# Patient Record
Sex: FEMALE | Race: WHITE | NOT HISPANIC OR LATINO | Employment: FULL TIME | URBAN - METROPOLITAN AREA
[De-identification: names, ages, dates, MRNs, and addresses within clinical notes are randomized per-mention and may not be internally consistent; named-entity substitution may affect disease eponyms.]

---

## 2017-11-08 ENCOUNTER — OFFICE VISIT (OUTPATIENT)
Dept: FAMILY MEDICINE | Facility: CLINIC | Age: 47
End: 2017-11-08
Payer: COMMERCIAL

## 2017-11-08 ENCOUNTER — TRANSFERRED RECORDS (OUTPATIENT)
Dept: HEALTH INFORMATION MANAGEMENT | Facility: CLINIC | Age: 47
End: 2017-11-08

## 2017-11-08 VITALS
WEIGHT: 175.25 LBS | TEMPERATURE: 99.4 F | BODY MASS INDEX: 29.2 KG/M2 | HEART RATE: 86 BPM | HEIGHT: 65 IN | RESPIRATION RATE: 16 BRPM | DIASTOLIC BLOOD PRESSURE: 72 MMHG | OXYGEN SATURATION: 99 % | SYSTOLIC BLOOD PRESSURE: 124 MMHG

## 2017-11-08 DIAGNOSIS — E03.9 HYPOTHYROIDISM, UNSPECIFIED TYPE: ICD-10-CM

## 2017-11-08 DIAGNOSIS — Z23 NEED FOR PROPHYLACTIC VACCINATION AND INOCULATION AGAINST INFLUENZA: ICD-10-CM

## 2017-11-08 DIAGNOSIS — Z00.00 ROUTINE GENERAL MEDICAL EXAMINATION AT A HEALTH CARE FACILITY: Primary | ICD-10-CM

## 2017-11-08 DIAGNOSIS — Z12.31 VISIT FOR SCREENING MAMMOGRAM: ICD-10-CM

## 2017-11-08 DIAGNOSIS — G62.9 NEUROPATHY: ICD-10-CM

## 2017-11-08 PROCEDURE — 90686 IIV4 VACC NO PRSV 0.5 ML IM: CPT | Performed by: FAMILY MEDICINE

## 2017-11-08 PROCEDURE — 93000 ELECTROCARDIOGRAM COMPLETE: CPT | Performed by: FAMILY MEDICINE

## 2017-11-08 PROCEDURE — 99396 PREV VISIT EST AGE 40-64: CPT | Mod: 25 | Performed by: FAMILY MEDICINE

## 2017-11-08 PROCEDURE — 90471 IMMUNIZATION ADMIN: CPT | Performed by: FAMILY MEDICINE

## 2017-11-08 ASSESSMENT — PAIN SCALES - GENERAL: PAINLEVEL: NO PAIN (0)

## 2017-11-08 NOTE — PROGRESS NOTES
Injectable Influenza Immunization Documentation    1.  Is the person to be vaccinated sick today?   No    2. Does the person to be vaccinated have an allergy to a component   of the vaccine?   No  Egg Allergy Algorithm Link    3. Has the person to be vaccinated ever had a serious reaction   to influenza vaccine in the past?   No    4. Has the person to be vaccinated ever had Guillain-Barré syndrome?   No    Form completed by Mitzy Rodriguez CMA    Prior to injection verified patient identity using patient's name and date of birth. Pt was advised to stay in clinic for 20 minutes and report any reactions.

## 2017-11-08 NOTE — PROGRESS NOTES
SUBJECTIVE:   CC: Vivian Ya is an 47 year old woman who presents for preventive health visit.     Physical   Annual:     Getting at least 3 servings of Calcium per day::  Yes    Bi-annual eye exam::  Yes    Dental care twice a year::  Yes    Sleep apnea or symptoms of sleep apnea::  Daytime drowsiness    Diet::  Regular (no restrictions)    Frequency of exercise::  1 day/week    Duration of exercise::  45-60 minutes    Taking medications regularly::  Yes    Medication side effects::  None    Additional concerns today::  YES          Concerns: bilateral feet pain for several months  While sitting with her legs bent under her she sees black spots and feels faint.  While lying down last night she felt her heart was irregular.        Today's PHQ-2 Score: PHQ-2 ( 1999 Pfizer) 11/8/2017   Q1: Little interest or pleasure in doing things 0   Q2: Feeling down, depressed or hopeless 1   PHQ-2 Score 1   Q1: Little interest or pleasure in doing things Not at all   Q2: Feeling down, depressed or hopeless Several days   PHQ-2 Score 1       Abuse: Current or Past(Physical, Sexual or Emotional)- No  Do you feel safe in your environment - Yes    Social History   Substance Use Topics     Smoking status: Never Smoker     Smokeless tobacco: Never Used     Alcohol use 0.0 oz/week     0 Standard drinks or equivalent per week     The patient does not drink >3 drinks per day nor >7 drinks per week.    Reviewed orders with patient.  Reviewed health maintenance and updated orders accordingly - Yes  BP Readings from Last 3 Encounters:   11/08/17 124/72   10/25/16 98/64   09/28/15 107/75    Wt Readings from Last 3 Encounters:   11/08/17 175 lb 4 oz (79.5 kg)   10/25/16 175 lb (79.4 kg)   09/28/15 162 lb (73.5 kg)                  Current Outpatient Prescriptions   Medication Sig Dispense Refill     Simethicone (GAS RELIEF PO) Take by mouth daily       levothyroxine (SYNTHROID,LEVOTHROID) 100 MCG tablet Take 1 tablet (100 mcg) by  "mouth daily 90 tablet 3         Mammogram not appropriate for this patient based on age.    Pertinent mammograms are reviewed under the imaging tab.  History of abnormal Pap smear: NO - age 30- 65 PAP every 3 years recommended    Reviewed and updated as needed this visit by clinical staff         Reviewed and updated as needed this visit by Provider          Review of Systems  C: NEGATIVE for fever, chills, change in weight  I: NEGATIVE for worrisome rashes, moles or lesions  E: NEGATIVE for vision changes or irritation  ENT: NEGATIVE for ear, mouth and throat problems  RESP:NEGATIVE for significant cough or SOB and dyspnea on exertion mild   B: NEGATIVE for masses, tenderness or discharge  CV: chest pain/pressure and irregular heart beat  GI: NEGATIVE for nausea, abdominal pain, heartburn, or change in bowel habits  : NEGATIVE for unusual urinary or vaginal symptoms. Periods are regular.  M: Painful feet in AM has had work up no findings will see [facility] if persists   N: NEGATIVE for weakness, dizziness or paresthesias  NEURO: lightheaded when standing, tingling in feet   P: NEGATIVE for changes in mood or affect  PSYCHIATRIC: stress and relationship issues      OBJECTIVE:   /72 (BP Location: Left arm, Patient Position: Chair, Cuff Size: Adult Regular)  Pulse 86  Temp 99.4  F (37.4  C) (Tympanic)  Resp 16  Ht 5' 4.5\" (1.638 m)  Wt 175 lb 4 oz (79.5 kg)  SpO2 99%  BMI 29.62 kg/m2  Physical Exam  GENERAL: healthy, alert and no distress  EYES: Eyes grossly normal to inspection, PERRL and conjunctivae and sclerae normal  HENT: ear canals and TM's normal, nose and mouth without ulcers or lesions  NECK: no adenopathy, no asymmetry, masses, or scars and thyroid normal to palpation  RESP: lungs clear to auscultation - no rales, rhonchi or wheezes  CV: regular rate and rhythm, normal S1 S2, no S3 or S4, no murmur, click or rub, no peripheral edema and peripheral pulses strong  MS: no gross musculoskeletal " "defects noted, no edema  NEURO: Normal strength and tone, mentation intact and speech normal  PSYCH: mentation appears normal, affect normal/bright      Fasting labs ordered     ASSESSMENT/PLAN:   1. Routine general medical examination at a health care facility      2. Visit for screening mammogram      3. Need for prophylactic vaccination and inoculation against influenza    - Vaccine Administration, Initial [35278]  - FLU VAC, SPLIT VIRUS IM > 3 YO (QUADRIVALENT) [41606]  - Vaccine Administration, Initial [11536]    4. Hypothyroidism, unspecified type  Will inform pt. of all test results and any care plan changes.    - TSH with free T4 reflex FUTURE anytime; Future    5. Neuropathy  In her feet   - **Glucose FUTURE anytime; Future  6. Palpitations       If they persist will get Holter monitor of event monitor   7. Chest symptoms; will walk over the next two weeks if pain, pressure of SOB will call and we will schedule a stress thallium       COUNSELING:  Reviewed preventive health counseling, as reflected in patient instructions       Regular exercise       Healthy diet/nutrition         reports that she has never smoked. She has never used smokeless tobacco.    Estimated body mass index is 29.62 kg/(m^2) as calculated from the following:    Height as of this encounter: 5' 4.5\" (1.638 m).    Weight as of this encounter: 175 lb 4 oz (79.5 kg).   Weight management plan: Discussed healthy diet and exercise guidelines and patient will follow up in 12 months in clinic to re-evaluate.    Counseling Resources:  ATP IV Guidelines  Pooled Cohorts Equation Calculator  Breast Cancer Risk Calculator  FRAX Risk Assessment  ICSI Preventive Guidelines  Dietary Guidelines for Americans, 2010  USDA's MyPlate  ASA Prophylaxis  Lung CA Screening    Harry Cancino MD, MD  [facility]  Answers for HPI/ROS submitted by the patient on 11/8/2017   PHQ-2 Score: 1    Injectable Influenza Immunization Documentation    1.  " Is the person to be vaccinated sick today?   No    2. Does the person to be vaccinated have an allergy to a component   of the vaccine?   No  Egg Allergy Algorithm Link    3. Has the person to be vaccinated ever had a serious reaction   to influenza vaccine in the past?   No    4. Has the person to be vaccinated ever had Guillain-Barré syndrome?   No    Form completed by Veronica Perales, [employer]

## 2017-11-08 NOTE — MR AVS SNAPSHOT
After Visit Summary   11/8/2017    Vivian Ya    MRN: [id number]           Patient Information     Date Of Birth          1970        Visit Information        Provider Department      11/8/2017 9:10 AM Harry Cancino MD [facility]        Today's Diagnoses     Routine general medical examination at a health care facility    -  1    Visit for screening mammogram        Need for prophylactic vaccination and inoculation against influenza        Hypothyroidism, unspecified type        Neuropathy          Care Instructions      Preventive Health Recommendations  Female Ages 40 to 49    Yearly exam:     See your health care provider every year in order to  1. Review health changes.   2. Discuss preventive care.    3. Review your medicines if your doctor prescribed any.      Get a Pap test every three years (unless you have an abnormal result and your provider advises testing more often).      If you get Pap tests with HPV test, you only need to test every 5 years, unless you have an abnormal result. You do not need a Pap test if your uterus was removed (hysterectomy) and you have not had cancer.      You should be tested each year for STDs (sexually transmitted diseases), if you're at risk.       Ask your doctor if you should have a mammogram.      Have a colonoscopy (test for colon cancer) if someone in your family has had colon cancer or polyps before age 50.       Have a cholesterol test every 5 years.       Have a diabetes test (fasting glucose) after age 45. If you are at risk for diabetes, you should have this test every 3 years.    Shots: Get a flu shot each year. Get a tetanus shot every 10 years.     Nutrition:     Eat at least 5 servings of fruits and vegetables each day.    Eat whole-grain bread, whole-wheat pasta and brown rice instead of white grains and rice.    Talk to your provider about Calcium and Vitamin D.     Lifestyle    Exercise at least 150 minutes a week  "(an average of 30 minutes a day, 5 days a week). This will help you control your weight and prevent disease.    Limit alcohol to one drink per day.    No smoking.     Wear sunscreen to prevent skin cancer.    See your dentist every six months for an exam and cleaning.          Follow-ups after your visit        Your next 10 appointments already scheduled     Nov 21, 2017  9:30 AM CST   MA SCREENING DIGITAL BILATERAL with PHMA1   [facility] ([facility])    [address] [place] [address] [phone]           Do not use any powder, lotion or deodorant under your arms or on your breast. If you do, we will ask you to remove it before your exam.  Wear comfortable, two-piece clothing.  If you have any allergies, tell your care team.  Bring any previous mammograms from other facilities or have them mailed to the breast center. Three-dimensional (3D) mammograms are available at [facility] locations in [facility], [facility], and [place]. Health locations include [facility] in [place]. Benefits of 3D mammograms include: - Improved rate of cancer detection - Decreases your chance of having to go back for more tests, which means fewer: - \"False-positive\" results (This means that there is an abnormal area but it isn't cancer.) - Invasive testing procedures, such as a biopsy or surgery - Can provide clearer images of the breast if you have dense breast tissue. 3D mammography is an optional exam that anyone can have with a 2D mammogram. It doesn't replace or take the place of a 2D mammogram. 2D mammograms remain an effective screening test for all women.  Not all insurance companies cover the cost of a 3D mammogram. Check with your insurance.              Future tests that were ordered for you today     Open Future Orders        Priority Expected Expires Ordered    **Glucose FUTURE anytime Routine " "11/8/2017 11/8/2018 11/8/2017    **TSH with free T4 reflex FUTURE anytime Routine 11/8/2017 11/8/2018 11/8/2017            Who to contact     If you have questions or need follow up information about today's clinic visit or your schedule please contact [facility] directly at [phone].  Normal or non-critical lab and imaging results will be communicated to you by MyChart, letter or phone within 4 business days after the clinic has received the results. If you do not hear from us within 7 days, please contact the clinic through Numerifyt or phone. If you have a critical or abnormal lab result, we will notify you by phone as soon as possible.  Submit refill requests through Airizu or call your pharmacy and they will forward the refill request to us. Please allow 3 business days for your refill to be completed.          Additional Information About Your Visit        SwagsyharRegalamos Information     Airizu gives you secure access to your electronic health record. If you see a primary care provider, you can also send messages to your care team and make appointments. If you have questions, please call your primary care clinic.  If you do not have a primary care provider, please call [phone] and they will assist you.        Care EveryWhere ID     This is your Care EveryWhere ID. This could be used by other organizations to access your [facility] medical records  [id number]        Your Vitals Were     Pulse Temperature Respirations Height Last Period Pulse Oximetry    86 99.4  F (37.4  C) (Tympanic) 16 5' 4.5\" (1.638 m) 10/24/2017 (Exact Date) 99%    BMI (Body Mass Index)                   29.62 kg/m2            Blood Pressure from Last 3 Encounters:   11/08/17 124/72   10/25/16 98/64   09/28/15 107/75    Weight from Last 3 Encounters:   11/08/17 175 lb 4 oz (79.5 kg)   10/25/16 175 lb (79.4 kg)   09/28/15 162 lb (73.5 kg)              We Performed the Following     FLU VAC, SPLIT VIRUS IM > 3 YO " (QUADRIVALENT) [08689]     Vaccine Administration, Initial [85963]     Vaccine Administration, Initial [67111]        Primary Care Provider Office Phone # Fax #    Harry Cancino [phone] [address] [place] [address]        Equal Access to Services     [facility]: Hadii aad ku hadasho Soomaali, waaxda luqadaha, qaybta kaalmada adeegyada, waxay stephaniein haykenyattan adefer khmikeyzuly lazoë cooney. So [facility] [phone].    ATENCIÓN: Si habla español, tiene a workman disposición servicios gratuitos de asistencia lingüística. Llame al [phone].    We comply with applicable federal civil rights laws and [place] laws. We do not discriminate on the basis of race, color, national origin, age, disability, sex, sexual orientation, or gender identity.            Thank you!     Thank you for choosing [facility]  for your care. Our goal is always to provide you with excellent care. Hearing back from our patients is one way we can continue to improve our services. Please take a few minutes to complete the written survey that you may receive in the mail after your visit with us. Thank you!             Your Updated Medication List - Protect others around you: Learn how to safely use, store and throw away your medicines at www.disposemymeds.org.          This list is accurate as of: 11/8/17  4:08 PM.  Always use your most recent med list.                   Brand Name Dispense Instructions for use Diagnosis    GAS RELIEF PO      Take by mouth daily        levothyroxine 100 MCG tablet    SYNTHROID/LEVOTHROID    90 tablet    Take 1 tablet (100 mcg) by mouth daily    Other specified hypothyroidism

## 2017-11-08 NOTE — NURSING NOTE
"Chief Complaint   Patient presents with     Physical       Initial /72 (BP Location: Left arm, Patient Position: Chair, Cuff Size: Adult Regular)  Pulse 86  Temp 99.4  F (37.4  C) (Tympanic)  Resp 16  Ht 5' 4.5\" (1.638 m)  Wt 175 lb 4 oz (79.5 kg)  LMP 10/24/2017 (Exact Date)  SpO2 99%  BMI 29.62 kg/m2 Estimated body mass index is 29.62 kg/(m^2) as calculated from the following:    Height as of this encounter: 5' 4.5\" (1.638 m).    Weight as of this encounter: 175 lb 4 oz (79.5 kg).  Medication Reconciliation: complete   Health Maintenance Due   Topic Date Due     INFLUENZA VACCINE (SYSTEM ASSIGNED)  09/01/2017     MAMMO Q1 YR  11/14/2017     Veronica Perales, [facility]      "

## 2017-11-09 DIAGNOSIS — E03.9 HYPOTHYROIDISM, UNSPECIFIED TYPE: ICD-10-CM

## 2017-11-09 DIAGNOSIS — G62.9 NEUROPATHY: ICD-10-CM

## 2017-11-09 LAB
GLUCOSE SERPL-MCNC: 98 MG/DL (ref 70–99)
TSH SERPL DL<=0.005 MIU/L-ACNC: 3.62 MU/L (ref 0.4–4)

## 2017-11-09 PROCEDURE — 84443 ASSAY THYROID STIM HORMONE: CPT | Performed by: FAMILY MEDICINE

## 2017-11-09 PROCEDURE — 82947 ASSAY GLUCOSE BLOOD QUANT: CPT | Performed by: FAMILY MEDICINE

## 2017-11-09 PROCEDURE — 36415 COLL VENOUS BLD VENIPUNCTURE: CPT | Performed by: FAMILY MEDICINE

## 2017-11-21 ENCOUNTER — HOSPITAL ENCOUNTER (OUTPATIENT)
Dept: MAMMOGRAPHY | Facility: CLINIC | Age: 47
Discharge: HOME | End: 2017-11-21
Attending: FAMILY MEDICINE | Admitting: FAMILY MEDICINE
Payer: COMMERCIAL

## 2017-11-21 DIAGNOSIS — Z12.31 VISIT FOR SCREENING MAMMOGRAM: ICD-10-CM

## 2017-11-21 PROCEDURE — G0202 SCR MAMMO BI INCL CAD: HCPCS

## 2017-11-26 DIAGNOSIS — E03.8 OTHER SPECIFIED HYPOTHYROIDISM: ICD-10-CM

## 2017-11-27 RX ORDER — LEVOTHYROXINE SODIUM 100 UG/1
TABLET ORAL
Qty: 90 TABLET | Refills: 3 | Status: SHIPPED | OUTPATIENT
Start: 2017-11-27 | End: 2018-12-07

## 2017-11-27 NOTE — TELEPHONE ENCOUNTER
LOV 11/08/2017.    Prescription approved per [facility] Refill Protocol.    Enedina Coughlin RN     Requested Prescriptions   Pending Prescriptions Disp Refills     levothyroxine (SYNTHROID/LEVOTHROID) 100 MCG tablet [Pharmacy Med Name: LEVOTHYROXINE 100MCG TAB] 90 tablet 3     Sig: TAKE ONE TABLET BY MOUTH EVERY DAY    Thyroid Protocol Passed    11/27/2017  8:34 AM       Passed - Patient is 12 years or older       Passed - Recent or future visit with authorizing provider's specialty    Patient had office visit in the last year or has a visit in the next 30 days with authorizing provider.  See chart review.              Passed - Normal TSH on file in past 12 months    Recent Labs   Lab Test  11/09/17   0745   TSH  3.62             Passed - No active pregnancy on record    If patient is pregnant or has had a positive pregnancy test, please check TSH.         Passed - No positive pregnancy test in past 12 months    If patient is pregnant or has had a positive pregnancy test, please check TSH.

## 2018-11-16 ASSESSMENT — PATIENT HEALTH QUESTIONNAIRE - PHQ9
SUM OF ALL RESPONSES TO PHQ QUESTIONS 1-9: 11
10. IF YOU CHECKED OFF ANY PROBLEMS, HOW DIFFICULT HAVE THESE PROBLEMS MADE IT FOR YOU TO DO YOUR WORK, TAKE CARE OF THINGS AT HOME, OR GET ALONG WITH OTHER PEOPLE: VERY DIFFICULT
SUM OF ALL RESPONSES TO PHQ QUESTIONS 1-9: 11

## 2018-11-17 ASSESSMENT — PATIENT HEALTH QUESTIONNAIRE - PHQ9: SUM OF ALL RESPONSES TO PHQ QUESTIONS 1-9: 11

## 2018-11-19 ENCOUNTER — OFFICE VISIT (OUTPATIENT)
Dept: OBGYN | Facility: OTHER | Age: 48
End: 2018-11-19
Payer: COMMERCIAL

## 2018-11-19 VITALS
SYSTOLIC BLOOD PRESSURE: 116 MMHG | WEIGHT: 171.2 LBS | DIASTOLIC BLOOD PRESSURE: 74 MMHG | BODY MASS INDEX: 28.52 KG/M2 | HEIGHT: 65 IN | HEART RATE: 72 BPM

## 2018-11-19 DIAGNOSIS — N85.2 ENLARGED UTERUS: ICD-10-CM

## 2018-11-19 DIAGNOSIS — Z12.31 ENCOUNTER FOR SCREENING MAMMOGRAM FOR BREAST CANCER: ICD-10-CM

## 2018-11-19 DIAGNOSIS — N93.9 ABNORMAL UTERINE BLEEDING (AUB): ICD-10-CM

## 2018-11-19 DIAGNOSIS — Z00.00 ANNUAL PHYSICAL EXAM: Primary | ICD-10-CM

## 2018-11-19 PROCEDURE — 99386 PREV VISIT NEW AGE 40-64: CPT | Performed by: OBSTETRICS & GYNECOLOGY

## 2018-11-19 NOTE — MR AVS SNAPSHOT
After Visit Summary   11/19/2018    Vivian Ya    MRN: [id number]           Patient Information     Date Of Birth          1970        Visit Information        Provider Department      11/19/2018 9:30 AM Leonardo Anderson MD [facility]        Today's Diagnoses     Annual physical exam    -  1    Encounter for screening mammogram for breast cancer        Abnormal uterine bleeding (AUB)        Enlarged uterus           Follow-ups after your visit        Your next 10 appointments already scheduled     Dec 05, 2018  1:40 PM CST   US PELVIC COMPLETE W TRANSVAGINAL with [id number]   [facility]    [address] [facility] [address] [phone]           How do I prepare for my exam? (Food and drink instructions) Adults: Drink four 8-ounce glasses of fluid. Finish drinking an hour before your exam, so you have a full bladder. If you need to empty your bladder before your exam, try to release only a little urine. Then, drink another glass of fluid.  Children: * Children who are potty trained up to 6 years old should drink at least 2 cups (16 oz) of water/non-carbonated beverage 30 minutes prior to the exam. * Children who are 6-10 years should drink at least 3 cups (24 oz) of water/non-carbonated beverage 45 minutes prior to the exam. * Children who are 10 years or older should drink at least 4 cups (32 oz) of water/non-carbonated beverage 45 minutes prior to the exam.  If your child is very uncomfortable or has an urgent need to pee, please notify a technologist; they will try to find out how much longer the wait may be and provide instructions to help relieve the pressure.  What should I wear: Wear comfortable clothes.  How long does the exam take: Most ultrasounds take 30 to 60 minutes.  What should I bring: Bring a list of your medicines, including vitamins, minerals and over-the-counter drugs. It is safest to leave  personal items at home.  Do I need a :  No  is needed.  What do I need to tell my doctor: Tell your doctor about any allergies you may have.  What should I do after the exam: No restrictions, you may resume normal activities.  What is this test: An ultrasound uses sound waves to make pictures of the body. Sound waves do not cause pain. The only discomfort may be the pressure of the wand against your skin or full bladder.  Who should I call with questions: If you have any questions, please call the Imaging Department where you will have your exam. Directions, parking instructions, and other information are available on our website, [email or web address].              Future tests that were ordered for you today     Open Future Orders        Priority Expected Expires Ordered    US Pelvic Complete w Transvaginal Routine  11/19/2019 11/19/2018    *MA Screening Digital Bilateral Routine  11/19/2019 11/19/2018            Who to contact     If you have questions or need follow up information about today's clinic visit or your schedule please contact [facility] directly at [phone].  Normal or non-critical lab and imaging results will be communicated to you by Lightwirehart, letter or phone within 4 business days after the clinic has received the results. If you do not hear from us within 7 days, please contact the clinic through OneTokt or phone. If you have a critical or abnormal lab result, we will notify you by phone as soon as possible.  Submit refill requests through Novatris or call your pharmacy and they will forward the refill request to us. Please allow 3 business days for your refill to be completed.          Additional Information About Your Visit        Novatris Information     Novatris gives you secure access to your electronic health record. If you see a primary care provider, you can also send messages to your care team and make appointments. If you have questions, please call your  "primary care clinic.  If you do not have a primary care provider, please call [phone] and they will assist you.        Care EveryWhere ID     This is your Care EveryWhere ID. This could be used by other organizations to access your [facility] medical records  [id number]        Your Vitals Were     Pulse Height Last Period BMI (Body Mass Index)          72 5' 4.8\" (1.646 m) 10/17/2018 (Exact Date) 28.66 kg/m2         Blood Pressure from Last 3 Encounters:   11/19/18 116/74   11/08/17 124/72   10/25/16 98/64    Weight from Last 3 Encounters:   11/19/18 171 lb 3.2 oz (77.7 kg)   11/08/17 175 lb 4 oz (79.5 kg)   10/25/16 175 lb (79.4 kg)               Primary Care Provider Office Phone # Fax #    Harry Cancino [phone] [address] [place] [address]        Equal Access to Services     [facility]: Hadii aad ku hadasho Soomaali, waaxda luqadaha, qaybta kaalmada adeegyada, waxay ramona haykenyattan foreign nicole . So [facility] [phone].    ATENCIÓN: Si habla español, tiene a workman disposición servicios gratuitos de asistencia lingüística. Ll[facility] [phone].    We comply with applicable federal civil rights laws and [place] laws. We do not discriminate on the basis of race, color, national origin, age, disability, sex, sexual orientation, or gender identity.            Thank you!     Thank you for choosing [facility]  for your care. Our goal is always to provide you with excellent care. Hearing back from our patients is one way we can continue to improve our services. Please take a few minutes to complete the written survey that you may receive in the mail after your visit with us. Thank you!             Your Updated Medication List - Protect others around you: Learn how to safely use, store and throw away your medicines at www.disposemymeds.org.          This list is accurate as of 11/19/18 12:59 PM.  Always use your most recent med list.                   Brand " Name Dispense Instructions for use Diagnosis    GAS RELIEF PO      Take by mouth daily        levothyroxine 100 MCG tablet    SYNTHROID/LEVOTHROID    90 tablet    TAKE ONE TABLET BY MOUTH EVERY DAY    Other specified hypothyroidism

## 2018-11-19 NOTE — PROGRESS NOTES
"Clinic Note    CC:    Chief Complaint   Patient presents with     Physical        HPI:  Vivian Ya is a 48 year old  woman who presents for last annual exam.   She notes several years of intermittent depressed feelings. She first was aware of this following her husbands stroke, though denies triggers for ongoing symptoms. She has not been previously seen nor evaluated for this. The mood changes have not interfered with her daily activities, she states that she just feels \"less mike in my life.\" Menstrual cycles have typically been regular and monthly, though over the past several months, the intervals have varied more and the bleeding length less regular that prior. She otherwise feels physically well. Denies breast changes, GI symptoms, changes in diet or weight, pelvic or vaginal symptoms, urinary or bowel symptoms.     Ob Hx:     Gyn Hx: Patient's last menstrual period was 10/17/2018 (exact date).  Menses as above   Last pap NILM, HPV- (10/16)   STI history negative   Using condoms for birth control  PMH:   Past Medical History:   Diagnosis Date     Family history of colon cancer     Colonoscoy q5 years next 2020     Hypothyroidism      SurgHx:   Past Surgical History:   Procedure Laterality Date     COLONOSCOPY N/A 2015    Procedure: COLONOSCOPY;  Surgeon: Eugenio Travis MD;  Location:  GI     TONSILLECTOMY       FamHx:   Family History   Problem Relation Age of Onset     Colon Cancer Maternal Grandmother 91     Hypertension Mother      Hyperlipidemia Mother      Cancer Mother 65     GIST     Other Cancer Mother      Coronary Artery Disease Father      MI     Thyroid Disease Sister      Asthma Daughter      SocHx:   Social History     Social History     Marital status:      Spouse name: N/A     Number of children: N/A     Years of education: N/A     Occupational History           Physical Therapy     Social History Main Topics     Smoking status: Never Smoker     " "Smokeless tobacco: Never Used     Alcohol use 0.0 oz/week     0 Standard drinks or equivalent per week      Comment: 1-2 times/month     Drug use: No     Sexual activity: Yes     Partners: Male     Birth control/ protection: Condom     Allergies:   Latex and Seasonal allergies    Current Medications:   Current Outpatient Prescriptions   Medication Sig Dispense Refill     levothyroxine (SYNTHROID/LEVOTHROID) 100 MCG tablet TAKE ONE TABLET BY MOUTH EVERY DAY 90 tablet 3     Simethicone (GAS RELIEF PO) Take by mouth daily       ROS: As described in HPI, otherwise negative for fever/chills, fatigue, dizziness, changes or new deficits in vision, weight changes, worrisome rashes, new lumps or masses, cough/SOB/CP, nausea/vomit, GI distress, dysuria, abnormal vaginal discharge, constipation/diarrhea, neurological deficits, changes in ADL, changes in skin or hair, heat or cold intolerance, or other systemic complaints    EXAM:  Vitals:    11/19/18 0935   BP: 116/74   BP Location: Right arm   Cuff Size: Adult Regular   Pulse: 72   Weight: 171 lb 3.2 oz (77.7 kg)   Height: 5' 4.8\" (1.646 m)    Body mass index is 28.66 kg/(m^2).    Gen: Alert, oriented, appropriately interactive, NAD  Neck: soft, no cervical adenopathy, no masses  CV: RRR, no murmurs, no extra heart sounds, 2+ peripheral pulses  Resp: CTAB, good effort without distress   Breasts: no axillary adenopathy, no dominant masses, no skin changes, no nipple discharge, nontender  Abdomen: soft, non tender, non distended, no masses, no hernias. No inguinal lymphadenopathy. No hepatosplenomegaly  Perineum: no lesions; normal appearing external genitalia, bartholins glands, urethra, skenes glands  Vagina : no masses or lesions or discharge, normally rugated.  Cervix: no masses or lesions or discharge   Bimanual exam:   Urethra nontender   Bladder- nontender and without massess , well supported   Uterus- midline , enlarged, anteverted, mobile , no masses, " non-tender  Adnexa - no masses or tenderness   No cervical motion tenderness  Lower extremities: non-tender, no edema  Skin: no lesions or rashes    Labs & Imaging:  No results found for this or any previous visit (from the past 24 hour(s)).    Lab Results   Component Value Date    PAP NIL 10/25/2016    PAP NIL 2013     ASSESSMENT/PLAN: Vivian Ya is a 48 year old  woman who presents for last annual exam.     1. Depressed mood  - Given prolonged course, we discussed first line antidepressants as well as a referral to mental health, I recommended consideration of starting an serotonin specific reuptake inhibitor today. She declined at this time, plans to become more active and develop a hobby. I encouraged these activities, though pointed out that the mood changes may make this effort more difficult, and to consider treatment if she finds that she is struggling.   - We also discussed that her TSH is technically normal, though a bit elevated, she could consider discussing a mild increase in her synthroid with her PCP    2. Abnormal uterine bleeding (AUB)  - Perimenopausal is a possibility, though given enlarged uterus, will collect an Ultrasound   - US Pelvic Complete w Transvaginal; Future    3. Annual exam  - *MA Screening Digital Bilateral; Future  - PAP not due    Leonardo Anderson MD  2018 12:27 PM      Answers for HPI/ROS submitted by the patient on 2018   PHQ-2 Score: 4  If you checked off any problems, how difficult have these problems made it for you to do your work, take care of things at home, or get along with other people?: Very difficult  PHQ9 TOTAL SCORE: 11

## 2018-11-30 ENCOUNTER — HOSPITAL ENCOUNTER (OUTPATIENT)
Dept: MAMMOGRAPHY | Facility: CLINIC | Age: 48
Discharge: HOME | End: 2018-11-30
Attending: OBSTETRICS & GYNECOLOGY | Admitting: OBSTETRICS & GYNECOLOGY
Payer: COMMERCIAL

## 2018-11-30 DIAGNOSIS — Z12.31 VISIT FOR SCREENING MAMMOGRAM: ICD-10-CM

## 2018-11-30 PROCEDURE — 77063 BREAST TOMOSYNTHESIS BI: CPT

## 2018-12-05 ENCOUNTER — RADIANT APPOINTMENT (OUTPATIENT)
Dept: ULTRASOUND IMAGING | Facility: OTHER | Age: 48
End: 2018-12-05
Payer: COMMERCIAL

## 2018-12-05 ENCOUNTER — TELEPHONE (OUTPATIENT)
Dept: OBGYN | Facility: OTHER | Age: 48
End: 2018-12-05

## 2018-12-05 DIAGNOSIS — N93.9 ABNORMAL UTERINE BLEEDING (AUB): ICD-10-CM

## 2018-12-05 DIAGNOSIS — E03.8 OTHER SPECIFIED HYPOTHYROIDISM: ICD-10-CM

## 2018-12-05 DIAGNOSIS — N93.9 ABNORMAL UTERINE BLEEDING (AUB): Primary | ICD-10-CM

## 2018-12-05 DIAGNOSIS — N85.2 ENLARGED UTERUS: ICD-10-CM

## 2018-12-05 PROCEDURE — 76856 US EXAM PELVIC COMPLETE: CPT

## 2018-12-05 PROCEDURE — 76830 TRANSVAGINAL US NON-OB: CPT

## 2018-12-05 NOTE — TELEPHONE ENCOUNTER
Reason for Call:  Medication or medication refill:    Do you use a [facility]?  Name of the pharmacy and phone number for the current request:  [facility] - [phone]    Name of the medication requested: levothyroxine (SYNTHROID/LEVOTHROID) 100 MCG tablet    Other request: patient is wanting to increase the dose like you gayle talked about at her visit. She is also ready for a refill.  Any questions please call    Can we leave a detailed message on this number? YES    Phone number patient can be reached at: Cell number on file:    Telephone Information:   Mobile [phone]       Best Time: any    Call taken on 12/5/2018 at 1:34 PM by Tania Azevedo

## 2018-12-05 NOTE — TELEPHONE ENCOUNTER
Per Dr. Anderson's OV plan on 11/19/18, he suggested that pt discussing a mild increase with her PCP:    We also discussed that her TSH is technically normal, though a bit elevated, she could consider discussing a mild increase in her synthroid with her PCP    Left pt a detailed message.  Let her know that she needs to f/u with her PCP for possible synthroid increase and/or refills.    Conchita Powell RN

## 2018-12-05 NOTE — TELEPHONE ENCOUNTER
Please call Pt at [phone] (Work)  Ask for Pt. She would like to talk to someone about this situation.

## 2018-12-06 NOTE — TELEPHONE ENCOUNTER
"Spoke with pt.  She states that when she was leaving her appointment with Dr. Anderson on 11/19/18 that he told her \"let me know if you want me to increase your Synthroid\".  I explained to the pt that he does not manage thyroid levels and and suggested to her during her appointment that she needs to contact her PCP to increase her Synthroid.  Again, pt was adamant that Dr. Anderson had told her he would change her thyroid medication for her.  She states she will f/u with her primary but she will be out of her Synthroid medication this weekend and doesn't have an appt set up with her PCP yet.  I explained to pt that I could send her PCP a request to refill her current dose until she is able to see him.  Pt did not want me to do that since her PCP is out the rest of the week and she needs a refill before the weekend.  I explained to pt that I will send a message to Dr. Anderson.  She is requesting Dr. Anderson increase it just for a month and her PCP can determine what level is best for her.  Also, pt was concerned because she states that she had a pelvic US yesterday and the [employer] tech put in her LMP wrong.  Pt states her LMP was on 11/7/18.  I explained to pt that when I roomed her on 11/19/18, she told me the date of her LMP was 10/17/18 as in her note.  Pt denies this and said her LMP was 11/7/18.  Pt states she wants whoever reads her pelvic US to know this information and that she also started bleeding today, 12/6/18.  I explained to pt that I will send a message to Dr. Anderson but that he is off today and we won't hear from him until tomorrow.  Pt verbalized understanding and agreed to plan.  Pt states we can call her back on her cell phone and leave a detailed message.    Conchita Powell RN    "

## 2018-12-06 NOTE — TELEPHONE ENCOUNTER
Attempted to reach pt at her work number as listed below.  Left a message to call back.  Also attempted pt's cell phone number, left message for pt to return call to clinic.    Conchita Powell RN

## 2018-12-07 DIAGNOSIS — N93.9 ABNORMAL UTERINE BLEEDING (AUB): ICD-10-CM

## 2018-12-07 DIAGNOSIS — E03.8 OTHER SPECIFIED HYPOTHYROIDISM: ICD-10-CM

## 2018-12-07 LAB
ERYTHROCYTE [DISTWIDTH] IN BLOOD BY AUTOMATED COUNT: 16.1 % (ref 10–15)
HCT VFR BLD AUTO: 34.2 % (ref 35–47)
HGB BLD-MCNC: 10.5 G/DL (ref 11.7–15.7)
MCH RBC QN AUTO: 24.1 PG (ref 26.5–33)
MCHC RBC AUTO-ENTMCNC: 30.7 G/DL (ref 31.5–36.5)
MCV RBC AUTO: 78 FL (ref 78–100)
PLATELET # BLD AUTO: 356 10E9/L (ref 150–450)
RBC # BLD AUTO: 4.36 10E12/L (ref 3.8–5.2)
WBC # BLD AUTO: 8.3 10E9/L (ref 4–11)

## 2018-12-07 PROCEDURE — 36415 COLL VENOUS BLD VENIPUNCTURE: CPT | Performed by: OBSTETRICS & GYNECOLOGY

## 2018-12-07 PROCEDURE — 85027 COMPLETE CBC AUTOMATED: CPT | Performed by: OBSTETRICS & GYNECOLOGY

## 2018-12-07 RX ORDER — LEVOTHYROXINE SODIUM 112 UG/1
112 TABLET ORAL DAILY
Qty: 90 TABLET | Refills: 3 | Status: SHIPPED | OUTPATIENT
Start: 2018-12-07 | End: 2020-01-22

## 2018-12-07 NOTE — TELEPHONE ENCOUNTER
Leonardo Anderson MD Netland, Heidi, RN        Caller: Unspecified (2 days ago,  1:34 PM)                     I refilled her synthroid with a mild increase in dose and with refills. I also ordered a repeat TSH, she should have this collected appx one month after she starts the new dose.     Regarding her recent Ultrasound, she does have several small fibroids consisted with her enlarged uterus on my exam. This is not dangerous, though, if her bleeding is too heavy or is bothering her, she should follow up with me to discuss options. I would like her to get a CBC drawn from lab when she stops in for her medications, if she is anemic, the fibroids are more concerning. Make sure she only has the CBC drawn this time, and not the TSH.     Thanks,   leonardo       Attempted to reach pt on her cell number. Left message to call clinic back to relay above message from Dr. Anderson.    Conchita Powell, RN

## 2018-12-07 NOTE — TELEPHONE ENCOUNTER
Left detailed message on pt's cell phone regarding Dr. Anderson's message below.    Conchita Powell RN

## 2018-12-11 DIAGNOSIS — D50.0 IRON DEFICIENCY ANEMIA DUE TO CHRONIC BLOOD LOSS: Primary | ICD-10-CM

## 2018-12-11 RX ORDER — FERROUS SULFATE 325(65) MG
325 TABLET ORAL 2 TIMES DAILY
Qty: 60 TABLET | Refills: 2 | Status: SHIPPED | OUTPATIENT
Start: 2018-12-11 | End: 2019-01-16

## 2018-12-19 ENCOUNTER — OFFICE VISIT (OUTPATIENT)
Dept: OBGYN | Facility: CLINIC | Age: 48
End: 2018-12-19
Payer: COMMERCIAL

## 2018-12-19 VITALS
HEART RATE: 88 BPM | SYSTOLIC BLOOD PRESSURE: 104 MMHG | WEIGHT: 174.9 LBS | DIASTOLIC BLOOD PRESSURE: 68 MMHG | BODY MASS INDEX: 29.28 KG/M2

## 2018-12-19 DIAGNOSIS — Z11.3 ROUTINE SCREENING FOR STI (SEXUALLY TRANSMITTED INFECTION): ICD-10-CM

## 2018-12-19 DIAGNOSIS — K59.00 CONSTIPATION, UNSPECIFIED CONSTIPATION TYPE: ICD-10-CM

## 2018-12-19 DIAGNOSIS — N93.9 ABNORMAL UTERINE BLEEDING (AUB): Primary | ICD-10-CM

## 2018-12-19 DIAGNOSIS — D50.0 IRON DEFICIENCY ANEMIA DUE TO CHRONIC BLOOD LOSS: ICD-10-CM

## 2018-12-19 DIAGNOSIS — Z30.430 ENCOUNTER FOR INSERTION OF INTRAUTERINE CONTRACEPTIVE DEVICE: ICD-10-CM

## 2018-12-19 LAB — HCG UR QL: NEGATIVE

## 2018-12-19 PROCEDURE — 87491 CHLMYD TRACH DNA AMP PROBE: CPT | Performed by: OBSTETRICS & GYNECOLOGY

## 2018-12-19 PROCEDURE — 87591 N.GONORRHOEAE DNA AMP PROB: CPT | Performed by: OBSTETRICS & GYNECOLOGY

## 2018-12-19 PROCEDURE — 81025 URINE PREGNANCY TEST: CPT | Performed by: OBSTETRICS & GYNECOLOGY

## 2018-12-19 PROCEDURE — 99214 OFFICE O/P EST MOD 30 MIN: CPT | Mod: 25 | Performed by: OBSTETRICS & GYNECOLOGY

## 2018-12-19 PROCEDURE — 58300 INSERT INTRAUTERINE DEVICE: CPT | Performed by: OBSTETRICS & GYNECOLOGY

## 2018-12-19 RX ORDER — POLYETHYLENE GLYCOL 3350 17 G/17G
1 POWDER, FOR SOLUTION ORAL DAILY
Qty: 510 G | Refills: 3 | Status: SHIPPED | OUTPATIENT
Start: 2018-12-19 | End: 2019-01-16

## 2018-12-19 NOTE — PROGRESS NOTES
Clinic Note    HPI:  Vivian Ya is a 48 year old  woman who presents for follow up regarding irregular menstrual cycles and enlarged uterus. Menstrual cycles have typically been regular and monthly, though over the past several months, the intervals have varied more, bleeding heavy at times, and the bleeding length less regular that prior. She has oral iron, though not tolerating well due to constipation and nausea.      Ob Hx:     Gyn Hx: Patient's last menstrual period was 2018.  Menses as above                 Last pap NILM, HPV- (10/16)                 STI history negative                 Using condoms for birth control  PMH:   Past Medical History:   Diagnosis Date     Family history of colon cancer     Colonoscoy q5 years next 2020     Hypothyroidism      SurgHx:   Past Surgical History:   Procedure Laterality Date     COLONOSCOPY N/A 2015    Procedure: COLONOSCOPY;  Surgeon: Eugenio Travis MD;  Location:  GI     TONSILLECTOMY       FamHx:   Family History   Problem Relation Age of Onset     Colon Cancer Maternal Grandmother 91     Hypertension Mother      Hyperlipidemia Mother      Cancer Mother 65        GIST     Other Cancer Mother      Coronary Artery Disease Father         MI     Thyroid Disease Sister      Asthma Daughter      SocHx:   Social History     Socioeconomic History     Marital status:      Spouse name: None     Number of children: None     Years of education: None     Highest education level: None   Social Needs     Financial resource strain: None     Food insecurity - worry: None     Food insecurity - inability: None     Transportation needs - medical: None     Transportation needs - non-medical: None   Occupational History     Comment: Physical Therapy   Tobacco Use     Smoking status: Never Smoker     Smokeless tobacco: Never Used   Substance and Sexual Activity     Alcohol use: Yes     Alcohol/week: 0.0 oz     Comment: 1-2 times/month      Drug use: No     Sexual activity: Yes     Partners: Male     Birth control/protection: Condom   Other Topics Concern     Parent/sibling w/ CABG, MI or angioplasty before 65F 55M? Not Asked   Social History Narrative     None     Allergies:   Latex and Seasonal allergies    Current Medications:   Current Outpatient Medications   Medication Sig Dispense Refill     ferrous sulfate (FEROSUL) 325 (65 Fe) MG tablet Take 1 tablet (325 mg) by mouth 2 times daily 60 tablet 2     levothyroxine (SYNTHROID/LEVOTHROID) 112 MCG tablet Take 1 tablet (112 mcg) by mouth daily 90 tablet 3     polyethylene glycol (MIRALAX) powder Take 17 g (1 capful) by mouth daily 510 g 3     Simethicone (GAS RELIEF PO) Take by mouth daily       ROS: As described in HPI, otherwise negative for fever/chills, fatigue, dizziness, changes or new deficits in vision, weight changes, worrisome rashes, new lumps or masses, cough/SOB/CP, nausea/vomit, GI distress, dysuria, abnormal vaginal discharge, constipation/diarrhea, neurological deficits, changes in ADL, changes in skin or hair, heat or cold intolerance, or other systemic complaints    EXAM:  Vitals:    12/19/18 0858   BP: 104/68   BP Location: Right arm   Cuff Size: Adult Regular   Pulse: 88   Weight: 79.3 kg (174 lb 14.4 oz)    Body mass index is 29.28 kg/m .    Gen: Alert, oriented, appropriately interactive, NAD  Neck: soft, no cervical adenopathy, no masses  CV: RRR, no murmurs, no extra heart sounds, 2+ peripheral pulses  Resp: CTAB, good effort without distress   Abdomen: soft, non tender, non distended, no masses, no hernias. No inguinal lymphadenopathy.   Perineum: no lesions; normal appearing external genitalia  Vagina : no masses or lesions or discharge, normally rugated.  Cervix: no masses or lesions or discharge   (Enlarged non tender uterus on bimanual at last visit)  No cervical motion tenderness  Lower extremities: non-tender, no edema  Skin: no lesions or rashes    Labs & Imaging:  Hgb  10.5  Plt 356  MCV 78  Results for orders placed or performed in visit on 12/19/18 (from the past 24 hour(s))   HCG qualitative urine   Result Value Ref Range    HCG Qual Urine Negative NEG^Negative     Pelvic Ultrasound (12/5/18):  The uterus measures 8.5 x 5.7 x 5.9 cm. Multiple fibroids are seen in the uterus. The largest three are described on this study. One in the anterior uterine fundus measures 1.2 x 1.0 x 1.4 cm. One in the upper uterine body adjacent to the endometrial stripe (submucosal as well as myometrial) measures 1.6 x 1.3 x 1.5 cm. One in the lower anterior uterine body measures 1.6 x 0.9 x 1.5 cm. Endometrial stripe measures up to 1.2 cm in thickness and appears grossly normal where seen. Uterus is otherwise unremarkable.  The ovaries measure 3.1 x 2.0 x 2.2 cm on the right and 2.7 x 1.5 x 1.7 cm on the left. Simple-appearing cystic structure in the right ovary measures 1.5 x 1.4 x 1.3 cm and likely represents a dominant follicle. The ovaries are otherwise normal in appearance. No abnormal free fluid collections are identified.                                                               IMPRESSION:  1. Fibroid uterus. One of the fibroids (#2) approaches the endometrial stripe and is likely submucosal as well as myometrial. This could be associated with menorrhagia.  2. No other significant abnormalities are identified. Dominant follicle is seen in the right ovary.    ASSESSMENT/PLAN: Vivian Ya is a 48 year old  woman who presents for f/u regarding AUB, enlarged uterus.     1. Abnormal uterine bleeding (AUB)  - Reviewed options including: do nothing, menstrual suppression with hormonal contraception vs Depo lupron, or surgery with myomectomy vs hysterectomy. We discussed that a hysteroscopic myomectomy would not be an option given fibroid position. We also discussed that hormonal suppression may not stop the bleeding given fibroid interference with the endometrial lining and likely  secondary vascular changes. She would prefer a minimally invasive option over surgery, and would prefer a Mirena IUD of lupron or other contraception choices.  - I anticipate that she will have some irregular spotting or bleeding, though anticipated period bleeding will be less.   - levonorgestrel (MIRENA) 20 MCG/24HR IUD 20 mcg; 1 each (20 mcg) by Intrauterine route once  - INSERTION INTRAUTERINE DEVICE    2. Iron deficiency anemia due to chronic blood loss  - Advised continue iron as tolerated   - levonorgestrel (MIRENA) 20 MCG/24HR IUD 20 mcg; 1 each (20 mcg) by Intrauterine route once  - INSERTION INTRAUTERINE DEVICE    3. Encounter for insertion of intrauterine contraceptive device  - Placed for menstrual suppression, improvement of CRISTINE  - levonorgestrel (MIRENA) 20 MCG/24HR IUD 20 mcg; 1 each (20 mcg) by Intrauterine route once  - INSERTION INTRAUTERINE DEVICE    4. Constipation, unspecified constipation type  - polyethylene glycol (MIRALAX) powder; Take 17 g (1 capful) by mouth daily  Dispense: 510 g; Refill: 3    5. Routine screening for STI (sexually transmitted infection)  - Collected given IUD placement. No symptoms nor risk factors  - Neisseria gonorrhoeae PCR  - Chlamydia trachomatis PCR    Leonardo Anderson MD  12/19/2018 11:28 AM

## 2018-12-19 NOTE — PROGRESS NOTES
IUD Insertion:  CONSULT:    Is a pregnancy test required: Yes.  Was it positive or negative?  Negative  Was a consent obtained?  Yes    Subjective: Vivian Ya is a 48 year old  presents for IUD and desires Mirena type IUD.    Patient has been given the opportunity to ask questions about all forms of birth control, including all options appropriate for Vivian Ya. Discussed that no method of birth control, except abstinence is 100% effective against pregnancy or sexually transmitted infection.     Vivian Ya understands she may have the IUD removed at any time. IUD should be removed by a health care provider.    The entire insertion procedure was reviewed with the patient, including care after placement.    Patient's last menstrual period was 2018. Last sexual activity: one week ago. No allergy to betadine or shellfish. Patient desires STD screening  HCG Qual Urine   Date Value Ref Range Status   2018 Negative NEG^Negative Final     Comment:     This test is for screening purposes.  Results should be interpreted along with   the clinical picture.  Confirmation testing is available if warranted by   ordering [id number], HCG Quantitative Pregnancy.           /68 (BP Location: Right arm, Cuff Size: Adult Regular)   Pulse 88   Wt 79.3 kg (174 lb 14.4 oz)   LMP 2018   BMI 29.28 kg/m      Pelvic Exam:   EG/BUS: normal genital architecture without lesions, erythema or abnormal secretions.   Vagina: moist, pink, rugae with physiologic discharge and secretions  Cervix: multiparous no lesions and pink, moist, closed, without lesion or CMT  Uterus: midline position, mobile, no pain  Adnexa: within normal limits and no masses, nodularity, tenderness    PROCEDURE NOTE: -- IUD Insertion    Reason for Insertion: contraception and abnormal uterine bleeding    Under sterile technique, cervix was visualized with speculum and prepped with Betadine solution swab x 3. Tenaculum was  placed for stability. The uterus was gently straightened and sounded to 6.5 cm. IUD prepared for placement, and IUD inserted according to 's instructions without difficulty or significant resitance, and deployed at the fundus. The strings were visualized and trimmed to 2.0 cm from the external os. Tenaculum was removed and hemostasis noted. Speculum removed.  Patient tolerated procedure well.    Lot # [id number]  Exp: Apr 2021     EBL: minimal    Complications: none    ASSESSMENT:     ICD-10-CM    1. Abnormal uterine bleeding (AUB) N93.9 levonorgestrel (MIRENA) 20 MCG/24HR IUD 20 mcg     INSERTION INTRAUTERINE DEVICE   2. Constipation, unspecified constipation type K59.00 polyethylene glycol (MIRALAX) powder     HCG qualitative urine   3. Routine screening for STI (sexually transmitted infection) Z11.3 Neisseria gonorrhoeae PCR     Chlamydia trachomatis PCR   4. Iron deficiency anemia due to chronic blood loss D50.0 levonorgestrel (MIRENA) 20 MCG/24HR IUD 20 mcg     INSERTION INTRAUTERINE DEVICE   5. Encounter for insertion of intrauterine contraceptive device Z30.430 levonorgestrel (MIRENA) 20 MCG/24HR IUD 20 mcg     INSERTION INTRAUTERINE DEVICE      PLAN:  Given 's handouts, including when to have IUD removed, list of danger s/sx, side effects and follow up recommended. Encouraged condom use for prevention of STD. Back up contraception advised for 7 days if progestin method. Advised to call for any fever, for prolonged or severe pain or bleeding, abnormal vaginal discharge, or unable to palpate strings. She was advised to use pain medications (ibuprofen) as needed for mild to moderate pain. Advised to follow-up in clinic in 4-6 weeks for IUD string check if unable to find strings or as directed by provider.     Leonardo Anderson MD  December 19, 2018 11:28 AM

## 2018-12-20 LAB
C TRACH DNA SPEC QL NAA+PROBE: NEGATIVE
N GONORRHOEA DNA SPEC QL NAA+PROBE: NEGATIVE
SPECIMEN SOURCE: NORMAL
SPECIMEN SOURCE: NORMAL

## 2019-01-16 ENCOUNTER — OFFICE VISIT (OUTPATIENT)
Dept: FAMILY MEDICINE | Facility: CLINIC | Age: 49
End: 2019-01-16
Payer: COMMERCIAL

## 2019-01-16 VITALS
WEIGHT: 175 LBS | HEIGHT: 65 IN | RESPIRATION RATE: 16 BRPM | TEMPERATURE: 97.8 F | SYSTOLIC BLOOD PRESSURE: 112 MMHG | HEART RATE: 78 BPM | DIASTOLIC BLOOD PRESSURE: 70 MMHG | BODY MASS INDEX: 29.16 KG/M2

## 2019-01-16 DIAGNOSIS — Z11.59 ENCOUNTER FOR HEPATITIS C SCREENING TEST FOR LOW RISK PATIENT: ICD-10-CM

## 2019-01-16 DIAGNOSIS — R53.83 FATIGUE, UNSPECIFIED TYPE: Primary | ICD-10-CM

## 2019-01-16 DIAGNOSIS — E03.9 HYPOTHYROIDISM, UNSPECIFIED TYPE: ICD-10-CM

## 2019-01-16 DIAGNOSIS — Z11.4 SCREENING FOR HIV WITHOUT PRESENCE OF RISK FACTORS: ICD-10-CM

## 2019-01-16 DIAGNOSIS — Z13.6 CARDIOVASCULAR SCREENING; LDL GOAL LESS THAN 160: ICD-10-CM

## 2019-01-16 DIAGNOSIS — D50.0 IRON DEFICIENCY ANEMIA DUE TO CHRONIC BLOOD LOSS: ICD-10-CM

## 2019-01-16 LAB
CHOLEST SERPL-MCNC: 196 MG/DL
DEPRECATED CALCIDIOL+CALCIFEROL SERPL-MC: 24 UG/L (ref 20–75)
HDLC SERPL-MCNC: 56 MG/DL
HGB BLD-MCNC: 11.4 G/DL (ref 11.7–15.7)
HIV 1+2 AB+HIV1 P24 AG SERPL QL IA: NONREACTIVE
LDLC SERPL CALC-MCNC: 114 MG/DL
NONHDLC SERPL-MCNC: 140 MG/DL
TRIGL SERPL-MCNC: 131 MG/DL
TSH SERPL DL<=0.005 MIU/L-ACNC: 0.67 MU/L (ref 0.4–4)

## 2019-01-16 PROCEDURE — 99214 OFFICE O/P EST MOD 30 MIN: CPT | Performed by: FAMILY MEDICINE

## 2019-01-16 PROCEDURE — 36415 COLL VENOUS BLD VENIPUNCTURE: CPT | Performed by: FAMILY MEDICINE

## 2019-01-16 PROCEDURE — 82306 VITAMIN D 25 HYDROXY: CPT | Performed by: FAMILY MEDICINE

## 2019-01-16 PROCEDURE — 80061 LIPID PANEL: CPT | Performed by: FAMILY MEDICINE

## 2019-01-16 PROCEDURE — 85018 HEMOGLOBIN: CPT | Performed by: FAMILY MEDICINE

## 2019-01-16 PROCEDURE — 87389 HIV-1 AG W/HIV-1&-2 AB AG IA: CPT | Performed by: FAMILY MEDICINE

## 2019-01-16 PROCEDURE — 84443 ASSAY THYROID STIM HORMONE: CPT | Performed by: FAMILY MEDICINE

## 2019-01-16 SDOH — HEALTH STABILITY: MENTAL HEALTH: HOW OFTEN DO YOU HAVE A DRINK CONTAINING ALCOHOL?: MONTHLY OR LESS

## 2019-01-16 SDOH — HEALTH STABILITY: MENTAL HEALTH: HOW OFTEN DO YOU HAVE 6 OR MORE DRINKS ON ONE OCCASION?: NEVER

## 2019-01-16 SDOH — HEALTH STABILITY: MENTAL HEALTH: HOW MANY STANDARD DRINKS CONTAINING ALCOHOL DO YOU HAVE ON A TYPICAL DAY?: 1 OR 2

## 2019-01-16 ASSESSMENT — ENCOUNTER SYMPTOMS
ARTHRALGIAS: 0
HEMATOCHEZIA: 0
HEARTBURN: 0
MYALGIAS: 0
BREAST MASS: 0
DIZZINESS: 0
EYE PAIN: 0
WEAKNESS: 0
SORE THROAT: 0
PARESTHESIAS: 0
DYSURIA: 0
FEVER: 0
COUGH: 0
JOINT SWELLING: 0
SHORTNESS OF BREATH: 0
NAUSEA: 0
FREQUENCY: 0
HEADACHES: 0
NERVOUS/ANXIOUS: 0
PALPITATIONS: 0
ABDOMINAL PAIN: 0
HEMATURIA: 0
CHILLS: 0
CONSTIPATION: 0
DIARRHEA: 0

## 2019-01-16 ASSESSMENT — MIFFLIN-ST. JEOR: SCORE: 1421.49

## 2019-01-16 NOTE — PROGRESS NOTES
SUBJECTIVE:   CC: Vivian Brown is an 48 year old woman who presents for preventive health visit.     Physical   Annual:     Getting at least 3 servings of Calcium per day:  NO    Bi-annual eye exam:  Yes    Dental care twice a year:  Yes    Sleep apnea or symptoms of sleep apnea:  None    Diet:  Regular (no restrictions)    Frequency of exercise:  1 day/week    Duration of exercise:  Less than 15 minutes    Taking medications regularly:  No    Barriers to taking medications:  Side effects    Medication side effects:  Other    Additional concerns today:  No    PHQ-2 Total Score: 0    Mary Ann is here for her annual physical. She is concerned about fatigue. She has had this for years and saw Dr. Anderson in December 2018 who diagnosed her with iron deficiency anemia. She has been taking iron intermittently due to nausea but does feel her fatigue has improved. She also had a Mirena IUD placed in December due to abnormal uterine bleeding and iron deficiency anemia. She had a very light period for about 2 weeks following placement of the IUD but otherwise has not had any side effects.    No other concerns. She is not a smoker. She is very busy with her job and children and rarely has time to exercise. She consumes alcohol rarely and does not use drugs.    Today's PHQ-2 Score:   PHQ-2 ( 1999 Pfizer) 1/16/2019   Q1: Little interest or pleasure in doing things 0   Q2: Feeling down, depressed or hopeless 0   PHQ-2 Score 0   Q1: Little interest or pleasure in doing things Not at all   Q2: Feeling down, depressed or hopeless Not at all   PHQ-2 Score 0       Abuse: Current or Past(Physical, Sexual or Emotional)- No  Do you feel safe in your environment? Yes    Social History     Tobacco Use     Smoking status: Never Smoker     Smokeless tobacco: Never Used   Substance Use Topics     Alcohol use: Yes     Alcohol/week: 0.0 oz     Comment: 1-2 times/month     Alcohol Use 1/16/2019   If you drink alcohol do you typically have  "greater than 3 drinks per day OR greater than 7 drinks per week? No   No flowsheet data found.    Reviewed orders with patient.  Reviewed health maintenance and updated orders accordingly - Yes      Mammogram Screening: Patient under age 50, mutual decision reflected in health maintenance.      Pertinent mammograms are reviewed under the imaging tab.  History of abnormal Pap smear:   Last 3 Pap and HPV Results:   PAP / HPV Latest Ref Rng & Units 10/25/2016 5/21/2013   PAP - NIL NIL   HPV 16 DNA NEG Negative -   HPV 18 DNA NEG Negative -   OTHER HR HPV NEG Negative -     PAP / HPV Latest Ref Rng & Units 10/25/2016 5/21/2013   PAP - NIL NIL   HPV 16 DNA NEG Negative -   HPV 18 DNA NEG Negative -   OTHER HR HPV NEG Negative -     Reviewed and updated as needed this visit by clinical staff  Tobacco  Allergies  Meds         Reviewed and updated as needed this visit by Provider            Review of Systems   Constitutional: Negative for chills and fever.   HENT: Negative for congestion, ear pain, hearing loss and sore throat.    Eyes: Negative for pain and visual disturbance.   Respiratory: Negative for cough and shortness of breath.    Cardiovascular: Negative for chest pain, palpitations and peripheral edema.   Gastrointestinal: Negative for abdominal pain, constipation, diarrhea, heartburn, hematochezia and nausea.   Breasts:  Negative for tenderness, breast mass and discharge.   Genitourinary: Negative for dysuria, frequency, genital sores, hematuria, pelvic pain, urgency, vaginal bleeding and vaginal discharge.   Musculoskeletal: Negative for arthralgias, joint swelling and myalgias.   Skin: Negative for rash.   Neurological: Negative for dizziness, weakness, headaches and paresthesias.   Psychiatric/Behavioral: Negative for mood changes. The patient is not nervous/anxious.         OBJECTIVE:   /70   Pulse 78   Temp 97.8  F (36.6  C) (Temporal)   Resp 16   Ht 1.646 m (5' 4.8\")   Wt 79.4 kg (175 lb)   " BMI 29.30 kg/m    Physical Exam  GENERAL: healthy, alert and no distress  EYES: Eyes grossly normal to inspection, PERRL and conjunctivae and sclerae normal  HENT: ear canals and TM's normal, nose and mouth without ulcers or lesions  NECK: no adenopathy, no asymmetry, masses, or scars and thyroid normal to palpation  RESP: lungs clear to auscultation - no rales, rhonchi or wheezes  CV: regular rate and rhythm, normal S1 S2, no S3 or S4, no murmur, click or rub, no peripheral edema   ABDOMEN: soft, nontender, no hepatosplenomegaly, no masses  MS: no gross musculoskeletal defects noted, no edema  SKIN: no suspicious lesions or rashes on exposed skin  NEURO: Normal strength and tone, mentation intact and speech normal  PSYCH: mentation appears normal, affect normal/bright    Diagnostic Test Results:  Results for orders placed or performed in visit on 01/16/19 (from the past 24 hour(s))   Hemoglobin   Result Value Ref Range    Hemoglobin 11.4 (L) 11.7 - 15.7 g/dL   TSH with free T4 reflex   Result Value Ref Range    TSH 0.67 0.40 - 4.00 mU/L   Lipid panel reflex to direct LDL Fasting   Result Value Ref Range    Cholesterol 196 <200 mg/dL    Triglycerides 131 <150 mg/dL    HDL Cholesterol 56 >49 mg/dL    LDL Cholesterol Calculated 114 (H) <100 mg/dL    Non HDL Cholesterol 140 (H) <130 mg/dL       ASSESSMENT/PLAN:   1. Fatigue, unspecified type  Fatigue improved with iron supplementation. Hgb 11.4 today, up from 10.5 on 12/07. Recommend taking multivitamin containing iron daily which should avoid the side effects she experienced with the iron supplementation. Also recommended 40-60 minutes of daily aerobic exercise to increase energy level. Will check Vitamin D level at patient's request.  - Vitamin D Deficiency    2. Iron deficiency anemia due to chronic blood loss  See #1 above.  - Hemoglobin  - Stool Guaiac x 2   3. Hypothyroidism, unspecified type  Has had fatigue but is improving. TSH in normal range. Continue with  "current dose of levothyroxine.   - TSH with free T4 reflex    4. CARDIOVASCULAR SCREENING; LDL GOAL LESS THAN 160  Lipid panel shows LDL of 114 but otherwise normal. LDL still under goal of 160, no treatment needed. Encouraged to watch diet and eat plenty of fruits and vegetables.  - Lipid panel reflex to direct LDL Fasting    5. Screening for HIV without presence of risk factors  No risk factors. Will notify with results.  - HIV Antigen Antibody Combo    6. Encounter for hepatitis C screening test for low risk patient  No risk factors. Will notify with results.  - **Hepatitis C Screen Reflex to RNA FUTURE anytime; Future    COUNSELING:  Reviewed preventive health counseling, as reflected in patient instructions  Special attention given to:        Regular exercise       Healthy diet/nutrition    BP Readings from Last 1 Encounters:   01/16/19 112/70     Estimated body mass index is 29.3 kg/m  as calculated from the following:    Height as of this encounter: 1.646 m (5' 4.8\").    Weight as of this encounter: 79.4 kg (175 lb).      Weight management plan: Discussed healthy diet and exercise guidelines     reports that  has never smoked. she has never used smokeless tobacco.      Counseling Resources:  ATP IV Guidelines  Pooled Cohorts Equation Calculator  Breast Cancer Risk Calculator  FRAX Risk Assessment  ICSI Preventive Guidelines  Dietary Guidelines for Americans, 2010  USDA's MyPlate  ASA Prophylaxis  Lung CA Screening    Patient was seen and examined by myself and Dr Cancino.  The note was then scribed by me.  Karissa Meng, MS3      I spent 25 minutes with this patient over 50% of the time was in healthcare counseling, careplan development, strategies for partnering in keeping this patient healthy and appropriate referrals and follow up    Harry Cancino MD, MD  [facility]  "

## 2019-01-16 NOTE — NURSING NOTE
"Chief Complaint   Patient presents with     Physical     Estimated body mass index is 29.3 kg/m  as calculated from the following:    Height as of this encounter: 1.646 m (5' 4.8\").    Weight as of this encounter: 79.4 kg (175 lb).  BP Readings from Last 1 Encounters:   01/16/19 112/70   ]  BP cuff size:  large  Do you feel safe in your environment?  Not asked  Does the patient need any medication refills today? No  Jia Thacker CMA ([facility])     "

## 2020-01-22 DIAGNOSIS — E03.8 OTHER SPECIFIED HYPOTHYROIDISM: ICD-10-CM

## 2020-01-22 RX ORDER — LEVOTHYROXINE SODIUM 112 UG/1
TABLET ORAL
Qty: 90 TABLET | Refills: 0 | Status: SHIPPED | OUTPATIENT
Start: 2020-01-22 | End: 2020-04-07

## 2020-01-22 NOTE — TELEPHONE ENCOUNTER
"Levothyroxine  Last Written Prescription Date:  12/07/2018  Last Fill Quantity: 90,  # refills: 3   Last office visit: 01/16/2019 with prescribing provider:  Barak   Future Office Visit:   Next 5 appointments (look out 90 days)    Jan 23, 2020  3:20 PM CST  [facility] [address] [facility] [address] [phone]         Requested Prescriptions   Pending Prescriptions Disp Refills     levothyroxine (SYNTHROID/LEVOTHROID) 112 MCG tablet [Pharmacy Med Name: LEVOTHYROXINE SODIUM 112MCG TABS] 90 tablet 3     Sig: TAKE ONE TABLET BY MOUTH ONCE DAILY       Thyroid Protocol Failed - 1/22/2020  8:54 AM        Failed - Recent (12 mo) or future (30 days) visit within the authorizing provider's specialty     Patient has had an office visit with the authorizing provider or a provider within the authorizing providers department within the previous 12 mos or has a future within next 30 days. See \"Patient Info\" tab in inbasket, or \"Choose Columns\" in Meds & Orders section of the refill encounter.              Failed - Normal TSH on file in past 12 months     Recent Labs   Lab Test 01/16/19  0919   TSH 0.67              Passed - Patient is 12 years or older        Passed - Medication is active on med list        Passed - No active pregnancy on record     If patient is pregnant or has had a positive pregnancy test, please check TSH.          Passed - No positive pregnancy test in past 12 months     If patient is pregnant or has had a positive pregnancy test, please check TSH.          Medication is being filled for 1 time refill only due to:  patient has an appointment scheduled 01/23/2020  Valorie Tanner RN BSN  '    "

## 2020-01-22 NOTE — PROGRESS NOTES
SUBJECTIVE:   CC: Vivian Brown is an 49 year old woman who presents for preventive health visit.     Healthy Habits:     Getting at least 3 servings of Calcium per day:  Yes    Bi-annual eye exam:  Yes    Dental care twice a year:  Yes    Sleep apnea or symptoms of sleep apnea:  None    Diet:  Regular (no restrictions)    Frequency of exercise:  2-3 days/week    Duration of exercise:  Greater than 60 minutes    Taking medications regularly:  No    Barriers to taking medications:  Side effects    Medication side effects:  Other    PHQ-2 Total Score: 0    Additional concerns today:  No    Today's PHQ-2 Score:   PHQ-2 ( 1999 Pfizer) 1/23/2020   Q1: Little interest or pleasure in doing things 0   Q2: Feeling down, depressed or hopeless 0   PHQ-2 Score 0   Q1: Little interest or pleasure in doing things Not at all   Q2: Feeling down, depressed or hopeless Not at all   PHQ-2 Score 0       Abuse: Current or Past(Physical, Sexual or Emotional)- No  Do you feel safe in your environment? Yes        Social History     Tobacco Use     Smoking status: Never Smoker     Smokeless tobacco: Never Used   Substance Use Topics     Alcohol use: Yes     Alcohol/week: 0.0 standard drinks     Frequency: Monthly or less     Drinks per session: 1 or 2     Binge frequency: Never     Comment: 1-2 times/month         Alcohol Use 1/23/2020   Prescreen: >3 drinks/day or >7 drinks/week? No   Prescreen: >3 drinks/day or >7 drinks/week? -       Reviewed orders with patient.  Reviewed health maintenance and updated orders accordingly - Yes  Lab work is in process  Labs reviewed in EPIC  BP Readings from Last 3 Encounters:   01/23/20 126/70   01/16/19 112/70   12/19/18 104/68    Wt Readings from Last 3 Encounters:   01/23/20 82.1 kg (181 lb)   01/16/19 79.4 kg (175 lb)   12/19/18 79.3 kg (174 lb 14.4 oz)                  Patient Active Problem List   Diagnosis     Hypothyroidism     Hyperlipidemia LDL goal <130     Family hx of colon cancer      Nonallopathic lesion of cervical region     Cervicalgia     Nonallopathic lesion of sacral region     Nonallopathic lesion of lumbar region     Lumbago     Nonallopathic lesion of thoracic region     Past Surgical History:   Procedure Laterality Date     COLONOSCOPY N/A 9/28/2015    Procedure: COLONOSCOPY;  Surgeon: Eugenio Travis MD;  Location:  GI     TONSILLECTOMY         Social History     Tobacco Use     Smoking status: Never Smoker     Smokeless tobacco: Never Used   Substance Use Topics     Alcohol use: Yes     Alcohol/week: 0.0 standard drinks     Frequency: Monthly or less     Drinks per session: 1 or 2     Binge frequency: Never     Comment: 1-2 times/month     Family History   Problem Relation Age of Onset     Colon Cancer Maternal Grandmother 91     Hypertension Mother      Hyperlipidemia Mother      Cancer Mother 65        GIST     Other Cancer Mother      Coronary Artery Disease Father         MI     Thyroid Disease Sister      Asthma Daughter          Current Outpatient Medications   Medication Sig Dispense Refill     levothyroxine (SYNTHROID/LEVOTHROID) 112 MCG tablet TAKE ONE TABLET BY MOUTH ONCE DAILY 90 tablet 0     Allergies   Allergen Reactions     Latex Itching     Seasonal Allergies      Recent Labs   Lab Test 01/16/19  0919 11/09/17  0745  09/18/15  0742  05/21/13  0934   *  --   --  124  --  108   HDL 56  --   --  55  --  46*   TRIG 131  --   --  114  --  135   TSH 0.67 3.62   < > 3.67   < > 4.87    < > = values in this interval not displayed.        Mammogram Screening: Patient under age 50, mutual decision reflected in health maintenance.      Pertinent mammograms are reviewed under the imaging tab.  History of abnormal Pap smear:   NO - age 30-65 PAP every 5 years with negative HPV co-testing recommended  Last 3 Pap and HPV Results:   PAP / HPV Latest Ref Rng & Units 10/25/2016 5/21/2013   PAP - NIL NIL   HPV 16 DNA NEG Negative -   HPV 18 DNA NEG Negative -   OTHER  "HR HPV NEG Negative -     PAP / HPV Latest Ref Rng & Units 10/25/2016 5/21/2013   PAP - NIL NIL   HPV 16 DNA NEG Negative -   HPV 18 DNA NEG Negative -   OTHER HR HPV NEG Negative -     Reviewed and updated as needed this visit by clinical staff  Tobacco  Allergies  Meds  Med Hx  Surg Hx  Fam Hx  Soc Hx        Reviewed and updated as needed this visit by Provider        Past Medical History:   Diagnosis Date     Family history of colon cancer     Colonoscoy q5 years next 9/2020     Hypothyroidism       Past Surgical History:   Procedure Laterality Date     COLONOSCOPY N/A 9/28/2015    Procedure: COLONOSCOPY;  Surgeon: Eugenio Travis MD;  Location:  GI     TONSILLECTOMY         Review of Systems   Constitutional: Negative for chills and fever.   HENT: Negative for congestion, ear pain, hearing loss and sore throat.    Eyes: Negative for pain and visual disturbance.   Respiratory: Negative for cough and shortness of breath.    Cardiovascular: Negative for chest pain, palpitations and peripheral edema.   Gastrointestinal: Negative for abdominal pain, constipation, diarrhea, heartburn, hematochezia and nausea.   Breasts:  Negative for tenderness, breast mass and discharge.   Genitourinary: Negative for dysuria, frequency, genital sores, hematuria, pelvic pain, urgency, vaginal bleeding and vaginal discharge.   Musculoskeletal: Negative for arthralgias, joint swelling and myalgias.   Skin: Negative for rash.   Neurological: Negative for dizziness, weakness, headaches and paresthesias.   Psychiatric/Behavioral: Negative for mood changes. The patient is not nervous/anxious.       OBJECTIVE:   /70   Pulse 88   Temp 98  F (36.7  C) (Temporal)   Resp 16   Ht 1.633 m (5' 4.29\")   Wt 82.1 kg (181 lb)   SpO2 96%   BMI 30.79 kg/m    Physical Exam  GENERAL: healthy, alert and no distress  EYES: Eyes grossly normal to inspection, PERRL and conjunctivae and sclerae normal  HENT: ear canals and TM's " normal, nose and mouth without ulcers or lesions  NECK: no adenopathy, no asymmetry, masses, or scars and thyroid normal to palpation  RESP: lungs clear to auscultation - no rales, rhonchi or wheezes  BREAST: normal without masses, tenderness or nipple discharge and no palpable axillary masses or adenopathy  CV: regular rate and rhythm, normal S1 S2, no S3 or S4, no murmur, click or rub, no peripheral edema and peripheral pulses strong  ABDOMEN: soft, nontender, no hepatosplenomegaly, no masses and bowel sounds normal   (female): normal female external genitalia, normal urethral meatus , vaginal mucosa pink, moist, well rugated, vaginal discharge - moderate, thin and milky, normal cervix, adnexae, and uterus without masses. and mild friability of the cervical os noted  MS: no gross musculoskeletal defects noted, no edema  SKIN: no suspicious lesions or rashes  NEURO: Normal strength and tone, mentation intact and speech normal  PSYCH: mentation appears normal, affect normal/bright    Diagnostic Test Results:  Labs reviewed in Epic  No results found for this or any previous visit (from the past 24 hour(s)).    ASSESSMENT/PLAN:   1. Routine general medical examination at a health care facility  - MA SCREENING DIGITAL BILAT - Future  (s+30); Future  - TSH with free T4 reflex; Future  - Comprehensive metabolic panel; Future  - CBC with platelets; Future  - Lipid panel reflex to direct LDL Fasting; Future    2. Acquired hypothyroidism  - TSH with free T4 reflex; Future  - Comprehensive metabolic panel; Future    3. Family hx of colon cancer  5. Special screening for malignant neoplasms, colon  6. Family history of malignant neoplasm of gastrointestinal tract  Repeat colonoscopy advised.  - GASTROENTEROLOGY ADULT REF PROCEDURE ONLY [facility] [phone]; Fred Costa MD    4. Hyperlipidemia LDL goal <130  - Comprehensive metabolic panel; Future  - Lipid panel reflex to direct LDL Fasting;  "Future        COUNSELING:  Reviewed preventive health counseling, as reflected in patient instructions       Regular exercise       Healthy diet/nutrition       Vision screening       Hearing screening    Estimated body mass index is 30.79 kg/m  as calculated from the following:    Height as of this encounter: 1.633 m (5' 4.29\").    Weight as of this encounter: 82.1 kg (181 lb).    Weight management plan: Discussed healthy diet and exercise guidelines     reports that she has never smoked. She has never used smokeless tobacco.      Counseling Resources:  ATP IV Guidelines  Pooled Cohorts Equation Calculator  Breast Cancer Risk Calculator  FRAX Risk Assessment  ICSI Preventive Guidelines  Dietary Guidelines for Americans, 2010  USDA's MyPlate  ASA Prophylaxis  Lung CA Screening    Alin Skinner PA-C  [facility] [place]  Answers for HPI/ROS submitted by the patient on 1/23/2020   PHQ9 TOTAL SCORE: 0  CON 7 TOTAL SCORE: 0    "

## 2020-01-22 NOTE — PROGRESS NOTES
"   SUBJECTIVE:   CC: Vivian Brown is an 49 year old woman who presents for preventive health visit.     Healthy Habits:    Do you get at least three servings of calcium containing foods daily (dairy, green leafy vegetables, etc.)? { :396345::\"yes\"}    Amount of exercise or daily activities, outside of work: { :293950}    Problems taking medications regularly { :241115::\"No\"}    Medication side effects: { :893480::\"No\"}    Have you had an eye exam in the past two years? { :214841}    Do you see a dentist twice per year? { :783799}    Do you have sleep apnea, excessive snoring or daytime drowsiness?{ :502643}  {Outside tests to abstract? :593373}    {additional problems to add (Optional):033902}    Today's PHQ-2 Score:   PHQ-2 ( 1999 Pfizer) 1/16/2019 11/16/2018   Q1: Little interest or pleasure in doing things 0 3   Q2: Feeling down, depressed or hopeless 0 1   PHQ-2 Score 0 4   Q1: Little interest or pleasure in doing things Not at all Nearly every day   Q2: Feeling down, depressed or hopeless Not at all Several days   PHQ-2 Score 0 4     {PHQ-2 LOOK IN ASSESSMENTS (Optional) :687260}  Abuse: Current or Past(Physical, Sexual or Emotional)- {YES/NO/NA:377792}  Do you feel safe in your environment? {YES/NO/NA:767983}        Social History     Tobacco Use     Smoking status: Never Smoker     Smokeless tobacco: Never Used   Substance Use Topics     Alcohol use: Yes     Alcohol/week: 0.0 standard drinks     Frequency: Monthly or less     Drinks per session: 1 or 2     Binge frequency: Never     Comment: 1-2 times/month     If you drink alcohol do you typically have >3 drinks per day or >7 drinks per week? {ETOH :338027}                     Reviewed orders with patient.  Reviewed health maintenance and updated orders accordingly - {Yes/No:666260::\"Yes\"}  {Chronicprobdata (Optional):621887}    {Mammo Decision Support (Optional):116101}    Pertinent mammograms are reviewed under the imaging tab.  History of abnormal " "Pap smear: {PAP HX:289969}  PAP / HPV Latest Ref Rng & Units 10/25/2016 5/21/2013   PAP - NIL NIL   HPV 16 DNA NEG Negative -   HPV 18 DNA NEG Negative -   OTHER HR HPV NEG Negative -     Reviewed and updated as needed this visit by clinical staff         Reviewed and updated as needed this visit by Provider        {HISTORY OPTIONS (Optional):550974}    ROS:  { :047716}    OBJECTIVE:   There were no vitals taken for this visit.  EXAM:  {Exam Choices:635039}    {Diagnostic Test Results (Optional):402543::\"Diagnostic Test Results:\",\"Labs reviewed in Epic\"}    ASSESSMENT/PLAN:   {Diag Picklist:585380}    COUNSELING:   {FEMALE COUNSELING MESSAGES:119632::\"Reviewed preventive health counseling, as reflected in patient instructions\"}    Estimated body mass index is 29.3 kg/m  as calculated from the following:    Height as of 1/16/19: 1.646 m (5' 4.8\").    Weight as of 1/16/19: 79.4 kg (175 lb).    {Weight Management Plan (ACO) Complete if BMI is abnormal-  Ages 18-64  BMI >24.9.  Age 65+ with BMI <23 or >30 (Optional):608533}     reports that she has never smoked. She has never used smokeless tobacco.  {Tobacco Cessation -- Complete if patient is a smoker (Optional):588353}    Counseling Resources:  ATP IV Guidelines  Pooled Cohorts Equation Calculator  Breast Cancer Risk Calculator  FRAX Risk Assessment  ICSI Preventive Guidelines  Dietary Guidelines for Americans, 2010  USDA's MyPlate  ASA Prophylaxis  Lung CA Screening    Alin Skinner PA-C  [facility]"

## 2020-01-23 ENCOUNTER — OFFICE VISIT (OUTPATIENT)
Dept: FAMILY MEDICINE | Facility: OTHER | Age: 50
End: 2020-01-23
Payer: COMMERCIAL

## 2020-01-23 VITALS
HEIGHT: 64 IN | BODY MASS INDEX: 30.9 KG/M2 | WEIGHT: 181 LBS | RESPIRATION RATE: 16 BRPM | OXYGEN SATURATION: 96 % | SYSTOLIC BLOOD PRESSURE: 126 MMHG | TEMPERATURE: 98 F | HEART RATE: 88 BPM | DIASTOLIC BLOOD PRESSURE: 70 MMHG

## 2020-01-23 DIAGNOSIS — Z80.0 FAMILY HX OF COLON CANCER: ICD-10-CM

## 2020-01-23 DIAGNOSIS — Z80.0 FAMILY HISTORY OF MALIGNANT NEOPLASM OF GASTROINTESTINAL TRACT: ICD-10-CM

## 2020-01-23 DIAGNOSIS — Z12.11 SPECIAL SCREENING FOR MALIGNANT NEOPLASMS, COLON: ICD-10-CM

## 2020-01-23 DIAGNOSIS — E78.5 HYPERLIPIDEMIA LDL GOAL <130: ICD-10-CM

## 2020-01-23 DIAGNOSIS — Z00.00 ROUTINE GENERAL MEDICAL EXAMINATION AT A HEALTH CARE FACILITY: Primary | ICD-10-CM

## 2020-01-23 DIAGNOSIS — E03.9 ACQUIRED HYPOTHYROIDISM: ICD-10-CM

## 2020-01-23 LAB
SPECIMEN SOURCE: NORMAL
WET PREP SPEC: NORMAL

## 2020-01-23 PROCEDURE — G0145 SCR C/V CYTO,THINLAYER,RESCR: HCPCS | Performed by: PHYSICIAN ASSISTANT

## 2020-01-23 PROCEDURE — 87624 HPV HI-RISK TYP POOLED RSLT: CPT | Performed by: PHYSICIAN ASSISTANT

## 2020-01-23 PROCEDURE — 99396 PREV VISIT EST AGE 40-64: CPT | Performed by: PHYSICIAN ASSISTANT

## 2020-01-23 PROCEDURE — 87210 SMEAR WET MOUNT SALINE/INK: CPT | Performed by: PHYSICIAN ASSISTANT

## 2020-01-23 PROCEDURE — 99213 OFFICE O/P EST LOW 20 MIN: CPT | Mod: 25 | Performed by: PHYSICIAN ASSISTANT

## 2020-01-23 ASSESSMENT — ENCOUNTER SYMPTOMS
COUGH: 0
FREQUENCY: 0
WEAKNESS: 0
BREAST MASS: 0
PALPITATIONS: 0
SORE THROAT: 0
EYE PAIN: 0
ARTHRALGIAS: 0
DYSURIA: 0
PARESTHESIAS: 0
HEMATURIA: 0
HEMATOCHEZIA: 0
FEVER: 0
NAUSEA: 0
HEARTBURN: 0
NERVOUS/ANXIOUS: 0
JOINT SWELLING: 0
ABDOMINAL PAIN: 0
HEADACHES: 0
CONSTIPATION: 0
SHORTNESS OF BREATH: 0
DIARRHEA: 0
DIZZINESS: 0
CHILLS: 0
MYALGIAS: 0

## 2020-01-23 ASSESSMENT — ANXIETY QUESTIONNAIRES
GAD7 TOTAL SCORE: 0
5. BEING SO RESTLESS THAT IT IS HARD TO SIT STILL: NOT AT ALL
2. NOT BEING ABLE TO STOP OR CONTROL WORRYING: NOT AT ALL
GAD7 TOTAL SCORE: 0
3. WORRYING TOO MUCH ABOUT DIFFERENT THINGS: NOT AT ALL
7. FEELING AFRAID AS IF SOMETHING AWFUL MIGHT HAPPEN: NOT AT ALL
1. FEELING NERVOUS, ANXIOUS, OR ON EDGE: NOT AT ALL
4. TROUBLE RELAXING: NOT AT ALL
6. BECOMING EASILY ANNOYED OR IRRITABLE: NOT AT ALL
GAD7 TOTAL SCORE: 0
7. FEELING AFRAID AS IF SOMETHING AWFUL MIGHT HAPPEN: NOT AT ALL

## 2020-01-23 ASSESSMENT — PATIENT HEALTH QUESTIONNAIRE - PHQ9
SUM OF ALL RESPONSES TO PHQ QUESTIONS 1-9: 0
SUM OF ALL RESPONSES TO PHQ QUESTIONS 1-9: 0

## 2020-01-23 ASSESSMENT — PAIN SCALES - GENERAL: PAINLEVEL: NO PAIN (0)

## 2020-01-23 ASSESSMENT — MIFFLIN-ST. JEOR: SCORE: 1435.63

## 2020-01-23 NOTE — LETTER
January 31, 2020    Vivian Brown  [address] [facility] [address]    Dear ,  This letter is regarding your recent Pap smear (cervical cancer screening) and Human Papillomavirus (HPV) test.  We are happy to inform you that your Pap smear result is normal. Cervical cancer is closely linked with certain types of HPV. Your results showed no evidence of high-risk HPV.  We recommend you have your next PAP smear and HPV test in 5 years.  You will still need to return to the clinic every year for an annual exam and other preventive tests.  If you have additional questions regarding this result, please call our registered nurse, Zeina at [phone].  Sincerely,    Alin Skinner PA-C/refugio

## 2020-01-24 ASSESSMENT — PATIENT HEALTH QUESTIONNAIRE - PHQ9: SUM OF ALL RESPONSES TO PHQ QUESTIONS 1-9: 0

## 2020-01-24 ASSESSMENT — ANXIETY QUESTIONNAIRES: GAD7 TOTAL SCORE: 0

## 2020-01-28 ENCOUNTER — TELEPHONE (OUTPATIENT)
Dept: FAMILY MEDICINE | Facility: CLINIC | Age: 50
End: 2020-01-28

## 2020-01-28 LAB
COPATH REPORT: NORMAL
PAP: NORMAL

## 2020-01-28 NOTE — TELEPHONE ENCOUNTER
Left message for patient to return call to schedule colonoscopy or EGD. If Zohreh or Anastasia are unavailable, please transfer to the surgery center.

## 2020-01-29 LAB
FINAL DIAGNOSIS: NORMAL
HPV HR 12 DNA CVX QL NAA+PROBE: NEGATIVE
HPV16 DNA SPEC QL NAA+PROBE: NEGATIVE
HPV18 DNA SPEC QL NAA+PROBE: NEGATIVE
SPECIMEN DESCRIPTION: NORMAL
SPECIMEN SOURCE CVX/VAG CYTO: NORMAL

## 2020-01-29 NOTE — TELEPHONE ENCOUNTER
Left message for patient to return call to schedule EGD/colonoscopy. If Anastasia or Zohreh are not available, please transfer to same day surgery

## 2020-03-09 ENCOUNTER — TELEPHONE (OUTPATIENT)
Dept: FAMILY MEDICINE | Facility: OTHER | Age: 50
End: 2020-03-09

## 2020-03-09 DIAGNOSIS — Z00.00 ROUTINE GENERAL MEDICAL EXAMINATION AT A HEALTH CARE FACILITY: ICD-10-CM

## 2020-03-09 DIAGNOSIS — E78.5 HYPERLIPIDEMIA LDL GOAL <130: ICD-10-CM

## 2020-03-09 DIAGNOSIS — E03.9 ACQUIRED HYPOTHYROIDISM: ICD-10-CM

## 2020-03-09 DIAGNOSIS — E03.9 ACQUIRED HYPOTHYROIDISM: Primary | ICD-10-CM

## 2020-03-09 LAB
ALBUMIN SERPL-MCNC: 3.4 G/DL (ref 3.4–5)
ALP SERPL-CCNC: 82 U/L (ref 40–150)
ALT SERPL W P-5'-P-CCNC: 35 U/L (ref 0–50)
ANION GAP SERPL CALCULATED.3IONS-SCNC: 7 MMOL/L (ref 3–14)
AST SERPL W P-5'-P-CCNC: 15 U/L (ref 0–45)
BASOPHILS # BLD AUTO: 0.1 10E9/L (ref 0–0.2)
BASOPHILS NFR BLD AUTO: 1.1 %
BILIRUB SERPL-MCNC: 0.6 MG/DL (ref 0.2–1.3)
BUN SERPL-MCNC: 17 MG/DL (ref 7–30)
CALCIUM SERPL-MCNC: 8.3 MG/DL (ref 8.5–10.1)
CHLORIDE SERPL-SCNC: 107 MMOL/L (ref 94–109)
CHOLEST SERPL-MCNC: 194 MG/DL
CO2 SERPL-SCNC: 27 MMOL/L (ref 20–32)
CREAT SERPL-MCNC: 0.94 MG/DL (ref 0.52–1.04)
DIFFERENTIAL METHOD BLD: NORMAL
EOSINOPHIL NFR BLD AUTO: 3.9 %
ERYTHROCYTE [DISTWIDTH] IN BLOOD BY AUTOMATED COUNT: 14.6 % (ref 10–15)
GFR SERPL CREATININE-BSD FRML MDRD: 71 ML/MIN/{1.73_M2}
GLUCOSE SERPL-MCNC: 95 MG/DL (ref 70–99)
HCT VFR BLD AUTO: 39.9 % (ref 35–47)
HDLC SERPL-MCNC: 45 MG/DL
HGB BLD-MCNC: 12.7 G/DL (ref 11.7–15.7)
IMM GRANULOCYTES # BLD: 0 10E9/L (ref 0–0.4)
IMM GRANULOCYTES NFR BLD: 0.3 %
LDLC SERPL CALC-MCNC: 115 MG/DL
LYMPHOCYTES # BLD AUTO: 2.3 10E9/L (ref 0.8–5.3)
LYMPHOCYTES NFR BLD AUTO: 25.1 %
MCH RBC QN AUTO: 28.2 PG (ref 26.5–33)
MCHC RBC AUTO-ENTMCNC: 31.8 G/DL (ref 31.5–36.5)
MCV RBC AUTO: 89 FL (ref 78–100)
MONOCYTES # BLD AUTO: 0.7 10E9/L (ref 0–1.3)
MONOCYTES NFR BLD AUTO: 7.5 %
NEUTROPHILS # BLD AUTO: 5.6 10E9/L (ref 1.6–8.3)
NEUTROPHILS NFR BLD AUTO: 62.1 %
NONHDLC SERPL-MCNC: 149 MG/DL
NRBC # BLD AUTO: 0 10*3/UL
NRBC BLD AUTO-RTO: 0 /100
PLATELET # BLD AUTO: 290 10E9/L (ref 150–450)
POTASSIUM SERPL-SCNC: 3.9 MMOL/L (ref 3.4–5.3)
PROT SERPL-MCNC: 7 G/DL (ref 6.8–8.8)
RBC # BLD AUTO: 4.51 10E12/L (ref 3.8–5.2)
SODIUM SERPL-SCNC: 141 MMOL/L (ref 133–144)
TRIGL SERPL-MCNC: 170 MG/DL
TSH SERPL DL<=0.005 MIU/L-ACNC: 1.13 MU/L (ref 0.4–4)
WBC # BLD AUTO: 9 10E9/L (ref 4–11)

## 2020-03-09 PROCEDURE — 80050 GENERAL HEALTH PANEL: CPT | Performed by: PHYSICIAN ASSISTANT

## 2020-03-09 PROCEDURE — 36415 COLL VENOUS BLD VENIPUNCTURE: CPT | Performed by: PHYSICIAN ASSISTANT

## 2020-03-09 PROCEDURE — 80061 LIPID PANEL: CPT | Performed by: PHYSICIAN ASSISTANT

## 2020-03-09 NOTE — LETTER
March 9, 2020      Vivian Brown  [address] [facility] [id number]        Dear ,    We are writing to inform you of your test results.    Your complete blood cell count and thyroid are normal at this point time.   Your comprehensive metabolic profile profile shows a low calcium.  Would have you consider a calcium supplement with vitamin D on a regular basis and also have this rechecked in 2 to 3 months.   Your cholesterol profile shows a low HDL cholesterol which is the part of the cholesterol that helps to combat cardiovascular disease.  Strongly recommend physical exercise on a daily basis to bring this back up to what it was a year ago.  Your LDL cholesterol is stable at 115.  Would strongly recommend that you take a look at your diet to decrease cholesterol containing foods as best you can to reach a optimal goal of less than 100.  It would be wise to recheck this in about 6 months.     If you have any questions or concerns, please call the clinic at the number listed above.       Sincerely,        Alin Skinner PA-C

## 2020-03-09 NOTE — TELEPHONE ENCOUNTER
Per provider letter written.  Rita Botello CMA ([facility])    Notes recorded by Alin Sandra PA-C on 3/9/2020 at 9:43 AM CDT   Your complete blood cell count and thyroid are normal at this point time.   Your comprehensive metabolic profile profile shows a low calcium.  Would have you consider a calcium supplement with vitamin D on a regular basis and also have this rechecked in 2 to 3 months.   Your cholesterol profile shows a low HDL cholesterol which is the part of the cholesterol that helps to combat cardiovascular disease.  Strongly recommend physical exercise on a daily basis to bring this back up to what it was a year ago.  Your LDL cholesterol is stable at 115.  Would strongly recommend that you take a look at your diet to decrease cholesterol containing foods as best you can to reach a optimal goal of less than 100.  It would be wise to recheck this in about 6 months.

## 2020-03-09 NOTE — TELEPHONE ENCOUNTER
Patient calling but she was told come in and do lab work but there were no orders. She said she was into see you on 1/23/20 and to stop in and do her annual blood work.  If you could place those right away, she can get this done before 8:30 am at [facility] She is fasting. .  Please call when ordered on her cell phone.

## 2020-04-06 DIAGNOSIS — E03.8 OTHER SPECIFIED HYPOTHYROIDISM: ICD-10-CM

## 2020-04-07 RX ORDER — LEVOTHYROXINE SODIUM 112 UG/1
TABLET ORAL
Qty: 90 TABLET | Refills: 1 | Status: SHIPPED | OUTPATIENT
Start: 2020-04-07 | End: 2020-11-04

## 2020-04-07 NOTE — TELEPHONE ENCOUNTER
"Levothyroxine  Last Written Prescription Date:  01/22/2020  Last Fill Quantity: 90,  # refills: 0   Last office visit: 01/23/2020 with prescribing provider:  Chas Ríos   Future Office Visit:  None  Prescription approved per [facility] Refill Protocol.    Requested Prescriptions   Pending Prescriptions Disp Refills     levothyroxine (SYNTHROID/LEVOTHROID) 112 MCG tablet [Pharmacy Med Name: LEVOTHYROXINE SODIUM 112MCG TABS] 90 tablet 0     Sig: TAKE ONE TABLET BY MOUTH ONCE DAILY       Thyroid Protocol Failed - 4/6/2020  8:49 AM        Failed - Recent (12 mo) or future (30 days) visit within the authorizing provider's specialty     Patient has had an office visit with the authorizing provider or a provider within the authorizing providers department within the previous 12 mos or has a future within next 30 days. See \"Patient Info\" tab in inbasket, or \"Choose Columns\" in Meds & Orders section of the refill encounter.          Passed - Patient is 12 years or older        Passed - Medication is active on med list        Passed - Normal TSH on file in past 12 months     Recent Labs   Lab Test 03/09/20  0758   TSH 1.13           Passed - No active pregnancy on record     If patient is pregnant or has had a positive pregnancy test, please check TSH.        Passed - No positive pregnancy test in past 12 months     If patient is pregnant or has had a positive pregnancy test, please check TSH         Enedina Coughlin RN   "

## 2020-08-06 ENCOUNTER — HOSPITAL ENCOUNTER (OUTPATIENT)
Dept: MAMMOGRAPHY | Facility: CLINIC | Age: 50
Discharge: HOME | End: 2020-08-06
Attending: PHYSICIAN ASSISTANT | Admitting: PHYSICIAN ASSISTANT
Payer: COMMERCIAL

## 2020-08-06 DIAGNOSIS — Z12.31 VISIT FOR SCREENING MAMMOGRAM: ICD-10-CM

## 2020-08-06 PROCEDURE — 77063 BREAST TOMOSYNTHESIS BI: CPT

## 2020-11-04 DIAGNOSIS — E03.8 OTHER SPECIFIED HYPOTHYROIDISM: ICD-10-CM

## 2020-11-04 RX ORDER — LEVOTHYROXINE SODIUM 112 UG/1
112 TABLET ORAL DAILY
Qty: 90 TABLET | Refills: 0 | Status: SHIPPED | OUTPATIENT
Start: 2020-11-04 | End: 2021-02-08

## 2020-11-04 NOTE — TELEPHONE ENCOUNTER
Prescription approved per [facility] Refill Protocol.  Angelica Vera RN

## 2020-11-04 NOTE — TELEPHONE ENCOUNTER
Reason for Call:  Medication or medication refill:    Do you use a [facility]?  Name of the pharmacy and phone number for the current request:  [facility] - [phone]    Name of the medication requested:levothyroxine (SYNTHROID/LEVOTHROID) 112 MCG tablet    Other request: Patient is calling in today to see if she can get a refill? Can someone call patient to let her know? Thank you    Can we leave a detailed message on this number? YES    Phone number patient can be reached at: Cell number on file:    Telephone Information:   Mobile [phone]       Best Time: anytime    Call taken on 11/4/2020 at 10:13 AM by Nithya Jerez

## 2020-12-20 ENCOUNTER — HEALTH MAINTENANCE LETTER (OUTPATIENT)
Age: 50
End: 2020-12-20

## 2021-02-06 DIAGNOSIS — E03.8 OTHER SPECIFIED HYPOTHYROIDISM: ICD-10-CM

## 2021-02-08 ENCOUNTER — TELEPHONE (OUTPATIENT)
Dept: FAMILY MEDICINE | Facility: OTHER | Age: 51
End: 2021-02-08

## 2021-02-08 DIAGNOSIS — E03.9 ACQUIRED HYPOTHYROIDISM: ICD-10-CM

## 2021-02-08 DIAGNOSIS — E78.5 HYPERLIPIDEMIA LDL GOAL <130: ICD-10-CM

## 2021-02-08 DIAGNOSIS — Z80.0 FAMILY HX OF COLON CANCER: Primary | ICD-10-CM

## 2021-02-08 RX ORDER — LEVOTHYROXINE SODIUM 112 UG/1
112 TABLET ORAL DAILY
Qty: 30 TABLET | Refills: 0 | Status: SHIPPED | OUTPATIENT
Start: 2021-02-08 | End: 2021-02-25

## 2021-02-08 NOTE — TELEPHONE ENCOUNTER
Vivian made her annual exam with you on 2/25/21. She is wondering if there would be any labs that you would want done prior to her coming in that day.... Please advise and okay to lm

## 2021-02-08 NOTE — TELEPHONE ENCOUNTER
Orders are placed for typical labs it would be wise for her to have done at her level of maturity.  Electronically signed:    Alin Skinner PA-C

## 2021-02-24 DIAGNOSIS — Z80.0 FAMILY HX OF COLON CANCER: ICD-10-CM

## 2021-02-24 DIAGNOSIS — E03.9 ACQUIRED HYPOTHYROIDISM: ICD-10-CM

## 2021-02-24 DIAGNOSIS — E78.5 HYPERLIPIDEMIA LDL GOAL <130: ICD-10-CM

## 2021-02-24 LAB
ALBUMIN SERPL-MCNC: 3.5 G/DL (ref 3.4–5)
ALP SERPL-CCNC: 87 U/L (ref 40–150)
ALT SERPL W P-5'-P-CCNC: 65 U/L (ref 0–50)
ANION GAP SERPL CALCULATED.3IONS-SCNC: 4 MMOL/L (ref 3–14)
AST SERPL W P-5'-P-CCNC: 29 U/L (ref 0–45)
BILIRUB SERPL-MCNC: 0.5 MG/DL (ref 0.2–1.3)
BUN SERPL-MCNC: 16 MG/DL (ref 7–30)
CALCIUM SERPL-MCNC: 9.2 MG/DL (ref 8.5–10.1)
CHLORIDE SERPL-SCNC: 107 MMOL/L (ref 94–109)
CHOLEST SERPL-MCNC: 223 MG/DL
CO2 SERPL-SCNC: 29 MMOL/L (ref 20–32)
CREAT SERPL-MCNC: 0.8 MG/DL (ref 0.52–1.04)
ERYTHROCYTE [DISTWIDTH] IN BLOOD BY AUTOMATED COUNT: 13.4 % (ref 10–15)
GFR SERPL CREATININE-BSD FRML MDRD: 85 ML/MIN/{1.73_M2}
GLUCOSE SERPL-MCNC: 97 MG/DL (ref 70–99)
HCT VFR BLD AUTO: 44.9 % (ref 35–47)
HDLC SERPL-MCNC: 56 MG/DL
HGB BLD-MCNC: 14.2 G/DL (ref 11.7–15.7)
LDLC SERPL CALC-MCNC: 138 MG/DL
MCH RBC QN AUTO: 29.5 PG (ref 26.5–33)
MCHC RBC AUTO-ENTMCNC: 31.6 G/DL (ref 31.5–36.5)
MCV RBC AUTO: 93 FL (ref 78–100)
NONHDLC SERPL-MCNC: 167 MG/DL
PLATELET # BLD AUTO: 290 10E9/L (ref 150–450)
POTASSIUM SERPL-SCNC: 4.3 MMOL/L (ref 3.4–5.3)
PROT SERPL-MCNC: 7.1 G/DL (ref 6.8–8.8)
RBC # BLD AUTO: 4.81 10E12/L (ref 3.8–5.2)
SODIUM SERPL-SCNC: 140 MMOL/L (ref 133–144)
T4 FREE SERPL-MCNC: 1.1 NG/DL (ref 0.76–1.46)
TRIGL SERPL-MCNC: 146 MG/DL
TSH SERPL DL<=0.005 MIU/L-ACNC: 4.14 MU/L (ref 0.4–4)
WBC # BLD AUTO: 11.1 10E9/L (ref 4–11)

## 2021-02-24 PROCEDURE — 80061 LIPID PANEL: CPT | Performed by: PHYSICIAN ASSISTANT

## 2021-02-24 PROCEDURE — 84443 ASSAY THYROID STIM HORMONE: CPT | Performed by: PHYSICIAN ASSISTANT

## 2021-02-24 PROCEDURE — 85027 COMPLETE CBC AUTOMATED: CPT | Performed by: PHYSICIAN ASSISTANT

## 2021-02-24 PROCEDURE — 84439 ASSAY OF FREE THYROXINE: CPT | Performed by: PHYSICIAN ASSISTANT

## 2021-02-24 PROCEDURE — 36415 COLL VENOUS BLD VENIPUNCTURE: CPT | Performed by: PHYSICIAN ASSISTANT

## 2021-02-24 PROCEDURE — 80053 COMPREHEN METABOLIC PANEL: CPT | Performed by: PHYSICIAN ASSISTANT

## 2021-02-24 NOTE — PROGRESS NOTES
{2021 PROVIDER CHARTING PREFERENCE:718393}    Subjective     Vivian Brown is a 51 year old female who presents to clinic today for the following health issues accompanied by her {accompanied to visit:832619}:    HPI     {SUPERLIST (Optional):244847}  {additonal problems for provider to add (Optional):708365}    Review of Systems   {ROS COMP (Optional):270613}      Objective    There were no vitals taken for this visit.  There is no height or weight on file to calculate BMI.  Physical Exam   {Exam List (Optional):608415}    {Diagnostic Test Results (Optional):271361}

## 2021-02-25 ENCOUNTER — OFFICE VISIT (OUTPATIENT)
Dept: FAMILY MEDICINE | Facility: OTHER | Age: 51
End: 2021-02-25
Payer: COMMERCIAL

## 2021-02-25 VITALS
SYSTOLIC BLOOD PRESSURE: 126 MMHG | DIASTOLIC BLOOD PRESSURE: 70 MMHG | BODY MASS INDEX: 31.07 KG/M2 | RESPIRATION RATE: 18 BRPM | HEART RATE: 88 BPM | HEIGHT: 64 IN | OXYGEN SATURATION: 97 % | TEMPERATURE: 98.3 F | WEIGHT: 182 LBS

## 2021-02-25 DIAGNOSIS — E03.8 OTHER SPECIFIED HYPOTHYROIDISM: ICD-10-CM

## 2021-02-25 DIAGNOSIS — E03.4 HYPOTHYROIDISM DUE TO ACQUIRED ATROPHY OF THYROID: ICD-10-CM

## 2021-02-25 DIAGNOSIS — Z80.0 FAMILY HISTORY OF MALIGNANT NEOPLASM OF GASTROINTESTINAL TRACT: ICD-10-CM

## 2021-02-25 DIAGNOSIS — Z23 NEED FOR VACCINATION: ICD-10-CM

## 2021-02-25 DIAGNOSIS — Z12.11 SPECIAL SCREENING FOR MALIGNANT NEOPLASMS, COLON: ICD-10-CM

## 2021-02-25 DIAGNOSIS — E78.5 HYPERLIPIDEMIA LDL GOAL <130: ICD-10-CM

## 2021-02-25 DIAGNOSIS — Z23 NEED FOR PROPHYLACTIC VACCINATION AND INOCULATION AGAINST INFLUENZA: ICD-10-CM

## 2021-02-25 DIAGNOSIS — Z11.59 NEED FOR HEPATITIS C SCREENING TEST: Primary | ICD-10-CM

## 2021-02-25 PROCEDURE — 99396 PREV VISIT EST AGE 40-64: CPT | Performed by: PHYSICIAN ASSISTANT

## 2021-02-25 RX ORDER — LEVOTHYROXINE SODIUM 112 UG/1
112 TABLET ORAL DAILY
Qty: 90 TABLET | Refills: 1 | Status: SHIPPED | OUTPATIENT
Start: 2021-02-25 | End: 2021-09-15

## 2021-02-25 ASSESSMENT — MIFFLIN-ST. JEOR: SCORE: 1431.42

## 2021-02-25 ASSESSMENT — PAIN SCALES - GENERAL: PAINLEVEL: NO PAIN (0)

## 2021-02-25 NOTE — PROGRESS NOTES
SUBJECTIVE:   CC: Vivian Brown is an 51 year old woman who presents for preventive health visit.     Patient has been advised of split billing requirements and indicates understanding: Yes     Healthy Habits:    Getting at least 3 servings of Calcium per day:  Yes    Bi-annual eye exam:  Yes    Dental care twice a year:  Yes    Sleep apnea or symptoms of sleep apnea:  None    Diet:  Regular (no restrictions)    Frequency of exercise:  4-5 days/week    Duration of exercise:  30-45 minutes    Taking medications regularly:  Yes    Barriers to taking medications:  None    Medication side effects:  None    PHQ-2 Total Score:    Additional concerns today:  No      Hypothyroidism Follow-up      Since last visit, patient describes the following symptoms: Weight stable, no hair loss, no skin changes, no constipation, no loose stools      Today's PHQ-2 Score:   PHQ-2 ( 1999 Pfizer) 2/25/2021   Q1: Little interest or pleasure in doing things -   Q2: Feeling down, depressed or hopeless -   PHQ-2 Score -   Q1: Little interest or pleasure in doing things -   Q2: Feeling down, depressed or hopeless -   PHQ-2 Score Incomplete       Abuse: Current or Past (Physical, Sexual or Emotional) - No  Do you feel safe in your environment? Yes    Have you ever done Advance Care Planning? (For example, a Health Directive, POLST, or a discussion with a medical provider or your loved ones about your wishes): No, advance care planning information given to patient to review.  Patient plans to discuss their wishes with loved ones or provider.      Social History     Tobacco Use     Smoking status: Never Smoker     Smokeless tobacco: Never Used   Substance Use Topics     Alcohol use: Yes     Alcohol/week: 0.0 standard drinks     Frequency: Monthly or less     Drinks per session: 1 or 2     Binge frequency: Never     Comment: 1-2 times/month         Alcohol Use 1/23/2020   Prescreen: >3 drinks/day or >7 drinks/week? No   Prescreen: >3  drinks/day or >7 drinks/week? -       Any new diagnosis of family breast, ovarian, or bowel cancer? No   Reviewed orders with patient.  Reviewed health maintenance and updated orders accordingly - Yes  Lab work is in process  Labs reviewed in EPIC  BP Readings from Last 3 Encounters:   02/25/21 126/70   01/23/20 126/70   01/16/19 112/70    Wt Readings from Last 3 Encounters:   02/25/21 82.6 kg (182 lb)   01/23/20 82.1 kg (181 lb)   01/16/19 79.4 kg (175 lb)                  Patient Active Problem List   Diagnosis     Hypothyroidism     Hyperlipidemia LDL goal <130     Family hx of colon cancer     Nonallopathic lesion of cervical region     Cervicalgia     Nonallopathic lesion of sacral region     Nonallopathic lesion of lumbar region     Lumbago     Nonallopathic lesion of thoracic region     Past Surgical History:   Procedure Laterality Date     COLONOSCOPY N/A 9/28/2015    Procedure: COLONOSCOPY;  Surgeon: Eugenio Travis MD;  Location:  GI     TONSILLECTOMY         Social History     Tobacco Use     Smoking status: Never Smoker     Smokeless tobacco: Never Used   Substance Use Topics     Alcohol use: Not Currently     Alcohol/week: 0.0 standard drinks     Frequency: Monthly or less     Drinks per session: 1 or 2     Binge frequency: Never     Comment: 1-2 times/month     Family History   Problem Relation Age of Onset     Colon Cancer Maternal Grandmother 91     Hypertension Mother      Hyperlipidemia Mother      Cancer Mother 65        GIST     Other Cancer Mother      Coronary Artery Disease Father         MI     Thyroid Disease Sister      Asthma Daughter          Current Outpatient Medications   Medication Sig Dispense Refill     levothyroxine (SYNTHROID/LEVOTHROID) 112 MCG tablet Take 1 tablet (112 mcg) by mouth daily 90 tablet 1     Allergies   Allergen Reactions     Latex Itching     Seasonal Allergies      Recent Labs   Lab Test 02/24/21  0758 03/09/20  0758 01/16/19  0919   * 115* 114*    HDL 56 45* 56   TRIG 146 170* 131   ALT 65* 35  --    CR 0.80 0.94  --    GFRESTIMATED 85 71  --    GFRESTBLACK >90 82  --    POTASSIUM 4.3 3.9  --    TSH 4.14* 1.13 0.67        Breast CA Risk Screening:  No flowsheet data found.  No flowsheet data found.  click delete button to remove this line now    Pertinent mammograms are reviewed under the imaging tab.    History of abnormal Pap smear:   Last 3 Pap and HPV Results:   PAP / HPV Latest Ref Rng & Units 1/23/2020 10/25/2016 5/21/2013   PAP - NIL NIL NIL   HPV 16 DNA NEG:Negative Negative Negative -   HPV 18 DNA NEG:Negative Negative Negative -   OTHER HR HPV NEG:Negative Negative Negative -     PAP / HPV Latest Ref Rng & Units 1/23/2020 10/25/2016 5/21/2013   PAP - NIL NIL NIL   HPV 16 DNA NEG:Negative Negative Negative -   HPV 18 DNA NEG:Negative Negative Negative -   OTHER HR HPV NEG:Negative Negative Negative -     Reviewed and updated as needed this visit by clinical staff   Allergies  Meds              Reviewed and updated as needed this visit by Provider                Past Medical History:   Diagnosis Date     Family history of colon cancer     Colonoscoy q5 years next 9/2020     Hypothyroidism       Past Surgical History:   Procedure Laterality Date     COLONOSCOPY N/A 9/28/2015    Procedure: COLONOSCOPY;  Surgeon: Eugenio Travis MD;  Location:  GI     TONSILLECTOMY         Review of Systems  CONSTITUTIONAL: NEGATIVE for fever, chills, change in weight  INTEGUMENTARU/SKIN: NEGATIVE for worrisome rashes, moles or lesions  EYES: NEGATIVE for vision changes or irritation  ENT: NEGATIVE for ear, mouth and throat problems  RESP: NEGATIVE for significant cough or SOB  BREAST: NEGATIVE for masses, tenderness or discharge  CV: NEGATIVE for chest pain, palpitations or peripheral edema  GI: NEGATIVE for nausea, abdominal pain, heartburn, or change in bowel habits  : NEGATIVE for unusual urinary or vaginal symptoms. Periods are  regular.  MUSCULOSKELETAL: NEGATIVE for significant arthralgias or myalgia  NEURO: NEGATIVE for weakness, dizziness or paresthesias  PSYCHIATRIC: NEGATIVE for changes in mood or affect     OBJECTIVE:   There were no vitals taken for this visit.  Physical Exam  GENERAL APPEARANCE: healthy, alert and no distress  EYES: Eyes grossly normal to inspection, PERRL and conjunctivae and sclerae normal  HENT: ear canals and TM's normal, nose and mouth without ulcers or lesions, oropharynx clear and oral mucous membranes moist  NECK: no adenopathy, no asymmetry, masses, or scars and thyroid normal to palpation  RESP: lungs clear to auscultation - no rales, rhonchi or wheezes  CV: regular rate and rhythm, normal S1 S2, no S3 or S4, no murmur, click or rub, no peripheral edema and peripheral pulses strong  ABDOMEN: soft, nontender, no hepatosplenomegaly, no masses and bowel sounds normal  MS: no musculoskeletal defects are noted and gait is age appropriate without ataxia  SKIN: no suspicious lesions or rashes  NEURO: Normal strength and tone, sensory exam grossly normal, mentation intact and speech normal  PSYCH: mentation appears normal and affect normal/bright    Diagnostic Test Results:  Labs reviewed in Epic    ASSESSMENT/PLAN:   1. Need for hepatitis C screening test  2. Need for vaccination  3. Need for prophylactic vaccination and inoculation against influenza  4. Hyperlipidemia LDL goal <130    5. Hypothyroidism due to acquired atrophy of thyroid  Slightly elevation in her TSH was noted recently.  It would appear that she may have had a different brand name or chemical composition of her medication that could be giving her part of the problem.  We may need to have this changed back to what it was.  Advised that we recheck her TSH in 12 weeks  - TSH with free T4 reflex; Future    6. Special screening for malignant neoplasms, colon  7. Family history of malignant neoplasm of gastrointestinal tract  She is some concern with  "gastrointestinal issues that may be either allergic in nature due to food sensitivities or possibly related to ulcerative colitis or Crohn's disease.  We reviewed the colonoscopy from 5 years ago and note a an essentially normal colon at that point time it would be highly unlikely that she would have developed UC or Crohn's within that time.  I do recommend that she try off of the foods that she has been tested positive for sensitivity to in the past and see if this helps her overall gut health.  Further evaluation based on her next colonoscopy and response to dietary restriction.  She certainly could see gastroenterology to discuss this further or be retested for allergies.  - GASTROENTEROLOGY ADULT REF PROCEDURE ONLY; Future    Patient has been advised of split billing requirements and indicates understanding:   COUNSELING:  Reviewed preventive health counseling, as reflected in patient instructions       Regular exercise       Healthy diet/nutrition       Vision screening       Hearing screening    Estimated body mass index is 30.79 kg/m  as calculated from the following:    Height as of 1/23/20: 1.633 m (5' 4.29\").    Weight as of 1/23/20: 82.1 kg (181 lb).    Weight management plan: Discussed healthy diet and exercise guidelines    She reports that she has never smoked. She has never used smokeless tobacco.      Counseling Resources:  ATP IV Guidelines  Pooled Cohorts Equation Calculator  Breast Cancer Risk Calculator  BRCA-Related Cancer Risk Assessment: FHS-7 Tool  FRAX Risk Assessment  ICSI Preventive Guidelines  Dietary Guidelines for Americans, 2010  USDA's MyPlate  ASA Prophylaxis  Lung CA Screening    JADE [facility]  "

## 2021-02-26 ENCOUNTER — TELEPHONE (OUTPATIENT)
Dept: FAMILY MEDICINE | Facility: OTHER | Age: 51
End: 2021-02-26

## 2021-02-26 ENCOUNTER — HOSPITAL ENCOUNTER (OUTPATIENT)
Facility: CLINIC | Age: 51
End: 2021-02-26
Attending: SPECIALIST | Admitting: SPECIALIST
Payer: COMMERCIAL

## 2021-02-26 DIAGNOSIS — Z11.59 ENCOUNTER FOR SCREENING FOR OTHER VIRAL DISEASES: ICD-10-CM

## 2021-02-26 NOTE — TELEPHONE ENCOUNTER
Date of procedure: 3/10/21  Colonoscopy  Surgeon: Dr. Guzman  Prep:Miralax  Packet:Colonoscopy/EGD instructions mailed to patient's home address.   Date: 2/26/2021      Surgery Scheduler

## 2021-03-01 NOTE — TELEPHONE ENCOUNTER
Please call pt to reschedule, it is ok to LM per pt and she would like to know what days are available.Thank You Radha [phone]

## 2021-08-26 ENCOUNTER — LAB (OUTPATIENT)
Dept: LAB | Facility: CLINIC | Age: 51
End: 2021-08-26
Payer: COMMERCIAL

## 2021-08-26 DIAGNOSIS — E03.4 HYPOTHYROIDISM DUE TO ACQUIRED ATROPHY OF THYROID: ICD-10-CM

## 2021-08-26 LAB — TSH SERPL DL<=0.005 MIU/L-ACNC: 2.2 MU/L (ref 0.4–4)

## 2021-08-26 PROCEDURE — 84443 ASSAY THYROID STIM HORMONE: CPT

## 2021-08-26 PROCEDURE — 36415 COLL VENOUS BLD VENIPUNCTURE: CPT

## 2021-08-28 ENCOUNTER — OFFICE VISIT (OUTPATIENT)
Dept: URGENT CARE | Facility: URGENT CARE | Age: 51
End: 2021-08-28
Payer: COMMERCIAL

## 2021-08-28 VITALS
HEART RATE: 76 BPM | WEIGHT: 185 LBS | OXYGEN SATURATION: 98 % | BODY MASS INDEX: 31.39 KG/M2 | TEMPERATURE: 98.8 F | SYSTOLIC BLOOD PRESSURE: 125 MMHG | DIASTOLIC BLOOD PRESSURE: 84 MMHG

## 2021-08-28 DIAGNOSIS — L23.7 CONTACT DERMATITIS DUE TO POISON IVY: Primary | ICD-10-CM

## 2021-08-28 PROCEDURE — 99213 OFFICE O/P EST LOW 20 MIN: CPT | Performed by: PHYSICIAN ASSISTANT

## 2021-08-28 RX ORDER — PREDNISONE 20 MG/1
20 TABLET ORAL DAILY
Qty: 5 TABLET | Refills: 0 | Status: SHIPPED | OUTPATIENT
Start: 2021-08-28 | End: 2021-09-02

## 2021-08-28 RX ORDER — TRIAMCINOLONE ACETONIDE 1 MG/G
CREAM TOPICAL 3 TIMES DAILY
Qty: 80 G | Refills: 4 | Status: SHIPPED | OUTPATIENT
Start: 2021-08-28 | End: 2023-11-27

## 2021-08-28 ASSESSMENT — PAIN SCALES - GENERAL: PAINLEVEL: NO PAIN (1)

## 2021-08-28 NOTE — PROGRESS NOTES
Assessment & Plan     1. Contact dermatitis due to poison ivy   R/O vs oak vs other    - predniSONE (DELTASONE) 20 MG tablet; Take 1 tablet (20 mg) by mouth daily for 5 days  Dispense: 5 tablet; Refill: 0  - triamcinolone (KENALOG) 0.1 % external cream; Apply topically 3 times daily  Dispense: 80 g; Refill: 4              JADE Garcia [facility] [place]    Dre Park is a 51 year old female who presents to clinic today for the following health issues:  Chief Complaint   Patient presents with     Derm Problem     arms and legs since Tuesday      HPI    Rash started 4 days ago. Location left forearm maybe a bite. Now other lesions to left arm and right arms and both ankles. Itching. She is outside a lot. Some softballs rolled into weeds that she grabbed. Ibuprofen and topical HC.             Review of Systems        Objective    /84 (BP Location: Right arm, Patient Position: Chair, Cuff Size: Adult Regular)   Pulse 76   Temp 98.8  F (37.1  C) (Tympanic)   Wt 83.9 kg (185 lb)   SpO2 98%   Breastfeeding No   BMI 31.39 kg/m    Physical Exam  Skin:     Comments: Left and right forearm with blistering lesions.

## 2021-09-04 ENCOUNTER — OFFICE VISIT (OUTPATIENT)
Dept: FAMILY MEDICINE | Facility: OTHER | Age: 51
End: 2021-09-04
Attending: NURSE PRACTITIONER
Payer: COMMERCIAL

## 2021-09-04 ENCOUNTER — NURSE TRIAGE (OUTPATIENT)
Dept: NURSING | Facility: CLINIC | Age: 51
End: 2021-09-04

## 2021-09-04 VITALS
BODY MASS INDEX: 31.95 KG/M2 | DIASTOLIC BLOOD PRESSURE: 72 MMHG | HEART RATE: 85 BPM | TEMPERATURE: 98.5 F | SYSTOLIC BLOOD PRESSURE: 130 MMHG | RESPIRATION RATE: 18 BRPM | OXYGEN SATURATION: 98 % | WEIGHT: 188.3 LBS

## 2021-09-04 DIAGNOSIS — L23.7 CONTACT DERMATITIS DUE TO POISON IVY: Primary | ICD-10-CM

## 2021-09-04 PROCEDURE — 99202 OFFICE O/P NEW SF 15 MIN: CPT | Performed by: FAMILY MEDICINE

## 2021-09-04 RX ORDER — PREDNISONE 20 MG/1
20 TABLET ORAL 2 TIMES DAILY
Qty: 10 TABLET | Refills: 1 | Status: SHIPPED | OUTPATIENT
Start: 2021-09-04 | End: 2021-09-09

## 2021-09-04 ASSESSMENT — PAIN SCALES - GENERAL: PAINLEVEL: NO PAIN (0)

## 2021-09-04 NOTE — PROGRESS NOTES
Nursing Notes:   Luz Elena Finnegan LPN  9/4/2021  1:08 PM  Sign at exiting of workspace  Chief Complaint   Patient presents with     Derm Problem     poison ivy type rash       Per patient- she was previously seen for this back home, her last day of prescribed prednisone was 8/31/21. Rash has now spread to arms and legs. Today is day 4 off of prednisone.    /72 (BP Location: Right arm, Patient Position: Sitting)   Pulse 85   Temp 98.5  F (36.9  C) (Tympanic)   Resp 18   Wt 85.4 kg (188 lb 4.8 oz)   SpO2 98%   Breastfeeding No   BMI 31.95 kg/m      Medication Reconciliation: complete      FOOD SECURITY SCREENING QUESTIONS  Hunger Vital Signs:  Within the past 12 months we worried whether our food would run out before we got money to buy more. Never  Within the past 12 months the food we bought just didn't last and we didn't have money to get more. Never      Luz Elena Finnegan LPN       SUBJECTIVE:   CC:  Vivian Brown is a 51 year old female who presents to clinic today for the following health issues:  rash    HPI  Vivian Brown is a 51 year old female who presents for evaluation of rash - possible poison ivy.  Onset of a rash August 24.  This was on her left forearm.  Initially she thought it was some type of spider bite, she had some mild erythema and itching with this.  A few days after this, the rash started to spread and she had more lines of raised bumps/vesicles.  These 2 are quite itchy.  She went to urgent care on August 28, was diagnosed with contact dermatitis. She had been on prednisone 20mg daily for 5 days.  She started to break back thru on this last weekend.  She now has spots on both arms and her right leg.      Allergies   Allergen Reactions     Latex Itching     Seasonal Allergies      Current Outpatient Medications   Medication     predniSONE (DELTASONE) 20 MG tablet     levothyroxine (SYNTHROID/LEVOTHROID) 112 MCG tablet     triamcinolone (KENALOG) 0.1 % external cream      No current facility-administered medications for this visit.      Past Medical History:   Diagnosis Date     Family history of colon cancer     Colonoscoy q5 years next 9/2020     Hypothyroidism       Past Surgical History:   Procedure Laterality Date     COLONOSCOPY N/A 9/28/2015    Procedure: COLONOSCOPY;  Surgeon: Eugenio Travis MD;  Location:  GI     TONSILLECTOMY         Review of Systems     PHQ-2 Score:     PHQ-2 ( 1999 Pfizer) 9/4/2021 2/25/2021   Q1: Little interest or pleasure in doing things 0 -   Q2: Feeling down, depressed or hopeless 0 -   PHQ-2 Score 0 -   Q1: Little interest or pleasure in doing things - -   Q2: Feeling down, depressed or hopeless - -   PHQ-2 Score - Incomplete         PHQ-9 SCORE 11/16/2018 1/23/2020   PHQ-9 Total Score MyChart 11 (Moderate depression) 0   PHQ-9 Total Score 11 0     CON-7 SCORE 1/23/2020   Total Score 0 (minimal anxiety)   Total Score 0             OBJECTIVE:     /72 (BP Location: Right arm, Patient Position: Sitting)   Pulse 85   Temp 98.5  F (36.9  C) (Tympanic)   Resp 18   Wt 85.4 kg (188 lb 4.8 oz)   SpO2 98%   Breastfeeding No   BMI 31.95 kg/m    Wt Readings from Last 3 Encounters:   09/04/21 85.4 kg (188 lb 4.8 oz)   08/28/21 83.9 kg (185 lb)   02/25/21 82.6 kg (182 lb)       Nursing notes and VS reviewed.      Body mass index is 31.95 kg/m .  Physical Exam  Constitutional:       Appearance: Normal appearance.   Skin:     Comments: Erythematous vesicular rash right forearm and areas on her right leg/shin.  On her left forearm, she has more flat/macular erythema.  There is no drainage.  These areas are not tender.   Neurological:      Mental Status: She is alert.          No results found for any visits on 09/04/21.      ASSESSMENT/PLAN:     1. Contact dermatitis due to poison ivy        Assessment & Plan   Problem List Items Addressed This Visit     None      Visit Diagnoses     Contact dermatitis due to poison ivy    -  Primary     Relevant Medications    predniSONE (DELTASONE) 20 MG tablet        1.  Patient appears to be having rebound/breakthrough contact dermatitis.  She did respond some to initial steroid dose and topical steroids.  She will be restarted on prednisone, this time at 20 mg twice daily with an initial prescription for 5 days which is then refillable if she is having breakthrough symptoms again.  Potential side effects of this medication, in particular since she will be on a higher dose are discussed with her.  Follow-up if not improving.      ERIK VIZCAINO MD  [facility]    PDMP Review     None

## 2021-09-04 NOTE — NURSING NOTE
Chief Complaint   Patient presents with     Derm Problem     poison ivy type rash       Per patient- she was previously seen for this back home, her last day of prescribed prednisone was 8/31/21. Rash has now spread to arms and legs. Today is day 4 off of prednisone.    /72 (BP Location: Right arm, Patient Position: Sitting)   Pulse 85   Temp 98.5  F (36.9  C) (Tympanic)   Resp 18   Wt 85.4 kg (188 lb 4.8 oz)   SpO2 98%   Breastfeeding No   BMI 31.95 kg/m      Medication Reconciliation: complete      FOOD SECURITY SCREENING QUESTIONS  Hunger Vital Signs:  Within the past 12 months we worried whether our food would run out before we got money to buy more. Never  Within the past 12 months the food we bought just didn't last and we didn't have money to get more. Never      Luz Elena Finnegan, STEVEN

## 2021-09-04 NOTE — TELEPHONE ENCOUNTER
Pt reports that she was diagnosed with contact dermatitis 1 week ago, at urgent care.   She was prescribed topical steroid cream x 5 days.  Symptoms were almost resolved.    This morning, she woke with the rash returned on 3 of her extremities.  She states the rash is re/pink, small dots, and very itchy. Some spots are fluid filled.  Pt is afebrile, and denies any swelling of the lips, tongue, throat, or difficulty breathing.    Per protocol, pt advised to be seen at [facility]/walk-in-clinic today.  Raegan Hatch RN 09/04/21 10:17 AM  [facility] Nurse Advisor        Reason for Disposition    Hives or itching    Additional Information    Negative: [1] Life-threatening reaction (anaphylaxis) in the past to the same drug AND [2] < 2 hours since exposure    Negative: Difficulty breathing or wheezing    Negative: [1] Hoarseness or cough AND [2] started soon after 1st dose of drug    Negative: [1] Swollen tongue AND [2] started soon after 1st dose of drug    Negative: [1] Purple or blood-colored rash (spots or dots) AND [2] fever    Negative: Sounds like a life-threatening emergency to the triager    Protocols used: RASH - WIDESPREAD ON DRUGS-A-AH    COVID 19 Nurse Triage Plan/Patient Instructions    Please be aware that novel coronavirus (COVID-19) may be circulating in the community. If you develop symptoms such as fever, cough, or SOB or if you have concerns about the presence of another infection including coronavirus (COVID-19), please contact your health care provider or visit [email or web address].     Disposition/Instructions    In-Person Visit with provider recommended. Reference Visit Selection Guide.    Thank you for taking steps to prevent the spread of this virus.  o Limit your contact with others.  o Wear a simple mask to cover your cough.  o Wash your hands well and often.    Resources    [facility]: About COVID-19: [email or web address]    CDC: What to Do If You're Sick:  www.cdc.gov/coronavirus/2019-ncov/about/steps-when-sick.html    CDC: Ending Home Isolation: www.cdc.gov/coronavirus/2019-ncov/hcp/disposition-in-home-patients.html     CDC: Caring for Someone: www.cdc.gov/coronavirus/2019-ncov/if-you-are-sick/care-for-someone.html     [facility]: Interim Guidance for Hospital Discharge to Home: www.[email or web address].mn./diseases/coronavirus/hcp/hospdischarge.pdf    [facility] clinical trials (COVID-19 research studies): clinicalaffairs.[facility]-clinical-trials     Below are the COVID-19 hotlines at the [place] [facility] ([facility]). Interpreters are available.   o For health questions: Call 920-064-0377 or 1-813.461.6289 (7 a.m. to 7 p.m.)  o For questions about schools and childcare: Call 483-098-7575 or 1-142.824.5561 (7 a.m. to 7 p.m.)

## 2021-09-15 ENCOUNTER — TELEPHONE (OUTPATIENT)
Dept: FAMILY MEDICINE | Facility: OTHER | Age: 51
End: 2021-09-15

## 2021-09-15 DIAGNOSIS — E03.8 OTHER SPECIFIED HYPOTHYROIDISM: ICD-10-CM

## 2021-09-15 RX ORDER — LEVOTHYROXINE SODIUM 112 UG/1
112 TABLET ORAL DAILY
Qty: 90 TABLET | Refills: 1 | Status: SHIPPED | OUTPATIENT
Start: 2021-09-15 | End: 2022-03-10

## 2021-09-15 NOTE — TELEPHONE ENCOUNTER
Patient called in questioning if she had any refills on levothyroxine. Advised we show one at [facility] . patient checking with them as she thought it was at [facility]. Transferred to pharmacy

## 2022-03-04 DIAGNOSIS — E03.8 OTHER SPECIFIED HYPOTHYROIDISM: ICD-10-CM

## 2022-03-10 ENCOUNTER — TELEPHONE (OUTPATIENT)
Dept: FAMILY MEDICINE | Facility: OTHER | Age: 52
End: 2022-03-10
Payer: COMMERCIAL

## 2022-03-10 RX ORDER — LEVOTHYROXINE SODIUM 112 UG/1
112 TABLET ORAL DAILY
Qty: 90 TABLET | Refills: 1 | Status: SHIPPED | OUTPATIENT
Start: 2022-03-10 | End: 2022-10-10

## 2022-03-10 NOTE — TELEPHONE ENCOUNTER
"Requested Prescriptions   Pending Prescriptions Disp Refills     levothyroxine (SYNTHROID/LEVOTHROID) 112 MCG tablet 90 tablet 1     Sig: Take 1 tablet (112 mcg) by mouth daily       Thyroid Protocol Passed - 3/4/2022  7:58 AM        Passed - Patient is 12 years or older        Passed - Recent (12 mo) or future (30 days) visit within the authorizing provider's specialty     Patient has had an office visit with the authorizing provider or a provider within the authorizing providers department within the previous 12 mos or has a future within next 30 days. See \"Patient Info\" tab in inbasket, or \"Choose Columns\" in Meds & Orders section of the refill encounter.              Passed - Medication is active on med list        Passed - Normal TSH on file in past 12 months     Recent Labs   Lab Test 08/26/21  1220   TSH 2.20              Passed - No active pregnancy on record     If patient is pregnant or has had a positive pregnancy test, please check TSH.          Passed - No positive pregnancy test in past 12 months     If patient is pregnant or has had a positive pregnancy test, please check TSH.             Pharmacy calling to check status of medication, RX approved per protocol    Cris Almazan RN on 3/10/2022 at 11:58 AM    "

## 2022-03-10 NOTE — TELEPHONE ENCOUNTER
Levothyroxine RX status update. RX approved per protocol.  Cris Almazan RN on 3/10/2022 at 11:58 AM

## 2022-09-06 ENCOUNTER — TELEPHONE (OUTPATIENT)
Dept: OBGYN | Facility: OTHER | Age: 52
End: 2022-09-06

## 2022-09-06 NOTE — TELEPHONE ENCOUNTER
[facility] Call Center    Phone Message    May a detailed message be left on voicemail: yes     Reason for Call: The patient called to schedule for skin tag on her labia that has changed color and is now weeping. Writer scheduled for first available on 9/20/22 in [place], and added to wait list. She would like to speak with a nurse. Please advise. Thank you.     Action Taken: Message routed to:  [facility] p [phone]    Travel Screening: Not Applicable

## 2022-09-06 NOTE — TELEPHONE ENCOUNTER
Pt called, has skin tag on labia that is bright red and dark red in spots and weeping for 2 days. Weepy from tag is clear, no odor, does not require a pad, no bleeding. No fever. Has had tag long time, possibly 10 years, very small growth over time. Not painful, just feels irritated.    Pt scheduled apt with Dr. Ontiveros for 9/20/22. Advised her to monitor for and notify clinic if she has signs of infection.    Pt would like to know if she should be seen sooner.    Routing to Dr. Ontiveros.    Lovely Manzano RN on 9/6/2022 at 5:07 PM

## 2022-09-09 NOTE — TELEPHONE ENCOUNTER
The 20th is probably soon enough to be seen. Suggest just to keep the lesions clean and if still a problem will remove them on the 20th.     DB     Spoke with pt, reviewed provider recommendation. Pt in agreement with plan.    Lovely Manzano RN on 9/9/2022 at 4:39 PM

## 2022-09-13 NOTE — TELEPHONE ENCOUNTER
Called pt, no sooner openings available at this time. Pt refers phone calls and will remain on wait list, does not use [employer]t.    Lovely Manzano RN on 9/13/2022 at 2:30 PM

## 2022-09-13 NOTE — TELEPHONE ENCOUNTER
Patient returning call;  Had received voicemail just after 6pm last night telling her earlier appointment available per wait list.  She is calling regarding that earlier appointment date.  Requesting call back to let her know if still available and to schedule please.  Routing to OB/GYN group for assistance.  Mary Ann LOBO RN

## 2022-09-20 ENCOUNTER — OFFICE VISIT (OUTPATIENT)
Dept: OBGYN | Facility: OTHER | Age: 52
End: 2022-09-20
Payer: COMMERCIAL

## 2022-09-20 VITALS
DIASTOLIC BLOOD PRESSURE: 80 MMHG | SYSTOLIC BLOOD PRESSURE: 109 MMHG | BODY MASS INDEX: 31.9 KG/M2 | WEIGHT: 188 LBS | HEART RATE: 86 BPM

## 2022-09-20 DIAGNOSIS — N90.89 LABIAL LESION: Primary | ICD-10-CM

## 2022-09-20 PROCEDURE — 88312 SPECIAL STAINS GROUP 1: CPT | Performed by: DERMATOLOGY

## 2022-09-20 PROCEDURE — 88305 TISSUE EXAM BY PATHOLOGIST: CPT | Performed by: DERMATOLOGY

## 2022-09-20 PROCEDURE — 11200 RMVL SKIN TAGS UP TO&INC 15: CPT | Performed by: OBSTETRICS & GYNECOLOGY

## 2022-09-20 NOTE — PROGRESS NOTES
"  SUBJECTIVE:                                                   Vivian Brown is a 52 year old female who presents to clinic today for the following health issue(s):  Patient presents with:  Consult: \"Skin tag on labia\"  Vivian has had a couple labial lesions on the right side that she is noted chronically.  1 lesion on the labia minora has been removed on 1 occasion as a skin tag but she feels that this is grown back.  Wondering if it got aggravated by 13 mile walk.    Patient was consented for excision of both lesions after examination revealed a three-quarter centimeter polyp type lesion growing out of the labia majora and the tip of it appeared to be eroded the other lesion was sessile.    Procedure:  Each lesion was prepped with alcohol and anesthetized with 1.1 cc lidocaine with epinephrine.  The labia lesion on the labia minora was tied off with a 4-0 Vicryl anchoring into normal dermis and then excised and placed in a pathology in formalin.  Good hemostasis had  Flat lesion on the lateral aspect of the labia was read each line below, one line at a time, and watch your screen to see what happens:    With 15 blade.  That was also removed and placed in pathology formalin cup.  Skin edges were reapproximated with 4-0 Vicryl for that good hemostasis had.    Assessment:  To labial lesions on the right side 1 on the labia minora and 1 on the labia majora.  Both abnormal in character meriting removal.    Plan:  Specimen sent to pathology  Inner labial lesion is labled #2  Outer labial lesion is labled #1  Patient does not to MyChart so we will either get a call if its normal pathology by the nurse line.  If its abnormal will call her myself    No LMP recorded. (Menstrual status: Irregular Periods)..     Patient is sexually active, .  Using IUD for contraception.    reports that she has never smoked. She has never used smokeless tobacco.    STD testing offered?  Declined    Health maintenance updated:  " no    Today's PHQ-2 Score:   PHQ-2 ( 1999 Pf[employer]) 9/4/2021   Q1: Little interest or pleasure in doing things 0   Q2: Feeling down, depressed or hopeless 0   PHQ-2 Score 0   PHQ-2 Total Score (12-17 Years)- Positive if 3 or more points; Administer PHQ-A if positive 0   Q1: Little interest or pleasure in doing things -   Q2: Feeling down, depressed or hopeless -   PHQ-2 Score -     Today's PHQ-9 Score:   PHQ-9 SCORE 1/23/2020   PHQ-9 Total Score MyChart 0   PHQ-9 Total Score 0     Today's CON-7 Score:   CON-7 SCORE 1/23/2020   Total Score 0 (minimal anxiety)   Total Score 0       Problem list and histories reviewed & adjusted, as indicated.  Additional history: as documented.    Patient Active Problem List   Diagnosis     Hypothyroidism     Hyperlipidemia LDL goal <130     Family hx of colon cancer     Nonallopathic lesion of cervical region     Cervicalgia     Nonallopathic lesion of sacral region     Nonallopathic lesion of lumbar region     Lumbago     Nonallopathic lesion of thoracic region     Past Surgical History:   Procedure Laterality Date     COLONOSCOPY N/A 9/28/2015    Procedure: COLONOSCOPY;  Surgeon: Eugenio Travis MD;  Location:  GI     TONSILLECTOMY        Social History     Tobacco Use     Smoking status: Never Smoker     Smokeless tobacco: Never Used   Substance Use Topics     Alcohol use: Not Currently     Alcohol/week: 0.0 standard drinks     Comment: 1-2 times/month      Problem (# of Occurrences) Relation (Name,Age of Onset)    Asthma (1) Daughter    Cancer (1) Mother (65): GIST    Colon Cancer (1) Maternal Grandmother (91)    Coronary Artery Disease (1) Father: MI    Hyperlipidemia (1) Mother    Hypertension (1) Mother    Other Cancer (1) Mother    Thyroid Disease (1) Sister            Current Outpatient Medications   Medication Sig     levothyroxine (SYNTHROID/LEVOTHROID) 112 MCG tablet Take 1 tablet (112 mcg) by mouth daily     triamcinolone (KENALOG) 0.1 % external cream Apply  topically 3 times daily     No current facility-administered medications for this visit.     Allergies   Allergen Reactions     Latex Itching     Seasonal Allergies          OBJECTIVE:     There were no vitals taken for this visit.  There is no height or weight on file to calculate BMI.    Exam:      In-Clinic Test Results:  No results found for this or any previous visit (from the past 24 hour(s)).    ASSESSMENT/PLAN:                                                        Sarthak Ontiveros MD  [facility]

## 2022-09-25 LAB
PATH REPORT.COMMENTS IMP SPEC: NORMAL
PATH REPORT.FINAL DX SPEC: NORMAL
PATH REPORT.FINAL DX SPEC: NORMAL
PATH REPORT.GROSS SPEC: NORMAL
PATH REPORT.GROSS SPEC: NORMAL
PATH REPORT.MICROSCOPIC SPEC OTHER STN: NORMAL
PATH REPORT.MICROSCOPIC SPEC OTHER STN: NORMAL
PATH REPORT.RELEVANT HX SPEC: NORMAL
PATH REPORT.RELEVANT HX SPEC: NORMAL
PHOTO IMAGE: NORMAL
PHOTO IMAGE: NORMAL

## 2022-09-28 ENCOUNTER — TELEPHONE (OUTPATIENT)
Dept: OBGYN | Facility: OTHER | Age: 52
End: 2022-09-28

## 2022-09-28 NOTE — TELEPHONE ENCOUNTER
"Per 9/20/22 pathology result note:  \"Attached are the results from the two growths that were removed.  They are both considered benign and I would expect not likely to recur again.\"    Contacted pt to relay above result note on her recent vulvar biopsies.      She states that one biopsy area has not healed yet.  I suggested taking sitz baths a couple times a day and to keep the area clean and dry and dab or blot when wiping after using the bathroom.  I encouraged her to schedule an OV with Dr. Ontiveros if it doesn't seem to be healing in the next couple of weeks or gets worse.    Pt asked me to deactivate her mychart as she wasn't able to access it even after several chats with IT.  She wanted it deactivated so it doesn't appear in her chart that she has mychart.    Pt also requested that the pathology report from her 9/20 vulvar biopsies be mailed to her home so she can review.  Verified that address in her chart is correct.    Routing to the [facility] for clinic staff to print and mail the 9/20 biopsy report to pt's home address.    Conchita Powell RN    "

## 2022-10-10 DIAGNOSIS — E03.8 OTHER SPECIFIED HYPOTHYROIDISM: ICD-10-CM

## 2022-10-10 RX ORDER — LEVOTHYROXINE SODIUM 112 UG/1
112 TABLET ORAL DAILY
Qty: 30 TABLET | Refills: 0 | Status: SHIPPED | OUTPATIENT
Start: 2022-10-10 | End: 2022-11-07

## 2022-11-07 DIAGNOSIS — E03.8 OTHER SPECIFIED HYPOTHYROIDISM: ICD-10-CM

## 2022-11-07 RX ORDER — LEVOTHYROXINE SODIUM 112 UG/1
112 TABLET ORAL DAILY
Qty: 30 TABLET | Refills: 0 | Status: SHIPPED | OUTPATIENT
Start: 2022-11-07 | End: 2022-12-06

## 2022-12-06 DIAGNOSIS — E03.8 OTHER SPECIFIED HYPOTHYROIDISM: ICD-10-CM

## 2022-12-06 RX ORDER — LEVOTHYROXINE SODIUM 112 UG/1
112 TABLET ORAL DAILY
Qty: 30 TABLET | Refills: 0 | Status: SHIPPED | OUTPATIENT
Start: 2022-12-06 | End: 2022-12-23 | Stop reason: DRUGHIGH

## 2022-12-19 ENCOUNTER — OFFICE VISIT (OUTPATIENT)
Dept: FAMILY MEDICINE | Facility: OTHER | Age: 52
End: 2022-12-19
Payer: COMMERCIAL

## 2022-12-19 VITALS
DIASTOLIC BLOOD PRESSURE: 70 MMHG | HEART RATE: 97 BPM | RESPIRATION RATE: 18 BRPM | OXYGEN SATURATION: 97 % | TEMPERATURE: 97.8 F | SYSTOLIC BLOOD PRESSURE: 114 MMHG | WEIGHT: 205 LBS | BODY MASS INDEX: 34.16 KG/M2 | HEIGHT: 65 IN

## 2022-12-19 DIAGNOSIS — E78.5 HYPERLIPIDEMIA LDL GOAL <100: ICD-10-CM

## 2022-12-19 DIAGNOSIS — Z12.11 SCREEN FOR COLON CANCER: ICD-10-CM

## 2022-12-19 DIAGNOSIS — Z12.31 VISIT FOR SCREENING MAMMOGRAM: ICD-10-CM

## 2022-12-19 DIAGNOSIS — D72.828 OTHER ELEVATED WHITE BLOOD CELL (WBC) COUNT: ICD-10-CM

## 2022-12-19 DIAGNOSIS — Z11.59 NEED FOR HEPATITIS C SCREENING TEST: ICD-10-CM

## 2022-12-19 DIAGNOSIS — Z23 NEED FOR VACCINATION: ICD-10-CM

## 2022-12-19 DIAGNOSIS — E03.4 HYPOTHYROIDISM DUE TO ACQUIRED ATROPHY OF THYROID: Primary | ICD-10-CM

## 2022-12-19 LAB
ALBUMIN SERPL-MCNC: 2.2 G/DL (ref 3.4–5)
ALP SERPL-CCNC: 105 U/L (ref 40–150)
ALT SERPL W P-5'-P-CCNC: 34 U/L (ref 0–50)
ANION GAP SERPL CALCULATED.3IONS-SCNC: 2 MMOL/L (ref 3–14)
AST SERPL W P-5'-P-CCNC: 23 U/L (ref 0–45)
BASOPHILS # BLD AUTO: 0.1 10E3/UL (ref 0–0.2)
BASOPHILS NFR BLD AUTO: 1 %
BILIRUB SERPL-MCNC: 0.4 MG/DL (ref 0.2–1.3)
BUN SERPL-MCNC: 15 MG/DL (ref 7–30)
CALCIUM SERPL-MCNC: 8.3 MG/DL (ref 8.5–10.1)
CHLORIDE BLD-SCNC: 108 MMOL/L (ref 94–109)
CO2 SERPL-SCNC: 33 MMOL/L (ref 20–32)
CREAT SERPL-MCNC: 0.9 MG/DL (ref 0.52–1.04)
EOSINOPHIL # BLD AUTO: 0.4 10E3/UL (ref 0–0.7)
EOSINOPHIL NFR BLD AUTO: 3 %
ERYTHROCYTE [DISTWIDTH] IN BLOOD BY AUTOMATED COUNT: 16.2 % (ref 10–15)
GFR SERPL CREATININE-BSD FRML MDRD: 77 ML/MIN/1.73M2
GLUCOSE BLD-MCNC: 96 MG/DL (ref 70–99)
HCT VFR BLD AUTO: 42.9 % (ref 35–47)
HGB BLD-MCNC: 14.2 G/DL (ref 11.7–15.7)
LDLC SERPL CALC-MCNC: 246 MG/DL
LYMPHOCYTES # BLD AUTO: 4.3 10E3/UL (ref 0.8–5.3)
LYMPHOCYTES NFR BLD AUTO: 35 %
MCH RBC QN AUTO: 28.7 PG (ref 26.5–33)
MCHC RBC AUTO-ENTMCNC: 33.1 G/DL (ref 31.5–36.5)
MCV RBC AUTO: 87 FL (ref 78–100)
MONOCYTES # BLD AUTO: 1 10E3/UL (ref 0–1.3)
MONOCYTES NFR BLD AUTO: 8 %
NEUTROPHILS # BLD AUTO: 6.6 10E3/UL (ref 1.6–8.3)
NEUTROPHILS NFR BLD AUTO: 53 %
PLATELET # BLD AUTO: 357 10E3/UL (ref 150–450)
POTASSIUM BLD-SCNC: 3.9 MMOL/L (ref 3.4–5.3)
PROT SERPL-MCNC: 5.9 G/DL (ref 6.8–8.8)
RBC # BLD AUTO: 4.95 10E6/UL (ref 3.8–5.2)
SODIUM SERPL-SCNC: 143 MMOL/L (ref 133–144)
T4 FREE SERPL-MCNC: 0.8 NG/DL (ref 0.76–1.46)
TSH SERPL DL<=0.005 MIU/L-ACNC: 29.16 MU/L (ref 0.4–4)
WBC # BLD AUTO: 12.1 10E3/UL (ref 4–11)
WBC # BLD AUTO: 12.3 10E3/UL (ref 4–11)

## 2022-12-19 PROCEDURE — 83721 ASSAY OF BLOOD LIPOPROTEIN: CPT | Performed by: PHYSICIAN ASSISTANT

## 2022-12-19 PROCEDURE — 99214 OFFICE O/P EST MOD 30 MIN: CPT | Mod: 25 | Performed by: PHYSICIAN ASSISTANT

## 2022-12-19 PROCEDURE — 36415 COLL VENOUS BLD VENIPUNCTURE: CPT | Performed by: PHYSICIAN ASSISTANT

## 2022-12-19 PROCEDURE — 90750 HZV VACC RECOMBINANT IM: CPT | Performed by: PHYSICIAN ASSISTANT

## 2022-12-19 PROCEDURE — 90715 TDAP VACCINE 7 YRS/> IM: CPT | Performed by: PHYSICIAN ASSISTANT

## 2022-12-19 PROCEDURE — 84439 ASSAY OF FREE THYROXINE: CPT | Performed by: PHYSICIAN ASSISTANT

## 2022-12-19 PROCEDURE — 80050 GENERAL HEALTH PANEL: CPT | Performed by: PHYSICIAN ASSISTANT

## 2022-12-19 PROCEDURE — 90471 IMMUNIZATION ADMIN: CPT | Performed by: PHYSICIAN ASSISTANT

## 2022-12-19 PROCEDURE — 90472 IMMUNIZATION ADMIN EACH ADD: CPT | Performed by: PHYSICIAN ASSISTANT

## 2022-12-19 ASSESSMENT — PAIN SCALES - GENERAL: PAINLEVEL: NO PAIN (0)

## 2022-12-19 NOTE — PROGRESS NOTES
"Assessment & Plan     Hypothyroidism due to acquired atrophy of thyroid  Due for related labs today.  Follow-up based on results.  Refill medications based on results.  Follow-up in 6 months.  - TSH with free T4 reflex; Future  - CBC with platelets; Future    Hyperlipidemia LDL goal <100  Labs pending at this point time.  LDL goal should be less than 100 based on hypothyroidism.  Follow-up in 6 months.  - TSH with free T4 reflex; Future  - LDL cholesterol direct; Future  - Comprehensive metabolic panel (BMP + Alb, Alk Phos, ALT, AST, Total. Bili, TP); Future  - CBC with platelets; Future    Need for vaccination  -Shingrix vaccine    Need for hepatitis C screening test  Declines considering her self low risk.    Screen for colon cancer  - Fecal colorectal cancer screen (FIT); Future    Visit for screening mammogram  - MA Diagnostic Digital Bilateral; Future -patient has been told that she has fibrous breast tissue and was recommended a 3D breast mammogram.  Advised that I will put in a diagnostic approach for this.  And see if there is anything that we can do to help.     BMI:   Estimated body mass index is 34.64 kg/m  as calculated from the following:    Height as of this encounter: 1.638 m (5' 4.5\").    Weight as of this encounter: 93 kg (205 lb).   Weight management plan: Discussed healthy diet and exercise guidelines    Work on weight loss  Regular exercise    No follow-ups on file.    Alin Skinner PA-C  [facility] MODESTO Park is a 52 year old, presenting for the following health issues:  Thyroid Problem      History of Present Illness       Reason for visit:  Med check   just a wellness visit    She eats 2-3 servings of fruits and vegetables daily.She consumes 0 sweetened beverage(s) daily.She exercises with enough effort to increase her heart rate 10 to 19 minutes per day.  She exercises with enough effort to increase her heart rate 3 or less days per week.   She is taking " "medications regularly.       Hypothyroidism Follow-up      Since last visit, patient describes the following symptoms: Weight stable, no hair loss, no skin changes, no constipation, no loose stools    Review of Systems   Constitutional, HEENT, cardiovascular, pulmonary, GI, , musculoskeletal, neuro, skin, endocrine and psych systems are negative, except as otherwise noted.      Objective    /70 (Cuff Size: Adult Regular)   Pulse 97   Temp 97.8  F (36.6  C) (Temporal)   Resp 18   Ht 1.638 m (5' 4.5\")   Wt 93 kg (205 lb)   SpO2 97%   BMI 34.64 kg/m    Body mass index is 34.64 kg/m .  Physical Exam   GENERAL: healthy, alert and no distress  NECK: no adenopathy, no asymmetry, masses, or scars and thyroid normal to palpation  RESP: lungs clear to auscultation - no rales, rhonchi or wheezes  CV: regular rate and rhythm, normal S1 S2, no S3 or S4, no murmur, click or rub, no peripheral edema and peripheral pulses strong  ABDOMEN: soft, nontender, no hepatosplenomegaly, no masses and bowel sounds normal  MS: no gross musculoskeletal defects noted, no edema  SKIN: no suspicious lesions or rashes to exposed visible skin today.  NEURO: Normal strength and tone, mentation intact and speech normal  PSYCH: mentation appears normal, affect normal/bright    No results found for this or any previous visit (from the past 24 hour(s)).                "

## 2022-12-19 NOTE — PROGRESS NOTES
Prior to immunization administration, verified patients identity using patient s name and date of birth. Please see Immunization Activity for additional information.     Screening Questionnaire for Adult Immunization    Are you sick today?   No   Do you have allergies to medications, food, a vaccine component or latex?   Yes   Have you ever had a serious reaction after receiving a vaccination?   No   Do you have a long-term health problem with heart, lung, kidney, or metabolic disease (e.g., diabetes), asthma, a blood disorder, no spleen, complement component deficiency, a cochlear implant, or a spinal fluid leak?  Are you on long-term aspirin therapy?   No   Do you have cancer, leukemia, HIV/AIDS, or any other immune system problem?   No   Do you have a parent, brother, or sister with an immune system problem?   No   In the past 3 months, have you taken medications that affect  your immune system, such as prednisone, other steroids, or anticancer drugs; drugs for the treatment of rheumatoid arthritis, Crohn s disease, or psoriasis; or have you had radiation treatments?   No   Have you had a seizure, or a brain or other nervous system problem?   No   During the past year, have you received a transfusion of blood or blood    products, or been given immune (gamma) globulin or antiviral drug?   No   For women: Are you pregnant or is there a chance you could become       pregnant during the next month?   No   Have you received any vaccinations in the past 4 weeks?   No     Immunization questionnaire was positive for at least one answer.  Notified Alin Skinner.        Per orders of Alin Skinner, injection of Tdap/Shingrix given by Iveth Westbrook CMA. Patient instructed to remain in clinic for 15 minutes afterwards, and to report any adverse reaction to me immediately.       Screening performed by Iveth Westbrook CMA on 12/19/2022 at 4:22 PM.  Answers for HPI/ROS submitted by the patient on 12/19/2022  What is  the reason for your visit today? : med check   just a wellness visit  How many servings of fruits and vegetables do you eat daily?: 2-3  On average, how many sweetened beverages do you drink each day (Examples: soda, juice, sweet tea, etc.  Do NOT count diet or artificially sweetened beverages)?: 0  How many minutes a day do you exercise enough to make your heart beat faster?: 10 to 19  How many days a week do you exercise enough to make your heart beat faster?: 3 or less  How many days per week do you miss taking your medication?: 0

## 2022-12-23 ENCOUNTER — TELEPHONE (OUTPATIENT)
Dept: FAMILY MEDICINE | Facility: OTHER | Age: 52
End: 2022-12-23

## 2022-12-23 DIAGNOSIS — E78.5 HYPERLIPIDEMIA LDL GOAL <100: ICD-10-CM

## 2022-12-23 DIAGNOSIS — E03.4 HYPOTHYROIDISM DUE TO ACQUIRED ATROPHY OF THYROID: Primary | ICD-10-CM

## 2022-12-23 RX ORDER — LEVOTHYROXINE SODIUM 150 UG/1
150 TABLET ORAL DAILY
Qty: 90 TABLET | Refills: 1 | Status: SHIPPED | OUTPATIENT
Start: 2022-12-23 | End: 2023-04-24 | Stop reason: DRUGHIGH

## 2022-12-23 NOTE — TELEPHONE ENCOUNTER
Patient calling about labs from 12/19/2022.     Patient saw note from provider on [facility] regarding some labs but noticed her TSH was elevated and the provider did not leave a comment regarding that abnormal result. Patient is concerned about this and is wondering about the plan moving forward of if any new follow up is needed or reccommended. Patient requesting callback with provider response.     Mary Ann: [phone]

## 2022-12-23 NOTE — TELEPHONE ENCOUNTER
Uncertain as to the reason why I have not seen the results yet we will adjust medications and proceed with a thyroid ultrasound to make sure that there is nothing further of concern here.  The big change in TSH does give me concern for etiology around her own process.  Electronically signed:    Alin Skinner PA-C

## 2022-12-29 ENCOUNTER — HOSPITAL ENCOUNTER (OUTPATIENT)
Dept: ULTRASOUND IMAGING | Facility: CLINIC | Age: 52
Discharge: HOME | End: 2022-12-29
Attending: PHYSICIAN ASSISTANT
Payer: COMMERCIAL

## 2022-12-29 ENCOUNTER — HOSPITAL ENCOUNTER (OUTPATIENT)
Dept: MAMMOGRAPHY | Facility: CLINIC | Age: 52
Discharge: HOME | End: 2022-12-29
Attending: PHYSICIAN ASSISTANT
Payer: COMMERCIAL

## 2022-12-29 DIAGNOSIS — Z12.31 SCREENING MAMMOGRAM FOR HIGH-RISK PATIENT: ICD-10-CM

## 2022-12-29 DIAGNOSIS — E03.4 HYPOTHYROIDISM DUE TO ACQUIRED ATROPHY OF THYROID: ICD-10-CM

## 2022-12-29 DIAGNOSIS — E78.5 HYPERLIPIDEMIA LDL GOAL <100: ICD-10-CM

## 2022-12-29 PROCEDURE — 76536 US EXAM OF HEAD AND NECK: CPT

## 2022-12-29 PROCEDURE — 77067 SCR MAMMO BI INCL CAD: CPT

## 2023-02-20 PROCEDURE — 82274 ASSAY TEST FOR BLOOD FECAL: CPT | Performed by: PHYSICIAN ASSISTANT

## 2023-02-24 LAB — HEMOCCULT STL QL IA: NEGATIVE

## 2023-04-19 ENCOUNTER — LAB (OUTPATIENT)
Dept: LAB | Facility: CLINIC | Age: 53
End: 2023-04-19
Payer: COMMERCIAL

## 2023-04-19 DIAGNOSIS — E78.5 HYPERLIPIDEMIA LDL GOAL <100: Primary | ICD-10-CM

## 2023-04-19 DIAGNOSIS — E03.4 HYPOTHYROIDISM DUE TO ACQUIRED ATROPHY OF THYROID: ICD-10-CM

## 2023-04-19 DIAGNOSIS — E78.5 HYPERLIPIDEMIA LDL GOAL <100: ICD-10-CM

## 2023-04-19 LAB
CHOLEST SERPL-MCNC: 394 MG/DL
HDLC SERPL-MCNC: 50 MG/DL
LDLC SERPL CALC-MCNC: 277 MG/DL
NONHDLC SERPL-MCNC: 344 MG/DL
T4 FREE SERPL-MCNC: 1.14 NG/DL (ref 0.9–1.7)
TRIGL SERPL-MCNC: 336 MG/DL
TSH SERPL DL<=0.005 MIU/L-ACNC: 13.2 UIU/ML (ref 0.3–4.2)

## 2023-04-19 PROCEDURE — 36415 COLL VENOUS BLD VENIPUNCTURE: CPT

## 2023-04-19 PROCEDURE — 84443 ASSAY THYROID STIM HORMONE: CPT

## 2023-04-19 PROCEDURE — 84439 ASSAY OF FREE THYROXINE: CPT

## 2023-04-19 PROCEDURE — 80061 LIPID PANEL: CPT

## 2023-04-19 RX ORDER — ATORVASTATIN CALCIUM 10 MG/1
10 TABLET, FILM COATED ORAL DAILY
Qty: 30 TABLET | Refills: 1 | Status: SHIPPED | OUTPATIENT
Start: 2023-04-19 | End: 2023-04-24

## 2023-04-20 ENCOUNTER — MYC MEDICAL ADVICE (OUTPATIENT)
Dept: FAMILY MEDICINE | Facility: OTHER | Age: 53
End: 2023-04-20
Payer: COMMERCIAL

## 2023-04-24 DIAGNOSIS — E03.4 HYPOTHYROIDISM DUE TO ACQUIRED ATROPHY OF THYROID: ICD-10-CM

## 2023-04-24 DIAGNOSIS — E78.5 HYPERLIPIDEMIA LDL GOAL <100: Primary | ICD-10-CM

## 2023-04-24 RX ORDER — LEVOTHYROXINE SODIUM 175 UG/1
175 TABLET ORAL DAILY
Qty: 90 TABLET | Refills: 1 | Status: SHIPPED | OUTPATIENT
Start: 2023-04-24 | End: 2023-07-20

## 2023-05-22 ENCOUNTER — OFFICE VISIT (OUTPATIENT)
Dept: FAMILY MEDICINE | Facility: OTHER | Age: 53
End: 2023-05-22
Payer: COMMERCIAL

## 2023-05-22 VITALS
BODY MASS INDEX: 35 KG/M2 | OXYGEN SATURATION: 96 % | WEIGHT: 205 LBS | HEIGHT: 64 IN | SYSTOLIC BLOOD PRESSURE: 108 MMHG | DIASTOLIC BLOOD PRESSURE: 74 MMHG | HEART RATE: 99 BPM | TEMPERATURE: 99.1 F

## 2023-05-22 DIAGNOSIS — Z12.11 SCREEN FOR COLON CANCER: Primary | ICD-10-CM

## 2023-05-22 DIAGNOSIS — R60.9 DEPENDENT EDEMA: ICD-10-CM

## 2023-05-22 DIAGNOSIS — E03.4 HYPOTHYROIDISM DUE TO ACQUIRED ATROPHY OF THYROID: ICD-10-CM

## 2023-05-22 LAB
ALBUMIN SERPL BCG-MCNC: 2.3 G/DL (ref 3.5–5.2)
ALP SERPL-CCNC: 109 U/L (ref 35–104)
ALT SERPL W P-5'-P-CCNC: 29 U/L (ref 10–35)
ANION GAP SERPL CALCULATED.3IONS-SCNC: 7 MMOL/L (ref 7–15)
AST SERPL W P-5'-P-CCNC: 29 U/L (ref 10–35)
BILIRUB SERPL-MCNC: 0.2 MG/DL
BUN SERPL-MCNC: 15.1 MG/DL (ref 6–20)
CALCIUM SERPL-MCNC: 8.8 MG/DL (ref 8.6–10)
CHLORIDE SERPL-SCNC: 104 MMOL/L (ref 98–107)
CREAT SERPL-MCNC: 1.05 MG/DL (ref 0.51–0.95)
DEPRECATED HCO3 PLAS-SCNC: 29 MMOL/L (ref 22–29)
GFR SERPL CREATININE-BSD FRML MDRD: 63 ML/MIN/1.73M2
GLUCOSE SERPL-MCNC: 94 MG/DL (ref 70–99)
HGB BLD-MCNC: 14.5 G/DL (ref 11.7–15.7)
NT-PROBNP SERPL-MCNC: 108 PG/ML (ref 0–900)
POTASSIUM SERPL-SCNC: 4.2 MMOL/L (ref 3.4–5.3)
PROT SERPL-MCNC: 5.3 G/DL (ref 6.4–8.3)
SODIUM SERPL-SCNC: 140 MMOL/L (ref 136–145)
T4 FREE SERPL-MCNC: 1.17 NG/DL (ref 0.9–1.7)
TSH SERPL DL<=0.005 MIU/L-ACNC: 7.43 UIU/ML (ref 0.3–4.2)

## 2023-05-22 PROCEDURE — 99214 OFFICE O/P EST MOD 30 MIN: CPT | Mod: 25 | Performed by: PHYSICIAN ASSISTANT

## 2023-05-22 PROCEDURE — 83880 ASSAY OF NATRIURETIC PEPTIDE: CPT | Performed by: PHYSICIAN ASSISTANT

## 2023-05-22 PROCEDURE — 85018 HEMOGLOBIN: CPT | Performed by: PHYSICIAN ASSISTANT

## 2023-05-22 PROCEDURE — 80053 COMPREHEN METABOLIC PANEL: CPT | Performed by: PHYSICIAN ASSISTANT

## 2023-05-22 PROCEDURE — 84439 ASSAY OF FREE THYROXINE: CPT | Performed by: PHYSICIAN ASSISTANT

## 2023-05-22 PROCEDURE — 36415 COLL VENOUS BLD VENIPUNCTURE: CPT | Performed by: PHYSICIAN ASSISTANT

## 2023-05-22 PROCEDURE — 90471 IMMUNIZATION ADMIN: CPT | Performed by: PHYSICIAN ASSISTANT

## 2023-05-22 PROCEDURE — 83721 ASSAY OF BLOOD LIPOPROTEIN: CPT | Performed by: PHYSICIAN ASSISTANT

## 2023-05-22 PROCEDURE — 84443 ASSAY THYROID STIM HORMONE: CPT | Performed by: PHYSICIAN ASSISTANT

## 2023-05-22 PROCEDURE — 90750 HZV VACC RECOMBINANT IM: CPT | Performed by: PHYSICIAN ASSISTANT

## 2023-05-22 RX ORDER — FUROSEMIDE 20 MG
20 TABLET ORAL 2 TIMES DAILY
Qty: 14 TABLET | Refills: 0 | Status: SHIPPED | OUTPATIENT
Start: 2023-05-22 | End: 2023-07-20

## 2023-05-22 ASSESSMENT — PAIN SCALES - GENERAL: PAINLEVEL: MODERATE PAIN (4)

## 2023-05-22 NOTE — PROGRESS NOTES
Assessment & Plan     Hypothyroidism due to acquired atrophy of thyroid  Rechecking related labs today.  Follow-up based on results.  Adjust medications based on results.  - Hemoglobin; Future  - Comprehensive metabolic panel (BMP + Alb, Alk Phos, ALT, AST, Total. Bili, TP); Future  - LDL cholesterol direct; Future  - BNP-N terminal pro; Future  - furosemide (LASIX) 20 MG tablet; Take 1 tablet (20 mg) by mouth 2 times daily for 7 days  - TSH with free T4 reflex; Future  - Hemoglobin  - Comprehensive metabolic panel (BMP + Alb, Alk Phos, ALT, AST, Total. Bili, TP)  - LDL cholesterol direct  - BNP-N terminal pro  - TSH with free T4 reflex    Dependent edema R>L  Mild dependent edema bilaterally.  Right greater than left based on patient's reported signs and symptoms.  Overall size may be more left than right.  Short-term treatment with some Lasix is advised and following up in 2 to 3 weeks from there.  - Hemoglobin; Future  - Comprehensive metabolic panel (BMP + Alb, Alk Phos, ALT, AST, Total. Bili, TP); Future  - LDL cholesterol direct; Future  - BNP-N terminal pro; Future  - furosemide (LASIX) 20 MG tablet; Take 1 tablet (20 mg) by mouth 2 times daily for 7 days  - TSH with free T4 reflex; Future  - Hemoglobin  - Comprehensive metabolic panel (BMP + Alb, Alk Phos, ALT, AST, Total. Bili, TP)  - LDL cholesterol direct  - BNP-N terminal pro  - TSH with free T4 reflex    Screen for colon cancer      JADE Graff [facility]    Dre Park is a 53 year old, presenting for the following health issues:  Edema (Lower legs/)        5/22/2023     3:38 PM   Additional Questions   Roomed by Lovely   Accompanied by Self     HPI     Concern - Lower leg Edema   Onset: May 10th most recent flare   Description: on and off since 06/2022, intermittent since then  Intensity: severe  Progression of Symptoms:  worsening  Accompanying Signs & Symptoms: pain, swelling  Previous history of similar  "problem: ongoing   Precipitating factors:        Worsened by: unknown   Alleviating factors:        Improved by: compression stockings   Therapies tried and outcome: unknown     Talk about other meds to make sure that isnt the cause.         Review of Systems   Constitutional, HEENT, cardiovascular, pulmonary, GI, , musculoskeletal, neuro, skin, endocrine and psych systems are negative, except as otherwise noted.      Objective    /74   Pulse 99   Temp 99.1  F (37.3  C) (Temporal)   Ht 1.635 m (5' 4.37\")   Wt 93 kg (205 lb)   SpO2 96%   BMI 34.78 kg/m    Body mass index is 34.78 kg/m .  Physical Exam   GENERAL: healthy, alert and no distress  NECK: no adenopathy, no asymmetry, masses, or scars and thyroid normal to palpation  RESP: lungs clear to auscultation - no rales, rhonchi or wheezes  CV: regular rate and rhythm, normal S1 S2, no S3 or S4, no murmur, click or rub, no peripheral edema and peripheral pulses strong  ABDOMEN: soft, nontender, no hepatosplenomegaly, no masses and bowel sounds normal  MS: normal muscle tone, 2+ edema to mid calf and tenderness to palpation right foot anteriorly, pedal pulses within normal limits.    No results found for this or any previous visit (from the past 24 hour(s)).                "

## 2023-05-23 DIAGNOSIS — E78.5 HYPERLIPIDEMIA LDL GOAL <100: Primary | ICD-10-CM

## 2023-05-23 DIAGNOSIS — E03.4 HYPOTHYROIDISM DUE TO ACQUIRED ATROPHY OF THYROID: ICD-10-CM

## 2023-05-23 LAB — LDLC SERPL DIRECT ASSAY-MCNC: 274 MG/DL

## 2023-05-23 RX ORDER — ROSUVASTATIN CALCIUM 10 MG/1
10 TABLET, COATED ORAL DAILY
Qty: 90 TABLET | Refills: 3 | Status: ON HOLD | OUTPATIENT
Start: 2023-05-23 | End: 2024-06-30

## 2023-07-17 ENCOUNTER — LAB (OUTPATIENT)
Dept: LAB | Facility: CLINIC | Age: 53
End: 2023-07-17
Payer: COMMERCIAL

## 2023-07-17 DIAGNOSIS — E78.5 HYPERLIPIDEMIA LDL GOAL <100: ICD-10-CM

## 2023-07-17 DIAGNOSIS — E03.4 HYPOTHYROIDISM DUE TO ACQUIRED ATROPHY OF THYROID: ICD-10-CM

## 2023-07-17 LAB
ALBUMIN SERPL BCG-MCNC: 2.3 G/DL (ref 3.5–5.2)
ALP SERPL-CCNC: 131 U/L (ref 35–104)
ALT SERPL W P-5'-P-CCNC: 31 U/L (ref 0–50)
ANION GAP SERPL CALCULATED.3IONS-SCNC: 5 MMOL/L (ref 7–15)
AST SERPL W P-5'-P-CCNC: 30 U/L (ref 0–45)
BILIRUB SERPL-MCNC: 0.3 MG/DL
BUN SERPL-MCNC: 18.9 MG/DL (ref 6–20)
CALCIUM SERPL-MCNC: 9 MG/DL (ref 8.6–10)
CHLORIDE SERPL-SCNC: 107 MMOL/L (ref 98–107)
CHOLEST SERPL-MCNC: 313 MG/DL
CREAT SERPL-MCNC: 1 MG/DL (ref 0.51–0.95)
DEPRECATED HCO3 PLAS-SCNC: 30 MMOL/L (ref 22–29)
GFR SERPL CREATININE-BSD FRML MDRD: 67 ML/MIN/1.73M2
GLUCOSE SERPL-MCNC: 102 MG/DL (ref 70–99)
HDLC SERPL-MCNC: 50 MG/DL
LDLC SERPL CALC-MCNC: 215 MG/DL
NONHDLC SERPL-MCNC: 263 MG/DL
POTASSIUM SERPL-SCNC: 4.2 MMOL/L (ref 3.4–5.3)
PROT SERPL-MCNC: 5.5 G/DL (ref 6.4–8.3)
SODIUM SERPL-SCNC: 142 MMOL/L (ref 136–145)
T4 FREE SERPL-MCNC: 1.09 NG/DL (ref 0.9–1.7)
TRIGL SERPL-MCNC: 241 MG/DL
TSH SERPL DL<=0.005 MIU/L-ACNC: 11.65 UIU/ML (ref 0.3–4.2)

## 2023-07-17 PROCEDURE — 80061 LIPID PANEL: CPT

## 2023-07-17 PROCEDURE — 36415 COLL VENOUS BLD VENIPUNCTURE: CPT

## 2023-07-17 PROCEDURE — 80053 COMPREHEN METABOLIC PANEL: CPT

## 2023-07-17 PROCEDURE — 84443 ASSAY THYROID STIM HORMONE: CPT

## 2023-07-17 PROCEDURE — 84439 ASSAY OF FREE THYROXINE: CPT

## 2023-07-20 ENCOUNTER — OFFICE VISIT (OUTPATIENT)
Dept: FAMILY MEDICINE | Facility: OTHER | Age: 53
End: 2023-07-20
Payer: COMMERCIAL

## 2023-07-20 VITALS
WEIGHT: 199 LBS | TEMPERATURE: 97.6 F | OXYGEN SATURATION: 96 % | HEART RATE: 93 BPM | BODY MASS INDEX: 33.15 KG/M2 | DIASTOLIC BLOOD PRESSURE: 82 MMHG | SYSTOLIC BLOOD PRESSURE: 106 MMHG | HEIGHT: 65 IN

## 2023-07-20 DIAGNOSIS — Z12.11 SCREEN FOR COLON CANCER: ICD-10-CM

## 2023-07-20 DIAGNOSIS — Z00.00 ROUTINE HISTORY AND PHYSICAL EXAMINATION OF ADULT: Primary | ICD-10-CM

## 2023-07-20 DIAGNOSIS — E78.5 HYPERLIPIDEMIA LDL GOAL <100: ICD-10-CM

## 2023-07-20 DIAGNOSIS — E03.4 HYPOTHYROIDISM DUE TO ACQUIRED ATROPHY OF THYROID: ICD-10-CM

## 2023-07-20 DIAGNOSIS — R60.9 DEPENDENT EDEMA: ICD-10-CM

## 2023-07-20 PROCEDURE — 99214 OFFICE O/P EST MOD 30 MIN: CPT | Mod: 25 | Performed by: PHYSICIAN ASSISTANT

## 2023-07-20 PROCEDURE — 84481 FREE ASSAY (FT-3): CPT | Performed by: PHYSICIAN ASSISTANT

## 2023-07-20 PROCEDURE — 84480 ASSAY TRIIODOTHYRONINE (T3): CPT | Performed by: PHYSICIAN ASSISTANT

## 2023-07-20 PROCEDURE — 36415 COLL VENOUS BLD VENIPUNCTURE: CPT | Performed by: PHYSICIAN ASSISTANT

## 2023-07-20 PROCEDURE — 99396 PREV VISIT EST AGE 40-64: CPT | Performed by: PHYSICIAN ASSISTANT

## 2023-07-20 PROCEDURE — 86376 MICROSOMAL ANTIBODY EACH: CPT | Performed by: PHYSICIAN ASSISTANT

## 2023-07-20 RX ORDER — FUROSEMIDE 20 MG
20 TABLET ORAL 2 TIMES DAILY
Qty: 14 TABLET | Refills: 0 | Status: SHIPPED | OUTPATIENT
Start: 2023-07-20 | End: 2023-11-27

## 2023-07-20 RX ORDER — LEVOTHYROXINE SODIUM 200 UG/1
200 TABLET ORAL DAILY
Qty: 90 TABLET | Refills: 3 | Status: SHIPPED | OUTPATIENT
Start: 2023-07-20 | End: 2023-11-17

## 2023-07-20 NOTE — PROGRESS NOTES
SUBJECTIVE:   CC: Vivian is an 53 year old who presents for preventive health visit.       7/20/2023     8:48 AM   Additional Questions   Roomed by Lovely   Accompanied by self     Healthy Habits:     Getting at least 3 servings of Calcium per day:  Yes    Bi-annual eye exam:  Yes    Dental care twice a year:  Yes    Sleep apnea or symptoms of sleep apnea:  None    Diet:  Regular (no restrictions)    Frequency of exercise:  2-3 days/week    Duration of exercise:  15-30 minutes    Taking medications regularly:  0    Barriers to taking medications:  None    Medication side effects:  None    Additional concerns today:  Yes (thyroid)  History of Present Illness       Hypothyroidism:     Since last visit, patient describes the following symptoms::  Fatigue    She eats 2-3 servings of fruits and vegetables daily.She consumes 0 sweetened beverage(s) daily.She exercises with enough effort to increase her heart rate 20 to 29 minutes per day.  She exercises with enough effort to increase her heart rate 3 or less days per week.   She is taking medications regularly.  She is not taking prescribed medications regularly due to None.    Social History     Tobacco Use     Smoking status: Never     Passive exposure: Never     Smokeless tobacco: Never   Substance Use Topics     Alcohol use: Not Currently     Alcohol/week: 0.0 standard drinks of alcohol     Comment: 1-2 times/month             7/20/2023     8:54 AM   Alcohol Use   Prescreen: >3 drinks/day or >7 drinks/week? No     Reviewed orders with patient.  Reviewed health maintenance and updated orders accordingly - Yes  Lab work is in process  Labs reviewed in EPIC  BP Readings from Last 3 Encounters:   07/20/23 106/82   05/22/23 108/74   12/19/22 114/70    Wt Readings from Last 3 Encounters:   07/20/23 90.3 kg (199 lb)   05/22/23 93 kg (205 lb)   12/19/22 93 kg (205 lb)                  Patient Active Problem List   Diagnosis     Hypothyroidism due to acquired atrophy of  thyroid     Family hx of colon cancer     Nonallopathic lesion of cervical region     Cervicalgia     Nonallopathic lesion of sacral region     Nonallopathic lesion of lumbar region     Lumbago     Nonallopathic lesion of thoracic region     Hyperlipidemia LDL goal <100     Dependent edema R>L     Past Surgical History:   Procedure Laterality Date     COLONOSCOPY N/A 9/28/2015    Procedure: COLONOSCOPY;  Surgeon: Eugenio Travis MD;  Location: [facility]     TONSILLECTOMY         Social History     Tobacco Use     Smoking status: Never     Passive exposure: Never     Smokeless tobacco: Never   Substance Use Topics     Alcohol use: Not Currently     Alcohol/week: 0.0 standard drinks of alcohol     Comment: 1-2 times/month     Family History   Problem Relation Age of Onset     Colon Cancer Maternal Grandmother 91     Hypertension Mother      Hyperlipidemia Mother      Cancer Mother 65        GIST     Other Cancer Mother      Coronary Artery Disease Father         MI     Thyroid Disease Sister      Asthma Daughter          Current Outpatient Medications   Medication Sig Dispense Refill     furosemide (LASIX) 20 MG tablet Take 1 tablet (20 mg) by mouth 2 times daily For days of longer travel 14 tablet 0     levothyroxine (SYNTHROID/LEVOTHROID) 200 MCG tablet Take 1 tablet (200 mcg) by mouth daily 90 tablet 3     rosuvastatin (CRESTOR) 10 MG tablet Take 1 tablet (10 mg) by mouth daily 90 tablet 3     triamcinolone (KENALOG) 0.1 % external cream Apply topically 3 times daily (Patient not taking: Reported on 9/20/2022) 80 g 4     Allergies   Allergen Reactions     Latex Itching     Seasonal Allergies      Recent Labs   Lab Test 07/17/23  0828 05/22/23  1605 04/19/23  1220 04/19/23  1220 12/19/22  1636 08/26/21  1220 02/24/21  0758 03/09/20  0758   * 274*  --  277* 246*  --  138* 115*   HDL 50  --   --  50  --   --  56 45*   TRIG 241*  --   --  336*  --   --  146 170*   ALT 31 29  --   --  34  --  65* 35   CR  1.00* 1.05*  --   --  0.90   < > 0.80 0.94   GFRESTIMATED 67 63  --   --  77   < > 85 71   GFRESTBLACK  --   --   --   --   --   --  >90 82   POTASSIUM 4.2 4.2  --   --  3.9  --  4.3 3.9   TSH 11.65* 7.43*   < > 13.20* 29.16*   < > 4.14* 1.13    < > = values in this interval not displayed.        Breast Cancer Screening:  Any new diagnosis of family breast, ovarian, or bowel cancer? No    FHS-7:       12/29/2022     3:05 PM   Breast CA Risk Assessment (FHS-7)   Did any of your first-degree relatives have breast or ovarian cancer? No   Did any of your relatives have bilateral breast cancer? No   Did any man in your family have breast cancer? No   Did any woman in your family have breast and ovarian cancer? No   Did any woman in your family have breast cancer before age 50 y? No   Do you have 2 or more relatives with breast and/or ovarian cancer? No   Do you have 2 or more relatives with breast and/or bowel cancer? No     click delete button to remove this line now  Mammogram Screening: Recommended annual mammography  Pertinent mammograms are reviewed under the imaging tab.    History of abnormal Pap smear: NO - age 30-65 PAP every 5 years with negative HPV co-testing recommended      Latest Ref Rng & Units 1/23/2020     4:48 PM 1/23/2020     3:59 PM 10/25/2016     9:54 AM   PAP / HPV   PAP (Historical)  NIL      HPV 16 DNA NEG^Negative  Negative  Negative    HPV 18 DNA NEG^Negative  Negative  Negative    Other HR HPV NEG^Negative  Negative  Negative      Reviewed and updated as needed this visit by clinical staff   Tobacco   Meds              Reviewed and updated as needed this visit by Provider                 Past Medical History:   Diagnosis Date     Family history of colon cancer     Colonoscoy q5 years next 9/2020      Past Surgical History:   Procedure Laterality Date     COLONOSCOPY N/A 9/28/2015    Procedure: COLONOSCOPY;  Surgeon: Eugenio Travis MD;  Location:  GI     TONSILLECTOMY    "      Review of Systems  CONSTITUTIONAL: NEGATIVE for fever, chills, change in weight  INTEGUMENTARY/SKIN: NEGATIVE for worrisome rashes, moles or lesions  EYES: NEGATIVE for vision changes or irritation  ENT: NEGATIVE for ear, mouth and throat problems  RESP: NEGATIVE for significant cough or SOB  BREAST: NEGATIVE for masses, tenderness or discharge  CV: NEGATIVE for chest pain, palpitations or peripheral edema  GI: NEGATIVE for nausea, abdominal pain, heartburn, or change in bowel habits  : NEGATIVE for unusual urinary or vaginal symptoms. No vaginal bleeding.  MUSCULOSKELETAL: NEGATIVE for significant arthralgias or myalgia  NEURO: NEGATIVE for weakness, dizziness or paresthesias  PSYCHIATRIC: NEGATIVE for changes in mood or affect      OBJECTIVE:   /82   Pulse 93   Temp 97.6  F (36.4  C) (Temporal)   Ht 1.64 m (5' 4.57\")   Wt 90.3 kg (199 lb)   SpO2 96%   BMI 33.56 kg/m    Physical Exam  GENERAL APPEARANCE: healthy, alert and no distress  EYES: Eyes grossly normal to inspection, PERRL and conjunctivae and sclerae normal  HENT: ear canals and TM's normal, nose and mouth without ulcers or lesions, oropharynx clear and oral mucous membranes moist  NECK: no adenopathy, no asymmetry, masses, or scars and thyroid normal to palpation  RESP: lungs clear to auscultation - no rales, rhonchi or wheezes  CV: regular rate and rhythm, normal S1 S2, no S3 or S4, no murmur, click or rub, no peripheral edema and peripheral pulses strong  ABDOMEN: soft, nontender, no hepatosplenomegaly, no masses and bowel sounds normal  MS: no musculoskeletal defects are noted and gait is age appropriate without ataxia  SKIN: no suspicious lesions or rashes  NEURO: Normal strength and tone, sensory exam grossly normal, mentation intact and speech normal  PSYCH: mentation appears normal and affect normal/bright    Diagnostic Test Results:  Labs reviewed in Epic  No results found for this or any previous visit (from the past 24 " "hour(s)).    ASSESSMENT/PLAN:   Vivian was seen today for physical.    Diagnoses and all orders for this visit:    Routine history and physical examination of adult    Hyperlipidemia LDL goal <100    Hypothyroidism due to acquired atrophy of thyroid  -     Thyroid peroxidase antibody; Future  -     T3, Free; Future  -     T3, total; Future  -     Adult Endocrinology  Referral; Future  -     levothyroxine (SYNTHROID/LEVOTHROID) 200 MCG tablet; Take 1 tablet (200 mcg) by mouth daily  -     furosemide (LASIX) 20 MG tablet; Take 1 tablet (20 mg) by mouth 2 times daily For days of longer travel  -     T3, total  -     T3, Free  -     Thyroid peroxidase antibody    Dependent edema R>L  -     furosemide (LASIX) 20 MG tablet; Take 1 tablet (20 mg) by mouth 2 times daily For days of longer travel    Screen for colon cancer        Patient has been advised of split billing requirements and indicates understanding: Yes      COUNSELING:  Reviewed preventive health counseling, as reflected in patient instructions       Regular exercise       Healthy diet/nutrition       Vision screening       Hearing screening       Alcohol Use       Folic Acid       Osteoporosis prevention/bone health      BMI:   Estimated body mass index is 33.56 kg/m  as calculated from the following:    Height as of this encounter: 1.64 m (5' 4.57\").    Weight as of this encounter: 90.3 kg (199 lb).   Weight management plan: Discussed healthy diet and exercise guidelines      She reports that she has never smoked. She has never been exposed to tobacco smoke. She has never used smokeless tobacco.      Alin Torres PA-C  [employer]  "

## 2023-07-21 LAB
T3 SERPL-MCNC: 87 NG/DL (ref 85–202)
T3FREE SERPL-MCNC: 2.4 PG/ML (ref 2–4.4)
THYROPEROXIDASE AB SERPL-ACNC: 738 IU/ML

## 2023-08-14 ENCOUNTER — TRANSFERRED RECORDS (OUTPATIENT)
Dept: HEALTH INFORMATION MANAGEMENT | Facility: CLINIC | Age: 53
End: 2023-08-14
Payer: COMMERCIAL

## 2023-10-04 ENCOUNTER — LAB (OUTPATIENT)
Dept: LAB | Facility: CLINIC | Age: 53
End: 2023-10-04
Payer: COMMERCIAL

## 2023-10-04 DIAGNOSIS — E06.3 CHRONIC LYMPHOCYTIC THYROIDITIS: Primary | ICD-10-CM

## 2023-10-04 DIAGNOSIS — E78.2 MIXED HYPERLIPIDEMIA: ICD-10-CM

## 2023-10-04 LAB
CHOLEST SERPL-MCNC: 303 MG/DL
CORTIS SERPL-MCNC: 5 UG/DL
HDLC SERPL-MCNC: 50 MG/DL
LDLC SERPL CALC-MCNC: 214 MG/DL
NONHDLC SERPL-MCNC: 253 MG/DL
T4 FREE SERPL-MCNC: 1.19 NG/DL (ref 0.9–1.7)
TRIGL SERPL-MCNC: 197 MG/DL
TSH SERPL DL<=0.005 MIU/L-ACNC: 3.44 UIU/ML (ref 0.3–4.2)

## 2023-10-04 PROCEDURE — 84443 ASSAY THYROID STIM HORMONE: CPT

## 2023-10-04 PROCEDURE — 36415 COLL VENOUS BLD VENIPUNCTURE: CPT

## 2023-10-04 PROCEDURE — 84439 ASSAY OF FREE THYROXINE: CPT

## 2023-10-04 PROCEDURE — 80061 LIPID PANEL: CPT

## 2023-10-04 PROCEDURE — 82533 TOTAL CORTISOL: CPT

## 2023-11-15 ENCOUNTER — MYC MEDICAL ADVICE (OUTPATIENT)
Dept: FAMILY MEDICINE | Facility: OTHER | Age: 53
End: 2023-11-15
Payer: COMMERCIAL

## 2023-11-16 ENCOUNTER — NURSE TRIAGE (OUTPATIENT)
Dept: NURSING | Facility: CLINIC | Age: 53
End: 2023-11-16
Payer: COMMERCIAL

## 2023-11-16 ENCOUNTER — LAB (OUTPATIENT)
Dept: LAB | Facility: CLINIC | Age: 53
End: 2023-11-16
Payer: COMMERCIAL

## 2023-11-16 DIAGNOSIS — E03.4 HYPOTHYROIDISM DUE TO ACQUIRED ATROPHY OF THYROID: ICD-10-CM

## 2023-11-16 DIAGNOSIS — R60.9 DEPENDENT EDEMA: ICD-10-CM

## 2023-11-16 DIAGNOSIS — R60.9 DEPENDENT EDEMA: Primary | ICD-10-CM

## 2023-11-16 LAB
ALBUMIN SERPL BCG-MCNC: 2.2 G/DL (ref 3.5–5.2)
ALP SERPL-CCNC: 125 U/L (ref 40–150)
ALT SERPL W P-5'-P-CCNC: 24 U/L (ref 0–50)
ANION GAP SERPL CALCULATED.3IONS-SCNC: 6 MMOL/L (ref 7–15)
AST SERPL W P-5'-P-CCNC: 25 U/L (ref 0–45)
BILIRUB SERPL-MCNC: 0.2 MG/DL
BUN SERPL-MCNC: 13.6 MG/DL (ref 6–20)
CALCIUM SERPL-MCNC: 9 MG/DL (ref 8.6–10)
CHLORIDE SERPL-SCNC: 105 MMOL/L (ref 98–107)
CREAT SERPL-MCNC: 1.04 MG/DL (ref 0.51–0.95)
DEPRECATED HCO3 PLAS-SCNC: 29 MMOL/L (ref 22–29)
EGFRCR SERPLBLD CKD-EPI 2021: 64 ML/MIN/1.73M2
GLUCOSE SERPL-MCNC: 97 MG/DL (ref 70–99)
NT-PROBNP SERPL-MCNC: 392 PG/ML (ref 0–900)
POTASSIUM SERPL-SCNC: 4.4 MMOL/L (ref 3.4–5.3)
PROT SERPL-MCNC: 4.9 G/DL (ref 6.4–8.3)
SODIUM SERPL-SCNC: 140 MMOL/L (ref 135–145)
TSH SERPL DL<=0.005 MIU/L-ACNC: 0.57 UIU/ML (ref 0.3–4.2)

## 2023-11-16 PROCEDURE — 80053 COMPREHEN METABOLIC PANEL: CPT

## 2023-11-16 PROCEDURE — 84443 ASSAY THYROID STIM HORMONE: CPT

## 2023-11-16 PROCEDURE — 36415 COLL VENOUS BLD VENIPUNCTURE: CPT

## 2023-11-16 PROCEDURE — 83880 ASSAY OF NATRIURETIC PEPTIDE: CPT

## 2023-11-16 NOTE — TELEPHONE ENCOUNTER
Alin Torres PA-C  [facility] Triage Nurse Pool36 minutes ago (10:06 AM)       Orders placed for related labs.  Please contact the patient and have her come in for labs to be done as soon as practical.  Electronically signed:    Alin Skinner PA-C

## 2023-11-16 NOTE — TELEPHONE ENCOUNTER
Nurse Triage SBAR    Is this a 2nd Level Triage? YES, LICENSED PRACTITIONER REVIEW IS REQUIRED    Situation: Pt calling to report that she has increased edema in her BLE recently and would like to be seen by her provider, but there are no appts available.    Background: Pt states that she has a hx of edema in her BLE. Hx of Thyroid issues and Hashimoto's     Assessment:   Pt is calling to report an increase in her BLE edema. Edema is above her knees and she also feels edema in her trunk area and arms. No difficulty breathing. She has an order for Furosemide for when she travels, but she is not traveling right now.      She has been told that her edema is related to Hashimoto's and thyroid issues. She has labs next week    Pants are getting tight  Compression socks are too hard to get on now  Left left calf pain  Left left leg is more swollen than her right   Itching of trunk and small rash that she took zyrtec for; not effective    Protocol Recommended Disposition:   Go To Office Now, Go To ED/UCC Now (Or To Office With PCP Approval)    Recommendation: Care advice given.      Routed to provider    Does the patient meet one of the following criteria for ADS visit consideration? 16+ years old, with an [facility] PCP     TIP  Providers, please consider if this condition is appropriate for management at one of our Acute and Diagnostic Services sites.     If patient is a good candidate, please use dotphrase <dot>triageresponse and select Refer to ADS to document.    Reason for Disposition   SEVERE swelling (e.g., swelling extends above knee, entire leg is swollen, weeping fluid)   Swelling of face, arm or hands  (Exception: Slight puffiness of fingers during hot weather.)   Thigh or calf pain and only 1 side and present > 1 hour    Additional Information   Negative: Sounds like a life-threatening emergency to the triager   Negative: Chest pain   Negative: Difficulty breathing at rest   Negative: Entire foot is cool or blue in  comparison to other side    Protocols used: Leg Swelling and Edema-A-OH  Grecia Holbrook RN  [facility] Nurse Advisor 8:22 AM 11/16/2023

## 2023-11-16 NOTE — TELEPHONE ENCOUNTER
RN called patient and let her know that labs have been ordered. Patient states understanding and will get labs drawn today. No further questions or concerns at this time.

## 2023-11-16 NOTE — TELEPHONE ENCOUNTER
Alin Torres PA-C Linn, Erin M, RN; [facility] Triage Nurse Pool44 minutes ago (8:35 AM)       I do not have access today.  Please have her weigh herself and compared to the last weight that we have on file.  If more than 5 pounds weight gain is noted that I do recommend an urgent care visit.  Theoretically I do have a spot that she could have tomorrow but that the last 1 for the week.  Electronically signed:    Alin Skinner PA-C

## 2023-11-16 NOTE — TELEPHONE ENCOUNTER
RN called patient and patient stated she weighed 202.8 lbs at home. Last office visit was 7/20 and patient weighed 199 lbs. Patient does not think UC is needed as she doesn't think she is having a DVT but she does really want to be seen given how tight her skin gets with edema. RN scheduled patient tomorrow in approval only spot with PCP. Patient is wondering if PCP would like kidney function labs done prior to appointment to discuss labs at time of visit? If so, please place order.

## 2023-11-17 ENCOUNTER — OFFICE VISIT (OUTPATIENT)
Dept: FAMILY MEDICINE | Facility: OTHER | Age: 53
End: 2023-11-17
Payer: COMMERCIAL

## 2023-11-17 ENCOUNTER — MYC REFILL (OUTPATIENT)
Dept: FAMILY MEDICINE | Facility: OTHER | Age: 53
End: 2023-11-17

## 2023-11-17 VITALS
OXYGEN SATURATION: 95 % | HEIGHT: 64 IN | RESPIRATION RATE: 20 BRPM | SYSTOLIC BLOOD PRESSURE: 118 MMHG | HEART RATE: 95 BPM | BODY MASS INDEX: 33.46 KG/M2 | WEIGHT: 196 LBS | TEMPERATURE: 98.3 F | DIASTOLIC BLOOD PRESSURE: 82 MMHG

## 2023-11-17 DIAGNOSIS — I49.9 IRREGULAR HEART BEAT: Primary | ICD-10-CM

## 2023-11-17 DIAGNOSIS — R60.9 DEPENDENT EDEMA: ICD-10-CM

## 2023-11-17 DIAGNOSIS — E03.4 HYPOTHYROIDISM DUE TO ACQUIRED ATROPHY OF THYROID: ICD-10-CM

## 2023-11-17 DIAGNOSIS — Z80.0 FAMILY HX OF COLON CANCER: ICD-10-CM

## 2023-11-17 DIAGNOSIS — N04.9 NEPHROTIC SYNDROME: ICD-10-CM

## 2023-11-17 LAB
ALBUMIN UR-MCNC: >=300 MG/DL
APPEARANCE UR: CLEAR
BACTERIA #/AREA URNS HPF: ABNORMAL /HPF
BILIRUB UR QL STRIP: NEGATIVE
COLOR UR AUTO: YELLOW
ERYTHROCYTE [DISTWIDTH] IN BLOOD BY AUTOMATED COUNT: 15.8 % (ref 10–15)
GLUCOSE UR STRIP-MCNC: NEGATIVE MG/DL
HCT VFR BLD AUTO: 45.3 % (ref 35–47)
HGB BLD-MCNC: 14.6 G/DL (ref 11.7–15.7)
HGB UR QL STRIP: ABNORMAL
HYALINE CASTS #/AREA URNS LPF: ABNORMAL /LPF
KETONES UR STRIP-MCNC: NEGATIVE MG/DL
LEUKOCYTE ESTERASE UR QL STRIP: NEGATIVE
MCH RBC QN AUTO: 27.9 PG (ref 26.5–33)
MCHC RBC AUTO-ENTMCNC: 32.2 G/DL (ref 31.5–36.5)
MCV RBC AUTO: 87 FL (ref 78–100)
MUCOUS THREADS #/AREA URNS LPF: PRESENT /LPF
NITRATE UR QL: NEGATIVE
PH UR STRIP: 6.5 [PH] (ref 5–7)
PLATELET # BLD AUTO: 406 10E3/UL (ref 150–450)
RBC # BLD AUTO: 5.23 10E6/UL (ref 3.8–5.2)
RBC #/AREA URNS AUTO: ABNORMAL /HPF
SP GR UR STRIP: 1.02 (ref 1–1.03)
SQUAMOUS #/AREA URNS AUTO: ABNORMAL /LPF
UROBILINOGEN UR STRIP-ACNC: 0.2 E.U./DL
WBC # BLD AUTO: 11 10E3/UL (ref 4–11)
WBC #/AREA URNS AUTO: ABNORMAL /HPF

## 2023-11-17 PROCEDURE — 93000 ELECTROCARDIOGRAM COMPLETE: CPT | Performed by: PHYSICIAN ASSISTANT

## 2023-11-17 PROCEDURE — 36415 COLL VENOUS BLD VENIPUNCTURE: CPT | Performed by: PHYSICIAN ASSISTANT

## 2023-11-17 PROCEDURE — 81001 URINALYSIS AUTO W/SCOPE: CPT | Performed by: PHYSICIAN ASSISTANT

## 2023-11-17 PROCEDURE — 99214 OFFICE O/P EST MOD 30 MIN: CPT | Performed by: PHYSICIAN ASSISTANT

## 2023-11-17 PROCEDURE — 85027 COMPLETE CBC AUTOMATED: CPT | Performed by: PHYSICIAN ASSISTANT

## 2023-11-17 RX ORDER — BUMETANIDE 1 MG/1
1 TABLET ORAL DAILY
Qty: 7 TABLET | Refills: 0 | Status: SHIPPED | OUTPATIENT
Start: 2023-11-17 | End: 2023-11-28

## 2023-11-17 ASSESSMENT — PAIN SCALES - GENERAL: PAINLEVEL: NO PAIN (0)

## 2023-11-17 NOTE — PROGRESS NOTES
Assessment & Plan   1. Irregular heart beat  2. Dependent edema R>L  3. Hypothyroidism due to acquired atrophy of thyroid  4. Family hx of colon cancer  Appears to have some PVCs but no other evidence of atrial fibrillation at this point in time.  Labs related to this are pending.  We will have her see GI for further follow-up.  - CBC with platelets; Future  - EKG 12-lead complete w/read - Clinics  - UA Macroscopic with reflex to Microscopic and Culture - Lab Collect; Future  - Adult GI  Referral - Consult Only; Future  - bumetanide (BUMEX) 1 MG tablet; Take 1 tablet (1 mg) by mouth daily for 7 days  Dispense: 7 tablet; Refill: 0    JADE Graff [facility] JOYCE Park is a 53 year old, presenting for the following health issues:  Edema      11/17/2023    11:27 AM   Additional Questions   Roomed by Malina   Accompanied by Self         11/17/2023    11:27 AM   Patient Reported Additional Medications   Patient reports taking the following new medications NA       History of Present Illness       Reason for visit:  Edema  Symptom onset:  More than a month  Symptom intensity:  Severe  Symptom progression:  Worsening  Had these symptoms before:  Yes  Has tried/received treatment for these symptoms:  Yes  Previous treatment was successful:  Yes  Prior treatment description:  Furosemide  What makes it worse:  Some foods/travel    She eats 2-3 servings of fruits and vegetables daily.She consumes 0 sweetened beverage(s) daily.She exercises with enough effort to increase her heart rate 60 or more minutes per day.  She exercises with enough effort to increase her heart rate 3 or less days per week.   She is taking medications regularly.                 Review of Systems   Constitutional, HEENT, cardiovascular, pulmonary, GI, , musculoskeletal, neuro, skin, endocrine and psych systems are negative, except as otherwise noted.      Objective    /82   Pulse 95   Temp  "98.3  F (36.8  C) (Temporal)   Resp 20   Ht 1.633 m (5' 4.29\")   Wt 88.9 kg (196 lb)   SpO2 95%   BMI 33.34 kg/m    Body mass index is 33.34 kg/m .  Physical Exam   GENERAL: healthy, alert and no distress  NECK: no adenopathy, no asymmetry, masses, or scars and thyroid normal to palpation  RESP: lungs clear to auscultation - no rales, rhonchi or wheezes  CV: irregular rate and rhythm, normal S1 S2, no S3 or S4, no murmur, click or rub, peripheral pulses strong  ABDOMEN: soft, nontender, no hepatosplenomegaly, no masses and bowel sounds normal  MS: 3 edema to above the knee  SKIN: no suspicious lesions or rashes to exposed visible skin today.  NEURO: Normal strength and tone, mentation intact and speech normal  PSYCH: anxious and fatigued    EKG - Reviewed and interpreted by me appears normal, NSR, normal axis, normal intervals, no acute ST/T changes c/w ischemia, no LVH by voltage criteria, occasional PVC noted, unifocal                "

## 2023-11-18 RX ORDER — LEVOTHYROXINE SODIUM 25 UG/1
25 TABLET ORAL DAILY
COMMUNITY
Start: 2023-11-18 | End: 2024-05-23

## 2023-11-18 RX ORDER — LIOTHYRONINE SODIUM 5 UG/1
5 TABLET ORAL 2 TIMES DAILY
COMMUNITY
Start: 2023-11-18

## 2023-11-18 RX ORDER — LEVOTHYROXINE SODIUM 200 UG/1
200 TABLET ORAL DAILY
Qty: 90 TABLET | Refills: 3 | Status: SHIPPED | OUTPATIENT
Start: 2023-11-18 | End: 2024-05-23

## 2023-11-21 ENCOUNTER — MYC MEDICAL ADVICE (OUTPATIENT)
Dept: GASTROENTEROLOGY | Facility: CLINIC | Age: 53
End: 2023-11-21
Payer: COMMERCIAL

## 2023-11-21 ENCOUNTER — MYC MEDICAL ADVICE (OUTPATIENT)
Dept: FAMILY MEDICINE | Facility: OTHER | Age: 53
End: 2023-11-21
Payer: COMMERCIAL

## 2023-11-21 ENCOUNTER — TELEPHONE (OUTPATIENT)
Dept: UROLOGY | Facility: CLINIC | Age: 53
End: 2023-11-21
Payer: COMMERCIAL

## 2023-11-21 NOTE — TELEPHONE ENCOUNTER
[facility] Call Center    Phone Message    May a detailed message be left on voicemail: yes     Reason for Call: Other:     Patient has a referral in that writer is unsure what to schedule under DX. Attempted backline. Please call pt back asap to discuss and schedule please call between 12:15-1pm due to pt being with patient.  Ref is   Irregular heart beat [I49.9]  Dependent edema [R60.9]  Hypothyroidism due to acquired atrophy of thyroid [E03.4]  Family hx of colon cancer [Z80.0]  Nephrotic syndrome [N04.9]       Action Taken: Message routed to:  Other: neph    Travel Screening: Not Applicable

## 2023-11-22 ENCOUNTER — LAB (OUTPATIENT)
Dept: LAB | Facility: CLINIC | Age: 53
End: 2023-11-22
Payer: COMMERCIAL

## 2023-11-22 DIAGNOSIS — E06.3 CHRONIC LYMPHOCYTIC THYROIDITIS: ICD-10-CM

## 2023-11-22 DIAGNOSIS — E03.4 HYPOTHYROIDISM DUE TO ACQUIRED ATROPHY OF THYROID: ICD-10-CM

## 2023-11-22 DIAGNOSIS — E03.9 MYXEDEMA HEART DISEASE: Primary | ICD-10-CM

## 2023-11-22 DIAGNOSIS — E78.5 HYPERLIPIDEMIA LDL GOAL <100: ICD-10-CM

## 2023-11-22 DIAGNOSIS — I51.9 MYXEDEMA HEART DISEASE: Primary | ICD-10-CM

## 2023-11-22 DIAGNOSIS — E78.2 MIXED HYPERLIPIDEMIA: ICD-10-CM

## 2023-11-22 LAB
ALBUMIN SERPL BCG-MCNC: 2.2 G/DL (ref 3.5–5.2)
ANION GAP SERPL CALCULATED.3IONS-SCNC: 5 MMOL/L (ref 7–15)
BUN SERPL-MCNC: 17.5 MG/DL (ref 6–20)
CALCIUM SERPL-MCNC: 8.9 MG/DL (ref 8.6–10)
CHLORIDE SERPL-SCNC: 105 MMOL/L (ref 98–107)
CHOLEST SERPL-MCNC: 267 MG/DL
CREAT SERPL-MCNC: 1.12 MG/DL (ref 0.51–0.95)
DEPRECATED HCO3 PLAS-SCNC: 30 MMOL/L (ref 22–29)
EGFRCR SERPLBLD CKD-EPI 2021: 59 ML/MIN/1.73M2
GLUCOSE SERPL-MCNC: 105 MG/DL (ref 70–99)
HDLC SERPL-MCNC: 51 MG/DL
LDLC SERPL CALC-MCNC: 174 MG/DL
NONHDLC SERPL-MCNC: 216 MG/DL
PHOSPHATE SERPL-MCNC: 4 MG/DL (ref 2.5–4.5)
POTASSIUM SERPL-SCNC: 4.1 MMOL/L (ref 3.4–5.3)
SODIUM SERPL-SCNC: 140 MMOL/L (ref 135–145)
T4 FREE SERPL-MCNC: 1.23 NG/DL (ref 0.9–1.7)
TRIGL SERPL-MCNC: 211 MG/DL
TSH SERPL DL<=0.005 MIU/L-ACNC: 1.01 UIU/ML (ref 0.3–4.2)

## 2023-11-22 PROCEDURE — 84439 ASSAY OF FREE THYROXINE: CPT

## 2023-11-22 PROCEDURE — 84443 ASSAY THYROID STIM HORMONE: CPT

## 2023-11-22 PROCEDURE — 80061 LIPID PANEL: CPT

## 2023-11-22 PROCEDURE — 36415 COLL VENOUS BLD VENIPUNCTURE: CPT

## 2023-11-22 PROCEDURE — 80069 RENAL FUNCTION PANEL: CPT

## 2023-11-22 NOTE — TELEPHONE ENCOUNTER
"Routing message received from Teresa RN Nephrology care coordinator:  \"Please offer this patient virtual visit with Dr. Sen on 11/28 that is open.  She lives in [place].  Thank you,  Teresa\"      Attempted to contact pt.  No answer. Message left to return call.  Will also send a [employer] Message.    Appt placed on HOLD for pt:  Virtual Visit with Dr. Sen 11/28/23 at 11:30am.        Solange Wood LPN    "

## 2023-11-22 NOTE — TELEPHONE ENCOUNTER
Pt returned call.  Pt agreed to Virtual Visit with Dr. Sen 11/28/23 at 11:30am.  Appt scheduled.  Pt stated if the pre visit caller that day is not able to contact her, to please leave a message, she may be with a pt her self.    This LPN placed this message as well in appt note for 11/28/23 appt.    Solange Wood LPN

## 2023-11-26 NOTE — PROGRESS NOTES
"Gastroenterology CLINIC VISIT, NEW PATIENT    CC/REFERRING PROVIDER: Alin Sandra  REASON FOR CONSULTATION: constipation, hypoalbuminemia    HPI: 53 year old female with Hx of hypothyroidism, hyperlipidemia, and edema, was referred  to GI clinic for consult on new hypoalbuminemia. Patient is here today with her mother. They are questioning if she has some kind of food allergy or celiac disease. Said that she had positive skin prick test in her 20s (recalls reaction to corn, peanuts, citrus, and wheat). She is on regular diet, but sometimes, feels \"sick to my stomach\" after consuming certain foods ( peanut butter, citrus, or dairy).  Patient reported ongoing morning nausea \"thru my entire life\". No emesis. Denies acid reflux. No abdominal pain. Complains of abdominal bloating at times. Patient said that she has constipation, reported hard large stools. No melena, no hematochezia. No straining.   Stated that she has been feeling tired over the past few years. Developed LE edema and rapid weight gain few months ago. Noted mild renal insufficiency. Patient was given  furosemide for one week. Stated that she lost 18 lbs.  She was seen by endocrinology for hypothyroidism. Inadequate control on levothyroxine. Was started on Cytomel.   Patient said that she had \"borderline\" anemia for many years on lab work before blood donation. Admits to issues with menorrhagia until IUD was placed. Hx of uterine fibroids.  Patient denies smoking and illicit drug use. Admits to alcohol use.    PREVIOUS ENDOSCOPY:  9/28/2015 Colonoscopy  Findings:       The colon (entire examined portion) appeared normal.        The terminal ileum appeared normal.       PERTINENT STUDIES Reviewed in EMR    ROS: 10pt ROS performed and otherwise negative.    PAST MEDICAL HISTORY:  Past Medical History:   Diagnosis Date    Family history of colon cancer     Colonoscoy q5 years next 9/2020       PREVIOUS ABDOMINAL/GYNECOLOGIC SURGERIES:    Past Surgical " History:   Procedure Laterality Date    COLONOSCOPY N/A 9/28/2015    Procedure: COLONOSCOPY;  Surgeon: Eugenio Travis MD;  Location:  GI    [facility]           PERTINENT MEDICATIONS:  Current Outpatient Medications   Medication Sig Dispense Refill    levothyroxine (SYNTHROID/LEVOTHROID) 200 MCG tablet Take 1 tablet (200 mcg) by mouth daily 90 tablet 3    levothyroxine (SYNTHROID/LEVOTHROID) 25 MCG tablet Take 1 tablet (25 mcg) by mouth daily      liothyronine (CYTOMEL) 5 MCG tablet Take 1 tablet (5 mcg) by mouth daily      rosuvastatin (CRESTOR) 10 MG tablet Take 1 tablet (10 mg) by mouth daily 90 tablet 3    bumetanide (BUMEX) 1 MG tablet Take 1 tablet (1 mg) by mouth daily for 7 days 7 tablet 0         SOCIAL HISTORY:  Social History     Socioeconomic History    Marital status:      Spouse name: Not on file    Number of children: Not on file    Years of education: Not on file    Highest education level: Not on file   Occupational History     Comment: Physical Therapy   Tobacco Use    Smoking status: Never     Passive exposure: Never    Smokeless tobacco: Never   Vaping Use    Vaping Use: Never used   Substance and Sexual Activity    Alcohol use: Not Currently     Alcohol/week: 0.0 standard drinks of alcohol     Comment: 1-2 times/month    Drug use: No    Sexual activity: Yes     Partners: Male     Birth control/protection: I.U.D.   Other Topics Concern    Parent/sibling w/ CABG, MI or angioplasty before 65F 55M? Not Asked   Social History Narrative    Not on file     Social Determinants of Health     Financial Resource Strain: Low Risk  (11/17/2023)    Financial Resource Strain     Within the past 12 months, have you or your family members you live with been unable to get utilities (heat, electricity) when it was really needed?: No   Food Insecurity: Low Risk  (11/17/2023)    Food Insecurity     Within the past 12 months, did you worry that your food would run out before you got money to buy  "more?: No     Within the past 12 months, did the food you bought just not last and you didn t have money to get more?: No   Transportation Needs: Low Risk  (11/17/2023)    Transportation Needs     Within the past 12 months, has lack of transportation kept you from medical appointments, getting your medicines, non-medical meetings or appointments, work, or from getting things that you need?: No   Physical Activity: Not on file   Stress: Not on file   Social Connections: Not on file   Interpersonal Safety: Low Risk  (11/17/2023)    Interpersonal Safety     Do you feel physically and emotionally safe where you currently live?: Yes     Within the past 12 months, have you been hit, slapped, kicked or otherwise physically hurt by someone?: No     Within the past 12 months, have you been humiliated or emotionally abused in other ways by your partner or ex-partner?: No   Housing Stability: Low Risk  (11/17/2023)    Housing Stability     Do you have housing? : Yes     Are you worried about losing your housing?: No       FAMILY HISTORY:  Denies colon/panc/esophageal/other GI CA, no other Sosa or other HPS-related Jose Martin. No IBD/celiac, no other AI/liver/thyroid disease.    Family History   Problem Relation Age of Onset    Colon Cancer Maternal Grandmother 91    Hypertension Mother     Hyperlipidemia Mother     Cancer Mother 65        GIST    Other Cancer Mother     Coronary Artery Disease Father         MI    Thyroid Disease Sister     Asthma Daughter        PHYSICAL EXAMINATION:  Vitals reviewed  /80 (BP Location: Right arm, Patient Position: Sitting, Cuff Size: Adult Large)   Pulse 88   Ht 1.632 m (5' 4.25\")   Wt 88 kg (194 lb)   Breastfeeding No   BMI 33.04 kg/m      General: Patient appears well in no acute distress.    Skin: No visualized rash or lesions on visualized skin  HEENT:    EOMI, no erythema, sclera icterus or discharge noted.  Mouth mucosa intact, pink, moist  No cervical or supraclavicular " lymphadenopathy. Thyroid gland not enlarged.  Resp: breathing comfortably without accessory muscle usage, speaking in full sentences, no cough. Lung sounds clear  Card: Regular and rhythmic S1 and S2. No gallop or rub. No murmur. Pitting 1+ bilat. LE edema.   Abdomen: Active bowel sounds X 4 quadrants. Soft to palpation.  No guarding or rebound tenderness. Enciso's sign negative.  MSK: Appears to have normal range of motion based on visualized movements  Neurologic: No apparent tremors, facial movements symmetric  Psych: affect normal, alert and oriented      ASSESSMENT/PLAN:    ICD-10-CM    1. Nausea  R11.0 CT Abdomen wo & w & Pelvis w Contrast      2. Dependent edema R>L  R60.9 Adult GI  Referral - Consult Only      3. Family hx of colon cancer  Z80.0 Adult GI  Referral - Consult Only      4. Abdominal bloating  R14.0 Tissue transglutaminase sukhi IgA and IgG     Allergy adult food panel     CT Abdomen wo & w & Pelvis w Contrast      5. Hypoalbuminemia  E88.09 Allergy adult food panel      6. Constipation, unspecified constipation type  K59.00 CT Abdomen wo & w & Pelvis w Contrast         53 year old female  presented  to GI clinic for evaluation of possible GI etiology of hypoalbuminemia. Patient is questioning if she could have celiac disease or some food allergies. She reported adequate food intake and she likes meat. Not on any diet. No history of eating disorder. Denies any GI symptoms other than ongoing nausea in the morning, some bloating, and possible constipation. Patient has bowel movement every day, but stated that her stools are large and hard at times. Not taking any laxatives or stool softeners.Benign assessment of abdomen. Pitting LE edema present.  Noted signs of new renal disease and proteinuria on recent lab work. No anemia. Normal liver function.   Patient has been followed by endocrinology for hypothyroidism, questionable myxedema?  Explained to the patient that there would be  no significant benefits from upper GI endoscopy given that she has only mild and non-specific GI symptoms. She had normal colonoscopy in 2015. Reported family hx of colon cancer was clarified- her maternal grandmother was diagnosed with colon cancer at age 91. Suggested that patient has colonoscopy every 10 years (next one in 2025).  Will order TTG, ferritin, and food allergy test.  CT scan of abdomen was ordered (liver cirrhosis? Vascular obstruction?)  May consider consultation with dietician if tests come back abnormal.    Patient verbalized understanding and appreciation of care provided. Stated that all of the questions were answered to her/his satisfaction.  RTC in 2 months    Thank you for this consultation. It was a pleasure to participate in the care of this patient; please contact us with any further questions.    MARICRUZ Tejada, KEYSHAP-C  [facility]  Gastroenterology Department  [place], [place]    This note was created with Dragon voice recognition software, and while reviewed for accuracy, inadvertent minor typographic errors may occur. Please contact the provider if you have any questions.

## 2023-11-27 ENCOUNTER — OFFICE VISIT (OUTPATIENT)
Dept: GASTROENTEROLOGY | Facility: CLINIC | Age: 53
End: 2023-11-27
Attending: PHYSICIAN ASSISTANT
Payer: COMMERCIAL

## 2023-11-27 VITALS
HEIGHT: 64 IN | WEIGHT: 194 LBS | SYSTOLIC BLOOD PRESSURE: 118 MMHG | BODY MASS INDEX: 33.12 KG/M2 | HEART RATE: 88 BPM | DIASTOLIC BLOOD PRESSURE: 80 MMHG

## 2023-11-27 DIAGNOSIS — Z80.0 FAMILY HX OF COLON CANCER: ICD-10-CM

## 2023-11-27 DIAGNOSIS — K59.00 CONSTIPATION, UNSPECIFIED CONSTIPATION TYPE: ICD-10-CM

## 2023-11-27 DIAGNOSIS — R11.0 NAUSEA: Primary | ICD-10-CM

## 2023-11-27 DIAGNOSIS — E88.09 HYPOALBUMINEMIA: ICD-10-CM

## 2023-11-27 DIAGNOSIS — R60.9 DEPENDENT EDEMA: ICD-10-CM

## 2023-11-27 DIAGNOSIS — R14.0 ABDOMINAL BLOATING: ICD-10-CM

## 2023-11-27 PROCEDURE — 99204 OFFICE O/P NEW MOD 45 MIN: CPT | Performed by: NURSE PRACTITIONER

## 2023-11-27 ASSESSMENT — PAIN SCALES - GENERAL: PAINLEVEL: NO PAIN (0)

## 2023-11-27 NOTE — LETTER
"    11/27/2023         RE: Vivian Brown  [address] [facility] [address]        Dear Colleague,    Thank you for referring your patient, Vivian Brown, to the [facility]. Please see a copy of my visit note below.    Gastroenterology CLINIC VISIT, NEW PATIENT    CC/REFERRING PROVIDER: Alin Sandra  REASON FOR CONSULTATION: constipation, hypoalbuminemia    HPI: 53 year old female with Hx of hypothyroidism, hyperlipidemia, and edema, was referred  to GI clinic for consult on new hypoalbuminemia. Patient is here today with her mother. They are questioning if she has some kind of food allergy or celiac disease. Said that she had positive skin prick test in her 20s (recalls reaction to corn, peanuts, citrus, and wheat). She is on regular diet, but sometimes, feels \"sick to my stomach\" after consuming certain foods ( peanut butter, citrus, or dairy).  Patient reported ongoing morning nausea \"thru my entire life\". No emesis. Denies acid reflux. No abdominal pain. Complains of abdominal bloating at times. Patient said that she has constipation, reported hard large stools. No melena, no hematochezia. No straining.   Stated that she has been feeling tired over the past few years. Developed LE edema and rapid weight gain few months ago. Noted mild renal insufficiency. Patient was given  furosemide for one week. Stated that she lost 18 lbs.  She was seen by endocrinology for hypothyroidism. Inadequate control on levothyroxine. Was started on Cytomel.   Patient said that she had \"borderline\" anemia for many years on lab work before blood donation. Admits to issues with menorrhagia until IUD was placed. Hx of uterine fibroids.  Patient denies smoking and illicit drug use. Admits to alcohol use.    PREVIOUS ENDOSCOPY:  9/28/2015 Colonoscopy  Findings:       The colon (entire examined portion) appeared normal.        The terminal ileum appeared normal.       PERTINENT STUDIES Reviewed in " EMR    ROS: 10pt ROS performed and otherwise negative.    PAST MEDICAL HISTORY:  Past Medical History:   Diagnosis Date     Family history of colon cancer     Colonoscoy q5 years next 9/2020       PREVIOUS ABDOMINAL/GYNECOLOGIC SURGERIES:    Past Surgical History:   Procedure Laterality Date     COLONOSCOPY N/A 9/28/2015    Procedure: COLONOSCOPY;  Surgeon: Eugenio Travis MD;  Location:  GI     [facility]           PERTINENT MEDICATIONS:  Current Outpatient Medications   Medication Sig Dispense Refill     levothyroxine (SYNTHROID/LEVOTHROID) 200 MCG tablet Take 1 tablet (200 mcg) by mouth daily 90 tablet 3     levothyroxine (SYNTHROID/LEVOTHROID) 25 MCG tablet Take 1 tablet (25 mcg) by mouth daily       liothyronine (CYTOMEL) 5 MCG tablet Take 1 tablet (5 mcg) by mouth daily       rosuvastatin (CRESTOR) 10 MG tablet Take 1 tablet (10 mg) by mouth daily 90 tablet 3     bumetanide (BUMEX) 1 MG tablet Take 1 tablet (1 mg) by mouth daily for 7 days 7 tablet 0         SOCIAL HISTORY:  Social History     Socioeconomic History     Marital status:      Spouse name: Not on file     Number of children: Not on file     Years of education: Not on file     Highest education level: Not on file   Occupational History     Comment: Physical Therapy   Tobacco Use     Smoking status: Never     Passive exposure: Never     Smokeless tobacco: Never   Vaping Use     Vaping Use: Never used   Substance and Sexual Activity     Alcohol use: Not Currently     Alcohol/week: 0.0 standard drinks of alcohol     Comment: 1-2 times/month     Drug use: No     Sexual activity: Yes     Partners: Male     Birth control/protection: I.U.D.   Other Topics Concern     Parent/sibling w/ CABG, MI or angioplasty before 65F 55M? Not Asked   Social History Narrative     Not on file     Social Determinants of Health     Financial Resource Strain: Low Risk  (11/17/2023)    Financial Resource Strain      Within the past 12 months, have you or  "your family members you live with been unable to get utilities (heat, electricity) when it was really needed?: No   Food Insecurity: Low Risk  (11/17/2023)    Food Insecurity      Within the past 12 months, did you worry that your food would run out before you got money to buy more?: No      Within the past 12 months, did the food you bought just not last and you didn t have money to get more?: No   Transportation Needs: Low Risk  (11/17/2023)    Transportation Needs      Within the past 12 months, has lack of transportation kept you from medical appointments, getting your medicines, non-medical meetings or appointments, work, or from getting things that you need?: No   Physical Activity: Not on file   Stress: Not on file   Social Connections: Not on file   Interpersonal Safety: Low Risk  (11/17/2023)    Interpersonal Safety      Do you feel physically and emotionally safe where you currently live?: Yes      Within the past 12 months, have you been hit, slapped, kicked or otherwise physically hurt by someone?: No      Within the past 12 months, have you been humiliated or emotionally abused in other ways by your partner or ex-partner?: No   Housing Stability: Low Risk  (11/17/2023)    Housing Stability      Do you have housing? : Yes      Are you worried about losing your housing?: No       FAMILY HISTORY:  Denies colon/panc/esophageal/other GI CA, no other Sosa or other HPS-related Jose Martin. No IBD/celiac, no other AI/liver/thyroid disease.    Family History   Problem Relation Age of Onset     Colon Cancer Maternal Grandmother 91     Hypertension Mother      Hyperlipidemia Mother      Cancer Mother 65        GIST     Other Cancer Mother      Coronary Artery Disease Father         MI     Thyroid Disease Sister      Asthma Daughter        PHYSICAL EXAMINATION:  Vitals reviewed  /80 (BP Location: Right arm, Patient Position: Sitting, Cuff Size: Adult Large)   Pulse 88   Ht 1.632 m (5' 4.25\")   Wt 88 kg (194 lb)  "  Breastfeeding No   BMI 33.04 kg/m      General: Patient appears well in no acute distress.    Skin: No visualized rash or lesions on visualized skin  HEENT:    EOMI, no erythema, sclera icterus or discharge noted.  Mouth mucosa intact, pink, moist  No cervical or supraclavicular lymphadenopathy. Thyroid gland not enlarged.  Resp: breathing comfortably without accessory muscle usage, speaking in full sentences, no cough. Lung sounds clear  Card: Regular and rhythmic S1 and S2. No gallop or rub. No murmur. Pitting 1+ bilat. LE edema.   Abdomen: Active bowel sounds X 4 quadrants. Soft to palpation.  No guarding or rebound tenderness. Enciso's sign negative.  MSK: Appears to have normal range of motion based on visualized movements  Neurologic: No apparent tremors, facial movements symmetric  Psych: affect normal, alert and oriented      ASSESSMENT/PLAN:    ICD-10-CM    1. Nausea  R11.0 CT Abdomen wo & w & Pelvis w Contrast      2. Dependent edema R>L  R60.9 Adult GI  Referral - Consult Only      3. Family hx of colon cancer  Z80.0 Adult GI  Referral - Consult Only      4. Abdominal bloating  R14.0 Tissue transglutaminase sukhi IgA and IgG     Allergy adult food panel     CT Abdomen wo & w & Pelvis w Contrast      5. Hypoalbuminemia  E88.09 Allergy adult food panel      6. Constipation, unspecified constipation type  K59.00 CT Abdomen wo & w & Pelvis w Contrast         53 year old female  presented  to GI clinic for evaluation of possible GI etiology of hypoalbuminemia. Patient is questioning if she could have celiac disease or some food allergies. She reported adequate food intake and she likes meat. Not on any diet. No history of eating disorder. Denies any GI symptoms other than ongoing nausea in the morning, some bloating, and possible constipation. Patient has bowel movement every day, but stated that her stools are large and hard at times. Not taking any laxatives or stool softeners.Benign  assessment of abdomen. Pitting LE edema present.  Noted signs of new renal disease and proteinuria on recent lab work. No anemia. Normal liver function.   Patient has been followed by endocrinology for hypothyroidism, questionable myxedema?  Explained to the patient that there would be no significant benefits from upper GI endoscopy given that she has only mild and non-specific GI symptoms. She had normal colonoscopy in 2015. Reported family hx of colon cancer was clarified- her maternal grandmother was diagnosed with colon cancer at age 91. Suggested that patient has colonoscopy every 10 years (next one in 2025).  Will order TTG, ferritin, and food allergy test.  CT scan of abdomen was ordered (liver cirrhosis? Vascular obstruction?)  May consider consultation with dietician if tests come back abnormal.    Patient verbalized understanding and appreciation of care provided. Stated that all of the questions were answered to her/his satisfaction.  RTC in 2 months    Thank you for this consultation. It was a pleasure to participate in the care of this patient; please contact us with any further questions.    MARICRUZ Tejada, FNP-C  [facility]  Gastroenterology Department  [place], [place]    This note was created with Dragon voice recognition software, and while reviewed for accuracy, inadvertent minor typographic errors may occur. Please contact the provider if you have any questions.       Again, thank you for allowing me to participate in the care of your patient.        Sincerely,        MARICRUZ TEJADA CNP

## 2023-11-27 NOTE — PATIENT INSTRUCTIONS
It was a pleasure taking care of you today.  I've included a brief summary of our discussion and care plan from today's visit below.  Please review this information with your primary care provider.  ______________________________________________________________________    My recommendations are summarized as follows:    As we discussed today, lab work was ordered. You can complete it any time in the next 30 days.    2. I also ordered CT scan of abdomen. They should call you to schedule an appointment.    3. Please follow up with nephrology on your new kidney disease.    Return to GI Clinic in 2 months to review your progress.    ______________________________________________________________________    Constipation refers to a change in bowel habits, but it has varied meanings. Stools may be too hard or too small, difficult to pass, or infrequent (less than three times per week). People with constipation may also notice a frequent need to strain and a sense that the bowels are not empty.  Constipation is a very common problem. Each year more than 2.5 million Americans visit their healthcare provider for relief from this problem. Many factors can contribute to or cause constipation, although in most people, no single cause can be found. In general, constipation occurs more frequently as you get older.     Treatment for constipation includes changing some behaviors, eating foods high in fiber, and using medications if needed.  Behavior changes -- The bowels are most active following meals, and this is often the time when stools will pass most readily. If you ignore your body's signals to have a bowel movement, the signals become weaker and weaker over time.By paying close attention to these signals, you may have an easier time moving your bowels. Drinking a caffeine-containing beverage in the morning may also be helpful.  Increase fiber -- Increasing fiber in your diet may reduce or eliminate constipation. The  recommended amount of dietary fiber is 20 to 35 grams of fiber per day. By reading the product information panel on the side of the package, you can determine the number of grams of fiber per serving .Many fruits and vegetables can be particularly helpful in preventing and treating constipation .This is especially true of citrus fruits, prunes, and prune juice. Some breakfast cereals are also an excellent source of dietary fiber.  Start your day off with a high-fiber breakfast such as whole grain oatmeal sprinkled with  raisins, blueberries, pecans or macadamia nuts.   Slice up raw veggies (carrots, celery, bell peppers) and fruit (apples, pears) and keep  them in baggies for quick high-fiber snacks.   Munch on a small handful of nuts, such as peanuts, walnuts, and almonds.   Look for individually wrapped prunes in the grocery store for a quick fiber snack.   When buying frozen veggies, look for ones that have been  flash frozen.    Add half a cup of garbanzo or black beans or peas to your salad to add fiber, texture, and flavor.   EXAMPLES OF HIGH-FIBER FOODS   1 large apple or pear (5g)   1 cup Raisin Bran (5g)     cup raspberries (9g)   1 cup cooked broccoli (5g)   23 almonds (3.5g)   1 cup black beans (15g)   2 brazil nuts (2.5g)   1 cup peas (16g)    Anti-constipation fruit paste:  Ingredients  1 cup pitted prunes  1 cup raisins  1 cup pitted dates  1/2 cup orange juice  2/3 cup prune juice  Tip:  For even more fiber add 1 cup of natural wheat bran to the fruit mixture.  Directions (Makes 25 servings)  Combine all ingredients in a bowl and soak overnight in the refrigerator. Blend in  or  until smooth. Keep in the refrigerator (can be kept frozen in small containers).   Serving size: 2 tablespoons. Spread it on toast or eat from a spoon.  Caution: May cause bloating and abdominal cramping. Start with smaller portion and increase it gradually over a few weeks as  tolerate.    Medications:  Bulk forming -- These include natural fiber and commercial fiber preparations such as Psyllium (Konsyl; Metamucil; Perdiem), Methylcellulose (Citrucel),  or Wheat dextrin (Benefiber);  You should increase the dose of fiber supplements slowly to prevent gas and cramping, and you should always take the supplement with plenty of fluid.  2. Hyperosmolar medications  -- such as   ?Polyethylene glycol (MiraLAX, GlycoLax)  ? Lactulose  ? Sorbitol  Polyethylene glycol is generally preferred since it does not cause gas or bloating and is available without a prescription. Lactulose and sorbitol can produce gas and bloating. Sorbitol works as well as lactulose and is much less expensive.  3.Saline laxatives -- such as Milk of Magnesia and magnesium citrate (Evac-Q-Mag) act similarly to the hyperosmolar drugs.  4. Stimulant drugs -- include Senna (eg, Black Draught, Ex-Lax, Senokot) and Bisacodil (eg, Correctol, Doxidan, Dulcolax).     Constipation treatments to avoid:  ? Emollients - Emollient laxatives, principally mineral oil, soften stools by moisturizing them. However, other treatments have fewer risks and equal benefit.  ? Natural products - A wide variety of natural products are advertised for constipation. Some of them contain the active ingredients found in commercially available laxatives. However, their dose and purity may not be carefully controlled. Thus, these products are not generally recommended.  ? A variety of home-made enema preparations have been used throughout the years, such as soapsuds, hydrogen peroxide, and household detergents. These can be extremely irritating to the lining of the intestine and should be avoided.           ______________________________________________________________________    Who do I call with any questions after my visit?  Please be in touch if there are any further questions that arise following today's visit.  There are multiple ways to contact your  gastroenterology care team.      During business hours, you may reach a Gastroenterology nurse at [phone], option [phone].     To schedule or reschedule an appointment, please call [phone].   To schedule your imaging studies (CT, MRI, ultrasound)  call [phone] (or toll-free # [phone])  To schedule your lab work at [facility], please call [phone]    You can always send a secure message through [employer].  [employer] messages are answered by your nurse or doctor typically within 24 hours.  Please allow extra time on weekends and holidays.      For urgent/emergent questions after business hours, you may reach the on-call GI Fellow by contacting the [facility]  at [phone].    In order for your refill to be processed in a timely fashion, it is your responsibility to ensure you follow the recommendations from your provider regarding your laboratory studies and follow up appointments.       How will I get the results of any tests ordered?    You will receive all of your results.  If you have signed up for [employer], any tests ordered at your visit will be available to you after your physician reviews them.  Typically this takes 1-2 weeks.  If there are urgent results that require a change in your care plan, your physician or nurse will call you to discuss the next steps.   What is [employer]?  [employer] is a secure way for you to access all of your healthcare records from the [facility].  It is a web based computer program, so you can sign on to it from any location.  It also allows you to send secure messages to your care team.  I recommend signing up for [employer] access if you have not already done so and are comfortable with using a computer.    How to I schedule a follow-up visit?  If you did not schedule a follow-up visit today, please call [phone] to schedule a follow-up office visit.      Sincerely,  [facility]  [place],  Division of Gastroenterology   ([facility])

## 2023-11-28 ENCOUNTER — VIRTUAL VISIT (OUTPATIENT)
Dept: NEPHROLOGY | Facility: CLINIC | Age: 53
End: 2023-11-28
Payer: COMMERCIAL

## 2023-11-28 ENCOUNTER — LAB (OUTPATIENT)
Dept: LAB | Facility: CLINIC | Age: 53
End: 2023-11-28
Payer: COMMERCIAL

## 2023-11-28 VITALS — HEIGHT: 64 IN | BODY MASS INDEX: 33.12 KG/M2 | WEIGHT: 194 LBS

## 2023-11-28 DIAGNOSIS — R80.1 PERSISTENT PROTEINURIA: ICD-10-CM

## 2023-11-28 DIAGNOSIS — R11.0 NAUSEA: ICD-10-CM

## 2023-11-28 DIAGNOSIS — E88.09 HYPOALBUMINEMIA: ICD-10-CM

## 2023-11-28 DIAGNOSIS — R14.0 ABDOMINAL BLOATING: Primary | ICD-10-CM

## 2023-11-28 DIAGNOSIS — N04.9 NEPHROTIC SYNDROME: ICD-10-CM

## 2023-11-28 DIAGNOSIS — R80.1 PERSISTENT PROTEINURIA: Primary | ICD-10-CM

## 2023-11-28 DIAGNOSIS — R14.0 ABDOMINAL BLOATING: ICD-10-CM

## 2023-11-28 LAB
ALBUMIN MFR UR ELPH: 1572 MG/DL
ALBUMIN SERPL BCG-MCNC: 2.2 G/DL (ref 3.5–5.2)
ALBUMIN UR-MCNC: >499 MG/DL
ANION GAP SERPL CALCULATED.3IONS-SCNC: 5 MMOL/L (ref 7–15)
APPEARANCE UR: ABNORMAL
BACTERIA #/AREA URNS HPF: ABNORMAL /HPF
BILIRUB UR QL STRIP: NEGATIVE
BUN SERPL-MCNC: 18.1 MG/DL (ref 6–20)
CALCIUM SERPL-MCNC: 9.1 MG/DL (ref 8.6–10)
CHLORIDE SERPL-SCNC: 105 MMOL/L (ref 98–107)
COLOR UR AUTO: YELLOW
CREAT SERPL-MCNC: 1.11 MG/DL (ref 0.51–0.95)
CREAT UR-MCNC: 125.8 MG/DL
DEPRECATED HCO3 PLAS-SCNC: 30 MMOL/L (ref 22–29)
EGFRCR SERPLBLD CKD-EPI 2021: 59 ML/MIN/1.73M2
FERRITIN SERPL-MCNC: 54 NG/ML (ref 11–328)
GLUCOSE SERPL-MCNC: 118 MG/DL (ref 70–99)
GLUCOSE UR STRIP-MCNC: NEGATIVE MG/DL
HGB UR QL STRIP: ABNORMAL
HYALINE CASTS: 11 /LPF
KETONES UR STRIP-MCNC: NEGATIVE MG/DL
LEUKOCYTE ESTERASE UR QL STRIP: NEGATIVE
Lab: NORMAL
MUCOUS THREADS #/AREA URNS LPF: PRESENT /LPF
NITRATE UR QL: NEGATIVE
PERFORMING LABORATORY: NORMAL
PH UR STRIP: 6 [PH] (ref 5–7)
PHOSPHATE SERPL-MCNC: 3.9 MG/DL (ref 2.5–4.5)
POTASSIUM SERPL-SCNC: 3.9 MMOL/L (ref 3.4–5.3)
PROT/CREAT 24H UR: 12.5 MG/MG CR (ref 0–0.2)
RBC URINE: 1 /HPF
SODIUM SERPL-SCNC: 140 MMOL/L (ref 135–145)
SP GR UR STRIP: 1.02 (ref 1–1.03)
SPECIMEN STATUS: NORMAL
SQUAMOUS EPITHELIAL: <1 /HPF
TEST NAME: NORMAL
TOTAL PROTEIN SERUM FOR ELP: 4.2 G/DL (ref 6.4–8.3)
UROBILINOGEN UR STRIP-MCNC: NORMAL MG/DL
WBC URINE: 3 /HPF

## 2023-11-28 PROCEDURE — 99204 OFFICE O/P NEW MOD 45 MIN: CPT | Mod: VID | Performed by: INTERNAL MEDICINE

## 2023-11-28 PROCEDURE — 84156 ASSAY OF PROTEIN URINE: CPT

## 2023-11-28 PROCEDURE — 86364 TISS TRNSGLTMNASE EA IG CLAS: CPT

## 2023-11-28 PROCEDURE — 86255 FLUORESCENT ANTIBODY SCREEN: CPT | Mod: 90

## 2023-11-28 PROCEDURE — 83876 ASSAY MYELOPEROXIDASE: CPT

## 2023-11-28 PROCEDURE — 81001 URINALYSIS AUTO W/SCOPE: CPT

## 2023-11-28 PROCEDURE — 36415 COLL VENOUS BLD VENIPUNCTURE: CPT

## 2023-11-28 PROCEDURE — 83516 IMMUNOASSAY NONANTIBODY: CPT

## 2023-11-28 PROCEDURE — 83520 IMMUNOASSAY QUANT NOS NONAB: CPT

## 2023-11-28 PROCEDURE — 83516 IMMUNOASSAY NONANTIBODY: CPT | Mod: 90

## 2023-11-28 PROCEDURE — 80069 RENAL FUNCTION PANEL: CPT

## 2023-11-28 PROCEDURE — 83521 IG LIGHT CHAINS FREE EACH: CPT

## 2023-11-28 PROCEDURE — 86003 ALLG SPEC IGE CRUDE XTRC EA: CPT

## 2023-11-28 PROCEDURE — 86160 COMPLEMENT ANTIGEN: CPT

## 2023-11-28 PROCEDURE — 84165 PROTEIN E-PHORESIS SERUM: CPT | Performed by: PATHOLOGY

## 2023-11-28 PROCEDURE — 84155 ASSAY OF PROTEIN SERUM: CPT

## 2023-11-28 PROCEDURE — 99000 SPECIMEN HANDLING OFFICE-LAB: CPT

## 2023-11-28 PROCEDURE — 82728 ASSAY OF FERRITIN: CPT

## 2023-11-28 PROCEDURE — 86334 IMMUNOFIX E-PHORESIS SERUM: CPT | Performed by: PATHOLOGY

## 2023-11-28 RX ORDER — LISINOPRIL 2.5 MG/1
2.5 TABLET ORAL DAILY
Qty: 90 TABLET | Refills: 3 | Status: SHIPPED | OUTPATIENT
Start: 2023-11-28 | End: 2024-03-18

## 2023-11-28 RX ORDER — FUROSEMIDE 20 MG
20 TABLET ORAL DAILY
Qty: 90 TABLET | Refills: 3 | Status: SHIPPED | OUTPATIENT
Start: 2023-11-28 | End: 2024-02-18

## 2023-11-28 ASSESSMENT — PAIN SCALES - GENERAL: PAINLEVEL: NO PAIN (0)

## 2023-11-28 NOTE — PROGRESS NOTES
Virtual Visit Details    Type of service:  Video Visit     Originating Location (pt. Location): Work    Distant Location (provider location):  Off-site  Platform used for Video Visit: Robert

## 2023-11-28 NOTE — PATIENT INSTRUCTIONS
Cancel CT scan appt  Schedule Abdominal ultrasound  Lab and urine today  Kidney biopsy in the near future.   Start lisinopril 2.5 mg daily  Ok to use lasix 20 mg daily as needed for swelling.

## 2023-11-28 NOTE — PROGRESS NOTES
"23    I was asked to see this patient by Alin Torres PA-C for evaluation of nephrotic syndrome.     CC: nephrotic syndrome     HPI: Vivian Brown is a 53 year old female who presents for evaluation of nephrotic syndrome. Her hx is significant for hypothyroidism on replacement but otherwise no significant other medical hx. On review of her labs, she has hematuria and proteinuria on her UA from 23. Her albumin on her blood work was 3.4 and 3.5 in  and  but down to 2.2 in Dec 2022 and has remained low through . Creatinine at baseline in 2691-2898 was 0.8-0.9; increased to 1.12 now.    Noted swelling in her legs in 2022. She recalls being [place] her daughter in 100 degrees - legs were dangling for that day - led to swelling. Has had weight gain - lost 18 lbs since taking the lasix. Sick to her stomach every morning since she was a kid. Feels better with food. That is her normal. No diarrhea. She has more difficulty with constipation. Addt hx includes her mother having a kidney mass - it was muscle in origin - benign finding. Mother had GIST. MOther's brother  of MDS. Mother's sister - with dx of multiple myeloma.     - History of Hematuria: no  - Swelling: yes  - Hx of UTIs: no  - Hx of stones: no  - Rashes/Joint pain: strong steroid for contact dermatitis after starting thyroid med started. ; fingers painful at times.   - Family hx of kidney disease:   - NSAID use: no    levothyroxine (SYNTHROID/LEVOTHROID) 200 MCG tablet, Take 1 tablet (200 mcg) by mouth daily  levothyroxine (SYNTHROID/LEVOTHROID) 25 MCG tablet, Take 1 tablet (25 mcg) by mouth daily  rosuvastatin (CRESTOR) 10 MG tablet, Take 1 tablet (10 mg) by mouth daily  liothyronine (CYTOMEL) 5 MCG tablet, Take 1 tablet (5 mcg) by mouth daily    No current facility-administered medications on file prior to visit.      Exam:  Ht 1.626 m (5' 4\")   Wt 88 kg (194 lb)   BMI 33.30 kg/m    GENERAL APPEARANCE: alert " and no distress  R - CTAB    Results:    Lab on 11/28/2023   Component Date Value Ref Range Status    Tissue Transglutaminase Antibody I* 11/28/2023 124.0 (H)  <7.0 U/mL Final    Positive    Tissue Transglutaminase Antibody I* 11/28/2023 <0.6  <7.0 U/mL Final    Negative    Immunoglobulin E 11/28/2023 208 (H)  0 - 114 kU/L Final    [place], Food IgE 11/28/2023 0.21 (H)  <0.10 KU(A)/L Final    Interpretation: Low    Cashew, Food IgE 11/28/2023 <0.10  <0.10 KU(A)/L Final    Interpretation: None Detected    Codfish IgE 11/28/2023 <0.10  <0.10 KU(A)/L Final    Interpretation: None Detected    Egg White IgE 11/28/2023 <0.10  <0.10 KU(A)/L Final    Interpretation: None Detected    Hazelnut IgE 11/28/2023 5.43 (H)  <0.10 KU(A)/L Final    Interpretation: High    Milk, Cow IgE 11/28/2023 <0.10  <0.10 KU(A)/L Final    Interpretation: None Detected    Peanut IgE 11/28/2023 2.88 (H)  <0.10 KU(A)/L Final    Interpretation: Moderate    [place]a IgE 11/28/2023 <0.10  <0.10 KU(A)/L Final    Interpretation: None Detected    Scallop IgE 11/28/2023 <0.10  <0.10 KU(A)/L Final    Interpretation: None Detected    Sesame Seed IgE 11/28/2023 0.28 (H)  <0.10 KU(A)/L Final    Interpretation: Low    Shrimp IgE 11/28/2023 <0.10  <0.10 KU(A)/L Final    Interpretation: None Detected    Soybean IgE 11/28/2023 <0.10  <0.10 KU(A)/L Final    Interpretation: None Detected    Tuna IgE 11/28/2023 <0.10  <0.10 KU(A)/L Final    Interpretation: None Detected    [place], Food IgE 11/28/2023 0.40 (H)  <0.10 KU(A)/L Final    Interpretation: Low    Wheat IgE 11/28/2023 0.17 (H)  <0.10 KU(A)/L Final    Interpretation: Low    Ferritin 11/28/2023 54  11 - 328 ng/mL Final    Sodium 11/28/2023 140  135 - 145 mmol/L Final    Reference intervals for this test were updated on 09/26/2023 to more accurately reflect our healthy population. There may be differences in the flagging of prior results with similar values performed with this method. Interpretation of those prior  results can be made in the context of the updated reference intervals.     Potassium 11/28/2023 3.9  3.4 - 5.3 mmol/L Final    Chloride 11/28/2023 105  98 - 107 mmol/L Final    Carbon Dioxide (CO2) 11/28/2023 30 (H)  22 - 29 mmol/L Final    Anion Gap 11/28/2023 5 (L)  7 - 15 mmol/L Final    Glucose 11/28/2023 118 (H)  70 - 99 mg/dL Final    Urea Nitrogen 11/28/2023 18.1  6.0 - 20.0 mg/dL Final    Creatinine 11/28/2023 1.11 (H)  0.51 - 0.95 mg/dL Final    GFR Estimate 11/28/2023 59 (L)  >60 mL/min/1.73m2 Final    Calcium 11/28/2023 9.1  8.6 - 10.0 mg/dL Final    Albumin 11/28/2023 2.2 (L)  3.5 - 5.2 g/dL Final    Phosphorus 11/28/2023 3.9  2.5 - 4.5 mg/dL Final    Total Protein Urine mg/dL 11/28/2023 1,572.0    mg/dL Final    The reference ranges have not been established in urine protein. The results should be integrated into the clinical context for interpretation.    Total Protein Urine mg/mg Creat 11/28/2023 12.50 (H)  0.00 - 0.20 mg/mg Cr Final    Creatinine Urine mg/dL 11/28/2023 125.8  mg/dL Final    The reference ranges have not been established in urine creatinine. The results should be integrated into the clinical context for interpretation.    Color Urine 11/28/2023 Yellow  Colorless, Straw, Light Yellow, Yellow Final    Appearance Urine 11/28/2023 Slightly Cloudy (A)  Clear Final    Glucose Urine 11/28/2023 Negative  Negative mg/dL Final    Bilirubin Urine 11/28/2023 Negative  Negative Final    Ketones Urine 11/28/2023 Negative  Negative mg/dL Final    Specific Gravity Urine 11/28/2023 1.020  1.003 - 1.035 Final    Blood Urine 11/28/2023 Small (A)  Negative Final    pH Urine 11/28/2023 6.0  5.0 - 7.0 Final    Protein Albumin Urine 11/28/2023 >499 (A)  Negative mg/dL Final    Urobilinogen Urine 11/28/2023 Normal  Normal, 2.0 mg/dL Final    Nitrite Urine 11/28/2023 Negative  Negative Final    Leukocyte Esterase Urine 11/28/2023 Negative  Negative Final    Bacteria Urine 11/28/2023 Few (A)  None Seen /HPF  Final    Mucus Urine 11/28/2023 Present (A)  None Seen /LPF Final    RBC Urine 11/28/2023 1  <=2 /HPF Final    WBC Urine 11/28/2023 3  <=5 /HPF Final    Squamous Epithelials Urine 11/28/2023 <1  <=1 /HPF Final    Hyaline Casts Urine 11/28/2023 11 (H)  <=2 /LPF Final    Kappa Free Light Chains 11/28/2023 3.05 (H)  0.33 - 1.94 mg/dL Final    Lambda Free Light Chains 11/28/2023 7.82 (H)  0.57 - 2.63 mg/dL Final    Kappa /Lambda Ratio 11/28/2023 0.39  0.26 - 1.65 Final    Immunofixation ELP 11/28/2023 No monoclonal protein seen on immunofixation. Pathologic significance requires clinical correlation.  RAPHAEL Greene M.D., Ph.D., Pathologist ()   Final    C3 Complement 11/28/2023 143  81 - 157 mg/dL Final    C4 Complement 11/28/2023 27  13 - 39 mg/dL Final    GBM Antibody IgG 11/28/2023 0  0 - 19 AU/mL Final    INTERPRETIVE INFORMATION: GBM Ab, IgG by Multiplex Bead                             Assay      19 AU/mL or Less ......... Negative    20-25 AU/mL .............. Equivocal    26 AU/mL or Greater ...... Positive    The presence of anti-glomerular basement membrane (GBM)   antibodies by Multiplex Bead Assay may aid in the diagnosis   of Goodpasture syndrome. False positive results may occur   due to reactivity against other chains of type IV collagen.   If Multiplex Bead Assay is negative but there is a strong   suspicion for disease, renal biopsy may be indicated. A   renal biopsy may also be essential in  suspected   Goodpasture disease with renal involvement, allowing   diagnostic confirmation and assessment of renal prognosis.  Performed By: [facility]  [address] [place] [id number]  : Eugenio Mcdaniel MD, PhD  CLIA Number: [id number]    MPO Loan IgG Instrument Value 11/28/2023 <0.3  <3.5 U/mL Final    Myeloperoxidase Antibody IgG 11/28/2023 Negative  Negative Final    Proteinase 3 Loan IgG Instrument Va* 11/28/2023 <1.0  <2.0 U/mL Final    Proteinase 3 Antibody  IgG 11/28/2023 Negative  Negative Final    See Scanned Result 11/28/2023 Specimen received. Reordered and sent to performing laboratory. Report to follow up on completion.   Final    Performing Laboratory 11/28/2023 [facility]   Final    Test Name 11/28/2023 screen for membranous nephropathy   Corrected    This is a corrected result. Previous result was PMND1 - screen for membranous nephropathy on 11/28/2023 at 12:34 PM CST    Test Code 11/28/2023 PMND1   Corrected    This is a corrected result. Previous result was PMND1 - screen for membranous nephropathy on 11/28/2023 at 12:34 PM CST    Total Protein Serum for ELP 11/28/2023 4.2 (L)  6.4 - 8.3 g/dL Final    Albumin 11/28/2023 1.9 (L)  3.7 - 5.1 g/dL Final    Alpha 1 11/28/2023 0.3  0.2 - 0.4 g/dL Final    Alpha 2 11/28/2023 1.1 (H)  0.5 - 0.9 g/dL Final    Beta Globulin 11/28/2023 0.6  0.6 - 1.0 g/dL Final    Gamma Globulin 11/28/2023 0.4 (L)  0.7 - 1.6 g/dL Final    Monoclonal Peak 11/28/2023 0.0  <=0.0 g/dL Final    ELP Interpretation 11/28/2023    Final                    Value:Maked hypoalbuminemia with significantly increased alpha 2 globulins and significant hypogammaglobulinemia, suggestive of possible renal protein loss. No obvious monoclonal protein seen. Pathologic significance requires clinical correlation. RAPHAEL Greene M.D., Ph.D., Pathologist ().    Hall Result 11/28/2023 SEE NOTE   Final       Test                                     Result  Flag  Unit   RefValue  ----------------------------------------------------------------------  Prim Membranous Nephropathy Diag, S    Phospholipase A2 Receptor, JILLIAN, S    <2            RU/mL                       -------------------REFERENCE VALUE--------------------------      <14 RU/mL: Negative      >=14 to <20 RU/mL: Borderline      >=20 RU/mL: Positive             Test Performed by:      [facility]      [address] [place]  [address]      : Nestor Deshpande M.D. Ph.D.; CLIA# [id number]    Phospholipase A2 Receptor, Immunof* 11/28/2023 Negative  Negative Final    PLA2R is Negative     -------------------ADDITIONAL INFORMATION-------------------  This test was developed and its performance characteristics   determined by [facility] in a manner consistent with   CLIA requirements. This test has not been cleared or   approved by the U.S. Food and Drug Administration.     Test Performed by:  [facility] - [address] [address]  : Nestor Deshpande M.D. Ph.D.; CLIA# [id number]    THSD7A Antibodies 11/28/2023 Negative  Negative Final       -------------------ADDITIONAL INFORMATION-------------------  This test was developed and its performance characteristics   determined by [facility] in a manner consistent with   CLIA requirements. This test has not been cleared or   approved by the U.S. Food and Drug Administration.     Test Performed by:  [facility] - [address] [address]  : Nestor Deshpande M.D. Ph.D.; CLIA# [id number]      Lab results were reviewed and interpreted.       Assessment/Plan:   Nephrotic syndrome: recommend renal imaging to assess kidney size/rule out obstruction. Recommend lab and urine studies at this time. Recommend kidney biopsy to determine etiology of nephrotic syndrome.   Swelling: to treat nephrotic syndrome, will start lisinopril  - low dose given normal BP. Will also prescribe lasix to help with edema.    Patient Instructions   Cancel CT scan appt  Schedule Abdominal ultrasound  Lab and urine today  Kidney biopsy in the near future.   Start lisinopril 2.5 mg daily  Ok to use lasix 20 mg daily as needed for swelling.      53 minutes spent by me on the date of the encounter doing chart review, review of test results, interpretation of tests, patient visit, and  documentation   [id number] video visit via [facility] - offsite  Audrey Sen DO

## 2023-11-28 NOTE — NURSING NOTE
Is the patient currently in the state of [place]? YES    Visit mode:VIDEO    If the visit is dropped, the patient can be reconnected by: VIDEO VISIT: Text to cell phone:   Telephone Information:   Mobile [phone]       Will anyone else be joining the visit? NO  (If patient encounters technical issues they should call [phone]    How would you like to obtain your AVS? MyChart    Are changes needed to the allergy or medication list? No    Reason for visit: Video Visit    Carmen ESCOBEDO

## 2023-11-28 NOTE — Clinical Note
I am about to see Ms. Brown but on review of her chart alone, I feel she is in need of a kidney biopsy to evaluate for cause of nephrotic syndrome likely. She needs a total urine protein/creatinne first so I'll have her get imaging of her kidneys and more labs prior to the biopsy. My question is can we cancel the CT of the abdomen with contrast? If she has an underlyng kidney disease at this time it would be best to avoid contrast exposure. I was planning a renal ultrasound but will wait to hear back from you as want to avoid multiple imaging studies of the abdomen if we can do all at once.  Audrey Sen Nephrology

## 2023-11-29 ENCOUNTER — PATIENT OUTREACH (OUTPATIENT)
Dept: CARE COORDINATION | Facility: CLINIC | Age: 53
End: 2023-11-29
Payer: COMMERCIAL

## 2023-11-29 ENCOUNTER — HOSPITAL ENCOUNTER (OUTPATIENT)
Dept: ULTRASOUND IMAGING | Facility: CLINIC | Age: 53
Discharge: HOME | End: 2023-11-29
Attending: NURSE PRACTITIONER | Admitting: NURSE PRACTITIONER
Payer: COMMERCIAL

## 2023-11-29 DIAGNOSIS — R11.0 NAUSEA: ICD-10-CM

## 2023-11-29 DIAGNOSIS — R14.0 ABDOMINAL BLOATING: ICD-10-CM

## 2023-11-29 LAB
ALBUMIN SERPL ELPH-MCNC: 1.9 G/DL (ref 3.7–5.1)
ALMOND IGE QN: 0.21 KU(A)/L
ALPHA1 GLOB SERPL ELPH-MCNC: 0.3 G/DL (ref 0.2–0.4)
ALPHA2 GLOB SERPL ELPH-MCNC: 1.1 G/DL (ref 0.5–0.9)
B-GLOBULIN SERPL ELPH-MCNC: 0.6 G/DL (ref 0.6–1)
C3 SERPL-MCNC: 143 MG/DL (ref 81–157)
C4 SERPL-MCNC: 27 MG/DL (ref 13–39)
CASHEW NUT IGE QN: <0.1 KU(A)/L
CODFISH IGE QN: <0.1 KU(A)/L
COW MILK IGE QN: <0.1 KU(A)/L
EGG WHITE IGE QN: <0.1 KU(A)/L
GAMMA GLOB SERPL ELPH-MCNC: 0.4 G/DL (ref 0.7–1.6)
HAZELNUT IGE QN: 5.43 KU(A)/L
IGE SERPL-ACNC: 208 KU/L (ref 0–114)
KAPPA LC FREE SER-MCNC: 3.05 MG/DL (ref 0.33–1.94)
KAPPA LC FREE/LAMBDA FREE SER NEPH: 0.39 {RATIO} (ref 0.26–1.65)
LAMBDA LC FREE SERPL-MCNC: 7.82 MG/DL (ref 0.57–2.63)
M PROTEIN SERPL ELPH-MCNC: 0 G/DL
PEANUT IGE QN: 2.88 KU(A)/L
PROT PATTERN SERPL ELPH-IMP: ABNORMAL
PROT PATTERN SERPL IFE-IMP: NORMAL
SALMON IGE QN: <0.1 KU(A)/L
SCALLOP IGE QN: <0.1 KU(A)/L
SESAME SEED IGE QN: 0.28 KU(A)/L
SHRIMP IGE QN: <0.1 KU(A)/L
SOYBEAN IGE QN: <0.1 KU(A)/L
TUNA IGE QN: <0.1 KU(A)/L
WALNUT IGE QN: 0.4 KU(A)/L
WHEAT IGE QN: 0.17 KU(A)/L

## 2023-11-29 PROCEDURE — 76700 US EXAM ABDOM COMPLETE: CPT

## 2023-11-30 ENCOUNTER — TELEPHONE (OUTPATIENT)
Dept: GASTROENTEROLOGY | Facility: CLINIC | Age: 53
End: 2023-11-30
Payer: COMMERCIAL

## 2023-11-30 DIAGNOSIS — E88.09 HYPOALBUMINEMIA: ICD-10-CM

## 2023-11-30 DIAGNOSIS — R14.0 ABDOMINAL BLOATING: Primary | ICD-10-CM

## 2023-11-30 LAB
BM IGG SER IF-ACNC: 0 AU/ML
TTG IGA SER-ACNC: 124 U/ML
TTG IGG SER-ACNC: <0.6 U/ML

## 2023-11-30 NOTE — TELEPHONE ENCOUNTER
----- Message from MARICRUZ Tejada CNP sent at 11/30/2023 12:31 PM CST -----  Please let the patient know that her ferritin level came back normal. Food allergy tests was positive for allergy to several nuts. Noted elevated IgE. Noted reaction to wheat but her TTG test is still pending. If comes back positive, I will order EGD.  I would suggest consultation with an allergist.  Thank you,  Carmen Waggoner CNP, GI

## 2023-11-30 NOTE — TELEPHONE ENCOUNTER
Call placed, message left.  Will send patient a [employer] message with results.      Enedina Coughlin RN

## 2023-11-30 NOTE — TELEPHONE ENCOUNTER
Patient returned call, follow up appointment was made as patient has questions about results.     Enedina Coughlin RN

## 2023-12-01 LAB
MAYO MISC RESULT: NORMAL
MYELOPEROXIDASE AB SER IA-ACNC: <0.3 U/ML
MYELOPEROXIDASE AB SER IA-ACNC: NEGATIVE
PLA2R IGG SERPL QL IF: NEGATIVE
PROTEINASE3 AB SER IA-ACNC: <1 U/ML
PROTEINASE3 AB SER IA-ACNC: NEGATIVE
THSD7A IGG SERPL QL IF: NEGATIVE

## 2023-12-04 ENCOUNTER — HOSPITAL ENCOUNTER (OUTPATIENT)
Facility: CLINIC | Age: 53
End: 2023-12-04
Attending: INTERNAL MEDICINE | Admitting: INTERNAL MEDICINE
Payer: COMMERCIAL

## 2023-12-04 ENCOUNTER — TELEPHONE (OUTPATIENT)
Dept: GASTROENTEROLOGY | Facility: CLINIC | Age: 53
End: 2023-12-04

## 2023-12-04 ENCOUNTER — OFFICE VISIT (OUTPATIENT)
Dept: GASTROENTEROLOGY | Facility: CLINIC | Age: 53
End: 2023-12-04
Payer: COMMERCIAL

## 2023-12-04 VITALS
HEIGHT: 64 IN | DIASTOLIC BLOOD PRESSURE: 74 MMHG | SYSTOLIC BLOOD PRESSURE: 110 MMHG | HEART RATE: 88 BPM | BODY MASS INDEX: 32.78 KG/M2 | WEIGHT: 192 LBS

## 2023-12-04 DIAGNOSIS — Z91.018 FOOD ALLERGY: Primary | ICD-10-CM

## 2023-12-04 DIAGNOSIS — K90.41 GLUTEN INTOLERANCE: ICD-10-CM

## 2023-12-04 PROCEDURE — 99214 OFFICE O/P EST MOD 30 MIN: CPT | Performed by: NURSE PRACTITIONER

## 2023-12-04 ASSESSMENT — PAIN SCALES - GENERAL: PAINLEVEL: NO PAIN (0)

## 2023-12-04 NOTE — LETTER
12/4/2023         RE: Vivian Brown  [address] [facility] [address]        Dear Colleague,    Thank you for referring your patient, Vivian Brown, to the [facility]. Please see a copy of my visit note below.    Gastroenterology CLINIC VISIT, RETURN PATIENT    CC/REFERRING PROVIDER: Alin Sandra   REASON FOR CONSULTATION: follow up    HPI: 53 year old female presented to GI clinic for follow up. I saw the patient one week ago. She is here today to discuss her lab report: positive TTG and food allergy test.     Was referred for evaluation of possible GI etiology of hypoalbuminemia.  She reported adequate food intake. Not on any diet. No history of eating disorder. Denies any GI symptoms other than ongoing nausea in the mornings, some bloating, and possible constipation. Patient has bowel movement every day, but stated that her stools are large and hard at times. Not taking any laxatives or stool softeners.     Patient was seen by nephrology for signs of new nephrotic syndrome and proteinuria on recent lab work. No anemia. Normal liver function.  Plan for renal biopsy on 12/21.  Patient is also followed by endocrinology for hypothyroidism, questionable myxedema.    PERTINENT STUDIES Reviewed in EMR  11/29/23 Abdominal ultrasound  FINDINGS:     GALLBLADDER: Normal. No gallstones, wall thickening, or  pericholecystic fluid. Negative sonographic Enciso's sign.     BILE DUCTS: No intrahepatic biliary dilatation. The common duct is  mildly enlarged at 7 mm. No obstructing stone or mass is seen.     LIVER: Normal parenchyma with smooth contour. No focal mass.     RIGHT KIDNEY: Normal size. Normal echogenicity with no hydronephrosis  or mass.      LEFT KIDNEY: Normal size. Normal echogenicity with no hydronephrosis  or mass.      SPLEEN: Spleen is normal in size. Numerous benign echogenic splenic  granulomas are noted.   PANCREAS: The visualized portions are normal.   AORTA:  Normal in caliber.    IVC: Normal where visualized.   No ascites.                                                                 IMPRESSION:  1.  Minimal dilation of the common bile duct without obstructing stone  or mass. Consider correlation with biliary function studies and  follow-up MRCP if clinically indicated.  2.  No signs of cholelithiasis or acute cholecystitis.      ROS: 10pt ROS performed and otherwise negative.    PAST MEDICAL HISTORY:  Past Medical History:   Diagnosis Date     Family history of colon cancer     Colonoscoy q5 years next 9/2020       PREVIOUS ABDOMINAL/GYNECOLOGIC SURGERIES:    Past Surgical History:   Procedure Laterality Date     COLONOSCOPY N/A 9/28/2015    Procedure: COLONOSCOPY;  Surgeon: Eugenio Travis MD;  Location:  GI     TONSILLECTOMY           PERTINENT MEDICATIONS:  Current Outpatient Medications   Medication Sig Dispense Refill     levothyroxine (SYNTHROID/LEVOTHROID) 200 MCG tablet Take 1 tablet (200 mcg) by mouth daily 90 tablet 3     levothyroxine (SYNTHROID/LEVOTHROID) 25 MCG tablet Take 1 tablet (25 mcg) by mouth daily       liothyronine (CYTOMEL) 5 MCG tablet Take 1 tablet (5 mcg) by mouth daily       lisinopril (ZESTRIL) 2.5 MG tablet Take 1 tablet (2.5 mg) by mouth daily 90 tablet 3     rosuvastatin (CRESTOR) 10 MG tablet Take 1 tablet (10 mg) by mouth daily 90 tablet 3     furosemide (LASIX) 20 MG tablet Take 1 tablet (20 mg) by mouth daily (Patient not taking: Reported on 12/4/2023) 90 tablet 3         SOCIAL HISTORY:  Social History     Socioeconomic History     Marital status:      Spouse name: Not on file     Number of children: Not on file     Years of education: Not on file     Highest education level: Not on file   Occupational History     Comment: Physical Therapy   Tobacco Use     Smoking status: Never     Passive exposure: Never     Smokeless tobacco: Never   Vaping Use     Vaping Use: Never used   Substance and Sexual Activity      Alcohol use: Not Currently     Alcohol/week: 0.0 standard drinks of alcohol     Comment: 1-2 times/month     Drug use: No     Sexual activity: Yes     Partners: Male     Birth control/protection: I.U.D.   Other Topics Concern     Parent/sibling w/ CABG, MI or angioplasty before 65F 55M? Not Asked   Social History Narrative     Not on file     Social Determinants of Health     Financial Resource Strain: Low Risk  (11/17/2023)    Financial Resource Strain      Within the past 12 months, have you or your family members you live with been unable to get utilities (heat, electricity) when it was really needed?: No   Food Insecurity: Low Risk  (11/17/2023)    Food Insecurity      Within the past 12 months, did you worry that your food would run out before you got money to buy more?: No      Within the past 12 months, did the food you bought just not last and you didn t have money to get more?: No   Transportation Needs: Low Risk  (11/17/2023)    Transportation Needs      Within the past 12 months, has lack of transportation kept you from medical appointments, getting your medicines, non-medical meetings or appointments, work, or from getting things that you need?: No   Physical Activity: Not on file   Stress: Not on file   Social Connections: Not on file   Interpersonal Safety: Low Risk  (11/17/2023)    Interpersonal Safety      Do you feel physically and emotionally safe where you currently live?: Yes      Within the past 12 months, have you been hit, slapped, kicked or otherwise physically hurt by someone?: No      Within the past 12 months, have you been humiliated or emotionally abused in other ways by your partner or ex-partner?: No   Housing Stability: Low Risk  (11/17/2023)    Housing Stability      Do you have housing? : Yes      Are you worried about losing your housing?: No       FAMILY HISTORY:  Denies colon/panc/esophageal/other GI CA, no other Sosa or other HPS-related Jose Martin. No IBD/celiac, no other  "AI/liver/thyroid disease.    Family History   Problem Relation Age of Onset     Colon Cancer Maternal Grandmother 91     Hypertension Mother      Hyperlipidemia Mother      Cancer Mother 65        GIST     Other Cancer Mother      Coronary Artery Disease Father         MI     Thyroid Disease Sister      Asthma Daughter        PHYSICAL EXAMINATION:  Vitals reviewed  /74 (BP Location: Right arm, Patient Position: Sitting, Cuff Size: Adult Regular)   Pulse 88   Ht 1.632 m (5' 4.25\")   Wt 87.1 kg (192 lb)   Breastfeeding No   BMI 32.70 kg/m      General: Patient appears well in no acute distress.    Skin: No visualized rash or lesions on visualized skin  HEENT:    EOMI, no erythema, sclera icterus or discharge noted.  Resp: breathing comfortably without accessory muscle usage, speaking in full sentences, no cough.  MSK: Appears to have normal range of motion based on visualized movements  Neurologic: No apparent tremors, facial movements symmetric  Psych: affect normal, alert and oriented      ASSESSMENT/PLAN:    ICD-10-CM    1. Food allergy  Z91.018 DX Hip/Pelvis/Spine     Nutrition Referral     Adult GI  Referral - Procedure Only      2. Gluten intolerance  K90.41 DX Hip/Pelvis/Spine     Nutrition Referral     Adult GI  Referral - Procedure Only     Adult GI Clinic Follow-Up Order (Blank)         53 year old female  presented  to GI clinic for a follow-up after completion of lab work.  Found to have multiple food allergies and elevated TTG IgA.   Food allergy came back positive for hazelnut, walnuts, peanuts, almonds, sesame seeds, and wheat.  TTG IgA was elevated at 124.  Suspect celiac disease.  Abdominal ultrasound was ordered. It showed mild dilation of common bile duct, no signs of cholelithiasis, sludge, or polyps.  No signs of cholecystitis.  Patient denies any changes in her symptoms.  No family history of celiac disease.  Recommended the following:  -Referral to GI dietitian to " discuss gluten-free diet.  - Placed an order for upper GI endoscopy with duodenal biopsy.  Patient was advised to continue regular diet until after endoscopy. Then, start strict gluten free diet.  - Will send the patient to DEXA scan.  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  - Start a multivitamin.  - Referral to allergy specialist per patient's request.  - Plan for lab work and follow up EGD in one year.  Patient verbalized understanding and appreciation of care provided. Stated that all of the questions were answered to her/his satisfaction.  RTC 6 months    Thank you for this consultation. It was a pleasure to participate in the care of this patient; please contact us with any further questions.    MARICRUZ Tejada, KEYSHAP-C  [facility]  Gastroenterology Department  [place], [place]    This note was created with Dragon voice recognition software, and while reviewed for accuracy, inadvertent minor typographic errors may occur. Please contact the provider if you have any questions.       Again, thank you for allowing me to participate in the care of your patient.        Sincerely,        MARICRUZ TEJADA CNP

## 2023-12-04 NOTE — TELEPHONE ENCOUNTER
"Endoscopy Scheduling Screen    Have you had a positive Covid test in the last 14 days?  No    Are you active on MyChart?   Yes    What insurance is in the chart?  Other:  [facility]    Ordering/Referring Provider: MABLE MANZANO   (If ordering provider performs procedure, schedule with ordering provider unless otherwise instructed. )    BMI: Estimated body mass index is 32.7 kg/m  as calculated from the following:    Height as of an earlier encounter on 12/4/23: 1.632 m (5' 4.25\").    Weight as of an earlier encounter on 12/4/23: 87.1 kg (192 lb).     Sedation Ordered  moderate sedation.   If patient BMI > 50 do not schedule in [facility].    If patient BMI > 45 do not schedule at [facility].    Are you taking methadone or Suboxone?  No    Are you taking any prescription medications for pain 3 or more times per week?   No    Do you have a history of malignant hyperthermia or adverse reaction to anesthesia?  No    (Females) Are you currently pregnant?   No     Have you been diagnosed or told you have pulmonary hypertension?   No    Do you have an LVAD?  No    Have you been told you have moderate to severe sleep apnea?  No    Have you been told you have COPD, asthma, or any other lung disease?  No    Do you have any heart conditions?  No     Have you ever had an organ transplant?   No    Have you ever had or are you awaiting a heart or lung transplant?   No    Have you had a stroke or transient ischemic attack (TIA aka \"mini stroke\" in the last 6 months?   No    Have you been diagnosed with or been told you have cirrhosis of the liver?   No    Are you currently on dialysis?   No    Do you need assistance transferring?   No    BMI: Estimated body mass index is 32.7 kg/m  as calculated from the following:    Height as of an earlier encounter on 12/4/23: 1.632 m (5' 4.25\").    Weight as of an earlier encounter on 12/4/23: 87.1 kg (192 lb).     Is patients BMI > 40 and scheduling location [facility]?  No    Do you take an injectable medication for " weight loss or diabetes (excluding insulin)?  No    Do you take the medication Naltrexone?  No    Do you take blood thinners?  No       Prep   Are you currently on dialysis or do you have chronic kidney disease?  No    Do you have a diagnosis of diabetes?  No    Do you have a diagnosis of cystic fibrosis (CF)?  No    On a regular basis do you go 3 -5 days between bowel movements?  No    BMI > 40?  No    Preferred Pharmacy:    [facility] - [place], [place] - [address]  [address]  [facility] [address]  Phone: [phone] Fax: [phone]      Final Scheduling Details   Colonoscopy prep sent?  N/A    Procedure scheduled  Upper endoscopy (EGD)    Surgeon:  CARROLL     Date of procedure:  1/23/24     Pre-OP / PAC:   No - Not required for this site.    Location  [facility] - Per order.    Sedation   MAC/Deep Sedation  Per location.      Patient Reminders:   You will receive a call from a Nurse to review instructions and health history.  This assessment must be completed prior to your procedure.  Failure to complete the Nurse assessment may result in the procedure being cancelled.      On the day of your procedure, please designate an adult(s) who can drive you home stay with you for the next 24 hours. The medicines used in the exam will make you sleepy. You will not be able to drive.      You cannot take public transportation, ride share services, or non-medical taxi service without a responsible caregiver.  Medical transport services are allowed with the requirement that a responsible caregiver will receive you at your destination.  We require that drivers and caregivers are confirmed prior to your procedure.

## 2023-12-04 NOTE — PROGRESS NOTES
Gastroenterology CLINIC VISIT, RETURN PATIENT    CC/REFERRING PROVIDER: Alin Sandra   REASON FOR CONSULTATION: follow up    HPI: 53 year old female presented to GI clinic for follow up. I saw the patient one week ago. She is here today to discuss her lab report: positive TTG and food allergy test.     Was referred for evaluation of possible GI etiology of hypoalbuminemia.  She reported adequate food intake. Not on any diet. No history of eating disorder. Denies any GI symptoms other than ongoing nausea in the mornings, some bloating, and possible constipation. Patient has bowel movement every day, but stated that her stools are large and hard at times. Not taking any laxatives or stool softeners.     Patient was seen by nephrology for signs of new nephrotic syndrome and proteinuria on recent lab work. No anemia. Normal liver function.  Plan for renal biopsy on 12/21.  Patient is also followed by endocrinology for hypothyroidism, questionable myxedema.    PERTINENT STUDIES Reviewed in EMR  11/29/23 Abdominal ultrasound  FINDINGS:     GALLBLADDER: Normal. No gallstones, wall thickening, or  pericholecystic fluid. Negative sonographic Enciso's sign.     BILE DUCTS: No intrahepatic biliary dilatation. The common duct is  mildly enlarged at 7 mm. No obstructing stone or mass is seen.     LIVER: Normal parenchyma with smooth contour. No focal mass.     RIGHT KIDNEY: Normal size. Normal echogenicity with no hydronephrosis  or mass.      LEFT KIDNEY: Normal size. Normal echogenicity with no hydronephrosis  or mass.      SPLEEN: Spleen is normal in size. Numerous benign echogenic splenic  granulomas are noted.   PANCREAS: The visualized portions are normal.   AORTA: Normal in caliber.    IVC: Normal where visualized.   No ascites.                                                                 IMPRESSION:  1.  Minimal dilation of the common bile duct without obstructing stone  or mass. Consider correlation with biliary  function studies and  follow-up MRCP if clinically indicated.  2.  No signs of cholelithiasis or acute cholecystitis.      ROS: 10pt ROS performed and otherwise negative.    PAST MEDICAL HISTORY:  Past Medical History:   Diagnosis Date    Family history of colon cancer     Colonoscoy q5 years next 9/2020       PREVIOUS ABDOMINAL/GYNECOLOGIC SURGERIES:    Past Surgical History:   Procedure Laterality Date    COLONOSCOPY N/A 9/28/2015    Procedure: COLONOSCOPY;  Surgeon: Eugenio Travis MD;  Location:  GI    TONSILLECTOMY           PERTINENT MEDICATIONS:  Current Outpatient Medications   Medication Sig Dispense Refill    levothyroxine (SYNTHROID/LEVOTHROID) 200 MCG tablet Take 1 tablet (200 mcg) by mouth daily 90 tablet 3    levothyroxine (SYNTHROID/LEVOTHROID) 25 MCG tablet Take 1 tablet (25 mcg) by mouth daily      liothyronine (CYTOMEL) 5 MCG tablet Take 1 tablet (5 mcg) by mouth daily      lisinopril (ZESTRIL) 2.5 MG tablet Take 1 tablet (2.5 mg) by mouth daily 90 tablet 3    rosuvastatin (CRESTOR) 10 MG tablet Take 1 tablet (10 mg) by mouth daily 90 tablet 3    furosemide (LASIX) 20 MG tablet Take 1 tablet (20 mg) by mouth daily (Patient not taking: Reported on 12/4/2023) 90 tablet 3         SOCIAL HISTORY:  Social History     Socioeconomic History    Marital status:      Spouse name: Not on file    Number of children: Not on file    Years of education: Not on file    Highest education level: Not on file   Occupational History     Comment: Physical Therapy   Tobacco Use    Smoking status: Never     Passive exposure: Never    Smokeless tobacco: Never   Vaping Use    Vaping Use: Never used   Substance and Sexual Activity    Alcohol use: Not Currently     Alcohol/week: 0.0 standard drinks of alcohol     Comment: 1-2 times/month    Drug use: No    Sexual activity: Yes     Partners: Male     Birth control/protection: I.U.D.   Other Topics Concern    Parent/sibling w/ CABG, MI or angioplasty before  65F 55M? Not Asked   Social History Narrative    Not on file     Social Determinants of Health     Financial Resource Strain: Low Risk  (11/17/2023)    Financial Resource Strain     Within the past 12 months, have you or your family members you live with been unable to get utilities (heat, electricity) when it was really needed?: No   Food Insecurity: Low Risk  (11/17/2023)    Food Insecurity     Within the past 12 months, did you worry that your food would run out before you got money to buy more?: No     Within the past 12 months, did the food you bought just not last and you didn t have money to get more?: No   Transportation Needs: Low Risk  (11/17/2023)    Transportation Needs     Within the past 12 months, has lack of transportation kept you from medical appointments, getting your medicines, non-medical meetings or appointments, work, or from getting things that you need?: No   Physical Activity: Not on file   Stress: Not on file   Social Connections: Not on file   Interpersonal Safety: Low Risk  (11/17/2023)    Interpersonal Safety     Do you feel physically and emotionally safe where you currently live?: Yes     Within the past 12 months, have you been hit, slapped, kicked or otherwise physically hurt by someone?: No     Within the past 12 months, have you been humiliated or emotionally abused in other ways by your partner or ex-partner?: No   Housing Stability: Low Risk  (11/17/2023)    Housing Stability     Do you have housing? : Yes     Are you worried about losing your housing?: No       FAMILY HISTORY:  Denies colon/panc/esophageal/other GI CA, no other Sosa or other HPS-related Jose Martin. No IBD/celiac, no other AI/liver/thyroid disease.    Family History   Problem Relation Age of Onset    Colon Cancer Maternal Grandmother 91    Hypertension Mother     Hyperlipidemia Mother     Cancer Mother 65        GIST    Other Cancer Mother     Coronary Artery Disease Father         MI    Thyroid Disease Sister      "Asthma Daughter        PHYSICAL EXAMINATION:  Vitals reviewed  /74 (BP Location: Right arm, Patient Position: Sitting, Cuff Size: Adult Regular)   Pulse 88   Ht 1.632 m (5' 4.25\")   Wt 87.1 kg (192 lb)   Breastfeeding No   BMI 32.70 kg/m      General: Patient appears well in no acute distress.    Skin: No visualized rash or lesions on visualized skin  HEENT:    EOMI, no erythema, sclera icterus or discharge noted.  Resp: breathing comfortably without accessory muscle usage, speaking in full sentences, no cough.  MSK: Appears to have normal range of motion based on visualized movements  Neurologic: No apparent tremors, facial movements symmetric  Psych: affect normal, alert and oriented      ASSESSMENT/PLAN:    ICD-10-CM    1. Food allergy  Z91.018 DX Hip/Pelvis/Spine     Nutrition Referral     Adult GI  Referral - Procedure Only      2. Gluten intolerance  K90.41 DX Hip/Pelvis/Spine     Nutrition Referral     Adult GI  Referral - Procedure Only     Adult GI Clinic Follow-Up Order (Blank)         53 year old female  presented  to GI clinic for a follow-up after completion of lab work.  Found to have multiple food allergies and elevated TTG IgA.   Food allergy came back positive for hazelnut, walnuts, peanuts, almonds, sesame seeds, and wheat.  TTG IgA was elevated at 124.  Suspect celiac disease.  Abdominal ultrasound was ordered. It showed mild dilation of common bile duct, no signs of cholelithiasis, sludge, or polyps.  No signs of cholecystitis.  Patient denies any changes in her symptoms.  No family history of celiac disease.  Recommended the following:  -Referral to GI dietitian to discuss gluten-free diet.  - Placed an order for upper GI endoscopy with duodenal biopsy.  Patient was advised to continue regular diet until after endoscopy. Then, start strict gluten free diet.  - Will send the patient to DEXA scan.  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  - Start a multivitamin.  - Referral to allergy specialist per patient's request.  - Plan for lab work and follow up EGD in one year.  Patient verbalized understanding and appreciation of care provided. Stated that all of the questions were answered to her/his satisfaction.  RTC 6 months    Thank you for this consultation. It was a pleasure to participate in the care of this patient; please contact us with any further questions.    MARICRUZ Tejada, FNP-C  [facility]  Gastroenterology Department  [place], [place]    This note was created with Dragon voice recognition software, and while reviewed for accuracy, inadvertent minor typographic errors may occur. Please contact the provider if you have any questions.

## 2023-12-04 NOTE — PATIENT INSTRUCTIONS
"It was a pleasure taking care of you today.  I've included a brief summary of our discussion and care plan from today's visit below.  Please review this information with your primary care provider.  ______________________________________________________________________    My recommendations are summarized as follows:    As we discussed today, please schedule your DEXA scan (bone density) at your convenience.  You may consider offering screening for celiac disease to your immediate family if someone helps symptoms.    2.  I placed a referral to our GI dietician, you will be contacted to schedule an appointment.    3.  Do not start a gluten-free diet until after EGD with duodenal biopsy.    4.  Continue to follow-up with nephrology regarding your kidney disease.    Return to GI Clinic in 6 months to review your progress.    ______________________________________________________________________    Celiac disease is an autoimmune disease that occurs in genetically predisposed people where the ingestion of gluten leads to damage in the small intestine. This damage can cause a range of GI symptoms, but can also impair the absorption of vitamins and minerals. Celiac disease is managed by a lifelong avoidance of gluten-containing products, which includes wheat, rye, and barley.  Many foods, such as breads, pasta, pizza, cereals, and crackers, have gluten in them. People who are on a gluten-free diet should not eat any foods with gluten.     Foods that are gluten free and generally safe for people with celiac disease include the following:           ? Rice, corn, potatoes, quinoa, millet, buckwheat, and soybeans  ?Special flours, pasta, and other products labeled \"gluten free\"  ?Fruits and vegetables  ?Meat, fish, and eggs    Milk, cheese, and other dairy foods are also gluten free. But many people with celiac disease have trouble digesting these foods, especially at first. Doctors usually recommend that people with celiac " "disease avoid eating dairy products, at least for a short time, while their intestines are healing.      ?Soybean or tapioca flours, rice, corn, buckwheat, and potatoes are safe.  ? Oats can be controversial in celiac disease. It is best to limit the amount of oats to around 2oz per day, and ensure any oat-based products are certified gluten free.  ?Read labels on prepared foods and condiments carefully, paying particular attention to additives such as stabilizers or emulsifiers that may contain gluten.  ?Distilled alcoholic beverages and vinegars, as well as wine, are gluten free. However, beers, ales, lagers, and malt vinegars should be avoided because they are often made from gluten-containing grains and are not distilled.        CELIAC DISEASE OVERVIEW  Celiac disease is a condition in which the immune system responds abnormally to a protein called gluten, which then leads to damage to the lining of the small intestine. Gluten is found in wheat, rye, barley, and a multitude of prepared foods. The small intestine is responsible for absorbing food and nutrients. Thus, damage to the lining of the small intestines can lead to difficulty absorbing important nutrients; this problem is referred to as \"malabsorption.\" Although celiac disease cannot be cured, avoiding gluten usually stops the damage to the intestinal lining and the malabsorption that results. Celiac disease can occur in people of any age, sex, and race.    CELIAC DISEASE SYMPTOMS  The symptoms of celiac disease vary from one person to another. In its mildest form, there may be no symptoms whatsoever. However, even if you have no symptoms, you may not be absorbing nutrients adequately; this can be detected with blood tests.  Some people with celiac disease have gastrointestinal symptoms, which commonly include:  ?Abdominal pain  ?Diarrhea  ?Bowel movements that are oily and float  ?Weight loss  ?Feeling bloated, or too full all the time  ?Lack of " "appetite  ?Bad gas  Certain other medical conditions are more common in people with celiac disease, including:  ?Osteopenia or osteoporosis (weakening of the bones)  ?Iron deficiency anemia (low blood count due to lack of iron)  ?Diabetes mellitus (type I or so-called juvenile onset diabetes mellitus)  ?Thyroid problems (usually hypothyroidism, an underactive thyroid)   ? A skin disease called dermatitis herpetiformis   ?Nervous system disorders  ?Liver disease    CELIAC DISEASE DIAGNOSIS  Celiac disease can be difficult to diagnose because it can cause so many different symptoms, many of which can also be caused by other conditions. Fortunately, testing is available that can easily distinguish untreated celiac disease from other disorders.  Blood tests -- A blood test can determine the blood level of antibodies (specific proteins) that become elevated in people with celiac disease. Over 95 percent of people with untreated celiac disease have elevated antibody levels (called IgA tissue transglutaminase, or IgA tTG), while these levels are rarely elevated in those without celiac disease. Levels of other antibodies (called IgA or IgG deamidated gliadin peptide) are also usually abnormally high in untreated celiac disease.  Before having these tests, it is important to continue eating foods that contain gluten. Avoiding or eliminating gluten could cause the antibody levels to fall to normal, delaying the diagnosis.  Small intestine biopsy -- If your blood test is positive, the diagnosis of celiac disease must be confirmed with a biopsy; this involves taking a sample of the intestinal lining and examining it under a microscope. The sample is usually collected during an upper endoscopy, a test that involves swallowing a small flexible instrument with a camera.   Normally, the lining of the small intestine has small finger-like structures that can be seen under a microscope, called \"villi.\" Villi allow the small intestine " "to absorb nutrients from food. When a person with celiac disease ingests gluten, the villi become flattened, and the body cannot properly absorb nutrients. Once the person removes gluten from their diet, the villi can resume a normal growth pattern. More than 70 percent of people with celiac disease begin to feel better within two weeks after stopping gluten.  \"Potential\" celiac disease -- People with a positive blood test but a normal small intestine biopsy are considered to have potential celiac disease. People with potential celiac disease are not usually advised to eat a gluten-free diet. However, ongoing monitoring (with blood tests) is recommended, and a repeat biopsy may be needed if you develop symptoms. Biopsies should be taken from several areas in the intestine.  \"Silent\" celiac disease -- If you have a positive blood test for celiac disease and an abnormal small intestine biopsy, but you have no other symptoms of celiac disease, you are said to have \"silent\" celiac disease. It is not clear if adults with silent celiac disease should eat a gluten-free diet, as there is not sufficient evidence that this is beneficial. Blood tests for malabsorption are recommended, and a gluten-free diet may be needed if you have evidence of malabsorption. A temporary trial of the gluten-free diet for several months may be worthwhile to determine whether subtle, unrecognized celiac symptoms (eg, low energy or mild intestinal symptoms) improve.  Testing for malabsorption -- You should be tested for nutritional deficiencies if your blood test or biopsy indicates celiac disease. Common tests include measurement of iron, folic acid, or vitamin B12, and vitamin D. You may have other tests if you have signs of mineral or fat deficiency, such as changes in taste or smell, poor appetite, changes in your nails, hair, or skin, or diarrhea.  Other tests -- Other standard tests include a CBC (complete blood count), lipid levels (total " cholesterol, HDL, LDL, and triglycerides), and thyroid levels. Once your celiac antibody levels return to normal, you should have a repeat test once per year.  Many clinicians recommend a test for bone loss 12 months after beginning a gluten-free diet. One method involves using a bone density (DEXA) scan to measures your bone density. The test is not painful and is similar to having an x-ray. If you have significant bone loss, you may need calcium and vitamin D supplements, an exercise program, and possibly a medicine to stop bone loss and encourage new bone growth.    CELIAC DISEASE COMPLICATIONS   Nonresponsive celiac disease -- Approximately 10 percent of people with celiac disease experience ongoing symptoms despite adhering to a gluten-free diet. There are many causes, including other food intolerances such as fructose (or other fermentable carbohydrates) malabsorption, food allergies, bacterial overgrowth in the small intestine or conditions such as microscopic colitis, irritable bowel syndrome, pancreatic exocrine insufficiency, or refractory celiac disease. However, the most common cause is ongoing, often inadvertent, gluten ingestion. Thus, an essential first step in evaluating nonresponsive celiac disease is consultation with an experienced celiac dietitian.  Refractory celiac disease -- A small percentage of people develop intestinal symptoms that do not improve despite use of a strict gluten-free diet. In other cases, intestinal symptoms initially improve with dietary changes but then return.  People who have these problems may have refractory celiac disease. The cause of this problem is not known. Treatment involves medications that suppress the immune system's abnormal response (eg, steroids). Treatment is important because people with untreated celiac disease can develop anemia, bone loss, and other consequences of malabsorption.  Ulcerative jejunitis -- People with refractory celiac disease who do  not improve with steroids (glucocorticoids) may have a condition known as ulcerative jejunitis. This condition causes the small intestine to develop multiple ulcers that do not heal; other symptoms may include a lack of appetite, weight loss, abdominal pain, diarrhea, and fever. This condition can be difficult to treat. Treatment may require surgery to remove the ulcerated area.  Lymphoma -- Cancer of the intestinal lymph system (lymphoma) is an uncommon complication of celiac disease. Avoiding gluten can usually prevent this complication.  Skin conditions -- Celiac disease is associated with a number of skin disorders, of which dermatitis herpetiformis is the most common. Dermatitis herpetiformis is characterized by intensely itchy, raised, fluid-filled areas on the skin, usually located on the elbows, knees, buttocks, lower back, face, neck, trunk, and occasionally within the mouth.  The most bothersome symptoms are itching and burning. This feeling is quickly relieved when the blister ruptures. Scratching causes the area to rupture, dry up, and leave an area of darkened skin and scarring. The condition will improve after eliminating gluten from the diet, although it may take several weeks to see significant improvement.     CELIAC DISEASE TREATMENT   Gluten-free diet -- The cornerstone of treatment for celiac disease is complete elimination of gluten from the diet for life. Gluten is the group of proteins found in wheat, rye, and barley that are toxic to those with celiac disease. Gluten is not only contained in these most commonly consumed grains in the [place] world, but is also hidden as an ingredient in a large number of prepared foods as well as medications and supplements.  Maintaining a gluten-free diet can be a challenging task that may require major lifestyle adjustments. Strict gluten avoidance is recommended since even small amounts can aggravate the disease. It is important to avoid both eating gluten  "and being exposed to large amounts of flour particles in the air.  Get help from a dietitian -- An experienced celiac dietitian can help you to learn how to eat a gluten-free diet, what foods to avoid, and what foods to add for a nutritionally balanced diet. Your dietitian can tailor your gluten-free diet to your own food preferences, culture, lifestyle, calorie needs, and other medical conditions. They can also recommend gluten-free vitamins/minerals and other supplements, as needed.  Your celiac dietitian can also educate you on grocery shopping, reading labels, food preparation, dining out, and lifestyle resources. Avoiding cross contact (when gluten-free foods come in contact with gluten-containing ingredients or foods) is also very important. Your dietitian can teach you common sources of cross contact in the kitchen, such as crumbs on the counter or sharing butter, jelly, or other condiments with those who eat gluten. Excellent resources are also available from celiac medical centers, organizations, and support groups.   Fortunately, life on a gluten-free diet becomes increasingly easier each year due to the rising popularity of gluten-free foods among those with celiac disease, non-celiac gluten sensitivity, and wheat allergies. Excellent gluten-free substitute foods are widely available in supermarkets, health food stores, and online.  General tips  ?Avoid foods containing wheat, rye, barley, malt, person's yeast, oats (unless uncontaminated, labeled gluten-free oats), and yeast extract and autolyzed yeast extract (unless the source is identified as gluten-free). \"Malt\" means \"barley malt\" unless another grain source is named, such as \"corn malt.\"  ?According to US Food and Drug Administration (FDA) regulations issued in 2013, foods with \"gluten-free\" labeling must contain less than 20 parts per million (ppm) of gluten; this is considered a safe limit for people with celiac disease.   Naturally gluten-free " foods include rice, wild rice, corn, potato, and other grains and foods . However, these foods may be contaminated with wheat, barley, or rye. Choose labeled gluten-free versions of these products. Exceptions are fresh corn, fresh potatoes, plain rice and plain wild rice. These foods may not be labeled gluten-free but are still considered safe to eat. Unless they are labeled gluten-free, dried beans, lentils, and other legumes, such as chickpeas, are allowed by law to contain a certain percentage of foreign grain, including wheat, barley, and/or rye. Whether they are labeled gluten-free or not, sort through them carefully and rinse carefully under running water. Choose labeled gluten-free nuts and seeds when possible, particularly seasoned or dry roasted.  ?Typically, people who have just been diagnosed depend on low-fiber and low-nutrient grains and starches like rice, corn, and potatoes because they are familiar with them. Some foods that contain these products are fortified with extra nutrients, but most companies do not fortify their products. In addition, a low fiber diet can lead to constipation. Adding some of the whole gluten-free grains (quinoa, amaranth, teff, buckwheat, sorghum, and millet) can help improve bowel movements and also increase your nutrients. Make sure they are labeled gluten-free. Be sure to also include plenty of fruits, vegetables, nuts and seeds, legumes and beans (navy, carcamo, black, cannellini, etc) in your diet. Add high-fiber foods slowly to give your stomach and intestines time to adjust. When eating a diet rich in fiber, it is important to drink water throughout the day to prevent constipation.  ?There is some concern over the potential for increased arsenic and other heavy metal exposure in people with celiac disease. Arsenic can be found in rice, and both children and adults with celiac disease consume a high intake of rice and rice-based products. We need more research to  "understand how this exposure may present a long-term health risk to those with celiac disease. In the meantime, it is another reason to rely less on rice and more on other labeled gluten-free grains. When you prepare rice, rinse it before cooking. Cook it in six times more water than rice (as when you cook pasta), and drain the excess water after the rice has finished cooking to remove about 50 percent of the arsenic.   ?If a food is regulated by the FDA and is not labeled gluten-free (such as prepared foods and condiments), read the ingredients list and \"contains\" statement carefully. The word \"wheat\" will be included if the product is FDA regulated and contains wheat protein. If you do not see any of the following words on the label of an FDA-regulated food (wheat, rye, barley, malt, person's yeast, oats, yeast extract, and autolyzed yeast extract) then the product is unlikely to include any gluten-containing ingredients. However, the Food Allergen Consumer Protection Act pertains to ingredients only. It does not cover wheat protein that may be in a product unintentionally due to cross-contact.  ?Unlike the FDA, there is no official standard for gluten-free labeling of [place] Department of Agriculture (USDA) products. However, if an USDA regulated product has a gluten-free label, it should meet the same standard as FDA products. See USDA labeling information in the resources listed below.  ?Distilled alcoholic beverages and vinegars, as well as wine, are gluten-free unless gluten-containing flavorings are added after production. However, malt beverages, including beer, are not considered gluten-free. There are specially produced beers that do not use malted barley that are labeled gluten-free and can be consumed on a gluten-free diet. Avoid beer that is labeled as gluten-removed or gluten-reduced. Please note that malt vinegar is not gluten-free.  ?You may not tolerate dairy products initially while your " "intestines are healing. If you tolerated lactose before your diagnosis, you may be able to tolerate it again after the intestine heals. In the meantime, choose lactose-reduced or lactose-free products if your symptoms are worsened by dairy products. Choose labeled gluten-free, dairy-free alternatives, such as rice, soy, or nut (almond, hazelnut) beverages that are enriched with calcium and vitamin D. Keep in mind that gluten-free rice and nut milks have minimal protein per serving compared with cow's or soy milk. Gluten-free lactase enzyme supplements are also available, which may help you to tolerate foods that contain lactose.  ?Discuss your need for calcium and vitamin D supplements with your health care provider or dietitian.  ?A small percentage of people with celiac disease cannot tolerate gluten-free oats for several reasons. Pure oats are naturally gluten free, but most are grown in or near barley, rye, and wheat. They can also be processed on the same equipment which contaminates them and makes them unsafe for someone with celiac disease to eat. At this time, there are two methods to produce gluten-free oats. Manufacturers of \"purity protocol\" oats follow several steps to avoid cross contact, such as separate equipment and facilities that do not share or process gluten-containing grains. Other companies use mechanical and optical sorting methods to \"clean\" oats. Their special equipment sorts oats from gluten-containing grains based on the differences in size, shape, and other factors. Regardless of the type of gluten-free oats, proper, rigorous testing by the company is the most important step to avoid cross-contact with gluten.  All oats consumed, at the very least, must be labeled gluten-free or certified gluten-free. If you choose to eat gluten-free oats, first talk to your doctor who can check your tTG-IgA level and monitor any symptoms. Limit your intake of gluten-free oats to no more than 50 grams " (approximately 1/2 cup dry rolled oats or 1/4 cup dry steel-cut oats) per day. If tolerated, you may be able to discuss eating more than 1/2 cup per day under the supervision of your doctor. People with severe or hard to manage disease should avoid even gluten-free oats.    Is strict gluten avoidance really necessary? -- People who have been diagnosed with celiac disease but have no symptoms often find it difficult to follow a strict gluten-free diet. Indeed, some health care providers have questioned the need for a gluten-free diet in this group. However, certain factors support a gluten-free diet, even in those without symptoms:  ?Strictly following a gluten-free diet sometimes helps you to feel more energetic and have an improved sense of health and wellbeing.  ?Some people with celiac disease have vitamin or nutrient deficiencies that do not cause them to feel ill, such as anemia due to iron deficiency or bone loss due to vitamin D deficiency. However, these deficiencies can cause problems over the long term.  ?Untreated celiac disease can increase the risk of developing certain types of gastrointestinal cancer. This risk can be reduced by eating a gluten-free diet.  Ongoing monitoring -- Once a person has been on a gluten-free diet for at least four to six weeks, blood tests can be used to confirm that antibody levels are declining  The health care provider will also monitor the person for any new or worsening symptoms.    IMPLICATIONS FOR THE FAMILY  Eliminating gluten requires a major lifestyle change for you as well as your family. However, with time and practice, it will be easier to know which foods, medications, supplements, and oral care products contain gluten and what alternatives are available. Although eating out can be challenging at first, restaurants have become increasingly interested in serving people with celiac disease by offering a gluten-free menu or ingredient substitutions.  Families also  need to be aware of their increased risk of celiac disease, as there is a genetic component. If you have been diagnosed with celiac disease, your first-degree relatives (parents, siblings, children) should consider being tested as well, especially if anyone has signs or symptoms of the condition.      ______________________________________________________________________    Who do I call with any questions after my visit?  Please be in touch if there are any further questions that arise following today's visit.  There are multiple ways to contact your gastroenterology care team.      During business hours, you may reach a Gastroenterology nurse at [phone], option 3.     To schedule or reschedule an appointment, please call [phone].   To schedule your imaging studies (CT, MRI, ultrasound)  call [phone] (or toll-free # [phone])  To schedule your lab work at [facility], please call [phone]    You can always send a secure message through [employer].  [employer] messages are answered by your nurse or doctor typically within 24 hours.  Please allow extra time on weekends and holidays.      For urgent/emergent questions after business hours, you may reach the on-call GI Fellow by contacting the [facility]  at [phone].    In order for your refill to be processed in a timely fashion, it is your responsibility to ensure you follow the recommendations from your provider regarding your laboratory studies and follow up appointments.       How will I get the results of any tests ordered?    You will receive all of your results.  If you have signed up for [employer], any tests ordered at your visit will be available to you after your physician reviews them.  Typically this takes 1-2 weeks.  If there are urgent results that require a change in your care plan, your physician or nurse will call you to discuss the next steps.   What is [employer]?  [employer] is a secure  way for you to access all of your healthcare records from the [facility].  It is a web based computer program, so you can sign on to it from any location.  It also allows you to send secure messages to your care team.  I recommend signing up for [employer] access if you have not already done so and are comfortable with using a computer.    How to I schedule a follow-up visit?  If you did not schedule a follow-up visit today, please call [phone] to schedule a follow-up office visit.      Sincerely,  TAL Tejada,  [facility],  Division of Gastroenterology   ([facility])

## 2023-12-08 DIAGNOSIS — R80.1 PERSISTENT PROTEINURIA: Primary | ICD-10-CM

## 2023-12-21 ENCOUNTER — APPOINTMENT (OUTPATIENT)
Dept: MEDSURG UNIT | Facility: CLINIC | Age: 53
End: 2023-12-21
Attending: RADIOLOGY
Payer: COMMERCIAL

## 2023-12-21 ENCOUNTER — HOSPITAL ENCOUNTER (OUTPATIENT)
Facility: CLINIC | Age: 53
Discharge: HOME | End: 2023-12-21
Attending: RADIOLOGY | Admitting: PHYSICIAN ASSISTANT
Payer: COMMERCIAL

## 2023-12-21 ENCOUNTER — APPOINTMENT (OUTPATIENT)
Dept: INTERVENTIONAL RADIOLOGY/VASCULAR | Facility: CLINIC | Age: 53
End: 2023-12-21
Attending: INTERNAL MEDICINE
Payer: COMMERCIAL

## 2023-12-21 VITALS
TEMPERATURE: 98.3 F | RESPIRATION RATE: 18 BRPM | WEIGHT: 186.1 LBS | SYSTOLIC BLOOD PRESSURE: 94 MMHG | DIASTOLIC BLOOD PRESSURE: 64 MMHG | OXYGEN SATURATION: 95 % | HEIGHT: 64 IN | BODY MASS INDEX: 31.77 KG/M2 | HEART RATE: 87 BPM

## 2023-12-21 DIAGNOSIS — N04.9 NEPHROTIC SYNDROME: ICD-10-CM

## 2023-12-21 DIAGNOSIS — R80.1 PERSISTENT PROTEINURIA: ICD-10-CM

## 2023-12-21 LAB
ERYTHROCYTE [DISTWIDTH] IN BLOOD BY AUTOMATED COUNT: 15.7 % (ref 10–15)
HCG UR QL: NEGATIVE
HCT VFR BLD AUTO: 46.5 % (ref 35–47)
HGB BLD-MCNC: 15.1 G/DL (ref 11.7–15.7)
INR PPP: 0.99 (ref 0.85–1.15)
MCH RBC QN AUTO: 27.8 PG (ref 26.5–33)
MCHC RBC AUTO-ENTMCNC: 32.5 G/DL (ref 31.5–36.5)
MCV RBC AUTO: 86 FL (ref 78–100)
PLATELET # BLD AUTO: 346 10E3/UL (ref 150–450)
RBC # BLD AUTO: 5.43 10E6/UL (ref 3.8–5.2)
WBC # BLD AUTO: 8.7 10E3/UL (ref 4–11)

## 2023-12-21 PROCEDURE — 36415 COLL VENOUS BLD VENIPUNCTURE: CPT | Performed by: NURSE PRACTITIONER

## 2023-12-21 PROCEDURE — 88305 TISSUE EXAM BY PATHOLOGIST: CPT | Mod: 26 | Performed by: PATHOLOGY

## 2023-12-21 PROCEDURE — 250N000009 HC RX 250

## 2023-12-21 PROCEDURE — 50200 RENAL BIOPSY PERQ: CPT | Mod: RT | Performed by: PHYSICIAN ASSISTANT

## 2023-12-21 PROCEDURE — 88346 IMFLUOR 1ST 1ANTB STAIN PX: CPT | Mod: TC | Performed by: INTERNAL MEDICINE

## 2023-12-21 PROCEDURE — 99153 MOD SED SAME PHYS/QHP EA: CPT

## 2023-12-21 PROCEDURE — 85027 COMPLETE CBC AUTOMATED: CPT | Performed by: NURSE PRACTITIONER

## 2023-12-21 PROCEDURE — 85610 PROTHROMBIN TIME: CPT | Performed by: NURSE PRACTITIONER

## 2023-12-21 PROCEDURE — 250N000011 HC RX IP 250 OP 636

## 2023-12-21 PROCEDURE — 88350 IMFLUOR EA ADDL 1ANTB STN PX: CPT | Mod: 26 | Performed by: PATHOLOGY

## 2023-12-21 PROCEDURE — C1889 IMPLANT/INSERT DEVICE, NOC: HCPCS

## 2023-12-21 PROCEDURE — 99152 MOD SED SAME PHYS/QHP 5/>YRS: CPT | Performed by: PHYSICIAN ASSISTANT

## 2023-12-21 PROCEDURE — 88313 SPECIAL STAINS GROUP 2: CPT | Mod: 26 | Performed by: PATHOLOGY

## 2023-12-21 PROCEDURE — 88346 IMFLUOR 1ST 1ANTB STAIN PX: CPT | Mod: 26 | Performed by: PATHOLOGY

## 2023-12-21 PROCEDURE — 999N000133 HC STATISTIC PP CARE STAGE 2

## 2023-12-21 PROCEDURE — 76942 ECHO GUIDE FOR BIOPSY: CPT | Mod: 26 | Performed by: PHYSICIAN ASSISTANT

## 2023-12-21 PROCEDURE — 999N000142 HC STATISTIC PROCEDURE PREP ONLY

## 2023-12-21 PROCEDURE — 81025 URINE PREGNANCY TEST: CPT | Performed by: PHYSICIAN ASSISTANT

## 2023-12-21 PROCEDURE — 88348 ELECTRON MICROSCOPY DX: CPT | Mod: 26 | Performed by: PATHOLOGY

## 2023-12-21 PROCEDURE — 258N000003 HC RX IP 258 OP 636: Performed by: NURSE PRACTITIONER

## 2023-12-21 RX ORDER — NALOXONE HYDROCHLORIDE 0.4 MG/ML
0.2 INJECTION, SOLUTION INTRAMUSCULAR; INTRAVENOUS; SUBCUTANEOUS
Status: DISCONTINUED | OUTPATIENT
Start: 2023-12-21 | End: 2023-12-21 | Stop reason: HOSPADM

## 2023-12-21 RX ORDER — LIDOCAINE 40 MG/G
CREAM TOPICAL
Status: DISCONTINUED | OUTPATIENT
Start: 2023-12-21 | End: 2023-12-21 | Stop reason: HOSPADM

## 2023-12-21 RX ORDER — FENTANYL CITRATE 50 UG/ML
25-50 INJECTION, SOLUTION INTRAMUSCULAR; INTRAVENOUS EVERY 5 MIN PRN
Status: DISCONTINUED | OUTPATIENT
Start: 2023-12-21 | End: 2023-12-21 | Stop reason: HOSPADM

## 2023-12-21 RX ORDER — FLUMAZENIL 0.1 MG/ML
0.2 INJECTION, SOLUTION INTRAVENOUS
Status: DISCONTINUED | OUTPATIENT
Start: 2023-12-21 | End: 2023-12-21 | Stop reason: HOSPADM

## 2023-12-21 RX ORDER — NALOXONE HYDROCHLORIDE 0.4 MG/ML
0.4 INJECTION, SOLUTION INTRAMUSCULAR; INTRAVENOUS; SUBCUTANEOUS
Status: DISCONTINUED | OUTPATIENT
Start: 2023-12-21 | End: 2023-12-21 | Stop reason: HOSPADM

## 2023-12-21 RX ORDER — SODIUM CHLORIDE 9 MG/ML
INJECTION, SOLUTION INTRAVENOUS CONTINUOUS
Status: DISCONTINUED | OUTPATIENT
Start: 2023-12-21 | End: 2023-12-21 | Stop reason: HOSPADM

## 2023-12-21 RX ADMIN — FENTANYL CITRATE 25 MCG: 50 INJECTION, SOLUTION INTRAMUSCULAR; INTRAVENOUS at 10:56

## 2023-12-21 RX ADMIN — FENTANYL CITRATE 50 MCG: 50 INJECTION, SOLUTION INTRAMUSCULAR; INTRAVENOUS at 10:45

## 2023-12-21 RX ADMIN — SODIUM CHLORIDE: 9 INJECTION, SOLUTION INTRAVENOUS at 09:20

## 2023-12-21 RX ADMIN — SODIUM CHLORIDE: 9 INJECTION, SOLUTION INTRAVENOUS at 09:26

## 2023-12-21 RX ADMIN — LIDOCAINE HYDROCHLORIDE 5 ML: 10 INJECTION, SOLUTION EPIDURAL; INFILTRATION; INTRACAUDAL; PERINEURAL at 10:57

## 2023-12-21 RX ADMIN — MIDAZOLAM 1 MG: 1 INJECTION INTRAMUSCULAR; INTRAVENOUS at 10:56

## 2023-12-21 RX ADMIN — MIDAZOLAM 1 MG: 1 INJECTION INTRAMUSCULAR; INTRAVENOUS at 10:45

## 2023-12-21 ASSESSMENT — ACTIVITIES OF DAILY LIVING (ADL)
ADLS_ACUITY_SCORE: 35

## 2023-12-21 NOTE — PRE-PROCEDURE
GENERAL PRE-PROCEDURE:   Procedure:  Image guided renal bipsy, either side   Date/Time:  12/21/2023 8:51 AM    Verbal consent obtained?: Yes    Written consent obtained?: Yes    Risks and benefits: Risks, benefits and alternatives were discussed    DC Plan: Appropriate discharge home plan in place for patients who are going home after procedure   Consent given by:  Patient  Patient states understanding of procedure being performed: Yes    Patient's understanding of procedure matches consent: Yes    Procedure consent matches procedure scheduled: Yes    Expected level of sedation:  Minimal  Appropriately NPO:  Yes  ASA Class:  1  Mallampati  :  Grade 2- soft palate, base of uvula, tonsillar pillars, and portion of posterior pharyngeal wall visible  Lungs:  Lungs clear with good breath sounds bilaterally  Heart:  Normal heart sounds and rate  History & Physical reviewed:  History and physical reviewed and no updates needed  Statement of review:  I have reviewed the lab findings, diagnostic data, medications, and the plan for sedation

## 2023-12-21 NOTE — PROCEDURES
[facility]    Procedure: IR Procedure Note    Date/Time: 12/21/2023 11:11 AM    Performed by: Jay Ortiz PA-C  Authorized by: Jay Ortiz PA-C      UNIVERSAL PROTOCOL   Site Marked: NA  Prior Images Obtained and Reviewed:  Yes  Required items: Required blood products, implants, devices and special equipment available    Patient identity confirmed:  Verbally with patient, arm band, provided demographic data and hospital-assigned identification number  Patient was reevaluated immediately before administering moderate or deep sedation or anesthesia  Confirmation Checklist:  Patient's identity using two indicators, relevant allergies, procedure was appropriate and matched the consent or emergent situation and correct equipment/implants were available  Time out: Immediately prior to the procedure a time out was called    Universal Protocol: the Joint Commission Universal Protocol was followed    Preparation: Patient was prepped and draped in usual sterile fashion       ANESTHESIA    Anesthesia:  Local infiltration  Local Anesthetic:  Lidocaine 1% without epinephrine      SEDATION  Patient Sedated: Yes    Vital signs: Vital signs monitored during sedation    See dictated procedure note for full details.  Findings: Sedation medications administered: 2 mg versed & 75 mcg fentanyl   Sedation time: 25 minutes    Specimens: core needle biopsy specimens sent for pathological analysis    Complications: None    Condition: Stable      PROCEDURE  Describe Procedure: Vivian MARVIN Brown  [id number]    Completed ultrasound guided RIGHT native kidney biopsy.  A total of four passes yielded tissue cores collected for further pathological evaluation.  Microscopy support present.  No immediate complications.  Dx: nephrotic syndrome.  Theron     Sedation medications administered: 2 mg versed & 75 mcg fentanyl   Sedation time: 25 minutes      Patient Tolerance:   Patient tolerated the procedure well with no immediate complications  Length of time physician/provider present for 1:1 monitoring during sedation: 25

## 2023-12-21 NOTE — PROGRESS NOTES
Patient tolerated recovery stage well. VSS, right flank site clean/dry/intact, no hematoma, and denies pain. Patient tolerated PO food and fluids. Teaching was done and discharge instructions were given. Patient ambulated, voided, and PIV was removed. Patient discharged from the hospital to home with .

## 2023-12-21 NOTE — IR NOTE
Patient Name: Vivian Brown  Medical Record Number: [id number]  Today's Date: 12/21/2023    Procedure: Image Guided Renal Biopsy  Proceduralist: Joe Ortiz PA-C  Pathology present: Renal Pathology Present for sample collection    Sedation Start: 1045  Procedure Start: 1054  Procedure end: 1107  Sedation medications administered: 2 mg versed & 75 mcg fentanyl     22 min sedation time    Report given to:  RN  : N/A    Other Notes: Pt arrived to IR room 6 from . Consent reviewed. Pt denies any questions or concerns regarding procedure. Pt positioned prone and monitored per protocol. Pt tolerated procedure without any noted complications. Pt transferred back to .

## 2023-12-21 NOTE — DISCHARGE INSTRUCTIONS
[facility]    Interventional Radiology  Patient Instructions Following Biopsy    AFTER YOU GO HOME  If you were given sedation, for the first 24 hours: we recommend that an adult stay with you, DO NOT drive or operate machinery at home or at work, DO NOT smoke, DO NOT make any important or legal decisions.  DO NOT drink alcoholic beverages the day of your procedure  Drink plenty of fluids   Resume your regular diet, unless otherwise instructed by your Primary Physician  Relax and take it easy for 48 hours  DO NOT do any strenuous exercise or lifting (> 10 lbs) for at least 7 days following your procedure  Keep the dressing dry and in place for 24 hours. Replace with Band aid for 2 days.  Never leave a wet dressing in place.  Do not take a shower for at least 12 hours following your procedure. No tub bath, hot tub, or swimming for 5 days.  There should be minimum drainage from the biopsy site    CALL THE PHYSICIAN IF:  You start bleeding from the procedure site.  If you do start to bleed from that site, lie down flat and hold pressure on the site for a minimum of 10 minutes.  Your physician will tell you if you need to return to the hospital  You develop nausea or vomiting  You have excessive swelling, redness, or tenderness at the site  You have drainage that looks like it is infected.  You experience severe pain  You develop hives or a rash or unexplained itching  You develop shortness of breath  You develop a temperature of 101 degrees F or greater  You develop bloody clots or red urine after you are discharged  You develop chest pain or cough up blood, lightheadedness or fainting    [facility] INTERVENTIONAL RADIOLOGY DEPARTMENT  Procedure Physician: Joe JACKSON                                     Date of procedure: December 21, 2023  Telephone Numbers: [phone]      Monday-Friday 7:30 am to 4:00 pm  [phone] After 4:00 pm Monday-Friday, Weekends & Holidays.   Ask for the Interventional  Radiologist on call.  Someone is on call 24 hrs/day  [facility] toll free number: [phone] Monday-Friday 8:00 am to 4:30 pm  [facility] Emergency Dept: [phone]

## 2023-12-21 NOTE — PROGRESS NOTES
Pt prepped for renal biopsy.PIV placed and labs sent and NS started.Consent signed and questions answered with  Aneudy at bedside.

## 2023-12-21 NOTE — PROGRESS NOTES
Pt returned from IR via liter accompanied by nurse at 1115.Right flank kidney biopsy site is C/D/I no hematoma and pt denies pain.Pt is sleepy so only wants  something to drink and not eat yet.

## 2023-12-22 ENCOUNTER — PATIENT OUTREACH (OUTPATIENT)
Dept: ONCOLOGY | Facility: CLINIC | Age: 53
End: 2023-12-22
Payer: COMMERCIAL

## 2023-12-22 DIAGNOSIS — E85.4 RENAL AMYLOIDOSIS (H): Primary | ICD-10-CM

## 2023-12-22 DIAGNOSIS — N08 RENAL AMYLOIDOSIS (H): Primary | ICD-10-CM

## 2023-12-22 NOTE — PROGRESS NOTES
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 Recommend lab and urine studies at this time. Recommend kidney biopsy to determine etiology of nephrotic syndrome.   Swelling: to treat nephrotic syndrome, will start lisinopril  - low dose given normal BP. Will also prescribe lasix to help with edema.     Patient Instructions   Cancel CT scan appt  Schedule Abdominal ultrasound  Lab and urine today  Kidney biopsy in the near future.   Start lisinopril 2.5 mg daily  Ok to use lasix 20 mg daily as needed for swelling.        ULTRASOUND ABDOMEN COMPLETE November 29, 2023 8:00 AM     CLINICAL HISTORY: Ongoing nausea, lower extremity edema, low serum  protein. Abdominal discomfort. Abdominal bloating. Nausea.     TECHNIQUE: Complete abdominal ultrasound.     COMPARISON: 12/5/2018.     FINDINGS:     GALLBLADDER: Normal. No gallstones, wall thickening, or  pericholecystic fluid. Negative sonographic Enciso's sign.     BILE DUCTS: No intrahepatic biliary dilatation. The common duct is  mildly enlarged at 7 mm. No obstructing stone or mass is seen.     LIVER: Normal parenchyma with smooth contour. No focal mass.     RIGHT KIDNEY: Normal size. Normal echogenicity with no hydronephrosis  or mass.      LEFT KIDNEY: Normal size. Normal echogenicity with no hydronephrosis  or mass.      SPLEEN: Spleen is normal in size. Numerous benign  echogenic splenic  granulomas are noted.     PANCREAS: The visualized portions are normal.     AORTA: Normal in caliber.      IVC: Normal where visualized.     No ascites.                                                             IMPRESSION:  1.  Minimal dilation of the common bile duct without obstructing stone  or mass. Consider correlation with biliary function studies and  follow-up MRCP if clinically indicated.  2.  No signs of cholelithiasis or acute cholecystitis.    12/21: Kidney biopsy results in process.      EDINSON HINTON MD   Records Location: See Bookmarked material

## 2023-12-23 LAB
PATH REPORT.COMMENTS IMP SPEC: NORMAL
PATH REPORT.FINAL DX SPEC: NORMAL
PATH REPORT.GROSS SPEC: NORMAL
PATH REPORT.MICROSCOPIC SPEC OTHER STN: NORMAL
PATH REPORT.RELEVANT HX SPEC: NORMAL
PHOTO IMAGE: NORMAL

## 2023-12-26 ENCOUNTER — PATIENT OUTREACH (OUTPATIENT)
Dept: ONCOLOGY | Facility: CLINIC | Age: 53
End: 2023-12-26
Payer: COMMERCIAL

## 2023-12-26 ENCOUNTER — TELEPHONE (OUTPATIENT)
Dept: CARDIOLOGY | Facility: CLINIC | Age: 53
End: 2023-12-26

## 2023-12-26 NOTE — PROGRESS NOTES
Hematology referral reviewed for Hematology services, see below.    Referral reason:       Referral received via: Internal referral by [facility]    Current abnormal labs: Available in Chart Review    Outreach: Call not placed to patient regarding referral.    Plan: Triage instructions updated and sent to NPS for completion.

## 2023-12-26 NOTE — TELEPHONE ENCOUNTER
RECORDS STATUS - ALL OTHER DIAGNOSIS      RECORDS RECEIVED FROM: [facility] - Internal records   DATE RECEIVED: 12/26

## 2023-12-26 NOTE — TELEPHONE ENCOUNTER
Reason for Call:  Other appointment    Detailed comments: Mary Ann is needing to get in for an ECHO complete. She has one scheduled for Jan 18th but is needing it done by Jan 10th. She has a lot of other tests that will be reviewed with her provider on Jan 12th and wants to make sure this is completed and resulted in time.     Is there any way she can be worked in for this?     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 She can be reached via cell or calling PT at [phone].    Phone Number Patient can be reached at: Cell number on file:    Telephone Information:   Mobile [phone]       Best Time: any    Can we leave a detailed message on this number? YES    Call taken on 12/26/2023 at 4:13 PM by Raul Luis

## 2023-12-27 ENCOUNTER — PRE VISIT (OUTPATIENT)
Dept: TRANSPLANT | Facility: CLINIC | Age: 53
End: 2023-12-27
Payer: COMMERCIAL

## 2023-12-27 NOTE — TELEPHONE ENCOUNTER
Message sent to heme/onc new pt scheduling. Pt was scheduled for first available. Writer was informed that message has been sent to a nurse navigator to review as well.   Will await updates and contact patient with those.

## 2023-12-27 NOTE — TELEPHONE ENCOUNTER
Note below given to Echo team at [facility] to see if work in is possible.   Marah Pastor on 12/27/2023 at 8:06 AM

## 2024-01-03 ENCOUNTER — HOSPITAL ENCOUNTER (OUTPATIENT)
Dept: BONE DENSITY | Facility: CLINIC | Age: 54
Discharge: HOME | End: 2024-01-03
Attending: NURSE PRACTITIONER
Payer: COMMERCIAL

## 2024-01-03 ENCOUNTER — VIRTUAL VISIT (OUTPATIENT)
Dept: GASTROENTEROLOGY | Facility: CLINIC | Age: 54
End: 2024-01-03
Attending: NURSE PRACTITIONER
Payer: COMMERCIAL

## 2024-01-03 ENCOUNTER — HOSPITAL ENCOUNTER (OUTPATIENT)
Dept: CARDIOLOGY | Facility: CLINIC | Age: 54
Discharge: HOME | End: 2024-01-03
Attending: INTERNAL MEDICINE
Payer: COMMERCIAL

## 2024-01-03 ENCOUNTER — HOSPITAL ENCOUNTER (OUTPATIENT)
Dept: MAMMOGRAPHY | Facility: CLINIC | Age: 54
Discharge: HOME | End: 2024-01-03
Attending: PHYSICIAN ASSISTANT
Payer: COMMERCIAL

## 2024-01-03 VITALS — BODY MASS INDEX: 31.17 KG/M2 | WEIGHT: 183 LBS

## 2024-01-03 DIAGNOSIS — Z91.018 FOOD ALLERGY: ICD-10-CM

## 2024-01-03 DIAGNOSIS — Z13.820 SCREENING FOR OSTEOPOROSIS: ICD-10-CM

## 2024-01-03 DIAGNOSIS — Z12.31 VISIT FOR SCREENING MAMMOGRAM: ICD-10-CM

## 2024-01-03 DIAGNOSIS — E85.4 RENAL AMYLOIDOSIS (H): ICD-10-CM

## 2024-01-03 DIAGNOSIS — K90.41 GLUTEN INTOLERANCE: ICD-10-CM

## 2024-01-03 DIAGNOSIS — N08 RENAL AMYLOIDOSIS (H): ICD-10-CM

## 2024-01-03 LAB — LVEF ECHO: NORMAL

## 2024-01-03 PROCEDURE — 99207 PR NO CHARGE LOS: CPT | Mod: 95 | Performed by: DIETITIAN, REGISTERED

## 2024-01-03 PROCEDURE — 93306 TTE W/DOPPLER COMPLETE: CPT | Mod: 26 | Performed by: INTERNAL MEDICINE

## 2024-01-03 PROCEDURE — 77063 BREAST TOMOSYNTHESIS BI: CPT

## 2024-01-03 PROCEDURE — 93356 MYOCRD STRAIN IMG SPCKL TRCK: CPT | Performed by: INTERNAL MEDICINE

## 2024-01-03 PROCEDURE — 77080 DXA BONE DENSITY AXIAL: CPT

## 2024-01-03 PROCEDURE — 93356 MYOCRD STRAIN IMG SPCKL TRCK: CPT

## 2024-01-03 PROCEDURE — 97802 MEDICAL NUTRITION INDIV IN: CPT | Mod: 95 | Performed by: DIETITIAN, REGISTERED

## 2024-01-03 ASSESSMENT — PAIN SCALES - GENERAL: PAINLEVEL: NO PAIN (0)

## 2024-01-03 NOTE — NURSING NOTE
Is the patient currently in the state of [place]? YES    Visit mode:VIDEO    If the visit is dropped, the patient can be reconnected by: VIDEO VISIT: Text to cell phone:   Telephone Information:   Mobile [phone]       Will anyone else be joining the visit? NO  (If patient encounters technical issues they should call [phone]    How would you like to obtain your AVS? MyChart    Are changes needed to the allergy or medication list? No    Reason for visit: No chief complaint on file.    Carmen ESCOBEDO

## 2024-01-03 NOTE — LETTER
1/3/2024         RE: Vivian Brown  [address] [facility] [address]        Dear Colleague,    Thank you for referring your patient, Vivian Brown, to the [facility] [place]. Please see a copy of my visit note below.  [facility] Outpatient Medical Nutrition Therapy      Time Spent:  50 minutes  Session Type:  Initial   Referring Physician:  MARICRUZ Tejada CNP  Reason for RD Visit:   gluten intolerance and food allergies. (noted had +TTG IgA and will have EGD on 1/23/24).    Nutrition Assessment:  Patient is a 53 year old female with history that includes +TTG IgA, hypothyroidism, edema, hypoalbuminemia and hyperlipidemia. She stated that once she got was diagnosed with and treated for hypothyroidism, she has been feeling better. She will sometimes have nausea, but not having bloating anymore since her thyroid is treated. She occasionally doesn't feel well after eating. For example ate some homemade chocolate covered pretzels recently and didn't feel well after that. She believes that some dairy may cause symptoms such as abeba sauce, ice cream and sour cream so stopped eating these but tolerates some milk and cheese. She will have an EGD on 1/23/24, so has been continuing to eat gluten daily d/t need to continue to consume it for testing. She reported several other food allergies such as tree nuts, peanuts, sesame seeds, wheat, corn, legumes and citrus. Last month she had some fluid weight gain and then weight loss due to fluids after taking a diuretic. She discontinued coke zero due to kidney issues and has had only a couple of glasses of wine over the last month at holidays d/t limiting with possible kidney concerns. Reported getting evaluated for kidney concerns.    Patient Active Problem List   Diagnosis    Hypothyroidism due to acquired atrophy of thyroid    Family hx of colon cancer    Nonallopathic lesion of cervical region    Cervicalgia  "   Nonallopathic lesion of sacral region    Nonallopathic lesion of lumbar region    Lumbago    Nonallopathic lesion of thoracic region    Hyperlipidemia LDL goal <100    Dependent edema R>L     Height:   Ht Readings from Last 1 Encounters:   12/21/23 1.632 m (5' 4.25\")   ]  Weight: reported lost 20 lbs due to fluids lost d/t diuretic in December 2023 (last month).  Wt Readings from Last 10 Encounters:   01/03/24 83 kg (183 lb)   12/21/23 84.4 kg (186 lb 1.6 oz)   12/04/23 87.1 kg (192 lb)   11/28/23 88 kg (194 lb)   11/27/23 88 kg (194 lb)   11/17/23 88.9 kg (196 lb)   07/20/23 90.3 kg (199 lb)   05/22/23 93 kg (205 lb)   12/19/22 93 kg (205 lb)   09/20/22 85.3 kg (188 lb)     BMI: Estimated body mass index is 31.17 kg/m  as calculated from the following:    Height as of 12/21/23: 1.632 m (5' 4.25\").    Weight as of this encounter: 83 kg (183 lb).    Diet Recall:  (some usual/recent meals/snacks/beverages): decreased portion sizes over past couple of month. Supper is her biggest meal of the day. Wonders if she tolerates dairy. Avoids ice cream, sour cream, creamy sauce. Tolerate 1 glass milk now.  Meal Food    Breakfast Smaller meal: bagel with PB or eggs if home or weekends homemade waffles/pancakes with saus or degroot or if out to eat eggs and hash browns   Lunch Work cafe taco salad or fresh ham and turkey sandwich on multigrain or chicken salad on croissant or grilled cheese and tomato soup or enchiliada   Dinner Tacos or spaghetti with jar sauce or homemade fettucjuan jose us or john hernandez or sloppy joes  (family will eat peas and corn, sometimes green beans but she's allergic to corn and peas) or pork chop/burger or brat with potatoes/potato salad and baked beans, veg. Couple nights ago seafood,    Snacks Not typically   Beverages ~60 oz Water, used to drink alexsandra tea lattes or just black alexsandra tea, very occas OJ if in the house but not usually. Used to drink coke zero but discontinued due to kidney " issues. Very occas lemonade if out   Alcohol Intake Not lately d/t kidney issues. Had a glass of wine twice over past month. Likes captain thereas [place] and coke zero but stopped due to kidney issues.     Frequency of eating/taking out meals: likes to eat out a lot at least 1-3x/week or more     Labs:    Last Comprehensive Metabolic Panel:  Sodium   Date Value Ref Range Status   11/28/2023 140 135 - 145 mmol/L Final     Comment:     Reference intervals for this test were updated on 09/26/2023 to more accurately reflect our healthy population. There may be differences in the flagging of prior results with similar values performed with this method. Interpretation of those prior results can be made in the context of the updated reference intervals.    02/24/2021 140 133 - 144 mmol/L Final     Potassium   Date Value Ref Range Status   11/28/2023 3.9 3.4 - 5.3 mmol/L Final   12/19/2022 3.9 3.4 - 5.3 mmol/L Final   02/24/2021 4.3 3.4 - 5.3 mmol/L Final     Chloride   Date Value Ref Range Status   11/28/2023 105 98 - 107 mmol/L Final   12/19/2022 108 94 - 109 mmol/L Final   02/24/2021 107 94 - 109 mmol/L Final     Carbon Dioxide   Date Value Ref Range Status   02/24/2021 29 20 - 32 mmol/L Final     Carbon Dioxide (CO2)   Date Value Ref Range Status   11/28/2023 30 (H) 22 - 29 mmol/L Final   12/19/2022 33 (H) 20 - 32 mmol/L Final     Anion Gap   Date Value Ref Range Status   11/28/2023 5 (L) 7 - 15 mmol/L Final   12/19/2022 2 (L) 3 - 14 mmol/L Final   02/24/2021 4 3 - 14 mmol/L Final     Glucose   Date Value Ref Range Status   11/28/2023 118 (H) 70 - 99 mg/dL Final   12/19/2022 96 70 - 99 mg/dL Final   02/24/2021 97 70 - 99 mg/dL Final     Urea Nitrogen   Date Value Ref Range Status   11/28/2023 18.1 6.0 - 20.0 mg/dL Final   12/19/2022 15 7 - 30 mg/dL Final   02/24/2021 16 7 - 30 mg/dL Final     Creatinine   Date Value Ref Range Status   11/28/2023 1.11 (H) 0.51 - 0.95 mg/dL Final   02/24/2021 0.80 0.52 - 1.04 mg/dL Final      GFR Estimate   Date Value Ref Range Status   11/28/2023 59 (L) >60 mL/min/1.73m2 Final   02/24/2021 85 >60 mL/min/[1.73_m2] Final     Comment:     Non  GFR Calc  Starting 12/18/2018, serum creatinine based estimated GFR (eGFR) will be   calculated using the Chronic Kidney Disease Epidemiology Collaboration   (CKD-EPI) equation.       Calcium   Date Value Ref Range Status   11/28/2023 9.1 8.6 - 10.0 mg/dL Final   02/24/2021 9.2 8.5 - 10.1 mg/dL Final     CBC RESULTS:   Recent Labs   Lab Test 12/21/23  0903   WBC 8.7   RBC 5.43*   HGB 15.1   HCT 46.5   MCV 86   MCH 27.8   MCHC 32.5   RDW 15.7*        Pertinent Medications/vitamin and mineral supplements:    reported takes a daily MVI.  Current Outpatient Medications   Medication    furosemide (LASIX) 20 MG tablet    levothyroxine (SYNTHROID/LEVOTHROID) 200 MCG tablet    levothyroxine (SYNTHROID/LEVOTHROID) 25 MCG tablet    liothyronine (CYTOMEL) 5 MCG tablet    lisinopril (ZESTRIL) 2.5 MG tablet    rosuvastatin (CRESTOR) 10 MG tablet     No current facility-administered medications for this visit.     Food Allergies:  she reported having some allergy testing and allergic to walnuts, almonds, peanuts, hazelnuts, sesame seeds, wheat, corn, legumes and citrus.    Physical Activity:  workout class/group circuit training 2x/week. Does some yoga as well. Used to walk a lot    MALNUTRITION:  % Weight Loss:  Weight loss does not meet criteria for malnutrition   % Intake:  Decreased intake does not meet criteria for malnutrition --she reported eating smaller portions lately  Subcutaneous Fat Loss:  None observed  Muscle Loss:  None observed  Fluid Retention:  None noted    Malnutrition Diagnosis: Patient does not meet two of the above criteria necessary for diagnosing malnutrition  In Context of:  Acute illness or injury  Chronic illness or disease    Nutrition Prescription: Nutrition Education     Nutrition Intervention      Provided diet education  for gluten free diet. Explained that she needs to continue eating at least a couple of servings daily of gluten until her upcoming EGD for testing. If MD/CNP dx her with celiac then explained strict gluten free diet (avoiding other allergen foods, if directed by her doctor/allergist), avoiding cross contamination of gluten and also of other allergy foods.     Answered patient's questions. Patient verbalized understanding of education provided. See Goals below.     Educational Materials Provided:  Nutrition Care Manual: Celiac nutrition therapy, celiac label reading tips and celiac healthy eating tips.    Goals:    --Continue eating at least 2 servings of gluten every day until you have your upper endoscopy later this month.    After your endoscopy, then start the strict gluten free diet and recommendations below:    1. Follow a strict gluten free diet.     2. Pre-plan menus and meals ahead of time to help you stick to a strict gluten free diet.    3. Avoid cross contamination especially when preparing and cooking gluten free food and beverages.  -Cleaning counters well before preparing gluten free foods and after preparing any foods containing gluten.  -Use separate cooking equipment, toasters and and also use separate frying pans, pans, cutting boards and utensils when cooking.  -Use separate peanut butter/jelly/montaño/mustard/condiment jars if any chance that gluten containing crumbs will be in those jars/containers.    -Use glass storage containers over plastic especially if sharing any storage containers with others who are not on gluten free diet.    4. Double check that medications and vitamin/mineral supplements that you take are gluten free.     --Can speak with Pharmacists about any medications and/or vitamin/mineral supplements you take to find out if they are gluten free and/or check websites for vitamin/mineral supplements to see if listed as gluten free.    5. Read food labels and double check that lip  "balms, mouthwashes, toothpaste, and other beauty products especially those that could be ingested are gluten free.     --Can review company websites to see if this information is provided/answered on their websites.    --can use an dalton for scanning labels while shopping such as Fig, Shop Well or Gluten free Scanner.    6. If eating or taking out meals, double check restaurant's website ahead of time to see if gluten free options. Can call ahead and discuss with restaurant and/or if out to eat, let your  know you have an allergy to gluten.    --Can also use an dalton such as \"Find Me Gluten Free\" to find places with gluten free options in your area/or area that you are traveling in.    7. Celiac Foundation at celiac.org is an additional resource for you.    8. Avoid other food allergens as well. Discuss with your doctor/allergist to see if you need to avoid those other foods strictly.    Nutrition Monitoring and Evaluation: Will monitor adherence to nutrition recommendations at future RD visits.     Further Medical Nutrition Therapy:  Follow-up in 2 months or as needed.    Patient was encouraged to contact RD with any further questions.        Again, thank you for allowing me to participate in the care of your patient.      Sincerely,    Sarah Chan RD  "

## 2024-01-03 NOTE — PATIENT INSTRUCTIONS
It was nice meeting you today. Below are the nutrition recommendations we discussed at your visit.    Please let me know if you have any additional questions.    Nutrition Recommendations    --Continue eating at least 2 servings of gluten every day until you have your upper endoscopy later this month.    After your endoscopy, then start the strict gluten free diet and recommendations below:    1. Follow a strict gluten free diet.     2. Pre-plan menus and meals ahead of time to help you stick to a strict gluten free diet.    3. Avoid cross contamination especially when preparing and cooking gluten free food and beverages.  -Cleaning counters well before preparing gluten free foods and after preparing any foods containing gluten.  -Use separate cooking equipment, toasters and and also use separate frying pans, pans, cutting boards and utensils when cooking.  -Use separate peanut butter/jelly/montaño/mustard/condiment jars if any chance that gluten containing crumbs will be in those jars/containers.    -Use glass storage containers over plastic especially if sharing any storage containers with others who are not on gluten free diet.    4. Double check that medications and vitamin/mineral supplements that you take are gluten free.     --Can speak with Pharmacists about any medications and/or vitamin/mineral supplements you take to find out if they are gluten free and/or check websites for vitamin/mineral supplements to see if listed as gluten free.    5. Read food labels and double check that lip balms, mouthwashes, toothpaste, and other beauty products especially those that could be ingested are gluten free.     --Can review company websites to see if this information is provided/answered on their websites.    --can use an dalton for scanning labels while shopping such as Fig, Shop Well or Gluten free Scanner.    6. If eating or taking out meals, double check restaurant's website ahead of time to see if gluten free options.  "Can call ahead and discuss with restaurant and/or if out to eat, let your  know you have an allergy to gluten.    --Can also use an dalton such as \"Find Me Gluten Free\" to find places with gluten free options in your area/or area that you are traveling in.    7. Celiac Foundation at celiac.org is an additional resource for you.    8. Avoid other food allergens as well. Discuss with your doctor/allergist to see if you need to avoid those other foods strictly.    Follow up in 2 months or can follow up as needed.    If you would like to schedule a follow up appointment, please call [phone].    Thank you,    Sarah Chan, MS, RD, LD    "

## 2024-01-03 NOTE — PROGRESS NOTES
Virtual Visit Details    Type of service:  Video Visit     Originating Location (pt. Location): Home  Distant Location (provider location):  Off-site  Platform used for Video Visit: [facility]      Time Spent:  50 minutes  Session Type:  Initial   Referring Physician:  MARICRUZ Tejada CNP  Reason for RD Visit:   gluten intolerance and food allergies. (noted had +TTG IgA and will have EGD on 1/23/24).    Nutrition Assessment:  Patient is a 53 year old female with history that includes +TTG IgA, hypothyroidism, edema, hypoalbuminemia and hyperlipidemia. She stated that once she got was diagnosed with and treated for hypothyroidism, she has been feeling better. She will sometimes have nausea, but not having bloating anymore since her thyroid is treated. She occasionally doesn't feel well after eating. For example ate some homemade chocolate covered pretzels recently and didn't feel well after that. She believes that some dairy may cause symptoms such as abeba sauce, ice cream and sour cream so stopped eating these but tolerates some milk and cheese. She will have an EGD on 1/23/24, so has been continuing to eat gluten daily d/t need to continue to consume it for testing. She reported several other food allergies such as tree nuts, peanuts, sesame seeds, wheat, corn, legumes and citrus. Last month she had some fluid weight gain and then weight loss due to fluids after taking a diuretic. She discontinued coke zero due to kidney issues and has had only a couple of glasses of wine over the last month at holidays d/t limiting with possible kidney concerns. Reported getting evaluated for kidney concerns.    Patient Active Problem List   Diagnosis    Hypothyroidism due to acquired atrophy of thyroid    Family hx of colon cancer    Nonallopathic lesion of cervical region    Cervicalgia    Nonallopathic lesion of sacral region    Nonallopathic lesion of lumbar region     "Lumbago    Nonallopathic lesion of thoracic region    Hyperlipidemia LDL goal <100    Dependent edema R>L     Height:   Ht Readings from Last 1 Encounters:   12/21/23 1.632 m (5' 4.25\")   ]  Weight: reported lost 20 lbs due to fluids lost d/t diuretic in December 2023 (last month).  Wt Readings from Last 10 Encounters:   01/03/24 83 kg (183 lb)   12/21/23 84.4 kg (186 lb 1.6 oz)   12/04/23 87.1 kg (192 lb)   11/28/23 88 kg (194 lb)   11/27/23 88 kg (194 lb)   11/17/23 88.9 kg (196 lb)   07/20/23 90.3 kg (199 lb)   05/22/23 93 kg (205 lb)   12/19/22 93 kg (205 lb)   09/20/22 85.3 kg (188 lb)     BMI: Estimated body mass index is 31.17 kg/m  as calculated from the following:    Height as of 12/21/23: 1.632 m (5' 4.25\").    Weight as of this encounter: 83 kg (183 lb).    Diet Recall:  (some usual/recent meals/snacks/beverages): decreased portion sizes over past couple of month. Supper is her biggest meal of the day. Wonders if she tolerates dairy. Avoids ice cream, sour cream, creamy sauce. Tolerate 1 glass milk now.  Meal Food    Breakfast Smaller meal: bagel with PB or eggs if home or weekends homemade waffles/pancakes with saus or degroot or if out to eat eggs and hash browns   Lunch Work cafe taco salad or fresh ham and turkey sandwich on multigrain or chicken salad on croissant or grilled cheese and tomato soup or enchiliada   Dinner Tacos or spaghetti with jar sauce or homemade gianni us or john hernandez or sloppy joes  (family will eat peas and corn, sometimes green beans but she's allergic to corn and peas) or pork chop/burger or brat with potatoes/potato salad and baked beans, veg. Couple nights ago seafood,    Snacks Not typically   Beverages ~60 oz Water, used to drink alexsandra tea lattes or just black alexsandra tea, very occas OJ if in the house but not usually. Used to drink coke zero but discontinued due to kidney issues. Very occas lemonade if out   Alcohol Intake Not lately d/t kidney issues. Had a " glass of wine twice over past month. Likes [employer] [place] and coke zero but stopped due to kidney issues.     Frequency of eating/taking out meals: likes to eat out a lot at least 1-3x/week or more     Labs:    Last Comprehensive Metabolic Panel:  Sodium   Date Value Ref Range Status   11/28/2023 140 135 - 145 mmol/L Final     Comment:     Reference intervals for this test were updated on 09/26/2023 to more accurately reflect our healthy population. There may be differences in the flagging of prior results with similar values performed with this method. Interpretation of those prior results can be made in the context of the updated reference intervals.    02/24/2021 140 133 - 144 mmol/L Final     Potassium   Date Value Ref Range Status   11/28/2023 3.9 3.4 - 5.3 mmol/L Final   12/19/2022 3.9 3.4 - 5.3 mmol/L Final   02/24/2021 4.3 3.4 - 5.3 mmol/L Final     Chloride   Date Value Ref Range Status   11/28/2023 105 98 - 107 mmol/L Final   12/19/2022 108 94 - 109 mmol/L Final   02/24/2021 107 94 - 109 mmol/L Final     Carbon Dioxide   Date Value Ref Range Status   02/24/2021 29 20 - 32 mmol/L Final     Carbon Dioxide (CO2)   Date Value Ref Range Status   11/28/2023 30 (H) 22 - 29 mmol/L Final   12/19/2022 33 (H) 20 - 32 mmol/L Final     Anion Gap   Date Value Ref Range Status   11/28/2023 5 (L) 7 - 15 mmol/L Final   12/19/2022 2 (L) 3 - 14 mmol/L Final   02/24/2021 4 3 - 14 mmol/L Final     Glucose   Date Value Ref Range Status   11/28/2023 118 (H) 70 - 99 mg/dL Final   12/19/2022 96 70 - 99 mg/dL Final   02/24/2021 97 70 - 99 mg/dL Final     Urea Nitrogen   Date Value Ref Range Status   11/28/2023 18.1 6.0 - 20.0 mg/dL Final   12/19/2022 15 7 - 30 mg/dL Final   02/24/2021 16 7 - 30 mg/dL Final     Creatinine   Date Value Ref Range Status   11/28/2023 1.11 (H) 0.51 - 0.95 mg/dL Final   02/24/2021 0.80 0.52 - 1.04 mg/dL Final     GFR Estimate   Date Value Ref Range Status   11/28/2023 59 (L) >60 mL/min/1.73m2 Final    02/24/2021 85 >60 mL/min/[1.73_m2] Final     Comment:     Non  GFR Calc  Starting 12/18/2018, serum creatinine based estimated GFR (eGFR) will be   calculated using the Chronic Kidney Disease Epidemiology Collaboration   (CKD-EPI) equation.       Calcium   Date Value Ref Range Status   11/28/2023 9.1 8.6 - 10.0 mg/dL Final   02/24/2021 9.2 8.5 - 10.1 mg/dL Final     CBC RESULTS:   Recent Labs   Lab Test 12/21/23  0903   WBC 8.7   RBC 5.43*   HGB 15.1   HCT 46.5   MCV 86   MCH 27.8   MCHC 32.5   RDW 15.7*        Pertinent Medications/vitamin and mineral supplements:    reported takes a daily MVI.  Current Outpatient Medications   Medication    furosemide (LASIX) 20 MG tablet    levothyroxine (SYNTHROID/LEVOTHROID) 200 MCG tablet    levothyroxine (SYNTHROID/LEVOTHROID) 25 MCG tablet    liothyronine (CYTOMEL) 5 MCG tablet    lisinopril (ZESTRIL) 2.5 MG tablet    rosuvastatin (CRESTOR) 10 MG tablet     No current facility-administered medications for this visit.     Food Allergies:  she reported having some allergy testing and allergic to walnuts, almonds, peanuts, hazelnuts, sesame seeds, wheat, corn, legumes and citrus.    Physical Activity:  workout class/group circuit training 2x/week. Does some yoga as well. Used to walk a lot    MALNUTRITION:  % Weight Loss:  Weight loss does not meet criteria for malnutrition   % Intake:  Decreased intake does not meet criteria for malnutrition --she reported eating smaller portions lately  Subcutaneous Fat Loss:  None observed  Muscle Loss:  None observed  Fluid Retention:  None noted    Malnutrition Diagnosis: Patient does not meet two of the above criteria necessary for diagnosing malnutrition  In Context of:  Acute illness or injury  Chronic illness or disease    Nutrition Prescription: Nutrition Education     Nutrition Intervention      Provided diet education for gluten free diet. Explained that she needs to continue eating at least a couple of  servings daily of gluten until her upcoming EGD for testing. If MD/CNP dx her with celiac then explained strict gluten free diet (avoiding other allergen foods, if directed by her doctor/allergist), avoiding cross contamination of gluten and also of other allergy foods.     Answered patient's questions. Patient verbalized understanding of education provided. See Goals below.     Educational Materials Provided:  Nutrition Care Manual: Celiac nutrition therapy, celiac label reading tips and celiac healthy eating tips.    Goals:    --Continue eating at least 2 servings of gluten every day until you have your upper endoscopy later this month.    After your endoscopy, then start the strict gluten free diet and recommendations below:    1. Follow a strict gluten free diet.     2. Pre-plan menus and meals ahead of time to help you stick to a strict gluten free diet.    3. Avoid cross contamination especially when preparing and cooking gluten free food and beverages.  -Cleaning counters well before preparing gluten free foods and after preparing any foods containing gluten.  -Use separate cooking equipment, toasters and and also use separate frying pans, pans, cutting boards and utensils when cooking.  -Use separate peanut butter/jelly/montaño/mustard/condiment jars if any chance that gluten containing crumbs will be in those jars/containers.    -Use glass storage containers over plastic especially if sharing any storage containers with others who are not on gluten free diet.    4. Double check that medications and vitamin/mineral supplements that you take are gluten free.     --Can speak with Pharmacists about any medications and/or vitamin/mineral supplements you take to find out if they are gluten free and/or check websites for vitamin/mineral supplements to see if listed as gluten free.    5. Read food labels and double check that lip balms, mouthwashes, toothpaste, and other beauty products especially those that could be  "ingested are gluten free.     --Can review company websites to see if this information is provided/answered on their websites.    --can use an dalton for scanning labels while shopping such as Fig, Shop Well or Gluten free Scanner.    6. If eating or taking out meals, double check restaurant's website ahead of time to see if gluten free options. Can call ahead and discuss with restaurant and/or if out to eat, let your  know you have an allergy to gluten.    --Can also use an dalton such as \"Find Me Gluten Free\" to find places with gluten free options in your area/or area that you are traveling in.    7. Celiac Foundation at celiac.org is an additional resource for you.    8. Avoid other food allergens as well. Discuss with your doctor/allergist to see if you need to avoid those other foods strictly.    Nutrition Monitoring and Evaluation: Will monitor adherence to nutrition recommendations at future RD visits.     Further Medical Nutrition Therapy:  Follow-up in 2 months or as needed.    Patient was encouraged to contact RD with any further questions.    Sarah Chan, MS, RD, LD      "

## 2024-01-04 ENCOUNTER — PRE VISIT (OUTPATIENT)
Dept: CARDIOLOGY | Facility: CLINIC | Age: 54
End: 2024-01-04
Payer: COMMERCIAL

## 2024-01-04 DIAGNOSIS — E85.4 RENAL AMYLOIDOSIS (H): Primary | ICD-10-CM

## 2024-01-04 DIAGNOSIS — N08 RENAL AMYLOIDOSIS (H): Primary | ICD-10-CM

## 2024-01-04 DIAGNOSIS — E85.4 AMYLOID HEART DISEASE (H): ICD-10-CM

## 2024-01-04 DIAGNOSIS — I43 AMYLOID HEART DISEASE (H): ICD-10-CM

## 2024-01-05 ENCOUNTER — TELEPHONE (OUTPATIENT)
Dept: GASTROENTEROLOGY | Facility: CLINIC | Age: 54
End: 2024-01-05
Payer: COMMERCIAL

## 2024-01-05 NOTE — TELEPHONE ENCOUNTER
Left Voicemail (1st Attempt) for the patient to call back and schedule the following:    Appointment type: RET GI Nutrition   Provider: Sarah Chan RD  Return date: 03/03/2024  Specialty phone number: [phone]  Additional appointment(s) needed: no   Additonal Notes: no

## 2024-01-08 ENCOUNTER — LAB (OUTPATIENT)
Dept: LAB | Facility: CLINIC | Age: 54
End: 2024-01-08
Payer: COMMERCIAL

## 2024-01-08 ENCOUNTER — PRE VISIT (OUTPATIENT)
Dept: NEPHROLOGY | Facility: CLINIC | Age: 54
End: 2024-01-08

## 2024-01-08 ENCOUNTER — VIRTUAL VISIT (OUTPATIENT)
Dept: NEPHROLOGY | Facility: CLINIC | Age: 54
End: 2024-01-08
Attending: INTERNAL MEDICINE
Payer: COMMERCIAL

## 2024-01-08 DIAGNOSIS — E85.81 AL AMYLOIDOSIS (H): ICD-10-CM

## 2024-01-08 DIAGNOSIS — N08 AMYLOID KIDNEY (H): ICD-10-CM

## 2024-01-08 DIAGNOSIS — N04.9 NEPHROTIC SYNDROME: ICD-10-CM

## 2024-01-08 DIAGNOSIS — E85.81 AL AMYLOIDOSIS (H): Primary | ICD-10-CM

## 2024-01-08 DIAGNOSIS — N08 RENAL AMYLOIDOSIS (H): ICD-10-CM

## 2024-01-08 DIAGNOSIS — I43 AMYLOID HEART DISEASE (H): ICD-10-CM

## 2024-01-08 DIAGNOSIS — E85.4 RENAL AMYLOIDOSIS (H): ICD-10-CM

## 2024-01-08 DIAGNOSIS — R80.1 PERSISTENT PROTEINURIA: ICD-10-CM

## 2024-01-08 DIAGNOSIS — E85.4 AMYLOID HEART DISEASE (H): ICD-10-CM

## 2024-01-08 DIAGNOSIS — E85.4 AMYLOID KIDNEY (H): ICD-10-CM

## 2024-01-08 LAB
ALBUMIN MFR UR ELPH: 767.4 MG/DL
ALBUMIN SERPL BCG-MCNC: 2 G/DL (ref 3.5–5.2)
ALBUMIN UR-MCNC: >499 MG/DL
ALP SERPL-CCNC: 120 U/L (ref 40–150)
ALT SERPL W P-5'-P-CCNC: 32 U/L (ref 0–50)
ANION GAP SERPL CALCULATED.3IONS-SCNC: 11 MMOL/L (ref 7–15)
APPEARANCE UR: CLEAR
AST SERPL W P-5'-P-CCNC: 28 U/L (ref 0–45)
BACTERIA #/AREA URNS HPF: ABNORMAL /HPF
BILIRUB SERPL-MCNC: 0.2 MG/DL
BILIRUB UR QL STRIP: NEGATIVE
BUN SERPL-MCNC: 17 MG/DL (ref 6–20)
CALCIUM SERPL-MCNC: 8.6 MG/DL (ref 8.6–10)
CHLORIDE SERPL-SCNC: 106 MMOL/L (ref 98–107)
COLOR UR AUTO: YELLOW
CREAT SERPL-MCNC: 1.04 MG/DL (ref 0.51–0.95)
CREAT UR-MCNC: 88.4 MG/DL
DEPRECATED HCO3 PLAS-SCNC: 24 MMOL/L (ref 22–29)
EGFRCR SERPLBLD CKD-EPI 2021: 64 ML/MIN/1.73M2
ERYTHROCYTE [DISTWIDTH] IN BLOOD BY AUTOMATED COUNT: 15.8 % (ref 10–15)
GLUCOSE SERPL-MCNC: 103 MG/DL (ref 70–99)
GLUCOSE UR STRIP-MCNC: NEGATIVE MG/DL
HCT VFR BLD AUTO: 43.5 % (ref 35–47)
HGB BLD-MCNC: 14.6 G/DL (ref 11.7–15.7)
HGB UR QL STRIP: NEGATIVE
HYALINE CASTS: 8 /LPF
KETONES UR STRIP-MCNC: NEGATIVE MG/DL
LEUKOCYTE ESTERASE UR QL STRIP: NEGATIVE
MCH RBC QN AUTO: 27.9 PG (ref 26.5–33)
MCHC RBC AUTO-ENTMCNC: 33.6 G/DL (ref 31.5–36.5)
MCV RBC AUTO: 83 FL (ref 78–100)
MUCOUS THREADS #/AREA URNS LPF: PRESENT /LPF
NITRATE UR QL: NEGATIVE
NT-PROBNP SERPL-MCNC: 511 PG/ML (ref 0–900)
NT-PROBNP SERPL-MCNC: 529 PG/ML (ref 0–900)
PH UR STRIP: 7 [PH] (ref 5–7)
PHOSPHATE SERPL-MCNC: 4.3 MG/DL (ref 2.5–4.5)
PLATELET # BLD AUTO: 361 10E3/UL (ref 150–450)
POTASSIUM SERPL-SCNC: 4.7 MMOL/L (ref 3.4–5.3)
PROT SERPL-MCNC: 4.7 G/DL (ref 6.4–8.3)
PROT/CREAT 24H UR: 8.68 MG/MG CR (ref 0–0.2)
PTH-INTACT SERPL-MCNC: 10 PG/ML (ref 15–65)
RBC # BLD AUTO: 5.23 10E6/UL (ref 3.8–5.2)
RBC URINE: 1 /HPF
SODIUM SERPL-SCNC: 141 MMOL/L (ref 135–145)
SP GR UR STRIP: 1.02 (ref 1–1.03)
TOTAL PROTEIN SERUM FOR ELP: 4 G/DL (ref 6.4–8.3)
TROPONIN T SERPL HS-MCNC: 15 NG/L
UROBILINOGEN UR STRIP-MCNC: NORMAL MG/DL
VIT D+METAB SERPL-MCNC: 7 NG/ML (ref 20–50)
WBC # BLD AUTO: 10.7 10E3/UL (ref 4–11)
WBC URINE: 3 /HPF

## 2024-01-08 PROCEDURE — 86335 IMMUNFIX E-PHORSIS/URINE/CSF: CPT | Performed by: PATHOLOGY

## 2024-01-08 PROCEDURE — 80053 COMPREHEN METABOLIC PANEL: CPT | Mod: 59

## 2024-01-08 PROCEDURE — 84165 PROTEIN E-PHORESIS SERUM: CPT | Performed by: PATHOLOGY

## 2024-01-08 PROCEDURE — 84155 ASSAY OF PROTEIN SERUM: CPT

## 2024-01-08 PROCEDURE — 83880 ASSAY OF NATRIURETIC PEPTIDE: CPT

## 2024-01-08 PROCEDURE — 86334 IMMUNOFIX E-PHORESIS SERUM: CPT | Performed by: PATHOLOGY

## 2024-01-08 PROCEDURE — 99417 PROLNG OP E/M EACH 15 MIN: CPT | Mod: 95 | Performed by: INTERNAL MEDICINE

## 2024-01-08 PROCEDURE — 85027 COMPLETE CBC AUTOMATED: CPT

## 2024-01-08 PROCEDURE — 84156 ASSAY OF PROTEIN URINE: CPT

## 2024-01-08 PROCEDURE — 81001 URINALYSIS AUTO W/SCOPE: CPT

## 2024-01-08 PROCEDURE — 84166 PROTEIN E-PHORESIS/URINE/CSF: CPT | Performed by: PATHOLOGY

## 2024-01-08 PROCEDURE — 99215 OFFICE O/P EST HI 40 MIN: CPT | Mod: 95 | Performed by: INTERNAL MEDICINE

## 2024-01-08 PROCEDURE — 36415 COLL VENOUS BLD VENIPUNCTURE: CPT

## 2024-01-08 PROCEDURE — 83970 ASSAY OF PARATHORMONE: CPT

## 2024-01-08 PROCEDURE — 84484 ASSAY OF TROPONIN QUANT: CPT

## 2024-01-08 PROCEDURE — 84100 ASSAY OF PHOSPHORUS: CPT

## 2024-01-08 PROCEDURE — 83521 IG LIGHT CHAINS FREE EACH: CPT

## 2024-01-08 PROCEDURE — 82306 VITAMIN D 25 HYDROXY: CPT

## 2024-01-08 NOTE — NURSING NOTE
Is the patient currently in the state of [place]? YES    Visit mode:VIDEO    If the visit is dropped, the patient can be reconnected by: VIDEO VISIT: Text to cell phone:   Telephone Information:   Mobile [phone]       Will anyone else be joining the visit? NO  (If patient encounters technical issues they should call [phone]    How would you like to obtain your AVS? MyChart    Are changes needed to the allergy or medication list? No    Reason for visit: Consult    Shelbi ESCOBEDO

## 2024-01-08 NOTE — PATIENT INSTRUCTIONS
Please monitor blood pressure daily  If <90/60 mmHg consistently martinezase let me know  See you in 2 months

## 2024-01-08 NOTE — PROGRESS NOTES
Virtual Visit Details    Type of service:  Video Visit     Originating Location (pt. Location): Home 10.31-11.12    Distant Location (provider location):  On-site  Platform used for Video Visit: [facility]          Nephrology Initial Consult  January 7, 2024      Vivian Brown MRN:[id number] YOB: 1970  Primary care provider: Alin Torres  Requesting physician: Dr. Lovely Sosa    REASON FOR CONSULT: Bx proven renal AL    HISTORY OF PRESENT ILLNESS:  Vivian Brown is a 53 year old with hypothyroidism, who is here for AL amyloidosis consult.    The patient initially presented with fatigue and LE edema for about 2 years. Initially, she was diagnosed with Hashimoto's and was started on thyroid supplement in August 2023. Symptoms has partially improved. Later on she was found to have hypoalbuminemia dn massiv eproteinuria hence was referred to Dr. Sen. She saw Dr. Sen in November 2023 and was started on Lisinopril 2.5 mg and lasix 20 mg per day. I 1 week, she has lsot 20 Lbs and swelling has been better and remained in control.  She underwent a kidney Bx on 12/21/23. The Bx read by Dr. Garcia which showed Amyloidosis of the kidney, AL-type, lambda light chain-restricted, affecting the glomeruli, interstitium, and arterioles. She has mild chronic changes of the parenchyma, including: focal global glomerulosclerosis (3% of glomeruli), focal tubular atrophy and interstitial fibrosis (5% of the cortex), severe arterial sclerosis.   LM: There were 37 glomeruli (LM-29; IF-7; EM-1), of which 1 is globally sclerosed. The non-sclerosed glomeruli are of normal size and cellularity. Significant endocapillary proliferation or cellular crescents are not seen in the glomeruli. The glomeruli show deposits of amorphous, homogeneous, acellular, eosinophilic material that infiltrates and expands the mesangial areas and extends down the capillaries of the glomerular tuft. Tubules are well-preserved and show no  signs of acute injury. Obvious signs of a viral cytopathic effect are not seen in the sample. No oxalate, urate, or phosphate crystals are present in the sample. The interstitium reveals occasional collection of foam cells and occasional deposits of amorphous material. The interstitium contains mild interstitial inflammation in areas of atrophy, and the infiltrates are composed of mononuclear cells. About 5% of the renal cortex shows tubular atrophy and interstitial fibrosis. Arteries show severe sclerosis. Examination of a [place] red-stained section reveals deposits of amorphous material with apple-green birefringence under polarized light, in all glomeruli and focally in arterioles and interstitium. EM: showed 1 glomerulus; 1 glomerulus is examined ultrastructurally. There is prominent deposition of fibrillary material in the mesangial areas, without significant hypercellularity.  There is focal effacement of visceral epithelial cell foot processes in areas of the glomerular basement membranes that show segmental widening of the subepithelial space and distortion of the lamina densa due to deposits of fibrillary material.  The mesangial and basement membrane deposits consist of non-branching, randomly arranged fibrils of 6 to 15 nm width. Glomerular endothelial cells show focal loss of their normal fenestrations. Tubules show no specific changes. Deposits are Congo red positive. The amyloid deposits affect the glomeruli extensively, and interstitium and arterioles focally. Other potential complications of paraproteins in the kidney are not seen, in particular, there is no evidence of light chain cast nephropathy, light chain deposition disease, or light chain tubulopathy.     For kidney function, she has baseline Cr of 0.9 in 12/19/22. However, Cr started to increase to 1.05 in 5/22/23 and now 1.11 on 11/28/23. Serum albumin was 3.5 in 2/24/21 and 2.2 in 12/19/22. Monoclonal work-up on 11/28/23 showed kappa 3.05,  "lambda 7.82 and K/L ratio 0.39 and L/K ratio 2.56. dFLC 4.77. Other serologies including MPO, PMND1 were negative. UPCR 12.50 mg/g. No 24 hr urine protein yet. UA showed small blood but no RBC. She also has LCL of 174. TSH 29.16 and + Tissue TTG.     She has Echo on 1/3/24. There is mild concentric left ventricular hypertrophy and borderline RVH. Global peak LV longitudinal strain is averaged at -15.3%. This suggests abnormal strain (normal <-18%).The visual ejection fraction is estimated at 54-56%.Trivial posterior pericardial effusion Clinical history is listed as renal amyloid--on this echo the atria are normal size, the valves are not thickend but there is mild LVH and borderline RVH, there is questionable \"scintillation\" of LV myocardium, the global longitudinal strain is abnormal and the best strain values are at the apex (borderline \"apical sparing\" strain pattern) and there is trace pericardial effusion along with borderline criteria for diastolic dysfunction grade2--take together this could represent some features of cardiac amyloid. Additional studies such as cardiac MRI, cardiac biopsy etc may be useful. She is scheduled to see Cardiology on 1/31/24.     Today, I saw her for consultation via video. She is with her  at home. Still feels fatigue and upset stomach last night.  She is currently on furosemide 20 mg daily, lisinopril 2.5 mg daily and crestor 10 mg daily. She reports having lower extremities edema for about 2 years. She was diagnosed with Hashimoto's in August 2023.  She has been feeling better after started taking thyroid supplement. She has leg swelling but not as bad as before. Furosemide was started in December 2023 and she has lost 20 Lbs wit that. She has seen more bubbly in the urineShe sometimes feels lightheadedness and dizziness and her BP was low sometimes. However, mostly > BP /60s. HR in the 80s.  BP today is 114/76, HR is 76.  She has no problem with swallowing, " numbness or tingling sensation. No CTS symptoms. No tongue enlargement. No periorbital ecchymoses. No easy bruising. She has no other medical problem before this. She has been active and exercise at least twice a week.     Her maternal aunt has MM and uncle has MDS. Paternal uncle  of Hodgkin lymphoma. She has no fam Hx of kidney disease.  No smoking or drinking. She has 2 children 14 daughter and 11 years old son. No problem with pregnancy. She is physical therapist.    PAST MEDICAL HISTORY:  Reviewed with patient on 2024     Past Medical History:   Diagnosis Date    Family history of colon cancer     Colonoscoy q5 years next 2020       Past Surgical History:   Procedure Laterality Date    COLONOSCOPY N/A 2015    Procedure: COLONOSCOPY;  Surgeon: Eugenio Travis MD;  Location: [facility]    IR RENAL BIOPSY RIGHT  2023    TONSILLECTOMY        MEDICATIONS:  PTA Meds  Current Outpatient Medications   Medication    furosemide (LASIX) 20 MG tablet    levothyroxine (SYNTHROID/LEVOTHROID) 200 MCG tablet    levothyroxine (SYNTHROID/LEVOTHROID) 25 MCG tablet    liothyronine (CYTOMEL) 5 MCG tablet    lisinopril (ZESTRIL) 2.5 MG tablet    rosuvastatin (CRESTOR) 10 MG tablet     No current facility-administered medications for this visit.         ALLERGIES:    Allergies   Allergen Reactions    Latex Itching    Seasonal Allergies        REVIEW OF SYSTEMS:  A comprehensive of systems was negative except as noted above.    SOCIAL HISTORY:   Social History     Socioeconomic History    Marital status:      Spouse name: Not on file    Number of children: Not on file    Years of education: Not on file    Highest education level: Not on file   Occupational History     Comment: Physical Therapy   Tobacco Use    Smoking status: Never     Passive exposure: Never    Smokeless tobacco: Never   Vaping Use    Vaping Use: Never used   Substance and Sexual Activity    Alcohol use: Not Currently      Alcohol/week: 0.0 standard drinks of alcohol     Comment: 1-2 times/month    Drug use: No    Sexual activity: Yes     Partners: Male     Birth control/protection: I.U.D.   Other Topics Concern    Parent/sibling w/ CABG, MI or angioplasty before 65F 55M? Not Asked   Social History Narrative    Not on file     Social Determinants of Health     Financial Resource Strain: Low Risk  (11/17/2023)    Financial Resource Strain     Within the past 12 months, have you or your family members you live with been unable to get utilities (heat, electricity) when it was really needed?: No   Food Insecurity: Low Risk  (11/17/2023)    Food Insecurity     Within the past 12 months, did you worry that your food would run out before you got money to buy more?: No     Within the past 12 months, did the food you bought just not last and you didn t have money to get more?: No   Transportation Needs: Low Risk  (11/17/2023)    Transportation Needs     Within the past 12 months, has lack of transportation kept you from medical appointments, getting your medicines, non-medical meetings or appointments, work, or from getting things that you need?: No   Physical Activity: Not on file   Stress: Not on file   Social Connections: Not on file   Interpersonal Safety: Low Risk  (11/17/2023)    Interpersonal Safety     Do you feel physically and emotionally safe where you currently live?: Yes     Within the past 12 months, have you been hit, slapped, kicked or otherwise physically hurt by someone?: No     Within the past 12 months, have you been humiliated or emotionally abused in other ways by your partner or ex-partner?: No   Housing Stability: Low Risk  (11/17/2023)    Housing Stability     Do you have housing? : Yes     Are you worried about losing your housing?: No     Reviewed with patient.    FAMILY MEDICAL HISTORY:   Family History   Problem Relation Age of Onset    Colon Cancer Maternal Grandmother 91    Hypertension Mother     Hyperlipidemia  Mother     Cancer Mother 65        GIST    Other Cancer Mother     Coronary Artery Disease Father         MI    Thyroid Disease Sister     Asthma Daughter      Reviewed with patient.    PHYSICAL EXAM:   There were no vitals taken for this visit.   Alert, not in acute distress. No swelling. No tongue enlargement. No periorbital ecchymoses.        LABS:   Last Renal Panel:  Sodium   Date Value Ref Range Status   11/28/2023 140 135 - 145 mmol/L Final     Comment:     Reference intervals for this test were updated on 09/26/2023 to more accurately reflect our healthy population. There may be differences in the flagging of prior results with similar values performed with this method. Interpretation of those prior results can be made in the context of the updated reference intervals.    02/24/2021 140 133 - 144 mmol/L Final     Potassium   Date Value Ref Range Status   11/28/2023 3.9 3.4 - 5.3 mmol/L Final   12/19/2022 3.9 3.4 - 5.3 mmol/L Final   02/24/2021 4.3 3.4 - 5.3 mmol/L Final     Chloride   Date Value Ref Range Status   11/28/2023 105 98 - 107 mmol/L Final   12/19/2022 108 94 - 109 mmol/L Final   02/24/2021 107 94 - 109 mmol/L Final     Carbon Dioxide   Date Value Ref Range Status   02/24/2021 29 20 - 32 mmol/L Final     Carbon Dioxide (CO2)   Date Value Ref Range Status   11/28/2023 30 (H) 22 - 29 mmol/L Final   12/19/2022 33 (H) 20 - 32 mmol/L Final     Anion Gap   Date Value Ref Range Status   11/28/2023 5 (L) 7 - 15 mmol/L Final   12/19/2022 2 (L) 3 - 14 mmol/L Final   02/24/2021 4 3 - 14 mmol/L Final     Glucose   Date Value Ref Range Status   11/28/2023 118 (H) 70 - 99 mg/dL Final   12/19/2022 96 70 - 99 mg/dL Final   02/24/2021 97 70 - 99 mg/dL Final     Urea Nitrogen   Date Value Ref Range Status   11/28/2023 18.1 6.0 - 20.0 mg/dL Final   12/19/2022 15 7 - 30 mg/dL Final   02/24/2021 16 7 - 30 mg/dL Final     Creatinine   Date Value Ref Range Status   11/28/2023 1.11 (H) 0.51 - 0.95 mg/dL Final    02/24/2021 0.80 0.52 - 1.04 mg/dL Final     GFR Estimate   Date Value Ref Range Status   11/28/2023 59 (L) >60 mL/min/1.73m2 Final   02/24/2021 85 >60 mL/min/[1.73_m2] Final     Comment:     Non  GFR Calc  Starting 12/18/2018, serum creatinine based estimated GFR (eGFR) will be   calculated using the Chronic Kidney Disease Epidemiology Collaboration   (CKD-EPI) equation.       Calcium   Date Value Ref Range Status   11/28/2023 9.1 8.6 - 10.0 mg/dL Final   02/24/2021 9.2 8.5 - 10.1 mg/dL Final     Phosphorus   Date Value Ref Range Status   11/28/2023 3.9 2.5 - 4.5 mg/dL Final     Albumin   Date Value Ref Range Status   11/28/2023 2.2 (L) 3.5 - 5.2 g/dL Final   12/19/2022 2.2 (L) 3.4 - 5.0 g/dL Final   02/24/2021 3.5 3.4 - 5.0 g/dL Final       URINE STUDIES  Recent Labs   Lab Test 11/28/23  1243 11/17/23  1221   COLOR Yellow Yellow   APPEARANCE Slightly Cloudy* Clear   URINEGLC Negative Negative   URINEBILI Negative Negative   URINEKETONE Negative Negative   SG 1.020 1.025   UBLD Small* Small*   URINEPH 6.0 6.5   PROTEIN >499* >=300*   UROBILINOGEN  --  0.2   NITRITE Negative Negative   LEUKEST Negative Negative   RBCU 1 2-5*   WBCU 3 0-5     No lab results found.  PTH  No lab results found.  IRON STUDIES  Recent Labs   Lab Test 11/28/23  1229   MICHA 54       IMAGING:  I review the 11/29/23 on which showed  RIGHT KIDNEY: Normal size. Normal echogenicity with no hydronephrosis  or mass.      LEFT KIDNEY: Normal size. Normal echogenicity with no hydronephrosis  or mass.     ASSESSMENT AND RECOMMENDATIONS:   # [place] LC AL involved: kidneys, cardiac, possibly GI, thyroid  # Renal proven AL amyloidosis  # Nephrotic syndrome  # Borderline Hypotension  # Family Hx of MM and MDS (maternal aunt and uncle) and HL (paternal uncle)  # CKD stage 3 2/2 [place] LC AL  The patient presented with fatigue and swelling since 2022. Noted hypoalbuminemia since 12/22. UPCR 12.5g with Salb 2.2 mg/dl. Cr 1.1 with eGFR 59. She  has kidney Bx which showed AL  Involved mainly glomeruli, some mesangium and vessels. Of note kidney sizes are normal on US.  Echo 1/3/23 showed mild LVH and RVH with strained pattern suggestive of possible amyloidosis. At this time, the treatment of renal AL is the treatment of AL. She will see Dr. Viramontes and will likely get BmBx to assess plasma cell burden.  Will need to complete staging with repeat monoclonal protein for dFLC, cTnT and ntproBNP. I suspect that she will mostly like has stage 0 or 1 based on Hall 2012 classification.     For treatment of nephrotic syndrome, she is now on lasix 20 mg daily and lisinopril 2.5 mg daily. I typically do not start lisinopril in AL but since she has been able to tolerate this, I will not stop the med. However, I ask her to measure BP closely and if <90/60 persistently or symptomatic then I will not be hesitant to stop. Will remain lasix at same dose since swelling is under control.     - nt proBNP and cTnT  - 24 hour urine protein, creatinine and UPEP; spot UPEP and uIF, SPEP, FLC, CBC , renal panel, vit D and PTH, UA, UPCR  - Follow up with Dr. Viramontes this Friday  - Follow up with cardiology this month  - Lasix 20 mg daily and lsisinopril 2.5 mg daily  # Hypothyroidism  TPO ab is positive so Dx with Hashimoto's. On thyroid supplement presently. Qondering wheter this could be from amyloid as well.   # Abdominal pain  # Positive TTG  # Concerns for Celiac disease  Will have EGD with Bx. Would recommend test for AL as well in duodenal tissue.      Follow-up in 2 months with labs    I spent 90 minutes on the date of the encounter doing chart review, history and exam, documentation and further activities as noted above. 41 (10.31-11.12) minutes of this visit is dedicated to direct patient interaction via video.     Tracie Dobson MD on 01/07/2024

## 2024-01-08 NOTE — LETTER
1/8/2024       RE: Vivian Brown  [address] [facility] [id number]     Dear Colleague,    Thank you for referring your patient, Vivian Brown, to the [facility] [place] at [facility]. Please see a copy of my visit note below.          Nephrology Initial Consult  January 7, 2024      Vivian Brown MRN:[id number] YOB: 1970  Primary care provider: Alin Torres  Requesting physician: Dr. Lovely Sosa    REASON FOR CONSULT: Bx proven renal AL    HISTORY OF PRESENT ILLNESS:  Vivian Brown is a 53 year old with hypothyroidism, who is here for AL amyloidosis consult.    The patient initially presented with fatigue and LE edema for about 2 years. Initially, she was diagnosed with Hashimoto's and was started on thyroid supplement in August 2023. Symptoms has partially improved. Later on she was found to have hypoalbuminemia dn massiv eproteinuria hence was referred to Dr. Sen. She saw Dr. Sen in November 2023 and was started on Lisinopril 2.5 mg and lasix 20 mg per day. I 1 week, she has lsot 20 Lbs and swelling has been better and remained in control.  She underwent a kidney Bx on 12/21/23. The Bx read by Dr. Garcia which showed Amyloidosis of the kidney, AL-type, lambda light chain-restricted, affecting the glomeruli, interstitium, and arterioles. She has mild chronic changes of the parenchyma, including: focal global glomerulosclerosis (3% of glomeruli), focal tubular atrophy and interstitial fibrosis (5% of the cortex), severe arterial sclerosis.   LM: There were 37 glomeruli (LM-29; IF-7; EM-1), of which 1 is globally sclerosed. The non-sclerosed glomeruli are of normal size and cellularity. Significant endocapillary proliferation or cellular crescents are not seen in the glomeruli. The glomeruli show deposits of amorphous, homogeneous, acellular, eosinophilic material that infiltrates and expands  the mesangial areas and extends down the capillaries of the glomerular tuft. Tubules are well-preserved and show no signs of acute injury. Obvious signs of a viral cytopathic effect are not seen in the sample. No oxalate, urate, or phosphate crystals are present in the sample. The interstitium reveals occasional collection of foam cells and occasional deposits of amorphous material. The interstitium contains mild interstitial inflammation in areas of atrophy, and the infiltrates are composed of mononuclear cells. About 5% of the renal cortex shows tubular atrophy and interstitial fibrosis. Arteries show severe sclerosis. Examination of a Congo red-stained section reveals deposits of amorphous material with apple-green birefringence under polarized light, in all glomeruli and focally in arterioles and interstitium. EM: showed 1 glomerulus; 1 glomerulus is examined ultrastructurally. There is prominent deposition of fibrillary material in the mesangial areas, without significant hypercellularity.  There is focal effacement of visceral epithelial cell foot processes in areas of the glomerular basement membranes that show segmental widening of the subepithelial space and distortion of the lamina densa due to deposits of fibrillary material.  The mesangial and basement membrane deposits consist of non-branching, randomly arranged fibrils of 6 to 15 nm width. Glomerular endothelial cells show focal loss of their normal fenestrations. Tubules show no specific changes. Deposits are Congo red positive. The amyloid deposits affect the glomeruli extensively, and interstitium and arterioles focally. Other potential complications of paraproteins in the kidney are not seen, in particular, there is no evidence of light chain cast nephropathy, light chain deposition disease, or light chain tubulopathy.     For kidney function, she has baseline Cr of 0.9 in 12/19/22. However, Cr started to increase to 1.05 in 5/22/23 and now 1.11 on  "11/28/23. Serum albumin was 3.5 in 2/24/21 and 2.2 in 12/19/22. Monoclonal work-up on 11/28/23 showed kappa 3.05, lambda 7.82 and K/L ratio 0.39 and L/K ratio 2.56. dFLC 4.77. Other serologies including MPO, PMND1 were negative. UPCR 12.50 mg/g. No 24 hr urine protein yet. UA showed small blood but no RBC. She also has LCL of 174. TSH 29.16 and + Tissue TTG.     She has Echo on 1/3/24. There is mild concentric left ventricular hypertrophy and borderline RVH. Global peak LV longitudinal strain is averaged at -15.3%. This suggests abnormal strain (normal <-18%).The visual ejection fraction is estimated at 54-56%.Trivial posterior pericardial effusion Clinical history is listed as renal amyloid--on this echo the atria are normal size, the valves are not thickend but there is mild LVH and borderline RVH, there is questionable \"scintillation\" of LV myocardium, the global longitudinal strain is abnormal and the best strain values are at the apex (borderline \"apical sparing\" strain pattern) and there is trace pericardial effusion along with borderline criteria for diastolic dysfunction grade2--take together this could represent some features of cardiac amyloid. Additional studies such as cardiac MRI, cardiac biopsy etc may be useful. She is scheduled to see Cardiology on 1/31/24.     Today, I saw her for consultation via video. She is with her  at home. Still feels fatigue and upset stomach last night.  She is currently on furosemide 20 mg daily, lisinopril 2.5 mg daily and crestor 10 mg daily. She reports having lower extremities edema for about 2 years. She was diagnosed with Hashimoto's in August 2023.  She has been feeling better after started taking thyroid supplement. She has leg swelling but not as bad as before. Furosemide was started in December 2023 and she has lost 20 Lbs wit that. She has seen more bubbly in the urineShe sometimes feels lightheadedness and dizziness and her BP was low sometimes. However, " mostly > BP /60s. HR in the 80s.  BP today is 114/76, HR is 76.  She has no problem with swallowing, numbness or tingling sensation. No CTS symptoms. No tongue enlargement. No periorbital ecchymoses. No easy bruising. She has no other medical problem before this. She has been active and exercise at least twice a week.     Her maternal aunt has MM and uncle has MDS. Paternal uncle  of Hodgkin lymphoma. She has no fam Hx of kidney disease.  No smoking or drinking. She has 2 children 14 daughter and 11 years old son. No problem with pregnancy. She is physical therapist.    PAST MEDICAL HISTORY:  Reviewed with patient on 2024     Past Medical History:   Diagnosis Date    Family history of colon cancer     Colonoscoy q5 years next 2020       Past Surgical History:   Procedure Laterality Date    COLONOSCOPY N/A 2015    Procedure: COLONOSCOPY;  Surgeon: Eugenio Travis MD;  Location: [facility]    IR RENAL BIOPSY RIGHT  2023    TONSILLECTOMY        MEDICATIONS:  PTA Meds  Current Outpatient Medications   Medication    furosemide (LASIX) 20 MG tablet    levothyroxine (SYNTHROID/LEVOTHROID) 200 MCG tablet    levothyroxine (SYNTHROID/LEVOTHROID) 25 MCG tablet    liothyronine (CYTOMEL) 5 MCG tablet    lisinopril (ZESTRIL) 2.5 MG tablet    rosuvastatin (CRESTOR) 10 MG tablet     No current facility-administered medications for this visit.         ALLERGIES:    Allergies   Allergen Reactions    Latex Itching    Seasonal Allergies        REVIEW OF SYSTEMS:  A comprehensive of systems was negative except as noted above.    SOCIAL HISTORY:   Social History     Socioeconomic History    Marital status:      Spouse name: Not on file    Number of children: Not on file    Years of education: Not on file    Highest education level: Not on file   Occupational History     Comment: Physical Therapy   Tobacco Use    Smoking status: Never     Passive exposure: Never    Smokeless tobacco: Never   Vaping  Use    Vaping Use: Never used   Substance and Sexual Activity    Alcohol use: Not Currently     Alcohol/week: 0.0 standard drinks of alcohol     Comment: 1-2 times/month    Drug use: No    Sexual activity: Yes     Partners: Male     Birth control/protection: I.U.D.   Other Topics Concern    Parent/sibling w/ CABG, MI or angioplasty before 65F 55M? Not Asked   Social History Narrative    Not on file     Social Determinants of Health     Financial Resource Strain: Low Risk  (11/17/2023)    Financial Resource Strain     Within the past 12 months, have you or your family members you live with been unable to get utilities (heat, electricity) when it was really needed?: No   Food Insecurity: Low Risk  (11/17/2023)    Food Insecurity     Within the past 12 months, did you worry that your food would run out before you got money to buy more?: No     Within the past 12 months, did the food you bought just not last and you didn t have money to get more?: No   Transportation Needs: Low Risk  (11/17/2023)    Transportation Needs     Within the past 12 months, has lack of transportation kept you from medical appointments, getting your medicines, non-medical meetings or appointments, work, or from getting things that you need?: No   Physical Activity: Not on file   Stress: Not on file   Social Connections: Not on file   Interpersonal Safety: Low Risk  (11/17/2023)    Interpersonal Safety     Do you feel physically and emotionally safe where you currently live?: Yes     Within the past 12 months, have you been hit, slapped, kicked or otherwise physically hurt by someone?: No     Within the past 12 months, have you been humiliated or emotionally abused in other ways by your partner or ex-partner?: No   Housing Stability: Low Risk  (11/17/2023)    Housing Stability     Do you have housing? : Yes     Are you worried about losing your housing?: No     Reviewed with patient.    FAMILY MEDICAL HISTORY:   Family History   Problem Relation  Age of Onset    Colon Cancer Maternal Grandmother 91    Hypertension Mother     Hyperlipidemia Mother     Cancer Mother 65        GIST    Other Cancer Mother     Coronary Artery Disease Father         MI    Thyroid Disease Sister     Asthma Daughter      Reviewed with patient.    PHYSICAL EXAM:   There were no vitals taken for this visit.   Alert, not in acute distress. No swelling. No tongue enlargement. No periorbital ecchymoses.        LABS:   Last Renal Panel:  Sodium   Date Value Ref Range Status   11/28/2023 140 135 - 145 mmol/L Final     Comment:     Reference intervals for this test were updated on 09/26/2023 to more accurately reflect our healthy population. There may be differences in the flagging of prior results with similar values performed with this method. Interpretation of those prior results can be made in the context of the updated reference intervals.    02/24/2021 140 133 - 144 mmol/L Final     Potassium   Date Value Ref Range Status   11/28/2023 3.9 3.4 - 5.3 mmol/L Final   12/19/2022 3.9 3.4 - 5.3 mmol/L Final   02/24/2021 4.3 3.4 - 5.3 mmol/L Final     Chloride   Date Value Ref Range Status   11/28/2023 105 98 - 107 mmol/L Final   12/19/2022 108 94 - 109 mmol/L Final   02/24/2021 107 94 - 109 mmol/L Final     Carbon Dioxide   Date Value Ref Range Status   02/24/2021 29 20 - 32 mmol/L Final     Carbon Dioxide (CO2)   Date Value Ref Range Status   11/28/2023 30 (H) 22 - 29 mmol/L Final   12/19/2022 33 (H) 20 - 32 mmol/L Final     Anion Gap   Date Value Ref Range Status   11/28/2023 5 (L) 7 - 15 mmol/L Final   12/19/2022 2 (L) 3 - 14 mmol/L Final   02/24/2021 4 3 - 14 mmol/L Final     Glucose   Date Value Ref Range Status   11/28/2023 118 (H) 70 - 99 mg/dL Final   12/19/2022 96 70 - 99 mg/dL Final   02/24/2021 97 70 - 99 mg/dL Final     Urea Nitrogen   Date Value Ref Range Status   11/28/2023 18.1 6.0 - 20.0 mg/dL Final   12/19/2022 15 7 - 30 mg/dL Final   02/24/2021 16 7 - 30 mg/dL Final      Creatinine   Date Value Ref Range Status   11/28/2023 1.11 (H) 0.51 - 0.95 mg/dL Final   02/24/2021 0.80 0.52 - 1.04 mg/dL Final     GFR Estimate   Date Value Ref Range Status   11/28/2023 59 (L) >60 mL/min/1.73m2 Final   02/24/2021 85 >60 mL/min/[1.73_m2] Final     Comment:     Non  GFR Calc  Starting 12/18/2018, serum creatinine based estimated GFR (eGFR) will be   calculated using the Chronic Kidney Disease Epidemiology Collaboration   (CKD-EPI) equation.       Calcium   Date Value Ref Range Status   11/28/2023 9.1 8.6 - 10.0 mg/dL Final   02/24/2021 9.2 8.5 - 10.1 mg/dL Final     Phosphorus   Date Value Ref Range Status   11/28/2023 3.9 2.5 - 4.5 mg/dL Final     Albumin   Date Value Ref Range Status   11/28/2023 2.2 (L) 3.5 - 5.2 g/dL Final   12/19/2022 2.2 (L) 3.4 - 5.0 g/dL Final   02/24/2021 3.5 3.4 - 5.0 g/dL Final       URINE STUDIES  Recent Labs   Lab Test 11/28/23  1243 11/17/23  1221   COLOR Yellow Yellow   APPEARANCE Slightly Cloudy* Clear   URINEGLC Negative Negative   URINEBILI Negative Negative   URINEKETONE Negative Negative   SG 1.020 1.025   UBLD Small* Small*   URINEPH 6.0 6.5   PROTEIN >499* >=300*   UROBILINOGEN  --  0.2   NITRITE Negative Negative   LEUKEST Negative Negative   RBCU 1 2-5*   WBCU 3 0-5     No lab results found.  PTH  No lab results found.  IRON STUDIES  Recent Labs   Lab Test 11/28/23  1229   MICHA 54       IMAGING:  I review the 11/29/23 on which showed  RIGHT KIDNEY: Normal size. Normal echogenicity with no hydronephrosis  or mass.      LEFT KIDNEY: Normal size. Normal echogenicity with no hydronephrosis  or mass.     ASSESSMENT AND RECOMMENDATIONS:   # [place] LC AL involved: kidneys, cardiac, possibly GI, thyroid  # Renal proven AL amyloidosis  # Nephrotic syndrome  # Borderline Hypotension  # Family Hx of MM and MDS (maternal aunt and uncle) and HL (paternal uncle)  # CKD stage 3 2/2 [place] LC AL  The patient presented with fatigue and swelling since 2022.  Noted hypoalbuminemia since 12/22. UPCR 12.5g with Salb 2.2 mg/dl. Cr 1.1 with eGFR 59. She has kidney Bx which showed AL  Involved mainly glomeruli, some mesangium and vessels. Of note kidney sizes are normal on US.  Echo 1/3/23 showed mild LVH and RVH with strained pattern suggestive of possible amyloidosis. At this time, the treatment of renal AL is the treatment of AL. She will see Dr. Viramontes and will likely get BmBx to assess plasma cell burden.  Will need to complete staging with repeat monoclonal protein for dFLC, cTnT and ntproBNP. I suspect that she will mostly like has stage 0 or 1 based on Hall 2012 classification.     For treatment of nephrotic syndrome, she is now on lasix 20 mg daily and lisinopril 2.5 mg daily. I typically do not start lisinopril in AL but since she has been able to tolerate this, I will not stop the med. However, I ask her to measure BP closely and if <90/60 persistently or symptomatic then I will not be hesitant to stop. Will remain lasix at same dose since swelling is under control.     - nt proBNP and cTnT  - 24 hour urine protein, creatinine and UPEP; spot UPEP and uIF, SPEP, FLC, CBC , renal panel, vit D and PTH, UA, UPCR  - Follow up with Dr. Viramontes this Friday  - Follow up with cardiology this month  - Lasix 20 mg daily and lsisinopril 2.5 mg daily  # Hypothyroidism  TPO ab is positive so Dx with Hashimoto's. On thyroid supplement presently. Qondering wheter this could be from amyloid as well.   # Abdominal pain  # Positive TTG  # Concerns for Celiac disease  Will have EGD with Bx. Would recommend test for AL as well in duodenal tissue.      Follow-up in 2 months with labs    I spent 90 minutes on the date of the encounter doing chart review, history and exam, documentation and further activities as noted above. 41 (10.31-11.12) minutes of this visit is dedicated to direct patient interaction via video.     Tracie Dobson MD on 01/07/2024              Again, thank you for  allowing me to participate in the care of your patient.      Sincerely,    Tracie Dobson MD

## 2024-01-08 NOTE — CONFIDENTIAL NOTE
DIAGNOSIS:    Irregular heart beat   Dependent edema   Hypothyroidism due to acquired atrophy of thyroid   Family hx of colon cancer   Nephrotic syndrome      DATE RECEIVED: 01.08.2024    NOTES STATUS DETAILS   OFFICE NOTE from referring provider Internal 11.17.2023  Alin Torres PA-C    OFFICE NOTE from other specialist  Internal 11.28.2023 Audrey Sen, DO    *Only VASCULITIS or LUPUS gather office notes for the following     *PULMONARY       *ENT     *DERMATOLOGY     *RHEUMATOLOGY     DISCHARGE SUMMARY from hospital Internal 12.21.2023 [facility]   DISCHARGE REPORT from the ER     MEDICATION LIST Internal    IMAGING  (NEED IMAGES AND REPORTS)     KIDNEY CT SCAN     KIDNEY ULTRASOUND     MR ABDOMEN     NUCLEAR MEDICINE RENAL     LABS     CBC Internal 12.21.2023   CMP Internal 12.21.2023   BMP     UA Internal 11.28.2023   URINE PROTEIN Internal 11.28.2023   RENAL PANEL Internal 11.28.2023   BIOPSY     KIDNEY BIOPSY  Internal 12.21.2023 ir Renal Biopsy Right

## 2024-01-09 ENCOUNTER — CARE COORDINATION (OUTPATIENT)
Dept: CARDIOLOGY | Facility: CLINIC | Age: 54
End: 2024-01-09
Payer: COMMERCIAL

## 2024-01-09 LAB
ALBUMIN MFR UR ELPH: 73.6 %
ALBUMIN SERPL ELPH-MCNC: 1.8 G/DL (ref 3.7–5.1)
ALPHA1 GLOB MFR UR ELPH: 6.8 %
ALPHA1 GLOB SERPL ELPH-MCNC: 0.3 G/DL (ref 0.2–0.4)
ALPHA2 GLOB MFR UR ELPH: 5.4 %
ALPHA2 GLOB SERPL ELPH-MCNC: 1 G/DL (ref 0.5–0.9)
B-GLOBULIN MFR UR ELPH: 8.8 %
B-GLOBULIN SERPL ELPH-MCNC: 0.6 G/DL (ref 0.6–1)
GAMMA GLOB MFR UR ELPH: 5.4 %
GAMMA GLOB SERPL ELPH-MCNC: 0.3 G/DL (ref 0.7–1.6)
KAPPA LC FREE SER-MCNC: 3.06 MG/DL (ref 0.33–1.94)
KAPPA LC FREE/LAMBDA FREE SER NEPH: 0.37 {RATIO} (ref 0.26–1.65)
LAMBDA LC FREE SERPL-MCNC: 8.31 MG/DL (ref 0.57–2.63)
LOCATION OF TASK: ABNORMAL
LOCATION OF TASK: ABNORMAL
LOCATION OF TASK: NORMAL
LOCATION OF TASK: NORMAL
M PROTEIN MFR UR ELPH: 0 %
M PROTEIN SERPL ELPH-MCNC: 0 G/DL
PROT ELPH PNL UR ELPH: NORMAL
PROT PATTERN SERPL ELPH-IMP: ABNORMAL
PROT PATTERN SERPL IFE-IMP: NORMAL
PROT PATTERN UR ELPH-IMP: ABNORMAL

## 2024-01-09 PROCEDURE — 82570 ASSAY OF URINE CREATININE: CPT

## 2024-01-09 PROCEDURE — 81050 URINALYSIS VOLUME MEASURE: CPT

## 2024-01-09 PROCEDURE — 81050 URINALYSIS VOLUME MEASURE: CPT | Performed by: PATHOLOGY

## 2024-01-09 NOTE — PROGRESS NOTES
AMYLOID REFERRAL     Referred from From: Jomar Chandler MD    Patient was meeting Dr. Chandler for a new kidney amyloid and echo looked suspecious for a cardiac amyloid as well so she is being referred to Dr. Armstrong.     Plan:   January 10, 2024 Per RN - Dr. Armstrong would like a Cardiac MRI and labs prior to seeing pt. MRI can be done much sooner than appt (depending on clinic schedule availability). MRI and labs have been ordered.  January 11, 2024 - Sending a message with the availability times for Amyloid clinic and MRI to make sure patient can and should wait.   Received answer Dr. Armstrong wants to MRI and OV soon. Azucena Shah okayed using MRI inpatient spot on 1/16 arrival 1200 MRI 1230.  In addition have sent message to RN HF supervisor to see if we can use Dr. Armstrong VAd/Tx spot on 1/26 @ 1115 (spot being held).   Left patient VM regarding MRI spot.   January 12, 2024 Patient contacted and appointment made with Dr. Armstrong on   1/26/24 with MRI on 1/16

## 2024-01-10 LAB
ALBUMIN MFR UR ELPH: 697.4 MG/DL
COLLECT DURATION TIME UR: 24 H
COLLECT DURATION TIME UR: 24 H
CREAT 24H UR-MRATE: 1.31 G/SPEC (ref 0.72–1.51)
CREAT UR-MCNC: 62.3 MG/DL
PROT 24H UR-MRATE: ABNORMAL MG/SPECIMEN
SPECIMEN VOL UR: 2100 ML
SPECIMEN VOL UR: 2100 ML

## 2024-01-11 NOTE — PROGRESS NOTES
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      Disease presentation and baseline characteristics:   12/21/2023:  Final Diagnosis   A. kidney biopsy (needle):     Amyloidosis of the kidney, AL-type, lambda light chain-restricted, affecting the glomeruli, interstitium, and arterioles (see comment)     Mild chronic changes of the parenchyma, including:   - focal global glomerulosclerosis (3% of glomeruli)   - focal tubular atrophy and interstitial fibrosis (5% of the cortex)   - severe arterial sclerosis   Electronically signed by Joe Garcia MD on 12/23/2023 at  3:21 PM   Comment  UULAB   The biopsy reveals findings diagnostic of amyloidosis; the amyloid shows lambda light chain-restriction (AL amyloidosis), and the deposits are Congo red positive. The amyloid deposits affect the glomeruli extensively, and interstitium and arterioles focally. Other potential complications of paraproteins in the kidney are not seen, in particular, there is no evidence of light chain cast nephropathy, light chain deposition disease, or light chain tubulopathy.     1/8/24: NT-Pro , Troponin T 15    Date Treatment Name Response Side Effects / Toxicities                HPI:  Please see my entry above for hematologic history.  Mrs. Brown presents today accompanied by Aneudy to discuss her recent diagnosis of AL amyloidosis.  She reports that she has had progressive fatigue and swelling in her legs over the past several years, worsening significantly over the past six months.  She works in physical therapy and exercises regularly, but noted that she was no longer able to exercise to the same degree over the past year.  She notes she has a history of carpal tunnel in her right arm, although this is mild and has been present for several years without any changes.  She denies any numbness or tingling of the extremities.  She denies tongue  swelling/thickness or difficulty swallowing.  She does endorse abdominal discomfort and will be getting an upper endoscopy in the near future.  She denies lightheadedness/dizziness when standing rapidly.  She denies shortness of breath/chest pain.  She was diagnosed with Hashimoto's thyroiditis in August with a positive TPO.      ASSESSMENT AND PLAN:  We discussed AL amyloidosis at length today, both in terms of disease pathology and treatment options.  I will plan to complete the workup with a bone marrow biopsy and additional labs; however her kidney biopsy is diagnostic of AL amyloid and will require systemic treatment.  We reviewed Daratumumab-CyBorD, which was shown in the ANDROMEDA study (DOI: 10.1056/[id number]) to result in higher frequencies of hematologic complete response and survival free from major organ deterioration or hematologic progression compared to CyBorD alone.  We reviewed drug dosing as well as potential side effects; Mrs. Brown had a number of appropriate questions that were answered.    Echo 1/3/24 is suspicious for cardiac amyloid, although cardiac biomarkers are essentially normal.  I reached out to our cardiac amyloid group and they will see her later this month with a planned cardiac MRI.  The presence of cardiac amyloid would not change induction therapy but would affect disease response monitoring and later treatment considerations.    Reported GI symptoms are concerning for possible amyloid involvement.  She has been referred to GI for an endoscopy and will get a duodenal biopsy which should be assessed for the presence of AL amyloid deposition.    She has a history of thyroiditis with a positive TPO in August, which would not typically be seen with amyloid involvement of the thyroid.    Orthostatic vitals checked in clinic today were unremarkable.    We briefly discussed the role of transplant in the management of AL amyloidosis.  I noted that many amyloid patients are  ultimately not transplant candidates due to the degree of organ involvement/damage from amyloid.  I will place a referral today and plan to see her in transplant clinic in approximately 2 months.  If Mrs. Brown is a transplant candidate she should still complete several cycles of induction therapy prior to transplant.      Plan:   - Obtain BMB, labs to complete staging next week  - Start Zenia-CyBorD shortly after BMB  - See cardiology later this month  - Continue to follow with nephrology  - Upper endoscopy to assess for GI involvement of amyloid upcoming  - PET/CT ordered, should not delay treatment start  - Transplant referral placed    I spent 60 minutes in the care of this patient today, which included time necessary for preparation for the visit, obtaining history, ordering medications/tests/procedures as medically indicated, review of pertinent medical literature, counseling of the patient, communication of recommendations to the care team, and documentation time.    Jomar Chandler MD    ROS:    10 point ROS neg other than the symptoms noted above in the HPI.      Past Medical History:   Diagnosis Date    Family history of colon cancer     Colonoscoy q5 years next 9/2020       Past Surgical History:   Procedure Laterality Date    COLONOSCOPY N/A 9/28/2015    Procedure: COLONOSCOPY;  Surgeon: Eugenio Travis MD;  Location: [facility]    IR RENAL BIOPSY RIGHT  12/21/2023    TONSILLECTOMY         Family History   Problem Relation Age of Onset    Colon Cancer Maternal Grandmother 91    Hypertension Mother     Hyperlipidemia Mother     Cancer Mother 65        GIST    Other Cancer Mother     Coronary Artery Disease Father         MI    Thyroid Disease Sister     Asthma Daughter        Social History     Tobacco Use    Smoking status: Never     Passive exposure: Past    Smokeless tobacco: Never    Tobacco comments:     Parents smoked during childhood in the house   Vaping Use    Vaping Use: Never used  "  Substance Use Topics    Alcohol use: Not Currently     Alcohol/week: 0.0 standard drinks of alcohol     Comment: 1-2 times/month    Drug use: No         Allergies   Allergen Reactions    Latex Itching    Seasonal Allergies         Current Outpatient Medications   Medication Sig Dispense Refill    furosemide (LASIX) 20 MG tablet Take 1 tablet (20 mg) by mouth daily 90 tablet 3    levothyroxine (SYNTHROID/LEVOTHROID) 200 MCG tablet Take 1 tablet (200 mcg) by mouth daily 90 tablet 3    levothyroxine (SYNTHROID/LEVOTHROID) 25 MCG tablet Take 1 tablet (25 mcg) by mouth daily      liothyronine (CYTOMEL) 5 MCG tablet Take 5 mcg by mouth 2 times daily      lisinopril (ZESTRIL) 2.5 MG tablet Take 1 tablet (2.5 mg) by mouth daily 90 tablet 3    rosuvastatin (CRESTOR) 10 MG tablet Take 1 tablet (10 mg) by mouth daily 90 tablet 3         Physical Exam:     Vital Signs: /75   Pulse 103   Temp 98.1  F (36.7  C) (Oral)   Resp 16   Ht 1.643 m (5' 4.69\")   Wt 83.9 kg (185 lb)   SpO2 96%   BMI 31.09 kg/m      KPS:  90  General Appearance: alert, and no distress  Eyes: PERRL, conjunctiva and lids normal, sclera nonicteric  Ears/Nose/M/Throat: Oral mucosa and posterior oropharynx normal, moist mucous membranes  Neck supple, non-tender, free range of motion, no adenopathy  Cardio/Vascular:regular rate and rhythm, normal S1 and S2, no murmur  Resp Effort And Auscultation: Normal - Clear to auscultation without rales, rhonchi, or wheezing.  GI: soft, nontender, bowel sounds present in all four quadrants, no hepatosplenomegaly  Lymphatics:no significant enlargement of lymph nodes globally   Musculoskeletal: Musculoskeletal normal  Edema: none  Skin: Skin color, texture, turgor normal. No rashes or lesions.  Neurologic: Gait normal.  Sensation grossly WNL.  Psych/Affect: Mood and affect are appropriate.    Blood Counts     Recent Labs   Lab Test 01/08/24  1201 12/21/23  0903 11/17/23  1213 05/22/23  1605 12/19/22  1802   HGB " 14.6 15.1 14.6   < >  --    HCT 43.5 46.5 45.3  --   --    WBC 10.7 8.7 11.0  --  12.3*   ANEUTAUTO  --   --   --   --  6.6   ALYMPAUTO  --   --   --   --  4.3   AMONOAUTO  --   --   --   --  1.0   AEOSAUTO  --   --   --   --  0.4   ABSBASO  --   --   --   --  0.1    346 406  --   --     < > = values in this interval not displayed.       Chemistries     Basic Panel  Recent Labs   Lab Test 01/08/24  1201 11/28/23  1229 11/22/23  0819    140 140   POTASSIUM 4.7 3.9 4.1   CHLORIDE 106 105 105   CO2 24 30* 30*   BUN 17.0 18.1 17.5   CR 1.04* 1.11* 1.12*   * 118* 105*        Calcium, Magnesium, Phosphorus  Recent Labs   Lab Test 01/08/24  1201 11/28/23  1229 11/22/23  0819   TANNER 8.6 9.1 8.9   PHOS 4.3 3.9 4.0        LFTs  Recent Labs   Lab Test 01/08/24  1201 11/28/23  1229 11/22/23  0819 11/16/23  1447 07/17/23  0828   BILITOTAL 0.2  --   --  0.2 0.3   ALKPHOS 120  --   --  125 131*   AST 28  --   --  25 30   ALT 32  --   --  24 31   ALBUMIN 2.0* 2.2* 2.2* 2.2* 2.3*       LDH  No lab results found.    B2-Microglobulin  No lab results found.      Immunoglobulins     No lab results found.    No lab results found.    No lab results found.      Monocloncal Protein Studies     M spike    Recent Labs   Lab Test 01/08/24 1201 11/28/23  1229   ELPM 0.0 0.0       [place]osa FLC    Recent Labs   Lab Test 01/08/24 1201 11/28/23  1229   KFLCA 3.06* 3.05*       Lambda FLC    Recent Labs   Lab Test 01/08/24  1201 11/28/23  1229   LFLCA 8.31* 7.82*       FLC Ratio    Recent Labs   Lab Test 01/08/24  1201 11/28/23  1229   KLRA 0.37 0.39         Bone Marrow Biopsy       Morphology    No results found for this or any previous visit (from the past 8760 hour(s)).      Echocardiogram       Results for orders placed during the hospital encounter of 01/03/24    ECHOCARDIOGRAM COMPLETE    Status: Normal 1/3/2024    Narrative  [id number]  [id number]  [id number]  [id number]^LEENA^IQRA^DOMINIC    [facility]  "[place]  Echocardiography Laboratory  [address] [place] [address]    Name: ADE NAVARRO  MRN: [id number]  : 1970  Study Date: 2024 09:59 AM  Age: 53 yrs  Gender: Female  Patient Location: [facility]  Reason For Study: Renal amyloidosis (H), Renal amyloidosis (H)  History: hyperlipidemia  Ordering Physician: IQRA STERN  Referring Physician: Alin Torres  Performed By: Beronica Dang    BSA: 1.9 m2  Height: 64 in  Weight: 186 lb  HR: 82  BP: 90/60 mmHg  ______________________________________________________________________________  Procedure  Complete Echo Adult. Myocardial Strain Imaging performed.  ______________________________________________________________________________  Interpretation Summary    There is mild concentric left ventricular hypertrophy.  The right ventricle is normal in size and function.  There is borderline right ventricular hypertrophy.  Global peak LV longitudinal strain is averaged at -15.3%. This suggests  abnormal strain (normal <-18%).  The visual ejection fraction is estimated at 54-56%.  Trivial posterior pericardial effusion  Clinical history is listed as renal amyloid--on this echo the atria are normal  size, the valves are not thickend but there is mild LVH and borderline RVH,  there is questionable \"scintillation\" of LV myocardium, the global  longitudinal strain is abnormal and the best strain values are at the apex  (borderline \"apical sparing\" strain pattern) and there is trace pericardial  effusion along with borderline criteria for diastolic dysfunction grade2--take  together this could represent some features of cardiac amyloid. Additional  studies such as cardiac MRI, cardiac biopsy etc may be useful.  ______________________________________________________________________________  Left Ventricle  The left ventricle is normal in size. There is mild concentric left  ventricular hypertrophy. Global peak LV longitudinal strain is averaged " at -  15.3%. This suggests abnormal strain (normal <-18%). The visual ejection  fraction is estimated at 54-56%. Normal left ventricular wall motion. There is  no thrombus seen in the left ventricle.    Right Ventricle  There is borderline right ventricular hypertrophy. The right ventricle is  normal in size and function.    Atria  Normal left atrial size. Right atrial size is normal.    Mitral Valve  The mitral valve leaflets appear normal. There is no evidence of stenosis,  fluttering, or prolapse. There is physiologic mitral regurgitation.    Tricuspid Valve  Right ventricular systolic pressure could not be approximated due to  inadequate tricuspid regurgitation.    Aortic Valve  Normal tricuspid aortic valve.    Pulmonic Valve  The pulmonic valve is not well seen, but is grossly normal.    Vessels  Normal size aorta. The inferior vena cava is normal.    Pericardium  Trivial posterior pericardial effusion.    Rhythm  Sinus rhythm was noted.  ______________________________________________________________________________  MMode/2D Measurements & Calculations  IVSd: 1.3 cm    LVIDd: 3.7 cm  LVIDs: 2.2 cm  LVPWd: 1.4 cm  FS: 39.1 %  LV mass(C)d: 176.0 grams  LV mass(C)dI: 92.8 grams/m2  Ao root diam: 2.6 cm  LA dimension: 3.0 cm  asc Aorta Diam: 3.1 cm  LA/Ao: 1.1  Ao root diam index Ht(cm/m): 1.6  Ao root diam index BSA (cm/m2): 1.4  Asc Ao diam index BSA (cm/m2): 1.6  Asc Ao diam index Ht(cm/m): 1.9  LA Volume (BP): 26.8 ml    LA Volume Index (BP): 14.1 ml/m2  RWT: 0.77  TAPSE: 1.7 cm    Doppler Measurements & Calculations  MV E max benedicto: 77.6 cm/sec  MV A max benedicto: 61.7 cm/sec  MV E/A: 1.3  MV dec time: 0.17 sec  Ao V2 max: 124.2 cm/sec  Ao max P.0 mmHg  LV V1 max P.4 mmHg  LV V1 max: 76.7 cm/sec  PA V2 max: 85.7 cm/sec  PA max PG: 3.0 mmHg  PA acc time: 0.10 sec  AV Benedicto Ratio (DI): 0.62  E/E' av.8  Lateral E/e': 11.3  Medial E/e': 14.3  RV S Benedicto: 11.7  cm/sec    ______________________________________________________________________________  Report approved by: Danie Ragland 01/03/2024 02:25 PM        PET Scan       No results found for this or any previous visit.       Vivian understood the above assessment and recommendations.  Multiple questions answered.  No barriers to learning identified.       ------------------------------------------------------------------------------------------------------------------------------------------------    Patient Care Team         Relationship Specialty Notifications Start End    Alin Torres PA-C PCP - General Family Medicine  11/4/20     Phone: [phone] Fax: [phone] [address] [address] [facility] [address]    Sarthak Ontiveros MD Assigned OBGYN Provider   9/24/22     Phone: [phone] Fax: [phone]         [address] [address]    Alin Torres PA-C Assigned PCP   12/31/22     Phone: [phone] Fax: [phone]         [address] [address]    Carmen Waggoner APRN CNP Nurse Practitioner Gastroenterology  11/21/23     Phone: [phone] Fax: [phone]         [address] [place] [address]    Audrey Sen DO Assigned Nephrology Provider   12/9/23     Phone: [phone] Fax: [phone]         [address] [facility] [address] [facility] [address]    Musa Adrian MD Physician Hematology & Oncology  12/26/23     Phone: [phone] Fax: [phone]         [address] [facility] [address] [facility] [address]    Jomar Chandler MD MD Hematology & Oncology  12/26/23     Phone: [phone] Fax: [phone]         [address] [place] [address] [address] [facility] [address]    Gaston Flores MD MD Cardiovascular Disease  1/5/24     Phone: [phone] Fax: [phone] [address] [facility] [address]

## 2024-01-12 ENCOUNTER — PATIENT OUTREACH (OUTPATIENT)
Dept: ONCOLOGY | Facility: CLINIC | Age: 54
End: 2024-01-12
Payer: COMMERCIAL

## 2024-01-12 ENCOUNTER — ONCOLOGY VISIT (OUTPATIENT)
Dept: TRANSPLANT | Facility: CLINIC | Age: 54
End: 2024-01-12
Attending: INTERNAL MEDICINE
Payer: COMMERCIAL

## 2024-01-12 ENCOUNTER — MYC MEDICAL ADVICE (OUTPATIENT)
Dept: NEPHROLOGY | Facility: CLINIC | Age: 54
End: 2024-01-12

## 2024-01-12 VITALS
HEIGHT: 65 IN | HEART RATE: 103 BPM | WEIGHT: 185 LBS | BODY MASS INDEX: 30.82 KG/M2 | DIASTOLIC BLOOD PRESSURE: 75 MMHG | OXYGEN SATURATION: 96 % | TEMPERATURE: 98.1 F | RESPIRATION RATE: 16 BRPM | SYSTOLIC BLOOD PRESSURE: 107 MMHG

## 2024-01-12 DIAGNOSIS — E85.4 RENAL AMYLOIDOSIS (H): ICD-10-CM

## 2024-01-12 DIAGNOSIS — E55.9 VITAMIN D DEFICIENCY: Primary | ICD-10-CM

## 2024-01-12 DIAGNOSIS — E85.81 AL AMYLOIDOSIS (H): Primary | ICD-10-CM

## 2024-01-12 DIAGNOSIS — N08 RENAL AMYLOIDOSIS (H): ICD-10-CM

## 2024-01-12 LAB
ALBUMIN MFR UR ELPH: 78.8 %
ALPHA1 GLOB MFR UR ELPH: 7.5 %
ALPHA2 GLOB MFR UR ELPH: 3.8 %
B-GLOBULIN MFR UR ELPH: 7.6 %
GAMMA GLOB MFR UR ELPH: 2.3 %
LOCATION OF TASK: ABNORMAL
M PROTEIN MFR UR ELPH: 0 %
PROT PATTERN UR ELPH-IMP: ABNORMAL

## 2024-01-12 PROCEDURE — 99205 OFFICE O/P NEW HI 60 MIN: CPT | Performed by: STUDENT IN AN ORGANIZED HEALTH CARE EDUCATION/TRAINING PROGRAM

## 2024-01-12 PROCEDURE — 99214 OFFICE O/P EST MOD 30 MIN: CPT | Performed by: STUDENT IN AN ORGANIZED HEALTH CARE EDUCATION/TRAINING PROGRAM

## 2024-01-12 ASSESSMENT — PAIN SCALES - GENERAL: PAINLEVEL: NO PAIN (0)

## 2024-01-12 NOTE — PROGRESS NOTES
"[facility]: Cancer Care Initial Note                                    Discussion with Patient:                                                      Met with Mary Ann and spouse, Aneudy, after office visit with Dr. Chandler. Introduced self as RN Care Coordinator. Provided [facility] Guidebook folder and discussed included materials with patient.  Distributed business cards and contact information for [facility]. Discussed roles of RNCC, MD, ZOHREH, nurse triage line, and clinic coordinators. Discussed how to get in contact with different team members via [employer] for non emergency questions and at [phone], which has options to talk with a Nurse available 24/7. Provided overview of supportive services at [facility] including SW, Cancer Rehab, palliative care, oncology dietitian, and oncology behavioral health providers (psychology, psychiatry, counseling).    Discussed chemo education and what to expect at infusion appointments. Provided printed literature on DaraCyBorD. Reviewed pertinent sections of [facility] Care Guidebook, including \"Getting Ready for Chemotherapy\". Reviewed possible side effects, when to contact your doctor or health care provider, and self care tips sections. Reviewed treatment schedule including cycle length, lab monitoring, and prn medications.    Auth to Discuss PHI form completed- form placed in scanning.           Goals          General     Other (pt-stated)      Notes - Note created  1/12/2024 11:10 AM by Kristen Henley RN     Goal Statement: I will use my clinic and care team resources as directed.  Date Goal set: 1/12/2024  Barriers: disease burden  Strengths: support, coping, motivation, health awareness, and involvement with care team  Date to Achieve By: ongoing  Patient expressed understanding of goal: Yes  Action steps to achieve this goal:  I will contact triage with new, worsening or uncontrolled symptoms.   I will contact triage with temperature over " 100.4  I will call with difficulties of scheduling and/or transportation.   I will request needed refills when there are 7 days of medication remaining.   I will not send urgent or symptomatic messages through [employer].   I will contact scheduling to arrange or make changes in my appointments.                Assessment:                                                      Initial  Current living arrangement:: I live in a private home with family  Type of residence:: Private home - stairs  Informal Support system:: Family  Equipment Currently Used at Home: none  Bed or wheelchair confined:: No  Mobility Status: Independent  Transportation means:: Regular car;Family  Referrals Placed: None    Plan of Care Education Review:   Assessment completed with:: Patient;Spouse or significant other    Plan of Care Education   Yearly learning assessment completed?: Yes (see Education tab)  Diagnosis:: Amyloidosis  Does patient understand diagnosis?: Yes  Tx plan/regimen:: DaraCyBorD  Does patient understand treatment plan/regimen?: Yes  Preparing for treatment:: Reviewed treatment preparation information with patient (vascular access, day of chemo, visitor policy, what to bring, etc.)  Vascular access education provided for:: Peripheral IV  Side effect education:: Diarrhea/Constipation;Fatigue;Hair loss;Immune-mediated effects;Infection;Lab value monitoring (anemia, neutropenia, thrombocytopenia);Mylosuppression;Nausea/Vomiting;Mouth sores;Neuropathy;Skin changes (Went over general side effects in education booklet)  Coping - concerns/fears reviewed with patient:: Yes  Plan of Care:: ZOHREH follow-up appointment;Lab appointment;Treatment schedule  When to call provider:: Bleeding;Increased shortness of breath;New/worsening pain;Shaking chills;Temperature >100.4F;Uncontrolled diarrhea/constipation;Uncontrolled nausea/vomiting (Went over when to call the doctor section in booklet)  Additional education provided for: : Neutropenic  precautions;Bleeding precautions  Procedure education provided for: : Bone marrow biopsy;Blood product transfusion    Evaluation of Learning  Patient Education Provided: Yes  Readiness:: Eager;Acceptance  Method:: Booklet/Handout;Literature;Explanation  Response:: Verbalizes understanding           Intervention/Education provided during outreach:                                                       Further POC:     - BMB asap  - Labs, ZOHREH, and infusion for C1D1 (to be scheduled)  - PET scan (to be scheduled)     Patient verbalizes understanding and has no questions or concerns at this time. Has contact information for [facility] if they have any further needs.    Kristen Henley, BSN, RN  RN Care Coordinator   [phone]

## 2024-01-12 NOTE — LETTER
1/12/2024         RE: Vivian Brown  [address] [facility] [address]        Dear Colleague,    Thank you for referring your patient, Vivian Brown, to the [facility] BLOOD AND MARROW TRANSPLANT PROGRAM [place]. Please see a copy of my visit note below.         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      Disease presentation and baseline characteristics:   12/21/2023:  Final Diagnosis   A. kidney biopsy (needle):     Amyloidosis of the kidney, AL-type, lambda light chain-restricted, affecting the glomeruli, interstitium, and arterioles (see comment)     Mild chronic changes of the parenchyma, including:   - focal global glomerulosclerosis (3% of glomeruli)   - focal tubular atrophy and interstitial fibrosis (5% of the cortex)   - severe arterial sclerosis   Electronically signed by Joe Garcia MD on 12/23/2023 at  3:21 PM   Comment  UULAB   The biopsy reveals findings diagnostic of amyloidosis; the amyloid shows lambda light chain-restriction (AL amyloidosis), and the deposits are Congo red positive. The amyloid deposits affect the glomeruli extensively, and interstitium and arterioles focally. Other potential complications of paraproteins in the kidney are not seen, in particular, there is no evidence of light chain cast nephropathy, light chain deposition disease, or light chain tubulopathy.     1/8/24: NT-Pro , Troponin T 15    Date Treatment Name Response Side Effects / Toxicities                HPI:  Please see my entry above for hematologic history.  Mrs. Brown presents today accompanied by Aneudy to discuss her recent diagnosis of AL amyloidosis.  She reports that she has had progressive fatigue and swelling in her legs over the past several years, worsening significantly over the past six months.  She works in physical therapy and exercises regularly, but noted that she was no  longer able to exercise to the same degree over the past year.  She notes she has a history of carpal tunnel in her right arm, although this is mild and has been present for several years without any changes.  She denies any numbness or tingling of the extremities.  She denies tongue swelling/thickness or difficulty swallowing.  She does endorse abdominal discomfort and will be getting an upper endoscopy in the near future.  She denies lightheadedness/dizziness when standing rapidly.  She denies shortness of breath/chest pain.  She was diagnosed with Hashimoto's thyroiditis in August with a positive TPO.      ASSESSMENT AND PLAN:  We discussed AL amyloidosis at length today, both in terms of disease pathology and treatment options.  I will plan to complete the workup with a bone marrow biopsy and additional labs; however her kidney biopsy is diagnostic of AL amyloid and will require systemic treatment.  We reviewed Daratumumab-CyBorD, which was shown in the ANDROMEDA study (DOI: 10.1056/IKHOzi5096864) to result in higher frequencies of hematologic complete response and survival free from major organ deterioration or hematologic progression compared to CyBorD alone.  We reviewed drug dosing as well as potential side effects; Mrs. Brown had a number of appropriate questions that were answered.    Echo 1/3/24 is suspicious for cardiac amyloid, although cardiac biomarkers are essentially normal.  I reached out to our cardiac amyloid group and they will see her later this month with a planned cardiac MRI.  The presence of cardiac amyloid would not change induction therapy but would affect disease response monitoring and later treatment considerations.    Reported GI symptoms are concerning for possible amyloid involvement.  She has been referred to GI for an endoscopy and will get a duodenal biopsy which should be assessed for the presence of AL amyloid deposition.    She has a history of thyroiditis with a positive TPO  in August, which would not typically be seen with amyloid involvement of the thyroid.    Orthostatic vitals checked in clinic today were unremarkable.    We briefly discussed the role of transplant in the management of AL amyloidosis.  I noted that many amyloid patients are ultimately not transplant candidates due to the degree of organ involvement/damage from amyloid.  I will place a referral today and plan to see her in transplant clinic in approximately 2 months.  If Mrs. Brown is a transplant candidate she should still complete several cycles of induction therapy prior to transplant.      Plan:   - Obtain BMB, labs to complete staging next week  - Start Zenia-CyBorD shortly after BMB  - See cardiology later this month  - Continue to follow with nephrology  - Upper endoscopy to assess for GI involvement of amyloid upcoming  - PET/CT ordered, should not delay treatment start  - Transplant referral placed    I spent 60 minutes in the care of this patient today, which included time necessary for preparation for the visit, obtaining history, ordering medications/tests/procedures as medically indicated, review of pertinent medical literature, counseling of the patient, communication of recommendations to the care team, and documentation time.    Jomar Chandler MD    ROS:    10 point ROS neg other than the symptoms noted above in the HPI.      Past Medical History:   Diagnosis Date    Family history of colon cancer     Colonoscoy q5 years next 9/2020       Past Surgical History:   Procedure Laterality Date    COLONOSCOPY N/A 9/28/2015    Procedure: COLONOSCOPY;  Surgeon: Eugenio Travis MD;  Location: [facility]    IR RENAL BIOPSY RIGHT  12/21/2023    TONSILLECTOMY         Family History   Problem Relation Age of Onset    Colon Cancer Maternal Grandmother 91    Hypertension Mother     Hyperlipidemia Mother     Cancer Mother 65        GIST    Other Cancer Mother     Coronary Artery Disease Father         MI     "Thyroid Disease Sister     Asthma Daughter        Social History     Tobacco Use    Smoking status: Never     Passive exposure: Past    Smokeless tobacco: Never    Tobacco comments:     Parents smoked during childhood in the house   Vaping Use    Vaping Use: Never used   Substance Use Topics    Alcohol use: Not Currently     Alcohol/week: 0.0 standard drinks of alcohol     Comment: 1-2 times/month    Drug use: No         Allergies   Allergen Reactions    Latex Itching    Seasonal Allergies         Current Outpatient Medications   Medication Sig Dispense Refill    furosemide (LASIX) 20 MG tablet Take 1 tablet (20 mg) by mouth daily 90 tablet 3    levothyroxine (SYNTHROID/LEVOTHROID) 200 MCG tablet Take 1 tablet (200 mcg) by mouth daily 90 tablet 3    levothyroxine (SYNTHROID/LEVOTHROID) 25 MCG tablet Take 1 tablet (25 mcg) by mouth daily      liothyronine (CYTOMEL) 5 MCG tablet Take 5 mcg by mouth 2 times daily      lisinopril (ZESTRIL) 2.5 MG tablet Take 1 tablet (2.5 mg) by mouth daily 90 tablet 3    rosuvastatin (CRESTOR) 10 MG tablet Take 1 tablet (10 mg) by mouth daily 90 tablet 3         Physical Exam:     Vital Signs: /75   Pulse 103   Temp 98.1  F (36.7  C) (Oral)   Resp 16   Ht 1.643 m (5' 4.69\")   Wt 83.9 kg (185 lb)   SpO2 96%   BMI 31.09 kg/m      KPS:  90  General Appearance: alert, and no distress  Eyes: PERRL, conjunctiva and lids normal, sclera nonicteric  Ears/Nose/M/Throat: Oral mucosa and posterior oropharynx normal, moist mucous membranes  Neck supple, non-tender, free range of motion, no adenopathy  Cardio/Vascular:regular rate and rhythm, normal S1 and S2, no murmur  Resp Effort And Auscultation: Normal - Clear to auscultation without rales, rhonchi, or wheezing.  GI: soft, nontender, bowel sounds present in all four quadrants, no hepatosplenomegaly  Lymphatics:no significant enlargement of lymph nodes globally   Musculoskeletal: Musculoskeletal normal  Edema: none  Skin: Skin " color, texture, turgor normal. No rashes or lesions.  Neurologic: Gait normal.  Sensation grossly WNL.  Psych/Affect: Mood and affect are appropriate.    Blood Counts     Recent Labs   Lab Test 01/08/24  1201 12/21/23  0903 11/17/23  1213 05/22/23  1605 12/19/22  1802   HGB 14.6 15.1 14.6   < >  --    HCT 43.5 46.5 45.3  --   --    WBC 10.7 8.7 11.0  --  12.3*   ANEUTAUTO  --   --   --   --  6.6   ALYMPAUTO  --   --   --   --  4.3   AMONOAUTO  --   --   --   --  1.0   AEOSAUTO  --   --   --   --  0.4   ABSBASO  --   --   --   --  0.1    346 406  --   --     < > = values in this interval not displayed.       Chemistries     Basic Panel  Recent Labs   Lab Test 01/08/24  1201 11/28/23  1229 11/22/23  0819    140 140   POTASSIUM 4.7 3.9 4.1   CHLORIDE 106 105 105   CO2 24 30* 30*   BUN 17.0 18.1 17.5   CR 1.04* 1.11* 1.12*   * 118* 105*        Calcium, Magnesium, Phosphorus  Recent Labs   Lab Test 01/08/24  1201 11/28/23  1229 11/22/23  0819   TANNER 8.6 9.1 8.9   PHOS 4.3 3.9 4.0        LFTs  Recent Labs   Lab Test 01/08/24  1201 11/28/23  1229 11/22/23  0819 11/16/23  1447 07/17/23  0828   BILITOTAL 0.2  --   --  0.2 0.3   ALKPHOS 120  --   --  125 131*   AST 28  --   --  25 30   ALT 32  --   --  24 31   ALBUMIN 2.0* 2.2* 2.2* 2.2* 2.3*       LDH  No lab results found.    B2-Microglobulin  No lab results found.      Immunoglobulins     No lab results found.    No lab results found.    No lab results found.      Monocloncal Protein Studies     M spike    Recent Labs   Lab Test 01/08/24  1201 11/28/23  1229   ELPM 0.0 0.0       [place] FLC    Recent Labs   Lab Test 01/08/24  1201 11/28/23  1229   KFLCA 3.06* 3.05*       Lambda FLC    Recent Labs   Lab Test 01/08/24  1201 11/28/23  1229   LFLCA 8.31* 7.82*       FLC Ratio    Recent Labs   Lab Test 01/08/24  1201 11/28/23  1229   KLRA 0.37 0.39         Bone Marrow Biopsy       Morphology    No results found for this or any previous visit (from the past 8705  "hour(s)).      Echocardiogram       Results for orders placed during the hospital encounter of 24    ECHOCARDIOGRAM COMPLETE    Status: Normal 1/3/2024    Narrative  [id number]  [id number]  [id number]  [id number]^LEENA^IQRA^DOMINIC    [facility]  Echocardiography Laboratory  [address] [place] [address]    Name: ADE NAVARRO  MRN: [id number]  : 1970  Study Date: 2024 09:59 AM  Age: 53 yrs  Gender: Female  Patient Location: [facility]  Reason For Study: Renal amyloidosis (H), Renal amyloidosis (H)  History: hyperlipidemia  Ordering Physician: IQRA STERN  Referring Physician: Alin Torres  Performed By: Beronica Dang    BSA: 1.9 m2  Height: 64 in  Weight: 186 lb  HR: 82  BP: 90/60 mmHg  ______________________________________________________________________________  Procedure  Complete Echo Adult. Myocardial Strain Imaging performed.  ______________________________________________________________________________  Interpretation Summary    There is mild concentric left ventricular hypertrophy.  The right ventricle is normal in size and function.  There is borderline right ventricular hypertrophy.  Global peak LV longitudinal strain is averaged at -15.3%. This suggests  abnormal strain (normal <-18%).  The visual ejection fraction is estimated at 54-56%.  Trivial posterior pericardial effusion  Clinical history is listed as renal amyloid--on this echo the atria are normal  size, the valves are not thickend but there is mild LVH and borderline RVH,  there is questionable \"scintillation\" of LV myocardium, the global  longitudinal strain is abnormal and the best strain values are at the apex  (borderline \"apical sparing\" strain pattern) and there is trace pericardial  effusion along with borderline criteria for diastolic dysfunction grade2--take  together this could represent some features of cardiac amyloid. Additional  studies such as cardiac MRI, cardiac " biopsy etc may be useful.  ______________________________________________________________________________  Left Ventricle  The left ventricle is normal in size. There is mild concentric left  ventricular hypertrophy. Global peak LV longitudinal strain is averaged at -  15.3%. This suggests abnormal strain (normal <-18%). The visual ejection  fraction is estimated at 54-56%. Normal left ventricular wall motion. There is  no thrombus seen in the left ventricle.    Right Ventricle  There is borderline right ventricular hypertrophy. The right ventricle is  normal in size and function.    Atria  Normal left atrial size. Right atrial size is normal.    Mitral Valve  The mitral valve leaflets appear normal. There is no evidence of stenosis,  fluttering, or prolapse. There is physiologic mitral regurgitation.    Tricuspid Valve  Right ventricular systolic pressure could not be approximated due to  inadequate tricuspid regurgitation.    Aortic Valve  Normal tricuspid aortic valve.    Pulmonic Valve  The pulmonic valve is not well seen, but is grossly normal.    Vessels  Normal size aorta. The inferior vena cava is normal.    Pericardium  Trivial posterior pericardial effusion.    Rhythm  Sinus rhythm was noted.  ______________________________________________________________________________  MMode/2D Measurements & Calculations  IVSd: 1.3 cm    LVIDd: 3.7 cm  LVIDs: 2.2 cm  LVPWd: 1.4 cm  FS: 39.1 %  LV mass(C)d: 176.0 grams  LV mass(C)dI: 92.8 grams/m2  Ao root diam: 2.6 cm  LA dimension: 3.0 cm  asc Aorta Diam: 3.1 cm  LA/Ao: 1.1  Ao root diam index Ht(cm/m): 1.6  Ao root diam index BSA (cm/m2): 1.4  Asc Ao diam index BSA (cm/m2): 1.6  Asc Ao diam index Ht(cm/m): 1.9  LA Volume (BP): 26.8 ml    LA Volume Index (BP): 14.1 ml/m2  RWT: 0.77  TAPSE: 1.7 cm    Doppler Measurements & Calculations  MV E max romero: 77.6 cm/sec  MV A max romero: 61.7 cm/sec  MV E/A: 1.3  MV dec time: 0.17 sec  Ao V2 max: 124.2 cm/sec  Ao max P.0  mmHg  LV V1 max P.4 mmHg  LV V1 max: 76.7 cm/sec  PA V2 max: 85.7 cm/sec  PA max PG: 3.0 mmHg  PA acc time: 0.10 sec  AV Benedicto Ratio (DI): 0.62  E/E' av.8  Lateral E/e': 11.3  Medial E/e': 14.3  RV S Benedicto: 11.7 cm/sec    ______________________________________________________________________________  Report approved by: Danie Ragland 2024 02:25 PM        PET Scan       No results found for this or any previous visit.       Vivian understood the above assessment and recommendations.  Multiple questions answered.  No barriers to learning identified.       ------------------------------------------------------------------------------------------------------------------------------------------------    Patient Care Team         Relationship Specialty Notifications Start End    Alin Torres PA-C PCP - General Family Medicine  20     Phone: [phone] Fax: [phone] [address] [facility] [address]    Sarthak Ontiveros MD Assigned OBGYN Provider   22     Phone: [phone] Fax: [phone]         [address] [address]    Alin Torres PA-C Assigned PCP   22     Phone: [phone] Fax: [phone]         [address] [place] [address]    Carmen Waggoner APRN CNP Nurse Practitioner Gastroenterology  23     Phone: [phone] Fax: [phone]          [address] [place] [address]    Audrey Sen, DO Assigned Nephrology Provider   23     Phone: [phone] Fax: [phone]         [address] [facility] [address] [facility] [address]    Musa Adrian MD Physician Hematology & Oncology  23     Phone: [phone] Fax: [phone]         [address] [facility] [address] [facility] [address]    Jomar Chandler MD MD Hematology & Oncology  23     Phone: [phone] Fax: [phone]         [address] [facility] [address] [facility] [address]    Gaston Flores MD MD Cardiovascular Disease  24     Phone:  [phone] Fax: [phone] [address] [facility] [address]            Jomar Chandler MD

## 2024-01-12 NOTE — NURSING NOTE
"Oncology Rooming Note    January 12, 2024 8:50 AM   Vivian Brown is a 53 year old female who presents for:    Chief Complaint   Patient presents with    Oncology Clinic Visit     Renal amyloidosis     Initial Vitals: /70 (BP Location: Right arm, Patient Position: Sitting, Cuff Size: Adult Large)   Pulse 101   Temp 98.1  F (36.7  C) (Oral)   Resp 20   Ht 1.643 m (5' 4.69\")   Wt 83.9 kg (185 lb)   SpO2 97%   BMI 31.09 kg/m   Estimated body mass index is 31.09 kg/m  as calculated from the following:    Height as of this encounter: 1.643 m (5' 4.69\").    Weight as of this encounter: 83.9 kg (185 lb). Body surface area is 1.96 meters squared.  No Pain (0) Comment: Data Unavailable   No LMP recorded. (Menstrual status: IUD).  Allergies reviewed: Yes  Medications reviewed: Yes    Medications: Medication refills not needed today.  Pharmacy name entered into [facility]:    [facility] [place], [place] - [address] [facility] [place], [place] - [address] [id number] - [place], [place] - [address] [id number] - [place], [place] - [address]  [facility] - [place], [place] - [address]    Frailty Screening:   Is the patient here for a new oncology consult visit in cancer care? 1. Yes. Over the past month, have you experienced difficulty or required a caregiver to assist with:   1. Balance, walking or general mobility (including any falls)? NO  2. Completion of self-care tasks such as bathing, dressing, toileting, grooming/hygiene?  NO  3. Concentration or memory that affects your daily life?  NO       Clinical concerns:  none      Linda Dahl              "

## 2024-01-14 PROBLEM — E85.81 AL AMYLOIDOSIS (H): Status: ACTIVE | Noted: 2024-01-14

## 2024-01-14 RX ORDER — DIPHENHYDRAMINE HCL 25 MG
50 CAPSULE ORAL
Status: CANCELLED | OUTPATIENT
Start: 2024-02-16

## 2024-01-14 RX ORDER — EPINEPHRINE 1 MG/ML
0.3 INJECTION, SOLUTION INTRAMUSCULAR; SUBCUTANEOUS EVERY 5 MIN PRN
Status: CANCELLED | OUTPATIENT
Start: 2024-02-16

## 2024-01-14 RX ORDER — ACETAMINOPHEN 325 MG/1
650 TABLET ORAL
Status: CANCELLED | OUTPATIENT
Start: 2024-02-16

## 2024-01-14 RX ORDER — MONTELUKAST SODIUM 10 MG/1
10 TABLET ORAL ONCE
Status: CANCELLED | OUTPATIENT
Start: 2024-01-19

## 2024-01-14 RX ORDER — HEPARIN SODIUM,PORCINE 10 UNIT/ML
5-20 VIAL (ML) INTRAVENOUS DAILY PRN
Status: CANCELLED | OUTPATIENT
Start: 2024-02-16

## 2024-01-14 RX ORDER — EPINEPHRINE 1 MG/ML
0.3 INJECTION, SOLUTION INTRAMUSCULAR; SUBCUTANEOUS EVERY 5 MIN PRN
Status: CANCELLED | OUTPATIENT
Start: 2024-01-27

## 2024-01-14 RX ORDER — ALBUTEROL SULFATE 90 UG/1
1-2 AEROSOL, METERED RESPIRATORY (INHALATION)
Status: CANCELLED
Start: 2024-01-27

## 2024-01-14 RX ORDER — LORAZEPAM 2 MG/ML
0.5 INJECTION INTRAMUSCULAR EVERY 4 HOURS PRN
Status: CANCELLED | OUTPATIENT
Start: 2024-01-19

## 2024-01-14 RX ORDER — ALBUTEROL SULFATE 90 UG/1
1-2 AEROSOL, METERED RESPIRATORY (INHALATION)
Status: CANCELLED
Start: 2024-02-09

## 2024-01-14 RX ORDER — METHYLPREDNISOLONE SODIUM SUCCINATE 125 MG/2ML
125 INJECTION, POWDER, LYOPHILIZED, FOR SOLUTION INTRAMUSCULAR; INTRAVENOUS
Status: CANCELLED
Start: 2024-01-19

## 2024-01-14 RX ORDER — LORAZEPAM 2 MG/ML
0.5 INJECTION INTRAMUSCULAR EVERY 4 HOURS PRN
Status: CANCELLED | OUTPATIENT
Start: 2024-02-09

## 2024-01-14 RX ORDER — HEPARIN SODIUM (PORCINE) LOCK FLUSH IV SOLN 100 UNIT/ML 100 UNIT/ML
5 SOLUTION INTRAVENOUS
Status: CANCELLED | OUTPATIENT
Start: 2024-02-09

## 2024-01-14 RX ORDER — DIPHENHYDRAMINE HYDROCHLORIDE 50 MG/ML
50 INJECTION INTRAMUSCULAR; INTRAVENOUS
Status: CANCELLED
Start: 2024-02-09

## 2024-01-14 RX ORDER — LORAZEPAM 2 MG/ML
0.5 INJECTION INTRAMUSCULAR EVERY 4 HOURS PRN
Status: CANCELLED | OUTPATIENT
Start: 2024-01-27

## 2024-01-14 RX ORDER — DIPHENHYDRAMINE HCL 25 MG
50 CAPSULE ORAL ONCE
Status: CANCELLED | OUTPATIENT
Start: 2024-02-09

## 2024-01-14 RX ORDER — HEPARIN SODIUM (PORCINE) LOCK FLUSH IV SOLN 100 UNIT/ML 100 UNIT/ML
5 SOLUTION INTRAVENOUS
Status: CANCELLED | OUTPATIENT
Start: 2024-02-16

## 2024-01-14 RX ORDER — LORAZEPAM 2 MG/ML
0.5 INJECTION INTRAMUSCULAR EVERY 4 HOURS PRN
Status: CANCELLED | OUTPATIENT
Start: 2024-02-16

## 2024-01-14 RX ORDER — ACETAMINOPHEN 325 MG/1
650 TABLET ORAL ONCE
Status: CANCELLED | OUTPATIENT
Start: 2024-02-09

## 2024-01-14 RX ORDER — METHYLPREDNISOLONE SODIUM SUCCINATE 125 MG/2ML
125 INJECTION, POWDER, LYOPHILIZED, FOR SOLUTION INTRAMUSCULAR; INTRAVENOUS
Status: CANCELLED
Start: 2024-02-09

## 2024-01-14 RX ORDER — MEPERIDINE HYDROCHLORIDE 25 MG/ML
25 INJECTION INTRAMUSCULAR; INTRAVENOUS; SUBCUTANEOUS EVERY 30 MIN PRN
Status: CANCELLED | OUTPATIENT
Start: 2024-02-09

## 2024-01-14 RX ORDER — HEPARIN SODIUM,PORCINE 10 UNIT/ML
5-20 VIAL (ML) INTRAVENOUS DAILY PRN
Status: CANCELLED | OUTPATIENT
Start: 2024-02-09

## 2024-01-14 RX ORDER — DEXAMETHASONE 4 MG/1
40 TABLET ORAL ONCE
Status: CANCELLED | OUTPATIENT
Start: 2024-02-16

## 2024-01-14 RX ORDER — ALBUTEROL SULFATE 0.83 MG/ML
2.5 SOLUTION RESPIRATORY (INHALATION)
Status: CANCELLED | OUTPATIENT
Start: 2024-01-19

## 2024-01-14 RX ORDER — DIPHENHYDRAMINE HCL 25 MG
50 CAPSULE ORAL ONCE
Status: CANCELLED | OUTPATIENT
Start: 2024-01-19

## 2024-01-14 RX ORDER — DIPHENHYDRAMINE HYDROCHLORIDE 50 MG/ML
50 INJECTION INTRAMUSCULAR; INTRAVENOUS
Status: CANCELLED
Start: 2024-01-27

## 2024-01-14 RX ORDER — HEPARIN SODIUM,PORCINE 10 UNIT/ML
5-20 VIAL (ML) INTRAVENOUS DAILY PRN
Status: CANCELLED | OUTPATIENT
Start: 2024-01-19

## 2024-01-14 RX ORDER — HEPARIN SODIUM,PORCINE 10 UNIT/ML
5-20 VIAL (ML) INTRAVENOUS DAILY PRN
Status: CANCELLED | OUTPATIENT
Start: 2024-01-27

## 2024-01-14 RX ORDER — MEPERIDINE HYDROCHLORIDE 25 MG/ML
25 INJECTION INTRAMUSCULAR; INTRAVENOUS; SUBCUTANEOUS EVERY 30 MIN PRN
Status: CANCELLED | OUTPATIENT
Start: 2024-01-19

## 2024-01-14 RX ORDER — ACETAMINOPHEN 325 MG/1
650 TABLET ORAL ONCE
Status: CANCELLED | OUTPATIENT
Start: 2024-01-19

## 2024-01-14 RX ORDER — EPINEPHRINE 1 MG/ML
0.3 INJECTION, SOLUTION INTRAMUSCULAR; SUBCUTANEOUS EVERY 5 MIN PRN
Status: CANCELLED | OUTPATIENT
Start: 2024-01-19

## 2024-01-14 RX ORDER — DIPHENHYDRAMINE HYDROCHLORIDE 50 MG/ML
50 INJECTION INTRAMUSCULAR; INTRAVENOUS
Status: CANCELLED
Start: 2024-01-19

## 2024-01-14 RX ORDER — MEPERIDINE HYDROCHLORIDE 25 MG/ML
25 INJECTION INTRAMUSCULAR; INTRAVENOUS; SUBCUTANEOUS EVERY 30 MIN PRN
Status: CANCELLED | OUTPATIENT
Start: 2024-02-16

## 2024-01-14 RX ORDER — DEXAMETHASONE 4 MG/1
40 TABLET ORAL ONCE
Status: CANCELLED | OUTPATIENT
Start: 2024-01-19

## 2024-01-14 RX ORDER — MONTELUKAST SODIUM 10 MG/1
10 TABLET ORAL ONCE
Status: CANCELLED | OUTPATIENT
Start: 2024-02-09

## 2024-01-14 RX ORDER — MEPERIDINE HYDROCHLORIDE 25 MG/ML
25 INJECTION INTRAMUSCULAR; INTRAVENOUS; SUBCUTANEOUS EVERY 30 MIN PRN
Status: CANCELLED | OUTPATIENT
Start: 2024-01-27

## 2024-01-14 RX ORDER — DIPHENHYDRAMINE HYDROCHLORIDE 50 MG/ML
50 INJECTION INTRAMUSCULAR; INTRAVENOUS
Status: CANCELLED
Start: 2024-02-16

## 2024-01-14 RX ORDER — ALBUTEROL SULFATE 90 UG/1
1-2 AEROSOL, METERED RESPIRATORY (INHALATION)
Status: CANCELLED
Start: 2024-02-16

## 2024-01-14 RX ORDER — HEPARIN SODIUM (PORCINE) LOCK FLUSH IV SOLN 100 UNIT/ML 100 UNIT/ML
5 SOLUTION INTRAVENOUS
Status: CANCELLED | OUTPATIENT
Start: 2024-01-19

## 2024-01-14 RX ORDER — ACETAMINOPHEN 325 MG/1
650 TABLET ORAL ONCE
Status: CANCELLED | OUTPATIENT
Start: 2024-01-27

## 2024-01-14 RX ORDER — EPINEPHRINE 1 MG/ML
0.3 INJECTION, SOLUTION INTRAMUSCULAR; SUBCUTANEOUS EVERY 5 MIN PRN
Status: CANCELLED | OUTPATIENT
Start: 2024-02-09

## 2024-01-14 RX ORDER — DIPHENHYDRAMINE HCL 25 MG
50 CAPSULE ORAL ONCE
Status: CANCELLED | OUTPATIENT
Start: 2024-01-27

## 2024-01-14 RX ORDER — ALBUTEROL SULFATE 0.83 MG/ML
2.5 SOLUTION RESPIRATORY (INHALATION)
Status: CANCELLED | OUTPATIENT
Start: 2024-02-09

## 2024-01-14 RX ORDER — MONTELUKAST SODIUM 10 MG/1
10 TABLET ORAL ONCE
Status: CANCELLED | OUTPATIENT
Start: 2024-01-27

## 2024-01-14 RX ORDER — ALBUTEROL SULFATE 0.83 MG/ML
2.5 SOLUTION RESPIRATORY (INHALATION)
Status: CANCELLED | OUTPATIENT
Start: 2024-02-16

## 2024-01-14 RX ORDER — DEXAMETHASONE 4 MG/1
40 TABLET ORAL ONCE
Status: CANCELLED | OUTPATIENT
Start: 2024-02-09

## 2024-01-14 RX ORDER — HEPARIN SODIUM (PORCINE) LOCK FLUSH IV SOLN 100 UNIT/ML 100 UNIT/ML
5 SOLUTION INTRAVENOUS
Status: CANCELLED | OUTPATIENT
Start: 2024-01-27

## 2024-01-14 RX ORDER — METHYLPREDNISOLONE SODIUM SUCCINATE 125 MG/2ML
125 INJECTION, POWDER, LYOPHILIZED, FOR SOLUTION INTRAMUSCULAR; INTRAVENOUS
Status: CANCELLED
Start: 2024-02-16

## 2024-01-14 RX ORDER — DEXAMETHASONE 4 MG/1
40 TABLET ORAL ONCE
Status: CANCELLED | OUTPATIENT
Start: 2024-01-27

## 2024-01-14 RX ORDER — METHYLPREDNISOLONE SODIUM SUCCINATE 125 MG/2ML
125 INJECTION, POWDER, LYOPHILIZED, FOR SOLUTION INTRAMUSCULAR; INTRAVENOUS
Status: CANCELLED
Start: 2024-01-27

## 2024-01-14 RX ORDER — ALBUTEROL SULFATE 90 UG/1
1-2 AEROSOL, METERED RESPIRATORY (INHALATION)
Status: CANCELLED
Start: 2024-01-19

## 2024-01-14 RX ORDER — ALBUTEROL SULFATE 0.83 MG/ML
2.5 SOLUTION RESPIRATORY (INHALATION)
Status: CANCELLED | OUTPATIENT
Start: 2024-01-27

## 2024-01-15 ENCOUNTER — TELEPHONE (OUTPATIENT)
Dept: ONCOLOGY | Facility: CLINIC | Age: 54
End: 2024-01-15
Payer: COMMERCIAL

## 2024-01-15 ENCOUNTER — MYC MEDICAL ADVICE (OUTPATIENT)
Dept: ONCOLOGY | Facility: CLINIC | Age: 54
End: 2024-01-15
Payer: COMMERCIAL

## 2024-01-15 ENCOUNTER — TELEPHONE (OUTPATIENT)
Dept: NEPHROLOGY | Facility: CLINIC | Age: 54
End: 2024-01-15
Payer: COMMERCIAL

## 2024-01-15 DIAGNOSIS — E85.81 AL AMYLOIDOSIS (H): Primary | ICD-10-CM

## 2024-01-15 RX ORDER — ONDANSETRON 8 MG/1
8 TABLET, FILM COATED ORAL EVERY 8 HOURS PRN
Qty: 30 TABLET | Refills: 2 | Status: SHIPPED | OUTPATIENT
Start: 2024-01-18 | End: 2024-03-18

## 2024-01-15 RX ORDER — CYCLOPHOSPHAMIDE 50 MG/1
500 CAPSULE ORAL
Qty: 40 CAPSULE | Refills: 0 | Status: SHIPPED | OUTPATIENT
Start: 2024-01-19 | End: 2024-03-15

## 2024-01-15 RX ORDER — ACYCLOVIR 400 MG/1
400 TABLET ORAL 2 TIMES DAILY
Qty: 60 TABLET | Refills: 3 | Status: SHIPPED | OUTPATIENT
Start: 2024-01-18 | End: 2024-02-23

## 2024-01-15 NOTE — TELEPHONE ENCOUNTER
Spoke to patient to schedule follow up with Dr. Dobson with labs prior for Dx: Renal Amyloid // 01.15.2024 MCE

## 2024-01-15 NOTE — ORAL ONC MGMT
"Oral Chemotherapy Monitoring Program    Lab Monitoring Plan  CBC and CMP at baseline then monthly  Subjective/Objective:  Vivian Brown is a 53 year old female contacted by phone for an initial visit for oral chemotherapy education.          No data to display                Last PHQ-2 Score on record:       1/3/2024     1:06 PM 11/28/2023    11:14 AM   PHQ-2 ( 1999 [employer])   Q1: Little interest or pleasure in doing things 0 0   Q2: Feeling down, depressed or hopeless 0 0   PHQ-2 Score 0 0       Vitals:  BP:   BP Readings from Last 1 Encounters:   01/12/24 107/75     Wt Readings from Last 1 Encounters:   01/12/24 83.9 kg (185 lb)     Estimated body surface area is 1.96 meters squared as calculated from the following:    Height as of 1/12/24: 1.643 m (5' 4.69\").    Weight as of 1/12/24: 83.9 kg (185 lb).    Labs:  _  Result Component Current Result Ref Range   Sodium 141 (1/8/2024) 135 - 145 mmol/L     _  Result Component Current Result Ref Range   Potassium 4.7 (1/8/2024) 3.4 - 5.3 mmol/L     _  Result Component Current Result Ref Range   Calcium 8.6 (1/8/2024) 8.6 - 10.0 mg/dL     No results found for Mag within last 30 days.     _  Result Component Current Result Ref Range   Phosphorus 4.3 (1/8/2024) 2.5 - 4.5 mg/dL     _  Result Component Current Result Ref Range   Albumin 2.0 (L) (1/8/2024) 3.5 - 5.2 g/dL     _  Result Component Current Result Ref Range   Urea Nitrogen 17.0 (1/8/2024) 6.0 - 20.0 mg/dL     _  Result Component Current Result Ref Range   Creatinine 1.04 (H) (1/8/2024) 0.51 - 0.95 mg/dL     _  Result Component Current Result Ref Range   AST 28 (1/8/2024) 0 - 45 U/L     _  Result Component Current Result Ref Range   ALT 32 (1/8/2024) 0 - 50 U/L     _  Result Component Current Result Ref Range   Bilirubin Total 0.2 (1/8/2024) <=1.2 mg/dL     _  Result Component Current Result Ref Range   WBC Count 10.7 (1/8/2024) 4.0 - 11.0 10e3/uL     _  Result Component Current Result Ref Range   Hemoglobin 14.6 " (1/8/2024) 11.7 - 15.7 g/dL     _  Result Component Current Result Ref Range   Platelet Count 361 (1/8/2024) 150 - 450 10e3/uL     No results found for ANC within last 30 days.     No results found for ANC within last 30 days.        Assessment:  Patient is appropriate to start therapy.    Plan:  Basic chemotherapy teaching was reviewed with the patient including indication, start date of therapy, dose, administration, adverse effects, missed doses, food and drug interactions, monitoring, side effect management, office contact information, and safe handling. Written materials were provided and all questions answered.    Follow-Up:  Rhina appointment and labs 1/19/24     Alvarez Lopez  Oncology Pharmacy Resident  January 15, 2024

## 2024-01-16 ENCOUNTER — HOSPITAL ENCOUNTER (OUTPATIENT)
Dept: MRI IMAGING | Facility: CLINIC | Age: 54
Discharge: HOME | End: 2024-01-16
Attending: INTERNAL MEDICINE | Admitting: INTERNAL MEDICINE
Payer: COMMERCIAL

## 2024-01-16 ENCOUNTER — TELEPHONE (OUTPATIENT)
Dept: ONCOLOGY | Facility: CLINIC | Age: 54
End: 2024-01-16
Payer: COMMERCIAL

## 2024-01-16 DIAGNOSIS — I43 AMYLOID HEART DISEASE (H): ICD-10-CM

## 2024-01-16 DIAGNOSIS — E85.4 RENAL AMYLOIDOSIS (H): ICD-10-CM

## 2024-01-16 DIAGNOSIS — N08 RENAL AMYLOIDOSIS (H): ICD-10-CM

## 2024-01-16 DIAGNOSIS — E85.4 AMYLOID HEART DISEASE (H): ICD-10-CM

## 2024-01-16 PROCEDURE — A9585 GADOBUTROL INJECTION: HCPCS | Performed by: INTERNAL MEDICINE

## 2024-01-16 PROCEDURE — 75561 CARDIAC MRI FOR MORPH W/DYE: CPT | Mod: 26 | Performed by: STUDENT IN AN ORGANIZED HEALTH CARE EDUCATION/TRAINING PROGRAM

## 2024-01-16 PROCEDURE — 255N000002 HC RX 255 OP 636: Performed by: INTERNAL MEDICINE

## 2024-01-16 PROCEDURE — 75561 CARDIAC MRI FOR MORPH W/DYE: CPT

## 2024-01-16 RX ORDER — GADOBUTROL 604.72 MG/ML
10 INJECTION INTRAVENOUS ONCE
Status: COMPLETED | OUTPATIENT
Start: 2024-01-16 | End: 2024-01-16

## 2024-01-16 RX ADMIN — GADOBUTROL 10 ML: 604.72 INJECTION INTRAVENOUS at 13:29

## 2024-01-16 NOTE — TELEPHONE ENCOUNTER
KENNY APPROVED    Medication: CYCLOPHOSPHAMIDE 50 MG PO CAPS  Amount: $ 4,000  Foundation Name: [employer]  [facility] Phone:      Foundation Effective Date: 1/16/2024  Foundation Expiration Date: 1/15/2025  Additional Information:   Patient Notified: yes          Thank you,    Galina Mcmillan  Oncology Pharmacy Liaison II  [email or web address]  Phone: [phone]  Fax: [phone]

## 2024-01-17 ENCOUNTER — TELEPHONE (OUTPATIENT)
Dept: TRANSPLANT | Facility: CLINIC | Age: 54
End: 2024-01-17
Payer: COMMERCIAL

## 2024-01-17 DIAGNOSIS — E85.81 AL AMYLOIDOSIS (H): Primary | ICD-10-CM

## 2024-01-17 NOTE — PROGRESS NOTES
BMT ONC Adult Bone Marrow Biopsy Procedure Note  January 18, 2024  /73   Pulse 107   Temp 98  F (36.7  C) (Oral)   Resp 18   Wt 82 kg (180 lb 12.8 oz)   SpO2 98%   BMI 30.38 kg/m       Learning needs assessment complete within 12 months? YES    DIAGNOSIS: Amyloidosis     PROCEDURE: Unilateral Bone Marrow Biopsy and Unilateral Aspirate    LOCATION: [facility] 2nd Floor    Patient s identification was positively verified by verbal identification and invasive procedure safety checklist was completed. Informed consent was obtained. Following the administration of 1.5 mg Midazolam as pre-medication, patient was placed in the prone position and prepped and draped in a sterile manner. Approximately 10 cc of 1% Lidocaine was used over the left posterior iliac spine. Following this a 3 mm incision was made. Trephine bone marrow core(s) was (were) obtained from the [facility]. Bone marrow aspirates were obtained from the [facility]. Aspirates were sent for morphology, immunophenotyping, and cytogenetics. A total of approximately 20 ml of marrow was aspirated. Following this procedure a sterile dressing was applied to the bone marrow biopsy site(s). The patient was placed in the supine position to maintain pressure on the biopsy site. Post-procedure wound care instructions were given.     Complications: NO    Pre-procedural pain: 0 out of 10 on the numeric pain rating scale.     Procedural pain: 6 out of 10 on the numeric pain rating scale.     Post-procedural pain assessment: 0 out of 10 on the numeric pain rating scale.     Interventions: NO    Length of procedure:20 minutes or less      Procedure performed by: JONO Trejo

## 2024-01-18 ENCOUNTER — APPOINTMENT (OUTPATIENT)
Dept: LAB | Facility: CLINIC | Age: 54
End: 2024-01-18
Attending: STUDENT IN AN ORGANIZED HEALTH CARE EDUCATION/TRAINING PROGRAM
Payer: COMMERCIAL

## 2024-01-18 ENCOUNTER — NURSE TRIAGE (OUTPATIENT)
Dept: NURSING | Facility: CLINIC | Age: 54
End: 2024-01-18

## 2024-01-18 ENCOUNTER — OFFICE VISIT (OUTPATIENT)
Dept: ONCOLOGY | Facility: CLINIC | Age: 54
End: 2024-01-18
Attending: STUDENT IN AN ORGANIZED HEALTH CARE EDUCATION/TRAINING PROGRAM
Payer: COMMERCIAL

## 2024-01-18 VITALS
BODY MASS INDEX: 30.38 KG/M2 | OXYGEN SATURATION: 97 % | WEIGHT: 180.8 LBS | DIASTOLIC BLOOD PRESSURE: 66 MMHG | HEART RATE: 82 BPM | TEMPERATURE: 98.5 F | RESPIRATION RATE: 20 BRPM | SYSTOLIC BLOOD PRESSURE: 106 MMHG

## 2024-01-18 DIAGNOSIS — R05.1 ACUTE COUGH: ICD-10-CM

## 2024-01-18 DIAGNOSIS — E85.81 AL AMYLOIDOSIS (H): Primary | ICD-10-CM

## 2024-01-18 LAB
ALBUMIN SERPL BCG-MCNC: 2 G/DL (ref 3.5–5.2)
ALP SERPL-CCNC: 132 U/L (ref 40–150)
ALT SERPL W P-5'-P-CCNC: 29 U/L (ref 0–50)
ANION GAP SERPL CALCULATED.3IONS-SCNC: 7 MMOL/L (ref 7–15)
APTT PPP: 30 SECONDS (ref 22–38)
AST SERPL W P-5'-P-CCNC: 30 U/L (ref 0–45)
BASOPHILS # BLD AUTO: 0.1 10E3/UL (ref 0–0.2)
BASOPHILS NFR BLD AUTO: 1 %
BILIRUB SERPL-MCNC: <0.2 MG/DL
BUN SERPL-MCNC: 14.1 MG/DL (ref 6–20)
C PNEUM DNA SPEC QL NAA+PROBE: NOT DETECTED
CALCIUM SERPL-MCNC: 8.5 MG/DL (ref 8.6–10)
CHLORIDE SERPL-SCNC: 104 MMOL/L (ref 98–107)
CHOLEST SERPL-MCNC: 251 MG/DL
CREAT SERPL-MCNC: 1.12 MG/DL (ref 0.51–0.95)
DEPRECATED HCO3 PLAS-SCNC: 25 MMOL/L (ref 22–29)
EGFRCR SERPLBLD CKD-EPI 2021: 59 ML/MIN/1.73M2
EOSINOPHIL # BLD AUTO: 0.3 10E3/UL (ref 0–0.7)
EOSINOPHIL NFR BLD AUTO: 2 %
ERYTHROCYTE [DISTWIDTH] IN BLOOD BY AUTOMATED COUNT: 15.9 % (ref 10–15)
FASTING STATUS PATIENT QL REPORTED: ABNORMAL
FLUAV H1 2009 PAND RNA SPEC QL NAA+PROBE: NOT DETECTED
FLUAV H1 RNA SPEC QL NAA+PROBE: NOT DETECTED
FLUAV H3 RNA SPEC QL NAA+PROBE: NOT DETECTED
FLUAV RNA SPEC QL NAA+PROBE: NOT DETECTED
FLUBV RNA SPEC QL NAA+PROBE: NOT DETECTED
GLUCOSE SERPL-MCNC: 112 MG/DL (ref 70–99)
HADV DNA SPEC QL NAA+PROBE: NOT DETECTED
HCOV PNL SPEC NAA+PROBE: NOT DETECTED
HCT VFR BLD AUTO: 45.7 % (ref 35–47)
HDLC SERPL-MCNC: 43 MG/DL
HGB BLD-MCNC: 15 G/DL (ref 11.7–15.7)
HMPV RNA SPEC QL NAA+PROBE: NOT DETECTED
HPIV1 RNA SPEC QL NAA+PROBE: NOT DETECTED
HPIV2 RNA SPEC QL NAA+PROBE: NOT DETECTED
HPIV3 RNA SPEC QL NAA+PROBE: NOT DETECTED
HPIV4 RNA SPEC QL NAA+PROBE: NOT DETECTED
IMM GRANULOCYTES # BLD: 0 10E3/UL
IMM GRANULOCYTES NFR BLD: 0 %
INR PPP: 0.99 (ref 0.85–1.15)
LDH SERPL L TO P-CCNC: 248 U/L (ref 0–250)
LDLC SERPL CALC-MCNC: 161 MG/DL
LYMPHOCYTES # BLD AUTO: 3 10E3/UL (ref 0.8–5.3)
LYMPHOCYTES NFR BLD AUTO: 28 %
M PNEUMO DNA SPEC QL NAA+PROBE: NOT DETECTED
MCH RBC QN AUTO: 27.3 PG (ref 26.5–33)
MCHC RBC AUTO-ENTMCNC: 32.8 G/DL (ref 31.5–36.5)
MCV RBC AUTO: 83 FL (ref 78–100)
MONOCYTES # BLD AUTO: 1.1 10E3/UL (ref 0–1.3)
MONOCYTES NFR BLD AUTO: 10 %
NEUTROPHILS # BLD AUTO: 6.4 10E3/UL (ref 1.6–8.3)
NEUTROPHILS NFR BLD AUTO: 59 %
NONHDLC SERPL-MCNC: 208 MG/DL
NRBC # BLD AUTO: 0 10E3/UL
NRBC BLD AUTO-RTO: 0 /100
PLATELET # BLD AUTO: 360 10E3/UL (ref 150–450)
POTASSIUM SERPL-SCNC: 4.1 MMOL/L (ref 3.4–5.3)
PROT SERPL-MCNC: 4.8 G/DL (ref 6.4–8.3)
RBC # BLD AUTO: 5.5 10E6/UL (ref 3.8–5.2)
RETICS # AUTO: 0.09 10E6/UL (ref 0.03–0.1)
RETICS/RBC NFR AUTO: 1.7 % (ref 0.5–2)
RSV RNA SPEC QL NAA+PROBE: DETECTED
RSV RNA SPEC QL NAA+PROBE: NOT DETECTED
RV+EV RNA SPEC QL NAA+PROBE: NOT DETECTED
SODIUM SERPL-SCNC: 136 MMOL/L (ref 135–145)
TRIGL SERPL-MCNC: 233 MG/DL
WBC # BLD AUTO: 11 10E3/UL (ref 4–11)

## 2024-01-18 PROCEDURE — 250N000011 HC RX IP 250 OP 636

## 2024-01-18 PROCEDURE — 36415 COLL VENOUS BLD VENIPUNCTURE: CPT | Performed by: STUDENT IN AN ORGANIZED HEALTH CARE EDUCATION/TRAINING PROGRAM

## 2024-01-18 PROCEDURE — 88341 IMHCHEM/IMCYTCHM EA ADD ANTB: CPT | Mod: 26 | Performed by: STUDENT IN AN ORGANIZED HEALTH CARE EDUCATION/TRAINING PROGRAM

## 2024-01-18 PROCEDURE — 88184 FLOWCYTOMETRY/ TC 1 MARKER: CPT | Performed by: STUDENT IN AN ORGANIZED HEALTH CARE EDUCATION/TRAINING PROGRAM

## 2024-01-18 PROCEDURE — 86900 BLOOD TYPING SEROLOGIC ABO: CPT | Mod: XU | Performed by: STUDENT IN AN ORGANIZED HEALTH CARE EDUCATION/TRAINING PROGRAM

## 2024-01-18 PROCEDURE — 80061 LIPID PANEL: CPT

## 2024-01-18 PROCEDURE — 88184 FLOWCYTOMETRY/ TC 1 MARKER: CPT

## 2024-01-18 PROCEDURE — 88369 M/PHMTRC ALYSISHQUANT/SEMIQ: CPT | Mod: 26 | Performed by: MEDICAL GENETICS

## 2024-01-18 PROCEDURE — 88305 TISSUE EXAM BY PATHOLOGIST: CPT | Mod: 26 | Performed by: STUDENT IN AN ORGANIZED HEALTH CARE EDUCATION/TRAINING PROGRAM

## 2024-01-18 PROCEDURE — 88237 TISSUE CULTURE BONE MARROW: CPT

## 2024-01-18 PROCEDURE — 87633 RESP VIRUS 12-25 TARGETS: CPT

## 2024-01-18 PROCEDURE — 88313 SPECIAL STAINS GROUP 2: CPT | Mod: 26 | Performed by: STUDENT IN AN ORGANIZED HEALTH CARE EDUCATION/TRAINING PROGRAM

## 2024-01-18 PROCEDURE — 85730 THROMBOPLASTIN TIME PARTIAL: CPT

## 2024-01-18 PROCEDURE — 82232 ASSAY OF BETA-2 PROTEIN: CPT

## 2024-01-18 PROCEDURE — 88185 FLOWCYTOMETRY/TC ADD-ON: CPT

## 2024-01-18 PROCEDURE — 85025 COMPLETE CBC W/AUTO DIFF WBC: CPT

## 2024-01-18 PROCEDURE — 88291 CYTO/MOLECULAR REPORT: CPT | Performed by: MEDICAL GENETICS

## 2024-01-18 PROCEDURE — 83615 LACTATE (LD) (LDH) ENZYME: CPT

## 2024-01-18 PROCEDURE — 88275 CYTOGENETICS 100-300: CPT | Mod: XU

## 2024-01-18 PROCEDURE — 87581 M.PNEUMON DNA AMP PROBE: CPT

## 2024-01-18 PROCEDURE — 80053 COMPREHEN METABOLIC PANEL: CPT

## 2024-01-18 PROCEDURE — 85097 BONE MARROW INTERPRETATION: CPT | Mod: GC | Performed by: STUDENT IN AN ORGANIZED HEALTH CARE EDUCATION/TRAINING PROGRAM

## 2024-01-18 PROCEDURE — 88188 FLOWCYTOMETRY/READ 9-15: CPT | Performed by: STUDENT IN AN ORGANIZED HEALTH CARE EDUCATION/TRAINING PROGRAM

## 2024-01-18 PROCEDURE — 38222 DX BONE MARROW BX & ASPIR: CPT

## 2024-01-18 PROCEDURE — 36415 COLL VENOUS BLD VENIPUNCTURE: CPT

## 2024-01-18 PROCEDURE — 88342 IMHCHEM/IMCYTCHM 1ST ANTB: CPT | Mod: 26 | Performed by: STUDENT IN AN ORGANIZED HEALTH CARE EDUCATION/TRAINING PROGRAM

## 2024-01-18 PROCEDURE — 88311 DECALCIFY TISSUE: CPT | Mod: TC

## 2024-01-18 PROCEDURE — 82784 ASSAY IGA/IGD/IGG/IGM EACH: CPT

## 2024-01-18 PROCEDURE — 85610 PROTHROMBIN TIME: CPT

## 2024-01-18 PROCEDURE — 0001U RBC DNA HEA 35 AG 11 BLD GRP: CPT | Performed by: STUDENT IN AN ORGANIZED HEALTH CARE EDUCATION/TRAINING PROGRAM

## 2024-01-18 PROCEDURE — 88368 INSITU HYBRIDIZATION MANUAL: CPT | Mod: 26 | Performed by: MEDICAL GENETICS

## 2024-01-18 PROCEDURE — 85045 AUTOMATED RETICULOCYTE COUNT: CPT

## 2024-01-18 PROCEDURE — 96374 THER/PROPH/DIAG INJ IV PUSH: CPT | Mod: 59

## 2024-01-18 PROCEDURE — 88311 DECALCIFY TISSUE: CPT | Mod: 26 | Performed by: STUDENT IN AN ORGANIZED HEALTH CARE EDUCATION/TRAINING PROGRAM

## 2024-01-18 PROCEDURE — 88271 CYTOGENETICS DNA PROBE: CPT

## 2024-01-18 RX ADMIN — MIDAZOLAM 1.5 MG: 1 INJECTION INTRAMUSCULAR; INTRAVENOUS at 15:00

## 2024-01-18 ASSESSMENT — PAIN SCALES - GENERAL
PAINLEVEL: NO PAIN (0)
PAINLEVEL: NO PAIN (0)

## 2024-01-18 NOTE — LETTER
1/18/2024         RE: Vivian Brown  [address] [facility] [address]        Dear Colleague,    Thank you for referring your patient, Vivian Brown, to the [facility]. Please see a copy of my visit note below.    BMT ONC Adult Bone Marrow Biopsy Procedure Note  January 18, 2024  /73   Pulse 107   Temp 98  F (36.7  C) (Oral)   Resp 18   Wt 82 kg (180 lb 12.8 oz)   SpO2 98%   BMI 30.38 kg/m       Learning needs assessment complete within 12 months? YES    DIAGNOSIS: Amyloidosis     PROCEDURE: Unilateral Bone Marrow Biopsy and Unilateral Aspirate    LOCATION: [facility] 2nd Floor    Patient s identification was positively verified by verbal identification and invasive procedure safety checklist was completed. Informed consent was obtained. Following the administration of 1.5 mg Midazolam as pre-medication, patient was placed in the prone position and prepped and draped in a sterile manner. Approximately 10 cc of 1% Lidocaine was used over the left posterior iliac spine. Following this a 3 mm incision was made. Trephine bone marrow core(s) was (were) obtained from the [facility]. Bone marrow aspirates were obtained from the [facility]. Aspirates were sent for morphology, immunophenotyping, and cytogenetics. A total of approximately 20 ml of marrow was aspirated. Following this procedure a sterile dressing was applied to the bone marrow biopsy site(s). The patient was placed in the supine position to maintain pressure on the biopsy site. Post-procedure wound care instructions were given.     Complications: NO    Pre-procedural pain: 0 out of 10 on the numeric pain rating scale.     Procedural pain: 6 out of 10 on the numeric pain rating scale.     Post-procedural pain assessment: 0 out of 10 on the numeric pain rating scale.     Interventions: NO    Length of procedure:20 minutes or less      Procedure performed by: JONO Trejo

## 2024-01-18 NOTE — NURSING NOTE
BMBX Teaching and Assessment       Teaching concerns addressed: Bone marrow biopsy and infection prevention.     Person(s) involved in teaching: Patient  Motivation Level  Asks Questions: Yes  Eager to Learn: Yes  Cooperative: Yes  Receptive (willing/able to accept information): Yes    Patient demonstrates understanding of the following:     Reason for the appointment, diagnosis and treatment plan: Yes  Knowledge of proper use of medications and conditions for which they are ordered (with special attention to potential side effects or drug interactions): Yes  Which situations necessitate calling provider and whom to contact: Yes    Teaching concerns addressed:   Reviewed activity restrictions if received premeds, potential for bleeding and actions to take if develops any of the issues below    Pain management techniques: Yes  Patient instructed on hand hygiene: Yes  How and/when to access community resources: Yes    Infection Control:  Patient demonstrates understanding of the following:   Bone marrow procedure site care taught: Yes  Signs and symptoms of infection taught: Yes       Instructional Materials Used/Given: Pt instructed to keep bmbx site clean and dry for 24hrs. Pt educated to monitor site for signs of infection such as redness, rash, oozing, puss, bleeding, pain, and elevated temp. Pt instructed to go to call the [facility] triage line or go to the ER if any signs of infection should occur. Pt educated to not operate machinery if receiving versed. Pt and mom verbalize understanding.     Pre-procedure labs drawn via PIV. Post procedure: Patient vital signs stable, ambulating, site is clean, dry and intact prior to discharge and line removed. Pt discharged with mom as .

## 2024-01-18 NOTE — NURSING NOTE
Patient expressed that her cyclophosphamide had not been delivered yet via [facility] specialty mail infusion. Nurse coordinator Kristen Henley contacted:      Patient Instructions:    Per Dr. Chandler: She should still get infusion tomorrow. If she gets the Cytoxan (aka cyclophosphamide) by Monday she can still take it, but if it's after Monday she should just wait for her next dose.       Patient also expressed that she had concerns with taking time off of work-Nurse coordinator to make a  referral

## 2024-01-19 ENCOUNTER — NURSE TRIAGE (OUTPATIENT)
Dept: ONCOLOGY | Facility: CLINIC | Age: 54
End: 2024-01-19
Payer: COMMERCIAL

## 2024-01-19 LAB
B2 MICROGLOB TUMOR MARKER SER-MCNC: 5.8 MG/L
IGA SERPL-MCNC: 164 MG/DL (ref 84–499)
IGG SERPL-MCNC: 294 MG/DL (ref 610–1616)
IGM SERPL-MCNC: 127 MG/DL (ref 35–242)
PATH REPORT.COMMENTS IMP SPEC: ABNORMAL
PATH REPORT.COMMENTS IMP SPEC: YES
PATH REPORT.FINAL DX SPEC: ABNORMAL
PATH REPORT.MICROSCOPIC SPEC OTHER STN: ABNORMAL
PATH REPORT.RELEVANT HX SPEC: ABNORMAL

## 2024-01-19 NOTE — TELEPHONE ENCOUNTER
Nurse Triage SBAR    Is this a 2nd Level Triage?     Situation:     Patient states she logged in to mychart tonight, received a [employer] message tonight with a positive RSV swab result.  And has questions on how that impacts chemotherapy scheduled to start tomorrow morning.  Patient denies new symptoms and does not want triage.    Patients states had a left iliac c1rest bone marrow biopsy today. Patient states she states her initial chemotherapy tomorrow, leaving approximately at 0630 in the morning to travel to [facility].    Patient states she had a cough and runny nose today at the biopsy appointment today so they took took some respiratory swabs Patient states no triage concerns, no new or worsening symptoms, just questions about if her initial chemotherapy would start tomorrow given new RSV positive test result.    Patient states she is concerned for delay in treatment dates if she doesn't start treatment tomorrow.  Patient states she has amyloid being treated.    Background:     Assessment:     Patient with new RSV result at approximately 1900 tonight, first or initial chemotherapy appointment tomorrow morning at [facility], questions about whether to come in.    Protocol Recommended Disposition:   No disposition on file.    2021 - I spoke with [facility] answering service to page [facility] Oncology doctor with my questions    2033.  Spoke with provider Dr. Dayday Zuniga.  We discussed patients RSV positive test tonight and chemotherapy starting tomorrow.  Per provider, was instructed patient to contact [facility] provider when clinic opens up at 7 a.m. tomorrow before traveling for chemotherapy.     2041 spoke with patient.  [facility] 7 a[facility]m. tomorrrow for evaluation and instruction.  Patient verbalizes understanding and will call [facility] in the morning to discuss this with treatment team and receive further instructions prior to traveling.    Routed  message to [facility] treatment team.      Recommendation:     Paged to provider    Does the patient meet one of the following criteria for ADS visit consideration? 16+ years old, with an [facility] PCP     TIP  Providers, please consider if this condition is appropriate for management at one of our Acute and Diagnostic Services sites.     If patient is a good candidate, please use dotphrase <dot>triageresponse and select Refer to ADS to document.    Reason for Disposition   [1] Caller requesting NON-URGENT health information AND [2] PCP's office is the best resource    Additional Information   Negative: [1] Caller is not with the adult (patient) AND [2] reporting urgent symptoms   Negative: Lab result questions   Negative: Medication questions   Negative: Caller can't be reached by phone   Negative: Caller has already spoken to PCP or another triager   Negative: RN needs further essential information from caller in order to complete triage   Negative: Requesting regular office appointment    Protocols used: Information Only Call - No Triage-A-

## 2024-01-19 NOTE — TELEPHONE ENCOUNTER
Pt calling regarding infusion scheduled for today. Pt reports she tested positive for RSV yesterday and is unsure if she should keep appts today. She states she does have a dry cough, some congestion, and post nasal drip. Denies F/C, SOB, chest pain.    Secure message to Rhina Sow to ask if pt should keep appts as scheduled.     0728 Rhina responding: we're going to delay infusion. Nothing needed from her today, she should stay home and rest/recover. I'll send a message to scheduling to get things rescheduled to late next week.    0728 Informed pt of provider response. Pt verbalized understanding.

## 2024-01-19 NOTE — TELEPHONE ENCOUNTER
AL is not covered under AML. She has test pending for MM.     Thank you,    Galina Mcmillan  Oncology Pharmacy Liaison DAV [email or web address]  Phone: [phone]  Fax: [phone]

## 2024-01-22 ENCOUNTER — TELEPHONE (OUTPATIENT)
Dept: GASTROENTEROLOGY | Facility: CLINIC | Age: 54
End: 2024-01-22

## 2024-01-22 ENCOUNTER — PATIENT OUTREACH (OUTPATIENT)
Dept: CARE COORDINATION | Facility: CLINIC | Age: 54
End: 2024-01-22

## 2024-01-22 ENCOUNTER — NURSE TRIAGE (OUTPATIENT)
Dept: NURSING | Facility: CLINIC | Age: 54
End: 2024-01-22

## 2024-01-22 ENCOUNTER — OFFICE VISIT (OUTPATIENT)
Dept: FAMILY MEDICINE | Facility: CLINIC | Age: 54
End: 2024-01-22
Payer: COMMERCIAL

## 2024-01-22 VITALS
HEART RATE: 52 BPM | RESPIRATION RATE: 12 BRPM | DIASTOLIC BLOOD PRESSURE: 60 MMHG | WEIGHT: 176.8 LBS | OXYGEN SATURATION: 98 % | TEMPERATURE: 98.2 F | BODY MASS INDEX: 29.46 KG/M2 | SYSTOLIC BLOOD PRESSURE: 90 MMHG | HEIGHT: 65 IN

## 2024-01-22 DIAGNOSIS — B33.8 RSV (RESPIRATORY SYNCYTIAL VIRUS INFECTION): Primary | ICD-10-CM

## 2024-01-22 DIAGNOSIS — E85.81 AL AMYLOIDOSIS (H): ICD-10-CM

## 2024-01-22 LAB
PATH REPORT.COMMENTS IMP SPEC: ABNORMAL
PATH REPORT.COMMENTS IMP SPEC: ABNORMAL
PATH REPORT.COMMENTS IMP SPEC: YES
PATH REPORT.FINAL DX SPEC: ABNORMAL
PATH REPORT.GROSS SPEC: ABNORMAL
PATH REPORT.MICROSCOPIC SPEC OTHER STN: ABNORMAL
PATH REPORT.MICROSCOPIC SPEC OTHER STN: ABNORMAL
PATH REPORT.RELEVANT HX SPEC: ABNORMAL

## 2024-01-22 PROCEDURE — 99213 OFFICE O/P EST LOW 20 MIN: CPT | Performed by: NURSE PRACTITIONER

## 2024-01-22 RX ORDER — BENZONATATE 200 MG/1
200 CAPSULE ORAL 3 TIMES DAILY PRN
Qty: 30 CAPSULE | Refills: 0 | Status: SHIPPED | OUTPATIENT
Start: 2024-01-22 | End: 2024-03-18

## 2024-01-22 ASSESSMENT — PAIN SCALES - GENERAL: PAINLEVEL: NO PAIN (1)

## 2024-01-22 NOTE — H&P
[facility] Anesthesia Pre-op History and Physical    Vivian Brown MRN# [id number]   Age: 53 year old YOB: 1970      Date of Surgery: 1/22/2024 Location [facility]      Date of Exam 1/22/2024 Facility (In hospital)       Home clinic: [facility]  Primary care provider: Alin Torres         Chief Complaint and/or Reason for Procedure:   No chief complaint on file.  EGD. Duodenal bx and congo Red stain requested, also.  .       Active problem list:     Patient Active Problem List    Diagnosis Date Noted    AL amyloidosis (H) 01/14/2024     Priority: Medium    Dependent edema R>L 05/22/2023     Priority: Medium    Hyperlipidemia LDL goal <100 12/23/2022     Priority: Medium    Nonallopathic lesion of cervical region 09/23/2015     Priority: Medium     Problem list name updated by automated process. Provider to review      Cervicalgia 09/23/2015     Priority: Medium    Nonallopathic lesion of sacral region 09/23/2015     Priority: Medium     Problem list name updated by automated process. Provider to review      Nonallopathic lesion of lumbar region 09/23/2015     Priority: Medium     Problem list name updated by automated process. Provider to review      Lumbago 09/23/2015     Priority: Medium    Nonallopathic lesion of thoracic region 09/23/2015     Priority: Medium     Problem list name updated by automated process. Provider to review      Family hx of colon cancer 08/12/2015     Priority: Medium    Hypothyroidism due to acquired atrophy of thyroid 05/21/2013     Priority: Medium            Medications (include herbals and vitamins):   Any Plavix use in the last 7 days? No     No current facility-administered medications for this encounter.     Current Outpatient Medications   Medication Sig    furosemide (LASIX) 20 MG tablet Take 1 tablet (20 mg) by mouth daily    levothyroxine (SYNTHROID/LEVOTHROID) 200 MCG tablet Take 1 tablet  (200 mcg) by mouth daily    levothyroxine (SYNTHROID/LEVOTHROID) 25 MCG tablet Take 1 tablet (25 mcg) by mouth daily    liothyronine (CYTOMEL) 5 MCG tablet Take 5 mcg by mouth 2 times daily    lisinopril (ZESTRIL) 2.5 MG tablet Take 1 tablet (2.5 mg) by mouth daily    rosuvastatin (CRESTOR) 10 MG tablet Take 1 tablet (10 mg) by mouth daily    acyclovir (ZOVIRAX) 400 MG tablet Take 1 tablet (400 mg) by mouth 2 times daily Viral Prophylaxis. (Patient not taking: Reported on 1/18/2024)    benzonatate (TESSALON) 200 MG capsule Take 1 capsule (200 mg) by mouth 3 times daily as needed for cough    cholecalciferol (VITAMIN D3) 125 mcg (5000 units) capsule Take 1 capsule (125 mcg) by mouth daily (Patient not taking: Reported on 1/18/2024)    cycloPHOSphamide (CYTOXAN) 50 MG capsule Take 10 capsules (500 mg) by mouth every 7 days Take on days 1, 8, 15, and 22. (Patient not taking: Reported on 1/18/2024)    ondansetron (ZOFRAN) 8 MG tablet Take 1 tablet (8 mg) by mouth every 8 hours as needed for nausea (Patient not taking: Reported on 1/18/2024)             Allergies:      Allergies   Allergen Reactions    Latex Itching    Seasonal Allergies      Allergy to Latex? No  Allergy to tape?   No  Intolerances:             Physical Exam:   All vitals have been reviewed  No data found.  No intake/output data recorded.  Lungs:   No increased work of breathing, good air exchange, clear to auscultation bilaterally, no crackles or wheezing     Cardiovascular:   Normal apical impulse, regular rate and rhythm, normal S1 and S2, no S3 or S4, and no murmur noted             Lab / Radiology Results:            Anesthetic risk and/or ASA classification:       Melo Cloud MD

## 2024-01-22 NOTE — TELEPHONE ENCOUNTER
Patient calling reports having RSV with a lot of ongoing coughing and sinus congestion. Reports not being able to clear her cough fully. Symptoms started 8 or 9 days ago and have continued to linger with patient scheduled for procedure 1/25. Patient wanting to know if this can continue. Advised to follow up with the clinic to ensure ok to proceed with current symptoms. Advised per protocol to be seen within 3 days for ongoing cold symptoms with patient agreeable to the plan.     Guerita Sanchez RN 01/22/24 2:59 AM   [facility] Triage Nurse Advisor    Reason for Disposition   Lots of coughing    Additional Information   Negative: SEVERE difficulty breathing (e.g., struggling for each breath, speaks in single words)   Negative: Sounds like a life-threatening emergency to the triager   Negative: [1] Sinus infection AND [2] taking an antibiotic AND [3] symptoms continue   Negative: [1] Difficulty breathing AND [2] not from stuffy nose (e.g., not relieved by cleaning out the nose)   Negative: [1] SEVERE headache AND [2] fever   Negative: [1] Redness or swelling on the cheek, forehead or around the eye AND [2] fever   Negative: Fever > 104 F (40 C)   Negative: Patient sounds very sick or weak to the triager   Negative: [1] SEVERE pain AND [2] not improved 2 hours after pain medicine   Negative: [1] Redness or swelling on the cheek, forehead or around the eye AND [2] no fever   Negative: [1] Fever > 101 F (38.3 C) AND [2] age > 60 years   Negative: [1] Fever > 100.0 F (37.8 C) AND [2] bedridden (e.g., CVA, chronic illness, recovering from surgery)   Negative: [1] Fever > 100.0 F (37.8 C) AND [2] diabetes mellitus or weak immune system (e.g., HIV positive, cancer chemo, splenectomy, organ transplant, chronic steroids)   Negative: [1] Fever returns after gone for over 24 hours AND [2] symptoms worse or not improved   Negative: [1] Sinus pain (not just congestion) AND [2] fever   Negative: Fever present > 3 days (72 hours)    Negative: Earache   Negative: [1] Sinus congestion (pressure, fullness) AND [2] present > 10 days   Negative: [1] Nasal discharge AND [2] present > 10 days   Negative: [1] Using nasal washes and pain medicine > 24 hours AND [2] sinus pain (around cheekbone or eye) persists    Protocols used: Sinus Pain or Congestion-A-AH

## 2024-01-22 NOTE — TELEPHONE ENCOUNTER
Caller: Vivian  Reason for Reschedule/Cancellation (please be detailed, any staff messages or encounters to note?): Patient has RSV needs to reschedule.       Prior to reschedule please review:  Ordering Provider:     Carmen Waggoner APRN CNP in [facility]     Sedation Determined: moderate  Does patient have any ASC Exclusions, please identify?: n      Notes on Cancelled Procedure:  Procedure: Upper Endoscopy [EGD]   Date: 01/23/2024  Location: [facility]; [address] [place] [address] , [place] [address]  Surgeon: Long      Rescheduled: No CASE IN DEPOT

## 2024-01-22 NOTE — COMMUNITY RESOURCES LIST (ENGLISH)
01/22/2024   [facility] [employer]  N/A  For questions about this resource list or additional care needs, please contact your primary care clinic or care manager.  Phone: [phone]   Email: N/A   Address: [address] [place] [id number]   Hours: N/A        Financial Stability       Utility payment assistance  1  [facility] - Emergency Assistance - Utility payment assistance Distance: 11.19 miles      In-Person   [address] [place] [address]  Language: English, Hungarian  Hours: Mon 10:00 AM - 1:30 PM Appt. Only, Wed 10:00 AM - 1:30 PM Appt. Only, Thu 4:30 PM - 6:30 PM Appt. Only, Fri 10:00 AM - 1:30 PM Appt. Only  Fees: Free   Phone: (678) 519-9494 Email: info@[employer] Website: http://www.[facility].org     2  [facility] - Emergency Assistance - Utility payment assistance Distance: 12.3 miles      In-Person   [address] [facility] [address] [place] [address]  Language: English  Hours: Mon - Fri 8:00 AM - 4:30 PM  Fees: Free   Phone: (136) 516-1030 Email: [facility]. Website: http://www.[facility]./[facility]/index.php          Important Numbers & Websites       Emergency Services   911  [facility]   311  Poison Control   (470) 519-1890  Suicide Prevention Lifeline   (154) 605-1895 (TALK)  Child Abuse Hotline   (482) 537-6959 (4-A-Child)  Sexual Assault Hotline   (867) 830-4831 (HOPE)  National Runaway Safeline   (825) 519-1599 (RUNAWAY)  All-Options Talkline   (438) 872-8821  Substance Abuse Referral   (242) 922-8364 (HELP)

## 2024-01-22 NOTE — PROGRESS NOTES
"  Assessment & Plan     RSV (respiratory syncytial virus infection)  - REVIEW OF HEALTH MAINTENANCE PROTOCOL ORDERS  - benzonatate (TESSALON) 200 MG capsule; Take 1 capsule (200 mg) by mouth 3 times daily as needed for cough    Supportive cares are encouraged, increased fluid intake and rest.   Guaifenesin (Mucinex) 1,000mg Twice daily as needed for mucus/congestion  Fluticasone (Flonase) 2 puffs in each nostril once daily   Dextromethorphan (Delsym) 60mg twice daily as needed for cough  Pseudoephedrine (Sudafed)  120mg every 12 hours as needed for congestion   Cetirizine (Zyrtec) 10mg daily   Saline nasal spray or irrigation twice daily (Simply Saline)   Tylenol (1,000mg every 6-8 hours) or Ibuprofen (600mg every 6-8 hours) as needed for fever or pain     Follow up if symptoms fail to improve or worsen, or if shortness of breath or chest pain develop.     I explained my diagnostic considerations and recommendations to the patient, who voiced understanding and agreement with the assessment and treatment plan. All questions were answered to patient's apparent satisfaction. We discussed potential side effects of any prescribed or recommended therapies, as well as expectations for response to treatments and importance of lifestyle measures that may improve symptoms. Patient was advised to contact our office if there are new symptoms or no improvement or worsening of conditions or symptoms.              BMI  Estimated body mass index is 29.79 kg/m  as calculated from the following:    Height as of this encounter: 1.641 m (5' 4.6\").    Weight as of this encounter: 80.2 kg (176 lb 12.8 oz).           Dre Park is a 53 year old, presenting for the following health issues:  URI (X 1 week)        1/22/2024     7:24 AM   Additional Questions   Roomed by Marcos Hennessy CMA     History of Present Illness       Reason for visit:  Rsv  Symptom onset:  1-2 weeks ago  Symptoms include:  Cough congestion  Symptom intensity: "  Severe  Symptom progression:  Improving  Had these symptoms before:  No    She eats 2-3 servings of fruits and vegetables daily.She consumes 0 sweetened beverage(s) daily.She exercises with enough effort to increase her heart rate 20 to 29 minutes per day.  She exercises with enough effort to increase her heart rate 4 days per week.   She is taking medications regularly.     Vivian Brown is a 53 year old female patient who presents to the clinic today for evaluation of 5-7 days worth of coughing. The cough is sometimes productive. She has not tried over-the-counter medication. She did try taking some Dayquil and Nyquil yesterday. She has found this to be helpful and overall feels she is improving since her symptom onset. Mary Ann has a history of AL amyloidosis, she is following with Oncology as well as Nephrology. She did recently undergo a bone marrow biopsy on 1/18/24, respiratory panel completed at that time did test positive for RSV. She is scheduled for EGD tomorrow for concerns of celiac verses amyloidosis in the GI tract.     Associated symptoms:  Fever/Chills: No  Rhinorrhea: Yes  Nasal congestion: Yes  Ear pain: Occasional  Sinus Pressure: Yes  Headache: Yes    Patient Active Problem List   Diagnosis    Hypothyroidism due to acquired atrophy of thyroid    Family hx of colon cancer    Nonallopathic lesion of cervical region    Cervicalgia    Nonallopathic lesion of sacral region    Nonallopathic lesion of lumbar region    Lumbago    Nonallopathic lesion of thoracic region    Hyperlipidemia LDL goal <100    Dependent edema R>L    AL amyloidosis (H)     Current Outpatient Medications   Medication    benzonatate (TESSALON) 200 MG capsule    furosemide (LASIX) 20 MG tablet    levothyroxine (SYNTHROID/LEVOTHROID) 200 MCG tablet    levothyroxine (SYNTHROID/LEVOTHROID) 25 MCG tablet    liothyronine (CYTOMEL) 5 MCG tablet    lisinopril (ZESTRIL) 2.5 MG tablet    rosuvastatin (CRESTOR) 10 MG tablet    acyclovir  "(ZOVIRAX) 400 MG tablet    cholecalciferol (VITAMIN D3) 125 mcg (5000 units) capsule    cycloPHOSphamide (CYTOXAN) 50 MG capsule    ondansetron (ZOFRAN) 8 MG tablet     No current facility-administered medications for this visit.         Review of Systems  Constitutional, HEENT, cardiovascular, pulmonary, gi and gu systems are negative, except as otherwise noted.      Objective    BP 90/60 (BP Location: Right arm, Patient Position: Chair, Cuff Size: Adult Large)   Pulse 52   Temp 98.2  F (36.8  C) (Temporal)   Resp 12   Ht 1.641 m (5' 4.6\")   Wt 80.2 kg (176 lb 12.8 oz)   SpO2 98%   BMI 29.79 kg/m    Body mass index is 29.79 kg/m .  Physical Exam  Vitals reviewed.   Constitutional:       General: She is not in acute distress.     Appearance: Normal appearance.   HENT:      Head: Normocephalic and atraumatic.      Right Ear: Tympanic membrane normal.      Left Ear: Tympanic membrane normal.      Ears:      Comments: Clear fluid behind bilateral TM     Nose: Congestion and rhinorrhea present.      Mouth/Throat:      Mouth: Mucous membranes are moist.      Pharynx: Oropharynx is clear.      Comments: Evidence of post-nasal drainage   Eyes:      Extraocular Movements: Extraocular movements intact.      Conjunctiva/sclera: Conjunctivae normal.      Pupils: Pupils are equal, round, and reactive to light.   Cardiovascular:      Rate and Rhythm: Normal rate and regular rhythm.      Heart sounds: Normal heart sounds.   Pulmonary:      Effort: Pulmonary effort is normal.      Breath sounds: Normal breath sounds.   Musculoskeletal:      Cervical back: Neck supple.   Lymphadenopathy:      Cervical: No cervical adenopathy.   Skin:     General: Skin is warm and dry.   Neurological:      Mental Status: She is alert and oriented to person, place, and time. Mental status is at baseline.   Psychiatric:         Mood and Affect: Mood normal.         Behavior: Behavior normal.          Status: Final result       Visible to " patient: Yes (seen)       Dx: Acute cough    Specimen Information: Nasopharyngeal; Swab   0 Result Notes         Component  Ref Range & Units 4 d ago    Adenovirus  Not Detected Not Detected    Coronavirus  Not Detected Not Detected   Comment: This test detects Coronavirus 229E, HKU1, NL63 and OC43 but does not distinguish between them. It does not detect MERS ( Respiratory Syndrome), SARS (Severe Acute Respiratory Syndrome) or 2019-nCoV (Novel 2019) Coronavirus.    Human Metapneumovirus  Not Detected Not Detected    Human Rhin/Enterovirus  Not Detected Not Detected    Influenza A  Not Detected Not Detected    Influenza A, H1  Not Detected Not Detected    Influenza A 2009 H1N1  Not Detected Not Detected    Influenza A, H3  Not Detected Not Detected    Influenza B  Not Detected Not Detected    Parainfluenza Virus 1  Not Detected Not Detected    Parainfluenza Virus 2  Not Detected Not Detected    Parainfluenza Virus 3  Not Detected Not Detected    Parainfluenza Virus 4  Not Detected Not Detected    Respiratory Syncytial Virus A  Not Detected Detected Abnormal     Respiratory Syncytial Virus B  Not Detected Not Detected    Chlamydia Pneumoniae  Not Detected Not Detected    Mycoplasma Pneumoniae  Not Detected Not Detected   Resulting Agency [facility]          Signed Electronically by: Tania Fox NP

## 2024-01-23 ENCOUNTER — HOSPITAL ENCOUNTER (OUTPATIENT)
Facility: CLINIC | Age: 54
End: 2024-01-23
Attending: INTERNAL MEDICINE | Admitting: INTERNAL MEDICINE
Payer: COMMERCIAL

## 2024-01-23 ENCOUNTER — TELEPHONE (OUTPATIENT)
Dept: GASTROENTEROLOGY | Facility: CLINIC | Age: 54
End: 2024-01-23
Payer: COMMERCIAL

## 2024-01-23 NOTE — TELEPHONE ENCOUNTER
Tracie Dobson MD5 days ago     NK  Presscribed vit D 5000 U daily. Vit D level is only 7!.         Note     MD Vivian Saavedra5 days ago     HENRY Park,     I am so sorry for late reply. I had just got back from my meeting. For the MRI, the contrast you will receive is not affecting kidneys. Only iodide contrast and now we start to question whether the association is real. It is still a concerns for CT contrast  but risk is quite low. Bottom line is that for MRI contrast, no worry about kidney function at this time.      For vitamin D, I want you to take 5000 units a day so 400 unit is not going to be enough!. I can prescribe that for you so you can have the right dose.      Let me know if you have any questions.     Thank you,  Dr. Dobson    -------------------------------------------------------------------------------  Script sent to pharmacy per patient request.

## 2024-01-23 NOTE — TELEPHONE ENCOUNTER
**Call from Dinah/PH Coordinator     REMINDER: We do not accept [employer] insurance.      Procedure Scheduled: Upper Endoscopy [EGD]  Procedure Date: 01/23/2024  Site:[facility]; [address] , [place] [address]  Endoscopist: long   Ordering Provider: salas richey   Scheduled by (per the direction of):     Dinah /PH Coordinator: Same day add-on to end of Long's block. Ok'ed per Pedro Luis.

## 2024-01-24 ENCOUNTER — PATIENT OUTREACH (OUTPATIENT)
Dept: ONCOLOGY | Facility: CLINIC | Age: 54
End: 2024-01-24
Payer: COMMERCIAL

## 2024-01-24 NOTE — PROGRESS NOTES
[facility]: Cancer Care Short Note                                    Discussion with Patient:                                                      INBOUND CALL: VM received from patient. Calling about appts for Friday. Wondering also why she needs to see ZOHREH. Callback requested.,     OUTBOUND CALL: RNCC called patient. Discussed treatment schedule below with her    Future Appointments   Date Time Provider Department Center   1/25/2024  8:30 AM [address] [place] [address]   1/26/2024  8:15 AM  [facility] DRAW [facility]   1/26/2024  9:00 AM Dian Read APRN CNP [facility]   1/26/2024 11:15 AM Suha Armstrong MD [facility]   1/26/2024 12:00 PM  ONC INFUSION NURSE [facility]   2/2/2024  1:30 PM  [facility] LAB DRAW [facility]   2/2/2024  2:00 PM  ONC INFUSION NURSE [facility]   2/9/2024  1:30 PM  [facility] LAB DRAW [facility]   2/9/2024  2:00 PM  ONC INFUSION NURSE [facility]   2/16/2024  1:30 PM  [facility] LAB DRAW [facility]   2/16/2024  2:00 PM UC ONC INFUSION NURSE [facility]   3/15/2024  1:00 PM Jomar Chandler MD [facility]   3/15/2024  2:00 PM Ana Gonzalez, FREDDIE [facility]   3/15/2024  2:30 PM Ana Pascal, LICSW [facility]   3/15/2024  4:00 PM  [facility] LAB DRAW [facility]   3/18/2024 10:30 AM  LAB [facility]   3/18/2024 11:30 AM Tracie Dobson MD [facility]   4/8/2024 11:00 AM Carmen Waggoner APRN [facility]     Discussed that she needs to see ZOHREH for check in, as she recently was diagnosed with RSV. Reviewed appt times and schedule from 1/25-1/26. She is agreeable       Intervention/Education provided during outreach:                                                         Patient to follow up as scheduled at next appt  Patient to call/MyChart message with updates    Patient verbalizes understanding of POC and has no other questions or concerns at this time.     Sahra Diamond, RN, MSN, OCN   RN Care Coordinator    Veronique  [facility] [id number] [place] [address]   [phone]

## 2024-01-25 ENCOUNTER — HOSPITAL ENCOUNTER (OUTPATIENT)
Dept: PET IMAGING | Facility: CLINIC | Age: 54
Discharge: HOME | End: 2024-01-25
Attending: STUDENT IN AN ORGANIZED HEALTH CARE EDUCATION/TRAINING PROGRAM | Admitting: STUDENT IN AN ORGANIZED HEALTH CARE EDUCATION/TRAINING PROGRAM
Payer: COMMERCIAL

## 2024-01-25 DIAGNOSIS — E85.81 AL AMYLOIDOSIS (H): ICD-10-CM

## 2024-01-25 PROCEDURE — A9552 F18 FDG: HCPCS | Performed by: STUDENT IN AN ORGANIZED HEALTH CARE EDUCATION/TRAINING PROGRAM

## 2024-01-25 PROCEDURE — 78816 PET IMAGE W/CT FULL BODY: CPT | Mod: 26 | Performed by: RADIOLOGY

## 2024-01-25 PROCEDURE — 343N000001 HC RX 343: Performed by: STUDENT IN AN ORGANIZED HEALTH CARE EDUCATION/TRAINING PROGRAM

## 2024-01-25 PROCEDURE — 78816 PET IMAGE W/CT FULL BODY: CPT | Mod: PI

## 2024-01-25 RX ADMIN — FLUDEOXYGLUCOSE F-18 10.54 MILLICURIE: 500 INJECTION, SOLUTION INTRAVENOUS at 08:26

## 2024-01-26 ENCOUNTER — INFUSION THERAPY VISIT (OUTPATIENT)
Dept: ONCOLOGY | Facility: CLINIC | Age: 54
End: 2024-01-26
Attending: STUDENT IN AN ORGANIZED HEALTH CARE EDUCATION/TRAINING PROGRAM
Payer: COMMERCIAL

## 2024-01-26 ENCOUNTER — OFFICE VISIT (OUTPATIENT)
Dept: CARDIOLOGY | Facility: CLINIC | Age: 54
End: 2024-01-26
Attending: INTERNAL MEDICINE
Payer: COMMERCIAL

## 2024-01-26 ENCOUNTER — ONCOLOGY VISIT (OUTPATIENT)
Dept: ONCOLOGY | Facility: CLINIC | Age: 54
End: 2024-01-26
Attending: INTERNAL MEDICINE
Payer: COMMERCIAL

## 2024-01-26 ENCOUNTER — LAB (OUTPATIENT)
Dept: LAB | Facility: CLINIC | Age: 54
End: 2024-01-26
Attending: STUDENT IN AN ORGANIZED HEALTH CARE EDUCATION/TRAINING PROGRAM
Payer: COMMERCIAL

## 2024-01-26 ENCOUNTER — TELEPHONE (OUTPATIENT)
Dept: CARDIOLOGY | Facility: CLINIC | Age: 54
End: 2024-01-26

## 2024-01-26 VITALS
OXYGEN SATURATION: 97 % | HEIGHT: 65 IN | WEIGHT: 179.2 LBS | HEART RATE: 91 BPM | BODY MASS INDEX: 29.85 KG/M2 | DIASTOLIC BLOOD PRESSURE: 72 MMHG | SYSTOLIC BLOOD PRESSURE: 107 MMHG

## 2024-01-26 VITALS
RESPIRATION RATE: 19 BRPM | HEART RATE: 78 BPM | SYSTOLIC BLOOD PRESSURE: 99 MMHG | OXYGEN SATURATION: 98 % | DIASTOLIC BLOOD PRESSURE: 62 MMHG | TEMPERATURE: 98.4 F

## 2024-01-26 DIAGNOSIS — E85.4 AMYLOID HEART DISEASE (H): Primary | ICD-10-CM

## 2024-01-26 DIAGNOSIS — E85.81 AL AMYLOIDOSIS (H): Primary | ICD-10-CM

## 2024-01-26 DIAGNOSIS — R05.1 ACUTE COUGH: ICD-10-CM

## 2024-01-26 DIAGNOSIS — E85.4 RENAL AMYLOIDOSIS (H): ICD-10-CM

## 2024-01-26 DIAGNOSIS — J21.0 RSV BRONCHIOLITIS: Primary | ICD-10-CM

## 2024-01-26 DIAGNOSIS — N08 RENAL AMYLOIDOSIS (H): ICD-10-CM

## 2024-01-26 DIAGNOSIS — E85.81 AL AMYLOIDOSIS (H): ICD-10-CM

## 2024-01-26 DIAGNOSIS — D80.1 HYPOGAMMAGLOBULINEMIA (H): ICD-10-CM

## 2024-01-26 DIAGNOSIS — I43 AMYLOID HEART DISEASE (H): Primary | ICD-10-CM

## 2024-01-26 LAB
ALBUMIN SERPL BCG-MCNC: 1.9 G/DL (ref 3.5–5.2)
ALP SERPL-CCNC: 171 U/L (ref 40–150)
ALT SERPL W P-5'-P-CCNC: 25 U/L (ref 0–50)
ANION GAP SERPL CALCULATED.3IONS-SCNC: 6 MMOL/L (ref 7–15)
AST SERPL W P-5'-P-CCNC: 25 U/L (ref 0–45)
BASOPHILS # BLD AUTO: 0.1 10E3/UL (ref 0–0.2)
BASOPHILS NFR BLD AUTO: 1 %
BILIRUB SERPL-MCNC: 0.2 MG/DL
BUN SERPL-MCNC: 13.6 MG/DL (ref 6–20)
CALCIUM SERPL-MCNC: 8.4 MG/DL (ref 8.6–10)
CHLORIDE SERPL-SCNC: 105 MMOL/L (ref 98–107)
CREAT SERPL-MCNC: 1.04 MG/DL (ref 0.51–0.95)
DEPRECATED HCO3 PLAS-SCNC: 25 MMOL/L (ref 22–29)
EGFRCR SERPLBLD CKD-EPI 2021: 64 ML/MIN/1.73M2
EOSINOPHIL # BLD AUTO: 0.2 10E3/UL (ref 0–0.7)
EOSINOPHIL NFR BLD AUTO: 2 %
ERYTHROCYTE [DISTWIDTH] IN BLOOD BY AUTOMATED COUNT: 16 % (ref 10–15)
GLUCOSE SERPL-MCNC: 120 MG/DL (ref 70–99)
HCT VFR BLD AUTO: 42.4 % (ref 35–47)
HGB BLD-MCNC: 13.7 G/DL (ref 11.7–15.7)
IMM GRANULOCYTES # BLD: 0.1 10E3/UL
IMM GRANULOCYTES NFR BLD: 1 %
LYMPHOCYTES # BLD AUTO: 3.9 10E3/UL (ref 0.8–5.3)
LYMPHOCYTES NFR BLD AUTO: 27 %
MCH RBC QN AUTO: 27 PG (ref 26.5–33)
MCHC RBC AUTO-ENTMCNC: 32.3 G/DL (ref 31.5–36.5)
MCV RBC AUTO: 84 FL (ref 78–100)
MONOCYTES # BLD AUTO: 1.1 10E3/UL (ref 0–1.3)
MONOCYTES NFR BLD AUTO: 8 %
NEUTROPHILS # BLD AUTO: 8.9 10E3/UL (ref 1.6–8.3)
NEUTROPHILS NFR BLD AUTO: 61 %
NRBC # BLD AUTO: 0 10E3/UL
NRBC BLD AUTO-RTO: 0 /100
PLATELET # BLD AUTO: 450 10E3/UL (ref 150–450)
POTASSIUM SERPL-SCNC: 4 MMOL/L (ref 3.4–5.3)
PROT SERPL-MCNC: 5.3 G/DL (ref 6.4–8.3)
RBC # BLD AUTO: 5.08 10E6/UL (ref 3.8–5.2)
SARS-COV-2 RNA RESP QL NAA+PROBE: NEGATIVE
SODIUM SERPL-SCNC: 136 MMOL/L (ref 135–145)
WBC # BLD AUTO: 14.3 10E3/UL (ref 4–11)

## 2024-01-26 PROCEDURE — 87635 SARS-COV-2 COVID-19 AMP PRB: CPT

## 2024-01-26 PROCEDURE — 250N000012 HC RX MED GY IP 250 OP 636 PS 637: Performed by: STUDENT IN AN ORGANIZED HEALTH CARE EDUCATION/TRAINING PROGRAM

## 2024-01-26 PROCEDURE — 99213 OFFICE O/P EST LOW 20 MIN: CPT | Mod: 25 | Performed by: INTERNAL MEDICINE

## 2024-01-26 PROCEDURE — 250N000013 HC RX MED GY IP 250 OP 250 PS 637: Performed by: STUDENT IN AN ORGANIZED HEALTH CARE EDUCATION/TRAINING PROGRAM

## 2024-01-26 PROCEDURE — G2211 COMPLEX E/M VISIT ADD ON: HCPCS | Performed by: INTERNAL MEDICINE

## 2024-01-26 PROCEDURE — 96401 CHEMO ANTI-NEOPL SQ/IM: CPT

## 2024-01-26 PROCEDURE — 36415 COLL VENOUS BLD VENIPUNCTURE: CPT | Performed by: NURSE PRACTITIONER

## 2024-01-26 PROCEDURE — 99215 OFFICE O/P EST HI 40 MIN: CPT | Performed by: NURSE PRACTITIONER

## 2024-01-26 PROCEDURE — 99205 OFFICE O/P NEW HI 60 MIN: CPT | Mod: GC | Performed by: INTERNAL MEDICINE

## 2024-01-26 PROCEDURE — 80053 COMPREHEN METABOLIC PANEL: CPT | Performed by: NURSE PRACTITIONER

## 2024-01-26 PROCEDURE — 250N000011 HC RX IP 250 OP 636: Mod: JW | Performed by: STUDENT IN AN ORGANIZED HEALTH CARE EDUCATION/TRAINING PROGRAM

## 2024-01-26 PROCEDURE — 85025 COMPLETE CBC W/AUTO DIFF WBC: CPT | Performed by: NURSE PRACTITIONER

## 2024-01-26 PROCEDURE — 99213 OFFICE O/P EST LOW 20 MIN: CPT | Mod: 27 | Performed by: NURSE PRACTITIONER

## 2024-01-26 RX ORDER — DEXAMETHASONE 4 MG/1
40 TABLET ORAL ONCE
Status: COMPLETED | OUTPATIENT
Start: 2024-01-26 | End: 2024-01-26

## 2024-01-26 RX ORDER — ALBUTEROL SULFATE 90 UG/1
2 AEROSOL, METERED RESPIRATORY (INHALATION) EVERY 6 HOURS PRN
Qty: 18 G | Refills: 0 | Status: SHIPPED | OUTPATIENT
Start: 2024-01-26 | End: 2024-03-18

## 2024-01-26 RX ORDER — ACETAMINOPHEN 325 MG/1
650 TABLET ORAL ONCE
Status: COMPLETED | OUTPATIENT
Start: 2024-01-26 | End: 2024-01-26

## 2024-01-26 RX ORDER — DIPHENHYDRAMINE HCL 25 MG
50 CAPSULE ORAL ONCE
Status: COMPLETED | OUTPATIENT
Start: 2024-01-26 | End: 2024-01-26

## 2024-01-26 RX ORDER — MONTELUKAST SODIUM 10 MG/1
10 TABLET ORAL ONCE
Status: COMPLETED | OUTPATIENT
Start: 2024-01-26 | End: 2024-01-26

## 2024-01-26 RX ADMIN — MONTELUKAST 10 MG: 10 TABLET, FILM COATED ORAL at 13:01

## 2024-01-26 RX ADMIN — ACETAMINOPHEN 650 MG: 325 TABLET ORAL at 13:01

## 2024-01-26 RX ADMIN — DIPHENHYDRAMINE HYDROCHLORIDE 50 MG: 25 CAPSULE ORAL at 13:01

## 2024-01-26 RX ADMIN — DARATUMUMAB AND HYALURONIDASE-FIHJ (HUMAN RECOMBINANT) 1800 MG: 1800; 30000 INJECTION SUBCUTANEOUS at 13:32

## 2024-01-26 RX ADMIN — DEXAMETHASONE 40 MG: 4 TABLET ORAL at 13:01

## 2024-01-26 RX ADMIN — BORTEZOMIB 2.5 MG: 3.5 INJECTION, POWDER, LYOPHILIZED, FOR SOLUTION INTRAVENOUS; SUBCUTANEOUS at 13:32

## 2024-01-26 ASSESSMENT — PAIN SCALES - GENERAL
PAINLEVEL: NO PAIN (0)
PAINLEVEL: NO PAIN (0)

## 2024-01-26 NOTE — PROGRESS NOTES
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    Patient was diagnosed with renal amyloidosis with a kidney biopsy on 12/21/2023 after presenting with nephrotic syndrome and was put on lisinopril 2.5 mg and lasix 20 mg per day. 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 She then underwent a bone marrow biopsy on 1/18/2024 which also showed  lambda-monotypic plasma cells. She was supposed to start Zenia-CyBorD on 1/19/2024 and undergo endoscopy to rule out GI involvement, but developed RSV infection. She also stopped lisinopril recently due to dizziness and hypotension.    Denies fever, chest pain, shortness of breath, dyspnea on exertion, cough, PND, orthopnea, palpitations, syncope or near-syncope, hemoptysis, nausea, vomiting, abdominal pain, dysuria, hematuria, headaches, local weakness, numbness/tingling of hands/feet.      PAST MEDICAL HISTORY:  Past Medical History:   Diagnosis Date    Family history of colon cancer     Colonoscoy q5 years next 9/2020       CURRENT MEDICATIONS:  Current Outpatient Medications   Medication Sig Dispense Refill    benzonatate (TESSALON) 200 MG capsule Take 1 capsule (200 mg) by mouth 3 times daily as needed for cough 30 capsule 0    cholecalciferol (VITAMIN D3) 125 mcg (5000 units) capsule Take 1 capsule (125 mcg) by mouth daily 30 capsule 6    cycloPHOSphamide (CYTOXAN) 50 MG capsule Take 10 capsules (500 mg) by mouth every 7 days Take on days 1, 8, 15,  "and 22. 40 capsule 0    furosemide (LASIX) 20 MG tablet Take 1 tablet (20 mg) by mouth daily 90 tablet 3    levothyroxine (SYNTHROID/LEVOTHROID) 200 MCG tablet Take 1 tablet (200 mcg) by mouth daily 90 tablet 3    levothyroxine (SYNTHROID/LEVOTHROID) 25 MCG tablet Take 1 tablet (25 mcg) by mouth daily      liothyronine (CYTOMEL) 5 MCG tablet Take 5 mcg by mouth 2 times daily      lisinopril (ZESTRIL) 2.5 MG tablet Take 1 tablet (2.5 mg) by mouth daily 90 tablet 3    rosuvastatin (CRESTOR) 10 MG tablet Take 1 tablet (10 mg) by mouth daily 90 tablet 3    acyclovir (ZOVIRAX) 400 MG tablet Take 1 tablet (400 mg) by mouth 2 times daily Viral Prophylaxis. (Patient not taking: Reported on 1/18/2024) 60 tablet 3    ondansetron (ZOFRAN) 8 MG tablet Take 1 tablet (8 mg) by mouth every 8 hours as needed for nausea (Patient not taking: Reported on 1/18/2024) 30 tablet 2       ALLERGIES:     Allergies   Allergen Reactions    Latex Itching    Seasonal Allergies        FAMILY HISTORY:  Family History   Problem Relation Age of Onset    Colon Cancer Maternal Grandmother 91    Hypertension Mother     Hyperlipidemia Mother     Cancer Mother 65        GIST    Other Cancer Mother     Coronary Artery Disease Father         MI    Thyroid Disease Sister     Asthma Daughter      No family history of premature CAD or sudden death.    SOCIAL HISTORY:  Social History     Tobacco Use    Smoking status: Never     Passive exposure: Past    Smokeless tobacco: Never    Tobacco comments:     Parents smoked during childhood in the house   Vaping Use    Vaping Use: Never used   Substance Use Topics    Alcohol use: Not Currently     Alcohol/week: 0.0 standard drinks of alcohol     Comment: 1-2 times/month    Drug use: No       ROS:   A comprehensive 14 point review of systems is negative other than as mentioned in HPI.    Exam:  /72 (BP Location: Left arm, Patient Position: Chair, Cuff Size: Adult Regular)   Pulse 91   Ht 1.651 m (5' 5\")   Wt " 81.3 kg (179 lb 3.2 oz)   SpO2 97%   BMI 29.82 kg/m      General Appearance: AOx3, no clubbing/cyanosis  HEENT: PERRL, EOMI, no pharyngeal erythema  Respiratory: CTAB, no wheezing, no crackles  Cardiovascular:  no JVD, RRR, normal S1/S2, no murmur  GI: no distension, normoactive bowel sounds, soft, no tenderness, no rebound tenderness or guarding, no hepatosplenomegaly  Genitourinary: no CVA tenderness  Skin: no rash  Musculoskeletal: no deformities, no joint swelling, trace pitting edema bilaterally   Neurologic: CN grossly intact, no focal neurological deficits, no asterixis     Labs:  Reviewed.     Testing/Procedures:  CMR 1/16/2024  Suboptimal image quality due to gating artifact from arrhythmia.      1. The LV is normal in cavity size with mild increase in wall thickness. The global systolic function is  mildly reduced. The LVEF is 51%. There is mildly diffuse hypokinesis with apical sparing.      2. The RV is normal in cavity size. The global systolic function is normal. The RVEF is 54%.      3. Both atria are normal in size.     4. There is no significant valvular disease.      5. Late gadolinium enhancement imaging shows diffuse subendocardial hyperenhancement involving mainly the LV with extension into the RV and both atria to a lesser extend. These findings are compatible with cardiac amyloid.      6. Small circumferential pericardial effusion is present.      7. There is no intracardiac thrombus.     CONCLUSIONS:  Cardiac amyloidosis with diffuse subendocardial LGE, mildly reduced LV function (LVEF of 51%) and small pericardial effusion.     TTE with strain 1/3/2024  Interpretation Summary     There is mild concentric left ventricular hypertrophy.  The right ventricle is normal in size and function.  There is borderline right ventricular hypertrophy.  Global peak LV longitudinal strain is averaged at -15.3%. This suggests abnormal strain (normal <-18%).  The visual ejection fraction is estimated at  "54-56%.  Trivial posterior pericardial effusion  Clinical history is listed as renal amyloid--on this echo the atria are normal size, the valves are not thickend but there is mild LVH and borderline RVH, there is questionable \"scintillation\" of LV myocardium, the global longitudinal strain is abnormal and the best strain values are at the apex  (borderline \"apical sparing\" strain pattern) and there is trace pericardial effusion along with borderline criteria for diastolic dysfunction grade2--take together this could represent some features of cardiac amyloid. Additional studies such as cardiac MRI, cardiac biopsy etc may be useful.    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 Her NT-proBNP was in the 500s, troponin was 15, and kappa-lambda difference was not markedly elevated, thereby reassuring and indicating stage 1. The mainstay treatment for AL cardiac amyloidosis is still chemotherapy. It is not uncommon for patients with cardiac amyloidosis to not tolerate ACEi/ARB/ARNI. However, we think it is still possible that she would benefit from SGLT2i, but after discussion, she would like to wait until after her first cycle of induction chemotherapy.  Recommendations:  - Will check for the cost for Empagliflozin or Dapagliflozin  - Follow up in 5 months after she finishes 4 cycles of induction chemotherapy    Patient was seen and staffed " with Dr. Patti Monterroso MD  Internal Medicine Resident (PGY-3)  [facility]    I have seen and examined the patient on January 26, 2024. I have discussed the patient with the resident and I agree with the assessment and plan as outlined below. I have personally reviewed vital signs, hemodynamics, medications, laboratory values, and diagnostic testing.     Stage 1 by troponin, NtproBNP, difference in free light chains  Trialed lisinopril, became hypotensive  On lasix 20mg daily  Discussed SGTL2i and MRA, patient wishes to refrain for now given how well she feels  Starting chemotherapy today, 4 cycles  I will see her back in 5 months  Would be BMT candidate in my mind if needed.    Suha Armstrong MD   of Medicine  Advanced Heart Failure and Transplant Cardiology    Today's clinic visit entailed:  60 minutes spent on the date of the encounter doing chart review, history and exam, documentation and further activities per the note  The level of medical decision making during this visit was of high complexity.     The longitudinal plan of care for AL cardiac amyloidosis was addressed during this visit. Due to the added complexity in care, I will continue to support Vivian Brown in the subsequent management of this  condition(s) and with the ongoing continuity of care of this condition(s)

## 2024-01-26 NOTE — LETTER
1/26/2024         RE: Vivian Brown  [address] [facility] [id number]        Dear Colleague,    Thank you for referring your patient, Vivian Brown, to the [facility]. Please see a copy of my visit note below.            [facility]    Hematology/Oncology Clinic Note    January 26, 2024      Reason for Office Visit   Cycle 1 day 1  Darzalex Faspro + (CyBorD) for AL amyloidosis.     Oncology History of Present Illness      Disease presentation and baseline characteristics:    Final Diagnosis 12/21/2023  A. kidney biopsy (needle):     Amyloidosis of the kidney, AL-type, lambda light chain-restricted, affecting the glomeruli, interstitium, and arterioles (see comment)     Mild chronic changes of the parenchyma, including:   - focal global glomerulosclerosis (3% of glomeruli)   - focal tubular atrophy and interstitial fibrosis (5% of the cortex)   - severe arterial sclerosis   Electronically signed by Joe Garcia MD on 12/23/2023 at  3:21 PM   Comment      The biopsy reveals findings diagnostic of amyloidosis; the amyloid shows lambda light chain-restriction (AL amyloidosis), and the deposits are Congo red positive. The amyloid deposits affect the glomeruli extensively, and interstitium and arterioles focally. Other potential complications of paraproteins in the kidney are not seen, in particular, there is no evidence of light chain cast nephropathy, light chain deposition disease, or light chain tubulopathy.      1/8/24: NT-Pro , Troponin T 15     Date Treatment Name Response Side Effects / Toxicities    1/26/24  Darzalex Faspro + (CyBorD)               Interval History   Mary Ann presents today to commence treatment which has been delayed due to      presents today accompanied by Aneudy to discuss her recent diagnosis of She reports that she has had progressive fatigue and swelling in her legs over the past several years, worsening  significantly over the past six months. She works in physical therapy and exercises regularly, but noted that she was no longer able to exercise to the same degree over the past year. She notes she has a history of carpal tunnel in her right arm, although this is mild and has been present for several years without any changes. She denies any numbness or tingling of the extremities. She denies tongue swelling/thickness or difficulty swallowing. She does endorse abdominal discomfort and will be getting an upper endoscopy in the near future. She denies lightheadedness/dizziness when standing rapidly. She denies shortness of breath/chest pain. She was diagnosed with   Past Medical History     Past Medical History:   Diagnosis Date    Family history of colon cancer     Colonoscoy q5 years next 9/2020         Past Surgical History:      Past Surgical History:   Procedure Laterality Date    COLONOSCOPY N/A 9/28/2015    Procedure: COLONOSCOPY;  Surgeon: Eugenio Travis MD;  Location: [facility]    IR RENAL BIOPSY RIGHT  12/21/2023    TONSILLECTOMY           Social History     Socioeconomic History    Marital status:      Spouse name: None    Number of children: None    Years of education: None    Highest education level: None   Occupational History     Comment: Physical Therapy   Substance and Sexual Activity    Alcohol use: Not Currently     Alcohol/week: 0.0 standard drinks of alcohol     Comment: 1-2 times/month    Drug use: No    Sexual activity: Yes     Partners: Male     Birth control/protection: I.U.D.       Allergies:     Allergies   Allergen Reactions    Latex Itching    Seasonal Allergies        Medications:     Current Outpatient Medications   Medication    benzonatate (TESSALON) 200 MG capsule    cycloPHOSphamide (CYTOXAN) 50 MG capsule    furosemide (LASIX) 20 MG tablet    levothyroxine (SYNTHROID/LEVOTHROID) 200 MCG tablet    levothyroxine (SYNTHROID/LEVOTHROID) 25 MCG tablet    liothyronine  (CYTOMEL) 5 MCG tablet    rosuvastatin (CRESTOR) 10 MG tablet    acyclovir (ZOVIRAX) 400 MG tablet    cholecalciferol (VITAMIN D3) 125 mcg (5000 units) capsule    lisinopril (ZESTRIL) 2.5 MG tablet    ondansetron (ZOFRAN) 8 MG tablet     No current facility-administered medications for this visit.       Physical Exam     BP 99/62 (BP Location: Right arm, Patient Position: Sitting, Cuff Size: Adult Regular)   Pulse 78   Temp 98.4  F (36.9  C) (Oral)   Resp 19   SpO2 98%     Wt Readings from Last 5 Encounters:   01/26/24 81.3 kg (179 lb 3.2 oz)   01/22/24 80.2 kg (176 lb 12.8 oz)   01/18/24 82 kg (180 lb 12.8 oz)   01/12/24 83.9 kg (185 lb)   01/03/24 83 kg (183 lb)       Constitutional: Appears stated age, well-groomed.  Accompanied by her sister. In no apparent distress.   HEENT: Normocephilic. EOMI, PERRL. Sclerae are anicteric. Oral mucosa is pink and moist with no lesions or thrush; gums normal and good dentition.   Respiratory: Bilateral lobe wheezing with inspiration and expiration. No increased work of breathing, good air exchange.  Cardiovascular: Normal apical impulse, regular rate and rhythm, normal S1 and S2, and no murmur noted.  GI: No masses or scars. +BS. Soft. No tenderness on palpation.  Lymph/Hematologic: No cervical lymphadenopathy and no supraclavicular lymphadenopathy.  Skin: No bruising or bleeding, normal skin color, texture, turgor, no redness, warmth, or swelling, no rashes, no lesions, no jaundice.  Extremities: :No upper or lower extremity edema noted bilaterally.There is no redness, warmth, or swelling of the joints.  Neurologic: Awake, alert, oriented to name, place and time.    Vascular access: Peripheral IV in place.    Data     Most Recent 3 CBC's:  Recent Labs   Lab Test 01/26/24  0846 01/18/24  1433 01/08/24  1201 05/22/23  1605 12/19/22  1802   WBC 14.3* 11.0 10.7   < > 12.3*   HGB 13.7 15.0 14.6   < >  --    MCV 84 83 83   < >  --     360 361   < >  --    ANEUTAUTO 8.9*  6.4  --   --  6.6    < > = values in this interval not displayed.     Most Recent 3 BMP's:  Recent Labs   Lab Test 01/26/24  0846 01/18/24  1433 01/08/24  1201    136 141   POTASSIUM 4.0 4.1 4.7   CHLORIDE 105 104 106   CO2 25 25 24   BUN 13.6 14.1 17.0   CR 1.04* 1.12* 1.04*   ANIONGAP 6* 7 11   TANNER 8.4* 8.5* 8.6   * 112* 103*   PROTTOTAL 5.3* 4.8* 4.7*   ALBUMIN 1.9* 2.0* 2.0*    Most Recent 3 LFT's:  Recent Labs   Lab Test 01/26/24  0846 01/18/24  1433 01/08/24  1201   AST 25 30 28   ALT 25 29 32   ALKPHOS 171* 132 120   BILITOTAL 0.2 <0.2 0.2    Most Recent 2 TSH and T4:  Recent Labs   Lab Test 11/22/23  0819 11/16/23  1447 10/04/23  0823   TSH 1.01 0.57 3.44   T4 1.23  --  1.19     I reviewed the above labs today.    Imaging:  PET Oncology Whole Body  Narrative: Combined Report of: PET and CT on  1/25/2024 9:59 AM:    1. PET of the neck, chest, abdomen, and pelvis.  2. PET CT Fusion for Attenuation Correction and Anatomical  Localization:    3. 3D MIP and PET-CT fused images were processed on an independent  workstation and archived to PACS and reviewed by a radiologist.    Technique:    1. PET: The patient received 10.54 mCi of F-18-FDG; the serum glucose  was 103 mg/dL prior to administration, body weight was 80 kg. Images  were evaluated in the axial, sagittal, and coronal planes as well as  the rotational whole body MIP. Images were acquired from the Vertex to  the Feet.    UPTAKE WAS MEASURED AT 62 MINUTES.     BACKGROUND:  Liver SUV max= 2.8,   Aorta Blood SUV max= 2.5.     2. CT: CT only obtained for attenuation correction and not diagnostic  purposes.    INDICATION: Biopsy-proven AL amyloidosis of kidney, workup for plasma  cell disorder; AL amyloidosis (H)    ADDITIONAL INFORMATION OBTAINED FROM EMR: 53-year-old female with  biopsy-proven renal amyloidosis (AL amyloidosis) and cardiac  involvement by MRI, PET CT requested for plasma cell disorder workup.    COMPARISON: None.    FOLLOW-UP  APPOINTMENT: Unknown    FINDINGS:     HEAD/NECK:  Mildly hypermetabolic mildly enlarged bilateral level 2A nodes for  example a 1.5 x 1.2 cm right level IIA node, SUV max 4.2, series 3  image 71 and a 1.1 x 0.7 cm left level 2 node, SUV max 3.7, series 3  image 71.    Moderate diffuse thyroid uptake, SUV max 6.0, series 3 image 97.    CHEST:  Left lower lobe series 4 image 159-163 there is nodularity along the  left lower lobe anterior segment bronchus, largest nodule measuring 8  mm there is focal hypermetabolism in this area max SUV 4.5.    Differential amyloid, infection, atelectasis.      There is mild uptake throughout the left ventricle which could  represent active amyloidosis however in the absence of a cardiac  preparation may simply be physiologic.  Calcified left hilar nodes and left lower lobe calcified granuloma. No  pneumothorax or pleural effusion.     ABDOMEN AND PELVIS:  No abnormal uptake.     No calcified gallstones. Calcified splenic granulomas. No adrenal  nodule. Left inferior pole nonobstructing intrarenal calculus, no  hydronephrosis. No small or large bowel obstruction. Intrauterine  device in place. No free air or free fluid in the abdomen or pelvis.    LOWER EXTREMITIES:   A single area of focal moderate to markedly hypermetabolic region in  the proximal posterior left gastrocnemius muscle, SUV max 8.6 series 3  image 416.    BONES AND SOFT TISSUES:   No abnormal uptake.  Old anterior rib fracture deformities.   Impression: IMPRESSION: In this patient with history of light chain amyloidosis  with renal and cardiac involvement,  1. No definite active amyloid on FDG pet.    2. Single focal moderate to markedly avid intramuscular uptake,  proximal left gastrocnemius, no etiology demonstrated on unenhanced  non diagnostic CT.  Recommend dedicated MRI.    3. Small to mildly enlarged mildly hypermetabolic bilateral level 2A  lymph nodes, most likely reactive.    4. A few equivocal findings -  unlikely to be active amyloid (patient  has been under treatment, so FDG may be decreased).  Recommend future  PET scans be done with a sarcoid cardiac prep and and Lasix protocol  to decrease background and make them more sensitive for cardiac and  renal activity.    4a. Mildly hypermetabolic left lower lobe anterior segment  peribronchial nodularity and hypermetabolism. Differential favors RSV  (positive 1/18/24), focal amyloid unlikely.   4b. Cardiac (MRI positive) - equivocal FDG throughout left ventricle,   likely physiologic. Given findings on MRI 1/16/2024, can not rule out  active amyloidosis. However in the absence of a cardiac dietary prep  (which switches myocardium from glucose to fatty metabolism), this is  likely normal physiology.  Suggest future PET scans be done with a  cardiac sarcoid dietary protocol (3 day ketogenic diet).  4c. Kidney cortex (bx. 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           I have personally reviewed the examination and initial interpretation  and I agree with the findings.    PETE VELASQUEZ MD         SYSTEM ID:  [id number]    I reviewed the above imaging today.      Assessment/Plan       Hashimoto's thyroiditis  - Diagnosed August 2023  - On               Plan from today's clinical encounter  Proceed with     60 minutes spent on the date of the encounter doing chart review, review of outside records, review of test results, interpretation of tests, patient visit, documentation, discussion with other provider(s), and discussion with family     Dian ALCANTARA, ANP-BC, AOCNP  [facility] [address] [id number] [phone] (pager)

## 2024-01-26 NOTE — PROGRESS NOTES
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 kidney biopsy (needle):     Amyloidosis of the kidney, AL-type, lambda light chain-restricted, affecting the glomeruli, interstitium, and arterioles (see comment)     Mild chronic changes of the parenchyma, including:   - focal global glomerulosclerosis (3% of glomeruli)   - focal tubular atrophy and interstitial fibrosis (5% of the cortex)   - severe arterial sclerosis   Electronically signed by Joe Garcia MD on 12/23/2023 at  3:21 PM   Comment      The biopsy reveals findings diagnostic of amyloidosis; the amyloid shows lambda light chain-restriction (AL amyloidosis), and the deposits are Congo red positive. The amyloid deposits affect the glomeruli extensively, and interstitium and arterioles focally. Other potential complications of paraproteins in the kidney are not seen, in particular, there is no evidence of light chain cast nephropathy, light chain deposition disease, or light chain tubulopathy.      1/8/24: NT-Pro , Troponin T 15    1/25/24 PET                                                                  Single focal moderate to markedly avid intramuscular uptake, proximal left gastrocnemius, no etiology demonstrated on un enhanced non diagnostic CT.    A few equivocal findings - unlikely to be active amyloid  Mildly hypermetabolic left lower lobe anterior segment peribronchial nodularity and hypermetabolism. Differential favors RSV positive 1/18/24), focal amyloid unlikely.   Cardiac (MRI positive) - equivocal FDG throughout left ventricle. Given findings on MRI  "1/16/2024, can not rule out active amyloidosis. However in the absence of a cardiac dietary prep (which switches myocardium from glucose to fatty metabolism), this is likely normal physiology.  Kidney cortex (bx. Proven) - likely normal, with the knowledge that the patient's kidneys are biopsy-positive for amyloid, could do future studies with a lasix protocol to dilute urinary FDG, may allow for identifying renal uptake.     Date Treatment Name Response Side Effects / Toxicities    1/26/24  Darzalex Faspro + (CyBorD)               Interval History   Mary Ann presents today to commence cycle 1 treatment which has been delayed due having RSV.    She is feeling better from a URI perspective.  Works as a PT. Endurance and strength have decreased due to fatigue and overall health.  Denies paresthesias, HA, vision changes, SOB or chest pain.  Denies bowel or bladder issues. Notes urine not as \"bubbly.\"  Saw cardiology today, due to amyloid involvement. Jardiance/Farxiga was recommended yet she prefers to hold this at present time.  Staging PET showed uptake in gastrocnemius.  Denies muscle pain.    Past Medical History     Past Medical History:   Diagnosis Date    Family history of colon cancer     Colonoscoy q5 years next 9/2020       Past Surgical History:      Past Surgical History:   Procedure Laterality Date    COLONOSCOPY N/A 9/28/2015    Procedure: COLONOSCOPY;  Surgeon: Eugenio Travis MD;  Location: [facility]    IR RENAL BIOPSY RIGHT  12/21/2023    TONSILLECTOMY         Social History     Socioeconomic History    Marital status:      Spouse name: None    Number of children: None    Years of education: None    Highest education level: None   Occupational History     Comment: Physical Therapy   Substance and Sexual Activity    Alcohol use: Not Currently     Alcohol/week: 0.0 standard drinks of alcohol     Comment: 1-2 times/month    Drug use: No    Sexual activity: Yes     Partners: Male     Birth " control/protection: I.U.D.       Allergies:     Allergies   Allergen Reactions    Latex Itching    Seasonal Allergies        Medications:     Current Outpatient Medications   Medication    benzonatate (TESSALON) 200 MG capsule    cycloPHOSphamide (CYTOXAN) 50 MG capsule    furosemide (LASIX) 20 MG tablet    levothyroxine (SYNTHROID/LEVOTHROID) 200 MCG tablet    levothyroxine (SYNTHROID/LEVOTHROID) 25 MCG tablet    liothyronine (CYTOMEL) 5 MCG tablet    rosuvastatin (CRESTOR) 10 MG tablet    acyclovir (ZOVIRAX) 400 MG tablet    cholecalciferol (VITAMIN D3) 125 mcg (5000 units) capsule    lisinopril (ZESTRIL) 2.5 MG tablet    ondansetron (ZOFRAN) 8 MG tablet     No current facility-administered medications for this visit.       Physical Exam     BP 99/62 (BP Location: Right arm, Patient Position: Sitting, Cuff Size: Adult Regular)   Pulse 78   Temp 98.4  F (36.9  C) (Oral)   Resp 19   SpO2 98%     Wt Readings from Last 5 Encounters:   01/26/24 81.3 kg (179 lb 3.2 oz)   01/22/24 80.2 kg (176 lb 12.8 oz)   01/18/24 82 kg (180 lb 12.8 oz)   01/12/24 83.9 kg (185 lb)   01/03/24 83 kg (183 lb)       Constitutional: Appears stated age, well-groomed.  Accompanied by her sister. In no apparent distress.   HEENT: Normocephilic. EOMI, PERRL. Sclerae are anicteric. Oral mucosa is pink and moist with no lesions or thrush; gums normal and good dentition.   Respiratory: Bilateral lobe wheezing with inspiration and expiration. No increased work of breathing, good air exchange.  Cardiovascular: Normal apical impulse, regular rate and rhythm, normal S1 and S2, and no murmur noted.  GI: +BS. Soft. No tenderness on palpation.  Lymph/Hematologic: No cervical lymphadenopathy and no supraclavicular lymphadenopathy.  Skin: No bruising or bleeding, normal skin color, texture, turgor, no redness, warmth, or swelling, no rashes, no lesions, no jaundice.  Extremities: No upper or lower extremity edema noted bilaterally.There is no redness,  warmth, or swelling of the joints.  Neurologic: Awake, alert, oriented to name, place and time.    Vascular access: Peripheral IV in place.    Data     Most Recent 3 CBC's:  Recent Labs   Lab Test 01/26/24  0846 01/18/24  1433 01/08/24  1201 05/22/23  1605 12/19/22  1802   WBC 14.3* 11.0 10.7   < > 12.3*   HGB 13.7 15.0 14.6   < >  --    MCV 84 83 83   < >  --     360 361   < >  --    ANEUTAUTO 8.9* 6.4  --   --  6.6    < > = values in this interval not displayed.     Most Recent 3 BMP's:  Recent Labs   Lab Test 01/26/24  0846 01/18/24  1433 01/08/24  1201    136 141   POTASSIUM 4.0 4.1 4.7   CHLORIDE 105 104 106   CO2 25 25 24   BUN 13.6 14.1 17.0   CR 1.04* 1.12* 1.04*   ANIONGAP 6* 7 11   TANNER 8.4* 8.5* 8.6   * 112* 103*   PROTTOTAL 5.3* 4.8* 4.7*   ALBUMIN 1.9* 2.0* 2.0*    Most Recent 3 LFT's:  Recent Labs   Lab Test 01/26/24  0846 01/18/24  1433 01/08/24  1201   AST 25 30 28   ALT 25 29 32   ALKPHOS 171* 132 120   BILITOTAL 0.2 <0.2 0.2    Most Recent 2 TSH and T4:  Recent Labs   Lab Test 11/22/23  0819 11/16/23  1447 10/04/23  0823   TSH 1.01 0.57 3.44   T4 1.23  --  1.19     I reviewed the above labs today.    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 Her NT-proBNP was in the 500s, troponin was 15, and kappa-lambda difference was not markedly elevated, thereby reassuring and indicating stage 1.   Mainstay treatment for AL cardiac amyloidosis is still chemotherapy. It is not uncommon for patients with cardiac amyloidosis to not tolerate ACEi/ARB/ARNI. H1/26/24: Would benefit from SGLT2i, but after  discussion, she would like to wait until after her first cycle of induction chemotherapy.  Follow up with Dr. Armstrong (cardiology in 5 months)    Renal involvement (per nephrology)  Nephrotic Syndrome  Stage 0 or 1 based on Hall 2012 classification.   Lasix 20 mg daily and lisinopril 2.5 mg daily.   Monitor BP closely and if <90/60 persistently or symptomatic may stop.    Hypogammaglobulinemia  1/18/24 IgG 294 and tested + RSV  Low IgG may be due to resent infection.  Will recheck IgG to evaluate level due to recent infection.  IF still low, consider IVIG.    Hashimoto's thyroiditis  Diagnosed August 2023, TPO ab is positive   Levothyroxine      Plan from today's clinical encounter  Proceed with cycle 1 day 1 CyBorD  Albuterol inhaler for wheezing  MR Tibia Fibula Lower Leg Left wo & w Contrast to evaluate proximal left gastrocnemius,  Schedule request for cycle 2 Zenia + Velcade on the following days 2/23, 3/1, 3/8, 3/15 3. 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

## 2024-01-26 NOTE — NURSING NOTE
Chief Complaint   Patient presents with    New Patient     New Amyloid- 53 year old female referred by Dr. Chandler for amyloid management. Labs done on 1/8, cMRI on 1/16.     Vitals were taken and medications reconciled.    Alex Griffiths, EMT  10:46 AM

## 2024-01-26 NOTE — LETTER
1/26/2024      RE: Vivian Brown  [address] [facility] [address]       Dear Colleague,    Thank you for the opportunity to participate in the care of your patient, Vivian Brown, at the [facility] [place] at [facility]. Please see a copy of my visit note below.      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    Patient was diagnosed with renal amyloidosis with a kidney biopsy on 12/21/2023 after presenting with nephrotic syndrome and was put on lisinopril 2.5 mg and lasix 20 mg per day. 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 She then underwent a bone marrow biopsy on 1/18/2024 which also showed  lambda-monotypic plasma cells. She was supposed to start Zenia-CyBorD on 1/19/2024 and undergo endoscopy to rule out GI involvement, but developed RSV infection. She also stopped lisinopril recently due to dizziness and hypotension.    Denies fever, chest pain, shortness of breath, dyspnea on exertion, cough, PND, orthopnea, palpitations, syncope or near-syncope, hemoptysis, nausea, vomiting, abdominal pain, dysuria, hematuria, headaches, local weakness, numbness/tingling of hands/feet.      PAST MEDICAL HISTORY:  Past Medical History:   Diagnosis Date     Family history of colon cancer     Colonoscoy q5 years next 9/2020       CURRENT MEDICATIONS:  Current Outpatient Medications   Medication Sig Dispense Refill      benzonatate (TESSALON) 200 MG capsule Take 1 capsule (200 mg) by mouth 3 times daily as needed for cough 30 capsule 0     cholecalciferol (VITAMIN D3) 125 mcg (5000 units) capsule Take 1 capsule (125 mcg) by mouth daily 30 capsule 6     cycloPHOSphamide (CYTOXAN) 50 MG capsule Take 10 capsules (500 mg) by mouth every 7 days Take on days 1, 8, 15, and 22. 40 capsule 0     furosemide (LASIX) 20 MG tablet Take 1 tablet (20 mg) by mouth daily 90 tablet 3     levothyroxine (SYNTHROID/LEVOTHROID) 200 MCG tablet Take 1 tablet (200 mcg) by mouth daily 90 tablet 3     levothyroxine (SYNTHROID/LEVOTHROID) 25 MCG tablet Take 1 tablet (25 mcg) by mouth daily       liothyronine (CYTOMEL) 5 MCG tablet Take 5 mcg by mouth 2 times daily       lisinopril (ZESTRIL) 2.5 MG tablet Take 1 tablet (2.5 mg) by mouth daily 90 tablet 3     rosuvastatin (CRESTOR) 10 MG tablet Take 1 tablet (10 mg) by mouth daily 90 tablet 3     acyclovir (ZOVIRAX) 400 MG tablet Take 1 tablet (400 mg) by mouth 2 times daily Viral Prophylaxis. (Patient not taking: Reported on 1/18/2024) 60 tablet 3     ondansetron (ZOFRAN) 8 MG tablet Take 1 tablet (8 mg) by mouth every 8 hours as needed for nausea (Patient not taking: Reported on 1/18/2024) 30 tablet 2       ALLERGIES:     Allergies   Allergen Reactions     Latex Itching     Seasonal Allergies        FAMILY HISTORY:  Family History   Problem Relation Age of Onset     Colon Cancer Maternal Grandmother 91     Hypertension Mother      Hyperlipidemia Mother      Cancer Mother 65        GIST     Other Cancer Mother      Coronary Artery Disease Father         MI     Thyroid Disease Sister      Asthma Daughter      No family history of premature CAD or sudden death.    SOCIAL HISTORY:  Social History     Tobacco Use     Smoking status: Never     Passive exposure: Past     Smokeless tobacco: Never     Tobacco comments:     Parents smoked during childhood in the house   Vaping Use     Vaping Use: Never used  "  Substance Use Topics     Alcohol use: Not Currently     Alcohol/week: 0.0 standard drinks of alcohol     Comment: 1-2 times/month     Drug use: No       ROS:   A comprehensive 14 point review of systems is negative other than as mentioned in HPI.    Exam:  /72 (BP Location: Left arm, Patient Position: Chair, Cuff Size: Adult Regular)   Pulse 91   Ht 1.651 m (5' 5\")   Wt 81.3 kg (179 lb 3.2 oz)   SpO2 97%   BMI 29.82 kg/m      General Appearance: AOx3, no clubbing/cyanosis  HEENT: PERRL, EOMI, no pharyngeal erythema  Respiratory: CTAB, no wheezing, no crackles  Cardiovascular:  no JVD, RRR, normal S1/S2, no murmur  GI: no distension, normoactive bowel sounds, soft, no tenderness, no rebound tenderness or guarding, no hepatosplenomegaly  Genitourinary: no CVA tenderness  Skin: no rash  Musculoskeletal: no deformities, no joint swelling, trace pitting edema bilaterally   Neurologic: CN grossly intact, no focal neurological deficits, no asterixis     Labs:  Reviewed.     Testing/Procedures:  CMR 1/16/2024  Suboptimal image quality due to gating artifact from arrhythmia.      1. The LV is normal in cavity size with mild increase in wall thickness. The global systolic function is  mildly reduced. The LVEF is 51%. There is mildly diffuse hypokinesis with apical sparing.      2. The RV is normal in cavity size. The global systolic function is normal. The RVEF is 54%.      3. Both atria are normal in size.     4. There is no significant valvular disease.      5. Late gadolinium enhancement imaging shows diffuse subendocardial hyperenhancement involving mainly the LV with extension into the RV and both atria to a lesser extend. These findings are compatible with cardiac amyloid.      6. Small circumferential pericardial effusion is present.      7. There is no intracardiac thrombus.     CONCLUSIONS:  Cardiac amyloidosis with diffuse subendocardial LGE, mildly reduced LV function (LVEF of 51%) and small " "pericardial effusion.     TTE with strain 1/3/2024  Interpretation Summary     There is mild concentric left ventricular hypertrophy.  The right ventricle is normal in size and function.  There is borderline right ventricular hypertrophy.  Global peak LV longitudinal strain is averaged at -15.3%. This suggests abnormal strain (normal <-18%).  The visual ejection fraction is estimated at 54-56%.  Trivial posterior pericardial effusion  Clinical history is listed as renal amyloid--on this echo the atria are normal size, the valves are not thickend but there is mild LVH and borderline RVH, there is questionable \"scintillation\" of LV myocardium, the global longitudinal strain is abnormal and the best strain values are at the apex  (borderline \"apical sparing\" strain pattern) and there is trace pericardial effusion along with borderline criteria for diastolic dysfunction grade2--take together this could represent some features of cardiac amyloid. Additional studies such as cardiac MRI, cardiac biopsy etc may be useful.    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 Her NT-proBNP was in the 500s, troponin was 15, and kappa-lambda difference was not markedly elevated, thereby reassuring and indicating stage 1. The mainstay treatment for AL cardiac amyloidosis is still chemotherapy. It is not uncommon for patients with cardiac amyloidosis to not " tolerate ACEi/ARB/ARNI. However, we think it is still possible that she would benefit from SGLT2i, but after discussion, she would like to wait until after her first cycle of induction chemotherapy.  Recommendations:  - Will check for the cost for Empagliflozin or Dapagliflozin  - Follow up in 5 months after she finishes 4 cycles of induction chemotherapy    Patient was seen and staffed with Dr. Patti Monterroso MD  Internal Medicine Resident (PGY-3)  [facility]    I have seen and examined the patient on January 26, 2024. I have discussed the patient with the resident and I agree with the assessment and plan as outlined below. I have personally reviewed vital signs, hemodynamics, medications, laboratory values, and diagnostic testing.     Stage 1 by troponin, NtproBNP, difference in free light chains  Trialed lisinopril, became hypotensive  On lasix 20mg daily  Discussed SGTL2i and MRA, patient wishes to refrain for now given how well she feels  Starting chemotherapy today, 4 cycles  I will see her back in 5 months  Would be BMT candidate in my mind if needed.    Suha Armstrong MD   of Medicine  Advanced Heart Failure and Transplant Cardiology    Today's clinic visit entailed:  60 minutes spent on the date of the encounter doing chart review, history and exam, documentation and further activities per the note  The level of medical decision making during this visit was of high complexity.     The longitudinal plan of care for AL cardiac amyloidosis was addressed during this visit. Due to the added complexity in care, I will continue to support Vivian Brown in the subsequent management of this  condition(s) and with the ongoing continuity of care of this condition(s)         Please do not hesitate to contact me if you have any questions/concerns.     Sincerely,     Suha Armstrong MD

## 2024-01-26 NOTE — PATIENT INSTRUCTIONS
Contact Numbers  [facility]: [phone] (for symptom and scheduling needs)    Please call the [facility] Triage line if you experience a temperature greater than or equal to 100.4, shaking chills, have uncontrolled nausea, vomiting and/or diarrhea, dizziness, shortness of breath, chest pain, bleeding, unexplained bruising, or if you have any other new/concerning symptoms, questions or concerns.     If you are having any concerning symptoms or wish to speak to a provider before your next infusion visit, please call your care coordinator or triage to notify them so we can adequately serve you.     If you need a refill on a narcotic prescription or other medication, please call triage before your infusion appointment.           January 2024 Sunday Monday Tuesday Wednesday Thursday Friday Saturday        1     2     3    ECHO COMPLETE  10:00 AM   (60 min.)   [id number]   [facility]    DX HIP/PELVIS/SPINE  10:45 AM   (30 min.)   PHDX1   [facility]    MA SCREENING BILATERAL W/ JEFF  11:15 AM   (15 min.)   PHMA1   [facility]    NEW GI NUTRITION   1:15 PM   (60 min.)   Sarah Chan RD   [facility] [place] 4     5     6       7     8    NEW NEPHROLOGY ONCOLOGY  10:15 AM   (30 min.)   Tracie Dobson MD   [facility] [place]    LAB  11:45 AM   (15 min.)   PH LAB   [facility] 9     10     11     12    NEW ONCOLOGY   8:45 AM   (60 min.)   Jomar Chandler MD   [facility] [place] 13       14     15     16    MR MYOCARDIUM W  12:00 PM   (105 min.)   [email or web address]   [facility] 17     18    LAB PERIPHERAL   2:00 PM   (15 min.)   [facility]RAW   [facility] BONE MARROW BIOPSY   2:45 PM   (90 min.)   Angelica Thorpe APRN [facility]  [facility] 19    LAB PERIPHERAL   7:30 AM   (15 min.)   [facility]   [facility]    RETURN CCSL   7:45 AM   (45 min.)   Rhina Sow APRN CNP   [facility] 20       21     22    OFFICE VISIT   7:10 AM   (30 min.)   Tania Fox NP   [facility] 23     24     25    PET ONCOLOGY WHOLE BODY   8:00 AM   (30 min.)   [id number]   [facility] 26    LAB PERIPHERAL   8:15 AM   (15 min.)   [facility]   [facility]    RETURN CCSL   8:45 AM   (45 min.)   Dian Read APRN CNP   [facility]    NEW [facility]  11:00 AM   (60 min.)   Suha Armstrong MD   [facility]    ONC INFUSION 5 HR (300 MIN)  12:00 PM   (300 min.)   [facility] ONC INFUSION NURSE   [facility] 27       28     29     30    ESOPHAGOGASTRODUODENOSCOPY   7:30 AM   Melo Cloud MD   [facility] 31 February 2024 Sunday Monday Tuesday Wednesday Thursday Friday Saturday                       1     2    LAB PERIPHERAL   1:30 PM   (15 min.)   [facility]   [facility]    ONC INFUSION 2 HR (120 MIN)   2:00 PM   (120 min.)   UC ONC INFUSION NURSE   [facility] 3       4     5     6     7     8     9    LAB PERIPHERAL   1:30 PM   (15 min.)   [facility]   [facility]    ONC INFUSION 2 HR (120 MIN)   2:00 PM   (120 min.)   UC ONC INFUSION NURSE   [facility] 10       11     12     13     14     15     16    LAB PERIPHERAL   1:30 PM   (15 min.)   [facility] D[facility]   [facility]    ONC INFUSION 2 HR (120 MIN)   2:00 PM   (120 min.)   UC ONC INFUSION NURSE   [facility] 17       18  Happy Birthday!     19     20      21     22     23     24       25     26     27     28     29                               Lab Results:  Recent Results (from the past 12 hour(s))   Comprehensive metabolic panel    Collection Time: 01/26/24  8:46 AM   Result Value Ref Range    Sodium 136 135 - 145 mmol/L    Potassium 4.0 3.4 - 5.3 mmol/L    Carbon Dioxide (CO2) 25 22 - 29 mmol/L    Anion Gap 6 (L) 7 - 15 mmol/L    Urea Nitrogen 13.6 6.0 - 20.0 mg/dL    Creatinine 1.04 (H) 0.51 - 0.95 mg/dL    GFR Estimate 64 >60 mL/min/1.73m2    Calcium 8.4 (L) 8.6 - 10.0 mg/dL    Chloride 105 98 - 107 mmol/L    Glucose 120 (H) 70 - 99 mg/dL    Alkaline Phosphatase 171 (H) 40 - 150 U/L    AST 25 0 - 45 U/L    ALT 25 0 - 50 U/L    Protein Total 5.3 (L) 6.4 - 8.3 g/dL    Albumin 1.9 (L) 3.5 - 5.2 g/dL    Bilirubin Total 0.2 <=1.2 mg/dL   CBC with platelets and differential    Collection Time: 01/26/24  8:46 AM   Result Value Ref Range    WBC Count 14.3 (H) 4.0 - 11.0 10e3/uL    RBC Count 5.08 3.80 - 5.20 10e6/uL    Hemoglobin 13.7 11.7 - 15.7 g/dL    Hematocrit 42.4 35.0 - 47.0 %    MCV 84 78 - 100 fL    MCH 27.0 26.5 - 33.0 pg    MCHC 32.3 31.5 - 36.5 g/dL    RDW 16.0 (H) 10.0 - 15.0 %    Platelet Count 450 150 - 450 10e3/uL    % Neutrophils 61 %    % Lymphocytes 27 %    % Monocytes 8 %    % Eosinophils 2 %    % Basophils 1 %    % Immature Granulocytes 1 %    NRBCs per 100 WBC 0 <1 /100    Absolute Neutrophils 8.9 (H) 1.6 - 8.3 10e3/uL    Absolute Lymphocytes 3.9 0.8 - 5.3 10e3/uL    Absolute Monocytes 1.1 0.0 - 1.3 10e3/uL    Absolute Eosinophils 0.2 0.0 - 0.7 10e3/uL    Absolute Basophils 0.1 0.0 - 0.2 10e3/uL    Absolute Immature Granulocytes 0.1 <=0.4 10e3/uL    Absolute NRBCs 0.0 10e3/uL

## 2024-01-26 NOTE — PROGRESS NOTES
Infusion Nursing Note:  Vivian Brown presents today for Cycle 1 Day 1 Darzalex Faspro and Velcade.    Patient seen by provider today: Yes: Dian Read NP   present during visit today: Not Applicable.    Note: Patient presents to infusion today doing well. No new questions or concerns following her visit with Dian Read NP.     Patient new to oncology infusion room and is receiving Darzalex Faspro and Velcade for the first time. Pt oriented to infusion room, including chair, nutrition station and call light. New patient teaching done previously by MATTHEW Lynch. Pt received new pt folder prior to coming to infusion. Writer reinforced chemotherapy teaching/side effects and schedule.     Pt instructed to call triage with chills/temp >=100.5, questions/concerns. Pt stated understanding of plan.     Intravenous Access:  Peripheral IV placed.    Treatment Conditions:  Lab Results   Component Value Date    HGB 13.7 01/26/2024    WBC 14.3 (H) 01/26/2024    ANEU 5.6 03/09/2020    ANEUTAUTO 8.9 (H) 01/26/2024     01/26/2024        Lab Results   Component Value Date     01/26/2024    POTASSIUM 4.0 01/26/2024    CR 1.04 (H) 01/26/2024    TANNER 8.4 (L) 01/26/2024    BILITOTAL 0.2 01/26/2024    ALBUMIN 1.9 (L) 01/26/2024    ALT 25 01/26/2024    AST 25 01/26/2024     Results reviewed, labs MET treatment parameters, ok to proceed with treatment.    Post Infusion Assessment:  Patient tolerated Velcade injection to Left Lower Abdomen without incident.  Patient tolerated Darzalex Faspro injection to Right Lower Abdomen without incident.  Patient observed for 3.5 hours post Darzalex Faspro per protocol.  Blood return noted pre and post infusion.  Site patent and intact, free from redness, edema or discomfort.  No evidence of extravasations.  Access discontinued per protocol.     Discharge Plan:   Prescription refills given for Acyclovir and Zofran. Pharmacy counseled patient on new  medications.  Discharge instructions reviewed with: Patient.  Patient and/or family verbalized understanding of discharge instructions and all questions answered.  Copy of AVS reviewed with patient and/or family.  Patient will return 2/2 for next appointment.  Patient discharged in stable condition accompanied by: sister.  Departure Mode: Ambulatory.      Jessica Waggoner RN

## 2024-01-26 NOTE — PATIENT INSTRUCTIONS
Cardiology Providers you saw during your visit:  Dr. Armstrong    Medication changes:  -We will get you a price for Jardiance/Farxiga. You let us know if/when you want to start taking.     Follow up:  -Follow-up with Dr. Armstrong in 5 months with labs prior.       Please call if you have :  1. Weight gain of more than 2 pounds in a day or 5 pounds in a week  2. Increased shortness of breath, swelling or bloating  3. Dizziness, lightheadedness   4. Any questions or concerns.       Follow the American Heart Association Diet and Lifestyle recommendations:  Limit saturated fat, trans fat, sodium, red meat, sweets and sugar-sweetened beverages. If you choose to eat red meat, compare labels and select the leanest cuts available.  Aim for at least 150 minutes of moderate physical activity or 75 minutes of vigorous physical activity - or an equal combination of both - each week.      During business hours: [phone], press option # 1 to schedule or leave a message for your care team      After hours, weekends or holidays: On Call Cardiologist- [phone]   option #4 and ask to speak to the on-call Cardiologist. Inform them you are a CORE/heart failure patient at the [facility].      Please consider attending our virtual Heart Failure support group which is held monthly. Please reach out to Hernan at [phone] for more information if you are interested in attending.      dates:    Monday, , 1-2pm     Monday,  , 1-2pm     Monday,  , 1-2pm     Monday, , 1-2pm     Monday, May 6th, 1-2pm     Monday, , 1-2pm     Monday, , 1-2pm     Monday, , 1-2pm     Monday, , 1-2pm     Monday, , 1-2pm     Monday, , 1-2pm     Monday, , 1-2pm       Loida FRAZIER   Cardiology Care Coordinator - Heart Failure/ C.O.R.E. Clinic  [facility]   Questions and [phone]   First press #1 for [facility]  "and then press #4 for \"To send a message to your care team\"    "

## 2024-01-26 NOTE — NURSING NOTE
New Medication:   -We will get you a price for Jardiance/Farxiga. You let us know if/when you want to start taking.   Patient was educated regarding newly prescribed medication, including discussion of  the indication, administration, side effects, and when to report to MD or RN. Patient demonstrated understanding of this information and agreed to call with further questions or concerns.    Return Appointment:  -Follow-up with Dr. Armstrong in 5 months with labs prior.    Patient given instructions regarding scheduling next clinic visit. Patient demonstrated understanding of this information and agreed to call with further questions or concerns.    Patient stated she understood all health information given and agreed to call with further questions or concerns.     Loida Germain RN

## 2024-01-26 NOTE — NURSING NOTE
Chief Complaint   Patient presents with    Blood Draw     Labs drawn with PIV start by RN.      Labs drawn with PIV start by RN. Pt tolerated well. Vitals taken. Pt checked into next appointment.    Sahra Mays RN

## 2024-01-27 ENCOUNTER — NURSE TRIAGE (OUTPATIENT)
Dept: NURSING | Facility: CLINIC | Age: 54
End: 2024-01-27
Payer: COMMERCIAL

## 2024-01-28 NOTE — TELEPHONE ENCOUNTER
"  Nurse Triage SBAR    Is this a 2nd Level Triage? NO    Situation: Rash     Background: Patient had velcaid injection on the left side of her stomach yesterday and this evening noticed a rash that measures 5.5 cm x 4 cm. With purple \"veins\" coming out of the edges. Patient denies fever, pain, or itch.  Patient states that she has seen cellulitis before and this does not look to her like cellulitis    Assessment: rash post injection, no fever    Protocol Recommended Disposition:   Home Care    Recommendation: writer advised that patient contact [facility] if rash is still present on Monday or to call triage again if rash becomes worse. Patient verbalized understanding of care advice.       Penelope Gan RN on 1/27/2024 at 9:18 PM      Does the patient meet one of the following criteria for ADS visit consideration? No    Reason for Disposition   Mild localized rash or itching present < 7 days    Additional Information   Negative: [1] Sudden onset of rash (within last 2 hours) AND [2] difficulty breathing or swallowing   Negative: Shock suspected (e.g., cold/pale/clammy skin, too weak to stand, low BP, rapid pulse)   Negative: Difficult to awaken or acting confused (e.g., disoriented, slurred speech)   Negative: Sounds like a life-threatening emergency to the triager   Negative: Fever > 103 F (39.4 C)   Negative: [1] Bright red, sunburn-like rash AND [2] current tampon use or nasal packing   Negative: [1] Bright red, sunburn-like rash AND [3] wound infection or recent surgery   Negative: [1] Purple or blood-colored rash (spots or dots) AND [2] fever   Negative: [1] Neutropenia known or suspected (e.g., recent cancer chemotherapy) AND [2] fever > 100.4 F (38.0 C)   Negative: [1] Bright red skin AND [2] peels off in sheets   Negative: [1] Widespread rash AND [2] fever > 100.4 F (38.0 C)   Negative: [1] Severe chills (i.e., feeling extremely cold WITH shaking chills) AND [2] fever > 100.4 F (38.0 C)   Negative: Patient " "sounds very sick or weak to the triager   Negative: [1] Looks infected (e.g., spreading area redness, red streak, or pus) AND [2] fever > 100.4 F (38.0 C)   Negative: [1] Rash is painful to touch AND [2] fever > 100.4 F (38.0 C)   Negative: Large or small blisters on skin (i.e., fluid filled bubbles or sacs)   Negative: Bloody crusts on lips or sores in mouth   Negative: [1] Purple or blood-colored rash (spots or dots) AND [2] no fever AND [3] sounds well to triager   Negative: SEVERE rash (new or worsening)   Negative: SEVERE itching (i.e., interferes with sleep, normal activities or school)   Negative: [1] Looks infected (spreading redness, red streak, pus) AND [2] no fever   Negative: Ring-like appearance of rash (or ask: does it look like a  \"target\" or \"bulls- eye\")   Negative: Fever > 100.4 F (38.0 C)   Negative: New or worsening widespread rash   Negative: [1] Caller has NON-URGENT question AND [2] triager unable to answer question   Negative: [1] Caller has URGENT question AND [2] triager unable to answer question    Protocols used: Cancer - Skin Symptoms and [id number]    "

## 2024-01-29 ENCOUNTER — TELEPHONE (OUTPATIENT)
Dept: CARDIOLOGY | Facility: CLINIC | Age: 54
End: 2024-01-29
Payer: COMMERCIAL

## 2024-01-29 ENCOUNTER — MYC MEDICAL ADVICE (OUTPATIENT)
Dept: CARDIOLOGY | Facility: CLINIC | Age: 54
End: 2024-01-29
Payer: COMMERCIAL

## 2024-01-29 ENCOUNTER — MYC MEDICAL ADVICE (OUTPATIENT)
Dept: ONCOLOGY | Facility: CLINIC | Age: 54
End: 2024-01-29
Payer: COMMERCIAL

## 2024-01-29 DIAGNOSIS — I43 AMYLOID HEART DISEASE (H): Primary | ICD-10-CM

## 2024-01-29 DIAGNOSIS — E85.4 AMYLOID HEART DISEASE (H): Primary | ICD-10-CM

## 2024-01-29 NOTE — H&P
[facility] Anesthesia Pre-op History and Physical    Vivian Brown MRN# [id number]   Age: 53 year old YOB: 1970      Date of Surgery: 1/30/2024 Location [facility]      Date of Exam 1/30/2024 Facility (In hospital)       Home clinic: [facility]  Primary care provider: Alin Torres         Chief Complaint and/or Reason for Procedure:   No chief complaint on file.  EGD. Abnormal TTG. Amyloid, bx requested.         Active problem list:     Patient Active Problem List    Diagnosis Date Noted    AL amyloidosis (H) 01/14/2024     Priority: Medium    Dependent edema R>L 05/22/2023     Priority: Medium    Hyperlipidemia LDL goal <100 12/23/2022     Priority: Medium    Nonallopathic lesion of cervical region 09/23/2015     Priority: Medium     Problem list name updated by automated process. Provider to review      Cervicalgia 09/23/2015     Priority: Medium    Nonallopathic lesion of sacral region 09/23/2015     Priority: Medium     Problem list name updated by automated process. Provider to review      Nonallopathic lesion of lumbar region 09/23/2015     Priority: Medium     Problem list name updated by automated process. Provider to review      Lumbago 09/23/2015     Priority: Medium    Nonallopathic lesion of thoracic region 09/23/2015     Priority: Medium     Problem list name updated by automated process. Provider to review      Family hx of colon cancer 08/12/2015     Priority: Medium    Hypothyroidism due to acquired atrophy of thyroid 05/21/2013     Priority: Medium            Medications (include herbals and vitamins):   Any Plavix use in the last 7 days? No     No current facility-administered medications for this encounter.     Current Outpatient Medications   Medication Sig    acyclovir (ZOVIRAX) 400 MG tablet Take 1 tablet (400 mg) by mouth 2 times daily Viral Prophylaxis. (Patient not taking: Reported on 1/18/2024)    albuterol  (PROAIR HFA/PROVENTIL HFA/VENTOLIN HFA) 108 (90 Base) MCG/ACT inhaler Inhale 2 puffs into the lungs every 6 hours as needed for shortness of breath, wheezing or cough    benzonatate (TESSALON) 200 MG capsule Take 1 capsule (200 mg) by mouth 3 times daily as needed for cough    cholecalciferol (VITAMIN D3) 125 mcg (5000 units) capsule Take 1 capsule (125 mcg) by mouth daily    cycloPHOSphamide (CYTOXAN) 50 MG capsule Take 10 capsules (500 mg) by mouth every 7 days Take on days 1, 8, 15, and 22.    furosemide (LASIX) 20 MG tablet Take 1 tablet (20 mg) by mouth daily    levothyroxine (SYNTHROID/LEVOTHROID) 200 MCG tablet Take 1 tablet (200 mcg) by mouth daily    levothyroxine (SYNTHROID/LEVOTHROID) 25 MCG tablet Take 1 tablet (25 mcg) by mouth daily    liothyronine (CYTOMEL) 5 MCG tablet Take 5 mcg by mouth 2 times daily    lisinopril (ZESTRIL) 2.5 MG tablet Take 1 tablet (2.5 mg) by mouth daily    ondansetron (ZOFRAN) 8 MG tablet Take 1 tablet (8 mg) by mouth every 8 hours as needed for nausea (Patient not taking: Reported on 1/18/2024)    rosuvastatin (CRESTOR) 10 MG tablet Take 1 tablet (10 mg) by mouth daily             Allergies:      Allergies   Allergen Reactions    Latex Itching    Seasonal Allergies      Allergy to Latex? No  Allergy to tape?   No  Intolerances:             Physical Exam:   All vitals have been reviewed  No data found.  No intake/output data recorded.  Lungs:   No increased work of breathing, good air exchange, clear to auscultation bilaterally, no crackles or wheezing     Cardiovascular:   Normal apical impulse, regular rate and rhythm, normal S1 and S2, no S3 or S4, and no murmur noted             Lab / Radiology Results:            Anesthetic risk and/or ASA classification:       Melo Cloud MD

## 2024-01-29 NOTE — TELEPHONE ENCOUNTER
"Oncology Nurse Triage - Reporting Symptoms  Situation:   Pt called into [facility] on 1/27 regarding rash. Triage call to pt to follow up on rash/symptoms.    Background:   Treating Provider:  Dr. Jomar Chandler    Date of last office visit: 1/26/24 w/ Dian Read    Recent treatments: Yes: 1/26/24 Cycle 1 Day 1 Darzalex Faspro and Velcade.     Assessment  Onset of symptoms: 1/27/24   Rash continues to be the same size, darker pink in color, flat, purple veining on edges that were reported on 1/27 have \"turned pink\". Area is on left side of stomach where Velcade was administered last week.   Face and neck are flushed in last few days.  Denies itching, no open areas, pain, fevers, SOB, chest pain, difficulty swallowing.   Pt will send picture of area via [facility].          Recommendations:   Writer informed to call triage if rash or flushing worsens or fever develops. Writer will wait for pt to send picture of area via [employer] and informed will send message to care team to review and contact her with any recommendations. Pt voiced understanding.          "

## 2024-01-29 NOTE — TELEPHONE ENCOUNTER
----- Message from Thais Delatorre sent at 1/26/2024  5:34 PM CST -----  Farxiga is $75 per month and Jardiance is $75 per month.  ----- Message -----  From: Jie Fuchs  Sent: 1/26/2024   4:49 PM CST  To: Thais Plascencia,  Can you look into this one?  Cathi Olson  ----- Message -----  From: Loida Germain, RN  Sent: 1/26/2024  12:44 PM CST  To: Rosina Almaguer-    Can we get a price check on Jardiance/Farxiga for this pt?     Thanks!     Loida

## 2024-01-30 ENCOUNTER — ANESTHESIA EVENT (OUTPATIENT)
Dept: GASTROENTEROLOGY | Facility: CLINIC | Age: 54
End: 2024-01-30
Payer: COMMERCIAL

## 2024-01-30 ENCOUNTER — ANESTHESIA (OUTPATIENT)
Dept: GASTROENTEROLOGY | Facility: CLINIC | Age: 54
End: 2024-01-30
Payer: COMMERCIAL

## 2024-01-30 ENCOUNTER — HOSPITAL ENCOUNTER (OUTPATIENT)
Facility: CLINIC | Age: 54
Discharge: HOME | End: 2024-01-30
Attending: INTERNAL MEDICINE | Admitting: INTERNAL MEDICINE
Payer: COMMERCIAL

## 2024-01-30 VITALS
TEMPERATURE: 98.7 F | SYSTOLIC BLOOD PRESSURE: 100 MMHG | HEART RATE: 87 BPM | RESPIRATION RATE: 16 BRPM | DIASTOLIC BLOOD PRESSURE: 66 MMHG | OXYGEN SATURATION: 98 %

## 2024-01-30 LAB — UPPER GI ENDOSCOPY: NORMAL

## 2024-01-30 PROCEDURE — 43239 EGD BIOPSY SINGLE/MULTIPLE: CPT | Performed by: INTERNAL MEDICINE

## 2024-01-30 PROCEDURE — 250N000009 HC RX 250: Performed by: NURSE ANESTHETIST, CERTIFIED REGISTERED

## 2024-01-30 PROCEDURE — 258N000003 HC RX IP 258 OP 636: Performed by: NURSE ANESTHETIST, CERTIFIED REGISTERED

## 2024-01-30 PROCEDURE — 88313 SPECIAL STAINS GROUP 2: CPT | Mod: TC | Performed by: INTERNAL MEDICINE

## 2024-01-30 PROCEDURE — 370N000017 HC ANESTHESIA TECHNICAL FEE, PER MIN: Performed by: INTERNAL MEDICINE

## 2024-01-30 PROCEDURE — 250N000011 HC RX IP 250 OP 636: Performed by: NURSE ANESTHETIST, CERTIFIED REGISTERED

## 2024-01-30 RX ORDER — LIDOCAINE 40 MG/G
CREAM TOPICAL
Status: DISCONTINUED | OUTPATIENT
Start: 2024-01-30 | End: 2024-01-30 | Stop reason: HOSPADM

## 2024-01-30 RX ORDER — PROPOFOL 10 MG/ML
INJECTION, EMULSION INTRAVENOUS CONTINUOUS PRN
Status: DISCONTINUED | OUTPATIENT
Start: 2024-01-30 | End: 2024-01-30

## 2024-01-30 RX ORDER — LIDOCAINE HYDROCHLORIDE 20 MG/ML
INJECTION, SOLUTION INFILTRATION; PERINEURAL PRN
Status: DISCONTINUED | OUTPATIENT
Start: 2024-01-30 | End: 2024-01-30

## 2024-01-30 RX ORDER — PROPOFOL 10 MG/ML
INJECTION, EMULSION INTRAVENOUS PRN
Status: DISCONTINUED | OUTPATIENT
Start: 2024-01-30 | End: 2024-01-30

## 2024-01-30 RX ORDER — SODIUM CHLORIDE, SODIUM LACTATE, POTASSIUM CHLORIDE, CALCIUM CHLORIDE 600; 310; 30; 20 MG/100ML; MG/100ML; MG/100ML; MG/100ML
INJECTION, SOLUTION INTRAVENOUS CONTINUOUS
Status: DISCONTINUED | OUTPATIENT
Start: 2024-01-30 | End: 2024-01-30 | Stop reason: HOSPADM

## 2024-01-30 RX ADMIN — LIDOCAINE HYDROCHLORIDE 1 ML: 10 INJECTION, SOLUTION EPIDURAL; INFILTRATION; INTRACAUDAL; PERINEURAL at 06:57

## 2024-01-30 RX ADMIN — LIDOCAINE HYDROCHLORIDE 50 MG: 20 INJECTION, SOLUTION INFILTRATION; PERINEURAL at 07:33

## 2024-01-30 RX ADMIN — SODIUM CHLORIDE, POTASSIUM CHLORIDE, SODIUM LACTATE AND CALCIUM CHLORIDE: 600; 310; 30; 20 INJECTION, SOLUTION INTRAVENOUS at 06:57

## 2024-01-30 RX ADMIN — PROPOFOL 70 MG: 10 INJECTION, EMULSION INTRAVENOUS at 07:34

## 2024-01-30 RX ADMIN — PROPOFOL 250 MCG/KG/MIN: 10 INJECTION, EMULSION INTRAVENOUS at 07:34

## 2024-01-30 ASSESSMENT — LIFESTYLE VARIABLES: TOBACCO_USE: 0

## 2024-01-30 ASSESSMENT — ACTIVITIES OF DAILY LIVING (ADL): ADLS_ACUITY_SCORE: 35

## 2024-01-30 NOTE — ANESTHESIA POSTPROCEDURE EVALUATION
Patient: Vivian Brown    Procedure: Procedure(s):  ESOPHAGOGASTRODUODENOSCOPY, WITH BIOPSY       Anesthesia Type:  MAC    Note:  Disposition: Outpatient   Postop Pain Control: Uneventful            Sign Out: Well controlled pain   PONV: No   Neuro/Psych: Uneventful            Sign Out: Acceptable/Baseline neuro status   Airway/Respiratory: Uneventful            Sign Out: Acceptable/Baseline resp. status   CV/Hemodynamics: Uneventful            Sign Out: Acceptable CV status   Other NRE: NONE   DID A NON-ROUTINE EVENT OCCUR? No    Event details/Postop Comments:  Pt was happy with anesthesia care.  No complications.  I will follow up with the pt if needed.           Last vitals:  Vitals Value Taken Time   /70 01/30/24 0810   Temp     Pulse 58 01/30/24 0810   Resp     SpO2 98 % 01/30/24 0812   Vitals shown include unfiled device data.    Electronically Signed By: MARICRUZ Blackwell CRNA  January 30, 2024  8:13 AM

## 2024-01-30 NOTE — ANESTHESIA PREPROCEDURE EVALUATION
Anesthesia Pre-Procedure Evaluation    Patient: Ade Brown   MRN: [id number] : 1970        Procedure : Procedure(s):  Esophagoscopy, gastroscopy, duodenoscopy (EGD), combined          Past Medical History:   Diagnosis Date    Family history of colon cancer     Colonoscoy q5 years next 2020      Past Surgical History:   Procedure Laterality Date    COLONOSCOPY N/A 2015    Procedure: COLONOSCOPY;  Surgeon: Eugenio Travis MD;  Location:  GI    IR RENAL BIOPSY RIGHT  2023    TONSILLECTOMY        Allergies   Allergen Reactions    Latex Itching    Seasonal Allergies       Social History     Tobacco Use    Smoking status: Never     Passive exposure: Past    Smokeless tobacco: Never    Tobacco comments:     Parents smoked during childhood in the house   Substance Use Topics    Alcohol use: Not Currently     Alcohol/week: 0.0 standard drinks of alcohol     Comment: 1-2 times/month      Wt Readings from Last 1 Encounters:   24 81.3 kg (179 lb 3.2 oz)        Anesthesia Evaluation   Pt has had prior anesthetic.     No history of anesthetic complications       ROS/MED HX  ENT/Pulmonary:    (-) tobacco use   Neurologic:  - neg neurologic ROS     Cardiovascular:     (+)  - -   -  - -                                 Previous cardiac testing   Echo: Date: 1-3-24 Results:  Reading Physician Reading Date Result Priority  Fred Devlin MD  [phone] 1/3/2024     Narrative & Impression  [id number]  [id number]  [id number]  [id number]^LEENA^IQRA^DOMINIC     [facility]  Echocardiography Laboratory  [address] [place] [address]     Name: ADE BROWN  MRN: [id number]  : 1970  Study Date: 2024 09:59 AM  Age: 53 yrs  Gender: Female  Patient Location: [facility]  Reason For Study: Renal amyloidosis (H), Renal amyloidosis (H)  History: hyperlipidemia  Ordering Physician: IQRA STERN  Referring Physician: Alin Torres  Performed By:  "Beronica Dang     BSA: 1.9 m2  Height: 64 in  Weight: 186 lb  HR: 82  BP: 90/60 mmHg  ______________________________________________________________________________  Procedure  Complete Echo Adult. Myocardial Strain Imaging performed.  ______________________________________________________________________________  Interpretation Summary     There is mild concentric left ventricular hypertrophy.  The right ventricle is normal in size and function.  There is borderline right ventricular hypertrophy.  Global peak LV longitudinal strain is averaged at -15.3%. This suggests  abnormal strain (normal <-18%).  The visual ejection fraction is estimated at 54-56%.  Trivial posterior pericardial effusion  Clinical history is listed as renal amyloid--on this echo the atria are normal  size, the valves are not thickend but there is mild LVH and borderline RVH,  there is questionable \"scintillation\" of LV myocardium, the global  longitudinal strain is abnormal and the best strain values are at the apex  (borderline \"apical sparing\" strain pattern) and there is trace pericardial  effusion along with borderline criteria for diastolic dysfunction grade2--take  together this could represent some features of cardiac amyloid. Additional  studies such as cardiac MRI, cardiac biopsy etc may be useful.  ______________________________________________________________________________  Left Ventricle  The left ventricle is normal in size. There is mild concentric left  ventricular hypertrophy. Global peak LV longitudinal strain is averaged at -  15.3%. This suggests abnormal strain (normal <-18%). The visual ejection  fraction is estimated at 54-56%. Normal left ventricular wall motion. There is  no thrombus seen in the left ventricle.     Right Ventricle  There is borderline right ventricular hypertrophy. The right ventricle is  normal in size and function.     Atria  Normal left atrial size. Right atrial size is normal.     Mitral " Valve  The mitral valve leaflets appear normal. There is no evidence of stenosis,  fluttering, or prolapse. There is physiologic mitral regurgitation.     Tricuspid Valve  Right ventricular systolic pressure could not be approximated due to  inadequate tricuspid regurgitation.     Aortic Valve  Normal tricuspid aortic valve.     Pulmonic Valve  The pulmonic valve is not well seen, but is grossly normal.     Vessels  Normal size aorta. The inferior vena cava is normal.     Pericardium  Trivial posterior pericardial effusion.     Rhythm  Sinus rhythm was noted.  ______________________________________________________________________________  MMode/2D Measurements & Calculations  IVSd: 1.3 cm     LVIDd: 3.7 cm  LVIDs: 2.2 cm  LVPWd: 1.4 cm  FS: 39.1 %  LV mass(C)d: 176.0 grams  LV mass(C)dI: 92.8 grams/m2  Ao root diam: 2.6 cm  LA dimension: 3.0 cm  asc Aorta Diam: 3.1 cm  LA/Ao: 1.1  Ao root diam index Ht(cm/m): 1.6  Ao root diam index BSA (cm/m2): 1.4  Asc Ao diam index BSA (cm/m2): 1.6  Asc Ao diam index Ht(cm/m): 1.9  LA Volume (BP): 26.8 ml     LA Volume Index (BP): 14.1 ml/m2  RWT: 0.77  TAPSE: 1.7 cm     Doppler Measurements & Calculations  MV E max benedicto: 77.6 cm/sec  MV A max benedicto: 61.7 cm/sec  MV E/A: 1.3  MV dec time: 0.17 sec  Ao V2 max: 124.2 cm/sec  Ao max P.0 mmHg  LV V1 max P.4 mmHg  LV V1 max: 76.7 cm/sec  PA V2 max: 85.7 cm/sec  PA max PG: 3.0 mmHg  PA acc time: 0.10 sec  AV Benedicto Ratio (DI): 0.62  E/E' av.8  Lateral E/e': 11.3  Medial E/e': 14.3  RV S Benedicto: 11.7 cm/sec     ______________________________________________________________________________  Report approved by: Danie Ragland 2024 02:25 PM             Component 3 wk ago  LVEF 54-56%        Stress Test:  Date: Results:    ECG Reviewed:  Date: 23 Results:  Sinus Rhythm -occasional ectopic ventricular beat    -Prominent R(V1) and right axis -consider right ventricular hypertrophy Low voltage with rightward P-axis and  rotation -possible pulmonary disease.    ABNORMAL    Cath:  Date: Results:      METS/Exercise Tolerance:     Hematologic:  - neg hematologic  ROS     Musculoskeletal: Comment: CLBP      GI/Hepatic:  - neg GI/hepatic ROS  (-) GERD   Renal/Genitourinary: Comment: amyloidosis      Endo:     (+)          thyroid problem, hypothyroidism,           Psychiatric/Substance Use:  - neg psychiatric ROS     Infectious Disease:  - neg infectious disease ROS     Malignancy:  - neg malignancy ROS     Other:  - neg other ROS          Physical Exam    Airway        Mallampati: II   TM distance: > 3 FB   Neck ROM: full   Mouth opening: > 3 cm    Respiratory Devices and Support         Dental       (+) Minor Abnormalities - some fillings, tiny chips      Cardiovascular   cardiovascular exam normal       Rhythm and rate: regular and normal     Pulmonary   pulmonary exam normal        breath sounds clear to auscultation           OUTSIDE LABS:  CBC:   Lab Results   Component Value Date    WBC 14.3 (H) 01/26/2024    WBC 11.0 01/18/2024    HGB 13.7 01/26/2024    HGB 15.0 01/18/2024    HCT 42.4 01/26/2024    HCT 45.7 01/18/2024     01/26/2024     01/18/2024     BMP:   Lab Results   Component Value Date     01/26/2024     01/18/2024    POTASSIUM 4.0 01/26/2024    POTASSIUM 4.1 01/18/2024    CHLORIDE 105 01/26/2024    CHLORIDE 104 01/18/2024    CO2 25 01/26/2024    CO2 25 01/18/2024    BUN 13.6 01/26/2024    BUN 14.1 01/18/2024    CR 1.04 (H) 01/26/2024    CR 1.12 (H) 01/18/2024     (H) 01/26/2024     (H) 01/18/2024     COAGS:   Lab Results   Component Value Date    PTT 30 01/18/2024    INR 0.99 01/18/2024     POC:   Lab Results   Component Value Date    HCG Negative 12/21/2023     HEPATIC:   Lab Results   Component Value Date    ALBUMIN 1.9 (L) 01/26/2024    PROTTOTAL 5.3 (L) 01/26/2024    ALT 25 01/26/2024    AST 25 01/26/2024    ALKPHOS 171 (H) 01/26/2024    BILITOTAL 0.2 01/26/2024     OTHER:   Lab  "Results   Component Value Date    TANNER 8.4 (L) 01/26/2024    PHOS 4.3 01/08/2024    TSH 1.01 11/22/2023    T4 1.23 11/22/2023    T3 87 07/20/2023       Anesthesia Plan    ASA Status:  2    NPO Status:  NPO Appropriate    Anesthesia Type: MAC.     - Reason for MAC: straight local not clinically adequate   Induction: Intravenous, Propofol.   Maintenance: TIVA.        Consents    Anesthesia Plan(s) and associated risks, benefits, and realistic alternatives discussed. Questions answered and patient/representative(s) expressed understanding.     - Discussed:     - Discussed with:  Patient      - Extended Intubation/Ventilatory Support Discussed: No.      - Patient is DNR/DNI Status: No     Use of blood products discussed: No .     Postoperative Care       PONV prophylaxis: Background Propofol Infusion     Comments:    Other Comments: The risks and benefits of anesthesia, and the alternatives where applicable, have been discussed with the patient, and they wish to proceed.              MARICRUZ Blackwell CRNA    I have reviewed the pertinent notes and labs in the chart from the past 30 days and (re)examined the patient.  Any updates or changes from those notes are reflected in this note.          # Hypoalbuminemia: Lowest albumin = 1.9 g/dL in the past 30 days , will monitor as appropriate     # Overweight: Estimated body mass index is 29.82 kg/m  as calculated from the following:    Height as of 1/26/24: 1.651 m (5' 5\").    Weight as of 1/26/24: 81.3 kg (179 lb 3.2 oz).      "

## 2024-01-30 NOTE — LETTER
January 31, 2024      Vivian Brown  [address] [facility] [address]        Dear ,    We are writing to inform you of your test results.    This confirms celiac disease.  A gluten free diet is important.  Siblings should have blood tests as their risk is one in ten.  The web site celiac.org may be helpful.  A GI clinic visit in 6 months is suggested. The special stains are pending on the amyloid question and will be sent later.     Resulted Orders   Surgical Pathology Exam   Result Value Ref Range    Case Report       Surgical Pathology Report                         Case: [id number]                                  Authorizing Provider:  Melo Cloud MD        Collected:           01/30/2024 07:37 AM          Ordering Location:     [facility]          Received:            01/30/2024 08:01 AM                                 [facility]                                                          Pathologist:           Helen Black MD                                                                           Specimen:    Small Intestine, Duodenum, Duodenal biopsy                                                 Final Diagnosis       Small intestine, duodenum, biopsies:  -Villous flattening with intraepithelial lymphocytosis consistent with gluten sensitivity/celiac disease, Marsh 3C        Comment       Intraepithelial lymphocytosis is hallmark of untreated or partially treated celiac disease.  While characteristic, histologic findings are not diagnostic; only response to gluten-free diet is diagnostic.  Please also correlate with serologic studies.      Clinical Information       Procedure:  ESOPHAGOGASTRODUODENOSCOPY, WITH BIOPSY  Pre-op Diagnosis: Food allergy [Z91.018]  Gluten intolerance [K90.41]  Post-op Diagnosis: Z91.018 - Food allergy [ICD-10-CM]  K90.41 - Gluten intolerance  "[ICD-10-CM]      Gross Description       A(1). Small Intestine, Duodenum, Duodenal biopsy:  The specimen is received in formalin, labeled with the patient's name, medical record number and other identifying information designated \"duodenal biopsy\". It consists of 8 tan soft tissue fragments, less than 0.1-0.5 cm.  Entirely submitted in 2 cassettes.  (Isaura Jerez Biopsy Tech)      Microscopic Description       A formal microscopic examination has been performed.  Intradepartmental consultation obtained.      Performing Labs       The technical component of this testing was completed at [facility]      Case Images         If you have any questions or concerns, please call the clinic at the number listed above.       Sincerely,      Melo Cloud MD            "

## 2024-01-30 NOTE — ANESTHESIA CARE TRANSFER NOTE
Patient: Vivian Brown    Procedure: Procedure(s):  ESOPHAGOGASTRODUODENOSCOPY, WITH BIOPSY       Diagnosis: Food allergy [Z91.018]  Gluten intolerance [K90.41]  Diagnosis Additional Information: No value filed.    Anesthesia Type:   MAC     Note:    Oropharynx: oropharynx clear of all foreign objects and spontaneously breathing  Level of Consciousness: awake  Oxygen Supplementation: room air    Independent Airway: airway patency satisfactory and stable  Dentition: dentition unchanged  Vital Signs Stable: post-procedure vital signs reviewed and stable  Report to RN Given: handoff report given  Patient transferred to: Phase II    Handoff Report: Identifed the Patient, Identified the Reponsible Provider, Reviewed the pertinent medical history, Discussed the surgical course, Reviewed Intra-OP anesthesia mangement and issues during anesthesia, Set expectations for post-procedure period and Allowed opportunity for questions and acknowledgement of understanding      Vitals:  Vitals Value Taken Time   /70 01/30/24 0810   Temp     Pulse 58 01/30/24 0810   Resp     SpO2 98 % 01/30/24 0812   Vitals shown include unfiled device data.    Electronically Signed By: MARICRUZ Blackwell CRNA  January 30, 2024  8:12 AM

## 2024-01-30 NOTE — DISCHARGE INSTRUCTIONS
[facility]    Home Care Following Endoscopy          Activity:  You have just undergone an endoscopic procedure usually performed with conscious sedation.  Do not work or operate machinery (including a car) for at least 12 hours.    I encourage you to walk and attempt to pass this air as soon as possible.    Diet:  Return to the diet you were on before your procedure but eat lightly for the first 12-24 hours.  Drink plenty of water.  Resume any regular medications unless otherwise advised by your physician.  Please begin any new medication prescribed as a result of your procedure as directed by your physician.   If you had any biopsy or polyp removed please refrain from aspirin or aspirin products for 2 days.  If on Coumadin please restart as instructed by your physician.   Pain:  You may take Tylenol as needed for pain.  Expected Recovery:  You can expect some mild abdominal fullness and/or discomfort due to the air used to inflate your intestinal tract. It is also normal to have a mild sore throat after upper endoscopy.    Call Your Physician if You Have:  After Upper Endoscopy:  Shoulder, back or chest pain.  Difficulty breathing or swallowing.  Vomiting blood.    Any questions or concerns about your recovery, please call [phone] or after hours 289[facility] ([phone]) Nurse Advice Line.    Follow-up Care:  You did have polyps/biopsy tissue sample(s) removed.  The polyps/biopsy tissue sample(s) will be sent to pathology.    You should receive letter in your My Chart from Dr. Cloud with your results within 1-2 weeks. If you do not participate in My Chart a physical letter will come in the mail in 2-3 weeks.  Please call if you have not received a notification of your results.  If asked to return to clinic please make an appointment 1 week after your procedure.  Call [phone].

## 2024-01-30 NOTE — LETTER
February 5, 2024      Vivian Brown  [address] [facility] [address]        Dear ,    We are writing to inform you of your test results.    The special stains for amyloid are negative or normal.  FYI.    Resulted Orders   Surgical Pathology Exam   Result Value Ref Range    Case Report       Surgical Pathology Report                         Case: [id number]                                  Authorizing Provider:  Melo Cloud MD        Collected:           01/30/2024 07:37 AM          Ordering Location:     [facility]          Received:            01/30/2024 08:01 AM                                 [facility]                                                          Pathologist:           Helen Black MD                                                                           Specimen:    Small Intestine, Duodenum, Duodenal biopsy                                                 Addendum       As requested, Congo red stain was performed with appropriately staining control tissue reviewed concurrently.  The patient sample is negative.  There are no diagnostic changes.      Final Diagnosis       Small intestine, duodenum, biopsies:  -Villous flattening with intraepithelial lymphocytosis consistent with gluten sensitivity/celiac disease, Marsh 3C        Comment       Intraepithelial lymphocytosis is hallmark of untreated or partially treated celiac disease.  While characteristic, histologic findings are not diagnostic; only response to gluten-free diet is diagnostic.  Please also correlate with serologic studies.      Clinical Information       Procedure:  ESOPHAGOGASTRODUODENOSCOPY, WITH BIOPSY  Pre-op Diagnosis: Food allergy [Z91.018]  Gluten intolerance [K90.41]  Post-op Diagnosis: Z91.018 - Food allergy [ICD-10-CM]  K90.41 - Gluten intolerance [ICD-10-CM]      Gross Description       A(1).  "Small Intestine, Duodenum, Duodenal biopsy:  The specimen is received in formalin, labeled with the patient's name, medical record number and other identifying information designated \"duodenal biopsy\". It consists of 8 tan soft tissue fragments, less than 0.1-0.5 cm.  Entirely submitted in 2 cassettes.  (Isaura Jerez, Biopsy Tech)      Microscopic Description       A formal microscopic examination has been performed.  Intradepartmental consultation obtained.      Performing Labs       The technical component of this testing was completed at [facility]      Case Images         If you have any questions or concerns, please call the clinic at the number listed above.       Sincerely,      Melo Cloud MD            "

## 2024-01-31 PROCEDURE — 88313 SPECIAL STAINS GROUP 2: CPT | Mod: 26 | Performed by: PATHOLOGY

## 2024-01-31 PROCEDURE — 88305 TISSUE EXAM BY PATHOLOGIST: CPT | Mod: 26 | Performed by: PATHOLOGY

## 2024-02-01 LAB
PATH REPORT.ADDENDUM SPEC: NORMAL
PATH REPORT.COMMENTS IMP SPEC: NORMAL
PATH REPORT.FINAL DX SPEC: NORMAL
PATH REPORT.GROSS SPEC: NORMAL
PATH REPORT.MICROSCOPIC SPEC OTHER STN: NORMAL
PATH REPORT.RELEVANT HX SPEC: NORMAL
PHOTO IMAGE: NORMAL

## 2024-02-01 RX ORDER — DAPAGLIFLOZIN 10 MG/1
10 TABLET, FILM COATED ORAL DAILY
Qty: 90 TABLET | Refills: 1 | Status: SHIPPED | OUTPATIENT
Start: 2024-02-01 | End: 2024-02-01 | Stop reason: ALTCHOICE

## 2024-02-01 NOTE — TELEPHONE ENCOUNTER
Date: 2/1/2024    Time of Call: 3:31 PM     Diagnosis:  HF     [ TORB ] Ordering provider: Dr. Piña   Order:   -Increase Lasix to 20 mg BID.   -BMP early next week.      Order received by: Loida Germain RN       Follow-up/additional notes: Called pt and reviewed medication recommendations; she will start increased Lasix tomorrow 2/2. Lab appt made for [facility] lab on Tuesday 2/6. Writer will Follow-up with pt on Tuesday to see how she is feeling/weight.

## 2024-02-01 NOTE — TELEPHONE ENCOUNTER
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     Since starting chemo, Pt reports today that her weight is up 6# in the last week. She also reports some swelling in her bilateral ankles. Denies any SOB, states she is feeling well.     Pt BP's have been on the low end of normal 99-low 100's/ 60's.     Pt currently taking Lasix 20 mg daily. Also will be starting Jardiance 10 mg in the next few days.     Routing to Dr. Piña to review and advise.     Loida Germain RN

## 2024-02-02 ENCOUNTER — TELEPHONE (OUTPATIENT)
Dept: ONCOLOGY | Facility: CLINIC | Age: 54
End: 2024-02-02

## 2024-02-02 ENCOUNTER — INFUSION THERAPY VISIT (OUTPATIENT)
Dept: ONCOLOGY | Facility: CLINIC | Age: 54
End: 2024-02-02
Attending: STUDENT IN AN ORGANIZED HEALTH CARE EDUCATION/TRAINING PROGRAM
Payer: COMMERCIAL

## 2024-02-02 ENCOUNTER — LAB (OUTPATIENT)
Dept: LAB | Facility: CLINIC | Age: 54
End: 2024-02-02
Attending: STUDENT IN AN ORGANIZED HEALTH CARE EDUCATION/TRAINING PROGRAM
Payer: COMMERCIAL

## 2024-02-02 VITALS
SYSTOLIC BLOOD PRESSURE: 115 MMHG | HEART RATE: 94 BPM | OXYGEN SATURATION: 97 % | WEIGHT: 186.2 LBS | TEMPERATURE: 98 F | BODY MASS INDEX: 30.99 KG/M2 | DIASTOLIC BLOOD PRESSURE: 76 MMHG | RESPIRATION RATE: 18 BRPM

## 2024-02-02 DIAGNOSIS — D80.1 HYPOGAMMAGLOBULINEMIA (H): Primary | ICD-10-CM

## 2024-02-02 DIAGNOSIS — E85.81 AL AMYLOIDOSIS (H): Primary | ICD-10-CM

## 2024-02-02 DIAGNOSIS — I43 AMYLOID HEART DISEASE (H): ICD-10-CM

## 2024-02-02 DIAGNOSIS — E85.4 AMYLOID HEART DISEASE (H): ICD-10-CM

## 2024-02-02 LAB
ALBUMIN SERPL BCG-MCNC: 2.1 G/DL (ref 3.5–5.2)
ALP SERPL-CCNC: 168 U/L (ref 40–150)
ALT SERPL W P-5'-P-CCNC: 27 U/L (ref 0–50)
ANION GAP SERPL CALCULATED.3IONS-SCNC: 6 MMOL/L (ref 7–15)
AST SERPL W P-5'-P-CCNC: 20 U/L (ref 0–45)
BASOPHILS # BLD AUTO: 0.1 10E3/UL (ref 0–0.2)
BASOPHILS NFR BLD AUTO: 1 %
BILIRUB SERPL-MCNC: <0.2 MG/DL
BUN SERPL-MCNC: 17 MG/DL (ref 6–20)
CALCIUM SERPL-MCNC: 8.3 MG/DL (ref 8.6–10)
CHLORIDE SERPL-SCNC: 105 MMOL/L (ref 98–107)
CREAT SERPL-MCNC: 1.1 MG/DL (ref 0.51–0.95)
DEPRECATED HCO3 PLAS-SCNC: 27 MMOL/L (ref 22–29)
EGFRCR SERPLBLD CKD-EPI 2021: 60 ML/MIN/1.73M2
EOSINOPHIL # BLD AUTO: 0.4 10E3/UL (ref 0–0.7)
EOSINOPHIL NFR BLD AUTO: 3 %
ERYTHROCYTE [DISTWIDTH] IN BLOOD BY AUTOMATED COUNT: 16.2 % (ref 10–15)
GLUCOSE SERPL-MCNC: 86 MG/DL (ref 70–99)
HCT VFR BLD AUTO: 39.1 % (ref 35–47)
HGB BLD-MCNC: 13 G/DL (ref 11.7–15.7)
IMM GRANULOCYTES # BLD: 0.1 10E3/UL
IMM GRANULOCYTES NFR BLD: 1 %
LYMPHOCYTES # BLD AUTO: 3.3 10E3/UL (ref 0.8–5.3)
LYMPHOCYTES NFR BLD AUTO: 26 %
MCH RBC QN AUTO: 27.9 PG (ref 26.5–33)
MCHC RBC AUTO-ENTMCNC: 33.2 G/DL (ref 31.5–36.5)
MCV RBC AUTO: 84 FL (ref 78–100)
MONOCYTES # BLD AUTO: 0.7 10E3/UL (ref 0–1.3)
MONOCYTES NFR BLD AUTO: 5 %
NEUTROPHILS # BLD AUTO: 8.2 10E3/UL (ref 1.6–8.3)
NEUTROPHILS NFR BLD AUTO: 64 %
NRBC # BLD AUTO: 0 10E3/UL
NRBC BLD AUTO-RTO: 0 /100
PLATELET # BLD AUTO: 536 10E3/UL (ref 150–450)
POTASSIUM SERPL-SCNC: 3.9 MMOL/L (ref 3.4–5.3)
PROT SERPL-MCNC: 4.8 G/DL (ref 6.4–8.3)
RBC # BLD AUTO: 4.66 10E6/UL (ref 3.8–5.2)
SODIUM SERPL-SCNC: 138 MMOL/L (ref 135–145)
WBC # BLD AUTO: 12.8 10E3/UL (ref 4–11)

## 2024-02-02 PROCEDURE — 250N000012 HC RX MED GY IP 250 OP 636 PS 637: Performed by: STUDENT IN AN ORGANIZED HEALTH CARE EDUCATION/TRAINING PROGRAM

## 2024-02-02 PROCEDURE — 85041 AUTOMATED RBC COUNT: CPT

## 2024-02-02 PROCEDURE — 36415 COLL VENOUS BLD VENIPUNCTURE: CPT

## 2024-02-02 PROCEDURE — 250N000013 HC RX MED GY IP 250 OP 250 PS 637: Performed by: STUDENT IN AN ORGANIZED HEALTH CARE EDUCATION/TRAINING PROGRAM

## 2024-02-02 PROCEDURE — 96401 CHEMO ANTI-NEOPL SQ/IM: CPT

## 2024-02-02 PROCEDURE — 82784 ASSAY IGA/IGD/IGG/IGM EACH: CPT

## 2024-02-02 PROCEDURE — 80053 COMPREHEN METABOLIC PANEL: CPT

## 2024-02-02 PROCEDURE — 250N000011 HC RX IP 250 OP 636: Mod: JZ | Performed by: STUDENT IN AN ORGANIZED HEALTH CARE EDUCATION/TRAINING PROGRAM

## 2024-02-02 RX ORDER — DEXAMETHASONE 4 MG/1
40 TABLET ORAL ONCE
Status: COMPLETED | OUTPATIENT
Start: 2024-02-02 | End: 2024-02-02

## 2024-02-02 RX ORDER — MONTELUKAST SODIUM 10 MG/1
10 TABLET ORAL ONCE
Status: COMPLETED | OUTPATIENT
Start: 2024-02-02 | End: 2024-02-02

## 2024-02-02 RX ORDER — ACETAMINOPHEN 325 MG/1
650 TABLET ORAL ONCE
Status: COMPLETED | OUTPATIENT
Start: 2024-02-02 | End: 2024-02-02

## 2024-02-02 RX ORDER — DIPHENHYDRAMINE HCL 25 MG
50 CAPSULE ORAL ONCE
Status: COMPLETED | OUTPATIENT
Start: 2024-02-02 | End: 2024-02-02

## 2024-02-02 RX ADMIN — BORTEZOMIB 2.5 MG: 3.5 INJECTION, POWDER, LYOPHILIZED, FOR SOLUTION INTRAVENOUS; SUBCUTANEOUS at 15:05

## 2024-02-02 RX ADMIN — DEXAMETHASONE 40 MG: 4 TABLET ORAL at 14:27

## 2024-02-02 RX ADMIN — ACETAMINOPHEN 650 MG: 325 TABLET ORAL at 14:27

## 2024-02-02 RX ADMIN — MONTELUKAST 10 MG: 10 TABLET, FILM COATED ORAL at 14:27

## 2024-02-02 RX ADMIN — DIPHENHYDRAMINE HYDROCHLORIDE 50 MG: 25 CAPSULE ORAL at 14:27

## 2024-02-02 RX ADMIN — DARATUMUMAB AND HYALURONIDASE-FIHJ (HUMAN RECOMBINANT) 1800 MG: 1800; 30000 INJECTION SUBCUTANEOUS at 15:03

## 2024-02-02 ASSESSMENT — PAIN SCALES - GENERAL: PAINLEVEL: NO PAIN (0)

## 2024-02-02 NOTE — NURSING NOTE
Chief Complaint   Patient presents with    Blood Draw     Labs drawn with PIV start by RN. Pt tolerated well. Vitals taken. Patient checked into next appointment.       Clotilde Snow RN

## 2024-02-02 NOTE — PROGRESS NOTES
Infusion Nursing Note:  Vivian Brown presents today for Cycle 1 Day 8 Darzalex Faspro and Velcade.    Patient seen by provider today: No   present during visit today: Not Applicable.    Note: Vivian comes into the clinic today doing well overall and has no concerns to offer at this visit. She does not attest to any abnormal pain/SOB/chest tightness/signs of infection/dizziness/sensation changes/bruising/swelling/diarrhea/constipation/nausea/urinary issues/vision or hearing changes/fatigue.    Patient was thinking she was supposed to get IVIG today.    Per secure chat message 2/2/24 @[phone] Dian Read CNP/Loida Lemus RN:   - Redraw IgG level and if level is still low, we will schedule her for IVIG next week.     Intravenous Access:  Peripheral IV placed.    Treatment Conditions:  Lab Results   Component Value Date    HGB 13.0 02/02/2024    WBC 12.8 (H) 02/02/2024    ANEU 5.6 03/09/2020    ANEUTAUTO 8.2 02/02/2024     (H) 02/02/2024        Lab Results   Component Value Date     02/02/2024    POTASSIUM 3.9 02/02/2024    CR 1.10 (H) 02/02/2024    TANNER 8.3 (L) 02/02/2024    BILITOTAL <0.2 02/02/2024    ALBUMIN 2.1 (L) 02/02/2024    ALT 27 02/02/2024    AST 20 02/02/2024       Results reviewed, labs MET treatment parameters, ok to proceed with treatment.      Post Infusion Assessment:  Patient tolerated Darzalex Faspro injection to LLQ abdomen without incident.   Patient tolerated Velcade injection to RLQ abdomen without incident.     Discharge Plan:   Patient declined prescription refills.  Discharge instructions reviewed with: Patient.  Patient and/or family verbalized understanding of discharge instructions and all questions answered.  Copy of AVS reviewed with patient and/or family.  Patient will return 2/9 for next appointment.  Patient discharged in stable condition accompanied by: self.  Departure Mode: Ambulatory.      Loida Lemus RN

## 2024-02-02 NOTE — TELEPHONE ENCOUNTER
Oral Chemotherapy Monitoring Program  Lab Follow Up    Placed call in follow up of oral chemotherapy. The purpose of this call was to perform an initial assessment and review lab results from 2/2Melvin Park reports that she has been taking cyclophosphamide 500 mg once weekly. She started this on 1/26 and has not missed any doses since then. She denies nausea/vomiting. She has had 2 episodes of diarrhea in the past week, but these have been spaced out and she hasn't needed to treat with anything. She had a little bit of mucus in her urine the other day, but has not observed this since. She reports that her lasix dose was doubled in the past week and she will be starting Jardiance soon.        2/2/2024     3:00 PM   ORAL CHEMOTHERAPY   Assessment Type Initial Follow up;Lab Monitoring   Diagnosis Code Multiple Myeloma   Providers Dr. Chandler   Clinic Name/Location [facility]   Is this patient followed by the [facility] OC team? No   Drug Name Cytoxan (cyclophsphamide)   Dose 500 mg   Current Schedule Weekly   Cycle Details Weekly/Daily   Start Date of Last Cycle 1/26/2024   Planned next cycle start date 2/23/2024   Doses missed in last 2 weeks 0   Adherence Assessment Adherent   Adverse Effects No AE identified during assessment   Any new drug interactions? No   Is the dose as ordered appropriate for the patient? Yes       Labs:  _  Result Component Current Result Ref Range   Sodium 138 (2/2/2024) 135 - 145 mmol/L     _  Result Component Current Result Ref Range   Potassium 3.9 (2/2/2024) 3.4 - 5.3 mmol/L     _  Result Component Current Result Ref Range   Calcium 8.3 (L) (2/2/2024) 8.6 - 10.0 mg/dL     No results found for Mag within last 30 days.     _  Result Component Current Result Ref Range   Phosphorus 4.3 (1/8/2024) 2.5 - 4.5 mg/dL     _  Result Component Current Result Ref Range   Albumin 2.1 (L) (2/2/2024) 3.5 - 5.2 g/dL     _  Result Component Current Result Ref Range   Urea Nitrogen 17.0 (2/2/2024) 6.0 - 20.0 mg/dL      _  Result Component Current Result Ref Range   Creatinine 1.10 (H) (2/2/2024) 0.51 - 0.95 mg/dL     _  Result Component Current Result Ref Range   AST 20 (2/2/2024) 0 - 45 U/L     _  Result Component Current Result Ref Range   ALT 27 (2/2/2024) 0 - 50 U/L     _  Result Component Current Result Ref Range   Bilirubin Total <0.2 (2/2/2024) <=1.2 mg/dL     _  Result Component Current Result Ref Range   WBC Count 12.8 (H) (2/2/2024) 4.0 - 11.0 10e3/uL     _  Result Component Current Result Ref Range   Hemoglobin 13.0 (2/2/2024) 11.7 - 15.7 g/dL     _  Result Component Current Result Ref Range   Platelet Count 536 (H) (2/2/2024) 150 - 450 10e3/uL     No results found for ANC within last 30 days.     _  Result Component Current Result Ref Range   Absolute Neutrophils 8.2 (2/2/2024) 1.6 - 8.3 10e3/uL        Assessment & Plan:  Vivian is tolerating treatment reasonably well with no adverse effects. She will continue cyclophosphamide as planned. We discussed that there are no clinically concerning lab abnormalities (upon reviewing CBC and CMP).    Questions answered to patient's satisfaction.    Follow-Up:  2/9 labs    Carlie Zambrano, PharmD, BCOP  Oral Chemotherapy Monitoring Program  [facility]  [phone]

## 2024-02-02 NOTE — PATIENT INSTRUCTIONS
[facility] Triage and after hours / weekends / holidays:  [phone]    Please call the triage or after hours line if you experience a temperature greater than or equal to 100.4, shaking chills, have uncontrolled nausea, vomiting and/or diarrhea, dizziness, shortness of breath, chest pain, bleeding, unexplained bruising, or if you have any other new/concerning symptoms, questions or concerns.      If you are having any concerning symptoms or wish to speak to a provider before your next infusion visit, please call triage to notify them so we can adequately serve you.     If you need a refill on a narcotic prescription or other medication, please call before your infusion appointment.              Latest Reference Range & Units 02/02/24 13:51   Sodium 135 - 145 mmol/L 138   Potassium 3.4 - 5.3 mmol/L 3.9   Chloride 98 - 107 mmol/L 105   Carbon Dioxide (CO2) 22 - 29 mmol/L 27   Urea Nitrogen 6.0 - 20.0 mg/dL 17.0   Creatinine 0.51 - 0.95 mg/dL 1.10 (H)   GFR Estimate >60 mL/min/1.73m2 60 (L)   Calcium 8.6 - 10.0 mg/dL 8.3 (L)   Anion Gap 7 - 15 mmol/L 6 (L)   Albumin 3.5 - 5.2 g/dL 2.1 (L)   Protein Total 6.4 - 8.3 g/dL 4.8 (L)   Alkaline Phosphatase 40 - 150 U/L 168 (H)   ALT 0 - 50 U/L 27   AST 0 - 45 U/L 20   Bilirubin Total <=1.2 mg/dL <0.2   Glucose 70 - 99 mg/dL 86   WBC 4.0 - 11.0 10e3/uL 12.8 (H)   Hemoglobin 11.7 - 15.7 g/dL 13.0   Hematocrit 35.0 - 47.0 % 39.1   Platelet Count 150 - 450 10e3/uL 536 (H)   RBC Count 3.80 - 5.20 10e6/uL 4.66   MCV 78 - 100 fL 84   MCH 26.5 - 33.0 pg 27.9   MCHC 31.5 - 36.5 g/dL 33.2   RDW 10.0 - 15.0 % 16.2 (H)   % Neutrophils % 64   % Lymphocytes % 26   % Monocytes % 5   % Eosinophils % 3   % Basophils % 1   Absolute Basophils 0.0 - 0.2 10e3/uL 0.1   Absolute Eosinophils 0.0 - 0.7 10e3/uL 0.4   Absolute Immature Granulocytes <=0.4 10e3/uL 0.1   Absolute Lymphocytes 0.8 - 5.3 10e3/uL 3.3   Absolute Monocytes 0.0 - 1.3 10e3/uL 0.7   % Immature Granulocytes % 1   Absolute  Neutrophils 1.6 - 8.3 10e3/uL 8.2   Absolute NRBCs 10e3/uL 0.0   NRBCs per 100 WBC <1 /100 0   (H): Data is abnormally high  (L): Data is abnormally low

## 2024-02-03 ENCOUNTER — NURSE TRIAGE (OUTPATIENT)
Dept: NURSING | Facility: CLINIC | Age: 54
End: 2024-02-03
Payer: COMMERCIAL

## 2024-02-03 ENCOUNTER — HOSPITAL ENCOUNTER (EMERGENCY)
Facility: CLINIC | Age: 54
Discharge: HOME | End: 2024-02-03
Attending: STUDENT IN AN ORGANIZED HEALTH CARE EDUCATION/TRAINING PROGRAM | Admitting: STUDENT IN AN ORGANIZED HEALTH CARE EDUCATION/TRAINING PROGRAM
Payer: COMMERCIAL

## 2024-02-03 VITALS
OXYGEN SATURATION: 98 % | RESPIRATION RATE: 17 BRPM | DIASTOLIC BLOOD PRESSURE: 89 MMHG | WEIGHT: 190 LBS | SYSTOLIC BLOOD PRESSURE: 129 MMHG | HEART RATE: 80 BPM | TEMPERATURE: 98 F | BODY MASS INDEX: 31.62 KG/M2

## 2024-02-03 DIAGNOSIS — N17.9 AKI (ACUTE KIDNEY INJURY) (H): ICD-10-CM

## 2024-02-03 DIAGNOSIS — D72.829 LEUKOCYTOSIS, UNSPECIFIED TYPE: ICD-10-CM

## 2024-02-03 DIAGNOSIS — R19.7 DIARRHEA, UNSPECIFIED TYPE: ICD-10-CM

## 2024-02-03 LAB
ALBUMIN SERPL BCG-MCNC: 2.2 G/DL (ref 3.5–5.2)
ALP SERPL-CCNC: 205 U/L (ref 40–150)
ALT SERPL W P-5'-P-CCNC: 45 U/L (ref 0–50)
ANION GAP SERPL CALCULATED.3IONS-SCNC: 6 MMOL/L (ref 7–15)
AST SERPL W P-5'-P-CCNC: 31 U/L (ref 0–45)
BASOPHILS # BLD AUTO: 0.1 10E3/UL (ref 0–0.2)
BASOPHILS NFR BLD AUTO: 0 %
BILIRUB SERPL-MCNC: <0.2 MG/DL
BUN SERPL-MCNC: 29.5 MG/DL (ref 6–20)
CALCIUM SERPL-MCNC: 8.1 MG/DL (ref 8.6–10)
CHLORIDE SERPL-SCNC: 101 MMOL/L (ref 98–107)
CREAT SERPL-MCNC: 1.3 MG/DL (ref 0.51–0.95)
DEPRECATED HCO3 PLAS-SCNC: 28 MMOL/L (ref 22–29)
EGFRCR SERPLBLD CKD-EPI 2021: 49 ML/MIN/1.73M2
EOSINOPHIL # BLD AUTO: 0 10E3/UL (ref 0–0.7)
EOSINOPHIL NFR BLD AUTO: 0 %
ERYTHROCYTE [DISTWIDTH] IN BLOOD BY AUTOMATED COUNT: 16.4 % (ref 10–15)
GLUCOSE SERPL-MCNC: 117 MG/DL (ref 70–99)
HCT VFR BLD AUTO: 39.1 % (ref 35–47)
HGB BLD-MCNC: 12.9 G/DL (ref 11.7–15.7)
IMM GRANULOCYTES # BLD: 0.2 10E3/UL
IMM GRANULOCYTES NFR BLD: 1 %
LYMPHOCYTES # BLD AUTO: 2.5 10E3/UL (ref 0.8–5.3)
LYMPHOCYTES NFR BLD AUTO: 11 %
MAGNESIUM SERPL-MCNC: 2 MG/DL (ref 1.7–2.3)
MCH RBC QN AUTO: 27.9 PG (ref 26.5–33)
MCHC RBC AUTO-ENTMCNC: 33 G/DL (ref 31.5–36.5)
MCV RBC AUTO: 85 FL (ref 78–100)
MONOCYTES # BLD AUTO: 1.6 10E3/UL (ref 0–1.3)
MONOCYTES NFR BLD AUTO: 7 %
NEUTROPHILS # BLD AUTO: 17.8 10E3/UL (ref 1.6–8.3)
NEUTROPHILS NFR BLD AUTO: 81 %
NRBC # BLD AUTO: 0 10E3/UL
NRBC BLD AUTO-RTO: 0 /100
PLATELET # BLD AUTO: 518 10E3/UL (ref 150–450)
POTASSIUM SERPL-SCNC: 3.6 MMOL/L (ref 3.4–5.3)
PROT SERPL-MCNC: 4.9 G/DL (ref 6.4–8.3)
RBC # BLD AUTO: 4.62 10E6/UL (ref 3.8–5.2)
SODIUM SERPL-SCNC: 135 MMOL/L (ref 135–145)
WBC # BLD AUTO: 22.2 10E3/UL (ref 4–11)

## 2024-02-03 PROCEDURE — 83735 ASSAY OF MAGNESIUM: CPT | Performed by: STUDENT IN AN ORGANIZED HEALTH CARE EDUCATION/TRAINING PROGRAM

## 2024-02-03 PROCEDURE — 99284 EMERGENCY DEPT VISIT MOD MDM: CPT | Performed by: STUDENT IN AN ORGANIZED HEALTH CARE EDUCATION/TRAINING PROGRAM

## 2024-02-03 PROCEDURE — 36415 COLL VENOUS BLD VENIPUNCTURE: CPT | Performed by: STUDENT IN AN ORGANIZED HEALTH CARE EDUCATION/TRAINING PROGRAM

## 2024-02-03 PROCEDURE — 99283 EMERGENCY DEPT VISIT LOW MDM: CPT | Performed by: STUDENT IN AN ORGANIZED HEALTH CARE EDUCATION/TRAINING PROGRAM

## 2024-02-03 PROCEDURE — 80053 COMPREHEN METABOLIC PANEL: CPT | Performed by: STUDENT IN AN ORGANIZED HEALTH CARE EDUCATION/TRAINING PROGRAM

## 2024-02-03 PROCEDURE — 85025 COMPLETE CBC W/AUTO DIFF WBC: CPT | Performed by: STUDENT IN AN ORGANIZED HEALTH CARE EDUCATION/TRAINING PROGRAM

## 2024-02-03 ASSESSMENT — ACTIVITIES OF DAILY LIVING (ADL): ADLS_ACUITY_SCORE: 35

## 2024-02-04 RX ORDER — DIPHENHYDRAMINE HCL 25 MG
50 CAPSULE ORAL
Status: CANCELLED | OUTPATIENT
Start: 2024-03-01

## 2024-02-04 RX ORDER — EPINEPHRINE 1 MG/ML
0.3 INJECTION, SOLUTION INTRAMUSCULAR; SUBCUTANEOUS EVERY 5 MIN PRN
Status: CANCELLED | OUTPATIENT
Start: 2024-02-23

## 2024-02-04 RX ORDER — ACETAMINOPHEN 325 MG/1
650 TABLET ORAL
Status: CANCELLED | OUTPATIENT
Start: 2024-02-23

## 2024-02-04 RX ORDER — ALBUTEROL SULFATE 0.83 MG/ML
2.5 SOLUTION RESPIRATORY (INHALATION)
Status: CANCELLED | OUTPATIENT
Start: 2024-03-15

## 2024-02-04 RX ORDER — MEPERIDINE HYDROCHLORIDE 25 MG/ML
25 INJECTION INTRAMUSCULAR; INTRAVENOUS; SUBCUTANEOUS EVERY 30 MIN PRN
Status: CANCELLED | OUTPATIENT
Start: 2024-03-15

## 2024-02-04 RX ORDER — DIPHENHYDRAMINE HYDROCHLORIDE 50 MG/ML
50 INJECTION INTRAMUSCULAR; INTRAVENOUS
Status: CANCELLED
Start: 2024-03-08

## 2024-02-04 RX ORDER — MEPERIDINE HYDROCHLORIDE 25 MG/ML
25 INJECTION INTRAMUSCULAR; INTRAVENOUS; SUBCUTANEOUS EVERY 30 MIN PRN
Status: CANCELLED | OUTPATIENT
Start: 2024-03-08

## 2024-02-04 RX ORDER — ALBUTEROL SULFATE 0.83 MG/ML
2.5 SOLUTION RESPIRATORY (INHALATION)
Status: CANCELLED | OUTPATIENT
Start: 2024-02-23

## 2024-02-04 RX ORDER — DIPHENHYDRAMINE HYDROCHLORIDE 50 MG/ML
50 INJECTION INTRAMUSCULAR; INTRAVENOUS
Status: CANCELLED
Start: 2024-02-23

## 2024-02-04 RX ORDER — MEPERIDINE HYDROCHLORIDE 25 MG/ML
25 INJECTION INTRAMUSCULAR; INTRAVENOUS; SUBCUTANEOUS EVERY 30 MIN PRN
Status: CANCELLED | OUTPATIENT
Start: 2024-03-01

## 2024-02-04 RX ORDER — HEPARIN SODIUM,PORCINE 10 UNIT/ML
5-20 VIAL (ML) INTRAVENOUS DAILY PRN
Status: CANCELLED | OUTPATIENT
Start: 2024-03-15

## 2024-02-04 RX ORDER — DIPHENHYDRAMINE HCL 25 MG
50 CAPSULE ORAL
Status: CANCELLED | OUTPATIENT
Start: 2024-03-15

## 2024-02-04 RX ORDER — ACETAMINOPHEN 325 MG/1
650 TABLET ORAL
Status: CANCELLED | OUTPATIENT
Start: 2024-03-01

## 2024-02-04 RX ORDER — HEPARIN SODIUM (PORCINE) LOCK FLUSH IV SOLN 100 UNIT/ML 100 UNIT/ML
5 SOLUTION INTRAVENOUS
Status: CANCELLED | OUTPATIENT
Start: 2024-03-08

## 2024-02-04 RX ORDER — HEPARIN SODIUM (PORCINE) LOCK FLUSH IV SOLN 100 UNIT/ML 100 UNIT/ML
5 SOLUTION INTRAVENOUS
Status: CANCELLED | OUTPATIENT
Start: 2024-02-23

## 2024-02-04 RX ORDER — ALBUTEROL SULFATE 90 UG/1
1-2 AEROSOL, METERED RESPIRATORY (INHALATION)
Status: CANCELLED
Start: 2024-02-23

## 2024-02-04 RX ORDER — HEPARIN SODIUM,PORCINE 10 UNIT/ML
5-20 VIAL (ML) INTRAVENOUS DAILY PRN
Status: CANCELLED | OUTPATIENT
Start: 2024-02-23

## 2024-02-04 RX ORDER — DEXAMETHASONE 4 MG/1
40 TABLET ORAL ONCE
Status: CANCELLED | OUTPATIENT
Start: 2024-02-23

## 2024-02-04 RX ORDER — DIPHENHYDRAMINE HYDROCHLORIDE 50 MG/ML
50 INJECTION INTRAMUSCULAR; INTRAVENOUS
Status: CANCELLED
Start: 2024-03-15

## 2024-02-04 RX ORDER — ALBUTEROL SULFATE 0.83 MG/ML
2.5 SOLUTION RESPIRATORY (INHALATION)
Status: CANCELLED | OUTPATIENT
Start: 2024-03-01

## 2024-02-04 RX ORDER — EPINEPHRINE 1 MG/ML
0.3 INJECTION, SOLUTION INTRAMUSCULAR; SUBCUTANEOUS EVERY 5 MIN PRN
Status: CANCELLED | OUTPATIENT
Start: 2024-03-08

## 2024-02-04 RX ORDER — DEXAMETHASONE 4 MG/1
40 TABLET ORAL ONCE
Status: CANCELLED | OUTPATIENT
Start: 2024-03-15

## 2024-02-04 RX ORDER — METHYLPREDNISOLONE SODIUM SUCCINATE 125 MG/2ML
125 INJECTION, POWDER, LYOPHILIZED, FOR SOLUTION INTRAMUSCULAR; INTRAVENOUS
Status: CANCELLED
Start: 2024-02-23

## 2024-02-04 RX ORDER — HEPARIN SODIUM (PORCINE) LOCK FLUSH IV SOLN 100 UNIT/ML 100 UNIT/ML
5 SOLUTION INTRAVENOUS
Status: CANCELLED | OUTPATIENT
Start: 2024-03-01

## 2024-02-04 RX ORDER — METHYLPREDNISOLONE SODIUM SUCCINATE 125 MG/2ML
125 INJECTION, POWDER, LYOPHILIZED, FOR SOLUTION INTRAMUSCULAR; INTRAVENOUS
Status: CANCELLED
Start: 2024-03-15

## 2024-02-04 RX ORDER — EPINEPHRINE 1 MG/ML
0.3 INJECTION, SOLUTION INTRAMUSCULAR; SUBCUTANEOUS EVERY 5 MIN PRN
Status: CANCELLED | OUTPATIENT
Start: 2024-03-15

## 2024-02-04 RX ORDER — DEXAMETHASONE 4 MG/1
40 TABLET ORAL ONCE
Status: CANCELLED | OUTPATIENT
Start: 2024-03-01

## 2024-02-04 RX ORDER — DIPHENHYDRAMINE HCL 25 MG
50 CAPSULE ORAL
Status: CANCELLED | OUTPATIENT
Start: 2024-03-08

## 2024-02-04 RX ORDER — MEPERIDINE HYDROCHLORIDE 25 MG/ML
25 INJECTION INTRAMUSCULAR; INTRAVENOUS; SUBCUTANEOUS EVERY 30 MIN PRN
Status: CANCELLED | OUTPATIENT
Start: 2024-02-23

## 2024-02-04 RX ORDER — HEPARIN SODIUM,PORCINE 10 UNIT/ML
5-20 VIAL (ML) INTRAVENOUS DAILY PRN
Status: CANCELLED | OUTPATIENT
Start: 2024-03-01

## 2024-02-04 RX ORDER — ALBUTEROL SULFATE 0.83 MG/ML
2.5 SOLUTION RESPIRATORY (INHALATION)
Status: CANCELLED | OUTPATIENT
Start: 2024-03-08

## 2024-02-04 RX ORDER — METHYLPREDNISOLONE SODIUM SUCCINATE 125 MG/2ML
125 INJECTION, POWDER, LYOPHILIZED, FOR SOLUTION INTRAMUSCULAR; INTRAVENOUS
Status: CANCELLED
Start: 2024-03-01

## 2024-02-04 RX ORDER — ACETAMINOPHEN 325 MG/1
650 TABLET ORAL
Status: CANCELLED | OUTPATIENT
Start: 2024-03-08

## 2024-02-04 RX ORDER — LORAZEPAM 2 MG/ML
0.5 INJECTION INTRAMUSCULAR EVERY 4 HOURS PRN
Status: CANCELLED | OUTPATIENT
Start: 2024-03-15

## 2024-02-04 RX ORDER — DIPHENHYDRAMINE HCL 25 MG
50 CAPSULE ORAL
Status: CANCELLED | OUTPATIENT
Start: 2024-02-23

## 2024-02-04 RX ORDER — HEPARIN SODIUM (PORCINE) LOCK FLUSH IV SOLN 100 UNIT/ML 100 UNIT/ML
5 SOLUTION INTRAVENOUS
Status: CANCELLED | OUTPATIENT
Start: 2024-03-15

## 2024-02-04 RX ORDER — ACETAMINOPHEN 325 MG/1
650 TABLET ORAL
Status: CANCELLED | OUTPATIENT
Start: 2024-03-15

## 2024-02-04 RX ORDER — LORAZEPAM 2 MG/ML
0.5 INJECTION INTRAMUSCULAR EVERY 4 HOURS PRN
Status: CANCELLED | OUTPATIENT
Start: 2024-03-01

## 2024-02-04 RX ORDER — ALBUTEROL SULFATE 90 UG/1
1-2 AEROSOL, METERED RESPIRATORY (INHALATION)
Status: CANCELLED
Start: 2024-03-15

## 2024-02-04 RX ORDER — METHYLPREDNISOLONE SODIUM SUCCINATE 125 MG/2ML
125 INJECTION, POWDER, LYOPHILIZED, FOR SOLUTION INTRAMUSCULAR; INTRAVENOUS
Status: CANCELLED
Start: 2024-03-08

## 2024-02-04 RX ORDER — LORAZEPAM 2 MG/ML
0.5 INJECTION INTRAMUSCULAR EVERY 4 HOURS PRN
Status: CANCELLED | OUTPATIENT
Start: 2024-02-23

## 2024-02-04 RX ORDER — HEPARIN SODIUM,PORCINE 10 UNIT/ML
5-20 VIAL (ML) INTRAVENOUS DAILY PRN
Status: CANCELLED | OUTPATIENT
Start: 2024-03-08

## 2024-02-04 RX ORDER — EPINEPHRINE 1 MG/ML
0.3 INJECTION, SOLUTION INTRAMUSCULAR; SUBCUTANEOUS EVERY 5 MIN PRN
Status: CANCELLED | OUTPATIENT
Start: 2024-03-01

## 2024-02-04 RX ORDER — DIPHENHYDRAMINE HYDROCHLORIDE 50 MG/ML
50 INJECTION INTRAMUSCULAR; INTRAVENOUS
Status: CANCELLED
Start: 2024-03-01

## 2024-02-04 RX ORDER — DEXAMETHASONE 4 MG/1
40 TABLET ORAL ONCE
Status: CANCELLED | OUTPATIENT
Start: 2024-03-08

## 2024-02-04 RX ORDER — ALBUTEROL SULFATE 90 UG/1
1-2 AEROSOL, METERED RESPIRATORY (INHALATION)
Status: CANCELLED
Start: 2024-03-08

## 2024-02-04 RX ORDER — LORAZEPAM 2 MG/ML
0.5 INJECTION INTRAMUSCULAR EVERY 4 HOURS PRN
Status: CANCELLED | OUTPATIENT
Start: 2024-03-08

## 2024-02-04 RX ORDER — ALBUTEROL SULFATE 90 UG/1
1-2 AEROSOL, METERED RESPIRATORY (INHALATION)
Status: CANCELLED
Start: 2024-03-01

## 2024-02-04 NOTE — TELEPHONE ENCOUNTER
Nurse Triage SBAR    Is this a 2nd Level Triage? NO    Situation: Pt calling with concerns for diarrhea.     Background: Pt states she had her second round of chemotherapy yesterday for Amyloidosis.     Assessment: Pt states today she has had 8 episodes of diarrhea today starting at 0530. Stool is mostly clear but did have 'small chunks' half of the time. She is drinking plenty of fluid. She does have nausea but has not vomited. BP is 134/98. Denies dizziness and abdominal pain.     Protocol Recommended Disposition:   Go to ED Now    Recommendation: On call provider spoken to (as I am unsure of her neutropenia status) who stated pt should be seen in the ER tonight. This information was relayed to pt who stated she would go in tonight.     Reason for Disposition   [1] Neutropenia known or suspected (e.g., recent cancer chemotherapy) AND [2] new-onset of diarrhea    Additional Information   Negative: Shock suspected (e.g., cold/pale/clammy skin, too weak to stand, low BP, rapid pulse)   Negative: Difficult to awaken or acting confused (e.g., disoriented, slurred speech)   Negative: Sounds like a life-threatening emergency to the triager   Negative: [1] SEVERE abdominal pain AND [2] age > 60 years   Negative: [1] SEVERE abdominal pain (e.g., excruciating) AND [2] present > 1 hour   Negative: Black or tarry bowel movements  (Exception: Chronic-unchanged black-grey BMs AND is taking iron pills or Pepto-Bismol.)   Negative: [1] Blood in the stool AND [2] moderate or large amount of blood    Protocols used: Cancer - Diarrhea-A-    Guerita Edgar RN  [facility] Nurse Advisor   2/3/2024  7:41 PM

## 2024-02-04 NOTE — DISCHARGE INSTRUCTIONS
The exact cause of your diarrhea is still unclear.  I was able to review your history and symptoms with the oncologist on-call.  Typically your medication regimen causes more constipation, so the diarrhea is a bit unusual in the setting.  Your labs did show some findings consistent with dehydration and slightly worsened kidney function.  White blood cell count was also elevated, but this is probably from your recent steroids.  I think you are safe for discharge home and it sounds like the diarrhea is already improving.  Drink plenty of fluids.  Avoid any antidiarrheal medication until you are able to provide a stool sample and rule out C. difficile.  I placed an order with the lab so that you can bring in a sample if/when you are able to provide one.  Please follow-up with your primary doctor and oncology team as scheduled.  Return to the emergency department sooner for any new or acutely worsening symptoms, particularly worsened diarrhea, blood in the stool, fevers.

## 2024-02-04 NOTE — ED TRIAGE NOTES
Pt presents to ER by direction of her oncologist after developing diarrhea after 2nd chemo treatment. Pt had a the 2nd treatment yesterday and has had diarrhea all day.      Triage Assessment (Adult)       Row Name 02/03/24 1398          Triage Assessment    Airway WDL WDL        Respiratory WDL    Respiratory WDL WDL

## 2024-02-04 NOTE — ED PROVIDER NOTES
History     Chief Complaint   Patient presents with    Diarrhea     HPI  Vivian Brown is a 53 year old female with history of hypothyroidism, AL amyloidosis currently undergoing chemotherapy who presents for evaluation of diarrhea.  Patient is being followed by oncology for AL amyloidosis.  She has been receiving cyclophosphamide as well as a weekly combination of Darzalex, Faspro, Velcade, and Decadron.  Infusion sessions are typically done on Friday.  Yesterday was her second session overall and she tolerated it without any complication.  Then starting today she developed loose, watery stools.  In total she has had about 12 episodes.  Patient denies any obvious melena or hematochezia.  Aside from some very mild nausea earlier today, she otherwise feels very well overall.  She does not have any fevers, is still tolerating oral intake, and she denies any associated vomiting, abdominal pain, changes in urinary habits, other complaints today.  Patient further denies any recent travel, antibiotic use, known sick contacts with similar symptoms.  Upon reviewing these symptoms with her oncology team/triage nurse over the phone, it was recommended that she present for evaluation and to have her electrolytes checked.    Allergies:  Allergies   Allergen Reactions    Latex Itching    Seasonal Allergies        Problem List:    Patient Active Problem List    Diagnosis Date Noted    AL amyloidosis (H) 01/14/2024     Priority: Medium    Dependent edema R>L 05/22/2023     Priority: Medium    Hyperlipidemia LDL goal <100 12/23/2022     Priority: Medium    Nonallopathic lesion of cervical region 09/23/2015     Priority: Medium     Problem list name updated by automated process. Provider to review      Cervicalgia 09/23/2015     Priority: Medium    Nonallopathic lesion of sacral region 09/23/2015     Priority: Medium     Problem list name updated by automated process. Provider to review      Nonallopathic lesion of lumbar  region 09/23/2015     Priority: Medium     Problem list name updated by automated process. Provider to review      Lumbago 09/23/2015     Priority: Medium    Nonallopathic lesion of thoracic region 09/23/2015     Priority: Medium     Problem list name updated by automated process. Provider to review      Family hx of colon cancer 08/12/2015     Priority: Medium    Hypothyroidism due to acquired atrophy of thyroid 05/21/2013     Priority: Medium        Past Medical History:    Past Medical History:   Diagnosis Date    Family history of colon cancer        Past Surgical History:    Past Surgical History:   Procedure Laterality Date    COLONOSCOPY N/A 9/28/2015    Procedure: COLONOSCOPY;  Surgeon: Eugenio Travis MD;  Location: [facility]    ESOPHAGOSCOPY, GASTROSCOPY, DUODENOSCOPY (EGD), COMBINED N/A 1/30/2024    Procedure: ESOPHAGOGASTRODUODENOSCOPY, WITH BIOPSY;  Surgeon: Melo Cloud MD;  Location: [facility]    IR RENAL BIOPSY RIGHT  12/21/2023    TONSILLECTOMY         Family History:    Family History   Problem Relation Age of Onset    Colon Cancer Maternal Grandmother 91    Hypertension Mother     Hyperlipidemia Mother     Cancer Mother 65        GIST    Other Cancer Mother     Coronary Artery Disease Father         MI    Thyroid Disease Sister     Asthma Daughter        Social History:  Marital Status:   [2]  Social History     Tobacco Use    Smoking status: Never     Passive exposure: Past    Smokeless tobacco: Never    Tobacco comments:     Parents smoked during childhood in the house   Vaping Use    Vaping Use: Never used   Substance Use Topics    Alcohol use: Not Currently     Alcohol/week: 0.0 standard drinks of alcohol     Comment: 1-2 times/month    Drug use: No        Medications:    acyclovir (ZOVIRAX) 400 MG tablet  albuterol (PROAIR HFA/PROVENTIL HFA/VENTOLIN HFA) 108 (90 Base) MCG/ACT inhaler  benzonatate (TESSALON) 200 MG capsule  cholecalciferol (VITAMIN D3) 125 mcg (5000 units)  capsule  cycloPHOSphamide (CYTOXAN) 50 MG capsule  empagliflozin (JARDIANCE) 10 MG TABS tablet  furosemide (LASIX) 20 MG tablet  levothyroxine (SYNTHROID/LEVOTHROID) 200 MCG tablet  levothyroxine (SYNTHROID/LEVOTHROID) 25 MCG tablet  liothyronine (CYTOMEL) 5 MCG tablet  lisinopril (ZESTRIL) 2.5 MG tablet  ondansetron (ZOFRAN) 8 MG tablet  rosuvastatin (CRESTOR) 10 MG tablet      Review of Systems   All other systems reviewed and are negative.  See HPI.    Physical Exam   BP: (!) 139/95  Pulse: 88  Temp: 98  F (36.7  C)  Resp: 18  Weight: 86.2 kg (190 lb)  SpO2: 98 %      Physical Exam  Vitals and nursing note reviewed.   Constitutional:       General: She is not in acute distress.     Appearance: Normal appearance. She is not ill-appearing, toxic-appearing or diaphoretic.   HENT:      Head: Normocephalic and atraumatic.      Mouth/Throat:      Pharynx: No oropharyngeal exudate or posterior oropharyngeal erythema.      Comments: Slightly tacky mucous membranes.  Otherwise unremarkable oropharynx.  Eyes:      General: No scleral icterus.     Conjunctiva/sclera: Conjunctivae normal.   Cardiovascular:      Rate and Rhythm: Normal rate and regular rhythm.      Pulses: Normal pulses.      Heart sounds: Normal heart sounds.   Pulmonary:      Effort: Pulmonary effort is normal. No respiratory distress.      Breath sounds: Normal breath sounds.   Abdominal:      General: Abdomen is flat.      Tenderness: There is no abdominal tenderness. There is no guarding or rebound.      Comments: Normoactive bowel sounds.  Abdomen is entirely nontender.  No rebound or guarding.   Musculoskeletal:         General: Normal range of motion.      Cervical back: Normal range of motion and neck supple. No tenderness.   Skin:     General: Skin is warm.      Capillary Refill: Capillary refill takes less than 2 seconds.      Coloration: Skin is not pale.      Findings: No erythema or rash.   Neurological:      General: No focal deficit present.       Mental Status: She is alert and oriented to person, place, and time.      Motor: No weakness.   Psychiatric:         Mood and Affect: Mood normal.         ED Course          ED Course as of 02/03/24 2338   Sat Feb 03, 2024 2226 Spoke with on call oncology, Dr. Blackburn.  Reviewed recent medication regimen and apparently this combination usually causes constipation.  For this reason, agreed with plans for collecting stool sample for c-diff testing.  If patient otherwise feels well, can discharge home with symptomatic care.  Would avoid anti-diarrheal medications until negative c-diff testing returns.     Procedures              Results for orders placed or performed during the hospital encounter of 02/03/24 (from the past 24 hour(s))   CBC with platelets differential    Narrative    The following orders were created for panel order CBC with platelets differential.  Procedure                               Abnormality         Status                     ---------                               -----------         ------                     CBC with platelets and d...[[id number]]  Abnormal            Final result                 Please view results for these tests on the individual orders.   Comprehensive metabolic panel   Result Value Ref Range    Sodium 135 135 - 145 mmol/L    Potassium 3.6 3.4 - 5.3 mmol/L    Carbon Dioxide (CO2) 28 22 - 29 mmol/L    Anion Gap 6 (L) 7 - 15 mmol/L    Urea Nitrogen 29.5 (H) 6.0 - 20.0 mg/dL    Creatinine 1.30 (H) 0.51 - 0.95 mg/dL    GFR Estimate 49 (L) >60 mL/min/1.73m2    Calcium 8.1 (L) 8.6 - 10.0 mg/dL    Chloride 101 98 - 107 mmol/L    Glucose 117 (H) 70 - 99 mg/dL    Alkaline Phosphatase 205 (H) 40 - 150 U/L    AST 31 0 - 45 U/L    ALT 45 0 - 50 U/L    Protein Total 4.9 (L) 6.4 - 8.3 g/dL    Albumin 2.2 (L) 3.5 - 5.2 g/dL    Bilirubin Total <0.2 <=1.2 mg/dL   Magnesium   Result Value Ref Range    Magnesium 2.0 1.7 - 2.3 mg/dL   CBC with platelets and differential   Result  Value Ref Range    WBC Count 22.2 (H) 4.0 - 11.0 10e3/uL    RBC Count 4.62 3.80 - 5.20 10e6/uL    Hemoglobin 12.9 11.7 - 15.7 g/dL    Hematocrit 39.1 35.0 - 47.0 %    MCV 85 78 - 100 fL    MCH 27.9 26.5 - 33.0 pg    MCHC 33.0 31.5 - 36.5 g/dL    RDW 16.4 (H) 10.0 - 15.0 %    Platelet Count 518 (H) 150 - 450 10e3/uL    % Neutrophils 81 %    % Lymphocytes 11 %    % Monocytes 7 %    % Eosinophils 0 %    % Basophils 0 %    % Immature Granulocytes 1 %    NRBCs per 100 WBC 0 <1 /100    Absolute Neutrophils 17.8 (H) 1.6 - 8.3 10e3/uL    Absolute Lymphocytes 2.5 0.8 - 5.3 10e3/uL    Absolute Monocytes 1.6 (H) 0.0 - 1.3 10e3/uL    Absolute Eosinophils 0.0 0.0 - 0.7 10e3/uL    Absolute Basophils 0.1 0.0 - 0.2 10e3/uL    Absolute Immature Granulocytes 0.2 <=0.4 10e3/uL    Absolute NRBCs 0.0 10e3/uL       Medications - No data to display    Assessments & Plan (with Medical Decision Making)     I have reviewed the nursing notes.    I have reviewed the findings, diagnosis, plan and need for follow up with the patient.    Medical Decision Making  Vivian Brown is a 53 year old female with history of hypothyroidism, AL amyloidosis currently undergoing chemotherapy who presents for evaluation of diarrhea.  Normal vitals on arrival.  Exam is very reassuring overall.  Patient has slightly tacky mucous membranes, but is nontoxic in appearance.  Her abdomen is entirely nontender and she has no rebound or guarding.  It seems as though her watery stools could be caused by chemotherapy regimen/medications.  Viral gastroenteritis is also a possibility.  We were able to draw some labs, per recommendations from oncology.  This revealed leukocytosis of 22, mild CHRIS with creatinine 1.3 and BUN to creatinine ratio of 22.  Mild elevation in alkaline phosphatase.  Otherwise electrolytes unremarkable.  Case was discussed with on-call oncology.  Typically her medication regimen causes constipation instead of diarrhea, and for this reason it was  recommended that we try to get a stool sample to formally rule out C. difficile.  Otherwise, since she feels well, diarrhea seems to be slowing, electrolytes are reassuring overall, she is stable for discharge home and outpatient follow-up.  Patient attempted to provide a stool sample tonight but was unable to do so.  We have placed an order with the lab such that she can bring in a sample when able.  Will hold off on antidiarrheal medications at this time.  Encouraged plenty of hydration, PCP and oncology follow-up as soon as possible.  Patient agrees to return sooner for any new or acutely worsening symptoms.    Discharge Medication List as of 2/3/2024 11:10 PM        Final diagnoses:   Diarrhea, unspecified type   Leukocytosis, unspecified type   CHRIS (acute kidney injury) (H24)       2/3/2024   [facility] EMERGENCY DEPT       Alvarez Nassar MD  02/03/24 [id number]

## 2024-02-05 PROBLEM — D80.1 HYPOGAMMAGLOBULINEMIA (H): Status: ACTIVE | Noted: 2024-02-05

## 2024-02-05 LAB
CULTURE HARVEST COMPLETE DATE: NORMAL
IGG SERPL-MCNC: 204 MG/DL (ref 610–1616)

## 2024-02-05 RX ORDER — ALBUTEROL SULFATE 90 UG/1
1-2 AEROSOL, METERED RESPIRATORY (INHALATION)
Status: CANCELLED
Start: 2024-02-09

## 2024-02-05 RX ORDER — DIPHENHYDRAMINE HYDROCHLORIDE 50 MG/ML
50 INJECTION INTRAMUSCULAR; INTRAVENOUS
Status: CANCELLED
Start: 2024-02-09

## 2024-02-05 RX ORDER — EPINEPHRINE 1 MG/ML
0.3 INJECTION, SOLUTION INTRAMUSCULAR; SUBCUTANEOUS EVERY 5 MIN PRN
Status: CANCELLED | OUTPATIENT
Start: 2024-02-09

## 2024-02-05 RX ORDER — ALBUTEROL SULFATE 0.83 MG/ML
2.5 SOLUTION RESPIRATORY (INHALATION)
Status: CANCELLED | OUTPATIENT
Start: 2024-02-09

## 2024-02-05 RX ORDER — MEPERIDINE HYDROCHLORIDE 25 MG/ML
25 INJECTION INTRAMUSCULAR; INTRAVENOUS; SUBCUTANEOUS EVERY 30 MIN PRN
Status: CANCELLED | OUTPATIENT
Start: 2024-02-09

## 2024-02-05 RX ORDER — ACETAMINOPHEN 325 MG/1
650 TABLET ORAL ONCE
Status: CANCELLED
Start: 2024-02-09

## 2024-02-05 RX ORDER — DIPHENHYDRAMINE HCL 25 MG
50 CAPSULE ORAL ONCE
Status: CANCELLED
Start: 2024-02-09

## 2024-02-05 RX ORDER — METHYLPREDNISOLONE SODIUM SUCCINATE 125 MG/2ML
125 INJECTION, POWDER, LYOPHILIZED, FOR SOLUTION INTRAMUSCULAR; INTRAVENOUS
Status: CANCELLED
Start: 2024-02-09

## 2024-02-06 ENCOUNTER — TELEPHONE (OUTPATIENT)
Dept: GASTROENTEROLOGY | Facility: CLINIC | Age: 54
End: 2024-02-06

## 2024-02-06 ENCOUNTER — LAB (OUTPATIENT)
Dept: LAB | Facility: CLINIC | Age: 54
End: 2024-02-06
Payer: COMMERCIAL

## 2024-02-06 DIAGNOSIS — E85.81 AL AMYLOIDOSIS (H): ICD-10-CM

## 2024-02-06 LAB
ALBUMIN SERPL BCG-MCNC: 2 G/DL (ref 3.5–5.2)
ALP SERPL-CCNC: 161 U/L (ref 40–150)
ALT SERPL W P-5'-P-CCNC: 29 U/L (ref 0–50)
ANION GAP SERPL CALCULATED.3IONS-SCNC: 4 MMOL/L (ref 7–15)
AST SERPL W P-5'-P-CCNC: 22 U/L (ref 0–45)
BASOPHILS # BLD AUTO: 0.1 10E3/UL (ref 0–0.2)
BASOPHILS NFR BLD AUTO: 1 %
BILIRUB SERPL-MCNC: 0.2 MG/DL
BUN SERPL-MCNC: 18.6 MG/DL (ref 6–20)
CALCIUM SERPL-MCNC: 8.2 MG/DL (ref 8.6–10)
CHLORIDE SERPL-SCNC: 104 MMOL/L (ref 98–107)
CREAT SERPL-MCNC: 1.2 MG/DL (ref 0.51–0.95)
CULTURE HARVEST COMPLETE DATE: NORMAL
CULTURE HARVEST COMPLETE DATE: NORMAL
DEPRECATED HCO3 PLAS-SCNC: 31 MMOL/L (ref 22–29)
EGFRCR SERPLBLD CKD-EPI 2021: 54 ML/MIN/1.73M2
EOSINOPHIL # BLD AUTO: 0.1 10E3/UL (ref 0–0.7)
EOSINOPHIL NFR BLD AUTO: 1 %
ERYTHROCYTE [DISTWIDTH] IN BLOOD BY AUTOMATED COUNT: 17 % (ref 10–15)
GLUCOSE SERPL-MCNC: 82 MG/DL (ref 70–99)
HCT VFR BLD AUTO: 38.8 % (ref 35–47)
HGB BLD-MCNC: 12.7 G/DL (ref 11.7–15.7)
IMM GRANULOCYTES # BLD: 0 10E3/UL
IMM GRANULOCYTES NFR BLD: 0 %
INTERPRETATION: NORMAL
LYMPHOCYTES # BLD AUTO: 2.4 10E3/UL (ref 0.8–5.3)
LYMPHOCYTES NFR BLD AUTO: 24 %
MCH RBC QN AUTO: 28.1 PG (ref 26.5–33)
MCHC RBC AUTO-ENTMCNC: 32.7 G/DL (ref 31.5–36.5)
MCV RBC AUTO: 86 FL (ref 78–100)
MONOCYTES # BLD AUTO: 0.7 10E3/UL (ref 0–1.3)
MONOCYTES NFR BLD AUTO: 7 %
NEUTROPHILS # BLD AUTO: 6.6 10E3/UL (ref 1.6–8.3)
NEUTROPHILS NFR BLD AUTO: 67 %
NRBC # BLD AUTO: 0.1 10E3/UL
NRBC BLD AUTO-RTO: 1 /100
PLATELET # BLD AUTO: 434 10E3/UL (ref 150–450)
POTASSIUM SERPL-SCNC: 3.5 MMOL/L (ref 3.4–5.3)
PROT SERPL-MCNC: 4.6 G/DL (ref 6.4–8.3)
RBC # BLD AUTO: 4.52 10E6/UL (ref 3.8–5.2)
SODIUM SERPL-SCNC: 139 MMOL/L (ref 135–145)
WBC # BLD AUTO: 9.8 10E3/UL (ref 4–11)

## 2024-02-06 PROCEDURE — 36415 COLL VENOUS BLD VENIPUNCTURE: CPT

## 2024-02-06 PROCEDURE — 80053 COMPREHEN METABOLIC PANEL: CPT | Performed by: STUDENT IN AN ORGANIZED HEALTH CARE EDUCATION/TRAINING PROGRAM

## 2024-02-06 PROCEDURE — 85025 COMPLETE CBC W/AUTO DIFF WBC: CPT | Performed by: STUDENT IN AN ORGANIZED HEALTH CARE EDUCATION/TRAINING PROGRAM

## 2024-02-06 NOTE — TELEPHONE ENCOUNTER
"Noted that pt was in the ER on 2/3/24 for multiple episodes of diarrhea likely secondary to chemo.     Called pt; she states that she feels that the diarrhea was actually due to constipation from chemo; she had been constipated for several days and took \"a bunch\" of stool softeners resulting in the diarrhea.     Pt had reported 6# weight increase and some selling in BLE on 2/1/24; Dr. Piña had increased Lasix to 20 mg BID (from once daily).     Pt states that swelling in bilateral legs is still present since second chemo treatment. Pt feels it is from Chemo, she started wearing compression stockings today which she feels is helping.     Pt states that her weight has decreased from 190# to 186#  today; denies any SOB or other fluid retention symptoms. Pt BP's have remained stable 120's/60-70's.     Pt had labs drawn today, appear stable.     Asked pt to please call if swelling in BLE gets worse or her weight starts to trend up or she develops any new symptoms; verbalized understanding.     Routing to Dr. Armstrong to review.     Loida Germain RN      "

## 2024-02-06 NOTE — TELEPHONE ENCOUNTER
Patient Contacted for the patient to call back and schedule the following:    Appointment type: Return GI Nutrition  Provider: Sarah Chan RD  Return date: 03/03/24  Specialty phone number: [phone]  Additional appointment(s) needed: N/A  Additonal Notes: Pt stated she does not wish for any further dietician appts at this time

## 2024-02-08 ENCOUNTER — PATIENT OUTREACH (OUTPATIENT)
Dept: ONCOLOGY | Facility: CLINIC | Age: 54
End: 2024-02-08
Payer: COMMERCIAL

## 2024-02-08 NOTE — PROGRESS NOTES
[facility]: Post-Discharge Note  SITUATION                                                      Admission:    Admission Date: 02/03/24   Reason for Admission: diarrhea  Discharge:   Discharge Date: 02/03/24  Discharge Diagnosis: diarrhea    BACKGROUND                                                      Per hospital discharge summary and inpatient provider notes.      ASSESSMENT           Discharge Assessment  How are you doing now that you are home?: Feeling fine. No issues to report at home.  How are your symptoms? (Red Flag symptoms escalate to triage hotline per guidelines): Improved  Do you feel your condition is stable enough to be safe at home until your provider visit?: Yes  Does the patient have their discharge instructions? : Yes  Does the patient have questions regarding their discharge instructions? : No  Were you started on any new medications or were there changes to any of your previous medications? : No  Does the patient have all of their medications?: Yes  Do you have questions regarding any of your medications? : No  Discharge follow-up appointment scheduled within 14 calendar days? : No (Has labs and infusion on 2/9)         Post-op (Clinicians Only)  Fever: No  Chills: No  Eating & Drinking: eating and drinking without complaints/concerns  PO Intake: regular diet  Bowel Function: normal  Urinary Status: voiding without complaint/concerns        PLAN                                                      Outpatient Plan:        Future Appointments   Date Time Provider Department Center   2/9/2024  9:00 AM  [facility] LAB DRAW [facility]   2/9/2024  9:30 AM  ONC INFUSION NURSE [facility]   2/16/2024  8:45 AM [address]   2/16/2024  1:30 PM  [facility] LAB DRAW [facility]   2/16/2024  2:00 PM  ONC INFUSION NURSE [facility]   3/15/2024  1:00 PM Jomar Chandler MD [facility]   3/15/2024  2:00 PM Ana Gonzalez, RN [facility]   3/15/2024  2:30 PM Ana Pascal,  LICSW UCBMT [facility]   3/15/2024  4:00 PM  [facility] LAB DRAW UCONL [facility]   3/18/2024 10:30 AM  LAB UCLABR [facility]   3/18/2024 11:30 AM Tracie Dobson MD [facility]   4/8/2024 11:00 AM Carmen Waggoner APRN [facility]         For any urgent concerns, please contact our 24 hour clinic line:   [place]: [phone]       Kristen Henley RN

## 2024-02-09 ENCOUNTER — LAB (OUTPATIENT)
Dept: LAB | Facility: CLINIC | Age: 54
End: 2024-02-09
Attending: STUDENT IN AN ORGANIZED HEALTH CARE EDUCATION/TRAINING PROGRAM
Payer: COMMERCIAL

## 2024-02-09 ENCOUNTER — TELEPHONE (OUTPATIENT)
Dept: ONCOLOGY | Facility: CLINIC | Age: 54
End: 2024-02-09

## 2024-02-09 ENCOUNTER — INFUSION THERAPY VISIT (OUTPATIENT)
Dept: ONCOLOGY | Facility: CLINIC | Age: 54
End: 2024-02-09
Attending: STUDENT IN AN ORGANIZED HEALTH CARE EDUCATION/TRAINING PROGRAM
Payer: COMMERCIAL

## 2024-02-09 VITALS
DIASTOLIC BLOOD PRESSURE: 84 MMHG | TEMPERATURE: 98.5 F | WEIGHT: 190.2 LBS | BODY MASS INDEX: 31.65 KG/M2 | OXYGEN SATURATION: 96 % | SYSTOLIC BLOOD PRESSURE: 120 MMHG | HEART RATE: 95 BPM | RESPIRATION RATE: 16 BRPM

## 2024-02-09 DIAGNOSIS — E85.81 AL AMYLOIDOSIS (H): Primary | ICD-10-CM

## 2024-02-09 DIAGNOSIS — D80.1 HYPOGAMMAGLOBULINEMIA (H): ICD-10-CM

## 2024-02-09 LAB
ALBUMIN SERPL BCG-MCNC: 2.2 G/DL (ref 3.5–5.2)
ALP SERPL-CCNC: 136 U/L (ref 40–150)
ALT SERPL W P-5'-P-CCNC: 26 U/L (ref 0–50)
ANION GAP SERPL CALCULATED.3IONS-SCNC: 6 MMOL/L (ref 7–15)
AST SERPL W P-5'-P-CCNC: 22 U/L (ref 0–45)
BASOPHILS # BLD AUTO: 0.1 10E3/UL (ref 0–0.2)
BASOPHILS NFR BLD AUTO: 1 %
BILIRUB SERPL-MCNC: 0.3 MG/DL
BUN SERPL-MCNC: 18.6 MG/DL (ref 6–20)
CALCIUM SERPL-MCNC: 8.1 MG/DL (ref 8.6–10)
CHLORIDE SERPL-SCNC: 107 MMOL/L (ref 98–107)
CREAT SERPL-MCNC: 1.1 MG/DL (ref 0.51–0.95)
DEPRECATED HCO3 PLAS-SCNC: 26 MMOL/L (ref 22–29)
EGFRCR SERPLBLD CKD-EPI 2021: 60 ML/MIN/1.73M2
EOSINOPHIL # BLD AUTO: 0.2 10E3/UL (ref 0–0.7)
EOSINOPHIL NFR BLD AUTO: 3 %
ERYTHROCYTE [DISTWIDTH] IN BLOOD BY AUTOMATED COUNT: 17.2 % (ref 10–15)
GLUCOSE SERPL-MCNC: 105 MG/DL (ref 70–99)
HCT VFR BLD AUTO: 38 % (ref 35–47)
HGB BLD-MCNC: 12.6 G/DL (ref 11.7–15.7)
IMM GRANULOCYTES # BLD: 0.1 10E3/UL
IMM GRANULOCYTES NFR BLD: 1 %
LYMPHOCYTES # BLD AUTO: 2.2 10E3/UL (ref 0.8–5.3)
LYMPHOCYTES NFR BLD AUTO: 26 %
MCH RBC QN AUTO: 28.2 PG (ref 26.5–33)
MCHC RBC AUTO-ENTMCNC: 33.2 G/DL (ref 31.5–36.5)
MCV RBC AUTO: 85 FL (ref 78–100)
MONOCYTES # BLD AUTO: 0.8 10E3/UL (ref 0–1.3)
MONOCYTES NFR BLD AUTO: 9 %
NEUTROPHILS # BLD AUTO: 5.1 10E3/UL (ref 1.6–8.3)
NEUTROPHILS NFR BLD AUTO: 60 %
NRBC # BLD AUTO: 0 10E3/UL
NRBC BLD AUTO-RTO: 0 /100
PLATELET # BLD AUTO: 414 10E3/UL (ref 150–450)
POTASSIUM SERPL-SCNC: 4 MMOL/L (ref 3.4–5.3)
PROT SERPL-MCNC: 4.4 G/DL (ref 6.4–8.3)
RBC # BLD AUTO: 4.47 10E6/UL (ref 3.8–5.2)
SODIUM SERPL-SCNC: 139 MMOL/L (ref 135–145)
WBC # BLD AUTO: 8.5 10E3/UL (ref 4–11)

## 2024-02-09 PROCEDURE — 80053 COMPREHEN METABOLIC PANEL: CPT

## 2024-02-09 PROCEDURE — 250N000013 HC RX MED GY IP 250 OP 250 PS 637: Performed by: STUDENT IN AN ORGANIZED HEALTH CARE EDUCATION/TRAINING PROGRAM

## 2024-02-09 PROCEDURE — 250N000011 HC RX IP 250 OP 636: Performed by: STUDENT IN AN ORGANIZED HEALTH CARE EDUCATION/TRAINING PROGRAM

## 2024-02-09 PROCEDURE — 250N000011 HC RX IP 250 OP 636: Mod: JZ | Performed by: NURSE PRACTITIONER

## 2024-02-09 PROCEDURE — 96401 CHEMO ANTI-NEOPL SQ/IM: CPT

## 2024-02-09 PROCEDURE — 85004 AUTOMATED DIFF WBC COUNT: CPT | Performed by: STUDENT IN AN ORGANIZED HEALTH CARE EDUCATION/TRAINING PROGRAM

## 2024-02-09 PROCEDURE — 250N000012 HC RX MED GY IP 250 OP 636 PS 637: Performed by: STUDENT IN AN ORGANIZED HEALTH CARE EDUCATION/TRAINING PROGRAM

## 2024-02-09 PROCEDURE — 36415 COLL VENOUS BLD VENIPUNCTURE: CPT | Performed by: STUDENT IN AN ORGANIZED HEALTH CARE EDUCATION/TRAINING PROGRAM

## 2024-02-09 PROCEDURE — 96365 THER/PROPH/DIAG IV INF INIT: CPT

## 2024-02-09 RX ORDER — DIPHENHYDRAMINE HYDROCHLORIDE 50 MG/ML
50 INJECTION INTRAMUSCULAR; INTRAVENOUS
Start: 2024-02-09

## 2024-02-09 RX ORDER — MONTELUKAST SODIUM 10 MG/1
10 TABLET ORAL ONCE
Status: COMPLETED | OUTPATIENT
Start: 2024-02-09 | End: 2024-02-09

## 2024-02-09 RX ORDER — DIPHENHYDRAMINE HCL 25 MG
50 CAPSULE ORAL ONCE
Status: DISCONTINUED | OUTPATIENT
Start: 2024-02-09 | End: 2024-02-09

## 2024-02-09 RX ORDER — EPINEPHRINE 1 MG/ML
0.3 INJECTION, SOLUTION, CONCENTRATE INTRAVENOUS EVERY 5 MIN PRN
OUTPATIENT
Start: 2024-02-09

## 2024-02-09 RX ORDER — ACETAMINOPHEN 325 MG/1
650 TABLET ORAL ONCE
Status: DISCONTINUED | OUTPATIENT
Start: 2024-02-09 | End: 2024-02-09

## 2024-02-09 RX ORDER — ACETAMINOPHEN 325 MG/1
650 TABLET ORAL ONCE
Start: 2024-02-09

## 2024-02-09 RX ORDER — ALBUTEROL SULFATE 90 UG/1
1-2 AEROSOL, METERED RESPIRATORY (INHALATION)
Start: 2024-02-09

## 2024-02-09 RX ORDER — DIPHENHYDRAMINE HCL 25 MG
50 CAPSULE ORAL ONCE
Start: 2024-02-09

## 2024-02-09 RX ORDER — DIPHENHYDRAMINE HCL 25 MG
50 CAPSULE ORAL ONCE
Status: COMPLETED | OUTPATIENT
Start: 2024-02-09 | End: 2024-02-09

## 2024-02-09 RX ORDER — DEXAMETHASONE 4 MG/1
40 TABLET ORAL ONCE
Status: COMPLETED | OUTPATIENT
Start: 2024-02-09 | End: 2024-02-09

## 2024-02-09 RX ORDER — MEPERIDINE HYDROCHLORIDE 25 MG/ML
25 INJECTION INTRAMUSCULAR; INTRAVENOUS; SUBCUTANEOUS EVERY 30 MIN PRN
OUTPATIENT
Start: 2024-02-09

## 2024-02-09 RX ORDER — METHYLPREDNISOLONE SODIUM SUCCINATE 125 MG/2ML
125 INJECTION, POWDER, LYOPHILIZED, FOR SOLUTION INTRAMUSCULAR; INTRAVENOUS
Start: 2024-02-09

## 2024-02-09 RX ORDER — ACETAMINOPHEN 325 MG/1
650 TABLET ORAL ONCE
Status: COMPLETED | OUTPATIENT
Start: 2024-02-09 | End: 2024-02-09

## 2024-02-09 RX ORDER — ALBUTEROL SULFATE 0.83 MG/ML
2.5 SOLUTION RESPIRATORY (INHALATION)
OUTPATIENT
Start: 2024-02-09

## 2024-02-09 RX ADMIN — ACETAMINOPHEN 650 MG: 325 TABLET ORAL at 10:07

## 2024-02-09 RX ADMIN — HUMAN IMMUNOGLOBULIN G 20 G: 20 LIQUID INTRAVENOUS at 11:41

## 2024-02-09 RX ADMIN — DEXAMETHASONE 40 MG: 4 TABLET ORAL at 10:08

## 2024-02-09 RX ADMIN — MONTELUKAST 10 MG: 10 TABLET, FILM COATED ORAL at 10:03

## 2024-02-09 RX ADMIN — DIPHENHYDRAMINE HYDROCHLORIDE 50 MG: 25 CAPSULE ORAL at 10:07

## 2024-02-09 RX ADMIN — DARATUMUMAB AND HYALURONIDASE-FIHJ (HUMAN RECOMBINANT) 1800 MG: 1800; 30000 INJECTION SUBCUTANEOUS at 10:36

## 2024-02-09 RX ADMIN — BORTEZOMIB 2.5 MG: 3.5 INJECTION, POWDER, LYOPHILIZED, FOR SOLUTION INTRAVENOUS; SUBCUTANEOUS at 10:36

## 2024-02-09 ASSESSMENT — PAIN SCALES - GENERAL: PAINLEVEL: NO PAIN (0)

## 2024-02-09 NOTE — ORAL ONC MGMT
Oral Chemotherapy Monitoring Program  Lab Follow Up    Patient is on cyclophosphamide  Reviewed lab results from 2/9/24.    Assessment & Plan:  CBC and CMP reviewed. Results show no concerning abnormalities.  Plan to continue cyclophosphamide as prescribed. Patient not contacted with lab results by OC team, as pt was seen in infusion suite    Follow-Up:  2/16: next labs      Ivelisse Willett PharmD  Hematology/Oncology Clinical Pharmacist  Oral Chemotherapy Monitoring Program  [facility]  [phone]  February 9, 2024 2/2/2024     3:00 PM 2/9/2024     2:00 PM   ORAL CHEMOTHERAPY   Assessment Type Initial Follow up;Lab Monitoring Lab Monitoring   Diagnosis Code Multiple Myeloma Multiple Myeloma   Providers Dr. Ramesh Chandler   Clinic Name/Location [facility]   Is this patient followed by the [facility] OC team? No No   Drug Name Cytoxan (cyclophsphamide) Cytoxan (cyclophsphamide)   Dose 500 mg 500 mg   Current Schedule Weekly Weekly   Cycle Details Weekly/Daily Weekly/Daily   Start Date of Last Cycle 1/26/2024 1/26/2024   Planned next cycle start date 2/23/2024 2/23/2024   Doses missed in last 2 weeks 0    Adherence Assessment Adherent    Adverse Effects No AE identified during assessment No AE identified during assessment   Any new drug interactions? No    Is the dose as ordered appropriate for the patient? Yes Yes       Labs:  _  Result Component Current Result Ref Range   Sodium 139 (2/9/2024) 135 - 145 mmol/L     _  Result Component Current Result Ref Range   Potassium 4.0 (2/9/2024) 3.4 - 5.3 mmol/L     _  Result Component Current Result Ref Range   Calcium 8.1 (L) (2/9/2024) 8.6 - 10.0 mg/dL     _  Result Component Current Result Ref Range   Magnesium 2.0 (2/3/2024) 1.7 - 2.3 mg/dL     No results found for Phos within last 30 days.     _  Result Component Current Result Ref Range   Albumin 2.2 (L) (2/9/2024) 3.5 - 5.2 g/dL     _  Result Component Current Result Ref Range   Urea  Nitrogen 18.6 (2/9/2024) 6.0 - 20.0 mg/dL     _  Result Component Current Result Ref Range   Creatinine 1.10 (H) (2/9/2024) 0.51 - 0.95 mg/dL     _  Result Component Current Result Ref Range   AST 22 (2/9/2024) 0 - 45 U/L     _  Result Component Current Result Ref Range   ALT 26 (2/9/2024) 0 - 50 U/L     _  Result Component Current Result Ref Range   Bilirubin Total 0.3 (2/9/2024) <=1.2 mg/dL     _  Result Component Current Result Ref Range   WBC Count 8.5 (2/9/2024) 4.0 - 11.0 10e3/uL     _  Result Component Current Result Ref Range   Hemoglobin 12.6 (2/9/2024) 11.7 - 15.7 g/dL     _  Result Component Current Result Ref Range   Platelet Count 414 (2/9/2024) 150 - 450 10e3/uL     No results found for ANC within last 30 days.

## 2024-02-09 NOTE — NURSING NOTE
Chief Complaint   Patient presents with    Labs Only     Labs drawn off PIV by RN, VS done     Labs drawn from PIV placed by RN. Line flushed with saline. Vitals taken. Pt checked in for appointment(s).

## 2024-02-09 NOTE — PATIENT INSTRUCTIONS
[facility] Triage and after hours / weekends / holidays:  [phone]    Please call the triage or after hours line if you experience a temperature greater than or equal to 100.4, shaking chills, have uncontrolled nausea, vomiting and/or diarrhea, dizziness, shortness of breath, chest pain, bleeding, unexplained bruising, or if you have any other new/concerning symptoms, questions, or concerns.      If you are having any concerning symptoms or wish to speak to a provider before your next infusion visit, please call your care coordinator or triage to notify them so we can adequately serve you.     If you need a refill on a narcotic prescription or other medication, please call before your infusion appointment.

## 2024-02-09 NOTE — PROGRESS NOTES
Infusion Nursing Note:  Vivian Brown presents today for Cycle 1 Day 15 darzalex faspro, bortezomib; IVIG.    Patient seen by provider today: No   present during visit today: Not Applicable.    Note: Vivian presents today feeling well. Denies pain or nausea/vomiting. Denies fevers/chills. Denies SOB, cough, chest pain, or dizziness/lightheadedness. Denies constipation/diarrhea. Denies urinary issues. Denies neuropathy. Offers no new concerns at this appointment.      Intravenous Access:  Peripheral IV placed in lab.    Treatment Conditions:     Latest Reference Range & Units 02/09/24 09:08   Sodium 135 - 145 mmol/L 139   Potassium 3.4 - 5.3 mmol/L 4.0   Chloride 98 - 107 mmol/L 107   Carbon Dioxide (CO2) 22 - 29 mmol/L 26   Urea Nitrogen 6.0 - 20.0 mg/dL 18.6   Creatinine 0.51 - 0.95 mg/dL 1.10 (H)   GFR Estimate >60 mL/min/1.73m2 60 (L)   Calcium 8.6 - 10.0 mg/dL 8.1 (L)   Anion Gap 7 - 15 mmol/L 6 (L)   Albumin 3.5 - 5.2 g/dL 2.2 (L)   Protein Total 6.4 - 8.3 g/dL 4.4 (L)   Alkaline Phosphatase 40 - 150 U/L 136   ALT 0 - 50 U/L 26   AST 0 - 45 U/L 22   Bilirubin Total <=1.2 mg/dL 0.3   Glucose 70 - 99 mg/dL 105 (H)   WBC 4.0 - 11.0 10e3/uL 8.5   Hemoglobin 11.7 - 15.7 g/dL 12.6   Hematocrit 35.0 - 47.0 % 38.0   Platelet Count 150 - 450 10e3/uL 414   RBC Count 3.80 - 5.20 10e6/uL 4.47   MCV 78 - 100 fL 85   MCH 26.5 - 33.0 pg 28.2   MCHC 31.5 - 36.5 g/dL 33.2   RDW 10.0 - 15.0 % 17.2 (H)   % Neutrophils % 60   % Lymphocytes % 26   % Monocytes % 9   % Eosinophils % 3   % Basophils % 1   Absolute Basophils 0.0 - 0.2 10e3/uL 0.1   Absolute Eosinophils 0.0 - 0.7 10e3/uL 0.2   Absolute Immature Granulocytes <=0.4 10e3/uL 0.1   Absolute Lymphocytes 0.8 - 5.3 10e3/uL 2.2   Absolute Monocytes 0.0 - 1.3 10e3/uL 0.8   % Immature Granulocytes % 1   Absolute Neutrophils 1.6 - 8.3 10e3/uL 5.1   Absolute NRBCs 10e3/uL 0.0   NRBCs per 100 WBC <1 /100 0     Results reviewed, labs MET treatment parameters, ok to  proceed with treatment.      Post Infusion Assessment:  Patient tolerated infusion without incident.  Patient tolerated bortezomib injection to LLQ abdomen without incident.  Patient tolerated darzalex faspro injection to R abdomen without incident.  Blood return noted pre and post infusion.  Site patent and intact, free from redness, edema or discomfort.  No evidence of extravasations.  Access discontinued per protocol.       Discharge Plan:   Patient declined prescription refills.  Discharge instructions reviewed with: Patient.  Patient and/or family verbalized understanding of discharge instructions and all questions answered.  AVS to patient via Between.  Patient will return 02/16 for next infusion appointment.   Patient discharged in stable condition accompanied by: sister.  Departure Mode: Ambulatory.      Eneida Hopkins RN   98.4

## 2024-02-12 LAB
ADDITIONAL COMMENTS: NORMAL
INTERPRETATION: NORMAL
INTERPRETATION: NORMAL
ISCN: NORMAL
METHODS: NORMAL

## 2024-02-13 DIAGNOSIS — E85.81 AL AMYLOIDOSIS (H): Primary | ICD-10-CM

## 2024-02-13 RX ORDER — CYCLOPHOSPHAMIDE 50 MG/1
500 CAPSULE ORAL
Qty: 40 CAPSULE | Refills: 0 | Status: SHIPPED | OUTPATIENT
Start: 2024-02-13 | End: 2024-03-15

## 2024-02-15 RX ORDER — SPIRONOLACTONE 25 MG/1
12.5 TABLET ORAL DAILY
Qty: 45 TABLET | Refills: 3 | Status: SHIPPED | OUTPATIENT
Start: 2024-02-15 | End: 2024-02-29

## 2024-02-15 NOTE — PROGRESS NOTES
Social Work - Follow-Up  [facility]    Data/Intervention:    Patient Name: Vivian Brown Goes By: Vivian BRENNER/Age: 1970 (53 year old)    Reason for Follow-Up: Disability benefits     Collaborated With:    -Patient    Intervention/Education/Resources Provided:  Patient had some paperwork they needed completed for their leave which has been delayed. Patients  has some concerns about patient working and having exposure. Patient is having a biopsy coming up to rule out Multiple Myeloma. Patient anticipates that they will need to explore FMLA/STD if it is found that they do have Multiple Myeloma due to treatment.    SW provided  and disability hub as a reference point if they had any further questions regarding disability benefits. Patient would like to wait for biopsy results before taking next steps.    Assessment/Plan:  SW will continue to remain available as needed. Previously provided patient/family with writer's contact information and availability.      THONY Samano,SHARMIN  Hematology/Oncology Social Worker  Phone:[phone] Pager: [phone]

## 2024-02-15 NOTE — TELEPHONE ENCOUNTER
Date: 2/15/2024    Time of Call: 10:31 AM     Diagnosis:  HF/Amyloid      [ TORB ] Ordering provider: Dr. Armstrong    Order:   -Start Spironolactone 12.5 mg daily.   -If tolerating, can titrate up to Spironolactone 25 mg in 2 weeks.   -BMP once pt has been on Spironolactone 25 mg x 2 weeks.      Order received by: Loida Germain RN       Follow-up/additional notes: [place] Temperature Management message sent to pt with recommendations. Will plan to Follow-up with pt in 2 weeks to see how she is feeling and titrate up Spironolactone if appropriate.

## 2024-02-16 ENCOUNTER — HOSPITAL ENCOUNTER (OUTPATIENT)
Dept: MRI IMAGING | Facility: CLINIC | Age: 54
Discharge: HOME | End: 2024-02-16
Attending: NURSE PRACTITIONER | Admitting: NURSE PRACTITIONER
Payer: COMMERCIAL

## 2024-02-16 ENCOUNTER — APPOINTMENT (OUTPATIENT)
Dept: LAB | Facility: CLINIC | Age: 54
End: 2024-02-16
Attending: STUDENT IN AN ORGANIZED HEALTH CARE EDUCATION/TRAINING PROGRAM
Payer: COMMERCIAL

## 2024-02-16 ENCOUNTER — INFUSION THERAPY VISIT (OUTPATIENT)
Dept: ONCOLOGY | Facility: CLINIC | Age: 54
End: 2024-02-16
Attending: STUDENT IN AN ORGANIZED HEALTH CARE EDUCATION/TRAINING PROGRAM
Payer: COMMERCIAL

## 2024-02-16 ENCOUNTER — NURSE TRIAGE (OUTPATIENT)
Dept: ONCOLOGY | Facility: CLINIC | Age: 54
End: 2024-02-16

## 2024-02-16 VITALS
SYSTOLIC BLOOD PRESSURE: 111 MMHG | TEMPERATURE: 98.2 F | OXYGEN SATURATION: 99 % | BODY MASS INDEX: 31.07 KG/M2 | DIASTOLIC BLOOD PRESSURE: 76 MMHG | HEART RATE: 82 BPM | RESPIRATION RATE: 16 BRPM | WEIGHT: 186.7 LBS

## 2024-02-16 DIAGNOSIS — E85.81 AL AMYLOIDOSIS (H): Primary | ICD-10-CM

## 2024-02-16 DIAGNOSIS — E85.81 AL AMYLOIDOSIS (H): ICD-10-CM

## 2024-02-16 LAB
ALBUMIN SERPL BCG-MCNC: 2.1 G/DL (ref 3.5–5.2)
ALP SERPL-CCNC: 129 U/L (ref 40–150)
ALT SERPL W P-5'-P-CCNC: 21 U/L (ref 0–50)
ANION GAP SERPL CALCULATED.3IONS-SCNC: 4 MMOL/L (ref 7–15)
AST SERPL W P-5'-P-CCNC: 17 U/L (ref 0–45)
BASOPHILS # BLD AUTO: 0.1 10E3/UL (ref 0–0.2)
BASOPHILS NFR BLD AUTO: 1 %
BILIRUB SERPL-MCNC: 0.2 MG/DL
BUN SERPL-MCNC: 18.3 MG/DL (ref 6–20)
CALCIUM SERPL-MCNC: 8.5 MG/DL (ref 8.6–10)
CHLORIDE SERPL-SCNC: 104 MMOL/L (ref 98–107)
CREAT SERPL-MCNC: 1.2 MG/DL (ref 0.51–0.95)
DEPRECATED HCO3 PLAS-SCNC: 29 MMOL/L (ref 22–29)
EGFRCR SERPLBLD CKD-EPI 2021: 54 ML/MIN/1.73M2
EOSINOPHIL # BLD AUTO: 0.2 10E3/UL (ref 0–0.7)
EOSINOPHIL NFR BLD AUTO: 2 %
ERYTHROCYTE [DISTWIDTH] IN BLOOD BY AUTOMATED COUNT: 17.6 % (ref 10–15)
GLUCOSE SERPL-MCNC: 88 MG/DL (ref 70–99)
HCT VFR BLD AUTO: 38.4 % (ref 35–47)
HGB BLD-MCNC: 13 G/DL (ref 11.7–15.7)
IMM GRANULOCYTES # BLD: 0 10E3/UL
IMM GRANULOCYTES NFR BLD: 0 %
LYMPHOCYTES # BLD AUTO: 2.3 10E3/UL (ref 0.8–5.3)
LYMPHOCYTES NFR BLD AUTO: 23 %
MCH RBC QN AUTO: 28.4 PG (ref 26.5–33)
MCHC RBC AUTO-ENTMCNC: 33.9 G/DL (ref 31.5–36.5)
MCV RBC AUTO: 84 FL (ref 78–100)
MONOCYTES # BLD AUTO: 0.9 10E3/UL (ref 0–1.3)
MONOCYTES NFR BLD AUTO: 9 %
NEUTROPHILS # BLD AUTO: 6.4 10E3/UL (ref 1.6–8.3)
NEUTROPHILS NFR BLD AUTO: 65 %
NRBC # BLD AUTO: 0 10E3/UL
NRBC BLD AUTO-RTO: 0 /100
PLATELET # BLD AUTO: 358 10E3/UL (ref 150–450)
POTASSIUM SERPL-SCNC: 4 MMOL/L (ref 3.4–5.3)
PROT SERPL-MCNC: 4.5 G/DL (ref 6.4–8.3)
RADIOLOGIST FLAGS: NORMAL
RBC # BLD AUTO: 4.57 10E6/UL (ref 3.8–5.2)
SODIUM SERPL-SCNC: 137 MMOL/L (ref 135–145)
WBC # BLD AUTO: 9.9 10E3/UL (ref 4–11)

## 2024-02-16 PROCEDURE — 36415 COLL VENOUS BLD VENIPUNCTURE: CPT | Performed by: STUDENT IN AN ORGANIZED HEALTH CARE EDUCATION/TRAINING PROGRAM

## 2024-02-16 PROCEDURE — 96401 CHEMO ANTI-NEOPL SQ/IM: CPT

## 2024-02-16 PROCEDURE — 250N000012 HC RX MED GY IP 250 OP 636 PS 637: Performed by: STUDENT IN AN ORGANIZED HEALTH CARE EDUCATION/TRAINING PROGRAM

## 2024-02-16 PROCEDURE — 85025 COMPLETE CBC W/AUTO DIFF WBC: CPT | Performed by: STUDENT IN AN ORGANIZED HEALTH CARE EDUCATION/TRAINING PROGRAM

## 2024-02-16 PROCEDURE — A9585 GADOBUTROL INJECTION: HCPCS | Performed by: NURSE PRACTITIONER

## 2024-02-16 PROCEDURE — 73720 MRI LWR EXTREMITY W/O&W/DYE: CPT | Mod: LT

## 2024-02-16 PROCEDURE — 82040 ASSAY OF SERUM ALBUMIN: CPT

## 2024-02-16 PROCEDURE — 255N000002 HC RX 255 OP 636: Performed by: NURSE PRACTITIONER

## 2024-02-16 PROCEDURE — 73720 MRI LWR EXTREMITY W/O&W/DYE: CPT | Mod: 26 | Performed by: RADIOLOGY

## 2024-02-16 PROCEDURE — 250N000011 HC RX IP 250 OP 636: Performed by: STUDENT IN AN ORGANIZED HEALTH CARE EDUCATION/TRAINING PROGRAM

## 2024-02-16 RX ORDER — DEXAMETHASONE 4 MG/1
40 TABLET ORAL ONCE
Status: COMPLETED | OUTPATIENT
Start: 2024-02-16 | End: 2024-02-16

## 2024-02-16 RX ORDER — GADOBUTROL 604.72 MG/ML
10 INJECTION INTRAVENOUS ONCE
Status: COMPLETED | OUTPATIENT
Start: 2024-02-16 | End: 2024-02-16

## 2024-02-16 RX ADMIN — DARATUMUMAB AND HYALURONIDASE-FIHJ (HUMAN RECOMBINANT) 1800 MG: 1800; 30000 INJECTION SUBCUTANEOUS at 15:02

## 2024-02-16 RX ADMIN — DEXAMETHASONE 40 MG: 4 TABLET ORAL at 14:33

## 2024-02-16 RX ADMIN — GADOBUTROL 8.5 ML: 604.72 INJECTION INTRAVENOUS at 09:51

## 2024-02-16 RX ADMIN — BORTEZOMIB 2.5 MG: 3.5 INJECTION, POWDER, LYOPHILIZED, FOR SOLUTION INTRAVENOUS; SUBCUTANEOUS at 15:02

## 2024-02-16 ASSESSMENT — PAIN SCALES - GENERAL: PAINLEVEL: NO PAIN (0)

## 2024-02-16 NOTE — NURSING NOTE
Chief Complaint   Patient presents with    Blood Draw     Labs drawn via  by RN in lab.  VS taken       Labs collected from venipuncture by RN. Vitals taken. Checked in for appointment(s).    Yudith Elena RN

## 2024-02-16 NOTE — PROGRESS NOTES
Infusion Nursing Note:  Vivian Brown presents today for C1D22 Darzalex Faspro/Velcade.    Patient seen by provider today: No   present during visit today: Not Applicable.    Note: Vivian denied fevers, chills, cough, SOB, and chest pain. She reported eating and drinking well. Had some nausea over the weekend that is now resolved. Encouraged use of antiemetics.    Expressed frustration regarding trying to get in to see an allergist for skin testing. Provided patient with allergy clinic number and encouraged her to call and ask to establish as new patient. If patient continues to have difficulty, encouraged her to speak to provider next week. Verbalized understanding.    Patient took cytoxan during infusion today.    Intravenous Access:  Labs drawn without difficulty.    Treatment Conditions:   Latest Reference Range & Units 02/16/24 14:03   Sodium 135 - 145 mmol/L 137   Potassium 3.4 - 5.3 mmol/L 4.0   Chloride 98 - 107 mmol/L 104   Carbon Dioxide (CO2) 22 - 29 mmol/L 29   Urea Nitrogen 6.0 - 20.0 mg/dL 18.3   Creatinine 0.51 - 0.95 mg/dL 1.20 (H)   GFR Estimate >60 mL/min/1.73m2 54 (L)   Calcium 8.6 - 10.0 mg/dL 8.5 (L)   Anion Gap 7 - 15 mmol/L 4 (L)   Albumin 3.5 - 5.2 g/dL 2.1 (L)   Protein Total 6.4 - 8.3 g/dL 4.5 (L)   Alkaline Phosphatase 40 - 150 U/L 129   ALT 0 - 50 U/L 21   AST 0 - 45 U/L 17   Bilirubin Total <=1.2 mg/dL 0.2   Glucose 70 - 99 mg/dL 88   WBC 4.0 - 11.0 10e3/uL 9.9   Hemoglobin 11.7 - 15.7 g/dL 13.0   Hematocrit 35.0 - 47.0 % 38.4   Platelet Count 150 - 450 10e3/uL 358   RBC Count 3.80 - 5.20 10e6/uL 4.57   MCV 78 - 100 fL 84   MCH 26.5 - 33.0 pg 28.4   MCHC 31.5 - 36.5 g/dL 33.9   RDW 10.0 - 15.0 % 17.6 (H)   % Neutrophils % 65   % Lymphocytes % 23   % Monocytes % 9   % Eosinophils % 2   % Basophils % 1   Absolute Basophils 0.0 - 0.2 10e3/uL 0.1   Absolute Eosinophils 0.0 - 0.7 10e3/uL 0.2   Absolute Immature Granulocytes <=0.4 10e3/uL 0.0   Absolute Lymphocytes 0.8 - 5.3  10e3/uL 2.3   Absolute Monocytes 0.0 - 1.3 10e3/uL 0.9   % Immature Granulocytes % 0   Absolute Neutrophils 1.6 - 8.3 10e3/uL 6.4   Absolute NRBCs 10e3/uL 0.0   NRBCs per 100 WBC <1 /100 0     Results reviewed, labs MET treatment parameters, ok to proceed with treatment.      Post Infusion Assessment:  Patient tolerated velcade injection to right abdomen without incident.   Patient tolerated darzalex faspro injection to left abdomen without incident.     Discharge Plan:   Discharge instructions reviewed with: Patient and Family.  Patient and/or family verbalized understanding of discharge instructions and all questions answered.  AVS to patient via [employer].  Patient will return 2/23 for next appointment.   Patient discharged in stable condition accompanied by: self and .  Departure Mode: Ambulatory.      Esthela Mercado RN

## 2024-02-16 NOTE — TELEPHONE ENCOUNTER
DATE:  2/16/2024   TIME OF RECEIPT FROM LAB:  1252  Incidental Finding on MR Tib Fib:    Patchy edema and enhancement within the proximal lateral gastrocnemius   correlating with recent PET/CT. Appearance is nonspecific however   favor subacute muscle strain with small aponeurotic tear. Recommend   follow-up contrast-enhanced left calf MRI in 3 months to ensure   improvement/resolution.   Provider Notified: Yes  Provider Name: Dian Read CNP  Date/Time lab value reported to provider: 2/16/23 at 1253  Mechanism of provider notification: Routed message to Care Team  Provider response: 1309. Acknowledged by Dian Read CNP

## 2024-02-16 NOTE — PATIENT INSTRUCTIONS
[facility] Triage and after hours / weekends / holidays:  [phone] option 5, option 2    Please call the triage or after hours line if you experience a temperature greater than or equal to 100.4, shaking chills, have uncontrolled nausea, vomiting and/or diarrhea, dizziness, shortness of breath, chest pain, bleeding, unexplained bruising, or if you have any other new/concerning symptoms, questions or concerns.      If you are having any concerning symptoms or wish to speak to a provider before your next infusion visit, please call triage to notify your care team so we can adequately serve you.     If you need a refill on a narcotic prescription or other medication, please call before your infusion appointment.

## 2024-02-18 ENCOUNTER — TELEPHONE (OUTPATIENT)
Dept: CARDIOLOGY | Facility: CLINIC | Age: 54
End: 2024-02-18
Payer: COMMERCIAL

## 2024-02-18 ENCOUNTER — NURSE TRIAGE (OUTPATIENT)
Dept: NURSING | Facility: CLINIC | Age: 54
End: 2024-02-18
Payer: COMMERCIAL

## 2024-02-18 DIAGNOSIS — R80.1 PERSISTENT PROTEINURIA: ICD-10-CM

## 2024-02-18 RX ORDER — FUROSEMIDE 20 MG
20 TABLET ORAL 2 TIMES DAILY
Qty: 60 TABLET | Refills: 1 | Status: SHIPPED | OUTPATIENT
Start: 2024-02-18 | End: 2024-02-29

## 2024-02-18 NOTE — TELEPHONE ENCOUNTER
Received a page regarding furosemide refill for this patient. She says that she run out of medication. Medication was refill furosemide 20 mg BID PO as indicated.    Jorge Reyes Castro, MD, MS  Cardiovascular disease fellow

## 2024-02-18 NOTE — TELEPHONE ENCOUNTER
Took furosemide which was once a day by nephrology. However cardiology increased it to two times a day. Supposed to take a second tablet today. Doesn't know who to call.  Cardiologist is Suha Armstrong MD. I connected with page  for them to page out cardiology to call her back directly.   Briana Nguyen RN  [facility]    Reason for Disposition   [1] Prescription refill request for ESSENTIAL medicine (i.e., likelihood of harm to patient if not taken) AND [2] triager unable to refill per department policy    Additional Information   Negative: New-onset or worsening symptoms, see that guideline (e.g., diarrhea, runny nose, sore throat)   Negative: Medicine question not related to refill or renewal   Negative: Caller (e.g., patient or pharmacist) requesting information about a new medicine   Negative: Caller requesting information unrelated to medicine    Protocols used: Medication Refill and Renewal Call-A-

## 2024-02-20 ENCOUNTER — TELEPHONE (OUTPATIENT)
Dept: TRANSPLANT | Facility: CLINIC | Age: 54
End: 2024-02-20
Payer: COMMERCIAL

## 2024-02-20 NOTE — TELEPHONE ENCOUNTER
STD forms received via Right[employer]ax from [facility].      Forms to be completed and put in folder for provider to approve.    Fax #:  [phone]  Claim: [id number]    Lorraine Huddleston

## 2024-02-21 NOTE — TELEPHONE ENCOUNTER
STD forms filled out and put in providers folder for review and signature.      Karlee Mayer CMA ([facility])

## 2024-02-22 NOTE — TELEPHONE ENCOUNTER
STD paperwork completed, checked for accuracy, signed and faxed to [employer] @ [phone]. A copy was made, sent to scanning and original mailed to patient at home address.    Successful transmission verified in [employer].      Nithya Huddleston

## 2024-02-23 ENCOUNTER — LAB (OUTPATIENT)
Dept: LAB | Facility: CLINIC | Age: 54
End: 2024-02-23
Attending: NURSE PRACTITIONER
Payer: COMMERCIAL

## 2024-02-23 ENCOUNTER — ONCOLOGY VISIT (OUTPATIENT)
Dept: ONCOLOGY | Facility: CLINIC | Age: 54
End: 2024-02-23
Attending: NURSE PRACTITIONER
Payer: COMMERCIAL

## 2024-02-23 VITALS
DIASTOLIC BLOOD PRESSURE: 73 MMHG | TEMPERATURE: 98.6 F | BODY MASS INDEX: 30.5 KG/M2 | SYSTOLIC BLOOD PRESSURE: 110 MMHG | RESPIRATION RATE: 16 BRPM | HEART RATE: 89 BPM | OXYGEN SATURATION: 96 % | WEIGHT: 183.3 LBS

## 2024-02-23 DIAGNOSIS — E85.81 AL AMYLOIDOSIS (H): ICD-10-CM

## 2024-02-23 DIAGNOSIS — E85.81 AL AMYLOIDOSIS (H): Primary | ICD-10-CM

## 2024-02-23 DIAGNOSIS — K90.0 CELIAC DISEASE: Primary | ICD-10-CM

## 2024-02-23 DIAGNOSIS — D80.1 HYPOGAMMAGLOBULINEMIA (H): Primary | ICD-10-CM

## 2024-02-23 LAB
ALBUMIN SERPL BCG-MCNC: 2.1 G/DL (ref 3.5–5.2)
ALP SERPL-CCNC: 121 U/L (ref 40–150)
ALT SERPL W P-5'-P-CCNC: 21 U/L (ref 0–50)
ANION GAP SERPL CALCULATED.3IONS-SCNC: 6 MMOL/L (ref 7–15)
AST SERPL W P-5'-P-CCNC: 20 U/L (ref 0–45)
BASOPHILS # BLD AUTO: 0.1 10E3/UL (ref 0–0.2)
BASOPHILS NFR BLD AUTO: 1 %
BILIRUB SERPL-MCNC: 0.2 MG/DL
BUN SERPL-MCNC: 19.9 MG/DL (ref 6–20)
CALCIUM SERPL-MCNC: 8.7 MG/DL (ref 8.6–10)
CHLORIDE SERPL-SCNC: 102 MMOL/L (ref 98–107)
CREAT SERPL-MCNC: 1.28 MG/DL (ref 0.51–0.95)
DEPRECATED HCO3 PLAS-SCNC: 30 MMOL/L (ref 22–29)
EGFRCR SERPLBLD CKD-EPI 2021: 50 ML/MIN/1.73M2
EOSINOPHIL # BLD AUTO: 0.2 10E3/UL (ref 0–0.7)
EOSINOPHIL NFR BLD AUTO: 2 %
ERYTHROCYTE [DISTWIDTH] IN BLOOD BY AUTOMATED COUNT: 18.4 % (ref 10–15)
GLUCOSE SERPL-MCNC: 92 MG/DL (ref 70–99)
HCT VFR BLD AUTO: 41 % (ref 35–47)
HGB BLD-MCNC: 13.5 G/DL (ref 11.7–15.7)
IMM GRANULOCYTES # BLD: 0.1 10E3/UL
IMM GRANULOCYTES NFR BLD: 1 %
LYMPHOCYTES # BLD AUTO: 1.9 10E3/UL (ref 0.8–5.3)
LYMPHOCYTES NFR BLD AUTO: 17 %
MCH RBC QN AUTO: 28.2 PG (ref 26.5–33)
MCHC RBC AUTO-ENTMCNC: 32.9 G/DL (ref 31.5–36.5)
MCV RBC AUTO: 86 FL (ref 78–100)
MONOCYTES # BLD AUTO: 0.8 10E3/UL (ref 0–1.3)
MONOCYTES NFR BLD AUTO: 7 %
NEUTROPHILS # BLD AUTO: 8 10E3/UL (ref 1.6–8.3)
NEUTROPHILS NFR BLD AUTO: 72 %
NRBC # BLD AUTO: 0 10E3/UL
NRBC BLD AUTO-RTO: 0 /100
PLATELET # BLD AUTO: 371 10E3/UL (ref 150–450)
POTASSIUM SERPL-SCNC: 3.8 MMOL/L (ref 3.4–5.3)
PROT SERPL-MCNC: 4.5 G/DL (ref 6.4–8.3)
RBC # BLD AUTO: 4.78 10E6/UL (ref 3.8–5.2)
SODIUM SERPL-SCNC: 138 MMOL/L (ref 135–145)
WBC # BLD AUTO: 10.9 10E3/UL (ref 4–11)

## 2024-02-23 PROCEDURE — 36415 COLL VENOUS BLD VENIPUNCTURE: CPT | Performed by: STUDENT IN AN ORGANIZED HEALTH CARE EDUCATION/TRAINING PROGRAM

## 2024-02-23 PROCEDURE — 99213 OFFICE O/P EST LOW 20 MIN: CPT | Mod: 25 | Performed by: NURSE PRACTITIONER

## 2024-02-23 PROCEDURE — 85048 AUTOMATED LEUKOCYTE COUNT: CPT | Performed by: STUDENT IN AN ORGANIZED HEALTH CARE EDUCATION/TRAINING PROGRAM

## 2024-02-23 PROCEDURE — 99215 OFFICE O/P EST HI 40 MIN: CPT | Performed by: NURSE PRACTITIONER

## 2024-02-23 PROCEDURE — 96401 CHEMO ANTI-NEOPL SQ/IM: CPT

## 2024-02-23 PROCEDURE — 80053 COMPREHEN METABOLIC PANEL: CPT | Performed by: STUDENT IN AN ORGANIZED HEALTH CARE EDUCATION/TRAINING PROGRAM

## 2024-02-23 PROCEDURE — 250N000012 HC RX MED GY IP 250 OP 636 PS 637: Performed by: NURSE PRACTITIONER

## 2024-02-23 PROCEDURE — 250N000011 HC RX IP 250 OP 636: Mod: JW | Performed by: STUDENT IN AN ORGANIZED HEALTH CARE EDUCATION/TRAINING PROGRAM

## 2024-02-23 RX ORDER — HEPARIN SODIUM,PORCINE 10 UNIT/ML
5-20 VIAL (ML) INTRAVENOUS DAILY PRN
Status: CANCELLED | OUTPATIENT
Start: 2024-03-22

## 2024-02-23 RX ORDER — METHYLPREDNISOLONE SODIUM SUCCINATE 125 MG/2ML
125 INJECTION, POWDER, LYOPHILIZED, FOR SOLUTION INTRAMUSCULAR; INTRAVENOUS
Status: CANCELLED
Start: 2024-04-05

## 2024-02-23 RX ORDER — LORAZEPAM 2 MG/ML
0.5 INJECTION INTRAMUSCULAR EVERY 4 HOURS PRN
Status: CANCELLED | OUTPATIENT
Start: 2024-04-12

## 2024-02-23 RX ORDER — DIPHENHYDRAMINE HCL 25 MG
50 CAPSULE ORAL
Status: CANCELLED | OUTPATIENT
Start: 2024-03-22

## 2024-02-23 RX ORDER — HEPARIN SODIUM (PORCINE) LOCK FLUSH IV SOLN 100 UNIT/ML 100 UNIT/ML
5 SOLUTION INTRAVENOUS
Status: CANCELLED | OUTPATIENT
Start: 2024-04-05

## 2024-02-23 RX ORDER — ALBUTEROL SULFATE 0.83 MG/ML
2.5 SOLUTION RESPIRATORY (INHALATION)
Status: CANCELLED | OUTPATIENT
Start: 2024-04-12

## 2024-02-23 RX ORDER — ALBUTEROL SULFATE 90 UG/1
1-2 AEROSOL, METERED RESPIRATORY (INHALATION)
Status: CANCELLED
Start: 2024-04-12

## 2024-02-23 RX ORDER — HEPARIN SODIUM,PORCINE 10 UNIT/ML
5-20 VIAL (ML) INTRAVENOUS DAILY PRN
Status: CANCELLED | OUTPATIENT
Start: 2024-04-05

## 2024-02-23 RX ORDER — DIPHENHYDRAMINE HYDROCHLORIDE 50 MG/ML
50 INJECTION INTRAMUSCULAR; INTRAVENOUS
Status: CANCELLED
Start: 2024-03-22

## 2024-02-23 RX ORDER — ALBUTEROL SULFATE 90 UG/1
1-2 AEROSOL, METERED RESPIRATORY (INHALATION)
Status: CANCELLED
Start: 2024-04-05

## 2024-02-23 RX ORDER — ALBUTEROL SULFATE 0.83 MG/ML
2.5 SOLUTION RESPIRATORY (INHALATION)
Status: CANCELLED | OUTPATIENT
Start: 2024-03-29

## 2024-02-23 RX ORDER — METHYLPREDNISOLONE SODIUM SUCCINATE 125 MG/2ML
125 INJECTION, POWDER, LYOPHILIZED, FOR SOLUTION INTRAMUSCULAR; INTRAVENOUS
Status: CANCELLED
Start: 2024-03-22

## 2024-02-23 RX ORDER — EPINEPHRINE 1 MG/ML
0.3 INJECTION, SOLUTION INTRAMUSCULAR; SUBCUTANEOUS EVERY 5 MIN PRN
Status: CANCELLED | OUTPATIENT
Start: 2024-03-22

## 2024-02-23 RX ORDER — LORAZEPAM 2 MG/ML
0.5 INJECTION INTRAMUSCULAR EVERY 4 HOURS PRN
Status: CANCELLED | OUTPATIENT
Start: 2024-04-05

## 2024-02-23 RX ORDER — DIPHENHYDRAMINE HYDROCHLORIDE 50 MG/ML
50 INJECTION INTRAMUSCULAR; INTRAVENOUS
Status: CANCELLED
Start: 2024-04-05

## 2024-02-23 RX ORDER — PROCHLORPERAZINE MALEATE 5 MG
5 TABLET ORAL EVERY 6 HOURS PRN
Qty: 30 TABLET | Refills: 1 | Status: SHIPPED | OUTPATIENT
Start: 2024-02-23 | End: 2024-05-06

## 2024-02-23 RX ORDER — ACETAMINOPHEN 325 MG/1
650 TABLET ORAL
Status: CANCELLED | OUTPATIENT
Start: 2024-04-05

## 2024-02-23 RX ORDER — EPINEPHRINE 1 MG/ML
0.3 INJECTION, SOLUTION INTRAMUSCULAR; SUBCUTANEOUS EVERY 5 MIN PRN
Status: CANCELLED | OUTPATIENT
Start: 2024-04-05

## 2024-02-23 RX ORDER — MEPERIDINE HYDROCHLORIDE 25 MG/ML
25 INJECTION INTRAMUSCULAR; INTRAVENOUS; SUBCUTANEOUS EVERY 30 MIN PRN
Status: CANCELLED | OUTPATIENT
Start: 2024-03-29

## 2024-02-23 RX ORDER — LORAZEPAM 2 MG/ML
0.5 INJECTION INTRAMUSCULAR EVERY 4 HOURS PRN
Status: CANCELLED | OUTPATIENT
Start: 2024-03-22

## 2024-02-23 RX ORDER — METHYLPREDNISOLONE SODIUM SUCCINATE 125 MG/2ML
125 INJECTION, POWDER, LYOPHILIZED, FOR SOLUTION INTRAMUSCULAR; INTRAVENOUS
Status: CANCELLED
Start: 2024-03-29

## 2024-02-23 RX ORDER — MEPERIDINE HYDROCHLORIDE 25 MG/ML
25 INJECTION INTRAMUSCULAR; INTRAVENOUS; SUBCUTANEOUS EVERY 30 MIN PRN
Status: CANCELLED | OUTPATIENT
Start: 2024-04-05

## 2024-02-23 RX ORDER — HEPARIN SODIUM (PORCINE) LOCK FLUSH IV SOLN 100 UNIT/ML 100 UNIT/ML
5 SOLUTION INTRAVENOUS
Status: CANCELLED | OUTPATIENT
Start: 2024-03-22

## 2024-02-23 RX ORDER — ACYCLOVIR 400 MG/1
400 TABLET ORAL 2 TIMES DAILY
Qty: 180 TABLET | Refills: 3 | Status: SHIPPED | OUTPATIENT
Start: 2024-02-23 | End: 2024-05-31

## 2024-02-23 RX ORDER — ALBUTEROL SULFATE 90 UG/1
1-2 AEROSOL, METERED RESPIRATORY (INHALATION)
Status: CANCELLED
Start: 2024-03-22

## 2024-02-23 RX ORDER — DIPHENHYDRAMINE HYDROCHLORIDE 50 MG/ML
50 INJECTION INTRAMUSCULAR; INTRAVENOUS
Status: CANCELLED
Start: 2024-04-12

## 2024-02-23 RX ORDER — ACETAMINOPHEN 325 MG/1
650 TABLET ORAL
Status: CANCELLED | OUTPATIENT
Start: 2024-03-22

## 2024-02-23 RX ORDER — LORAZEPAM 2 MG/ML
0.5 INJECTION INTRAMUSCULAR EVERY 4 HOURS PRN
Status: CANCELLED | OUTPATIENT
Start: 2024-03-29

## 2024-02-23 RX ORDER — ALBUTEROL SULFATE 90 UG/1
1-2 AEROSOL, METERED RESPIRATORY (INHALATION)
Status: CANCELLED
Start: 2024-03-29

## 2024-02-23 RX ORDER — ALBUTEROL SULFATE 0.83 MG/ML
2.5 SOLUTION RESPIRATORY (INHALATION)
Status: CANCELLED | OUTPATIENT
Start: 2024-03-22

## 2024-02-23 RX ORDER — DIPHENHYDRAMINE HCL 25 MG
50 CAPSULE ORAL
Status: CANCELLED | OUTPATIENT
Start: 2024-04-05

## 2024-02-23 RX ORDER — METHYLPREDNISOLONE SODIUM SUCCINATE 125 MG/2ML
125 INJECTION, POWDER, LYOPHILIZED, FOR SOLUTION INTRAMUSCULAR; INTRAVENOUS
Status: CANCELLED
Start: 2024-04-12

## 2024-02-23 RX ORDER — EPINEPHRINE 1 MG/ML
0.3 INJECTION, SOLUTION INTRAMUSCULAR; SUBCUTANEOUS EVERY 5 MIN PRN
Status: CANCELLED | OUTPATIENT
Start: 2024-04-12

## 2024-02-23 RX ORDER — MEPERIDINE HYDROCHLORIDE 25 MG/ML
25 INJECTION INTRAMUSCULAR; INTRAVENOUS; SUBCUTANEOUS EVERY 30 MIN PRN
Status: CANCELLED | OUTPATIENT
Start: 2024-03-22

## 2024-02-23 RX ORDER — ALBUTEROL SULFATE 0.83 MG/ML
2.5 SOLUTION RESPIRATORY (INHALATION)
Status: CANCELLED | OUTPATIENT
Start: 2024-04-05

## 2024-02-23 RX ORDER — DIPHENHYDRAMINE HYDROCHLORIDE 50 MG/ML
50 INJECTION INTRAMUSCULAR; INTRAVENOUS
Status: CANCELLED
Start: 2024-03-29

## 2024-02-23 RX ORDER — MEPERIDINE HYDROCHLORIDE 25 MG/ML
25 INJECTION INTRAMUSCULAR; INTRAVENOUS; SUBCUTANEOUS EVERY 30 MIN PRN
Status: CANCELLED | OUTPATIENT
Start: 2024-04-12

## 2024-02-23 RX ORDER — EPINEPHRINE 1 MG/ML
0.3 INJECTION, SOLUTION INTRAMUSCULAR; SUBCUTANEOUS EVERY 5 MIN PRN
Status: CANCELLED | OUTPATIENT
Start: 2024-03-29

## 2024-02-23 RX ADMIN — DEXAMETHASONE 30 MG: 2 TABLET ORAL at 15:27

## 2024-02-23 RX ADMIN — BORTEZOMIB 2.5 MG: 3.5 INJECTION, POWDER, LYOPHILIZED, FOR SOLUTION INTRAVENOUS; SUBCUTANEOUS at 15:44

## 2024-02-23 RX ADMIN — DARATUMUMAB AND HYALURONIDASE-FIHJ (HUMAN RECOMBINANT) 1800 MG: 1800; 30000 INJECTION SUBCUTANEOUS at 15:44

## 2024-02-23 ASSESSMENT — PAIN SCALES - GENERAL: PAINLEVEL: NO PAIN (0)

## 2024-02-23 NOTE — PROGRESS NOTES
Infusion Nursing Note:  Vivian Brown presents today for Cycle 2 Day 1 Darzalex Faspro and Velcade.    Patient seen by provider today: Yes: Dian Read CNP   present during visit today: Not Applicable.    Note: Patient presents to the infusion center today after her provider appt.    Patient took his PO Cytoxan while in infusion today and state she has enough for the month.    Per secure chat with Dian Read CNP/Swapna Guardado RN 2/23/24 1530  -after talking with ; dose reduce dex premed from 40mg to 30mg    Intravenous Access:  No Intravenous access at this visit.    Treatment Conditions:   Latest Reference Range & Units 02/23/24 13:17   Sodium 135 - 145 mmol/L 138   Potassium 3.4 - 5.3 mmol/L 3.8   Chloride 98 - 107 mmol/L 102   Carbon Dioxide (CO2) 22 - 29 mmol/L 30 (H)   Urea Nitrogen 6.0 - 20.0 mg/dL 19.9   Creatinine 0.51 - 0.95 mg/dL 1.28 (H)   GFR Estimate >60 mL/min/1.73m2 50 (L)   Calcium 8.6 - 10.0 mg/dL 8.7   Anion Gap 7 - 15 mmol/L 6 (L)   Albumin 3.5 - 5.2 g/dL 2.1 (L)   Protein Total 6.4 - 8.3 g/dL 4.5 (L)   Alkaline Phosphatase 40 - 150 U/L 121   ALT 0 - 50 U/L 21   AST 0 - 45 U/L 20   Bilirubin Total <=1.2 mg/dL 0.2   Glucose 70 - 99 mg/dL 92   WBC 4.0 - 11.0 10e3/uL 10.9   Hemoglobin 11.7 - 15.7 g/dL 13.5   Hematocrit 35.0 - 47.0 % 41.0   Platelet Count 150 - 450 10e3/uL 371   RBC Count 3.80 - 5.20 10e6/uL 4.78   MCV 78 - 100 fL 86   MCH 26.5 - 33.0 pg 28.2   MCHC 31.5 - 36.5 g/dL 32.9   RDW 10.0 - 15.0 % 18.4 (H)   % Neutrophils % 72   % Lymphocytes % 17   % Monocytes % 7   % Eosinophils % 2   % Basophils % 1   Absolute Basophils 0.0 - 0.2 10e3/uL 0.1   Absolute Eosinophils 0.0 - 0.7 10e3/uL 0.2   Absolute Immature Granulocytes <=0.4 10e3/uL 0.1   Absolute Lymphocytes 0.8 - 5.3 10e3/uL 1.9   Absolute Monocytes 0.0 - 1.3 10e3/uL 0.8   % Immature Granulocytes % 1   Absolute Neutrophils 1.6 - 8.3 10e3/uL 8.0   Absolute NRBCs 10e3/uL 0.0   NRBCs per 100 WBC <1 /100 0      Results reviewed, labs MET treatment parameters, ok to proceed with treatment.    Post Infusion Assessment:  Patient tolerated carmen injection in right lower abd without incident.  Patient tolerated velcade injection left lower abd without incident.  Site patent and intact, free from redness, edema or discomfort.     Discharge Plan:   Patient declined prescription refills.  Discharge instructions reviewed with: Patient and Family.  Patient and/or family verbalized understanding of discharge instructions and all questions answered.  AVS to patient via [employer].  Patient will return 3/1 for next appointment.   Patient discharged in stable condition accompanied by: .  Departure Mode: Ambulatory.      Swapna Guardado RN

## 2024-02-23 NOTE — PROGRESS NOTES
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 kidney biopsy (needle):     Amyloidosis of the kidney, AL-type, lambda light chain-restricted, affecting the glomeruli, interstitium, and arterioles (see comment)     Mild chronic changes of the parenchyma, including:   - focal global glomerulosclerosis (3% of glomeruli)   - focal tubular atrophy and interstitial fibrosis (5% of the cortex)   - severe arterial sclerosis   Electronically signed by Joe Garcia MD on 12/23/2023 at  3:21 PM   Comment      The biopsy reveals findings diagnostic of amyloidosis; the amyloid shows lambda light chain-restriction (AL amyloidosis), and the deposits are Congo red positive. The amyloid deposits affect the glomeruli extensively, and interstitium and arterioles focally. Other potential complications of paraproteins in the kidney are not seen, in particular, there is no evidence of light chain cast nephropathy, light chain deposition disease, or light chain tubulopathy.      1/8/24: NT-Pro , Troponin T 15    1/25/24 PET                                                                  Single focal moderate to markedly avid intramuscular uptake, proximal left gastrocnemius, no etiology demonstrated on un enhanced non diagnostic CT.    A few equivocal findings - unlikely to be active amyloid  Mildly hypermetabolic left lower lobe anterior segment peribronchial nodularity and hypermetabolism. Differential favors RSV positive 1/18/24), focal amyloid unlikely.   Cardiac (MRI positive) - equivocal FDG throughout left ventricle. Given findings on MRI 1/16/2024, can not rule out  active amyloidosis. However in the absence of a cardiac dietary prep (which switches myocardium from glucose to fatty metabolism), this is likely normal physiology.  Kidney cortex (bx. Proven) - likely normal, with the knowledge that the patient's kidneys are biopsy-positive for amyloid, could do future studies with a lasix protocol to dilute urinary FDG, may allow for identifying renal uptake.     Date Treatment Name Response Side Effects / Toxicities    1/26/24  Darzalex Faspro + (CyBorD)               Interval History   Mary Ann presents today to commence cycle 2  Nausea takes ondansetron. Constipation  Tolerating dairy  Swelling worse by week 2  Started jardiance and aldactone as well as lasix 2 x day  1/20/24 Duodenal biopsy consistent with gluten sensitivity/celiac disease, Marsh 3C  She is feeling better from a URI perspective.Believes IVIG helped  Works as a PT. Endurance and strength have decreased due to fatigue and overall health.  Left leg MRI 2/16/24 Impression:  Patchy edema and enhancement within the proximal lateral gastrocnemius  correlating with recent PET/CT. Appearance is nonspecific however, favor subacute muscle strain with small aponeurotic tear. Recommend follow-up contrast-enhanced left calf MRI in 3 months to ensure improvement/resolution.    Past Medical History     Past Medical History:   Diagnosis Date    Celiac disease     Family history of colon cancer     Colonoscoy q5 years next 9/2020       Past Surgical History:      Past Surgical History:   Procedure Laterality Date    COLONOSCOPY N/A 9/28/2015    Procedure: COLONOSCOPY;  Surgeon: Eugenio Travis MD;  Location:  GI    ESOPHAGOSCOPY, GASTROSCOPY, DUODENOSCOPY (EGD), COMBINED N/A 1/30/2024    Procedure: ESOPHAGOGASTRODUODENOSCOPY, WITH BIOPSY;  Surgeon: Melo Cloud MD;  Location:  GI    IR RENAL BIOPSY RIGHT  12/21/2023    TONSILLECTOMY         Social History     Socioeconomic History    Marital status:       Spouse name: None    Number of children: None    Years of education: None    Highest education level: None   Occupational History     Comment: Physical Therapy   Substance and Sexual Activity    Alcohol use: Not Currently     Alcohol/week: 0.0 standard drinks of alcohol     Comment: 1-2 times/month    Drug use: No    Sexual activity: Yes     Partners: Male     Birth control/protection: I.U.D.       Allergies:     Allergies   Allergen Reactions    Gluten Meal     Latex Itching    Seasonal Allergies        Medications:     Current Outpatient Medications   Medication    acyclovir (ZOVIRAX) 400 MG tablet    albuterol (PROAIR HFA/PROVENTIL HFA/VENTOLIN HFA) 108 (90 Base) MCG/ACT inhaler    benzonatate (TESSALON) 200 MG capsule    cholecalciferol (VITAMIN D3) 125 mcg (5000 units) capsule    cycloPHOSphamide (CYTOXAN) 50 MG capsule    cycloPHOSphamide (CYTOXAN) 50 MG capsule    empagliflozin (JARDIANCE) 10 MG TABS tablet    furosemide (LASIX) 20 MG tablet    levothyroxine (SYNTHROID/LEVOTHROID) 200 MCG tablet    levothyroxine (SYNTHROID/LEVOTHROID) 25 MCG tablet    liothyronine (CYTOMEL) 5 MCG tablet    lisinopril (ZESTRIL) 2.5 MG tablet    ondansetron (ZOFRAN) 8 MG tablet    rosuvastatin (CRESTOR) 10 MG tablet    spironolactone (ALDACTONE) 25 MG tablet     No current facility-administered medications for this visit.       Physical Exam     There were no vitals taken for this visit.    Wt Readings from Last 5 Encounters:   02/16/24 84.7 kg (186 lb 11.2 oz)   02/09/24 86.3 kg (190 lb 3.2 oz)   02/03/24 86.2 kg (190 lb)   02/02/24 84.5 kg (186 lb 3.2 oz)   01/26/24 81.3 kg (179 lb 3.2 oz)       Constitutional: Appears stated age, well-groomed.  Accompanied by her  In no apparent distress.   HEENT: Normocephilic. EOMI, PERRL. Sclerae are anicteric. Oral mucosa is pink and moist with no lesions or thrush; gums normal and good dentition.   Respiratory: Bilateral lobe wheezing with inspiration and expiration. No increased  work of breathing, good air exchange.  Cardiovascular: Normal apical impulse, regular rate and rhythm, normal S1 and S2, and no murmur noted.  GI: +BS. Soft. No tenderness on palpation.  Lymph/Hematologic: No cervical lymphadenopathy and no supraclavicular lymphadenopathy.  Skin: No bruising or bleeding, normal skin color, texture, turgor, no redness, warmth, or swelling, no rashes, no lesions, no jaundice.  Extremities: 2+ bilateral pitting lower extremity edema noted bilaterally.There is no redness, warmth, or swelling of the joints.  Neurologic: Awake, alert, oriented to name, place and time.    Vascular access: Peripheral IV in place.    Data     Most Recent 3 CBC's:  Recent Labs   Lab Test 02/16/24  1403 02/09/24  0908 02/06/24  0902   WBC 9.9 8.5 9.8   HGB 13.0 12.6 12.7   MCV 84 85 86    414 434   ANEUTAUTO 6.4 5.1 6.6     Most Recent 3 BMP's:  Recent Labs   Lab Test 02/16/24  1403 02/09/24  0908 02/06/24  0902    139 139   POTASSIUM 4.0 4.0 3.5   CHLORIDE 104 107 104   CO2 29 26 31*   BUN 18.3 18.6 18.6   CR 1.20* 1.10* 1.20*   ANIONGAP 4* 6* 4*   TANNER 8.5* 8.1* 8.2*   GLC 88 105* 82   PROTTOTAL 4.5* 4.4* 4.6*   ALBUMIN 2.1* 2.2* 2.0*    Most Recent 3 LFT's:  Recent Labs   Lab Test 02/16/24  1403 02/09/24  0908 02/06/24  0902   AST 17 22 22   ALT 21 26 29   ALKPHOS 129 136 161*   BILITOTAL 0.2 0.3 0.2    Most Recent 2 TSH and T4:  Recent Labs   Lab Test 11/22/23  0819 11/16/23  1447 10/04/23  0823   TSH 1.01 0.57 3.44   T4 1.23  --  1.19     I reviewed the above labs today.    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 Her NT-proBNP was in the 500s, troponin was 15, and kappa-lambda  difference was not markedly elevated, thereby reassuring and indicating stage 1.   Mainstay treatment for AL cardiac amyloidosis is still chemotherapy. It is not uncommon for patients with cardiac amyloidosis to not tolerate ACEi/ARB/ARNI. H1/26/24: Would benefit from SGLT2i, but after discussion, she would like to wait until after her first cycle of induction chemotherapy.  Follow up with Dr. Armstrong (cardiology in 5 months)    Renal involvement (per nephrology)  Nephrotic Syndrome  Stage 0 or 1 based on Hall 2012 classification.   Lasix 20 mg daily and lisinopril 2.5 mg daily.   Monitor BP closely and if <90/60 persistently or symptomatic may stop.    Hypogammaglobulinemia  1/18/24 IgG 294 and tested + RSV  Low IgG may be due to resent infection.  Will recheck IgG to evaluate level due to recent infection.  If still low, consider IVIG.    Hashimoto's thyroiditis  Diagnosed August 2023, TPO ab is positive   Levothyroxine    Plan from today's clinical encounter  Proceed with cycle 2 day 1 Zenia + CyBorD  Reduce dose of dexamethasone to 30 mg po weekly due to edema  Check K/L light chains to assess response to therapy  Compazine for nausea as Zofran not effective and contributing to constipation    52  minutes spent on the date of the encounter doing chart review, review of outside records, review of test results, interpretation of tests, patient visit, documentation, discussion with other provider(s), and discussion with family     Dian ALCANTARA, ANP-BC, AOCNP  [facility]  [address] [place] [phone] (pager)

## 2024-02-23 NOTE — NURSING NOTE
"Oncology Rooming Note    February 23, 2024 1:34 PM   Vivian Brown is a 54 year old female who presents for:    Chief Complaint   Patient presents with    Blood Draw     Labs drawn via  by RN in lab. VS taken.     Oncology Clinic Visit     AL amyloidosis     Initial Vitals: /73 (BP Location: Left arm, Patient Position: Sitting, Cuff Size: Adult Regular)   Pulse 89   Temp 98.6  F (37  C) (Oral)   Resp 16   Wt 83.1 kg (183 lb 4.8 oz)   SpO2 96%   BMI 30.50 kg/m   Estimated body mass index is 30.5 kg/m  as calculated from the following:    Height as of 1/26/24: 1.651 m (5' 5\").    Weight as of this encounter: 83.1 kg (183 lb 4.8 oz). Body surface area is 1.95 meters squared.  No Pain (0) Comment: Data Unavailable   No LMP recorded. (Menstrual status: IUD).  Allergies reviewed: Yes  Medications reviewed: Yes    Medications: MEDICATION REFILLS NEEDED TODAY. Provider was notified.  Pharmacy name entered into [facility]:    [facility] - [place], [place] - [address] [facility] [place], [place] - [address] [id number] - [place] - [address] [id number] - [place], [place] - [address]  [facility] - [place], [place] - [address]  [facility] [place], [place] - [address]  [facility] - [place], [place] - [address]  [facility]  [facility] - [place], [place] - [address]    Frailty Screening:   Is the patient here for a new oncology consult visit in cancer care? 2. No      Clinical concerns: None       Joyce De Jesus CMA            "

## 2024-02-23 NOTE — PATIENT INSTRUCTIONS
[employer] Triage and after hours / weekends / holidays:  [phone]    Please call the [employer] Triage line if you experience a temperature greater than or equal to 100.4, shaking chills, have uncontrolled nausea, vomiting and/or diarrhea, dizziness, shortness of breath, chest pain, bleeding, unexplained bruising, or if you have any other new/concerning symptoms, questions or concerns.     If you wish to speak to a provider before your next infusion visit, please call your care coordinator to notify them so we can adequately serve you.    If you need a refill on a narcotic prescription or other medication, please call before your infusion appointment.      February 2024 Sunday Monday Tuesday Wednesday Thursday Friday Saturday                       1     2    LAB PERIPHERAL   1:30 PM   (15 min.)    [facility] DRAW   [facility]    ONC INFUSION 2 HR (120 MIN)   2:00 PM   (120 min.)   [facility] ONC INFUSION NURSE   [facility] 3    Admission   9:14 PM   Alvarez Nassar MD   [facility] Emergency Dept   (Discharge: 2/3/2024)   4     5     6    LAB   9:00 AM   (15 min.)   PH LAB   [facility] Laboratory 7     8     9    LAB PERIPHERAL   9:00 AM   (15 min.)    [facility] DRAW   [facility]    ONC INFUSION 2 HR (120 MIN)   9:30 AM   (120 min.)   [facility] ONC INFUSION NURSE   [facility] 10       11     12     13     14     15     16    MR TIBIA FIBULA LEFT WWO   8:15 AM   (45 min.)   PHMR1   [facility]    LAB PERIPHERAL   1:30 PM   (15 min.)    [facility] LAB DRAW   [facility]    ONC INFUSION 2 HR (120 MIN)   2:00 PM   (120 min.)   [facility] ONC INFUSION NURSE   [facility] 17       18  Happy Birthday!     19     20     21     22     23    LAB PERIPHERAL  12:45 PM   (15 min.)    [employer] [facility]  DRAW   [facility]    RETURN CCSL   1:15 PM   (45 min.)   Dian Read APRN CNP   [facility]    ONC INFUSION 2 HR (120 MIN)   2:30 PM   (120 min.)    ONC INFUSION NURSE   [facility] 24       25     26     27     28     29 March 2024 Sunday Monday Tuesday Wednesday Thursday Friday Saturday                            1    LAB PERIPHERAL   2:00 PM   (15 min.)   [facility] DRAW   [facility]    ONC INFUSION 2 HR (120 MIN)   2:30 PM   (120 min.)   [facility] ONC INFUSION NURSE   [facility] 2       3     4     5     6     7     8    LAB PERIPHERAL   2:00 PM   (15 min.)   [facility] DRAW   [facility]    ONC INFUSION 2 HR (120 MIN)   2:30 PM   (120 min.)   UC ONC INFUSION NURSE   [facility] 9       10     11     12     13     14     15    LAB PERIPHERAL   8:00 AM   (15 min.)    [facility] DRAW   [facility]    ONC INFUSION 2 HR (120 MIN)   8:30 AM   (120 min.)    ONC INFUSION NURSE   [facility]    BMT NEW   1:00 PM   (60 min.)   Jomar Chandler MD   [facility]    BMT NURSE COORD   2:00 PM   (30 min.)   Ana Gonzalez RN   [facility]    BMT SOCIAL WORK WITH ROOM   2:15 PM   (90 min.)   Nithya Aguilar LICSW   [facility]    LAB PERIPHERAL   4:00 PM   (15 min.)   [facility] DRAW   [facility] 16       17     18    LAB  10:30 AM   (15 min.)   UC LAB   [facility] [place]    RETURN NEPHROLOGY  11:15 AM   (30 min.)   Tracie Dobson MD   [facility] [place] 19     20     21      22     23       24     25     26     27     28     29     30       31                                                  Recent Results (from the past 24 hour(s))   Comprehensive metabolic panel    Collection Time: 02/23/24  1:17 PM   Result Value Ref Range    Sodium 138 135 - 145 mmol/L    Potassium 3.8 3.4 - 5.3 mmol/L    Carbon Dioxide (CO2) 30 (H) 22 - 29 mmol/L    Anion Gap 6 (L) 7 - 15 mmol/L    Urea Nitrogen 19.9 6.0 - 20.0 mg/dL    Creatinine 1.28 (H) 0.51 - 0.95 mg/dL    GFR Estimate 50 (L) >60 mL/min/1.73m2    Calcium 8.7 8.6 - 10.0 mg/dL    Chloride 102 98 - 107 mmol/L    Glucose 92 70 - 99 mg/dL    Alkaline Phosphatase 121 40 - 150 U/L    AST 20 0 - 45 U/L    ALT 21 0 - 50 U/L    Protein Total 4.5 (L) 6.4 - 8.3 g/dL    Albumin 2.1 (L) 3.5 - 5.2 g/dL    Bilirubin Total 0.2 <=1.2 mg/dL   CBC with platelets and differential    Collection Time: 02/23/24  1:17 PM   Result Value Ref Range    WBC Count 10.9 4.0 - 11.0 10e3/uL    RBC Count 4.78 3.80 - 5.20 10e6/uL    Hemoglobin 13.5 11.7 - 15.7 g/dL    Hematocrit 41.0 35.0 - 47.0 %    MCV 86 78 - 100 fL    MCH 28.2 26.5 - 33.0 pg    MCHC 32.9 31.5 - 36.5 g/dL    RDW 18.4 (H) 10.0 - 15.0 %    Platelet Count 371 150 - 450 10e3/uL    % Neutrophils 72 %    % Lymphocytes 17 %    % Monocytes 7 %    % Eosinophils 2 %    % Basophils 1 %    % Immature Granulocytes 1 %    NRBCs per 100 WBC 0 <1 /100    Absolute Neutrophils 8.0 1.6 - 8.3 10e3/uL    Absolute Lymphocytes 1.9 0.8 - 5.3 10e3/uL    Absolute Monocytes 0.8 0.0 - 1.3 10e3/uL    Absolute Eosinophils 0.2 0.0 - 0.7 10e3/uL    Absolute Basophils 0.1 0.0 - 0.2 10e3/uL    Absolute Immature Granulocytes 0.1 <=0.4 10e3/uL    Absolute NRBCs 0.0 10e3/uL

## 2024-02-23 NOTE — NURSING NOTE
Chief Complaint   Patient presents with    Blood Draw     Labs drawn via  by RN in lab. VS taken.      Labs collected from venipuncture by RN. Vitals taken. Checked in for appointment(s).    Teresa Linn RN

## 2024-02-23 NOTE — LETTER
2/23/2024         RE: Vivina Brown  [address] [facility] [address]        Dear Colleague,    Thank you for referring your patient, Vivian Brown, to the [facility]. Please see a copy of my visit note below.            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 kidney biopsy (needle):     Amyloidosis of the kidney, AL-type, lambda light chain-restricted, affecting the glomeruli, interstitium, and arterioles (see comment)     Mild chronic changes of the parenchyma, including:   - focal global glomerulosclerosis (3% of glomeruli)   - focal tubular atrophy and interstitial fibrosis (5% of the cortex)   - severe arterial sclerosis   Electronically signed by Joe Garcia MD on 12/23/2023 at  3:21 PM   Comment      The biopsy reveals findings diagnostic of amyloidosis; the amyloid shows lambda light chain-restriction (AL amyloidosis), and the deposits are Congo red positive. The amyloid deposits affect the glomeruli extensively, and interstitium and arterioles focally. Other potential complications of paraproteins in the kidney are not seen, in particular, there is no evidence of light chain cast nephropathy, light chain deposition disease, or light chain tubulopathy.      1/8/24: NT-Pro , Troponin T 15    1/25/24 PET                                                                  Single focal moderate to markedly avid intramuscular uptake, proximal left gastrocnemius, no etiology demonstrated on un enhanced non diagnostic CT.    A few equivocal findings - unlikely to be active amyloid  Mildly hypermetabolic  left lower lobe anterior segment peribronchial nodularity and hypermetabolism. Differential favors RSV positive 1/18/24), focal amyloid unlikely.   Cardiac (MRI positive) - equivocal FDG throughout left ventricle. Given findings on MRI 1/16/2024, can not rule out active amyloidosis. However in the absence of a cardiac dietary prep (which switches myocardium from glucose to fatty metabolism), this is likely normal physiology.  Kidney cortex (bx. Proven) - likely normal, with the knowledge that the patient's kidneys are biopsy-positive for amyloid, could do future studies with a lasix protocol to dilute urinary FDG, may allow for identifying renal uptake.     Date Treatment Name Response Side Effects / Toxicities    1/26/24  Darzalex Faspro + (CyBorD)               Interval History   Mary Ann presents today to commence cycle 2  Nausea takes ondansetron. Constipation  Tolerating dairy  Swelling worse by week 2  Started jardiance and aldactone as well as lasix 2 x day  1/20/24 Duodenal biopsy consistent with gluten sensitivity/celiac disease, [place] 3C  She is feeling better from a URI perspective.Believes IVIG helped  Works as a PT. Endurance and strength have decreased due to fatigue and overall health.  Left leg MRI 2/16/24 Impression:  Patchy edema and enhancement within the proximal lateral gastrocnemius  correlating with recent PET/CT. Appearance is nonspecific however, favor subacute muscle strain with small aponeurotic tear. Recommend follow-up contrast-enhanced left calf MRI in 3 months to ensure improvement/resolution.    Past Medical History     Past Medical History:   Diagnosis Date    Celiac disease     Family history of colon cancer     Colonoscoy q5 years next 9/2020       Past Surgical History:      Past Surgical History:   Procedure Laterality Date    COLONOSCOPY N/A 9/28/2015    Procedure: COLONOSCOPY;  Surgeon: Eugenio Travis MD;  Location:  GI    ESOPHAGOSCOPY, GASTROSCOPY, DUODENOSCOPY (EGD),  COMBINED N/A 1/30/2024    Procedure: ESOPHAGOGASTRODUODENOSCOPY, WITH BIOPSY;  Surgeon: Melo Cloud MD;  Location: [facility] GI    IR RENAL BIOPSY RIGHT  12/21/2023    TONSILLECTOMY         Social History     Socioeconomic History    Marital status:      Spouse name: None    Number of children: None    Years of education: None    Highest education level: None   Occupational History     Comment: Physical Therapy   Substance and Sexual Activity    Alcohol use: Not Currently     Alcohol/week: 0.0 standard drinks of alcohol     Comment: 1-2 times/month    Drug use: No    Sexual activity: Yes     Partners: Male     Birth control/protection: I.U.D.       Allergies:     Allergies   Allergen Reactions    Gluten Meal     Latex Itching    Seasonal Allergies        Medications:     Current Outpatient Medications   Medication    acyclovir (ZOVIRAX) 400 MG tablet    albuterol (PROAIR HFA/PROVENTIL HFA/VENTOLIN HFA) 108 (90 Base) MCG/ACT inhaler    benzonatate (TESSALON) 200 MG capsule    cholecalciferol (VITAMIN D3) 125 mcg (5000 units) capsule    cycloPHOSphamide (CYTOXAN) 50 MG capsule    cycloPHOSphamide (CYTOXAN) 50 MG capsule    empagliflozin (JARDIANCE) 10 MG TABS tablet    furosemide (LASIX) 20 MG tablet    levothyroxine (SYNTHROID/LEVOTHROID) 200 MCG tablet    levothyroxine (SYNTHROID/LEVOTHROID) 25 MCG tablet    liothyronine (CYTOMEL) 5 MCG tablet    lisinopril (ZESTRIL) 2.5 MG tablet    ondansetron (ZOFRAN) 8 MG tablet    rosuvastatin (CRESTOR) 10 MG tablet    spironolactone (ALDACTONE) 25 MG tablet     No current facility-administered medications for this visit.       Physical Exam     There were no vitals taken for this visit.    Wt Readings from Last 5 Encounters:   02/16/24 84.7 kg (186 lb 11.2 oz)   02/09/24 86.3 kg (190 lb 3.2 oz)   02/03/24 86.2 kg (190 lb)   02/02/24 84.5 kg (186 lb 3.2 oz)   01/26/24 81.3 kg (179 lb 3.2 oz)       Constitutional: Appears stated age, well-groomed.  Accompanied by her   In no apparent distress.   HEENT: Normocephilic. EOMI, PERRL. Sclerae are anicteric. Oral mucosa is pink and moist with no lesions or thrush; gums normal and good dentition.   Respiratory: Bilateral lobe wheezing with inspiration and expiration. No increased work of breathing, good air exchange.  Cardiovascular: Normal apical impulse, regular rate and rhythm, normal S1 and S2, and no murmur noted.  GI: +BS. Soft. No tenderness on palpation.  Lymph/Hematologic: No cervical lymphadenopathy and no supraclavicular lymphadenopathy.  Skin: No bruising or bleeding, normal skin color, texture, turgor, no redness, warmth, or swelling, no rashes, no lesions, no jaundice.  Extremities: 2+ bilateral pitting lower extremity edema noted bilaterally.There is no redness, warmth, or swelling of the joints.  Neurologic: Awake, alert, oriented to name, place and time.    Vascular access: Peripheral IV in place.    Data     Most Recent 3 CBC's:  Recent Labs   Lab Test 02/16/24  1403 02/09/24  0908 02/06/24  0902   WBC 9.9 8.5 9.8   HGB 13.0 12.6 12.7   MCV 84 85 86    414 434   ANEUTAUTO 6.4 5.1 6.6     Most Recent 3 BMP's:  Recent Labs   Lab Test 02/16/24  1403 02/09/24  0908 02/06/24  0902    139 139   POTASSIUM 4.0 4.0 3.5   CHLORIDE 104 107 104   CO2 29 26 31*   BUN 18.3 18.6 18.6   CR 1.20* 1.10* 1.20*   ANIONGAP 4* 6* 4*   TANNER 8.5* 8.1* 8.2*   GLC 88 105* 82   PROTTOTAL 4.5* 4.4* 4.6*   ALBUMIN 2.1* 2.2* 2.0*    Most Recent 3 LFT's:  Recent Labs   Lab Test 02/16/24  1403 02/09/24  0908 02/06/24  0902   AST 17 22 22   ALT 21 26 29   ALKPHOS 129 136 161*   BILITOTAL 0.2 0.3 0.2    Most Recent 2 TSH and T4:  Recent Labs   Lab Test 11/22/23  0819 11/16/23  1447 10/04/23  0823   TSH 1.01 0.57 3.44   T4 1.23  --  1.19     I reviewed the above labs today.    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 Her NT-proBNP was in the 500s, troponin was 15, and kappa-lambda difference was not markedly elevated, thereby reassuring and indicating stage 1.   Mainstay treatment for AL cardiac amyloidosis is still chemotherapy. It is not uncommon for patients with cardiac amyloidosis to not tolerate ACEi/ARB/ARNI. H1/26/24: Would benefit from SGLT2i, but after discussion, she would like to wait until after her first cycle of induction chemotherapy.  Follow up with Dr. Armstrong (cardiology in 5 months)    Renal involvement (per nephrology)  Nephrotic Syndrome  Stage 0 or 1 based on Hall 2012 classification.   Lasix 20 mg daily and lisinopril 2.5 mg daily.   Monitor BP closely and if <90/60 persistently or symptomatic may stop.    Hypogammaglobulinemia  1/18/24 IgG 294 and tested + RSV  Low IgG may be due to resent infection.  Will recheck IgG to evaluate level due to recent infection.  If still low, consider IVIG.    Hashimoto's thyroiditis  Diagnosed August 2023, TPO ab is positive   Levothyroxine    Plan from today's clinical encounter  Proceed with cycle 2 day 1 Zenia + CyBorD  Reduce dose of dexamethasone to 30 mg po weekly due to edema  Check K/L light chains to assess response to therapy  Compazine for nausea as Zofran not effective and contributing to constipation    52  minutes spent on the date of the encounter doing chart review, review of outside records, review of test results, interpretation of tests, patient visit, documentation, discussion with other provider(s), and discussion with family     Dian Jacek ALCANTARA, ANP-BC, AOCNP  [facility]  [address] [place] [id number] [phone] (pager)

## 2024-02-24 PROCEDURE — 83521 IG LIGHT CHAINS FREE EACH: CPT | Performed by: NURSE PRACTITIONER

## 2024-02-24 PROCEDURE — 36415 COLL VENOUS BLD VENIPUNCTURE: CPT

## 2024-02-26 LAB
KAPPA LC FREE SER-MCNC: 1.13 MG/DL (ref 0.33–1.94)
KAPPA LC FREE/LAMBDA FREE SER NEPH: 0.58 {RATIO} (ref 0.26–1.65)
LAMBDA LC FREE SERPL-MCNC: 1.94 MG/DL (ref 0.57–2.63)

## 2024-02-27 DIAGNOSIS — E85.81 AL AMYLOIDOSIS (H): Primary | ICD-10-CM

## 2024-02-27 RX ORDER — DEXAMETHASONE 4 MG/1
30 TABLET ORAL ONCE
Status: CANCELLED | OUTPATIENT
Start: 2024-03-15

## 2024-02-27 RX ORDER — DEXAMETHASONE 4 MG/1
30 TABLET ORAL ONCE
Status: CANCELLED | OUTPATIENT
Start: 2024-03-08

## 2024-02-27 RX ORDER — DEXAMETHASONE 4 MG/1
30 TABLET ORAL ONCE
Status: CANCELLED | OUTPATIENT
Start: 2024-03-01

## 2024-02-29 DIAGNOSIS — R80.1 PERSISTENT PROTEINURIA: ICD-10-CM

## 2024-02-29 RX ORDER — SPIRONOLACTONE 25 MG/1
25 TABLET ORAL DAILY
Qty: 90 TABLET | Refills: 3 | Status: ON HOLD | OUTPATIENT
Start: 2024-02-29 | End: 2024-06-30

## 2024-02-29 RX ORDER — FUROSEMIDE 20 MG
20 TABLET ORAL 2 TIMES DAILY
Qty: 180 TABLET | Refills: 3 | Status: SHIPPED | OUTPATIENT
Start: 2024-02-29 | End: 2024-03-05

## 2024-02-29 NOTE — TELEPHONE ENCOUNTER
Date: 2/29/2024    Time of Call: 10:14 AM     Diagnosis:  Amyloid HF      [ VORB ] Ordering provider: Dr. Armstrong    Order:   -Increase Spironolactone to 25 mg daily.   -BMP in 2 weeks.      Order received by: Loida Germain RN       Follow-up/additional notes: Communicated medication to pt via [employer] message. Pt has existing lab appt on 3/15 that will add BMP to.     Loida Germain RN

## 2024-02-29 NOTE — TELEPHONE ENCOUNTER
[employer] message sent to pt to Follow-up on how she is feeling since starting Spironolactone.     Loida Germain RN

## 2024-03-01 ENCOUNTER — INFUSION THERAPY VISIT (OUTPATIENT)
Dept: ONCOLOGY | Facility: CLINIC | Age: 54
End: 2024-03-01
Attending: NURSE PRACTITIONER
Payer: COMMERCIAL

## 2024-03-01 ENCOUNTER — APPOINTMENT (OUTPATIENT)
Dept: LAB | Facility: CLINIC | Age: 54
End: 2024-03-01
Attending: NURSE PRACTITIONER
Payer: COMMERCIAL

## 2024-03-01 VITALS
WEIGHT: 184.6 LBS | TEMPERATURE: 98.2 F | RESPIRATION RATE: 18 BRPM | SYSTOLIC BLOOD PRESSURE: 116 MMHG | HEART RATE: 97 BPM | BODY MASS INDEX: 30.72 KG/M2 | OXYGEN SATURATION: 96 % | DIASTOLIC BLOOD PRESSURE: 72 MMHG

## 2024-03-01 DIAGNOSIS — E85.81 AL AMYLOIDOSIS (H): Primary | ICD-10-CM

## 2024-03-01 LAB
ALBUMIN SERPL BCG-MCNC: 2.1 G/DL (ref 3.5–5.2)
ALP SERPL-CCNC: 117 U/L (ref 40–150)
ALT SERPL W P-5'-P-CCNC: 23 U/L (ref 0–50)
ANION GAP SERPL CALCULATED.3IONS-SCNC: 7 MMOL/L (ref 7–15)
AST SERPL W P-5'-P-CCNC: 22 U/L (ref 0–45)
BASOPHILS # BLD AUTO: 0.1 10E3/UL (ref 0–0.2)
BASOPHILS NFR BLD AUTO: 1 %
BILIRUB SERPL-MCNC: 0.2 MG/DL
BUN SERPL-MCNC: 19.2 MG/DL (ref 6–20)
CALCIUM SERPL-MCNC: 8.3 MG/DL (ref 8.6–10)
CHLORIDE SERPL-SCNC: 104 MMOL/L (ref 98–107)
CREAT SERPL-MCNC: 1.23 MG/DL (ref 0.51–0.95)
DEPRECATED HCO3 PLAS-SCNC: 26 MMOL/L (ref 22–29)
EGFRCR SERPLBLD CKD-EPI 2021: 52 ML/MIN/1.73M2
EOSINOPHIL # BLD AUTO: 0.3 10E3/UL (ref 0–0.7)
EOSINOPHIL NFR BLD AUTO: 2 %
ERYTHROCYTE [DISTWIDTH] IN BLOOD BY AUTOMATED COUNT: 18.4 % (ref 10–15)
GLUCOSE SERPL-MCNC: 128 MG/DL (ref 70–99)
HCT VFR BLD AUTO: 39 % (ref 35–47)
HGB BLD-MCNC: 13 G/DL (ref 11.7–15.7)
IMM GRANULOCYTES # BLD: 0.1 10E3/UL
IMM GRANULOCYTES NFR BLD: 1 %
LYMPHOCYTES # BLD AUTO: 2.4 10E3/UL (ref 0.8–5.3)
LYMPHOCYTES NFR BLD AUTO: 22 %
MCH RBC QN AUTO: 28.7 PG (ref 26.5–33)
MCHC RBC AUTO-ENTMCNC: 33.3 G/DL (ref 31.5–36.5)
MCV RBC AUTO: 86 FL (ref 78–100)
MONOCYTES # BLD AUTO: 0.8 10E3/UL (ref 0–1.3)
MONOCYTES NFR BLD AUTO: 7 %
NEUTROPHILS # BLD AUTO: 7.1 10E3/UL (ref 1.6–8.3)
NEUTROPHILS NFR BLD AUTO: 67 %
NRBC # BLD AUTO: 0 10E3/UL
NRBC BLD AUTO-RTO: 0 /100
PLATELET # BLD AUTO: 409 10E3/UL (ref 150–450)
POTASSIUM SERPL-SCNC: 3.8 MMOL/L (ref 3.4–5.3)
PROT SERPL-MCNC: 4.2 G/DL (ref 6.4–8.3)
RBC # BLD AUTO: 4.53 10E6/UL (ref 3.8–5.2)
SODIUM SERPL-SCNC: 137 MMOL/L (ref 135–145)
WBC # BLD AUTO: 10.7 10E3/UL (ref 4–11)

## 2024-03-01 PROCEDURE — 250N000012 HC RX MED GY IP 250 OP 636 PS 637: Performed by: STUDENT IN AN ORGANIZED HEALTH CARE EDUCATION/TRAINING PROGRAM

## 2024-03-01 PROCEDURE — 80053 COMPREHEN METABOLIC PANEL: CPT

## 2024-03-01 PROCEDURE — 250N000011 HC RX IP 250 OP 636: Mod: JZ | Performed by: STUDENT IN AN ORGANIZED HEALTH CARE EDUCATION/TRAINING PROGRAM

## 2024-03-01 PROCEDURE — 36415 COLL VENOUS BLD VENIPUNCTURE: CPT

## 2024-03-01 PROCEDURE — 85004 AUTOMATED DIFF WBC COUNT: CPT

## 2024-03-01 PROCEDURE — 96401 CHEMO ANTI-NEOPL SQ/IM: CPT

## 2024-03-01 RX ADMIN — DEXAMETHASONE 30 MG: 2 TABLET ORAL at 14:53

## 2024-03-01 RX ADMIN — DARATUMUMAB AND HYALURONIDASE-FIHJ (HUMAN RECOMBINANT) 1800 MG: 1800; 30000 INJECTION SUBCUTANEOUS at 15:49

## 2024-03-01 RX ADMIN — BORTEZOMIB 2.5 MG: 3.5 INJECTION, POWDER, LYOPHILIZED, FOR SOLUTION INTRAVENOUS; SUBCUTANEOUS at 15:49

## 2024-03-01 ASSESSMENT — PAIN SCALES - GENERAL: PAINLEVEL: NO PAIN (0)

## 2024-03-01 NOTE — PROGRESS NOTES
Infusion Nursing Note:  Vivian Brown presents today for Cycle 2 Day 8 Darzalex Faspro and Velcade  Patient seen by provider today: No   present during visit today: Not Applicable.    Note: Vivian comes into the clinic today doing well overall and has no concerns to offer at this visit. She is constipated normally. She otherwise does not attest to any abnormal pain/SOB/chest tightness/signs of infection/dizziness/sensation changes/bruising/swelling/diarrhea/nausea/urinary issues/vision or hearing changes/fatigue.    Lately she has been having a burning sensation similar to a sunburn after chemo injections. This burning occurs on her abdomen and back and sometimes down her thighs. Provider notified.        Intravenous Access:  Labs drawn without difficulty.    Treatment Conditions:  Lab Results   Component Value Date    HGB 13.0 03/01/2024    WBC 10.7 03/01/2024    ANEU 5.6 03/09/2020    ANEUTAUTO 7.1 03/01/2024     03/01/2024        Lab Results   Component Value Date     03/01/2024    POTASSIUM 3.8 03/01/2024    MAG 2.0 02/03/2024    CR 1.23 (H) 03/01/2024    TANNER 8.3 (L) 03/01/2024    BILITOTAL 0.2 03/01/2024    ALBUMIN 2.1 (L) 03/01/2024    ALT 23 03/01/2024    AST 22 03/01/2024       Results reviewed, labs MET treatment parameters, ok to proceed with treatment.      Post Infusion Assessment:  Patient tolerated Darzalex Faspro injection to left lower abdomen without incident.   Patient tolerated Velcade injection to right lower abdomen without incident.     Discharge Plan:   Patient declined prescription refills.  Discharge instructions reviewed with: Patient and Family.  Patient and/or family verbalized understanding of discharge instructions and all questions answered.  AVS to patient via [employer].  Patient will return 3/8 for next appointment.   Patient discharged in stable condition accompanied by: self.  Departure Mode: Ambulatory.      Loida Lemus RN

## 2024-03-01 NOTE — NURSING NOTE
Chief Complaint   Patient presents with    Blood Draw     Labs drawn with  by RN. Vitals taken. Pt checked into next appointment.       Clotilde Snow RN

## 2024-03-01 NOTE — PATIENT INSTRUCTIONS
[facility] Triage and after hours / weekends / holidays:  [phone]    Please call the triage or after hours line if you experience a temperature greater than or equal to 100.4, shaking chills, have uncontrolled nausea, vomiting and/or diarrhea, dizziness, shortness of breath, chest pain, bleeding, unexplained bruising, or if you have any other new/concerning symptoms, questions or concerns.      If you are having any concerning symptoms or wish to speak to a provider before your next infusion visit, please call triage to notify them so we can adequately serve you.     If you need a refill on a narcotic prescription or other medication, please call before your infusion appointment.

## 2024-03-04 NOTE — ORAL ONC MGMT
Oral Chemotherapy Monitoring Program  Lab Follow Up    Reviewed CBC and CMP lab results from 3/1/2024.        2/2/2024     3:00 PM 2/9/2024     2:00 PM 2/13/2024     2:00 PM 3/4/2024    11:00 AM   ORAL CHEMOTHERAPY   Assessment Type Initial Follow up;Lab Monitoring Lab Monitoring Refill Lab Monitoring   Diagnosis Code Multiple Myeloma Multiple Myeloma Multiple Myeloma Multiple Myeloma   Providers Dr. Ramesh Chandler   Clinic Name/Location [facility]   Is this patient followed by the [facility] OC team? No No No No   Drug Name Cytoxan (cyclophsphamide) Cytoxan (cyclophsphamide) Cytoxan (cyclophsphamide) Cytoxan (cyclophsphamide)   Dose 500 mg 500 mg 500 mg 500 mg   Current Schedule Weekly Weekly Weekly Weekly   Cycle Details Weekly/Daily Weekly/Daily Weekly/Daily Weekly/Daily   Start Date of Last Cycle 1/26/2024 1/26/2024 1/26/2024    Planned next cycle start date 2/23/2024 2/23/2024 2/23/2024    Doses missed in last 2 weeks 0      Adherence Assessment Adherent      Adverse Effects No AE identified during assessment No AE identified during assessment     Any new drug interactions? No      Is the dose as ordered appropriate for the patient? Yes Yes         Labs:  _  Result Component Current Result Ref Range   Sodium 137 (3/1/2024) 135 - 145 mmol/L     _  Result Component Current Result Ref Range   Potassium 3.8 (3/1/2024) 3.4 - 5.3 mmol/L     _  Result Component Current Result Ref Range   Calcium 8.3 (L) (3/1/2024) 8.6 - 10.0 mg/dL     _  Result Component Current Result Ref Range   Magnesium 2.0 (2/3/2024) 1.7 - 2.3 mg/dL     No results found for Phos within last 30 days.     _  Result Component Current Result Ref Range   Albumin 2.1 (L) (3/1/2024) 3.5 - 5.2 g/dL     _  Result Component Current Result Ref Range   Urea Nitrogen 19.2 (3/1/2024) 6.0 - 20.0 mg/dL     _  Result Component Current Result Ref Range   Creatinine 1.23 (H) (3/1/2024) 0.51 - 0.95 mg/dL     _  Result  Component Current Result Ref Range   AST 22 (3/1/2024) 0 - 45 U/L     _  Result Component Current Result Ref Range   ALT 23 (3/1/2024) 0 - 50 U/L     _  Result Component Current Result Ref Range   Bilirubin Total 0.2 (3/1/2024) <=1.2 mg/dL     _  Result Component Current Result Ref Range   WBC Count 10.7 (3/1/2024) 4.0 - 11.0 10e3/uL     _  Result Component Current Result Ref Range   Hemoglobin 13.0 (3/1/2024) 11.7 - 15.7 g/dL     _  Result Component Current Result Ref Range   Platelet Count 409 (3/1/2024) 150 - 450 10e3/uL     No results found for ANC within last 30 days.     _  Result Component Current Result Ref Range   Absolute Neutrophils 7.1 (3/1/2024) 1.6 - 8.3 10e3/uL        Assessment & Plan:  No new concerning lab abnormalities. No changes with cyclophosphamide needed at this time. Patient not contacted as patient was seen in infusion 3/1.     Follow-Up:  3/11: review 3/8 labs     Melo Silva, PharmD  Hematology/Oncology Clinical Pharmacist  [facility]  [facility]

## 2024-03-05 DIAGNOSIS — R80.1 PERSISTENT PROTEINURIA: ICD-10-CM

## 2024-03-05 RX ORDER — FUROSEMIDE 20 MG
40 TABLET ORAL 2 TIMES DAILY
Qty: 360 TABLET | Refills: 3 | Status: ON HOLD | OUTPATIENT
Start: 2024-03-05 | End: 2024-06-11

## 2024-03-05 NOTE — PROGRESS NOTES
Date: 3/1/2024    Time of Call: 11:40 AM     Diagnosis:  hypervolemia     [ TORB ] Ordering provider: Dr. Walden  Order: Increase furosemide to 40 mg BID until leg swelling gets better     Order received by: Edyta Varghese RN

## 2024-03-08 ENCOUNTER — INFUSION THERAPY VISIT (OUTPATIENT)
Dept: ONCOLOGY | Facility: CLINIC | Age: 54
End: 2024-03-08
Attending: NURSE PRACTITIONER
Payer: COMMERCIAL

## 2024-03-08 ENCOUNTER — APPOINTMENT (OUTPATIENT)
Dept: LAB | Facility: CLINIC | Age: 54
End: 2024-03-08
Attending: NURSE PRACTITIONER
Payer: COMMERCIAL

## 2024-03-08 VITALS
DIASTOLIC BLOOD PRESSURE: 83 MMHG | OXYGEN SATURATION: 98 % | SYSTOLIC BLOOD PRESSURE: 127 MMHG | RESPIRATION RATE: 16 BRPM | HEART RATE: 77 BPM | TEMPERATURE: 98 F | BODY MASS INDEX: 29.45 KG/M2 | WEIGHT: 177 LBS

## 2024-03-08 DIAGNOSIS — E85.81 AL AMYLOIDOSIS (H): Primary | ICD-10-CM

## 2024-03-08 LAB
ALBUMIN SERPL BCG-MCNC: 2.1 G/DL (ref 3.5–5.2)
ANION GAP SERPL CALCULATED.3IONS-SCNC: 7 MMOL/L (ref 7–15)
BASOPHILS # BLD AUTO: 0.1 10E3/UL (ref 0–0.2)
BASOPHILS NFR BLD AUTO: 1 %
BUN SERPL-MCNC: 25 MG/DL (ref 6–20)
CALCIUM SERPL-MCNC: 8.3 MG/DL (ref 8.6–10)
CHLORIDE SERPL-SCNC: 103 MMOL/L (ref 98–107)
CREAT SERPL-MCNC: 1.33 MG/DL (ref 0.51–0.95)
DEPRECATED HCO3 PLAS-SCNC: 27 MMOL/L (ref 22–29)
EGFRCR SERPLBLD CKD-EPI 2021: 47 ML/MIN/1.73M2
EOSINOPHIL # BLD AUTO: 0.4 10E3/UL (ref 0–0.7)
EOSINOPHIL NFR BLD AUTO: 3 %
ERYTHROCYTE [DISTWIDTH] IN BLOOD BY AUTOMATED COUNT: 18.3 % (ref 10–15)
GLUCOSE SERPL-MCNC: 103 MG/DL (ref 70–99)
HCT VFR BLD AUTO: 38.4 % (ref 35–47)
HGB BLD-MCNC: 12.7 G/DL (ref 11.7–15.7)
IMM GRANULOCYTES # BLD: 0.1 10E3/UL
IMM GRANULOCYTES NFR BLD: 1 %
LYMPHOCYTES # BLD AUTO: 1.5 10E3/UL (ref 0.8–5.3)
LYMPHOCYTES NFR BLD AUTO: 11 %
MCH RBC QN AUTO: 28.4 PG (ref 26.5–33)
MCHC RBC AUTO-ENTMCNC: 33.1 G/DL (ref 31.5–36.5)
MCV RBC AUTO: 86 FL (ref 78–100)
MONOCYTES # BLD AUTO: 1.1 10E3/UL (ref 0–1.3)
MONOCYTES NFR BLD AUTO: 9 %
NEUTROPHILS # BLD AUTO: 9.9 10E3/UL (ref 1.6–8.3)
NEUTROPHILS NFR BLD AUTO: 75 %
NRBC # BLD AUTO: 0 10E3/UL
NRBC BLD AUTO-RTO: 0 /100
PHOSPHATE SERPL-MCNC: 4.8 MG/DL (ref 2.5–4.5)
PLATELET # BLD AUTO: 378 10E3/UL (ref 150–450)
POTASSIUM SERPL-SCNC: 3.8 MMOL/L (ref 3.4–5.3)
RBC # BLD AUTO: 4.47 10E6/UL (ref 3.8–5.2)
SODIUM SERPL-SCNC: 137 MMOL/L (ref 135–145)
WBC # BLD AUTO: 13 10E3/UL (ref 4–11)

## 2024-03-08 PROCEDURE — 96401 CHEMO ANTI-NEOPL SQ/IM: CPT

## 2024-03-08 PROCEDURE — 36415 COLL VENOUS BLD VENIPUNCTURE: CPT

## 2024-03-08 PROCEDURE — 85025 COMPLETE CBC W/AUTO DIFF WBC: CPT | Performed by: STUDENT IN AN ORGANIZED HEALTH CARE EDUCATION/TRAINING PROGRAM

## 2024-03-08 PROCEDURE — 80069 RENAL FUNCTION PANEL: CPT

## 2024-03-08 PROCEDURE — 250N000011 HC RX IP 250 OP 636: Mod: JW | Performed by: STUDENT IN AN ORGANIZED HEALTH CARE EDUCATION/TRAINING PROGRAM

## 2024-03-08 PROCEDURE — 83521 IG LIGHT CHAINS FREE EACH: CPT

## 2024-03-08 PROCEDURE — 86334 IMMUNOFIX E-PHORESIS SERUM: CPT | Performed by: PATHOLOGY

## 2024-03-08 PROCEDURE — 250N000012 HC RX MED GY IP 250 OP 636 PS 637: Performed by: STUDENT IN AN ORGANIZED HEALTH CARE EDUCATION/TRAINING PROGRAM

## 2024-03-08 PROCEDURE — 86334 IMMUNOFIX E-PHORESIS SERUM: CPT | Mod: 26 | Performed by: PATHOLOGY

## 2024-03-08 RX ADMIN — BORTEZOMIB 2.5 MG: 3.5 INJECTION, POWDER, LYOPHILIZED, FOR SOLUTION INTRAVENOUS; SUBCUTANEOUS at 14:47

## 2024-03-08 RX ADMIN — DEXAMETHASONE 30 MG: 2 TABLET ORAL at 14:16

## 2024-03-08 RX ADMIN — DARATUMUMAB AND HYALURONIDASE-FIHJ (HUMAN RECOMBINANT) 1800 MG: 1800; 30000 INJECTION SUBCUTANEOUS at 14:47

## 2024-03-08 ASSESSMENT — PAIN SCALES - GENERAL: PAINLEVEL: NO PAIN (0)

## 2024-03-08 NOTE — PROGRESS NOTES
Infusion Nursing Note:  Vivian Brown presents today for Cycle 2, Day 15 Velcade, Darzalex Faspro.    Patient seen by provider today: No   present during visit today: Not Applicable.    Note: Pt assessed upon arrival to infusion suite. Denies fever, chills. Pt states she caught her 12 year old son's cold and has been more fatigued this week, but denies any other issues. Pt's creatinine 1.33 today, increased from 1.23. Dian Read notified.     Secure message from Dian Read CNP/Lorraine Mosley RN - Pt had lasix dose increased recently by cardiology and it is likely from that. No new orders.     Treatment Conditions:  Lab Results   Component Value Date    HGB 12.7 03/08/2024    WBC 13.0 (H) 03/08/2024    ANEU 5.6 03/09/2020    ANEUTAUTO 9.9 (H) 03/08/2024     03/08/2024        Lab Results   Component Value Date     03/08/2024    POTASSIUM 3.8 03/08/2024    MAG 2.0 02/03/2024    CR 1.33 (H) 03/08/2024    TANNER 8.3 (L) 03/08/2024    BILITOTAL 0.2 03/01/2024    ALBUMIN 2.1 (L) 03/08/2024    ALT 23 03/01/2024    AST 22 03/01/2024     Results reviewed, labs MET treatment parameters, ok to proceed with treatment.    Post Infusion Assessment:  Patient tolerated Velcade injection to left lower abdomen without incident.   Patient tolerated Darzalex injection to right lower abdomen without incident.     Discharge Plan:   Patient declined prescription refills.  AVS to patient via [employer].  Patient will return 3/15/24 for next appointment.   Patient discharged in stable condition accompanied by: family.  Departure Mode: Ambulatory.      Nithya Mosley RN

## 2024-03-08 NOTE — NURSING NOTE
Chief Complaint   Patient presents with    Blood Draw     Labs collected from venipuncture by RN. Vitals taken. Checked in for appointment(s).      Labs collected from venipuncture by RN. Vitals taken. Checked in for appointment(s).     Arleth Aragon RN

## 2024-03-11 ENCOUNTER — DOCUMENTATION ONLY (OUTPATIENT)
Dept: ONCOLOGY | Facility: CLINIC | Age: 54
End: 2024-03-11
Payer: COMMERCIAL

## 2024-03-11 LAB
KAPPA LC FREE SER-MCNC: 1.2 MG/DL (ref 0.33–1.94)
KAPPA LC FREE/LAMBDA FREE SER NEPH: 0.69 {RATIO} (ref 0.26–1.65)
LAMBDA LC FREE SERPL-MCNC: 1.75 MG/DL (ref 0.57–2.63)
PROT PATTERN SERPL IFE-IMP: NORMAL

## 2024-03-11 NOTE — PROGRESS NOTES
Oral Chemotherapy Monitoring Program  Lab Follow Up    Reviewed lab results from 3/8/24.        2/2/2024     3:00 PM 2/9/2024     2:00 PM 2/13/2024     2:00 PM 3/4/2024    11:00 AM 3/11/2024     1:00 PM   ORAL CHEMOTHERAPY   Assessment Type Initial Follow up;Lab Monitoring Lab Monitoring Refill Lab Monitoring Lab Monitoring   Diagnosis Code Multiple Myeloma Multiple Myeloma Multiple Myeloma Multiple Myeloma Multiple Myeloma   Providers Dr. Ramesh Chandler   [facility]   Is this patient followed by the [facility] team? No No No No No   Drug Name Cytoxan (cyclophsphamide) Cytoxan (cyclophsphamide) Cytoxan (cyclophsphamide) Cytoxan (cyclophsphamide) Cytoxan (cyclophsphamide)   Dose 500 mg 500 mg 500 mg 500 mg 500 mg   Current Schedule Weekly Weekly Weekly Weekly Weekly   Cycle Details Weekly/Daily Weekly/Daily Weekly/Daily Weekly/Daily Weekly/Daily   Start Date of Last Cycle 1/26/2024 1/26/2024 1/26/2024 2/23/2024    Planned next cycle start date 2/23/2024 2/23/2024 2/23/2024 3/22/2024    Doses missed in last 2 weeks 0       Adherence Assessment Adherent       Adverse Effects No AE identified during assessment No AE identified during assessment      Any new drug interactions? No       Is the dose as ordered appropriate for the patient? Yes Yes          Labs:  _  Result Component Current Result Ref Range   Sodium 137 (3/8/2024) 135 - 145 mmol/L     _  Result Component Current Result Ref Range   Potassium 3.8 (3/8/2024) 3.4 - 5.3 mmol/L     _  Result Component Current Result Ref Range   Calcium 8.3 (L) (3/8/2024) 8.6 - 10.0 mg/dL     No results found for Mag within last 30 days.     _  Result Component Current Result Ref Range   Phosphorus 4.8 (H) (3/8/2024) 2.5 - 4.5 mg/dL     _  Result Component Current Result Ref Range   Albumin 2.1 (L) (3/8/2024) 3.5 - 5.2 g/dL     _  Result Component Current Result Ref Range   Urea Nitrogen 25.0  (H) (3/8/2024) 6.0 - 20.0 mg/dL     _  Result Component Current Result Ref Range   Creatinine 1.33 (H) (3/8/2024) 0.51 - 0.95 mg/dL     _  Result Component Current Result Ref Range   AST 22 (3/1/2024) 0 - 45 U/L     _  Result Component Current Result Ref Range   ALT 23 (3/1/2024) 0 - 50 U/L     _  Result Component Current Result Ref Range   Bilirubin Total 0.2 (3/1/2024) <=1.2 mg/dL     _  Result Component Current Result Ref Range   WBC Count 13.0 (H) (3/8/2024) 4.0 - 11.0 10e3/uL     _  Result Component Current Result Ref Range   Hemoglobin 12.7 (3/8/2024) 11.7 - 15.7 g/dL     _  Result Component Current Result Ref Range   Platelet Count 378 (3/8/2024) 150 - 450 10e3/uL     No results found for ANC within last 30 days.     _  Result Component Current Result Ref Range   Absolute Neutrophils 9.9 (H) (3/8/2024) 1.6 - 8.3 10e3/uL        Assessment & Plan:  Results show increase in sCr.    Infusion nurse already consulted with ZOHREH regarding this.    No changes with cyclophosphamide needed at this time.    Patient not contacted as patient was seen in infusion that day.      Karissa Carpio, PharmD, BCPS, BCOP  Oncology Clinical Pharmacy Specialist  [facility]  [phone]

## 2024-03-12 ENCOUNTER — OFFICE VISIT (OUTPATIENT)
Dept: ALLERGY | Facility: OTHER | Age: 54
End: 2024-03-12
Payer: COMMERCIAL

## 2024-03-12 VITALS
DIASTOLIC BLOOD PRESSURE: 71 MMHG | WEIGHT: 174.82 LBS | BODY MASS INDEX: 29.09 KG/M2 | HEART RATE: 80 BPM | OXYGEN SATURATION: 95 % | SYSTOLIC BLOOD PRESSURE: 108 MMHG

## 2024-03-12 DIAGNOSIS — J30.1 SEASONAL ALLERGIC RHINITIS DUE TO POLLEN: ICD-10-CM

## 2024-03-12 DIAGNOSIS — T78.49XA OTHER ALLERGY, INITIAL ENCOUNTER: Primary | ICD-10-CM

## 2024-03-12 DIAGNOSIS — R06.2 WHEEZING: ICD-10-CM

## 2024-03-12 PROCEDURE — 99000 SPECIMEN HANDLING OFFICE-LAB: CPT | Performed by: ALLERGY & IMMUNOLOGY

## 2024-03-12 PROCEDURE — 86003 ALLG SPEC IGE CRUDE XTRC EA: CPT | Mod: 90 | Performed by: ALLERGY & IMMUNOLOGY

## 2024-03-12 PROCEDURE — 86008 ALLG SPEC IGE RECOMB EA: CPT | Mod: 59 | Performed by: ALLERGY & IMMUNOLOGY

## 2024-03-12 PROCEDURE — 99204 OFFICE O/P NEW MOD 45 MIN: CPT | Mod: 25 | Performed by: ALLERGY & IMMUNOLOGY

## 2024-03-12 PROCEDURE — 36415 COLL VENOUS BLD VENIPUNCTURE: CPT | Performed by: ALLERGY & IMMUNOLOGY

## 2024-03-12 PROCEDURE — 86003 ALLG SPEC IGE CRUDE XTRC EA: CPT | Mod: 59 | Performed by: ALLERGY & IMMUNOLOGY

## 2024-03-12 PROCEDURE — 95004 PERQ TESTS W/ALRGNC XTRCS: CPT | Performed by: ALLERGY & IMMUNOLOGY

## 2024-03-12 PROCEDURE — 86003 ALLG SPEC IGE CRUDE XTRC EA: CPT | Performed by: ALLERGY & IMMUNOLOGY

## 2024-03-12 RX ORDER — AZELASTINE 1 MG/ML
2 SPRAY, METERED NASAL 2 TIMES DAILY PRN
Qty: 30 ML | Refills: 3 | Status: SHIPPED | OUTPATIENT
Start: 2024-03-12

## 2024-03-12 RX ORDER — ALBUTEROL SULFATE AND BUDESONIDE 90; 80 UG/1; UG/1
2 AEROSOL, METERED RESPIRATORY (INHALATION) EVERY 4 HOURS PRN
Qty: 10.7 G | Refills: 3 | Status: SHIPPED | OUTPATIENT
Start: 2024-03-12

## 2024-03-12 RX ORDER — EPINEPHRINE 0.3 MG/.3ML
0.3 INJECTION SUBCUTANEOUS PRN
Qty: 2 EACH | Refills: 3 | Status: SHIPPED | OUTPATIENT
Start: 2024-03-12

## 2024-03-12 RX ORDER — FLUTICASONE PROPIONATE 50 MCG
2 SPRAY, SUSPENSION (ML) NASAL DAILY
Qty: 16 G | Refills: 3 | Status: SHIPPED | OUTPATIENT
Start: 2024-03-12

## 2024-03-12 NOTE — PROGRESS NOTES
SUBJECTIVE:                                                                   Vivian Brown is a 54-year-old female who presents today to our Allergy Clinic at [facility]; She is being seen in consultation at the request of No ref. provider found for an evaluation of Marianela Waggoner NP, for evaluation of abdominal bloating and hypoalbuminemia.    History of seasonal allergies, manifested by rhinorrhea, nasal itchiness, sneezing, nasal congestion, postnasal drainage, aural fullness, and itchy/red/watery eyes, since 10 years of age.   Perennial pattern with Spring, Summer, and Fall exacerbations.  Symptoms are worse with exposures to cats, dogs, dust, mowing grass, raking leaves.  She uses nasal sprays very occasionally.  Most of the time, she takes loratadine and cetirizine. Not well controlled with oral antihistamines.  There is no history of PE tubes, sinus surgeries, or tonsillectomy/adenoidectomy.      Has an albuterol inhaler.  At the age of 10 years, she started have episodes of wheezing, chest tightness, and cough.  Symptoms are exacerbated by pollen, animals, and viral respiratory infections. Has symptoms on occasions, less than once a year. Rhinoconjunctivitis are more severe and frequent.     She had hypoalbuminemia and amyloidosis. As a part of the work up some labs were done.     She had labs done that were suspicious for celiac disease.  Upper endoscopy with duodenal mucosal changes seen and celiac disease.  The biopsy revealed Intraepithelial lymphocytosis, hallmark of untreated or partially treated celiac disease.    As a part of the workup, referring provider ordered labs for food allergy.    Component      Latest Ref Rng 11/28/2023  12:29 PM   IGE      0 - 114 kU/L 208 (H)    Allergen [place]      <0.10 KU(A)/L 0.21 (H)    Allergen Cashew      <0.10 KU(A)/L <0.10    Allergen Fish(Cod)      <0.10 KU(A)/L <0.10    Allergen Egg White      <0.10 KU(A)/L <0.10    Allergen,  Hazelnut      <0.10 KU(A)/L 5.43 (H)    Allergen Milk      <0.10 KU(A)/L <0.10    Allergen Peanut      <0.10 KU(A)/L 2.88 (H)    Allergen, [place]      <0.10 KU(A)/L <0.10    Allergen Scallop      <0.10 KU(A)/L <0.10    Allergen, Sesame Seed      <0.10 KU(A)/L 0.28 (H)    Allergen Shrimp      <0.10 KU(A)/L <0.10    Allergen Soybean IgE      <0.10 KU(A)/L <0.10    Allergen Tuna      <0.10 KU(A)/L <0.10    Allergen, [place]      <0.10 KU(A)/L 0.40 (H)    Allergen Wheat      <0.10 KU(A)/L 0.17 (H)       Legend:  (H) High      With kiwi, watermelon, and cantaloupe, she develops itchy throat, and rhinoconjunctivitis symptoms. It happens in Summer season only.    She develops itchy throat, rhinorrhea and itchy eyes if she eats peanut butter.  She usually does not eat hazelnuts or other tree nuts.  Tahini test is likely false positive.  The patient was able to eat hummus with tahini without IgE-mediated symptoms last week.  With tomatoes and cucumbers, she develops similar symptoms.       With corn, she develops primarily GI discomfort. Once, she had corn on a cub, she developed rhinoconjunctivitis.         Patient Active Problem List   Diagnosis    Hypothyroidism due to acquired atrophy of thyroid    Family hx of colon cancer    Nonallopathic lesion of cervical region    Cervicalgia    Nonallopathic lesion of sacral region    Nonallopathic lesion of lumbar region    Lumbago    Nonallopathic lesion of thoracic region    Hyperlipidemia LDL goal <100    Dependent edema R>L    AL amyloidosis (H)    Hypogammaglobulinemia (H24)       Past Medical History:   Diagnosis Date    Celiac disease     Family history of colon cancer     Colonoscoy q5 years next 9/2020      Problem (# of Occurrences) Relation (Name,Age of Onset)    Asthma (1) Daughter    Cancer (1) Mother (65): GIST    Hypertension (1) Mother    Thyroid Disease (1) Sister    Other Cancer (1) Mother    Hyperlipidemia (1) Mother    Coronary Artery Disease (1) Father: MI     Colon Cancer (1) Maternal Grandmother (91)          Past Surgical History:   Procedure Laterality Date    COLONOSCOPY N/A 9/28/2015    Procedure: COLONOSCOPY;  Surgeon: Eugenio Travis MD;  Location: [facility]    ESOPHAGOSCOPY, GASTROSCOPY, DUODENOSCOPY (EGD), COMBINED N/A 1/30/2024    Procedure: ESOPHAGOGASTRODUODENOSCOPY, WITH BIOPSY;  Surgeon: Melo Cloud MD;  Location: [facility]    IR RENAL BIOPSY RIGHT  12/21/2023    TONSILLECTOMY       Social History     Socioeconomic History    Marital status:      Spouse name: None    Number of children: None    Years of education: None    Highest education level: None   Occupational History     Comment: Physical Therapy   Tobacco Use    Smoking status: Never     Passive exposure: Past    Smokeless tobacco: Never    Tobacco comments:     Parents smoked during childhood in the house   Vaping Use    Vaping Use: Never used   Substance and Sexual Activity    Alcohol use: Not Currently     Alcohol/week: 0.0 standard drinks of alcohol     Comment: 1-2 times/month    Drug use: No    Sexual activity: Yes     Partners: Male     Birth control/protection: I.U.D.   Social History Narrative    March 12, 2024    ENVIRONMENTAL HISTORY: [place] is heated with a forced air. They does have central air conditioning. The patient's bedroom is furnished with carpeting in bedroom and fabric window coverings.  Pets inside the house include no. There is no history of cockroach or mice infestation. There is/are 0 smokers in the house.  The house does not have a damp basement.      Social Determinants of Health     Financial Resource Strain: High Risk (1/22/2024)    Financial Resource Strain     Within the past 12 months, have you or your family members you live with been unable to get utilities (heat, electricity) when it was really needed?: Yes   Food Insecurity: Low Risk  (1/22/2024)    Food Insecurity     Within the past 12 months,  did you worry that your food would run out before you got money to buy more?: No     Within the past 12 months, did the food you bought just not last and you didn t have money to get more?: No   Transportation Needs: Low Risk  (1/22/2024)    Transportation Needs     Within the past 12 months, has lack of transportation kept you from medical appointments, getting your medicines, non-medical meetings or appointments, work, or from getting things that you need?: No   Interpersonal Safety: Low Risk  (11/17/2023)    Interpersonal Safety     Do you feel physically and emotionally safe where you currently live?: Yes     Within the past 12 months, have you been hit, slapped, kicked or otherwise physically hurt by someone?: No     Within the past 12 months, have you been humiliated or emotionally abused in other ways by your partner or ex-partner?: No   Housing Stability: Low Risk  (1/22/2024)    Housing Stability     Do you have housing? : Yes     Are you worried about losing your housing?: No           Current Outpatient Medications:     acyclovir (ZOVIRAX) 400 MG tablet, Take 1 tablet (400 mg) by mouth 2 times daily Viral Prophylaxis., Disp: 180 tablet, Rfl: 3    Albuterol-Budesonide (AIRSUPRA) 90-80 MCG/ACT AERO, Inhale 2 Inhalations into the lungs every 4 hours as needed (Wheezing, chest tightness, shortness of breath, or persistent cough), Disp: 10.7 g, Rfl: 3    azelastine (ASTELIN) 0.1 % nasal spray, Spray 2 sprays into both nostrils 2 times daily as needed for rhinitis, Disp: 30 mL, Rfl: 3    cholecalciferol (VITAMIN D3) 125 mcg (5000 units) capsule, Take 1 capsule (125 mcg) by mouth daily, Disp: 30 capsule, Rfl: 6    cycloPHOSphamide (CYTOXAN) 50 MG capsule, Take 10 capsules (500 mg) by mouth every 7 days Take on days 1, 8, 15, and 22., Disp: 40 capsule, Rfl: 0    cycloPHOSphamide (CYTOXAN) 50 MG capsule, Take 10 capsules (500 mg) by mouth every 7 days Take on days 1, 8, 15, and 22., Disp: 40 capsule, Rfl: 0     empagliflozin (JARDIANCE) 10 MG TABS tablet, Take 1 tablet (10 mg) by mouth daily, Disp: 30 tablet, Rfl: 11    EPINEPHrine (ANY BX GENERIC EQUIV) 0.3 MG/0.3ML injection 2-pack, Inject 0.3 mLs (0.3 mg) into the muscle as needed for anaphylaxis May repeat one time in 5-15 minutes if response to initial dose is inadequate., Disp: 2 each, Rfl: 3    fluticasone (FLONASE) 50 MCG/ACT nasal spray, Spray 2 sprays into both nostrils daily, Disp: 16 g, Rfl: 3    furosemide (LASIX) 20 MG tablet, Take 2 tablets (40 mg) by mouth 2 times daily, Disp: 360 tablet, Rfl: 3    levothyroxine (SYNTHROID/LEVOTHROID) 200 MCG tablet, Take 1 tablet (200 mcg) by mouth daily, Disp: 90 tablet, Rfl: 3    levothyroxine (SYNTHROID/LEVOTHROID) 25 MCG tablet, Take 1 tablet (25 mcg) by mouth daily, Disp: , Rfl:     liothyronine (CYTOMEL) 5 MCG tablet, Take 5 mcg by mouth 2 times daily, Disp: , Rfl:     prochlorperazine (COMPAZINE) 5 MG tablet, Take 1 tablet (5 mg) by mouth every 6 hours as needed for nausea or vomiting, Disp: 30 tablet, Rfl: 1    rosuvastatin (CRESTOR) 10 MG tablet, Take 1 tablet (10 mg) by mouth daily, Disp: 90 tablet, Rfl: 3    spironolactone (ALDACTONE) 25 MG tablet, Take 1 tablet (25 mg) by mouth daily, Disp: 90 tablet, Rfl: 3    albuterol (PROAIR HFA/PROVENTIL HFA/VENTOLIN HFA) 108 (90 Base) MCG/ACT inhaler, Inhale 2 puffs into the lungs every 6 hours as needed for shortness of breath, wheezing or cough (Patient not taking: Reported on 3/12/2024), Disp: 18 g, Rfl: 0    benzonatate (TESSALON) 200 MG capsule, Take 1 capsule (200 mg) by mouth 3 times daily as needed for cough (Patient not taking: Reported on 3/12/2024), Disp: 30 capsule, Rfl: 0    lisinopril (ZESTRIL) 2.5 MG tablet, Take 1 tablet (2.5 mg) by mouth daily (Patient not taking: Reported on 3/12/2024), Disp: 90 tablet, Rfl: 3    ondansetron (ZOFRAN) 8 MG tablet, Take 1 tablet (8 mg) by mouth every 8 hours as needed for nausea (Patient not taking: Reported on 3/12/2024),  Disp: 30 tablet, Rfl: 2  Immunization History   Administered Date(s) Administered    HepB, Unspecified 08/01/1992, 10/01/1992, 02/01/1993    Influenza (H1N1) 12/16/2009    Influenza (IIV3) PF 10/04/2010, 09/14/2011, 09/19/2012    Influenza (intradermal) 01/18/2013    Influenza Vaccine >6 months,quad, PF 10/25/2016, 11/08/2017, 10/01/2018, 10/14/2019    TDAP (Adacel,Boostrix) 12/19/2022    Td (Adult), Adsorbed 03/10/1986    Zoster recombinant adjuvanted (SHINGRIX) 12/19/2022, 05/22/2023     Allergies   Allergen Reactions    Gluten Meal     Latex Itching    Seasonal Allergies      OBJECTIVE:                                                                 /71   Pulse 80   Wt 79.3 kg (174 lb 13.2 oz)   SpO2 95%   BMI 29.09 kg/m          Physical Exam  Vitals and nursing note reviewed.   Constitutional:       General: She is not in acute distress.     Appearance: She is not ill-appearing, toxic-appearing or diaphoretic.   HENT:      Head: Normocephalic and atraumatic.      Right Ear: Tympanic membrane, ear canal and external ear normal.      Left Ear: Tympanic membrane, ear canal and external ear normal.      Nose: No mucosal edema, congestion or rhinorrhea.      Right Turbinates: Not enlarged, swollen or pale.      Left Turbinates: Not enlarged, swollen or pale.      Mouth/Throat:      Lips: Pink.      Mouth: Mucous membranes are moist.      Pharynx: Oropharynx is clear. No pharyngeal swelling, oropharyngeal exudate, posterior oropharyngeal erythema or uvula swelling.   Eyes:      General:         Right eye: No discharge.         Left eye: No discharge.      Conjunctiva/sclera: Conjunctivae normal.   Cardiovascular:      Rate and Rhythm: Normal rate and regular rhythm.      Heart sounds: Normal heart sounds. No murmur heard.  Pulmonary:      Effort: Pulmonary effort is normal. No respiratory distress.      Breath sounds: Normal breath sounds and air entry. No stridor, decreased air movement or transmitted  upper airway sounds. No decreased breath sounds, wheezing, rhonchi or rales.   Neurological:      Mental Status: She is alert and oriented to person, place, and time.   Psychiatric:         Mood and Affect: Mood normal.         Behavior: Behavior normal.               WORKUP:     At today's visit, the patient and I engaged in an informed consent discussion about allergy testing.  We discussed skin testing, blood testing, and the alternative of not undergoing any testing. The patient has a preference for skin testing. We then discussed the risks and benefits of skin testing.  The patient understands skin testing risks can include, but are not limited to, urticaria, angioedema, shortness of breath, and severe anaphylaxis.  The benefits include, but are not limited, to evaluation for allergens causing symptoms.  After answering the patient's questions they have agreed to proceed with skin testing.    ENVIRONMENTAL PERCUTANEOUS SKIN TESTING: ADULT      3/12/2024     9:00 AM   [facility]   Consent Y   Ordering Physician Tye   Interpreting Physician Tye   Testing Technician Roseanne   Location Back   Time start: 09:30   Time End: 09:45   Positive Control: Histatrol*ALK 1 mg/ml 8/15   Negative Control: 50% Glycerin 0   Cat Hair*ALK (10,000 BAU/ml) 0   AP Dog Hair/Dander (1:100 w/v) 0   Dust Mite p. 30,000 AU/ml 0   Dust Mite f. (30,000 AU/ml) 0   Solitario (W/F in millimeters) 5/20   Titus Grass (100,000 BAU/mL) 8/40   Red [place] (W/F in millimeters) 0   Maple/[place] (W/F in millimeters) 3/15   Hackberry (W/F in millimeters) 6/20   [place] (W/F in millimeters) 15/20   [place] *ALK (W/F in millimeters) 0   American Elm (W/F in millimeters) 0   [place] (W/F in millimeters) 4/15   Black [place] (W/F in millimeters) 3/10   Birch Mix (W/F in millimeters) 8/25   [place] (W/F in millimeters) 6/15   [place] (W/F in millimeters) 8/20   Cocklebur (W/F in millimeters) 0   [place] (W/F in millimeters) 3/10   White  Teri (W/F in millimeters) 8/20   Careless (W/F in millimeters) 3/10   Nettle (W/F in millimeters) 0   English Plantain (W/F in millimeters) 5/10   Kochia (W/F in millimeters) 6/10   Lamb's Quarter (W/F in millimeters) 3/10   Marshelder (W/F in millimeters) 0   Ragweed Mix* ALK (W/F in millimeters) 20/30   Russian Thistle (W/F in millimeters) 4/15   Sagebrush/Mugwort (W/F in millimeters) 5/10   Sheep Sorrel (W/F in millimeters) 0   Feather Mix* ALK (W/F in millimeters) 0   Penicillium Mix (1:10 w/v) 0   Curvularia spicifera (1:10 w/v) 0   Epicoccum (1:10 w/v) 0   Aspergillus fumigatus (1:10 w/v): 0   Alternaria tenius (1:10 w/v) 0   H. Cladosporium (1:10 w/v) 0   Phoma herbarum (1:10 w/v) 0      My interpretation: SPT for aeroallergens performed today (March 12, 2024) showed sensitivity to grass pollen (Solitario and Titus), tree pollen (maple/Box Elder, Hackberry, [place]ickory, [place], Black [place], Birch mix, [place], oak, [place], and white teri), and weed pollen (careless, English planting, Kochia, lambs quarter, ragweed mix, Russian thistle, and sagebrush/mugwort).  The rest was negative with appropriate responses to positive and negative controls.    ASSESSMENT/PLAN:    Other allergy, initial encounter  We discussed the difference between the celiac disease and IgE mediated food allergy.  She will continue celiac disease management with GI and nutrition.  In regards to symptoms related to peanuts and peanut butter, and selective fruits and vegetables, I favor pollen food syndrome as the most possible examination of her symptoms.  She was positive for grass pollen, ragweed pollen, and birch tree pollen, frequently associated with pollen food syndrome.  Considering that she develops rhinoconjunctivitis symptoms with ingestion, I would recommend to consider strict avoidance of those foods being fresh.  She may still try eating those foods cooked.  -Ordered serum IgE for corn, peanut, cantaloupe, banana, kiwi, tomato,  watermelon.   - I will order component resolved diagnostics for peanut, hazelnut, and walnuts to better understand the true etiology of symptoms (systemic versus PFS).  Epinephrine autoinjector prescribed.  Anaphylaxis action plan was reviewed and provided.      - Allergen corn IgE  - Allergen peanut IgE  - Peanut Component Allergy Panel  - Allergen Cantalope  - Allergen banana IgE  - Allergen Food Kiwi IgE  - Allergen tomato IgE  - Allergen watermelon IgE  - Hazelnut Component Allergy Panel  - Allergen hazelnut IgE  - ALLERGY SKIN TESTS,ALLERGENS  - EPINEPHrine (ANY BX GENERIC EQUIV) 0.3 MG/0.3ML injection 2-pack  Dispense: 2 each; Refill: 3  - Allergen walnuts IgE  - Allergen Castell rJug r 3 IgE  - Allergen Castell rJug r 1 IgE    Seasonal allergic rhinitis due to pollen  Avoidance measures were discussed, and information was provided based on the skin test results.  - Use intranasal fluticasone (Flonase) 1-2 sprays in each nostril once daily.  - Add azelastine nasal spray, 2 sprays in each nostril twice daily as needed.    - fluticasone (FLONASE) 50 MCG/ACT nasal spray  Dispense: 16 g; Refill: 3  - azelastine (ASTELIN) 0.1 % nasal spray  Dispense: 30 mL; Refill: 3    Wheezing  -Use AirSupra inhaler 2 puffs every 4 hours as needed for chest tightness/wheezing/shortness of breath/persistent cough. Use it with a spacer/chamber device.    - Albuterol-Budesonide (AIRSUPRA) 90-80 MCG/ACT AERO  Dispense: 10.7 g; Refill: 3     Follow-up in 3 months or sooner if needed.    Thank you for allowing us to participate in the care of this patient. Please feel free to contact us if there are any questions or concerns about the patient.    Disclaimer: This note consists of symbols derived from keyboarding, dictation and/or voice recognition software. As a result, there may be errors in the script that have gone undetected. Please consider this when interpreting information found in this chart.    Jose Gamble MD, FAAAAI,  DARRICK  Allergy and Asthma     [facility]

## 2024-03-12 NOTE — LETTER
3/12/2024         RE: Vivian Brown  [address] [facility] [address]        Dear Colleague,    Thank you for referring your patient, Vivian Brown, to the [facility]. Please see a copy of my visit note below.    SUBJECTIVE:                                                                   Vivian Brown is a 54-year-old female who presents today to our Allergy Clinic at [facility]; She is being seen in consultation at the request of No ref. provider found for an evaluation of Marianela Waggoner NP, for evaluation of abdominal bloating and hypoalbuminemia.    History of seasonal allergies, manifested by rhinorrhea, nasal itchiness, sneezing, nasal congestion, postnasal drainage, aural fullness, and itchy/red/watery eyes, since 10 years of age.   Perennial pattern with Spring, Summer, and Fall exacerbations.  Symptoms are worse with exposures to cats, dogs, dust, mowing grass, raking leaves.  She uses nasal sprays very occasionally.  Most of the time, she takes loratadine and cetirizine. Not well controlled with oral antihistamines.  There is no history of PE tubes, sinus surgeries, or tonsillectomy/adenoidectomy.      Has an albuterol inhaler.  At the age of 10 years, she started have episodes of wheezing, chest tightness, and cough.  Symptoms are exacerbated by pollen, animals, and viral respiratory infections. Has symptoms on occasions, less than once a year. Rhinoconjunctivitis are more severe and frequent.     She had hypoalbuminemia and amyloidosis. As a part of the work up some labs were done.     She had labs done that were suspicious for celiac disease.  Upper endoscopy with duodenal mucosal changes seen and celiac disease.  The biopsy revealed Intraepithelial lymphocytosis, hallmark of untreated or partially treated celiac disease.    As a part of the workup, referring provider ordered labs for food allergy.    Component      Latest Ref  Rng 11/28/2023  12:29 PM   IGE      0 - 114 kU/L 208 (H)    Allergen [place]      <0.10 KU(A)/L 0.21 (H)    Allergen Cashew      <0.10 KU(A)/L <0.10    Allergen Fish(Cod)      <0.10 KU(A)/L <0.10    Allergen Egg White      <0.10 KU(A)/L <0.10    Allergen, Hazelnut      <0.10 KU(A)/L 5.43 (H)    Allergen Milk      <0.10 KU(A)/L <0.10    Allergen Peanut      <0.10 KU(A)/L 2.88 (H)    Allergen, [place]      <0.10 KU(A)/L <0.10    Allergen Scallop      <0.10 KU(A)/L <0.10    Allergen, Sesame Seed      <0.10 KU(A)/L 0.28 (H)    Allergen Shrimp      <0.10 KU(A)/L <0.10    Allergen Soybean IgE      <0.10 KU(A)/L <0.10    Allergen Tuna      <0.10 KU(A)/L <0.10    Allergen, [place]      <0.10 KU(A)/L 0.40 (H)    Allergen Wheat      <0.10 KU(A)/L 0.17 (H)       Legend:  (H) High      With kiwi, watermelon, and cantaloupe, she develops itchy throat, and rhinoconjunctivitis symptoms. It happens in Summer season only.    She develops itchy throat, rhinorrhea and itchy eyes if she eats peanut butter.  She usually does not eat hazelnuts or other tree nuts.  Tahini test is likely false positive.  The patient was able to eat hummus with tahini without IgE-mediated symptoms last week.  With tomatoes and cucumbers, she develops similar symptoms.       With corn, she develops primarily GI discomfort. Once, she had corn on a cub, she developed rhinoconjunctivitis.         Patient Active Problem List   Diagnosis     Hypothyroidism due to acquired atrophy of thyroid     Family hx of colon cancer     Nonallopathic lesion of cervical region     Cervicalgia     Nonallopathic lesion of sacral region     Nonallopathic lesion of lumbar region     Lumbago     Nonallopathic lesion of thoracic region     Hyperlipidemia LDL goal <100     Dependent edema R>L     AL amyloidosis (H)     Hypogammaglobulinemia (H24)       Past Medical History:   Diagnosis Date     Celiac disease      Family history of colon cancer     Colonoscoy q5 years next 9/2020       Problem (# of Occurrences) Relation (Name,Age of Onset)    Asthma (1) Daughter    Cancer (1) Mother (65): GIST    Hypertension (1) Mother    Thyroid Disease (1) Sister    Other Cancer (1) Mother    Hyperlipidemia (1) Mother    Coronary Artery Disease (1) Father: MI    Colon Cancer (1) Maternal Grandmother (91)          Past Surgical History:   Procedure Laterality Date     COLONOSCOPY N/A 9/28/2015    Procedure: COLONOSCOPY;  Surgeon: Eugenio Travis MD;  Location: [facility]     ESOPHAGOSCOPY, GASTROSCOPY, DUODENOSCOPY (EGD), COMBINED N/A 1/30/2024    Procedure: ESOPHAGOGASTRODUODENOSCOPY, WITH BIOPSY;  Surgeon: Melo Cloud MD;  Location: [facility]     IR RENAL BIOPSY RIGHT  12/21/2023     TONSILLECTOMY       Social History     Socioeconomic History     Marital status:      Spouse name: None     Number of children: None     Years of education: None     Highest education level: None   Occupational History     Comment: Physical Therapy   Tobacco Use     Smoking status: Never     Passive exposure: Past     Smokeless tobacco: Never     Tobacco comments:     Parents smoked during childhood in the house   Vaping Use     Vaping Use: Never used   Substance and Sexual Activity     Alcohol use: Not Currently     Alcohol/week: 0.0 standard drinks of alcohol     Comment: 1-2 times/month     Drug use: No     Sexual activity: Yes     Partners: Male     Birth control/protection: I.U.D.   Social History Narrative    March 12, 2024    ENVIRONMENTAL HISTORY: [place] The home is heated with a forced air. They does have central air conditioning. The patient's bedroom is furnished with carpeting in bedroom and fabric window coverings.  Pets inside the house include no. There is no history of cockroach or mice infestation. There is/are 0 smokers in the house.  The house does not have a damp basement.      Social Determinants of Health     Financial Resource Strain: High Risk  (1/22/2024)    Financial Resource Strain      Within the past 12 months, have you or your family members you live with been unable to get utilities (heat, electricity) when it was really needed?: Yes   Food Insecurity: Low Risk  (1/22/2024)    Food Insecurity      Within the past 12 months, did you worry that your food would run out before you got money to buy more?: No      Within the past 12 months, did the food you bought just not last and you didn t have money to get more?: No   Transportation Needs: Low Risk  (1/22/2024)    Transportation Needs      Within the past 12 months, has lack of transportation kept you from medical appointments, getting your medicines, non-medical meetings or appointments, work, or from getting things that you need?: No   Interpersonal Safety: Low Risk  (11/17/2023)    Interpersonal Safety      Do you feel physically and emotionally safe where you currently live?: Yes      Within the past 12 months, have you been hit, slapped, kicked or otherwise physically hurt by someone?: No      Within the past 12 months, have you been humiliated or emotionally abused in other ways by your partner or ex-partner?: No   Housing Stability: Low Risk  (1/22/2024)    Housing Stability      Do you have housing? : Yes      Are you worried about losing your housing?: No           Current Outpatient Medications:      acyclovir (ZOVIRAX) 400 MG tablet, Take 1 tablet (400 mg) by mouth 2 times daily Viral Prophylaxis., Disp: 180 tablet, Rfl: 3     Albuterol-Budesonide (AIRSUPRA) 90-80 MCG/ACT AERO, Inhale 2 Inhalations into the lungs every 4 hours as needed (Wheezing, chest tightness, shortness of breath, or persistent cough), Disp: 10.7 g, Rfl: 3     azelastine (ASTELIN) 0.1 % nasal spray, Spray 2 sprays into both nostrils 2 times daily as needed for rhinitis, Disp: 30 mL, Rfl: 3     cholecalciferol (VITAMIN D3) 125 mcg (5000 units) capsule, Take 1 capsule (125 mcg) by mouth daily, Disp: 30 capsule, Rfl: 6      cycloPHOSphamide (CYTOXAN) 50 MG capsule, Take 10 capsules (500 mg) by mouth every 7 days Take on days 1, 8, 15, and 22., Disp: 40 capsule, Rfl: 0     cycloPHOSphamide (CYTOXAN) 50 MG capsule, Take 10 capsules (500 mg) by mouth every 7 days Take on days 1, 8, 15, and 22., Disp: 40 capsule, Rfl: 0     empagliflozin (JARDIANCE) 10 MG TABS tablet, Take 1 tablet (10 mg) by mouth daily, Disp: 30 tablet, Rfl: 11     EPINEPHrine (ANY BX GENERIC EQUIV) 0.3 MG/0.3ML injection 2-pack, Inject 0.3 mLs (0.3 mg) into the muscle as needed for anaphylaxis May repeat one time in 5-15 minutes if response to initial dose is inadequate., Disp: 2 each, Rfl: 3     fluticasone (FLONASE) 50 MCG/ACT nasal spray, Spray 2 sprays into both nostrils daily, Disp: 16 g, Rfl: 3     furosemide (LASIX) 20 MG tablet, Take 2 tablets (40 mg) by mouth 2 times daily, Disp: 360 tablet, Rfl: 3     levothyroxine (SYNTHROID/LEVOTHROID) 200 MCG tablet, Take 1 tablet (200 mcg) by mouth daily, Disp: 90 tablet, Rfl: 3     levothyroxine (SYNTHROID/LEVOTHROID) 25 MCG tablet, Take 1 tablet (25 mcg) by mouth daily, Disp: , Rfl:      liothyronine (CYTOMEL) 5 MCG tablet, Take 5 mcg by mouth 2 times daily, Disp: , Rfl:      prochlorperazine (COMPAZINE) 5 MG tablet, Take 1 tablet (5 mg) by mouth every 6 hours as needed for nausea or vomiting, Disp: 30 tablet, Rfl: 1     rosuvastatin (CRESTOR) 10 MG tablet, Take 1 tablet (10 mg) by mouth daily, Disp: 90 tablet, Rfl: 3     spironolactone (ALDACTONE) 25 MG tablet, Take 1 tablet (25 mg) by mouth daily, Disp: 90 tablet, Rfl: 3     albuterol (PROAIR HFA/PROVENTIL HFA/VENTOLIN HFA) 108 (90 Base) MCG/ACT inhaler, Inhale 2 puffs into the lungs every 6 hours as needed for shortness of breath, wheezing or cough (Patient not taking: Reported on 3/12/2024), Disp: 18 g, Rfl: 0     benzonatate (TESSALON) 200 MG capsule, Take 1 capsule (200 mg) by mouth 3 times daily as needed for cough (Patient not taking: Reported on 3/12/2024),  Disp: 30 capsule, Rfl: 0     lisinopril (ZESTRIL) 2.5 MG tablet, Take 1 tablet (2.5 mg) by mouth daily (Patient not taking: Reported on 3/12/2024), Disp: 90 tablet, Rfl: 3     ondansetron (ZOFRAN) 8 MG tablet, Take 1 tablet (8 mg) by mouth every 8 hours as needed for nausea (Patient not taking: Reported on 3/12/2024), Disp: 30 tablet, Rfl: 2  Immunization History   Administered Date(s) Administered     HepB, Unspecified 08/01/1992, 10/01/1992, 02/01/1993     Influenza (H1N1) 12/16/2009     Influenza (IIV3) PF 10/04/2010, 09/14/2011, 09/19/2012     Influenza (intradermal) 01/18/2013     Influenza Vaccine >6 months,quad, PF 10/25/2016, 11/08/2017, 10/01/2018, 10/14/2019     TDAP (Adacel,Boostrix) 12/19/2022     Td (Adult), Adsorbed 03/10/1986     Zoster recombinant adjuvanted (SHINGRIX) 12/19/2022, 05/22/2023     Allergies   Allergen Reactions     Gluten Meal      Latex Itching     Seasonal Allergies      OBJECTIVE:                                                                 /71   Pulse 80   Wt 79.3 kg (174 lb 13.2 oz)   SpO2 95%   BMI 29.09 kg/m          Physical Exam  Vitals and nursing note reviewed.   Constitutional:       General: She is not in acute distress.     Appearance: She is not ill-appearing, toxic-appearing or diaphoretic.   HENT:      Head: Normocephalic and atraumatic.      Right Ear: Tympanic membrane, ear canal and external ear normal.      Left Ear: Tympanic membrane, ear canal and external ear normal.      Nose: No mucosal edema, congestion or rhinorrhea.      Right Turbinates: Not enlarged, swollen or pale.      Left Turbinates: Not enlarged, swollen or pale.      Mouth/Throat:      Lips: Pink.      Mouth: Mucous membranes are moist.      Pharynx: Oropharynx is clear. No pharyngeal swelling, oropharyngeal exudate, posterior oropharyngeal erythema or uvula swelling.   Eyes:      General:         Right eye: No discharge.         Left eye: No discharge.      Conjunctiva/sclera:  Conjunctivae normal.   Cardiovascular:      Rate and Rhythm: Normal rate and regular rhythm.      Heart sounds: Normal heart sounds. No murmur heard.  Pulmonary:      Effort: Pulmonary effort is normal. No respiratory distress.      Breath sounds: Normal breath sounds and air entry. No stridor, decreased air movement or transmitted upper airway sounds. No decreased breath sounds, wheezing, rhonchi or rales.   Neurological:      Mental Status: She is alert and oriented to person, place, and time.   Psychiatric:         Mood and Affect: Mood normal.         Behavior: Behavior normal.               WORKUP:     At today's visit, the patient and I engaged in an informed consent discussion about allergy testing.  We discussed skin testing, blood testing, and the alternative of not undergoing any testing. The patient has a preference for skin testing. We then discussed the risks and benefits of skin testing.  The patient understands skin testing risks can include, but are not limited to, urticaria, angioedema, shortness of breath, and severe anaphylaxis.  The benefits include, but are not limited, to evaluation for allergens causing symptoms.  After answering the patient's questions they have agreed to proceed with skin testing.    ENVIRONMENTAL PERCUTANEOUS SKIN TESTING: ADULT      3/12/2024     9:00 AM   [facility]   Consent Y   Ordering Physician Tye   Interpreting Physician Tye   Testing Technician Roseanne   Location Back   Time start: 09:30   Time End: 09:45   Positive Control: Histatrol*ALK 1 mg/ml 8/15   Negative Control: 50% Glycerin 0   Cat Hair*ALK (10,000 BAU/ml) 0   AP Dog Hair/Dander (1:100 w/v) 0   Dust Mite p. 30,000 AU/ml 0   Dust Mite f. (30,000 AU/ml) 0   Solitario (W/F in millimeters) 5/20   Titus Grass (100,000 BAU/mL) 8/40   Red [place] (W/F in millimeters) 0   Maple/[place] (W/F in millimeters) 3/15   Hackberry (W/F in millimeters) 6/20   White Heath (W/F in millimeters) 15/20   [place]  *ALK (W/F in millimeters) 0   American Elm (W/F in millimeters) 0   Britton (W/F in millimeters) 4/15   Black Helen (W/F in millimeters) 3/10   Birch Mix (W/F in millimeters) 8/25   [place] (W/F in millimeters) 6/15   [place] (W/F in millimeters) 8/20   Cocklebur (W/F in millimeters) 0   Chickasha (W/F in millimeters) 3/10   White Teri (W/F in millimeters) 8/20   Careless (W/F in millimeters) 3/10   Nettle (W/F in millimeters) 0   English Plantain (W/F in millimeters) 5/10   Kochia (W/F in millimeters) 6/10   Lamb's Quarter (W/F in millimeters) 3/10   Marshelder (W/F in millimeters) 0   Ragweed Mix* ALK (W/F in millimeters) 20/30   Russian Thistle (W/F in millimeters) 4/15   Sagebrush/Mugwort (W/F in millimeters) 5/10   Sheep Sorrel (W/F in millimeters) 0   Feather Mix* ALK (W/F in millimeters) 0   Penicillium Mix (1:10 w/v) 0   Curvularia spicifera (1:10 w/v) 0   Epicoccum (1:10 w/v) 0   Aspergillus fumigatus (1:10 w/v): 0   Alternaria tenius (1:10 w/v) 0   H. Cladosporium (1:10 w/v) 0   Phoma herbarum (1:10 w/v) 0      My interpretation: SPT for aeroallergens performed today (March 12, 2024) showed sensitivity to grass pollen (Solitario and Titus), tree pollen (maple/Box Elder, Hackberry, Hickory, Britton, Black Helen, Birch mix, [place], oak, Chickasha, and white teri), and weed pollen (careless, English planting, Kochia, lambs quarter, ragweed mix, Russian thistle, and sagebrush/mugwort).  The rest was negative with appropriate responses to positive and negative controls.    ASSESSMENT/PLAN:    Other allergy, initial encounter  We discussed the difference between the celiac disease and IgE mediated food allergy.  She will continue celiac disease management with GI and nutrition.  In regards to symptoms related to peanuts and peanut butter, and selective fruits and vegetables, I favor pollen food syndrome as the most possible examination of her symptoms.  She was positive for grass pollen, ragweed pollen, and birch  tree pollen, frequently associated with pollen food syndrome.  Considering that she develops rhinoconjunctivitis symptoms with ingestion, I would recommend to consider strict avoidance of those foods being fresh.  She may still try eating those foods cooked.  -Ordered serum IgE for corn, peanut, cantaloupe, banana, kiwi, tomato, watermelon.   - I will order component resolved diagnostics for peanut, hazelnut, and walnuts to better understand the true etiology of symptoms (systemic versus PFS).  Epinephrine autoinjector prescribed.  Anaphylaxis action plan was reviewed and provided.      - Allergen corn IgE  - Allergen peanut IgE  - Peanut Component Allergy Panel  - Allergen Cantalope  - Allergen banana IgE  - Allergen Food Kiwi IgE  - Allergen tomato IgE  - Allergen watermelon IgE  - Hazelnut Component Allergy Panel  - Allergen hazelnut IgE  - ALLERGY SKIN TESTS,ALLERGENS  - EPINEPHrine (ANY BX GENERIC EQUIV) 0.3 MG/0.3ML injection 2-pack  Dispense: 2 each; Refill: 3  - Allergen walnuts IgE  - Allergen [place] rJug r 3 IgE  - Allergen [place] rJug r 1 IgE    Seasonal allergic rhinitis due to pollen  Avoidance measures were discussed, and information was provided based on the skin test results.  - Use intranasal fluticasone (Flonase) 1-2 sprays in each nostril once daily.  - Add azelastine nasal spray, 2 sprays in each nostril twice daily as needed.    - fluticasone (FLONASE) 50 MCG/ACT nasal spray  Dispense: 16 g; Refill: 3  - azelastine (ASTELIN) 0.1 % nasal spray  Dispense: 30 mL; Refill: 3    Wheezing  -Use AirSupra inhaler 2 puffs every 4 hours as needed for chest tightness/wheezing/shortness of breath/persistent cough. Use it with a spacer/chamber device.    - Albuterol-Budesonide (AIRSUPRA) 90-80 MCG/ACT AERO  Dispense: 10.7 g; Refill: 3     Follow-up in 3 months or sooner if needed.    Thank you for allowing us to participate in the care of this patient. Please feel free to contact us if there are any questions  or concerns about the patient.    Disclaimer: This note consists of symbols derived from keyboarding, dictation and/or voice recognition software. As a result, there may be errors in the script that have gone undetected. Please consider this when interpreting information found in this chart.    Jose Gamble MD, FAAAAI, FACAAI  Allergy and Asthma     [facility]        Again, thank you for allowing me to participate in the care of your patient.        Sincerely,        Jose Gamble MD

## 2024-03-12 NOTE — LETTER
ANAPHYLAXIS ALLERGY PLAN    Name: Vivian Brown      :  1970    Allergy to: Pollen food syndrome that can affect systemically.    Weight: 174 lbs 13.2 oz           Asthma:  Possibly  (higher risk for a severe reaction)      Do not depend on antihistamines or inhalers (bronchodilators) to treat a severe reaction; USE EPINEPHRINE      MEDICATIONS/DOSES  Epinephrine:  EpiPen/Adrenaclick/Auvi-Q   Epinephrine dose:  0.3 mg IM  Antihistamine:  Zyrtec (Cetirizine)  Antihistamine dose:  20  Other (e.g., inhaler-bronchodilator if wheezing):  AirSupra 2 puffs         ANAPHYLAXIS ALLERGY PLAN (Page 2)  Patient:  Vivian Brown  :  1970         Electronically signed on 2024 by:  Jose Gamble MD  Parent/Guardian Authorization Signature:  ___________________________ Date:    FORM PROVIDED COURTESY OF FOOD ALLERGY RESEARCH & EDUCATION (FARE) ([email or web address]) 2017

## 2024-03-12 NOTE — PATIENT INSTRUCTIONS
Avoid foods that bother you.  With foods and veggies, you can try to eat those foods cooked.  A lot of patients with pollen food syndrome, have no issues tolerating them.  - Use intranasal fluticasone (Flonase) 1-2 sprays in each nostril once daily.  - Add azelastine nasal spray, 2 sprays in each nostril twice daily as needed.    -Use AirSupra inhaler 2 puffs every 4 hours as needed for chest tightness/wheezing/shortness of breath/persistent cough. Use it with a spacer/chamber device.    Follow-up in 3 months or sooner if needed    Dr Gamble [phone]    All visits for food challenges, venom allergy testing, and medication/drug challenges MUST be scheduled through the allergy clinic nurse. Please send a brands4friends message or call the allergy scheduling line and ask to speak with Dr Gamble's team for scheduling these appointments. Appointments for these visits that are made through the schedulers or via brands4friends may be cancelled or rescheduled.    Allergy Shot Room [facility]): [phone]    Pulmonary Function [phone]    Prescription Assistance  If you need assistance with your prescriptions (cost, coverage, etc) please contact: [place] Pres[facility] Assistance [facility] [phone]

## 2024-03-12 NOTE — NURSING NOTE
RN reviewed Anaphylaxis Action Plan with patient. Educated on the symptoms and treatment of anaphylaxis. Went through the different ways that a reaction can present, and the body systems that it can affect. Patient verbalized understanding.     Writer demonstrated how to use an EpiPen auto-injector.  Patient instructed to form a fist around the auto-injector, remove blue safety release by pulling straight up, then firmly push orange tip against outer thigh so it clicks, holding for 3 seconds.  Patient advised that once used, needle will not be exposed, as orange tip extends.  Patient advised to call 911 or go to emergency department after epi-pen use for further monitoring.         Roseanne Elizabeth MSN, RN   Specialty Clinic, 3/12/2024 10:13 AM

## 2024-03-13 ENCOUNTER — CARE COORDINATION (OUTPATIENT)
Dept: TRANSPLANT | Facility: CLINIC | Age: 54
End: 2024-03-13
Payer: COMMERCIAL

## 2024-03-13 NOTE — PROGRESS NOTES
[facility]  RN Coordinator Pre-Visit Documentation      Vivian Brown is a 54 year old female who has been referred to the [facility] for hematopoietic cell transplant or immune effector cell therapy.      Referring MD Name: Jomar Chandler    Referring RN Coordinator: Kristen Henley    Reason for referral: amyloidosis    Link to BMT & CT Program Algorithms        All relevant clinical notes, labs, imaging, and pathology may be reviewed in Epic Bookmarks under name: Ana Gonzalez      Patient Care Team         Relationship Specialty Notifications Start End    Alin Torres PA-C PCP - General Family Medicine  11/4/20     Phone: [phone] Fax: [phone] [address] [facility] [address]    Sarthak Ontiveros MD Assigned OBGYN Provider   9/24/22     Phone: [phone] Fax: [phone]         [address]  [facility] [address]    Alin Torres PA-C Assigned PCP   12/31/22     Phone: [phone] Fax: [phone]         [address] [facility] [address]    Carmen Waggoner APRN CNP Nurse Practitioner Gastroenterology  11/21/23     Phone: [phone] Fax: [phone]         [address] [facility] [address] [place] [address]    Musa Adrian MD Physician Hematology & Oncology  12/26/23     Phone: [phone] Fax: [phone]         [address] [facility] [address] [facility] [address]    Jomar Chandler MD MD Hematology & Oncology Abnormal results only, Admissions 12/26/23     Phone: [phone] Fax: [phone]         [address] [address]    Gaston Flores MD MD Cardiovascular Disease  1/5/24     Phone: [phone] Fax: [phone] [address] [facility] [address]    Kristen Henley, RN Specialty Care Coordinator Hematology & Oncology Admissions 1/12/24     Tracie Dobson MD Assigned Nephrology Provider   1/13/24     Phone: [phone] Fax: [phone] [address] [facility]  [facility] [address]    Carmen Waggoner APRN CNP Assigned Surgical Provider   1/25/24     Phone: [phone] Fax: [phone]         [address] [facility] [address] [facility] [address] [place] [address]    Suha Armstrong MD Assigned Heart and Vascular Provider   2/2/24     Phone: [phone] Fax: [phone] [address] [facility] [address]    Dian Read APRN CNP Assigned Cancer Care Provider   2/23/24     Phone: [phone] Pager: [phone] Fax: [phone]         [facility] [address]    Jose Gamble MD MD Allergy & Immunology  3/1/24     Phone: [phone] Fax: [phone]         [address] [place] [address]              Ana Gonzalez RN

## 2024-03-14 ENCOUNTER — DOCUMENTATION ONLY (OUTPATIENT)
Dept: MULTI SPECIALTY CLINIC | Facility: CLINIC | Age: 54
End: 2024-03-14
Payer: COMMERCIAL

## 2024-03-14 LAB
DEPRECATED MISC ALLERGEN IGE RAST QL: NORMAL
KIWIFRUIT IGE QN: <0.1 KU/L
MELON IGE QN: <0.1 KU/L

## 2024-03-15 ENCOUNTER — OFFICE VISIT (OUTPATIENT)
Dept: TRANSPLANT | Facility: CLINIC | Age: 54
End: 2024-03-15
Attending: STUDENT IN AN ORGANIZED HEALTH CARE EDUCATION/TRAINING PROGRAM
Payer: COMMERCIAL

## 2024-03-15 ENCOUNTER — TELEPHONE (OUTPATIENT)
Dept: CARDIOLOGY | Facility: CLINIC | Age: 54
End: 2024-03-15

## 2024-03-15 ENCOUNTER — INFUSION THERAPY VISIT (OUTPATIENT)
Dept: ONCOLOGY | Facility: CLINIC | Age: 54
End: 2024-03-15
Attending: STUDENT IN AN ORGANIZED HEALTH CARE EDUCATION/TRAINING PROGRAM
Payer: COMMERCIAL

## 2024-03-15 ENCOUNTER — APPOINTMENT (OUTPATIENT)
Dept: LAB | Facility: CLINIC | Age: 54
End: 2024-03-15
Attending: STUDENT IN AN ORGANIZED HEALTH CARE EDUCATION/TRAINING PROGRAM
Payer: COMMERCIAL

## 2024-03-15 VITALS
RESPIRATION RATE: 16 BRPM | WEIGHT: 173 LBS | DIASTOLIC BLOOD PRESSURE: 78 MMHG | HEART RATE: 128 BPM | TEMPERATURE: 98 F | SYSTOLIC BLOOD PRESSURE: 109 MMHG | BODY MASS INDEX: 28.79 KG/M2 | OXYGEN SATURATION: 97 %

## 2024-03-15 DIAGNOSIS — E85.81 AL AMYLOIDOSIS (H): Primary | ICD-10-CM

## 2024-03-15 DIAGNOSIS — I43 AMYLOID HEART DISEASE (H): ICD-10-CM

## 2024-03-15 DIAGNOSIS — E85.4 AMYLOID HEART DISEASE (H): ICD-10-CM

## 2024-03-15 DIAGNOSIS — Z71.9 VISIT FOR COUNSELING: Primary | ICD-10-CM

## 2024-03-15 LAB
ANION GAP SERPL CALCULATED.3IONS-SCNC: 7 MMOL/L (ref 7–15)
BASOPHILS # BLD AUTO: 0.1 10E3/UL (ref 0–0.2)
BASOPHILS NFR BLD AUTO: 1 %
BUN SERPL-MCNC: 20.5 MG/DL (ref 6–20)
CALCIUM SERPL-MCNC: 8.4 MG/DL (ref 8.6–10)
CHLORIDE SERPL-SCNC: 105 MMOL/L (ref 98–107)
CREAT SERPL-MCNC: 1.35 MG/DL (ref 0.51–0.95)
DEPRECATED HCO3 PLAS-SCNC: 26 MMOL/L (ref 22–29)
EGFRCR SERPLBLD CKD-EPI 2021: 46 ML/MIN/1.73M2
EOSINOPHIL # BLD AUTO: 0.2 10E3/UL (ref 0–0.7)
EOSINOPHIL NFR BLD AUTO: 2 %
ERYTHROCYTE [DISTWIDTH] IN BLOOD BY AUTOMATED COUNT: 18.4 % (ref 10–15)
GLUCOSE SERPL-MCNC: 84 MG/DL (ref 70–99)
HCT VFR BLD AUTO: 41.5 % (ref 35–47)
HGB BLD-MCNC: 13.8 G/DL (ref 11.7–15.7)
IGA SERPL-MCNC: 36 MG/DL (ref 84–499)
IGG SERPL-MCNC: 116 MG/DL (ref 610–1616)
IGM SERPL-MCNC: 33 MG/DL (ref 35–242)
IMM GRANULOCYTES # BLD: 0.1 10E3/UL
IMM GRANULOCYTES NFR BLD: 1 %
KAPPA LC FREE SER-MCNC: 1.33 MG/DL (ref 0.33–1.94)
KAPPA LC FREE/LAMBDA FREE SER NEPH: 0.68 {RATIO} (ref 0.26–1.65)
LAMBDA LC FREE SERPL-MCNC: 1.96 MG/DL (ref 0.57–2.63)
LYMPHOCYTES # BLD AUTO: 1.8 10E3/UL (ref 0.8–5.3)
LYMPHOCYTES NFR BLD AUTO: 16 %
MCH RBC QN AUTO: 28.5 PG (ref 26.5–33)
MCHC RBC AUTO-ENTMCNC: 33.3 G/DL (ref 31.5–36.5)
MCV RBC AUTO: 86 FL (ref 78–100)
MONOCYTES # BLD AUTO: 1 10E3/UL (ref 0–1.3)
MONOCYTES NFR BLD AUTO: 9 %
NEUTROPHILS # BLD AUTO: 7.8 10E3/UL (ref 1.6–8.3)
NEUTROPHILS NFR BLD AUTO: 71 %
NRBC # BLD AUTO: 0 10E3/UL
NRBC BLD AUTO-RTO: 0 /100
PLATELET # BLD AUTO: 409 10E3/UL (ref 150–450)
POTASSIUM SERPL-SCNC: 4 MMOL/L (ref 3.4–5.3)
RBC # BLD AUTO: 4.84 10E6/UL (ref 3.8–5.2)
SODIUM SERPL-SCNC: 138 MMOL/L (ref 135–145)
WBC # BLD AUTO: 11 10E3/UL (ref 4–11)

## 2024-03-15 PROCEDURE — 86334 IMMUNOFIX E-PHORESIS SERUM: CPT | Performed by: PATHOLOGY

## 2024-03-15 PROCEDURE — 99213 OFFICE O/P EST LOW 20 MIN: CPT | Mod: 25 | Performed by: STUDENT IN AN ORGANIZED HEALTH CARE EDUCATION/TRAINING PROGRAM

## 2024-03-15 PROCEDURE — 36415 COLL VENOUS BLD VENIPUNCTURE: CPT

## 2024-03-15 PROCEDURE — 250N000012 HC RX MED GY IP 250 OP 636 PS 637: Performed by: STUDENT IN AN ORGANIZED HEALTH CARE EDUCATION/TRAINING PROGRAM

## 2024-03-15 PROCEDURE — 80048 BASIC METABOLIC PNL TOTAL CA: CPT

## 2024-03-15 PROCEDURE — 96401 CHEMO ANTI-NEOPL SQ/IM: CPT

## 2024-03-15 PROCEDURE — 83521 IG LIGHT CHAINS FREE EACH: CPT

## 2024-03-15 PROCEDURE — 86334 IMMUNOFIX E-PHORESIS SERUM: CPT | Mod: 26 | Performed by: PATHOLOGY

## 2024-03-15 PROCEDURE — 84155 ASSAY OF PROTEIN SERUM: CPT

## 2024-03-15 PROCEDURE — 250N000011 HC RX IP 250 OP 636: Mod: JZ | Performed by: STUDENT IN AN ORGANIZED HEALTH CARE EDUCATION/TRAINING PROGRAM

## 2024-03-15 PROCEDURE — 82784 ASSAY IGA/IGD/IGG/IGM EACH: CPT

## 2024-03-15 PROCEDURE — G2211 COMPLEX E/M VISIT ADD ON: HCPCS | Performed by: STUDENT IN AN ORGANIZED HEALTH CARE EDUCATION/TRAINING PROGRAM

## 2024-03-15 PROCEDURE — 85025 COMPLETE CBC W/AUTO DIFF WBC: CPT | Performed by: STUDENT IN AN ORGANIZED HEALTH CARE EDUCATION/TRAINING PROGRAM

## 2024-03-15 PROCEDURE — 84165 PROTEIN E-PHORESIS SERUM: CPT | Mod: TC | Performed by: PATHOLOGY

## 2024-03-15 PROCEDURE — 84165 PROTEIN E-PHORESIS SERUM: CPT | Mod: 26 | Performed by: PATHOLOGY

## 2024-03-15 PROCEDURE — 99215 OFFICE O/P EST HI 40 MIN: CPT | Performed by: STUDENT IN AN ORGANIZED HEALTH CARE EDUCATION/TRAINING PROGRAM

## 2024-03-15 RX ORDER — CYCLOPHOSPHAMIDE 50 MG/1
500 CAPSULE ORAL
Qty: 40 CAPSULE | Refills: 0 | Status: SHIPPED | OUTPATIENT
Start: 2024-03-15 | End: 2024-05-10

## 2024-03-15 RX ADMIN — BORTEZOMIB 2.5 MG: 3.5 INJECTION, POWDER, LYOPHILIZED, FOR SOLUTION INTRAVENOUS; SUBCUTANEOUS at 09:42

## 2024-03-15 RX ADMIN — DARATUMUMAB AND HYALURONIDASE-FIHJ (HUMAN RECOMBINANT) 1800 MG: 1800; 30000 INJECTION SUBCUTANEOUS at 09:42

## 2024-03-15 RX ADMIN — DEXAMETHASONE 30 MG: 2 TABLET ORAL at 09:16

## 2024-03-15 ASSESSMENT — PAIN SCALES - GENERAL: PAINLEVEL: NO PAIN (0)

## 2024-03-15 NOTE — LETTER
3/15/2024         RE: Vivain Brown  [address] [facility] [id number]        Dear Colleague,    Thank you for referring your patient, Vivian Brown, to the [facility] BLOOD AND MARROW TRANSPLANT PROGRAM [place]. Please see a copy of my visit note below.         BMT Consultation    Disease presentation and baseline characteristics:   12/21/2023:  Final Diagnosis   A. kidney biopsy (needle):     Amyloidosis of the kidney, AL-type, lambda light chain-restricted, affecting the glomeruli, interstitium, and arterioles (see comment)     Mild chronic changes of the parenchyma, including:   - focal global glomerulosclerosis (3% of glomeruli)   - focal tubular atrophy and interstitial fibrosis (5% of the cortex)   - severe arterial sclerosis   Electronically signed by Joe Garcia MD on 12/23/2023 at  3:21 PM   Comment  UULAB   The biopsy reveals findings diagnostic of amyloidosis; the amyloid shows lambda light chain-restriction (AL amyloidosis), and the deposits are Congo red positive. The amyloid deposits affect the glomeruli extensively, and interstitium and arterioles focally. Other potential complications of paraproteins in the kidney are not seen, in particular, there is no evidence of light chain cast nephropathy, light chain deposition disease, or light chain tubulopathy.     1/8/24: NT-Pro , Troponin T 15    Date Treatment Name Response Side Effects / Toxicities   1/19/24 D-CyBorD x 2 cycles            HPI:  Please see my entry above for hematologic history.  Mrs. Brown presents today to discuss autologous transplant for AL amyloidosis.  She reports she is doing well overall.  She has been tolerating her induction therapy to date without major issues.  Energy and appetite remain good and she manages her daily activities at home without help.      ASSESSMENT AND PLAN:  We discussed the role of autologous transplant for AL amyloidosis in detail today.  I noted that the most  current treatment regimen (D-CyBorD, based on the ANDROMEDA study) has never been compared head-to-head with transplant.  Historically transplant has been considered standard of care for eligible patients, and it is unlikely we will get more data in a meaningful timeframe to influence her decision to proceed with transplant or not.  Based on her initial testing and organ involvement she does appear to be a candidate.    We reviewed autologous stem cell transplant in detail.  We discussed the process of transplant, including pre-transplant organ evaluation followed by stem cell collection, high-dose chemotherapy and stem cell infusion.  Risks of autologous transplant were outlined, including but not limited to low blood counts, need for transfusions, secondary malignancies, and possible hospitalization.  We also reviewed that following transplant for at least a month the patient will require a 24/7 caregiver and will need to remain in close proximity to our center with frequent clinic visits.  A month after transplant, assuming the patient is doing well they will resume care with their primary oncologist.  We discussed that after transplant maintenance therapy and repeat vaccinations are recommended.  We discussed that mortality rates with auto transplant in general are low, but are often higher in patients with amyloidosis.    After extended discussion Ms. Brown is interested in proceeding with transplant workup.  The tentative plan would be outpatient collections (assuming no concerning findings on workup), followed by inpatient transplant and staying through engraftment.  She should complete 4 cycles of D-CyBorD prior to coming for transplant workup.      Plan:   - Plan for transplant workup after 4 cycles of D-CyBorD  - Continue to follow with cardiology  - Continue to follow with nephrology    I spent 40 minutes in the care of this patient today, which included time necessary for preparation for the visit,  obtaining history, ordering medications/tests/procedures as medically indicated, review of pertinent medical literature, counseling of the patient, communication of recommendations to the care team, and documentation time.    Jomar Chandler MD    ROS:    10 point ROS neg other than the symptoms noted above in the HPI.      Past Medical History:   Diagnosis Date    Celiac disease     Family history of colon cancer     Colonoscoy q5 years next 9/2020       Past Surgical History:   Procedure Laterality Date    COLONOSCOPY N/A 9/28/2015    Procedure: COLONOSCOPY;  Surgeon: Eugenio Travis MD;  Location: [facility]    ESOPHAGOSCOPY, GASTROSCOPY, DUODENOSCOPY (EGD), COMBINED N/A 1/30/2024    Procedure: ESOPHAGOGASTRODUODENOSCOPY, WITH BIOPSY;  Surgeon: Melo Cloud MD;  Location: [facility]    IR RENAL BIOPSY RIGHT  12/21/2023    TONSILLECTOMY         Family History   Problem Relation Age of Onset    Colon Cancer Maternal Grandmother 91    Hypertension Mother     Hyperlipidemia Mother     Cancer Mother 65        GIST    Other Cancer Mother     Coronary Artery Disease Father         MI    Thyroid Disease Sister     Asthma Daughter        Social History     Tobacco Use    Smoking status: Never     Passive exposure: Past    Smokeless tobacco: Never    Tobacco comments:     Parents smoked during childhood in the house   Vaping Use    Vaping Use: Never used   Substance Use Topics    Alcohol use: Not Currently     Alcohol/week: 0.0 standard drinks of alcohol     Comment: 1-2 times/month    Drug use: No         Allergies   Allergen Reactions    Gluten Meal     Latex Itching    Seasonal Allergies         Current Outpatient Medications   Medication Sig Dispense Refill    acyclovir (ZOVIRAX) 400 MG tablet Take 1 tablet (400 mg) by mouth 2 times daily Viral Prophylaxis. 180 tablet 3    Albuterol-Budesonide (AIRSUPRA) 90-80 MCG/ACT AERO Inhale 2 Inhalations into the lungs every 4 hours as needed (Wheezing, chest tightness,  shortness of breath, or persistent cough) 10.7 g 3    azelastine (ASTELIN) 0.1 % nasal spray Spray 2 sprays into both nostrils 2 times daily as needed for rhinitis 30 mL 3    cholecalciferol (VITAMIN D3) 125 mcg (5000 units) capsule Take 1 capsule (125 mcg) by mouth daily 30 capsule 6    cycloPHOSphamide (CYTOXAN) 50 MG capsule Take 10 capsules (500 mg) by mouth every 7 days Take on days 1, 8, 15, and 22. 40 capsule 0    empagliflozin (JARDIANCE) 10 MG TABS tablet Take 1 tablet (10 mg) by mouth daily 30 tablet 11    EPINEPHrine (ANY BX GENERIC EQUIV) 0.3 MG/0.3ML injection 2-pack Inject 0.3 mLs (0.3 mg) into the muscle as needed for anaphylaxis May repeat one time in 5-15 minutes if response to initial dose is inadequate. 2 each 3    fluticasone (FLONASE) 50 MCG/ACT nasal spray Spray 2 sprays into both nostrils daily 16 g 3    furosemide (LASIX) 20 MG tablet Take 2 tablets (40 mg) by mouth 2 times daily 360 tablet 3    levothyroxine (SYNTHROID/LEVOTHROID) 200 MCG tablet Take 1 tablet (200 mcg) by mouth daily 90 tablet 3    levothyroxine (SYNTHROID/LEVOTHROID) 25 MCG tablet Take 1 tablet (25 mcg) by mouth daily      liothyronine (CYTOMEL) 5 MCG tablet Take 5 mcg by mouth 2 times daily      prochlorperazine (COMPAZINE) 5 MG tablet Take 1 tablet (5 mg) by mouth every 6 hours as needed for nausea or vomiting 30 tablet 1    rosuvastatin (CRESTOR) 10 MG tablet Take 1 tablet (10 mg) by mouth daily 90 tablet 3    spironolactone (ALDACTONE) 25 MG tablet Take 1 tablet (25 mg) by mouth daily 90 tablet 3         Physical Exam:     KPS:  90  General Appearance: alert, and no distress  Eyes: PERRL, conjunctiva and lids normal, sclera nonicteric  Ears/Nose/M/Throat: Oral mucosa and posterior oropharynx normal, moist mucous membranes  Cardio/Vascular:regular rate and rhythm, normal S1 and S2, no murmur  Resp Effort And Auscultation: Normal - Clear to auscultation without rales, rhonchi, or wheezing.  GI: soft, nontender, bowel  sounds present in all four quadrants, no hepatosplenomegaly  Musculoskeletal: Musculoskeletal normal  Edema: none  Skin: Skin color, texture, turgor normal. No rashes or lesions.  Neurologic: Gait normal.  Sensation grossly WNL.  Psych/Affect: Mood and affect are appropriate.    Blood Counts     Recent Labs   Lab Test 03/18/24  1102 03/15/24  0820 03/08/24  1348 03/01/24  1406   HGB 13.5 13.8 12.7 13.0   HCT 41.4 41.5 38.4 39.0   WBC 10.8 11.0 13.0* 10.7   ANEUTAUTO  --  7.8 9.9* 7.1   ALYMPAUTO  --  1.8 1.5 2.4   AMONOAUTO  --  1.0 1.1 0.8   AEOSAUTO  --  0.2 0.4 0.3   ABSBASO  --  0.1 0.1 0.1   NRBCMAN  --  0.0 0.0 0.0    409 378 409       Chemistries     Basic Panel  Recent Labs   Lab Test 03/18/24  1102 03/15/24  0820 03/08/24  1348    138 137   POTASSIUM 3.7 4.0 3.8   CHLORIDE 101 105 103   CO2 32* 26 27   BUN 21.1* 20.5* 25.0*   CR 1.41* 1.35* 1.33*   GLC 84 84 103*        Calcium, Magnesium, Phosphorus  Recent Labs   Lab Test 03/18/24  1102 03/15/24  0820 03/08/24  1348 02/06/24  0902 02/03/24  2140 01/18/24  1433 01/08/24  1201   TANNER 8.7 8.4* 8.3*   < > 8.1*   < > 8.6   MAG  --   --   --   --  2.0  --   --    PHOS 4.4  --  4.8*  --   --   --  4.3    < > = values in this interval not displayed.        LFTs  Recent Labs   Lab Test 03/18/24  1102 03/08/24  1348 03/01/24  1406 02/23/24  1317 02/16/24  1403   BILITOTAL  --   --  0.2 0.2 0.2   ALKPHOS  --   --  117 121 129   AST  --   --  22 20 17   ALT  --   --  23 21 21   ALBUMIN 2.3* 2.1* 2.1* 2.1* 2.1*       LDH  Recent Labs   Lab Test 01/18/24  1433          B2-Microglobulin  Recent Labs   Lab Test 01/18/24  1433   LTTR4FYMH 5.8*         Immunoglobulins     Recent Labs   Lab Test 03/15/24  0820 02/02/24  1437 01/18/24  1444   * 204* 294*       Recent Labs   Lab Test 03/15/24  0820 01/18/24  1444   IGA 36* 164       Recent Labs   Lab Test 03/15/24  0820 01/18/24  1444   IGM 33* 127         Monocloncal Protein Studies     M  spike    Recent Labs   Lab Test 03/15/24  0820 01/08/24  1201 11/28/23  1229   ELPM 0.1* 0.0 0.0       Success FLC    Recent Labs   Lab Test 03/15/24  0820 03/08/24  1348 02/24/24  0741 01/08/24  1201 11/28/23  1229   KFLCA 1.33 1.20 1.13 3.06* 3.05*       Lambda FLC    Recent Labs   Lab Test 03/15/24  0820 03/08/24  1348 02/24/24  0741 01/08/24  1201 11/28/23  1229   LFLCA 1.96 1.75 1.94 8.31* 7.82*       FLC Ratio    Recent Labs   Lab Test 03/15/24  0820 03/08/24  1348 02/24/24  0741 01/08/24  1201 11/28/23  1229   KLRA 0.68 0.69 0.58 0.37 0.39         Bone Marrow Biopsy       Morphology    Results for orders placed or performed in visit on 01/18/24 (from the past 8760 hour(s))   Bone marrow biopsy   Result Value    Final Diagnosis      Bone marrow, posterior iliac crest, left decalcified trephine biopsy and touch imprint;direct aspirate smear, and concentrated aspirate smear; and peripheral smear:  - Slightly hypocellular marrow (variable; overall 30-40%) with trilineage hematopoiesis, no overt dysplasia, no increase in blasts and lambda monotypic plasma cell population consistent with plasma cell neoplasm  - Positive congo red for amyloid deposition  - Peripheral blood showing normal hemogram and differential  - See comment       Comment      Flow cytometry analysis on concurrent specimen ([id number]) showed lambda-monotypic plasma cells. The findings are consistent with a plasma cell neoplasm favoring primary amyloidosis.  Concurrent ancillary studies are in progress and will be reported separately.  Correlation with the results of ancillary tests and clinical findings is recommended.      Clinical Information      Per Epic electronic medical records; 53 year old female with AL amyloidosis lambda light chain-restricted  of the kidney. This is a staging bone marrow biopsy.      Peripheral Hematologic Data      CBC WITH DIFFERENTIAL (01/18/2024 02:43 PM CST):     RESULT VALUE REF. RANGE UNITS   WBC  Count  Hemoglobin  Hematocrit  Platelet Count   RBC Count   MCV  MCH  MCHC   RDW  11.0 (NORMAL)    15.0 (NORMAL)     45.7 (NORMAL)  360 (NORMAL)   5.50  ( H )       83 (NORMAL)     27.3 (NORMAL)     32.8 (NORMAL)      15.9  ( H ) 4.0-11.0  11.7-15.7  35.0-47.0  150-450  3.80-5.20    26.5-33.0  31.5-36.5  10.0-15.0 10e3/uL  g/dL  %  10e3/uL  10e6/uL  fL  pg  g/dL  %   % Neutrophils  % Lymphocytes  % Monocytes  % Eosinophils  % Basophils  % Immature Granulocytes  Absolute Neutrophils  Absolute Lymphocytes  Absolute Monocytes  Absolute Eosinophils  Absolute Basophils  Absolute Immature Granulocytes  NRBCs per 100 WBC  Absolute NRBCs 59  28  10  2  1  0  6.4 (NORMAL)  3.0 (NORMAL)  1.1 (NORMAL)  0.3 (NORMAL)  0.1 (NORMAL)  0.0 (NORMAL)  0 (NORMAL)  0.0 () N/A  N/A  N/A  N/A  N/A  N/A  1.6-8.3  0.8-5.3  0.0-1.3  0.0-0.7  0.0-0.2  <=0.4  <1  <=0.0 %  %  %  %  %  %  10e3/uL  10e3/uL  10e3/uL  10e3/uL  10e3/uL  10e3/uL  /100  10e3/uL       Microscopic Description      PERIPHERAL BLOOD SMEAR MORPHOLOGY:  The red blood cells appear normochromic. Poikilocytosis  includes occasional echinocytes . Polychromasia is not increased. Rouleaux formation is moderately increased. Lymphocyte morphology is polymorphous. Neutrophils display predominantly normal cytoplasmic granularity and unremarkable nuclear morphology. The morphology of the platelets unremarkable.    Bone marrow aspirates and bone marrow trephine core biopsy touch imprints are reviewed.    BONE MARROW DIFFERENTIAL (500 cells on bone marrow biopsy touch imprints )  Percent Cell (reference range)  1  Blasts (0 - 1)  2  Neutrophil promyelocytes (2 - 4)  53  Neutrophils and precursors (54 - 63)  16  Erythroid precursors (18 - 24)  1  Monocytes (1 - 1.5)  3  Eosinophils (1 - 3)  0  Basophils (0 - 1)  15  Lymphocytes (8 - 12)  3  Plasma cells (0 - 1.5)    The aspirate smears are adequate for evaluation.    Granulocytes are adequate in number with progressive and complete  maturation; no overt dysplasia seen. Erythrocytes are adequate in number with progressive and complete maturation; no overt dysplasia seen. Megakaryocytes with unremarkable morphology are present.  A subset of lymphocytes have a morphology consistent with hematogones.      IRON STAINS:  Dacie iron stain on concentrate smears:  Iron stains show 76% sideroblasts. No ring sideroblasts are seen on scanning.     Prussian blue stain on particle crush:   Marrow iron stores are reduced    TREPHINE SECTIONS:  Hematoxylin and eosin stains are reviewed. The quality of the trephine core biopsy is adequate. The marrow cellularity is variable, ranging from 10-50%, overall estimated at 30-40%. The cellular composition reflects the differential. It shows trilinage hematopoesis. Megakaryocytes are are present with unremarkable morphology.    SPECIAL STAINS:  Congo Red:   Positive for amyloid deposition      IMMUNOHISTOCHEMISTRY:  Immunohistochemical stains are performed on the paraffin-embedded trephine core with appropriate controls.    Stains for , cytoplasmic kappa and lambda immunoglobulin light chains show lambda monotypic plasma cells comprising ~5% of marrow cellularity.      Note: These immunohistochemical stains are deemed medically necessary. Some of the antigens may also be evaluated by flow cytometry. Concurrent evaluation by immunohistochemistry on clot and/or trephine sections is indicated in this case in order to correlate immunophenotype with cell morphology and determine extent of involvement, spatial pattern, and focality of potential disease distribution.    Case was reviewed by the following:  Resident Pathologist: Aliyah Olmos MD  A resident or fellow in a training program was involved in the initial review, preparation, and/or interpretation of this case.  I, as the senior physician, attest that I have personally reviewed all specimens and or slides, including the listed special stains, and used them  with my medical judgement to determine the final diagnosis.              Gross Description      Procedure/Gross Description   Aspirate(s) and trephine(s) procured by RENETTA Clayton    Specimen sent for Special Studies:         Flow Cytometry: left aspirate        Cytogenetics: left aspirate         Biopsy aspiration site: left posterior iliac crest                                                      (Reference Range)          Amount of aspirate           3.2   mL        Fat and P.V. cell layer        1    %               (1 - 3)        Particles                             trace   %        Myeloid-erythroid layer    3    %               (5 - 8)          Clot Section: no    Trephine biopsy site: left posterior iliac crest    Designated left posterior iliac crest is 1 cylinder of gritty tissue, labeled with the patient's name and hospital number, obtained with 11 gauge needle and a length of 22 mm; entirely submitted in 1 cassette; acetic zinc formalin fixed, decalcified, processed, and stained for hematoxylin and eosin per laboratory protocol.        MCRS Yes (A)    Performing Labs      The technical component of this testing was completed at [facility]           Echocardiogram       Results for orders placed during the hospital encounter of 24    ECHOCARDIOGRAM COMPLETE    Status: Normal 1/3/2024    Narrative  [id number]  [id number]  [id number]  [id number]^LEENA^IQRA^DOMINIC    [facility]  Echocardiography Laboratory  [address] [place] [address]    Name: ADE NAVARRO  MRN: [id number]  : 1970  Study Date: 2024 09:59 AM  Age: 53 yrs  Gender: Female  Patient Location: [facility]  Reason For Study: Renal amyloidosis (H), Renal amyloidosis (H)  History: hyperlipidemia  Ordering Physician: IQRA STERN  Referring Physician: Alin Torres  Performed By: Beronica Dang    BSA: 1.9  "m2  Height: 64 in  Weight: 186 lb  HR: 82  BP: 90/60 mmHg  ______________________________________________________________________________  Procedure  Complete Echo Adult. Myocardial Strain Imaging performed.  ______________________________________________________________________________  Interpretation Summary    There is mild concentric left ventricular hypertrophy.  The right ventricle is normal in size and function.  There is borderline right ventricular hypertrophy.  Global peak LV longitudinal strain is averaged at -15.3%. This suggests  abnormal strain (normal <-18%).  The visual ejection fraction is estimated at 54-56%.  Trivial posterior pericardial effusion  Clinical history is listed as renal amyloid--on this echo the atria are normal  size, the valves are not thickend but there is mild LVH and borderline RVH,  there is questionable \"scintillation\" of LV myocardium, the global  longitudinal strain is abnormal and the best strain values are at the apex  (borderline \"apical sparing\" strain pattern) and there is trace pericardial  effusion along with borderline criteria for diastolic dysfunction grade2--take  together this could represent some features of cardiac amyloid. Additional  studies such as cardiac MRI, cardiac biopsy etc may be useful.  ______________________________________________________________________________  Left Ventricle  The left ventricle is normal in size. There is mild concentric left  ventricular hypertrophy. Global peak LV longitudinal strain is averaged at -  15.3%. This suggests abnormal strain (normal <-18%). The visual ejection  fraction is estimated at 54-56%. Normal left ventricular wall motion. There is  no thrombus seen in the left ventricle.    Right Ventricle  There is borderline right ventricular hypertrophy. The right ventricle is  normal in size and function.    Atria  Normal left atrial size. Right atrial size is normal.    Mitral Valve  The mitral valve leaflets appear " normal. There is no evidence of stenosis,  fluttering, or prolapse. There is physiologic mitral regurgitation.    Tricuspid Valve  Right ventricular systolic pressure could not be approximated due to  inadequate tricuspid regurgitation.    Aortic Valve  Normal tricuspid aortic valve.    Pulmonic Valve  The pulmonic valve is not well seen, but is grossly normal.    Vessels  Normal size aorta. The inferior vena cava is normal.    Pericardium  Trivial posterior pericardial effusion.    Rhythm  Sinus rhythm was noted.  ______________________________________________________________________________  MMode/2D Measurements & Calculations  IVSd: 1.3 cm    LVIDd: 3.7 cm  LVIDs: 2.2 cm  LVPWd: 1.4 cm  FS: 39.1 %  LV mass(C)d: 176.0 grams  LV mass(C)dI: 92.8 grams/m2  Ao root diam: 2.6 cm  LA dimension: 3.0 cm  asc Aorta Diam: 3.1 cm  LA/Ao: 1.1  Ao root diam index Ht(cm/m): 1.6  Ao root diam index BSA (cm/m2): 1.4  Asc Ao diam index BSA (cm/m2): 1.6  Asc Ao diam index Ht(cm/m): 1.9  LA Volume (BP): 26.8 ml    LA Volume Index (BP): 14.1 ml/m2  RWT: 0.77  TAPSE: 1.7 cm    Doppler Measurements & Calculations  MV E max benedicto: 77.6 cm/sec  MV A max benedicto: 61.7 cm/sec  MV E/A: 1.3  MV dec time: 0.17 sec  Ao V2 max: 124.2 cm/sec  Ao max P.0 mmHg  LV V1 max P.4 mmHg  LV V1 max: 76.7 cm/sec  PA V2 max: 85.7 cm/sec  PA max PG: 3.0 mmHg  PA acc time: 0.10 sec  AV Benedicto Ratio (DI): 0.62  E/E' av.8  Lateral E/e': 11.3  Medial E/e': 14.3  RV S Benedicto: 11.7 cm/sec    ______________________________________________________________________________  Report approved by: Danie Ragland 2024 02:25 PM        PET Scan       Results for orders placed during the hospital encounter of 24    PET ONCOLOGY WHOLE BODY    Status: Normal 2024    Narrative  Combined Report of: PET and CT on  2024 9:59 AM:    1. PET of the neck, chest, abdomen, and pelvis.  2. PET CT Fusion for Attenuation Correction and  Anatomical  Localization:  3. 3D MIP and PET-CT fused images were processed on an independent  workstation and archived to PACS and reviewed by a radiologist.    Technique:    1. PET: The patient received 10.54 mCi of F-18-FDG; the serum glucose  was 103 mg/dL prior to administration, body weight was 80 kg. Images  were evaluated in the axial, sagittal, and coronal planes as well as  the rotational whole body MIP. Images were acquired from the Vertex to  the Feet.    UPTAKE WAS MEASURED AT 62 MINUTES.    BACKGROUND:  Liver SUV max= 2.8,   Aorta Blood SUV max= 2.5.    2. CT: CT only obtained for attenuation correction and not diagnostic  purposes.    INDICATION: Biopsy-proven AL amyloidosis of kidney, workup for plasma  cell disorder; AL amyloidosis (H)    ADDITIONAL INFORMATION OBTAINED FROM EMR: 53-year-old female with  biopsy-proven renal amyloidosis (AL amyloidosis) and cardiac  involvement by MRI, PET CT requested for plasma cell disorder workup.    COMPARISON: None.    FOLLOW-UP APPOINTMENT: Unknown    FINDINGS:    HEAD/NECK:  Mildly hypermetabolic mildly enlarged bilateral level 2A nodes for  example a 1.5 x 1.2 cm right level IIA node, SUV max 4.2, series 3  image 71 and a 1.1 x 0.7 cm left level 2 node, SUV max 3.7, series 3  image 71.    Moderate diffuse thyroid uptake, SUV max 6.0, series 3 image 97.    CHEST:  Left lower lobe series 4 image [id number] there is nodularity along the  left lower lobe anterior segment bronchus, largest nodule measuring 8  mm there is focal hypermetabolism in this area max SUV 4.5.  Differential amyloid, infection, atelectasis.    There is mild uptake throughout the left ventricle which could  represent active amyloidosis however in the absence of a cardiac  preparation may simply be physiologic.  Calcified left hilar nodes and left lower lobe calcified granuloma. No  pneumothorax or pleural effusion.    ABDOMEN AND PELVIS:  No abnormal uptake.    No calcified gallstones.  Calcified splenic granulomas. No adrenal  nodule. Left inferior pole nonobstructing intrarenal calculus, no  hydronephrosis. No small or large bowel obstruction. Intrauterine  device in place. No free air or free fluid in the abdomen or pelvis.    LOWER EXTREMITIES:  A single area of focal moderate to markedly hypermetabolic region in  the proximal posterior left gastrocnemius muscle, SUV max 8.6 series 3  image 416.    BONES AND SOFT TISSUES:  No abnormal uptake.  Old anterior rib fracture deformities.    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 No definite active amyloid on FDG pet.  2. Single focal moderate to markedly avid intramuscular uptake,  proximal left gastrocnemius, no etiology demonstrated on unenhanced  non diagnostic CT.  Recommend dedicated MRI.    3. Small to mildly enlarged mildly hypermetabolic bilateral level 2A  lymph nodes, most likely reactive.    4. A few equivocal findings - unlikely to be active amyloid (patient  has been under treatment, so FDG may be decreased).  Recommend future  PET scans be done with a sarcoid cardiac prep and and Lasix protocol  to decrease background and make them more sensitive for cardiac and  renal activity.    4a. Mildly hypermetabolic left lower lobe anterior segment  peribronchial nodularity and hypermetabolism. Differential favors RSV  (positive 1/18/24), focal amyloid unlikely.  4b. Cardiac (MRI positive) - equivocal FDG throughout left ventricle,  likely physiologic. Given findings on MRI 1/16/2024, can not rule out  active amyloidosis. However in the absence of a cardiac dietary prep  (which switches myocardium from glucose to fatty metabolism), this is  likely normal physiology.  Suggest future PET scans be done with a  cardiac sarcoid dietary protocol (3 day ketogenic diet).  4c. Kidney cortex (bx. 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        I have personally reviewed the examination and initial interpretation  and I agree with the findings.    JOMAR VELASQUEZ MD      SYSTEM ID:  [id number]     The longitudinal plan of care for the diagnosis(es)/condition(s) as documented were addressed during this visit. Due to the added complexity in care, I will continue to support Vivian in the subsequent management and with ongoing continuity of care.    ------------------------------------------------------------------------------------------------------------------------------------------------    Patient Care Team         Relationship Specialty Notifications Start End    Alin Torres PA-C PCP - General Family Medicine  11/4/20     Phone: [phone] Fax: [phone] [address] [facility] [address]    Sarthak Ontiveros MD Assigned OBGYN Provider   9/24/22     Phone: [phone] Fax: [phone]         [address] [address]    Alin Torres PA-C Assigned PCP   12/31/22     Phone: [phone] Fax: [phone]         [address] [facility] [address]    Carmen Waggoner APRN CNP Nurse Practitioner Gastroenterology  11/21/23     Phone: [phone] Fax: [phone]         [address] [place] [address]    Audrey Sen DO Assigned Nephrology Provider   12/9/23     Phone: [phone] Fax: [phone]         [address] [facility] [address]    Musa Adrian MD Physician Hematology & Oncology  12/26/23     Phone: [phone] Fax: [phone]         [address] [facility] [address] [facility] [address]    Jomar Chandler MD MD Hematology & Oncology  12/26/23     Phone: [phone] Fax: [phone]         [address] [address]    Gaston Flores MD MD Cardiovascular Disease  1/5/24     Phone: [phone] Fax:  [phone]         [address] [place] [address]            Jomar Chandler MD

## 2024-03-15 NOTE — TELEPHONE ENCOUNTER
----- Message from Suha Armstrong MD sent at 3/15/2024  1:22 PM CDT -----  Ok lets keep as it.  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 Pt feeling well, BP's stable 100's/70's. Creat is trending up tho.     Loida  ----- Message -----  From: Loida Germain, RN  Sent: 3/15/2024  12:00 AM CDT  To: Loida Germain RN    2/29-increase nicole to 25 ,labs on 3/15

## 2024-03-15 NOTE — NURSING NOTE
"Oncology Rooming Note    March 15, 2024 12:50 PM   Vivian Brown is a 54 year old female who presents for:    Chief Complaint   Patient presents with    Oncology Clinic Visit     AL amyloidosis     Initial Vitals: There were no vitals taken for this visit. Estimated body mass index is 28.79 kg/m  as calculated from the following:    Height as of 1/26/24: 1.651 m (5' 5\").    Weight as of an earlier encounter on 3/15/24: 78.5 kg (173 lb). There is no height or weight on file to calculate BSA.  Data Unavailable Comment: Data Unavailable   No LMP recorded. (Menstrual status: IUD).  Allergies reviewed: Yes  Medications reviewed: Yes    Medications: Medication refills not needed today.  Pharmacy name entered into [employer]:    [facility] [place], [place] - [address] [facility] [place], [place] - [address] [id number] - [place], [place] - [address] [id number] - [place], [place] - [address]  [facility] - [place], [place] - [address]  [facility] - [place], [place] - [address]  [facility] AND [facility]    Frailty Screening:   Is the patient here for a new oncology consult visit in cancer care? 2. No      Clinical concerns: None       Joyce De Jesus CMA            "

## 2024-03-15 NOTE — PROGRESS NOTES
Infusion Nursing Note:  Vivian Brown presents today for C2D22 Darzalex Faspro, Velcade SubQ.    Patient seen by provider today: No   present during visit today: Not Applicable.    Note: Patient arrives in infusion feeling well overall. Cough has improved over the last week. Patient denies fever, chills, and shortness of breath.    Serum creatinine 1.35 today. Angelica Thorpe notified and gave the go ahead to proceed with treatment. Patient to see Dr. Viramontes later today.        Intravenous Access:  No Intravenous access/labs at this visit.    Treatment Conditions:  Lab Results   Component Value Date    HGB 13.8 03/15/2024    WBC 11.0 03/15/2024    ANEU 5.6 03/09/2020    ANEUTAUTO 7.8 03/15/2024     03/15/2024        Lab Results   Component Value Date     03/15/2024    POTASSIUM 4.0 03/15/2024    MAG 2.0 02/03/2024    CR 1.35 (H) 03/15/2024    TANNER 8.4 (L) 03/15/2024    BILITOTAL 0.2 03/01/2024    ALBUMIN 2.1 (L) 03/08/2024    ALT 23 03/01/2024    AST 22 03/01/2024       Results reviewed, labs MET treatment parameters, ok to proceed with treatment.      Post Infusion Assessment:  Patient tolerated injection without incident.   Velcade Right lower abdomen.  Darzalex Left lower Abdomen.      Discharge Plan:   Patient declined prescription refills.  Discharge instructions reviewed with: Patient and Family.  Patient and/or family verbalized understanding of discharge instructions and all questions answered.  AVS to patient via [employer].  Patient will return in the future for next appointment. Active requests are in a patient knows to watch [employer] for next appointment.  Patient discharged in stable condition accompanied by: self and sister.  Departure Mode: Ambulatory.      Hernan Morgan RN

## 2024-03-16 LAB — TOTAL PROTEIN SERUM FOR ELP: 3.8 G/DL (ref 6.4–8.3)

## 2024-03-18 ENCOUNTER — LAB (OUTPATIENT)
Dept: LAB | Facility: CLINIC | Age: 54
End: 2024-03-18
Payer: COMMERCIAL

## 2024-03-18 ENCOUNTER — OFFICE VISIT (OUTPATIENT)
Dept: NEPHROLOGY | Facility: CLINIC | Age: 54
End: 2024-03-18
Attending: INTERNAL MEDICINE
Payer: COMMERCIAL

## 2024-03-18 ENCOUNTER — LAB (OUTPATIENT)
Dept: LAB | Facility: CLINIC | Age: 54
End: 2024-03-18
Attending: INTERNAL MEDICINE
Payer: COMMERCIAL

## 2024-03-18 VITALS
OXYGEN SATURATION: 96 % | BODY MASS INDEX: 28.89 KG/M2 | WEIGHT: 173.6 LBS | DIASTOLIC BLOOD PRESSURE: 73 MMHG | SYSTOLIC BLOOD PRESSURE: 107 MMHG | HEART RATE: 98 BPM

## 2024-03-18 DIAGNOSIS — N04.9 NEPHROTIC SYNDROME: Primary | ICD-10-CM

## 2024-03-18 DIAGNOSIS — N18.31 CHRONIC KIDNEY DISEASE, STAGE 3A (H): ICD-10-CM

## 2024-03-18 DIAGNOSIS — N04.9 NEPHROTIC SYNDROME: ICD-10-CM

## 2024-03-18 DIAGNOSIS — E55.9 VITAMIN D DEFICIENCY: ICD-10-CM

## 2024-03-18 DIAGNOSIS — E85.81 AL AMYLOIDOSIS (H): ICD-10-CM

## 2024-03-18 DIAGNOSIS — E85.81 AL AMYLOIDOSIS (H): Primary | ICD-10-CM

## 2024-03-18 LAB
ALBUMIN MFR UR ELPH: 363 MG/DL
ALBUMIN SERPL BCG-MCNC: 2.3 G/DL (ref 3.5–5.2)
ALBUMIN SERPL ELPH-MCNC: 1.8 G/DL (ref 3.7–5.1)
ALBUMIN UR-MCNC: 300 MG/DL
ALPHA1 GLOB SERPL ELPH-MCNC: 0.3 G/DL (ref 0.2–0.4)
ALPHA2 GLOB SERPL ELPH-MCNC: 1 G/DL (ref 0.5–0.9)
ANION GAP SERPL CALCULATED.3IONS-SCNC: 5 MMOL/L (ref 7–15)
APPEARANCE UR: CLEAR
B-GLOBULIN SERPL ELPH-MCNC: 0.5 G/DL (ref 0.6–1)
BILIRUB UR QL STRIP: NEGATIVE
BUN SERPL-MCNC: 21.1 MG/DL (ref 6–20)
CALCIUM SERPL-MCNC: 8.7 MG/DL (ref 8.6–10)
CHLORIDE SERPL-SCNC: 101 MMOL/L (ref 98–107)
COLOR UR AUTO: ABNORMAL
CREAT SERPL-MCNC: 1.41 MG/DL (ref 0.51–0.95)
CREAT UR-MCNC: 21.7 MG/DL
CREAT UR-MCNC: 21.8 MG/DL
DEPRECATED HCO3 PLAS-SCNC: 32 MMOL/L (ref 22–29)
EGFRCR SERPLBLD CKD-EPI 2021: 44 ML/MIN/1.73M2
ERYTHROCYTE [DISTWIDTH] IN BLOOD BY AUTOMATED COUNT: 18.4 % (ref 10–15)
GAMMA GLOB SERPL ELPH-MCNC: 0.2 G/DL (ref 0.7–1.6)
GLUCOSE SERPL-MCNC: 84 MG/DL (ref 70–99)
GLUCOSE UR STRIP-MCNC: 150 MG/DL
HCT VFR BLD AUTO: 41.4 % (ref 35–47)
HGB BLD-MCNC: 13.5 G/DL (ref 11.7–15.7)
HGB UR QL STRIP: ABNORMAL
KETONES UR STRIP-MCNC: NEGATIVE MG/DL
LEUKOCYTE ESTERASE UR QL STRIP: NEGATIVE
LOCATION OF TASK: ABNORMAL
M PROTEIN SERPL ELPH-MCNC: 0.1 G/DL
MCH RBC QN AUTO: 28.4 PG (ref 26.5–33)
MCHC RBC AUTO-ENTMCNC: 32.6 G/DL (ref 31.5–36.5)
MCV RBC AUTO: 87 FL (ref 78–100)
MICROALBUMIN UR-MCNC: 2439 MG/L
MICROALBUMIN/CREAT UR: ABNORMAL MG/G CR (ref 0–25)
NITRATE UR QL: NEGATIVE
PH UR STRIP: 7 [PH] (ref 5–7)
PHOSPHATE SERPL-MCNC: 4.4 MG/DL (ref 2.5–4.5)
PLATELET # BLD AUTO: 333 10E3/UL (ref 150–450)
POTASSIUM SERPL-SCNC: 3.7 MMOL/L (ref 3.4–5.3)
PROT PATTERN SERPL ELPH-IMP: ABNORMAL
PROT/CREAT 24H UR: 16.65 MG/MG CR (ref 0–0.2)
RBC # BLD AUTO: 4.75 10E6/UL (ref 3.8–5.2)
RBC URINE: 1 /HPF
SODIUM SERPL-SCNC: 138 MMOL/L (ref 135–145)
SP GR UR STRIP: 1.01 (ref 1–1.03)
SQUAMOUS EPITHELIAL: <1 /HPF
UROBILINOGEN UR STRIP-MCNC: NORMAL MG/DL
VIT D+METAB SERPL-MCNC: 8 NG/ML (ref 20–50)
WBC # BLD AUTO: 10.8 10E3/UL (ref 4–11)
WBC URINE: 1 /HPF

## 2024-03-18 PROCEDURE — 99213 OFFICE O/P EST LOW 20 MIN: CPT | Performed by: INTERNAL MEDICINE

## 2024-03-18 PROCEDURE — 85027 COMPLETE CBC AUTOMATED: CPT | Performed by: PATHOLOGY

## 2024-03-18 PROCEDURE — 82570 ASSAY OF URINE CREATININE: CPT | Performed by: INTERNAL MEDICINE

## 2024-03-18 PROCEDURE — 84156 ASSAY OF PROTEIN URINE: CPT | Performed by: PATHOLOGY

## 2024-03-18 PROCEDURE — 99215 OFFICE O/P EST HI 40 MIN: CPT | Performed by: INTERNAL MEDICINE

## 2024-03-18 PROCEDURE — 99000 SPECIMEN HANDLING OFFICE-LAB: CPT | Performed by: PATHOLOGY

## 2024-03-18 PROCEDURE — G2211 COMPLEX E/M VISIT ADD ON: HCPCS | Performed by: INTERNAL MEDICINE

## 2024-03-18 PROCEDURE — 82306 VITAMIN D 25 HYDROXY: CPT | Performed by: INTERNAL MEDICINE

## 2024-03-18 PROCEDURE — 81001 URINALYSIS AUTO W/SCOPE: CPT | Performed by: PATHOLOGY

## 2024-03-18 PROCEDURE — 36415 COLL VENOUS BLD VENIPUNCTURE: CPT | Performed by: PATHOLOGY

## 2024-03-18 PROCEDURE — 80069 RENAL FUNCTION PANEL: CPT | Performed by: PATHOLOGY

## 2024-03-18 ASSESSMENT — PAIN SCALES - GENERAL: PAINLEVEL: NO PAIN (0)

## 2024-03-18 NOTE — PROGRESS NOTES
"BMT Clinical Social Work New Transplant Evaluation    Information for this evaluation on March 15, 2024 was collected via Pt and sister's report, consultation with medical team, and medical chart review.     Present:  Patient: Vivian \"Mary Ann\"Stephanie   Sister: Alaina Bynum  Clinical  (CSW): THONY Ruiz, [facility]    Medical Team:   Nurse Coordinator: Dalila Gonzalez RN  BMT Physician: Adam Chandler MD  Referring Physician: Adam Chandler MD    Diagnosis: Amyloidosis  Diagnosis Date: 1/8/2024      Presenting Information:   Pt is a 54 year old female diagnosed with Amyloidosis who presents to [facility] for consultation regarding an autologous stem cell transplant. Pt was accompanied by her sister Alaina.      Contact Information:  Pt Phone: [phone]  Pt Email: [email or web address]  Pt's  Aneudy Phone: [phone]    Special Needs:  Pt will need local lodging. The pt lives in [place], [place] (53 min/47 miles) and would need to relocate. Discussed with Dr. Chandler and he is ok with the pt returning home once she is ready for discharge from the hospital. The pt does have a desire to stay at the [facility] as well post BMT pending how she is feeling.    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  Aneudy works in [employer].     Finances/Insurance:  No financial or insurance concerns identified at this time. The pt does not have relocation benefits from her insurance.     CSW provided information regarding the insurance authorization process and the role of the BMT financial case-mangers. CSW provided contact info for the BMT financial case-managers and referred pt to them for future insurance questions.     Caregiver:  CSW discussed with Pt the caregiver role and expectation at length. Pt is " agreeable to having a full time caregiver for a minimum of 30 days until cleared by the BMT physician. Pt's identified caregivers are her mother and family. Caregiver education and information provided. No caregiver concerns identified.     Healthcare Directive:  No. CSW provided education and forms. CSW encouraged Pt to have discussions with their family regarding their health care wishes.     Resources Provided:  BMT Information Book   BMT Resources Packet:   Healthcare Directive:   Tour of Unit NO   Transplant unit description and information provided.     Identified Concerns:   Per Dr. Chandler the pt will plan to be IP through engraftment. Once she discharges she can return home.     Summary:   Pt presents to [facility] for consultation regarding an autologous stem cell transplant. Pt/family asked good questions regarding psychosocial factors related to BMT; all of which were answered. Pt presented as friendly and open. Pt's affect was engaged. Family's affect was engaged and supportive.      Plan:   CSW provided contact information and encouraged Pt to contact CSW with questions, concerns, resources and for support. CSW will continue to follow Pt to provide supportive counseling and assistance with resources as needed.      THONY Ruiz, [facility]  Phone [phone]  [facility]- BMT [address]

## 2024-03-18 NOTE — PROGRESS NOTES
Nephrology Progress Note  03/18/2024   Chief complaint: Follow up AL amyloidosis  History of Present Illness:    Vivian Brown is a 54 year old F  with hypothyroidism, who is here for AL amyloidosis consult.     The patient initially presented with fatigue and LE edema for about 2 years. Initially, she was diagnosed with Hashimoto's and was started on thyroid supplement in August 2023. Symptoms has partially improved. Later on she was found to have hypoalbuminemia dn massiv eproteinuria hence was referred to Dr. Sen. She saw Dr. Sen in November 2023 and was started on Lisinopril 2.5 mg and lasix 20 mg per day. I 1 week, she has lsot 20 Lbs and swelling has been better and remained in control.  She underwent a kidney Bx on 12/21/23. The Bx read by Dr. Garcia which showed Amyloidosis of the kidney, AL-type, lambda light chain-restricted, affecting the glomeruli, interstitium, and arterioles. She has mild chronic changes of the parenchyma, including: focal global glomerulosclerosis (3% of glomeruli), focal tubular atrophy and interstitial fibrosis (5% of the cortex), severe arterial sclerosis.   LM: There were 37 glomeruli (LM-29; IF-7; EM-1), of which 1 is globally sclerosed. The non-sclerosed glomeruli are of normal size and cellularity. Significant endocapillary proliferation or cellular crescents are not seen in the glomeruli. The glomeruli show deposits of amorphous, homogeneous, acellular, eosinophilic material that infiltrates and expands the mesangial areas and extends down the capillaries of the glomerular tuft. Tubules are well-preserved and show no signs of acute injury. Obvious signs of a viral cytopathic effect are not seen in the sample. No oxalate, urate, or phosphate crystals are present in the sample. The interstitium reveals occasional collection of foam cells and occasional deposits of amorphous material. The interstitium contains mild interstitial inflammation in areas of atrophy, and  the infiltrates are composed of mononuclear cells. About 5% of the renal cortex shows tubular atrophy and interstitial fibrosis. Arteries show severe sclerosis. Examination of a Congo red-stained section reveals deposits of amorphous material with apple-green birefringence under polarized light, in all glomeruli and focally in arterioles and interstitium. EM: showed 1 glomerulus; 1 glomerulus is examined ultrastructurally. There is prominent deposition of fibrillary material in the mesangial areas, without significant hypercellularity.  There is focal effacement of visceral epithelial cell foot processes in areas of the glomerular basement membranes that show segmental widening of the subepithelial space and distortion of the lamina densa due to deposits of fibrillary material.  The mesangial and basement membrane deposits consist of non-branching, randomly arranged fibrils of 6 to 15 nm width. Glomerular endothelial cells show focal loss of their normal fenestrations. Tubules show no specific changes. Deposits are Congo red positive. The amyloid deposits affect the glomeruli extensively, and interstitium and arterioles focally. Other potential complications of paraproteins in the kidney are not seen, in particular, there is no evidence of light chain cast nephropathy, light chain deposition disease, or light chain tubulopathy.      For kidney function, she has baseline Cr of 0.9 in 12/19/22. However, Cr started to increase to 1.05 in 5/22/23 and now 1.11 on 11/28/23. Serum albumin was 3.5 in 2/24/21 and 2.2 in 12/19/22. Monoclonal work-up on 11/28/23 showed kappa 3.05, lambda 7.82 and K/L ratio 0.39 and L/K ratio 2.56. dFLC 4.77. Other serologies including MPO, PMND1 were negative. UPCR 12.50 mg/g. No 24 hr urine protein yet. UA showed small blood but no RBC. She also has LCL of 174. TSH 29.16 and + Tissue TTG.      She has Echo on 1/3/24. There is mild concentric left ventricular hypertrophy and borderline RVH.  "Global peak LV longitudinal strain is averaged at -15.3%. This suggests abnormal strain (normal <-18%).The visual ejection fraction is estimated at 54-56%.Trivial posterior pericardial effusion Clinical history is listed as renal amyloid--on this echo the atria are normal size, the valves are not thickend but there is mild LVH and borderline RVH, there is questionable \"scintillation\" of LV myocardium, the global longitudinal strain is abnormal and the best strain values are at the apex (borderline \"apical sparing\" strain pattern) and there is trace pericardial effusion along with borderline criteria for diastolic dysfunction grade2--take together this could represent some features of cardiac amyloid. Additional studies such as cardiac MRI, cardiac biopsy etc may be useful. She is scheduled to see Cardiology on 1/31/24.      1/8/24: I saw her for consultation via video. She is with her  at home. Still feels fatigue and upset stomach last night.  She is currently on furosemide 20 mg daily, lisinopril 2.5 mg daily and crestor 10 mg daily. She reports having lower extremities edema for about 2 years. She was diagnosed with Hashimoto's in August 2023.  She has been feeling better after started taking thyroid supplement. She has leg swelling but not as bad as before. Furosemide was started in December 2023 and she has lost 20 Lbs wit that. She has seen more bubbly in the urineShe sometimes feels lightheadedness and dizziness and her BP was low sometimes. However, mostly > BP /60s. HR in the 80s.  BP today is 114/76, HR is 76.  She has no problem with swallowing, numbness or tingling sensation. No CTS symptoms. No tongue enlargement. No periorbital ecchymoses. No easy bruising. She has no other medical problem before this. She has been active and exercise at least twice a week.   3/18/24: In the interim, she underwent BmBx on 1/18/24 which showed  lambda restricted plasma cell, 5%. Congo red positive in the " BmBx. She was started on Zenia-CyBorD, C1D1 on 1/26/24. Now s/p 2 cycles, and will start 3rd cycle soon. ASCT is plan on 1st week of Vijaya. In the interim, she was started on Jardiance 2/3/24 and spironolactone on 2/29/24. She does not feel lightheadedness and dizziness. Lisinopril was on hold because of dizziness. Lasix was increased to 40 mg BID on early February. Overall she feels better.  She denies any lightheadedness or dizziness.  She intermittently has numbness and tingling sensation around her thoracic cage typically after the chemo but it usually disappear within 1 week.  Labs on 3/18/24 show creatinine 1.41, BUN 21.1, bicarb 32, potassium 3.7, bicarb 32. Albumin 2.3.  White blood cell 10.8, hemoglobin 13.5, platelet 333. UA showed small blood, glucose 150 and protein 300. UPCR and UACR pending.     Past medical history  Past Medical History:   Diagnosis Date    Celiac disease     Family history of colon cancer     Colonoscoy q5 years next 9/2020     Past surgical history  Past Surgical History:   Procedure Laterality Date    COLONOSCOPY N/A 9/28/2015    Procedure: COLONOSCOPY;  Surgeon: Eugenio Travis MD;  Location:  GI    ESOPHAGOSCOPY, GASTROSCOPY, DUODENOSCOPY (EGD), COMBINED N/A 1/30/2024    Procedure: ESOPHAGOGASTRODUODENOSCOPY, WITH BIOPSY;  Surgeon: Melo Cloud MD;  Location: [facility]    IR RENAL BIOPSY RIGHT  12/21/2023    TONSILLECTOMY       Review of Systems:   14 systems were reviewed and all negative except as mentioned above.   Current Medications:  Current Outpatient Medications   Medication    acyclovir (ZOVIRAX) 400 MG tablet    Albuterol-Budesonide (AIRSUPRA) 90-80 MCG/ACT AERO    azelastine (ASTELIN) 0.1 % nasal spray    cholecalciferol (VITAMIN D3) 125 mcg (5000 units) capsule    cycloPHOSphamide (CYTOXAN) 50 MG capsule    empagliflozin (JARDIANCE) 10 MG TABS tablet    EPINEPHrine (ANY BX GENERIC EQUIV) 0.3 MG/0.3ML injection 2-pack    fluticasone (FLONASE) 50 MCG/ACT nasal  spray    furosemide (LASIX) 20 MG tablet    levothyroxine (SYNTHROID/LEVOTHROID) 200 MCG tablet    levothyroxine (SYNTHROID/LEVOTHROID) 25 MCG tablet    liothyronine (CYTOMEL) 5 MCG tablet    prochlorperazine (COMPAZINE) 5 MG tablet    rosuvastatin (CRESTOR) 10 MG tablet    spironolactone (ALDACTONE) 25 MG tablet    albuterol (PROAIR HFA/PROVENTIL HFA/VENTOLIN HFA) 108 (90 Base) MCG/ACT inhaler    benzonatate (TESSALON) 200 MG capsule     No current facility-administered medications for this visit.       Physical Exam:   /73   Pulse 98   Wt 78.7 kg (173 lb 9.6 oz)   SpO2 96%   BMI 28.89 kg/m     Body mass index is 28.89 kg/m .    GENERAL APPEARANCE: Alert, not in acute distress  EYES:  Not pale conjunctiva, pupils equal  HENT: Mouth without ulcers or lesions  PULM: lungs clear to auscultation bilaterally, equal air movement, no clubbing  CV: regular rhythm, normal rate, no rub     -JVD no distended.      -edema: trace to 1+ pitting edema  GI: soft,  - tender, no distended, bowel sounds are present  INTEGUMENT: No rash  NEURO:  Non focal. No asterixis.     Labs:   All labs reviewed by me  Last Renal Panel:  Sodium   Date Value Ref Range Status   03/18/2024 138 135 - 145 mmol/L Final     Comment:     Reference intervals for this test were updated on 09/26/2023 to more accurately reflect our healthy population. There may be differences in the flagging of prior results with similar values performed with this method. Interpretation of those prior results can be made in the context of the updated reference intervals.    02/24/2021 140 133 - 144 mmol/L Final     Potassium   Date Value Ref Range Status   03/18/2024 3.7 3.4 - 5.3 mmol/L Final   12/19/2022 3.9 3.4 - 5.3 mmol/L Final   02/24/2021 4.3 3.4 - 5.3 mmol/L Final     Chloride   Date Value Ref Range Status   03/18/2024 101 98 - 107 mmol/L Final   12/19/2022 108 94 - 109 mmol/L Final   02/24/2021 107 94 - 109 mmol/L Final     Carbon Dioxide   Date Value Ref  Range Status   02/24/2021 29 20 - 32 mmol/L Final     Carbon Dioxide (CO2)   Date Value Ref Range Status   03/18/2024 32 (H) 22 - 29 mmol/L Final   12/19/2022 33 (H) 20 - 32 mmol/L Final     Anion Gap   Date Value Ref Range Status   03/18/2024 5 (L) 7 - 15 mmol/L Final   12/19/2022 2 (L) 3 - 14 mmol/L Final   02/24/2021 4 3 - 14 mmol/L Final     Glucose   Date Value Ref Range Status   03/18/2024 84 70 - 99 mg/dL Final   12/19/2022 96 70 - 99 mg/dL Final   02/24/2021 97 70 - 99 mg/dL Final     Urea Nitrogen   Date Value Ref Range Status   03/18/2024 21.1 (H) 6.0 - 20.0 mg/dL Final   12/19/2022 15 7 - 30 mg/dL Final   02/24/2021 16 7 - 30 mg/dL Final     Creatinine   Date Value Ref Range Status   03/18/2024 1.41 (H) 0.51 - 0.95 mg/dL Final   02/24/2021 0.80 0.52 - 1.04 mg/dL Final     GFR Estimate   Date Value Ref Range Status   03/18/2024 44 (L) >60 mL/min/1.73m2 Final   02/24/2021 85 >60 mL/min/[1.73_m2] Final     Comment:     Non  GFR Calc  Starting 12/18/2018, serum creatinine based estimated GFR (eGFR) will be   calculated using the Chronic Kidney Disease Epidemiology Collaboration   (CKD-EPI) equation.       Calcium   Date Value Ref Range Status   03/18/2024 8.7 8.6 - 10.0 mg/dL Final   02/24/2021 9.2 8.5 - 10.1 mg/dL Final     Phosphorus   Date Value Ref Range Status   03/18/2024 4.4 2.5 - 4.5 mg/dL Final     Albumin   Date Value Ref Range Status   03/18/2024 2.3 (L) 3.5 - 5.2 g/dL Final   12/19/2022 2.2 (L) 3.4 - 5.0 g/dL Final   02/24/2021 3.5 3.4 - 5.0 g/dL Final       Imaging:  I reviewed imaging studies.     Assessment & Recommendations:   Problem list  # Lambda LC AL involved: kidneys, cardiac,  BmBx; stage I based on Hall 2012 staging s/p Zenia-CyBorD C1D1 1/26/24; now in at least VGPR  # Renal proven AL amyloidosis  # Nephrotic syndrome  # Borderline Hypotension  # Family Hx of MM and MDS (maternal aunt and uncle) and HL (paternal uncle)  # CKD stage 3 2/2 lambda LC AL  The patient  presented with fatigue and swelling since 2022. Noted hypoalbuminemia since 12/22. UPCR 12.5g with Salb 2.2 mg/dl. Cr 1.1 with eGFR 59. She has kidney Bx which showed lambda AL involved mainly glomeruli, some mesangium and vessels. Of note kidney sizes are normal on US.  Echo 1/3/23 showed mild LVH and RVH with strained pattern suggestive of possible amyloidosis.  She is at stage I per SANTO 2012 classification. She has bone marrow biopsy done which showed lambda light chain restricted plasma cells less than 5%.  She was started on Zenia CyBorD C1 D1 on 1/26/24. Now her LC has come down and attained at least VGPR.  He still has positive immunofixation but that was IgG kappa which could be from Zenia. If IgG lambda was negative and negative monofixation in the urine then she would be having CR.  Now she is approved for ASCT and the plan is to proceed in first week of June 2024.    For treatment of nephrotic syndrome, she was on lasix 20 mg daily and lisinopril 2.5 mg daily.  Lisinopril was discontinued due to dizziness.  And cardiology has adjusted medication and increase Lasix to 40 mg twice a day.  She was also started on Jardiance 10 mg daily on 2/3/2024 and spironolactone on 2/28 6/24.  Now blood pressure is 107/73.  I noticed that her creatinine has increased from 1-1.1 to 1.2-1.4.  I doubt that this is from progression of AL amyloidosis given significant improvement in hematological parameters.  This is likely due to hemodynamics effects of Jardiance and spironolactone and increasing Lasix.  This regimen has improved her swelling so I plan not to change any regimen at this time.  However if she developed hypotension or creatinine continue to worsen I would recommend stopping Jardiance for 6 and repeat kidney function.  I also discussed with her about when she should hold Jardiance especially when she feels sick or volume depleted as increased risks of CHRIS.  I will see her again on May 16, 2024 to decide whether we  should continue Jardiance during transplantation. There is no data to support there benefit but it is possible increase risk of CHRIS in pretransplant period du to risk of volume depletion.    - 24 hour urine protein, creatinine and UPEP; spot UPEP and uIF, SPEP, FLC, CBC , renal panel, UA, UPCR next visit  - 24 hr urine protein and creatinine now to assess the kidney response, UPCR is unreliable when UPCR >10 g/g  -Currently on Lasix 40 mg twice daily, Jardiance 10 mg daily and spironolactone 25 mg daily  # Hypothyroidism  TPO ab is positive so Dx with Hashimoto's. On thyroid supplement presently. Qondering wheter this could be from amyloid as well.   # Celiac disease Bx proven  She had EGD but no Amyloid presence.   # Vitamin D deficiency  Vit D 7 on 1/8/24. She is on on Vit D 5000 U daily. Will repeat Vitamin D this time to see the trend.     Follow-up in 5/16/24 3.30 PM    The longitudinal plan of care for Renal AL, nephrotic syndrome, hypotension, low Vit D, CKD 3 was addressed during this visit. Due to the added complexity in care, I will continue to support Vivian Sandyars  in the subsequent management of this condition(s) and with the ongoing continuity of care of this condition(s).    I spent  50 minutes on the date of the encounter doing chart review, history and exam, documentation and further activities as noted above. 40 minutes of this visit is dedicated to direct patient interaction via face to face.     Tracie Dobson MD on 03/18/2024

## 2024-03-18 NOTE — PATIENT INSTRUCTIONS
If you are sick for a couple of days, holding Jardiance AND resume when getting better  See you in 5/16/24 3.30 PM with labs before hands

## 2024-03-18 NOTE — NURSING NOTE
Chief Complaint   Patient presents with    RECHECK     Follow up.      Vitals:    03/18/24 1135 03/18/24 1139 03/18/24 1140 03/18/24 1141   BP: 104/75 105/72 111/73 107/73   BP Location: Right arm Right arm Right arm    Patient Position: Sitting Sitting Sitting    Cuff Size: Adult Regular Adult Regular Adult Regular    Pulse: 98      SpO2: 96%      Weight: 78.7 kg (173 lb 9.6 oz)          BP Readings from Last 3 Encounters:   03/18/24 107/73   03/15/24 109/78   03/12/24 108/71       /73   Pulse 98   Wt 78.7 kg (173 lb 9.6 oz)   SpO2 96%   BMI 28.89 kg/m       Mary Zepeda

## 2024-03-18 NOTE — LETTER
3/18/2024       RE: Vivian Brown  [address] [facility] [id number]     Dear Colleague,    Thank you for referring your patient, Vivian Brown, to the [facility] [place] at [facility]. Please see a copy of my visit note below.    Nephrology Progress Note  03/18/2024   Chief complaint: Follow up AL amyloidosis  History of Present Illness:    Vivian Brown is a 54 year old F  with hypothyroidism, who is here for AL amyloidosis consult.     The patient initially presented with fatigue and LE edema for about 2 years. Initially, she was diagnosed with Hashimoto's and was started on thyroid supplement in August 2023. Symptoms has partially improved. Later on she was found to have hypoalbuminemia dn massiv eproteinuria hence was referred to Dr. Sen. She saw Dr. Sen in November 2023 and was started on Lisinopril 2.5 mg and lasix 20 mg per day. I 1 week, she has lsot 20 Lbs and swelling has been better and remained in control.  She underwent a kidney Bx on 12/21/23. The Bx read by Dr. Garcia which showed Amyloidosis of the kidney, AL-type, lambda light chain-restricted, affecting the glomeruli, interstitium, and arterioles. She has mild chronic changes of the parenchyma, including: focal global glomerulosclerosis (3% of glomeruli), focal tubular atrophy and interstitial fibrosis (5% of the cortex), severe arterial sclerosis.   LM: There were 37 glomeruli (LM-29; IF-7; EM-1), of which 1 is globally sclerosed. The non-sclerosed glomeruli are of normal size and cellularity. Significant endocapillary proliferation or cellular crescents are not seen in the glomeruli. The glomeruli show deposits of amorphous, homogeneous, acellular, eosinophilic material that infiltrates and expands the mesangial areas and extends down the capillaries of the glomerular tuft. Tubules are well-preserved and show no signs of acute injury. Obvious  signs of a viral cytopathic effect are not seen in the sample. No oxalate, urate, or phosphate crystals are present in the sample. The interstitium reveals occasional collection of foam cells and occasional deposits of amorphous material. The interstitium contains mild interstitial inflammation in areas of atrophy, and the infiltrates are composed of mononuclear cells. About 5% of the renal cortex shows tubular atrophy and interstitial fibrosis. Arteries show severe sclerosis. Examination of a Congo red-stained section reveals deposits of amorphous material with apple-green birefringence under polarized light, in all glomeruli and focally in arterioles and interstitium. EM: showed 1 glomerulus; 1 glomerulus is examined ultrastructurally. There is prominent deposition of fibrillary material in the mesangial areas, without significant hypercellularity.  There is focal effacement of visceral epithelial cell foot processes in areas of the glomerular basement membranes that show segmental widening of the subepithelial space and distortion of the lamina densa due to deposits of fibrillary material.  The mesangial and basement membrane deposits consist of non-branching, randomly arranged fibrils of 6 to 15 nm width. Glomerular endothelial cells show focal loss of their normal fenestrations. Tubules show no specific changes. Deposits are Congo red positive. The amyloid deposits affect the glomeruli extensively, and interstitium and arterioles focally. Other potential complications of paraproteins in the kidney are not seen, in particular, there is no evidence of light chain cast nephropathy, light chain deposition disease, or light chain tubulopathy.      For kidney function, she has baseline Cr of 0.9 in 12/19/22. However, Cr started to increase to 1.05 in 5/22/23 and now 1.11 on 11/28/23. Serum albumin was 3.5 in 2/24/21 and 2.2 in 12/19/22. Monoclonal work-up on 11/28/23 showed kappa 3.05, lambda 7.82 and K/L ratio 0.39  "and L/K ratio 2.56. dFLC 4.77. Other serologies including MPO, PMND1 were negative. UPCR 12.50 mg/g. No 24 hr urine protein yet. UA showed small blood but no RBC. She also has LCL of 174. TSH 29.16 and + Tissue TTG.      She has Echo on 1/3/24. There is mild concentric left ventricular hypertrophy and borderline RVH. Global peak LV longitudinal strain is averaged at -15.3%. This suggests abnormal strain (normal <-18%).The visual ejection fraction is estimated at 54-56%.Trivial posterior pericardial effusion Clinical history is listed as renal amyloid--on this echo the atria are normal size, the valves are not thickend but there is mild LVH and borderline RVH, there is questionable \"scintillation\" of LV myocardium, the global longitudinal strain is abnormal and the best strain values are at the apex (borderline \"apical sparing\" strain pattern) and there is trace pericardial effusion along with borderline criteria for diastolic dysfunction grade2--take together this could represent some features of cardiac amyloid. Additional studies such as cardiac MRI, cardiac biopsy etc may be useful. She is scheduled to see Cardiology on 1/31/24.      1/8/24: I saw her for consultation via video. She is with her  at home. Still feels fatigue and upset stomach last night.  She is currently on furosemide 20 mg daily, lisinopril 2.5 mg daily and crestor 10 mg daily. She reports having lower extremities edema for about 2 years. She was diagnosed with Hashimoto's in August 2023.  She has been feeling better after started taking thyroid supplement. She has leg swelling but not as bad as before. Furosemide was started in December 2023 and she has lost 20 Lbs wit that. She has seen more bubbly in the urineShe sometimes feels lightheadedness and dizziness and her BP was low sometimes. However, mostly > BP /60s. HR in the 80s.  BP today is 114/76, HR is 76.  She has no problem with swallowing, numbness or tingling sensation. No " CTS symptoms. No tongue enlargement. No periorbital ecchymoses. No easy bruising. She has no other medical problem before this. She has been active and exercise at least twice a week.   3/18/24: In the interim, she underwent BmBx on 1/18/24 which showed  lambda restricted plasma cell, 5%. Congo red positive in the BmBx. She was started on Zenia-CyBorD, C1D1 on 1/26/24. Now s/p 2 cycles, and will start 3rd cycle soon. ASCT is plan on 1st week of June. In the interim, she was started on Jardiance 2/3/24 and spironolactone on 2/29/24. She does not feel lightheadedness and dizziness. Lisinopril was on hold because of dizziness. Lasix was increased to 40 mg BID on early February. Overall she feels better.  She denies any lightheadedness or dizziness.  She intermittently has numbness and tingling sensation around her thoracic cage typically after the chemo but it usually disappear within 1 week.  Labs on 3/18/24 show creatinine 1.41, BUN 21.1, bicarb 32, potassium 3.7, bicarb 32. Albumin 2.3.  White blood cell 10.8, hemoglobin 13.5, platelet 333. UA showed small blood, glucose 150 and protein 300. UPCR and UACR pending.     Past medical history  Past Medical History:   Diagnosis Date    Celiac disease     Family history of colon cancer     Colonoscoy q5 years next 9/2020     Past surgical history  Past Surgical History:   Procedure Laterality Date    COLONOSCOPY N/A 9/28/2015    Procedure: COLONOSCOPY;  Surgeon: Eugenio Travis MD;  Location:  GI    ESOPHAGOSCOPY, GASTROSCOPY, DUODENOSCOPY (EGD), COMBINED N/A 1/30/2024    Procedure: ESOPHAGOGASTRODUODENOSCOPY, WITH BIOPSY;  Surgeon: Melo Cloud MD;  Location: [facility]    IR RENAL BIOPSY RIGHT  12/21/2023    TONSILLECTOMY       Review of Systems:   14 systems were reviewed and all negative except as mentioned above.   Current Medications:  Current Outpatient Medications   Medication    acyclovir (ZOVIRAX) 400 MG tablet    Albuterol-Budesonide (AIRSUPRA) 90-80  MCG/ACT AERO    azelastine (ASTELIN) 0.1 % nasal spray    cholecalciferol (VITAMIN D3) 125 mcg (5000 units) capsule    cycloPHOSphamide (CYTOXAN) 50 MG capsule    empagliflozin (JARDIANCE) 10 MG TABS tablet    EPINEPHrine (ANY BX GENERIC EQUIV) 0.3 MG/0.3ML injection 2-pack    fluticasone (FLONASE) 50 MCG/ACT nasal spray    furosemide (LASIX) 20 MG tablet    levothyroxine (SYNTHROID/LEVOTHROID) 200 MCG tablet    levothyroxine (SYNTHROID/LEVOTHROID) 25 MCG tablet    liothyronine (CYTOMEL) 5 MCG tablet    prochlorperazine (COMPAZINE) 5 MG tablet    rosuvastatin (CRESTOR) 10 MG tablet    spironolactone (ALDACTONE) 25 MG tablet    albuterol (PROAIR HFA/PROVENTIL HFA/VENTOLIN HFA) 108 (90 Base) MCG/ACT inhaler    benzonatate (TESSALON) 200 MG capsule     No current facility-administered medications for this visit.       Physical Exam:   /73   Pulse 98   Wt 78.7 kg (173 lb 9.6 oz)   SpO2 96%   BMI 28.89 kg/m     Body mass index is 28.89 kg/m .    GENERAL APPEARANCE: Alert, not in acute distress  EYES:  Not pale conjunctiva, pupils equal  HENT: Mouth without ulcers or lesions  PULM: lungs clear to auscultation bilaterally, equal air movement, no clubbing  CV: regular rhythm, normal rate, no rub     -JVD no distended.      -edema: trace to 1+ pitting edema  GI: soft,  - tender, no distended, bowel sounds are present  INTEGUMENT: No rash  NEURO:  Non focal. No asterixis.     Labs:   All labs reviewed by me  Last Renal Panel:  Sodium   Date Value Ref Range Status   03/18/2024 138 135 - 145 mmol/L Final     Comment:     Reference intervals for this test were updated on 09/26/2023 to more accurately reflect our healthy population. There may be differences in the flagging of prior results with similar values performed with this method. Interpretation of those prior results can be made in the context of the updated reference intervals.    02/24/2021 140 133 - 144 mmol/L Final     Potassium   Date Value Ref Range Status    03/18/2024 3.7 3.4 - 5.3 mmol/L Final   12/19/2022 3.9 3.4 - 5.3 mmol/L Final   02/24/2021 4.3 3.4 - 5.3 mmol/L Final     Chloride   Date Value Ref Range Status   03/18/2024 101 98 - 107 mmol/L Final   12/19/2022 108 94 - 109 mmol/L Final   02/24/2021 107 94 - 109 mmol/L Final     Carbon Dioxide   Date Value Ref Range Status   02/24/2021 29 20 - 32 mmol/L Final     Carbon Dioxide (CO2)   Date Value Ref Range Status   03/18/2024 32 (H) 22 - 29 mmol/L Final   12/19/2022 33 (H) 20 - 32 mmol/L Final     Anion Gap   Date Value Ref Range Status   03/18/2024 5 (L) 7 - 15 mmol/L Final   12/19/2022 2 (L) 3 - 14 mmol/L Final   02/24/2021 4 3 - 14 mmol/L Final     Glucose   Date Value Ref Range Status   03/18/2024 84 70 - 99 mg/dL Final   12/19/2022 96 70 - 99 mg/dL Final   02/24/2021 97 70 - 99 mg/dL Final     Urea Nitrogen   Date Value Ref Range Status   03/18/2024 21.1 (H) 6.0 - 20.0 mg/dL Final   12/19/2022 15 7 - 30 mg/dL Final   02/24/2021 16 7 - 30 mg/dL Final     Creatinine   Date Value Ref Range Status   03/18/2024 1.41 (H) 0.51 - 0.95 mg/dL Final   02/24/2021 0.80 0.52 - 1.04 mg/dL Final     GFR Estimate   Date Value Ref Range Status   03/18/2024 44 (L) >60 mL/min/1.73m2 Final   02/24/2021 85 >60 mL/min/[1.73_m2] Final     Comment:     Non  GFR Calc  Starting 12/18/2018, serum creatinine based estimated GFR (eGFR) will be   calculated using the Chronic Kidney Disease Epidemiology Collaboration   (CKD-EPI) equation.       Calcium   Date Value Ref Range Status   03/18/2024 8.7 8.6 - 10.0 mg/dL Final   02/24/2021 9.2 8.5 - 10.1 mg/dL Final     Phosphorus   Date Value Ref Range Status   03/18/2024 4.4 2.5 - 4.5 mg/dL Final     Albumin   Date Value Ref Range Status   03/18/2024 2.3 (L) 3.5 - 5.2 g/dL Final   12/19/2022 2.2 (L) 3.4 - 5.0 g/dL Final   02/24/2021 3.5 3.4 - 5.0 g/dL Final       Imaging:  I reviewed imaging studies.     Assessment & Recommendations:   Problem list  # Lambda LC AL involved:  kidneys, cardiac,  BmBx; stage I based on Hall 2012 staging s/p Zenia-CyBorD C1D1 1/26/24; now in at least VGPR  # Renal proven AL amyloidosis  # Nephrotic syndrome  # Borderline Hypotension  # Family Hx of MM and MDS (maternal aunt and uncle) and HL (paternal uncle)  # CKD stage 3 2/2 lambda LC AL  The patient presented with fatigue and swelling since 2022. Noted hypoalbuminemia since 12/22. UPCR 12.5g with Salb 2.2 mg/dl. Cr 1.1 with eGFR 59. She has kidney Bx which showed lambda AL involved mainly glomeruli, some mesangium and vessels. Of note kidney sizes are normal on US.  Echo 1/3/23 showed mild LVH and RVH with strained pattern suggestive of possible amyloidosis.  She is at stage I per HALL 2012 classification. She has bone marrow biopsy done which showed lambda light chain restricted plasma cells less than 5%.  She was started on Zenia CyBorD C1 D1 on 1/26/24. Now her LC has come down and attained at least VGPR.  He still has positive immunofixation but that was IgG kappa which could be from Zenia. If IgG lambda was negative and negative monofixation in the urine then she would be having CR.  Now she is approved for ASCT and the plan is to proceed in first week of June 2024.    For treatment of nephrotic syndrome, she was on lasix 20 mg daily and lisinopril 2.5 mg daily.  Lisinopril was discontinued due to dizziness.  And cardiology has adjusted medication and increase Lasix to 40 mg twice a day.  She was also started on Jardiance 10 mg daily on 2/3/2024 and spironolactone on 2/28 6/24.  Now blood pressure is 107/73.  I noticed that her creatinine has increased from 1-1.1 to 1.2-1.4.  I doubt that this is from progression of AL amyloidosis given significant improvement in hematological parameters.  This is likely due to hemodynamics effects of Jardiance and spironolactone and increasing Lasix.  This regimen has improved her swelling so I plan not to change any regimen at this time.  However if she developed  hypotension or creatinine continue to worsen I would recommend stopping Jardiance for 6 and repeat kidney function.  I also discussed with her about when she should hold Jardiance especially when she feels sick or volume depleted as increased risks of CHRIS.  I will see her again on May 16, 2024 to decide whether we should continue Jardiance during transplantation. There is no data to support there benefit but it is possible increase risk of CHRIS in pretransplant period du to risk of volume depletion.    - 24 hour urine protein, creatinine and UPEP; spot UPEP and uIF, SPEP, FLC, CBC , renal panel, UA, UPCR next visit  - 24 hr urine protein and creatinine now to assess the kidney response, UPCR is unreliable when UPCR >10 g/g  -Currently on Lasix 40 mg twice daily, Jardiance 10 mg daily and spironolactone 25 mg daily  # Hypothyroidism  TPO ab is positive so Dx with Hashimoto's. On thyroid supplement presently. Qondering wheter this could be from amyloid as well.   # Celiac disease Bx proven  She had EGD but no Amyloid presence.   # Vitamin D deficiency  Vit D 7 on 1/8/24. She is on on Vit D 5000 U daily. Will repeat Vitamin D this time to see the trend.     Follow-up in 5/16/24 3.30 PM    The longitudinal plan of care for Renal AL, nephrotic syndrome, hypotension, low Vit D, CKD 3 was addressed during this visit. Due to the added complexity in care, I will continue to support Vivian Brown  in the subsequent management of this condition(s) and with the ongoing continuity of care of this condition(s).    I spent  50 minutes on the date of the encounter doing chart review, history and exam, documentation and further activities as noted above. 40 minutes of this visit is dedicated to direct patient interaction via face to face.     Tracie Dobson MD on 03/18/2024

## 2024-03-19 LAB
ALLERGEN WALNUT RJUG R 1 IGE: <0.1 KU(A)/L
BANANA IGE QN: <0.1 KU(A)/L
COR A 14 STORAGE PROTEIN 2S HAZELNUT: <0.1 KU(A)/L
CORN IGE QN: <0.1 KU(A)/L
ENGLISH WALNUT (RJUG R) 3 IGE QN: <0.1 KU(A)/L
HAZELNUT (NCOR A) 9 IGE QN: <0.1 KU(A)/L
HAZELNUT (RCOR A) 1 IGE QN: 1.53 KU(A)/L
HAZELNUT (RCOR A) 8 IGE QN: <0.1 KU(A)/L
HAZELNUT IGE QN: 1.26 KU(A)/L
LOCATION OF TASK: NORMAL
PEANUT (RARA H) 1 IGE QN: <0.1 KU(A)/L
PEANUT (RARA H) 2 IGE QN: <0.1 KU(A)/L
PEANUT (RARA H) 3 IGE QN: <0.1 KU(A)/L
PEANUT (RARA H) 6 IGE QN: <0.1 KU(A)/L
PEANUT (RARA H) 8 IGE QN: 0.27 KU(A)/L
PEANUT (RARA H) 9 IGE QN: <0.1 KU(A)/L
PEANUT IGE QN: 0.65 KU(A)/L
PROT PATTERN SERPL IFE-IMP: NORMAL
TOMATO IGE QN: 0.11 KU(A)/L
WALNUT IGE QN: 0.31 KU(A)/L
WATERMELON IGE QN: <0.1 KU(A)/L

## 2024-03-20 NOTE — RESULT ENCOUNTER NOTE
[employer] message sent:   Peanut IgE with mild sensitivity, primarily based on Blessing H 8, frequently associated with pollen food syndrome.  The same is true about hazelnut sensitivity.  Mild sensitivity to walnut, but negative primary component Jug r 1.  Slight sensitivity to tomato.  Negative serum IgE to corn, cantaloupe, banana, kiwi, and watermelon.  Recommend skin test for corn, cantaloupe, banana, kiwi, watermelon, tomato, peanut, hazelnut, and walnut.  You will need to bring us a fresh banana, kiwi, watermelon, and tomato for skin test purposes.  Depending on results, may offer oral food challenge test in the office setting.

## 2024-03-22 ENCOUNTER — LAB (OUTPATIENT)
Dept: LAB | Facility: CLINIC | Age: 54
End: 2024-03-22
Attending: STUDENT IN AN ORGANIZED HEALTH CARE EDUCATION/TRAINING PROGRAM
Payer: COMMERCIAL

## 2024-03-22 ENCOUNTER — INFUSION THERAPY VISIT (OUTPATIENT)
Dept: ONCOLOGY | Facility: CLINIC | Age: 54
End: 2024-03-22
Attending: STUDENT IN AN ORGANIZED HEALTH CARE EDUCATION/TRAINING PROGRAM
Payer: COMMERCIAL

## 2024-03-22 VITALS
BODY MASS INDEX: 29.3 KG/M2 | HEART RATE: 63 BPM | DIASTOLIC BLOOD PRESSURE: 73 MMHG | WEIGHT: 176.1 LBS | OXYGEN SATURATION: 97 % | RESPIRATION RATE: 18 BRPM | SYSTOLIC BLOOD PRESSURE: 108 MMHG | TEMPERATURE: 98.3 F

## 2024-03-22 DIAGNOSIS — E85.81 AL AMYLOIDOSIS (H): Primary | ICD-10-CM

## 2024-03-22 DIAGNOSIS — E85.81 AL AMYLOIDOSIS (H): ICD-10-CM

## 2024-03-22 LAB
ALBUMIN MFR UR ELPH: 813.6 MG/DL
ALBUMIN SERPL BCG-MCNC: 2.1 G/DL (ref 3.5–5.2)
ALP SERPL-CCNC: 105 U/L (ref 40–150)
ALT SERPL W P-5'-P-CCNC: 24 U/L (ref 0–50)
ANION GAP SERPL CALCULATED.3IONS-SCNC: 6 MMOL/L (ref 7–15)
AST SERPL W P-5'-P-CCNC: 25 U/L (ref 0–45)
BASOPHILS # BLD AUTO: 0.1 10E3/UL (ref 0–0.2)
BASOPHILS NFR BLD AUTO: 1 %
BILIRUB SERPL-MCNC: 0.2 MG/DL
BUN SERPL-MCNC: 20.1 MG/DL (ref 6–20)
CALCIUM SERPL-MCNC: 8.5 MG/DL (ref 8.6–10)
CHLORIDE SERPL-SCNC: 103 MMOL/L (ref 98–107)
COLLECT DURATION TIME UR: 24 H
COLLECT DURATION TIME UR: 24 H
CREAT 24H UR-MRATE: 1.22 G/SPEC (ref 0.72–1.51)
CREAT SERPL-MCNC: 1.52 MG/DL (ref 0.51–0.95)
CREAT UR-MCNC: 85.4 MG/DL
DEPRECATED HCO3 PLAS-SCNC: 30 MMOL/L (ref 22–29)
EGFRCR SERPLBLD CKD-EPI 2021: 40 ML/MIN/1.73M2
EOSINOPHIL # BLD AUTO: 0.3 10E3/UL (ref 0–0.7)
EOSINOPHIL NFR BLD AUTO: 2 %
ERYTHROCYTE [DISTWIDTH] IN BLOOD BY AUTOMATED COUNT: 18.6 % (ref 10–15)
GLUCOSE SERPL-MCNC: 89 MG/DL (ref 70–99)
HCT VFR BLD AUTO: 38.8 % (ref 35–47)
HGB BLD-MCNC: 12.9 G/DL (ref 11.7–15.7)
IMM GRANULOCYTES # BLD: 0.1 10E3/UL
IMM GRANULOCYTES NFR BLD: 1 %
LYMPHOCYTES # BLD AUTO: 1.4 10E3/UL (ref 0.8–5.3)
LYMPHOCYTES NFR BLD AUTO: 12 %
MCH RBC QN AUTO: 29 PG (ref 26.5–33)
MCHC RBC AUTO-ENTMCNC: 33.2 G/DL (ref 31.5–36.5)
MCV RBC AUTO: 87 FL (ref 78–100)
MONOCYTES # BLD AUTO: 0.9 10E3/UL (ref 0–1.3)
MONOCYTES NFR BLD AUTO: 8 %
NEUTROPHILS # BLD AUTO: 8.9 10E3/UL (ref 1.6–8.3)
NEUTROPHILS NFR BLD AUTO: 76 %
NRBC # BLD AUTO: 0 10E3/UL
NRBC BLD AUTO-RTO: 0 /100
PLATELET # BLD AUTO: 413 10E3/UL (ref 150–450)
POTASSIUM SERPL-SCNC: 3.5 MMOL/L (ref 3.4–5.3)
PROT 24H UR-MRATE: ABNORMAL MG/SPECIMEN
PROT SERPL-MCNC: 4.4 G/DL (ref 6.4–8.3)
RBC # BLD AUTO: 4.45 10E6/UL (ref 3.8–5.2)
SODIUM SERPL-SCNC: 139 MMOL/L (ref 135–145)
SPECIMEN VOL UR: 1425 ML
SPECIMEN VOL UR: 1425 ML
WBC # BLD AUTO: 11.6 10E3/UL (ref 4–11)

## 2024-03-22 PROCEDURE — 84156 ASSAY OF PROTEIN URINE: CPT

## 2024-03-22 PROCEDURE — 96401 CHEMO ANTI-NEOPL SQ/IM: CPT

## 2024-03-22 PROCEDURE — 80053 COMPREHEN METABOLIC PANEL: CPT | Performed by: STUDENT IN AN ORGANIZED HEALTH CARE EDUCATION/TRAINING PROGRAM

## 2024-03-22 PROCEDURE — 36415 COLL VENOUS BLD VENIPUNCTURE: CPT | Performed by: STUDENT IN AN ORGANIZED HEALTH CARE EDUCATION/TRAINING PROGRAM

## 2024-03-22 PROCEDURE — 250N000011 HC RX IP 250 OP 636: Mod: JZ | Performed by: STUDENT IN AN ORGANIZED HEALTH CARE EDUCATION/TRAINING PROGRAM

## 2024-03-22 PROCEDURE — 86335 IMMUNFIX E-PHORSIS/URINE/CSF: CPT | Mod: 26 | Performed by: PATHOLOGY

## 2024-03-22 PROCEDURE — 81050 URINALYSIS VOLUME MEASURE: CPT

## 2024-03-22 PROCEDURE — 86335 IMMUNFIX E-PHORSIS/URINE/CSF: CPT | Performed by: PATHOLOGY

## 2024-03-22 PROCEDURE — 99000 SPECIMEN HANDLING OFFICE-LAB: CPT | Performed by: PATHOLOGY

## 2024-03-22 PROCEDURE — 250N000012 HC RX MED GY IP 250 OP 636 PS 637: Performed by: STUDENT IN AN ORGANIZED HEALTH CARE EDUCATION/TRAINING PROGRAM

## 2024-03-22 PROCEDURE — 85025 COMPLETE CBC W/AUTO DIFF WBC: CPT | Performed by: STUDENT IN AN ORGANIZED HEALTH CARE EDUCATION/TRAINING PROGRAM

## 2024-03-22 PROCEDURE — 81050 URINALYSIS VOLUME MEASURE: CPT | Performed by: PATHOLOGY

## 2024-03-22 PROCEDURE — 84166 PROTEIN E-PHORESIS/URINE/CSF: CPT | Performed by: PATHOLOGY

## 2024-03-22 RX ORDER — DEXAMETHASONE 4 MG/1
TABLET ORAL
Qty: 20 TABLET | Refills: 0 | Status: SHIPPED | OUTPATIENT
Start: 2024-03-22 | End: 2024-04-15

## 2024-03-22 RX ADMIN — DARATUMUMAB AND HYALURONIDASE-FIHJ (HUMAN RECOMBINANT) 1800 MG: 1800; 30000 INJECTION SUBCUTANEOUS at 09:55

## 2024-03-22 RX ADMIN — DEXAMETHASONE 30 MG: 2 TABLET ORAL at 09:21

## 2024-03-22 RX ADMIN — BORTEZOMIB 2.5 MG: 3.5 INJECTION, POWDER, LYOPHILIZED, FOR SOLUTION INTRAVENOUS; SUBCUTANEOUS at 09:55

## 2024-03-22 NOTE — NURSING NOTE
Chief Complaint   Patient presents with    Blood Draw     Labs drawn via  by RN. VS taken.     Labs collected from venipuncture by RN. Vitals taken. Checked in for appointment(s).     Levon Miranda RN

## 2024-03-22 NOTE — PROGRESS NOTES
Infusion Nursing Note:  Vivian Brown presents today for Cycle 3, Day 1 Velcade, Darzalex Faspro.    Patient seen by provider today: No   present during visit today: Not Applicable.    Note: Pt assessed upon arrival to infusion suite. Denies fever, chills, cough, SOB or other signs/symptoms of infection. Pt states she is eating/drinking adequately and denies any dizziness/nausea/diarrhea or constipation. No other issues or concerns.       Treatment Conditions:  Lab Results   Component Value Date    HGB 12.9 03/22/2024    WBC 11.6 (H) 03/22/2024    ANEU 5.6 03/09/2020    ANEUTAUTO 8.9 (H) 03/22/2024     03/22/2024     Results reviewed, labs MET treatment parameters, ok to proceed with treatment.    Post Infusion Assessment:  Patient tolerated Velcade injection to left lower abdomen without incident.   Patient tolerated Darzalex Faspro injection to right lower abdomen without incident.     Discharge Plan:   Patient declined prescription refills.  AVS to patient via [employer].  Patient will return 3/29/24 for next appointment.   Patient discharged in stable condition accompanied by: family.  Departure Mode: Ambulatory.      Nithya Mosley RN

## 2024-03-25 LAB
ALBUMIN MFR UR ELPH: 76.9 %
ALPHA1 GLOB MFR UR ELPH: 7.3 %
ALPHA2 GLOB MFR UR ELPH: 5.8 %
B-GLOBULIN MFR UR ELPH: 8.4 %
GAMMA GLOB MFR UR ELPH: 1.6 %
M PROTEIN MFR UR ELPH: 0 %
PROT ELPH PNL UR ELPH: NORMAL
PROT PATTERN UR ELPH-IMP: ABNORMAL

## 2024-03-29 ENCOUNTER — INFUSION THERAPY VISIT (OUTPATIENT)
Dept: ONCOLOGY | Facility: CLINIC | Age: 54
End: 2024-03-29
Attending: STUDENT IN AN ORGANIZED HEALTH CARE EDUCATION/TRAINING PROGRAM
Payer: COMMERCIAL

## 2024-03-29 ENCOUNTER — DOCUMENTATION ONLY (OUTPATIENT)
Dept: ONCOLOGY | Facility: CLINIC | Age: 54
End: 2024-03-29

## 2024-03-29 ENCOUNTER — APPOINTMENT (OUTPATIENT)
Dept: LAB | Facility: CLINIC | Age: 54
End: 2024-03-29
Attending: STUDENT IN AN ORGANIZED HEALTH CARE EDUCATION/TRAINING PROGRAM
Payer: COMMERCIAL

## 2024-03-29 VITALS
TEMPERATURE: 97.5 F | WEIGHT: 177.4 LBS | RESPIRATION RATE: 16 BRPM | HEART RATE: 91 BPM | DIASTOLIC BLOOD PRESSURE: 73 MMHG | BODY MASS INDEX: 29.52 KG/M2 | OXYGEN SATURATION: 96 % | SYSTOLIC BLOOD PRESSURE: 105 MMHG

## 2024-03-29 DIAGNOSIS — E85.81 AL AMYLOIDOSIS (H): Primary | ICD-10-CM

## 2024-03-29 LAB
ALBUMIN SERPL BCG-MCNC: 2.2 G/DL (ref 3.5–5.2)
ALP SERPL-CCNC: 101 U/L (ref 40–150)
ALT SERPL W P-5'-P-CCNC: 25 U/L (ref 0–50)
ANION GAP SERPL CALCULATED.3IONS-SCNC: 6 MMOL/L (ref 7–15)
AST SERPL W P-5'-P-CCNC: 27 U/L (ref 0–45)
BASOPHILS # BLD AUTO: 0.1 10E3/UL (ref 0–0.2)
BASOPHILS NFR BLD AUTO: 1 %
BILIRUB SERPL-MCNC: 0.3 MG/DL
BUN SERPL-MCNC: 20.9 MG/DL (ref 6–20)
CALCIUM SERPL-MCNC: 8.6 MG/DL (ref 8.6–10)
CHLORIDE SERPL-SCNC: 105 MMOL/L (ref 98–107)
CREAT SERPL-MCNC: 1.39 MG/DL (ref 0.51–0.95)
DEPRECATED HCO3 PLAS-SCNC: 28 MMOL/L (ref 22–29)
EGFRCR SERPLBLD CKD-EPI 2021: 45 ML/MIN/1.73M2
EOSINOPHIL # BLD AUTO: 0.3 10E3/UL (ref 0–0.7)
EOSINOPHIL NFR BLD AUTO: 3 %
ERYTHROCYTE [DISTWIDTH] IN BLOOD BY AUTOMATED COUNT: 18.5 % (ref 10–15)
GLUCOSE SERPL-MCNC: 84 MG/DL (ref 70–99)
HCT VFR BLD AUTO: 39 % (ref 35–47)
HGB BLD-MCNC: 12.9 G/DL (ref 11.7–15.7)
IMM GRANULOCYTES # BLD: 0 10E3/UL
IMM GRANULOCYTES NFR BLD: 0 %
LYMPHOCYTES # BLD AUTO: 1.6 10E3/UL (ref 0.8–5.3)
LYMPHOCYTES NFR BLD AUTO: 17 %
MCH RBC QN AUTO: 28.7 PG (ref 26.5–33)
MCHC RBC AUTO-ENTMCNC: 33.1 G/DL (ref 31.5–36.5)
MCV RBC AUTO: 87 FL (ref 78–100)
MONOCYTES # BLD AUTO: 1 10E3/UL (ref 0–1.3)
MONOCYTES NFR BLD AUTO: 10 %
NEUTROPHILS # BLD AUTO: 6.7 10E3/UL (ref 1.6–8.3)
NEUTROPHILS NFR BLD AUTO: 69 %
NRBC # BLD AUTO: 0 10E3/UL
NRBC BLD AUTO-RTO: 0 /100
PLATELET # BLD AUTO: 391 10E3/UL (ref 150–450)
POTASSIUM SERPL-SCNC: 3.5 MMOL/L (ref 3.4–5.3)
PROT SERPL-MCNC: 4.5 G/DL (ref 6.4–8.3)
RBC # BLD AUTO: 4.49 10E6/UL (ref 3.8–5.2)
SODIUM SERPL-SCNC: 139 MMOL/L (ref 135–145)
WBC # BLD AUTO: 9.7 10E3/UL (ref 4–11)

## 2024-03-29 PROCEDURE — 36415 COLL VENOUS BLD VENIPUNCTURE: CPT

## 2024-03-29 PROCEDURE — 80053 COMPREHEN METABOLIC PANEL: CPT

## 2024-03-29 PROCEDURE — 250N000011 HC RX IP 250 OP 636: Performed by: STUDENT IN AN ORGANIZED HEALTH CARE EDUCATION/TRAINING PROGRAM

## 2024-03-29 PROCEDURE — 85025 COMPLETE CBC W/AUTO DIFF WBC: CPT | Performed by: STUDENT IN AN ORGANIZED HEALTH CARE EDUCATION/TRAINING PROGRAM

## 2024-03-29 PROCEDURE — 96401 CHEMO ANTI-NEOPL SQ/IM: CPT

## 2024-03-29 RX ADMIN — BORTEZOMIB 2.5 MG: 3.5 INJECTION, POWDER, LYOPHILIZED, FOR SOLUTION INTRAVENOUS; SUBCUTANEOUS at 08:14

## 2024-03-29 ASSESSMENT — PAIN SCALES - GENERAL: PAINLEVEL: NO PAIN (0)

## 2024-03-29 NOTE — PATIENT INSTRUCTIONS
Contact Numbers  [facility]: [phone] (for symptom and scheduling needs)    Please call the [facility] Triage line if you experience a temperature greater than or equal to 100.4, shaking chills, have uncontrolled nausea, vomiting and/or diarrhea, dizziness, shortness of breath, chest pain, bleeding, unexplained bruising, or if you have any other new/concerning symptoms, questions or concerns.     If you are having any concerning symptoms or wish to speak to a provider before your next infusion visit, please call your care coordinator or triage to notify them so we can adequately serve you.     If you need a refill on a narcotic prescription or other medication, please call triage before your infusion appointment.           March 2024 Sunday Monday Tuesday Wednesday Thursday Friday Saturday                            1    LAB PERIPHERAL   2:00 PM   (15 min.)   [facility] LAB DRAW   [facility]    ONC INFUSION 2 HR (120 MIN)   2:30 PM   (120 min.)    ONC INFUSION NURSE   [facility] 2       3     4     5     6     7     8    LAB PERIPHERAL   2:00 PM   (15 min.)   [facility] LAB DRAW   [facility]    ONC INFUSION 2 HR (120 MIN)   2:30 PM   (120 min.)    ONC INFUSION NURSE   [facility] 9       10     11     12    NEW ALLERGY   8:15 AM   (30 min.)   Jose Gamble MD   [facility] 13     14     15    LAB PERIPHERAL   8:00 AM   (15 min.)   [facility] LAB DRAW   [facility]    ONC INFUSION 2 HR (120 MIN)   8:30 AM   (120 min.)    ONC INFUSION NURSE   [facility]    BMT NEW   1:00 PM   (60 min.)   Jomar Chandler MD   [facility]    BMT NURSE COORD   2:00 PM   (30 min.)   Ana Gonzalez, FREDDIE   [facility]  [place]    BMT SOCIAL WORK WITH ROOM   2:15 PM   (90 min.)   Nithya Aguilar LICSW   [facility] [place] 16       17     18    LAB  10:30 AM   (15 min.)    LAB   [facility] [place]    RETURN NEPHROLOGY  11:15 AM   (30 min.)   Tracie Dobson MD   [facility] [place]    LAB   6:45 PM   (15 min.)    LAB   [facility] [place] 19     20     21     22    LAB PERIPHERAL   8:45 AM   (15 min.)    [facility] DRAW   [facility]    ONC INFUSION 2 HR (120 MIN)   9:30 AM   (120 min.)   [facility] ONC INFUSION NURSE   [facility] 23       24     25     26     27     28     29    LAB PERIPHERAL   6:45 AM   (15 min.)   [facility] DRAW   [facility]    ONC INFUSION 1 HR (60 MIN)   7:00 AM   (60 min.)   [facility] ONC INFUSION NURSE   [facility] 30 31 April 2024 Sunday Monday Tuesday Wednesday Thursday Friday Saturday        1     2     3     4     5    LAB PERIPHERAL   8:00 AM   (15 min.)   [facility] DRAW   [facility]    ONC INFUSION 2 HR (120 MIN)   8:30 AM   (120 min.)    ONC INFUSION NURSE   [facility] 6       7     8    RETURN GI  10:45 AM   (30 min.)   Carmen Waggoner APRN CNP   [facility] 9     10     11     12    LAB PERIPHERAL  10:30 AM   (15 min.)    [facility] DRAW   [facility]    ONC INFUSION 1 HR (60 MIN)  11:00 AM   (60 min.)   UC ONC INFUSION NURSE   [facility] 13       14     15     16     17     18     19    LAB PERIPHERAL  10:30 AM   (15 min.)   [facility] LAB DRAW   [facility]    RETURN CCSL  10:45 AM   (45 min.)   Dary Tinoco APRN CNP   [facility]  [facility]    ONC INFUSION 2 HR (120 MIN)  12:00 PM   (120 min.)    ONC INFUSION NURSE   [facility] 20       21     22     23     24     25     26    LAB PERIPHERAL  10:30 AM   (15 min.)   [facility] LAB DRAW   [facility]    ONC INFUSION 1 HR (60 MIN)  11:00 AM   (60 min.)    ONC INFUSION NURSE   [facility] 27       28     29     30                                         Lab Results:  Recent Results (from the past 12 hour(s))   Comprehensive metabolic panel    Collection Time: 03/29/24  6:59 AM   Result Value Ref Range    Sodium 139 135 - 145 mmol/L    Potassium 3.5 3.4 - 5.3 mmol/L    Carbon Dioxide (CO2) 28 22 - 29 mmol/L    Anion Gap 6 (L) 7 - 15 mmol/L    Urea Nitrogen 20.9 (H) 6.0 - 20.0 mg/dL    Creatinine 1.39 (H) 0.51 - 0.95 mg/dL    GFR Estimate 45 (L) >60 mL/min/1.73m2    Calcium 8.6 8.6 - 10.0 mg/dL    Chloride 105 98 - 107 mmol/L    Glucose 84 70 - 99 mg/dL    Alkaline Phosphatase 101 40 - 150 U/L    AST 27 0 - 45 U/L    ALT 25 0 - 50 U/L    Protein Total 4.5 (L) 6.4 - 8.3 g/dL    Albumin 2.2 (L) 3.5 - 5.2 g/dL    Bilirubin Total 0.3 <=1.2 mg/dL   CBC with platelets and differential    Collection Time: 03/29/24  6:59 AM   Result Value Ref Range    WBC Count 9.7 4.0 - 11.0 10e3/uL    RBC Count 4.49 3.80 - 5.20 10e6/uL    Hemoglobin 12.9 11.7 - 15.7 g/dL    Hematocrit 39.0 35.0 - 47.0 %    MCV 87 78 - 100 fL    MCH 28.7 26.5 - 33.0 pg    MCHC 33.1 31.5 - 36.5 g/dL    RDW 18.5 (H) 10.0 - 15.0 %    Platelet Count 391 150 - 450 10e3/uL    % Neutrophils 69 %    % Lymphocytes 17 %    % Monocytes 10 %    % Eosinophils 3 %    % Basophils 1 %    % Immature Granulocytes 0 %    NRBCs per 100 WBC 0 <1 /100    Absolute Neutrophils 6.7 1.6 - 8.3 10e3/uL    Absolute Lymphocytes 1.6 0.8 - 5.3 10e3/uL    Absolute Monocytes 1.0 0.0 - 1.3 10e3/uL    Absolute Eosinophils 0.3 0.0 - 0.7 10e3/uL    Absolute Basophils  0.1 0.0 - 0.2 10e3/uL    Absolute Immature Granulocytes 0.0 <=0.4 10e3/uL    Absolute NRBCs 0.0 10e3/uL

## 2024-03-29 NOTE — PROGRESS NOTES
Oral Chemotherapy Monitoring Program  Lab Follow Up    Reviewed lab results from 3/29/24.        2/2/2024     3:00 PM 2/9/2024     2:00 PM 2/13/2024     2:00 PM 3/4/2024    11:00 AM 3/11/2024     1:00 PM 3/15/2024     9:00 AM 3/29/2024     1:00 PM   ORAL CHEMOTHERAPY   Assessment Type Initial Follow up;Lab Monitoring Lab Monitoring Refill Lab Monitoring Lab Monitoring Refill Lab Monitoring   Diagnosis Code Multiple Myeloma Multiple Myeloma Multiple Myeloma Multiple Myeloma Multiple Myeloma Multiple Myeloma Multiple Myeloma   Providers Dr. Ramesh Chandler   [facility]   Is this patient followed by the [facility] OC team? No No No No No No No   Drug Name Cytoxan (cyclophsphamide) Cytoxan (cyclophsphamide) Cytoxan (cyclophsphamide) Cytoxan (cyclophsphamide) Cytoxan (cyclophsphamide) Cytoxan (cyclophsphamide) Cytoxan (cyclophsphamide)   Dose 500 mg 500 mg 500 mg 500 mg 500 mg 500 mg 500 mg   Current Schedule Weekly Weekly Weekly Weekly Weekly Weekly Weekly   Cycle Details Weekly/Daily Weekly/Daily Weekly/Daily Weekly/Daily Weekly/Daily Weekly/Daily Weekly/Daily   Start Date of Last Cycle 1/26/2024 1/26/2024 1/26/2024 2/23/2024 2/23/2024    Planned next cycle start date 2/23/2024 2/23/2024 2/23/2024 3/22/2024  3/22/2024    Doses missed in last 2 weeks 0         Adherence Assessment Adherent         Adverse Effects No AE identified during assessment No AE identified during assessment        Any new drug interactions? No         Is the dose as ordered appropriate for the patient? Yes Yes            Labs:  _  Result Component Current Result Ref Range   Sodium 139 (3/29/2024) 135 - 145 mmol/L     _  Result Component Current Result Ref Range   Potassium 3.5 (3/29/2024) 3.4 - 5.3 mmol/L     _  Result Component Current Result Ref Range   Calcium 8.6 (3/29/2024) 8.6 - 10.0 mg/dL     No results found for  Mag within last 30 days.     _  Result Component Current Result Ref Range   Phosphorus 4.4 (3/18/2024) 2.5 - 4.5 mg/dL     _  Result Component Current Result Ref Range   Albumin 2.2 (L) (3/29/2024) 3.5 - 5.2 g/dL     _  Result Component Current Result Ref Range   Urea Nitrogen 20.9 (H) (3/29/2024) 6.0 - 20.0 mg/dL     _  Result Component Current Result Ref Range   Creatinine 1.39 (H) (3/29/2024) 0.51 - 0.95 mg/dL     _  Result Component Current Result Ref Range   AST 27 (3/29/2024) 0 - 45 U/L     _  Result Component Current Result Ref Range   ALT 25 (3/29/2024) 0 - 50 U/L     _  Result Component Current Result Ref Range   Bilirubin Total 0.3 (3/29/2024) <=1.2 mg/dL     _  Result Component Current Result Ref Range   WBC Count 9.7 (3/29/2024) 4.0 - 11.0 10e3/uL     _  Result Component Current Result Ref Range   Hemoglobin 12.9 (3/29/2024) 11.7 - 15.7 g/dL     _  Result Component Current Result Ref Range   Platelet Count 391 (3/29/2024) 150 - 450 10e3/uL     No results found for ANC within last 30 days.     _  Result Component Current Result Ref Range   Absolute Neutrophils 6.7 (3/29/2024) 1.6 - 8.3 10e3/uL        Assessment & Plan:  No concerning lab abnormalities.  No changes with Cyclophosphamide needed at this time.    Patient not contacted as patient was seen in infusion today.    Karissa Carpio, PharmD, BCPS, BCOP  Oncology Clinical Pharmacy Specialist  [facility]  [phone]

## 2024-03-29 NOTE — PROGRESS NOTES
Infusion Nursing Note:  Vivian Brown presents today for Cycle 3 Day 8 Velcade.    Patient seen by provider today: No   present during visit today: Not Applicable.    Note: Patient presents to infusion today doing well. No recent fevers, chills, SOB, chest pains or cough. Notes swelling in BLE has increased, but states she was just travelling and on a plane yesterday and believes it to be worse from this. Will elevate at home and continue to monitor.     Patient was not aware that she would be switching to carmen every other week with this cycle. Confirmed that this is the standard protocol per pharmacy. Educated patient that she should take home dexamethasone on Days 8 and 22. IB sent to care team to follow up with patient to confirm understanding of treatment plan.    Intravenous Access:  No Intravenous access at this visit.    Treatment Conditions:  Lab Results   Component Value Date    HGB 12.9 03/29/2024    WBC 9.7 03/29/2024    ANEU 5.6 03/09/2020    ANEUTAUTO 6.7 03/29/2024     03/29/2024        Lab Results   Component Value Date     03/29/2024    POTASSIUM 3.5 03/29/2024    MAG 2.0 02/03/2024    CR 1.39 (H) 03/29/2024    TANNER 8.6 03/29/2024    BILITOTAL 0.3 03/29/2024    ALBUMIN 2.2 (L) 03/29/2024    ALT 25 03/29/2024    AST 27 03/29/2024     Results reviewed, labs MET treatment parameters, ok to proceed with treatment.    Post Infusion Assessment:  Patient tolerated Velcade injection to Right Lower Abdomen without incident.  Site patent and intact, free from redness, edema or discomfort.  No evidence of extravasations.     Discharge Plan:   Patient declined prescription refills.  Discharge instructions reviewed with: Patient.  Patient and/or family verbalized understanding of discharge instructions and all questions answered.  Copy of AVS reviewed with patient and/or family.  Patient will return 4/5 for next appointment.  Patient discharged in stable condition accompanied by:  mother.  Departure Mode: Ambulatory.      Jessica Waggoner RN

## 2024-04-05 ENCOUNTER — TELEPHONE (OUTPATIENT)
Dept: CARDIOLOGY | Facility: CLINIC | Age: 54
End: 2024-04-05

## 2024-04-05 ENCOUNTER — APPOINTMENT (OUTPATIENT)
Dept: LAB | Facility: CLINIC | Age: 54
End: 2024-04-05
Attending: STUDENT IN AN ORGANIZED HEALTH CARE EDUCATION/TRAINING PROGRAM
Payer: COMMERCIAL

## 2024-04-05 ENCOUNTER — INFUSION THERAPY VISIT (OUTPATIENT)
Dept: ONCOLOGY | Facility: CLINIC | Age: 54
End: 2024-04-05
Attending: STUDENT IN AN ORGANIZED HEALTH CARE EDUCATION/TRAINING PROGRAM
Payer: COMMERCIAL

## 2024-04-05 VITALS
HEIGHT: 65 IN | SYSTOLIC BLOOD PRESSURE: 100 MMHG | OXYGEN SATURATION: 95 % | RESPIRATION RATE: 16 BRPM | TEMPERATURE: 98.1 F | WEIGHT: 165 LBS | BODY MASS INDEX: 27.49 KG/M2 | HEART RATE: 119 BPM | DIASTOLIC BLOOD PRESSURE: 68 MMHG

## 2024-04-05 DIAGNOSIS — E85.81 AL AMYLOIDOSIS (H): Primary | ICD-10-CM

## 2024-04-05 LAB
ALBUMIN SERPL BCG-MCNC: 2.2 G/DL (ref 3.5–5.2)
ALP SERPL-CCNC: 108 U/L (ref 40–150)
ALT SERPL W P-5'-P-CCNC: 22 U/L (ref 0–50)
ANION GAP SERPL CALCULATED.3IONS-SCNC: 9 MMOL/L (ref 7–15)
AST SERPL W P-5'-P-CCNC: 24 U/L (ref 0–45)
BASOPHILS # BLD AUTO: 0.1 10E3/UL (ref 0–0.2)
BASOPHILS NFR BLD AUTO: 1 %
BILIRUB SERPL-MCNC: 0.2 MG/DL
BUN SERPL-MCNC: 16.7 MG/DL (ref 6–20)
CALCIUM SERPL-MCNC: 9 MG/DL (ref 8.6–10)
CHLORIDE SERPL-SCNC: 103 MMOL/L (ref 98–107)
CREAT SERPL-MCNC: 1.42 MG/DL (ref 0.51–0.95)
DEPRECATED HCO3 PLAS-SCNC: 25 MMOL/L (ref 22–29)
EGFRCR SERPLBLD CKD-EPI 2021: 44 ML/MIN/1.73M2
EOSINOPHIL # BLD AUTO: 0.3 10E3/UL (ref 0–0.7)
EOSINOPHIL NFR BLD AUTO: 2 %
ERYTHROCYTE [DISTWIDTH] IN BLOOD BY AUTOMATED COUNT: 18.6 % (ref 10–15)
GLUCOSE SERPL-MCNC: 115 MG/DL (ref 70–99)
HCT VFR BLD AUTO: 40.9 % (ref 35–47)
HGB BLD-MCNC: 13.8 G/DL (ref 11.7–15.7)
IMM GRANULOCYTES # BLD: 0 10E3/UL
IMM GRANULOCYTES NFR BLD: 0 %
LYMPHOCYTES # BLD AUTO: 1.9 10E3/UL (ref 0.8–5.3)
LYMPHOCYTES NFR BLD AUTO: 18 %
MCH RBC QN AUTO: 29.2 PG (ref 26.5–33)
MCHC RBC AUTO-ENTMCNC: 33.7 G/DL (ref 31.5–36.5)
MCV RBC AUTO: 87 FL (ref 78–100)
MONOCYTES # BLD AUTO: 0.9 10E3/UL (ref 0–1.3)
MONOCYTES NFR BLD AUTO: 9 %
NEUTROPHILS # BLD AUTO: 7.3 10E3/UL (ref 1.6–8.3)
NEUTROPHILS NFR BLD AUTO: 70 %
NRBC # BLD AUTO: 0 10E3/UL
NRBC BLD AUTO-RTO: 0 /100
PLATELET # BLD AUTO: 431 10E3/UL (ref 150–450)
POTASSIUM SERPL-SCNC: 3.9 MMOL/L (ref 3.4–5.3)
PROT SERPL-MCNC: 4.7 G/DL (ref 6.4–8.3)
RBC # BLD AUTO: 4.72 10E6/UL (ref 3.8–5.2)
SODIUM SERPL-SCNC: 137 MMOL/L (ref 135–145)
WBC # BLD AUTO: 10.5 10E3/UL (ref 4–11)

## 2024-04-05 PROCEDURE — 85025 COMPLETE CBC W/AUTO DIFF WBC: CPT | Performed by: STUDENT IN AN ORGANIZED HEALTH CARE EDUCATION/TRAINING PROGRAM

## 2024-04-05 PROCEDURE — 250N000012 HC RX MED GY IP 250 OP 636 PS 637: Performed by: STUDENT IN AN ORGANIZED HEALTH CARE EDUCATION/TRAINING PROGRAM

## 2024-04-05 PROCEDURE — 36415 COLL VENOUS BLD VENIPUNCTURE: CPT | Performed by: STUDENT IN AN ORGANIZED HEALTH CARE EDUCATION/TRAINING PROGRAM

## 2024-04-05 PROCEDURE — 250N000011 HC RX IP 250 OP 636: Mod: JZ | Performed by: STUDENT IN AN ORGANIZED HEALTH CARE EDUCATION/TRAINING PROGRAM

## 2024-04-05 PROCEDURE — 96401 CHEMO ANTI-NEOPL SQ/IM: CPT

## 2024-04-05 PROCEDURE — 80053 COMPREHEN METABOLIC PANEL: CPT

## 2024-04-05 RX ADMIN — DEXAMETHASONE 30 MG: 2 TABLET ORAL at 10:10

## 2024-04-05 RX ADMIN — BORTEZOMIB 2.5 MG: 3.5 INJECTION, POWDER, LYOPHILIZED, FOR SOLUTION INTRAVENOUS; SUBCUTANEOUS at 10:55

## 2024-04-05 RX ADMIN — DARATUMUMAB AND HYALURONIDASE-FIHJ (HUMAN RECOMBINANT) 1800 MG: 1800; 30000 INJECTION SUBCUTANEOUS at 10:47

## 2024-04-05 ASSESSMENT — PAIN SCALES - GENERAL: PAINLEVEL: NO PAIN (0)

## 2024-04-05 NOTE — PATIENT INSTRUCTIONS
[facility] Triage and after hours / weekends / holidays:  [phone]    Please call the triage or after hours line if you experience a temperature greater than or equal to 100.4, shaking chills, have uncontrolled nausea, vomiting and/or diarrhea, dizziness, shortness of breath, chest pain, bleeding, unexplained bruising, or if you have any other new/concerning symptoms, questions or concerns.      If you are having any concerning symptoms or wish to speak to a provider before your next infusion visit, please call triage to notify them so we can adequately serve you.     If you need a refill on a narcotic prescription or other medication, please call before your infusion appointment.                 April 2024 Sunday Monday Tuesday Wednesday Thursday Friday Saturday        1     2     3     4     5    LAB PERIPHERAL   8:00 AM   (15 min.)   [facility]RAW   [facility]    ONC INFUSION 2 HR (120 MIN)   8:30 AM   (120 min.)    ONC INFUSION NURSE   [facility] 6       7     8    RETURN GI  10:45 AM   (30 min.)   Carmen Waggoner APRN CNP   [facility] 9     10     11     12    LAB PERIPHERAL  10:30 AM   (15 min.)   [facility]   [facility]    ONC INFUSION 1 HR (60 MIN)  11:00 AM   (60 min.)    ONC INFUSION NURSE   [facility] 13       14     15     16     17     18     19    LAB PERIPHERAL  10:30 AM   (15 min.)   [facility]   [facility]    RETURN CCSL  10:45 AM   (45 min.)   Dary Tinoco APRN CNP   [facility]    ONC INFUSION 2 HR (120 MIN)  12:00 PM   (120 min.)    ONC INFUSION NURSE   [facility] 20       21     22     23     24     25     26    LAB PERIPHERAL  10:30 AM   (15 min.)   [facility]   [facility]    ONC INFUSION 1  HR (60 MIN)  11:00 AM   (60 min.)    ONC INFUSION NURSE   [facility] 27       28     29     30    ADULT SKIN PRICK TEST   7:45 AM   (30 min.)   Jose Gamble MD   [facility]                                 May 2024      [facility] Monday Tuesday Wednesday Thursday Friday Saturday                  1     2     3    LAB PERIPHERAL  10:00 AM   (15 min.)    [facility]   [facility]    ONC INFUSION 2 HR (120 MIN)  10:30 AM   (120 min.)    ONC INFUSION NURSE   [facility] 4       5     6     7     8     9     10    LAB PERIPHERAL  10:30 AM   (15 min.)   [facility]   [facility]    ONC INFUSION 1 HR (60 MIN)  11:00 AM   (60 min.)    ONC INFUSION NURSE   [facility] 11       12     13     14     15     16    LAB   2:30 PM   (15 min.)    LAB   [facility] [place]    RETURN NEPHROLOGY   3:15 PM   (30 min.)   Tracie Dobson MD   [facility] Nephrology [facility] 17     18       19     20     21     22     23     24     25       26     27     28     29     30     31                          Lab Results:  Recent Results (from the past 12 hour(s))   Comprehensive metabolic panel    Collection Time: 04/05/24  8:14 AM   Result Value Ref Range    Sodium 137 135 - 145 mmol/L    Potassium 3.9 3.4 - 5.3 mmol/L    Carbon Dioxide (CO2) 25 22 - 29 mmol/L    Anion Gap 9 7 - 15 mmol/L    Urea Nitrogen 16.7 6.0 - 20.0 mg/dL    Creatinine 1.42 (H) 0.51 - 0.95 mg/dL    GFR Estimate 44 (L) >60 mL/min/1.73m2    Calcium 9.0 8.6 - 10.0 mg/dL    Chloride 103 98 - 107 mmol/L    Glucose 115 (H) 70 - 99 mg/dL    Alkaline Phosphatase 108 40 - 150 U/L    AST 24 0 - 45 U/L    ALT 22 0 - 50 U/L    Protein Total 4.7 (L) 6.4 - 8.3 g/dL    Albumin 2.2 (L) 3.5 - 5.2 g/dL    Bilirubin Total 0.2 <=1.2 mg/dL   CBC with platelets and differential     Collection Time: 04/05/24  8:14 AM   Result Value Ref Range    WBC Count 10.5 4.0 - 11.0 10e3/uL    RBC Count 4.72 3.80 - 5.20 10e6/uL    Hemoglobin 13.8 11.7 - 15.7 g/dL    Hematocrit 40.9 35.0 - 47.0 %    MCV 87 78 - 100 fL    MCH 29.2 26.5 - 33.0 pg    MCHC 33.7 31.5 - 36.5 g/dL    RDW 18.6 (H) 10.0 - 15.0 %    Platelet Count 431 150 - 450 10e3/uL    % Neutrophils 70 %    % Lymphocytes 18 %    % Monocytes 9 %    % Eosinophils 2 %    % Basophils 1 %    % Immature Granulocytes 0 %    NRBCs per 100 WBC 0 <1 /100    Absolute Neutrophils 7.3 1.6 - 8.3 10e3/uL    Absolute Lymphocytes 1.9 0.8 - 5.3 10e3/uL    Absolute Monocytes 0.9 0.0 - 1.3 10e3/uL    Absolute Eosinophils 0.3 0.0 - 0.7 10e3/uL    Absolute Basophils 0.1 0.0 - 0.2 10e3/uL    Absolute Immature Granulocytes 0.0 <=0.4 10e3/uL    Absolute NRBCs 0.0 10e3/uL

## 2024-04-05 NOTE — NURSING NOTE
Chief Complaint   Patient presents with    Blood Draw     Labs drawn via  by RN in lab. VS taken.      Labs collected from venipuncture by RN. Vital signs taken. Checked in for appointment(s).    Teresa Linn RN     Problem: Perioperative Period (Adult)  Goal: Signs and Symptoms of Listed Potential Problems Will be Absent or Manageable (Perioperative Period)  Outcome: Ongoing (interventions implemented as appropriate)    02/13/17 1311   Perioperative Period   Problems Assessed (Perioperative Period) pain;wound complications;hemorrhage;hypothermia;hypoxia/hypoxemia   Problems Present (Perioperative Period) none

## 2024-04-05 NOTE — PROGRESS NOTES
Infusion Nursing Note:  Vivian Brown presents today for Day 15 Cycle 3 Darzalex Faspro  Patient seen by provider today: No   present during visit today: Not Applicable.    Note: Pt presents to infusion feeling ok. Pt states the following: for the last 2 days, 10-12 episodes of almost fainting during activity/exertion that has progressively gotten less frequent. Pt states her vision will get black that starts in the back of her head and moves forward which then she knows to sit down. She said she's had a history of these episodes but its been when her blood pressure was in the 70's. Her blood pressure within the last 2 days at home have been in the 90's systolic. She has not fainted. During these near fainting moments-no other vision changes, no changes in hearing. she said her eyes would get really watery. Pt thinks these episodes are from dehydration-she was out of town for [facility] and didn't maintain hydration. Creatinine 1.42 today. Pt has a 13lb weight loss in 1 week with no changes in PO intake. Pt states last week her legs were quite swollen. Orthostatic bp trends done-see graphic for more information. Bp did decrease to 86/63, post 1 minute standing but recovered to 100/68 after standing an additional 2 minutes.  Otherwise, pt denies further  acute discomfort and states no acute complaints or concerns needing to be addressed today. Specifically, pt denies s/s of infection such as fever, sore throat, cough, chest pain, shortness of breath, body aches, chills, headache, increased nasal congestion, or changes in taste/smell.    Dian Read CNP made aware of the above information. Also notified provider of calculated dose of 2.4mg with change in body weight but is still within 10% of signed 2.5mg.   Per written communication. 4/5/24. 1008. Dian Read CNP. Pierce Yung RN. Give Velcade as ordered.     IB sent to patients Cardiology team: Dr. Borden    On 2/23, PO Dex changed from 40 mg  to 30mg to help decrease extremity edema per chart review. Pt ordered 40mg PO Dex to take at home on her Velcade only days. Clarification requested.  Per written communication. 4/5/24. 1114. Dian Read CNP. Pierce Yung RN. Pt to take 30mg PO Dex in infusion and at home.     MYCHART sent to pt. Pt can split 4mg tablet or pt can request a different prescription. IB sent to Dian Read CNP to adjust PO Dex prescription for next cycle.        Intravenous Access:  No Intravenous access at this visit.    Treatment Conditions:  Lab Results   Component Value Date    HGB 13.8 04/05/2024    WBC 10.5 04/05/2024    ANEU 5.6 03/09/2020    ANEUTAUTO 7.3 04/05/2024     04/05/2024        Lab Results   Component Value Date     04/05/2024    POTASSIUM 3.9 04/05/2024    MAG 2.0 02/03/2024    CR 1.42 (H) 04/05/2024    TANNER 9.0 04/05/2024    BILITOTAL 0.2 04/05/2024    ALBUMIN 2.2 (L) 04/05/2024    ALT 22 04/05/2024    AST 24 04/05/2024       Results reviewed, labs MET treatment parameters, ok to proceed with treatment.      Post Infusion Assessment:  Patient tolerated 1 Darzalex Faspro injection via RLQ of abdomen without incident.   Patient tolerated 1 Velcade injection via LLQ of abdomen without incident.     Discharge Plan:   Patient declined prescription refills.  Discharge instructions reviewed with: Patient.  Patient and/or family verbalized understanding of discharge instructions and all questions answered.  AVS to patient via [employer].  Patient will return 4/12 for next appointment.   Patient discharged in stable condition accompanied by: .  Departure Mode: Ambulatory.      Pierce Yung RN

## 2024-04-05 NOTE — TELEPHONE ENCOUNTER
Date: 4/5/2024    Time of Call: 10:34 AM     Diagnosis:  Hypovolemia      [ TORB ] Ordering provider: Dr. Armstrong    Order:   -HOLD Lasix through the weekend.   -Check in with pt on Monday 4/8/24; if BP is stable and weight has increased, re-stat Lasix at 20 mg BID.      Order received by: Loida Germain RN       Follow-up/additional notes: Called pt and relayed recommendation to hold Lasix starting today. Pt states she already took AM dose of Laix 40 mg but will hold through the weekend. Writer will check in with pt Monday to see how her weight and Bp's are doing and resume Lasix at lower dose if appropriate. Pt verbalized understanding and will hold Lasix until we talk on Monday.

## 2024-04-05 NOTE — TELEPHONE ENCOUNTER
----- Message from Suha Armstrong MD sent at 4/5/2024 10:03 AM CDT -----  Regarding: RE: symptoms  I think we hold lasix a few days allow some weight gain back then resume at half of what she is doing, so 20mg BID  ----- Message -----  From: Pierce Yung RN  Sent: 4/5/2024   9:55 AM CDT  To: Edyta Varghese RN; Pierce Yung RN; #  Subject: symptoms                                         Hello!    I have Mary Ann for her Darzalex/Velcade injection today. Pt states the following: 10-12 episodes of almost fainting. pt states her vision will get black that starts in the back of her head and moves forward. She said she's had a history of these episodes before but its been when her blood pressure was in the 70's. Her blood pressure within the last 2 days at home have been in the 90's. she has not fainted. during these near fainting moments-no other vision changes, no changes in hearing. she said her eyes would get really watery.      Pt thinks these episodes are from dehydration-she was out of town for [facility] and didn't maintain hydration. Creatinine 1.42 today. pt has had 13lb weight loss in 1 week with no changes in PO intake. pt states last week her legs were quite swollen.I did orthostatic bp trends on her that you can see via the Graphic tab. She didn't have any symptoms during the blood pressures. Her bp did dip to 86/63, 1 minute standing but recovered to 100/68 after standing an additional 2 minutes.    Please reach out to patient to follow-up. Per oncology, we went ahead with her treatment today as scheduled.     Pierce FRAZIER

## 2024-04-07 NOTE — PROGRESS NOTES
Gastroenterology CLINIC VISIT, RETURN PATIENT    CC/REFERRING PROVIDER: Alin Sandra   REASON FOR CONSULTATION: follow up    HPI: 54 year old female presented to GI clinic for follow up. Was referred for evaluation of possible GI etiology of hypoalbuminemia.  She reported adequate food intake.  Was not on any diet. No history of eating disorder. Was complaining of ongoing nausea in the mornings, some bloating, and constipation. Normal appearance of liver on ultrasound. There was mild dilation of common bile duct,  but no signs of cholelithiasis, sludge, or polyps. No signs of cholecystitis.   Food allergy came back positive for hazelnut, walnuts, peanuts, almonds, sesame seeds, and wheat.  TTG IgA was elevated at 124. Referred to upper GI endoscopy with duodenal biopsy. Patology reported villous flattening with intraepithelial lymphocytosis consistent with gluten sensitivity/celiac disease, Marsh 3C.  Patient started on gluten-free diet 2 months ago, tolerated well. She complains of daily nausea, worse in the morning.  No emesis.  Denies abdominal bloating.  No hematochezia or melena.  No weight loss.  Patient stated that her brother and daughter were tested negative for celiac disease.  Her mother denies any symptoms.    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 Started  Zenia + CyBorD, completed 2 cycles.     PREVIOUS ENDOSCOPY:  1/30/2024 EGD  Findings:       The examined esophagus was normal.        The gastroesophageal flap valve was visualized endoscopically and        classified as Hill Grade II (fold present, opens with respiration).        The stomach was normal.        Decreased folds were found in the entire duodenum and flattening was        found in the entire duodenum. Biopsies were taken with a cold forceps        for histology.   Final Diagnosis   Small intestine, duodenum,  biopsies:  -Villous flattening with intraepithelial lymphocytosis consistent with gluten sensitivity/celiac disease, Marsh 3C         Comment    Intraepithelial lymphocytosis is hallmark of untreated or partially treated celiac disease.  While characteristic, histologic findings are not diagnostic; only response to gluten-free diet is diagnostic.  Please also correlate with serologic studies.       PERTINENT STUDIES Reviewed in EMR  11/29/23 Abdominal ultrasound  FINDINGS:     GALLBLADDER: Normal. No gallstones, wall thickening, or  pericholecystic fluid. Negative sonographic Enciso's sign.     BILE DUCTS: No intrahepatic biliary dilatation. The common duct is  mildly enlarged at 7 mm. No obstructing stone or mass is seen.     LIVER: Normal parenchyma with smooth contour. No focal mass.     RIGHT KIDNEY: Normal size. Normal echogenicity with no hydronephrosis  or mass.      LEFT KIDNEY: Normal size. Normal echogenicity with no hydronephrosis  or mass.      SPLEEN: Spleen is normal in size. Numerous benign echogenic splenic  granulomas are noted.   PANCREAS: The visualized portions are normal.   AORTA: Normal in caliber.    IVC: Normal where visualized.   No ascites.                                                                 IMPRESSION:  1.  Minimal dilation of the common bile duct without obstructing stone  or mass. Consider correlation with biliary function studies and  follow-up MRCP if clinically indicated.  2.  No signs of cholelithiasis or acute cholecystitis.    ROS: 10pt ROS performed and otherwise negative.    PAST MEDICAL HISTORY:  Past Medical History:   Diagnosis Date    Celiac disease     Family history of colon cancer     Colonoscoy q5 years next 9/2020       PREVIOUS ABDOMINAL/GYNECOLOGIC SURGERIES:  Past Surgical History:   Procedure Laterality Date    COLONOSCOPY N/A 9/28/2015    Procedure: COLONOSCOPY;  Surgeon: Eugenio Travis MD;  Location:  GI    ESOPHAGOSCOPY, GASTROSCOPY,  DUODENOSCOPY (EGD), COMBINED N/A 1/30/2024    Procedure: ESOPHAGOGASTRODUODENOSCOPY, WITH BIOPSY;  Surgeon: Melo Cloud MD;  Location: [facility]    IR RENAL BIOPSY RIGHT  12/21/2023    TONSILLECTOMY           PERTINENT MEDICATIONS:  Current Outpatient Medications   Medication Sig Dispense Refill    acyclovir (ZOVIRAX) 400 MG tablet Take 1 tablet (400 mg) by mouth 2 times daily Viral Prophylaxis. 180 tablet 3    Albuterol-Budesonide (AIRSUPRA) 90-80 MCG/ACT AERO Inhale 2 Inhalations into the lungs every 4 hours as needed (Wheezing, chest tightness, shortness of breath, or persistent cough) 10.7 g 3    cholecalciferol (VITAMIN D3) 125 mcg (5000 units) capsule Take 1 capsule (125 mcg) by mouth daily 30 capsule 6    cycloPHOSphamide (CYTOXAN) 50 MG capsule Take 10 capsules (500 mg) by mouth every 7 days Take on days 1, 8, 15, and 22. 40 capsule 0    dexAMETHasone (DECADRON) 4 MG tablet Take 40 mg (10 tablets) on days 8 and 22. (Patient taking differently: 30 mg Take 30 mg (10 tablets) on days 8 and 22-clarification obtained on 4/5/24 by Dian Read CNP) 20 tablet 0    empagliflozin (JARDIANCE) 10 MG TABS tablet Take 1 tablet (10 mg) by mouth daily 30 tablet 11    levothyroxine (SYNTHROID/LEVOTHROID) 200 MCG tablet Take 1 tablet (200 mcg) by mouth daily 90 tablet 3    levothyroxine (SYNTHROID/LEVOTHROID) 25 MCG tablet Take 1 tablet (25 mcg) by mouth daily      liothyronine (CYTOMEL) 5 MCG tablet Take 5 mcg by mouth 2 times daily      prochlorperazine (COMPAZINE) 5 MG tablet Take 1 tablet (5 mg) by mouth every 6 hours as needed for nausea or vomiting 30 tablet 1    rosuvastatin (CRESTOR) 10 MG tablet Take 1 tablet (10 mg) by mouth daily 90 tablet 3    spironolactone (ALDACTONE) 25 MG tablet Take 1 tablet (25 mg) by mouth daily 90 tablet 3    azelastine (ASTELIN) 0.1 % nasal spray Spray 2 sprays into both nostrils 2 times daily as needed for rhinitis (Patient not taking: Reported on 4/8/2024) 30 mL 3    EPINEPHrine  (ANY BX GENERIC EQUIV) 0.3 MG/0.3ML injection 2-pack Inject 0.3 mLs (0.3 mg) into the muscle as needed for anaphylaxis May repeat one time in 5-15 minutes if response to initial dose is inadequate. (Patient not taking: Reported on 4/8/2024) 2 each 3    fluticasone (FLONASE) 50 MCG/ACT nasal spray Spray 2 sprays into both nostrils daily (Patient not taking: Reported on 4/8/2024) 16 g 3    furosemide (LASIX) 20 MG tablet Take 2 tablets (40 mg) by mouth 2 times daily (Patient not taking: Reported on 4/8/2024) 360 tablet 3         SOCIAL HISTORY:  Social History     Socioeconomic History    Marital status:      Spouse name: Not on file    Number of children: Not on file    Years of education: Not on file    Highest education level: Not on file   Occupational History     Comment: Physical Therapy   Tobacco Use    Smoking status: Never     Passive exposure: Past    Smokeless tobacco: Never    Tobacco comments:     Parents smoked during childhood in the house   Vaping Use    Vaping Use: Never used   Substance and Sexual Activity    Alcohol use: Not Currently     Alcohol/week: 0.0 standard drinks of alcohol     Comment: 1-2 times/month    Drug use: No    Sexual activity: Yes     Partners: Male     Birth control/protection: I.U.D.   Other Topics Concern    Parent/sibling w/ CABG, MI or angioplasty before 65F 55M? Not Asked   Social History Narrative    March 12, 2024    ENVIRONMENTAL HISTORY: [place] The [place] is heated with a forced air. They does have central air conditioning. The patient's bedroom is furnished with carpeting in bedroom and fabric window coverings.  Pets inside the house include no. There is no history of cockroach or mice infestation. There is/are 0 smokers in the house.  The house does not have a damp basement.      Social Determinants of Health     Financial Resource Strain: High Risk (1/22/2024)    Financial Resource Strain     Within the past 12  "months, have you or your family members you live with been unable to get utilities (heat, electricity) when it was really needed?: Yes   Food Insecurity: Low Risk  (1/22/2024)    Food Insecurity     Within the past 12 months, did you worry that your food would run out before you got money to buy more?: No     Within the past 12 months, did the food you bought just not last and you didn t have money to get more?: No   Transportation Needs: Low Risk  (1/22/2024)    Transportation Needs     Within the past 12 months, has lack of transportation kept you from medical appointments, getting your medicines, non-medical meetings or appointments, work, or from getting things that you need?: No   Physical Activity: Not on file   Stress: Not on file   Social Connections: Not on file   Interpersonal Safety: Low Risk  (11/17/2023)    Interpersonal Safety     Do you feel physically and emotionally safe where you currently live?: Yes     Within the past 12 months, have you been hit, slapped, kicked or otherwise physically hurt by someone?: No     Within the past 12 months, have you been humiliated or emotionally abused in other ways by your partner or ex-partner?: No   Housing Stability: Low Risk  (1/22/2024)    Housing Stability     Do you have housing? : Yes     Are you worried about losing your housing?: No       FAMILY HISTORY:  Denies colon/panc/esophageal/other GI CA, no other Sosa or other HPS-related Jose Martin. No IBD/celiac, no other AI/liver/thyroid disease.    Family History   Problem Relation Age of Onset    Colon Cancer Maternal Grandmother 91    Hypertension Mother     Hyperlipidemia Mother     Cancer Mother 65        GIST    Other Cancer Mother     Coronary Artery Disease Father         MI    Thyroid Disease Sister     Asthma Daughter        PHYSICAL EXAMINATION:  Vitals reviewed  /72 (BP Location: Right arm, Patient Position: Sitting, Cuff Size: Adult Regular)   Pulse 84   Ht 1.64 m (5' 4.57\")   Wt 76.2 kg (168 " lb)   Breastfeeding No   BMI 28.33 kg/m      General: Patient appears well in no acute distress.    Skin: No visualized rash or lesions on visualized skin  HEENT:    EOMI, no erythema, sclera icterus or discharge noted.  Mouth mucosa intact, pink, moist  No cervical or supraclavicular lymphadenopathy. Thyroid gland not enlarged.  Resp: breathing comfortably without accessory muscle usage, speaking in full sentences, no cough. Lung sounds clear  Card: Regular and rhythmic S1 and S2. No gallop or rub. No murmur.  No LE edema.  Abdomen: Active bowel sounds X 4 quadrants. Soft to palpation.  No guarding or rebound tenderness. Enciso's sign negative.  MSK: Appears to have normal range of motion based on visualized movements  Neurologic: No apparent tremors, facial movements symmetric  Psych: affect normal, alert and oriented      ASSESSMENT/PLAN:    ICD-10-CM    1. Food allergy  Z91.018       2. Constipation, unspecified constipation type  K59.00       3. AL amyloidosis (H)  E85.81       4. Celiac disease  K90.0 Med Therapy Management Referral         54 year old female was initially referred to GI clinic for evaluation of acute hypoalbuminemia.  We did some evaluation for malabsorption and she found to have celiac disease, confirmed by duodenal biopsy and celiac serology.  Marsh 3C.  Patient started a gluten-free diet about 2 months ago.  She reported some improvement to her abdominal bloating.  Continues complaining of daily nausea without emesis.  Patient was also evaluated by oncology and nephrology. 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 Started  Zenia + CyBorD, completed 2 cycles.  Plan for transplantation.    We discussed manifestations and management of GI symptoms related to celiac disease.  Explained to the patient that her nausea is likely related to chemotherapy.  She reported that Zofran was not effective.  She takes Compazine as  needed with some benefits.  Patient's mother asking about potential GI side effect of chemotherapy.  Deferred the question to their oncology as I am not an expert in this area of medicine.  I suggested to continue gluten-free diet.  Plan for a follow-up celiac serology tests along with vitamin D, vitamin B12, folate, vitamin A, vitamin E, copper, zinc, ferritin, PT and iron.  Patient was educated on recommendations for a follow-up EGD in 1 year.  Will place an MTM consultation to review patient's medication list for presence of gluten.  I emphasized importance to give priorities to management of amyloidosis.  We can postpone or reschedule the planned studies if needed.    Patient and her mother's questions were answered to their satisfaction.  They verbalized appreciation of care provided.  RTC in 6 months    Thank you for this consultation. It was a pleasure to participate in the care of this patient; please contact us with any further questions.    MARICRUZ Tejada, FNP-C  Division of Gastroenterology, Hepatology, and Nutrition  [facility], [place], [place]    This note was created with Dragon voice recognition software, and while reviewed for accuracy, inadvertent minor typographic errors may occur. Please contact the provider if you have any questions.

## 2024-04-07 NOTE — PATIENT INSTRUCTIONS
"It was a pleasure taking care of you today.  I've included a brief summary of our discussion and care plan from today's visit below.  Please review this information with your primary care provider.  ______________________________________________________________________    My recommendations are summarized as follows:    1.  As we discussed today, I placed a referral to pharmacy to review your medications for presence of gluten.  They will call you to schedule an appointment.    2.  Please start taking a woman multivitamin once a day.    3.  Continue gluten-free diet.    4.  Plan to have a follow-up lab tests in 6 months.  It could be postponed up to 12 months.    Return to GI Clinic in 6 months to review your progress.    ______________________________________________________________________    CELIAC DISEASE OVERVIEW  Celiac disease is a condition in which the immune system responds abnormally to a protein called gluten, which then leads to damage to the lining of the small intestine. Gluten is found in wheat, rye, barley, and a multitude of prepared foods. The small intestine is responsible for absorbing food and nutrients. Thus, damage to the lining of the small intestines can lead to difficulty absorbing important nutrients; this problem is referred to as \"malabsorption.\" Although celiac disease cannot be cured, avoiding gluten usually stops the damage to the intestinal lining and the malabsorption that results. Celiac disease can occur in people of any age, sex, and race.    CELIAC DISEASE SYMPTOMS  The symptoms of celiac disease vary from one person to another. In its mildest form, there may be no symptoms whatsoever. However, even if you have no symptoms, you may not be absorbing nutrients adequately; this can be detected with blood tests.  Some people with celiac disease have gastrointestinal symptoms, which commonly include:  ?Abdominal pain  ?Diarrhea  ?Bowel movements that are oily and float  ?Weight " loss  ?Feeling bloated, or too full all the time  ?Lack of appetite  ?Bad gas  Certain other medical conditions are more common in people with celiac disease, including:  ?Osteopenia or osteoporosis (weakening of the bones)  ?Iron deficiency anemia (low blood count due to lack of iron)  ?Diabetes mellitus (type I or so-called juvenile onset diabetes mellitus)  ?Thyroid problems (usually hypothyroidism, an underactive thyroid)   ? A skin disease called dermatitis herpetiformis   ?Nervous system disorders  ?Liver disease    CELIAC DISEASE DIAGNOSIS  Celiac disease can be difficult to diagnose because it can cause so many different symptoms, many of which can also be caused by other conditions. Fortunately, testing is available that can easily distinguish untreated celiac disease from other disorders.  Blood tests -- A blood test can determine the blood level of antibodies (specific proteins) that become elevated in people with celiac disease. Over 95 percent of people with untreated celiac disease have elevated antibody levels (called IgA tissue transglutaminase, or IgA tTG), while these levels are rarely elevated in those without celiac disease. Levels of other antibodies (called IgA or IgG deamidated gliadin peptide) are also usually abnormally high in untreated celiac disease.  Before having these tests, it is important to continue eating foods that contain gluten. Avoiding or eliminating gluten could cause the antibody levels to fall to normal, delaying the diagnosis.  Small intestine biopsy -- If your blood test is positive, the diagnosis of celiac disease must be confirmed with a biopsy; this involves taking a sample of the intestinal lining and examining it under a microscope. The sample is usually collected during an upper endoscopy, a test that involves swallowing a small flexible instrument with a camera.   Normally, the lining of the small intestine has small finger-like structures that can be seen under a  "microscope, called \"villi.\" Villi allow the small intestine to absorb nutrients from food. When a person with celiac disease ingests gluten, the villi become flattened, and the body cannot properly absorb nutrients. Once the person removes gluten from their diet, the villi can resume a normal growth pattern. More than 70 percent of people with celiac disease begin to feel better within two weeks after stopping gluten.  \"Potential\" celiac disease -- People with a positive blood test but a normal small intestine biopsy are considered to have potential celiac disease. People with potential celiac disease are not usually advised to eat a gluten-free diet. However, ongoing monitoring (with blood tests) is recommended, and a repeat biopsy may be needed if you develop symptoms. Biopsies should be taken from several areas in the intestine.  \"Silent\" celiac disease -- If you have a positive blood test for celiac disease and an abnormal small intestine biopsy, but you have no other symptoms of celiac disease, you are said to have \"silent\" celiac disease. It is not clear if adults with silent celiac disease should eat a gluten-free diet, as there is not sufficient evidence that this is beneficial. Blood tests for malabsorption are recommended, and a gluten-free diet may be needed if you have evidence of malabsorption. A temporary trial of the gluten-free diet for several months may be worthwhile to determine whether subtle, unrecognized celiac symptoms (eg, low energy or mild intestinal symptoms) improve.  Testing for malabsorption -- You should be tested for nutritional deficiencies if your blood test or biopsy indicates celiac disease. Common tests include measurement of iron, folic acid, or vitamin B12, and vitamin D. You may have other tests if you have signs of mineral or fat deficiency, such as changes in taste or smell, poor appetite, changes in your nails, hair, or skin, or diarrhea.  Other tests -- Other standard " tests include a CBC (complete blood count), lipid levels (total cholesterol, HDL, LDL, and triglycerides), and thyroid levels. Once your celiac antibody levels return to normal, you should have a repeat test once per year.  Many clinicians recommend a test for bone loss 12 months after beginning a gluten-free diet. One method involves using a bone density (DEXA) scan to measures your bone density. The test is not painful and is similar to having an x-ray. If you have significant bone loss, you may need calcium and vitamin D supplements, an exercise program, and possibly a medicine to stop bone loss and encourage new bone growth.    CELIAC DISEASE COMPLICATIONS   Nonresponsive celiac disease -- Approximately 10 percent of people with celiac disease experience ongoing symptoms despite adhering to a gluten-free diet. There are many causes, including other food intolerances such as fructose (or other fermentable carbohydrates) malabsorption, food allergies, bacterial overgrowth in the small intestine or conditions such as microscopic colitis, irritable bowel syndrome, pancreatic exocrine insufficiency, or refractory celiac disease. However, the most common cause is ongoing, often inadvertent, gluten ingestion. Thus, an essential first step in evaluating nonresponsive celiac disease is consultation with an experienced celiac dietitian.  Refractory celiac disease -- A small percentage of people develop intestinal symptoms that do not improve despite use of a strict gluten-free diet. In other cases, intestinal symptoms initially improve with dietary changes but then return.  People who have these problems may have refractory celiac disease. The cause of this problem is not known. Treatment involves medications that suppress the immune system's abnormal response (eg, steroids). Treatment is important because people with untreated celiac disease can develop anemia, bone loss, and other consequences of  malabsorption.  Ulcerative jejunitis -- People with refractory celiac disease who do not improve with steroids (glucocorticoids) may have a condition known as ulcerative jejunitis. This condition causes the small intestine to develop multiple ulcers that do not heal; other symptoms may include a lack of appetite, weight loss, abdominal pain, diarrhea, and fever. This condition can be difficult to treat. Treatment may require surgery to remove the ulcerated area.  Lymphoma -- Cancer of the intestinal lymph system (lymphoma) is an uncommon complication of celiac disease. Avoiding gluten can usually prevent this complication.  Skin conditions -- Celiac disease is associated with a number of skin disorders, of which dermatitis herpetiformis is the most common. Dermatitis herpetiformis is characterized by intensely itchy, raised, fluid-filled areas on the skin, usually located on the elbows, knees, buttocks, lower back, face, neck, trunk, and occasionally within the mouth.  The most bothersome symptoms are itching and burning. This feeling is quickly relieved when the blister ruptures. Scratching causes the area to rupture, dry up, and leave an area of darkened skin and scarring. The condition will improve after eliminating gluten from the diet, although it may take several weeks to see significant improvement.     CELIAC DISEASE TREATMENT   Gluten-free diet -- The cornerstone of treatment for celiac disease is complete elimination of gluten from the diet for life. Gluten is the group of proteins found in wheat, rye, and barley that are toxic to those with celiac disease. Gluten is not only contained in these most commonly consumed grains in the [place] world, but is also hidden as an ingredient in a large number of prepared foods as well as medications and supplements.  Maintaining a gluten-free diet can be a challenging task that may require major lifestyle adjustments. Strict gluten avoidance is recommended since even  "small amounts can aggravate the disease. It is important to avoid both eating gluten and being exposed to large amounts of flour particles in the air.  Get help from a dietitian -- An experienced celiac dietitian can help you to learn how to eat a gluten-free diet, what foods to avoid, and what foods to add for a nutritionally balanced diet. Your dietitian can tailor your gluten-free diet to your own food preferences, culture, lifestyle, calorie needs, and other medical conditions. They can also recommend gluten-free vitamins/minerals and other supplements, as needed.  Your celiac dietitian can also educate you on grocery shopping, reading labels, food preparation, dining out, and lifestyle resources. Avoiding cross contact (when gluten-free foods come in contact with gluten-containing ingredients or foods) is also very important. Your dietitian can teach you common sources of cross contact in the kitchen, such as crumbs on the counter or sharing butter, jelly, or other condiments with those who eat gluten. Excellent resources are also available from celiac medical centers, organizations, and support groups.   Fortunately, life on a gluten-free diet becomes increasingly easier each year due to the rising popularity of gluten-free foods among those with celiac disease, non-celiac gluten sensitivity, and wheat allergies. Excellent gluten-free substitute foods are widely available in supermarkets, health food stores, and online.  General tips  ?Avoid foods containing wheat, rye, barley, malt, person's yeast, oats (unless uncontaminated, labeled gluten-free oats), and yeast extract and autolyzed yeast extract (unless the source is identified as gluten-free). \"Malt\" means \"barley malt\" unless another grain source is named, such as \"corn malt.\"  ?According to US Food and Drug Administration (FDA) regulations issued in 2013, foods with \"gluten-free\" labeling must contain less than 20 parts per million (ppm) of gluten; this " is considered a safe limit for people with celiac disease.   Naturally gluten-free foods include rice, wild rice, corn, potato, and other grains and foods . However, these foods may be contaminated with wheat, barley, or rye. Choose labeled gluten-free versions of these products. Exceptions are fresh corn, fresh potatoes, plain rice and plain wild rice. These foods may not be labeled gluten-free but are still considered safe to eat. Unless they are labeled gluten-free, dried beans, lentils, and other legumes, such as chickpeas, are allowed by law to contain a certain percentage of foreign grain, including wheat, barley, and/or rye. Whether they are labeled gluten-free or not, sort through them carefully and rinse carefully under running water. Choose labeled gluten-free nuts and seeds when possible, particularly seasoned or dry roasted.  ?Typically, people who have just been diagnosed depend on low-fiber and low-nutrient grains and starches like rice, corn, and potatoes because they are familiar with them. Some foods that contain these products are fortified with extra nutrients, but most companies do not fortify their products. In addition, a low fiber diet can lead to constipation. Adding some of the whole gluten-free grains (quinoa, amaranth, teff, buckwheat, sorghum, and millet) can help improve bowel movements and also increase your nutrients. Make sure they are labeled gluten-free. Be sure to also include plenty of fruits, vegetables, nuts and seeds, legumes and beans (navy, carcamo, black, cannellini, etc) in your diet. Add high-fiber foods slowly to give your stomach and intestines time to adjust. When eating a diet rich in fiber, it is important to drink water throughout the day to prevent constipation.  ?There is some concern over the potential for increased arsenic and other heavy metal exposure in people with celiac disease. Arsenic can be found in rice, and both children and adults with celiac disease  "consume a high intake of rice and rice-based products. We need more research to understand how this exposure may present a long-term health risk to those with celiac disease. In the meantime, it is another reason to rely less on rice and more on other labeled gluten-free grains. When you prepare rice, rinse it before cooking. Cook it in six times more water than rice (as when you cook pasta), and drain the excess water after the rice has finished cooking to remove about 50 percent of the arsenic.   ?If a food is regulated by the FDA and is not labeled gluten-free (such as prepared foods and condiments), read the ingredients list and \"contains\" statement carefully. The word \"wheat\" will be included if the product is FDA regulated and contains wheat protein. If you do not see any of the following words on the label of an FDA-regulated food (wheat, rye, barley, malt, person's yeast, oats, yeast extract, and autolyzed yeast extract) then the product is unlikely to include any gluten-containing ingredients. However, the Food Allergen Consumer Protection Act pertains to ingredients only. It does not cover wheat protein that may be in a product unintentionally due to cross-contact.  ?Unlike the FDA, there is no official standard for gluten-free labeling of [place] Department of Agriculture (USDA) products. However, if an USDA regulated product has a gluten-free label, it should meet the same standard as FDA products. See USDA labeling information in the resources listed below.  ?Distilled alcoholic beverages and vinegars, as well as wine, are gluten-free unless gluten-containing flavorings are added after production. However, malt beverages, including beer, are not considered gluten-free. There are specially produced beers that do not use malted barley that are labeled gluten-free and can be consumed on a gluten-free diet. Avoid beer that is labeled as gluten-removed or gluten-reduced. Please note that malt vinegar " "is not gluten-free.  ?You may not tolerate dairy products initially while your intestines are healing. If you tolerated lactose before your diagnosis, you may be able to tolerate it again after the intestine heals. In the meantime, choose lactose-reduced or lactose-free products if your symptoms are worsened by dairy products. Choose labeled gluten-free, dairy-free alternatives, such as rice, soy, or nut (almond, hazelnut) beverages that are enriched with calcium and vitamin D. Keep in mind that gluten-free rice and nut milks have minimal protein per serving compared with cow's or soy milk. Gluten-free lactase enzyme supplements are also available, which may help you to tolerate foods that contain lactose.  ?Discuss your need for calcium and vitamin D supplements with your health care provider or dietitian.  ?A small percentage of people with celiac disease cannot tolerate gluten-free oats for several reasons. Pure oats are naturally gluten free, but most are grown in or near barley, rye, and wheat. They can also be processed on the same equipment which contaminates them and makes them unsafe for someone with celiac disease to eat. At this time, there are two methods to produce gluten-free oats. Manufacturers of \"purity protocol\" oats follow several steps to avoid cross contact, such as separate equipment and facilities that do not share or process gluten-containing grains. Other companies use mechanical and optical sorting methods to \"clean\" oats. Their special equipment sorts oats from gluten-containing grains based on the differences in size, shape, and other factors. Regardless of the type of gluten-free oats, proper, rigorous testing by the company is the most important step to avoid cross-contact with gluten.  All oats consumed, at the very least, must be labeled gluten-free or certified gluten-free. If you choose to eat gluten-free oats, first talk to your doctor who can check your tTG-IgA level and monitor " any symptoms. Limit your intake of gluten-free oats to no more than 50 grams (approximately 1/2 cup dry rolled oats or 1/4 cup dry steel-cut oats) per day. If tolerated, you may be able to discuss eating more than 1/2 cup per day under the supervision of your doctor. People with severe or hard to manage disease should avoid even gluten-free oats.    Is strict gluten avoidance really necessary? -- People who have been diagnosed with celiac disease but have no symptoms often find it difficult to follow a strict gluten-free diet. Indeed, some health care providers have questioned the need for a gluten-free diet in this group. However, certain factors support a gluten-free diet, even in those without symptoms:  ?Strictly following a gluten-free diet sometimes helps you to feel more energetic and have an improved sense of health and wellbeing.  ?Some people with celiac disease have vitamin or nutrient deficiencies that do not cause them to feel ill, such as anemia due to iron deficiency or bone loss due to vitamin D deficiency. However, these deficiencies can cause problems over the long term.  ?Untreated celiac disease can increase the risk of developing certain types of gastrointestinal cancer. This risk can be reduced by eating a gluten-free diet.  Ongoing monitoring -- Once a person has been on a gluten-free diet for at least four to six weeks, blood tests can be used to confirm that antibody levels are declining  The health care provider will also monitor the person for any new or worsening symptoms.    IMPLICATIONS FOR THE FAMILY  Eliminating gluten requires a major lifestyle change for you as well as your family. However, with time and practice, it will be easier to know which foods, medications, supplements, and oral care products contain gluten and what alternatives are available. Although eating out can be challenging at first, restaurants have become increasingly interested in serving people with celiac  disease by offering a gluten-free menu or ingredient substitutions.  Families also need to be aware of their increased risk of celiac disease, as there is a genetic component. If you have been diagnosed with celiac disease, your first-degree relatives (parents, siblings, children) should consider being tested as well, especially if anyone has signs or symptoms of the condition.          ______________________________________________________________________    Who do I call with any questions after my visit?  Please be in touch if there are any further questions that arise following today's visit.  There are multiple ways to contact your gastroenterology care team.      During business hours, you may reach a Gastroenterology nurse at [phone], option 3.     To schedule or reschedule an appointment, please call [phone].   To schedule your imaging studies (CT, MRI, ultrasound)  call [phone] (or toll-free # [phone])  To schedule your lab work at [facility], please call [phone]    You can always send a secure message through [employer].  [employer] messages are answered by your nurse or doctor typically within 24 hours.  Please allow extra time on weekends and holidays.      For urgent/emergent questions after business hours, you may reach the on-call GI Fellow by contacting the [facility]  at [phone].    In order for your refill to be processed in a timely fashion, it is your responsibility to ensure you follow the recommendations from your provider regarding your laboratory studies and follow up appointments.       How will I get the results of any tests ordered?    You will receive all of your results.  If you have signed up for [employer], any tests ordered at your visit will be available to you after your physician reviews them.  Typically this takes 1-2 weeks.  If there are urgent results that require a change in your care plan, your physician or  nurse will call you to discuss the next steps.   What is [employer]?  [employer] is a secure way for you to access all of your healthcare records from the [facility].  It is a web based computer program, so you can sign on to it from any location.  It also allows you to send secure messages to your care team.  I recommend signing up for [employer] access if you have not already done so and are comfortable with using a computer.    How to I schedule a follow-up visit?  If you did not schedule a follow-up visit today, please call [phone] to schedule a follow-up office visit.      Sincerely,  TAL Tejada,  [facility],  Division of Gastroenterology   ([facility])

## 2024-04-08 ENCOUNTER — MYC MEDICAL ADVICE (OUTPATIENT)
Dept: CARDIOLOGY | Facility: CLINIC | Age: 54
End: 2024-04-08

## 2024-04-08 ENCOUNTER — PATIENT OUTREACH (OUTPATIENT)
Dept: ONCOLOGY | Facility: CLINIC | Age: 54
End: 2024-04-08

## 2024-04-08 ENCOUNTER — DOCUMENTATION ONLY (OUTPATIENT)
Dept: ONCOLOGY | Facility: CLINIC | Age: 54
End: 2024-04-08

## 2024-04-08 ENCOUNTER — OFFICE VISIT (OUTPATIENT)
Dept: GASTROENTEROLOGY | Facility: CLINIC | Age: 54
End: 2024-04-08
Attending: NURSE PRACTITIONER
Payer: COMMERCIAL

## 2024-04-08 VITALS
SYSTOLIC BLOOD PRESSURE: 104 MMHG | HEIGHT: 65 IN | DIASTOLIC BLOOD PRESSURE: 72 MMHG | WEIGHT: 168 LBS | HEART RATE: 84 BPM | BODY MASS INDEX: 27.99 KG/M2

## 2024-04-08 DIAGNOSIS — K90.0 CELIAC DISEASE: ICD-10-CM

## 2024-04-08 DIAGNOSIS — E85.81 AL AMYLOIDOSIS (H): ICD-10-CM

## 2024-04-08 DIAGNOSIS — Z91.018 FOOD ALLERGY: Primary | ICD-10-CM

## 2024-04-08 DIAGNOSIS — K59.00 CONSTIPATION, UNSPECIFIED CONSTIPATION TYPE: ICD-10-CM

## 2024-04-08 PROCEDURE — 99214 OFFICE O/P EST MOD 30 MIN: CPT | Performed by: NURSE PRACTITIONER

## 2024-04-08 ASSESSMENT — PAIN SCALES - GENERAL: PAINLEVEL: NO PAIN (0)

## 2024-04-08 NOTE — TELEPHONE ENCOUNTER
Date: 4/8/2024    Time of Call: 1:35 PM     Diagnosis:  Amyloid/HF      [ VORB ] Ordering provider: Dr. Armstrong    Order:   -Resume Lasix at 20 mg BID starting tomorrow. Continue to monitor home BP's and weights.      Order received by: Loida Germain RN       Follow-up/additional notes: [employer] message sent to pt with medication recommendations. Will plan to Follow-up with pt on Wednesday to see how she is feeling.

## 2024-04-08 NOTE — TELEPHONE ENCOUNTER
[employer] message sent to pt to Follow-up on how her weight and home Bp's are doing after holding Lasix over the weekend.     Lioda Germain RN

## 2024-04-08 NOTE — PROGRESS NOTES
Oral Chemotherapy Monitoring Program  Lab Follow Up    Reviewed CBC and CMP lab results from 4/5/24.        2/9/2024     2:00 PM 2/13/2024     2:00 PM 3/4/2024    11:00 AM 3/11/2024     1:00 PM 3/15/2024     9:00 AM 3/29/2024     1:00 PM 4/8/2024     2:00 PM   ORAL CHEMOTHERAPY   Assessment Type Lab Monitoring Refill Lab Monitoring Lab Monitoring Refill Lab Monitoring Lab Monitoring   Diagnosis Code Multiple Myeloma Multiple Myeloma Multiple Myeloma Multiple Myeloma Multiple Myeloma Multiple Myeloma Multiple Myeloma   Providers Dr. Ramesh Chandler   [facility]   Is this patient followed by the [facility] OC team? No No No No No No No   Drug Name Cytoxan (cyclophsphamide) Cytoxan (cyclophsphamide) Cytoxan (cyclophsphamide) Cytoxan (cyclophsphamide) Cytoxan (cyclophsphamide) Cytoxan (cyclophsphamide) Cytoxan (cyclophsphamide)   Dose 500 mg 500 mg 500 mg 500 mg 500 mg 500 mg 500 mg   Current Schedule Weekly Weekly Weekly Weekly Weekly Weekly Weekly   Cycle Details Weekly/Daily Weekly/Daily Weekly/Daily Weekly/Daily Weekly/Daily Weekly/Daily Weekly/Daily   Start Date of Last Cycle 1/26/2024 1/26/2024 2/23/2024 2/23/2024     Planned next cycle start date 2/23/2024 2/23/2024 3/22/2024  3/22/2024     Adverse Effects No AE identified during assessment         Is the dose as ordered appropriate for the patient? Yes             Labs:  _  Result Component Current Result Ref Range   Sodium 137 (4/5/2024) 135 - 145 mmol/L     _  Result Component Current Result Ref Range   Potassium 3.9 (4/5/2024) 3.4 - 5.3 mmol/L     _  Result Component Current Result Ref Range   Calcium 9.0 (4/5/2024) 8.6 - 10.0 mg/dL     No results found for Mag within last 30 days.     _  Result Component Current Result Ref Range   Phosphorus 4.4 (3/18/2024) 2.5 - 4.5 mg/dL     _  Result Component Current Result Ref Range   Albumin 2.2  (L) (4/5/2024) 3.5 - 5.2 g/dL     _  Result Component Current Result Ref Range   Urea Nitrogen 16.7 (4/5/2024) 6.0 - 20.0 mg/dL     _  Result Component Current Result Ref Range   Creatinine 1.42 (H) (4/5/2024) 0.51 - 0.95 mg/dL     _  Result Component Current Result Ref Range   AST 24 (4/5/2024) 0 - 45 U/L     _  Result Component Current Result Ref Range   ALT 22 (4/5/2024) 0 - 50 U/L     _  Result Component Current Result Ref Range   Bilirubin Total 0.2 (4/5/2024) <=1.2 mg/dL     _  Result Component Current Result Ref Range   WBC Count 10.5 (4/5/2024) 4.0 - 11.0 10e3/uL     _  Result Component Current Result Ref Range   Hemoglobin 13.8 (4/5/2024) 11.7 - 15.7 g/dL     _  Result Component Current Result Ref Range   Platelet Count 431 (4/5/2024) 150 - 450 10e3/uL     No results found for ANC within last 30 days.     _  Result Component Current Result Ref Range   Absolute Neutrophils 7.3 (4/5/2024) 1.6 - 8.3 10e3/uL        Assessment & Plan:  Results are stable with no concerning abnormalities. Continue cyclophosphamide as planned.    No outreach made as pt was seen in infusion on 4/5.    Follow-Up:  4/9: assessment  4/12: labs+infusion    Nathalie AnsariD  Hematology/Oncology Clinical Pharmacist  [facility]  [phone]

## 2024-04-08 NOTE — LETTER
4/8/2024         RE: Vivian Brown  [address] [facility] [id number]        Dear Colleague,    Thank you for referring your patient, Vivian Brown, to the [facility]. Please see a copy of my visit note below.    Gastroenterology CLINIC VISIT, RETURN PATIENT    CC/REFERRING PROVIDER: Alin Sandra   REASON FOR CONSULTATION: follow up    HPI: 54 year old female presented to GI clinic for follow up. Was referred for evaluation of possible GI etiology of hypoalbuminemia.  She reported adequate food intake.  Was not on any diet. No history of eating disorder. Was complaining of ongoing nausea in the mornings, some bloating, and constipation. Normal appearance of liver on ultrasound. There was mild dilation of common bile duct,  but no signs of cholelithiasis, sludge, or polyps. No signs of cholecystitis.   Food allergy came back positive for hazelnut, walnuts, peanuts, almonds, sesame seeds, and wheat.  TTG IgA was elevated at 124. Referred to upper GI endoscopy with duodenal biopsy. Patology reported villous flattening with intraepithelial lymphocytosis consistent with gluten sensitivity/celiac disease, Marsh 3C.  Patient started on gluten-free diet 2 months ago, tolerated well. She complains of daily nausea, worse in the morning.  No emesis.  Denies abdominal bloating.  No hematochezia or melena.  No weight loss.  Patient stated that her brother and daughter were tested negative for celiac disease.  Her mother denies any symptoms.    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 Started  Zenia + CyBorD, completed 2 cycles.     PREVIOUS ENDOSCOPY:  1/30/2024 EGD  Findings:       The examined esophagus was normal.        The gastroesophageal flap valve was visualized endoscopically and        classified as Hill Grade II (fold present, opens with respiration).         The stomach was normal.        Decreased folds were found in the entire duodenum and flattening was        found in the entire duodenum. Biopsies were taken with a cold forceps        for histology.   Final Diagnosis   Small intestine, duodenum, biopsies:  -Villous flattening with intraepithelial lymphocytosis consistent with gluten sensitivity/celiac disease, Marsh 3C         Comment    Intraepithelial lymphocytosis is hallmark of untreated or partially treated celiac disease.  While characteristic, histologic findings are not diagnostic; only response to gluten-free diet is diagnostic.  Please also correlate with serologic studies.       PERTINENT STUDIES Reviewed in EMR  11/29/23 Abdominal ultrasound  FINDINGS:     GALLBLADDER: Normal. No gallstones, wall thickening, or  pericholecystic fluid. Negative sonographic Enciso's sign.     BILE DUCTS: No intrahepatic biliary dilatation. The common duct is  mildly enlarged at 7 mm. No obstructing stone or mass is seen.     LIVER: Normal parenchyma with smooth contour. No focal mass.     RIGHT KIDNEY: Normal size. Normal echogenicity with no hydronephrosis  or mass.      LEFT KIDNEY: Normal size. Normal echogenicity with no hydronephrosis  or mass.      SPLEEN: Spleen is normal in size. Numerous benign echogenic splenic  granulomas are noted.   PANCREAS: The visualized portions are normal.   AORTA: Normal in caliber.    IVC: Normal where visualized.   No ascites.                                                                 IMPRESSION:  1.  Minimal dilation of the common bile duct without obstructing stone  or mass. Consider correlation with biliary function studies and  follow-up MRCP if clinically indicated.  2.  No signs of cholelithiasis or acute cholecystitis.    ROS: 10pt ROS performed and otherwise negative.    PAST MEDICAL HISTORY:  Past Medical History:   Diagnosis Date     Celiac disease      Family history of colon cancer     Colonoscoy q5 years next  9/2020       PREVIOUS ABDOMINAL/GYNECOLOGIC SURGERIES:  Past Surgical History:   Procedure Laterality Date     COLONOSCOPY N/A 9/28/2015    Procedure: COLONOSCOPY;  Surgeon: Eugenio Travis MD;  Location: [facility]     ESOPHAGOSCOPY, GASTROSCOPY, DUODENOSCOPY (EGD), COMBINED N/A 1/30/2024    Procedure: ESOPHAGOGASTRODUODENOSCOPY, WITH BIOPSY;  Surgeon: Melo Cloud MD;  Location: [facility]     IR RENAL BIOPSY RIGHT  12/21/2023     TONSILLECTOMY           PERTINENT MEDICATIONS:  Current Outpatient Medications   Medication Sig Dispense Refill     acyclovir (ZOVIRAX) 400 MG tablet Take 1 tablet (400 mg) by mouth 2 times daily Viral Prophylaxis. 180 tablet 3     Albuterol-Budesonide (AIRSUPRA) 90-80 MCG/ACT AERO Inhale 2 Inhalations into the lungs every 4 hours as needed (Wheezing, chest tightness, shortness of breath, or persistent cough) 10.7 g 3     cholecalciferol (VITAMIN D3) 125 mcg (5000 units) capsule Take 1 capsule (125 mcg) by mouth daily 30 capsule 6     cycloPHOSphamide (CYTOXAN) 50 MG capsule Take 10 capsules (500 mg) by mouth every 7 days Take on days 1, 8, 15, and 22. 40 capsule 0     dexAMETHasone (DECADRON) 4 MG tablet Take 40 mg (10 tablets) on days 8 and 22. (Patient taking differently: 30 mg Take 30 mg (10 tablets) on days 8 and 22-clarification obtained on 4/5/24 by Dian Read CNP) 20 tablet 0     empagliflozin (JARDIANCE) 10 MG TABS tablet Take 1 tablet (10 mg) by mouth daily 30 tablet 11     levothyroxine (SYNTHROID/LEVOTHROID) 200 MCG tablet Take 1 tablet (200 mcg) by mouth daily 90 tablet 3     levothyroxine (SYNTHROID/LEVOTHROID) 25 MCG tablet Take 1 tablet (25 mcg) by mouth daily       liothyronine (CYTOMEL) 5 MCG tablet Take 5 mcg by mouth 2 times daily       prochlorperazine (COMPAZINE) 5 MG tablet Take 1 tablet (5 mg) by mouth every 6 hours as needed for nausea or vomiting 30 tablet 1     rosuvastatin (CRESTOR) 10 MG tablet Take 1 tablet (10 mg) by mouth daily 90 tablet 3      spironolactone (ALDACTONE) 25 MG tablet Take 1 tablet (25 mg) by mouth daily 90 tablet 3     azelastine (ASTELIN) 0.1 % nasal spray Spray 2 sprays into both nostrils 2 times daily as needed for rhinitis (Patient not taking: Reported on 4/8/2024) 30 mL 3     EPINEPHrine (ANY BX GENERIC EQUIV) 0.3 MG/0.3ML injection 2-pack Inject 0.3 mLs (0.3 mg) into the muscle as needed for anaphylaxis May repeat one time in 5-15 minutes if response to initial dose is inadequate. (Patient not taking: Reported on 4/8/2024) 2 each 3     fluticasone (FLONASE) 50 MCG/ACT nasal spray Spray 2 sprays into both nostrils daily (Patient not taking: Reported on 4/8/2024) 16 g 3     furosemide (LASIX) 20 MG tablet Take 2 tablets (40 mg) by mouth 2 times daily (Patient not taking: Reported on 4/8/2024) 360 tablet 3         SOCIAL HISTORY:  Social History     Socioeconomic History     Marital status:      Spouse name: Not on file     Number of children: Not on file     Years of education: Not on file     Highest education level: Not on file   Occupational History     Comment: Physical Therapy   Tobacco Use     Smoking status: Never     Passive exposure: Past     Smokeless tobacco: Never     Tobacco comments:     Parents smoked during childhood in the house   Vaping Use     Vaping Use: Never used   Substance and Sexual Activity     Alcohol use: Not Currently     Alcohol/week: 0.0 standard drinks of alcohol     Comment: 1-2 times/month     Drug use: No     Sexual activity: Yes     Partners: Male     Birth control/protection: I.U.D.   Other Topics Concern     Parent/sibling w/ CABG, MI or angioplasty before 65F 55M? Not Asked   Social History Narrative    March 12, 2024    ENVIRONMENTAL HISTORY: The family [place] [facility] [place] The home is heated with a forced air. They does have central air conditioning. The patient's bedroom is furnished with carpeting in bedroom and fabric window coverings.  Pets inside the house  include no. There is no history of cockroach or mice infestation. There is/are 0 smokers in the house.  The house does not have a damp basement.      Social Determinants of Health     Financial Resource Strain: High Risk (1/22/2024)    Financial Resource Strain      Within the past 12 months, have you or your family members you live with been unable to get utilities (heat, electricity) when it was really needed?: Yes   Food Insecurity: Low Risk  (1/22/2024)    Food Insecurity      Within the past 12 months, did you worry that your food would run out before you got money to buy more?: No      Within the past 12 months, did the food you bought just not last and you didn t have money to get more?: No   Transportation Needs: Low Risk  (1/22/2024)    Transportation Needs      Within the past 12 months, has lack of transportation kept you from medical appointments, getting your medicines, non-medical meetings or appointments, work, or from getting things that you need?: No   Physical Activity: Not on file   Stress: Not on file   Social Connections: Not on file   Interpersonal Safety: Low Risk  (11/17/2023)    Interpersonal Safety      Do you feel physically and emotionally safe where you currently live?: Yes      Within the past 12 months, have you been hit, slapped, kicked or otherwise physically hurt by someone?: No      Within the past 12 months, have you been humiliated or emotionally abused in other ways by your partner or ex-partner?: No   Housing Stability: Low Risk  (1/22/2024)    Housing Stability      Do you have housing? : Yes      Are you worried about losing your housing?: No       FAMILY HISTORY:  Denies colon/panc/esophageal/other GI CA, no other Sosa or other HPS-related Jose Martin. No IBD/celiac, no other AI/liver/thyroid disease.    Family History   Problem Relation Age of Onset     Colon Cancer Maternal Grandmother 91     Hypertension Mother      Hyperlipidemia Mother      Cancer Mother 65        GIST      "Other Cancer Mother      Coronary Artery Disease Father         MI     Thyroid Disease Sister      Asthma Daughter        PHYSICAL EXAMINATION:  Vitals reviewed  /72 (BP Location: Right arm, Patient Position: Sitting, Cuff Size: Adult Regular)   Pulse 84   Ht 1.64 m (5' 4.57\")   Wt 76.2 kg (168 lb)   Breastfeeding No   BMI 28.33 kg/m      General: Patient appears well in no acute distress.    Skin: No visualized rash or lesions on visualized skin  HEENT:    EOMI, no erythema, sclera icterus or discharge noted.  Mouth mucosa intact, pink, moist  No cervical or supraclavicular lymphadenopathy. Thyroid gland not enlarged.  Resp: breathing comfortably without accessory muscle usage, speaking in full sentences, no cough. Lung sounds clear  Card: Regular and rhythmic S1 and S2. No gallop or rub. No murmur.  No LE edema.  Abdomen: Active bowel sounds X 4 quadrants. Soft to palpation.  No guarding or rebound tenderness. Enciso's sign negative.  MSK: Appears to have normal range of motion based on visualized movements  Neurologic: No apparent tremors, facial movements symmetric  Psych: affect normal, alert and oriented      ASSESSMENT/PLAN:    ICD-10-CM    1. Food allergy  Z91.018       2. Constipation, unspecified constipation type  K59.00       3. AL amyloidosis (H)  E85.81       4. Celiac disease  K90.0 Med Therapy Management Referral         54 year old female was initially referred to GI clinic for evaluation of acute hypoalbuminemia.  We did some evaluation for malabsorption and she found to have celiac disease, confirmed by duodenal biopsy and celiac serology.  Marsh 3C.  Patient started a gluten-free diet about 2 months ago.  She reported some improvement to her abdominal bloating.  Continues complaining of daily nausea without emesis.  Patient was also evaluated by oncology and nephrology. 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 " Started  Zenia + CyBorD, completed 2 cycles.  Plan for transplantation.    We discussed manifestations and management of GI symptoms related to celiac disease.  Explained to the patient that her nausea is likely related to chemotherapy.  She reported that Zofran was not effective.  She takes Compazine as needed with some benefits.  Patient's mother asking about potential GI side effect of chemotherapy.  Deferred the question to their oncology as I am not an expert in this area of medicine.  I suggested to continue gluten-free diet.  Plan for a follow-up celiac serology tests along with vitamin D, vitamin B12, folate, vitamin A, vitamin E, copper, zinc, ferritin, PT and iron.  Patient was educated on recommendations for a follow-up EGD in 1 year.  Will place an MTM consultation to review patient's medication list for presence of gluten.  I emphasized importance to give priorities to management of amyloidosis.  We can postpone or reschedule the planned studies if needed.    Patient and her mother's questions were answered to their satisfaction.  They verbalized appreciation of care provided.  RTC in 6 months    Thank you for this consultation. It was a pleasure to participate in the care of this patient; please contact us with any further questions.    MARICRUZ Tejada, FNP-C  Division of Gastroenterology, Hepatology, and Nutrition  [facility], [place], [place]    This note was created with Dragon voice recognition software, and while reviewed for accuracy, inadvertent minor typographic errors may occur. Please contact the provider if you have any questions.       Again, thank you for allowing me to participate in the care of your patient.        Sincerely,        MARICRUZ TEJADA CNP

## 2024-04-08 NOTE — PROGRESS NOTES
[facility]: Cancer Care Short Note                                                                                          Patient called inquiring about the status of her FMLA paperwork she gave to infusion RN on Friday, 4/5. RNCC did chart review and did not see any documentation of paperwork. RNCC sent message to infusion RN for follow-up.    RNCC informed patient that we need at least 10-14 days to complete paperwork. RNCC will follow-up with more information. Patient understood and had no further questions.    Kristen Henley, BSN, RN  RN Care Coordinator   [phone]

## 2024-04-09 ENCOUNTER — TELEPHONE (OUTPATIENT)
Dept: TRANSPLANT | Facility: CLINIC | Age: 54
End: 2024-04-09
Payer: COMMERCIAL

## 2024-04-09 NOTE — TELEPHONE ENCOUNTER
Accommodation Request forms received via clinic from patient.      Forms to be completed and put in folder for provider to approve.    Fax #:  [phone]    Lorarine Huddleston

## 2024-04-10 NOTE — TELEPHONE ENCOUNTER
Accomodation request forms filled out and put in providers folder for review and signature.      Monica Weeks

## 2024-04-11 NOTE — TELEPHONE ENCOUNTER
"Called pt to Follow-up on how she is feeling re-starting Lasix at lower dose 20 mg BID after Lasix holiday for weight loss/hypotension.     Pt states she is overall feeling better, she feels her fatigue has improved, still reports intermittant nausea from chemo. She has not had any episodes of near-syncope or lightheadedness. BP's remain low/normal for her high 90's-100's/60-70's.     Pt weight today is 166#, she has a small amount of \"puffiness\" in her ankles but feels this is slowly improving being back on the Lasix 20 mg BID.     Asked that pt continue to monitor BP's and weights daily; if she notices weight increase or decrease 5# in a week or any lighthead/syncopal episodes please contact cardiology right away. Pt verbalize understanding.     Dr. Armstrong, any further recommendations?     Loida Germain RN    "

## 2024-04-12 ENCOUNTER — INFUSION THERAPY VISIT (OUTPATIENT)
Dept: ONCOLOGY | Facility: CLINIC | Age: 54
End: 2024-04-12
Attending: STUDENT IN AN ORGANIZED HEALTH CARE EDUCATION/TRAINING PROGRAM
Payer: COMMERCIAL

## 2024-04-12 ENCOUNTER — APPOINTMENT (OUTPATIENT)
Dept: LAB | Facility: CLINIC | Age: 54
End: 2024-04-12
Attending: STUDENT IN AN ORGANIZED HEALTH CARE EDUCATION/TRAINING PROGRAM
Payer: COMMERCIAL

## 2024-04-12 VITALS
WEIGHT: 169 LBS | SYSTOLIC BLOOD PRESSURE: 110 MMHG | OXYGEN SATURATION: 98 % | TEMPERATURE: 98 F | RESPIRATION RATE: 16 BRPM | HEART RATE: 96 BPM | DIASTOLIC BLOOD PRESSURE: 75 MMHG | BODY MASS INDEX: 28.5 KG/M2

## 2024-04-12 DIAGNOSIS — E85.81 AL AMYLOIDOSIS (H): Primary | ICD-10-CM

## 2024-04-12 LAB
ALBUMIN SERPL BCG-MCNC: 2.1 G/DL (ref 3.5–5.2)
ALP SERPL-CCNC: 99 U/L (ref 40–150)
ALT SERPL W P-5'-P-CCNC: 23 U/L (ref 0–50)
ANION GAP SERPL CALCULATED.3IONS-SCNC: 7 MMOL/L (ref 7–15)
AST SERPL W P-5'-P-CCNC: 23 U/L (ref 0–45)
BASOPHILS # BLD AUTO: 0.1 10E3/UL (ref 0–0.2)
BASOPHILS NFR BLD AUTO: 1 %
BILIRUB SERPL-MCNC: 0.3 MG/DL
BUN SERPL-MCNC: 23.4 MG/DL (ref 6–20)
CALCIUM SERPL-MCNC: 8.8 MG/DL (ref 8.6–10)
CHLORIDE SERPL-SCNC: 104 MMOL/L (ref 98–107)
CREAT SERPL-MCNC: 1.43 MG/DL (ref 0.51–0.95)
DEPRECATED HCO3 PLAS-SCNC: 25 MMOL/L (ref 22–29)
EGFRCR SERPLBLD CKD-EPI 2021: 43 ML/MIN/1.73M2
EOSINOPHIL # BLD AUTO: 0.2 10E3/UL (ref 0–0.7)
EOSINOPHIL NFR BLD AUTO: 2 %
ERYTHROCYTE [DISTWIDTH] IN BLOOD BY AUTOMATED COUNT: 18 % (ref 10–15)
GLUCOSE SERPL-MCNC: 113 MG/DL (ref 70–99)
HCT VFR BLD AUTO: 37.2 % (ref 35–47)
HGB BLD-MCNC: 12.8 G/DL (ref 11.7–15.7)
IMM GRANULOCYTES # BLD: 0.1 10E3/UL
IMM GRANULOCYTES NFR BLD: 0 %
LYMPHOCYTES # BLD AUTO: 1.3 10E3/UL (ref 0.8–5.3)
LYMPHOCYTES NFR BLD AUTO: 12 %
MCH RBC QN AUTO: 29.7 PG (ref 26.5–33)
MCHC RBC AUTO-ENTMCNC: 34.4 G/DL (ref 31.5–36.5)
MCV RBC AUTO: 86 FL (ref 78–100)
MONOCYTES # BLD AUTO: 0.9 10E3/UL (ref 0–1.3)
MONOCYTES NFR BLD AUTO: 8 %
NEUTROPHILS # BLD AUTO: 9 10E3/UL (ref 1.6–8.3)
NEUTROPHILS NFR BLD AUTO: 77 %
NRBC # BLD AUTO: 0.1 10E3/UL
NRBC BLD AUTO-RTO: 0 /100
PLATELET # BLD AUTO: 392 10E3/UL (ref 150–450)
POTASSIUM SERPL-SCNC: 4.4 MMOL/L (ref 3.4–5.3)
PROT SERPL-MCNC: 4.6 G/DL (ref 6.4–8.3)
RBC # BLD AUTO: 4.31 10E6/UL (ref 3.8–5.2)
SODIUM SERPL-SCNC: 136 MMOL/L (ref 135–145)
WBC # BLD AUTO: 11.6 10E3/UL (ref 4–11)

## 2024-04-12 PROCEDURE — 80053 COMPREHEN METABOLIC PANEL: CPT

## 2024-04-12 PROCEDURE — 250N000011 HC RX IP 250 OP 636: Mod: JW | Performed by: STUDENT IN AN ORGANIZED HEALTH CARE EDUCATION/TRAINING PROGRAM

## 2024-04-12 PROCEDURE — 85004 AUTOMATED DIFF WBC COUNT: CPT | Performed by: STUDENT IN AN ORGANIZED HEALTH CARE EDUCATION/TRAINING PROGRAM

## 2024-04-12 PROCEDURE — 36415 COLL VENOUS BLD VENIPUNCTURE: CPT

## 2024-04-12 PROCEDURE — 96401 CHEMO ANTI-NEOPL SQ/IM: CPT

## 2024-04-12 RX ADMIN — BORTEZOMIB 2.5 MG: 3.5 INJECTION, POWDER, LYOPHILIZED, FOR SOLUTION INTRAVENOUS; SUBCUTANEOUS at 11:56

## 2024-04-12 ASSESSMENT — PAIN SCALES - GENERAL: PAINLEVEL: NO PAIN (0)

## 2024-04-12 NOTE — PROGRESS NOTES
Infusion Nursing Note:  Vivian Brown presents today for Cycle 3 Day 22 bortezomib.    Patient seen by provider today: No   present during visit today: Not Applicable.    Note: Vivian presents today feeling well. Denies pain or nausea/vomiting. Denies fevers/chills. Denies SOB, cough, chest pain, or dizziness/lightheadedness. Denies constipation/diarrhea. Denies urinary issues. Denies neuropathy. Offers no new concerns at this appointment. Confirms taking cytoxan and decadron (30 mg) as prescribed.      Intravenous Access:  No Intravenous access at this visit.    Treatment Conditions:     Latest Reference Range & Units 04/12/24 10:38   Sodium 135 - 145 mmol/L 136   Potassium 3.4 - 5.3 mmol/L 4.4   Chloride 98 - 107 mmol/L 104   Carbon Dioxide (CO2) 22 - 29 mmol/L 25   Urea Nitrogen 6.0 - 20.0 mg/dL 23.4 (H)   Creatinine 0.51 - 0.95 mg/dL 1.43 (H)   GFR Estimate >60 mL/min/1.73m2 43 (L)   Calcium 8.6 - 10.0 mg/dL 8.8   Anion Gap 7 - 15 mmol/L 7   Albumin 3.5 - 5.2 g/dL 2.1 (L)   Protein Total 6.4 - 8.3 g/dL 4.6 (L)   Alkaline Phosphatase 40 - 150 U/L 99   ALT 0 - 50 U/L 23   AST 0 - 45 U/L 23   Bilirubin Total <=1.2 mg/dL 0.3   Glucose 70 - 99 mg/dL 113 (H)   WBC 4.0 - 11.0 10e3/uL 11.6 (H)   Hemoglobin 11.7 - 15.7 g/dL 12.8   Hematocrit 35.0 - 47.0 % 37.2   Platelet Count 150 - 450 10e3/uL 392   RBC Count 3.80 - 5.20 10e6/uL 4.31   MCV 78 - 100 fL 86   MCH 26.5 - 33.0 pg 29.7   MCHC 31.5 - 36.5 g/dL 34.4   RDW 10.0 - 15.0 % 18.0 (H)   % Neutrophils % 77   % Lymphocytes % 12   % Monocytes % 8   % Eosinophils % 2   % Basophils % 1   Absolute Basophils 0.0 - 0.2 10e3/uL 0.1   Absolute Eosinophils 0.0 - 0.7 10e3/uL 0.2   Absolute Immature Granulocytes <=0.4 10e3/uL 0.1   Absolute Lymphocytes 0.8 - 5.3 10e3/uL 1.3   Absolute Monocytes 0.0 - 1.3 10e3/uL 0.9   % Immature Granulocytes % 0   Absolute Neutrophils 1.6 - 8.3 10e3/uL 9.0 (H)   Absolute NRBCs 10e3/uL 0.1   NRBCs per 100 WBC <1 /100 0     Results  reviewed, labs MET treatment parameters, ok to proceed with treatment.      Post Infusion Assessment:  Patient tolerated injection to RLQ abdomen without incident.       Discharge Plan:   Patient declined prescription refills.  Discharge instructions reviewed with: Patient.  Patient and/or family verbalized understanding of discharge instructions and all questions answered.  AVS to patient via [employer].  Patient will return 04/19 for next infusion appointment.   Patient discharged in stable condition accompanied by: family.  Departure Mode: Ambulatory.      Eneida Hopkins RN

## 2024-04-12 NOTE — PATIENT INSTRUCTIONS
[facility] Triage and after hours / weekends / holidays:  [phone]    Please call the triage or after hours line if you experience a temperature greater than or equal to 100.4, shaking chills, have uncontrolled nausea, vomiting and/or diarrhea, dizziness, shortness of breath, chest pain, bleeding, unexplained bruising, or if you have any other new/concerning symptoms, questions, or concerns.      If you are having any concerning symptoms or wish to speak to a provider before your next infusion visit, please call your care coordinator or triage to notify them so we can adequately serve you.     If you need a refill on a narcotic prescription or other medication, please call before your infusion appointment.

## 2024-04-14 DIAGNOSIS — E85.81 AL AMYLOIDOSIS (H): Primary | ICD-10-CM

## 2024-04-14 RX ORDER — ACETAMINOPHEN 325 MG/1
650 TABLET ORAL
Status: CANCELLED | OUTPATIENT
Start: 2024-05-03

## 2024-04-14 RX ORDER — ALBUTEROL SULFATE 90 UG/1
1-2 AEROSOL, METERED RESPIRATORY (INHALATION)
Status: CANCELLED
Start: 2024-05-03

## 2024-04-14 RX ORDER — METHYLPREDNISOLONE SODIUM SUCCINATE 125 MG/2ML
125 INJECTION, POWDER, LYOPHILIZED, FOR SOLUTION INTRAMUSCULAR; INTRAVENOUS
Status: CANCELLED
Start: 2024-04-26

## 2024-04-14 RX ORDER — METHYLPREDNISOLONE SODIUM SUCCINATE 125 MG/2ML
125 INJECTION, POWDER, LYOPHILIZED, FOR SOLUTION INTRAMUSCULAR; INTRAVENOUS
Status: CANCELLED
Start: 2024-05-03

## 2024-04-14 RX ORDER — LORAZEPAM 2 MG/ML
0.5 INJECTION INTRAMUSCULAR EVERY 4 HOURS PRN
Status: CANCELLED | OUTPATIENT
Start: 2024-04-19

## 2024-04-14 RX ORDER — MEPERIDINE HYDROCHLORIDE 25 MG/ML
25 INJECTION INTRAMUSCULAR; INTRAVENOUS; SUBCUTANEOUS EVERY 30 MIN PRN
Status: CANCELLED | OUTPATIENT
Start: 2024-04-26

## 2024-04-14 RX ORDER — DIPHENHYDRAMINE HYDROCHLORIDE 50 MG/ML
50 INJECTION INTRAMUSCULAR; INTRAVENOUS
Status: CANCELLED
Start: 2024-05-03

## 2024-04-14 RX ORDER — HEPARIN SODIUM (PORCINE) LOCK FLUSH IV SOLN 100 UNIT/ML 100 UNIT/ML
5 SOLUTION INTRAVENOUS
Status: CANCELLED | OUTPATIENT
Start: 2024-05-03

## 2024-04-14 RX ORDER — METHYLPREDNISOLONE SODIUM SUCCINATE 125 MG/2ML
125 INJECTION, POWDER, LYOPHILIZED, FOR SOLUTION INTRAMUSCULAR; INTRAVENOUS
Status: CANCELLED
Start: 2024-05-08

## 2024-04-14 RX ORDER — ALBUTEROL SULFATE 0.83 MG/ML
2.5 SOLUTION RESPIRATORY (INHALATION)
Status: CANCELLED | OUTPATIENT
Start: 2024-04-26

## 2024-04-14 RX ORDER — HEPARIN SODIUM,PORCINE 10 UNIT/ML
5-20 VIAL (ML) INTRAVENOUS DAILY PRN
Status: CANCELLED | OUTPATIENT
Start: 2024-04-19

## 2024-04-14 RX ORDER — ACETAMINOPHEN 325 MG/1
650 TABLET ORAL
Status: CANCELLED | OUTPATIENT
Start: 2024-04-19

## 2024-04-14 RX ORDER — LORAZEPAM 2 MG/ML
0.5 INJECTION INTRAMUSCULAR EVERY 4 HOURS PRN
Status: CANCELLED | OUTPATIENT
Start: 2024-05-03

## 2024-04-14 RX ORDER — MEPERIDINE HYDROCHLORIDE 25 MG/ML
25 INJECTION INTRAMUSCULAR; INTRAVENOUS; SUBCUTANEOUS EVERY 30 MIN PRN
Status: CANCELLED | OUTPATIENT
Start: 2024-05-08

## 2024-04-14 RX ORDER — EPINEPHRINE 1 MG/ML
0.3 INJECTION, SOLUTION INTRAMUSCULAR; SUBCUTANEOUS EVERY 5 MIN PRN
Status: CANCELLED | OUTPATIENT
Start: 2024-04-26

## 2024-04-14 RX ORDER — LORAZEPAM 2 MG/ML
0.5 INJECTION INTRAMUSCULAR EVERY 4 HOURS PRN
Status: CANCELLED | OUTPATIENT
Start: 2024-05-08

## 2024-04-14 RX ORDER — METHYLPREDNISOLONE SODIUM SUCCINATE 125 MG/2ML
125 INJECTION, POWDER, LYOPHILIZED, FOR SOLUTION INTRAMUSCULAR; INTRAVENOUS
Status: CANCELLED
Start: 2024-04-19

## 2024-04-14 RX ORDER — ALBUTEROL SULFATE 0.83 MG/ML
2.5 SOLUTION RESPIRATORY (INHALATION)
Status: CANCELLED | OUTPATIENT
Start: 2024-04-19

## 2024-04-14 RX ORDER — MEPERIDINE HYDROCHLORIDE 25 MG/ML
25 INJECTION INTRAMUSCULAR; INTRAVENOUS; SUBCUTANEOUS EVERY 30 MIN PRN
Status: CANCELLED | OUTPATIENT
Start: 2024-04-19

## 2024-04-14 RX ORDER — ALBUTEROL SULFATE 90 UG/1
1-2 AEROSOL, METERED RESPIRATORY (INHALATION)
Status: CANCELLED
Start: 2024-04-19

## 2024-04-14 RX ORDER — ALBUTEROL SULFATE 0.83 MG/ML
2.5 SOLUTION RESPIRATORY (INHALATION)
Status: CANCELLED | OUTPATIENT
Start: 2024-05-08

## 2024-04-14 RX ORDER — DIPHENHYDRAMINE HCL 25 MG
50 CAPSULE ORAL
Status: CANCELLED | OUTPATIENT
Start: 2024-05-03

## 2024-04-14 RX ORDER — DIPHENHYDRAMINE HCL 25 MG
50 CAPSULE ORAL
Status: CANCELLED | OUTPATIENT
Start: 2024-04-19

## 2024-04-14 RX ORDER — ALBUTEROL SULFATE 90 UG/1
1-2 AEROSOL, METERED RESPIRATORY (INHALATION)
Status: CANCELLED
Start: 2024-05-08

## 2024-04-14 RX ORDER — HEPARIN SODIUM,PORCINE 10 UNIT/ML
5-20 VIAL (ML) INTRAVENOUS DAILY PRN
Status: CANCELLED | OUTPATIENT
Start: 2024-05-03

## 2024-04-14 RX ORDER — DIPHENHYDRAMINE HYDROCHLORIDE 50 MG/ML
50 INJECTION INTRAMUSCULAR; INTRAVENOUS
Status: CANCELLED
Start: 2024-04-19

## 2024-04-14 RX ORDER — ALBUTEROL SULFATE 90 UG/1
1-2 AEROSOL, METERED RESPIRATORY (INHALATION)
Status: CANCELLED
Start: 2024-04-26

## 2024-04-14 RX ORDER — LORAZEPAM 2 MG/ML
0.5 INJECTION INTRAMUSCULAR EVERY 4 HOURS PRN
Status: CANCELLED | OUTPATIENT
Start: 2024-04-26

## 2024-04-14 RX ORDER — EPINEPHRINE 1 MG/ML
0.3 INJECTION, SOLUTION INTRAMUSCULAR; SUBCUTANEOUS EVERY 5 MIN PRN
Status: CANCELLED | OUTPATIENT
Start: 2024-05-03

## 2024-04-14 RX ORDER — DIPHENHYDRAMINE HYDROCHLORIDE 50 MG/ML
50 INJECTION INTRAMUSCULAR; INTRAVENOUS
Status: CANCELLED
Start: 2024-05-08

## 2024-04-14 RX ORDER — EPINEPHRINE 1 MG/ML
0.3 INJECTION, SOLUTION INTRAMUSCULAR; SUBCUTANEOUS EVERY 5 MIN PRN
Status: CANCELLED | OUTPATIENT
Start: 2024-05-08

## 2024-04-14 RX ORDER — DIPHENHYDRAMINE HYDROCHLORIDE 50 MG/ML
50 INJECTION INTRAMUSCULAR; INTRAVENOUS
Status: CANCELLED
Start: 2024-04-26

## 2024-04-14 RX ORDER — ALBUTEROL SULFATE 0.83 MG/ML
2.5 SOLUTION RESPIRATORY (INHALATION)
Status: CANCELLED | OUTPATIENT
Start: 2024-05-03

## 2024-04-14 RX ORDER — HEPARIN SODIUM (PORCINE) LOCK FLUSH IV SOLN 100 UNIT/ML 100 UNIT/ML
5 SOLUTION INTRAVENOUS
Status: CANCELLED | OUTPATIENT
Start: 2024-04-19

## 2024-04-14 RX ORDER — EPINEPHRINE 1 MG/ML
0.3 INJECTION, SOLUTION INTRAMUSCULAR; SUBCUTANEOUS EVERY 5 MIN PRN
Status: CANCELLED | OUTPATIENT
Start: 2024-04-19

## 2024-04-14 RX ORDER — MEPERIDINE HYDROCHLORIDE 25 MG/ML
25 INJECTION INTRAMUSCULAR; INTRAVENOUS; SUBCUTANEOUS EVERY 30 MIN PRN
Status: CANCELLED | OUTPATIENT
Start: 2024-05-03

## 2024-04-15 ENCOUNTER — DOCUMENTATION ONLY (OUTPATIENT)
Dept: ONCOLOGY | Facility: CLINIC | Age: 54
End: 2024-04-15
Payer: COMMERCIAL

## 2024-04-15 DIAGNOSIS — E85.81 AL AMYLOIDOSIS (H): ICD-10-CM

## 2024-04-15 DIAGNOSIS — E85.81 AL AMYLOIDOSIS (H): Primary | ICD-10-CM

## 2024-04-15 RX ORDER — CYCLOPHOSPHAMIDE 50 MG/1
500 CAPSULE ORAL
Qty: 40 CAPSULE | Refills: 0 | Status: SHIPPED | OUTPATIENT
Start: 2024-04-19 | End: 2024-05-10

## 2024-04-15 NOTE — PROGRESS NOTES
Oral Chemotherapy Monitoring Program  Lab Follow Up    Reviewed lab results from 4/12.        2/13/2024     2:00 PM 3/4/2024    11:00 AM 3/11/2024     1:00 PM 3/15/2024     9:00 AM 3/29/2024     1:00 PM 4/8/2024     2:00 PM 4/15/2024    12:00 PM   ORAL CHEMOTHERAPY   Assessment Type Refill Lab Monitoring Lab Monitoring Refill Lab Monitoring Lab Monitoring Lab Monitoring;Refill   Diagnosis Code Multiple Myeloma Multiple Myeloma Multiple Myeloma Multiple Myeloma Multiple Myeloma Multiple Myeloma Multiple Myeloma   Providers Dr. Ramesh Chandler   [facility]   Is this patient followed by the [facility] OC team? No No No No No No No   Drug Name Cytoxan (cyclophsphamide) Cytoxan (cyclophsphamide) Cytoxan (cyclophsphamide) Cytoxan (cyclophsphamide) Cytoxan (cyclophsphamide) Cytoxan (cyclophsphamide) Cytoxan (cyclophsphamide)   Dose 500 mg 500 mg 500 mg 500 mg 500 mg 500 mg 500 mg   Current Schedule Weekly Weekly Weekly Weekly Weekly Weekly Weekly   Cycle Details Weekly/Daily Weekly/Daily Weekly/Daily Weekly/Daily Weekly/Daily Weekly/Daily Weekly/Daily   Start Date of Last Cycle 1/26/2024 2/23/2024 2/23/2024      Planned next cycle start date 2/23/2024 3/22/2024  3/22/2024          Labs:  _  Result Component Current Result Ref Range   Sodium 136 (4/12/2024) 135 - 145 mmol/L     _  Result Component Current Result Ref Range   Potassium 4.4 (4/12/2024) 3.4 - 5.3 mmol/L     _  Result Component Current Result Ref Range   Calcium 8.8 (4/12/2024) 8.6 - 10.0 mg/dL     No results found for Mag within last 30 days.     _  Result Component Current Result Ref Range   Phosphorus 4.4 (3/18/2024) 2.5 - 4.5 mg/dL     _  Result Component Current Result Ref Range   Albumin 2.1 (L) (4/12/2024) 3.5 - 5.2 g/dL     _  Result Component Current Result Ref Range   Urea Nitrogen 23.4 (H) (4/12/2024) 6.0 - 20.0 mg/dL      _  Result Component Current Result Ref Range   Creatinine 1.43 (H) (4/12/2024) 0.51 - 0.95 mg/dL     _  Result Component Current Result Ref Range   AST 23 (4/12/2024) 0 - 45 U/L     _  Result Component Current Result Ref Range   ALT 23 (4/12/2024) 0 - 50 U/L     _  Result Component Current Result Ref Range   Bilirubin Total 0.3 (4/12/2024) <=1.2 mg/dL     _  Result Component Current Result Ref Range   WBC Count 11.6 (H) (4/12/2024) 4.0 - 11.0 10e3/uL     _  Result Component Current Result Ref Range   Hemoglobin 12.8 (4/12/2024) 11.7 - 15.7 g/dL     _  Result Component Current Result Ref Range   Platelet Count 392 (4/12/2024) 150 - 450 10e3/uL     No results found for ANC within last 30 days.     _  Result Component Current Result Ref Range   Absolute Neutrophils 9.0 (H) (4/12/2024) 1.6 - 8.3 10e3/uL        Assessment & Plan:  Lab results are stable. No changes to therapy needed at this time. Patient was not contacted as she was seen in infusion on 4/12.    Follow-Up:  4/19 labs and office visit    Carlie Zambrano, PharmD, BCOP  Oral Chemotherapy Monitoring Program  [facility]  [phone]

## 2024-04-17 DIAGNOSIS — E85.81 AL AMYLOIDOSIS (H): Primary | ICD-10-CM

## 2024-04-17 RX ORDER — DIPHENHYDRAMINE HCL 25 MG
50 CAPSULE ORAL
Status: CANCELLED | OUTPATIENT
Start: 2024-05-17

## 2024-04-17 RX ORDER — ALBUTEROL SULFATE 90 UG/1
1-2 AEROSOL, METERED RESPIRATORY (INHALATION)
Status: CANCELLED
Start: 2024-06-07

## 2024-04-17 RX ORDER — HEPARIN SODIUM (PORCINE) LOCK FLUSH IV SOLN 100 UNIT/ML 100 UNIT/ML
5 SOLUTION INTRAVENOUS
Status: CANCELLED | OUTPATIENT
Start: 2024-05-17

## 2024-04-17 RX ORDER — LORAZEPAM 2 MG/ML
0.5 INJECTION INTRAMUSCULAR EVERY 4 HOURS PRN
Status: CANCELLED | OUTPATIENT
Start: 2024-05-17

## 2024-04-17 RX ORDER — EPINEPHRINE 1 MG/ML
0.3 INJECTION, SOLUTION INTRAMUSCULAR; SUBCUTANEOUS EVERY 5 MIN PRN
Status: CANCELLED | OUTPATIENT
Start: 2024-05-17

## 2024-04-17 RX ORDER — DEXAMETHASONE 4 MG/1
TABLET ORAL
Qty: 20 TABLET | Refills: 0 | Status: SHIPPED | OUTPATIENT
Start: 2024-04-17 | End: 2024-05-23

## 2024-04-17 RX ORDER — EPINEPHRINE 1 MG/ML
0.3 INJECTION, SOLUTION INTRAMUSCULAR; SUBCUTANEOUS EVERY 5 MIN PRN
Status: CANCELLED | OUTPATIENT
Start: 2024-06-07

## 2024-04-17 RX ORDER — LORAZEPAM 2 MG/ML
0.5 INJECTION INTRAMUSCULAR EVERY 4 HOURS PRN
Status: CANCELLED | OUTPATIENT
Start: 2024-05-24

## 2024-04-17 RX ORDER — METHYLPREDNISOLONE SODIUM SUCCINATE 125 MG/2ML
125 INJECTION, POWDER, LYOPHILIZED, FOR SOLUTION INTRAMUSCULAR; INTRAVENOUS
Status: CANCELLED
Start: 2024-06-07

## 2024-04-17 RX ORDER — HEPARIN SODIUM (PORCINE) LOCK FLUSH IV SOLN 100 UNIT/ML 100 UNIT/ML
5 SOLUTION INTRAVENOUS
Status: CANCELLED | OUTPATIENT
Start: 2024-05-31

## 2024-04-17 RX ORDER — DIPHENHYDRAMINE HYDROCHLORIDE 50 MG/ML
50 INJECTION INTRAMUSCULAR; INTRAVENOUS
Status: CANCELLED
Start: 2024-05-24

## 2024-04-17 RX ORDER — ACETAMINOPHEN 325 MG/1
650 TABLET ORAL
Status: CANCELLED | OUTPATIENT
Start: 2024-05-31

## 2024-04-17 RX ORDER — ACETAMINOPHEN 325 MG/1
650 TABLET ORAL
Status: CANCELLED | OUTPATIENT
Start: 2024-05-17

## 2024-04-17 RX ORDER — METHYLPREDNISOLONE SODIUM SUCCINATE 125 MG/2ML
125 INJECTION, POWDER, LYOPHILIZED, FOR SOLUTION INTRAMUSCULAR; INTRAVENOUS
Status: CANCELLED
Start: 2024-05-24

## 2024-04-17 RX ORDER — ALBUTEROL SULFATE 0.83 MG/ML
2.5 SOLUTION RESPIRATORY (INHALATION)
Status: CANCELLED | OUTPATIENT
Start: 2024-05-24

## 2024-04-17 RX ORDER — DIPHENHYDRAMINE HYDROCHLORIDE 50 MG/ML
50 INJECTION INTRAMUSCULAR; INTRAVENOUS
Status: CANCELLED
Start: 2024-06-07

## 2024-04-17 RX ORDER — MEPERIDINE HYDROCHLORIDE 25 MG/ML
25 INJECTION INTRAMUSCULAR; INTRAVENOUS; SUBCUTANEOUS EVERY 30 MIN PRN
Status: CANCELLED | OUTPATIENT
Start: 2024-05-31

## 2024-04-17 RX ORDER — ALBUTEROL SULFATE 90 UG/1
1-2 AEROSOL, METERED RESPIRATORY (INHALATION)
Status: CANCELLED
Start: 2024-05-31

## 2024-04-17 RX ORDER — LORAZEPAM 2 MG/ML
0.5 INJECTION INTRAMUSCULAR EVERY 4 HOURS PRN
Status: CANCELLED | OUTPATIENT
Start: 2024-05-31

## 2024-04-17 RX ORDER — DIPHENHYDRAMINE HCL 25 MG
50 CAPSULE ORAL
Status: CANCELLED | OUTPATIENT
Start: 2024-05-31

## 2024-04-17 RX ORDER — EPINEPHRINE 1 MG/ML
0.3 INJECTION, SOLUTION INTRAMUSCULAR; SUBCUTANEOUS EVERY 5 MIN PRN
Status: CANCELLED | OUTPATIENT
Start: 2024-05-31

## 2024-04-17 RX ORDER — ALBUTEROL SULFATE 0.83 MG/ML
2.5 SOLUTION RESPIRATORY (INHALATION)
Status: CANCELLED | OUTPATIENT
Start: 2024-05-17

## 2024-04-17 RX ORDER — ALBUTEROL SULFATE 0.83 MG/ML
2.5 SOLUTION RESPIRATORY (INHALATION)
Status: CANCELLED | OUTPATIENT
Start: 2024-05-31

## 2024-04-17 RX ORDER — MEPERIDINE HYDROCHLORIDE 25 MG/ML
25 INJECTION INTRAMUSCULAR; INTRAVENOUS; SUBCUTANEOUS EVERY 30 MIN PRN
Status: CANCELLED | OUTPATIENT
Start: 2024-05-17

## 2024-04-17 RX ORDER — MEPERIDINE HYDROCHLORIDE 25 MG/ML
25 INJECTION INTRAMUSCULAR; INTRAVENOUS; SUBCUTANEOUS EVERY 30 MIN PRN
Status: CANCELLED | OUTPATIENT
Start: 2024-06-07

## 2024-04-17 RX ORDER — HEPARIN SODIUM,PORCINE 10 UNIT/ML
5-20 VIAL (ML) INTRAVENOUS DAILY PRN
Status: CANCELLED | OUTPATIENT
Start: 2024-05-31

## 2024-04-17 RX ORDER — HEPARIN SODIUM,PORCINE 10 UNIT/ML
5-20 VIAL (ML) INTRAVENOUS DAILY PRN
Status: CANCELLED | OUTPATIENT
Start: 2024-05-17

## 2024-04-17 RX ORDER — MEPERIDINE HYDROCHLORIDE 25 MG/ML
25 INJECTION INTRAMUSCULAR; INTRAVENOUS; SUBCUTANEOUS EVERY 30 MIN PRN
Status: CANCELLED | OUTPATIENT
Start: 2024-05-24

## 2024-04-17 RX ORDER — METHYLPREDNISOLONE SODIUM SUCCINATE 125 MG/2ML
125 INJECTION, POWDER, LYOPHILIZED, FOR SOLUTION INTRAMUSCULAR; INTRAVENOUS
Status: CANCELLED
Start: 2024-05-17

## 2024-04-17 RX ORDER — ALBUTEROL SULFATE 90 UG/1
1-2 AEROSOL, METERED RESPIRATORY (INHALATION)
Status: CANCELLED
Start: 2024-05-17

## 2024-04-17 RX ORDER — DIPHENHYDRAMINE HYDROCHLORIDE 50 MG/ML
50 INJECTION INTRAMUSCULAR; INTRAVENOUS
Status: CANCELLED
Start: 2024-05-31

## 2024-04-17 RX ORDER — EPINEPHRINE 1 MG/ML
0.3 INJECTION, SOLUTION INTRAMUSCULAR; SUBCUTANEOUS EVERY 5 MIN PRN
Status: CANCELLED | OUTPATIENT
Start: 2024-05-24

## 2024-04-17 RX ORDER — ALBUTEROL SULFATE 90 UG/1
1-2 AEROSOL, METERED RESPIRATORY (INHALATION)
Status: CANCELLED
Start: 2024-05-24

## 2024-04-17 RX ORDER — METHYLPREDNISOLONE SODIUM SUCCINATE 125 MG/2ML
125 INJECTION, POWDER, LYOPHILIZED, FOR SOLUTION INTRAMUSCULAR; INTRAVENOUS
Status: CANCELLED
Start: 2024-05-31

## 2024-04-17 RX ORDER — LORAZEPAM 2 MG/ML
0.5 INJECTION INTRAMUSCULAR EVERY 4 HOURS PRN
Status: CANCELLED | OUTPATIENT
Start: 2024-06-07

## 2024-04-17 RX ORDER — DIPHENHYDRAMINE HYDROCHLORIDE 50 MG/ML
50 INJECTION INTRAMUSCULAR; INTRAVENOUS
Status: CANCELLED
Start: 2024-05-17

## 2024-04-17 RX ORDER — ALBUTEROL SULFATE 0.83 MG/ML
2.5 SOLUTION RESPIRATORY (INHALATION)
Status: CANCELLED | OUTPATIENT
Start: 2024-06-07

## 2024-04-18 NOTE — PROGRESS NOTES
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 kidney biopsy (needle):     Amyloidosis of the kidney, AL-type, lambda light chain-restricted, affecting the glomeruli, interstitium, and arterioles (see comment)     Mild chronic changes of the parenchyma, including:   - focal global glomerulosclerosis (3% of glomeruli)   - focal tubular atrophy and interstitial fibrosis (5% of the cortex)   - severe arterial sclerosis   Electronically signed by Joe Garcia MD on 12/23/2023 at  3:21 PM   Comment      The biopsy reveals findings diagnostic of amyloidosis; the amyloid shows lambda light chain-restriction (AL amyloidosis), and the deposits are Congo red positive. The amyloid deposits affect the glomeruli extensively, and interstitium and arterioles focally. Other potential complications of paraproteins in the kidney are not seen, in particular, there is no evidence of light chain cast nephropathy, light chain deposition disease, or light chain tubulopathy.      1/8/24: NT-Pro , Troponin T 15    1/25/24 PET                                                                  Single focal moderate to markedly avid intramuscular uptake, proximal left gastrocnemius, no etiology demonstrated on un enhanced non diagnostic CT.    A few equivocal findings - unlikely to be active amyloid  Mildly hypermetabolic left lower lobe anterior segment peribronchial nodularity and hypermetabolism. Differential favors RSV positive 1/18/24), focal amyloid unlikely.   Cardiac (MRI positive) - equivocal FDG throughout left ventricle. Given findings on MRI 1/16/2024, can not rule out active amyloidosis. However in the absence  of a cardiac dietary prep (which switches myocardium from glucose to fatty metabolism), this is likely normal physiology.  Kidney cortex (bx. Proven) - likely normal, with the knowledge that the patient's kidneys are biopsy-positive for amyloid, could do future studies with a lasix protocol to dilute urinary FDG, may allow for identifying renal uptake.     Date Treatment Name Response Side Effects / Toxicities    1/26/24  Darzalex Faspro + (CyBorD)               Interval History   Mary Ann presents today for follow up prior to C4 chemo  Overall feeling well. First 3 months were as she had RSV for a long time. She has been feeling more like herself, doing more around her home. Still not walking/ exercising. Diet has been stable (gluten free since Jan) bowels and bladder stable.   Breathing feels fine. She has not had any respiratory symptoms since recovered from RSV.   Her swelling had gotten worse while her lasix was held for low blood pressure, now resumed on half dose and swelling is better. Wearing compression socks.   No bleeding / abnormal bruising. No skin concerns.   ROS otherwise neg.     Past Medical History     Past Medical History:   Diagnosis Date    Celiac disease     Family history of colon cancer     Colonoscoy q5 years next 9/2020       Past Surgical History:      Past Surgical History:   Procedure Laterality Date    COLONOSCOPY N/A 9/28/2015    Procedure: COLONOSCOPY;  Surgeon: Eugenio Travis MD;  Location:  GI    ESOPHAGOSCOPY, GASTROSCOPY, DUODENOSCOPY (EGD), COMBINED N/A 1/30/2024    Procedure: ESOPHAGOGASTRODUODENOSCOPY, WITH BIOPSY;  Surgeon: Melo Cloud MD;  Location:  GI    IR RENAL BIOPSY RIGHT  12/21/2023    TONSILLECTOMY         Social History     Socioeconomic History    Marital status:      Spouse name: None    Number of children: None    Years of education: None    Highest education level: None   Occupational History     Comment: Physical Therapy   Substance and  Sexual Activity    Alcohol use: Not Currently     Alcohol/week: 0.0 standard drinks of alcohol     Comment: 1-2 times/month    Drug use: No    Sexual activity: Yes     Partners: Male     Birth control/protection: I.U.D.       Allergies:     Allergies   Allergen Reactions    Gluten Meal     Latex Itching    Seasonal Allergies        Medications:     Current Outpatient Medications   Medication Sig Dispense Refill    acyclovir (ZOVIRAX) 400 MG tablet Take 1 tablet (400 mg) by mouth 2 times daily Viral Prophylaxis. 180 tablet 3    Albuterol-Budesonide (AIRSUPRA) 90-80 MCG/ACT AERO Inhale 2 Inhalations into the lungs every 4 hours as needed (Wheezing, chest tightness, shortness of breath, or persistent cough) 10.7 g 3    azelastine (ASTELIN) 0.1 % nasal spray Spray 2 sprays into both nostrils 2 times daily as needed for rhinitis (Patient not taking: Reported on 4/8/2024) 30 mL 3    cholecalciferol (VITAMIN D3) 125 mcg (5000 units) capsule Take 1 capsule (125 mcg) by mouth daily 30 capsule 6    [START ON 4/19/2024] cycloPHOSphamide (CYTOXAN) 50 MG capsule Take 10 capsules (500 mg) by mouth every 7 days Take on days 1, 8, 15, and 22. 40 capsule 0    cycloPHOSphamide (CYTOXAN) 50 MG capsule Take 10 capsules (500 mg) by mouth every 7 days Take on days 1, 8, 15, and 22. 40 capsule 0    dexAMETHasone (DECADRON) 4 MG tablet Take 40 mg (10 tablets) on days 8 and 22. 20 tablet 0    empagliflozin (JARDIANCE) 10 MG TABS tablet Take 1 tablet (10 mg) by mouth daily 30 tablet 11    EPINEPHrine (ANY BX GENERIC EQUIV) 0.3 MG/0.3ML injection 2-pack Inject 0.3 mLs (0.3 mg) into the muscle as needed for anaphylaxis May repeat one time in 5-15 minutes if response to initial dose is inadequate. (Patient not taking: Reported on 4/8/2024) 2 each 3    fluticasone (FLONASE) 50 MCG/ACT nasal spray Spray 2 sprays into both nostrils daily (Patient not taking: Reported on 4/8/2024) 16 g 3    furosemide (LASIX) 20 MG tablet Take 2 tablets (40 mg) by  mouth 2 times daily (Patient taking differently: Take 40 mg by mouth 2 times daily Taking 20 mg BID starting 04/10/24) 360 tablet 3    levothyroxine (SYNTHROID/LEVOTHROID) 200 MCG tablet Take 1 tablet (200 mcg) by mouth daily 90 tablet 3    levothyroxine (SYNTHROID/LEVOTHROID) 25 MCG tablet Take 1 tablet (25 mcg) by mouth daily      liothyronine (CYTOMEL) 5 MCG tablet Take 5 mcg by mouth 2 times daily      prochlorperazine (COMPAZINE) 5 MG tablet Take 1 tablet (5 mg) by mouth every 6 hours as needed for nausea or vomiting 30 tablet 1    rosuvastatin (CRESTOR) 10 MG tablet Take 1 tablet (10 mg) by mouth daily 90 tablet 3    spironolactone (ALDACTONE) 25 MG tablet Take 1 tablet (25 mg) by mouth daily 90 tablet 3     No current facility-administered medications for this visit.       Physical Exam     Physical Exam:  /77   Pulse 94   Temp 97.4  F (36.3  C) (Oral)   Resp 16   Wt 76.2 kg (168 lb)   SpO2 96%   BMI 28.33 kg/m    General: No acute distress.  HEENT: EOMI, PERRL. Sclerae are anicteric. Oral mucosa is pink and moist with no lesions or thrush.   Lymph: Neck is supple with no lymphadenopathy in the cervical or supraclavicular areas.   Abdomen: Bowel sounds present, soft, nontender with no palpable hepatosplenomegaly or masses.   Extremities: No lower extremity edema noted bilaterally.   Neuro: Alert and oriented x3, CN grossly intact, steady gait  Skin: No rashes, petechiae, or bruising noted on exposed skin.    Wt Readings from Last 4 Encounters:   04/19/24 76.2 kg (168 lb)   04/12/24 76.7 kg (169 lb)   04/08/24 76.2 kg (168 lb)   04/05/24 74.8 kg (165 lb)       Data     Most Recent 3 CBC's:  Recent Labs   Lab Test 04/12/24  1038 04/05/24  0814 03/29/24  0659   WBC 11.6* 10.5 9.7   HGB 12.8 13.8 12.9   MCV 86 87 87    431 391   ANEUTAUTO 9.0* 7.3 6.7     Most Recent 3 BMP's:  Recent Labs   Lab Test 04/12/24  1038 04/05/24  0814 03/29/24  0659    137 139   POTASSIUM 4.4 3.9 3.5   CHLORIDE  104 103 105   CO2 25 25 28   BUN 23.4* 16.7 20.9*   CR 1.43* 1.42* 1.39*   ANIONGAP 7 9 6*   TANNER 8.8 9.0 8.6   * 115* 84   PROTTOTAL 4.6* 4.7* 4.5*   ALBUMIN 2.1* 2.2* 2.2*    Most Recent 3 LFT's:  Recent Labs   Lab Test 04/12/24  1038 04/05/24  0814 03/29/24  0659   AST 23 24 27   ALT 23 22 25   ALKPHOS 99 108 101   BILITOTAL 0.3 0.2 0.3    Most Recent 2 TSH and T4:  Recent Labs   Lab Test 11/22/23  0819 11/16/23  1447 10/04/23  0823   TSH 1.01 0.57 3.44   T4 1.23  --  1.19     I reviewed the above labs today.    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 Her NT-proBNP was in the 500s, troponin was 15, and kappa-lambda difference was not markedly elevated, thereby reassuring and indicating stage 1.   - Mainstay treatment for AL cardiac amyloidosis is still chemotherapy. It is not uncommon for patients with cardiac amyloidosis to not tolerate ACEi/ARB/ARNI. H1/26/24: Would benefit from SGLT2i, but after discussion, she would like to wait until after her first cycle of induction chemotherapy.  - Follow up with Dr. Armstrong with cardiology    Renal involvement (per nephrology)  Nephrotic Syndrome  Stage 0 or 1 based on Hall 2012 classification.   Lasix 20 mg daily and lisinopril 2.5 mg daily.   Monitor BP closely and if <90/60 persistently or symptomatic may stop.  - Follows with nephrology Dr Dobson    Hypogammaglobulinemia  1/18/24 IgG 294 and tested + RSV  - Supplement IVIG for level < 400    Hashimoto's thyroiditis  Diagnosed August  2023, TPO ab is positive   - Continue Levothyroxine      45 minutes spent on the date of the encounter doing chart review, review of test results, interpretation of tests, patient visit, and documentation     Dary Tinoco [facility]

## 2024-04-19 ENCOUNTER — ONCOLOGY VISIT (OUTPATIENT)
Dept: ONCOLOGY | Facility: CLINIC | Age: 54
End: 2024-04-19
Attending: STUDENT IN AN ORGANIZED HEALTH CARE EDUCATION/TRAINING PROGRAM
Payer: COMMERCIAL

## 2024-04-19 ENCOUNTER — APPOINTMENT (OUTPATIENT)
Dept: LAB | Facility: CLINIC | Age: 54
End: 2024-04-19
Attending: STUDENT IN AN ORGANIZED HEALTH CARE EDUCATION/TRAINING PROGRAM
Payer: COMMERCIAL

## 2024-04-19 VITALS
DIASTOLIC BLOOD PRESSURE: 77 MMHG | SYSTOLIC BLOOD PRESSURE: 117 MMHG | OXYGEN SATURATION: 96 % | RESPIRATION RATE: 16 BRPM | BODY MASS INDEX: 28.33 KG/M2 | WEIGHT: 168 LBS | TEMPERATURE: 97.4 F | HEART RATE: 94 BPM

## 2024-04-19 DIAGNOSIS — E85.81 AL AMYLOIDOSIS (H): Primary | ICD-10-CM

## 2024-04-19 LAB
ALBUMIN SERPL BCG-MCNC: 2.3 G/DL (ref 3.5–5.2)
ALP SERPL-CCNC: 101 U/L (ref 40–150)
ALT SERPL W P-5'-P-CCNC: 24 U/L (ref 0–50)
ANION GAP SERPL CALCULATED.3IONS-SCNC: 6 MMOL/L (ref 7–15)
AST SERPL W P-5'-P-CCNC: 24 U/L (ref 0–45)
BASOPHILS # BLD AUTO: 0.1 10E3/UL (ref 0–0.2)
BASOPHILS NFR BLD AUTO: 1 %
BILIRUB SERPL-MCNC: 0.3 MG/DL
BUN SERPL-MCNC: 25.6 MG/DL (ref 6–20)
CALCIUM SERPL-MCNC: 9.2 MG/DL (ref 8.6–10)
CHLORIDE SERPL-SCNC: 106 MMOL/L (ref 98–107)
CREAT SERPL-MCNC: 1.47 MG/DL (ref 0.51–0.95)
DEPRECATED HCO3 PLAS-SCNC: 27 MMOL/L (ref 22–29)
EGFRCR SERPLBLD CKD-EPI 2021: 42 ML/MIN/1.73M2
EOSINOPHIL # BLD AUTO: 0.2 10E3/UL (ref 0–0.7)
EOSINOPHIL NFR BLD AUTO: 2 %
ERYTHROCYTE [DISTWIDTH] IN BLOOD BY AUTOMATED COUNT: 18.3 % (ref 10–15)
GLUCOSE SERPL-MCNC: 96 MG/DL (ref 70–99)
HCT VFR BLD AUTO: 35.3 % (ref 35–47)
HGB BLD-MCNC: 11.9 G/DL (ref 11.7–15.7)
IGA SERPL-MCNC: 37 MG/DL (ref 84–499)
IGG SERPL-MCNC: 101 MG/DL (ref 610–1616)
IGM SERPL-MCNC: 21 MG/DL (ref 35–242)
IMM GRANULOCYTES # BLD: 0.1 10E3/UL
IMM GRANULOCYTES NFR BLD: 1 %
KAPPA LC FREE SER-MCNC: 1.13 MG/DL (ref 0.33–1.94)
KAPPA LC FREE/LAMBDA FREE SER NEPH: 0.66 {RATIO} (ref 0.26–1.65)
LAMBDA LC FREE SERPL-MCNC: 1.71 MG/DL (ref 0.57–2.63)
LYMPHOCYTES # BLD AUTO: 1.5 10E3/UL (ref 0.8–5.3)
LYMPHOCYTES NFR BLD AUTO: 14 %
MCH RBC QN AUTO: 29.7 PG (ref 26.5–33)
MCHC RBC AUTO-ENTMCNC: 33.7 G/DL (ref 31.5–36.5)
MCV RBC AUTO: 88 FL (ref 78–100)
MONOCYTES # BLD AUTO: 1.1 10E3/UL (ref 0–1.3)
MONOCYTES NFR BLD AUTO: 10 %
NEUTROPHILS # BLD AUTO: 7.5 10E3/UL (ref 1.6–8.3)
NEUTROPHILS NFR BLD AUTO: 72 %
NRBC # BLD AUTO: 0.1 10E3/UL
NRBC BLD AUTO-RTO: 1 /100
PLATELET # BLD AUTO: 392 10E3/UL (ref 150–450)
POTASSIUM SERPL-SCNC: 4.2 MMOL/L (ref 3.4–5.3)
PROT SERPL-MCNC: 4.5 G/DL (ref 6.4–8.3)
RBC # BLD AUTO: 4.01 10E6/UL (ref 3.8–5.2)
SODIUM SERPL-SCNC: 139 MMOL/L (ref 135–145)
WBC # BLD AUTO: 10.4 10E3/UL (ref 4–11)

## 2024-04-19 PROCEDURE — 99213 OFFICE O/P EST LOW 20 MIN: CPT | Performed by: NURSE PRACTITIONER

## 2024-04-19 PROCEDURE — 80053 COMPREHEN METABOLIC PANEL: CPT | Performed by: STUDENT IN AN ORGANIZED HEALTH CARE EDUCATION/TRAINING PROGRAM

## 2024-04-19 PROCEDURE — 250N000012 HC RX MED GY IP 250 OP 636 PS 637: Performed by: STUDENT IN AN ORGANIZED HEALTH CARE EDUCATION/TRAINING PROGRAM

## 2024-04-19 PROCEDURE — 83521 IG LIGHT CHAINS FREE EACH: CPT

## 2024-04-19 PROCEDURE — 86334 IMMUNOFIX E-PHORESIS SERUM: CPT | Performed by: PATHOLOGY

## 2024-04-19 PROCEDURE — 84165 PROTEIN E-PHORESIS SERUM: CPT | Mod: 26 | Performed by: PATHOLOGY

## 2024-04-19 PROCEDURE — 84165 PROTEIN E-PHORESIS SERUM: CPT | Mod: TC | Performed by: PATHOLOGY

## 2024-04-19 PROCEDURE — 84155 ASSAY OF PROTEIN SERUM: CPT

## 2024-04-19 PROCEDURE — 82784 ASSAY IGA/IGD/IGG/IGM EACH: CPT

## 2024-04-19 PROCEDURE — 250N000011 HC RX IP 250 OP 636: Performed by: STUDENT IN AN ORGANIZED HEALTH CARE EDUCATION/TRAINING PROGRAM

## 2024-04-19 PROCEDURE — 99215 OFFICE O/P EST HI 40 MIN: CPT | Performed by: NURSE PRACTITIONER

## 2024-04-19 PROCEDURE — 36415 COLL VENOUS BLD VENIPUNCTURE: CPT

## 2024-04-19 PROCEDURE — 96401 CHEMO ANTI-NEOPL SQ/IM: CPT

## 2024-04-19 PROCEDURE — 85041 AUTOMATED RBC COUNT: CPT | Performed by: STUDENT IN AN ORGANIZED HEALTH CARE EDUCATION/TRAINING PROGRAM

## 2024-04-19 PROCEDURE — 86334 IMMUNOFIX E-PHORESIS SERUM: CPT | Mod: 26 | Performed by: PATHOLOGY

## 2024-04-19 RX ADMIN — DARATUMUMAB AND HYALURONIDASE-FIHJ (HUMAN RECOMBINANT) 1800 MG: 1800; 30000 INJECTION SUBCUTANEOUS at 13:15

## 2024-04-19 RX ADMIN — BORTEZOMIB 2.5 MG: 3.5 INJECTION, POWDER, LYOPHILIZED, FOR SOLUTION INTRAVENOUS; SUBCUTANEOUS at 13:14

## 2024-04-19 RX ADMIN — DEXAMETHASONE 30 MG: 2 TABLET ORAL at 12:44

## 2024-04-19 ASSESSMENT — PAIN SCALES - GENERAL: PAINLEVEL: NO PAIN (0)

## 2024-04-19 NOTE — TELEPHONE ENCOUNTER
Accommodation Request paperwork completed, checked for accuracy, signed and faxed to [employer] @ [phone]. A copy was made, sent to scanning and original mailed to patient at home address.    Successful transmission verified in Right Fax.      Nithya Huddleston

## 2024-04-19 NOTE — LETTER
4/19/2024         RE: Vivian Brown  [address] [facility] [address]        Dear Colleague,    Thank you for referring your patient, Vivian Brown, to the [facility]. Please see a copy of my visit note below.            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 kidney biopsy (needle):     Amyloidosis of the kidney, AL-type, lambda light chain-restricted, affecting the glomeruli, interstitium, and arterioles (see comment)     Mild chronic changes of the parenchyma, including:   - focal global glomerulosclerosis (3% of glomeruli)   - focal tubular atrophy and interstitial fibrosis (5% of the cortex)   - severe arterial sclerosis   Electronically signed by Joe Garcia MD on 12/23/2023 at  3:21 PM   Comment      The biopsy reveals findings diagnostic of amyloidosis; the amyloid shows lambda light chain-restriction (AL amyloidosis), and the deposits are Congo red positive. The amyloid deposits affect the glomeruli extensively, and interstitium and arterioles focally. Other potential complications of paraproteins in the kidney are not seen, in particular, there is no evidence of light chain cast nephropathy, light chain deposition disease, or light chain tubulopathy.      1/8/24: NT-Pro , Troponin T 15    1/25/24 PET                                                                  Single focal moderate to markedly avid intramuscular uptake, proximal left gastrocnemius, no etiology demonstrated on un enhanced non diagnostic CT.    A few equivocal findings - unlikely to be active amyloid  Mildly hypermetabolic left lower lobe anterior segment  peribronchial nodularity and hypermetabolism. Differential favors RSV positive 1/18/24), focal amyloid unlikely.   Cardiac (MRI positive) - equivocal FDG throughout left ventricle. Given findings on MRI 1/16/2024, can not rule out active amyloidosis. However in the absence of a cardiac dietary prep (which switches myocardium from glucose to fatty metabolism), this is likely normal physiology.  Kidney cortex (bx. Proven) - likely normal, with the knowledge that the patient's kidneys are biopsy-positive for amyloid, could do future studies with a lasix protocol to dilute urinary FDG, may allow for identifying renal uptake.     Date Treatment Name Response Side Effects / Toxicities    1/26/24  Darzalex Faspro + (CyBorD)               Interval History   Mary Ann presents today for follow up prior to C4 chemo  Overall feeling well. First 3 months were as she had RSV for a long time. She has been feeling more like herself, doing more around her home. Still not walking/ exercising. Diet has been stable (gluten free since Jan) bowels and bladder stable.   Breathing feels fine. She has not had any respiratory symptoms since recovered from RSV.   Her swelling had gotten worse while her lasix was held for low blood pressure, now resumed on half dose and swelling is better. Wearing compression socks.   No bleeding / abnormal bruising. No skin concerns.   ROS otherwise neg.     Past Medical History     Past Medical History:   Diagnosis Date    Celiac disease     Family history of colon cancer     Colonoscoy q5 years next 9/2020       Past Surgical History:      Past Surgical History:   Procedure Laterality Date    COLONOSCOPY N/A 9/28/2015    Procedure: COLONOSCOPY;  Surgeon: Eugenio Travis MD;  Location:  GI    ESOPHAGOSCOPY, GASTROSCOPY, DUODENOSCOPY (EGD), COMBINED N/A 1/30/2024    Procedure: ESOPHAGOGASTRODUODENOSCOPY, WITH BIOPSY;  Surgeon: Melo Cloud MD;  Location:  GI    IR RENAL BIOPSY RIGHT  12/21/2023     TONSILLECTOMY         Social History     Socioeconomic History    Marital status:      Spouse name: None    Number of children: None    Years of education: None    Highest education level: None   Occupational History     Comment: Physical Therapy   Substance and Sexual Activity    Alcohol use: Not Currently     Alcohol/week: 0.0 standard drinks of alcohol     Comment: 1-2 times/month    Drug use: No    Sexual activity: Yes     Partners: Male     Birth control/protection: I.U.D.       Allergies:     Allergies   Allergen Reactions    Gluten Meal     Latex Itching    Seasonal Allergies        Medications:     Current Outpatient Medications   Medication Sig Dispense Refill    acyclovir (ZOVIRAX) 400 MG tablet Take 1 tablet (400 mg) by mouth 2 times daily Viral Prophylaxis. 180 tablet 3    Albuterol-Budesonide (AIRSUPRA) 90-80 MCG/ACT AERO Inhale 2 Inhalations into the lungs every 4 hours as needed (Wheezing, chest tightness, shortness of breath, or persistent cough) 10.7 g 3    azelastine (ASTELIN) 0.1 % nasal spray Spray 2 sprays into both nostrils 2 times daily as needed for rhinitis (Patient not taking: Reported on 4/8/2024) 30 mL 3    cholecalciferol (VITAMIN D3) 125 mcg (5000 units) capsule Take 1 capsule (125 mcg) by mouth daily 30 capsule 6    [START ON 4/19/2024] cycloPHOSphamide (CYTOXAN) 50 MG capsule Take 10 capsules (500 mg) by mouth every 7 days Take on days 1, 8, 15, and 22. 40 capsule 0    cycloPHOSphamide (CYTOXAN) 50 MG capsule Take 10 capsules (500 mg) by mouth every 7 days Take on days 1, 8, 15, and 22. 40 capsule 0    dexAMETHasone (DECADRON) 4 MG tablet Take 40 mg (10 tablets) on days 8 and 22. 20 tablet 0    empagliflozin (JARDIANCE) 10 MG TABS tablet Take 1 tablet (10 mg) by mouth daily 30 tablet 11    EPINEPHrine (ANY BX GENERIC EQUIV) 0.3 MG/0.3ML injection 2-pack Inject 0.3 mLs (0.3 mg) into the muscle as needed for anaphylaxis May repeat one time in 5-15 minutes if response to initial  dose is inadequate. (Patient not taking: Reported on 4/8/2024) 2 each 3    fluticasone (FLONASE) 50 MCG/ACT nasal spray Spray 2 sprays into both nostrils daily (Patient not taking: Reported on 4/8/2024) 16 g 3    furosemide (LASIX) 20 MG tablet Take 2 tablets (40 mg) by mouth 2 times daily (Patient taking differently: Take 40 mg by mouth 2 times daily Taking 20 mg BID starting 04/10/24) 360 tablet 3    levothyroxine (SYNTHROID/LEVOTHROID) 200 MCG tablet Take 1 tablet (200 mcg) by mouth daily 90 tablet 3    levothyroxine (SYNTHROID/LEVOTHROID) 25 MCG tablet Take 1 tablet (25 mcg) by mouth daily      liothyronine (CYTOMEL) 5 MCG tablet Take 5 mcg by mouth 2 times daily      prochlorperazine (COMPAZINE) 5 MG tablet Take 1 tablet (5 mg) by mouth every 6 hours as needed for nausea or vomiting 30 tablet 1    rosuvastatin (CRESTOR) 10 MG tablet Take 1 tablet (10 mg) by mouth daily 90 tablet 3    spironolactone (ALDACTONE) 25 MG tablet Take 1 tablet (25 mg) by mouth daily 90 tablet 3     No current facility-administered medications for this visit.       Physical Exam     Physical Exam:  /77   Pulse 94   Temp 97.4  F (36.3  C) (Oral)   Resp 16   Wt 76.2 kg (168 lb)   SpO2 96%   BMI 28.33 kg/m    General: No acute distress.  HEENT: EOMI, PERRL. Sclerae are anicteric. Oral mucosa is pink and moist with no lesions or thrush.   Lymph: Neck is supple with no lymphadenopathy in the cervical or supraclavicular areas.   Abdomen: Bowel sounds present, soft, nontender with no palpable hepatosplenomegaly or masses.   Extremities: No lower extremity edema noted bilaterally.   Neuro: Alert and oriented x3, CN grossly intact, steady gait  Skin: No rashes, petechiae, or bruising noted on exposed skin.    Wt Readings from Last 4 Encounters:   04/19/24 76.2 kg (168 lb)   04/12/24 76.7 kg (169 lb)   04/08/24 76.2 kg (168 lb)   04/05/24 74.8 kg (165 lb)       Data     Most Recent 3 CBC's:  Recent Labs   Lab Test 04/12/24  1038  04/05/24  0814 03/29/24  0659   WBC 11.6* 10.5 9.7   HGB 12.8 13.8 12.9   MCV 86 87 87    431 391   ANEUTAUTO 9.0* 7.3 6.7     Most Recent 3 BMP's:  Recent Labs   Lab Test 04/12/24  1038 04/05/24  0814 03/29/24  0659    137 139   POTASSIUM 4.4 3.9 3.5   CHLORIDE 104 103 105   CO2 25 25 28   BUN 23.4* 16.7 20.9*   CR 1.43* 1.42* 1.39*   ANIONGAP 7 9 6*   TANNER 8.8 9.0 8.6   * 115* 84   PROTTOTAL 4.6* 4.7* 4.5*   ALBUMIN 2.1* 2.2* 2.2*    Most Recent 3 LFT's:  Recent Labs   Lab Test 04/12/24  1038 04/05/24  0814 03/29/24  0659   AST 23 24 27   ALT 23 22 25   ALKPHOS 99 108 101   BILITOTAL 0.3 0.2 0.3    Most Recent 2 TSH and T4:  Recent Labs   Lab Test 11/22/23  0819 11/16/23  1447 10/04/23  0823   TSH 1.01 0.57 3.44   T4 1.23  --  1.19     I reviewed the above labs today.    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 Her NT-proBNP was in the 500s, troponin was 15, and kappa-lambda difference was not markedly elevated, thereby reassuring and indicating stage 1.   - Mainstay treatment for AL cardiac amyloidosis is still chemotherapy. It is not uncommon for patients with cardiac amyloidosis to not tolerate ACEi/ARB/ARNI. H1/26/24: Would benefit from SGLT2i, but after discussion, she would like to wait until after her first cycle of induction chemotherapy.  - Follow up with Dr. Armstrong with cardiology    Renal involvement (per nephrology)  Nephrotic Syndrome  Stage 0 or 1 based on Hall 2012 classification.   Lasix 20 mg daily and  lisinopril 2.5 mg daily.   Monitor BP closely and if <90/60 persistently or symptomatic may stop.  - Follows with nephrology Dr Dobson    Hypogammaglobulinemia  1/18/24 IgG 294 and tested + RSV  - Supplement IVIG for level < 400    Hashimoto's thyroiditis  Diagnosed August 2023, TPO ab is positive   - Continue Levothyroxine      45 minutes spent on the date of the encounter doing chart review, review of test results, interpretation of tests, patient visit, and documentation     Dary Tinoco [facility]-BC

## 2024-04-19 NOTE — PATIENT INSTRUCTIONS
[facility] Triage and after hours / weekends / holidays:  [phone]    Please call the triage or after hours line if you experience a temperature greater than or equal to 100.4, shaking chills, have uncontrolled nausea, vomiting and/or diarrhea, dizziness, shortness of breath, chest pain, bleeding, unexplained bruising, or if you have any other new/concerning symptoms, questions or concerns.      If you are having any concerning symptoms or wish to speak to a provider before your next infusion visit, please call triage to notify them so we can adequately serve you.     If you need a refill on a narcotic prescription or other medication, please call before your infusion appointment.                April 2024 Sunday Monday Tuesday Wednesday Thursday Friday Saturday        1     2     3     4     5    LAB PERIPHERAL   8:00 AM   (15 min.)   [facility]   [facility]    ONC INFUSION 2 HR (120 MIN)   8:30 AM   (120 min.)    ONC INFUSION NURSE   [facility] 6       7     8    RETURN GI  10:45 AM   (30 min.)   Carmen Waggoner APRN CNP   [facility] 9     10     11     12    LAB PERIPHERAL  10:30 AM   (15 min.)   [facility]   [facility]    ONC INFUSION 1 HR (60 MIN)  11:00 AM   (60 min.)    ONC INFUSION NURSE   [facility] 13       14     15     16     17     18     19    LAB PERIPHERAL  10:30 AM   (15 min.)   [facility]   [facility]    RETURN CCSL  10:45 AM   (45 min.)   Dary Tinoco APRN CNP   [facility]    ONC INFUSION 2 HR (120 MIN)  12:00 PM   (120 min.)    ONC INFUSION NURSE   [facility] 20       21     22    NEW - MTM   8:30 AM   (60 min.)   Hunter Ballesteros RPH   [facility] 23     24     25     26    LAB PERIPHERAL  10:30  AM   (15 min.)    [facility]   [facility]    ONC INFUSION 1 HR (60 MIN)  11:00 AM   (60 min.)   [facility] ONC INFUSION NURSE   [facility] 27       28     29     30    ADULT SKIN PRICK TEST   7:45 AM   (30 min.)   Jose Gamble MD   [facility]                                 May 2024      [place] Monday Tuesday Wednesday Thursday Friday Saturday                  1     2     3    LAB PERIPHERAL  10:00 AM   (15 min.)    [facility]RAW   [facility]    ONC INFUSION 2 HR (120 MIN)  10:30 AM   (120 min.)   [facility] ONC INFUSION NURSE   [facility] 4       5     6     7     8     9     10    LAB PERIPHERAL  10:30 AM   (15 min.)    [facility]   [facility]    ONC INFUSION 1 HR (60 MIN)  11:00 AM   (60 min.)    ONC INFUSION NURSE   [facility] 11       12     13     14    LAB   7:30 AM   (15 min.)   [facility] LAB   [facility] 15     16    LAB   2:30 PM   (15 min.)   [facility] LAB   [facility] NEPHROLOGY   3:15 PM   (30 min.)   Tracie Dobson MD   [facility] [place] 17     18       19     20     21     22     23     24     25       26     27     28     29     30     31                         Recent Results (from the past 24 hour(s))   Comprehensive metabolic panel    Collection Time: 04/19/24  9:56 AM   Result Value Ref Range    Sodium 139 135 - 145 mmol/L    Potassium 4.2 3.4 - 5.3 mmol/L    Carbon Dioxide (CO2) 27 22 - 29 mmol/L    Anion Gap 6 (L) 7 - 15 mmol/L    Urea Nitrogen 25.6 (H) 6.0 - 20.0 mg/dL    Creatinine 1.47 (H) 0.51 - 0.95 mg/dL    GFR Estimate 42 (L) >60 mL/min/1.73m2    Calcium 9.2 8.6 - 10.0 mg/dL    Chloride 106 98 - 107 mmol/L    Glucose 96 70 - 99 mg/dL    Alkaline Phosphatase 101 40 - 150 U/L    AST 24 0 - 45 U/L     ALT 24 0 - 50 U/L    Protein Total 4.5 (L) 6.4 - 8.3 g/dL    Albumin 2.3 (L) 3.5 - 5.2 g/dL    Bilirubin Total 0.3 <=1.2 mg/dL   CBC with platelets and differential    Collection Time: 04/19/24  9:56 AM   Result Value Ref Range    WBC Count 10.4 4.0 - 11.0 10e3/uL    RBC Count 4.01 3.80 - 5.20 10e6/uL    Hemoglobin 11.9 11.7 - 15.7 g/dL    Hematocrit 35.3 35.0 - 47.0 %    MCV 88 78 - 100 fL    MCH 29.7 26.5 - 33.0 pg    MCHC 33.7 31.5 - 36.5 g/dL    RDW 18.3 (H) 10.0 - 15.0 %    Platelet Count 392 150 - 450 10e3/uL    % Neutrophils 72 %    % Lymphocytes 14 %    % Monocytes 10 %    % Eosinophils 2 %    % Basophils 1 %    % Immature Granulocytes 1 %    NRBCs per 100 WBC 1 (H) <1 /100    Absolute Neutrophils 7.5 1.6 - 8.3 10e3/uL    Absolute Lymphocytes 1.5 0.8 - 5.3 10e3/uL    Absolute Monocytes 1.1 0.0 - 1.3 10e3/uL    Absolute Eosinophils 0.2 0.0 - 0.7 10e3/uL    Absolute Basophils 0.1 0.0 - 0.2 10e3/uL    Absolute Immature Granulocytes 0.1 <=0.4 10e3/uL    Absolute NRBCs 0.1 10e3/uL

## 2024-04-19 NOTE — NURSING NOTE
"Oncology Rooming Note    April 19, 2024 10:54 AM   Vivian Brown is a 54 year old female who presents for:    Chief Complaint   Patient presents with    Blood Draw     Vitals, blood draw,  by DASHA CINTRON. Pt checked into appt.    Oncology Clinic Visit     AL amyloidosis     Initial Vitals: /77   Pulse 94   Temp 97.4  F (36.3  C) (Oral)   Resp 16   Wt 76.2 kg (168 lb)   SpO2 96%   BMI 28.33 kg/m   Estimated body mass index is 28.33 kg/m  as calculated from the following:    Height as of 4/8/24: 1.64 m (5' 4.57\").    Weight as of this encounter: 76.2 kg (168 lb). Body surface area is 1.86 meters squared.  No Pain (0) Comment: Data Unavailable   No LMP recorded. (Menstrual status: IUD).  Allergies reviewed: Yes  Medications reviewed: Yes    Medications: Medication refills not needed today.  Pharmacy name entered into [employer]:    [facility] - [place], [place] - [address] [facility] [place], [place] - [address] [place] [id number] - [place] [address] [id number] - [place] - [address]  [facility] - [place], [place] - [address]  [facility] [place], [place] - [address] [facility] AND [facility]    Frailty Screening:   Is the patient here for a new oncology consult visit in cancer care? 2. No      Clinical concerns: None       Joyce De Jesus CMA            "

## 2024-04-19 NOTE — PROGRESS NOTES
Infusion Nursing Note:  Vivian Brown presents today for C4 D1 Daratumumab + Velcade.    Patient seen by provider today: Yes: Dary Tinoco.   present during visit today: Not Applicable.    Note: Patient presents to the infusion center today with no new questions or concerns following provider visit today. Patient confirms taking oral dexamethasone and cyclophosphamide as prescribed. With today's visit, patient will take 30mg of oral dexamethasone at the infusion center. Plans to take home prescription of oral dexamethasone for days 8 and 22 of cycle 4.       Intravenous Access:  No Intravenous access/labs at this visit.    Treatment Conditions:  Lab Results   Component Value Date    HGB 11.9 04/19/2024    WBC 10.4 04/19/2024    ANEU 5.6 03/09/2020    ANEUTAUTO 7.5 04/19/2024     04/19/2024        Lab Results   Component Value Date     04/19/2024    POTASSIUM 4.2 04/19/2024    MAG 2.0 02/03/2024    CR 1.47 (H) 04/19/2024    TANNER 9.2 04/19/2024    BILITOTAL 0.3 04/19/2024    ALBUMIN 2.3 (L) 04/19/2024    ALT 24 04/19/2024    AST 24 04/19/2024       Results reviewed, labs MET treatment parameters, ok to proceed with treatment.      Post Infusion Assessment:  Patient tolerated daratumumab injection to the RLQ of the abdomen without incident.  Patient tolerated velcade injection to the LLQ of the abdomen without incident.       Discharge Plan:   Patient declined prescription refills.  Discharge instructions reviewed with: Patient and Family.  Patient and/or family verbalized understanding of discharge instructions and all questions answered.  AVS to patient via [employer].  Patient will return 04/26/2024 for next appointment.   Patient discharged in stable condition accompanied by: self and family.  Departure Mode: Ambulatory.      Julia Tejada RN

## 2024-04-19 NOTE — NURSING NOTE
Chief Complaint   Patient presents with    Blood Draw     Vitals, blood draw,  by DASHA CINTRON. Pt checked into appt.      Pam Akbar MA

## 2024-04-20 LAB — TOTAL PROTEIN SERUM FOR ELP: 3.7 G/DL (ref 6.4–8.3)

## 2024-04-22 ENCOUNTER — VIRTUAL VISIT (OUTPATIENT)
Dept: GASTROENTEROLOGY | Facility: CLINIC | Age: 54
End: 2024-04-22
Attending: NURSE PRACTITIONER
Payer: COMMERCIAL

## 2024-04-22 DIAGNOSIS — K90.0 CELIAC DISEASE: Primary | ICD-10-CM

## 2024-04-22 DIAGNOSIS — D80.1 HYPOGAMMAGLOBULINEMIA (H): Primary | ICD-10-CM

## 2024-04-22 LAB
ALBUMIN SERPL ELPH-MCNC: 1.9 G/DL (ref 3.7–5.1)
ALPHA1 GLOB SERPL ELPH-MCNC: 0.3 G/DL (ref 0.2–0.4)
ALPHA2 GLOB SERPL ELPH-MCNC: 0.9 G/DL (ref 0.5–0.9)
B-GLOBULIN SERPL ELPH-MCNC: 0.5 G/DL (ref 0.6–1)
GAMMA GLOB SERPL ELPH-MCNC: 0.1 G/DL (ref 0.7–1.6)
M PROTEIN SERPL ELPH-MCNC: 0.1 G/DL
PATH REPORT.COMMENTS IMP SPEC: ABNORMAL
PATH REPORT.COMMENTS IMP SPEC: NORMAL
PROT PATTERN SERPL ELPH-IMP: ABNORMAL
PROT PATTERN SERPL IFE-IMP: NORMAL

## 2024-04-22 RX ORDER — MEPERIDINE HYDROCHLORIDE 25 MG/ML
25 INJECTION INTRAMUSCULAR; INTRAVENOUS; SUBCUTANEOUS EVERY 30 MIN PRN
Status: CANCELLED | OUTPATIENT
Start: 2024-04-26

## 2024-04-22 RX ORDER — METHYLPREDNISOLONE SODIUM SUCCINATE 125 MG/2ML
125 INJECTION, POWDER, LYOPHILIZED, FOR SOLUTION INTRAMUSCULAR; INTRAVENOUS
Status: CANCELLED
Start: 2024-04-26

## 2024-04-22 RX ORDER — EPINEPHRINE 1 MG/ML
0.3 INJECTION, SOLUTION INTRAMUSCULAR; SUBCUTANEOUS EVERY 5 MIN PRN
Status: CANCELLED | OUTPATIENT
Start: 2024-04-26

## 2024-04-22 RX ORDER — HEPARIN SODIUM (PORCINE) LOCK FLUSH IV SOLN 100 UNIT/ML 100 UNIT/ML
5 SOLUTION INTRAVENOUS
Status: CANCELLED | OUTPATIENT
Start: 2024-04-26

## 2024-04-22 RX ORDER — DIPHENHYDRAMINE HYDROCHLORIDE 50 MG/ML
50 INJECTION INTRAMUSCULAR; INTRAVENOUS
Status: CANCELLED
Start: 2024-04-26

## 2024-04-22 RX ORDER — DIPHENHYDRAMINE HCL 25 MG
50 CAPSULE ORAL ONCE
Status: CANCELLED
Start: 2024-04-26

## 2024-04-22 RX ORDER — HEPARIN SODIUM,PORCINE 10 UNIT/ML
5-20 VIAL (ML) INTRAVENOUS DAILY PRN
Status: CANCELLED | OUTPATIENT
Start: 2024-04-26

## 2024-04-22 RX ORDER — ACETAMINOPHEN 325 MG/1
650 TABLET ORAL ONCE
Status: CANCELLED
Start: 2024-04-26

## 2024-04-22 RX ORDER — ALBUTEROL SULFATE 90 UG/1
1-2 AEROSOL, METERED RESPIRATORY (INHALATION)
Status: CANCELLED
Start: 2024-04-26

## 2024-04-22 RX ORDER — ALBUTEROL SULFATE 0.83 MG/ML
2.5 SOLUTION RESPIRATORY (INHALATION)
Status: CANCELLED | OUTPATIENT
Start: 2024-04-26

## 2024-04-22 NOTE — Clinical Note
"    4/22/2024         RE: Vivian Brown  [address] [facility] [address]        Dear Colleague,    Thank you for referring your patient, Vivian Brown, to the [facility]. Please see a copy of my visit note below.    Medication Therapy Management (MTM) Encounter    ASSESSMENT:                            Medication Adherence/Access: No issues identified    Celiac Disease:  Vivian was counseled on gluten in medications. All of their medications were reviewed for risk of gluten contamination and no concerns at this time. Minimize or avoid NSAIDs as these can increase permeability of gluten and raise risk of symptoms.       PLAN:                            Try to avoid NSAIDs such as ibuprofen. Although these medications may not contain gluten, they can increase the permeability of gluten and increase risk of symptoms.   When I spoke with Jamie in [place], I did ask that they update your allergy list to include gluten. Moving forward please verify and update your allergy list with pharmacies to let them know you have celiac disease and need to avoid gluten.   When available, consider purchasing over-the-counter products that are designated as \"gluten-free.\" When the gluten-free label is not present, you or a pharmacist will need to check the ingredient listing to verify there are no inactive ingredients for gluten.  You can review the education we discussed on gluten in medications at the following website: https://www.beyondceliac.org/living-with-celiac-disease/gluten-in-medication/   I've reviewed all of your current medications and have no concerns about gluten contamination.    Follow-up: {[id number]}    SUBJECTIVE/OBJECTIVE:                          Vivian Brown is a 54 year old female called for an initial visit. She was referred to me from MARICRUZ Tejada.      Reason for visit: Celiac disease med review.    Allergies/ADRs: Reviewed in " chart  Past Medical History: Reviewed in chart  Tobacco: She reports that she has never smoked. She has been exposed to tobacco smoke. She has never used smokeless tobacco.  Alcohol: Less than 1 beverage / month      Medication Adherence/Access: no issues reported    Celiac Disease:     Met with Vivian for a targetted review. This was not a comprehensive medication review, although I did reconcile all medications and comprehensively screen for presence of gluten.    Gluten in Medications:    Celiac disease is a serious genetic autoimmune disease that damages the villi of the small intestine and interferes with absorption of nutrients from food. Celiac disease is triggered by consumption of the protein called gluten, which is found in wheat, barley and rye.     Although rare, products such as prescription or over-the-counter medications, vitamins, and supplements can contain traces of gluten. You can eliminate all risks by effectively evaluating the ingredients in these products.    It is highly unlikely that any excipient other than starch will contain any measurable amount of gluten. The most common types of starch used are from corn, potato, tapioca and wheat. Products containing wheat starch should be avoided.    The following inactive ingredients are considered  red flags,  as they may be sourced from  wheat, barley or rye. If you see a red flag ingredient, it means that more information is needed  to find out if the drug s ingredients contain gluten:  Wheat  Modified starch (if source is not specified)  Pregelatinized starch (if source is not specified)  Pregelatinized modified starch (if source is not specified)  Dextrates (if source is not specified)*  Dextrin (if source is not specified; the source is usually corn or potato which is  acceptable)*  Dextrimaltose (when barley malt is used)*  Caramel coloring (when barley malt is used)*    * These are highly processed ingredients that themselves do not  contain gluten, but may have gluten contamination through manufacturing process. Although many experts say they are ok for individuals with Celiac Disease, they should generally be avoided if the product is not certified gluten-free or patient is symptomatic.     Gluten contamination screening (drug, , considerations):  Acyclovir 400 mg twice daily as needed- Camber- Contains ingredient derived from potato starch  Cyclophosphamide- Alembic- ingredient derived from corn starch  Dexamethasone- Marion (Hikma)- corn starch  Rosuvastatin- Torrent - no concerns  Synthroid 200  mcg- Abbvie - no concerns  Levothyroxine 25 mcg - Accord- ingredient derived from potato starch   Spironolactone- Oxford - potato and corn starch derived  Vitamin D- ??- Called and spoke with [employer], last dispensed was NDC: [address]-83, called and spoke with [employer] and provided NDC to representative Sabrina, she confirmed this is for Vitamin D 5000 unit capsule, per Sabrina database says gluten-free   Jardiance- Boehringer- no concerns  Furosemide- Solco Heal - corn starch  Liothyronine- Mayne - no concerns  Fluticasone- Apotex- no concerns  Azelastine- Aurobindo - no concerns  Airsupra- Astrazeneca - no concerns  Albuterol- Prasco - no concerns  Prochlorperazine- Zydus - corn starch derived  Ondansetron- Aurobindo - no concerns    OTCs-  Daytime Cold and Flu- Top Care- no concerns, corn starch, labeled gluten free  Acetaminophen- Top Care- no concerns, corn starch, labeled gluten free  Ibuprofen- Top Care- no concerns, corn starch, labeled gluten free  Cetirizine - Top Care- no concerns, corn starch, labeled gluten free  Loratadine- [facility]- no concerns, corn starch, labeled gluten free        ----------------      I spent 40 minutes with this patient today. All changes were made via collaborative practice agreement with MARICRUZ Tejada CNP. A copy of the visit note was provided to the patient's provider(s).    A  "summary of these recommendations was sent via [employer].    Hunter Ballesteros, PharmD, BCPS  MTM Pharmacist   [facility]  Phone: [phone]    Telemedicine Visit Details  Type of service:  Telephone visit  Start Time:  8:30 AM  End Time:  9:10 AM     Medication Therapy Recommendations  No medication therapy recommendations to display       Medication Therapy Management (MTM) Encounter    ASSESSMENT:                            Medication Adherence/Access: No issues identified    Celiac Disease:  Vivian was counseled on gluten in medications. All of their medications were reviewed for risk of gluten contamination and no concerns at this time. Minimize or avoid NSAIDs as these can increase permeability of gluten and raise risk of symptoms. Due to time constraints, I was only able to assess the above with the patient today.    PLAN:                            Try to avoid NSAIDs such as ibuprofen. Although these medications may not contain gluten, they can increase the permeability of gluten and increase risk of symptoms.   When I spoke with Jamie in [place] to confirm  of Vitamin D ([employer]), I did ask that they update your allergy list to include gluten. Moving forward please verify and update your allergy list with pharmacies to let them know you have celiac disease and need to avoid gluten.   When available, consider purchasing over-the-counter products that are designated as \"gluten-free.\" When the gluten-free label is not present, you or a pharmacist will need to check the ingredient listing to verify there are no inactive ingredients for gluten.  You can review the education we discussed on gluten in medications at the following website: https://www.beyondceliac.org/living-with-celiac-disease/gluten-in-medication/   I've reviewed all of your current medications and have no concerns about gluten contamination.  Reach out to me with any questions about gluten in " medications.     Follow-up: MTM visit in 1 year or as needed    SUBJECTIVE/OBJECTIVE:                          Vivian Brown is a 54 year old female called for an initial visit. She was referred to me from MARICRUZ Tejada.      Reason for visit: Celiac disease med review.    Allergies/ADRs: Reviewed in chart  Past Medical History: Reviewed in chart  Tobacco: She reports that she has never smoked. She has been exposed to tobacco smoke. She has never used smokeless tobacco.  Alcohol: Less than 1 beverage / month      Medication Adherence/Access: no issues reported    Celiac Disease:     Met with Vivian for a targetted review. This was not a comprehensive medication review, although I did reconcile all medications and comprehensively screen for presence of gluten.    Gluten in Medications:    Celiac disease is a serious genetic autoimmune disease that damages the villi of the small intestine and interferes with absorption of nutrients from food. Celiac disease is triggered by consumption of the protein called gluten, which is found in wheat, barley and rye.     Although rare, products such as prescription or over-the-counter medications, vitamins, and supplements can contain traces of gluten. You can eliminate all risks by effectively evaluating the ingredients in these products.    It is highly unlikely that any excipient other than starch will contain any measurable amount of gluten. The most common types of starch used are from corn, potato, tapioca and wheat. Products containing wheat starch should be avoided.    The following inactive ingredients are considered  red flags,  as they may be sourced from  wheat, barley or rye. If you see a red flag ingredient, it means that more information is needed  to find out if the drug s ingredients contain gluten:  Wheat  Modified starch (if source is not specified)  Pregelatinized starch (if source is not specified)  Pregelatinized modified starch (if source is not  specified)  Dextrates (if source is not specified)*  Dextrin (if source is not specified; the source is usually corn or potato which is  acceptable)*  Dextrimaltose (when barley malt is used)*  Caramel coloring (when barley malt is used)*    * These are highly processed ingredients that themselves do not contain gluten, but may have gluten contamination through manufacturing process. Although many experts say they are ok for individuals with Celiac Disease, they should generally be avoided if the product is not certified gluten-free or patient is symptomatic.     Gluten contamination screening (drug, , considerations):  Acyclovir- Camber- Contains ingredient derived from potato starch  Cyclophosphamide- Alembic- ingredient derived from corn starch  Dexamethasone- Marion (Hikma)- corn starch  Rosuvastatin- Torrent - no concerns  Synthroid 200 mcg- Abbvie - no concerns  Levothyroxine 25 mcg - Accord- ingredient derived from potato starch   Spironolactone- Oxford - potato and corn starch derived  Vitamin D- ??- Called and spoke with [employer], last dispensed was NDC: [address]-38, called and spoke with [employer] and provided NDC to representative Sabrina, she confirmed this is for Vitamin D 5000 unit capsule, per Sabrina database says gluten-free   Jardiance- Boehringer- no concerns  Furosemide- Solco Heal - corn starch  Liothyronine- Mayne - no concerns  Fluticasone- Apotex- no concerns  Azelastine- Aurobindo - no concerns  Airsupra- Astrazeneca - no concerns  Albuterol- Prasco - no concerns  Prochlorperazine- Zydus - corn starch derived  Ondansetron- Aurobindo - no concerns    OTCs-  Daytime Cold and Flu- Top Care- no concerns, corn starch, labeled gluten free  Acetaminophen- Top Care- no concerns, corn starch, labeled gluten free  Ibuprofen- Top Care- no concerns, corn starch, labeled gluten free  Cetirizine - Top Care- no concerns, corn starch, labeled gluten free  Loratadine- [facility]- no  concerns, corn starch, labeled gluten free        ----------------      I spent 40 minutes with this patient today. All changes were made via collaborative practice agreement with MARICRUZ Tejada CNP. A copy of the visit note was provided to the patient's provider(s).    A summary of these recommendations was sent via [employer].    Hunter Ballesteros PharmD, BCPS  [facility] Pharmacist   [facility]  Phone: [phone]    Telemedicine Visit Details  Type of service:  Telephone visit  Start Time:  8:30 AM  End Time:  9:10 AM     Medication Therapy Recommendations  No medication therapy recommendations to display         Again, thank you for allowing me to participate in the care of your patient.        Sincerely,        Hunter Ballesteros RPH

## 2024-04-22 NOTE — PROGRESS NOTES
"Medication Therapy Management (MTM) Encounter    ASSESSMENT:                            Medication Adherence/Access: No issues identified    Celiac Disease:  Vivian was counseled on gluten in medications. All of their medications were reviewed for risk of gluten contamination and no concerns at this time. Minimize or avoid NSAIDs as these can increase permeability of gluten and raise risk of symptoms. Due to time constraints, I was only able to assess the above with the patient today.    PLAN:                            Try to avoid NSAIDs such as ibuprofen. Although these medications may not contain gluten, they can increase the permeability of gluten and increase risk of symptoms.   When I spoke with Jamie in [place] to confirm  of Vitamin D ([employer]), I did ask that they update your allergy list to include gluten. Moving forward please verify and update your allergy list with pharmacies to let them know you have celiac disease and need to avoid gluten.   When available, consider purchasing over-the-counter products that are designated as \"gluten-free.\" When the gluten-free label is not present, you or a pharmacist will need to check the ingredient listing to verify there are no inactive ingredients for gluten.  You can review the education we discussed on gluten in medications at the following website: https://www.beyondceliac.org/living-with-celiac-disease/gluten-in-medication/   I've reviewed all of your current medications and have no concerns about gluten contamination.  Reach out to me with any questions about gluten in medications.     Follow-up: MTM visit in 1 year or as needed    SUBJECTIVE/OBJECTIVE:                          Vivian Brown is a 54 year old female called for an initial visit. She was referred to me from MARICRUZ Tejada.      Reason for visit: Celiac disease med review.    Allergies/ADRs: Reviewed in chart  Past Medical History: Reviewed in " chart  Tobacco: She reports that she has never smoked. She has been exposed to tobacco smoke. She has never used smokeless tobacco.  Alcohol: Less than 1 beverage / month      Medication Adherence/Access: no issues reported    Celiac Disease:     Met with Vivian for a targetted review. This was not a comprehensive medication review, although I did reconcile all medications and comprehensively screen for presence of gluten.    Gluten in Medications:    Celiac disease is a serious genetic autoimmune disease that damages the villi of the small intestine and interferes with absorption of nutrients from food. Celiac disease is triggered by consumption of the protein called gluten, which is found in wheat, barley and rye.     Although rare, products such as prescription or over-the-counter medications, vitamins, and supplements can contain traces of gluten. You can eliminate all risks by effectively evaluating the ingredients in these products.    It is highly unlikely that any excipient other than starch will contain any measurable amount of gluten. The most common types of starch used are from corn, potato, tapioca and wheat. Products containing wheat starch should be avoided.    The following inactive ingredients are considered  red flags,  as they may be sourced from  wheat, barley or rye. If you see a red flag ingredient, it means that more information is needed  to find out if the drug s ingredients contain gluten:  Wheat  Modified starch (if source is not specified)  Pregelatinized starch (if source is not specified)  Pregelatinized modified starch (if source is not specified)  Dextrates (if source is not specified)*  Dextrin (if source is not specified; the source is usually corn or potato which is  acceptable)*  Dextrimaltose (when barley malt is used)*  Caramel coloring (when barley malt is used)*    * These are highly processed ingredients that themselves do not contain gluten, but may have gluten  contamination through manufacturing process. Although many experts say they are ok for individuals with Celiac Disease, they should generally be avoided if the product is not certified gluten-free or patient is symptomatic.     Gluten contamination screening (drug, , considerations):  Acyclovir- Camber- Contains ingredient derived from potato starch  Cyclophosphamide- Alembic- ingredient derived from corn starch  Dexamethasone- Marion (Hikma)- corn starch  Rosuvastatin- Torrent - no concerns  Synthroid 200 mcg- Abbvie - no concerns  Levothyroxine 25 mcg - Accord- ingredient derived from potato starch   Spironolactone- Oxford - potato and corn starch derived  Vitamin D- ??- Called and spoke with [employer], last dispensed was NDC: [address]-99, called and spoke with [employer] and provided NDC to representative Sabrina, she confirmed this is for Vitamin D 5000 unit capsule, per Sabrina database says gluten-free   Jardiance- Boehringer- no concerns  Furosemide- Solco Heal - corn starch  Liothyronine- Mayne - no concerns  Fluticasone- Apotex- no concerns  Azelastine- Aurobindo - no concerns  Airsupra- Astrazeneca - no concerns  Albuterol- Prasco - no concerns  Prochlorperazine- Zydus - corn starch derived  Ondansetron- Aurobindo - no concerns    OTCs-  Daytime Cold and Flu- [facility] Care- no concerns, corn starch, labeled gluten free  Acetaminophen- [employer] Care- no concerns, corn starch, labeled gluten free  Ibuprofen- [employer] Care- no concerns, corn starch, labeled gluten free  Cetirizine - [employer]- no concerns, corn starch, labeled gluten free  Loratadine- [facility]- no concerns, corn starch, labeled gluten free        ----------------      I spent 40 minutes with this patient today. All changes were made via collaborative practice agreement with MARICRUZ Tejada CNP. A copy of the visit note was provided to the patient's provider(s).    A summary of these recommendations was sent via [employer].    Hunter  Favian, PharmD, BCPS  MTM Pharmacist   [facility]  Phone: [phone]    Telemedicine Visit Details  Type of service:  Telephone visit  Start Time:  8:30 AM  End Time:  9:10 AM     Medication Therapy Recommendations  No medication therapy recommendations to display

## 2024-04-22 NOTE — PATIENT INSTRUCTIONS
"Recommendations from today's MTM visit:                                                      Try to avoid NSAIDs such as ibuprofen. Although these medications may not contain gluten, they can increase the permeability of gluten and increase risk of symptoms.   When I spoke with Jamie in [place] to confirm  of Vitamin D ([employer]), I did ask that they update your allergy list to include gluten. Moving forward please verify and update your allergy list with pharmacies to let them know you have celiac disease and need to avoid gluten.   When available, consider purchasing over-the-counter products that are designated as \"gluten-free.\" When the gluten-free label is not present, you or a pharmacist will need to check the ingredient listing to verify there are no inactive ingredients for gluten.  You can review the education we discussed on gluten in medications at the following website: https://www.beyondceliac.org/living-with-celiac-disease/gluten-in-medication/   I've reviewed all of your current medications and have no concerns about gluten contamination.  Reach out to me with any questions about gluten in medications.     Follow-up: MTM visit in 1 year or as needed    It was great speaking with you today.  I value your experience and would be very thankful for your time in providing feedback in our clinic survey. In the next few days, you may receive an email or text message from [facility] with a link to a survey related to your  clinical pharmacist.\"     To schedule another MTM appointment, please call the clinic directly or you may call the MTM scheduling line at [phone] or toll-free at [phone].     My Clinical Pharmacist's contact information:                                                      Please feel free to contact me with any questions or concerns you have.      Hunter Ballesteros, PharmD, BCPS  MTM Pharmacist   [facility]  Phone: [phone] "

## 2024-04-23 ENCOUNTER — PATIENT OUTREACH (OUTPATIENT)
Dept: ONCOLOGY | Facility: CLINIC | Age: 54
End: 2024-04-23
Payer: COMMERCIAL

## 2024-04-23 NOTE — PROGRESS NOTES
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 RNCC drafted letter and sent to patient via [employer].     Kristen Henley, FILIN, RN  RN Care Coordinator   [phone]

## 2024-04-23 NOTE — LETTER
April 23, 2024      RE: Vivian Brown  [address] [facility] [address]         Dear Rayna Snyder,    Vivian will need to pause her [employer] membership for at least a 4 month period while she undergoes a bone marrow transplant. Please assist patient with this request.      Sincerely,      FILI LynchN, RN  RN Care Coordinator  [facility] [address] [address]  Phone: [phone] Option 5, Option 2  Fax: [phone]

## 2024-04-24 ENCOUNTER — TELEPHONE (OUTPATIENT)
Dept: CARDIOLOGY | Facility: CLINIC | Age: 54
End: 2024-04-24
Payer: COMMERCIAL

## 2024-04-24 NOTE — TELEPHONE ENCOUNTER
Patient confirmed scheduled appointment:  Date: 7/26  Time: 9:15  Visit type: RA  Provider: Patti  [place] [facility] [place]/imaging: Labs prior  Additional notes: lisa CARRION

## 2024-04-26 ENCOUNTER — INFUSION THERAPY VISIT (OUTPATIENT)
Dept: ONCOLOGY | Facility: CLINIC | Age: 54
End: 2024-04-26
Attending: STUDENT IN AN ORGANIZED HEALTH CARE EDUCATION/TRAINING PROGRAM
Payer: COMMERCIAL

## 2024-04-26 ENCOUNTER — APPOINTMENT (OUTPATIENT)
Dept: LAB | Facility: CLINIC | Age: 54
End: 2024-04-26
Attending: STUDENT IN AN ORGANIZED HEALTH CARE EDUCATION/TRAINING PROGRAM
Payer: COMMERCIAL

## 2024-04-26 VITALS
OXYGEN SATURATION: 97 % | WEIGHT: 166.3 LBS | RESPIRATION RATE: 16 BRPM | BODY MASS INDEX: 28.04 KG/M2 | DIASTOLIC BLOOD PRESSURE: 69 MMHG | HEART RATE: 108 BPM | SYSTOLIC BLOOD PRESSURE: 106 MMHG | TEMPERATURE: 98 F

## 2024-04-26 DIAGNOSIS — D80.1 HYPOGAMMAGLOBULINEMIA (H): ICD-10-CM

## 2024-04-26 DIAGNOSIS — E85.81 AL AMYLOIDOSIS (H): Primary | ICD-10-CM

## 2024-04-26 LAB
ALBUMIN SERPL BCG-MCNC: 2.2 G/DL (ref 3.5–5.2)
ALP SERPL-CCNC: 96 U/L (ref 40–150)
ALT SERPL W P-5'-P-CCNC: 23 U/L (ref 0–50)
ANION GAP SERPL CALCULATED.3IONS-SCNC: 6 MMOL/L (ref 7–15)
AST SERPL W P-5'-P-CCNC: 24 U/L (ref 0–45)
BASOPHILS # BLD AUTO: 0.1 10E3/UL (ref 0–0.2)
BASOPHILS NFR BLD AUTO: 1 %
BILIRUB SERPL-MCNC: 0.3 MG/DL
BUN SERPL-MCNC: 21.4 MG/DL (ref 6–20)
CALCIUM SERPL-MCNC: 9.1 MG/DL (ref 8.6–10)
CHLORIDE SERPL-SCNC: 107 MMOL/L (ref 98–107)
CREAT SERPL-MCNC: 1.35 MG/DL (ref 0.51–0.95)
DEPRECATED HCO3 PLAS-SCNC: 25 MMOL/L (ref 22–29)
EGFRCR SERPLBLD CKD-EPI 2021: 46 ML/MIN/1.73M2
EOSINOPHIL # BLD AUTO: 0.2 10E3/UL (ref 0–0.7)
EOSINOPHIL NFR BLD AUTO: 2 %
ERYTHROCYTE [DISTWIDTH] IN BLOOD BY AUTOMATED COUNT: 18 % (ref 10–15)
GLUCOSE SERPL-MCNC: 90 MG/DL (ref 70–99)
HCT VFR BLD AUTO: 38.6 % (ref 35–47)
HGB BLD-MCNC: 13.1 G/DL (ref 11.7–15.7)
IMM GRANULOCYTES # BLD: 0 10E3/UL
IMM GRANULOCYTES NFR BLD: 0 %
LYMPHOCYTES # BLD AUTO: 1.5 10E3/UL (ref 0.8–5.3)
LYMPHOCYTES NFR BLD AUTO: 13 %
MCH RBC QN AUTO: 29.8 PG (ref 26.5–33)
MCHC RBC AUTO-ENTMCNC: 33.9 G/DL (ref 31.5–36.5)
MCV RBC AUTO: 88 FL (ref 78–100)
MONOCYTES # BLD AUTO: 1 10E3/UL (ref 0–1.3)
MONOCYTES NFR BLD AUTO: 9 %
NEUTROPHILS # BLD AUTO: 8.5 10E3/UL (ref 1.6–8.3)
NEUTROPHILS NFR BLD AUTO: 75 %
NRBC # BLD AUTO: 0 10E3/UL
NRBC BLD AUTO-RTO: 0 /100
PLATELET # BLD AUTO: 455 10E3/UL (ref 150–450)
POTASSIUM SERPL-SCNC: 4.1 MMOL/L (ref 3.4–5.3)
PROT SERPL-MCNC: 4.6 G/DL (ref 6.4–8.3)
RBC # BLD AUTO: 4.4 10E6/UL (ref 3.8–5.2)
SODIUM SERPL-SCNC: 138 MMOL/L (ref 135–145)
WBC # BLD AUTO: 11.4 10E3/UL (ref 4–11)

## 2024-04-26 PROCEDURE — 258N000003 HC RX IP 258 OP 636: Performed by: STUDENT IN AN ORGANIZED HEALTH CARE EDUCATION/TRAINING PROGRAM

## 2024-04-26 PROCEDURE — 96401 CHEMO ANTI-NEOPL SQ/IM: CPT

## 2024-04-26 PROCEDURE — 96366 THER/PROPH/DIAG IV INF ADDON: CPT

## 2024-04-26 PROCEDURE — 85025 COMPLETE CBC W/AUTO DIFF WBC: CPT | Performed by: STUDENT IN AN ORGANIZED HEALTH CARE EDUCATION/TRAINING PROGRAM

## 2024-04-26 PROCEDURE — 36415 COLL VENOUS BLD VENIPUNCTURE: CPT | Performed by: STUDENT IN AN ORGANIZED HEALTH CARE EDUCATION/TRAINING PROGRAM

## 2024-04-26 PROCEDURE — 250N000011 HC RX IP 250 OP 636: Mod: JZ | Performed by: NURSE PRACTITIONER

## 2024-04-26 PROCEDURE — 250N000011 HC RX IP 250 OP 636: Performed by: STUDENT IN AN ORGANIZED HEALTH CARE EDUCATION/TRAINING PROGRAM

## 2024-04-26 PROCEDURE — 250N000013 HC RX MED GY IP 250 OP 250 PS 637: Performed by: NURSE PRACTITIONER

## 2024-04-26 PROCEDURE — 96365 THER/PROPH/DIAG IV INF INIT: CPT

## 2024-04-26 PROCEDURE — 82565 ASSAY OF CREATININE: CPT

## 2024-04-26 RX ORDER — ACETAMINOPHEN 325 MG/1
650 TABLET ORAL ONCE
Start: 2024-04-26

## 2024-04-26 RX ORDER — MEPERIDINE HYDROCHLORIDE 25 MG/ML
25 INJECTION INTRAMUSCULAR; INTRAVENOUS; SUBCUTANEOUS EVERY 30 MIN PRN
OUTPATIENT
Start: 2024-04-26

## 2024-04-26 RX ORDER — ALBUTEROL SULFATE 90 UG/1
1-2 AEROSOL, METERED RESPIRATORY (INHALATION)
Start: 2024-04-26

## 2024-04-26 RX ORDER — HEPARIN SODIUM,PORCINE 10 UNIT/ML
5-20 VIAL (ML) INTRAVENOUS DAILY PRN
OUTPATIENT
Start: 2024-04-26

## 2024-04-26 RX ORDER — DIPHENHYDRAMINE HCL 25 MG
50 CAPSULE ORAL ONCE
Start: 2024-04-26

## 2024-04-26 RX ORDER — ALBUTEROL SULFATE 0.83 MG/ML
2.5 SOLUTION RESPIRATORY (INHALATION)
OUTPATIENT
Start: 2024-04-26

## 2024-04-26 RX ORDER — DIPHENHYDRAMINE HCL 25 MG
50 CAPSULE ORAL ONCE
Status: COMPLETED | OUTPATIENT
Start: 2024-04-26 | End: 2024-04-26

## 2024-04-26 RX ORDER — HEPARIN SODIUM (PORCINE) LOCK FLUSH IV SOLN 100 UNIT/ML 100 UNIT/ML
5 SOLUTION INTRAVENOUS
OUTPATIENT
Start: 2024-04-26

## 2024-04-26 RX ORDER — DIPHENHYDRAMINE HYDROCHLORIDE 50 MG/ML
50 INJECTION INTRAMUSCULAR; INTRAVENOUS
Start: 2024-04-26

## 2024-04-26 RX ORDER — ACETAMINOPHEN 325 MG/1
650 TABLET ORAL ONCE
Status: COMPLETED | OUTPATIENT
Start: 2024-04-26 | End: 2024-04-26

## 2024-04-26 RX ORDER — EPINEPHRINE 1 MG/ML
0.3 INJECTION, SOLUTION, CONCENTRATE INTRAVENOUS EVERY 5 MIN PRN
OUTPATIENT
Start: 2024-04-26

## 2024-04-26 RX ORDER — METHYLPREDNISOLONE SODIUM SUCCINATE 125 MG/2ML
125 INJECTION, POWDER, LYOPHILIZED, FOR SOLUTION INTRAMUSCULAR; INTRAVENOUS
Start: 2024-04-26

## 2024-04-26 RX ADMIN — ACETAMINOPHEN 650 MG: 325 TABLET ORAL at 11:28

## 2024-04-26 RX ADMIN — HUMAN IMMUNOGLOBULIN G 20 G: 20 LIQUID INTRAVENOUS at 12:13

## 2024-04-26 RX ADMIN — DIPHENHYDRAMINE HYDROCHLORIDE 50 MG: 25 CAPSULE ORAL at 11:28

## 2024-04-26 RX ADMIN — SODIUM CHLORIDE 250 ML: 9 INJECTION, SOLUTION INTRAVENOUS at 12:00

## 2024-04-26 RX ADMIN — BORTEZOMIB 2.5 MG: 3.5 INJECTION, POWDER, LYOPHILIZED, FOR SOLUTION INTRAVENOUS; SUBCUTANEOUS at 12:27

## 2024-04-26 ASSESSMENT — PAIN SCALES - GENERAL: PAINLEVEL: NO PAIN (0)

## 2024-04-26 NOTE — PATIENT INSTRUCTIONS
[facility] Triage and after hours / weekends / holidays:  [phone]    Please call the triage or after hours line if you experience a temperature greater than or equal to 100.4, shaking chills, have uncontrolled nausea, vomiting and/or diarrhea, dizziness, shortness of breath, chest pain, bleeding, unexplained bruising, or if you have any other new/concerning symptoms, questions or concerns.      If you are having any concerning symptoms or wish to speak to a provider before your next infusion visit, please call triage to notify them so we can adequately serve you.     If you need a refill on a narcotic prescription or other medication, please call before your infusion appointment.                 April 2024 Sunday Monday Tuesday Wednesday Thursday Friday Saturday        1     2     3     4     5    LAB PERIPHERAL   8:00 AM   (15 min.)   [facility] DRAW   [facility]    ONC INFUSION 2 HR (120 MIN)   8:30 AM   (120 min.)    ONC INFUSION NURSE   [facility] 6       7     8    RETURN GI  10:45 AM   (30 min.)   Carmen Waggoner APRN CNP   [facility] 9     10     11     12    LAB PERIPHERAL  10:30 AM   (15 min.)   [facility]   [facility]    ONC INFUSION 1 HR (60 MIN)  11:00 AM   (60 min.)    ONC INFUSION NURSE   [facility] 13       14     15     16     17     18     19    LAB PERIPHERAL  10:30 AM   (15 min.)   [facility]   [facility]    RETURN CCSL  10:45 AM   (45 min.)   Dary Tinoco APRN CNP   [facility]    ONC INFUSION 2 HR (120 MIN)  12:00 PM   (120 min.)    ONC INFUSION NURSE   [facility] 20       21     22    NEW - MTM   8:30 AM   (60 min.)   Hunter Ballesteros RPH   [facility] 23     24     25     26    LAB PERIPHERAL  10:30  AM   (15 min.)   [facility]   [facility]    ONC INFUSION 4 HR (240 MIN)  11:00 AM   (240 min.)   UC ONC INFUSION NURSE   [facility] 27       28     29     30    ADULT SKIN PRICK TEST   7:45 AM   (30 min.)   Jose Gamble MD   [facility]                                 May 2024      [place] Monday Tuesday Wednesday Thursday Friday Saturday                  1     2     3    LAB PERIPHERAL  10:00 AM   (15 min.)   [facility]   [facility]    ONC INFUSION 2 HR (120 MIN)  10:30 AM   (120 min.)   UC ONC INFUSION NURSE   [facility] 4       5     6     7     8    LAB PERIPHERAL   8:30 AM   (15 min.)   [facility]   [facility]    ONC INFUSION 1 HR (60 MIN)   9:00 AM   (60 min.)   UC ONC INFUSION NURSE   [facility] 9     10    LAB PERIPHERAL  10:30 AM   (15 min.)   [facility]   [facility]    ONC INFUSION 1 HR (60 MIN)  11:00 AM   (60 min.)   UC ONC INFUSION NURSE   [facility] 11       12     13     14    LAB   7:30 AM   (15 min.)   [facility] LAB   [facility] 15     16    LAB   2:30 PM   (15 min.)   [facility] LAB   [facility]   3:15 PM   (30 min.)   Tracie Dobson MD   [facility] 17     18       19     20     21     22     23     24     25       26     27     28     29     30     31                          Lab Results:  Recent Results (from the past 12 hour(s))   Comprehensive metabolic panel    Collection Time: 04/26/24 10:21 AM   Result Value Ref Range    Sodium 138 135 - 145 mmol/L    Potassium 4.1 3.4 - 5.3 mmol/L    Carbon Dioxide (CO2) 25 22 - 29 mmol/L    Anion Gap 6 (L) 7 - 15 mmol/L    Urea Nitrogen 21.4 (H) 6.0 - 20.0 mg/dL     Creatinine 1.35 (H) 0.51 - 0.95 mg/dL    GFR Estimate 46 (L) >60 mL/min/1.73m2    Calcium 9.1 8.6 - 10.0 mg/dL    Chloride 107 98 - 107 mmol/L    Glucose 90 70 - 99 mg/dL    Alkaline Phosphatase 96 40 - 150 U/L    AST 24 0 - 45 U/L    ALT 23 0 - 50 U/L    Protein Total 4.6 (L) 6.4 - 8.3 g/dL    Albumin 2.2 (L) 3.5 - 5.2 g/dL    Bilirubin Total 0.3 <=1.2 mg/dL   CBC with platelets and differential    Collection Time: 04/26/24 10:21 AM   Result Value Ref Range    WBC Count 11.4 (H) 4.0 - 11.0 10e3/uL    RBC Count 4.40 3.80 - 5.20 10e6/uL    Hemoglobin 13.1 11.7 - 15.7 g/dL    Hematocrit 38.6 35.0 - 47.0 %    MCV 88 78 - 100 fL    MCH 29.8 26.5 - 33.0 pg    MCHC 33.9 31.5 - 36.5 g/dL    RDW 18.0 (H) 10.0 - 15.0 %    Platelet Count 455 (H) 150 - 450 10e3/uL    % Neutrophils 75 %    % Lymphocytes 13 %    % Monocytes 9 %    % Eosinophils 2 %    % Basophils 1 %    % Immature Granulocytes 0 %    NRBCs per 100 WBC 0 <1 /100    Absolute Neutrophils 8.5 (H) 1.6 - 8.3 10e3/uL    Absolute Lymphocytes 1.5 0.8 - 5.3 10e3/uL    Absolute Monocytes 1.0 0.0 - 1.3 10e3/uL    Absolute Eosinophils 0.2 0.0 - 0.7 10e3/uL    Absolute Basophils 0.1 0.0 - 0.2 10e3/uL    Absolute Immature Granulocytes 0.0 <=0.4 10e3/uL    Absolute NRBCs 0.0 10e3/uL

## 2024-04-26 NOTE — PROGRESS NOTES
"Infusion Nursing Note:  Vivian Brown presents today for Day 8 Cycle 4 Velcade. IVIG last dose 2/9/24  Patient seen by provider today: No   present during visit today: Not Applicable.    Note: Patient presents to infusion feeling ok. Pt states Monday and Tuesday were rough as she had very little energy and \"just didn't feel good\". Pt states there wasn't a specific symptom that was the main issue, but overall fatigue was the most troublesome. Pt states yesterday she had a near fainting episode, but was able to sit down before actually fainting-BP95/68 . Pt states she notified her cardiology team of episode yesterday. Patient denies new acute discomfort and states no acute complaints or concerns needing to be addressed today. Specifically, pt denies s/s of infection such as fever, sore throat, cough, chest pain, shortness of breath, body aches, chills, headache, increased nasal congestion, or changes in taste/smell. Pt confirms she feels much better today and wishes to proceed with treatment today. Pt confirms she is taking 30mg PO Dex weekly on Velcade only days and cytoxan weekly as prescribed.    Pt verbalizes understanding that IVIG will be given when IGG level is less than 400.    IVIG infused per the following using ideal Body weight 56kg  28mls/hour x15 mins  56mls/hour x15 mins  84mls/hour x15 mins  112mls/hour x15 mins  140mls/hour x15 mins  168mls/hour x15 mins  196mls/hour x15 mins  224mls/hour xremainder of infusion    Intravenous Access:  Peripheral IV placed.    Treatment Conditions:  Lab Results   Component Value Date    HGB 13.1 04/26/2024    WBC 11.4 (H) 04/26/2024    ANEU 5.6 03/09/2020    ANEUTAUTO 8.5 (H) 04/26/2024     (H) 04/26/2024        Lab Results   Component Value Date     04/26/2024    POTASSIUM 4.1 04/26/2024    MAG 2.0 02/03/2024    CR 1.35 (H) 04/26/2024    TANNER 9.1 04/26/2024    BILITOTAL 0.3 04/26/2024    ALBUMIN 2.2 (L) 04/26/2024    ALT 23 04/26/2024 "    AST 24 04/26/2024      Latest Reference Range & Units 04/19/24 09:56    - 1,616 mg/dL 101 (L)   (L): Data is abnormally low    Results reviewed, labs MET treatment parameters, ok to proceed with treatment.  .      Post Infusion Assessment:  Patient tolerated infusion without incident.  Patient tolerated 1 subcutaneous injection of Velcade via RLQ of abdomen without incident.  Blood return noted pre and post infusion.  Site patent and intact, free from redness, edema or discomfort.  No evidence of extravasations.  Access discontinued per protocol.       Discharge Plan:   Patient declined prescription refills.  Discharge instructions reviewed with: Patient.  Patient and/or family verbalized understanding of discharge instructions and all questions answered.  AVS to patient via [employer].  Patient will return 5/3 for next appointment.   Patient discharged in stable condition accompanied by: friend.  Departure Mode: Ambulatory.      Pierce Yung RN    Lack of energy

## 2024-04-26 NOTE — NURSING NOTE
Chief Complaint   Patient presents with    Blood Draw     Labs drawn via PIV by RN in lab.  VS taken       Labs drawn from PIV placed by RN. Line flushed with saline. Vitals taken. Pt checked in for appointment(s).    Yudith Elena RN

## 2024-04-29 ENCOUNTER — TELEPHONE (OUTPATIENT)
Dept: ONCOLOGY | Facility: CLINIC | Age: 54
End: 2024-04-29
Payer: COMMERCIAL

## 2024-04-29 NOTE — PROGRESS NOTES
SUBJECTIVE:                                                                   Vivian Brown presents today to our Allergy Clinic at [facility] for a follow up visit. She is a 54 year old female with allergic rhinitis, wheezing episodes, and history of food allergy..  History of allergic rhinoconjunctivitis symptoms for multiple years.  Perennial pattern with Spring, Summer, and Fall exacerbations.  SPT for aeroallergens performed in March 2024 showed sensitivity to grass pollen (Solitario and Titus), tree pollen (maple/Box Elder, Hackberry, Hickory, [place], Black Carlisle, Birch mix, Belmont, oak, [place], and white teri), and weed pollen (careless, English planting, Kochia, lambs quarter, ragweed mix, Russian thistle, and sagebrush/mugwort).  At the age of 10 years, he started having episodes of wheezing, chest tightness, shortness of breath.  Was prescribed albuterol in the past.  Symptoms are exacerbated by pollen, animals, and viral respiratory infections. Has symptoms on occasions, less than once a year. Rhinoconjunctivitis are more severe and frequent.   She had labs done that were suspicious for celiac disease.  Upper endoscopy with duodenal mucosal changes seen and celiac disease.  The biopsy revealed Intraepithelial lymphocytosis, hallmark of untreated or partially treated celiac disease.   With kiwi, watermelon, and cantaloupe, she develops itchy throat, and rhinoconjunctivitis symptoms. It happens in Summer season only. She develops itchy throat, rhinorrhea and itchy eyes if she eats peanut butter. She usually does not eat hazelnuts or other tree nuts. With tomatoes and cucumbers, she develops similar symptoms.   In the rest of the seasons, she is doing well.       In March 2024, peanut IgE with mild sensitivity, primarily based on Blessing H 8, frequently associated with pollen food syndrome.  The same is true about hazelnut sensitivity.  Mild sensitivity to walnut, but negative  primary component Jug r 1.  Slight sensitivity to tomato.  Negative serum IgE to corn, cantaloupe, banana, kiwi, and watermelon.    She eats pecans, almonds.   History of Present Illness  She stopped antihistamines.  Feels that her allergic rhinoconjunctivitis symptoms are not bad enough yet.  Denies significant nasal congestion, rhinorrhea, excessive sneezing, or itchy/watery eyes  She is not sure whether she tried AirSupra.  If she did, it was once.         Patient Active Problem List   Diagnosis    Hypothyroidism due to acquired atrophy of thyroid    Family hx of colon cancer    Nonallopathic lesion of cervical region    Cervicalgia    Nonallopathic lesion of sacral region    Nonallopathic lesion of lumbar region    Lumbago    Nonallopathic lesion of thoracic region    Hyperlipidemia LDL goal <100    Dependent edema R>L    AL amyloidosis (H)    Hypogammaglobulinemia (H24)    Chronic kidney disease, stage 3a (H)    Celiac disease       Past Medical History:   Diagnosis Date    Celiac disease     Family history of colon cancer     Colonoscoy q5 years next 9/2020      Problem (# of Occurrences) Relation (Name,Age of Onset)    Asthma (1) Daughter    Cancer (1) Mother (65): GIST    Hypertension (1) Mother    Thyroid Disease (1) Sister    Other Cancer (1) Mother    Hyperlipidemia (1) Mother    Coronary Artery Disease (1) Father: MI    Colon Cancer (1) Maternal Grandmother (91)          Past Surgical History:   Procedure Laterality Date    COLONOSCOPY N/A 9/28/2015    Procedure: COLONOSCOPY;  Surgeon: Eugenio Travis MD;  Location:  GI    ESOPHAGOSCOPY, GASTROSCOPY, DUODENOSCOPY (EGD), COMBINED N/A 1/30/2024    Procedure: ESOPHAGOGASTRODUODENOSCOPY, WITH BIOPSY;  Surgeon: Melo Cloud MD;  Location:  GI    IR RENAL BIOPSY RIGHT  12/21/2023    TONSILLECTOMY       Social History     Socioeconomic History    Marital status:      Spouse name: None    Number of children: None    Years of education:  None    Highest education level: None   Occupational History     Comment: Physical Therapy   Tobacco Use    Smoking status: Never     Passive exposure: Past    Smokeless tobacco: Never    Tobacco comments:     Parents smoked during childhood in the house   Vaping Use    Vaping status: Never Used   Substance and Sexual Activity    Alcohol use: Not Currently     Alcohol/week: 0.0 standard drinks of alcohol     Comment: 1-2 times/month    Drug use: No    Sexual activity: Yes     Partners: Male     Birth control/protection: I.U.D.   Social History Narrative    March 12, 2024    ENVIRONMENTAL HISTORY: [place] The home is heated with a forced air. They does have central air conditioning. The patient's bedroom is furnished with carpeting in bedroom and fabric window coverings.  Pets inside the house include no. There is no history of cockroach or mice infestation. There is/are 0 smokers in the house.  The house does not have a damp basement.      Social Determinants of Health     Financial Resource Strain: High Risk (1/22/2024)    Financial Resource Strain     Within the past 12 months, have you or your family members you live with been unable to get utilities (heat, electricity) when it was really needed?: Yes   Food Insecurity: Low Risk  (1/22/2024)    Food Insecurity     Within the past 12 months, did you worry that your food would run out before you got money to buy more?: No     Within the past 12 months, did the food you bought just not last and you didn t have money to get more?: No   Transportation Needs: Low Risk  (1/22/2024)    Transportation Needs     Within the past 12 months, has lack of transportation kept you from medical appointments, getting your medicines, non-medical meetings or appointments, work, or from getting things that you need?: No    Received from [facility], [facility]   Interpersonal Safety: Low Risk  (11/17/2023)    Interpersonal Safety     Do you feel physically and emotionally safe where you currently live?: Yes     Within the past 12 months, have you been hit, slapped, kicked or otherwise physically hurt by someone?: No     Within the past 12 months, have you been humiliated or emotionally abused in other ways by your partner or ex-partner?: No   Housing Stability: Low Risk  (1/22/2024)    Housing Stability     Do you have housing? : Yes     Are you worried about losing your housing?: No         Review of Systems       Current Outpatient Medications:     acyclovir (ZOVIRAX) 400 MG tablet, Take 1 tablet (400 mg) by mouth 2 times daily Viral Prophylaxis., Disp: 180 tablet, Rfl: 3    cholecalciferol (VITAMIN D3) 125 mcg (5000 units) capsule, Take 1 capsule (125 mcg) by mouth daily, Disp: 30 capsule, Rfl: 6    cycloPHOSphamide (CYTOXAN) 50 MG capsule, Take 10 capsules (500 mg) by mouth every 7 days Take on days 1, 8, 15, and 22., Disp: 40 capsule, Rfl: 0    cycloPHOSphamide (CYTOXAN) 50 MG capsule, Take 10 capsules (500 mg) by mouth every 7 days Take on days 1, 8, 15, and 22., Disp: 40 capsule, Rfl: 0    dexAMETHasone (DECADRON) 4 MG tablet, Take 40 mg (10 tablets) on days 8 and 22., Disp: 20 tablet, Rfl: 0    empagliflozin (JARDIANCE) 10 MG TABS tablet, Take 1 tablet (10 mg) by mouth daily, Disp: 30 tablet, Rfl: 11    furosemide (LASIX) 20 MG tablet, Take 2 tablets (40 mg) by mouth 2 times daily (Patient taking differently: Take 40 mg by mouth 2 times daily Taking 20 mg BID starting 04/10/24), Disp: 360 tablet, Rfl: 3    levothyroxine (SYNTHROID/LEVOTHROID) 200 MCG tablet, Take 1 tablet (200 mcg) by mouth daily, Disp: 90 tablet, Rfl: 3    levothyroxine (SYNTHROID/LEVOTHROID) 25 MCG tablet, Take 1 tablet (25 mcg) by mouth daily, Disp: , Rfl:     liothyronine (CYTOMEL) 5 MCG tablet, Take 5 mcg by mouth 2 times daily, Disp: , Rfl:     rosuvastatin (CRESTOR) 10 MG  tablet, Take 1 tablet (10 mg) by mouth daily, Disp: 90 tablet, Rfl: 3    spironolactone (ALDACTONE) 25 MG tablet, Take 1 tablet (25 mg) by mouth daily, Disp: 90 tablet, Rfl: 3    Albuterol-Budesonide (AIRSUPRA) 90-80 MCG/ACT AERO, Inhale 2 Inhalations into the lungs every 4 hours as needed (Wheezing, chest tightness, shortness of breath, or persistent cough) (Patient not taking: Reported on 4/30/2024), Disp: 10.7 g, Rfl: 3    azelastine (ASTELIN) 0.1 % nasal spray, Spray 2 sprays into both nostrils 2 times daily as needed for rhinitis (Patient not taking: Reported on 4/30/2024), Disp: 30 mL, Rfl: 3    EPINEPHrine (ANY BX GENERIC EQUIV) 0.3 MG/0.3ML injection 2-pack, Inject 0.3 mLs (0.3 mg) into the muscle as needed for anaphylaxis May repeat one time in 5-15 minutes if response to initial dose is inadequate. (Patient not taking: Reported on 4/30/2024), Disp: 2 each, Rfl: 3    fluticasone (FLONASE) 50 MCG/ACT nasal spray, Spray 2 sprays into both nostrils daily (Patient not taking: Reported on 4/30/2024), Disp: 16 g, Rfl: 3    prochlorperazine (COMPAZINE) 5 MG tablet, Take 1 tablet (5 mg) by mouth every 6 hours as needed for nausea or vomiting (Patient not taking: Reported on 4/30/2024), Disp: 30 tablet, Rfl: 1  Immunization History   Administered Date(s) Administered    HepB, Unspecified 08/01/1992, 10/01/1992, 02/01/1993    Influenza (H1N1) 12/16/2009    Influenza (IIV3) PF 10/04/2010, 09/14/2011, 09/19/2012    Influenza (intradermal) 01/18/2013    Influenza Vaccine >6 months,quad, PF 10/25/2016, 11/08/2017, 10/01/2018, 10/14/2019    TDAP (Adacel,Boostrix) 12/19/2022    Td (Adult), Adsorbed 03/10/1986    Zoster recombinant adjuvanted (SHINGRIX) 12/19/2022, 05/22/2023     Allergies   Allergen Reactions    Gluten Meal     Latex Itching    Seasonal Allergies      OBJECTIVE:                                                                 /78   Pulse 114   Resp 16   Wt 73 kg (161 lb)   SpO2 96%   BMI 27.15  kg/m          Physical Exam  Vitals and nursing note reviewed.   Constitutional:       Appearance: Normal appearance.   HENT:      Head: Normocephalic and atraumatic.      Right Ear: External ear normal.      Left Ear: External ear normal.      Mouth/Throat:      Mouth: Mucous membranes are moist.   Eyes:      General:         Right eye: No discharge.         Left eye: No discharge.      Conjunctiva/sclera: Conjunctivae normal.   Pulmonary:      Effort: Pulmonary effort is normal. No respiratory distress.      Breath sounds: No stridor.   Neurological:      Mental Status: She is alert and oriented to person, place, and time.   Psychiatric:         Mood and Affect: Mood normal.         Behavior: Behavior normal.               WORKUP:   At today's visit, the patient and I engaged in an informed consent discussion about allergy testing.  We discussed the risks and benefits of skin testing.  The patient understands skin testing risks can include, but are not limited to, urticaria, angioedema, shortness of breath, and severe anaphylaxis.  The benefits include, but are not limited, to evaluation for allergens causing symptoms.  After answering the patient's questions they have agreed to proceed with skin testing.    FOOD ALLERGEN PERCUTANEOUS SKIN TESTING      4/30/2024     8:00 AM   [employer]    Consent Y   Ordering Physician Tye   Interpreting Physician Tye   Testing Technician Roseanne   Location Back   Time start: 08:15   Time End: 08:30   Positive Control: Histatrol*ALK 1 mg/ml 4/4   Negative Control: 50% Glycerin**McIntosh Casimiro 0   Peanut 1:20 (W/F in millimeters) 0   Chester 1:20 (W/F in millimeters) 0   Hazelnut (Filbert)  1:20 (W/F in millimeters) 0   Corn 1:40 (W/F in millimeters) 0   Other Food(s) banana x2   Reaction (W/F in millimeters) 0; 0   Other Food(s) kiwi x2   Reaction (W/F in millimeters) 0; 0   Other Food(s) watermelon x2   Reaction (W/F in millimeters) 0; 0   Other Food(s) tomato x2    Reaction (W/F in millimeters) 0; 0      Negative SPT for peanut, walnut, hazelnut, corn, banana, kiwi, watermelon, and tomato.  The patient had appropriate responses to positive and negative controls.        ASSESSMENT/PLAN:      Other allergy, initial encounter      The patient's skin test and blood test results are negative, yet she experiences symptoms when consuming certain foods exclusively during the summer. 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 The patient has no issues consuming these foods during other seasons. For instance, she consumed a banana immediately after the skin test today without any adverse effects, which she reported to us post-consumption. Similarly, she tolerated these foods well during the winter months.  Given the clear seasonal pattern, I recommend that the patient avoid these trigger foods during the summer when symptoms are present. She may continue to consume them during the rest of the year as she has been doing without any reported issues.   I plan to follow up with the patient in Fall 2024 to specifically address the control of rhinoconjunctivitis symptoms, which she reports as being most severe during this season.      - ALLERGY SKIN TESTS,ALLERGENS       Thank you for allowing us to participate in the care of this patient. Please feel free to contact us if there are any questions or concerns about the patient.    Disclaimer: This note consists of symbols derived from keyboarding, dictation and/or voice recognition software. As a result, there may be errors in the script that have gone undetected. Please consider this when interpreting information found in this chart.    Jose Gamble MD, FAAAAI, FACAAI  Allergy, Asthma and Immunology     [facility]

## 2024-04-29 NOTE — ORAL ONC MGMT
Oral Chemotherapy Monitoring Program    Subjective/Objective:  Vivian Brown is a 54 year old female contacted by phone for a follow-up visit for oral chemotherapy.  Vivian confirms taking cyclophosphamide once weekly but didn't recall the dose off the top of her head as she didn't have they pill bottle with her, 10 capsules sounded correct though for a total dose of 500 mg. Vivian has been having some nausea and initially didn't think ondansetron was working so she switched to prochlorperazine. She doesn't feel that either antiemetic give relief for an extended period of time, and she usually has nausea Saturday to Tuesday after chemo on Friday. Discussed scheduling ondansetron Friday/Saturday before the nausea begins and that it would be ok to use both medications if needed, just no more frequently than every 8 hours for ondansetron and 6 hours for prochlorperazine.             3/4/2024    11:00 AM 3/11/2024     1:00 PM 3/15/2024     9:00 AM 3/29/2024     1:00 PM 4/8/2024     2:00 PM 4/15/2024    12:00 PM 4/29/2024    12:00 PM   ORAL CHEMOTHERAPY   Assessment Type Lab Monitoring Lab Monitoring Refill Lab Monitoring Lab Monitoring Lab Monitoring;Refill Lab Monitoring;Monthly Follow up   Diagnosis Code Multiple Myeloma Multiple Myeloma Multiple Myeloma Multiple Myeloma Multiple Myeloma Multiple Myeloma Multiple Myeloma   Providers Dr. Ramesh Chandler   [facility]   Is this patient followed by the [facility] OC team? No No No No No No No   Drug Name Cytoxan (cyclophsphamide) Cytoxan (cyclophsphamide) Cytoxan (cyclophsphamide) Cytoxan (cyclophsphamide) Cytoxan (cyclophsphamide) Cytoxan (cyclophsphamide) Cytoxan (cyclophsphamide)   Dose 500 mg 500 mg 500 mg 500 mg 500 mg 500 mg 500 mg   Current Schedule Weekly Weekly Weekly Weekly Weekly Weekly Weekly   Cycle Details Weekly/Daily  "Weekly/Daily Weekly/Daily Weekly/Daily Weekly/Daily Weekly/Daily Weekly/Daily   Start Date of Last Cycle 2/23/2024 2/23/2024       Planned next cycle start date 3/22/2024  3/22/2024           Last PHQ-2 Score on record:       1/3/2024     1:06 PM 11/28/2023    11:14 AM   PHQ-2 ( 1999 Pf[employer])   Q1: Little interest or pleasure in doing things 0 0   Q2: Feeling down, depressed or hopeless 0 0   PHQ-2 Score 0 0       Vitals:  BP:   BP Readings from Last 1 Encounters:   04/26/24 106/69     Wt Readings from Last 1 Encounters:   04/26/24 75.4 kg (166 lb 4.8 oz)     Estimated body surface area is 1.85 meters squared as calculated from the following:    Height as of 4/8/24: 1.64 m (5' 4.57\").    Weight as of 4/26/24: 75.4 kg (166 lb 4.8 oz).    Labs:  _  Result Component Current Result Ref Range   Sodium 138 (4/26/2024) 135 - 145 mmol/L     _  Result Component Current Result Ref Range   Potassium 4.1 (4/26/2024) 3.4 - 5.3 mmol/L     _  Result Component Current Result Ref Range   Calcium 9.1 (4/26/2024) 8.6 - 10.0 mg/dL     No results found for Mag within last 30 days.     No results found for Phos within last 30 days.     _  Result Component Current Result Ref Range   Albumin 2.2 (L) (4/26/2024) 3.5 - 5.2 g/dL     _  Result Component Current Result Ref Range   Urea Nitrogen 21.4 (H) (4/26/2024) 6.0 - 20.0 mg/dL     _  Result Component Current Result Ref Range   Creatinine 1.35 (H) (4/26/2024) 0.51 - 0.95 mg/dL     _  Result Component Current Result Ref Range   AST 24 (4/26/2024) 0 - 45 U/L     _  Result Component Current Result Ref Range   ALT 23 (4/26/2024) 0 - 50 U/L     _  Result Component Current Result Ref Range   Bilirubin Total 0.3 (4/26/2024) <=1.2 mg/dL     _  Result Component Current Result Ref Range   WBC Count 11.4 (H) (4/26/2024) 4.0 - 11.0 10e3/uL     _  Result Component Current Result Ref Range   Hemoglobin 13.1 (4/26/2024) 11.7 - 15.7 g/dL     _  Result Component Current Result Ref Range   Platelet Count 455 " (H) (4/26/2024) 150 - 450 10e3/uL     No results found for ANC within last 30 days.     _  Result Component Current Result Ref Range   Absolute Neutrophils 8.5 (H) (4/26/2024) 1.6 - 8.3 10e3/uL          Assessment/Plan:  Continue cyclophosphamide as directed.    Follow-Up:  Labs in infusion 5/3    Refill Due:  5/17 =C5D1    Ann Rodriguez, PharmD, BCPS  Oral Chemotherapy Monitoring Program  [facility]  [phone]

## 2024-04-30 ENCOUNTER — OFFICE VISIT (OUTPATIENT)
Dept: ALLERGY | Facility: OTHER | Age: 54
End: 2024-04-30
Payer: COMMERCIAL

## 2024-04-30 VITALS
DIASTOLIC BLOOD PRESSURE: 78 MMHG | OXYGEN SATURATION: 96 % | WEIGHT: 161 LBS | BODY MASS INDEX: 27.15 KG/M2 | HEART RATE: 114 BPM | SYSTOLIC BLOOD PRESSURE: 112 MMHG | RESPIRATION RATE: 16 BRPM

## 2024-04-30 DIAGNOSIS — T78.49XA OTHER ALLERGY, INITIAL ENCOUNTER: Primary | ICD-10-CM

## 2024-04-30 PROCEDURE — 99212 OFFICE O/P EST SF 10 MIN: CPT | Mod: 25 | Performed by: ALLERGY & IMMUNOLOGY

## 2024-04-30 PROCEDURE — 95004 PERQ TESTS W/ALRGNC XTRCS: CPT | Performed by: ALLERGY & IMMUNOLOGY

## 2024-04-30 ASSESSMENT — PAIN SCALES - GENERAL: PAINLEVEL: NO PAIN (0)

## 2024-04-30 NOTE — LETTER
4/30/2024         RE: Vivian Brown  [address] [facility] [id number]        Dear Colleague,    Thank you for referring your patient, Vivian Brown, to the [facility]. Please see a copy of my visit note below.    SUBJECTIVE:                                                                   Vivian Brown presents today to our Allergy Clinic at [facility] for a follow up visit. She is a 54 year old female with allergic rhinitis, wheezing episodes, and history of food allergy..  History of allergic rhinoconjunctivitis symptoms for multiple years.  Perennial pattern with Spring, Summer, and Fall exacerbations.  SPT for aeroallergens performed in March 2024 showed sensitivity to grass pollen (Solitario and Titus), tree pollen (maple/Box Elder, Hackberry, Hickory, [place], Black [place], Birch mix, [place], oak, [place], and white teri), and weed pollen (careless, English planting, Kochia, lambs quarter, ragweed mix, Russian thistle, and sagebrush/mugwort).  At the age of 10 years, he started having episodes of wheezing, chest tightness, shortness of breath.  Was prescribed albuterol in the past.  Symptoms are exacerbated by pollen, animals, and viral respiratory infections. Has symptoms on occasions, less than once a year. Rhinoconjunctivitis are more severe and frequent.   She had labs done that were suspicious for celiac disease.  Upper endoscopy with duodenal mucosal changes seen and celiac disease.  The biopsy revealed Intraepithelial lymphocytosis, hallmark of untreated or partially treated celiac disease.   With kiwi, watermelon, and cantaloupe, she develops itchy throat, and rhinoconjunctivitis symptoms. It happens in Summer season only. She develops itchy throat, rhinorrhea and itchy eyes if she eats peanut butter. She usually does not eat hazelnuts or other tree nuts. With tomatoes and cucumbers, she develops similar symptoms.   In the  rest of the seasons, she is doing well.       In March 2024, peanut IgE with mild sensitivity, primarily based on Blessing H 8, frequently associated with pollen food syndrome.  The same is true about hazelnut sensitivity.  Mild sensitivity to walnut, but negative primary component Jug r 1.  Slight sensitivity to tomato.  Negative serum IgE to corn, cantaloupe, banana, kiwi, and watermelon.    She eats pecans, almonds.   History of Present Illness  She stopped antihistamines.  Feels that her allergic rhinoconjunctivitis symptoms are not bad enough yet.  Denies significant nasal congestion, rhinorrhea, excessive sneezing, or itchy/watery eyes  She is not sure whether she tried AirSupra.  If she did, it was once.         Patient Active Problem List   Diagnosis     Hypothyroidism due to acquired atrophy of thyroid     Family hx of colon cancer     Nonallopathic lesion of cervical region     Cervicalgia     Nonallopathic lesion of sacral region     Nonallopathic lesion of lumbar region     Lumbago     Nonallopathic lesion of thoracic region     Hyperlipidemia LDL goal <100     Dependent edema R>L     AL amyloidosis (H)     Hypogammaglobulinemia (H24)     Chronic kidney disease, stage 3a (H)     Celiac disease       Past Medical History:   Diagnosis Date     Celiac disease      Family history of colon cancer     Colonoscoy q5 years next 9/2020      Problem (# of Occurrences) Relation (Name,Age of Onset)    Asthma (1) Daughter    Cancer (1) Mother (65): GIST    Hypertension (1) Mother    Thyroid Disease (1) Sister    Other Cancer (1) Mother    Hyperlipidemia (1) Mother    Coronary Artery Disease (1) Father: MI    Colon Cancer (1) Maternal Grandmother (91)          Past Surgical History:   Procedure Laterality Date     COLONOSCOPY N/A 9/28/2015    Procedure: COLONOSCOPY;  Surgeon: Eugenio Travis MD;  Location:  GI     ESOPHAGOSCOPY, GASTROSCOPY, DUODENOSCOPY (EGD), COMBINED N/A 1/30/2024    Procedure:  ESOPHAGOGASTRODUODENOSCOPY, WITH BIOPSY;  Surgeon: Melo Cloud MD;  Location: [facility]     IR RENAL BIOPSY RIGHT  12/21/2023     TONSILLECTOMY       Social History     Socioeconomic History     Marital status:      Spouse name: None     Number of children: None     Years of education: None     Highest education level: None   Occupational History     Comment: Physical Therapy   Tobacco Use     Smoking status: Never     Passive exposure: Past     Smokeless tobacco: Never     Tobacco comments:     Parents smoked during childhood in the house   Vaping Use     Vaping status: Never Used   Substance and Sexual Activity     Alcohol use: Not Currently     Alcohol/week: 0.0 standard drinks of alcohol     Comment: 1-2 times/month     Drug use: No     Sexual activity: Yes     Partners: Male     Birth control/protection: I.U.D.   Social History Narrative    March 12, 2024    ENVIRONMENTAL HISTORY: [place] The home is heated with a forced air. They does have central air conditioning. The patient's bedroom is furnished with carpeting in bedroom and fabric window coverings.  Pets inside the house include no. There is no history of cockroach or mice infestation. There is/are 0 smokers in the house.  The house does not have a damp basement.      Social Determinants of Health     Financial Resource Strain: High Risk (1/22/2024)    Financial Resource Strain      Within the past 12 months, have you or your family members you live with been unable to get utilities (heat, electricity) when it was really needed?: Yes   Food Insecurity: Low Risk  (1/22/2024)    Food Insecurity      Within the past 12 months, did you worry that your food would run out before you got money to buy more?: No      Within the past 12 months, did the food you bought just not last and you didn t have money to get more?: No   Transportation Needs: Low Risk  (1/22/2024)    Transportation Needs      Within the past  12 months, has lack of transportation kept you from medical appointments, getting your medicines, non-medical meetings or appointments, work, or from getting things that you need?: No    Received from [facility], [facility]    Social Connections   Interpersonal Safety: Low Risk  (11/17/2023)    Interpersonal Safety      Do you feel physically and emotionally safe where you currently live?: Yes      Within the past 12 months, have you been hit, slapped, kicked or otherwise physically hurt by someone?: No      Within the past 12 months, have you been humiliated or emotionally abused in other ways by your partner or ex-partner?: No   Housing Stability: Low Risk  (1/22/2024)    Housing Stability      Do you have housing? : Yes      Are you worried about losing your housing?: No         Review of Systems       Current Outpatient Medications:      acyclovir (ZOVIRAX) 400 MG tablet, Take 1 tablet (400 mg) by mouth 2 times daily Viral Prophylaxis., Disp: 180 tablet, Rfl: 3     cholecalciferol (VITAMIN D3) 125 mcg (5000 units) capsule, Take 1 capsule (125 mcg) by mouth daily, Disp: 30 capsule, Rfl: 6     cycloPHOSphamide (CYTOXAN) 50 MG capsule, Take 10 capsules (500 mg) by mouth every 7 days Take on days 1, 8, 15, and 22., Disp: 40 capsule, Rfl: 0     cycloPHOSphamide (CYTOXAN) 50 MG capsule, Take 10 capsules (500 mg) by mouth every 7 days Take on days 1, 8, 15, and 22., Disp: 40 capsule, Rfl: 0     dexAMETHasone (DECADRON) 4 MG tablet, Take 40 mg (10 tablets) on days 8 and 22., Disp: 20 tablet, Rfl: 0     empagliflozin (JARDIANCE) 10 MG TABS tablet, Take 1 tablet (10 mg) by mouth daily, Disp: 30 tablet, Rfl: 11     furosemide (LASIX) 20 MG tablet, Take 2 tablets (40 mg) by mouth 2 times daily (Patient taking differently: Take 40 mg by mouth 2 times daily Taking 20 mg BID starting 04/10/24), Disp: 360 tablet, Rfl: 3     levothyroxine (SYNTHROID/LEVOTHROID)  200 MCG tablet, Take 1 tablet (200 mcg) by mouth daily, Disp: 90 tablet, Rfl: 3     levothyroxine (SYNTHROID/LEVOTHROID) 25 MCG tablet, Take 1 tablet (25 mcg) by mouth daily, Disp: , Rfl:      liothyronine (CYTOMEL) 5 MCG tablet, Take 5 mcg by mouth 2 times daily, Disp: , Rfl:      rosuvastatin (CRESTOR) 10 MG tablet, Take 1 tablet (10 mg) by mouth daily, Disp: 90 tablet, Rfl: 3     spironolactone (ALDACTONE) 25 MG tablet, Take 1 tablet (25 mg) by mouth daily, Disp: 90 tablet, Rfl: 3     Albuterol-Budesonide (AIRSUPRA) 90-80 MCG/ACT AERO, Inhale 2 Inhalations into the lungs every 4 hours as needed (Wheezing, chest tightness, shortness of breath, or persistent cough) (Patient not taking: Reported on 4/30/2024), Disp: 10.7 g, Rfl: 3     azelastine (ASTELIN) 0.1 % nasal spray, Spray 2 sprays into both nostrils 2 times daily as needed for rhinitis (Patient not taking: Reported on 4/30/2024), Disp: 30 mL, Rfl: 3     EPINEPHrine (ANY BX GENERIC EQUIV) 0.3 MG/0.3ML injection 2-pack, Inject 0.3 mLs (0.3 mg) into the muscle as needed for anaphylaxis May repeat one time in 5-15 minutes if response to initial dose is inadequate. (Patient not taking: Reported on 4/30/2024), Disp: 2 each, Rfl: 3     fluticasone (FLONASE) 50 MCG/ACT nasal spray, Spray 2 sprays into both nostrils daily (Patient not taking: Reported on 4/30/2024), Disp: 16 g, Rfl: 3     prochlorperazine (COMPAZINE) 5 MG tablet, Take 1 tablet (5 mg) by mouth every 6 hours as needed for nausea or vomiting (Patient not taking: Reported on 4/30/2024), Disp: 30 tablet, Rfl: 1  Immunization History   Administered Date(s) Administered     HepB, Unspecified 08/01/1992, 10/01/1992, 02/01/1993     Influenza (H1N1) 12/16/2009     Influenza (IIV3) PF 10/04/2010, 09/14/2011, 09/19/2012     Influenza (intradermal) 01/18/2013     Influenza Vaccine >6 months,quad, PF 10/25/2016, 11/08/2017, 10/01/2018, 10/14/2019     TDAP (Adacel,Boostrix) 12/19/2022     Td (Adult), Adsorbed  03/10/1986     Zoster recombinant adjuvanted (SHINGRIX) 12/19/2022, 05/22/2023     Allergies   Allergen Reactions     Gluten Meal      Latex Itching     Seasonal Allergies      OBJECTIVE:                                                                 /78   Pulse 114   Resp 16   Wt 73 kg (161 lb)   SpO2 96%   BMI 27.15 kg/m          Physical Exam  Vitals and nursing note reviewed.   Constitutional:       Appearance: Normal appearance.   HENT:      Head: Normocephalic and atraumatic.      Right Ear: External ear normal.      Left Ear: External ear normal.      Mouth/Throat:      Mouth: Mucous membranes are moist.   Eyes:      General:         Right eye: No discharge.         Left eye: No discharge.      Conjunctiva/sclera: Conjunctivae normal.   Pulmonary:      Effort: Pulmonary effort is normal. No respiratory distress.      Breath sounds: No stridor.   Neurological:      Mental Status: She is alert and oriented to person, place, and time.   Psychiatric:         Mood and Affect: Mood normal.         Behavior: Behavior normal.               WORKUP:   At today's visit, the patient and I engaged in an informed consent discussion about allergy testing.  We discussed the risks and benefits of skin testing.  The patient understands skin testing risks can include, but are not limited to, urticaria, angioedema, shortness of breath, and severe anaphylaxis.  The benefits include, but are not limited, to evaluation for allergens causing symptoms.  After answering the patient's questions they have agreed to proceed with skin testing.    FOOD ALLERGEN PERCUTANEOUS SKIN TESTING      4/30/2024     8:00 AM   [employer]    Consent Y   Ordering Physician Tye   Interpreting Physician Tye   Testing Technician Roseanne   Location Back   Time start: 08:15   Time End: 08:30   Positive Control: Histatrol*ALK 1 mg/ml 4/4   Negative Control: 50% Glycerin**Sebastian Casimiro 0   Peanut 1:20 (W/F in millimeters) 0   Loring 1:20  (W/F in millimeters) 0   Hazelnut (Filbert)  1:20 (W/F in millimeters) 0   Corn 1:40 (W/F in millimeters) 0   Other Food(s) banana x2   Reaction (W/F in millimeters) 0; 0   Other Food(s) kiwi x2   Reaction (W/F in millimeters) 0; 0   Other Food(s) watermelon x2   Reaction (W/F in millimeters) 0; 0   Other Food(s) tomato x2   Reaction (W/F in millimeters) 0; 0      Negative SPT for peanut, walnut, hazelnut, corn, banana, kiwi, watermelon, and tomato.  The patient had appropriate responses to positive and negative controls.        ASSESSMENT/PLAN:      Other allergy, initial encounter      The patient's skin test and blood test results are negative, yet she experiences symptoms when consuming certain foods exclusively during the summer. 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 The patient has no issues consuming these foods during other seasons. For instance, she consumed a banana immediately after the skin test today without any adverse effects, which she reported to us post-consumption. Similarly, she tolerated these foods well during the winter months.  Given the clear seasonal pattern, I recommend that the patient avoid these trigger foods during the summer when symptoms are present. She may continue to consume them during the rest of the year as she has been doing without any reported issues.   I plan to follow up with the patient in Fall 2024 to specifically address the control of rhinoconjunctivitis symptoms, which she reports as being most severe during this season.      - ALLERGY SKIN TESTS,ALLERGENS       Thank you for allowing us to participate in the care of this patient. Please feel free to contact us if there are any questions or concerns about the patient.    Disclaimer: This note consists of symbols derived from keyboarding, dictation and/or voice recognition software. As a  result, there may be errors in the script that have gone undetected. Please consider this when interpreting information found in this chart.    Jose Gamble MD, FAAAAI, FACCHADI  Allergy, Asthma and Immunology     [facility]        Again, thank you for allowing me to participate in the care of your patient.        Sincerely,        Jose Gamble MD

## 2024-04-30 NOTE — PATIENT INSTRUCTIONS
Dr Gamble [phone]    All visits for food challenges, venom allergy testing, and medication/drug challenges MUST be scheduled through the allergy clinic nurse. Please send a [employer] message or call the allergy scheduling line and ask to speak with Dr Gamble's team for scheduling these appointments. Appointments for these visits that are made through the schedulers or via [employer] may be cancelled or rescheduled.    Allergy Shot Room ([place]): [phone]    Pulmonary Function [phone]    Prescription Assistance  If you need assistance with your prescriptions (cost, coverage, etc) please contact: [place] Prescription Assistance Program [phone]

## 2024-05-03 ENCOUNTER — TELEPHONE (OUTPATIENT)
Dept: CARDIOLOGY | Facility: CLINIC | Age: 54
End: 2024-05-03

## 2024-05-03 ENCOUNTER — MYC MEDICAL ADVICE (OUTPATIENT)
Dept: CARDIOLOGY | Facility: CLINIC | Age: 54
End: 2024-05-03

## 2024-05-03 ENCOUNTER — INFUSION THERAPY VISIT (OUTPATIENT)
Dept: ONCOLOGY | Facility: CLINIC | Age: 54
End: 2024-05-03
Attending: STUDENT IN AN ORGANIZED HEALTH CARE EDUCATION/TRAINING PROGRAM
Payer: COMMERCIAL

## 2024-05-03 ENCOUNTER — APPOINTMENT (OUTPATIENT)
Dept: LAB | Facility: CLINIC | Age: 54
End: 2024-05-03
Attending: STUDENT IN AN ORGANIZED HEALTH CARE EDUCATION/TRAINING PROGRAM
Payer: COMMERCIAL

## 2024-05-03 VITALS
SYSTOLIC BLOOD PRESSURE: 127 MMHG | WEIGHT: 163.5 LBS | OXYGEN SATURATION: 98 % | DIASTOLIC BLOOD PRESSURE: 80 MMHG | RESPIRATION RATE: 18 BRPM | TEMPERATURE: 98.1 F | BODY MASS INDEX: 27.57 KG/M2 | HEART RATE: 92 BPM

## 2024-05-03 DIAGNOSIS — I95.9 HYPOTENSION, UNSPECIFIED HYPOTENSION TYPE: ICD-10-CM

## 2024-05-03 DIAGNOSIS — E85.81 AL AMYLOIDOSIS (H): Primary | ICD-10-CM

## 2024-05-03 DIAGNOSIS — R55 SYNCOPE: Primary | ICD-10-CM

## 2024-05-03 DIAGNOSIS — R07.9 CHEST PAIN: Primary | ICD-10-CM

## 2024-05-03 LAB
ALBUMIN SERPL BCG-MCNC: 2.3 G/DL (ref 3.5–5.2)
ALP SERPL-CCNC: 94 U/L (ref 40–150)
ALT SERPL W P-5'-P-CCNC: 26 U/L (ref 0–50)
ANION GAP SERPL CALCULATED.3IONS-SCNC: 7 MMOL/L (ref 7–15)
AST SERPL W P-5'-P-CCNC: 22 U/L (ref 0–45)
BASOPHILS # BLD AUTO: 0.1 10E3/UL (ref 0–0.2)
BASOPHILS NFR BLD AUTO: 1 %
BILIRUB SERPL-MCNC: 0.3 MG/DL
BUN SERPL-MCNC: 28.3 MG/DL (ref 6–20)
CALCIUM SERPL-MCNC: 9.2 MG/DL (ref 8.6–10)
CHLORIDE SERPL-SCNC: 105 MMOL/L (ref 98–107)
CREAT SERPL-MCNC: 1.57 MG/DL (ref 0.51–0.95)
DEPRECATED HCO3 PLAS-SCNC: 26 MMOL/L (ref 22–29)
EGFRCR SERPLBLD CKD-EPI 2021: 39 ML/MIN/1.73M2
EOSINOPHIL # BLD AUTO: 0.2 10E3/UL (ref 0–0.7)
EOSINOPHIL NFR BLD AUTO: 2 %
ERYTHROCYTE [DISTWIDTH] IN BLOOD BY AUTOMATED COUNT: 17.8 % (ref 10–15)
GLUCOSE SERPL-MCNC: 91 MG/DL (ref 70–99)
HCT VFR BLD AUTO: 37.2 % (ref 35–47)
HGB BLD-MCNC: 12.6 G/DL (ref 11.7–15.7)
IMM GRANULOCYTES # BLD: 0.1 10E3/UL
IMM GRANULOCYTES NFR BLD: 1 %
LYMPHOCYTES # BLD AUTO: 1.6 10E3/UL (ref 0.8–5.3)
LYMPHOCYTES NFR BLD AUTO: 15 %
MCH RBC QN AUTO: 30.1 PG (ref 26.5–33)
MCHC RBC AUTO-ENTMCNC: 33.9 G/DL (ref 31.5–36.5)
MCV RBC AUTO: 89 FL (ref 78–100)
MONOCYTES # BLD AUTO: 0.9 10E3/UL (ref 0–1.3)
MONOCYTES NFR BLD AUTO: 9 %
NEUTROPHILS # BLD AUTO: 7.9 10E3/UL (ref 1.6–8.3)
NEUTROPHILS NFR BLD AUTO: 72 %
NRBC # BLD AUTO: 0.1 10E3/UL
NRBC BLD AUTO-RTO: 1 /100
PLATELET # BLD AUTO: 369 10E3/UL (ref 150–450)
POTASSIUM SERPL-SCNC: 3.9 MMOL/L (ref 3.4–5.3)
PROT SERPL-MCNC: 4.6 G/DL (ref 6.4–8.3)
RBC # BLD AUTO: 4.18 10E6/UL (ref 3.8–5.2)
SODIUM SERPL-SCNC: 138 MMOL/L (ref 135–145)
WBC # BLD AUTO: 10.7 10E3/UL (ref 4–11)

## 2024-05-03 PROCEDURE — 250N000012 HC RX MED GY IP 250 OP 636 PS 637: Performed by: STUDENT IN AN ORGANIZED HEALTH CARE EDUCATION/TRAINING PROGRAM

## 2024-05-03 PROCEDURE — 82040 ASSAY OF SERUM ALBUMIN: CPT

## 2024-05-03 PROCEDURE — 96401 CHEMO ANTI-NEOPL SQ/IM: CPT

## 2024-05-03 PROCEDURE — 36415 COLL VENOUS BLD VENIPUNCTURE: CPT

## 2024-05-03 PROCEDURE — 85025 COMPLETE CBC W/AUTO DIFF WBC: CPT | Performed by: STUDENT IN AN ORGANIZED HEALTH CARE EDUCATION/TRAINING PROGRAM

## 2024-05-03 PROCEDURE — 250N000011 HC RX IP 250 OP 636: Mod: JW | Performed by: STUDENT IN AN ORGANIZED HEALTH CARE EDUCATION/TRAINING PROGRAM

## 2024-05-03 RX ADMIN — DEXAMETHASONE 30 MG: 2 TABLET ORAL at 11:04

## 2024-05-03 RX ADMIN — BORTEZOMIB 2.5 MG: 3.5 INJECTION, POWDER, LYOPHILIZED, FOR SOLUTION INTRAVENOUS; SUBCUTANEOUS at 11:43

## 2024-05-03 RX ADMIN — DARATUMUMAB AND HYALURONIDASE-FIHJ (HUMAN RECOMBINANT) 1800 MG: 1800; 30000 INJECTION SUBCUTANEOUS at 11:34

## 2024-05-03 ASSESSMENT — PAIN SCALES - GENERAL: PAINLEVEL: NO PAIN (0)

## 2024-05-03 NOTE — TELEPHONE ENCOUNTER
"Received a call from Pierce FRAZIER from Onc infusion clinic. Pt is being seen today for chemo.     Pierce called to report some pt symptoms; pt reported that she has been having \"near syncopal episodes\" for nearly 2 weeks. She reports twelve of these episodes. Pt states she get extremely lightheaded when doing anything exertional and has to sit down to rest. These episodes last a few minutes and then pass.     Pt home BP's have been stable 's/60's; BP doesn't seem to change when she has lightheadedness or dizziness.     Pt weight has been stable; pt normal weight range is 161-166#. Pt weight today is 163.5#. No reports of swelling, increased SOB. Pt does endorse increased fatigue but this seems to be related to her chemo on Friday's.     FREDDIE Pelayo also relayed that pt reported a \"sharp heart pain\" that lasted about 30 minutes when pt was trying to go to sleep last night. Pt denied any other concurrent symptoms, no radiation, palpitations, diaphoresis, nausea. Pt stated she fell asleep and when she woke up chest pain was gone and she felt fine. Pt currently does not have any chest pain.     Reviewed with Dr. Armstrong.     Date: 5/3/2024    Time of Call: 10:33 AM     Diagnosis:  Syncope      [ VORB ] Ordering provider: Dr. Armstrong    Order:   -48 hour Holter Monitor      Order received by: Loida Germain RN       Follow-up/additional notes: Called pt and reviewed all symptoms; relayed recommendation to have Holter monitor placed to r/o any arrhythmias. Pt agreeable to plan. She will come up to 3rd floor to have Holter placed today after chemo. Appointment made for noon. Writer will call pt on Monday to check in on her.       "

## 2024-05-03 NOTE — TELEPHONE ENCOUNTER
Date: 5/3/2024    Time of Call: 1:30 PM     Diagnosis:  Syncope      [ VORB ] Ordering provider: Dr. Armstrong    Order:   -7 day Biotel     Order received by: Loida Germain RN       Follow-up/additional notes: Unable to place Holter per rooming staff for technical difficulties. 7 day Biotel ordered instead and placed in clinic.

## 2024-05-03 NOTE — PROGRESS NOTES
"Infusion Nursing Note:  Vivian Brown presents today for Day 15 Cycle 4 Darazalex Faspro, Velcade  Patient seen by provider today: No   present during visit today: Not Applicable.    Note: Patient presents to infusion with the following:  Continued debilitating weakness and fatigue Monday/Tuesday following chemo for the last 2 weeks. Pt states energy gradually improves but hasn't gotten baseline. Pt does state feeling \"winded\" as well during activity and feels as its being related to her increased fatigue.  Hx of near fainting episodes. Within the last week, 12 episodes noted with exertion such as bending down to pick something up off the floor or walking in yard at home. Pt states blood pressure has been stable averaging 90-low 100 for systolic and 60 at the lowest for diastolic. Pt confirms she is taking Lasix 20mg twice a day as prescribed and is following daily weight at home.  \"Heart pain\": hx of prior to diagnosis. Pt states she had a 30 minute episode of \"heart pain\" while laying in bed that resolved without intervention. Episode did not include dizziness, lightheadedness, shortness of breath, nausea, heart burn, radiation of discomfort, or changes in mental status. Pt states with similar episodes in the past, the \"heart pain\" did not last as long as last nights episode.   Patient denies new acute discomfort and states no further acute complaints or concerns needing to be addressed today. Specifically, pt denies s/s of infection such as fever, sore throat, cough, body aches, chills, headache, increased nasal congestion, or changes in taste/smell. Pt confirms she's taking 30mg PO Dex at home and is taking weekly Cytoxan as ordered.     Loida Kelly RNCC with cardiology made aware of patients cardiac symptoms above whom reviewed information with Dr. Armstrong-orders received: 48 hour holter monitor. No medication changes. If holter monitor results are normal, PRN Midodrine may be ordered for her to " start taking have chemo days.     Dr. Younger made aware of Cardiology's orders above. Reviewed above assessment and labs. Pt also leaving for a trip next Wednesday and is scheduled for Day 22 which is 2 days early per every 7 day treatment.  [id number]. 5/3/24. Dr. Younger. Pierce Yung RN. Proceed with treatment today. No interventions for increased Creatinine. Schedule ZOHREH visit with next injection. Pt ok to receive Day 22 next Wednesday-2 days early.     IB sent to scheduling to get ZOHREH visit scheduled next week. Handicap sticker paperwork filled out by patient and given to RNCC Kristen Henley.     Intravenous Access:  No Intravenous access at this visit.    Treatment Conditions:  Lab Results   Component Value Date    HGB 12.6 05/03/2024    WBC 10.7 05/03/2024    ANEU 5.6 03/09/2020    ANEUTAUTO 7.9 05/03/2024     05/03/2024        Lab Results   Component Value Date     05/03/2024    POTASSIUM 3.9 05/03/2024    MAG 2.0 02/03/2024    CR 1.57 (H) 05/03/2024    TANNER 9.2 05/03/2024    BILITOTAL 0.3 05/03/2024    ALBUMIN 2.3 (L) 05/03/2024    ALT 26 05/03/2024    AST 22 05/03/2024     Results reviewed, labs MET treatment parameters, ok to proceed with treatment.      Post Infusion Assessment:  Patient tolerated 1 subcutaneous Darzalex injection via RLQ of abdomen without incident using 23 gauge butterfly needle  Patient tolerated 1 subcutaneous Velcade injection via LLQ of abdomen without incident      Discharge Plan:   Patient declined prescription refills.  Discharge instructions reviewed with: Patient.  Patient and/or family verbalized understanding of discharge instructions and all questions answered.  AVS to patient via [employer].  Patient will return 5/8 for next appointment.   Patient discharged in stable condition accompanied by: mother.  Departure Mode: Ambulatory.      Pierce Yung RN

## 2024-05-03 NOTE — PATIENT INSTRUCTIONS
[facility] Triage and after hours / weekends / holidays:  [phone]    Please call the triage or after hours line if you experience a temperature greater than or equal to 100.4, shaking chills, have uncontrolled nausea, vomiting and/or diarrhea, dizziness, shortness of breath, chest pain, bleeding, unexplained bruising, or if you have any other new/concerning symptoms, questions or concerns.      If you are having any concerning symptoms or wish to speak to a provider before your next infusion visit, please call triage to notify them so we can adequately serve you.     If you need a refill on a narcotic prescription or other medication, please call before your infusion appointment.                 May 2024      [place] Monday Tuesday Wednesday Thursday Friday Saturday                  1     2     3    LAB PERIPHERAL  10:00 AM   (15 min.)    [facility] DRAW   [facility]    ONC INFUSION 2 HR (120 MIN)  10:30 AM   (120 min.)    ONC INFUSION NURSE   [facility]    NURSE ONLY  12:00 PM   (30 min.)   Nurse,  Cvc   [facility]    HOLTER MONITOR  12:15 PM   (30 min.)   [facility] MONITOR TECH   [facility] 4       5     6     7     8    LAB PERIPHERAL   8:30 AM   (15 min.)   [facility]   [facility]    ONC INFUSION 1 HR (60 MIN)   9:00 AM   (60 min.)   [facility] ONC INFUSION NURSE   [facility] 9     10    LAB PERIPHERAL  10:30 AM   (15 min.)   [facility]   [facility]    ONC INFUSION 1 HR (60 MIN)  11:00 AM   (60 min.)    ONC INFUSION NURSE   [facility] 11       12     13     14    LAB   7:30 AM   (15 min.)   [facility] LAB   [facility] 15     16    LAB   2:30 PM   (15 min.)   [facility] LAB   [facility] NEPHROLOGY   3:15 PM   (30  min.Tracie Campuzano MD   [facility] [place] 17     18       19     20     21     22     23     24     25       26     27     28     29     30     31 June 2024 Sunday Monday Tuesday Wednesday Thursday Friday Saturday                                 1       2     3     4     5     6     7     8       9     10     11     12     13     14     15       16     17     18     19     20     21     22       23     24     25     26     27     28     29       30                                                   Lab Results:  Recent Results (from the past 12 hour(s))   Comprehensive metabolic panel    Collection Time: 05/03/24  9:13 AM   Result Value Ref Range    Sodium 138 135 - 145 mmol/L    Potassium 3.9 3.4 - 5.3 mmol/L    Carbon Dioxide (CO2) 26 22 - 29 mmol/L    Anion Gap 7 7 - 15 mmol/L    Urea Nitrogen 28.3 (H) 6.0 - 20.0 mg/dL    Creatinine 1.57 (H) 0.51 - 0.95 mg/dL    GFR Estimate 39 (L) >60 mL/min/1.73m2    Calcium 9.2 8.6 - 10.0 mg/dL    Chloride 105 98 - 107 mmol/L    Glucose 91 70 - 99 mg/dL    Alkaline Phosphatase 94 40 - 150 U/L    AST 22 0 - 45 U/L    ALT 26 0 - 50 U/L    Protein Total 4.6 (L) 6.4 - 8.3 g/dL    Albumin 2.3 (L) 3.5 - 5.2 g/dL    Bilirubin Total 0.3 <=1.2 mg/dL   CBC with platelets and differential    Collection Time: 05/03/24  9:13 AM   Result Value Ref Range    WBC Count 10.7 4.0 - 11.0 10e3/uL    RBC Count 4.18 3.80 - 5.20 10e6/uL    Hemoglobin 12.6 11.7 - 15.7 g/dL    Hematocrit 37.2 35.0 - 47.0 %    MCV 89 78 - 100 fL    MCH 30.1 26.5 - 33.0 pg    MCHC 33.9 31.5 - 36.5 g/dL    RDW 17.8 (H) 10.0 - 15.0 %    Platelet Count 369 150 - 450 10e3/uL    % Neutrophils 72 %    % Lymphocytes 15 %    % Monocytes 9 %    % Eosinophils 2 %    % Basophils 1 %    % Immature Granulocytes 1 %    NRBCs per 100 WBC 1 (H) <1 /100    Absolute Neutrophils 7.9 1.6 - 8.3 10e3/uL    Absolute Lymphocytes 1.6 0.8 - 5.3 10e3/uL    Absolute Monocytes 0.9 0.0 -  1.3 10e3/uL    Absolute Eosinophils 0.2 0.0 - 0.7 10e3/uL    Absolute Basophils 0.1 0.0 - 0.2 10e3/uL    Absolute Immature Granulocytes 0.1 <=0.4 10e3/uL    Absolute NRBCs 0.1 10e3/uL

## 2024-05-03 NOTE — NURSING NOTE
Chief Complaint   Patient presents with    Labs Only     Venipuncture, vitals checked     Florencia Bustillos RN on 5/3/2024 at 9:19 AM

## 2024-05-06 ENCOUNTER — DOCUMENTATION ONLY (OUTPATIENT)
Dept: ONCOLOGY | Facility: CLINIC | Age: 54
End: 2024-05-06
Payer: COMMERCIAL

## 2024-05-06 DIAGNOSIS — E85.81 AL AMYLOIDOSIS (H): ICD-10-CM

## 2024-05-06 RX ORDER — PROCHLORPERAZINE MALEATE 5 MG
5 TABLET ORAL EVERY 6 HOURS PRN
Qty: 30 TABLET | Refills: 1 | Status: ON HOLD | OUTPATIENT
Start: 2024-05-06 | End: 2024-07-01

## 2024-05-06 NOTE — TELEPHONE ENCOUNTER
"Called pt to Follow-up on how she is feeling over the weekend; pt states she is feeling \"really good\". She states that her nausea has resolved and she has more energy.     Her BP's have improved 120's/80's although she did have one lower BP over the weekend 90/60. Pt denies any palpitations, did state that she had one episode of \"mild heart pain\" on Friday after chemo.     Pt has Biotel monitor in place but kept getting notifications that the sensor wasn't charged. She had just gotten off the phone with Biotel when writer called and is going to take the monitor off and charge it for a few hours.     Readings from Biotel, so far, look stable.     FYI to Dr. Armstrong, I have also sent you an email with Biotel results so far.     Will check back in with pt later this week. Pt to call clinic with any new or worsening symptoms.     Loida Germain RN      "

## 2024-05-06 NOTE — TELEPHONE ENCOUNTER
Medication:Prochlorperazine  Last prescribing provider:Dian ALCANTARA  Last clinic visit date: Dary Tinoco on [id number]   Recommendations for requested medication:for nausea  Any other pertinent information:routed to last provider Dary ALCANTARA. Pt is hoping to  at next appt which is Wednesday 5/8/2024.     Pt also wants to know what time provider visit is going to be on Wednesday 5/8 as was told likely a provider visit was going to be added to the plan for Wednesday 5/8.

## 2024-05-06 NOTE — PROGRESS NOTES
Oral Chemotherapy Monitoring Program  Lab Follow Up    Reviewed lab results from 5/3/24.        3/11/2024     1:00 PM 3/15/2024     9:00 AM 3/29/2024     1:00 PM 4/8/2024     2:00 PM 4/15/2024    12:00 PM 4/29/2024    12:00 PM 5/6/2024     1:00 PM   ORAL CHEMOTHERAPY   Assessment Type Lab Monitoring Refill Lab Monitoring Lab Monitoring Lab Monitoring;Refill Lab Monitoring;Monthly Follow up Lab Monitoring   Diagnosis Code Multiple Myeloma Multiple Myeloma Multiple Myeloma Multiple Myeloma Multiple Myeloma Multiple Myeloma Multiple Myeloma   Providers Dr. Ramesh Chandler   [facility]   Is this patient followed by the [facility] OC team? No No No No No No No   Drug Name Cytoxan (cyclophsphamide) Cytoxan (cyclophsphamide) Cytoxan (cyclophsphamide) Cytoxan (cyclophsphamide) Cytoxan (cyclophsphamide) Cytoxan (cyclophsphamide) Cytoxan (cyclophsphamide)   Dose 500 mg 500 mg 500 mg 500 mg 500 mg 500 mg 500 mg   Current Schedule Weekly Weekly Weekly Weekly Weekly Weekly Weekly   Cycle Details Weekly/Daily Weekly/Daily Weekly/Daily Weekly/Daily Weekly/Daily Weekly/Daily Weekly/Daily   Start Date of Last Cycle  2/23/2024        Planned next cycle start date  3/22/2024            Labs:  _  Result Component Current Result Ref Range   Sodium 138 (5/3/2024) 135 - 145 mmol/L     _  Result Component Current Result Ref Range   Potassium 3.9 (5/3/2024) 3.4 - 5.3 mmol/L     _  Result Component Current Result Ref Range   Calcium 9.2 (5/3/2024) 8.6 - 10.0 mg/dL     No results found for Mag within last 30 days.     No results found for Phos within last 30 days.     _  Result Component Current Result Ref Range   Albumin 2.3 (L) (5/3/2024) 3.5 - 5.2 g/dL     _  Result Component Current Result Ref Range   Urea Nitrogen 28.3 (H) (5/3/2024) 6.0 - 20.0 mg/dL     _  Result Component Current Result Ref Range   Creatinine 1.57  (H) (5/3/2024) 0.51 - 0.95 mg/dL     _  Result Component Current Result Ref Range   AST 22 (5/3/2024) 0 - 45 U/L     _  Result Component Current Result Ref Range   ALT 26 (5/3/2024) 0 - 50 U/L     _  Result Component Current Result Ref Range   Bilirubin Total 0.3 (5/3/2024) <=1.2 mg/dL     _  Result Component Current Result Ref Range   WBC Count 10.7 (5/3/2024) 4.0 - 11.0 10e3/uL     _  Result Component Current Result Ref Range   Hemoglobin 12.6 (5/3/2024) 11.7 - 15.7 g/dL     _  Result Component Current Result Ref Range   Platelet Count 369 (5/3/2024) 150 - 450 10e3/uL     No results found for ANC within last 30 days.     _  Result Component Current Result Ref Range   Absolute Neutrophils 7.9 (5/3/2024) 1.6 - 8.3 10e3/uL        Assessment & Plan:  No concerning lab abnormalities.    No changes with cyclophosphamide needed at this time.    Coupad message sent to patient.    Karissa Carpio, PharmD, BCPS, BCOP  Oncology Clinical Pharmacy Specialist  [facility]/ [facility]  [phone]

## 2024-05-07 RX ORDER — MIDODRINE HYDROCHLORIDE 10 MG/1
10 TABLET ORAL 3 TIMES DAILY
Qty: 270 TABLET | Refills: 1 | Status: SHIPPED | OUTPATIENT
Start: 2024-05-07 | End: 2024-08-02

## 2024-05-07 NOTE — TELEPHONE ENCOUNTER
Date: 5/7/2024    Time of Call: 9:37 AM     Diagnosis:  Chest pain      [ TORB ] Ordering provider: Dr. Armstrong    Order:   -NM Lexiscan   -Midodrine 10 mg TID PRN BP less than 90/50 or lightheadedness/dizziness.   -Okay for pt to travel to [place] 5/8-5/11.      Order received by: Loida Germain RN       Follow-up/additional notes: Called pt and reviewed pre-procedure instructions for Lexiscan, also Mycharted instructions. Reviewed PRN Midodrine 10 mg TID with pt; she will take this with her on her trip incase she has hypotension or lightheadedness. Writer will call pt on Monday to see how she is doing.

## 2024-05-07 NOTE — PROGRESS NOTES
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 kidney biopsy (needle):     Amyloidosis of the kidney, AL-type, lambda light chain-restricted, affecting the glomeruli, interstitium, and arterioles (see comment)     Mild chronic changes of the parenchyma, including:   - focal global glomerulosclerosis (3% of glomeruli)   - focal tubular atrophy and interstitial fibrosis (5% of the cortex)   - severe arterial sclerosis   Electronically signed by Joe Garcia MD on 12/23/2023 at  3:21 PM   Comment      The biopsy reveals findings diagnostic of amyloidosis; the amyloid shows lambda light chain-restriction (AL amyloidosis), and the deposits are Congo red positive. The amyloid deposits affect the glomeruli extensively, and interstitium and arterioles focally. Other potential complications of paraproteins in the kidney are not seen, in particular, there is no evidence of light chain cast nephropathy, light chain deposition disease, or light chain tubulopathy.      1/8/24: NT-Pro , Troponin T 15    1/25/24 PET                                                                  Single focal moderate to markedly avid intramuscular uptake, proximal left gastrocnemius, no etiology demonstrated on un enhanced non diagnostic CT.    A few equivocal findings - unlikely to be active amyloid  Mildly hypermetabolic left lower lobe anterior segment peribronchial nodularity and hypermetabolism. Differential favors RSV positive 1/18/24), focal amyloid unlikely.   Cardiac (MRI positive) - equivocal FDG throughout left ventricle. Given findings on MRI 1/16/2024, can not rule out active amyloidosis. However in the absence  of a cardiac dietary prep (which switches myocardium from glucose to fatty metabolism), this is likely normal physiology.  Kidney cortex (bx. Proven) - likely normal, with the knowledge that the patient's kidneys are biopsy-positive for amyloid, could do future studies with a lasix protocol to dilute urinary FDG, may allow for identifying renal uptake.     Date Treatment Name Response Side Effects / Toxicities    1/26/24  Darzalex Faspro + (CyBorD)               Interval History   Patient reports that overall she is feeling a bit better.  She did have a couple of weeks for her energy was quite low.  She is now wearing a heart monitor for 7 days and continuing to work closely with her cardiology team.  She continues to have intermittent sharp chest pain.  She denies any shortness of breath.  She does still sometimes feel lightheaded though this is occurring less often lately.  She denies fevers.  She reports a good appetite.  She is getting in at least 60 ounces of water as well as some milk each day.  She reports that her nausea is better managed with premedicating with her nausea medications prior to the chemotherapy.  She is having bowel movements every 1 to 2 days though they are quite hard like andrew.  She has taken a dose of Metamucil to try to help with this.  She denies any numbness or tingling in her hands or feet.  She does get intermittent sensation of burning in her back that has occurred since starting on chemotherapy.  She denies other concerns.    Past Medical History     Past Medical History:   Diagnosis Date    Celiac disease     Family history of colon cancer     Colonoscoy q5 years next 9/2020       Past Surgical History:      Past Surgical History:   Procedure Laterality Date    COLONOSCOPY N/A 9/28/2015    Procedure: COLONOSCOPY;  Surgeon: Eugenio Travis MD;  Location:  GI    ESOPHAGOSCOPY, GASTROSCOPY, DUODENOSCOPY (EGD), COMBINED N/A 1/30/2024    Procedure:  ESOPHAGOGASTRODUODENOSCOPY, WITH BIOPSY;  Surgeon: Melo Cloud MD;  Location: [facility]    IR RENAL BIOPSY RIGHT  12/21/2023    TONSILLECTOMY         Social History     Socioeconomic History    Marital status:      Spouse name: None    Number of children: None    Years of education: None    Highest education level: None   Occupational History     Comment: Physical Therapy   Substance and Sexual Activity    Alcohol use: Not Currently     Alcohol/week: 0.0 standard drinks of alcohol     Comment: 1-2 times/month    Drug use: No    Sexual activity: Yes     Partners: Male     Birth control/protection: I.U.D.       Allergies:     Allergies   Allergen Reactions    Gluten Meal     Latex Itching    Seasonal Allergies        Medications:     Current Outpatient Medications   Medication Sig Dispense Refill    acyclovir (ZOVIRAX) 400 MG tablet Take 1 tablet (400 mg) by mouth 2 times daily Viral Prophylaxis. 180 tablet 3    cholecalciferol (VITAMIN D3) 125 mcg (5000 units) capsule Take 1 capsule (125 mcg) by mouth daily 30 capsule 6    cycloPHOSphamide (CYTOXAN) 50 MG capsule Take 10 capsules (500 mg) by mouth every 7 days Take on days 1, 8, 15, and 22. 40 capsule 0    dexAMETHasone (DECADRON) 4 MG tablet Take 40 mg (10 tablets) on days 8 and 22. 20 tablet 0    empagliflozin (JARDIANCE) 10 MG TABS tablet Take 1 tablet (10 mg) by mouth daily 30 tablet 11    levothyroxine (SYNTHROID/LEVOTHROID) 200 MCG tablet Take 1 tablet (200 mcg) by mouth daily 90 tablet 3    levothyroxine (SYNTHROID/LEVOTHROID) 25 MCG tablet Take 1 tablet (25 mcg) by mouth daily      liothyronine (CYTOMEL) 5 MCG tablet Take 5 mcg by mouth 2 times daily      prochlorperazine (COMPAZINE) 5 MG tablet Take 1 tablet (5 mg) by mouth every 6 hours as needed for nausea or vomiting 30 tablet 1    rosuvastatin (CRESTOR) 10 MG tablet Take 1 tablet (10 mg) by mouth daily 90 tablet 3    spironolactone (ALDACTONE) 25 MG tablet Take 1 tablet (25 mg) by mouth daily 90  tablet 3    Albuterol-Budesonide (AIRSUPRA) 90-80 MCG/ACT AERO Inhale 2 Inhalations into the lungs every 4 hours as needed (Wheezing, chest tightness, shortness of breath, or persistent cough) (Patient not taking: Reported on 4/30/2024) 10.7 g 3    azelastine (ASTELIN) 0.1 % nasal spray Spray 2 sprays into both nostrils 2 times daily as needed for rhinitis (Patient not taking: Reported on 4/30/2024) 30 mL 3    cycloPHOSphamide (CYTOXAN) 50 MG capsule Take 10 capsules (500 mg) by mouth every 7 days Take on days 1, 8, 15, and 22. 40 capsule 0    EPINEPHrine (ANY BX GENERIC EQUIV) 0.3 MG/0.3ML injection 2-pack Inject 0.3 mLs (0.3 mg) into the muscle as needed for anaphylaxis May repeat one time in 5-15 minutes if response to initial dose is inadequate. (Patient not taking: Reported on 4/30/2024) 2 each 3    fluticasone (FLONASE) 50 MCG/ACT nasal spray Spray 2 sprays into both nostrils daily (Patient not taking: Reported on 4/30/2024) 16 g 3    furosemide (LASIX) 20 MG tablet Take 2 tablets (40 mg) by mouth 2 times daily (Patient taking differently: Take 40 mg by mouth 2 times daily Taking 20 mg BID starting 04/10/24) 360 tablet 3    midodrine (PROAMATINE) 10 MG tablet Take 1 tablet (10 mg) by mouth 3 times daily (Patient not taking: Reported on 5/8/2024) 270 tablet 1     No current facility-administered medications for this visit.       Physical Exam   General: The patient is a pleasant female in no acute distress.  /77   Pulse 95   Temp 97.7  F (36.5  C) (Oral)   Resp 16   Wt 73.9 kg (162 lb 14.4 oz)   SpO2 95%   BMI 27.47 kg/m    Wt Readings from Last 10 Encounters:   05/08/24 73.9 kg (162 lb 14.4 oz)   05/03/24 74.2 kg (163 lb 8 oz)   04/30/24 73 kg (161 lb)   04/26/24 75.4 kg (166 lb 4.8 oz)   04/19/24 76.2 kg (168 lb)   04/12/24 76.7 kg (169 lb)   04/08/24 76.2 kg (168 lb)   04/05/24 74.8 kg (165 lb)   03/29/24 80.5 kg (177 lb 6.4 oz)   03/22/24 79.9 kg (176 lb 1.6 oz)   HEENT: EOMI. Sclerae are  anicteric.   Lymph: Neck is supple with no lymphadenopathy in the cervical or supraclavicular areas.   Heart: Regular rate and rhythm.   Lungs: Clear to auscultation bilaterally.   Abdomen: Bowel sounds present, soft, nontender with no palpable hepatosplenomegaly or masses.   Extremities: Trace lower extremity edema noted bilaterally.   Neuro: Cranial nerves II through XII are grossly intact.  Skin: No rashes, petechiae, or bruising noted on exposed skin.      Data     Most Recent 3 CBC's:  Recent Labs   Lab Test 05/08/24  0809 05/03/24  0913 04/26/24  1021   WBC 9.9 10.7 11.4*   HGB 12.8 12.6 13.1   MCV 88 89 88    369 455*   ANEUTAUTO 7.1 7.9 8.5*     Most Recent 3 BMP's:  Recent Labs   Lab Test 05/03/24  0913 04/26/24  1021 04/19/24  0956    138 139   POTASSIUM 3.9 4.1 4.2   CHLORIDE 105 107 106   CO2 26 25 27   BUN 28.3* 21.4* 25.6*   CR 1.57* 1.35* 1.47*   ANIONGAP 7 6* 6*   TANNER 9.2 9.1 9.2   GLC 91 90 96   PROTTOTAL 4.6* 4.6* 4.5*   ALBUMIN 2.3* 2.2* 2.3*    Most Recent 3 LFT's:  Recent Labs   Lab Test 05/03/24  0913 04/26/24  1021 04/19/24  0956   AST 22 24 24   ALT 26 23 24   ALKPHOS 94 96 101   BILITOTAL 0.3 0.3 0.3   I reviewed the above labs today.    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 Patient would also like to see Dr. Chandler in follow-up soon.  - Transplant work up after current cycle-- I reached out to the transplant team who will be contacting the patient later today to set up her BMT work up.    Cardiac amyloidosis, stage I (per cardiology)  Chest pain and lightheadedness  - TTE and MRI supported a diagnosis of AL cardiac amyloidosis. Her NT-proBNP was in the 500s, troponin was 15, and kappa-lambda difference was not markedly elevated, thereby reassuring  and indicating stage 1.   - Mainstay treatment for AL cardiac amyloidosis is still chemotherapy. It is not uncommon for patients with cardiac amyloidosis to not tolerate ACEi/ARB/ARNI. H1/26/24: Would benefit from SGLT2i, but after discussion, she would like to wait until after her first cycle of induction chemotherapy.  -She is currently undergoing a 7 day heart monitor and is scheduled for a stress test next week. Her symptoms are somewhat improved this week as compared to last week.   -She will continue close follow up with Dr. Armstrong with cardiology    Renal involvement (per nephrology)  Nephrotic Syndrome  Stage 0 or 1 based on Hall 2012 classification.   Lasix 20 mg daily  Monitor BP closely and if <90/60 persistently or symptomatic may stop.  - Follows with nephrology Dr Dobson    Hypogammaglobulinemia  1/18/24 IgG 294 and tested + RSV  - Supplement IVIG for level < 400  - Given doses of IVIG on 2/9/24 and 4/26/24.    Hashimoto's thyroiditis  Diagnosed August 2023, TPO ab is positive   - Continue Levothyroxine  - No acute concerns today.    Constipation  - Secondary to treatment. Recommend taking Metamucil daily and pushing fluids with a goal of at least 64 oz/day.    Tania Paulino PA-C  [facility]  [address] [place], [place] [address] [phone]    20 minutes spent on the date of the encounter doing chart review, review of test results, interpretation of tests, patient visit, and documentation     The longitudinal plan of care for the diagnosis(es)/condition(s) as documented were addressed during this visit. Due to the added complexity in care, I will continue to support Vivian in the subsequent management and with ongoing continuity of care.

## 2024-05-08 ENCOUNTER — INFUSION THERAPY VISIT (OUTPATIENT)
Dept: ONCOLOGY | Facility: CLINIC | Age: 54
End: 2024-05-08
Attending: STUDENT IN AN ORGANIZED HEALTH CARE EDUCATION/TRAINING PROGRAM
Payer: COMMERCIAL

## 2024-05-08 ENCOUNTER — LAB (OUTPATIENT)
Dept: LAB | Facility: CLINIC | Age: 54
End: 2024-05-08
Attending: STUDENT IN AN ORGANIZED HEALTH CARE EDUCATION/TRAINING PROGRAM
Payer: COMMERCIAL

## 2024-05-08 VITALS
OXYGEN SATURATION: 95 % | RESPIRATION RATE: 16 BRPM | HEART RATE: 95 BPM | WEIGHT: 162.9 LBS | DIASTOLIC BLOOD PRESSURE: 77 MMHG | SYSTOLIC BLOOD PRESSURE: 109 MMHG | BODY MASS INDEX: 27.47 KG/M2 | TEMPERATURE: 97.7 F

## 2024-05-08 DIAGNOSIS — I43 AMYLOID HEART DISEASE (H): Primary | ICD-10-CM

## 2024-05-08 DIAGNOSIS — N18.31 CHRONIC KIDNEY DISEASE, STAGE 3A (H): ICD-10-CM

## 2024-05-08 DIAGNOSIS — Z86.2 PERSONAL HISTORY OF DISEASES OF BLOOD AND BLOOD-FORMING ORGANS: ICD-10-CM

## 2024-05-08 DIAGNOSIS — Z01.818 EXAMINATION PRIOR TO CHEMOTHERAPY: ICD-10-CM

## 2024-05-08 DIAGNOSIS — E85.81 AL AMYLOIDOSIS (H): Primary | ICD-10-CM

## 2024-05-08 DIAGNOSIS — E85.9 AMYLOIDOSIS, UNSPECIFIED (H): ICD-10-CM

## 2024-05-08 DIAGNOSIS — E85.4 AMYLOID HEART DISEASE (H): Primary | ICD-10-CM

## 2024-05-08 DIAGNOSIS — E85.81 AL AMYLOIDOSIS (H): ICD-10-CM

## 2024-05-08 LAB
ALBUMIN MFR UR ELPH: 584 MG/DL
ALBUMIN SERPL BCG-MCNC: 2.3 G/DL (ref 3.5–5.2)
ALP SERPL-CCNC: 88 U/L (ref 40–150)
ALT SERPL W P-5'-P-CCNC: 28 U/L (ref 0–50)
ANION GAP SERPL CALCULATED.3IONS-SCNC: 8 MMOL/L (ref 7–15)
AST SERPL W P-5'-P-CCNC: 23 U/L (ref 0–45)
BASOPHILS # BLD AUTO: 0.1 10E3/UL (ref 0–0.2)
BASOPHILS NFR BLD AUTO: 1 %
BILIRUB SERPL-MCNC: 0.4 MG/DL
BUN SERPL-MCNC: 27.4 MG/DL (ref 6–20)
CALCIUM SERPL-MCNC: 9.3 MG/DL (ref 8.6–10)
CHLORIDE SERPL-SCNC: 105 MMOL/L (ref 98–107)
COLLECT DURATION TIME UR: 24 H
CREAT 24H UR-MRATE: 1.31 G/SPEC (ref 0.72–1.51)
CREAT 24H UR-MRATE: 1.31 G/SPEC (ref 0.72–1.51)
CREAT CL 24H UR+SERPL-VRATE: 67 ML/MIN (ref 66–143)
CREAT CL/1.73 SQ M 24H UR+SERPL-ARVRAT: 63 ML/MIN/1.7M2 (ref 100–160)
CREAT SERPL-MCNC: 1.35 MG/DL (ref 0.51–0.95)
CREAT SERPL-MCNC: 1.35 MG/DL (ref 0.51–0.95)
CREAT UR-MCNC: 44.9 MG/DL
CREAT UR-MCNC: 44.9 MG/DL
DEPRECATED HCO3 PLAS-SCNC: 25 MMOL/L (ref 22–29)
EGFRCR SERPLBLD CKD-EPI 2021: 46 ML/MIN/1.73M2
EOSINOPHIL # BLD AUTO: 0.3 10E3/UL (ref 0–0.7)
EOSINOPHIL NFR BLD AUTO: 3 %
ERYTHROCYTE [DISTWIDTH] IN BLOOD BY AUTOMATED COUNT: 17.6 % (ref 10–15)
GLUCOSE SERPL-MCNC: 87 MG/DL (ref 70–99)
HCT VFR BLD AUTO: 36.9 % (ref 35–47)
HGB BLD-MCNC: 12.8 G/DL (ref 11.7–15.7)
IMM GRANULOCYTES # BLD: 0 10E3/UL
IMM GRANULOCYTES NFR BLD: 0 %
LYMPHOCYTES # BLD AUTO: 1.4 10E3/UL (ref 0.8–5.3)
LYMPHOCYTES NFR BLD AUTO: 14 %
MCH RBC QN AUTO: 30.4 PG (ref 26.5–33)
MCHC RBC AUTO-ENTMCNC: 34.7 G/DL (ref 31.5–36.5)
MCV RBC AUTO: 88 FL (ref 78–100)
MONOCYTES # BLD AUTO: 1 10E3/UL (ref 0–1.3)
MONOCYTES NFR BLD AUTO: 10 %
NEUTROPHILS # BLD AUTO: 7.1 10E3/UL (ref 1.6–8.3)
NEUTROPHILS NFR BLD AUTO: 72 %
NRBC # BLD AUTO: 0.4 10E3/UL
NRBC BLD AUTO-RTO: 4 /100
PLATELET # BLD AUTO: 357 10E3/UL (ref 150–450)
POTASSIUM SERPL-SCNC: 3.7 MMOL/L (ref 3.4–5.3)
PROT 24H UR-MRATE: ABNORMAL MG/SPECIMEN
PROT SERPL-MCNC: 4.6 G/DL (ref 6.4–8.3)
RBC # BLD AUTO: 4.21 10E6/UL (ref 3.8–5.2)
SODIUM SERPL-SCNC: 138 MMOL/L (ref 135–145)
SPECIMEN VOL UR: 2917 ML
WBC # BLD AUTO: 9.9 10E3/UL (ref 4–11)

## 2024-05-08 PROCEDURE — G2211 COMPLEX E/M VISIT ADD ON: HCPCS | Performed by: PHYSICIAN ASSISTANT

## 2024-05-08 PROCEDURE — 82043 UR ALBUMIN QUANTITATIVE: CPT | Performed by: PATHOLOGY

## 2024-05-08 PROCEDURE — 250N000011 HC RX IP 250 OP 636: Performed by: STUDENT IN AN ORGANIZED HEALTH CARE EDUCATION/TRAINING PROGRAM

## 2024-05-08 PROCEDURE — 36415 COLL VENOUS BLD VENIPUNCTURE: CPT | Performed by: STUDENT IN AN ORGANIZED HEALTH CARE EDUCATION/TRAINING PROGRAM

## 2024-05-08 PROCEDURE — 81050 URINALYSIS VOLUME MEASURE: CPT

## 2024-05-08 PROCEDURE — 82570 ASSAY OF URINE CREATININE: CPT

## 2024-05-08 PROCEDURE — 96401 CHEMO ANTI-NEOPL SQ/IM: CPT

## 2024-05-08 PROCEDURE — 80053 COMPREHEN METABOLIC PANEL: CPT

## 2024-05-08 PROCEDURE — 84156 ASSAY OF PROTEIN URINE: CPT | Performed by: PATHOLOGY

## 2024-05-08 PROCEDURE — 82575 CREATININE CLEARANCE TEST: CPT

## 2024-05-08 PROCEDURE — 86335 IMMUNFIX E-PHORSIS/URINE/CSF: CPT | Mod: 26 | Performed by: PATHOLOGY

## 2024-05-08 PROCEDURE — 86335 IMMUNFIX E-PHORSIS/URINE/CSF: CPT | Performed by: PATHOLOGY

## 2024-05-08 PROCEDURE — 84156 ASSAY OF PROTEIN URINE: CPT

## 2024-05-08 PROCEDURE — 85048 AUTOMATED LEUKOCYTE COUNT: CPT | Performed by: STUDENT IN AN ORGANIZED HEALTH CARE EDUCATION/TRAINING PROGRAM

## 2024-05-08 PROCEDURE — 99213 OFFICE O/P EST LOW 20 MIN: CPT | Performed by: PHYSICIAN ASSISTANT

## 2024-05-08 PROCEDURE — 99213 OFFICE O/P EST LOW 20 MIN: CPT | Mod: 25 | Performed by: PHYSICIAN ASSISTANT

## 2024-05-08 RX ADMIN — BORTEZOMIB 2.5 MG: 3.5 INJECTION, POWDER, LYOPHILIZED, FOR SOLUTION INTRAVENOUS; SUBCUTANEOUS at 09:20

## 2024-05-08 ASSESSMENT — PAIN SCALES - GENERAL: PAINLEVEL: NO PAIN (0)

## 2024-05-08 NOTE — LETTER
5/8/2024         RE: Vivian Brown  [address] [facility] [address]        Dear Colleague,    Thank you for referring your patient, Vivian Brown, to the [facility]. Please see a copy of my visit note below.            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 kidney biopsy (needle):     Amyloidosis of the kidney, AL-type, lambda light chain-restricted, affecting the glomeruli, interstitium, and arterioles (see comment)     Mild chronic changes of the parenchyma, including:   - focal global glomerulosclerosis (3% of glomeruli)   - focal tubular atrophy and interstitial fibrosis (5% of the cortex)   - severe arterial sclerosis   Electronically signed by Joe Garcia MD on 12/23/2023 at  3:21 PM   Comment      The biopsy reveals findings diagnostic of amyloidosis; the amyloid shows lambda light chain-restriction (AL amyloidosis), and the deposits are Congo red positive. The amyloid deposits affect the glomeruli extensively, and interstitium and arterioles focally. Other potential complications of paraproteins in the kidney are not seen, in particular, there is no evidence of light chain cast nephropathy, light chain deposition disease, or light chain tubulopathy.      1/8/24: NT-Pro , Troponin T 15    1/25/24 PET                                                                  Single focal moderate to markedly avid intramuscular uptake, proximal left gastrocnemius, no etiology demonstrated on un enhanced non diagnostic CT.    A few equivocal findings - unlikely to be active amyloid  Mildly hypermetabolic left lower lobe anterior segment  peribronchial nodularity and hypermetabolism. Differential favors RSV positive 1/18/24), focal amyloid unlikely.   Cardiac (MRI positive) - equivocal FDG throughout left ventricle. Given findings on MRI 1/16/2024, can not rule out active amyloidosis. However in the absence of a cardiac dietary prep (which switches myocardium from glucose to fatty metabolism), this is likely normal physiology.  Kidney cortex (bx. Proven) - likely normal, with the knowledge that the patient's kidneys are biopsy-positive for amyloid, could do future studies with a lasix protocol to dilute urinary FDG, may allow for identifying renal uptake.     Date Treatment Name Response Side Effects / Toxicities    1/26/24  Darzalex Faspro + (CyBorD)               Interval History   Patient reports that overall she is feeling a bit better.  She did have a couple of weeks for her energy was quite low.  She is now wearing a heart monitor for 7 days and continuing to work closely with her cardiology team.  She continues to have intermittent sharp chest pain.  She denies any shortness of breath.  She does still sometimes feel lightheaded though this is occurring less often lately.  She denies fevers.  She reports a good appetite.  She is getting in at least 60 ounces of water as well as some milk each day.  She reports that her nausea is better managed with premedicating with her nausea medications prior to the chemotherapy.  She is having bowel movements every 1 to 2 days though they are quite hard like andrew.  She has taken a dose of Metamucil to try to help with this.  She denies any numbness or tingling in her hands or feet.  She does get intermittent sensation of burning in her back that has occurred since starting on chemotherapy.  She denies other concerns.    Past Medical History     Past Medical History:   Diagnosis Date    Celiac disease     Family history of colon cancer     Colonoscoy q5 years next 9/2020       Past Surgical History:       Past Surgical History:   Procedure Laterality Date    COLONOSCOPY N/A 9/28/2015    Procedure: COLONOSCOPY;  Surgeon: Eugenio Travis MD;  Location: [facility]    ESOPHAGOSCOPY, GASTROSCOPY, DUODENOSCOPY (EGD), COMBINED N/A 1/30/2024    Procedure: ESOPHAGOGASTRODUODENOSCOPY, WITH BIOPSY;  Surgeon: Melo Cloud MD;  Location: [facility]    IR RENAL BIOPSY RIGHT  12/21/2023    TONSILLECTOMY         Social History     Socioeconomic History    Marital status:      Spouse name: None    Number of children: None    Years of education: None    Highest education level: None   Occupational History     Comment: Physical Therapy   Substance and Sexual Activity    Alcohol use: Not Currently     Alcohol/week: 0.0 standard drinks of alcohol     Comment: 1-2 times/month    Drug use: No    Sexual activity: Yes     Partners: Male     Birth control/protection: I.U.D.       Allergies:     Allergies   Allergen Reactions    Gluten Meal     Latex Itching    Seasonal Allergies        Medications:     Current Outpatient Medications   Medication Sig Dispense Refill    acyclovir (ZOVIRAX) 400 MG tablet Take 1 tablet (400 mg) by mouth 2 times daily Viral Prophylaxis. 180 tablet 3    cholecalciferol (VITAMIN D3) 125 mcg (5000 units) capsule Take 1 capsule (125 mcg) by mouth daily 30 capsule 6    cycloPHOSphamide (CYTOXAN) 50 MG capsule Take 10 capsules (500 mg) by mouth every 7 days Take on days 1, 8, 15, and 22. 40 capsule 0    dexAMETHasone (DECADRON) 4 MG tablet Take 40 mg (10 tablets) on days 8 and 22. 20 tablet 0    empagliflozin (JARDIANCE) 10 MG TABS tablet Take 1 tablet (10 mg) by mouth daily 30 tablet 11    levothyroxine (SYNTHROID/LEVOTHROID) 200 MCG tablet Take 1 tablet (200 mcg) by mouth daily 90 tablet 3    levothyroxine (SYNTHROID/LEVOTHROID) 25 MCG tablet Take 1 tablet (25 mcg) by mouth daily      liothyronine (CYTOMEL) 5 MCG tablet Take 5 mcg by mouth 2 times daily      prochlorperazine (COMPAZINE) 5 MG tablet Take 1  tablet (5 mg) by mouth every 6 hours as needed for nausea or vomiting 30 tablet 1    rosuvastatin (CRESTOR) 10 MG tablet Take 1 tablet (10 mg) by mouth daily 90 tablet 3    spironolactone (ALDACTONE) 25 MG tablet Take 1 tablet (25 mg) by mouth daily 90 tablet 3    Albuterol-Budesonide (AIRSUPRA) 90-80 MCG/ACT AERO Inhale 2 Inhalations into the lungs every 4 hours as needed (Wheezing, chest tightness, shortness of breath, or persistent cough) (Patient not taking: Reported on 4/30/2024) 10.7 g 3    azelastine (ASTELIN) 0.1 % nasal spray Spray 2 sprays into both nostrils 2 times daily as needed for rhinitis (Patient not taking: Reported on 4/30/2024) 30 mL 3    cycloPHOSphamide (CYTOXAN) 50 MG capsule Take 10 capsules (500 mg) by mouth every 7 days Take on days 1, 8, 15, and 22. 40 capsule 0    EPINEPHrine (ANY BX GENERIC EQUIV) 0.3 MG/0.3ML injection 2-pack Inject 0.3 mLs (0.3 mg) into the muscle as needed for anaphylaxis May repeat one time in 5-15 minutes if response to initial dose is inadequate. (Patient not taking: Reported on 4/30/2024) 2 each 3    fluticasone (FLONASE) 50 MCG/ACT nasal spray Spray 2 sprays into both nostrils daily (Patient not taking: Reported on 4/30/2024) 16 g 3    furosemide (LASIX) 20 MG tablet Take 2 tablets (40 mg) by mouth 2 times daily (Patient taking differently: Take 40 mg by mouth 2 times daily Taking 20 mg BID starting 04/10/24) 360 tablet 3    midodrine (PROAMATINE) 10 MG tablet Take 1 tablet (10 mg) by mouth 3 times daily (Patient not taking: Reported on 5/8/2024) 270 tablet 1     No current facility-administered medications for this visit.       Physical Exam   General: The patient is a pleasant female in no acute distress.  /77   Pulse 95   Temp 97.7  F (36.5  C) (Oral)   Resp 16   Wt 73.9 kg (162 lb 14.4 oz)   SpO2 95%   BMI 27.47 kg/m    Wt Readings from Last 10 Encounters:   05/08/24 73.9 kg (162 lb 14.4 oz)   05/03/24 74.2 kg (163 lb 8 oz)   04/30/24 73 kg (161  lb)   04/26/24 75.4 kg (166 lb 4.8 oz)   04/19/24 76.2 kg (168 lb)   04/12/24 76.7 kg (169 lb)   04/08/24 76.2 kg (168 lb)   04/05/24 74.8 kg (165 lb)   03/29/24 80.5 kg (177 lb 6.4 oz)   03/22/24 79.9 kg (176 lb 1.6 oz)   HEENT: EOMI. Sclerae are anicteric.   Lymph: Neck is supple with no lymphadenopathy in the cervical or supraclavicular areas.   Heart: Regular rate and rhythm.   Lungs: Clear to auscultation bilaterally.   Abdomen: Bowel sounds present, soft, nontender with no palpable hepatosplenomegaly or masses.   Extremities: Trace lower extremity edema noted bilaterally.   Neuro: Cranial nerves II through XII are grossly intact.  Skin: No rashes, petechiae, or bruising noted on exposed skin.      Data     Most Recent 3 CBC's:  Recent Labs   Lab Test 05/08/24  0809 05/03/24  0913 04/26/24  1021   WBC 9.9 10.7 11.4*   HGB 12.8 12.6 13.1   MCV 88 89 88    369 455*   ANEUTAUTO 7.1 7.9 8.5*     Most Recent 3 BMP's:  Recent Labs   Lab Test 05/03/24  0913 04/26/24  1021 04/19/24  0956    138 139   POTASSIUM 3.9 4.1 4.2   CHLORIDE 105 107 106   CO2 26 25 27   BUN 28.3* 21.4* 25.6*   CR 1.57* 1.35* 1.47*   ANIONGAP 7 6* 6*   TANNER 9.2 9.1 9.2   GLC 91 90 96   PROTTOTAL 4.6* 4.6* 4.5*   ALBUMIN 2.3* 2.2* 2.3*    Most Recent 3 LFT's:  Recent Labs   Lab Test 05/03/24  0913 04/26/24  1021 04/19/24  0956   AST 22 24 24   ALT 26 23 24   ALKPHOS 94 96 101   BILITOTAL 0.3 0.3 0.3   I reviewed the above labs today.    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 Patient would also like to see Dr. Chandler in follow-up soon.  - Transplant work up after current cycle-- I reached out to the transplant team who will be contacting the patient later today to set up her BMT work up.    Cardiac  amyloidosis, stage I (per cardiology)  Chest pain and lightheadedness  - TTE and MRI supported a diagnosis of AL cardiac amyloidosis. Her NT-proBNP was in the 500s, troponin was 15, and kappa-lambda difference was not markedly elevated, thereby reassuring and indicating stage 1.   - Mainstay treatment for AL cardiac amyloidosis is still chemotherapy. It is not uncommon for patients with cardiac amyloidosis to not tolerate ACEi/ARB/ARNI. H1/26/24: Would benefit from SGLT2i, but after discussion, she would like to wait until after her first cycle of induction chemotherapy.  -She is currently undergoing a 7 day heart monitor and is scheduled for a stress test next week. Her symptoms are somewhat improved this week as compared to last week.   -She will continue close follow up with Dr. Armstrong with cardiology    Renal involvement (per nephrology)  Nephrotic Syndrome  Stage 0 or 1 based on Hall 2012 classification.   Lasix 20 mg daily  Monitor BP closely and if <90/60 persistently or symptomatic may stop.  - Follows with nephrology Dr Dobson    Hypogammaglobulinemia  1/18/24 IgG 294 and tested + RSV  - Supplement IVIG for level < 400  - Given doses of IVIG on 2/9/24 and 4/26/24.    Hashimoto's thyroiditis  Diagnosed August 2023, TPO ab is positive   - Continue Levothyroxine  - No acute concerns today.    Tania Paulino PA-C  [facility]  [address] [place] [id number] [phone]    20 minutes spent on the date of the encounter doing chart review, review of test results, interpretation of tests, patient visit, and documentation     The longitudinal plan of care for the diagnosis(es)/condition(s) as documented were addressed during this visit. Due to the added complexity in care, I will continue to support Vivian in the subsequent management and with ongoing continuity of care.

## 2024-05-08 NOTE — NURSING NOTE
Chief Complaint   Patient presents with    Blood Draw     Labs drawn via      Labs collected from venipuncture by RN. Vitals taken. Checked in for appointment(s).     Alaina STOVER RN PHN BSN  BMT/Oncology Lab

## 2024-05-08 NOTE — PATIENT INSTRUCTIONS
[facility] Triage and after hours / weekends / holidays:  [phone]    Please call the triage or after hours line if you experience a temperature greater than or equal to 100.4, shaking chills, have uncontrolled nausea, vomiting and/or diarrhea, dizziness, shortness of breath, chest pain, bleeding, unexplained bruising, or if you have any other new/concerning symptoms, questions, or concerns.      If you are having any concerning symptoms or wish to speak to a provider before your next infusion visit, please call your care coordinator or triage to notify them so we can adequately serve you.     If you need a refill on a narcotic prescription or other medication, please call before your infusion appointment.

## 2024-05-08 NOTE — PROGRESS NOTES
Infusion Nursing Note:  Vivian Brown presents today for Cycle 4 Day 22 bortezomib.    Patient seen by provider today: Yes: Tania Paulino PA-C   present during visit today: Not Applicable.    Note: Vivian presents today feeling well. Denies pain or nausea/vomiting. Offers no concerns since visit with Tania Paulino prior to infusion.      Intravenous Access:  No Intravenous access at this visit.    Treatment Conditions:     Latest Reference Range & Units 05/08/24 08:09   Sodium 135 - 145 mmol/L 138   Potassium 3.4 - 5.3 mmol/L 3.7   Chloride 98 - 107 mmol/L 105   Carbon Dioxide (CO2) 22 - 29 mmol/L 25   Urea Nitrogen 6.0 - 20.0 mg/dL 27.4 (H)   Creatinine 0.51 - 0.95 mg/dL 1.35 (H)   GFR Estimate >60 mL/min/1.73m2 46 (L)   Calcium 8.6 - 10.0 mg/dL 9.3   Anion Gap 7 - 15 mmol/L 8   Albumin 3.5 - 5.2 g/dL 2.3 (L)   Protein Total 6.4 - 8.3 g/dL 4.6 (L)   Alkaline Phosphatase 40 - 150 U/L 88   ALT 0 - 50 U/L 28   AST 0 - 45 U/L 23   Bilirubin Total <=1.2 mg/dL 0.4   Glucose 70 - 99 mg/dL 87   WBC 4.0 - 11.0 10e3/uL 9.9   Hemoglobin 11.7 - 15.7 g/dL 12.8   Hematocrit 35.0 - 47.0 % 36.9   Platelet Count 150 - 450 10e3/uL 357   RBC Count 3.80 - 5.20 10e6/uL 4.21   MCV 78 - 100 fL 88   MCH 26.5 - 33.0 pg 30.4   MCHC 31.5 - 36.5 g/dL 34.7   RDW 10.0 - 15.0 % 17.6 (H)   % Neutrophils % 72   % Lymphocytes % 14   % Monocytes % 10   % Eosinophils % 3   % Basophils % 1   Absolute Basophils 0.0 - 0.2 10e3/uL 0.1   Absolute Eosinophils 0.0 - 0.7 10e3/uL 0.3   Absolute Immature Granulocytes <=0.4 10e3/uL 0.0   Absolute Lymphocytes 0.8 - 5.3 10e3/uL 1.4   Absolute Monocytes 0.0 - 1.3 10e3/uL 1.0   % Immature Granulocytes % 0   Absolute Neutrophils 1.6 - 8.3 10e3/uL 7.1   Absolute NRBCs 10e3/uL 0.4   NRBCs per 100 WBC <1 /100 4 (H)     Results reviewed, labs MET treatment parameters, ok to proceed with treatment.      Post Infusion Assessment:  Patient tolerated injection to RLQ abdomen without incident.       Discharge  Plan:   Patient declined prescription refills.  Discharge instructions reviewed with: Patient.  Patient and/or family verbalized understanding of discharge instructions and all questions answered.  AVS to patient via [employer].  Patient will return as scheduled for next appointments. Plan is for transplant per patient reports.   Patient discharged in stable condition accompanied by: daughter.  Departure Mode: Ambulatory.      Eneida Hopkins RN

## 2024-05-08 NOTE — NURSING NOTE
"Oncology Rooming Note    May 8, 2024 8:19 AM   Vivian Brown is a 54 year old female who presents for:    Chief Complaint   Patient presents with    Blood Draw     Labs drawn via     Oncology Clinic Visit     RTN for Amyloidosis      Initial Vitals: /77   Pulse 95   Temp 97.7  F (36.5  C) (Oral)   Resp 16   Wt 73.9 kg (162 lb 14.4 oz)   SpO2 95%   BMI 27.47 kg/m   Estimated body mass index is 27.47 kg/m  as calculated from the following:    Height as of 4/8/24: 1.64 m (5' 4.57\").    Weight as of this encounter: 73.9 kg (162 lb 14.4 oz). Body surface area is 1.83 meters squared.  No Pain (0) Comment: Data Unavailable   No LMP recorded. (Menstrual status: IUD).  Allergies reviewed: Yes  Medications reviewed: Yes    Medications: Medication refills not needed today.  Pharmacy name entered into [employer]:    [facility] [place], [place] - [address] [facility] [place], [place] - [address] [facility] [id number] - [place], [place] - [address] [id number] - [place], [place] - [address]  [facility] - [place], [place] - [address]  [facility] - [place], [place] - [address] [facility]    Frailty Screening:   Is the patient here for a new oncology consult visit in cancer care? 2. No      Clinical concerns: none       Iveth Dawn MA             "

## 2024-05-09 DIAGNOSIS — C90.00 MULTIPLE MYELOMA, REMISSION STATUS UNSPECIFIED (H): Primary | ICD-10-CM

## 2024-05-09 DIAGNOSIS — N18.31 CHRONIC KIDNEY DISEASE, STAGE 3A (H): Primary | ICD-10-CM

## 2024-05-09 LAB
ALBUMIN MFR UR ELPH: 1533 MG/DL
CREAT UR-MCNC: 95.6 MG/DL
CREAT UR-MCNC: 95.6 MG/DL
MICROALBUMIN UR-MCNC: >4400 MG/L
MICROALBUMIN/CREAT UR: NORMAL MG/G{CREAT}
PATH REPORT.COMMENTS IMP SPEC: NORMAL
PROT ELPH PNL UR ELPH: NORMAL
PROT/CREAT 24H UR: 16.04 MG/MG CR (ref 0–0.2)

## 2024-05-10 ENCOUNTER — TELEPHONE (OUTPATIENT)
Dept: TRANSPLANT | Facility: CLINIC | Age: 54
End: 2024-05-10
Payer: COMMERCIAL

## 2024-05-10 DIAGNOSIS — E85.81 AL AMYLOIDOSIS (H): Primary | ICD-10-CM

## 2024-05-10 RX ORDER — CYCLOPHOSPHAMIDE 50 MG/1
500 CAPSULE ORAL
Qty: 40 CAPSULE | Refills: 0 | Status: SHIPPED | OUTPATIENT
Start: 2024-05-10 | End: 2024-05-23

## 2024-05-10 RX ORDER — CYCLOPHOSPHAMIDE 50 MG/1
500 CAPSULE ORAL
Qty: 40 CAPSULE | Refills: 0 | OUTPATIENT
Start: 2024-05-10 | End: 2024-05-10

## 2024-05-10 NOTE — TELEPHONE ENCOUNTER
Date: 5/10/2024    Time of Call: 1:25 PM     Diagnosis:  Biotel Results      [ VORB ] Ordering provider: Dr. Armstrong    Order:   -Biotel results normal, no arrhythmias noted. Will wait for NM Stress test results on 5/16/24     Order received by: Lioda Germain RN       Follow-up/additional notes: Called pt and relayed normal Biotel results. Pt states she is feeling okay, no new chest pain episodes notes. Pt started Midodrine on 5/9 for BP's low 100's/50's; has taken 3 doses. Asked that pt Follow-up with clinic for any low Bps, lightheadedness or chest pain. Pt verbalized understanding.

## 2024-05-13 ENCOUNTER — TELEPHONE (OUTPATIENT)
Dept: ONCOLOGY | Facility: CLINIC | Age: 54
End: 2024-05-13
Payer: COMMERCIAL

## 2024-05-13 NOTE — TELEPHONE ENCOUNTER
Oral Chemotherapy Monitoring Program    Placed call to patient regarding the cyclophosphamide prescription.    Dr Shukla confirmed that the patient should stop the cyclophosphamide at this point, anticipating patient's progress to BMT.    Karissa Carpio, PharmD, BCPS, BCOP  Oncology Clinical Pharmacy Specialist  [facility]  The oral chemotherapy pharmacists are now working on provider-specific teams. Your pharmacist team can be reached at [facility] (included for replies here) or [phone].

## 2024-05-14 ENCOUNTER — LAB (OUTPATIENT)
Dept: LAB | Facility: CLINIC | Age: 54
End: 2024-05-14
Payer: COMMERCIAL

## 2024-05-14 DIAGNOSIS — N18.31 CHRONIC KIDNEY DISEASE, STAGE 3A (H): ICD-10-CM

## 2024-05-14 DIAGNOSIS — E03.9 MYXEDEMA HEART DISEASE: Primary | ICD-10-CM

## 2024-05-14 DIAGNOSIS — I51.9 MYXEDEMA HEART DISEASE: Primary | ICD-10-CM

## 2024-05-14 DIAGNOSIS — E78.2 MIXED HYPERLIPIDEMIA: ICD-10-CM

## 2024-05-14 DIAGNOSIS — R73.01 IMPAIRED FASTING GLUCOSE: ICD-10-CM

## 2024-05-14 LAB
ALBUMIN SERPL BCG-MCNC: 2.4 G/DL (ref 3.5–5.2)
ALBUMIN UR-MCNC: >499 MG/DL
ANION GAP SERPL CALCULATED.3IONS-SCNC: 8 MMOL/L (ref 7–15)
APPEARANCE UR: ABNORMAL
BILIRUB UR QL STRIP: NEGATIVE
BUN SERPL-MCNC: 25.5 MG/DL (ref 6–20)
CALCIUM SERPL-MCNC: 10 MG/DL (ref 8.6–10)
CHLORIDE SERPL-SCNC: 101 MMOL/L (ref 98–107)
CHOLEST SERPL-MCNC: 375 MG/DL
COLOR UR AUTO: ABNORMAL
CREAT SERPL-MCNC: 1.9 MG/DL (ref 0.51–0.95)
DEPRECATED HCO3 PLAS-SCNC: 28 MMOL/L (ref 22–29)
EGFRCR SERPLBLD CKD-EPI 2021: 31 ML/MIN/1.73M2
ERYTHROCYTE [DISTWIDTH] IN BLOOD BY AUTOMATED COUNT: 17.7 % (ref 10–15)
FASTING STATUS PATIENT QL REPORTED: YES
FASTING STATUS PATIENT QL REPORTED: YES
GLUCOSE SERPL-MCNC: 90 MG/DL (ref 70–99)
GLUCOSE SERPL-MCNC: 90 MG/DL (ref 70–99)
GLUCOSE UR STRIP-MCNC: 150 MG/DL
GRANULAR CAST: 8 /LPF
HBA1C MFR BLD: 5.7 %
HCT VFR BLD AUTO: 36.9 % (ref 35–47)
HDLC SERPL-MCNC: 47 MG/DL
HGB BLD-MCNC: 12.4 G/DL (ref 11.7–15.7)
HGB UR QL STRIP: ABNORMAL
KETONES UR STRIP-MCNC: NEGATIVE MG/DL
LDLC SERPL CALC-MCNC: 267 MG/DL
LEUKOCYTE ESTERASE UR QL STRIP: NEGATIVE
MCH RBC QN AUTO: 30.5 PG (ref 26.5–33)
MCHC RBC AUTO-ENTMCNC: 33.6 G/DL (ref 31.5–36.5)
MCV RBC AUTO: 91 FL (ref 78–100)
MUCOUS THREADS #/AREA URNS LPF: PRESENT /LPF
NITRATE UR QL: NEGATIVE
NONHDLC SERPL-MCNC: 328 MG/DL
PH UR STRIP: 6 [PH] (ref 5–7)
PHOSPHATE SERPL-MCNC: 3.7 MG/DL (ref 2.5–4.5)
PLATELET # BLD AUTO: 357 10E3/UL (ref 150–450)
POTASSIUM SERPL-SCNC: 3.9 MMOL/L (ref 3.4–5.3)
RBC # BLD AUTO: 4.06 10E6/UL (ref 3.8–5.2)
RBC URINE: 5 /HPF
SODIUM SERPL-SCNC: 137 MMOL/L (ref 135–145)
SP GR UR STRIP: 1.01 (ref 1–1.03)
SQUAMOUS EPITHELIAL: <1 /HPF
T4 FREE SERPL-MCNC: 1.34 NG/DL (ref 0.9–1.7)
TRIGL SERPL-MCNC: 305 MG/DL
TSH SERPL DL<=0.005 MIU/L-ACNC: 0.13 UIU/ML (ref 0.3–4.2)
UROBILINOGEN UR STRIP-MCNC: NORMAL MG/DL
WBC # BLD AUTO: 8.7 10E3/UL (ref 4–11)
WBC URINE: 8 /HPF

## 2024-05-14 PROCEDURE — 84439 ASSAY OF FREE THYROXINE: CPT

## 2024-05-14 PROCEDURE — 80061 LIPID PANEL: CPT

## 2024-05-14 PROCEDURE — 81001 URINALYSIS AUTO W/SCOPE: CPT

## 2024-05-14 PROCEDURE — 84443 ASSAY THYROID STIM HORMONE: CPT

## 2024-05-14 PROCEDURE — 85027 COMPLETE CBC AUTOMATED: CPT

## 2024-05-14 PROCEDURE — 36415 COLL VENOUS BLD VENIPUNCTURE: CPT

## 2024-05-14 PROCEDURE — 80069 RENAL FUNCTION PANEL: CPT

## 2024-05-14 PROCEDURE — 83036 HEMOGLOBIN GLYCOSYLATED A1C: CPT

## 2024-05-14 NOTE — TELEPHONE ENCOUNTER
Called pt and left VM to check in how her weekend went taking Midodrine and how she is feeling.     Loida Germain RN

## 2024-05-16 ENCOUNTER — OFFICE VISIT (OUTPATIENT)
Dept: NEPHROLOGY | Facility: CLINIC | Age: 54
End: 2024-05-16
Attending: INTERNAL MEDICINE
Payer: COMMERCIAL

## 2024-05-16 ENCOUNTER — HOSPITAL ENCOUNTER (OUTPATIENT)
Dept: NUCLEAR MEDICINE | Facility: CLINIC | Age: 54
Setting detail: NUCLEAR MEDICINE
Discharge: HOME | End: 2024-05-16
Attending: INTERNAL MEDICINE
Payer: COMMERCIAL

## 2024-05-16 ENCOUNTER — HOSPITAL ENCOUNTER (OUTPATIENT)
Dept: CARDIOLOGY | Facility: CLINIC | Age: 54
Setting detail: NUCLEAR MEDICINE
Discharge: HOME | End: 2024-05-16
Attending: INTERNAL MEDICINE
Payer: COMMERCIAL

## 2024-05-16 VITALS
WEIGHT: 163 LBS | OXYGEN SATURATION: 96 % | TEMPERATURE: 98.7 F | HEART RATE: 89 BPM | SYSTOLIC BLOOD PRESSURE: 112 MMHG | DIASTOLIC BLOOD PRESSURE: 73 MMHG | BODY MASS INDEX: 27.49 KG/M2

## 2024-05-16 VITALS — DIASTOLIC BLOOD PRESSURE: 77 MMHG | HEART RATE: 66 BPM | SYSTOLIC BLOOD PRESSURE: 114 MMHG

## 2024-05-16 DIAGNOSIS — E85.81 AL AMYLOIDOSIS (H): Primary | ICD-10-CM

## 2024-05-16 DIAGNOSIS — R07.9 CHEST PAIN: ICD-10-CM

## 2024-05-16 DIAGNOSIS — N04.9 NEPHROTIC SYNDROME: ICD-10-CM

## 2024-05-16 DIAGNOSIS — N18.31 CHRONIC KIDNEY DISEASE, STAGE 3A (H): ICD-10-CM

## 2024-05-16 DIAGNOSIS — E55.9 VITAMIN D DEFICIENCY: ICD-10-CM

## 2024-05-16 LAB
CV STRESS MAX HR HE: 96
RATE PRESSURE PRODUCT: NORMAL
STRESS ECHO BASELINE DIASTOLIC HE: 77
STRESS ECHO BASELINE HR: 66 BPM
STRESS ECHO BASELINE SYSTOLIC BP: 114
STRESS ECHO CALCULATED PERCENT HR: 58 %
STRESS ECHO LAST STRESS DIASTOLIC BP: 80
STRESS ECHO LAST STRESS SYSTOLIC BP: 121
STRESS ECHO TARGET HR: 166

## 2024-05-16 PROCEDURE — 93018 CV STRESS TEST I&R ONLY: CPT | Performed by: INTERNAL MEDICINE

## 2024-05-16 PROCEDURE — 78452 HT MUSCLE IMAGE SPECT MULT: CPT

## 2024-05-16 PROCEDURE — 99213 OFFICE O/P EST LOW 20 MIN: CPT | Performed by: INTERNAL MEDICINE

## 2024-05-16 PROCEDURE — G2211 COMPLEX E/M VISIT ADD ON: HCPCS | Performed by: INTERNAL MEDICINE

## 2024-05-16 PROCEDURE — 99215 OFFICE O/P EST HI 40 MIN: CPT | Performed by: INTERNAL MEDICINE

## 2024-05-16 PROCEDURE — A9502 TC99M TETROFOSMIN: HCPCS | Performed by: INTERNAL MEDICINE

## 2024-05-16 PROCEDURE — 93017 CV STRESS TEST TRACING ONLY: CPT

## 2024-05-16 PROCEDURE — 78452 HT MUSCLE IMAGE SPECT MULT: CPT | Mod: 26 | Performed by: INTERNAL MEDICINE

## 2024-05-16 PROCEDURE — 93016 CV STRESS TEST SUPVJ ONLY: CPT | Performed by: INTERNAL MEDICINE

## 2024-05-16 PROCEDURE — 343N000001 HC RX 343: Performed by: INTERNAL MEDICINE

## 2024-05-16 PROCEDURE — 250N000011 HC RX IP 250 OP 636: Mod: JZ | Performed by: INTERNAL MEDICINE

## 2024-05-16 RX ORDER — REGADENOSON 0.08 MG/ML
0.4 INJECTION, SOLUTION INTRAVENOUS ONCE
Status: COMPLETED | OUTPATIENT
Start: 2024-05-16 | End: 2024-05-16

## 2024-05-16 RX ORDER — ONDANSETRON 8 MG/1
8 TABLET, FILM COATED ORAL EVERY 8 HOURS PRN
COMMUNITY
Start: 2024-05-06

## 2024-05-16 RX ORDER — CHOLECALCIFEROL (VITAMIN D3) 50 MCG
1 TABLET ORAL DAILY
Qty: 30 TABLET | Refills: 6 | Status: SHIPPED | OUTPATIENT
Start: 2024-05-16 | End: 2024-07-22

## 2024-05-16 RX ADMIN — TETROFOSMIN 10.1 MILLICURIE: 1.38 INJECTION, POWDER, LYOPHILIZED, FOR SOLUTION INTRAVENOUS at 09:00

## 2024-05-16 RX ADMIN — TETROFOSMIN 30.4 MILLICURIE: 1.38 INJECTION, POWDER, LYOPHILIZED, FOR SOLUTION INTRAVENOUS at 11:00

## 2024-05-16 RX ADMIN — REGADENOSON 0.4 MG: 0.08 INJECTION, SOLUTION INTRAVENOUS at 11:55

## 2024-05-16 ASSESSMENT — PAIN SCALES - GENERAL: PAINLEVEL: NO PAIN (0)

## 2024-05-16 NOTE — PROGRESS NOTES
She gets chemo on last Wednesday. She has been feeling nausea, lightheadedness or dizziness. She has no diarrhea. Swelling has been better controlled.  She also has chest pain and just went to [facility]. She will start work-up next week for BMT. They plan to possibly do BMT on early June. Home BP are 107-117/60-70. She feels lightheadedness with exertion.

## 2024-05-16 NOTE — PROGRESS NOTES
Nephrology Progress Note  05/16/2024   Chief complaint: Follow up AL amyloidosis  History of Present Illness:    Vivian Brown is a 54 year old F  with hypothyroidism, who is here for AL amyloidosis consult.     The patient initially presented with fatigue and LE edema for about 2 years. Initially, she was diagnosed with Hashimoto's and was started on thyroid supplement in August 2023. Symptoms has partially improved. Later on she was found to have hypoalbuminemia dn massiv eproteinuria hence was referred to Dr. Sen. She saw Dr. Sen in November 2023 and was started on Lisinopril 2.5 mg and lasix 20 mg per day. I 1 week, she has lsot 20 Lbs and swelling has been better and remained in control.  She underwent a kidney Bx on 12/21/23. The Bx read by Dr. Garcia which showed Amyloidosis of the kidney, AL-type, lambda light chain-restricted, affecting the glomeruli, interstitium, and arterioles. She has mild chronic changes of the parenchyma, including: focal global glomerulosclerosis (3% of glomeruli), focal tubular atrophy and interstitial fibrosis (5% of the cortex), severe arterial sclerosis.   LM: There were 37 glomeruli (LM-29; IF-7; EM-1), of which 1 is globally sclerosed. The non-sclerosed glomeruli are of normal size and cellularity. Significant endocapillary proliferation or cellular crescents are not seen in the glomeruli. The glomeruli show deposits of amorphous, homogeneous, acellular, eosinophilic material that infiltrates and expands the mesangial areas and extends down the capillaries of the glomerular tuft. Tubules are well-preserved and show no signs of acute injury. Obvious signs of a viral cytopathic effect are not seen in the sample. No oxalate, urate, or phosphate crystals are present in the sample. The interstitium reveals occasional collection of foam cells and occasional deposits of amorphous material. The interstitium contains mild interstitial inflammation in areas of atrophy, and  the infiltrates are composed of mononuclear cells. About 5% of the renal cortex shows tubular atrophy and interstitial fibrosis. Arteries show severe sclerosis. Examination of a Congo red-stained section reveals deposits of amorphous material with apple-green birefringence under polarized light, in all glomeruli and focally in arterioles and interstitium. EM: showed 1 glomerulus; 1 glomerulus is examined ultrastructurally. There is prominent deposition of fibrillary material in the mesangial areas, without significant hypercellularity.  There is focal effacement of visceral epithelial cell foot processes in areas of the glomerular basement membranes that show segmental widening of the subepithelial space and distortion of the lamina densa due to deposits of fibrillary material.  The mesangial and basement membrane deposits consist of non-branching, randomly arranged fibrils of 6 to 15 nm width. Glomerular endothelial cells show focal loss of their normal fenestrations. Tubules show no specific changes. Deposits are Congo red positive. The amyloid deposits affect the glomeruli extensively, and interstitium and arterioles focally. Other potential complications of paraproteins in the kidney are not seen, in particular, there is no evidence of light chain cast nephropathy, light chain deposition disease, or light chain tubulopathy.      For kidney function, she has baseline Cr of 0.9 in 12/19/22. However, Cr started to increase to 1.05 in 5/22/23 and now 1.11 on 11/28/23. Serum albumin was 3.5 in 2/24/21 and 2.2 in 12/19/22. Monoclonal work-up on 11/28/23 showed kappa 3.05, lambda 7.82 and K/L ratio 0.39 and L/K ratio 2.56. dFLC 4.77. Other serologies including MPO, PMND1 were negative. UPCR 12.50 mg/g. No 24 hr urine protein yet. UA showed small blood but no RBC. She also has LCL of 174. TSH 29.16 and + Tissue TTG.      She has Echo on 1/3/24. There is mild concentric left ventricular hypertrophy and borderline RVH.  "Global peak LV longitudinal strain is averaged at -15.3%. This suggests abnormal strain (normal <-18%).The visual ejection fraction is estimated at 54-56%.Trivial posterior pericardial effusion Clinical history is listed as renal amyloid--on this echo the atria are normal size, the valves are not thickend but there is mild LVH and borderline RVH, there is questionable \"scintillation\" of LV myocardium, the global longitudinal strain is abnormal and the best strain values are at the apex (borderline \"apical sparing\" strain pattern) and there is trace pericardial effusion along with borderline criteria for diastolic dysfunction grade2--take together this could represent some features of cardiac amyloid. Additional studies such as cardiac MRI, cardiac biopsy etc may be useful. She is scheduled to see Cardiology on 1/31/24.      1/8/24: I saw her for consultation via video. She is with her  at home. Still feels fatigue and upset stomach last night.  She is currently on furosemide 20 mg daily, lisinopril 2.5 mg daily and crestor 10 mg daily. She reports having lower extremities edema for about 2 years. She was diagnosed with Hashimoto's in August 2023.  She has been feeling better after started taking thyroid supplement. She has leg swelling but not as bad as before. Furosemide was started in December 2023 and she has lost 20 Lbs wit that. She has seen more bubbly in the urineShe sometimes feels lightheadedness and dizziness and her BP was low sometimes. However, mostly > BP /60s. HR in the 80s.  BP today is 114/76, HR is 76.  She has no problem with swallowing, numbness or tingling sensation. No CTS symptoms. No tongue enlargement. No periorbital ecchymoses. No easy bruising. She has no other medical problem before this. She has been active and exercise at least twice a week.   3/18/24: In the interim, she underwent BmBx on 1/18/24 which showed  lambda restricted plasma cell, 5%. Congo red positive in the " BmBx. She was started on Zenia-CyBorD, C1D1 on 1/26/24. Now s/p 2 cycles, and will start 3rd cycle soon. ASCT is plan on 1st week of Vijaya. In the interim, she was started on Jardiance 2/3/24 and spironolactone on 2/29/24. She does not feel lightheadedness and dizziness. Lisinopril was on hold because of dizziness. Lasix was increased to 40 mg BID on early February. Overall she feels better.  She denies any lightheadedness or dizziness.  She intermittently has numbness and tingling sensation around her thoracic cage typically after the chemo but it usually disappear within 1 week.  Labs on 3/18/24 show creatinine 1.41, BUN 21.1, bicarb 32, potassium 3.7, bicarb 32. Albumin 2.3.  White blood cell 10.8, hemoglobin 13.5, platelet 333. UA showed small blood, glucose 150 and protein 300. UPCR and UACR pending.   5/16/24: In the interim, she continued with Zenia-CyBorD and so far has received 4 cycles. Her Cr has increased gradually and now at 1.90 on 5/14/24. UA showed large protein with hematuria, RBC 5 and WBC 8. Granular cast 8. Serum albumin improved slightly to 2.4. UPCR is stable at 16.04 on 5/8/24 and 24 hour urine protein was 17 g/d. On 4/19/24 Kappa 1.13, lambda 1.71 (8.31 upon diagnosis) with monoclonal protein IgG kappa 0.1 (could be zenia peak). Today, she does not feel good. She has several SE with chemo: feeling nausea, lightheadedness or dizziness. She has no diarrhea. Swelling has been better controlled.  She also has chest pain and just went to [facility].Cardiology thought that chest pain could be from hypotension so she was started on midodrine 2.5 mg for 1 week. She will start work-up next week for BMT. They plan to possibly do BMT on early June. Home BP are 107-117/60-70. She feels lightheadedness with exertion.    Past medical history  Past Medical History:   Diagnosis Date    Celiac disease     Family history of colon cancer     Colonoscoy q5 years next 9/2020     Past surgical history  Past Surgical  History:   Procedure Laterality Date    COLONOSCOPY N/A 9/28/2015    Procedure: COLONOSCOPY;  Surgeon: Eugenio Travis MD;  Location: [facility]    ESOPHAGOSCOPY, GASTROSCOPY, DUODENOSCOPY (EGD), COMBINED N/A 1/30/2024    Procedure: ESOPHAGOGASTRODUODENOSCOPY, WITH BIOPSY;  Surgeon: Melo Cloud MD;  Location: [facility]    IR RENAL BIOPSY RIGHT  12/21/2023    TONSILLECTOMY       Review of Systems:   14 systems were reviewed and all negative except as mentioned above.   Current Medications:  Current Outpatient Medications   Medication Sig Dispense Refill    acyclovir (ZOVIRAX) 400 MG tablet Take 1 tablet (400 mg) by mouth 2 times daily Viral Prophylaxis. 180 tablet 3    Albuterol-Budesonide (AIRSUPRA) 90-80 MCG/ACT AERO Inhale 2 Inhalations into the lungs every 4 hours as needed (Wheezing, chest tightness, shortness of breath, or persistent cough) 10.7 g 3    azelastine (ASTELIN) 0.1 % nasal spray Spray 2 sprays into both nostrils 2 times daily as needed for rhinitis 30 mL 3    cycloPHOSphamide (CYTOXAN) 50 MG capsule Take 10 capsules (500 mg) by mouth every 7 days Take on days 1, 8, 15, and 22. 40 capsule 0    dexAMETHasone (DECADRON) 4 MG tablet Take 40 mg (10 tablets) on days 8 and 22. 20 tablet 0    EPINEPHrine (ANY BX GENERIC EQUIV) 0.3 MG/0.3ML injection 2-pack Inject 0.3 mLs (0.3 mg) into the muscle as needed for anaphylaxis May repeat one time in 5-15 minutes if response to initial dose is inadequate. 2 each 3    fluticasone (FLONASE) 50 MCG/ACT nasal spray Spray 2 sprays into both nostrils daily 16 g 3    furosemide (LASIX) 20 MG tablet Take 2 tablets (40 mg) by mouth 2 times daily (Patient taking differently: Take 40 mg by mouth 2 times daily Taking 20 mg BID starting 04/10/24) 360 tablet 3    levothyroxine (SYNTHROID/LEVOTHROID) 200 MCG tablet Take 1 tablet (200 mcg) by mouth daily 90 tablet 3    levothyroxine (SYNTHROID/LEVOTHROID) 25 MCG tablet Take 1 tablet (25 mcg) by mouth daily      liothyronine  (CYTOMEL) 5 MCG tablet Take 5 mcg by mouth 2 times daily      midodrine (PROAMATINE) 10 MG tablet Take 1 tablet (10 mg) by mouth 3 times daily 270 tablet 1    ondansetron (ZOFRAN) 8 MG tablet Take 8 mg by mouth every 8 hours as needed for nausea      prochlorperazine (COMPAZINE) 5 MG tablet Take 1 tablet (5 mg) by mouth every 6 hours as needed for nausea or vomiting 30 tablet 1    rosuvastatin (CRESTOR) 10 MG tablet Take 1 tablet (10 mg) by mouth daily 90 tablet 3    spironolactone (ALDACTONE) 25 MG tablet Take 1 tablet (25 mg) by mouth daily 90 tablet 3    vitamin D3 (CHOLECALCIFEROL) 50 mcg (2000 units) tablet Take 1 tablet (50 mcg) by mouth daily 30 tablet 6     No current facility-administered medications for this visit.       Physical Exam:   /73   Pulse 89   Temp 98.7  F (37.1  C) (Oral)   Wt 73.9 kg (163 lb)   SpO2 96%   BMI 27.49 kg/m     Body mass index is 27.49 kg/m .    GENERAL APPEARANCE: Alert, not in acute distress  EYES:  Not pale conjunctiva, pupils equal  HENT: Mouth without ulcers or lesions  PULM: lungs clear to auscultation bilaterally, equal air movement, no clubbing  CV: regular rhythm, normal rate, no rub     -JVD no distended.      -edema: trace to 1+ pitting edema  GI: soft,  - tender, no distended, bowel sounds are present  INTEGUMENT: No rash  NEURO:  Non focal. No asterixis.     Labs:   All labs reviewed by me  Last Renal Panel:  Sodium   Date Value Ref Range Status   05/14/2024 137 135 - 145 mmol/L Final     Comment:     Reference intervals for this test were updated on 09/26/2023 to more accurately reflect our healthy population. There may be differences in the flagging of prior results with similar values performed with this method. Interpretation of those prior results can be made in the context of the updated reference intervals.    02/24/2021 140 133 - 144 mmol/L Final     Potassium   Date Value Ref Range Status   05/14/2024 3.9 3.4 - 5.3 mmol/L Final   12/19/2022 3.9 3.4  - 5.3 mmol/L Final   02/24/2021 4.3 3.4 - 5.3 mmol/L Final     Chloride   Date Value Ref Range Status   05/14/2024 101 98 - 107 mmol/L Final   12/19/2022 108 94 - 109 mmol/L Final   02/24/2021 107 94 - 109 mmol/L Final     Carbon Dioxide   Date Value Ref Range Status   02/24/2021 29 20 - 32 mmol/L Final     Carbon Dioxide (CO2)   Date Value Ref Range Status   05/14/2024 28 22 - 29 mmol/L Final   12/19/2022 33 (H) 20 - 32 mmol/L Final     Anion Gap   Date Value Ref Range Status   05/14/2024 8 7 - 15 mmol/L Final   12/19/2022 2 (L) 3 - 14 mmol/L Final   02/24/2021 4 3 - 14 mmol/L Final     Glucose   Date Value Ref Range Status   05/14/2024 90 70 - 99 mg/dL Final   05/14/2024 90 70 - 99 mg/dL Final   12/19/2022 96 70 - 99 mg/dL Final   02/24/2021 97 70 - 99 mg/dL Final     Urea Nitrogen   Date Value Ref Range Status   05/14/2024 25.5 (H) 6.0 - 20.0 mg/dL Final   12/19/2022 15 7 - 30 mg/dL Final   02/24/2021 16 7 - 30 mg/dL Final     Creatinine   Date Value Ref Range Status   05/14/2024 1.90 (H) 0.51 - 0.95 mg/dL Final   02/24/2021 0.80 0.52 - 1.04 mg/dL Final     GFR Estimate   Date Value Ref Range Status   05/14/2024 31 (L) >60 mL/min/1.73m2 Final   02/24/2021 85 >60 mL/min/[1.73_m2] Final     Comment:     Non  GFR Calc  Starting 12/18/2018, serum creatinine based estimated GFR (eGFR) will be   calculated using the Chronic Kidney Disease Epidemiology Collaboration   (CKD-EPI) equation.       Calcium   Date Value Ref Range Status   05/14/2024 10.0 8.6 - 10.0 mg/dL Final   02/24/2021 9.2 8.5 - 10.1 mg/dL Final     Phosphorus   Date Value Ref Range Status   05/14/2024 3.7 2.5 - 4.5 mg/dL Final     Albumin   Date Value Ref Range Status   05/14/2024 2.4 (L) 3.5 - 5.2 g/dL Final   12/19/2022 2.2 (L) 3.4 - 5.0 g/dL Final   02/24/2021 3.5 3.4 - 5.0 g/dL Final       Imaging:  I reviewed imaging studies.     Assessment & Recommendations:   Problem list  # Lambda LC AL involved: kidneys, cardiac,  BmBx; stage I  based on Hall 2012 staging s/p Zenia-CyBorD C1D1 1/26/24; now in at least VGPR  # Renal proven AL amyloidosis  # Nephrotic syndrome  # Borderline Hypotension  # Family Hx of MM and MDS (maternal aunt and uncle) and HL (paternal uncle)  # CKD stage 3 2/2 lambda LC AL  The patient presented with fatigue and swelling since 2022. Noted hypoalbuminemia since 12/22. UPCR 12.5g with Salb 2.2 mg/dl. Cr 1.1 with eGFR 59. She has kidney Bx which showed lambda AL involved mainly glomeruli, some mesangium and vessels. Of note kidney sizes are normal on US.  Echo 1/3/23 showed mild LVH and RVH with strained pattern suggestive of possible amyloidosis.  She is at stage I per HALL 2012 classification. She has bone marrow biopsy done which showed lambda light chain restricted plasma cells less than 5%.  She was started on Zenia CyBorD C1 D1 on 1/26/24. Now her LC has come down and attained at least VGPR.  He still has positive immunofixation but that was IgG kappa which could be from Zenia. If IgG lambda was negative and negative monofixation in the urine then she would be having CR.  Now she is approved for ASCT and she hao start the work-uo process in late May 2024.     For treatment of nephrotic syndrome, she was on lasix 20 mg daily and lisinopril 2.5 mg daily.  Lisinopril was discontinued due to dizziness.  And cardiology has adjusted medication and increase Lasix to 40 mg twice a day.  She was also started on Jardiance 10 mg daily on 2/3/2024 and spironolactone on 2/28/24.  I noticed that her creatinine has increased from 1-1.1 to 1.2-1.4.  I doubt that this is from progression of AL amyloidosis given significant improvement in hematological parameters.  This is likely due to hemodynamics effects of Jardiance and spironolactone and increasing Lasix.  This regimen has improved her swelling so I plan not to change any regimen at this time.  Since last visit her Cr has increased to ~ 1.5 and now 1.9 on 5/14. UA showed some pyuria  but no UTI symptoms. Urine protein still the same at 17 g/day but serum albumin has improved. I strongly suspected that her CHRIS is mostly from hemodynamic so will STOP jardiance at this time. In any case, evidence of SGLT2i is not as strong as all landmark trials have excluded these patients. A study from [facility] [place] presented at [facility] 2023 as an oral presentation showed that in cancer patients, SGLT2 increased risk of UTI and perhaps CHRIS. I will maintain lasix at 20 mg BID and spironolactone 25 mg daily for now.     - Repeat renal panel in 1 week after jardiance, if Cr improve next week, will increase lasix again; of note, she still responds well with lasix but if ever reasons, she does not, I am not hesitant to switch her to Bumex  -Currently on Lasix 20 mg twice daily, spironolactone 25 mg daily  - Stop Jardiance as mentioned  # Hypothyroidism  TPO ab is positive so Dx with Hashimoto's. On thyroid supplement presently. Qondering wheter this could be from amyloid as well.   # Celiac disease Bx proven  She had EGD but no Amyloid presence.   # Vitamin D deficiency  # Rising calcium  Vit D 7 on 1/8/24. She is on on Vit D 5000 U daily. Calcium was 10 but corrected calcium is 11.28! So I asked her to reduce vitamin D to 2000 U daily. Stay well hydrated.     Follow-up in 2 months with labs    The longitudinal plan of care for Renal AL, nephrotic syndrome, hypotension, low Vit D, CKD 3 was addressed during this visit. Due to the added complexity in care, I will continue to support Vivian Brown  in the subsequent management of this condition(s) and with the ongoing continuity of care of this condition(s).    I spent  42 minutes on the date of the encounter doing chart review, history and exam, documentation and further activities as noted above. 20 minutes of this visit is dedicated to direct patient interaction via face to face.     Tracie Dobson MD on 05/16/2024

## 2024-05-16 NOTE — NURSING NOTE
Chief Complaint   Patient presents with    RECHECK     Follow up.      Vitals:    05/16/24 1505 05/16/24 1511 05/16/24 1512 05/16/24 1513   BP: 116/75 115/73 106/70 112/73   BP Location: Left arm      Patient Position: Sitting      Cuff Size: Adult Regular      Pulse: 89      Temp: 98.7  F (37.1  C)      TempSrc: Oral      SpO2: 96%      Weight: 73.9 kg (163 lb)          BP Readings from Last 3 Encounters:   05/16/24 112/73   05/16/24 114/77   05/08/24 109/77       /73   Pulse 89   Temp 98.7  F (37.1  C) (Oral)   Wt 73.9 kg (163 lb)   SpO2 96%   BMI 27.49 kg/m       Sunshmanasa shea

## 2024-05-16 NOTE — PATIENT INSTRUCTIONS
Stop Jardiance  Check labs next week on 5/24  If labs improved then will increase lasix to 40-20  See you in 2 months  Stay well hydrated

## 2024-05-16 NOTE — TELEPHONE ENCOUNTER
Date: 5/16/2024    Time of Call: 2:35 PM     Diagnosis:  HF      [ VORB ] Ordering provider: Dr. Armstrong    Order:   NM Stress Test is normal, no further testing needed.      Order received by: Loida Germain RN       Follow-up/additional notes:   Called pt and left message with normal NM Stress results and to please call back clinic to see how she is feeling since starting Midodrine.

## 2024-05-16 NOTE — LETTER
5/16/2024       RE: Vivian Brown  [address] [facility] [address]     Dear Colleague,    Thank you for referring your patient, Vivian Brown, to the [facility] [place] at [facility]. Please see a copy of my visit note below.      Nephrology Progress Note  05/16/2024   Chief complaint: Follow up AL amyloidosis  History of Present Illness:    Vivian Brown is a 54 year old F  with hypothyroidism, who is here for AL amyloidosis consult.     The patient initially presented with fatigue and LE edema for about 2 years. Initially, she was diagnosed with Hashimoto's and was started on thyroid supplement in August 2023. Symptoms has partially improved. Later on she was found to have hypoalbuminemia dn massiv eproteinuria hence was referred to Dr. Sen. She saw Dr. Sen in November 2023 and was started on Lisinopril 2.5 mg and lasix 20 mg per day. I 1 week, she has lsot 20 Lbs and swelling has been better and remained in control.  She underwent a kidney Bx on 12/21/23. The Bx read by Dr. Garcia which showed Amyloidosis of the kidney, AL-type, lambda light chain-restricted, affecting the glomeruli, interstitium, and arterioles. She has mild chronic changes of the parenchyma, including: focal global glomerulosclerosis (3% of glomeruli), focal tubular atrophy and interstitial fibrosis (5% of the cortex), severe arterial sclerosis.   LM: There were 37 glomeruli (LM-29; IF-7; EM-1), of which 1 is globally sclerosed. The non-sclerosed glomeruli are of normal size and cellularity. Significant endocapillary proliferation or cellular crescents are not seen in the glomeruli. The glomeruli show deposits of amorphous, homogeneous, acellular, eosinophilic material that infiltrates and expands the mesangial areas and extends down the capillaries of the glomerular tuft. Tubules are well-preserved and show no signs of acute injury. Obvious  signs of a viral cytopathic effect are not seen in the sample. No oxalate, urate, or phosphate crystals are present in the sample. The interstitium reveals occasional collection of foam cells and occasional deposits of amorphous material. The interstitium contains mild interstitial inflammation in areas of atrophy, and the infiltrates are composed of mononuclear cells. About 5% of the renal cortex shows tubular atrophy and interstitial fibrosis. Arteries show severe sclerosis. Examination of a Congo red-stained section reveals deposits of amorphous material with apple-green birefringence under polarized light, in all glomeruli and focally in arterioles and interstitium. EM: showed 1 glomerulus; 1 glomerulus is examined ultrastructurally. There is prominent deposition of fibrillary material in the mesangial areas, without significant hypercellularity.  There is focal effacement of visceral epithelial cell foot processes in areas of the glomerular basement membranes that show segmental widening of the subepithelial space and distortion of the lamina densa due to deposits of fibrillary material.  The mesangial and basement membrane deposits consist of non-branching, randomly arranged fibrils of 6 to 15 nm width. Glomerular endothelial cells show focal loss of their normal fenestrations. Tubules show no specific changes. Deposits are Congo red positive. The amyloid deposits affect the glomeruli extensively, and interstitium and arterioles focally. Other potential complications of paraproteins in the kidney are not seen, in particular, there is no evidence of light chain cast nephropathy, light chain deposition disease, or light chain tubulopathy.      For kidney function, she has baseline Cr of 0.9 in 12/19/22. However, Cr started to increase to 1.05 in 5/22/23 and now 1.11 on 11/28/23. Serum albumin was 3.5 in 2/24/21 and 2.2 in 12/19/22. Monoclonal work-up on 11/28/23 showed kappa 3.05, lambda 7.82 and K/L ratio 0.39  "and L/K ratio 2.56. dFLC 4.77. Other serologies including MPO, PMND1 were negative. UPCR 12.50 mg/g. No 24 hr urine protein yet. UA showed small blood but no RBC. She also has LCL of 174. TSH 29.16 and + Tissue TTG.      She has Echo on 1/3/24. There is mild concentric left ventricular hypertrophy and borderline RVH. Global peak LV longitudinal strain is averaged at -15.3%. This suggests abnormal strain (normal <-18%).The visual ejection fraction is estimated at 54-56%.Trivial posterior pericardial effusion Clinical history is listed as renal amyloid--on this echo the atria are normal size, the valves are not thickend but there is mild LVH and borderline RVH, there is questionable \"scintillation\" of LV myocardium, the global longitudinal strain is abnormal and the best strain values are at the apex (borderline \"apical sparing\" strain pattern) and there is trace pericardial effusion along with borderline criteria for diastolic dysfunction grade2--take together this could represent some features of cardiac amyloid. Additional studies such as cardiac MRI, cardiac biopsy etc may be useful. She is scheduled to see Cardiology on 1/31/24.      1/8/24: I saw her for consultation via video. She is with her  at home. Still feels fatigue and upset stomach last night.  She is currently on furosemide 20 mg daily, lisinopril 2.5 mg daily and crestor 10 mg daily. She reports having lower extremities edema for about 2 years. She was diagnosed with Hashimoto's in August 2023.  She has been feeling better after started taking thyroid supplement. She has leg swelling but not as bad as before. Furosemide was started in December 2023 and she has lost 20 Lbs wit that. She has seen more bubbly in the urineShe sometimes feels lightheadedness and dizziness and her BP was low sometimes. However, mostly > BP /60s. HR in the 80s.  BP today is 114/76, HR is 76.  She has no problem with swallowing, numbness or tingling sensation. No " CTS symptoms. No tongue enlargement. No periorbital ecchymoses. No easy bruising. She has no other medical problem before this. She has been active and exercise at least twice a week.   3/18/24: In the interim, she underwent BmBx on 1/18/24 which showed  lambda restricted plasma cell, 5%. Congo red positive in the BmBx. She was started on Zenia-CyBorD, C1D1 on 1/26/24. Now s/p 2 cycles, and will start 3rd cycle soon. ASCT is plan on 1st week of Vijaya. In the interim, she was started on Jardiance 2/3/24 and spironolactone on 2/29/24. She does not feel lightheadedness and dizziness. Lisinopril was on hold because of dizziness. Lasix was increased to 40 mg BID on early February. Overall she feels better.  She denies any lightheadedness or dizziness.  She intermittently has numbness and tingling sensation around her thoracic cage typically after the chemo but it usually disappear within 1 week.  Labs on 3/18/24 show creatinine 1.41, BUN 21.1, bicarb 32, potassium 3.7, bicarb 32. Albumin 2.3.  White blood cell 10.8, hemoglobin 13.5, platelet 333. UA showed small blood, glucose 150 and protein 300. UPCR and UACR pending.   5/16/24: In the interim, she continued with Zenia-CyBorD and so far has received 4 cycles. Her Cr has increased gradually and now at 1.90 on 5/14/24. UA showed large protein with hematuria, RBC 5 and WBC 8. Granular cast 8. Serum albumin improved slightly to 2.4. UPCR is stable at 16.04 on 5/8/24 and 24 hour urine protein was 17 g/d. On 4/19/24 Kappa 1.13, lambda 1.71 (8.31 upon diagnosis) with monoclonal protein IgG kappa 0.1 (could be zenia peak). Today, she does not feel good. She has several SE with chemo: feeling nausea, lightheadedness or dizziness. She has no diarrhea. Swelling has been better controlled.  She also has chest pain and just went to [facility].Cardiology thought that chest pain could be from hypotension so she was started on midodrine 2.5 mg for 1 week. She will start work-up next week  for BMT. They plan to possibly do BMT on early June. Home BP are 107-117/60-70. She feels lightheadedness with exertion.    Past medical history  Past Medical History:   Diagnosis Date    Celiac disease     Family history of colon cancer     Colonoscoy q5 years next 9/2020     Past surgical history  Past Surgical History:   Procedure Laterality Date    COLONOSCOPY N/A 9/28/2015    Procedure: COLONOSCOPY;  Surgeon: Eugenio Travis MD;  Location: [facility]    ESOPHAGOSCOPY, GASTROSCOPY, DUODENOSCOPY (EGD), COMBINED N/A 1/30/2024    Procedure: ESOPHAGOGASTRODUODENOSCOPY, WITH BIOPSY;  Surgeon: Melo Cloud MD;  Location: [facility]    IR RENAL BIOPSY RIGHT  12/21/2023    TONSILLECTOMY       Review of Systems:   14 systems were reviewed and all negative except as mentioned above.   Current Medications:  Current Outpatient Medications   Medication Sig Dispense Refill    acyclovir (ZOVIRAX) 400 MG tablet Take 1 tablet (400 mg) by mouth 2 times daily Viral Prophylaxis. 180 tablet 3    Albuterol-Budesonide (AIRSUPRA) 90-80 MCG/ACT AERO Inhale 2 Inhalations into the lungs every 4 hours as needed (Wheezing, chest tightness, shortness of breath, or persistent cough) 10.7 g 3    azelastine (ASTELIN) 0.1 % nasal spray Spray 2 sprays into both nostrils 2 times daily as needed for rhinitis 30 mL 3    cycloPHOSphamide (CYTOXAN) 50 MG capsule Take 10 capsules (500 mg) by mouth every 7 days Take on days 1, 8, 15, and 22. 40 capsule 0    dexAMETHasone (DECADRON) 4 MG tablet Take 40 mg (10 tablets) on days 8 and 22. 20 tablet 0    EPINEPHrine (ANY BX GENERIC EQUIV) 0.3 MG/0.3ML injection 2-pack Inject 0.3 mLs (0.3 mg) into the muscle as needed for anaphylaxis May repeat one time in 5-15 minutes if response to initial dose is inadequate. 2 each 3    fluticasone (FLONASE) 50 MCG/ACT nasal spray Spray 2 sprays into both nostrils daily 16 g 3    furosemide (LASIX) 20 MG tablet Take 2 tablets (40 mg) by mouth 2 times daily (Patient  taking differently: Take 40 mg by mouth 2 times daily Taking 20 mg BID starting 04/10/24) 360 tablet 3    levothyroxine (SYNTHROID/LEVOTHROID) 200 MCG tablet Take 1 tablet (200 mcg) by mouth daily 90 tablet 3    levothyroxine (SYNTHROID/LEVOTHROID) 25 MCG tablet Take 1 tablet (25 mcg) by mouth daily      liothyronine (CYTOMEL) 5 MCG tablet Take 5 mcg by mouth 2 times daily      midodrine (PROAMATINE) 10 MG tablet Take 1 tablet (10 mg) by mouth 3 times daily 270 tablet 1    ondansetron (ZOFRAN) 8 MG tablet Take 8 mg by mouth every 8 hours as needed for nausea      prochlorperazine (COMPAZINE) 5 MG tablet Take 1 tablet (5 mg) by mouth every 6 hours as needed for nausea or vomiting 30 tablet 1    rosuvastatin (CRESTOR) 10 MG tablet Take 1 tablet (10 mg) by mouth daily 90 tablet 3    spironolactone (ALDACTONE) 25 MG tablet Take 1 tablet (25 mg) by mouth daily 90 tablet 3    vitamin D3 (CHOLECALCIFEROL) 50 mcg (2000 units) tablet Take 1 tablet (50 mcg) by mouth daily 30 tablet 6     No current facility-administered medications for this visit.       Physical Exam:   /73   Pulse 89   Temp 98.7  F (37.1  C) (Oral)   Wt 73.9 kg (163 lb)   SpO2 96%   BMI 27.49 kg/m     Body mass index is 27.49 kg/m .    GENERAL APPEARANCE: Alert, not in acute distress  EYES:  Not pale conjunctiva, pupils equal  HENT: Mouth without ulcers or lesions  PULM: lungs clear to auscultation bilaterally, equal air movement, no clubbing  CV: regular rhythm, normal rate, no rub     -JVD no distended.      -edema: trace to 1+ pitting edema  GI: soft,  - tender, no distended, bowel sounds are present  INTEGUMENT: No rash  NEURO:  Non focal. No asterixis.     Labs:   All labs reviewed by me  Last Renal Panel:  Sodium   Date Value Ref Range Status   05/14/2024 137 135 - 145 mmol/L Final     Comment:     Reference intervals for this test were updated on 09/26/2023 to more accurately reflect our healthy population. There may be differences in the  flagging of prior results with similar values performed with this method. Interpretation of those prior results can be made in the context of the updated reference intervals.    02/24/2021 140 133 - 144 mmol/L Final     Potassium   Date Value Ref Range Status   05/14/2024 3.9 3.4 - 5.3 mmol/L Final   12/19/2022 3.9 3.4 - 5.3 mmol/L Final   02/24/2021 4.3 3.4 - 5.3 mmol/L Final     Chloride   Date Value Ref Range Status   05/14/2024 101 98 - 107 mmol/L Final   12/19/2022 108 94 - 109 mmol/L Final   02/24/2021 107 94 - 109 mmol/L Final     Carbon Dioxide   Date Value Ref Range Status   02/24/2021 29 20 - 32 mmol/L Final     Carbon Dioxide (CO2)   Date Value Ref Range Status   05/14/2024 28 22 - 29 mmol/L Final   12/19/2022 33 (H) 20 - 32 mmol/L Final     Anion Gap   Date Value Ref Range Status   05/14/2024 8 7 - 15 mmol/L Final   12/19/2022 2 (L) 3 - 14 mmol/L Final   02/24/2021 4 3 - 14 mmol/L Final     Glucose   Date Value Ref Range Status   05/14/2024 90 70 - 99 mg/dL Final   05/14/2024 90 70 - 99 mg/dL Final   12/19/2022 96 70 - 99 mg/dL Final   02/24/2021 97 70 - 99 mg/dL Final     Urea Nitrogen   Date Value Ref Range Status   05/14/2024 25.5 (H) 6.0 - 20.0 mg/dL Final   12/19/2022 15 7 - 30 mg/dL Final   02/24/2021 16 7 - 30 mg/dL Final     Creatinine   Date Value Ref Range Status   05/14/2024 1.90 (H) 0.51 - 0.95 mg/dL Final   02/24/2021 0.80 0.52 - 1.04 mg/dL Final     GFR Estimate   Date Value Ref Range Status   05/14/2024 31 (L) >60 mL/min/1.73m2 Final   02/24/2021 85 >60 mL/min/[1.73_m2] Final     Comment:     Non  GFR Calc  Starting 12/18/2018, serum creatinine based estimated GFR (eGFR) will be   calculated using the Chronic Kidney Disease Epidemiology Collaboration   (CKD-EPI) equation.       Calcium   Date Value Ref Range Status   05/14/2024 10.0 8.6 - 10.0 mg/dL Final   02/24/2021 9.2 8.5 - 10.1 mg/dL Final     Phosphorus   Date Value Ref Range Status   05/14/2024 3.7 2.5 - 4.5 mg/dL  Final     Albumin   Date Value Ref Range Status   05/14/2024 2.4 (L) 3.5 - 5.2 g/dL Final   12/19/2022 2.2 (L) 3.4 - 5.0 g/dL Final   02/24/2021 3.5 3.4 - 5.0 g/dL Final       Imaging:  I reviewed imaging studies.     Assessment & Recommendations:   Problem list  # Lambda LC AL involved: kidneys, cardiac,  BmBx; stage I based on Hall 2012 staging s/p Zenia-CyBorD C1D1 1/26/24; now in at least VGPR  # Renal proven AL amyloidosis  # Nephrotic syndrome  # Borderline Hypotension  # Family Hx of MM and MDS (maternal aunt and uncle) and HL (paternal uncle)  # CKD stage 3 2/2 lambda LC AL  The patient presented with fatigue and swelling since 2022. Noted hypoalbuminemia since 12/22. UPCR 12.5g with Salb 2.2 mg/dl. Cr 1.1 with eGFR 59. She has kidney Bx which showed lambda AL involved mainly glomeruli, some mesangium and vessels. Of note kidney sizes are normal on US.  Echo 1/3/23 showed mild LVH and RVH with strained pattern suggestive of possible amyloidosis.  She is at stage I per HALL 2012 classification. She has bone marrow biopsy done which showed lambda light chain restricted plasma cells less than 5%.  She was started on Zenia CyBorD C1 D1 on 1/26/24. Now her LC has come down and attained at least VGPR.  He still has positive immunofixation but that was IgG kappa which could be from Zenia. If IgG lambda was negative and negative monofixation in the urine then she would be having CR.  Now she is approved for ASCT and she hao start the work-uo process in late May 2024.     For treatment of nephrotic syndrome, she was on lasix 20 mg daily and lisinopril 2.5 mg daily.  Lisinopril was discontinued due to dizziness.  And cardiology has adjusted medication and increase Lasix to 40 mg twice a day.  She was also started on Jardiance 10 mg daily on 2/3/2024 and spironolactone on 2/28/24.  I noticed that her creatinine has increased from 1-1.1 to 1.2-1.4.  I doubt that this is from progression of AL amyloidosis given significant  improvement in hematological parameters.  This is likely due to hemodynamics effects of Jardiance and spironolactone and increasing Lasix.  This regimen has improved her swelling so I plan not to change any regimen at this time.  Since last visit her Cr has increased to ~ 1.5 and now 1.9 on 5/14. UA showed some pyuria but no UTI symptoms. Urine protein still the same at 17 g/day but serum albumin has improved. I strongly suspected that her CHRIS is mostly from hemodynamic so will STOP jardiance at this time. In any case, evidence of SGLT2i is not as strong as all landmark trials have excluded these patients. A study from [facility] presented at [facility] 2023 as an oral presentation showed that in cancer patients, SGLT2 increased risk of UTI and perhaps CHRIS. I will maintain lasix at 20 mg BID and spironolactone 25 mg daily for now.     - Repeat renal panel in 1 week after jardiance, if Cr improve next week, will increase lasix again; of note, she still responds well with lasix but if ever reasons, she does not, I am not hesitant to switch her to Bumex  -Currently on Lasix 20 mg twice daily, spironolactone 25 mg daily  - Stop Jardiance as mentioned  # Hypothyroidism  TPO ab is positive so Dx with Hashimoto's. On thyroid supplement presently. Qondering wheter this could be from amyloid as well.   # Celiac disease Bx proven  She had EGD but no Amyloid presence.   # Vitamin D deficiency  # Rising calcium  Vit D 7 on 1/8/24. She is on on Vit D 5000 U daily. Calcium was 10 but corrected calcium is 11.28! So I asked her to reduce vitamin D to 2000 U daily. Stay well hydrated.     Follow-up in 2 months with labs    The longitudinal plan of care for Renal AL, nephrotic syndrome, hypotension, low Vit D, CKD 3 was addressed during this visit. Due to the added complexity in care, I will continue to support Vivian Brown  in the subsequent management of this condition(s) and with the ongoing continuity of care of this condition(s).    I  spent  42 minutes on the date of the encounter doing chart review, history and exam, documentation and further activities as noted above. 20 minutes of this visit is dedicated to direct patient interaction via face to face.     Tracie Dobson MD on 05/16/2024      She gets chemo on last Wednesday. She has been feeling nausea, lightheadedness or dizziness. She has no diarrhea. Swelling has been better controlled.  She also has chest pain and just went to [facility]. She will start work-up next week for BMT. They plan to possibly do BMT on early June. Home BP are 107-117/60-70. She feels lightheadedness with exertion.      Again, thank you for allowing me to participate in the care of your patient.      Sincerely,    Tracie Dobson MD

## 2024-05-20 DIAGNOSIS — E85.81 AL AMYLOIDOSIS (H): Primary | ICD-10-CM

## 2024-05-20 LAB
ABO/RH(D): ABNORMAL
ANTIBODY SCREEN: POSITIVE
SPECIMEN EXPIRATION DATE: ABNORMAL

## 2024-05-20 RX ORDER — HEPARIN SODIUM (PORCINE) LOCK FLUSH IV SOLN 100 UNIT/ML 100 UNIT/ML
5 SOLUTION INTRAVENOUS
Status: CANCELLED | OUTPATIENT
Start: 2024-05-21

## 2024-05-20 RX ORDER — HEPARIN SODIUM,PORCINE 10 UNIT/ML
5-20 VIAL (ML) INTRAVENOUS DAILY PRN
Status: CANCELLED | OUTPATIENT
Start: 2024-05-21

## 2024-05-21 ENCOUNTER — LAB (OUTPATIENT)
Dept: LAB | Facility: CLINIC | Age: 54
End: 2024-05-21
Payer: COMMERCIAL

## 2024-05-21 ENCOUNTER — ALLIED HEALTH/NURSE VISIT (OUTPATIENT)
Dept: TRANSPLANT | Facility: CLINIC | Age: 54
End: 2024-05-21
Attending: STUDENT IN AN ORGANIZED HEALTH CARE EDUCATION/TRAINING PROGRAM
Payer: COMMERCIAL

## 2024-05-21 ENCOUNTER — MEDICAL CORRESPONDENCE (OUTPATIENT)
Dept: TRANSPLANT | Facility: CLINIC | Age: 54
End: 2024-05-21

## 2024-05-21 ENCOUNTER — OFFICE VISIT (OUTPATIENT)
Dept: PULMONOLOGY | Facility: CLINIC | Age: 54
End: 2024-05-21
Payer: COMMERCIAL

## 2024-05-21 ENCOUNTER — APPOINTMENT (OUTPATIENT)
Dept: LAB | Facility: CLINIC | Age: 54
End: 2024-05-21
Attending: STUDENT IN AN ORGANIZED HEALTH CARE EDUCATION/TRAINING PROGRAM
Payer: COMMERCIAL

## 2024-05-21 VITALS
RESPIRATION RATE: 16 BRPM | WEIGHT: 164.1 LBS | HEART RATE: 73 BPM | SYSTOLIC BLOOD PRESSURE: 127 MMHG | BODY MASS INDEX: 27.67 KG/M2 | DIASTOLIC BLOOD PRESSURE: 82 MMHG | OXYGEN SATURATION: 97 % | TEMPERATURE: 98.4 F

## 2024-05-21 DIAGNOSIS — Z01.818 EXAMINATION PRIOR TO CHEMOTHERAPY: ICD-10-CM

## 2024-05-21 DIAGNOSIS — Z71.9 VISIT FOR COUNSELING: Primary | ICD-10-CM

## 2024-05-21 DIAGNOSIS — E85.81 AL AMYLOIDOSIS (H): Primary | ICD-10-CM

## 2024-05-21 DIAGNOSIS — E85.9 AMYLOIDOSIS, UNSPECIFIED (H): ICD-10-CM

## 2024-05-21 DIAGNOSIS — Z86.2 PERSONAL HISTORY OF DISEASES OF BLOOD AND BLOOD-FORMING ORGANS: ICD-10-CM

## 2024-05-21 DIAGNOSIS — E85.81 AL AMYLOIDOSIS (H): ICD-10-CM

## 2024-05-21 LAB
ALBUMIN SERPL BCG-MCNC: 2.3 G/DL (ref 3.5–5.2)
ALBUMIN UR-MCNC: 600 MG/DL
ALP SERPL-CCNC: 91 U/L (ref 40–150)
ALT SERPL W P-5'-P-CCNC: 24 U/L (ref 0–50)
ANION GAP SERPL CALCULATED.3IONS-SCNC: 7 MMOL/L (ref 7–15)
ANTIBODY ID: NORMAL
ANTIBODY SCREEN, TUBE: NORMAL
APPEARANCE UR: CLEAR
APTT PPP: 27 SECONDS (ref 22–38)
AST SERPL W P-5'-P-CCNC: 24 U/L (ref 0–45)
BASOPHILS # BLD AUTO: 0.2 10E3/UL (ref 0–0.2)
BASOPHILS NFR BLD AUTO: 2 %
BILIRUB SERPL-MCNC: 0.2 MG/DL
BILIRUB UR QL STRIP: NEGATIVE
BMT WORKUP IRRADIATED BLOOD REQUIRED: NORMAL
BUN SERPL-MCNC: 21 MG/DL (ref 6–20)
CALCIUM SERPL-MCNC: 9.3 MG/DL (ref 8.6–10)
CHLORIDE SERPL-SCNC: 103 MMOL/L (ref 98–107)
COLOR UR AUTO: ABNORMAL
CREAT SERPL-MCNC: 1.49 MG/DL (ref 0.51–0.95)
DEPRECATED HCO3 PLAS-SCNC: 25 MMOL/L (ref 22–29)
EGFRCR SERPLBLD CKD-EPI 2021: 41 ML/MIN/1.73M2
EOSINOPHIL # BLD AUTO: 0.2 10E3/UL (ref 0–0.7)
EOSINOPHIL NFR BLD AUTO: 2 %
ERYTHROCYTE [DISTWIDTH] IN BLOOD BY AUTOMATED COUNT: 17 % (ref 10–15)
GLUCOSE SERPL-MCNC: 104 MG/DL (ref 70–99)
GLUCOSE UR STRIP-MCNC: 30 MG/DL
HCG UR QL: NEGATIVE
HCT VFR BLD AUTO: 35.1 % (ref 35–47)
HGB BLD-MCNC: 11.8 G/DL (ref 11.7–15.7)
HGB UR QL STRIP: ABNORMAL
IMM GRANULOCYTES # BLD: 0 10E3/UL
IMM GRANULOCYTES NFR BLD: 0 %
INR PPP: 0.94 (ref 0.85–1.15)
KETONES UR STRIP-MCNC: NEGATIVE MG/DL
LDH SERPL L TO P-CCNC: NORMAL U/L
LEUKOCYTE ESTERASE UR QL STRIP: NEGATIVE
LYMPHOCYTES # BLD AUTO: 1.5 10E3/UL (ref 0.8–5.3)
LYMPHOCYTES NFR BLD AUTO: 16 %
MAGNESIUM SERPL-MCNC: 2 MG/DL (ref 1.7–2.3)
MCH RBC QN AUTO: 30.6 PG (ref 26.5–33)
MCHC RBC AUTO-ENTMCNC: 33.6 G/DL (ref 31.5–36.5)
MCV RBC AUTO: 91 FL (ref 78–100)
MONOCYTES # BLD AUTO: 0.8 10E3/UL (ref 0–1.3)
MONOCYTES NFR BLD AUTO: 9 %
MUCOUS THREADS #/AREA URNS LPF: PRESENT /LPF
NEUTROPHILS # BLD AUTO: 6.7 10E3/UL (ref 1.6–8.3)
NEUTROPHILS NFR BLD AUTO: 71 %
NITRATE UR QL: NEGATIVE
NRBC # BLD AUTO: 0 10E3/UL
NRBC BLD AUTO-RTO: 0 /100
NT-PROBNP SERPL-MCNC: 518 PG/ML (ref 0–900)
PH UR STRIP: 7.5 [PH] (ref 5–7)
PHOSPHATE SERPL-MCNC: 3.8 MG/DL (ref 2.5–4.5)
PLATELET # BLD AUTO: 525 10E3/UL (ref 150–450)
POTASSIUM SERPL-SCNC: 3.8 MMOL/L (ref 3.4–5.3)
PROT SERPL-MCNC: 4.6 G/DL (ref 6.4–8.3)
RBC # BLD AUTO: 3.85 10E6/UL (ref 3.8–5.2)
RBC URINE: 1 /HPF
SODIUM SERPL-SCNC: 135 MMOL/L (ref 135–145)
SP GR UR STRIP: 1.01 (ref 1–1.03)
SPECIMEN EXPIRATION DATE: NORMAL
TROPONIN T SERPL HS-MCNC: 14 NG/L
URATE SERPL-MCNC: 4.2 MG/DL (ref 2.4–5.7)
UROBILINOGEN UR STRIP-MCNC: NORMAL MG/DL
WBC # BLD AUTO: 9.4 10E3/UL (ref 4–11)
WBC URINE: 2 /HPF

## 2024-05-21 PROCEDURE — 81025 URINE PREGNANCY TEST: CPT

## 2024-05-21 PROCEDURE — 86870 RBC ANTIBODY IDENTIFICATION: CPT

## 2024-05-21 PROCEDURE — 87516 HEPATITIS B DNA AMP PROBE: CPT

## 2024-05-21 PROCEDURE — 93010 ELECTROCARDIOGRAM REPORT: CPT | Performed by: INTERNAL MEDICINE

## 2024-05-21 PROCEDURE — 83521 IG LIGHT CHAINS FREE EACH: CPT

## 2024-05-21 PROCEDURE — 82784 ASSAY IGA/IGD/IGG/IGM EACH: CPT

## 2024-05-21 PROCEDURE — 86850 RBC ANTIBODY SCREEN: CPT

## 2024-05-21 PROCEDURE — 86900 BLOOD TYPING SEROLOGIC ABO: CPT

## 2024-05-21 PROCEDURE — 86696 HERPES SIMPLEX TYPE 2 TEST: CPT

## 2024-05-21 PROCEDURE — 81001 URINALYSIS AUTO W/SCOPE: CPT

## 2024-05-21 PROCEDURE — 82785 ASSAY OF IGE: CPT

## 2024-05-21 PROCEDURE — 83735 ASSAY OF MAGNESIUM: CPT

## 2024-05-21 PROCEDURE — 36415 COLL VENOUS BLD VENIPUNCTURE: CPT

## 2024-05-21 PROCEDURE — 86753 PROTOZOA ANTIBODY NOS: CPT

## 2024-05-21 PROCEDURE — 85610 PROTHROMBIN TIME: CPT

## 2024-05-21 PROCEDURE — 93005 ELECTROCARDIOGRAM TRACING: CPT

## 2024-05-21 PROCEDURE — 82306 VITAMIN D 25 HYDROXY: CPT

## 2024-05-21 PROCEDURE — 84550 ASSAY OF BLOOD/URIC ACID: CPT

## 2024-05-21 PROCEDURE — 84484 ASSAY OF TROPONIN QUANT: CPT

## 2024-05-21 PROCEDURE — 85025 COMPLETE CBC W/AUTO DIFF WBC: CPT

## 2024-05-21 PROCEDURE — 94729 DIFFUSING CAPACITY: CPT | Performed by: INTERNAL MEDICINE

## 2024-05-21 PROCEDURE — 82232 ASSAY OF BETA-2 PROTEIN: CPT

## 2024-05-21 PROCEDURE — 86695 HERPES SIMPLEX TYPE 1 TEST: CPT

## 2024-05-21 PROCEDURE — 84155 ASSAY OF PROTEIN SERUM: CPT

## 2024-05-21 PROCEDURE — 84100 ASSAY OF PHOSPHORUS: CPT

## 2024-05-21 PROCEDURE — 83880 ASSAY OF NATRIURETIC PEPTIDE: CPT

## 2024-05-21 PROCEDURE — 94375 RESPIRATORY FLOW VOLUME LOOP: CPT | Performed by: INTERNAL MEDICINE

## 2024-05-21 PROCEDURE — 94726 PLETHYSMOGRAPHY LUNG VOLUMES: CPT | Performed by: INTERNAL MEDICINE

## 2024-05-21 PROCEDURE — 84165 PROTEIN E-PHORESIS SERUM: CPT | Mod: TC | Performed by: PATHOLOGY

## 2024-05-21 PROCEDURE — 86665 EPSTEIN-BARR CAPSID VCA: CPT

## 2024-05-21 PROCEDURE — 83020 HEMOGLOBIN ELECTROPHORESIS: CPT

## 2024-05-21 PROCEDURE — 85730 THROMBOPLASTIN TIME PARTIAL: CPT

## 2024-05-21 PROCEDURE — 83615 LACTATE (LD) (LDH) ENZYME: CPT

## 2024-05-21 PROCEDURE — 82247 BILIRUBIN TOTAL: CPT

## 2024-05-21 PROCEDURE — 86777 TOXOPLASMA ANTIBODY: CPT

## 2024-05-21 PROCEDURE — 84165 PROTEIN E-PHORESIS SERUM: CPT | Mod: 26 | Performed by: PATHOLOGY

## 2024-05-21 ASSESSMENT — PAIN SCALES - GENERAL: PAINLEVEL: NO PAIN (0)

## 2024-05-21 NOTE — LETTER
5/21/2024         RE: Vivian Brown  [address] [facility] [address]        Dear Colleague,    Thank you for referring your patient, Vivian Brown, to the [facility] BLOOD AND MARROW TRANSPLANT PROGRAM [place]. Please see a copy of my visit note below.    BMT Teaching Flowsheet    Vivian Brown is a 54 year old female  The encounter diagnosis was AL amyloidosis (H).    Teaching Topic: 2016-35 Calendar Review and Teach    Person(s) involved in teaching: Patient and Mother    Motivation Level  Asks Questions: Yes  Eager to Learn: NA  Cooperative: Yes  Receptive (willing/able to accept information): Yes  Any cultural factors/Sikhism beliefs that may influence understanding or compliance? No    Patient, Family, and Caregiver demonstrates understanding of the following:   Reason for the appointment, diagnosis and treatment plan: Yes  Knowledge of proper use of medications and conditions for which they are ordered (with special attention to potential side effects or drug interactions): Yes  Which situations necessitate calling provider and whom to contact: Yes  Proper use and care of (medical equipment, care aids, etc.) Yes  Pain management techniques: Yes  How and/when to access community resources: Yes    Teaching/ learning concerns addressed: None    Infection Control:  Patient, Family, and Caregiver instructed on hand hygiene: Yes  Signs and symptoms of infection taught: Yes    Instructional Materials Used/Given:   Calendar for collections and transplant .      Time spent with patient: 90 minutes.  Specific Concerns: NA      Again, thank you for allowing me to participate in the care of your patient.        Sincerely,        Ana Gonzalez RN

## 2024-05-21 NOTE — PROGRESS NOTES
"CLINICAL SOCIAL WORK   PSYCHOSOCIAL ASSESSMENT  BLOOD AND MARROW TRANSPLANT SERVICE      Assessment completed on May 21, 2024 of living situation, support system, financial status, functional status, coping, stressors, need for resources and social work intervention provided as needed.  Information for this assessment was provided by Pt and mother's report in addition to medical chart review and consultation with medical team.     Present at assessment: Patient, Vivian \"Mary Ann\" Stephanie  and Lovely Landa  were present for this assessment conducted by Rylie Aguilar, BMT .     Diagnosis: Amyloidosis     Date of Diagnosis: 1/8/24    Transplant type: Autologous    Donor: Autologous     Physician: Adam Chandler MD    Nurse Coordinator: Dalila Gonzalez RN    Work-up Nurse Coordinator: Dalila Gonzalez RN    : THONY Ruiz, [facility]     Permanent Address:   [address] [place] [id number]    Local Address:   [id number] [place] [address] main desk: [phone]    Contact Information:  Pt Home Phone: [phone]  Pt Cell Phone: [phone]  Pt Email: [email or web address]  Pt's Aneudy Phone: [phone]    Presenting Information:  Mary Ann is a 54 year old female diagnosed with Amyloidosis  who presents for evaluation for Autologous transplant at the [facility] ([facility]).  Pt was accompanied to today's visit by her mother Lovely.     Decision Making:   Self     Health Care Directive:   Declined completing at this time, but is working on completing one.     Relationship Status:    to her  Aneudy for 16 years. The pt shared that her relationship with her  is often stable and supportive, stable and unsupportive, minimal interactions at times and highly stressed at time and verbally abusive at times.     Special Lodging Needs: Local Lodging Needed. Mary Ann [facility] post [facility]. "     Family/Support System: Pt endorsed a good support system including family and close friends who will be available to support Pt throughout transplant process.     Spouse: Aneudy Brown    Children: Jassi (12) and Doris (15)    Grandchildren: n/a    Parents: Mother Lovely- Father     Siblings: 2 -Alaina and Adam    Friends: some friends who are supportive    Caregiver: SW discussed with pt the caregiver role and expectation at length. Pt is agreeable to having a full time caregiver for a minimum of 30 days until cleared by the BMT physician. Pt's identified caregiver is her mother. Pt signed the caregiver contract which will be scanned into the EMR. Caregiver education and resources provided. No caregiver concerns identified. Pt and Pt's mother confirmed understanding caregiving requirement, including driving restrictions, as discussed during psychosocial assessment.     Name & Numbers  Lovely Prince [phone]    Transportation Mode:  Private Car . Pt is aware of driving restrictions post-BMT and the need for the caregiver is to drive until cleared to drive by the  BMT physician. SW provided information on parking info and monthly parking pass options. Pt will utilize the locr security [employer] for transportation to and from the [facility] and BMT Clinic/Hospital.    Insurance:  No Insurance issues identified.  Pt denied specific insurance concerns at this time. SW reiterated information about the BMT Financial  should specific insurance questions arise as Pt moves through transplant process.     Sources of Income:  Employer [employer] is supported from short term disability and her husbands income. Pt denied anticipation of financial hardship related to BMT at this time.  SW encouraged Pt to contact this SW for additional potential resources should financial situation change.     Employment:   Employer:  [employer]  Position: Physical Therapist  Last Day of Work:  "1/2024     Spouse's Employment:  Employer:  Self- employed  Position: [employer]    Mental Health: Pt has current mental health treatment       PHQ-9 assessment, score was 5 ,which indicates mild signs of depression.  GAD7 assessment, score was 2, which indicates no current signs of anxiety.    Mary Ann noted that she does not have a history of anxiety or depression, but did note that \"everyone has it\" when referencing anxiety. She shared that she has a had a lot of ups and down in life recently which has impacted her mental health. She discussed feeling support by friends and family, but her  has now always been as supportive as she would wish.      We talked about how some patients may see an increase in feelings of anxiety or depression while hospitalized for extended periods along with isolation. Encouraged Mary Ann and her family to let us know if they are noticing an increase in symptoms. We talked about the variety of modalities available to use as coping mechanisms (including but not limited to guided imagery, relaxation techniques, progressive muscle relaxation, counseling/talk therapy and medication).    Chemical Use: No issues identified.  Mary Ann denied the use of tobacco, alcohol, marijuana or other drugs.   Based on the information provided, there appear to be no specific risks or concerns identified at this time.     Trauma/Loss/Abuse History: Multiple losses associated with cancer diagnosis and treatment, including health, employment, changes to physical appearance, etc.     Spirituality:  Patient identifies with alisa community. Mary Ann shared that she is Methodist, she is interested in seeing the anna for a blessing.      Coping: Pt noted that she is currently feeling \"positive, hopeful, prepared, excited for time off, and focused at times\".  Pt shared that her main coping mechanisms are talking with friends and family and prayer/spiritual practice. Pt noted that she also dex by exercise, and positive " "self-talk. SW and Pt discussed additional positive coping mechanisms that Pt can utilize while in the hospital.     Caregiver Coping: Pt's mother Loevly noted that she is feeling \"positive, hopeful, prepared, ready to begin and focused\" at this time.  Lovely noted that she dex by talking with friends and family, prayer/spiritual practices, reading books, exercise, meditation/guided imagery, gathering educational information and working on my hobbies.     Education Provided: Transplant process expectations, Caregiver requirements, Caregiver self-care, Financial issues related to transplant, Financial resources/grants available, Common psychosocial stressors pre/post transplant, Support group(s) available, Tour/layout of the inpatient unit/non-use of cell phones, Hospital resources available, Web site information, Resources for transplant patients and their families as well as the Clinical Social Work role.     Interventions Provided: Supportive counseling and education     Recreation/Leisure Activities:  Mary Ann shared that she enjoys being with her family, spending time with her kids, floating on the lake, paddle boarding and going for walks.    Plans for Hospital Stay Leisure:  During her IP stay she plans to do puzzles, play games, knit and photo books.    Assessment and Recommendations for Team:  Pt is a 54 year old female diagnosed with Amyloidosis who is here undergoing preparation for a planned autologous transplant.     Pt is a pleasant and articulate female who feels comfortable communicating with the medical team. Pt is pleasant, calm and able to articulate concerns/coping mechanisms in an appropriate manner. Mary Ann was alert, interactive, and affect was full, they displayed appropriate eye contact, memory and thought processes. Pt has a strong supportive network of family and friends who are involved.     Pt will benefit from ongoing psychosocial support in regards to coping with the adjustment to the BMT " process. CSW has discussed  psychosocial support options in regards to coping with the adjustment to the BMT process and support groups opportunities.      Pt has a good support system and a good caregiver plan. Pt verbalizes understanding of the transplant process and wanting to proceed. SW provided contact information and encouraged Pt to contact SW with questions, concerns, resources and for support. Per this assessment, I did not identify any barriers to this patient moving forward with transplant.      Mary Ann's mother Lovely noted that Mary Ann is bringing her kids and  to the close so they understand the need for the pt to have more help from them at home. CSW discussed this with Mary Ann and Lovely and helped rephrase goals of the children's presence for the close and that this currently could be helpful for them to understand what the pt is planning to go through, but may not support the desire from Lovely for the family to support the pt more at home. CSW also encouraged Mary Ann to think about how her kids will respond to hear all the information about transplant as this at times can be hard for children to process. Mary Ann noted that she has been very honest with her kids through her treatment, but agreed keeping this line of communication open will be important for them.       Important Information:   - Mary Ann plans to stay at the [facility] post BMT  - Mary Ann would like a anna visit IP  - Mary Ann noted she is a quiet person, but does enjoy conversations with others.   - Mary Ann is interested in a treadmill in her room  - Mary Ann dropped off her disability parking to the ONC team, she is hoping to get this prior to her BMT. Message sent to ONC RNCC to check status.    Follow up Planned:   Psychosocial support  Lodging referrals  Spiritual Health referral      THONY Ruiz, [facility]  [facility]  Phone: [phone]  Vocera- BMT SW 3

## 2024-05-21 NOTE — NURSING NOTE
EKG was performed today per order written by Jomar Yu.  Name and  verified with patient. Patient tolerated well without incident. File transmitted to chart.    Fermín Manuel on 2024 at 2:00 PM

## 2024-05-21 NOTE — PROGRESS NOTES
BMT Teaching Flowsheet    Vivian Brown is a 54 year old female  The encounter diagnosis was AL amyloidosis (H).    Teaching Topic: 2016-35 Calendar Review and Teach    Person(s) involved in teaching: Patient and Mother    Motivation Level  Asks Questions: Yes  Eager to Learn: NA  Cooperative: Yes  Receptive (willing/able to accept information): Yes  Any cultural factors/Methodist beliefs that may influence understanding or compliance? No    Patient, Family, and Caregiver demonstrates understanding of the following:   Reason for the appointment, diagnosis and treatment plan: Yes  Knowledge of proper use of medications and conditions for which they are ordered (with special attention to potential side effects or drug interactions): Yes  Which situations necessitate calling provider and whom to contact: Yes  Proper use and care of (medical equipment, care aids, etc.) Yes  Pain management techniques: Yes  How and/when to access community resources: Yes    Teaching/ learning concerns addressed: None    Infection Control:  Patient, Family, and Caregiver instructed on hand hygiene: Yes  Signs and symptoms of infection taught: Yes    Instructional Materials Used/Given:   Calendar for collections and transplant .      Time spent with patient: 90 minutes.  Specific Concerns: NA

## 2024-05-22 ENCOUNTER — TELEPHONE (OUTPATIENT)
Dept: PHARMACY | Facility: CLINIC | Age: 54
End: 2024-05-22
Payer: COMMERCIAL

## 2024-05-22 LAB
ABC CLONOSEQ B-CELL CLONALITY (ID) RESULT: NORMAL
ABC DOMINANT SEQUENCES (B-CELL): 1
ABC TOTAL NUCLEATED CELLS (B-CELL): NORMAL
ALBUMIN SERPL ELPH-MCNC: 2 G/DL (ref 3.7–5.1)
ALPHA1 GLOB SERPL ELPH-MCNC: 0.3 G/DL (ref 0.2–0.4)
ALPHA2 GLOB SERPL ELPH-MCNC: 0.9 G/DL (ref 0.5–0.9)
ATRIAL RATE - MUSE: 71 BPM
B-GLOBULIN SERPL ELPH-MCNC: 0.5 G/DL (ref 0.6–1)
B2 MICROGLOB TUMOR MARKER SER-MCNC: 4.3 MG/L
DIASTOLIC BLOOD PRESSURE - MUSE: NORMAL MMHG
DLCOCOR-%PRED-PRE: 106 %
DLCOCOR-PRE: 21.96 ML/MIN/MMHG
DLCOUNC-%PRED-PRE: 101 %
DLCOUNC-PRE: 20.8 ML/MIN/MMHG
DLCOUNC-PRED: 20.54 ML/MIN/MMHG
EBV VCA IGG SER IA-ACNC: 328 U/ML
EBV VCA IGG SER IA-ACNC: POSITIVE
ERV-%PRED-PRE: 87 %
ERV-PRE: 1.02 L
ERV-PRED: 1.16 L
EXPTIME-PRE: 5.36 SEC
FEF2575-%PRED-PRE: 90 %
FEF2575-PRE: 2.21 L/SEC
FEF2575-PRED: 2.43 L/SEC
FEFMAX-%PRED-PRE: 90 %
FEFMAX-PRE: 6.01 L/SEC
FEFMAX-PRED: 6.66 L/SEC
FEV1-%PRED-PRE: 97 %
FEV1-PRE: 2.47 L
FEV1FEV6-PRE: 79 %
FEV1FEV6-PRED: 82 %
FEV1FVC-PRE: 79 %
FEV1FVC-PRED: 81 %
FEV1SVC-PRE: 72 %
FEV1SVC-PRED: 75 %
FIFMAX-PRE: 5.79 L/SEC
FRCPLETH-%PRED-PRE: 85 %
FRCPLETH-PRE: 2.34 L
FRCPLETH-PRED: 2.73 L
FVC-%PRED-PRE: 99 %
FVC-PRE: 3.12 L
FVC-PRED: 3.13 L
GAMMA GLOB SERPL ELPH-MCNC: 0.1 G/DL (ref 0.7–1.6)
HSV1 IGG SERPL QL IA: 1.54 INDEX
HSV1 IGG SERPL QL IA: ABNORMAL
HSV2 IGG SERPL QL IA: 0.05 INDEX
HSV2 IGG SERPL QL IA: ABNORMAL
IC-%PRED-PRE: 103 %
IC-PRE: 2.41 L
IC-PRED: 2.32 L
IGA SERPL-MCNC: 35 MG/DL (ref 84–499)
IGD SER-MCNC: <1.3 MG/DL
IGG SERPL-MCNC: 86 MG/DL (ref 610–1616)
IGM SERPL-MCNC: <10 MG/DL (ref 35–242)
INTERPRETATION ECG - MUSE: NORMAL
KAPPA LC FREE SER-MCNC: 1.07 MG/DL (ref 0.33–1.94)
KAPPA LC FREE/LAMBDA FREE SER NEPH: 0.72 {RATIO} (ref 0.26–1.65)
LAMBDA LC FREE SERPL-MCNC: 1.49 MG/DL (ref 0.57–2.63)
M PROTEIN SERPL ELPH-MCNC: 0 G/DL
P AXIS - MUSE: 43 DEGREES
PATH REPORT.COMMENTS IMP SPEC: ABNORMAL
PR INTERVAL - MUSE: 154 MS
PROT PATTERN SERPL ELPH-IMP: ABNORMAL
QRS DURATION - MUSE: 96 MS
QT - MUSE: 394 MS
QTC - MUSE: 428 MS
R AXIS - MUSE: 129 DEGREES
RVPLETH-%PRED-PRE: 81 %
RVPLETH-PRE: 1.32 L
RVPLETH-PRED: 1.63 L
SYSTOLIC BLOOD PRESSURE - MUSE: NORMAL MMHG
T AXIS - MUSE: 27 DEGREES
TLCPLETH-%PRED-PRE: 94 %
TLCPLETH-PRE: 4.75 L
TLCPLETH-PRED: 5.03 L
TOTAL PROTEIN SERUM FOR ELP: 3.8 G/DL (ref 6.4–8.3)
VA-%PRED-PRE: 97 %
VA-PRE: 4.74 L
VC-%PRED-PRE: 101 %
VC-PRE: 3.43 L
VC-PRED: 3.38 L
VENTRICULAR RATE- MUSE: 71 BPM

## 2024-05-22 NOTE — TELEPHONE ENCOUNTER
Pharmacist IVIG Stewardship Program     Diagnosis: Hypogammaglobulinemia   Date  2/2/24   IgG Level (mg/dL) 204   Patient has been treat with Daratumumab which has an effect on B cell lineage and immune production     Current Dosing Regimen: Privigen 20g IV PRN IgG <400 mg/dL  Patient is dosed as needed based on an IgG levels of less than 400 mg/dL to ensure the lowest amount of medication is administered to achieve beneficial outcomes.  Pre-medications:  Acetaminophen 650 mg by mouth, 30 minutes prior to infusion  Diphenhydramine 50 mg by mouth, 30 minutes prior to infusion  Current Titration Regimen: Start infusion: 28mls/hour x15 mins, 56mls/hour x15 mins, 84mls/hour x15 mins, 112mls/hour x15 mins, 140mls/hour x15 mins, 168mls/hour x15 mins  196mls/hour x15 mins, 224mls/hour for the remainder of infusion  Previously Tried Ig Products: None     Side Effects: Patient denies side effects such as headaches, flushing, fever, muscle or joint pain, nausea, or rash/itching    Interventions: Lab review completed.     Assessment: Patient is appropriate for additional IVIG therapy. Following therapy they had resolution of URI symptoms and they are tolerating infusions well and IVIG infusions are effective in increasing IgG levels to an appropriate level to minimize patient's risk of infection. Patient should continue on treatment as they have been per provider order. Without therapy their levels would put them at an increased risk of infections.    Plan: Patient is okay to proceed with infusions as planned per provider order without any additional mandated parameters through 10/16/24    Next Review Needed: 10/2024    Thais Lew, PharmD, IgCP  Medication Access Pharmacist

## 2024-05-22 NOTE — PROGRESS NOTES
Blood and Marrow Transplant - Workup Calendar Review    Vivian Brown is a 54 year old female  The encounter diagnosis was AL amyloidosis (H).    Teaching Topic: work up routine  Person(s) involved: Patient and Mother    Patient demonstrates understanding of the following:  - Reason for the appointment, diagnosis and treatment plan: Yes    Reviewed calendar and locations of procedures. Patient understands to check in for appointments at the  and to contact the BMT Office [phone] if there are schedule questions.    24 hr urine collection needed? Yes   If yes: Patient instructed to  collection container in lab. Patient will drop off container on 5/23 and will have corresponding lab appointment scheduled on 5/23.    Instructional Materials Used/Given: copy of calendar, map of campus    Specific Concerns: No    Time spent with patient: 90 minutes

## 2024-05-23 ENCOUNTER — APPOINTMENT (OUTPATIENT)
Dept: LAB | Facility: CLINIC | Age: 54
End: 2024-05-23
Attending: STUDENT IN AN ORGANIZED HEALTH CARE EDUCATION/TRAINING PROGRAM
Payer: COMMERCIAL

## 2024-05-23 ENCOUNTER — HOSPITAL ENCOUNTER (OUTPATIENT)
Dept: LAB | Facility: CLINIC | Age: 54
Discharge: HOME | End: 2024-05-23
Attending: STUDENT IN AN ORGANIZED HEALTH CARE EDUCATION/TRAINING PROGRAM
Payer: COMMERCIAL

## 2024-05-23 ENCOUNTER — HOSPITAL ENCOUNTER (OUTPATIENT)
Dept: CARDIOLOGY | Facility: CLINIC | Age: 54
Discharge: HOME | End: 2024-05-23
Attending: STUDENT IN AN ORGANIZED HEALTH CARE EDUCATION/TRAINING PROGRAM
Payer: COMMERCIAL

## 2024-05-23 ENCOUNTER — HOSPITAL ENCOUNTER (OUTPATIENT)
Dept: GENERAL RADIOLOGY | Facility: CLINIC | Age: 54
Discharge: HOME | End: 2024-05-23
Attending: STUDENT IN AN ORGANIZED HEALTH CARE EDUCATION/TRAINING PROGRAM
Payer: COMMERCIAL

## 2024-05-23 ENCOUNTER — OFFICE VISIT (OUTPATIENT)
Dept: TRANSPLANT | Facility: CLINIC | Age: 54
End: 2024-05-23
Attending: STUDENT IN AN ORGANIZED HEALTH CARE EDUCATION/TRAINING PROGRAM
Payer: COMMERCIAL

## 2024-05-23 ENCOUNTER — ANESTHESIA EVENT (OUTPATIENT)
Dept: SURGERY | Facility: AMBULATORY SURGERY CENTER | Age: 54
End: 2024-05-23
Payer: COMMERCIAL

## 2024-05-23 VITALS
BODY MASS INDEX: 27.06 KG/M2 | TEMPERATURE: 97.5 F | WEIGHT: 160.5 LBS | HEART RATE: 103 BPM | DIASTOLIC BLOOD PRESSURE: 76 MMHG | RESPIRATION RATE: 16 BRPM | SYSTOLIC BLOOD PRESSURE: 114 MMHG

## 2024-05-23 VITALS
DIASTOLIC BLOOD PRESSURE: 76 MMHG | SYSTOLIC BLOOD PRESSURE: 114 MMHG | WEIGHT: 160.5 LBS | TEMPERATURE: 97.5 F | BODY MASS INDEX: 27.06 KG/M2 | RESPIRATION RATE: 16 BRPM | HEART RATE: 103 BPM | OXYGEN SATURATION: 99 %

## 2024-05-23 DIAGNOSIS — Z86.2 PERSONAL HISTORY OF DISEASES OF BLOOD AND BLOOD-FORMING ORGANS: ICD-10-CM

## 2024-05-23 DIAGNOSIS — E85.81 AL AMYLOIDOSIS (H): Primary | ICD-10-CM

## 2024-05-23 DIAGNOSIS — E85.81 AL AMYLOIDOSIS (H): ICD-10-CM

## 2024-05-23 DIAGNOSIS — Z01.818 EXAMINATION PRIOR TO CHEMOTHERAPY: ICD-10-CM

## 2024-05-23 DIAGNOSIS — E85.9 AMYLOIDOSIS, UNSPECIFIED (H): ICD-10-CM

## 2024-05-23 LAB
CREAT SERPL-MCNC: 1.59 MG/DL (ref 0.51–0.95)
EGFRCR SERPLBLD CKD-EPI 2021: 38 ML/MIN/1.73M2
IGE SERPL-ACNC: 26 KU/L (ref 0–114)
LVEF ECHO: NORMAL
T GONDII IGG SER-ACNC: <3 IU/ML
T GONDII IGM SER-ACNC: <3 AU/ML

## 2024-05-23 PROCEDURE — 36415 COLL VENOUS BLD VENIPUNCTURE: CPT

## 2024-05-23 PROCEDURE — 99000 SPECIMEN HANDLING OFFICE-LAB: CPT | Performed by: PATHOLOGY

## 2024-05-23 PROCEDURE — 93356 MYOCRD STRAIN IMG SPCKL TRCK: CPT | Performed by: INTERNAL MEDICINE

## 2024-05-23 PROCEDURE — 93306 TTE W/DOPPLER COMPLETE: CPT | Mod: 26 | Performed by: INTERNAL MEDICINE

## 2024-05-23 PROCEDURE — 81050 URINALYSIS VOLUME MEASURE: CPT | Performed by: PATHOLOGY

## 2024-05-23 PROCEDURE — 99212 OFFICE O/P EST SF 10 MIN: CPT

## 2024-05-23 PROCEDURE — 93356 MYOCRD STRAIN IMG SPCKL TRCK: CPT

## 2024-05-23 PROCEDURE — 71046 X-RAY EXAM CHEST 2 VIEWS: CPT

## 2024-05-23 PROCEDURE — 99215 OFFICE O/P EST HI 40 MIN: CPT

## 2024-05-23 PROCEDURE — G0463 HOSPITAL OUTPT CLINIC VISIT: HCPCS | Mod: 25

## 2024-05-23 PROCEDURE — 71046 X-RAY EXAM CHEST 2 VIEWS: CPT | Mod: 26 | Performed by: RADIOLOGY

## 2024-05-23 PROCEDURE — 82565 ASSAY OF CREATININE: CPT

## 2024-05-23 PROCEDURE — 84166 PROTEIN E-PHORESIS/URINE/CSF: CPT | Mod: 26 | Performed by: PATHOLOGY

## 2024-05-23 RX ORDER — LEVOTHYROXINE SODIUM 200 UG/1
200 TABLET ORAL DAILY
COMMUNITY

## 2024-05-23 RX ORDER — MULTIVITAMIN
1 TABLET ORAL DAILY
Status: ON HOLD | COMMUNITY
End: 2024-06-30

## 2024-05-23 ASSESSMENT — ANXIETY QUESTIONNAIRES
3. WORRYING TOO MUCH ABOUT DIFFERENT THINGS: NOT AT ALL
IF YOU CHECKED OFF ANY PROBLEMS ON THIS QUESTIONNAIRE, HOW DIFFICULT HAVE THESE PROBLEMS MADE IT FOR YOU TO DO YOUR WORK, TAKE CARE OF THINGS AT HOME, OR GET ALONG WITH OTHER PEOPLE: NOT DIFFICULT AT ALL
7. FEELING AFRAID AS IF SOMETHING AWFUL MIGHT HAPPEN: NOT AT ALL
GAD7 TOTAL SCORE: 2
1. FEELING NERVOUS, ANXIOUS, OR ON EDGE: SEVERAL DAYS
GAD7 TOTAL SCORE: 2
6. BECOMING EASILY ANNOYED OR IRRITABLE: SEVERAL DAYS
5. BEING SO RESTLESS THAT IT IS HARD TO SIT STILL: NOT AT ALL
2. NOT BEING ABLE TO STOP OR CONTROL WORRYING: NOT AT ALL

## 2024-05-23 ASSESSMENT — PAIN SCALES - GENERAL: PAINLEVEL: NO PAIN (0)

## 2024-05-23 ASSESSMENT — PATIENT HEALTH QUESTIONNAIRE - PHQ9
5. POOR APPETITE OR OVEREATING: NOT AT ALL
SUM OF ALL RESPONSES TO PHQ QUESTIONS 1-9: 5

## 2024-05-23 NOTE — CONSULTS
APHERESIS INITIAL CONSULT CHECKLIST    Current Encounter Information  Current Encounter Information: Reason for Visit, Allergies and Current Meds  Procedure Requested: MNC/PBSC Collection  History of: (Reason for Apheresis): Amyloidosis    Access Assessment  Access Assessment  Needs a catheter placed for Apheresis?: Yes, transfusion medicine physician informed.    Vital Signs  Vital Signs  BP: 114/76  Pulse: 103  Temp: 97.5  F (36.4  C)  Temp src: Oral  Resp: 16  Weight: 72.8 kg (160 lb 7.9 oz)    Reviewed   Review With Patient  Have you read the brochure Getting ready for Apheresis?: Yes  Have you had any invasive procedures, surgery, biopsy, bleeding in the last month?: No  Review medications and allergies: Yes  Have you ever been transfused?: No  Patient given tour of the unit: No (done in consult room)    Additional Information  Notes, needs and time spent with patient  Explain procedure, side effects or reactions, instructions: Yes  Patient has special need?: No  Time spent: 20 minutes face to face for medical history review. Pt. takes midodrine for low blood pressure 3 times a day. Patient states she will bring her afternoon meds with her on collection days (midodrine and lasix) Patient follows a gluten free diet, she will bring snacks with her. Reviewed need to follow a low fat diet during the collection process.

## 2024-05-23 NOTE — PROGRESS NOTES
[facility]  BMTCT OPEN VISIT    May 23, 2024      Vivian Brwon is a 54 year old female undergoing evaluation prior to hematopoietic cell transplant or immune effector cell therapy.    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 If yes, why? No    Treatment for diabetes? No    Today, the patient notes the following symptoms:  Review Of Systems  Skin: negative  Eyes: negative  Ears/Nose/Throat: negative  Respiratory: No shortness of breath, dyspnea on exertion, cough, or hemoptysis  Cardiovascular: negative  Gastrointestinal: negative  Genitourinary: negative  Musculoskeletal: negative  Neurologic: negative  Psychiatric: negative  Hematologic/Lymphatic/Immunologic: as above  Endocrine: hx of hashimotos/ on synthroid      Vivian Brown's History    Past Medical History:   Diagnosis Date    Celiac disease     Family history of colon cancer     Colonoscoy q5 years next 9/2020       Past Surgical History:   Procedure Laterality Date    COLONOSCOPY N/A 9/28/2015    Procedure: COLONOSCOPY;  Surgeon: Eugenio Travis MD;  Location:  GI    ESOPHAGOSCOPY, GASTROSCOPY, DUODENOSCOPY (EGD), COMBINED N/A 1/30/2024    Procedure: ESOPHAGOGASTRODUODENOSCOPY, WITH BIOPSY;  Surgeon: Melo Cloud MD;  Location: [facility]    IR RENAL BIOPSY RIGHT  12/21/2023    TONSILLECTOMY         Family History   Problem Relation Age of Onset    Colon Cancer Maternal Grandmother 91    Hypertension Mother     Hyperlipidemia Mother     Cancer Mother 65        GIST    Other Cancer Mother     Coronary Artery Disease Father         MI    Thyroid Disease Sister     Asthma Daughter        Social History     Tobacco Use    Smoking status: Never     Passive exposure: Past    Smokeless tobacco: Never    Tobacco comments:     Parents smoked during childhood in the  house   Substance Use Topics    Alcohol use: Not Currently     Alcohol/week: 0.0 standard drinks of alcohol     Comment: 1-2 times/month           Vivian Brown's Medications and Allergies    Current Outpatient Medications   Medication Sig Dispense Refill    acyclovir (ZOVIRAX) 400 MG tablet Take 1 tablet (400 mg) by mouth 2 times daily Viral Prophylaxis. 180 tablet 3    Albuterol-Budesonide (AIRSUPRA) 90-80 MCG/ACT AERO Inhale 2 Inhalations into the lungs every 4 hours as needed (Wheezing, chest tightness, shortness of breath, or persistent cough) 10.7 g 3    azelastine (ASTELIN) 0.1 % nasal spray Spray 2 sprays into both nostrils 2 times daily as needed for rhinitis 30 mL 3    cycloPHOSphamide (CYTOXAN) 50 MG capsule Take 10 capsules (500 mg) by mouth every 7 days Take on days 1, 8, 15, and 22. 40 capsule 0    dexAMETHasone (DECADRON) 4 MG tablet Take 40 mg (10 tablets) on days 8 and 22. 20 tablet 0    EPINEPHrine (ANY BX GENERIC EQUIV) 0.3 MG/0.3ML injection 2-pack Inject 0.3 mLs (0.3 mg) into the muscle as needed for anaphylaxis May repeat one time in 5-15 minutes if response to initial dose is inadequate. 2 each 3    fluticasone (FLONASE) 50 MCG/ACT nasal spray Spray 2 sprays into both nostrils daily 16 g 3    furosemide (LASIX) 20 MG tablet Take 2 tablets (40 mg) by mouth 2 times daily (Patient taking differently: Take 40 mg by mouth 2 times daily Taking 20 mg BID starting 04/10/24) 360 tablet 3    levothyroxine (SYNTHROID/LEVOTHROID) 200 MCG tablet Take 1 tablet (200 mcg) by mouth daily 90 tablet 3    levothyroxine (SYNTHROID/LEVOTHROID) 25 MCG tablet Take 1 tablet (25 mcg) by mouth daily      liothyronine (CYTOMEL) 5 MCG tablet Take 5 mcg by mouth 2 times daily      midodrine (PROAMATINE) 10 MG tablet Take 1 tablet (10 mg) by mouth 3 times daily 270 tablet 1    ondansetron (ZOFRAN) 8 MG tablet Take 8 mg by mouth every 8 hours as needed for nausea      prochlorperazine (COMPAZINE) 5 MG tablet Take 1  tablet (5 mg) by mouth every 6 hours as needed for nausea or vomiting 30 tablet 1    rosuvastatin (CRESTOR) 10 MG tablet Take 1 tablet (10 mg) by mouth daily 90 tablet 3    spironolactone (ALDACTONE) 25 MG tablet Take 1 tablet (25 mg) by mouth daily 90 tablet 3    vitamin D3 (CHOLECALCIFEROL) 50 mcg (2000 units) tablet Take 1 tablet (50 mcg) by mouth daily 30 tablet 6     No current facility-administered medications for this visit.          Allergies   Allergen Reactions    Blood Transfusion Related (Informational Only)      Patient has a history of a clinically significant antibody against RBC antigens.  A delay in compatible RBCs may occur. Anti-CD38 interfering in all testing at [facility] 05/21/24.    Gluten Meal     Latex Itching    Seasonal Allergies            Physical Examination    /76 (BP Location: Left arm, Patient Position: Sitting, Cuff Size: Adult Regular)   Pulse 103   Temp 97.5  F (36.4  C) (Oral)   Resp 16   Wt 72.8 kg (160 lb 7.9 oz)   SpO2 99%   BMI 27.06 kg/m      Exam:  Constitutional: healthy, alert, and no distress  Head: Normocephalic. No masses, lesions, tenderness or abnormalities  Cardiovascular: RRR  Respiratory: negative, Percussion normal. Good diaphragmatic excursion. Lungs clear  Gastrointestinal: Abdomen soft, non-tender. BS normal. No masses, organomegaly  Musculoskeletal: extremities normal- no gross deformities noted, gait normal  Skin: no suspicious lesions or rashes  Neurologic: Gait normal. Sensation grossly WNL.  Psychiatric: mentation appears normal and affect normal/bright  Hematologic/Lymphatic/Immunologic: Normal cervical lymph nodes      Frailty Screening    BMT Fried Frailty          5/23/2024    13:46   Fried Frailty   Lost>10 pounds unintenionally last year Y   Exhaustion Score 1   Slowness Score 0   Weakness/ Strength Score 0   Low Activity Level Score 0   Final Score Not Frail   Final Score Number 2   Sit Stand Assessment   Patient able to perform 5 chair  stands Y   Chair Stands in seconds 8   Patient is able to perform stand with Feet Side by Side? Y   First attempt (in seconds): 10   Patient is able to perform Semi-Tandem Stand? Y   First attemp (in seconds): 10   Patient is able to perform Tandem Stand? Y   First attemp (in seconds): 10           Pre-Procedure Assessment done at 1530.  Expected Level:  MAC plus local  Indication:  Sedation is required to allow for  bone marrow biopsy  Will be consented 5/24/2024 prior to procedure  PO Intake:   aware to be NPO at 3:30 am 5/24  ASA Class:  Class 2 - MILD SYSTEMIC DISEASE, NO ACUTE PROBLEMS, NO FUNCTIONAL LIMITATIONS.  Lungs: Lungs Clear with good breath sounds bilaterally.  Heart: Normal heart sounds and rate      Overall Assessment    Delete paragraph if not having a sedated bone marrow biopsy    I have reviewed the lab findings, diagnostic data, medications, and the plan for sedation. I have determined this patient to be an appropriate candidate for the planned sedation and procedure and pt will be reassessed IMMEDIATELY PRIOR to sedation and procedure. Pts case will also be reviewed by anesthesia.    Pt has no URI symptoms, sleep apnea, tobacco use, complications w/ prior anesthesia.  Pt does not take a blood thinner.    I have reviewed the diagnostic data, medications, frailty screening, and general processes prior to BMTCT.  I have notified the Primary BMT Physician/and or Attending Physician in the clinic of any issues. We also discussed in detail the database research for which Vivian Brown is eligible. We discussed the potential risks and potential benefits of each of these protocols individually. We explained potential alternatives to the protocols discussed. We explained to the patient that participation is voluntary and that consent may be withdrawn at any time.       Consents Signed:   Blood transfusion consent form  Ethnicity form  [facility] database  [facility] BMTCT Database    Present during the  discussion were pt and her niece. Copies of the signed consent forms will be provided to the patient on admission. No procedures specific to any studies were performed prior to the patient signing the consent form.    Vivian Brown had the opportunity to ask questions, and I answered all of the questions to the best of my ability.      Suze Edmond PA-C

## 2024-05-23 NOTE — LETTER
5/23/2024         RE: Vivian Brown  [address] [facility] [address]        Dear Colleague,    Thank you for referring your patient, Vivian Brown, to the [facility] BLOOD AND MARROW TRANSPLANT PROGRAM [place]. Please see a copy of my visit note below.    [facility]  BMTCT OPEN VISIT    May 23, 2024      Vivian Brown is a 54 year old female undergoing evaluation prior to hematopoietic cell transplant or immune effector cell therapy.    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 If yes, why? No    Treatment for diabetes? No    Today, the patient notes the following symptoms:  Review Of Systems  Skin: negative  Eyes: negative  Ears/Nose/Throat: negative  Respiratory: No shortness of breath, dyspnea on exertion, cough, or hemoptysis  Cardiovascular: negative  Gastrointestinal: negative  Genitourinary: negative  Musculoskeletal: negative  Neurologic: negative  Psychiatric: negative  Hematologic/Lymphatic/Immunologic: as above  Endocrine: hx of hashimotos/ on synthroid      Vivian Brown's History    Past Medical History:   Diagnosis Date    Celiac disease     Family history of colon cancer     Colonoscoy q5 years next 9/2020       Past Surgical History:   Procedure Laterality Date    COLONOSCOPY N/A 9/28/2015    Procedure: COLONOSCOPY;  Surgeon: Eugenio Travis MD;  Location:  GI    ESOPHAGOSCOPY, GASTROSCOPY, DUODENOSCOPY (EGD), COMBINED N/A 1/30/2024    Procedure: ESOPHAGOGASTRODUODENOSCOPY, WITH BIOPSY;  Surgeon: Melo Cloud MD;  Location:  GI    IR RENAL BIOPSY RIGHT  12/21/2023    TONSILLECTOMY         Family History   Problem Relation Age of Onset    Colon Cancer Maternal Grandmother 91    Hypertension Mother     Hyperlipidemia Mother     Cancer Mother 65        GIST     Other Cancer Mother     Coronary Artery Disease Father         MI    Thyroid Disease Sister     Asthma Daughter        Social History     Tobacco Use    Smoking status: Never     Passive exposure: Past    Smokeless tobacco: Never    Tobacco comments:     Parents smoked during childhood in the house   Substance Use Topics    Alcohol use: Not Currently     Alcohol/week: 0.0 standard drinks of alcohol     Comment: 1-2 times/month           Vivian Brown's Medications and Allergies    Current Outpatient Medications   Medication Sig Dispense Refill    acyclovir (ZOVIRAX) 400 MG tablet Take 1 tablet (400 mg) by mouth 2 times daily Viral Prophylaxis. 180 tablet 3    Albuterol-Budesonide (AIRSUPRA) 90-80 MCG/ACT AERO Inhale 2 Inhalations into the lungs every 4 hours as needed (Wheezing, chest tightness, shortness of breath, or persistent cough) 10.7 g 3    azelastine (ASTELIN) 0.1 % nasal spray Spray 2 sprays into both nostrils 2 times daily as needed for rhinitis 30 mL 3    cycloPHOSphamide (CYTOXAN) 50 MG capsule Take 10 capsules (500 mg) by mouth every 7 days Take on days 1, 8, 15, and 22. 40 capsule 0    dexAMETHasone (DECADRON) 4 MG tablet Take 40 mg (10 tablets) on days 8 and 22. 20 tablet 0    EPINEPHrine (ANY BX GENERIC EQUIV) 0.3 MG/0.3ML injection 2-pack Inject 0.3 mLs (0.3 mg) into the muscle as needed for anaphylaxis May repeat one time in 5-15 minutes if response to initial dose is inadequate. 2 each 3    fluticasone (FLONASE) 50 MCG/ACT nasal spray Spray 2 sprays into both nostrils daily 16 g 3    furosemide (LASIX) 20 MG tablet Take 2 tablets (40 mg) by mouth 2 times daily (Patient taking differently: Take 40 mg by mouth 2 times daily Taking 20 mg BID starting 04/10/24) 360 tablet 3    levothyroxine (SYNTHROID/LEVOTHROID) 200 MCG tablet Take 1 tablet (200 mcg) by mouth daily 90 tablet 3    levothyroxine (SYNTHROID/LEVOTHROID) 25 MCG tablet Take 1 tablet (25 mcg) by mouth daily      liothyronine (CYTOMEL)  5 MCG tablet Take 5 mcg by mouth 2 times daily      midodrine (PROAMATINE) 10 MG tablet Take 1 tablet (10 mg) by mouth 3 times daily 270 tablet 1    ondansetron (ZOFRAN) 8 MG tablet Take 8 mg by mouth every 8 hours as needed for nausea      prochlorperazine (COMPAZINE) 5 MG tablet Take 1 tablet (5 mg) by mouth every 6 hours as needed for nausea or vomiting 30 tablet 1    rosuvastatin (CRESTOR) 10 MG tablet Take 1 tablet (10 mg) by mouth daily 90 tablet 3    spironolactone (ALDACTONE) 25 MG tablet Take 1 tablet (25 mg) by mouth daily 90 tablet 3    vitamin D3 (CHOLECALCIFEROL) 50 mcg (2000 units) tablet Take 1 tablet (50 mcg) by mouth daily 30 tablet 6     No current facility-administered medications for this visit.          Allergies   Allergen Reactions    Blood Transfusion Related (Informational Only)      Patient has a history of a clinically significant antibody against RBC antigens.  A delay in compatible RBCs may occur. Anti-CD38 interfering in all testing at [facility] 05/21/24.    Gluten Meal     Latex Itching    Seasonal Allergies            Physical Examination    /76 (BP Location: Left arm, Patient Position: Sitting, Cuff Size: Adult Regular)   Pulse 103   Temp 97.5  F (36.4  C) (Oral)   Resp 16   Wt 72.8 kg (160 lb 7.9 oz)   SpO2 99%   BMI 27.06 kg/m      Exam:  Constitutional: healthy, alert, and no distress  Head: Normocephalic. No masses, lesions, tenderness or abnormalities  Cardiovascular: RRR  Respiratory: negative, Percussion normal. Good diaphragmatic excursion. Lungs clear  Gastrointestinal: Abdomen soft, non-tender. BS normal. No masses, organomegaly  Musculoskeletal: extremities normal- no gross deformities noted, gait normal  Skin: no suspicious lesions or rashes  Neurologic: Gait normal. Sensation grossly WNL.  Psychiatric: mentation appears normal and affect normal/bright  Hematologic/Lymphatic/Immunologic: Normal cervical lymph nodes      Frailty Screening    BMT Fried Frailty           5/23/2024    13:46   Fried Frailty   Lost>10 pounds unintenionally last year Y   Exhaustion Score 1   Slowness Score 0   Weakness/ Strength Score 0   Low Activity Level Score 0   Final Score Not Frail   Final Score Number 2   Sit Stand Assessment   Patient able to perform 5 chair stands Y   Chair Stands in seconds 8   Patient is able to perform stand with Feet Side by Side? Y   First attempt (in seconds): 10   Patient is able to perform Semi-Tandem Stand? Y   First attemp (in seconds): 10   Patient is able to perform Tandem Stand? Y   First attemp (in seconds): 10           Pre-Procedure Assessment done at 1530.  Expected Level:  MAC plus local  Indication:  Sedation is required to allow for  bone marrow biopsy  Will be consented 5/24/2024 prior to procedure  PO Intake:   aware to be NPO at 3:30 am 5/24  ASA Class:  Class 2 - MILD SYSTEMIC DISEASE, NO ACUTE PROBLEMS, NO FUNCTIONAL LIMITATIONS.  Lungs: Lungs Clear with good breath sounds bilaterally.  Heart: Normal heart sounds and rate      Overall Assessment    Delete paragraph if not having a sedated bone marrow biopsy    I have reviewed the lab findings, diagnostic data, medications, and the plan for sedation. I have determined this patient to be an appropriate candidate for the planned sedation and procedure and pt will be reassessed IMMEDIATELY PRIOR to sedation and procedure. Pts case will also be reviewed by anesthesia.    Pt has no URI symptoms, sleep apnea, tobacco use, complications w/ prior anesthesia.  Pt does not take a blood thinner.    I have reviewed the diagnostic data, medications, frailty screening, and general processes prior to BMTCT.  I have notified the Primary BMT Physician/and or Attending Physician in the clinic of any issues. We also discussed in detail the database research for which Vivian WONG Stephanie is eligible. We discussed the potential risks and potential benefits of each of these protocols individually. We explained  potential alternatives to the protocols discussed. We explained to the patient that participation is voluntary and that consent may be withdrawn at any time.       Consents Signed:   Blood transfusion consent form  Ethnicity form  [facility]  [facility]    Present during the discussion were pt and her niece. Copies of the signed consent forms will be provided to the patient on admission. No procedures specific to any studies were performed prior to the patient signing the consent form.    Vivian Brown had the opportunity to ask questions, and I answered all of the questions to the best of my ability.      Suze Edmond PA-C

## 2024-05-23 NOTE — LETTER
"    5/23/2024         RE: Vivian Brown  [address] [facility] [address]        Dear Colleague,    Thank you for referring your patient, Vivian Brown, to the [facility] BLOOD AND MARROW TRANSPLANT PROGRAM [place]. Please see a copy of my visit note below.    Pharmacy Assessment - Pre-Stem Cell Transplant    Assessments & Recommendations:  1) Pt prefers Synthroid brand levothyroxine.  Hospital usually has some strength that can be used.  She can bring her own if doesn't want to take multiple tablets.   2) Strict I/Os per protocol for amyloidosis patients.   3) Ice therapy around melphalan.     If this patient is admitted under observation, the patient may bring in their own supply of the following medication for use in the hospital:  1) Might need to bring Synthroid brand name.  The hospital has some, but likely different tablet size.   -Per \"Medications Not Supplied by Pharmacy\" policy (available on [employer])    History of Present Illness:  Vivian Brown is a 54 year old year old female diagnosed with amyloidosis.  she has been treated with carmen-CyBorD.  she is now being work up for Autologous Stem Cell Transplant on protocol 2016-35 which utilizes melphalan as a conditioning regimen.    Pertinent labs/tests:  Viral Testing:  HSV(+) / EBV(+)  Ejection Fraction: 55-60% (5/23)  QTc: 428msec (5/21)    Weights:   Wt Readings from Last 3 Encounters:   05/21/24 74.4 kg (164 lb 1.6 oz)   05/16/24 73.9 kg (163 lb)   05/08/24 73.9 kg (162 lb 14.4 oz)   Ideal body weight: 56 kg (123 lb 7.5 oz)  Adjusted ideal body weight: 62.7 kg (138 lb 4.4 oz)  % IBW:  133%  There is no height or weight on file to calculate BMI.    Primary BMT Physician: Dr Shukla  BMT RN Coordinator:  Dalila Gonzalez    Past Medical History:  Past Medical History:   Diagnosis Date    Celiac disease     Family history of colon cancer     Colonoscoy q5 years next 9/2020       Medication Allergies:  Allergies   Allergen " Reactions    Blood Transfusion Related (Informational Only)      Patient has a history of a clinically significant antibody against RBC antigens.  A delay in compatible RBCs may occur. Anti-CD38 interfering in all testing at [facility] 05/21/24.    Gluten Meal     Latex Itching    Seasonal Allergies        Current Medications (pre-admit):  Current Outpatient Medications   Medication Sig Dispense Refill    levothyroxine (SYNTHROID) 200 MCG tablet Take 200 mcg by mouth daily Pt takes Synthroid brand name. FRANKI      multivitamin w/minerals (MULTI-VITAMIN) tablet Take 1 tablet by mouth daily      acyclovir (ZOVIRAX) 400 MG tablet Take 1 tablet (400 mg) by mouth 2 times daily Viral Prophylaxis. 180 tablet 3    Albuterol-Budesonide (AIRSUPRA) 90-80 MCG/ACT AERO Inhale 2 Inhalations into the lungs every 4 hours as needed (Wheezing, chest tightness, shortness of breath, or persistent cough) 10.7 g 3    azelastine (ASTELIN) 0.1 % nasal spray Spray 2 sprays into both nostrils 2 times daily as needed for rhinitis 30 mL 3    EPINEPHrine (ANY BX GENERIC EQUIV) 0.3 MG/0.3ML injection 2-pack Inject 0.3 mLs (0.3 mg) into the muscle as needed for anaphylaxis May repeat one time in 5-15 minutes if response to initial dose is inadequate. 2 each 3    fluticasone (FLONASE) 50 MCG/ACT nasal spray Spray 2 sprays into both nostrils daily 16 g 3    furosemide (LASIX) 20 MG tablet Take 2 tablets (40 mg) by mouth 2 times daily (Patient taking differently: Take 20 mg by mouth 2 times daily Taking 20 mg BID starting 04/10/24) 360 tablet 3    liothyronine (CYTOMEL) 5 MCG tablet Take 5 mcg by mouth 2 times daily      midodrine (PROAMATINE) 10 MG tablet Take 1 tablet (10 mg) by mouth 3 times daily 270 tablet 1    ondansetron (ZOFRAN) 8 MG tablet Take 8 mg by mouth every 8 hours as needed for nausea      prochlorperazine (COMPAZINE) 5 MG tablet Take 1 tablet (5 mg) by mouth every 6 hours as needed for nausea or vomiting 30 tablet 1    rosuvastatin  (CRESTOR) 10 MG tablet Take 1 tablet (10 mg) by mouth daily 90 tablet 3    spironolactone (ALDACTONE) 25 MG tablet Take 1 tablet (25 mg) by mouth daily 90 tablet 3    vitamin D3 (CHOLECALCIFEROL) 50 mcg (2000 units) tablet Take 1 tablet (50 mcg) by mouth daily 30 tablet 6       Herbal Medication/Nutritional Supplements: vitamin D    Smoking/Past Drug Use: Didn't discuss.     Nausea/Vomiting, Pain, or other issues:  She recalls Compazine being more effective than Zofran, but found best relief when took both.     Summary:  I met with Vivian Brown for approximately 35 minutes.  We discussed allergies, home medications, filgrastim priming, preparative regimen (melphalan), anti-infective prophylaxis (acyclovir, levofloxacin, fluconazole, Bactrim), supportive medications (mucositis management, antiemetics), vaccines, med box/pharmacy expectations.     David Alexandra, [facility]

## 2024-05-23 NOTE — CONSULTS
Transfusion Medicine Consultation    Vivian Brown [id number]   YOB: 1970 Age: 54 year old   Date of Consult: 5/23/2024     Reason for consult: Autologous Hematopoietic Progenitor Cell (HPC)  Collection           Assessment and Plan:   54 year old female presents for consultation for autologous HPC collection for treatment of amyloidosis. The plan is to collect for 1 to 3 days or until the target goal is met.  A central line will be placed and will be used for access for the procedure.          Chief Complaint:   Transfusion medicine consultation.         History of Present Illness:   54 year old female presents for consultation for autologous  HPC  collection.  Her past medical history includes thyroid disease and amyloidosis.  She is currently feeling well.  The patient denies any back pain that would prevent her from tolerating the procedure. The patient reports no significant travel history and has no readily identifiable risk factors for infectious disease.  The procedure, risks/benefits were discussed with the patient and all of her questions were addressed at this time. Consent was obtained.             Past Medical History:     Past Medical History:   Diagnosis Date    Celiac disease     Family history of colon cancer     Colonoscoy q5 years next 9/2020             Past Surgical History:     Past Surgical History:   Procedure Laterality Date    COLONOSCOPY N/A 9/28/2015    Procedure: COLONOSCOPY;  Surgeon: Eugenio Travis MD;  Location:  GI    ESOPHAGOSCOPY, GASTROSCOPY, DUODENOSCOPY (EGD), COMBINED N/A 1/30/2024    Procedure: ESOPHAGOGASTRODUODENOSCOPY, WITH BIOPSY;  Surgeon: Melo Cloud MD;  Location:  GI    IR RENAL BIOPSY RIGHT  12/21/2023    TONSILLECTOMY                Social History:     Social History     Tobacco Use    Smoking status: Never     Passive exposure: Past    Smokeless tobacco: Never    Tobacco comments:     Parents smoked during childhood in the  house   Substance Use Topics    Alcohol use: Not Currently     Alcohol/week: 0.0 standard drinks of alcohol     Comment: 1-2 times/month             Family History:     Family History   Problem Relation Age of Onset    Colon Cancer Maternal Grandmother 91    Hypertension Mother     Hyperlipidemia Mother     Cancer Mother 65        GIST    Other Cancer Mother     Coronary Artery Disease Father         MI    Thyroid Disease Sister     Asthma Daughter              Immunizations:     Immunization History   Administered Date(s) Administered    HepB, Unspecified 08/01/1992, 10/01/1992, 02/01/1993    Influenza (H1N1) 12/16/2009    Influenza (IIV3) PF 10/04/2010, 09/14/2011, 09/19/2012    Influenza (intradermal) 01/18/2013    Influenza Vaccine >6 months,quad, PF 10/25/2016, 11/08/2017, 10/01/2018, 10/14/2019    TDAP (Adacel,Boostrix) 12/19/2022    Td (Adult), Adsorbed 03/10/1986    Zoster recombinant adjuvanted (SHINGRIX) 12/19/2022, 05/22/2023             Allergies:     Allergies   Allergen Reactions    Blood Transfusion Related (Informational Only)      Patient has a history of a clinically significant antibody against RBC antigens.  A delay in compatible RBCs may occur. Anti-CD38 interfering in all testing at [facility] 05/21/24.    Gluten Meal     Latex Itching    Seasonal Allergies              Medications:     Current Outpatient Medications   Medication Sig Dispense Refill    acyclovir (ZOVIRAX) 400 MG tablet Take 1 tablet (400 mg) by mouth 2 times daily Viral Prophylaxis. 180 tablet 3    Albuterol-Budesonide (AIRSUPRA) 90-80 MCG/ACT AERO Inhale 2 Inhalations into the lungs every 4 hours as needed (Wheezing, chest tightness, shortness of breath, or persistent cough) 10.7 g 3    azelastine (ASTELIN) 0.1 % nasal spray Spray 2 sprays into both nostrils 2 times daily as needed for rhinitis 30 mL 3    fluticasone (FLONASE) 50 MCG/ACT nasal spray Spray 2 sprays into both nostrils daily 16 g 3    furosemide (LASIX) 20 MG tablet  Take 2 tablets (40 mg) by mouth 2 times daily (Patient taking differently: Take 20 mg by mouth 2 times daily Taking 20 mg BID starting 04/10/24) 360 tablet 3    levothyroxine (SYNTHROID) 200 MCG tablet Take 200 mcg by mouth daily Pt takes Synthroid brand name. FRANKI      liothyronine (CYTOMEL) 5 MCG tablet Take 5 mcg by mouth 2 times daily      midodrine (PROAMATINE) 10 MG tablet Take 1 tablet (10 mg) by mouth 3 times daily 270 tablet 1    multivitamin w/minerals (MULTI-VITAMIN) tablet Take 1 tablet by mouth daily      rosuvastatin (CRESTOR) 10 MG tablet Take 1 tablet (10 mg) by mouth daily 90 tablet 3    spironolactone (ALDACTONE) 25 MG tablet Take 1 tablet (25 mg) by mouth daily 90 tablet 3    vitamin D3 (CHOLECALCIFEROL) 50 mcg (2000 units) tablet Take 1 tablet (50 mcg) by mouth daily 30 tablet 6    EPINEPHrine (ANY BX GENERIC EQUIV) 0.3 MG/0.3ML injection 2-pack Inject 0.3 mLs (0.3 mg) into the muscle as needed for anaphylaxis May repeat one time in 5-15 minutes if response to initial dose is inadequate. 2 each 3    ondansetron (ZOFRAN) 8 MG tablet Take 8 mg by mouth every 8 hours as needed for nausea      prochlorperazine (COMPAZINE) 5 MG tablet Take 1 tablet (5 mg) by mouth every 6 hours as needed for nausea or vomiting 30 tablet 1     No current facility-administered medications for this encounter.             Review of Systems:     CONSTITUTIONAL: NEGATIVE for fever, chills, change in weight  ENT/MOUTH: NEGATIVE for ear, mouth and throat problems  RESP: NEGATIVE for significant cough or SOB  CV: NEGATIVE for chest pain, palpitations or peripheral edema           Vital Signs:   /76   Pulse 103   Temp 97.5  F (36.4  C) (Oral)   Resp 16   Wt 72.8 kg (160 lb 7.9 oz)   BMI 27.06 kg/m              Data:     CBC RESULTS:   Recent Labs   Lab Test 05/21/24  1338   WBC 9.4   RBC 3.85   HGB 11.8   HCT 35.1   MCV 91   MCH 30.6   MCHC 33.6   RDW 17.0*   *     ABO/RH(D)   Date Value Ref Range Status    05/21/2024 O POS  Final     Antibody Screen   Date Value Ref Range Status   05/21/2024 Positive (A) Negative Final       All other pertinent laboratory data reviewed.      Zeke Shea MD  Transfusion Medicine Fellow  T: [phone]  P: [phone]

## 2024-05-23 NOTE — NURSING NOTE
"Oncology Rooming Note    May 23, 2024 3:13 PM   Vivian Brown is a 54 year old female who presents for:    Chief Complaint   Patient presents with    Labs Only     Labs drawn via VPT by RN, GARCIA done    Oncology Clinic Visit     RTN for Amyloidosis     Initial Vitals: /76 (BP Location: Left arm, Patient Position: Sitting, Cuff Size: Adult Regular)   Pulse 103   Temp 97.5  F (36.4  C) (Oral)   Resp 16   Wt 72.8 kg (160 lb 7.9 oz)   SpO2 99%   BMI 27.06 kg/m   Estimated body mass index is 27.06 kg/m  as calculated from the following:    Height as of 4/8/24: 1.64 m (5' 4.57\").    Weight as of this encounter: 72.8 kg (160 lb 7.9 oz). Body surface area is 1.82 meters squared.  No Pain (0) Comment: Data Unavailable   No LMP recorded. (Menstrual status: IUD).  Allergies reviewed: Yes  Medications reviewed: Yes    Medications: Medication refills not needed today.  Pharmacy name entered into [facility]:    [facility] - [place], [place] - [address] [facility] [place] [address] [id number] - [place] [address] [id number] - [place] - [address]  [facility] - [place], [place] - [address]  [facility] [place], [place] - [address] [facility]    Frailty Screening:   Is the patient here for a new oncology consult visit in cancer care? 2. No      Clinical concerns: none       Iveth Dawn MA             "

## 2024-05-23 NOTE — NURSING NOTE
[facility] ZAINAB Frailty assessment completed with patient in clinic. Patient had no questions and showed understanding of what assessment was being done.    Linda Dahl on 5/23/2024 at 1:55 PM

## 2024-05-23 NOTE — NURSING NOTE
LDH specimen was hemolyzed and lab had to cancel the specimen. Will be redrawn tomorrow when patient returns for lab draw.     -Loida Lemus RN

## 2024-05-23 NOTE — PROGRESS NOTES
"Pharmacy Assessment - Pre-Stem Cell Transplant    Assessments & Recommendations:  1) Pt prefers Synthroid brand levothyroxine.  Hospital usually has some strength that can be used.  She can bring her own if doesn't want to take multiple tablets.   2) Strict I/Os per protocol for amyloidosis patients.   3) Ice therapy around melphalan.     If this patient is admitted under observation, the patient may bring in their own supply of the following medication for use in the hospital:  1) Might need to bring Synthroid brand name.  The hospital has some, but likely different tablet size.   -Per \"Medications Not Supplied by Pharmacy\" policy (available on [employer])    History of Present Illness:  Vivian Brown is a 54 year old year old female diagnosed with amyloidosis.  she has been treated with carmen-CyBorD.  she is now being work up for Autologous Stem Cell Transplant on protocol 2016-35 which utilizes melphalan as a conditioning regimen.    Pertinent labs/tests:  Viral Testing:  HSV(+) / EBV(+)  Ejection Fraction: 55-60% (5/23)  QTc: 428msec (5/21)    Weights:   Wt Readings from Last 3 Encounters:   05/21/24 74.4 kg (164 lb 1.6 oz)   05/16/24 73.9 kg (163 lb)   05/08/24 73.9 kg (162 lb 14.4 oz)   Ideal body weight: 56 kg (123 lb 7.5 oz)  Adjusted ideal body weight: 62.7 kg (138 lb 4.4 oz)  % IBW:  133%  There is no height or weight on file to calculate BMI.    Primary BMT Physician: Dr Shukla  BMT RN Coordinator:  Dalila Gonzalez    Past Medical History:  Past Medical History:   Diagnosis Date    Celiac disease     Family history of colon cancer     Colonoscoy q5 years next 9/2020       Medication Allergies:  Allergies   Allergen Reactions    Blood Transfusion Related (Informational Only)      Patient has a history of a clinically significant antibody against RBC antigens.  A delay in compatible RBCs may occur. Anti-CD38 interfering in all testing at [facility] 05/21/24.    Gluten Meal     Latex Itching    Seasonal Allergies  "       Current Medications (pre-admit):  Current Outpatient Medications   Medication Sig Dispense Refill    levothyroxine (SYNTHROID) 200 MCG tablet Take 200 mcg by mouth daily Pt takes Synthroid brand name. FRANKI      multivitamin w/minerals (MULTI-VITAMIN) tablet Take 1 tablet by mouth daily      acyclovir (ZOVIRAX) 400 MG tablet Take 1 tablet (400 mg) by mouth 2 times daily Viral Prophylaxis. 180 tablet 3    Albuterol-Budesonide (AIRSUPRA) 90-80 MCG/ACT AERO Inhale 2 Inhalations into the lungs every 4 hours as needed (Wheezing, chest tightness, shortness of breath, or persistent cough) 10.7 g 3    azelastine (ASTELIN) 0.1 % nasal spray Spray 2 sprays into both nostrils 2 times daily as needed for rhinitis 30 mL 3    EPINEPHrine (ANY BX GENERIC EQUIV) 0.3 MG/0.3ML injection 2-pack Inject 0.3 mLs (0.3 mg) into the muscle as needed for anaphylaxis May repeat one time in 5-15 minutes if response to initial dose is inadequate. 2 each 3    fluticasone (FLONASE) 50 MCG/ACT nasal spray Spray 2 sprays into both nostrils daily 16 g 3    furosemide (LASIX) 20 MG tablet Take 2 tablets (40 mg) by mouth 2 times daily (Patient taking differently: Take 20 mg by mouth 2 times daily Taking 20 mg BID starting 04/10/24) 360 tablet 3    liothyronine (CYTOMEL) 5 MCG tablet Take 5 mcg by mouth 2 times daily      midodrine (PROAMATINE) 10 MG tablet Take 1 tablet (10 mg) by mouth 3 times daily 270 tablet 1    ondansetron (ZOFRAN) 8 MG tablet Take 8 mg by mouth every 8 hours as needed for nausea      prochlorperazine (COMPAZINE) 5 MG tablet Take 1 tablet (5 mg) by mouth every 6 hours as needed for nausea or vomiting 30 tablet 1    rosuvastatin (CRESTOR) 10 MG tablet Take 1 tablet (10 mg) by mouth daily 90 tablet 3    spironolactone (ALDACTONE) 25 MG tablet Take 1 tablet (25 mg) by mouth daily 90 tablet 3    vitamin D3 (CHOLECALCIFEROL) 50 mcg (2000 units) tablet Take 1 tablet (50 mcg) by mouth daily 30 tablet 6       Herbal  Medication/Nutritional Supplements: vitamin D    Smoking/Past Drug Use: Didn't discuss.     Nausea/Vomiting, Pain, or other issues:  She recalls Compazine being more effective than Zofran, but found best relief when took both.     Summary:  I met with Vivian Brown for approximately 35 minutes.  We discussed allergies, home medications, filgrastim priming, preparative regimen (melphalan), anti-infective prophylaxis (acyclovir, levofloxacin, fluconazole, Bactrim), supportive medications (mucositis management, antiemetics), vaccines, med box/pharmacy expectations.     David Alexandra, [facility]

## 2024-05-24 ENCOUNTER — ANESTHESIA (OUTPATIENT)
Dept: SURGERY | Facility: AMBULATORY SURGERY CENTER | Age: 54
End: 2024-05-24
Payer: COMMERCIAL

## 2024-05-24 ENCOUNTER — HOSPITAL ENCOUNTER (OUTPATIENT)
Facility: AMBULATORY SURGERY CENTER | Age: 54
Discharge: HOME | End: 2024-05-24
Payer: COMMERCIAL

## 2024-05-24 ENCOUNTER — LAB (OUTPATIENT)
Dept: LAB | Facility: CLINIC | Age: 54
End: 2024-05-24
Payer: COMMERCIAL

## 2024-05-24 ENCOUNTER — OFFICE VISIT (OUTPATIENT)
Dept: TRANSPLANT | Facility: CLINIC | Age: 54
End: 2024-05-24
Payer: COMMERCIAL

## 2024-05-24 VITALS
RESPIRATION RATE: 16 BRPM | HEART RATE: 80 BPM | SYSTOLIC BLOOD PRESSURE: 129 MMHG | BODY MASS INDEX: 27.54 KG/M2 | HEIGHT: 64 IN | DIASTOLIC BLOOD PRESSURE: 69 MMHG | TEMPERATURE: 98.4 F | WEIGHT: 161.3 LBS | OXYGEN SATURATION: 99 %

## 2024-05-24 VITALS
HEART RATE: 80 BPM | RESPIRATION RATE: 16 BRPM | BODY MASS INDEX: 27.2 KG/M2 | OXYGEN SATURATION: 99 % | DIASTOLIC BLOOD PRESSURE: 84 MMHG | WEIGHT: 161.3 LBS | SYSTOLIC BLOOD PRESSURE: 125 MMHG | TEMPERATURE: 97.9 F

## 2024-05-24 VITALS — HEART RATE: 71 BPM

## 2024-05-24 DIAGNOSIS — N18.31 CHRONIC KIDNEY DISEASE, STAGE 3A (H): ICD-10-CM

## 2024-05-24 DIAGNOSIS — E85.81 AL AMYLOIDOSIS (H): ICD-10-CM

## 2024-05-24 DIAGNOSIS — Z01.818 EXAMINATION PRIOR TO CHEMOTHERAPY: ICD-10-CM

## 2024-05-24 DIAGNOSIS — Z86.2 PERSONAL HISTORY OF DISEASES OF BLOOD AND BLOOD-FORMING ORGANS: ICD-10-CM

## 2024-05-24 LAB
ALBUMIN MFR UR ELPH: 77.5 %
ALBUMIN MFR UR ELPH: 927 MG/DL
ALBUMIN UR-MCNC: 300 MG/DL
ALPHA1 GLOB MFR UR ELPH: 6.9 %
ALPHA2 GLOB MFR UR ELPH: 5.3 %
ANION GAP SERPL CALCULATED.3IONS-SCNC: 8 MMOL/L (ref 7–15)
APPEARANCE UR: CLEAR
B-GLOBULIN MFR UR ELPH: 8 %
BASOPHILS # BLD AUTO: 0.2 10E3/UL (ref 0–0.2)
BASOPHILS NFR BLD AUTO: 2 %
BILIRUB UR QL STRIP: NEGATIVE
BUN SERPL-MCNC: 20.3 MG/DL (ref 6–20)
CALCIUM SERPL-MCNC: 9.8 MG/DL (ref 8.6–10)
CHLORIDE SERPL-SCNC: 104 MMOL/L (ref 98–107)
COLOR UR AUTO: ABNORMAL
CREAT SERPL-MCNC: 1.52 MG/DL (ref 0.51–0.95)
CREAT UR-MCNC: 51.5 MG/DL
DEPRECATED HCO3 PLAS-SCNC: 26 MMOL/L (ref 22–29)
DONOR CYTOMEGALOVIRUS ABY: NORMAL
DONOR HEP B CORE ABY: NORMAL
DONOR HEP B SURF AGN: NORMAL
DONOR HEPATITIS C ABY: NORMAL
DONOR HTLV 1&2 ANTIBODY: NORMAL
DONOR TREPONEMA PAL ABY: NORMAL
EGFRCR SERPLBLD CKD-EPI 2021: 40 ML/MIN/1.73M2
EOSINOPHIL # BLD AUTO: 0.2 10E3/UL (ref 0–0.7)
EOSINOPHIL NFR BLD AUTO: 2 %
ERYTHROCYTE [DISTWIDTH] IN BLOOD BY AUTOMATED COUNT: 16.8 % (ref 10–15)
GAMMA GLOB MFR UR ELPH: 2.3 %
GLUCOSE SERPL-MCNC: 94 MG/DL (ref 70–99)
GLUCOSE UR STRIP-MCNC: NEGATIVE MG/DL
HBV DNA SERPL QL NAA+PROBE: NORMAL
HCT VFR BLD AUTO: 39.6 % (ref 35–47)
HCV RNA SERPL QL NAA+PROBE: NORMAL
HGB BLD-MCNC: 13 G/DL (ref 11.7–15.7)
HGB S BLD QL: NEGATIVE
HGB UR QL STRIP: ABNORMAL
HIV1+2 AB SERPL QL IA: NORMAL
HIV1+2 RNA SERPL QL NAA+PROBE: NORMAL
HYALINE CASTS: 32 /LPF
IMM GRANULOCYTES # BLD: 0 10E3/UL
IMM GRANULOCYTES NFR BLD: 0 %
KETONES UR STRIP-MCNC: NEGATIVE MG/DL
LDH SERPL L TO P-CCNC: 248 U/L (ref 0–250)
LEUKOCYTE ESTERASE UR QL STRIP: NEGATIVE
LYMPHOCYTES # BLD AUTO: 1.4 10E3/UL (ref 0.8–5.3)
LYMPHOCYTES NFR BLD AUTO: 15 %
M PROTEIN MFR UR ELPH: 0 %
MCH RBC QN AUTO: 30.2 PG (ref 26.5–33)
MCHC RBC AUTO-ENTMCNC: 32.8 G/DL (ref 31.5–36.5)
MCV RBC AUTO: 92 FL (ref 78–100)
MONOCYTES # BLD AUTO: 1 10E3/UL (ref 0–1.3)
MONOCYTES NFR BLD AUTO: 10 %
MUCOUS THREADS #/AREA URNS LPF: PRESENT /LPF
NEUTROPHILS # BLD AUTO: 6.9 10E3/UL (ref 1.6–8.3)
NEUTROPHILS NFR BLD AUTO: 71 %
NITRATE UR QL: NEGATIVE
NRBC # BLD AUTO: 0 10E3/UL
NRBC BLD AUTO-RTO: 0 /100
PATH REPORT.COMMENTS IMP SPEC: ABNORMAL
PH UR STRIP: 6.5 [PH] (ref 5–7)
PLATELET # BLD AUTO: 483 10E3/UL (ref 150–450)
POTASSIUM SERPL-SCNC: 3.4 MMOL/L (ref 3.4–5.3)
PROT PATTERN UR ELPH-IMP: ABNORMAL
PROT/CREAT 24H UR: 18 MG/MG CR (ref 0–0.2)
RBC # BLD AUTO: 4.31 10E6/UL (ref 3.8–5.2)
RBC URINE: 2 /HPF
SODIUM SERPL-SCNC: 138 MMOL/L (ref 135–145)
SP GR UR STRIP: 1.01 (ref 1–1.03)
TRYPANOSOMA CRUZI: NORMAL
UROBILINOGEN UR STRIP-MCNC: NORMAL MG/DL
WBC # BLD AUTO: 9.7 10E3/UL (ref 4–11)
WBC URINE: 2 /HPF
WNV RNA SERPL DONR QL NAA+PROBE: NORMAL

## 2024-05-24 PROCEDURE — 88341 IMHCHEM/IMCYTCHM EA ADD ANTB: CPT | Mod: 26 | Performed by: STUDENT IN AN ORGANIZED HEALTH CARE EDUCATION/TRAINING PROGRAM

## 2024-05-24 PROCEDURE — 88185 FLOWCYTOMETRY/TC ADD-ON: CPT

## 2024-05-24 PROCEDURE — 88237 TISSUE CULTURE BONE MARROW: CPT

## 2024-05-24 PROCEDURE — 88188 FLOWCYTOMETRY/READ 9-15: CPT | Mod: GC | Performed by: STUDENT IN AN ORGANIZED HEALTH CARE EDUCATION/TRAINING PROGRAM

## 2024-05-24 PROCEDURE — 88291 CYTO/MOLECULAR REPORT: CPT | Performed by: MEDICAL GENETICS

## 2024-05-24 PROCEDURE — 88184 FLOWCYTOMETRY/ TC 1 MARKER: CPT | Performed by: STUDENT IN AN ORGANIZED HEALTH CARE EDUCATION/TRAINING PROGRAM

## 2024-05-24 PROCEDURE — 84165 PROTEIN E-PHORESIS SERUM: CPT | Mod: 26 | Performed by: PATHOLOGY

## 2024-05-24 PROCEDURE — 36415 COLL VENOUS BLD VENIPUNCTURE: CPT

## 2024-05-24 PROCEDURE — 84165 PROTEIN E-PHORESIS SERUM: CPT | Mod: TC | Performed by: PATHOLOGY

## 2024-05-24 PROCEDURE — 38222 DX BONE MARROW BX & ASPIR: CPT | Performed by: ANESTHESIOLOGY

## 2024-05-24 PROCEDURE — 38222 DX BONE MARROW BX & ASPIR: CPT | Performed by: NURSE ANESTHETIST, CERTIFIED REGISTERED

## 2024-05-24 PROCEDURE — 85097 BONE MARROW INTERPRETATION: CPT | Mod: GC | Performed by: STUDENT IN AN ORGANIZED HEALTH CARE EDUCATION/TRAINING PROGRAM

## 2024-05-24 PROCEDURE — 86334 IMMUNOFIX E-PHORESIS SERUM: CPT | Mod: 26 | Performed by: PATHOLOGY

## 2024-05-24 PROCEDURE — 83615 LACTATE (LD) (LDH) ENZYME: CPT

## 2024-05-24 PROCEDURE — 88311 DECALCIFY TISSUE: CPT | Mod: 26 | Performed by: STUDENT IN AN ORGANIZED HEALTH CARE EDUCATION/TRAINING PROGRAM

## 2024-05-24 PROCEDURE — 88369 M/PHMTRC ALYSISHQUANT/SEMIQ: CPT | Mod: 26 | Performed by: MEDICAL GENETICS

## 2024-05-24 PROCEDURE — 83521 IG LIGHT CHAINS FREE EACH: CPT

## 2024-05-24 PROCEDURE — 88342 IMHCHEM/IMCYTCHM 1ST ANTB: CPT | Mod: TC

## 2024-05-24 PROCEDURE — 86334 IMMUNOFIX E-PHORESIS SERUM: CPT | Performed by: PATHOLOGY

## 2024-05-24 PROCEDURE — 81001 URINALYSIS AUTO W/SCOPE: CPT

## 2024-05-24 PROCEDURE — 85060 BLOOD SMEAR INTERPRETATION: CPT | Mod: GC | Performed by: STUDENT IN AN ORGANIZED HEALTH CARE EDUCATION/TRAINING PROGRAM

## 2024-05-24 PROCEDURE — 38222 DX BONE MARROW BX & ASPIR: CPT

## 2024-05-24 PROCEDURE — 88271 CYTOGENETICS DNA PROBE: CPT

## 2024-05-24 PROCEDURE — 85025 COMPLETE CBC W/AUTO DIFF WBC: CPT

## 2024-05-24 PROCEDURE — 88313 SPECIAL STAINS GROUP 2: CPT | Mod: 26 | Performed by: STUDENT IN AN ORGANIZED HEALTH CARE EDUCATION/TRAINING PROGRAM

## 2024-05-24 PROCEDURE — 88305 TISSUE EXAM BY PATHOLOGIST: CPT | Mod: 26 | Performed by: STUDENT IN AN ORGANIZED HEALTH CARE EDUCATION/TRAINING PROGRAM

## 2024-05-24 PROCEDURE — 84155 ASSAY OF PROTEIN SERUM: CPT

## 2024-05-24 PROCEDURE — 80048 BASIC METABOLIC PNL TOTAL CA: CPT

## 2024-05-24 PROCEDURE — 88368 INSITU HYBRIDIZATION MANUAL: CPT | Mod: 26 | Performed by: MEDICAL GENETICS

## 2024-05-24 PROCEDURE — 84156 ASSAY OF PROTEIN URINE: CPT

## 2024-05-24 PROCEDURE — 88342 IMHCHEM/IMCYTCHM 1ST ANTB: CPT | Mod: 26 | Performed by: STUDENT IN AN ORGANIZED HEALTH CARE EDUCATION/TRAINING PROGRAM

## 2024-05-24 RX ORDER — PROPOFOL 10 MG/ML
INJECTION, EMULSION INTRAVENOUS PRN
Status: DISCONTINUED | OUTPATIENT
Start: 2024-05-24 | End: 2024-05-24

## 2024-05-24 RX ORDER — ACETAMINOPHEN 325 MG/1
975 TABLET ORAL ONCE
Status: COMPLETED | OUTPATIENT
Start: 2024-05-24 | End: 2024-05-24

## 2024-05-24 RX ORDER — PROPOFOL 10 MG/ML
INJECTION, EMULSION INTRAVENOUS CONTINUOUS PRN
Status: DISCONTINUED | OUTPATIENT
Start: 2024-05-24 | End: 2024-05-24

## 2024-05-24 RX ORDER — LIDOCAINE HYDROCHLORIDE 20 MG/ML
INJECTION, SOLUTION INFILTRATION; PERINEURAL PRN
Status: DISCONTINUED | OUTPATIENT
Start: 2024-05-24 | End: 2024-05-24

## 2024-05-24 RX ORDER — FENTANYL CITRATE 50 UG/ML
25 INJECTION, SOLUTION INTRAMUSCULAR; INTRAVENOUS
Status: DISCONTINUED | OUTPATIENT
Start: 2024-05-24 | End: 2024-05-29 | Stop reason: HOSPADM

## 2024-05-24 RX ORDER — SODIUM CHLORIDE, SODIUM LACTATE, POTASSIUM CHLORIDE, CALCIUM CHLORIDE 600; 310; 30; 20 MG/100ML; MG/100ML; MG/100ML; MG/100ML
INJECTION, SOLUTION INTRAVENOUS CONTINUOUS PRN
Status: DISCONTINUED | OUTPATIENT
Start: 2024-05-24 | End: 2024-05-24

## 2024-05-24 RX ORDER — NALOXONE HYDROCHLORIDE 0.4 MG/ML
0.1 INJECTION, SOLUTION INTRAMUSCULAR; INTRAVENOUS; SUBCUTANEOUS
Status: DISCONTINUED | OUTPATIENT
Start: 2024-05-24 | End: 2024-05-29 | Stop reason: HOSPADM

## 2024-05-24 RX ORDER — OXYCODONE HYDROCHLORIDE 5 MG/1
5 TABLET ORAL
Status: DISCONTINUED | OUTPATIENT
Start: 2024-05-24 | End: 2024-05-29 | Stop reason: HOSPADM

## 2024-05-24 RX ORDER — LIDOCAINE HYDROCHLORIDE 10 MG/ML
8-10 INJECTION, SOLUTION EPIDURAL; INFILTRATION; INTRACAUDAL; PERINEURAL
Status: CANCELLED | OUTPATIENT
Start: 2024-05-24

## 2024-05-24 RX ORDER — LIDOCAINE HYDROCHLORIDE 10 MG/ML
8-10 INJECTION, SOLUTION EPIDURAL; INFILTRATION; INTRACAUDAL; PERINEURAL
Status: DISCONTINUED | OUTPATIENT
Start: 2024-05-24 | End: 2024-05-29 | Stop reason: HOSPADM

## 2024-05-24 RX ORDER — SODIUM CHLORIDE, SODIUM LACTATE, POTASSIUM CHLORIDE, CALCIUM CHLORIDE 600; 310; 30; 20 MG/100ML; MG/100ML; MG/100ML; MG/100ML
INJECTION, SOLUTION INTRAVENOUS CONTINUOUS
Status: DISCONTINUED | OUTPATIENT
Start: 2024-05-24 | End: 2024-05-29 | Stop reason: HOSPADM

## 2024-05-24 RX ORDER — ONDANSETRON 4 MG/1
4 TABLET, ORALLY DISINTEGRATING ORAL EVERY 30 MIN PRN
Status: DISCONTINUED | OUTPATIENT
Start: 2024-05-24 | End: 2024-05-29 | Stop reason: HOSPADM

## 2024-05-24 RX ORDER — OXYCODONE HYDROCHLORIDE 5 MG/1
10 TABLET ORAL
Status: DISCONTINUED | OUTPATIENT
Start: 2024-05-24 | End: 2024-05-29 | Stop reason: HOSPADM

## 2024-05-24 RX ORDER — LIDOCAINE 40 MG/G
CREAM TOPICAL
Status: DISCONTINUED | OUTPATIENT
Start: 2024-05-24 | End: 2024-05-29 | Stop reason: HOSPADM

## 2024-05-24 RX ORDER — ONDANSETRON 2 MG/ML
4 INJECTION INTRAMUSCULAR; INTRAVENOUS EVERY 30 MIN PRN
Status: DISCONTINUED | OUTPATIENT
Start: 2024-05-24 | End: 2024-05-29 | Stop reason: HOSPADM

## 2024-05-24 RX ORDER — DEXAMETHASONE SODIUM PHOSPHATE 10 MG/ML
4 INJECTION, SOLUTION INTRAMUSCULAR; INTRAVENOUS
Status: DISCONTINUED | OUTPATIENT
Start: 2024-05-24 | End: 2024-05-29 | Stop reason: HOSPADM

## 2024-05-24 RX ORDER — LIDOCAINE 40 MG/G
CREAM TOPICAL
Status: CANCELLED | OUTPATIENT
Start: 2024-05-24

## 2024-05-24 RX ADMIN — PROPOFOL 70 MG: 10 INJECTION, EMULSION INTRAVENOUS at 13:10

## 2024-05-24 RX ADMIN — SODIUM CHLORIDE, SODIUM LACTATE, POTASSIUM CHLORIDE, CALCIUM CHLORIDE: 600; 310; 30; 20 INJECTION, SOLUTION INTRAVENOUS at 13:04

## 2024-05-24 RX ADMIN — ACETAMINOPHEN 975 MG: 325 TABLET ORAL at 11:56

## 2024-05-24 RX ADMIN — PROPOFOL 150 MCG/KG/MIN: 10 INJECTION, EMULSION INTRAVENOUS at 13:10

## 2024-05-24 RX ADMIN — LIDOCAINE HYDROCHLORIDE 80 MG: 20 INJECTION, SOLUTION INFILTRATION; PERINEURAL at 13:10

## 2024-05-24 ASSESSMENT — PAIN SCALES - GENERAL: PAINLEVEL: MODERATE PAIN (4)

## 2024-05-24 NOTE — ANESTHESIA PREPROCEDURE EVALUATION
Anesthesia Pre-Procedure Evaluation    Patient: Vivian Brown   MRN: [id number] : 1970        Procedure : Procedure(s):  BIOPSY, BONE MARROW          Past Medical History:   Diagnosis Date    Celiac disease     Family history of colon cancer     Colonoscoy q5 years next 2020      Past Surgical History:   Procedure Laterality Date    COLONOSCOPY N/A 2015    Procedure: COLONOSCOPY;  Surgeon: Eugenio Travis MD;  Location: [facility]    ESOPHAGOSCOPY, GASTROSCOPY, DUODENOSCOPY (EGD), COMBINED N/A 2024    Procedure: ESOPHAGOGASTRODUODENOSCOPY, WITH BIOPSY;  Surgeon: Melo Cloud MD;  Location: [facility]    IR RENAL BIOPSY RIGHT  2023    TONSILLECTOMY        Allergies   Allergen Reactions    Blood Transfusion Related (Informational Only)      Patient has a history of a clinically significant antibody against RBC antigens.  A delay in compatible RBCs may occur. Anti-CD38 interfering in all testing at [facility] 24.    Gluten Meal     Latex Itching    Seasonal Allergies       Social History     Tobacco Use    Smoking status: Never     Passive exposure: Past    Smokeless tobacco: Never    Tobacco comments:     Parents smoked during childhood in the house   Substance Use Topics    Alcohol use: Not Currently     Alcohol/week: 0.0 standard drinks of alcohol     Comment: 1-2 times/month      Wt Readings from Last 1 Encounters:   24 73.2 kg (161 lb 4.8 oz)        Anesthesia Evaluation            ROS/MED HX  ENT/Pulmonary:       Neurologic:       Cardiovascular:     (+) Dyslipidemia - -   -  - -                                      METS/Exercise Tolerance:     Hematologic:       Musculoskeletal:       GI/Hepatic:       Renal/Genitourinary:     (+) renal disease, type: CRI,            Endo:     (+)          thyroid problem, hypothyroidism,           Psychiatric/Substance Use:       Infectious Disease:       Malignancy:       Other: Comment: Amyloidosis           Physical Exam    Airway   airway exam normal      Mallampati: II   TM distance: > 3 FB   Neck ROM: full   Mouth opening: > 3 cm    Respiratory Devices and Support         Dental       (+) Completely normal teeth      Cardiovascular          Rhythm and rate: regular and normal     Pulmonary   pulmonary exam normal        breath sounds clear to auscultation           OUTSIDE LABS:  CBC:   Lab Results   Component Value Date    WBC 9.7 05/24/2024    WBC 9.4 05/21/2024    HGB 13.0 05/24/2024    HGB 11.8 05/21/2024    HCT 39.6 05/24/2024    HCT 35.1 05/21/2024     (H) 05/24/2024     (H) 05/21/2024     BMP:   Lab Results   Component Value Date     05/24/2024     05/21/2024    POTASSIUM 3.4 05/24/2024    POTASSIUM 3.8 05/21/2024    CHLORIDE 104 05/24/2024    CHLORIDE 103 05/21/2024    CO2 26 05/24/2024    CO2 25 05/21/2024    BUN 20.3 (H) 05/24/2024    BUN 21.0 (H) 05/21/2024    CR 1.52 (H) 05/24/2024    CR 1.59 (H) 05/23/2024    GLC 94 05/24/2024     (H) 05/21/2024     COAGS:   Lab Results   Component Value Date    PTT 27 05/21/2024    INR 0.94 05/21/2024     POC:   Lab Results   Component Value Date    HCG Negative 05/21/2024     HEPATIC:   Lab Results   Component Value Date    ALBUMIN 2.3 (L) 05/21/2024    PROTTOTAL 4.6 (L) 05/21/2024    ALT 24 05/21/2024    AST 24 05/21/2024    ALKPHOS 91 05/21/2024    BILITOTAL 0.2 05/21/2024     OTHER:   Lab Results   Component Value Date    A1C 5.7 (H) 05/14/2024    TANNER 9.8 05/24/2024    PHOS 3.8 05/21/2024    MAG 2.0 05/21/2024    TSH 0.13 (L) 05/14/2024    T4 1.34 05/14/2024    T3 87 07/20/2023       Anesthesia Plan    ASA Status:  3    NPO Status:  NPO Appropriate    Anesthesia Type: MAC.     - Reason for MAC: immobility needed, straight local not clinically adequate   Induction: Intravenous.   Maintenance: TIVA.        Consents    Anesthesia Plan(s) and associated risks, benefits, and realistic alternatives discussed. Questions answered and patient/representative(s)  "expressed understanding.     - Discussed:     - Discussed with:  Patient      - Extended Intubation/Ventilatory Support Discussed: No.      - Patient is DNR/DNI Status: No     Use of blood products discussed: No .     Postoperative Care    Pain management: IV analgesics, Oral pain medications, Multi-modal analgesia.   PONV prophylaxis: Ondansetron (or other 5HT-3), Background Propofol Infusion     Comments:               Eulalio Villa MD    I have reviewed the pertinent notes and labs in the chart from the past 30 days and (re)examined the patient.  Any updates or changes from those notes are reflected in this note.     # Hyponatremia: Lowest Na = 135 mmol/L in last 30 days, will monitor as appropriate      # Hypoalbuminemia: Lowest albumin = 2.2 g/dL in the past 30 days , will monitor as appropriate     # Overweight: Estimated body mass index is 27.69 kg/m  as calculated from the following:    Height as of this encounter: 1.626 m (5' 4\").    Weight as of this encounter: 73.2 kg (161 lb 4.8 oz).      "

## 2024-05-24 NOTE — ANESTHESIA CARE TRANSFER NOTE
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    Anesthesia Type:   MAC     Note:      Level of Consciousness: awake  Oxygen Supplementation: room air    Independent Airway: airway patency satisfactory and stable        Patient transferred to: Phase II    Handoff Report: Identifed the Patient, Identified the Reponsible Provider, Reviewed the pertinent medical history, Discussed the surgical course, Reviewed Intra-OP anesthesia mangement and issues during anesthesia, Set expectations for post-procedure period and Allowed opportunity for questions and acknowledgement of understanding  Vitals:  Vitals Value Taken Time   BP     Temp     Pulse     Resp     SpO2         Electronically Signed By: MARICRUZ Hardin CRNA  May 24, 2024  1:34 PM

## 2024-05-24 NOTE — ANESTHESIA POSTPROCEDURE EVALUATION
Patient: Vivian Brown    Procedure: Procedure(s):  BIOPSY, BONE MARROW       Anesthesia Type:  MAC    Note:  Disposition: Outpatient   Postop Pain Control: Uneventful            Sign Out: Well controlled pain   PONV: No   Neuro/Psych: Uneventful            Sign Out: Acceptable/Baseline neuro status   Airway/Respiratory: Uneventful            Sign Out: Acceptable/Baseline resp. status   CV/Hemodynamics: Uneventful            Sign Out: Acceptable CV status   Other NRE: NONE   DID A NON-ROUTINE EVENT OCCUR? No           Last vitals:  Vitals Value Taken Time   /69 05/24/24 1400   Temp 36.9  C (98.4  F) 05/24/24 1400   Pulse     Resp 16 05/24/24 1400   SpO2 99 % 05/24/24 1400       Electronically Signed By: Eulalio Villa MD  May 24, 2024  2:20 PM

## 2024-05-24 NOTE — LETTER
5/24/2024         RE: Vivian Brown  [address] [facility] [address]        Dear Colleague,    Thank you for referring your patient, Vivian Brown, to the [facility] BLOOD AND MARROW TRANSPLANT PROGRAM [place]. Please see a copy of my visit note below.    See procedure note in other encounter      Again, thank you for allowing me to participate in the care of your patient.        Sincerely,        [facility] Advanced Practice Provider

## 2024-05-24 NOTE — NURSING NOTE
Chief Complaint   Patient presents with    Labs Only     PIV placed, vitals checked     Florencia Bustillos RN on 5/24/2024 at 11:16 AM

## 2024-05-24 NOTE — DISCHARGE INSTRUCTIONS
How to Care for your Bone Marrow Biopsy    Activity  Relax and take it easy for the next 24 hours.   Resume regular activity after 24 hours.    Diet   Resume pre-procedure diet and drink plenty of fluids.    If you received sedation, you may feel a little nauseated so start with a clear liquid diet until the nausea passes.    Do Not Immerse Bone Marrow Biopsy Puncture Site in Water  Do not take a bath until the puncture site has healed.  Do not sit in a hot tub or spa until the puncture site has healed.  Do not swim until the puncture site has healed.  Wait 24 hours before taking a shower.    Drainage  Drainage should be minimal.  IF bleeding should occur and soaks through the dressing, lie down and put pressure on the puncture site.    IF bleeding persists, apply gentle pressure with your hand over the dressing for 5 minutes.    IF the pressure doesn't stop the bleeding, contact your provider immediately.    Dressing  Keep the dressing dry and in place for 24 hours, unless instructed otherwise.    IF bleeding soaks through the dressing in the first 24 hours do NOT remove the dressing as you may pull off any scab that has formed.  Instead, reinforce the dressing with extra gauze and tape.    No Alcohol  Do not drink alcoholic beverages for the next 24 hours.    No Driving or Operating Machinery  No driving or operating machinery for the next 24 hours.    Notify your provider IF:    Excessive bleeding or drainage at the puncture site    Excessive swelling, redness or tenderness at the puncture site    Fever above 100.5 degrees taken orally    Severe pain    Drainage that is green, yellow, thick white or has a bad odor    Telephone Numbers  Bone Marrow transplant clinic:  [phone] (Monday thru Friday, 8:00 am to 4:00 pm)  After business hours call the [facility]:  [phone] and ask for the Hematology/BMT doctor on call.  Or call the Emergency Room at the [facility] [place]  [facility]:  [phone].

## 2024-05-24 NOTE — PROCEDURES
"BMT ONC Adult Bone Marrow Biopsy Procedure Note  May 24, 2024  /66 (Cuff Size: Adult Regular)   Pulse 80   Temp 98.6  F (37  C) (Temporal)   Resp 16   Ht 1.626 m (5' 4\")   Wt 73.2 kg (161 lb 4.8 oz)   SpO2 98%   BMI 27.69 kg/m       Learning needs assessment complete within 12 months? YES    DIAGNOSIS: AL Amyloidosis, workup for autologous BMT     PROCEDURE: Unilateral Bone Marrow Biopsy and Unilateral Aspirate    LOCATION: [facility] 5th floor-Procedure Room    Patient s identification was positively verified by verbal identification and invasive procedure safety checklist was completed. Informed consent was obtained. Following the administration of  MAC per anesthesia  as pre-medication, patient was placed in the prone position and prepped and draped in a sterile manner. Approximately 10 cc of 1% Lidocaine was used over the left posterior iliac spine. Following this a 3 mm incision was made. Trephine bone marrow core(s) was (were) obtained from the [facility]. Bone marrow aspirates were obtained from the [facility]. Aspirates were sent for morphology, immunophenotyping, cytogenetics, and molecular diagnostics clonoseq. A total of approximately 27 ml of marrow was aspirated. Following this procedure a sterile dressing was applied to the bone marrow biopsy site(s). The patient was placed in the supine position to maintain pressure on the biopsy site. Post-procedure wound care instructions were given.     Complications: NO    Pt well sedated throughout procedure    Interventions: NO    Length of procedure:21 minutes to 45 minutes    Procedure performed by: Sabrina Jain NP    "

## 2024-05-25 LAB — TOTAL PROTEIN SERUM FOR ELP: 4 G/DL (ref 6.4–8.3)

## 2024-05-27 LAB
DEPRECATED CALCIDIOL+CALCIFEROL SERPL-MC: <24 UG/L (ref 20–75)
VITAMIN D2 SERPL-MCNC: <5 UG/L
VITAMIN D3 SERPL-MCNC: 19 UG/L

## 2024-05-28 LAB
INTERPRETATION: NORMAL
KAPPA LC FREE SER-MCNC: 1.12 MG/DL (ref 0.33–1.94)
KAPPA LC FREE/LAMBDA FREE SER NEPH: 0.64 {RATIO} (ref 0.26–1.65)
LAMBDA LC FREE SERPL-MCNC: 1.74 MG/DL (ref 0.57–2.63)
PATH REPORT.COMMENTS IMP SPEC: ABNORMAL
PATH REPORT.COMMENTS IMP SPEC: YES
PATH REPORT.COMMENTS IMP SPEC: YES
PATH REPORT.FINAL DX SPEC: ABNORMAL
PATH REPORT.FINAL DX SPEC: ABNORMAL
PATH REPORT.GROSS SPEC: ABNORMAL
PATH REPORT.MICROSCOPIC SPEC OTHER STN: ABNORMAL
PATH REPORT.RELEVANT HX SPEC: ABNORMAL
PATH REPORT.RELEVANT HX SPEC: ABNORMAL

## 2024-05-29 ENCOUNTER — TELEPHONE (OUTPATIENT)
Dept: CARDIOLOGY | Facility: CLINIC | Age: 54
End: 2024-05-29

## 2024-05-29 ENCOUNTER — ANCILLARY PROCEDURE (OUTPATIENT)
Dept: PET IMAGING | Facility: CLINIC | Age: 54
End: 2024-05-29
Attending: STUDENT IN AN ORGANIZED HEALTH CARE EDUCATION/TRAINING PROGRAM
Payer: COMMERCIAL

## 2024-05-29 DIAGNOSIS — E85.9 AMYLOIDOSIS, UNSPECIFIED (H): ICD-10-CM

## 2024-05-29 DIAGNOSIS — E85.81 AL AMYLOIDOSIS (H): Primary | ICD-10-CM

## 2024-05-29 DIAGNOSIS — Z52.011 AUTOLOGOUS DONOR OF STEM CELLS: ICD-10-CM

## 2024-05-29 DIAGNOSIS — Z86.2 PERSONAL HISTORY OF DISEASES OF BLOOD AND BLOOD-FORMING ORGANS: ICD-10-CM

## 2024-05-29 DIAGNOSIS — E85.81 AL AMYLOIDOSIS (H): ICD-10-CM

## 2024-05-29 DIAGNOSIS — Z01.818 EXAMINATION PRIOR TO CHEMOTHERAPY: ICD-10-CM

## 2024-05-29 LAB
ALBUMIN SERPL ELPH-MCNC: 2.1 G/DL (ref 3.7–5.1)
ALPHA1 GLOB SERPL ELPH-MCNC: 0.3 G/DL (ref 0.2–0.4)
ALPHA2 GLOB SERPL ELPH-MCNC: 0.9 G/DL (ref 0.5–0.9)
B-GLOBULIN SERPL ELPH-MCNC: 0.5 G/DL (ref 0.6–1)
GAMMA GLOB SERPL ELPH-MCNC: 0.2 G/DL (ref 0.7–1.6)
GLUCOSE SERPL-MCNC: 62 MG/DL (ref 70–99)
M PROTEIN SERPL ELPH-MCNC: 0 G/DL
PATH REPORT.COMMENTS IMP SPEC: ABNORMAL
PATH REPORT.COMMENTS IMP SPEC: NORMAL
PROT PATTERN SERPL ELPH-IMP: ABNORMAL
PROT PATTERN SERPL IFE-IMP: NORMAL

## 2024-05-29 RX ORDER — FUROSEMIDE 10 MG/ML
40 INJECTION INTRAMUSCULAR; INTRAVENOUS ONCE
Status: COMPLETED | OUTPATIENT
Start: 2024-05-29 | End: 2024-05-29

## 2024-05-29 RX ORDER — IOPAMIDOL 755 MG/ML
50-135 INJECTION, SOLUTION INTRAVASCULAR ONCE
Status: COMPLETED | OUTPATIENT
Start: 2024-05-29 | End: 2024-05-29

## 2024-05-29 RX ORDER — FLUDEOXYGLUCOSE F 18 200 MCI/ML
1-18 INJECTION, SOLUTION INTRAVENOUS ONCE
Status: COMPLETED | OUTPATIENT
Start: 2024-05-29 | End: 2024-05-29

## 2024-05-29 RX ADMIN — FLUDEOXYGLUCOSE F 18 15.15 MILLICURIE: 200 INJECTION, SOLUTION INTRAVENOUS at 11:25

## 2024-05-29 RX ADMIN — IOPAMIDOL 96 ML: 755 INJECTION, SOLUTION INTRAVASCULAR at 12:30

## 2024-05-29 RX ADMIN — FUROSEMIDE 40 MG: 10 INJECTION INTRAMUSCULAR; INTRAVENOUS at 12:30

## 2024-05-29 NOTE — TELEPHONE ENCOUNTER
5/29 Patient confirmed scheduled appointment:  Date: 8/2/2024  Time: 3:15 pm  Visit type: Return Amyloidosis  Provider: Patti  Location: [facility]  Testing/imaging: labs prior   Additional notes: N/A

## 2024-05-29 NOTE — DISCHARGE INSTRUCTIONS

## 2024-05-30 ENCOUNTER — HOME INFUSION (PRE-WILLOW HOME INFUSION) (OUTPATIENT)
Dept: PHARMACY | Facility: CLINIC | Age: 54
End: 2024-05-30
Payer: COMMERCIAL

## 2024-05-30 ENCOUNTER — MYC MEDICAL ADVICE (OUTPATIENT)
Dept: INTERVENTIONAL RADIOLOGY/VASCULAR | Facility: CLINIC | Age: 54
End: 2024-05-30
Payer: COMMERCIAL

## 2024-05-30 NOTE — PROGRESS NOTES
Therapy: [facility]  Insurance: [facility]  Ded: $4200  Met: $4200    Co-Insurance: 85/15  Max Out of Pocket: $8200  Met: $4275    In reference to referral from [facility] BMT clinic received on 05/29/24 to check for line care coverage.    Please contact Intake with any questions, [phone] or In Basket pool, [facility] ([phone]).

## 2024-05-30 NOTE — PROGRESS NOTES
BMT Close Note    Disease presentation and baseline characteristics:   12/21/2023:  Final Diagnosis   A. kidney biopsy (needle):     Amyloidosis of the kidney, AL-type, lambda light chain-restricted, affecting the glomeruli, interstitium, and arterioles (see comment)     Mild chronic changes of the parenchyma, including:   - focal global glomerulosclerosis (3% of glomeruli)   - focal tubular atrophy and interstitial fibrosis (5% of the cortex)   - severe arterial sclerosis   Electronically signed by Joe Garcia MD on 12/23/2023 at  3:21 PM   Comment  UULAB   The biopsy reveals findings diagnostic of amyloidosis; the amyloid shows lambda light chain-restriction (AL amyloidosis), and the deposits are Congo red positive. The amyloid deposits affect the glomeruli extensively, and interstitium and arterioles focally. Other potential complications of paraproteins in the kidney are not seen, in particular, there is no evidence of light chain cast nephropathy, light chain deposition disease, or light chain tubulopathy.     1/8/24: NT-Pro , Troponin T 15    Date Treatment Name Response Side Effects / Toxicities   1/19/24 D-CyBorD x 4 cycles            HPI:  Please see my entry above for hematologic history.  Mrs. Brown presents today accompanied by her family to review her pre-transplant workup.  She reports she felt unwell while fasting for her PET scan (predominantly lightheaded/dizzy and nauseous).  She felt much better after eating and is feeling better today.      ASSESSMENT AND PLAN:    BMT and Cell Therapy Informed Consent Discussion     In today's visit, we discussed in detail the research for which Vivian Brown is eligible. We discussed the potential risks and potential benefits of each protocol individually. We explained potential alternatives to the protocols discussed. We explained to the patient that participation is voluntary and that consent may be withdrawn at any time.     We  discussed:  The rationale for our approach to the disease treatment  The eligibility requirements for treatment in the context of clinical trials  The need for caregiver support and the caregiver's role in recovery  The importance of adherence to the treatment plan and appropriate follow up  The requirements for contraception while undergoing treatment  The potential risks of morbidity and mortality related to this treatment  The requirements for supportive care to reduce the risk of infection and other complications  The role of the dietician and PT/OT to reduce the risk of muscle loss/sarcopenia  Support that is available through our social workers and care team to mitigate distress  The desired outcomes/goals of treatment, including the possibility of long-term disease control    The patient completed the last round of treatment on 5/16/24.    HCT-CI score: 0. We counseled the patient about the impact of this on the risk of treatment related and overall mortality. The score fit within treatment protocol eligibility criteria.  We discussed that with amyloidosis this score may not reflect the risks of transplant; the patient was comfortable with proceeding.    Karnofsky performance score: 100      Active infections:  None.  Prior infections that require additional special prophylaxis considerations: none.  I reviewed and discussed infectious disease evaluation with the patient and the management plan during treatment.    Reproductive status: What methods of birth control does the patient plan to use during the treatment period beginning with conditioning and ending with the discontinuation of immune suppression (indicate with an X all that apply):  X The patient is confirmed to be sterile or post-menopausal  __ Sexual abstinence  __ Condoms  __ Implants  __ Injectables  __ Oral contraceptives  X   Intrauterine devices (IUD)  __ Other (describe)    The patient received appropriate reproductive counseling and agreed  with the need for effective contraception during the treatment procedures.    Dental health suitable to proceed: Yes    Transplants for amyloid:  The collection goal is 8 million CD34/kg for 2 transplants..  The day for admission to begin melphalan conditioning is Day -1 for melphalan 200 mg/m2 (not frail, creatinine clearance 30+). .    After our detailed discussion above, the patient signed the following consents for treatment and protocols:  [id number]       Plan:   - Plan for outpatient collections followed by inpatient transplant and stay through engraftment    I spent 40 minutes in the care of this patient today, which included time necessary for preparation for the visit, obtaining history, ordering medications/tests/procedures as medically indicated, review of pertinent medical literature, counseling of the patient, communication of recommendations to the care team, and documentation time.    Jomar Chandler MD    ROS:    10 point ROS neg other than the symptoms noted above in the HPI.      Current Outpatient Medications   Medication Sig Dispense Refill    acyclovir (ZOVIRAX) 400 MG tablet Take 1 tablet (400 mg) by mouth 2 times daily Viral Prophylaxis. 180 tablet 3    Albuterol-Budesonide (AIRSUPRA) 90-80 MCG/ACT AERO Inhale 2 Inhalations into the lungs every 4 hours as needed (Wheezing, chest tightness, shortness of breath, or persistent cough) 10.7 g 3    azelastine (ASTELIN) 0.1 % nasal spray Spray 2 sprays into both nostrils 2 times daily as needed for rhinitis 30 mL 3    EPINEPHrine (ANY BX GENERIC EQUIV) 0.3 MG/0.3ML injection 2-pack Inject 0.3 mLs (0.3 mg) into the muscle as needed for anaphylaxis May repeat one time in 5-15 minutes if response to initial dose is inadequate. 2 each 3    fluticasone (FLONASE) 50 MCG/ACT nasal spray Spray 2 sprays into both nostrils daily 16 g 3    furosemide (LASIX) 20 MG tablet Take 2 tablets (40 mg) by mouth 2 times daily (Patient taking differently: Take 20 mg by  mouth 2 times daily Taking 20 mg BID starting 04/10/24) 360 tablet 3    levothyroxine (SYNTHROID) 200 MCG tablet Take 200 mcg by mouth daily Pt takes Synthroid brand name. FRANKI      liothyronine (CYTOMEL) 5 MCG tablet Take 5 mcg by mouth 2 times daily      midodrine (PROAMATINE) 10 MG tablet Take 1 tablet (10 mg) by mouth 3 times daily 270 tablet 1    multivitamin w/minerals (MULTI-VITAMIN) tablet Take 1 tablet by mouth daily      ondansetron (ZOFRAN) 8 MG tablet Take 8 mg by mouth every 8 hours as needed for nausea      prochlorperazine (COMPAZINE) 5 MG tablet Take 1 tablet (5 mg) by mouth every 6 hours as needed for nausea or vomiting 30 tablet 1    rosuvastatin (CRESTOR) 10 MG tablet Take 1 tablet (10 mg) by mouth daily 90 tablet 3    spironolactone (ALDACTONE) 25 MG tablet Take 1 tablet (25 mg) by mouth daily 90 tablet 3    vitamin D3 (CHOLECALCIFEROL) 50 mcg (2000 units) tablet Take 1 tablet (50 mcg) by mouth daily 30 tablet 6         Physical Exam:     KPS:  100  General Appearance: alert, and no distress  Eyes: PERRL, conjunctiva and lids normal, sclera nonicteric  Ears/Nose/M/Throat: Oral mucosa and posterior oropharynx normal, moist mucous membranes  Cardio/Vascular:regular rate and rhythm, normal S1 and S2, no murmur  Resp Effort And Auscultation: Normal - Clear to auscultation without rales, rhonchi, or wheezing.  GI: soft, nontender, bowel sounds present in all four quadrants, no hepatosplenomegaly  Musculoskeletal: Musculoskeletal normal  Edema: none  Skin: Skin color, texture, turgor normal. No rashes or lesions.  Neurologic: Gait normal.  Sensation grossly WNL.  Psych/Affect: Mood and affect are appropriate.    Blood Counts     Recent Labs   Lab Test 05/24/24  1115 05/21/24  1338 05/14/24  0934 05/08/24  0809   HGB 13.0 11.8 12.4 12.8   HCT 39.6 35.1 36.9 36.9   WBC 9.7 9.4 8.7 9.9   ANEUTAUTO 6.9 6.7  --  7.1   ALYMPAUTO 1.4 1.5  --  1.4   AMONOAUTO 1.0 0.8  --  1.0   AEOSAUTO 0.2 0.2  --  0.3    ABSBASO 0.2 0.2  --  0.1   NRBCMAN 0.0 0.0  --  0.4   * 525* 357 357       Chemistries     Basic Panel  Recent Labs   Lab Test 05/29/24  1040 05/24/24  1112 05/23/24  1302 05/21/24  1338 05/14/24  0934   NA  --  138  --  135 137   POTASSIUM  --  3.4  --  3.8 3.9   CHLORIDE  --  104  --  103 101   CO2  --  26  --  25 28   BUN  --  20.3*  --  21.0* 25.5*   CR  --  1.52* 1.59* 1.49* 1.90*   GLC 62* 94  --  104* 90  90        Calcium, Magnesium, Phosphorus  Recent Labs   Lab Test 05/24/24  1112 05/21/24  1338 05/14/24  0934 03/22/24  0857 03/18/24  1102 02/06/24  0902 02/03/24  2140   TANNER 9.8 9.3 10.0   < > 8.7   < > 8.1*   MAG  --  2.0  --   --   --   --  2.0   PHOS  --  3.8 3.7  --  4.4   < >  --     < > = values in this interval not displayed.        LFTs  Recent Labs   Lab Test 05/21/24 1338 05/14/24  0934 05/08/24  0809 05/03/24  0913   BILITOTAL 0.2  --  0.4 0.3   ALKPHOS 91  --  88 94   AST 24  --  23 22   ALT 24  --  28 26   ALBUMIN 2.3* 2.4* 2.3* 2.3*       LDH  Recent Labs   Lab Test 05/24/24  1112 01/18/24  1433    248       B2-Microglobulin  Recent Labs   Lab Test 05/21/24 1338 01/18/24  1433   RCBA3GWPN 4.3* 5.8*         Immunoglobulins     Recent Labs   Lab Test 05/21/24 1338 04/19/24  0956 03/15/24  0820 02/02/24  1437 01/18/24  1444   IGG 86* 101* 116* 204* 294*       Recent Labs   Lab Test 05/21/24 1338 04/19/24  0956 03/15/24  0820 01/18/24  1444   IGA 35* 37* 36* 164       Recent Labs   Lab Test 05/21/24  1338 04/19/24  0956 03/15/24  0820 01/18/24  1444   IGM <10* 21* 33* 127         Monocloncal Protein Studies     M spike    Recent Labs   Lab Test 05/24/24  1112 05/21/24  1338 04/19/24  0956 03/15/24  0820 01/08/24  1201 11/28/23  1229   ELPM 0.0 0.0 0.1* 0.1* 0.0 0.0       [place] FLC    Recent Labs   Lab Test 05/24/24  1108 05/21/24  1338 04/19/24  0956 03/15/24  0820 03/08/24  1348 02/24/24  0741   KFLCA 1.12 1.07 1.13 1.33 1.20 1.13       Lambda FLC    Recent Labs   Lab Test  05/24/24  1108 05/21/24  1338 04/19/24  0956 03/15/24  0820 03/08/24  1348 02/24/24  0741   LFLCA 1.74 1.49 1.71 1.96 1.75 1.94       FLC Ratio    Recent Labs   Lab Test 05/24/24  1108 05/21/24  1338 04/19/24  0956 03/15/24  0820 03/08/24  1348 02/24/24  0741   KLRA 0.64 0.72 0.66 0.68 0.69 0.58         Bone Marrow Biopsy       Morphology    Results for orders placed or performed in visit on 05/24/24 (from the past 8760 hour(s))   Bone marrow biopsy   Result Value    Final Diagnosis      Bone marrow, posterior iliac crest, left decalcified trephine biopsy and touch imprint; left aspirate clot, direct aspirate smear, concentrate aspirate smear, and peripheral blood smear:               - Minute level involvement by recurrent/persistent plasma cell neoplasm  - Normocellular marrow (40-50% estimated cellularity), with , trilineage hematopoiesis, no overt dysplasia, and no increase in blasts (1%)  - Positive for amyloid deposisiton by congo erika   - Peripheral blood showing slight thrombocytosis and no circulating plasma cells  - See comment      Comment      Flow cytometry analysis on concurrent specimen ([id number]) showed rare lambda monotypic plasma cells (0.001%) and polytypic B cells. The flow findings coupled with the positive congo red and lambda monotypic plasma cells by immunohistochemistry are consistent with a plasma cell neoplasm.     Concurrent ancillary studies are in progress and will be reported separately. Correlation with the results of ancillary tests and clinical findings is recommended.      Clinical Information      From Epic electronic medical record; 54 year old female with a history of AL amyloidosis, lambda light chain-restricted, with kidney (nephrotic syndrome), bone marrow, and cardiac involvement treated with chemotherapy. Her most recent bone marrow biopsy ([id number] on 1/18/2024), pre-therapy, showed lambda-monotypic plasma cells with monotypic B cells. She is undergoing evaluation prior  to hematopoietic cell transplant.       Peripheral Hematologic Data      CBC WITH DIFFERENTIAL (05/24/2024 11:17 AM CDT):     RESULT VALUE REF. RANGE UNITS   WBC Count  Hemoglobin  Hematocrit   Platelet Count   RBC Count  MCV  MCH  MCHC   RDW  9.7 (NORMAL)    13.0 (NORMAL)     39.6 (NORMAL)   483  ( H )  4.31 (NORMAL)       92 (NORMAL)     30.2 (NORMAL)     32.8 (NORMAL)      16.8  ( H ) 4.0-11.0  11.7-15.7  35.0-47.0  150-450  3.80-5.20    26.5-33.0  31.5-36.5  10.0-15.0 10e3/uL  g/dL  %  10e3/uL  10e6/uL  fL  pg  g/dL  %   % Neutrophils  % Lymphocytes  % Monocytes  % Eosinophils  % Basophils  % Immature Granulocytes  Absolute Neutrophils  Absolute Lymphocytes  Absolute Monocytes  Absolute Eosinophils  Absolute Basophils  Absolute Immature Granulocytes  NRBCs per 100 WBC  Absolute NRBCs 71  15  10  2  2  0  6.9 (NORMAL)  1.4 (NORMAL)  1.0 (NORMAL)  0.2 (NORMAL)  0.2 (NORMAL)  0.0 (NORMAL)  0 (NORMAL)  0.0 () N/A  N/A  N/A  N/A  N/A  N/A  1.6-8.3  0.8-5.3  0.0-1.3  0.0-0.7  0.0-0.2  <=0.4  <1  <=0.0 %  %  %  %  %  %  10e3/uL  10e3/uL  10e3/uL  10e3/uL  10e3/uL  10e3/uL  /100  10e3/uL         Microscopic Description      PERIPHERAL BLOOD SMEAR MORPHOLOGY:  The red blood cells appear normochromic.  Poikilocytosis  includes numerous acanthocytes and echinocytes .  Polychromasia is not increased increased.  Rouleaux formation is not increased. The morphology of the platelets is normal. No circulating plasma cells.     Bone marrow aspirates and trephine core biopsy touch imprints are reviewed.    BONE MARROW DIFFERENTIAL (500 cells on direct aspirate smears)  Percent (%) Cell Population Reference Range (%)   1 Blasts  (0 - 1)   3 Neutrophil promyelocytes (2 - 4)   53 Neutrophils and precursors (54 - 63)   27 Erythroid precursors (18 - 24)   1 Monocytes (1 - 1.5)   1 Eosinophils (1 - 3)   1 Basophils (0 - 1)   13 Lymphocytes (8 - 12)   0 Plasma cells (0 - 1.5)     The aspirate smears are adequate for  evaluation.    Neutrophil maturation is complete. Erythroid maturation is complete. Megakaryocytes exhibit a spectrum of sizes and morphologies, within the limits of normal .    CLOT SECTIONS:   The clot sections show fibrin and blood.    TREPHINE SECTIONS:  Hematoxylin and eosin stains are reviewed. The quality of the trephine core biopsy is good, with slight crush and fragmentation artifact. The marrow cellularity is estimated at 40-50%. The cellular composition reflects the aspirate smear differential. The number of megakaryocytes appears consistent with the degree of marrow cellularity, with unremarkable morphology. No aggregates of plasma cells are identified. There are few thickened blood vessels identified.    IMMUNOHISTOCHEMISTRY:  Immunohistochemical and special stains are performed on the paraffin-embedded trephine core with appropriate controls.     highlights scattered plasma cells.  Stains for cytoplasmic kappa and lambda immunoglobulin light chains show lambda-monotypic plasma cells.  Congo red is positive    Note: These immunohistochemical stains are deemed medically necessary. Some of the antigens may also be evaluated by flow cytometry. Concurrent evaluation by immunohistochemistry on clot and/or trephine sections is indicated in this case in order to correlate immunophenotype with cell morphology and determine extent of involvement, spatial pattern, and focality of potential disease distribution.     Case was reviewed by the following:  Resident Pathologist: Loida Ott MD  A resident or fellow in a training program was involved in the initial review, preparation, and/or interpretation of this case.  I, as the senior physician, attest that I have personally reviewed all specimens and or slides, including the listed special stains, and used them with my medical judgement to determine the final diagnosis.              Gross Description      Procedure/Gross Description   Aspirate(s) and  trephine(s) procured by SHAQUILLE JACKSON    Specimen sent for Special Studies:         Flow Cytometry: left aspirate        Cytogenetics: left aspirate        Molecular Diagnostics: left aspirate        Other: left aspirate- ClonoSEQ    Biopsy aspiration site: left posterior iliac crest                                                      (Reference Range)          Amount of aspirate           2.5   mL        Fat and P.V. cell layer        2    %               (1 - 3)        Particles                             0   %        Myeloid-erythroid layer    2    %               (5 - 8)          Clot Section: yes    Trephine biopsy site: left posterior iliac crest    Designated left posterior iliac crest 1 cylinder of gritty tissue, labeled with the patient's name and hospital number, obtained with 11 gauge needle and a length of 14 mm; entirely submitted in 1 cassette; acetic zinc formalin fixed, decalcified, processed, and stained for hematoxylin and eosin per laboratory protocol.        MCRS Yes (A)    Performing Labs      The technical component of this testing was completed at [facility].     Stain controls for all stains resulted within this report have been reviewed and show appropriate reactivity.            Echocardiogram       Results for orders placed during the hospital encounter of 24    ECHOCARDIOGRAM COMPLETE    Status: Normal 2024    [facility]  [id number]  [id number]  [id number]  [id number]^PETE    [facility]  Echocardiography Laboratory  [address] [place] [address]    Name: ADE NAVARRO  MRN: [id number]  : 1970  Study Date: 2024 09:04 AM  Age: 54 yrs  Gender: Female  Patient Location: [facility]  Reason For Study: AL amyloidosis  Ordering Physician: PETE HOOD  Referring Physician: PETE HOOD  Performed By: Jw Park RDCS    BSA: 1.8  m2  Height: 64 in  Weight: 164 lb  ______________________________________________________________________________  Procedure  Echocardiogram with two-dimensional, color and spectral Doppler performed.  ______________________________________________________________________________  Interpretation Summary  Global and regional left ventricular function is normal with an EF of 55-60%.  Diastolic Doppler findings (E/E' ratio and/or other parameters) suggest left  ventricular filling pressures are increased.  Global peak LV longitudinal strain is averaged at -11.3%. This suggests  abnormal strain (normal <-18%).  Global right ventricular function is normal. There is mild right ventricular  hypertrophy.  Normal IVC.  Small circumferential pericardial effusion is present without any hemodynamic  significance.    This study was compared with the study from 1/3/24 .  No significant changes. Findings of increased biventricular wall thickness,  abnormal diastology, GLS, and presence of pericardial effusion in the setting  of renal amyloidosis raise suspicion of cardiac amyloidosis.  ______________________________________________________________________________  Left Ventricle  Left ventricular size is normal. Global and regional left ventricular function  is normal with an EF of 55-60%. Mild concentric wall thickening consistent  with left ventricular hypertrophy is present. Left ventricular diastolic  function is indeterminate. Diastolic Doppler findings (E/E' ratio and/or other  parameters) suggest left ventricular filling pressures are increased. Global  peak LV longitudinal strain is averaged at -11.3%. This suggests abnormal  strain (normal <-18%). No regional wall motion abnormalities are seen.    Right Ventricle  The right ventricle is normal size. Global right ventricular function is  normal. There is mild right ventricular hypertrophy.    Atria  Both atria appear normal.    Mitral Valve  The mitral valve is  normal.    Aortic Valve  Aortic valve is normal in structure and function. The aortic valve is  tricuspid.    Tricuspid Valve  The tricuspid valve is normal. Trace tricuspid insufficiency is present. The  peak velocity of the tricuspid regurgitant jet is not obtainable. Pulmonary  artery systolic pressure cannot be assessed.    Pulmonic Valve  The pulmonic valve is normal. Trace pulmonic insufficiency is present.    Vessels  The aorta root is normal. The thoracic aorta is normal. The inferior vena cava  was normal in size with preserved respiratory variability. IVC diameter <2.1  cm collapsing >50% with sniff suggests a normal RA pressure of 3 mmHg.    Pericardium  Small circumferential pericardial effusion is present without any hemodynamic  significance.    Miscellaneous  No significant valvular abnormalities present.    Compared to Previous Study  This study was compared with the study from 1/3/24 . No significant changes.  Findings of increased biventricular wall thickness, abnormal diastology, GLS,  and presence of pericardial effusion in the setting of renal amyloidosis raise  suspicion of cardiac amyloidosis.    Attestation  I have personally viewed the imaging and agree with the interpretation and  report as documented by the fellow, Mario Vila, and/or edited by me.  ______________________________________________________________________________  MMode/2D Measurements & Calculations  IVSd: 1.2 cm  LVIDd: 4.2 cm  LVIDs: 2.7 cm  LVPWd: 1.1 cm  FS: 35.4 %  LV mass(C)d: 165.4 grams  LV mass(C)dI: 92.0 grams/m2  Ao root diam: 3.0 cm  asc Aorta Diam: 3.1 cm  LVOT diam: 2.2 cm  LVOT area: 3.7 cm2  Ao root diam index Ht(cm/m): 1.8  Ao root diam index BSA (cm/m2): 1.7    Asc Ao diam index BSA (cm/m2): 1.8  Asc Ao diam index Ht(cm/m): 1.9  LA Volume (BP): 41.9 ml  LA Volume Index (BP): 23.3 ml/m2  RWT: 0.51  TAPSE: 1.7 cm    Doppler Measurements & Calculations  MV E max romero: 61.3 cm/sec  MV A max romero: 58.2 cm/sec  MV  E/A: 1.1  MV dec slope: 331.4 cm/sec2  MV dec time: 0.19 sec    PA acc time: 0.08 sec  E/E' av.9  Lateral E/e': 17.6  Medial E/e': 18.2  RV S Benedicto: 13.6 cm/sec    ______________________________________________________________________________  Report approved by: Danie Hernandez 2024 01:50 PM        PET Scan       Results for orders placed during the hospital encounter of 24    PET ONCOLOGY WHOLE BODY    Status: Normal 2024    Narrative  Combined Report of: PET and CT on  2024 9:59 AM:    1. PET of the neck, chest, abdomen, and pelvis.  2. PET CT Fusion for Attenuation Correction and Anatomical  Localization:  3. 3D MIP and PET-CT fused images were processed on an independent  workstation and archived to PACS and reviewed by a radiologist.    Technique:    1. PET: The patient received 10.54 mCi of F-18-FDG; the serum glucose  was 103 mg/dL prior to administration, body weight was 80 kg. Images  were evaluated in the axial, sagittal, and coronal planes as well as  the rotational whole body MIP. Images were acquired from the Vertex to  the Feet.    UPTAKE WAS MEASURED AT 62 MINUTES.    BACKGROUND:  Liver SUV max= 2.8,   Aorta Blood SUV max= 2.5.    2. CT: CT only obtained for attenuation correction and not diagnostic  purposes.    INDICATION: Biopsy-proven AL amyloidosis of kidney, workup for plasma  cell disorder; AL amyloidosis (H)    ADDITIONAL INFORMATION OBTAINED FROM EMR: 53-year-old female with  biopsy-proven renal amyloidosis (AL amyloidosis) and cardiac  involvement by MRI, PET CT requested for plasma cell disorder workup.    COMPARISON: None.    FOLLOW-UP APPOINTMENT: Unknown    FINDINGS:    HEAD/NECK:  Mildly hypermetabolic mildly enlarged bilateral level 2A nodes for  example a 1.5 x 1.2 cm right level IIA node, SUV max 4.2, series 3  image 71 and a 1.1 x 0.7 cm left level 2 node, SUV max 3.7, series 3  image 71.    Moderate diffuse thyroid uptake, SUV max 6.0, series 3  image 97.    CHEST:  Left lower lobe series 4 image [id number] there is nodularity along the  left lower lobe anterior segment bronchus, largest nodule measuring 8  mm there is focal hypermetabolism in this area max SUV 4.5.  Differential amyloid, infection, atelectasis.    There is mild uptake throughout the left ventricle which could  represent active amyloidosis however in the absence of a cardiac  preparation may simply be physiologic.  Calcified left hilar nodes and left lower lobe calcified granuloma. No  pneumothorax or pleural effusion.    ABDOMEN AND PELVIS:  No abnormal uptake.    No calcified gallstones. Calcified splenic granulomas. No adrenal  nodule. Left inferior pole nonobstructing intrarenal calculus, no  hydronephrosis. No small or large bowel obstruction. Intrauterine  device in place. No free air or free fluid in the abdomen or pelvis.    LOWER EXTREMITIES:  A single area of focal moderate to markedly hypermetabolic region in  the proximal posterior left gastrocnemius muscle, SUV max 8.6 series 3  image 416.    BONES AND SOFT TISSUES:  No abnormal uptake.  Old anterior rib fracture deformities.    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 No definite active amyloid on FDG pet.  2. Single focal moderate to markedly avid intramuscular uptake,  proximal left gastrocnemius, no etiology demonstrated on unenhanced  non diagnostic CT.  Recommend dedicated MRI.    3. Small to mildly enlarged mildly hypermetabolic bilateral level 2A  lymph nodes, most likely reactive.    4. A few equivocal findings - unlikely to be active amyloid (patient  has been under treatment, so FDG may be decreased).  Recommend future  PET scans be done with a sarcoid cardiac prep and and Lasix protocol  to decrease background and make them more sensitive for cardiac and  renal activity.    4a. Mildly hypermetabolic left lower lobe anterior  segment  peribronchial nodularity and hypermetabolism. Differential favors RSV  (positive 1/18/24), focal amyloid unlikely.  4b. Cardiac (MRI positive) - equivocal FDG throughout left ventricle,  likely physiologic. Given findings on MRI 1/16/2024, can not rule out  active amyloidosis. However in the absence of a cardiac dietary prep  (which switches myocardium from glucose to fatty metabolism), this is  likely normal physiology.  Suggest future PET scans be done with a  cardiac sarcoid dietary protocol (3 day ketogenic diet).  4c. Kidney cortex (bx. 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        I have personally reviewed the examination and initial interpretation  and I agree with the findings.    PETE VELASQUEZ MD      SYSTEM ID:  [id number]     The longitudinal plan of care for the diagnosis(es)/condition(s) as documented were addressed during this visit. Due to the added complexity in care, I will continue to support Vivian in the subsequent management and with ongoing continuity of care.    ------------------------------------------------------------------------------------------------------------------------------------------------    Patient Care Team         Relationship Specialty Notifications Start End    Alin Torres, PA-C PCP - General Family Medicine  11/4/20     Phone: [phone] Fax: [phone] [address] [facility] [address]    Sarthak Ontiveros MD Assigned OBGYN Provider   9/24/22     Phone: [phone] Fax: [phone]         [address] [address]    Alin Torres PA-C Assigned PCP   12/31/22     Phone: [phone] Fax: [phone]         [address] [place] [address]    Carmen Waggoner APRN CNP Nurse Practitioner Gastroenterology  11/21/23     Phone:  [phone] Fax: [phone]         [address] [facility] [address] [facility] [address] [place] [address]    Audrey Sen,  Assigned Nephrology Provider   12/9/23     Phone: [phone] Fax: [phone]         [address] [place], [place] [id number] [facility] [address]    Musa Adrian MD Physician Hematology & Oncology  12/26/23     Phone: [phone] Fax: [phone]         [address] [place] [address], [facility] [address] [facility] [address]    Jomar Chandler MD MD Hematology & Oncology  12/26/23     Phone: [phone] Fax: [phone]         [address] [place] [address]    Gaston Flores MD MD Cardiovascular Disease  1/5/24     Phone: [phone] Fax: [phone] [address] [facility] [address]

## 2024-05-31 ENCOUNTER — CARE COORDINATION (OUTPATIENT)
Dept: TRANSPLANT | Facility: CLINIC | Age: 54
End: 2024-05-31
Payer: COMMERCIAL

## 2024-05-31 ENCOUNTER — OFFICE VISIT (OUTPATIENT)
Dept: TRANSPLANT | Facility: CLINIC | Age: 54
End: 2024-05-31
Attending: STUDENT IN AN ORGANIZED HEALTH CARE EDUCATION/TRAINING PROGRAM
Payer: COMMERCIAL

## 2024-05-31 VITALS
HEART RATE: 91 BPM | WEIGHT: 152 LBS | SYSTOLIC BLOOD PRESSURE: 111 MMHG | TEMPERATURE: 98.4 F | RESPIRATION RATE: 16 BRPM | DIASTOLIC BLOOD PRESSURE: 73 MMHG | OXYGEN SATURATION: 97 % | BODY MASS INDEX: 26.09 KG/M2

## 2024-05-31 DIAGNOSIS — E85.81 AL AMYLOIDOSIS (H): Primary | ICD-10-CM

## 2024-05-31 DIAGNOSIS — N08 RENAL AMYLOIDOSIS (H): ICD-10-CM

## 2024-05-31 DIAGNOSIS — Z52.011 AUTOLOGOUS DONOR OF STEM CELLS: Primary | ICD-10-CM

## 2024-05-31 DIAGNOSIS — E85.4 RENAL AMYLOIDOSIS (H): ICD-10-CM

## 2024-05-31 PROCEDURE — 99215 OFFICE O/P EST HI 40 MIN: CPT | Performed by: STUDENT IN AN ORGANIZED HEALTH CARE EDUCATION/TRAINING PROGRAM

## 2024-05-31 PROCEDURE — 99212 OFFICE O/P EST SF 10 MIN: CPT | Performed by: STUDENT IN AN ORGANIZED HEALTH CARE EDUCATION/TRAINING PROGRAM

## 2024-05-31 PROCEDURE — G2211 COMPLEX E/M VISIT ADD ON: HCPCS | Performed by: STUDENT IN AN ORGANIZED HEALTH CARE EDUCATION/TRAINING PROGRAM

## 2024-05-31 RX ORDER — DIPHENHYDRAMINE HCL 25 MG
25 CAPSULE ORAL ONCE
Status: CANCELLED
Start: 2024-06-11 | End: 2024-06-11

## 2024-05-31 RX ORDER — ACETAMINOPHEN 325 MG/1
650 TABLET ORAL ONCE
Status: CANCELLED
Start: 2024-06-11 | End: 2024-06-11

## 2024-05-31 RX ORDER — MEPERIDINE HYDROCHLORIDE 25 MG/ML
25-50 INJECTION INTRAMUSCULAR; INTRAVENOUS; SUBCUTANEOUS
Status: CANCELLED | OUTPATIENT
Start: 2024-06-11 | End: 2024-06-12

## 2024-05-31 RX ORDER — DEXTROSE MONOHYDRATE 50 MG/ML
10-20 INJECTION, SOLUTION INTRAVENOUS
Status: CANCELLED | OUTPATIENT
Start: 2024-06-16

## 2024-05-31 RX ORDER — DEXAMETHASONE 4 MG/1
8 TABLET ORAL DAILY
Status: CANCELLED
Start: 2024-06-11

## 2024-05-31 RX ORDER — ONDANSETRON 4 MG/1
8 TABLET, FILM COATED ORAL EVERY 8 HOURS
Status: CANCELLED
Start: 2024-06-10

## 2024-05-31 RX ORDER — ALLOPURINOL 300 MG/1
300 TABLET ORAL ONCE
Status: CANCELLED
Start: 2024-06-10 | End: 2024-06-10

## 2024-05-31 ASSESSMENT — PAIN SCALES - GENERAL: PAINLEVEL: NO PAIN (0)

## 2024-05-31 NOTE — LETTER
5/31/2024         RE: Vivian Brown  [address] [facility] [id number]        Dear Colleague,    Thank you for referring your patient, Vivian Brown, to the [facility]LOOD AND MARROW TRANSPL[facility] [place]. Please see a copy of my visit note below.         BMT Close Note    Disease presentation and baseline characteristics:   12/21/2023:  Final Diagnosis   A. kidney biopsy (needle):     Amyloidosis of the kidney, AL-type, lambda light chain-restricted, affecting the glomeruli, interstitium, and arterioles (see comment)     Mild chronic changes of the parenchyma, including:   - focal global glomerulosclerosis (3% of glomeruli)   - focal tubular atrophy and interstitial fibrosis (5% of the cortex)   - severe arterial sclerosis   Electronically signed by Joe Garcia MD on 12/23/2023 at  3:21 PM   Comment  [facility]   The biopsy reveals findings diagnostic of amyloidosis; the amyloid shows lambda light chain-restriction (AL amyloidosis), and the deposits are Congo red positive. The amyloid deposits affect the glomeruli extensively, and interstitium and arterioles focally. Other potential complications of paraproteins in the kidney are not seen, in particular, there is no evidence of light chain cast nephropathy, light chain deposition disease, or light chain tubulopathy.     1/8/24: NT-Pro , Troponin T 15    Date Treatment Name Response Side Effects / Toxicities   1/19/24 D-CyBorD x 4 cycles            HPI:  Please see my entry above for hematologic history.  Mrs. Brown presents today accompanied by her family to review her pre-transplant workup.  She reports she felt unwell while fasting for her PET scan (predominantly lightheaded/dizzy and nauseous).  She felt much better after eating and is feeling better today.      ASSESSMENT AND PLAN:    BMT and Cell Therapy Informed Consent Discussion     In today's visit, we discussed in detail the research for which Vivian Brown is  eligible. We discussed the potential risks and potential benefits of each protocol individually. We explained potential alternatives to the protocols discussed. We explained to the patient that participation is voluntary and that consent may be withdrawn at any time.     We discussed:  The rationale for our approach to the disease treatment  The eligibility requirements for treatment in the context of clinical trials  The need for caregiver support and the caregiver's role in recovery  The importance of adherence to the treatment plan and appropriate follow up  The requirements for contraception while undergoing treatment  The potential risks of morbidity and mortality related to this treatment  The requirements for supportive care to reduce the risk of infection and other complications  The role of the dietician and PT/OT to reduce the risk of muscle loss/sarcopenia  Support that is available through our social workers and care team to mitigate distress  The desired outcomes/goals of treatment, including the possibility of long-term disease control    The patient completed the last round of treatment on 5/16/24.    HCT-CI score: 0. We counseled the patient about the impact of this on the risk of treatment related and overall mortality. The score fit within treatment protocol eligibility criteria.  We discussed that with amyloidosis this score may not reflect the risks of transplant; the patient was comfortable with proceeding.    Karnofsky performance score: 100      Active infections:  None.  Prior infections that require additional special prophylaxis considerations: none.  I reviewed and discussed infectious disease evaluation with the patient and the management plan during treatment.    Reproductive status: What methods of birth control does the patient plan to use during the treatment period beginning with conditioning and ending with the discontinuation of immune suppression (indicate with an X all that  apply):  X The patient is confirmed to be sterile or post-menopausal  __ Sexual abstinence  __ Condoms  __ Implants  __ Injectables  __ Oral contraceptives  X   Intrauterine devices (IUD)  __ Other (describe)    The patient received appropriate reproductive counseling and agreed with the need for effective contraception during the treatment procedures.    Dental health suitable to proceed: Yes    Transplants for amyloid:  The collection goal is 8 million CD34/kg for 2 transplants..  The day for admission to begin melphalan conditioning is Day -1 for melphalan 200 mg/m2 (not frail, creatinine clearance 30+). .    After our detailed discussion above, the patient signed the following consents for treatment and protocols:  [id number]       Plan:   - Plan for outpatient collections followed by inpatient transplant and stay through engraftment    I spent 40 minutes in the care of this patient today, which included time necessary for preparation for the visit, obtaining history, ordering medications/tests/procedures as medically indicated, review of pertinent medical literature, counseling of the patient, communication of recommendations to the care team, and documentation time.    Jomar Chandler MD    ROS:    10 point ROS neg other than the symptoms noted above in the HPI.      Current Outpatient Medications   Medication Sig Dispense Refill    acyclovir (ZOVIRAX) 400 MG tablet Take 1 tablet (400 mg) by mouth 2 times daily Viral Prophylaxis. 180 tablet 3    Albuterol-Budesonide (AIRSUPRA) 90-80 MCG/ACT AERO Inhale 2 Inhalations into the lungs every 4 hours as needed (Wheezing, chest tightness, shortness of breath, or persistent cough) 10.7 g 3    azelastine (ASTELIN) 0.1 % nasal spray Spray 2 sprays into both nostrils 2 times daily as needed for rhinitis 30 mL 3    EPINEPHrine (ANY BX GENERIC EQUIV) 0.3 MG/0.3ML injection 2-pack Inject 0.3 mLs (0.3 mg) into the muscle as needed for anaphylaxis May repeat one time in 5-15  minutes if response to initial dose is inadequate. 2 each 3    fluticasone (FLONASE) 50 MCG/ACT nasal spray Spray 2 sprays into both nostrils daily 16 g 3    furosemide (LASIX) 20 MG tablet Take 2 tablets (40 mg) by mouth 2 times daily (Patient taking differently: Take 20 mg by mouth 2 times daily Taking 20 mg BID starting 04/10/24) 360 tablet 3    levothyroxine (SYNTHROID) 200 MCG tablet Take 200 mcg by mouth daily Pt takes Synthroid brand name. FRANKI      liothyronine (CYTOMEL) 5 MCG tablet Take 5 mcg by mouth 2 times daily      midodrine (PROAMATINE) 10 MG tablet Take 1 tablet (10 mg) by mouth 3 times daily 270 tablet 1    multivitamin w/minerals (MULTI-VITAMIN) tablet Take 1 tablet by mouth daily      ondansetron (ZOFRAN) 8 MG tablet Take 8 mg by mouth every 8 hours as needed for nausea      prochlorperazine (COMPAZINE) 5 MG tablet Take 1 tablet (5 mg) by mouth every 6 hours as needed for nausea or vomiting 30 tablet 1    rosuvastatin (CRESTOR) 10 MG tablet Take 1 tablet (10 mg) by mouth daily 90 tablet 3    spironolactone (ALDACTONE) 25 MG tablet Take 1 tablet (25 mg) by mouth daily 90 tablet 3    vitamin D3 (CHOLECALCIFEROL) 50 mcg (2000 units) tablet Take 1 tablet (50 mcg) by mouth daily 30 tablet 6         Physical Exam:     KPS:  100  General Appearance: alert, and no distress  Eyes: PERRL, conjunctiva and lids normal, sclera nonicteric  Ears/Nose/M/Throat: Oral mucosa and posterior oropharynx normal, moist mucous membranes  Cardio/Vascular:regular rate and rhythm, normal S1 and S2, no murmur  Resp Effort And Auscultation: Normal - Clear to auscultation without rales, rhonchi, or wheezing.  GI: soft, nontender, bowel sounds present in all four quadrants, no hepatosplenomegaly  Musculoskeletal: Musculoskeletal normal  Edema: none  Skin: Skin color, texture, turgor normal. No rashes or lesions.  Neurologic: Gait normal.  Sensation grossly WNL.  Psych/Affect: Mood and affect are appropriate.    Blood Counts      Recent Labs   Lab Test 05/24/24  1115 05/21/24  1338 05/14/24  0934 05/08/24  0809   HGB 13.0 11.8 12.4 12.8   HCT 39.6 35.1 36.9 36.9   WBC 9.7 9.4 8.7 9.9   ANEUTAUTO 6.9 6.7  --  7.1   ALYMPAUTO 1.4 1.5  --  1.4   AMONOAUTO 1.0 0.8  --  1.0   AEOSAUTO 0.2 0.2  --  0.3   ABSBASO 0.2 0.2  --  0.1   NRBCMAN 0.0 0.0  --  0.4   * 525* 357 357       Chemistries     Basic Panel  Recent Labs   Lab Test 05/29/24  1040 05/24/24  1112 05/23/24  1302 05/21/24  1338 05/14/24  0934   NA  --  138  --  135 137   POTASSIUM  --  3.4  --  3.8 3.9   CHLORIDE  --  104  --  103 101   CO2  --  26  --  25 28   BUN  --  20.3*  --  21.0* 25.5*   CR  --  1.52* 1.59* 1.49* 1.90*   GLC 62* 94  --  104* 90  90        Calcium, Magnesium, Phosphorus  Recent Labs   Lab Test 05/24/24  1112 05/21/24  1338 05/14/24  0934 03/22/24  0857 03/18/24  1102 02/06/24  0902 02/03/24  2140   TANNER 9.8 9.3 10.0   < > 8.7   < > 8.1*   MAG  --  2.0  --   --   --   --  2.0   PHOS  --  3.8 3.7  --  4.4   < >  --     < > = values in this interval not displayed.        LFTs  Recent Labs   Lab Test 05/21/24  1338 05/14/24  0934 05/08/24  0809 05/03/24  0913   BILITOTAL 0.2  --  0.4 0.3   ALKPHOS 91  --  88 94   AST 24  --  23 22   ALT 24  --  28 26   ALBUMIN 2.3* 2.4* 2.3* 2.3*       LDH  Recent Labs   Lab Test 05/24/24  1112 01/18/24  1433    248       B2-Microglobulin  Recent Labs   Lab Test 05/21/24  1338 01/18/24  1433   [id number] 4.3* 5.8*         Immunoglobulins     Recent Labs   Lab Test 05/21/24  1338 04/19/24  0956 03/15/24  0820 02/02/24  1437 01/18/24  1444   IGG 86* 101* 116* 204* 294*       Recent Labs   Lab Test 05/21/24  1338 04/19/24  0956 03/15/24  0820 01/18/24  1444   IGA 35* 37* 36* 164       Recent Labs   Lab Test 05/21/24  1338 04/19/24  0956 03/15/24  0820 01/18/24  1444   IGM <10* 21* 33* 127         Monocloncal Protein Studies     M spike    Recent Labs   Lab Test 05/24/24  1112 05/21/24  1338 04/19/24  0956 03/15/24  0820  01/08/24  1201 11/28/23  1229   ELPM 0.0 0.0 0.1* 0.1* 0.0 0.0       [place] FLC    Recent Labs   Lab Test 05/24/24  1108 05/21/24  1338 04/19/24  0956 03/15/24  0820 03/08/24  1348 02/24/24  0741   KFLCA 1.12 1.07 1.13 1.33 1.20 1.13       Lambda FLC    Recent Labs   Lab Test 05/24/24  1108 05/21/24  1338 04/19/24  0956 03/15/24  0820 03/08/24  1348 02/24/24  0741   LFLCA 1.74 1.49 1.71 1.96 1.75 1.94       FLC Ratio    Recent Labs   Lab Test 05/24/24  1108 05/21/24  1338 04/19/24  0956 03/15/24  0820 03/08/24  1348 02/24/24  0741   KLRA 0.64 0.72 0.66 0.68 0.69 0.58         Bone Marrow Biopsy       Morphology    Results for orders placed or performed in visit on 05/24/24 (from the past 8760 hour(s))   Bone marrow biopsy   Result Value    Final Diagnosis      Bone marrow, posterior iliac crest, left decalcified trephine biopsy and touch imprint; left aspirate clot, direct aspirate smear, concentrate aspirate smear, and peripheral blood smear:               - Minute level involvement by recurrent/persistent plasma cell neoplasm  - Normocellular marrow (40-50% estimated cellularity), with , trilineage hematopoiesis, no overt dysplasia, and no increase in blasts (1%)  - Positive for amyloid deposisiton by congo erika   - Peripheral blood showing slight thrombocytosis and no circulating plasma cells  - See comment      Comment      Flow cytometry analysis on concurrent specimen ([id number]) showed rare lambda monotypic plasma cells (0.001%) and polytypic B cells. The flow findings coupled with the positive congo red and lambda monotypic plasma cells by immunohistochemistry are consistent with a plasma cell neoplasm.     Concurrent ancillary studies are in progress and will be reported separately. Correlation with the results of ancillary tests and clinical findings is recommended.      Clinical Information      From Epic electronic medical record; 54 year old female with a history of AL amyloidosis, lambda light  chain-restricted, with kidney (nephrotic syndrome), bone marrow, and cardiac involvement treated with chemotherapy. Her most recent bone marrow biopsy ([id number] on 1/18/2024), pre-therapy, showed lambda-monotypic plasma cells with monotypic B cells. She is undergoing evaluation prior to hematopoietic cell transplant.       Peripheral Hematologic Data      CBC WITH DIFFERENTIAL (05/24/2024 11:17 AM CDT):     RESULT VALUE REF. RANGE UNITS   WBC Count  Hemoglobin  Hematocrit   Platelet Count   RBC Count  MCV  MCH  MCHC   RDW  9.7 (NORMAL)    13.0 (NORMAL)     39.6 (NORMAL)   483  ( H )  4.31 (NORMAL)       92 (NORMAL)     30.2 (NORMAL)     32.8 (NORMAL)      16.8  ( H ) 4.0-11.0  11.7-15.7  35.0-47.0  150-450  3.80-5.20    26.5-33.0  31.5-36.5  10.0-15.0 10e3/uL  g/dL  %  10e3/uL  10e6/uL  fL  pg  g/dL  %   % Neutrophils  % Lymphocytes  % Monocytes  % Eosinophils  % Basophils  % Immature Granulocytes  Absolute Neutrophils  Absolute Lymphocytes  Absolute Monocytes  Absolute Eosinophils  Absolute Basophils  Absolute Immature Granulocytes  NRBCs per 100 WBC  Absolute NRBCs 71  15  10  2  2  0  6.9 (NORMAL)  1.4 (NORMAL)  1.0 (NORMAL)  0.2 (NORMAL)  0.2 (NORMAL)  0.0 (NORMAL)  0 (NORMAL)  0.0 () N/A  N/A  N/A  N/A  N/A  N/A  1.6-8.3  0.8-5.3  0.0-1.3  0.0-0.7  0.0-0.2  <=0.4  <1  <=0.0 %  %  %  %  %  %  10e3/uL  10e3/uL  10e3/uL  10e3/uL  10e3/uL  10e3/uL  /100  10e3/uL         Microscopic Description      PERIPHERAL BLOOD SMEAR MORPHOLOGY:  The red blood cells appear normochromic.  Poikilocytosis  includes numerous acanthocytes and echinocytes .  Polychromasia is not increased increased.  Rouleaux formation is not increased. The morphology of the platelets is normal. No circulating plasma cells.     Bone marrow aspirates and trephine core biopsy touch imprints are reviewed.    BONE MARROW DIFFERENTIAL (500 cells on direct aspirate smears)  Percent (%) Cell Population Reference Range (%)   1 Blasts  (0 - 1)   3  Neutrophil promyelocytes (2 - 4)   53 Neutrophils and precursors (54 - 63)   27 Erythroid precursors (18 - 24)   1 Monocytes (1 - 1.5)   1 Eosinophils (1 - 3)   1 Basophils (0 - 1)   13 Lymphocytes (8 - 12)   0 Plasma cells (0 - 1.5)     The aspirate smears are adequate for evaluation.    Neutrophil maturation is complete. Erythroid maturation is complete. Megakaryocytes exhibit a spectrum of sizes and morphologies, within the limits of normal .    CLOT SECTIONS:   The clot sections show fibrin and blood.    TREPHINE SECTIONS:  Hematoxylin and eosin stains are reviewed. The quality of the trephine core biopsy is good, with slight crush and fragmentation artifact. The marrow cellularity is estimated at 40-50%. The cellular composition reflects the aspirate smear differential. The number of megakaryocytes appears consistent with the degree of marrow cellularity, with unremarkable morphology. No aggregates of plasma cells are identified. There are few thickened blood vessels identified.    IMMUNOHISTOCHEMISTRY:  Immunohistochemical and special stains are performed on the paraffin-embedded trephine core with appropriate controls.     highlights scattered plasma cells.  Stains for cytoplasmic kappa and lambda immunoglobulin light chains show lambda-monotypic plasma cells.  Congo red is positive    Note: These immunohistochemical stains are deemed medically necessary. Some of the antigens may also be evaluated by flow cytometry. Concurrent evaluation by immunohistochemistry on clot and/or trephine sections is indicated in this case in order to correlate immunophenotype with cell morphology and determine extent of involvement, spatial pattern, and focality of potential disease distribution.     Case was reviewed by the following:  Resident Pathologist: Loida Ott MD  A resident or fellow in a training program was involved in the initial review, preparation, and/or interpretation of this case.  I, as the senior  physician, attest that I have personally reviewed all specimens and or slides, including the listed special stains, and used them with my medical judgement to determine the final diagnosis.              Gross Description      Procedure/Gross Description   Aspirate(s) and trephine(s) procured by SHAQUILLE JACKSON    Specimen sent for Special Studies:         Flow Cytometry: left aspirate        Cytogenetics: left aspirate        Molecular Diagnostics: left aspirate        Other: left aspirate- ClonoSEQ    Biopsy aspiration site: left posterior iliac crest                                                      (Reference Range)          Amount of aspirate           2.5   mL        Fat and P.V. cell layer        2    %               (1 - 3)        Particles                             0   %        Myeloid-erythroid layer    2    %               (5 - 8)          Clot Section: yes    Trephine biopsy site: left posterior iliac crest    Designated left posterior iliac crest 1 cylinder of gritty tissue, labeled with the patient's name and hospital number, obtained with 11 gauge needle and a length of 14 mm; entirely submitted in 1 cassette; acetic zinc formalin fixed, decalcified, processed, and stained for hematoxylin and eosin per laboratory protocol.        MCRS Yes (A)    Performing Labs      The technical component of this testing was completed at [facility].     Stain controls for all stains resulted within this report have been reviewed and show appropriate reactivity.            Echocardiogram       Results for orders placed during the hospital encounter of 05/23/24    ECHOCARDIOGRAM COMPLETE    Status: Normal 5/23/2024    Narrative  [id number]  [id number]  [id number]  [id number]^[email or web address]'[email or web address]^PETE    [facility],[place]  Echocardiography Laboratory  [address] [place] [id number]    Name: ADE NAVARRO  MRN:  [id number]  : 1970  Study Date: 2024 09:04 AM  Age: 54 yrs  Gender: Female  Patient Location: [facility]  Reason For Study: AL amyloidosis  Ordering Physician: PETE HOOD  Referring Physician: PETE HOOD  Performed By: Jw Park RDCS    BSA: 1.8 m2  Height: 64 in  Weight: 164 lb  ______________________________________________________________________________  Procedure  Echocardiogram with two-dimensional, color and spectral Doppler performed.  ______________________________________________________________________________  Interpretation Summary  Global and regional left ventricular function is normal with an EF of 55-60%.  Diastolic Doppler findings (E/E' ratio and/or other parameters) suggest left  ventricular filling pressures are increased.  Global peak LV longitudinal strain is averaged at -11.3%. This suggests  abnormal strain (normal <-18%).  Global right ventricular function is normal. There is mild right ventricular  hypertrophy.  Normal IVC.  Small circumferential pericardial effusion is present without any hemodynamic  significance.    This study was compared with the study from 1/3/24 .  No significant changes. Findings of increased biventricular wall thickness,  abnormal diastology, GLS, and presence of pericardial effusion in the setting  of renal amyloidosis raise suspicion of cardiac amyloidosis.  ______________________________________________________________________________  Left Ventricle  Left ventricular size is normal. Global and regional left ventricular function  is normal with an EF of 55-60%. Mild concentric wall thickening consistent  with left ventricular hypertrophy is present. Left ventricular diastolic  function is indeterminate. Diastolic Doppler findings (E/E' ratio and/or other  parameters) suggest left ventricular filling pressures are increased. Global  peak LV longitudinal strain is averaged at -11.3%. This suggests abnormal  strain (normal <-18%). No  regional wall motion abnormalities are seen.    Right Ventricle  The right ventricle is normal size. Global right ventricular function is  normal. There is mild right ventricular hypertrophy.    Atria  Both atria appear normal.    Mitral Valve  The mitral valve is normal.    Aortic Valve  Aortic valve is normal in structure and function. The aortic valve is  tricuspid.    Tricuspid Valve  The tricuspid valve is normal. Trace tricuspid insufficiency is present. The  peak velocity of the tricuspid regurgitant jet is not obtainable. Pulmonary  artery systolic pressure cannot be assessed.    Pulmonic Valve  The pulmonic valve is normal. Trace pulmonic insufficiency is present.    Vessels  The aorta root is normal. The thoracic aorta is normal. The inferior vena cava  was normal in size with preserved respiratory variability. IVC diameter <2.1  cm collapsing >50% with sniff suggests a normal RA pressure of 3 mmHg.    Pericardium  Small circumferential pericardial effusion is present without any hemodynamic  significance.    Miscellaneous  No significant valvular abnormalities present.    Compared to Previous Study  This study was compared with the study from 1/3/24 . No significant changes.  Findings of increased biventricular wall thickness, abnormal diastology, GLS,  and presence of pericardial effusion in the setting of renal amyloidosis raise  suspicion of cardiac amyloidosis.    Attestation  I have personally viewed the imaging and agree with the interpretation and  report as documented by the fellow, Mario Vila, and/or edited by me.  ______________________________________________________________________________  MMode/2D Measurements & Calculations  IVSd: 1.2 cm  LVIDd: 4.2 cm  LVIDs: 2.7 cm  LVPWd: 1.1 cm  FS: 35.4 %  LV mass(C)d: 165.4 grams  LV mass(C)dI: 92.0 grams/m2  Ao root diam: 3.0 cm  asc Aorta Diam: 3.1 cm  LVOT diam: 2.2 cm  LVOT area: 3.7 cm2  Ao root diam index Ht(cm/m): 1.8  Ao root diam index BSA  (cm/m2): 1.7    Asc Ao diam index BSA (cm/m2): 1.8  Asc Ao diam index Ht(cm/m): 1.9  LA Volume (BP): 41.9 ml  LA Volume Index (BP): 23.3 ml/m2  RWT: 0.51  TAPSE: 1.7 cm    Doppler Measurements & Calculations  MV E max benedicto: 61.3 cm/sec  MV A max benedicto: 58.2 cm/sec  MV E/A: 1.1  MV dec slope: 331.4 cm/sec2  MV dec time: 0.19 sec    PA acc time: 0.08 sec  E/E' av.9  Lateral E/e': 17.6  Medial E/e': 18.2  RV S Benedicto: 13.6 cm/sec    ______________________________________________________________________________  Report approved by: Danie Hernandez 2024 01:50 PM        PET Scan       Results for orders placed during the hospital encounter of 24    PET ONCOLOGY WHOLE BODY    Status: Normal 2024    Narrative  Combined Report of: PET and CT on  2024 9:59 AM:    1. PET of the neck, chest, abdomen, and pelvis.  2. PET CT Fusion for Attenuation Correction and Anatomical  Localization:  3. 3D MIP and PET-CT fused images were processed on an independent  workstation and archived to PACS and reviewed by a radiologist.    Technique:    1. PET: The patient received 10.54 mCi of F-18-FDG; the serum glucose  was 103 mg/dL prior to administration, body weight was 80 kg. Images  were evaluated in the axial, sagittal, and coronal planes as well as  the rotational whole body MIP. Images were acquired from the Vertex to  the Feet.    UPTAKE WAS MEASURED AT 62 MINUTES.    BACKGROUND:  Liver SUV max= 2.8,   Aorta Blood SUV max= 2.5.    2. CT: CT only obtained for attenuation correction and not diagnostic  purposes.    INDICATION: Biopsy-proven AL amyloidosis of kidney, workup for plasma  cell disorder; AL amyloidosis (H)    ADDITIONAL INFORMATION OBTAINED FROM EMR: 53-year-old female with  biopsy-proven renal amyloidosis (AL amyloidosis) and cardiac  involvement by MRI, PET CT requested for plasma cell disorder workup.    COMPARISON: None.    FOLLOW-UP APPOINTMENT: Unknown    FINDINGS:    HEAD/NECK:  Mildly  hypermetabolic mildly enlarged bilateral level 2A nodes for  example a 1.5 x 1.2 cm right level IIA node, SUV max 4.2, series 3  image 71 and a 1.1 x 0.7 cm left level 2 node, SUV max 3.7, series 3  image 71.    Moderate diffuse thyroid uptake, SUV max 6.0, series 3 image 97.    CHEST:  Left lower lobe series 4 image [id number] there is nodularity along the  left lower lobe anterior segment bronchus, largest nodule measuring 8  mm there is focal hypermetabolism in this area max SUV 4.5.  Differential amyloid, infection, atelectasis.    There is mild uptake throughout the left ventricle which could  represent active amyloidosis however in the absence of a cardiac  preparation may simply be physiologic.  Calcified left hilar nodes and left lower lobe calcified granuloma. No  pneumothorax or pleural effusion.    ABDOMEN AND PELVIS:  No abnormal uptake.    No calcified gallstones. Calcified splenic granulomas. No adrenal  nodule. Left inferior pole nonobstructing intrarenal calculus, no  hydronephrosis. No small or large bowel obstruction. Intrauterine  device in place. No free air or free fluid in the abdomen or pelvis.    LOWER EXTREMITIES:  A single area of focal moderate to markedly hypermetabolic region in  the proximal posterior left gastrocnemius muscle, SUV max 8.6 series 3  image 416.    BONES AND SOFT TISSUES:  No abnormal uptake.  Old anterior rib fracture deformities.    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 No definite active amyloid on FDG pet.  2. Single focal moderate to markedly avid intramuscular uptake,  proximal left gastrocnemius, no etiology demonstrated on unenhanced  non diagnostic CT.  Recommend dedicated MRI.    3. Small to mildly enlarged mildly hypermetabolic bilateral level 2A  lymph nodes, most likely reactive.    4. A few equivocal findings - unlikely to be active amyloid (patient  has been under treatment, so  FDG may be decreased).  Recommend future  PET scans be done with a sarcoid cardiac prep and and Lasix protocol  to decrease background and make them more sensitive for cardiac and  renal activity.    4a. Mildly hypermetabolic left lower lobe anterior segment  peribronchial nodularity and hypermetabolism. Differential favors RSV  (positive 1/18/24), focal amyloid unlikely.  4b. Cardiac (MRI positive) - equivocal FDG throughout left ventricle,  likely physiologic. Given findings on MRI 1/16/2024, can not rule out  active amyloidosis. However in the absence of a cardiac dietary prep  (which switches myocardium from glucose to fatty metabolism), this is  likely normal physiology.  Suggest future PET scans be done with a  cardiac sarcoid dietary protocol (3 day ketogenic diet).  4c. Kidney cortex (bx. 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        I have personally reviewed the examination and initial interpretation  and I agree with the findings.    PETE VELASQUEZ MD      SYSTEM ID:  [id number]     The longitudinal plan of care for the diagnosis(es)/condition(s) as documented were addressed during this visit. Due to the added complexity in care, I will continue to support Vivian in the subsequent management and with ongoing continuity of care.    ------------------------------------------------------------------------------------------------------------------------------------------------    Patient Care Team         Relationship Specialty Notifications Start End    Alin Torres PA-C PCP - General Family Medicine  11/4/20     Phone: [phone] Fax: [phone] [address] [facility] [address]    Sarthak Ontiveros MD Assigned OBGYN Provider   9/24/22     Phone: [phone] Fax: [phone]         [address]  [address] [facility] [address]    Alin Torres PATeodoraC Assigned PCP   12/31/22     Phone: [phone] Fax: [phone]         [address] [facility] [address]    Carmen Waggoner APRN CNP Nurse Practitioner Gastroenterology  11/21/23     Phone: [phone] Fax: [phone]         [address] [place] [address]    Audrey Sen DO Assigned Nephrology Provider   12/9/23     Phone: [phone] Fax: [phone]         [address] [facility] [address] [facility] [address]    Musa Adrian MD Physician Hematology & Oncology  12/26/23     Phone: [phone] Fax: [phone]         [address] [facility] [address] [facility] [address]    Jomar Chandler MD MD Hematology & Oncology  12/26/23     Phone: [phone] Fax: [phone]         [address] [place] [address] [address] [facility] [address]    Gaston Flores MD MD Cardiovascular Disease  1/5/24     Phone: [phone] Fax: [phone] [address] [facility] [address]          Jomar Chandler MD

## 2024-05-31 NOTE — NURSING NOTE
"Oncology Rooming Note    May 31, 2024 10:59 AM   Vivian Brown is a 54 year old female who presents for:    Chief Complaint   Patient presents with    Oncology Clinic Visit     Autologous donor of stem cells     Initial Vitals: /73 (BP Location: Right arm, Patient Position: Sitting, Cuff Size: Adult Regular)   Pulse 91   Temp 98.4  F (36.9  C) (Oral)   Resp 16   Wt 68.9 kg (152 lb)   SpO2 97%   BMI 26.09 kg/m   Estimated body mass index is 26.09 kg/m  as calculated from the following:    Height as of 5/24/24: 1.626 m (5' 4\").    Weight as of this encounter: 68.9 kg (152 lb). Body surface area is 1.76 meters squared.  No Pain (0) Comment: Data Unavailable   No LMP recorded. (Menstrual status: IUD).  Allergies reviewed: Yes  Medications reviewed: Yes    Medications: Medication refills not needed today.  Pharmacy name entered into [employer]:    [facility] - [place], [place] - [address] [facility] [place] - [address] [id number] - [place] - [address] [id number] - [place] - [address]  [facility] - [place], [place] - [address]  [facility] [place], [place] - [address]  [facility]    Frailty Screening:   Is the patient here for a new oncology consult visit in cancer care? 2. No      Clinical concerns:     -would like to clarify low fat diet.     -Pt comments that glucose was at 62 when pet scan for heart was done, wonders if that would need to be redone.      Fermín Manuel              "

## 2024-05-31 NOTE — PROGRESS NOTES
Writer talked w/ patient to inform of upcoming (beginning 6/2) G-CSF schedule, line placement and stem cell collections.  Patient was provided pre-op instructions for line placement as per recommendations via IR. Patient was given paper copy of schedule.  Patient verbalized understanding of instructions via teach-back and no further questions at this time.

## 2024-06-02 ENCOUNTER — INFUSION THERAPY VISIT (OUTPATIENT)
Dept: TRANSPLANT | Facility: CLINIC | Age: 54
End: 2024-06-02
Attending: STUDENT IN AN ORGANIZED HEALTH CARE EDUCATION/TRAINING PROGRAM
Payer: COMMERCIAL

## 2024-06-02 VITALS
RESPIRATION RATE: 18 BRPM | DIASTOLIC BLOOD PRESSURE: 70 MMHG | HEART RATE: 99 BPM | TEMPERATURE: 97.8 F | SYSTOLIC BLOOD PRESSURE: 102 MMHG | OXYGEN SATURATION: 97 %

## 2024-06-02 DIAGNOSIS — Z52.011 AUTOLOGOUS DONOR OF STEM CELLS: Primary | ICD-10-CM

## 2024-06-02 DIAGNOSIS — E85.81 AL AMYLOIDOSIS (H): ICD-10-CM

## 2024-06-02 LAB — HCG UR QL: NEGATIVE

## 2024-06-02 PROCEDURE — 250N000011 HC RX IP 250 OP 636: Mod: JZ | Performed by: STUDENT IN AN ORGANIZED HEALTH CARE EDUCATION/TRAINING PROGRAM

## 2024-06-02 PROCEDURE — 81025 URINE PREGNANCY TEST: CPT

## 2024-06-02 PROCEDURE — 96372 THER/PROPH/DIAG INJ SC/IM: CPT | Performed by: STUDENT IN AN ORGANIZED HEALTH CARE EDUCATION/TRAINING PROGRAM

## 2024-06-02 RX ORDER — HEPARIN SODIUM (PORCINE) LOCK FLUSH IV SOLN 100 UNIT/ML 100 UNIT/ML
5 SOLUTION INTRAVENOUS
Status: CANCELLED | OUTPATIENT
Start: 2024-06-14

## 2024-06-02 RX ORDER — HEPARIN SODIUM,PORCINE 10 UNIT/ML
5-20 VIAL (ML) INTRAVENOUS DAILY PRN
Status: CANCELLED | OUTPATIENT
Start: 2024-06-14

## 2024-06-02 RX ADMIN — FILGRASTIM-SNDZ 780 MCG: 480 INJECTION, SOLUTION INTRAVENOUS; SUBCUTANEOUS at 09:55

## 2024-06-02 ASSESSMENT — PAIN SCALES - GENERAL: PAINLEVEL: NO PAIN (1)

## 2024-06-02 NOTE — PROGRESS NOTES
First dose GCSF Nursing Note:  Vivian Brown presents today for first dose GCSF.    Patient seen by provider today: No   present during visit today: Not Applicable.    Pregnancy Status: Pregnancy Status: Negative - confirmed via urine test    Note: Pt here today for first dose GCSF prior to collections. Meds and allergies reviewed. VSS. Pt reports feeling well today and denies pain, fever/chills, bleeding, n/v/d, or respiratory symptoms. See assessment flowsheet for details. Pt was educated on medication side effects/symptoms. All questions answered and pt verbalized understanding. Pt received 780 mcg Zarxio subcutaneously into right lower abdomen. Pt was monitored for 15 minutes post injection.     Post Injection Assessment:  Patient tolerated injection without incident.     Discharge Plan:   Patient discharged in stable condition accompanied by: .  Departure Mode: Ambulatory.      Ninoska Love RN

## 2024-06-03 ENCOUNTER — ALLIED HEALTH/NURSE VISIT (OUTPATIENT)
Dept: TRANSPLANT | Facility: CLINIC | Age: 54
End: 2024-06-03
Attending: STUDENT IN AN ORGANIZED HEALTH CARE EDUCATION/TRAINING PROGRAM
Payer: COMMERCIAL

## 2024-06-03 VITALS
DIASTOLIC BLOOD PRESSURE: 73 MMHG | TEMPERATURE: 98.7 F | SYSTOLIC BLOOD PRESSURE: 110 MMHG | HEART RATE: 102 BPM | OXYGEN SATURATION: 99 % | RESPIRATION RATE: 16 BRPM

## 2024-06-03 DIAGNOSIS — E85.81 AL AMYLOIDOSIS (H): Primary | ICD-10-CM

## 2024-06-03 DIAGNOSIS — Z52.011 AUTOLOGOUS DONOR OF STEM CELLS: ICD-10-CM

## 2024-06-03 PROCEDURE — 250N000011 HC RX IP 250 OP 636: Mod: JZ | Performed by: STUDENT IN AN ORGANIZED HEALTH CARE EDUCATION/TRAINING PROGRAM

## 2024-06-03 PROCEDURE — 96372 THER/PROPH/DIAG INJ SC/IM: CPT | Performed by: STUDENT IN AN ORGANIZED HEALTH CARE EDUCATION/TRAINING PROGRAM

## 2024-06-03 RX ORDER — HEPARIN SODIUM (PORCINE) LOCK FLUSH IV SOLN 100 UNIT/ML 100 UNIT/ML
5 SOLUTION INTRAVENOUS
Status: CANCELLED | OUTPATIENT
Start: 2024-06-15

## 2024-06-03 RX ORDER — HEPARIN SODIUM,PORCINE 10 UNIT/ML
5-20 VIAL (ML) INTRAVENOUS DAILY PRN
Status: CANCELLED | OUTPATIENT
Start: 2024-06-15

## 2024-06-03 RX ADMIN — FILGRASTIM-SNDZ 780 MCG: 480 INJECTION, SOLUTION INTRAVENOUS; SUBCUTANEOUS at 09:23

## 2024-06-03 ASSESSMENT — PAIN SCALES - GENERAL: PAINLEVEL: NO PAIN (0)

## 2024-06-03 NOTE — PROGRESS NOTES
Pt here today for scheduled GCSF. VSS. Meds and allergies reviewed. Pt reports headache and nausea earlier this AM, both of which have now resolved. Pt reports nausea was intense, and although she did not vomit she was coughing/dry heaving. She did not take her antiemetics as the nausea came on very quickly and then resolved. No other symptoms noted. Pt reports that she discussed her intermittent nausea with Dr. Chandler last week and states he was concerned about it. In basket message sent to Dr. Chandler to update him on worsened nausea this AM. Instructed pt to try home antiemetics if nausea returns and to call triage if symptoms are unmanaged or if she cannot eat/drink/take her medications. Pt verbalized understanding. Pt received 780 mcg Zarxio subcutaneously into left posterior arm without incident.     Ninoska Love RN

## 2024-06-04 ENCOUNTER — TELEPHONE (OUTPATIENT)
Dept: NEPHROLOGY | Facility: CLINIC | Age: 54
End: 2024-06-04
Payer: COMMERCIAL

## 2024-06-04 ENCOUNTER — DOCUMENTATION ONLY (OUTPATIENT)
Dept: OTHER | Facility: CLINIC | Age: 54
End: 2024-06-04
Payer: COMMERCIAL

## 2024-06-04 ENCOUNTER — ALLIED HEALTH/NURSE VISIT (OUTPATIENT)
Dept: TRANSPLANT | Facility: CLINIC | Age: 54
End: 2024-06-04
Attending: STUDENT IN AN ORGANIZED HEALTH CARE EDUCATION/TRAINING PROGRAM
Payer: COMMERCIAL

## 2024-06-04 VITALS
TEMPERATURE: 99.2 F | OXYGEN SATURATION: 97 % | HEART RATE: 115 BPM | RESPIRATION RATE: 18 BRPM | DIASTOLIC BLOOD PRESSURE: 73 MMHG | SYSTOLIC BLOOD PRESSURE: 119 MMHG

## 2024-06-04 DIAGNOSIS — E85.81 AL AMYLOIDOSIS (H): Primary | ICD-10-CM

## 2024-06-04 DIAGNOSIS — Z52.011 AUTOLOGOUS DONOR OF STEM CELLS: ICD-10-CM

## 2024-06-04 LAB
ABC 95% CONFIDENCE INTERVAL (B-CELL): NORMAL
ABC CLONOSEQ B-CELL TRACKING (MRD) RESULT: NORMAL
ABC DOMINANT SEQUENCES (B-CELL): 1
ABC RESIDUAL CLONAL CELLS/ MILL NUCLEATED CELLS BCELL: 1757 PER MILLION NUCLEATED CELLS

## 2024-06-04 PROCEDURE — 96372 THER/PROPH/DIAG INJ SC/IM: CPT | Performed by: STUDENT IN AN ORGANIZED HEALTH CARE EDUCATION/TRAINING PROGRAM

## 2024-06-04 PROCEDURE — 250N000011 HC RX IP 250 OP 636: Performed by: STUDENT IN AN ORGANIZED HEALTH CARE EDUCATION/TRAINING PROGRAM

## 2024-06-04 RX ORDER — HEPARIN SODIUM (PORCINE) LOCK FLUSH IV SOLN 100 UNIT/ML 100 UNIT/ML
5 SOLUTION INTRAVENOUS
Status: CANCELLED | OUTPATIENT
Start: 2024-06-16

## 2024-06-04 RX ORDER — HEPARIN SODIUM,PORCINE 10 UNIT/ML
5-20 VIAL (ML) INTRAVENOUS DAILY PRN
Status: CANCELLED | OUTPATIENT
Start: 2024-06-16

## 2024-06-04 RX ADMIN — FILGRASTIM-SNDZ 780 MCG: 480 INJECTION, SOLUTION INTRAVENOUS; SUBCUTANEOUS at 10:24

## 2024-06-04 ASSESSMENT — PAIN SCALES - GENERAL: PAINLEVEL: MODERATE PAIN (4)

## 2024-06-04 NOTE — PROGRESS NOTES
Patient is complaining of right ear pain -- dull ache.     Left TM is normal. Canal clear  Right TM has single area of upper right corner red scab like it had recently ruptured. Clear fluid behind TM. No drainage in ear.      Patient can continue using tylenol for ear and bone pain. Start flonase 2x daily for the next 5-7 days. If pain worsens and signs of infection, we can start a course of antibiotics during collections if necessary.    BALJINDER MelloC

## 2024-06-04 NOTE — TELEPHONE ENCOUNTER
[facility] Call Center    Phone Message    May a detailed message be left on voicemail: yes     Reason for Call: Appointment Intake    Referring Provider Name: Olya  Diagnosis and/or Symptoms: follow-up    Patient called to schedule follow-up with Dr. Dobson. Writer offered to schedule next available in January and add to wait list but patient refused. Sending TE per patient request due to patient stating that Dr. Dobson requested this appointment as urgent. Please call patient back to discuss.    Action Taken: Message routed to:  [facility] Nephrology    Travel Screening: Not Applicable     Date of Service:

## 2024-06-04 NOTE — TELEPHONE ENCOUNTER
Left Voicemail (1st Attempt) and Sent Mychart (1st Attempt) for the patient to schedule:    Appointment type: Return Nephrology  Provider: Dr. Dobson  Return date: Approx. 7/16  Phone number: [phone]  Add'l appt(s) needed: Lab 1-hour prior to clinic visit (or 1-week if video visit)

## 2024-06-04 NOTE — PROGRESS NOTES
Administrations This Visit       filgrastim-sndz (ZARXIO) 780 mcg injection       Admin Date  06/04/2024 Action  $Given Dose  780 mcg Route  Subcutaneous Documented By  Carolina Montes RN                  Pt given growth factor injection in right posterior arm, tolerated well.   Pt expressed new ear pain and some dried blood she discovered yesterday. Pt rates pain a 4/10. HR elevated 110s. Harry Sanches, ZOHREH assessed pt.   Pt reports n/v improved from yesterday.   Pt discharged with no further concerns at this time.     Carolina Montes, RN

## 2024-06-05 ENCOUNTER — APPOINTMENT (OUTPATIENT)
Dept: LAB | Facility: CLINIC | Age: 54
End: 2024-06-05
Attending: STUDENT IN AN ORGANIZED HEALTH CARE EDUCATION/TRAINING PROGRAM
Payer: COMMERCIAL

## 2024-06-05 ENCOUNTER — ALLIED HEALTH/NURSE VISIT (OUTPATIENT)
Dept: TRANSPLANT | Facility: CLINIC | Age: 54
End: 2024-06-05
Attending: STUDENT IN AN ORGANIZED HEALTH CARE EDUCATION/TRAINING PROGRAM
Payer: COMMERCIAL

## 2024-06-05 ENCOUNTER — APPOINTMENT (OUTPATIENT)
Dept: MEDSURG UNIT | Facility: CLINIC | Age: 54
End: 2024-06-05
Attending: STUDENT IN AN ORGANIZED HEALTH CARE EDUCATION/TRAINING PROGRAM
Payer: COMMERCIAL

## 2024-06-05 ENCOUNTER — APPOINTMENT (OUTPATIENT)
Dept: INTERVENTIONAL RADIOLOGY/VASCULAR | Facility: CLINIC | Age: 54
End: 2024-06-05
Attending: STUDENT IN AN ORGANIZED HEALTH CARE EDUCATION/TRAINING PROGRAM
Payer: COMMERCIAL

## 2024-06-05 ENCOUNTER — HOSPITAL ENCOUNTER (OUTPATIENT)
Facility: CLINIC | Age: 54
Discharge: HOME | End: 2024-06-05
Attending: STUDENT IN AN ORGANIZED HEALTH CARE EDUCATION/TRAINING PROGRAM
Payer: COMMERCIAL

## 2024-06-05 VITALS
RESPIRATION RATE: 16 BRPM | DIASTOLIC BLOOD PRESSURE: 75 MMHG | OXYGEN SATURATION: 95 % | TEMPERATURE: 98.7 F | SYSTOLIC BLOOD PRESSURE: 109 MMHG | HEART RATE: 90 BPM | WEIGHT: 153 LBS | BODY MASS INDEX: 26.26 KG/M2

## 2024-06-05 VITALS
TEMPERATURE: 98.5 F | SYSTOLIC BLOOD PRESSURE: 108 MMHG | HEART RATE: 86 BPM | OXYGEN SATURATION: 92 % | WEIGHT: 152.9 LBS | RESPIRATION RATE: 18 BRPM | DIASTOLIC BLOOD PRESSURE: 66 MMHG | BODY MASS INDEX: 26.25 KG/M2

## 2024-06-05 VITALS
OXYGEN SATURATION: 95 % | BODY MASS INDEX: 26.42 KG/M2 | DIASTOLIC BLOOD PRESSURE: 75 MMHG | RESPIRATION RATE: 16 BRPM | WEIGHT: 153.9 LBS | SYSTOLIC BLOOD PRESSURE: 109 MMHG | TEMPERATURE: 98.7 F | HEART RATE: 90 BPM

## 2024-06-05 DIAGNOSIS — Z52.011 AUTOLOGOUS DONOR OF STEM CELLS: ICD-10-CM

## 2024-06-05 DIAGNOSIS — E85.81 AL AMYLOIDOSIS (H): Primary | ICD-10-CM

## 2024-06-05 DIAGNOSIS — E85.81 AL AMYLOIDOSIS (H): ICD-10-CM

## 2024-06-05 LAB
ACANTHOCYTES BLD QL SMEAR: SLIGHT
ALBUMIN SERPL BCG-MCNC: 2.2 G/DL (ref 3.5–5.2)
ALP SERPL-CCNC: 230 U/L (ref 40–150)
ALT SERPL W P-5'-P-CCNC: 25 U/L (ref 0–50)
ANION GAP SERPL CALCULATED.3IONS-SCNC: 8 MMOL/L (ref 7–15)
AST SERPL W P-5'-P-CCNC: 25 U/L (ref 0–45)
BASOPHILS # BLD AUTO: ABNORMAL 10*3/UL
BASOPHILS # BLD MANUAL: 0 10E3/UL (ref 0–0.2)
BASOPHILS NFR BLD AUTO: ABNORMAL %
BASOPHILS NFR BLD MANUAL: 0 %
BILIRUB SERPL-MCNC: 0.2 MG/DL
BUN SERPL-MCNC: 20.6 MG/DL (ref 6–20)
CALCIUM SERPL-MCNC: 9.8 MG/DL (ref 8.6–10)
CD34 ABSOLUTE COUNT COMMENT: NORMAL
CD34 CELLS # SPEC: 84 CELLS/UL
CD34 CELLS NFR SPEC: 0.09 %
CHLORIDE SERPL-SCNC: 101 MMOL/L (ref 98–107)
CREAT SERPL-MCNC: 1.81 MG/DL (ref 0.51–0.95)
DEPRECATED HCO3 PLAS-SCNC: 27 MMOL/L (ref 22–29)
EGFRCR SERPLBLD CKD-EPI 2021: 33 ML/MIN/1.73M2
EOSINOPHIL # BLD AUTO: ABNORMAL 10*3/UL
EOSINOPHIL # BLD MANUAL: 0 10E3/UL (ref 0–0.7)
EOSINOPHIL NFR BLD AUTO: ABNORMAL %
EOSINOPHIL NFR BLD MANUAL: 0 %
ERYTHROCYTE [DISTWIDTH] IN BLOOD BY AUTOMATED COUNT: 16.6 % (ref 10–15)
GLUCOSE SERPL-MCNC: 128 MG/DL (ref 70–99)
HCT VFR BLD AUTO: 37.2 % (ref 35–47)
HGB BLD-MCNC: 12.7 G/DL (ref 11.7–15.7)
IMM GRANULOCYTES # BLD: ABNORMAL 10*3/UL
IMM GRANULOCYTES NFR BLD: ABNORMAL %
LYMPHOCYTES # BLD AUTO: ABNORMAL 10*3/UL
LYMPHOCYTES # BLD MANUAL: 5.1 10E3/UL (ref 0.8–5.3)
LYMPHOCYTES NFR BLD AUTO: ABNORMAL %
LYMPHOCYTES NFR BLD MANUAL: 6 %
MCH RBC QN AUTO: 31.5 PG (ref 26.5–33)
MCHC RBC AUTO-ENTMCNC: 34.1 G/DL (ref 31.5–36.5)
MCV RBC AUTO: 92 FL (ref 78–100)
MONOCYTES # BLD AUTO: ABNORMAL 10*3/UL
MONOCYTES # BLD MANUAL: 5.9 10E3/UL (ref 0–1.3)
MONOCYTES NFR BLD AUTO: ABNORMAL %
MONOCYTES NFR BLD MANUAL: 7 %
MYELOCYTES # BLD MANUAL: 0.8 10E3/UL
MYELOCYTES NFR BLD MANUAL: 1 %
NEUTROPHILS # BLD AUTO: ABNORMAL 10*3/UL
NEUTROPHILS # BLD MANUAL: 72.5 10E3/UL (ref 1.6–8.3)
NEUTROPHILS NFR BLD AUTO: ABNORMAL %
NEUTROPHILS NFR BLD MANUAL: 86 %
NRBC # BLD AUTO: 0.3 10E3/UL
NRBC # BLD AUTO: 0.8 10E3/UL
NRBC BLD AUTO-RTO: 0 /100
NRBC BLD MANUAL-RTO: 1 %
PLAT MORPH BLD: ABNORMAL
PLATELET # BLD AUTO: 305 10E3/UL (ref 150–450)
POTASSIUM SERPL-SCNC: 3.5 MMOL/L (ref 3.4–5.3)
PRODUCT NUMBER FLOW CYTOMETRY: NORMAL
PROT SERPL-MCNC: 4.8 G/DL (ref 6.4–8.3)
RBC # BLD AUTO: 4.03 10E6/UL (ref 3.8–5.2)
RBC MORPH BLD: ABNORMAL
SODIUM SERPL-SCNC: 136 MMOL/L (ref 135–145)
VIABLE CD34 CELLS NFR FLD: 98.14 %
WBC # BLD AUTO: 84.3 10E3/UL (ref 4–11)

## 2024-06-05 PROCEDURE — 99215 OFFICE O/P EST HI 40 MIN: CPT

## 2024-06-05 PROCEDURE — 250N000011 HC RX IP 250 OP 636: Mod: JZ

## 2024-06-05 PROCEDURE — 999N000142 HC STATISTIC PROCEDURE PREP ONLY

## 2024-06-05 PROCEDURE — 250N000011 HC RX IP 250 OP 636: Performed by: STUDENT IN AN ORGANIZED HEALTH CARE EDUCATION/TRAINING PROGRAM

## 2024-06-05 PROCEDURE — 86367 STEM CELLS TOTAL COUNT: CPT

## 2024-06-05 PROCEDURE — 36415 COLL VENOUS BLD VENIPUNCTURE: CPT

## 2024-06-05 PROCEDURE — 96372 THER/PROPH/DIAG INJ SC/IM: CPT | Performed by: STUDENT IN AN ORGANIZED HEALTH CARE EDUCATION/TRAINING PROGRAM

## 2024-06-05 PROCEDURE — 250N000011 HC RX IP 250 OP 636: Mod: JZ | Performed by: PHYSICIAN ASSISTANT

## 2024-06-05 PROCEDURE — C1769 GUIDE WIRE: HCPCS

## 2024-06-05 PROCEDURE — 258N000003 HC RX IP 258 OP 636: Performed by: PHYSICIAN ASSISTANT

## 2024-06-05 PROCEDURE — 85007 BL SMEAR W/DIFF WBC COUNT: CPT

## 2024-06-05 PROCEDURE — 84450 TRANSFERASE (AST) (SGOT): CPT

## 2024-06-05 PROCEDURE — 99153 MOD SED SAME PHYS/QHP EA: CPT

## 2024-06-05 PROCEDURE — 99213 OFFICE O/P EST LOW 20 MIN: CPT

## 2024-06-05 PROCEDURE — 272N000192 HC ACCESSORY CR2

## 2024-06-05 PROCEDURE — C1750 CATH, HEMODIALYSIS,LONG-TERM: HCPCS

## 2024-06-05 PROCEDURE — 77001 FLUOROGUIDE FOR VEIN DEVICE: CPT | Mod: 26

## 2024-06-05 PROCEDURE — 85041 AUTOMATED RBC COUNT: CPT

## 2024-06-05 PROCEDURE — 999N000132 HC STATISTIC PP CARE STAGE 1

## 2024-06-05 PROCEDURE — 36558 INSERT TUNNELED CV CATH: CPT

## 2024-06-05 PROCEDURE — 76937 US GUIDE VASCULAR ACCESS: CPT | Mod: 26

## 2024-06-05 PROCEDURE — 99152 MOD SED SAME PHYS/QHP 5/>YRS: CPT

## 2024-06-05 PROCEDURE — 82040 ASSAY OF SERUM ALBUMIN: CPT

## 2024-06-05 PROCEDURE — 272N000504 HC NEEDLE CR4

## 2024-06-05 PROCEDURE — 250N000009 HC RX 250

## 2024-06-05 RX ORDER — HEPARIN SODIUM,PORCINE 10 UNIT/ML
5-20 VIAL (ML) INTRAVENOUS DAILY PRN
Status: CANCELLED | OUTPATIENT
Start: 2024-06-17

## 2024-06-05 RX ORDER — LORATADINE 10 MG/1
10 TABLET ORAL DAILY
Status: ON HOLD | COMMUNITY
End: 2024-06-30

## 2024-06-05 RX ORDER — FLUMAZENIL 0.1 MG/ML
0.2 INJECTION, SOLUTION INTRAVENOUS
Status: DISCONTINUED | OUTPATIENT
Start: 2024-06-05 | End: 2024-06-05 | Stop reason: HOSPADM

## 2024-06-05 RX ORDER — HEPARIN SODIUM (PORCINE) LOCK FLUSH IV SOLN 100 UNIT/ML 100 UNIT/ML
3 SOLUTION INTRAVENOUS
Status: DISCONTINUED | OUTPATIENT
Start: 2024-06-05 | End: 2024-06-05 | Stop reason: HOSPADM

## 2024-06-05 RX ORDER — HEPARIN SODIUM (PORCINE) LOCK FLUSH IV SOLN 100 UNIT/ML 100 UNIT/ML
1-3 SOLUTION INTRAVENOUS
Status: DISCONTINUED | OUTPATIENT
Start: 2024-06-05 | End: 2024-06-05 | Stop reason: HOSPADM

## 2024-06-05 RX ORDER — NALOXONE HYDROCHLORIDE 0.4 MG/ML
0.4 INJECTION, SOLUTION INTRAMUSCULAR; INTRAVENOUS; SUBCUTANEOUS
Status: DISCONTINUED | OUTPATIENT
Start: 2024-06-05 | End: 2024-06-05 | Stop reason: HOSPADM

## 2024-06-05 RX ORDER — CEFAZOLIN SODIUM 2 G/100ML
2 INJECTION, SOLUTION INTRAVENOUS
Status: COMPLETED | OUTPATIENT
Start: 2024-06-05 | End: 2024-06-05

## 2024-06-05 RX ORDER — LIDOCAINE HYDROCHLORIDE 10 MG/ML
1-30 INJECTION, SOLUTION EPIDURAL; INFILTRATION; INTRACAUDAL; PERINEURAL
Status: COMPLETED | OUTPATIENT
Start: 2024-06-05 | End: 2024-06-05

## 2024-06-05 RX ORDER — FENTANYL CITRATE 50 UG/ML
25-50 INJECTION, SOLUTION INTRAMUSCULAR; INTRAVENOUS EVERY 5 MIN PRN
Status: DISCONTINUED | OUTPATIENT
Start: 2024-06-05 | End: 2024-06-05 | Stop reason: HOSPADM

## 2024-06-05 RX ORDER — ACETAMINOPHEN 500 MG
1000 TABLET ORAL EVERY 8 HOURS PRN
Status: ON HOLD | COMMUNITY
End: 2024-06-30

## 2024-06-05 RX ORDER — LIDOCAINE 40 MG/G
CREAM TOPICAL
Status: DISCONTINUED | OUTPATIENT
Start: 2024-06-05 | End: 2024-06-05 | Stop reason: HOSPADM

## 2024-06-05 RX ORDER — HEPARIN SODIUM (PORCINE) LOCK FLUSH IV SOLN 100 UNIT/ML 100 UNIT/ML
5 SOLUTION INTRAVENOUS
Status: CANCELLED | OUTPATIENT
Start: 2024-06-17

## 2024-06-05 RX ORDER — NALOXONE HYDROCHLORIDE 0.4 MG/ML
0.2 INJECTION, SOLUTION INTRAMUSCULAR; INTRAVENOUS; SUBCUTANEOUS
Status: DISCONTINUED | OUTPATIENT
Start: 2024-06-05 | End: 2024-06-05 | Stop reason: HOSPADM

## 2024-06-05 RX ORDER — HEPARIN SODIUM (PORCINE) LOCK FLUSH IV SOLN 100 UNIT/ML 100 UNIT/ML
3 SOLUTION INTRAVENOUS EVERY 24 HOURS
Status: DISCONTINUED | OUTPATIENT
Start: 2024-06-05 | End: 2024-06-05 | Stop reason: HOSPADM

## 2024-06-05 RX ORDER — SODIUM CHLORIDE 9 MG/ML
INJECTION, SOLUTION INTRAVENOUS CONTINUOUS
Status: DISCONTINUED | OUTPATIENT
Start: 2024-06-05 | End: 2024-06-05 | Stop reason: HOSPADM

## 2024-06-05 RX ORDER — HEPARIN SODIUM (PORCINE) LOCK FLUSH IV SOLN 100 UNIT/ML 100 UNIT/ML
1-3 SOLUTION INTRAVENOUS EVERY 24 HOURS
Status: DISCONTINUED | OUTPATIENT
Start: 2024-06-05 | End: 2024-06-05 | Stop reason: HOSPADM

## 2024-06-05 RX ADMIN — CEFAZOLIN SODIUM 2 G: 2 INJECTION, SOLUTION INTRAVENOUS at 12:50

## 2024-06-05 RX ADMIN — Medication 2.5 ML: at 14:38

## 2024-06-05 RX ADMIN — LIDOCAINE HYDROCHLORIDE 8 ML: 10 INJECTION, SOLUTION EPIDURAL; INFILTRATION; INTRACAUDAL; PERINEURAL at 14:36

## 2024-06-05 RX ADMIN — FENTANYL CITRATE 25 MCG: 50 INJECTION, SOLUTION INTRAMUSCULAR; INTRAVENOUS at 14:31

## 2024-06-05 RX ADMIN — SODIUM CHLORIDE: 9 INJECTION, SOLUTION INTRAVENOUS at 12:49

## 2024-06-05 RX ADMIN — Medication 2.5 ML: at 14:37

## 2024-06-05 RX ADMIN — MIDAZOLAM 1 MG: 1 INJECTION INTRAMUSCULAR; INTRAVENOUS at 14:12

## 2024-06-05 RX ADMIN — FILGRASTIM-SNDZ 780 MCG: 480 INJECTION, SOLUTION INTRAVENOUS; SUBCUTANEOUS at 11:42

## 2024-06-05 RX ADMIN — FENTANYL CITRATE 25 MCG: 50 INJECTION, SOLUTION INTRAMUSCULAR; INTRAVENOUS at 14:22

## 2024-06-05 RX ADMIN — MIDAZOLAM 0.5 MG: 1 INJECTION INTRAMUSCULAR; INTRAVENOUS at 14:31

## 2024-06-05 RX ADMIN — FENTANYL CITRATE 50 MCG: 50 INJECTION, SOLUTION INTRAMUSCULAR; INTRAVENOUS at 14:12

## 2024-06-05 RX ADMIN — MIDAZOLAM 0.5 MG: 1 INJECTION INTRAMUSCULAR; INTRAVENOUS at 14:22

## 2024-06-05 ASSESSMENT — ACTIVITIES OF DAILY LIVING (ADL)
ADLS_ACUITY_SCORE: 33
ADLS_ACUITY_SCORE: 35

## 2024-06-05 ASSESSMENT — PAIN SCALES - GENERAL
PAINLEVEL: NO PAIN (0)
PAINLEVEL: NO PAIN (0)

## 2024-06-05 NOTE — DISCHARGE INSTRUCTIONS
Interventional Radiology                 Discharge Instructions                       Following Tunneled Catheter Placement    Your site(s) has been closed with:     The Catheter is sutured  to skin at  insertion site    Derma Hearn (Skin Glue) on neck incision  Do not apply any ointments over site  This thin layer will slough off in 7-10 days               May gently remove Derma Hearn in 10 days if still present         Tunneled catheter is always covered with a Sterile Transparent dressing  Keep site clean and dry   Cover with an occlusive dressing for showering  Weekly transparent dressing changes or when it becomes wet or dirty     If there is any oozing or bleeding from the site, apply direct pressure for 5-10 minutes with a gauze pad.  If bleeding continues after 10 minutes call your primary doctor.  If bleeding cannot be controlled with direct pressure, call 911.    Call your Doctor if:  Bleeding as above  Swelling in your neck or arm  Sudden onset of shortness of breath, lightheadedness, or heart palpitations..  Fever greater than 100.5  F  Other signs of infection such as, redness, tenderness, or drainage from the wound.    If you were given sedation:  We recommend an adult stay with you for the first 24 hours.  No driving or alcoholic beverages for 24 hours.    ADDITIONAL INSTRUCTIONS:           If you are on Coumadin you may restart tonight.  Follow up Coumadin Clinic or Primary Care MD         To have your INR rechecked.           No heavy lifting greater than 10 lbs for 3 days.           May use ice pack for pain or minor swelling for 15 min 3-4 times per day.             *Plastic Clamps given    [facility] Interventional Radiology Department    Procedure Physician Assitant: Jenniffer Aldana PA-C                                 Date:June 5, 2024    Telephone Numbers:  [phone]      Monday-Friday 7:30 am to 4:00 pm  Hospital : [phone]....After hours, Weekends, & Holidays.  Ask the   to contact the Interventional Radiologist on call.  Someone is on call 24 hours a day.     Emergency Department:  [phone]

## 2024-06-05 NOTE — IR NOTE
Patient Name: Vivian Brown  Medical Record Number: [id number]  Today's Date: 6/5/2024    Procedure: Image guided tunneled central venous catheter placement for apheresis  Proceduralist: Shellie Aldana PA-C    Procedure Start: 1416  Procedure end: 1445  Sedation medications administered: 100 mcg fentanyl, 2 mg versed     Report given to: Tania FRAZIER 2A  : elías    Right internal jugular double lumen tunneled line placed. Both lumens locked with heparin and line ready for use.     Other Notes: Pt arrived to IR room 2 from . Consent reviewed. Pt denies any questions or concerns regarding procedure. Pt positioned supine and monitored per protocol. Pt tolerated procedure without any noted complications. Pt transferred back to 2A.

## 2024-06-05 NOTE — TELEPHONE ENCOUNTER
Called pt and scheduled follow up with Dr. Dobson on 07/22/24 at 430pm for virtual appt. Pt informed writer she is scheduled for transplant surgery on 07/10/24.     Angelica Gracia RN, BSN  Nephrology Supervisor  [facility]

## 2024-06-05 NOTE — NURSING NOTE
Chief Complaint   Patient presents with    Blood Draw     Labs drawn via  by RN. VS taken.     Labs collected from venipuncture by RN. Vitals taken. Checked in for appointment(s).     Lovely Ortega RN

## 2024-06-05 NOTE — PROGRESS NOTES
Pt arrived back to Unit 2a post tunneled line placement procedure in IR. VSS. Denies pain. R neck & R chest procedure sites C/D/I; no bleeding/hematoma noted. Tolerating PO. Pt stable. Mom at bedside.

## 2024-06-05 NOTE — PROGRESS NOTES
BMT ProgressNote    Amyloidosis Day +4 GCSF primer.     HPI: Doing okay other then GCSF making her achy and some nausea. Taking tylenol and claritin and has zofran and compazine.     ROS:    10 point ROS neg other than the symptoms noted above in the HPI.        Physical Exam:     General Appearance: alert, and no distress  Eyes: PERRL, conjunctiva and lids normal, sclera nonicteric  Cardio/Vascular:regular rate and rhythm, normal S1 and S2, no murmur  Resp Effort And Auscultation: Normal - Clear to auscultation without rales, rhonchi, or wheezing.  Musculoskeletal: Musculoskeletal normal  Skin: Skin color, texture, turgor normal. No rashes or lesions.  Neurologic: Gait normal.   Psych/Affect: Mood and affect are appropriate.    ASSESSMENT AND PLAN:     Amyloidosis:     Transplants for amyloid:  The collection goal is 8 million CD34/kg for 2 transplants..  The day for admission to begin melphalan conditioning is Day -1 for melphalan 200 mg/m2 (not frail, creatinine clearance 30+). .   - Plan for outpatient collections followed by inpatient transplant and stay through engraftment  - Day 4 CD34 looks great 84. No mozobil needed which is good as not covered by insurance currently. Approval is currently at letter of medical necessity needs to be submitted. So if we end up needing to add would need to discuss further. Kylee Barbosa was notified that it was not covered today   - apheresis aware to start collections tomorrow.  - going to get line placed now.   - notified patient and mom.    - alk phos elevated secondary to GCSF  - watch Cr with diuretics/collection. Likely elevated as NPO this am for line.    - no other meds discussed or changed today. Patient appeared euvolemic.     40 minutes spent on the date of the encounter doing chart review, review of test results, interpretation of tests, patient visit, call back with CD34, apheresis notified, and documentation        RTC: daily through collections. First day  of collections tomorrow.     Kylee De Jesus PA-C

## 2024-06-05 NOTE — NURSING NOTE
DATE/TIME OF CALL RECEIVED FROM LAB:  06/05/24 at  11:57 AM   LAB TEST:  WBC  LAB VALUE:  84.3  PROVIDER NOTIFIED?: Yes  PROVIDER NAME: ZOHREH Navarro  DATE/TIME LAB VALUE REPORTED TO PROVIDER: 6/5/24 1202  MECHANISM OF PROVIDER NOTIFICATION:  [id number]  PROVIDER RESPONSE: Confirmed the lab value is to be expected

## 2024-06-05 NOTE — PROCEDURES
[facility]    Procedure: IR Procedure Note    Date/Time: 6/5/2024 2:49 PM    Performed by: Felicia Aldana PA-C  Authorized by: Felicia Aldana PA-C      UNIVERSAL PROTOCOL   Site Marked: NA  Prior Images Obtained and Reviewed:  Yes  Required items: Required blood products, implants, devices and special equipment available    Patient identity confirmed:  Verbally with patient, arm band, provided demographic data and hospital-assigned identification number  Patient was reevaluated immediately before administering moderate or deep sedation or anesthesia  Confirmation Checklist:  Patient's identity using two indicators, relevant allergies, procedure was appropriate and matched the consent or emergent situation and correct equipment/implants were available  Time out: Immediately prior to the procedure a time out was called    Universal Protocol: the Joint Commission Universal Protocol was followed    Preparation: Patient was prepped and draped in usual sterile fashion       ANESTHESIA    Anesthesia:  Local infiltration  Local Anesthetic:  Lidocaine 1% without epinephrine  Anesthetic Total (mL):  8      SEDATION  Patient Sedated: Yes    Sedation Type:  Moderate (conscious) sedation  Vital signs: Vital signs monitored during sedation    See dictated procedure note for full details.  Findings: Patient tolerated procedure well. Please see IR nursing note.    Specimens: none    Complications: None    Condition: Stable    Plan: Follow-up per primary team. Bedrest x 1 hr.      PROCEDURE  Describe Procedure: Image-guided right tunneled double lumen, 14.5 Fr, 23 cm CVC via right internal jugular. Aspirates and flushes well. Heparin-locked and ready for immediate use.  Patient Tolerance:  Patient tolerated the procedure well with no immediate complications  Length of time physician/provider present for 1:1 monitoring during sedation: 30

## 2024-06-05 NOTE — PROGRESS NOTES
Recovery time completed without complications. Discharge instructions reviewed. Clamps and instructions done. Pt tolerated ambulation around unit. Able to void without complications. R neck/R chest procedures sites remained unchanged; stable. PIV discontinued. Pt left unit in WC with another RN and her mom to main entrance to take shuttle to [facility]. Pt stable.

## 2024-06-05 NOTE — LETTER
6/5/2024      Vivian Brown  [address]  [facility] [address]      Dear Colleague,    Thank you for referring your patient, Vivian Brown, to the [facility] BLOOD AND MARROW TRANSPLANT PROGRAM [place]. Please see a copy of my visit note below.         BMT ProgressNote    Amyloidosis Day +4 GCSF primer.     HPI: Doing okay other then GCSF making her achy and some nausea. Taking tylenol and claritin and has zofran and compazine.     ROS:    10 point ROS neg other than the symptoms noted above in the HPI.        Physical Exam:     General Appearance: alert, and no distress  Eyes: PERRL, conjunctiva and lids normal, sclera nonicteric  Cardio/Vascular:regular rate and rhythm, normal S1 and S2, no murmur  Resp Effort And Auscultation: Normal - Clear to auscultation without rales, rhonchi, or wheezing.  Musculoskeletal: Musculoskeletal normal  Skin: Skin color, texture, turgor normal. No rashes or lesions.  Neurologic: Gait normal.   Psych/Affect: Mood and affect are appropriate.    ASSESSMENT AND PLAN:     Amyloidosis:     Transplants for amyloid:  The collection goal is 8 million CD34/kg for 2 transplants..  The day for admission to begin melphalan conditioning is Day -1 for melphalan 200 mg/m2 (not frail, creatinine clearance 30+). .   - Plan for outpatient collections followed by inpatient transplant and stay through engraftment  - Day 4 CD34 looks great 84. No mozobil needed which is good as not covered by insurance currently. Approval is currently at letter of medical necessity needs to be submitted. So if we end up needing to add would need to discuss further. Kylee Barbosa was notified that it was not covered today   - apheresis aware to start collections tomorrow.  - going to get line placed now.   - notified patient and mom.    - alk phos elevated secondary to GCSF  - watch Cr with diuretics/collection. Likely elevated as NPO this am for line.    - no other meds discussed or changed  today. Patient appeared euvolemic.     40 minutes spent on the date of the encounter doing chart review, review of test results, interpretation of tests, patient visit, call back with CD34, apheresis notified, and documentation        RTC: daily through collections. First day of collections tomorrow.     Kylee De Jesus PA-C

## 2024-06-05 NOTE — PROGRESS NOTES
Pt arrived to Unit 2a for CVC tunneled line placement procedure in IR. AFVSS. Denies pain. Labs drawn earlier today at clinic. Pt's mom, Lovely, at bedside, to drive pt home post procedure. Pt stable, ready for consent.

## 2024-06-06 ENCOUNTER — HOSPITAL ENCOUNTER (OUTPATIENT)
Dept: LAB | Facility: CLINIC | Age: 54
Discharge: HOME | End: 2024-06-06
Attending: STUDENT IN AN ORGANIZED HEALTH CARE EDUCATION/TRAINING PROGRAM | Admitting: PATHOLOGY
Payer: COMMERCIAL

## 2024-06-06 VITALS
BODY MASS INDEX: 26.38 KG/M2 | DIASTOLIC BLOOD PRESSURE: 67 MMHG | SYSTOLIC BLOOD PRESSURE: 110 MMHG | TEMPERATURE: 97.7 F | RESPIRATION RATE: 16 BRPM | WEIGHT: 153.66 LBS | HEART RATE: 71 BPM

## 2024-06-06 DIAGNOSIS — Z52.011 AUTOLOGOUS DONOR OF STEM CELLS: Primary | ICD-10-CM

## 2024-06-06 DIAGNOSIS — E85.81 AL AMYLOIDOSIS (H): ICD-10-CM

## 2024-06-06 LAB
ACANTHOCYTES BLD QL SMEAR: SLIGHT
ANION GAP SERPL CALCULATED.3IONS-SCNC: 10 MMOL/L (ref 7–15)
BASOPHILS # BLD AUTO: ABNORMAL 10*3/UL
BASOPHILS # BLD MANUAL: 0 10E3/UL (ref 0–0.2)
BASOPHILS NFR BLD AUTO: ABNORMAL %
BASOPHILS NFR BLD MANUAL: 0 %
BUN SERPL-MCNC: 17.5 MG/DL (ref 6–20)
BURR CELLS BLD QL SMEAR: ABNORMAL
CA-I BLD-MCNC: 5.3 MG/DL (ref 4.4–5.2)
CALCIUM SERPL-MCNC: 9.8 MG/DL (ref 8.6–10)
CD34 ABSOLUTE COUNT COMMENT: NORMAL
CD34 CELLS # SPEC: 90 CELLS/UL
CD34 CELLS NFR SPEC: 0.09 %
CHLORIDE SERPL-SCNC: 101 MMOL/L (ref 98–107)
CREAT SERPL-MCNC: 1.78 MG/DL (ref 0.51–0.95)
DEPRECATED HCO3 PLAS-SCNC: 27 MMOL/L (ref 22–29)
EGFRCR SERPLBLD CKD-EPI 2021: 33 ML/MIN/1.73M2
EOSINOPHIL # BLD AUTO: ABNORMAL 10*3/UL
EOSINOPHIL # BLD MANUAL: 0 10E3/UL (ref 0–0.7)
EOSINOPHIL NFR BLD AUTO: ABNORMAL %
EOSINOPHIL NFR BLD MANUAL: 0 %
ERYTHROCYTE [DISTWIDTH] IN BLOOD BY AUTOMATED COUNT: 16.7 % (ref 10–15)
GLUCOSE SERPL-MCNC: 140 MG/DL (ref 70–99)
HCT VFR BLD AUTO: 37.9 % (ref 35–47)
HGB BLD-MCNC: 12.4 G/DL (ref 11.7–15.7)
HOWELL-JOLLY BOD BLD QL SMEAR: PRESENT
IMM GRANULOCYTES # BLD: ABNORMAL 10*3/UL
IMM GRANULOCYTES NFR BLD: ABNORMAL %
LYMPHOCYTES # BLD AUTO: ABNORMAL 10*3/UL
LYMPHOCYTES # BLD MANUAL: 3.4 10E3/UL (ref 0.8–5.3)
LYMPHOCYTES NFR BLD AUTO: ABNORMAL %
LYMPHOCYTES NFR BLD MANUAL: 4 %
MAGNESIUM SERPL-MCNC: 2 MG/DL (ref 1.7–2.3)
MCH RBC QN AUTO: 30.8 PG (ref 26.5–33)
MCHC RBC AUTO-ENTMCNC: 32.7 G/DL (ref 31.5–36.5)
MCV RBC AUTO: 94 FL (ref 78–100)
MONOCYTES # BLD AUTO: ABNORMAL 10*3/UL
MONOCYTES # BLD MANUAL: 5.1 10E3/UL (ref 0–1.3)
MONOCYTES NFR BLD AUTO: ABNORMAL %
MONOCYTES NFR BLD MANUAL: 6 %
MYELOCYTES # BLD MANUAL: 1.7 10E3/UL
MYELOCYTES NFR BLD MANUAL: 2 %
NEUTROPHILS # BLD AUTO: ABNORMAL 10*3/UL
NEUTROPHILS # BLD MANUAL: 73.3 10E3/UL (ref 1.6–8.3)
NEUTROPHILS NFR BLD AUTO: ABNORMAL %
NEUTROPHILS NFR BLD MANUAL: 87 %
NRBC # BLD AUTO: 0.7 10E3/UL
NRBC # BLD AUTO: 1.7 10E3/UL
NRBC BLD AUTO-RTO: 1 /100
NRBC BLD MANUAL-RTO: 2 %
PLAT MORPH BLD: ABNORMAL
PLATELET # BLD AUTO: 280 10E3/UL (ref 150–450)
POLYCHROMASIA BLD QL SMEAR: SLIGHT
POTASSIUM SERPL-SCNC: 3.4 MMOL/L (ref 3.4–5.3)
PRODUCT NUMBER FLOW CYTOMETRY: NORMAL
PROMYELOCYTES # BLD MANUAL: 0.8 10E3/UL
PROMYELOCYTES NFR BLD MANUAL: 1 %
RBC # BLD AUTO: 4.02 10E6/UL (ref 3.8–5.2)
RBC MORPH BLD: ABNORMAL
SODIUM SERPL-SCNC: 138 MMOL/L (ref 135–145)
VIABLE CD34 CELLS NFR FLD: 98 %
WBC # BLD AUTO: 84.2 10E3/UL (ref 4–11)

## 2024-06-06 PROCEDURE — 96372 THER/PROPH/DIAG INJ SC/IM: CPT | Mod: XS | Performed by: STUDENT IN AN ORGANIZED HEALTH CARE EDUCATION/TRAINING PROGRAM

## 2024-06-06 PROCEDURE — 83735 ASSAY OF MAGNESIUM: CPT

## 2024-06-06 PROCEDURE — 96374 THER/PROPH/DIAG INJ IV PUSH: CPT | Mod: XU

## 2024-06-06 PROCEDURE — 36415 COLL VENOUS BLD VENIPUNCTURE: CPT

## 2024-06-06 PROCEDURE — 250N000011 HC RX IP 250 OP 636: Performed by: STUDENT IN AN ORGANIZED HEALTH CARE EDUCATION/TRAINING PROGRAM

## 2024-06-06 PROCEDURE — 250N000011 HC RX IP 250 OP 636: Mod: JZ | Performed by: STUDENT IN AN ORGANIZED HEALTH CARE EDUCATION/TRAINING PROGRAM

## 2024-06-06 PROCEDURE — 85027 COMPLETE CBC AUTOMATED: CPT

## 2024-06-06 PROCEDURE — 86367 STEM CELLS TOTAL COUNT: CPT

## 2024-06-06 PROCEDURE — 250N000009 HC RX 250

## 2024-06-06 PROCEDURE — 250N000013 HC RX MED GY IP 250 OP 250 PS 637: Performed by: STUDENT IN AN ORGANIZED HEALTH CARE EDUCATION/TRAINING PROGRAM

## 2024-06-06 PROCEDURE — 80048 BASIC METABOLIC PNL TOTAL CA: CPT

## 2024-06-06 PROCEDURE — 85007 BL SMEAR W/DIFF WBC COUNT: CPT

## 2024-06-06 PROCEDURE — 250N000011 HC RX IP 250 OP 636: Mod: JZ

## 2024-06-06 PROCEDURE — 82330 ASSAY OF CALCIUM: CPT

## 2024-06-06 PROCEDURE — 38206 HARVEST AUTO STEM CELLS: CPT

## 2024-06-06 RX ORDER — HEPARIN SODIUM (PORCINE) LOCK FLUSH IV SOLN 100 UNIT/ML 100 UNIT/ML
3 SOLUTION INTRAVENOUS ONCE
Status: COMPLETED | OUTPATIENT
Start: 2024-06-06 | End: 2024-06-06

## 2024-06-06 RX ORDER — ACETAMINOPHEN 325 MG/1
650 TABLET ORAL ONCE
Status: COMPLETED | OUTPATIENT
Start: 2024-06-06 | End: 2024-06-06

## 2024-06-06 RX ORDER — HEPARIN SODIUM (PORCINE) LOCK FLUSH IV SOLN 100 UNIT/ML 100 UNIT/ML
5 SOLUTION INTRAVENOUS
Status: CANCELLED | OUTPATIENT
Start: 2024-06-17

## 2024-06-06 RX ORDER — HEPARIN SODIUM,PORCINE 10 UNIT/ML
5-20 VIAL (ML) INTRAVENOUS DAILY PRN
Status: CANCELLED | OUTPATIENT
Start: 2024-06-17

## 2024-06-06 RX ADMIN — PROCHLORPERAZINE EDISYLATE 5 MG: 5 INJECTION INTRAMUSCULAR; INTRAVENOUS at 10:26

## 2024-06-06 RX ADMIN — CALCIUM GLUCONATE 1388 MG/HR: 98 INJECTION, SOLUTION INTRAVENOUS at 10:05

## 2024-06-06 RX ADMIN — ANTICOAGULANT CITRATE DEXTROSE SOLUTION FORMULA A: 12.25; 11; 3.65 SOLUTION INTRAVENOUS at 10:05

## 2024-06-06 RX ADMIN — FILGRASTIM-SNDZ 780 MCG: 480 INJECTION, SOLUTION INTRAVENOUS; SUBCUTANEOUS at 08:31

## 2024-06-06 RX ADMIN — Medication 3 ML: at 13:43

## 2024-06-06 RX ADMIN — ACETAMINOPHEN 650 MG: 325 TABLET ORAL at 10:03

## 2024-06-06 NOTE — DISCHARGE INSTRUCTIONS
Apheresis Blood Donor Center Post Instructions  You may feel tired after your procedure today.   Please call your doctor if you have:  bleeding that doesn t stop, fever, pain where a needle or tube (catheter) was placed, seizures, trouble breathing, red urine, nausea or vomiting, other health concerns.     If your symptoms are severe, call 360.  If your veins were used, keep the bandages on for 2-4 hours.  Avoid heavy lifting with your arms.  If bleeding occurs from these sites, apply firm pressure for 5-10 minutes.  Call your physician if bleeding continues.    If you have a Central Venous Catheter:  Notify your doctor if you have had a fever, chills, shaking  or redness, warmth, swelling, drainage at the exit-site.  This could be a sign of infection.    If we used your fistula or graft, watch for signs of bleeding.  Please remove the bandages after 4 hours.  The Apheresis/Blood Donor Center is open Monday-Friday 7:30 a.m. to 5 p.m.  The phone number is [phone].  A Transfusion Medicine physician can be reached after 5:00 p.m. weekdays and on weekends /Holidays by calling [phone], and asking for the physician on call.      Autologous HPC/MNC Collection:   In most cases, the cell dose report will be available tomorrow morning.   The Bone Marrow Transplant (BMT) clinic staff looks at your report, and a decision is made if you will need another collection.   Remember it is important to follow a low fat diet during the collection process. Sometimes following the procedure, your blood platelet count may be low.  If you are told your platelet count is low, you need to avoid taking aspirin/aspirin containing products and avoid heavy physical activity and activities that may result in bruising or traumatic injury.  To contact the BMT fellow or attending physician after 5 p.m. call [phone].

## 2024-06-06 NOTE — PROCEDURES
Transfusion Medicine   Apheresis Procedure Note    Vivian Brown [id number]   YOB: 1970 Age: 54 year old        Procedure Autologous Hematopoietic Progenitor Cell (HPC)  Collection           Assessment and Plan:   54 year old female undergoes autologous HPC collection for treatment of amyloidosis. The plan is to collect for 1 to 3 days or until the target goal is met.  A central line was placed and used for access for the procedure.     Today is collection day #1.  She tolerated the procedure well without complication.  She notes feeling well today.  She does have a bit of a headache, given tylenol.               History of Present Illness:   54 year old female presents for autologous  HPC  collection.  Her past medical history includes thyroid disease and amyloidosis.  She is currently feeling well.  The patient denies any back pain that would prevent her from tolerating the procedure. The patient reports no significant travel history and has no readily identifiable risk factors for infectious disease.               Past Medical History:     Past Medical History:   Diagnosis Date    Celiac disease     Family history of colon cancer     Colonoscoy q5 years next 9/2020             Past Surgical History:     Past Surgical History:   Procedure Laterality Date    BONE MARROW BIOPSY, BONE SPECIMEN, NEEDLE/TROCAR Left 5/24/2024    Procedure: BIOPSY, BONE MARROW;  Surgeon: Sabrina Jain NP;  Location: [facility] OR    COLONOSCOPY N/A 9/28/2015    Procedure: COLONOSCOPY;  Surgeon: Eugenio Travis MD;  Location:  GI    ESOPHAGOSCOPY, GASTROSCOPY, DUODENOSCOPY (EGD), COMBINED N/A 1/30/2024    Procedure: ESOPHAGOGASTRODUODENOSCOPY, WITH BIOPSY;  Surgeon: Melo Cloud MD;  Location: [facility]    IR CVC TUNNEL PLACEMENT > 5 YRS OF AGE  6/5/2024    IR RENAL BIOPSY RIGHT  12/21/2023    TONSILLECTOMY                  Allergies:     Allergies   Allergen Reactions    Blood Transfusion Related  (Informational Only)      Patient has a history of a clinically significant antibody against RBC antigens.  A delay in compatible RBCs may occur. Anti-CD38 interfering in all testing at [facility] 05/21/24.    Gluten Meal     Latex Itching    Seasonal Allergies              Medications:     Current Outpatient Medications   Medication Sig Dispense Refill    acetaminophen (TYLENOL) 500 MG tablet Take 1,000 mg by mouth every 8 hours as needed for mild pain      Albuterol-Budesonide (AIRSUPRA) 90-80 MCG/ACT AERO Inhale 2 Inhalations into the lungs every 4 hours as needed (Wheezing, chest tightness, shortness of breath, or persistent cough) 10.7 g 3    azelastine (ASTELIN) 0.1 % nasal spray Spray 2 sprays into both nostrils 2 times daily as needed for rhinitis 30 mL 3    EPINEPHrine (ANY BX GENERIC EQUIV) 0.3 MG/0.3ML injection 2-pack Inject 0.3 mLs (0.3 mg) into the muscle as needed for anaphylaxis May repeat one time in 5-15 minutes if response to initial dose is inadequate. (Patient not taking: Reported on 6/2/2024) 2 each 3    fluticasone (FLONASE) 50 MCG/ACT nasal spray Spray 2 sprays into both nostrils daily 16 g 3    furosemide (LASIX) 20 MG tablet Take 2 tablets (40 mg) by mouth 2 times daily (Patient taking differently: Take 20 mg by mouth 2 times daily 40 mg in AM, 20 mg in afternoon) 360 tablet 3    levothyroxine (SYNTHROID) 200 MCG tablet Take 200 mcg by mouth daily Pt takes Synthroid brand name. FRANKI      liothyronine (CYTOMEL) 5 MCG tablet Take 5 mcg by mouth 2 times daily      loratadine (CLARITIN) 10 MG tablet Take 10 mg by mouth daily      midodrine (PROAMATINE) 10 MG tablet Take 1 tablet (10 mg) by mouth 3 times daily 270 tablet 1    multivitamin w/minerals (MULTI-VITAMIN) tablet Take 1 tablet by mouth daily      ondansetron (ZOFRAN) 8 MG tablet Take 8 mg by mouth every 8 hours as needed for nausea      prochlorperazine (COMPAZINE) 5 MG tablet Take 1 tablet (5 mg) by mouth every 6 hours as needed for nausea or  vomiting 30 tablet 1    rosuvastatin (CRESTOR) 10 MG tablet Take 1 tablet (10 mg) by mouth daily 90 tablet 3    spironolactone (ALDACTONE) 25 MG tablet Take 1 tablet (25 mg) by mouth daily 90 tablet 3    vitamin D3 (CHOLECALCIFEROL) 50 mcg (2000 units) tablet Take 1 tablet (50 mcg) by mouth daily 30 tablet 6     No current facility-administered medications for this encounter.             Review of Systems:     See above           Exam:   /74   Pulse 89   Temp 97.7  F (36.5  C) (Oral)   Resp 18   Wt 69.7 kg (153 lb 10.6 oz)   BMI 26.38 kg/m    Alert, no apparent distress  Breathing appears comfortable on room air  Central line accessed for the procedure            Data:       BMP  Recent Labs   Lab 06/06/24  0834 06/05/24  1048    136   POTASSIUM 3.4 3.5   CHLORIDE 101 101   TANNER 9.8 9.8   CO2 27 27   BUN 17.5 20.6*   CR 1.78* 1.81*   * 128*     CBC  Recent Labs   Lab 06/06/24  0834 06/05/24  1048   WBC 84.2* 84.3*   RBC 4.02 4.03   HGB 12.4 12.7   HCT 37.9 37.2   MCV 94 92   MCH 30.8 31.5   MCHC 32.7 34.1   RDW 16.7* 16.6*    305         CD34 Absolute Count   Date Value Ref Range Status   06/05/2024 84 cells/uL Final     CD34 Absolute Count   Date Value Ref Range Status   06/06/2024 90 cells/uL Final              Procedure Summary:   Mononuclear cell collection was performed on the Optia cell separator.  A dual lumen central line was used for access and allowed for appropriate flow during the procedure.  ACD-A was used for anticoagulation. Calcium gluconate was given in the return line to offset the effects of the citrate.   The patient's vital signs were stable throughout.  The patient tolerated the procedure well without complication.  See apheresis flowsheet for additional details.      Attestation: During the procedure the patient was directly seen and evaluated by me, Corey Jerez MD.  I have reviewed the chart and pertinent laboratory findings, and discussed the patient  and the current procedure with the Apheresis nursing staff.    Corey Jerez MD  Transfusion Medicine Attending  Laboratory Medicine & Pathology

## 2024-06-07 ENCOUNTER — HOSPITAL ENCOUNTER (OUTPATIENT)
Dept: LAB | Facility: CLINIC | Age: 54
Discharge: HOME | End: 2024-06-07
Attending: STUDENT IN AN ORGANIZED HEALTH CARE EDUCATION/TRAINING PROGRAM | Admitting: PATHOLOGY
Payer: COMMERCIAL

## 2024-06-07 VITALS
HEART RATE: 89 BPM | TEMPERATURE: 98.4 F | SYSTOLIC BLOOD PRESSURE: 112 MMHG | RESPIRATION RATE: 16 BRPM | DIASTOLIC BLOOD PRESSURE: 77 MMHG

## 2024-06-07 DIAGNOSIS — Z52.011 AUTOLOGOUS DONOR OF STEM CELLS: ICD-10-CM

## 2024-06-07 DIAGNOSIS — E85.81 AL AMYLOIDOSIS (H): Primary | ICD-10-CM

## 2024-06-07 LAB
ACANTHOCYTES BLD QL SMEAR: SLIGHT
ANION GAP SERPL CALCULATED.3IONS-SCNC: 8 MMOL/L (ref 7–15)
BASOPHILS # BLD AUTO: ABNORMAL 10*3/UL
BASOPHILS # BLD MANUAL: 0 10E3/UL (ref 0–0.2)
BASOPHILS NFR BLD AUTO: ABNORMAL %
BASOPHILS NFR BLD MANUAL: 0 %
BUN SERPL-MCNC: 18.2 MG/DL (ref 6–20)
CALCIUM SERPL-MCNC: 10 MG/DL (ref 8.6–10)
CD34 ABSOLUTE COUNT COMMENT: NORMAL
CD34 CELLS # SPEC: 25 CELLS/UL
CD34 CELLS NFR SPEC: 0.04 %
CHLORIDE SERPL-SCNC: 99 MMOL/L (ref 98–107)
CREAT SERPL-MCNC: 1.61 MG/DL (ref 0.51–0.95)
CULTURE HARVEST COMPLETE DATE: NORMAL
CULTURE HARVEST COMPLETE DATE: NORMAL
DEPRECATED HCO3 PLAS-SCNC: 33 MMOL/L (ref 22–29)
EGFRCR SERPLBLD CKD-EPI 2021: 38 ML/MIN/1.73M2
EOSINOPHIL # BLD AUTO: ABNORMAL 10*3/UL
EOSINOPHIL # BLD MANUAL: 0 10E3/UL (ref 0–0.7)
EOSINOPHIL NFR BLD AUTO: ABNORMAL %
EOSINOPHIL NFR BLD MANUAL: 0 %
ERYTHROCYTE [DISTWIDTH] IN BLOOD BY AUTOMATED COUNT: 16.3 % (ref 10–15)
GLUCOSE SERPL-MCNC: 105 MG/DL (ref 70–99)
HCT VFR BLD AUTO: 36 % (ref 35–47)
HGB BLD-MCNC: 11.9 G/DL (ref 11.7–15.7)
IMM GRANULOCYTES # BLD: ABNORMAL 10*3/UL
IMM GRANULOCYTES NFR BLD: ABNORMAL %
LYMPHOCYTES # BLD AUTO: ABNORMAL 10*3/UL
LYMPHOCYTES # BLD MANUAL: 2.7 10E3/UL (ref 0.8–5.3)
LYMPHOCYTES NFR BLD AUTO: ABNORMAL %
LYMPHOCYTES NFR BLD MANUAL: 5 %
MAGNESIUM SERPL-MCNC: 1.9 MG/DL (ref 1.7–2.3)
MCH RBC QN AUTO: 31.1 PG (ref 26.5–33)
MCHC RBC AUTO-ENTMCNC: 33.1 G/DL (ref 31.5–36.5)
MCV RBC AUTO: 94 FL (ref 78–100)
MONOCYTES # BLD AUTO: ABNORMAL 10*3/UL
MONOCYTES # BLD MANUAL: 5.5 10E3/UL (ref 0–1.3)
MONOCYTES NFR BLD AUTO: ABNORMAL %
MONOCYTES NFR BLD MANUAL: 10 %
MYELOCYTES # BLD MANUAL: 1.1 10E3/UL
MYELOCYTES NFR BLD MANUAL: 2 %
NEUTROPHILS # BLD AUTO: ABNORMAL 10*3/UL
NEUTROPHILS # BLD MANUAL: 45.5 10E3/UL (ref 1.6–8.3)
NEUTROPHILS NFR BLD AUTO: ABNORMAL %
NEUTROPHILS NFR BLD MANUAL: 83 %
NRBC # BLD AUTO: 1.1 10E3/UL
NRBC # BLD AUTO: 1.1 10E3/UL
NRBC BLD AUTO-RTO: 2 /100
NRBC BLD MANUAL-RTO: 2 %
PLAT MORPH BLD: ABNORMAL
PLATELET # BLD AUTO: 153 10E3/UL (ref 150–450)
POTASSIUM SERPL-SCNC: 3.2 MMOL/L (ref 3.4–5.3)
PRODUCT NUMBER FLOW CYTOMETRY: NORMAL
RBC # BLD AUTO: 3.83 10E6/UL (ref 3.8–5.2)
RBC MORPH BLD: ABNORMAL
SODIUM SERPL-SCNC: 140 MMOL/L (ref 135–145)
VIABLE CD34 CELLS NFR FLD: 93.21 %
WBC # BLD AUTO: 54.8 10E3/UL (ref 4–11)

## 2024-06-07 PROCEDURE — 36592 COLLECT BLOOD FROM PICC: CPT

## 2024-06-07 PROCEDURE — 85007 BL SMEAR W/DIFF WBC COUNT: CPT

## 2024-06-07 PROCEDURE — 85048 AUTOMATED LEUKOCYTE COUNT: CPT

## 2024-06-07 PROCEDURE — 250N000011 HC RX IP 250 OP 636: Mod: JZ

## 2024-06-07 PROCEDURE — 250N000011 HC RX IP 250 OP 636: Mod: JZ | Performed by: STUDENT IN AN ORGANIZED HEALTH CARE EDUCATION/TRAINING PROGRAM

## 2024-06-07 PROCEDURE — 96372 THER/PROPH/DIAG INJ SC/IM: CPT | Mod: XS | Performed by: STUDENT IN AN ORGANIZED HEALTH CARE EDUCATION/TRAINING PROGRAM

## 2024-06-07 PROCEDURE — 83735 ASSAY OF MAGNESIUM: CPT

## 2024-06-07 PROCEDURE — 250N000009 HC RX 250

## 2024-06-07 PROCEDURE — 250N000013 HC RX MED GY IP 250 OP 250 PS 637

## 2024-06-07 PROCEDURE — 38206 HARVEST AUTO STEM CELLS: CPT

## 2024-06-07 PROCEDURE — 80048 BASIC METABOLIC PNL TOTAL CA: CPT

## 2024-06-07 PROCEDURE — 86367 STEM CELLS TOTAL COUNT: CPT

## 2024-06-07 RX ORDER — HEPARIN SODIUM (PORCINE) LOCK FLUSH IV SOLN 100 UNIT/ML 100 UNIT/ML
3 SOLUTION INTRAVENOUS ONCE
Status: COMPLETED | OUTPATIENT
Start: 2024-06-07 | End: 2024-06-07

## 2024-06-07 RX ORDER — HEPARIN SODIUM (PORCINE) LOCK FLUSH IV SOLN 100 UNIT/ML 100 UNIT/ML
5 SOLUTION INTRAVENOUS
Status: CANCELLED | OUTPATIENT
Start: 2024-06-17

## 2024-06-07 RX ORDER — HEPARIN SODIUM,PORCINE 10 UNIT/ML
5-20 VIAL (ML) INTRAVENOUS DAILY PRN
Status: CANCELLED | OUTPATIENT
Start: 2024-06-17

## 2024-06-07 RX ORDER — POTASSIUM CHLORIDE 1500 MG/1
40 TABLET, EXTENDED RELEASE ORAL ONCE
Status: COMPLETED | OUTPATIENT
Start: 2024-06-07 | End: 2024-06-07

## 2024-06-07 RX ADMIN — Medication 3 ML: at 13:48

## 2024-06-07 RX ADMIN — FILGRASTIM-SNDZ 780 MCG: 480 INJECTION, SOLUTION INTRAVENOUS; SUBCUTANEOUS at 08:24

## 2024-06-07 RX ADMIN — Medication 3 ML: at 13:49

## 2024-06-07 RX ADMIN — ANTICOAGULANT CITRATE DEXTROSE SOLUTION FORMULA A 1492 ML: 12.25; 11; 3.65 SOLUTION INTRAVENOUS at 09:07

## 2024-06-07 RX ADMIN — CALCIUM GLUCONATE 1394 MG/HR: 98 INJECTION, SOLUTION INTRAVENOUS at 09:00

## 2024-06-07 RX ADMIN — POTASSIUM CHLORIDE 40 MEQ: 1500 TABLET, EXTENDED RELEASE ORAL at 12:25

## 2024-06-07 NOTE — DISCHARGE INSTRUCTIONS
Apheresis Blood Donor Center Post Instructions  You may feel tired after your procedure today.   Please call your doctor if you have:  bleeding that doesn t stop, fever, pain where a needle or tube (catheter) was placed, seizures, trouble breathing, red urine, nausea or vomiting, other health concerns.     If your symptoms are severe, call 501.    If you have a Central Venous Catheter:  Notify your doctor if you have had a fever, chills, shaking  or redness, warmth, swelling, drainage at the exit-site.  This could be a sign of infection.      The Apheresis/Blood Donor Center is open Monday-Friday 7:30 a.m. to 5 p.m.  The phone number is [phone].  A Transfusion Medicine physician can be reached after 5:00 p.m. weekdays and on weekends /Holidays by calling [phone], and asking for the physician on call.      Autologous HPC/MNC Collection:   In most cases, the cell dose report will be available tomorrow morning.   The Bone Marrow Transplant (BMT) clinic staff looks at your report, and a decision is made if you will need another collection.   Remember it is important to follow a low fat diet during the collection process. Sometimes following the procedure, your blood platelet count may be low.  If you are told your platelet count is low, you need to avoid taking aspirin/aspirin containing products and avoid heavy physical activity and activities that may result in bruising or traumatic injury.  To contact the BMT fellow or attending physician after 5 p.m. call [phone].

## 2024-06-07 NOTE — PROCEDURES
Transfusion Medicine   Apheresis Procedure Note    Vivian Brown [id number]   YOB: 1970 Age: 54 year old        Procedure Autologous Hematopoietic Progenitor Cell (HPC)  Collection           Assessment and Plan:   54 year old female undergoes autologous HPC collection for treatment of amyloidosis. The plan is to collect for 1 to 3 days or until the target goal is met.  A central line was placed and used for access for the procedure.     Today is collection day #2.  She tolerated the procedure well without complication.  She notes feeling well today.  Denies nausea, vomiting, fevers, chills. She did note a little diarrhea that came on yesterday.      Yield through day #1 = 5.05 x10^6 CD34/kg         History of Present Illness:   54 year old female presents for autologous  HPC  collection.  Her past medical history includes thyroid disease and amyloidosis.  She is currently feeling well.  The patient denies any back pain that would prevent her from tolerating the procedure. The patient reports no significant travel history and has no readily identifiable risk factors for infectious disease.               Past Medical History:     Past Medical History:   Diagnosis Date    Celiac disease     Family history of colon cancer     Colonoscoy q5 years next 9/2020             Past Surgical History:     Past Surgical History:   Procedure Laterality Date    BONE MARROW BIOPSY, BONE SPECIMEN, NEEDLE/TROCAR Left 5/24/2024    Procedure: BIOPSY, BONE MARROW;  Surgeon: Sabrina Jain NP;  Location: [facility] OR    COLONOSCOPY N/A 9/28/2015    Procedure: COLONOSCOPY;  Surgeon: Eugenio Travis MD;  Location:  GI    ESOPHAGOSCOPY, GASTROSCOPY, DUODENOSCOPY (EGD), COMBINED N/A 1/30/2024    Procedure: ESOPHAGOGASTRODUODENOSCOPY, WITH BIOPSY;  Surgeon: Melo Cloud MD;  Location:  GI    IR CVC TUNNEL PLACEMENT > 5 YRS OF AGE  6/5/2024    IR RENAL BIOPSY RIGHT  12/21/2023    TONSILLECTOMY                   Allergies:     Allergies   Allergen Reactions    Blood Transfusion Related (Informational Only)      Patient has a history of a clinically significant antibody against RBC antigens.  A delay in compatible RBCs may occur. Anti-CD38 interfering in all testing at [facility] 05/21/24.    Gluten Meal     Latex Itching    Seasonal Allergies              Medications:     Current Outpatient Medications   Medication Sig Dispense Refill    acetaminophen (TYLENOL) 500 MG tablet Take 1,000 mg by mouth every 8 hours as needed for mild pain      Albuterol-Budesonide (AIRSUPRA) 90-80 MCG/ACT AERO Inhale 2 Inhalations into the lungs every 4 hours as needed (Wheezing, chest tightness, shortness of breath, or persistent cough) 10.7 g 3    azelastine (ASTELIN) 0.1 % nasal spray Spray 2 sprays into both nostrils 2 times daily as needed for rhinitis 30 mL 3    fluticasone (FLONASE) 50 MCG/ACT nasal spray Spray 2 sprays into both nostrils daily 16 g 3    furosemide (LASIX) 20 MG tablet Take 2 tablets (40 mg) by mouth 2 times daily (Patient taking differently: Take 20 mg by mouth 2 times daily 40 mg in AM, 20 mg in afternoon) 360 tablet 3    levothyroxine (SYNTHROID) 200 MCG tablet Take 200 mcg by mouth daily Pt takes Synthroid brand name. FRANKI      liothyronine (CYTOMEL) 5 MCG tablet Take 5 mcg by mouth 2 times daily      loratadine (CLARITIN) 10 MG tablet Take 10 mg by mouth daily      midodrine (PROAMATINE) 10 MG tablet Take 1 tablet (10 mg) by mouth 3 times daily 270 tablet 1    multivitamin w/minerals (MULTI-VITAMIN) tablet Take 1 tablet by mouth daily      ondansetron (ZOFRAN) 8 MG tablet Take 8 mg by mouth every 8 hours as needed for nausea      prochlorperazine (COMPAZINE) 5 MG tablet Take 1 tablet (5 mg) by mouth every 6 hours as needed for nausea or vomiting 30 tablet 1    rosuvastatin (CRESTOR) 10 MG tablet Take 1 tablet (10 mg) by mouth daily 90 tablet 3    spironolactone (ALDACTONE) 25 MG tablet Take 1 tablet (25 mg) by mouth  daily 90 tablet 3    vitamin D3 (CHOLECALCIFEROL) 50 mcg (2000 units) tablet Take 1 tablet (50 mcg) by mouth daily 30 tablet 6    EPINEPHrine (ANY BX GENERIC EQUIV) 0.3 MG/0.3ML injection 2-pack Inject 0.3 mLs (0.3 mg) into the muscle as needed for anaphylaxis May repeat one time in 5-15 minutes if response to initial dose is inadequate. 2 each 3     Current Facility-Administered Medications   Medication Dose Route Frequency Provider Last Rate Last Admin    potassium chloride milka ER (KLOR-CON M20) CR tablet 40 mEq  40 mEq Oral Once Skip Jerez PA-C                 Review of Systems:     See above           Exam:   /76   Pulse 90   Temp 98.4  F (36.9  C) (Oral)   Resp 16   Alert, no apparent distress  Breathing appears comfortable on room air  Central line accessed for the procedure            Data:       BMP  Recent Labs   Lab 06/07/24  0835 06/06/24  0834 06/05/24  1048    138 136   POTASSIUM 3.2* 3.4 3.5   CHLORIDE 99 101 101   TANNER 10.0 9.8 9.8   CO2 33* 27 27   BUN 18.2 17.5 20.6*   CR 1.61* 1.78* 1.81*   * 140* 128*     CBC  Recent Labs   Lab 06/07/24  0835 06/06/24  0834 06/05/24  1048   WBC 54.8* 84.2* 84.3*   RBC 3.83 4.02 4.03   HGB 11.9 12.4 12.7   HCT 36.0 37.9 37.2   MCV 94 94 92   MCH 31.1 30.8 31.5   MCHC 33.1 32.7 34.1   RDW 16.3* 16.7* 16.6*    280 305         CD34 Absolute Count   Date Value Ref Range Status   06/05/2024 84 cells/uL Final     CD34 Absolute Count   Date Value Ref Range Status   06/06/2024 90 cells/uL Final     CD34 Absolute Count   Date Value Ref Range Status   06/07/2024 25 cells/uL Final              Procedure Summary:   Mononuclear cell collection was performed on the Drug[employer].coma cell separator.  A dual lumen central line was used for access and allowed for appropriate flow during the procedure.  ACD-A was used for anticoagulation. Calcium gluconate was given in the return line to offset the effects of the citrate.   The patient's vital signs were  stable throughout.  The patient tolerated the procedure well without complication.  See apheresis flowsheet for additional details.        Attestation: During the procedure the patient was directly seen and evaluated by me, Corey Jerez MD.  The medical student, Mario Duffy, was also present for the evaluation.  I have reviewed the chart and pertinent laboratory findings, and discussed the patient and the current procedure with the Apheresis nursing staff.    Corey Jerez MD  Transfusion Medicine Attending  Laboratory Medicine and Pathology

## 2024-06-09 ENCOUNTER — TELEPHONE (OUTPATIENT)
Dept: ONCOLOGY | Facility: CLINIC | Age: 54
End: 2024-06-09
Payer: COMMERCIAL

## 2024-06-09 ENCOUNTER — DOCUMENTATION ONLY (OUTPATIENT)
Dept: ONCOLOGY | Facility: CLINIC | Age: 54
End: 2024-06-09
Payer: COMMERCIAL

## 2024-06-09 NOTE — PROGRESS NOTES
[facility] Oral Chemotherapy Monitoring Program    Thank you for the opportunity to be a part in the care of this patient's oral chemotherapy. The oncology pharmacy will no longer be following this patient for oral chemotherapy. If there are any questions or the plan changes, feel free to contact us.        3/29/2024     1:00 PM 4/8/2024     2:00 PM 4/15/2024    12:00 PM 4/29/2024    12:00 PM 5/6/2024     1:00 PM 5/10/2024    11:00 AM 6/9/2024     9:00 AM   ORAL CHEMOTHERAPY   Assessment Type Lab Monitoring Lab Monitoring Lab Monitoring;Refill Lab Monitoring;Monthly Follow up Lab Monitoring Refill Discontinuation   Stop Date       6/6/2024   Reason for Discontinuation       Therapy complete   Diagnosis Code Multiple Myeloma Multiple Myeloma Multiple Myeloma Multiple Myeloma Multiple Myeloma Multiple Myeloma Multiple Myeloma   Providers Dr. Rmaesh Chandler   Clinic Name/Location [facility]   Is this patient followed by the [facility] OC team? No No No No No No No   Drug Name Cytoxan (cyclophsphamide) Cytoxan (cyclophsphamide) Cytoxan (cyclophsphamide) Cytoxan (cyclophsphamide) Cytoxan (cyclophsphamide) Cytoxan (cyclophsphamide) Cytoxan (cyclophsphamide)   Dose 500 mg 500 mg 500 mg 500 mg 500 mg 500 mg 500 mg   Current Schedule Weekly Weekly Weekly Weekly Weekly Weekly Weekly   Cycle Details Weekly/Daily Weekly/Daily Weekly/Daily Weekly/Daily Weekly/Daily Weekly/Daily Weekly/Daily       Joi Lopez  Pharmacy Intern  Oral Chemotherapy Monitoring Program  [facility]  [phone].

## 2024-06-10 ENCOUNTER — CARE COORDINATION (OUTPATIENT)
Dept: TRANSPLANT | Facility: CLINIC | Age: 54
End: 2024-06-10
Payer: COMMERCIAL

## 2024-06-10 ENCOUNTER — TELEPHONE (OUTPATIENT)
Dept: TRANSPLANT | Facility: CLINIC | Age: 54
End: 2024-06-10
Payer: COMMERCIAL

## 2024-06-10 DIAGNOSIS — E85.81 AL AMYLOIDOSIS (H): Primary | ICD-10-CM

## 2024-06-10 LAB
CULTURE HARVEST COMPLETE DATE: NORMAL
INTERPRETATION: NORMAL
INTERPRETATION: NORMAL
ISCN: NORMAL
METHODS: NORMAL

## 2024-06-10 NOTE — TELEPHONE ENCOUNTER
"Brief Hematology On call note:      Received page from patient \"end cap of cath came off while flushing/advise\"      Called patient. She mentioned that she has recently been managing her tunneled line. She was doing routine heparin flushes , during which one of the cap of the cathetar slipped off. She rightly managed this by covering the line with alcohol swab. She then wore sterile gloves to open a new cap from her home care kit and placed it. No bleeding / pain or line issues.     She called to just keep us posted.  I applauded her for managing this well and discussed to call us back if any redness, tenderness at the line site or any fevers.      Keily Ochoa MD  Hematology and Medical Oncology Fellow, PGY-5  [facility]  Pager: [phone]    " 69 yo male obese, pmh pleural effusion, smoker comes to ed with dizziness and chest pain which started at 1745 today; pt noted with history of herniated discs; as per ems, pt in svt and treated with adenosine; pt with improvement of symptoms; presently with chest pain 2/10;

## 2024-06-10 NOTE — TELEPHONE ENCOUNTER
Notified patient of admission to hospital on 6/11 for planned autologous stem cell transplant.  Mary Ann understands to check-in at hospital Admissions 630 am. Mary Ann verbalized that she has been feeling well, no symptoms of concern at this time.  Patient has no questions or concerns.

## 2024-06-10 NOTE — PROGRESS NOTES
Inpatient Admission Information:      Admit Date:  6/11   Diagnosis:  Amyloidosis   Transplant Type:  Auto   Protocol:  MT 2016-35   Sedated bmbx needed?  Yes   NMDP lab (lab 7033) needed?  No  **If YES, ZOHREH to please order with admission labs.  **If YES, this lab MUST BE DRAWN PRIOR TO ANY BMT PREP (chemo/TBI).   Notes:  Inpatient through engraftment       New Eval Work-Up   MD Ramesh Brandt         Consult Type Date   1 Nephrology 5/16/24   2 Cardiology 1/25/24              Long Term Follow-Up   MD Ramesh Gonzalez      EOC updated? Yes  Care team updated? Yes

## 2024-06-11 ENCOUNTER — MEDICAL CORRESPONDENCE (OUTPATIENT)
Dept: TRANSPLANT | Facility: CLINIC | Age: 54
End: 2024-06-11
Payer: COMMERCIAL

## 2024-06-11 ENCOUNTER — HOSPITAL ENCOUNTER (INPATIENT)
Facility: CLINIC | Age: 54
LOS: 20 days | Discharge: HOME | DRG: 016 | End: 2024-07-01
Attending: INTERNAL MEDICINE | Admitting: STUDENT IN AN ORGANIZED HEALTH CARE EDUCATION/TRAINING PROGRAM
Payer: COMMERCIAL

## 2024-06-11 DIAGNOSIS — E85.81 AL AMYLOIDOSIS (H): Primary | ICD-10-CM

## 2024-06-11 DIAGNOSIS — E03.4 HYPOTHYROIDISM DUE TO ACQUIRED ATROPHY OF THYROID: ICD-10-CM

## 2024-06-11 DIAGNOSIS — N18.31 CHRONIC KIDNEY DISEASE, STAGE 3A (H): ICD-10-CM

## 2024-06-11 DIAGNOSIS — D80.1 HYPOGAMMAGLOBULINEMIA (H): ICD-10-CM

## 2024-06-11 DIAGNOSIS — Z52.011 AUTOLOGOUS DONOR OF STEM CELLS: ICD-10-CM

## 2024-06-11 DIAGNOSIS — R60.9 DEPENDENT EDEMA: ICD-10-CM

## 2024-06-11 LAB
ABO/RH(D): ABNORMAL
ABO/RH(D): NORMAL
ALBUMIN SERPL BCG-MCNC: 2.4 G/DL (ref 3.5–5.2)
ALP SERPL-CCNC: 190 U/L (ref 40–150)
ALT SERPL W P-5'-P-CCNC: 21 U/L (ref 0–50)
ANION GAP SERPL CALCULATED.3IONS-SCNC: 8 MMOL/L (ref 7–15)
ANTIBODY SCREEN: POSITIVE
APTT PPP: 24 SECONDS (ref 22–38)
AST SERPL W P-5'-P-CCNC: 25 U/L (ref 0–45)
BASOPHILS # BLD AUTO: 0.1 10E3/UL (ref 0–0.2)
BASOPHILS NFR BLD AUTO: 1 %
BILIRUB SERPL-MCNC: 0.2 MG/DL
BUN SERPL-MCNC: 18.4 MG/DL (ref 6–20)
C DIFF TOX B STL QL: NEGATIVE
CALCIUM SERPL-MCNC: 9.1 MG/DL (ref 8.6–10)
CHLORIDE SERPL-SCNC: 102 MMOL/L (ref 98–107)
CREAT SERPL-MCNC: 1.46 MG/DL (ref 0.51–0.95)
DEPRECATED HCO3 PLAS-SCNC: 27 MMOL/L (ref 22–29)
EGFRCR SERPLBLD CKD-EPI 2021: 42 ML/MIN/1.73M2
EOSINOPHIL # BLD AUTO: 0.2 10E3/UL (ref 0–0.7)
EOSINOPHIL NFR BLD AUTO: 3 %
ERYTHROCYTE [DISTWIDTH] IN BLOOD BY AUTOMATED COUNT: 16 % (ref 10–15)
GLUCOSE SERPL-MCNC: 92 MG/DL (ref 70–99)
HCT VFR BLD AUTO: 34.8 % (ref 35–47)
HGB BLD-MCNC: 11.7 G/DL (ref 11.7–15.7)
IMM GRANULOCYTES # BLD: 0.1 10E3/UL
IMM GRANULOCYTES NFR BLD: 1 %
INR PPP: 0.97 (ref 0.85–1.15)
LYMPHOCYTES # BLD AUTO: 1.1 10E3/UL (ref 0.8–5.3)
LYMPHOCYTES NFR BLD AUTO: 12 %
MAGNESIUM SERPL-MCNC: 2 MG/DL (ref 1.7–2.3)
MCH RBC QN AUTO: 31.6 PG (ref 26.5–33)
MCHC RBC AUTO-ENTMCNC: 33.6 G/DL (ref 31.5–36.5)
MCV RBC AUTO: 94 FL (ref 78–100)
MONOCYTES # BLD AUTO: 1.1 10E3/UL (ref 0–1.3)
MONOCYTES NFR BLD AUTO: 12 %
NEUTROPHILS # BLD AUTO: 6.4 10E3/UL (ref 1.6–8.3)
NEUTROPHILS NFR BLD AUTO: 71 %
NRBC # BLD AUTO: 0 10E3/UL
NRBC BLD AUTO-RTO: 0 /100
PHOSPHATE SERPL-MCNC: 4.2 MG/DL (ref 2.5–4.5)
PLATELET # BLD AUTO: 154 10E3/UL (ref 150–450)
POTASSIUM SERPL-SCNC: 3.7 MMOL/L (ref 3.4–5.3)
PROT SERPL-MCNC: 4.6 G/DL (ref 6.4–8.3)
RBC # BLD AUTO: 3.7 10E6/UL (ref 3.8–5.2)
SARS-COV-2 RNA RESP QL NAA+PROBE: NEGATIVE
SODIUM SERPL-SCNC: 137 MMOL/L (ref 135–145)
SPECIMEN EXPIRATION DATE: ABNORMAL
SPECIMEN EXPIRATION DATE: NORMAL
URATE SERPL-MCNC: 5.6 MG/DL (ref 2.4–5.7)
WBC # BLD AUTO: 9 10E3/UL (ref 4–11)

## 2024-06-11 PROCEDURE — 85610 PROTHROMBIN TIME: CPT

## 2024-06-11 PROCEDURE — 250N000011 HC RX IP 250 OP 636: Performed by: STUDENT IN AN ORGANIZED HEALTH CARE EDUCATION/TRAINING PROGRAM

## 2024-06-11 PROCEDURE — 206N000001 HC R&B BMT UMMC

## 2024-06-11 PROCEDURE — 83735 ASSAY OF MAGNESIUM: CPT

## 2024-06-11 PROCEDURE — 84100 ASSAY OF PHOSPHORUS: CPT | Performed by: INTERNAL MEDICINE

## 2024-06-11 PROCEDURE — 250N000012 HC RX MED GY IP 250 OP 636 PS 637: Performed by: STUDENT IN AN ORGANIZED HEALTH CARE EDUCATION/TRAINING PROGRAM

## 2024-06-11 PROCEDURE — 86900 BLOOD TYPING SEROLOGIC ABO: CPT | Performed by: INTERNAL MEDICINE

## 2024-06-11 PROCEDURE — 87493 C DIFF AMPLIFIED PROBE: CPT

## 2024-06-11 PROCEDURE — 258N000003 HC RX IP 258 OP 636: Mod: JZ | Performed by: INTERNAL MEDICINE

## 2024-06-11 PROCEDURE — 84550 ASSAY OF BLOOD/URIC ACID: CPT

## 2024-06-11 PROCEDURE — 80053 COMPREHEN METABOLIC PANEL: CPT

## 2024-06-11 PROCEDURE — 85730 THROMBOPLASTIN TIME PARTIAL: CPT

## 2024-06-11 PROCEDURE — 250N000011 HC RX IP 250 OP 636: Mod: JZ | Performed by: INTERNAL MEDICINE

## 2024-06-11 PROCEDURE — 99223 1ST HOSP IP/OBS HIGH 75: CPT | Mod: FS | Performed by: PHYSICIAN ASSISTANT

## 2024-06-11 PROCEDURE — 87081 CULTURE SCREEN ONLY: CPT

## 2024-06-11 PROCEDURE — 3E04305 INTRODUCTION OF OTHER ANTINEOPLASTIC INTO CENTRAL VEIN, PERCUTANEOUS APPROACH: ICD-10-PCS | Performed by: INTERNAL MEDICINE

## 2024-06-11 PROCEDURE — 250N000013 HC RX MED GY IP 250 OP 250 PS 637

## 2024-06-11 PROCEDURE — 85025 COMPLETE CBC W/AUTO DIFF WBC: CPT

## 2024-06-11 PROCEDURE — 258N000003 HC RX IP 258 OP 636: Mod: JZ | Performed by: STUDENT IN AN ORGANIZED HEALTH CARE EDUCATION/TRAINING PROGRAM

## 2024-06-11 PROCEDURE — 250N000013 HC RX MED GY IP 250 OP 250 PS 637: Performed by: PHYSICIAN ASSISTANT

## 2024-06-11 PROCEDURE — 87635 SARS-COV-2 COVID-19 AMP PRB: CPT

## 2024-06-11 RX ORDER — ONDANSETRON 8 MG/1
8 TABLET, FILM COATED ORAL EVERY 8 HOURS
Status: COMPLETED | OUTPATIENT
Start: 2024-06-11 | End: 2024-06-12

## 2024-06-11 RX ORDER — POTASSIUM CHLORIDE 29.8 MG/ML
20 INJECTION INTRAVENOUS ONCE
Qty: 50 ML | Refills: 0 | Status: COMPLETED | OUTPATIENT
Start: 2024-06-11 | End: 2024-06-11

## 2024-06-11 RX ORDER — HEPARIN SODIUM,PORCINE 10 UNIT/ML
5-20 VIAL (ML) INTRAVENOUS EVERY 24 HOURS
Status: DISCONTINUED | OUTPATIENT
Start: 2024-06-12 | End: 2024-07-01 | Stop reason: HOSPADM

## 2024-06-11 RX ORDER — DEXTROSE MONOHYDRATE 50 MG/ML
10-20 INJECTION, SOLUTION INTRAVENOUS
Status: DISCONTINUED | OUTPATIENT
Start: 2024-06-17 | End: 2024-07-01 | Stop reason: HOSPADM

## 2024-06-11 RX ORDER — FUROSEMIDE 20 MG
20 TABLET ORAL 2 TIMES DAILY
Status: DISCONTINUED | OUTPATIENT
Start: 2024-06-11 | End: 2024-06-11

## 2024-06-11 RX ORDER — ALLOPURINOL 300 MG/1
300 TABLET ORAL DAILY
Status: DISCONTINUED | OUTPATIENT
Start: 2024-06-11 | End: 2024-06-21

## 2024-06-11 RX ORDER — LORAZEPAM 0.5 MG/1
.5-1 TABLET ORAL EVERY 4 HOURS PRN
Status: DISCONTINUED | OUTPATIENT
Start: 2024-06-11 | End: 2024-06-20

## 2024-06-11 RX ORDER — LORAZEPAM 2 MG/ML
.5-1 INJECTION INTRAMUSCULAR EVERY 4 HOURS PRN
Status: DISCONTINUED | OUTPATIENT
Start: 2024-06-11 | End: 2024-06-20

## 2024-06-11 RX ORDER — LEVOTHYROXINE SODIUM 200 UG/1
200 TABLET ORAL DAILY
Status: DISCONTINUED | OUTPATIENT
Start: 2024-06-11 | End: 2024-07-01 | Stop reason: HOSPADM

## 2024-06-11 RX ORDER — FUROSEMIDE 20 MG
40 TABLET ORAL DAILY
Status: DISCONTINUED | OUTPATIENT
Start: 2024-06-11 | End: 2024-06-30

## 2024-06-11 RX ORDER — SULFAMETHOXAZOLE/TRIMETHOPRIM 800-160 MG
1 TABLET ORAL
Status: DISCONTINUED | OUTPATIENT
Start: 2024-07-15 | End: 2024-07-01 | Stop reason: HOSPADM

## 2024-06-11 RX ORDER — FUROSEMIDE 10 MG/ML
40 INJECTION INTRAMUSCULAR; INTRAVENOUS EVERY MORNING
Status: DISCONTINUED | OUTPATIENT
Start: 2024-06-11 | End: 2024-06-11

## 2024-06-11 RX ORDER — PROCHLORPERAZINE MALEATE 5 MG
5-10 TABLET ORAL EVERY 6 HOURS PRN
Status: DISCONTINUED | OUTPATIENT
Start: 2024-06-11 | End: 2024-06-20

## 2024-06-11 RX ORDER — MIDODRINE HYDROCHLORIDE 5 MG/1
10 TABLET ORAL 3 TIMES DAILY
Status: DISCONTINUED | OUTPATIENT
Start: 2024-06-11 | End: 2024-07-01 | Stop reason: HOSPADM

## 2024-06-11 RX ORDER — LEVOFLOXACIN 250 MG/1
250 TABLET, FILM COATED ORAL
Status: DISCONTINUED | OUTPATIENT
Start: 2024-06-11 | End: 2024-06-21

## 2024-06-11 RX ORDER — VITAMIN B COMPLEX
50 TABLET ORAL DAILY
Status: DISCONTINUED | OUTPATIENT
Start: 2024-06-11 | End: 2024-07-01 | Stop reason: HOSPADM

## 2024-06-11 RX ORDER — HEPARIN SODIUM,PORCINE 10 UNIT/ML
5-20 VIAL (ML) INTRAVENOUS
Status: DISCONTINUED | OUTPATIENT
Start: 2024-06-11 | End: 2024-07-01 | Stop reason: HOSPADM

## 2024-06-11 RX ORDER — SPIRONOLACTONE 25 MG/1
25 TABLET ORAL DAILY
Status: DISCONTINUED | OUTPATIENT
Start: 2024-06-11 | End: 2024-07-01 | Stop reason: HOSPADM

## 2024-06-11 RX ORDER — ACYCLOVIR 800 MG/1
800 TABLET ORAL 2 TIMES DAILY
Status: DISCONTINUED | OUTPATIENT
Start: 2024-06-11 | End: 2024-06-21

## 2024-06-11 RX ORDER — LIOTHYRONINE SODIUM 5 UG/1
5 TABLET ORAL 2 TIMES DAILY
Status: DISCONTINUED | OUTPATIENT
Start: 2024-06-11 | End: 2024-06-12

## 2024-06-11 RX ORDER — ACYCLOVIR 400 MG/1
400 TABLET ORAL 2 TIMES DAILY
Status: ON HOLD | COMMUNITY
End: 2024-06-30

## 2024-06-11 RX ORDER — ACETAMINOPHEN 325 MG/1
650 TABLET ORAL ONCE
Status: COMPLETED | OUTPATIENT
Start: 2024-06-12 | End: 2024-06-12

## 2024-06-11 RX ORDER — CEFEPIME HYDROCHLORIDE 2 G/1
2 INJECTION, POWDER, FOR SOLUTION INTRAVENOUS
Status: DISCONTINUED | OUTPATIENT
Start: 2024-06-11 | End: 2024-07-01 | Stop reason: HOSPADM

## 2024-06-11 RX ORDER — DIPHENHYDRAMINE HCL 25 MG
25 CAPSULE ORAL ONCE
Status: COMPLETED | OUTPATIENT
Start: 2024-06-12 | End: 2024-06-12

## 2024-06-11 RX ORDER — FUROSEMIDE 20 MG
TABLET ORAL
Status: ON HOLD | COMMUNITY
End: 2024-06-30

## 2024-06-11 RX ORDER — FLUCONAZOLE 200 MG/1
200 TABLET ORAL DAILY
Status: DISCONTINUED | OUTPATIENT
Start: 2024-06-11 | End: 2024-06-21

## 2024-06-11 RX ORDER — MEPERIDINE HYDROCHLORIDE 25 MG/ML
25-50 INJECTION INTRAMUSCULAR; INTRAVENOUS; SUBCUTANEOUS
Status: ACTIVE | OUTPATIENT
Start: 2024-06-12 | End: 2024-06-13

## 2024-06-11 RX ORDER — ACETAMINOPHEN 325 MG/1
325-650 TABLET ORAL EVERY 4 HOURS PRN
Status: DISCONTINUED | OUTPATIENT
Start: 2024-06-11 | End: 2024-07-01 | Stop reason: HOSPADM

## 2024-06-11 RX ORDER — PANTOPRAZOLE SODIUM 40 MG/1
40 TABLET, DELAYED RELEASE ORAL DAILY
Status: DISCONTINUED | OUTPATIENT
Start: 2024-06-11 | End: 2024-06-21

## 2024-06-11 RX ORDER — LORATADINE 10 MG/1
10 TABLET ORAL DAILY
Status: DISCONTINUED | OUTPATIENT
Start: 2024-06-11 | End: 2024-06-11

## 2024-06-11 RX ORDER — FUROSEMIDE 10 MG/ML
20 INJECTION INTRAMUSCULAR; INTRAVENOUS DAILY
Status: DISCONTINUED | OUTPATIENT
Start: 2024-06-11 | End: 2024-06-11

## 2024-06-11 RX ORDER — DEXAMETHASONE 4 MG/1
8 TABLET ORAL DAILY
Status: COMPLETED | OUTPATIENT
Start: 2024-06-12 | End: 2024-06-13

## 2024-06-11 RX ORDER — FUROSEMIDE 20 MG
20 TABLET ORAL DAILY
Status: DISCONTINUED | OUTPATIENT
Start: 2024-06-11 | End: 2024-06-12

## 2024-06-11 RX ADMIN — LIOTHYRONINE SODIUM 5 MCG: 5 TABLET ORAL at 20:41

## 2024-06-11 RX ADMIN — MELPHALAN HYDROCHLORIDE 350 MG: KIT INTRAVENOUS at 11:20

## 2024-06-11 RX ADMIN — ACYCLOVIR 800 MG: 800 TABLET ORAL at 20:41

## 2024-06-11 RX ADMIN — SPIRONOLACTONE 25 MG: 25 TABLET, FILM COATED ORAL at 11:49

## 2024-06-11 RX ADMIN — ALLOPURINOL 300 MG: 300 TABLET ORAL at 11:49

## 2024-06-11 RX ADMIN — PANTOPRAZOLE SODIUM 40 MG: 40 TABLET, DELAYED RELEASE ORAL at 09:21

## 2024-06-11 RX ADMIN — DEXAMETHASONE 10 MG: 2 TABLET ORAL at 10:26

## 2024-06-11 RX ADMIN — POTASSIUM CHLORIDE 20 MEQ: 29.8 INJECTION, SOLUTION INTRAVENOUS at 09:40

## 2024-06-11 RX ADMIN — ONDANSETRON HYDROCHLORIDE 8 MG: 8 TABLET, FILM COATED ORAL at 10:27

## 2024-06-11 RX ADMIN — LIOTHYRONINE SODIUM 5 MCG: 5 TABLET ORAL at 11:52

## 2024-06-11 RX ADMIN — Medication 5 ML: at 12:52

## 2024-06-11 RX ADMIN — ACYCLOVIR 800 MG: 800 TABLET ORAL at 11:49

## 2024-06-11 RX ADMIN — FUROSEMIDE 20 MG: 20 TABLET ORAL at 11:51

## 2024-06-11 RX ADMIN — FLUCONAZOLE 200 MG: 200 TABLET ORAL at 11:52

## 2024-06-11 RX ADMIN — MIDODRINE HYDROCHLORIDE 10 MG: 5 TABLET ORAL at 17:33

## 2024-06-11 RX ADMIN — LEVOFLOXACIN 250 MG: 250 TABLET, FILM COATED ORAL at 11:51

## 2024-06-11 RX ADMIN — MIDODRINE HYDROCHLORIDE 10 MG: 5 TABLET ORAL at 11:51

## 2024-06-11 RX ADMIN — ONDANSETRON HYDROCHLORIDE 8 MG: 8 TABLET, FILM COATED ORAL at 17:33

## 2024-06-11 RX ADMIN — Medication 50 MCG: at 11:51

## 2024-06-11 RX ADMIN — SODIUM CHLORIDE 1000 ML: 9 INJECTION, SOLUTION INTRAVENOUS at 09:21

## 2024-06-11 ASSESSMENT — ENCOUNTER SYMPTOMS
FATIGUE: 0
HEADACHES: 1
MUSCULOSKELETAL NEGATIVE: 1
APPETITE CHANGE: 0
SHORTNESS OF BREATH: 0
DIZZINESS: 0
DIARRHEA: 0
LIGHT-HEADEDNESS: 0
BRUISES/BLEEDS EASILY: 0
LEG SWELLING: 1
VOMITING: 0
CONSTIPATION: 0
DYSURIA: 0
WOUND: 0
NAUSEA: 0
ABDOMINAL PAIN: 0
COUGH: 0
FEVER: 0

## 2024-06-11 ASSESSMENT — ACTIVITIES OF DAILY LIVING (ADL)
ADLS_ACUITY_SCORE: 18
ADLS_ACUITY_SCORE: 35
ADLS_ACUITY_SCORE: 18
ADLS_ACUITY_SCORE: 35

## 2024-06-11 NOTE — H&P
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      Transplant Essential Data:   Diagnosis Renal amyloidosis BMTCT Type Auto PBSCT    Prep Regimen Day -1 Melphalan BMT MD/TARAN Chandler/Carlos    Clinical Trials [id number]       HPI: Alissa presents today for her autologous stem cell transplant.  She is feeling good, and her only physical complaints right now are some pain in her right ear and some swelling in her lower legs.  She remarked that leg swelling was initially attributed to her hypothyroidism, but eventually a further work-up was done and that showed her amyloidosis.  She was on her feet yesterday doing a lot of errands, so her legs are a bit more swollen than usual today.  Typically, the swelling is minimal these days.     Diagnosis and Treatment Summary     Disease presentation and baseline characteristics:   12/21/2023:       Final Diagnosis   A. kidney biopsy (needle):     Amyloidosis of the kidney, AL-type, lambda light chain-restricted, affecting the glomeruli, interstitium, and arterioles (see comment)     Mild chronic changes of the parenchyma, including:   - focal global glomerulosclerosis (3% of glomeruli)   - focal tubular atrophy and interstitial fibrosis (5% of the cortex)   - severe arterial sclerosis   Electronically signed by Joe Garcia MD on 12/23/2023 at  3:21 PM   Comment   UULAB   The biopsy reveals findings diagnostic of amyloidosis; the amyloid shows lambda light chain-restriction (AL amyloidosis), and the deposits are Congo red positive. The amyloid deposits affect the glomeruli extensively, and  interstitium and arterioles focally. Other potential complications of paraproteins in the kidney are not seen, in particular, there is no evidence of light chain cast nephropathy, light chain deposition disease, or light chain tubulopathy.      1/8/24: NT-Pro , Troponin T 15     Date Treatment Name Response Side Effects / Toxicities   1/19/24 D-CyBorD x 2 cycles                 Donor Characteristics     Self or Related or Unrelated Donor: self      BMT Work Up Results     Blood Counts Recent Labs   Lab Test 06/11/24  0806 06/07/24  0835 06/06/24  0834 06/05/24  1048 05/24/24  1115 05/21/24  1338   HGB 11.7 11.9 12.4   < > 13.0 11.8   HCT 34.8* 36.0 37.9   < > 39.6 35.1   WBC 9.0 54.8* 84.2*   < > 9.7 9.4   ANEUTAUTO 6.4  --   --   --  6.9 6.7   ALYMPAUTO 1.1  --   --   --  1.4 1.5   AMONOAUTO 1.1  --   --   --  1.0 0.8   AEOSAUTO 0.2  --   --   --  0.2 0.2   ABSBASO 0.1  --   --   --  0.2 0.2   NRBCMAN 0.0 1.1 0.7   < > 0.0 0.0    153 280   < > 483* 525*    < > = values in this interval not displayed.      Bone Marrow 5/24/2024  Path:   Minute level involvement by recurrent/persistent plasma cell neoplasm. Normocellular marrow (40-50% estimated cellularity), with , trilineage hematopoiesis, no overt dysplasia, and no increase in blasts (1%). Positive for amyloid deposisiton by congo erika. Peripheral blood showing slight thrombocytosis and no circulating plasma cells.  Flow:  - Rare lambda monotypic plasma cells (0.001%)  - Polytypic B cells  Chromosomes:  No cells with a t(11;14) or other clonal chromosomal abnormality were detected among the 20 metaphase cells analyzed by G-banding. These findings are not helpful for confirming or further characterizing those of the concurrent FISH analysis ([id number]), performed on a portion of the specimen enriched for plasma cells, that showed IGH::CCND1 gene fusion and gains of chromosomes 5, 11, and 15. As it is well documented that the involved plasma cells in  plasma cell neoplasms grow poorly in culture, it is most likely that the metaphase cells analyzed by G-banding represent normal tissue elements rather than neoplastic cells.   FISH:  ABNORMAL  - Gain of chromosomes 5 and 15 (2%)  - IGH::CCND1 fusion with one fusion rather than the expected two fusions (7.5%)  - Gain of 1-2 copies of CCND1 (7.5%)   Blood Type Recent Labs   Lab Test 06/11/24  0806   ABORH O POS        Chemistries Recent Labs   Lab Test 06/11/24  0806 06/07/24  0835 06/06/24  0834    140 138   POTASSIUM 3.7 3.2* 3.4   CHLORIDE 102 99 101   CO2 27 33* 27   BUN 18.4 18.2 17.5   CR 1.46* 1.61* 1.78*   GLC 92 105* 140*       Liver Tests Recent Labs   Lab Test 06/11/24  0806 06/05/24  1048 05/21/24  1338   BILITOTAL 0.2 0.2 0.2   ALKPHOS 190* 230* 91   AST 25 25 24   ALT 21 25 24   ALBUMIN 2.4* 2.2* 2.3*      PET/CT: January 2024  1. No definite active amyloid on FDG pet.    2. Single focal moderate to markedly avid intramuscular uptake,  proximal left gastrocnemius, no etiology demonstrated on unenhanced  non diagnostic CT.  Recommend dedicated MRI.  3. Small to mildly enlarged mildly hypermetabolic bilateral level 2A  lymph nodes, most likely reactive.  4. A few equivocal findings - unlikely to be active amyloid (patient  has been under treatment, so FDG may be decreased).  Recommend future  PET scans be done with a sarcoid cardiac prep and and Lasix protocol  to decrease background and make them more sensitive for cardiac and  renal activity.  4a. Mildly hypermetabolic left lower lobe anterior segment  peribronchial nodularity and hypermetabolism. Differential favors RSV  (positive 1/18/24), focal amyloid unlikely.   4b. Cardiac (MRI positive) - equivocal FDG throughout left ventricle,   likely physiologic. Given findings on MRI 1/16/2024, can not rule out  active amyloidosis. However in the absence of a cardiac dietary prep  (which switches myocardium from glucose to fatty metabolism), this  is  likely normal physiology.  Suggest future PET scans be done with a  cardiac sarcoid dietary protocol (3 day ketogenic diet).  4c. Kidney cortex (bx. 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     Chest X-Ray: Results for orders placed during the hospital encounter of 05/23/24    XR CHEST 2 VIEWS    Status: Normal 5/23/2024    Narrative  EXAM: XR CHEST 2 VIEWS  5/23/2024 9:00 AM    HISTORY: AL amyloidosis (H); Amyloidosis, unspecified (H); Examination  prior to chemotherapy; Personal history of diseases of blood and  blood-forming organs    COMPARISON: Head CT 1/25/2024    FINDINGS: Two views of the chest. Trachea is midline. Cardiac  silhouette is within normal limits. Calcified nodule in the left lower  lobe corresponding to granuloma seen on prior PET/CT. Rounded opacity  in the peripheral right lower lung field. No pleural effusion or  pneumothorax.    Impression  IMPRESSION: Rounded opacity in the right lower lung field without  correlate on prior PET/CT. This can be further evaluated with CT.    I have personally reviewed the examination and initial interpretation  and I agree with the findings.    OLIVIA TEMPLETON MD      SYSTEM ID:  [id number]       Chest CT No results found for this or any previous visit.     PFTs FVC%  Recent Labs   Lab Test 05/21/24  1404   20003 99       FEV1%  Recent Labs   Lab Test 05/21/24  1404   20016 97       DLCO%  Recent Labs   Lab Test 05/21/24  1404   20143 106        ECHO or MUGA: Global and regional left ventricular function is normal with an EF of 55-60%.  Diastolic Doppler findings (E/E' ratio and/or other parameters) suggest left  ventricular filling pressures are increased.  Global peak LV longitudinal strain is averaged at -11.3%. This suggests  abnormal strain (normal <-18%).  Global right ventricular  function is normal. There is mild right ventricular  hypertrophy.  Normal IVC.  Small circumferential pericardial effusion is present without any hemodynamic  significance.     This study was compared with the study from 1/3/24 .  No significant changes. Findings of increased biventricular wall thickness,  abnormal diastology, GLS, and presence of pericardial effusion in the setting  of renal amyloidosis raise suspicion of cardiac amyloidosis.     EKG ECG results from 05/21/24   EKG 12-lead complete w/read - Clinics     Value    Systolic Blood Pressure     Diastolic Blood Pressure     Ventricular Rate 71    Atrial Rate 71    SC Interval 154    QRS Duration 96        QTc 428    P Axis 43    R AXIS 129    T Axis 27    Interpretation ECG      Sinus rhythm  Low voltage QRS  Incomplete right bundle branch block  Left posterior fascicular block  Septal infarct (cited on or before 16-MAY-2024)  Abnormal ECG  When compared with ECG of 16-MAY-2024 10:46,  No significant change was found  Confirmed by MD DAVIS DAVID ([id number]) on 5/22/2024 11:24:12 AM        Serologies: CMV: No lab results found.  EBV:   Recent Labs   Lab Test 05/21/24  1338   EBVCAG Positive*     HSV:   Recent Labs   Lab Test 05/21/24  1338   E4LRRCC 1.54*   H1IGG Positive.  IgG antibody to HSV-1 detected.*   F0VXGHM 0.05   H2IGG No HSV-2 IgG antibodies detected.     VZV: No lab results found.  HTLV:   Recent Labs   Lab Test 05/21/24  1338   DHTLVA Non-reactive      Vitamin D Recent Labs   Lab Test 03/18/24  1102   VITDT 8*      [facility] [place] IDMs Recent Labs   Lab Test 05/21/24  1338   DCMIG Neg   DHBSAG Non-reactive   DHBCAB Non-reactive   DHIVAB Non-reactive   DHCVAB Non-reactive   DHTLVA Non-reactive   TCRUZI Non-reactive   DTRPAB Non-reactive        Other Lab Results:     COVID:  Recent Labs   Lab Test 01/26/24  0846   [id number] Negative       LDH  Recent Labs   Lab Test 05/24/24  1112 01/18/24  1433    248       Creatinine  Clearance  Recent Labs   Lab Test 05/24/24  1108 05/08/24  0500   HT  --  164   WT  --  74.0   RAW  --  67   STD  --  63*   VOL  --  2,917  2,917  2,917   DUR  --  24.0  24.0  24.0   UCRR 51.5 95.6  95.6  44.9  44.9   UCR24  --  1.31  1.31       Immunoglobulins:   Recent Labs   Lab Test 05/21/24  1338 04/19/24  0956 03/15/24  0820 02/02/24  1437 01/18/24  1444   IGG 86* 101* 116* 204* 294*     Recent Labs   Lab Test 05/21/24  1338 04/19/24  0956 03/15/24  0820 01/18/24  1444   IGA 35* 37* 36* 164     Recent Labs   Lab Test 05/21/24  1338 04/19/24  0956 03/15/24  0820 01/18/24  1444   IGM <10* 21* 33* 127       Monoclonal Protein Studies   M spike:   Recent Labs   Lab Test 05/24/24  1112 05/21/24  1338 04/19/24  0956 03/15/24  0820 01/08/24  1201 11/28/23  1229   ELPM 0.0 0.0 0.1* 0.1* 0.0 0.0     Kappa FLC:   Recent Labs   Lab Test 05/24/24  1108 05/21/24  1338 04/19/24  0956 03/15/24  0820 03/08/24  1348 02/24/24  0741   KFLCA 1.12 1.07 1.13 1.33 1.20 1.13     Lambda FLC:   Recent Labs   Lab Test 05/24/24  1108 05/21/24  1338 04/19/24  0956 03/15/24  0820 03/08/24  1348 02/24/24  0741   LFLCA 1.74 1.49 1.71 1.96 1.75 1.94     FLC Ratio:   Recent Labs   Lab Test 05/24/24  1108 05/21/24  1338 04/19/24  0956 03/15/24  0820 03/08/24  1348 02/24/24  0741   KLRA 0.64 0.72 0.66 0.68 0.69 0.58       B2-Microglobulin    Recent Labs   Lab Test 05/21/24  1338 01/18/24  1433   VBSE1CCEO 4.3* 5.8*       Urine Studies   Recent Labs   Lab Test 05/24/24  1108 05/21/24  1338 05/14/24  0938   COLOR Light Yellow   < > Ivelisse*   APPEARANCE Clear   < > Slightly Cloudy*   URINEGLC Negative   < > 150*   URINEBILI Negative   < > Negative   URINEKETONE Negative   < > Negative   SG 1.014   < > 1.015   UBLD Moderate*   < > Small*   URINEPH 6.5   < > 6.0   PROTEIN 300*   < > >499*   UUROI Normal   < > Normal   NITRITE Negative   < > Negative   LEUKEST Negative   < > Negative   MUCUS Present*   < > Present*   RBCU 2   < > 5*   WBCU 2    < > 8*   USQEI  --   --  <1    < > = values in this interval not displayed.     I have assessed all abnormal lab values for their clinical significance and any values considered clinically significant have been addressed in the assessment and plan    Family History:   Family History   Problem Relation Age of Onset    Colon Cancer Maternal Grandmother 91    Hypertension Mother     Hyperlipidemia Mother     Cancer Mother 65        GIST    Other Cancer Mother     Coronary Artery Disease Father         MI    Thyroid Disease Sister     Asthma Daughter        Social History:   Social History     Socioeconomic History    Marital status:      Spouse name: Not on file    Number of children: Not on file    Years of education: Not on file    Highest education level: Not on file   Occupational History     Comment: Physical Therapy   Tobacco Use    Smoking status: Never     Passive exposure: Past    Smokeless tobacco: Never    Tobacco comments:     Parents smoked during childhood in the house   Vaping Use    Vaping status: Never Used   Substance and Sexual Activity    Alcohol use: Not Currently     Alcohol/week: 0.0 standard drinks of alcohol     Comment: 1-2 times/month    Drug use: No    Sexual activity: Yes     Partners: Male     Birth control/protection: I.U.D.   Other Topics Concern    Parent/sibling w/ CABG, MI or angioplasty before 65F 55M? Not Asked   Social History Narrative    March 12, 2024    ENVIRONMENTAL HISTORY: [place] home is heated with a forced air. They does have central air conditioning. The patient's bedroom is furnished with carpeting in bedroom and fabric window coverings.  Pets inside the house include no. There is no history of cockroach or mice infestation. There is/are 0 smokers in the house.  The house does not have a damp basement.      Social Determinants of Health     Financial Resource Strain: High Risk (1/22/2024)    Financial Resource  Strain     Within the past 12 months, have you or your family members you live with been unable to get utilities (heat, electricity) when it was really needed?: Yes   Food Insecurity: Low Risk  (1/22/2024)    Food Insecurity     Within the past 12 months, did you worry that your food would run out before you got money to buy more?: No     Within the past 12 months, did the food you bought just not last and you didn t have money to get more?: No   Transportation Needs: Low Risk  (1/22/2024)    Transportation Needs     Within the past 12 months, has lack of transportation kept you from medical appointments, getting your medicines, non-medical meetings or appointments, work, or from getting things that you need?: No   Physical Activity: Not on file   Stress: Not on file   Social Connections: Unknown (8/13/2023)    [facility]    Social Connections     Frequency of Communication with Friends and Family: Not on file   Interpersonal Safety: Low Risk  (11/17/2023)    Interpersonal Safety     Do you feel physically and emotionally safe where you currently live?: Yes     Within the past 12 months, have you been hit, slapped, kicked or otherwise physically hurt by someone?: No     Within the past 12 months, have you been humiliated or emotionally abused in other ways by your partner or ex-partner?: No   Housing Stability: Low Risk  (1/22/2024)    Housing Stability     Do you have housing? : Yes     Are you worried about losing your housing?: No       Past Medical History:   Past Medical History:   Diagnosis Date    Celiac disease     Family history of colon cancer     Colonoscoy q5 years next 9/2020        Past Surgical History:   Past Surgical History:   Procedure Laterality Date    BONE MARROW BIOPSY, BONE SPECIMEN, NEEDLE/TROCAR Left 5/24/2024    Procedure: BIOPSY, BONE MARROW;  Surgeon: Sabrina Jain NP;  Location: [facility] OR     COLONOSCOPY N/A 9/28/2015    Procedure: COLONOSCOPY;  Surgeon: Eugenio Travis MD;  Location: [facility]    ESOPHAGOSCOPY, GASTROSCOPY, DUODENOSCOPY (EGD), COMBINED N/A 1/30/2024    Procedure: ESOPHAGOGASTRODUODENOSCOPY, WITH BIOPSY;  Surgeon: Melo Cloud MD;  Location: [facility]    IR CVC TUNNEL PLACEMENT > 5 YRS OF AGE  6/5/2024    IR RENAL BIOPSY RIGHT  12/21/2023    TONSILLECTOMY         Allergies:   Allergies   Allergen Reactions    Blood Transfusion Related (Informational Only)      Patient has a history of a clinically significant antibody against RBC antigens.  A delay in compatible RBCs may occur. Anti-CD38 interfering in all testing at [facility] 05/21/24.    Gluten Meal     Latex Itching    Seasonal Allergies        Home Medications      Prior to Admission medications    Medication Sig Start Date End Date Taking? Authorizing Provider   acetaminophen (TYLENOL) 500 MG tablet Take 1,000 mg by mouth every 8 hours as needed for mild pain   Yes Reported, Patient   furosemide (LASIX) 20 MG tablet Take 2 tablets (40 mg) by mouth 2 times daily  Patient taking differently: Take 20 mg by mouth 2 times daily 40 mg in AM, 20 mg in afternoon 3/5/24  Yes Suha Armstrong MD   levothyroxine (SYNTHROID) 200 MCG tablet Take 200 mcg by mouth daily Pt takes Synthroid brand name. FRANKI   Yes Unknown, Entered By History   liothyronine (CYTOMEL) 5 MCG tablet Take 5 mcg by mouth 2 times daily 11/18/23  Yes Alin Torres, PATeodoraC   loratadine (CLARITIN) 10 MG tablet Take 10 mg by mouth daily   Yes Reported, Patient   midodrine (PROAMATINE) 10 MG tablet Take 1 tablet (10 mg) by mouth 3 times daily 5/7/24  Yes Suha Armstrong MD   multivitamin w/minerals (MULTI-VITAMIN) tablet Take 1 tablet by mouth daily   Yes Unknown, Entered By History   ondansetron (ZOFRAN) 8 MG tablet Take 8 mg by mouth every 8 hours as needed for nausea 5/6/24  Yes Reported, Patient   prochlorperazine (COMPAZINE) 5 MG tablet Take 1 tablet (5 mg) by mouth  every 6 hours as needed for nausea or vomiting 5/6/24  Yes Dary Tinoco APRN CNP   rosuvastatin (CRESTOR) 10 MG tablet Take 1 tablet (10 mg) by mouth daily 5/23/23  Yes Alin Torres PA-C   spironolactone (ALDACTONE) 25 MG tablet Take 1 tablet (25 mg) by mouth daily 2/29/24  Yes Suah Armstrong MD   vitamin D3 (CHOLECALCIFEROL) 50 mcg (2000 units) tablet Take 1 tablet (50 mcg) by mouth daily 5/16/24  Yes Tracie Dobson MD   Albuterol-Budesonide (AIRSUPRA) 90-80 MCG/ACT AERO Inhale 2 Inhalations into the lungs every 4 hours as needed (Wheezing, chest tightness, shortness of breath, or persistent cough) 3/12/24   Jose Gamble MD   azelastine (ASTELIN) 0.1 % nasal spray Spray 2 sprays into both nostrils 2 times daily as needed for rhinitis 3/12/24   Jose Gamble MD   EPINEPHrine (ANY BX GENERIC EQUIV) 0.3 MG/0.3ML injection 2-pack Inject 0.3 mLs (0.3 mg) into the muscle as needed for anaphylaxis May repeat one time in 5-15 minutes if response to initial dose is inadequate. 3/12/24   Jose Gamble MD   fluticasone (FLONASE) 50 MCG/ACT nasal spray Spray 2 sprays into both nostrils daily 3/12/24   Jose Gamble MD       Review of Systems    Review of Systems   Constitutional:  Negative for appetite change, fatigue and fever.   HENT:   Negative for mouth sores.    Respiratory:  Negative for cough and shortness of breath.    Cardiovascular:  Positive for leg swelling. Negative for chest pain.   Gastrointestinal:  Negative for abdominal pain, constipation, diarrhea, nausea and vomiting.   Genitourinary:  Negative for dysuria.    Musculoskeletal: Negative.    Skin:  Negative for rash and wound.   Neurological:  Positive for headaches. Negative for dizziness and light-headedness.   Hematological:  Does not bruise/bleed easily.     PHYSICAL EXAM      Weight     Wt Readings from Last 3 Encounters:   06/11/24 71.3 kg (157 lb 1.6 oz)   06/06/24 69.7 kg (153 lb 10.6 oz)   06/05/24 69.8 kg (153 lb 14.4 oz)  "       KPS: 100    /88 (BP Location: Right arm)   Pulse 99   Temp 98  F (36.7  C) (Oral)   Resp 16   Ht 1.63 m (5' 4.17\")   Wt 71.3 kg (157 lb 1.6 oz)   SpO2 98%   BMI 26.82 kg/m       General: NAD   Eyes: SUKUMAR, sclera anicteric   Lungs: CTA bilaterally  Cardiovascular: RRR, no M/R/G   Abdominal/Rectal: +BS, soft, NT, ND  Lymphatics: trace edema bilateral ankles  Skin: No rashes or petechaie  Neuro: A&O   Musculoskeletal: Muscle mass adequate  Additional Findings: apheresis CVC not tender, although she occasionally gets a sharp pain at the exit site when the dressing is being changed.    LABS AND IMAGING: I have assessed all abnormal lab values for their clinical significance and any values considered clinically significant have been addressed in the assessment and plan.        Lab Results   Component Value Date    WBC 9.0 06/11/2024    ANEUTAUTO 6.4 06/11/2024    HGB 11.7 06/11/2024    HCT 34.8 (L) 06/11/2024     06/11/2024     06/11/2024    POTASSIUM 3.7 06/11/2024    CHLORIDE 102 06/11/2024    CO2 27 06/11/2024    GLC 92 06/11/2024    BUN 18.4 06/11/2024    CR 1.46 (H) 06/11/2024    MAG 2.0 06/11/2024    INR 0.97 06/11/2024           SYSTEMS-BASED ASSESSMENT AND PLAN   Vivian Brown is a 54 year old female with renal/cardiac/bone marrow lambda light chain amyloidosis, undergoing melphalan chemo therapy followed by autologous peripheral blood stem cell transplant as rescue;  currently day -1.    Prep: melphalan    BMT  - BMT doc/Coordinator: Ramesh/Carlos  - [id number]  - Cell dose: total collections of 5.99 million CD34+ cells/kg.     - Prep as above. Flush and pre-meds prior to transplant.  - GCSF starts day +5, continue until ANC > 2500 for 2 consecutive days.     HEME/COAG  - Transfusion parameters: hgb <7g/dL and plts <10,000    ID  Prophy: ACV; levaquin; fluconazole.  PJP ppx to start at day +28.     GI/NUTRITION  - Anti-emetics: zofran/dex prior to chemotherapy.  Ativan and " compazine prn.   - Ulcer prophy: Protonix 40mg daily.   # Celiac Disease, see diet below    FLUIDS/NUTRITION  - melphalan fluid flush 6/11  - continue PTA regimen of lasix 40mg qam; lasix 20mg qpm; and daily spironoloactone  - gluten free diet (h/o celiac disease)  - dietitian to follow    RENAL  # renal amyloidosis; diuresis as above  - Monitor Cr and electrolytes daily.   - Replace electrolytes per sliding scale    ENDOCRINE  # Hypothyroidism:  continue PTA cytomel and synthroid    CARDIAC  # cardiac amyloidosis: cautious fluid balance; diuresis as above  # h/o dizziness and chest pain with chemotherapy for amyloidosis; improved with addition of midodrine.  Continue.   # hyperlipidemia: rosuvastatin on hold during acute transplant course    SUPPORTIVE CARE  - PT/OT to evaluate on admission and follow as indicated.   - BMT Social work to follow.    - # Pain Assessment:      6/7/2024     8:00 AM   Current Pain Score   Patient currently in pain? lamonte ndiaye pain level was assessed and she currently denies pain.      Code Status: Full Code    Dispo/Discharge:   Medically Ready for Discharge: Anticipated in 5+ Days  Patient to stay admitted through engraftment.      Patient was given a copy of consents in printed format on admission.     Dominga Pacheco PA-C  Securely message with the Vocera Web Console

## 2024-06-11 NOTE — PROGRESS NOTES
Stop time on MAR & chart I & O  Chemo drug: Melphalan  Tolerated: Well  Intervention: Pt given 500cc bolus pre and post infusion. Pt also given decadron and zofran as pre-meds. Blood return monitored before and after infusion. Pt educated on cryotherapy and began 30min prior to infusion and will continue 2-3hrs after completion.   Response: Pt tolerated infusion well.   Plan: Continue to follow treatment protocol.

## 2024-06-11 NOTE — PROGRESS NOTES
Patient admitted to: 5410  Admitted from: home  Arrived by: 0630  Reason for admission: auto transplant  Patient accompanied by: mother  Belongings: with patients   Teaching: completed with patient and mother   Skin double check completed by: Beata Cardoso RN

## 2024-06-11 NOTE — PROGRESS NOTES
BMT CLINICAL SOCIAL WORK NOTE:    Focus: Supportive Counseling    Data: Pt is a 54 year old female, admitted for her planned admission for her auto BMT.    Interventions: Clinical  (CSW) spoke with the pt by phone to discuss a [facility] referral for her mother Lovely. The pt noted that she is not certain if her mother will spend the night his evening as they were still working to figure out her visitors. CSW dicussed the need for the referral process, agreed that we would send a referral for this evening for Lovely and if she decides she does not need to stay she can cancel the reservation.  Referral completed and sent to the [facility].    Assessment: Pt presented as friendly, just getting settled in the hospital.  Pt appears to be coping well at this time. Pt continues to be supported by her mother Lovely and  Aneudy.     Plan: CSW will continue to provide supportive counseling and assistance with resources as needed. CSW will continue to collaborate with multidisciplinary team regarding Pt's plan of care.     THONY Ruiz, [facility]  Phone: [phone]  [facility]- BMT SW 3

## 2024-06-11 NOTE — CONSULTS
"Below is the pt's psychosocial assessment to review as needed.          CLINICAL SOCIAL WORK   PSYCHOSOCIAL ASSESSMENT  BLOOD AND MARROW TRANSPLANT SERVICE        Assessment completed on May 21, 2024 of living situation, support system, financial status, functional status, coping, stressors, need for resources and social work intervention provided as needed.  Information for this assessment was provided by Pt and mother's report in addition to medical chart review and consultation with medical team.      Present at assessment: Patient, Vivian \"Mary Ann\" Stephanie  and Lovely Cordell  were present for this assessment conducted by Rylie Aguilar, BMT .      Diagnosis: Amyloidosis      Date of Diagnosis: 1/8/24     Transplant type: Autologous     Donor: Autologous      Physician: Adam Chandler MD     Nurse Coordinator: Dalila Gonzalez RN     Work-up Nurse Coordinator: Dalila Gonzalez RN     : THON[facility] Ruiz, [facility]      Permanent Address:   [address] [place] [id number]     Local Address:   [address] [address]  [employer] main desk: [phone]     Contact Information:  Pt Home Phone: [phone]  Pt Cell Phone: [phone]  Pt Email: [email or web address]  Pt's Aneudy Phone: [phone]     Presenting Information:  Mary Ann is a 54 year old female diagnosed with Amyloidosis  who presents for evaluation for Autologous transplant at the [facility] ([facility]).  Pt was accompanied to today's visit by her mother Lovely.      Decision Making:   Self      Health Care Directive:   Declined completing at this time, but is working on completing one.      Relationship Status:    to her  Aneudy for 16 years. The pt shared that her relationship with her  is often stable and supportive, stable and unsupportive, minimal interactions at times and highly stressed at time and verbally abusive at times.      Special Lodging " Needs: Local Lodging Needed. Mary Ann plans to stay at the [facility] post BMT.      Family/Support System: Pt endorsed a good support system including family and close friends who will be available to support Pt throughout transplant process.      Spouse: Aneudy Brown     Children: Jassi (12) and Doris (15)     Grandchildren: n/a     Parents: Mother Lovely- Father      Siblings: 2 -Alaina and Adam     Friends: some friends who are supportive     Caregiver: SW discussed with pt the caregiver role and expectation at length. Pt is agreeable to having a full time caregiver for a minimum of 30 days until cleared by the BMT physician. Pt's identified caregiver is her mother. Pt signed the caregiver contract which will be scanned into the EMR. Caregiver education and resources provided. No caregiver concerns identified. Pt and Pt's mother confirmed understanding caregiving requirement, including driving restrictions, as discussed during psychosocial assessment.      Name & Numbers  Lovely Prince [phone]     Transportation Mode:  Private Car . Pt is aware of driving restrictions post-BMT and the need for the caregiver is to drive until cleared to drive by the  BMT physician. SW provided information on parking info and monthly parking pass options. Pt will utilize the [facility] security shuttle for transportation to and from the [facility] and BMT Clinic/Hospital.     Insurance:  No Insurance issues identified.  Pt denied specific insurance concerns at this time. SW reiterated information about the BMT Financial  should specific insurance questions arise as Pt moves through transplant process.      Sources of Income:  Employer disability  Mary Ann is supported from short term disability and her husbands income. Pt denied anticipation of financial hardship related to BMT at this time.  SW encouraged Pt to contact this SW for additional potential resources should financial situation change.      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  GAD7 assessment, score was 2, which indicates no current signs of anxiety.     Mary Ann noted that she does not have a history of anxiety or depression, but did note that \"everyone has it\" when referencing anxiety. She shared that she has a had a lot of ups and down in life recently which has impacted her mental health. She discussed feeling support by friends and family, but her  has now always been as supportive as she would wish.       We talked about how some patients may see an increase in feelings of anxiety or depression while hospitalized for extended periods along with isolation. Encouraged Mary Ann and her family to let us know if they are noticing an increase in symptoms. We talked about the variety of modalities available to use as coping mechanisms (including but not limited to guided imagery, relaxation techniques, progressive muscle relaxation, counseling/talk therapy and medication).     Chemical Use: No issues identified.  Mary Ann denied the use of tobacco, alcohol, marijuana or other drugs.   Based on the information provided, there appear to be no specific risks or concerns identified at this time.      Trauma/Loss/Abuse History: Multiple losses associated with cancer diagnosis and treatment, including health, employment, changes to physical appearance, etc.      Spirituality:  Patient identifies with alisa community. Mary Ann shared that she is Judaism, she is interested in seeing the anna for a blessing.       Coping: Pt noted that she is currently feeling \"positive, hopeful, prepared, excited for time off, and focused at times\".  Pt shared that her main coping mechanisms are talking with friends and " "family and prayer/spiritual practice. Pt noted that she also dex by exercise, and positive self-talk. SW and Pt discussed additional positive coping mechanisms that Pt can utilize while in the hospital.      Caregiver Coping: Pt's mother Lovely noted that she is feeling \"positive, hopeful, prepared, ready to begin and focused\" at this time.  Lovely noted that she dex by talking with friends and family, prayer/spiritual practices, reading books, exercise, meditation/guided imagery, gathering educational information and working on my hobbies.      Education Provided: Transplant process expectations, Caregiver requirements, Caregiver self-care, Financial issues related to transplant, Financial resources/grants available, Common psychosocial stressors pre/post transplant, Support group(s) available, Tour/layout of the inpatient unit/non-use of cell phones, Hospital resources available, Web site information, Resources for transplant patients and their families as well as the Clinical Social Work role.      Interventions Provided: Supportive counseling and education      Recreation/Leisure Activities:  Mary Ann shared that she enjoys being with her family, spending time with her kids, floating on the lake, paddle boarding and going for walks.     Plans for Hospital Stay Leisure:  During her IP stay she plans to do puzzles, play games, knit and photo books.     Assessment and Recommendations for Team:  Pt is a 54 year old female diagnosed with Amyloidosis who is here undergoing preparation for a planned autologous transplant.      Pt is a pleasant and articulate female who feels comfortable communicating with the medical team. Pt is pleasant, calm and able to articulate concerns/coping mechanisms in an appropriate manner. Mary Ann was alert, interactive, and affect was full, they displayed appropriate eye contact, memory and thought processes. Pt has a strong supportive network of family and friends who are involved.      Pt will " benefit from ongoing psychosocial support in regards to coping with the adjustment to the BMT process. CSW has discussed  psychosocial support options in regards to coping with the adjustment to the BMT process and support groups opportunities.       Pt has a good support system and a good caregiver plan. Pt verbalizes understanding of the transplant process and wanting to proceed. SW provided contact information and encouraged Pt to contact SW with questions, concerns, resources and for support. Per this assessment, I did not identify any barriers to this patient moving forward with transplant.       Mary Ann's mother Lovely noted that Mary Ann is bringing her kids and  to the close so they understand the need for the pt to have more help from them at home. CSW discussed this with Mary Ann and Lovely and helped rephrase goals of the children's presence for the close and that this currently could be helpful for them to understand what the pt is planning to go through, but may not support the desire from Lovely for the family to support the pt more at home. CSW also encouraged Mary Ann to think about how her kids will respond to hear all the information about transplant as this at times can be hard for children to process. Mary Ann noted that she has been very honest with her kids through her treatment, but agreed keeping this line of communication open will be important for them.         Important Information:   - Mary Ann plans to stay at the [facility]  - Mary Ann would like a anna visit IP  - Mary Ann noted she is a quiet person, but does enjoy conversations with others.   - Mary Ann is interested in a treadmill in her room  - Mary Ann dropped off her disability parking to the [facility] team, she is hoping to get this prior to her BMT. Message sent to [facility] RNCC to check status.     Follow up Planned:   Psychosocial support  Lodging referrals  Spiritual Health referral        THONY Ruiz, [facility]  Phone:  [phone]  [facility]- BMT SW 3

## 2024-06-11 NOTE — PHARMACY-ADMISSION MEDICATION HISTORY
Pharmacist Admission Medication History    Admission medication history is complete. The information provided in this note is only as accurate as the sources available at the time of the update.    Information Source(s): Patient and CareEverywhere/SureScripts via phone    Pertinent Information:   Patient has a gluten allergy (Celiac disease)  Patient requires brand name Synthroid. Confirmed current dosing is 200 mcg daily.   Airsupra is non-formulary. Patient reported that she hasn't used this inhaler in a few months. If inhaler is needed, patient could bring in home supply or could change to hospital supplied albuterol inhaler + steroid inhaler  Patient was taking loratadine for pain with G-CSF during stem cell mobilization    Changes made to PTA medication list:  Added: Acyclovir  Deleted: None  Changed: Furosemide dosing updated to 40 mg every morning and 20 mg in the afternoon    Allergies reviewed with patient and updates made in EHR: yes    Medication History Completed By: Ilsa Michel [facility] 6/11/2024 11:06 AM    PTA Med List   Medication Sig Last Dose    acetaminophen (TYLENOL) 500 MG tablet Take 1,000 mg by mouth every 8 hours as needed for mild pain Past Week    acyclovir (ZOVIRAX) 400 MG tablet Take 400 mg by mouth 2 times daily 6/10/2024 at PM    Albuterol-Budesonide (AIRSUPRA) 90-80 MCG/ACT AERO Inhale 2 Inhalations into the lungs every 4 hours as needed (Wheezing, chest tightness, shortness of breath, or persistent cough) More than a month    azelastine (ASTELIN) 0.1 % nasal spray Spray 2 sprays into both nostrils 2 times daily as needed for rhinitis More than a month    fluticasone (FLONASE) 50 MCG/ACT nasal spray Spray 2 sprays into both nostrils daily More than a month    furosemide (LASIX) 20 MG tablet Take 40 mg by mouth in the AM and 20 mg by mouth in the afternoon 6/10/2024    levothyroxine (SYNTHROID) 200 MCG tablet Take 200 mcg by mouth daily Pt takes Synthroid brand name. FRANKI 6/11/2024     liothyronine (CYTOMEL) 5 MCG tablet Take 5 mcg by mouth 2 times daily 6/10/2024    loratadine (CLARITIN) 10 MG tablet Take 10 mg by mouth daily Past Week    midodrine (PROAMATINE) 10 MG tablet Take 1 tablet (10 mg) by mouth 3 times daily 6/10/2024    multivitamin w/minerals (MULTI-VITAMIN) tablet Take 1 tablet by mouth daily Past Week    ondansetron (ZOFRAN) 8 MG tablet Take 8 mg by mouth every 8 hours as needed for nausea Past Week    prochlorperazine (COMPAZINE) 5 MG tablet Take 1 tablet (5 mg) by mouth every 6 hours as needed for nausea or vomiting Past Week    rosuvastatin (CRESTOR) 10 MG tablet Take 1 tablet (10 mg) by mouth daily 6/10/2024    spironolactone (ALDACTONE) 25 MG tablet Take 1 tablet (25 mg) by mouth daily 6/10/2024    vitamin D3 (CHOLECALCIFEROL) 50 mcg (2000 units) tablet Take 1 tablet (50 mcg) by mouth daily 6/10/2024      Normal for race

## 2024-06-11 NOTE — PLAN OF CARE
"BP (!) 131/92 (BP Location: Right arm)   Pulse 96   Temp 98  F (36.7  C) (Oral)   Resp 18   Ht 1.63 m (5' 4.17\")   Wt 71.3 kg (157 lb 1.6 oz)   SpO2 97%   BMI 26.82 kg/m       Vivian was vitally stable throughout the shift. She is up independently and denies any nausea or pain. She received 1 bag of potassium replacement, recheck in the morning. She tolerated her dose of melphalan well. She had a very small hard stool and states that her constipation is back.   "

## 2024-06-12 LAB
ANION GAP SERPL CALCULATED.3IONS-SCNC: 6 MMOL/L (ref 7–15)
BASOPHILS # BLD AUTO: 0 10E3/UL (ref 0–0.2)
BASOPHILS NFR BLD AUTO: 0 %
BILL ONLY STEM CELL INFUSION: NORMAL
BUN SERPL-MCNC: 20 MG/DL (ref 6–20)
CALCIUM SERPL-MCNC: 8.9 MG/DL (ref 8.6–10)
CHLORIDE SERPL-SCNC: 104 MMOL/L (ref 98–107)
CREAT SERPL-MCNC: 1.47 MG/DL (ref 0.51–0.95)
DEPRECATED HCO3 PLAS-SCNC: 24 MMOL/L (ref 22–29)
EGFRCR SERPLBLD CKD-EPI 2021: 42 ML/MIN/1.73M2
EOSINOPHIL # BLD AUTO: 0 10E3/UL (ref 0–0.7)
EOSINOPHIL NFR BLD AUTO: 0 %
ERYTHROCYTE [DISTWIDTH] IN BLOOD BY AUTOMATED COUNT: 15.8 % (ref 10–15)
GLUCOSE SERPL-MCNC: 122 MG/DL (ref 70–99)
HCT VFR BLD AUTO: 34.5 % (ref 35–47)
HGB BLD-MCNC: 11.6 G/DL (ref 11.7–15.7)
IMM GRANULOCYTES # BLD: 0.1 10E3/UL
IMM GRANULOCYTES NFR BLD: 1 %
LYMPHOCYTES # BLD AUTO: 0.3 10E3/UL (ref 0.8–5.3)
LYMPHOCYTES NFR BLD AUTO: 4 %
MAGNESIUM SERPL-MCNC: 2.1 MG/DL (ref 1.7–2.3)
MCH RBC QN AUTO: 30.9 PG (ref 26.5–33)
MCHC RBC AUTO-ENTMCNC: 33.6 G/DL (ref 31.5–36.5)
MCV RBC AUTO: 92 FL (ref 78–100)
MONOCYTES # BLD AUTO: 0.2 10E3/UL (ref 0–1.3)
MONOCYTES NFR BLD AUTO: 3 %
NEUTROPHILS # BLD AUTO: 8 10E3/UL (ref 1.6–8.3)
NEUTROPHILS NFR BLD AUTO: 92 %
NRBC # BLD AUTO: 0 10E3/UL
NRBC BLD AUTO-RTO: 0 /100
PLATELET # BLD AUTO: 223 10E3/UL (ref 150–450)
POTASSIUM SERPL-SCNC: 4.2 MMOL/L (ref 3.4–5.3)
RBC # BLD AUTO: 3.76 10E6/UL (ref 3.8–5.2)
SODIUM SERPL-SCNC: 134 MMOL/L (ref 135–145)
WBC # BLD AUTO: 8.7 10E3/UL (ref 4–11)

## 2024-06-12 PROCEDURE — 30243Y0 TRANSFUSION OF AUTOLOGOUS HEMATOPOIETIC STEM CELLS INTO CENTRAL VEIN, PERCUTANEOUS APPROACH: ICD-10-PCS | Performed by: INTERNAL MEDICINE

## 2024-06-12 PROCEDURE — 80048 BASIC METABOLIC PNL TOTAL CA: CPT

## 2024-06-12 PROCEDURE — 250N000011 HC RX IP 250 OP 636: Mod: JZ

## 2024-06-12 PROCEDURE — 999N000022 HC STATISTIC AUTOLOGOUS BM-INITIAL INFUSION

## 2024-06-12 PROCEDURE — 38208 THAW PRESERVED STEM CELLS: CPT | Performed by: INTERNAL MEDICINE

## 2024-06-12 PROCEDURE — 258N000003 HC RX IP 258 OP 636: Mod: JZ | Performed by: PHYSICIAN ASSISTANT

## 2024-06-12 PROCEDURE — 38241 TRANSPLT AUTOL HCT/DONOR: CPT | Performed by: PHYSICIAN ASSISTANT

## 2024-06-12 PROCEDURE — 250N000013 HC RX MED GY IP 250 OP 250 PS 637: Performed by: PHYSICIAN ASSISTANT

## 2024-06-12 PROCEDURE — 206N000001 HC R&B BMT UMMC

## 2024-06-12 PROCEDURE — 99418 PROLNG IP/OBS E/M EA 15 MIN: CPT | Performed by: PHYSICIAN ASSISTANT

## 2024-06-12 PROCEDURE — 250N000013 HC RX MED GY IP 250 OP 250 PS 637: Performed by: STUDENT IN AN ORGANIZED HEALTH CARE EDUCATION/TRAINING PROGRAM

## 2024-06-12 PROCEDURE — 250N000011 HC RX IP 250 OP 636: Mod: JZ | Performed by: INTERNAL MEDICINE

## 2024-06-12 PROCEDURE — 83735 ASSAY OF MAGNESIUM: CPT

## 2024-06-12 PROCEDURE — 250N000012 HC RX MED GY IP 250 OP 636 PS 637: Performed by: STUDENT IN AN ORGANIZED HEALTH CARE EDUCATION/TRAINING PROGRAM

## 2024-06-12 PROCEDURE — 99233 SBSQ HOSP IP/OBS HIGH 50: CPT | Mod: 25 | Performed by: PHYSICIAN ASSISTANT

## 2024-06-12 PROCEDURE — 250N000013 HC RX MED GY IP 250 OP 250 PS 637

## 2024-06-12 PROCEDURE — 250N000013 HC RX MED GY IP 250 OP 250 PS 637: Performed by: INTERNAL MEDICINE

## 2024-06-12 PROCEDURE — 85025 COMPLETE CBC W/AUTO DIFF WBC: CPT

## 2024-06-12 PROCEDURE — 250N000011 HC RX IP 250 OP 636: Performed by: STUDENT IN AN ORGANIZED HEALTH CARE EDUCATION/TRAINING PROGRAM

## 2024-06-12 RX ORDER — SODIUM CHLORIDE 9 MG/ML
INJECTION, SOLUTION INTRAVENOUS CONTINUOUS
Status: ACTIVE | OUTPATIENT
Start: 2024-06-12 | End: 2024-06-12

## 2024-06-12 RX ORDER — FUROSEMIDE 20 MG
20 TABLET ORAL EVERY EVENING
Status: DISCONTINUED | OUTPATIENT
Start: 2024-06-12 | End: 2024-06-13

## 2024-06-12 RX ORDER — DIPHENHYDRAMINE HCL 25 MG
25 CAPSULE ORAL ONCE
Status: COMPLETED | OUTPATIENT
Start: 2024-06-12 | End: 2024-06-12

## 2024-06-12 RX ORDER — ACETAMINOPHEN 325 MG/1
650 TABLET ORAL ONCE
Status: COMPLETED | OUTPATIENT
Start: 2024-06-12 | End: 2024-06-12

## 2024-06-12 RX ORDER — OLANZAPINE 2.5 MG/1
2.5 TABLET, FILM COATED ORAL 2 TIMES DAILY
Status: DISCONTINUED | OUTPATIENT
Start: 2024-06-12 | End: 2024-06-16

## 2024-06-12 RX ORDER — POLYETHYLENE GLYCOL 3350 17 G/17G
17 POWDER, FOR SOLUTION ORAL 2 TIMES DAILY PRN
Status: DISCONTINUED | OUTPATIENT
Start: 2024-06-12 | End: 2024-06-19

## 2024-06-12 RX ORDER — LIOTHYRONINE SODIUM 5 UG/1
5 TABLET ORAL 2 TIMES DAILY
Status: DISCONTINUED | OUTPATIENT
Start: 2024-06-12 | End: 2024-07-01 | Stop reason: HOSPADM

## 2024-06-12 RX ADMIN — LORAZEPAM 1 MG: 0.5 TABLET ORAL at 00:39

## 2024-06-12 RX ADMIN — ONDANSETRON HYDROCHLORIDE 8 MG: 8 TABLET, FILM COATED ORAL at 03:47

## 2024-06-12 RX ADMIN — ACYCLOVIR 800 MG: 800 TABLET ORAL at 07:56

## 2024-06-12 RX ADMIN — FLUCONAZOLE 200 MG: 200 TABLET ORAL at 07:57

## 2024-06-12 RX ADMIN — PANTOPRAZOLE SODIUM 40 MG: 40 TABLET, DELAYED RELEASE ORAL at 07:57

## 2024-06-12 RX ADMIN — MIDODRINE HYDROCHLORIDE 10 MG: 5 TABLET ORAL at 11:53

## 2024-06-12 RX ADMIN — LEVOTHYROXINE SODIUM 200 MCG: 200 TABLET ORAL at 05:51

## 2024-06-12 RX ADMIN — Medication 5 ML: at 03:48

## 2024-06-12 RX ADMIN — ACYCLOVIR 800 MG: 800 TABLET ORAL at 19:41

## 2024-06-12 RX ADMIN — DIPHENHYDRAMINE HYDROCHLORIDE 25 MG: 25 CAPSULE ORAL at 10:56

## 2024-06-12 RX ADMIN — LIOTHYRONINE SODIUM 5 MCG: 5 TABLET ORAL at 14:38

## 2024-06-12 RX ADMIN — DEXAMETHASONE 8 MG: 4 TABLET ORAL at 07:58

## 2024-06-12 RX ADMIN — SPIRONOLACTONE 25 MG: 25 TABLET, FILM COATED ORAL at 07:58

## 2024-06-12 RX ADMIN — ALLOPURINOL 300 MG: 300 TABLET ORAL at 07:58

## 2024-06-12 RX ADMIN — OLANZAPINE 2.5 MG: 2.5 TABLET, FILM COATED ORAL at 19:41

## 2024-06-12 RX ADMIN — SODIUM CHLORIDE: 9 INJECTION, SOLUTION INTRAVENOUS at 07:58

## 2024-06-12 RX ADMIN — LORAZEPAM 1 MG: 2 INJECTION INTRAMUSCULAR; INTRAVENOUS at 08:09

## 2024-06-12 RX ADMIN — MIDODRINE HYDROCHLORIDE 10 MG: 5 TABLET ORAL at 16:13

## 2024-06-12 RX ADMIN — LORAZEPAM 1 MG: 0.5 TABLET ORAL at 13:05

## 2024-06-12 RX ADMIN — SODIUM CHLORIDE: 9 INJECTION, SOLUTION INTRAVENOUS at 13:05

## 2024-06-12 RX ADMIN — FUROSEMIDE 40 MG: 20 TABLET ORAL at 07:56

## 2024-06-12 RX ADMIN — LEVOFLOXACIN 250 MG: 250 TABLET, FILM COATED ORAL at 10:57

## 2024-06-12 RX ADMIN — ACETAMINOPHEN 650 MG: 325 TABLET, FILM COATED ORAL at 10:56

## 2024-06-12 RX ADMIN — LIOTHYRONINE SODIUM 5 MCG: 5 TABLET ORAL at 07:58

## 2024-06-12 RX ADMIN — Medication 10 ML: at 17:29

## 2024-06-12 RX ADMIN — DIPHENHYDRAMINE HYDROCHLORIDE 25 MG: 25 CAPSULE ORAL at 14:38

## 2024-06-12 RX ADMIN — ACETAMINOPHEN 650 MG: 325 TABLET, FILM COATED ORAL at 14:38

## 2024-06-12 RX ADMIN — MIDODRINE HYDROCHLORIDE 10 MG: 5 TABLET ORAL at 07:03

## 2024-06-12 RX ADMIN — Medication 50 MCG: at 07:58

## 2024-06-12 RX ADMIN — OLANZAPINE 2.5 MG: 2.5 TABLET, FILM COATED ORAL at 11:53

## 2024-06-12 RX ADMIN — FUROSEMIDE 20 MG: 20 TABLET ORAL at 14:38

## 2024-06-12 RX ADMIN — PROCHLORPERAZINE MALEATE 10 MG: 5 TABLET ORAL at 15:48

## 2024-06-12 ASSESSMENT — ACTIVITIES OF DAILY LIVING (ADL)
ADLS_ACUITY_SCORE: 18

## 2024-06-12 NOTE — PROGRESS NOTES
BMT/Cellular Autologous Product Infusion         Patient Vitals for the past 24 hrs:   Temp Temp src Pulse Resp BP   06/11/24 1142 97.2  F (36.2  C) Axillary 88 16 (!) 121/92   06/11/24 1533 98  F (36.7  C) Oral 96 18 (!) 131/92   06/11/24 1949 98.6  F (37  C) Oral 110 20 (!) 143/99   06/12/24 0000 98.1  F (36.7  C) Oral 90 18 121/88   06/12/24 0345 98.1  F (36.7  C) Oral 87 18 --   06/12/24 0659 -- -- 91 -- 113/80   06/12/24 0810 97.9  F (36.6  C) Oral 117 18 (!) 147/102      BMT INFUSION DOCUMENTATION (Last 48 Hours)       BMT/Cellular Product Infusion       Row Name                  Product 06/12/24 HPC, Apheresis    Product Details Product Release Date: 06/12/24  - Product Type: HPC, Apheresis  -SCAR DIN: [id number]  - Product Description Code: [id number]  -SCAR Volume Dispensed (mL): 142 mL  -SCAR    Checked by (Patient RN) --       Checked by (Witness) --       Product Volume Infused (mL) --       Flush Volume (mL) --       Volume Dispensed (mL) --          Product 06/12/24 HPC, Apheresis    Product Details Product Release Date: 06/12/24  - Product Type: HPC, Apheresis  -SCAR DIN: [id number]  -SCAR Product Description Code: [id number]  -SCAR Volume Dispensed (mL): 142 mL  -SCAR    Checked by (Patient RN) --       Checked by (Witness) --       Product Volume Infused (mL) --       Flush Volume (mL) --       Volume Dispensed (mL) --          Product 06/12/24 HPC, Apheresis    Product Details Product Release Date: 06/12/24  - Product Type: HPC, Apheresis  -SCAR DIN: [id number]  -SCAR Product Description Code: [id number]  -SCAR Volume Dispensed (mL): 142 mL  -SCAR    Checked by (Patient RN) --       Checked by (Witness) --       Product Volume Infused (mL) --       Flush Volume (mL) --       Volume Dispensed (mL) --          Product 06/12/24 HPC, Apheresis    Product Details Product Release Date: 06/12/24  -SCAR Product Type: HPC, Apheresis  -SCAR DIN: [id number]  -SCAR Product Description Code: [id number]  -SCAR Volume  Dispensed (mL): 142 mL  -SCAR    Checked by (Patient RN) --       Checked by (Witness) --       Product Volume Infused (mL) --       Flush Volume (mL) --       Volume Dispensed (mL) --          Product 06/12/24 HPC, Apheresis    Product Details Product Release Date: 06/12/24  -SCAR Product Type: HPC, Apheresis  - DIN: [id number]  -SCAR Product Description Code: [id number]  -SCAR Volume Dispensed (mL): 142 mL  -SCAR    Checked by (Patient RN) --       Checked by (Witness) --       Product Volume Infused (mL) --       Flush Volume (mL) --       Volume Dispensed (mL) --                 User Key  (r) = Recorded By, (t) = Taken By, (c) = Cosigned By      Initials Name Effective Dates    Ellie Hall 07/11/21 -                   Allogeneic Donor Eligibility Determination and Summary of Records: N/A - Autologous        Type of Infusion: Autologous      Baseline Pre-Infusion Evaluation (to be completed by Provider):   Dyspnea: Grade 0 - none  Hypoxia: Grade 0 - not present  Fever: Grade 0 - afebrile  Chills: Grade 0 - none  Febrile Neutropenia: Grade 0 - not present  Sinus Bradycardia: Grade 0 - none  Hypertension: Grade 3 - stage 2 hypertension (systolic BP >/ 160 mm Hg or diastolic BP >/ 100 mm Hg); medical intervention indicated; more than one drug or more intensive therapy than previously used indicated  Hypotension: Grade 0 - none  Chest Pain: Grade 0 - none  Bronchospasm: Grade 0 - none  Pain: Grade 0 - none  Rash: Grade 0 - None  Neurologic Specify: none    If adverse reactions, events or complications occur (fever greater than 2 degrees fahrenheit increase, and severe reactions of the following types: chills, dyspnea, bronchospasm, hyper/hypotension, hypoxia, bradycardia, chest pain, back/flank pain, hypoxia, and any other reaction deemed severe or life threatening; any instance of product bag breakage or unusual product appearance)    Any other events that are >= grade 3, then immediately contact the BMT Attending  physician, the Cell Therapy Laboratory Medical Director (pager [phone]) and the Cell Therapy Laboratory ([phone]).  After midnight, holidays & weekends contact the [facility] on the appropriate campus ([facility] [place]: [phone]; [facility] [place]: [phone]).    Dominga Pacheco PA-C

## 2024-06-12 NOTE — PLAN OF CARE
"Assumed cares from [id number]    Patient is A&Ox4 and calls appropriately. VSS on room air. C/o mild headache but declined intervention. PRN ativan given x1 for sleep and nausea, effective. Voiding without issues. LBM 6/11 and passing gas. Patient is looking forward to transplant today and hoping the last two bags can be given after 1500 when family arrives. Continue with POC.      Problem: Adult Inpatient Plan of Care  Goal: Plan of Care Review  Description: The Plan of Care Review/Shift note should be completed every shift.  The Outcome Evaluation is a brief statement about your assessment that the patient is improving, declining, or no change.  This information will be displayed automatically on your shift  note.  Outcome: Progressing  Goal: Patient-Specific Goal (Individualized)  Description: You can add care plan individualizations to a care plan. Examples of Individualization might be:  \"Parent requests to be called daily at 9am for status\", \"I have a hard time hearing out of my right ear\", or \"Do not touch me to wake me up as it startles  me\".  Outcome: Progressing  Goal: Absence of Hospital-Acquired Illness or Injury  Outcome: Progressing  Intervention: Identify and Manage Fall Risk  Recent Flowsheet Documentation  Taken 6/12/2024 0419 by Merlin Will RN  Safety Promotion/Fall Prevention:   safety round/check completed   nonskid shoes/slippers when out of bed   clutter free environment maintained  Taken 6/12/2024 0000 by Merlin Will, RN  Safety Promotion/Fall Prevention:   safety round/check completed   nonskid shoes/slippers when out of bed   clutter free environment maintained  Taken 6/11/2024 2000 by Merlin Will, RN  Safety Promotion/Fall Prevention:   safety round/check completed   nonskid shoes/slippers when out of bed   clutter free environment maintained  Intervention: Prevent Skin Injury  Recent Flowsheet Documentation  Taken 6/11/2024 2000 by Merlin Will, RN  Body Position: position " changed independently  Skin Protection: adhesive use limited  Device Skin Pressure Protection: adhesive use limited  Intervention: Prevent Infection  Recent Flowsheet Documentation  Taken 6/12/2024 0419 by Merlin Will RN  Infection Prevention:   environmental surveillance performed   equipment surfaces disinfected   hand hygiene promoted   personal protective equipment utilized   rest/sleep promoted   single patient room provided   visitors restricted/screened  Taken 6/12/2024 0000 by Merlin Will RN  Infection Prevention:   environmental surveillance performed   equipment surfaces disinfected   hand hygiene promoted   personal protective equipment utilized   rest/sleep promoted   single patient room provided   visitors restricted/screened  Taken 6/11/2024 2000 by Merlin Will RN  Infection Prevention:   environmental surveillance performed   equipment surfaces disinfected   hand hygiene promoted   personal protective equipment utilized   rest/sleep promoted   single patient room provided   visitors restricted/screened  Goal: Optimal Comfort and Wellbeing  Outcome: Progressing  Intervention: Monitor Pain and Promote Comfort  Recent Flowsheet Documentation  Taken 6/12/2024 0000 by Merlin Will RN  Pain Management Interventions: declines  Goal: Readiness for Transition of Care  Outcome: Progressing     Problem: Stem Cell/Bone Marrow Transplant  Goal: Optimal Coping with Transplant  Outcome: Progressing  Goal: Symptom-Free Urinary Elimination  Outcome: Progressing  Intervention: Prevent or Manage Bladder Irritation  Recent Flowsheet Documentation  Taken 6/12/2024 0000 by Merlin Will RN  Pain Management Interventions: declines  Goal: Diarrhea Symptom Control  Outcome: Progressing  Intervention: Manage Diarrhea  Recent Flowsheet Documentation  Taken 6/12/2024 0419 by Merlin Will RN  Perineal Care: perineal hygiene encouraged  Taken 6/11/2024 2000 by Merlin Will RN  Skin Protection: adhesive use  limited  Goal: Improved Activity Tolerance  Outcome: Progressing  Intervention: Promote Improved Energy  Recent Flowsheet Documentation  Taken 6/12/2024 0419 by Merlin Will RN  Activity Management:   ambulated to bathroom   back to bed  Taken 6/11/2024 2000 by Merlin Will RN  Sleep/Rest Enhancement:   consistent schedule promoted   natural light exposure provided   regular sleep/rest pattern promoted  Activity Management: ambulated in room  Goal: Blood Counts Within Acceptable Range  Outcome: Progressing  Intervention: Monitor and Manage Hematologic Symptoms  Recent Flowsheet Documentation  Taken 6/11/2024 2000 by Merlin Will RN  Sleep/Rest Enhancement:   consistent schedule promoted   natural light exposure provided   regular sleep/rest pattern promoted  Bleeding Precautions: blood pressure closely monitored  Medication Review/Management: medications reviewed  Goal: Absence of Hypersensitivity Reaction  Outcome: Progressing  Goal: Absence of Infection  Outcome: Progressing  Intervention: Prevent and Manage Infection  Recent Flowsheet Documentation  Taken 6/12/2024 0419 by Merlin Will RN  Infection Prevention:   environmental surveillance performed   equipment surfaces disinfected   hand hygiene promoted   personal protective equipment utilized   rest/sleep promoted   single patient room provided   visitors restricted/screened  Isolation Precautions:   protective environment maintained   enteric precautions discontinued  Taken 6/12/2024 0000 by Merlin Will RN  Infection Prevention:   environmental surveillance performed   equipment surfaces disinfected   hand hygiene promoted   personal protective equipment utilized   rest/sleep promoted   single patient room provided   visitors restricted/screened  Isolation Precautions:   protective environment maintained   enteric precautions discontinued  Taken 6/11/2024 2000 by Merlin Will RN  Infection Prevention:   environmental surveillance  performed   equipment surfaces disinfected   hand hygiene promoted   personal protective equipment utilized   rest/sleep promoted   single patient room provided   visitors restricted/screened  Infection Management: aseptic technique maintained  Isolation Precautions:   protective environment maintained   enteric precautions discontinued  Goal: Improved Oral Mucous Membrane Health and Integrity  Outcome: Progressing  Goal: Nausea and Vomiting Symptom Relief  Outcome: Progressing  Intervention: Prevent and Manage Nausea and Vomiting  Recent Flowsheet Documentation  Taken 6/12/2024 0000 by Merlin Will RN  Nausea/Vomiting Interventions: antiemetic  Goal: Optimal Nutrition Intake  Outcome: Progressing     Problem: Skin Injury Risk Increased  Goal: Skin Health and Integrity  Outcome: Progressing  Intervention: Optimize Skin Protection  Recent Flowsheet Documentation  Taken 6/12/2024 0419 by Merlin Will, RN  Activity Management:   ambulated to bathroom   back to bed  Taken 6/11/2024 2000 by Merlin Will, RN  Skin Protection: adhesive use limited  Activity Management: ambulated in room

## 2024-06-12 NOTE — PROGRESS NOTES
"BMT/Cell Therapy Daily Progress Note   06/12/2024    Patient ID:  Vivian Brown is a 54 year old female, currently day 0 of her autologous stem cell transplant for amyloidosis.    Admission date: 6/11/2024    INTERVAL  HISTORY   Vivian is feeling okay now, but she had sudden onset of vomiting after taking her pills this morning.  No pills in the vomit.  She feels slightly constipated, but doesn't feel the need to take anything for it.  She attributes it to the decadron.      Review of Systems: ROS negative except as noted above.      PHYSICAL EXAM     Weight In/Out     Wt Readings from Last 3 Encounters:   06/12/24 72.5 kg (159 lb 14.4 oz)   06/06/24 69.7 kg (153 lb 10.6 oz)   06/05/24 69.8 kg (153 lb 14.4 oz)      I/O last 3 completed shifts:  In: 1650 [P.O.:600; I.V.:50; IV Piggyback:1000]  Out: 2050 [Urine:2050]       KPS:  90    BP (!) 147/102 (BP Location: Right arm)   Pulse 117   Temp 97.9  F (36.6  C) (Oral)   Resp 18   Ht 1.63 m (5' 4.17\")   Wt 72.5 kg (159 lb 14.4 oz)   SpO2 96%   BMI 27.30 kg/m       General: NAD   Eyes: : SUKUMAR, sclera anicteric   Lungs: CTA bilaterally  Cardiovascular: RRR, no M/R/G   Abdominal/Rectal: +BS, soft, NT, ND, No HSM   Lymphatics: no edema  Skin: no rashes or petechiae  Neuro: A&O   Additional Findings: Hightower site NT, no drainage.    LABS AND IMAGING: I have assessed all abnormal lab values for their clinical significance and any values considered clinically significant have been addressed in the assessment and plan.        Lab Results   Component Value Date    WBC 8.7 06/12/2024    ANEU 45.5 (H) 06/07/2024    HGB 11.6 (L) 06/12/2024    HCT 34.5 (L) 06/12/2024     06/12/2024     (L) 06/12/2024    POTASSIUM 4.2 06/12/2024    CHLORIDE 104 06/12/2024    CO2 24 06/12/2024     (H) 06/12/2024    BUN 20.0 06/12/2024    CR 1.47 (H) 06/12/2024    MAG 2.1 06/12/2024    INR 0.97 06/11/2024           SYSTEMS-BASED ASSESSMENT AND PLAN   Vivian Brown" is a 54 year old female with amyloidosis, undergoing melphalan followed by autologous stem cell rescue.  She is day 0.    Prep: melphalan     BMT  - BMT doc/Coordinator: Wei  - [id number]  - Cell dose: total collections of 5.99 million CD34+ cells/kg.     - Prep as above. Flush and pre-meds prior to transplant.  - GCSF starts day +5, continue until ANC > 2500 for 2 consecutive days.      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     ID  Prophy: ACV; levaquin; fluconazole.  PJP ppx to start at day +28.      GI/NUTRITION  - Anti-emetics: zofran/dex prior to chemotherapy.  Ativan and compazine prn.   - Ulcer prophy: Protonix 40mg daily.   # Celiac Disease, see diet below     FLUIDS/NUTRITION  - transplant fluid flush x 10 hours 6/12  - continue PTA regimen of lasix 40mg qam; lasix 20mg qpm; and daily spironoloactone  - gluten free diet (h/o celiac disease)  - dietitian to follow     RENAL  # renal amyloidosis; diuresis as above  - Monitor Cr and electrolytes daily.   - Replace electrolytes per sliding scale     ENDOCRINE  # Hypothyroidism:  continue PTA cytomel and synthroid     CARDIAC  # cardiac amyloidosis: cautious fluid balance; diuresis as above  # h/o dizziness and chest pain with chemotherapy for amyloidosis; improved with addition of midodrine.  Continue.   # hyperlipidemia: rosuvastatin on hold during acute transplant course     SUPPORTIVE CARE  - PT/OT to evaluate on admission and follow as indicated.   - BMT Social work to follow.     Known issues that I take into account for medical decisions, with salient changes to the plan considering these complexities noted above.    Patient Active Problem List   Diagnosis    Hypothyroidism due to acquired atrophy of thyroid    Family hx of colon cancer    Nonallopathic lesion of cervical region    Cervicalgia    Nonallopathic lesion of sacral region    Nonallopathic lesion of lumbar region    Lumbago  "   Nonallopathic lesion of thoracic region    Hyperlipidemia LDL goal <100    Dependent edema R>L    AL amyloidosis (H)    Hypogammaglobulinemia (H24)    Chronic kidney disease, stage 3a (H)    Celiac disease    Autologous donor of stem cells   \  Medically Ready for Discharge: Anticipated in 5+ Days    Clinically Significant Risk Factors           # Hypercalcemia: corrected calcium is >10.1, will monitor as appropriate    # Hypoalbuminemia: Lowest albumin = 2.4 g/dL at 6/11/2024  8:06 AM, will monitor as appropriate                  # Overweight: Estimated body mass index is 27.3 kg/m  as calculated from the following:    Height as of this encounter: 1.63 m (5' 4.17\").    Weight as of this encounter: 72.5 kg (159 lb 14.4 oz)., PRESENT ON ADMISSION            I spent 30 minutes in the care of this patient today, which included time necessary for preparation for the visit, obtaining history, ordering medications/tests/procedures as medically indicated, review of pertinent medical literature, counseling of the patient, communication of recommendations to the care team, and documentation time.      Dominga Pacheco PA-C  6/12/2024   "

## 2024-06-12 NOTE — PLAN OF CARE
"/80   Pulse 99   Temp 98.2  F (36.8  C)   Resp 16   Ht 1.63 m (5' 4.17\")   Wt 72.5 kg (159 lb 14.4 oz)   SpO2 95%   BMI 27.30 kg/m       Mary Ann was vitally stable throughout the shift. She has been nauseous throughout the day and had one episode of emesis this morning. She received two doses of ativan and one dose of compazine. She denies any pain. She received 5/5 bags of her auto SCT today and tolerated it well. She reports being very sleepy from the nausea and benadryl pre-med. Otherwise she states she is feeling ok. She is up independently and A&Ox4. CVC dressing and cap change completed early this morning.      Problem: Stem Cell/Bone Marrow Transplant  Goal: Nausea and Vomiting Symptom Relief  Outcome: Not Progressing  Intervention: Prevent and Manage Nausea and Vomiting  Recent Flowsheet Documentation  Taken 6/12/2024 0810 by Sonya White, RN  Nausea/Vomiting Interventions: antiemetic  Goal: Optimal Nutrition Intake  Outcome: Not Progressing     "

## 2024-06-12 NOTE — PROGRESS NOTES
BMT Post Infusion Documentation    Data   Patient Vitals for the past 72 hrs:   Temp Temp src Pulse Resp BP   06/11/24 0640 98  F (36.7  C) Oral 99 16 125/88   06/11/24 1142 97.2  F (36.2  C) Axillary 88 16 (!) 121/92   06/11/24 1533 98  F (36.7  C) Oral 96 18 (!) 131/92   06/11/24 1949 98.6  F (37  C) Oral 110 20 (!) 143/99   06/12/24 0000 98.1  F (36.7  C) Oral 90 18 121/88   06/12/24 0345 98.1  F (36.7  C) Oral 87 18 --   06/12/24 0659 -- -- 91 -- 113/80   06/12/24 0810 97.9  F (36.6  C) Oral 117 18 (!) 147/102   06/12/24 1145 98.2  F (36.8  C) Oral 104 16 116/84   06/12/24 1220 97.6  F (36.4  C) Oral 99 16 109/73   06/12/24 1300 97.8  F (36.6  C) Oral -- 16 113/75   06/12/24 1340 98  F (36.7  C) Oral -- 16 100/72   06/12/24 1458 -- -- -- -- (!) 127/93   06/12/24 1550 98.1  F (36.7  C) -- -- 16 (!) 121/92   06/12/24 1630 98.2  F (36.8  C) -- -- 16 111/80   06/12/24 1721 98  F (36.7  C) Oral 74 17 (!) 150/100   06/12/24 1735 -- -- -- -- 120/84   06/12/24 1915 97.7  F (36.5  C) Oral 86 17 121/85   06/12/24 2323 97.5  F (36.4  C) Oral 90 18 111/84   06/13/24 0311 97.9  F (36.6  C) Oral 87 16 104/76   06/13/24 0759 97.6  F (36.4  C) Oral 99 16 124/85   06/13/24 1135 97.6  F (36.4  C) Oral -- 16 --     BMT INFUSION DOCUMENTATION (Last 24 Hours)       BMT/Cellular Product Infusion       Row Name 06/12/24 0926 06/12/24 0900             Cell Therapy Documentation    Product Release Date 06/12/24  -SCAR 06/12/24  -SCAR      Recipient Study ID N/A  -SCAR N/A  -SCAR      Donor Autologous  -SCAR Autologous  -SCAR      Donor MRN/ID [id number]  -SCAR [id number]  -      Donor ABO/Rh O pos  -SCAR O pos  -SCAR      Allogeneic Donor Eligibility Determination and Summary of Records N/A - Autologous  -SCAR N/A - Autologous  -SCAR      Type of Infusion Autologous  -SCAR Autologous  -SCAR      Total Volume Dispensed (mL) 284  -SCAR 426  -SCAR      Total NC Dose 11.48E+08  -SCAR 17.82E+08  -SCAR      Total CD34 Dose 0.37E+06  -SCAR 2.53E+06  -SCAR      Total CD3 dose N/A   -SCAR N/A  -SCAR      Total NC Dose Left in Storage 35.04E+08  -NB 46.52E+08  -NB      Comments for Product Issues N/A  -SCAR N/A  -SCAR      Donor ABO/Rh -- --      Product Types -- --      Product Numbers -- --      Product Types and Numbers -- --      Volume -- --      ABO Mismatch -- --      ZZTotal NC Dose -- --      ZZTotal CD34 Dose -- --      ZZTotal NC Dose Left in Storage -- --         Product 06/12/24 HPC, Apheresis    Product Details Product Release Date: 06/12/24  - Product Type: HPC, Apheresis  -SCAR DIN: [id number]  -SCAR Product Description Code: [id number]  -SCAR Volume Dispensed (mL): 142 mL  -SCAR    Checked by (Patient RN) -- --      Checked by (Witness) -- --      Product Volume Infused (mL) -- --      Flush Volume (mL) -- --      Volume Dispensed (mL) -- --         Product 06/12/24 HPC, Apheresis    Product Details Product Release Date: 06/12/24  - Product Type: HPC, Apheresis  -SCAR DIN: [id number]  -SCAR Product Description Code: [id number]  -SCAR Volume Dispensed (mL): 142 mL  -SCAR    Checked by (Patient RN) -- --      Checked by (Witness) -- --      Product Volume Infused (mL) -- --      Flush Volume (mL) -- --      Volume Dispensed (mL) -- --         Product 06/12/24 HPC, Apheresis    Product Details Product Release Date: 06/12/24  - Product Type: HPC, Apheresis  -SCAR DIN: [id number]  - Product Description Code: [id number]  -SCAR Volume Dispensed (mL): 142 mL  -SCAR    Checked by (Patient RN) -- --      Checked by (Witness) -- --      Product Volume Infused (mL) -- --      Flush Volume (mL) -- --      Volume Dispensed (mL) -- --         Product 06/12/24 HPC, Apheresis    Product Details Product Release Date: 06/12/24  - Product Type: HPC, Apheresis  -SCAR DIN: [id number]  - Product Description Code: [id number]  - Volume Dispensed (mL): 142 mL  -SCAR    Checked by (Patient RN) -- --      Checked by (Witness) -- --      Product Volume Infused (mL) -- --      Flush Volume (mL) -- --      Volume  Dispensed (mL) -- --         Product 06/12/24 HPC, Apheresis    Product Details Product Release Date: 06/12/24  -SCAR Product Type: HPC, Apheresis  - DIN: [id number]  - Product Description Code: [id number]  -SCAR Volume Dispensed (mL): 142 mL  -    Checked by (Patient RN) -- --      Checked by (Witness) -- --      Product Volume Infused (mL) -- --      Flush Volume (mL) -- --      Volume Dispensed (mL) -- --         RN Documentation    Patient was premedicated as ordered -- --      Line Type -- --      Patient Stable Prior to Infusion -- --      Time Infusion Started -- --      Checked by (Patient RN) -- --      Checked by (RN 2) -- --      Broken Bag? -- --      Immediate suspected transfusion reaction to the product -- --      Time Infusion Stopped -- --      Total Flush Volume (mL) -- --      Checked by (Witness) -- --      Date Infusion Started -- --      Date Infusion Stopped -- --      Volume Infused (mL) -- --      Total Volume Infused (cc) -- --         Patient tolerance of product infusion    Immediate suspected transfusion reaction to the product -- --      Did patient have prior history of similar signs/symptoms during this hospitalization? -- --      Symptoms during/after infusion -- --      Did the patient tolerate the infusion well -- --      Medications and treatment for symptoms -- --      Did the symptoms resolve? -- --      Enter comments if clots, leaks, broken bag, infusion delays, other issues with bag/infusion -- --      Describe symptoms -- --                User Key  (r) = Recorded By, (t) = Taken By, (c) = Cosigned By      Initials Name Effective Dates    SCAR BranchRusty batistaise 07/11/21 -     Cheryl Ulloa 01/08/24 -                       Post-Infusion Evaluation:   Infusion Related Reaction: Grade 0 - none  Dyspnea: Grade 0 - none  Hypoxia: Grade 0 - not present  Fever: Grade 0 - afebrile  Chills: Grade 0 - none  Febrile Neutropenia: Grade 0 - not present  Sinus Bradycardia: Grade 0  - none  Hypertension: Grade 2 - stage 1 hypertension (systolic -159 mm Hg or diastolic BP 90-99 mm Hg); medical intervention indicated; recurrent or persistent (>/ 24 hours); symptomatic increase by >/ 20 mm Hg (diastolic) or to > 140/90 mm Hg if previously WNL; monotherapy indicated  Hypotension: Grade 0 - none  Chest Pain: Grade 0 - none  Bronchospasm: Grade 0 - none  Pain: Grade 0 - none  Rash: Grade 0 - None  Neurologic Specify: none    If this was a cord blood transplant, was more than one cord blood unit infused? no    Dominga Pacheco PA-C

## 2024-06-12 NOTE — PROGRESS NOTES
Type of transplant: Donor: Autologous  Product:   BMT INFUSION DOCUMENTATION (Last 48 Hours)       BMT/Cellular Product Infusion       Row Name 06/12/24 0926 06/12/24 0900             [REMOVED] Product 06/12/24 1044 HPC, Apheresis    Product Details Product Release Date: 06/12/24 -SCAR Product Release Time: 1044 -NB Product Type: HPC, Apheresis  -SCAR DIN: [id number]  -SCAR Product Description Code: [id number]  -SCAR Volume Dispensed (mL): 142 mL  -SCAR Completion Date (RN to complete): 06/12/24  -MD Completion Time (RN to complete): 1340  -MD    Checked by (Patient RN) Sonya BARNES --      Checked by (Witness) Jaimee WYATT --      Product Volume Infused (mL) 142 mL  -MD --      Flush Volume (mL) 50 mL  -MD --      Volume Dispensed (mL) -- --         [REMOVED] Product 06/12/24 1044 HPC, Apheresis    Product Details Product Release Date: 06/12/24 -SCAR Product Release Time: 1044 -NB Product Type: HPC, Apheresis  -SCAR DIN: [id number]  -SCAR Product Description Code: [id number]  -SCAR Volume Dispensed (mL): 142 mL  -SCAR Completion Date (RN to complete): 06/12/24  -MD Completion Time (RN to complete): 1301  -MD    Checked by (Patient RN) -- Sonya White RN  -MD      Checked by (Witness) -- Jaimee WYATT      Product Volume Infused (mL) -- 142 mL  -MD      Flush Volume (mL) -- 50 mL  -MD      Volume Dispensed (mL) -- --         [REMOVED] Product 06/12/24 1044 HPC, Apheresis    Product Details Product Release Date: 06/12/24 - Product Release Time: 1044 -NB Product Type: HPC, Apheresis  -SCAR DIN: [id number]  -SCAR Product Description Code: [id number]  -SCAR Volume Dispensed (mL): 142 mL  -SCAR Completion Date (RN to complete): 06/12/24  -MD Completion Time (RN to complete): 1335  -MD    Checked by (Patient RN) -- Sonya BARNES      Checked by (Witness) -- Daisyannie Montanez  -FG      Product Volume Infused (mL) -- 142 mL  -MD      Flush Volume (mL) -- 40 mL  -MD      Volume Dispensed (mL) -- --         [REMOVED]  Product 06/12/24 1044 HPC, Apheresis    Product Details Product Release Date: 06/12/24 -JP Product Release Time: 1044 -NB Product Type: HPC, Apheresis  -SCAR DIN: [id number]  -SCAR Product Description Code: [id number]  -SCAR Volume Dispensed (mL): 142 mL  -SCAR Completion Date (RN to complete): 06/12/24  -MD Completion Time (RN to complete): 1224  -MD    Checked by (Patient RN) -- Sonya BARNES      Checked by (Witness) -- Jaimee WYATT      Product Volume Infused (mL) -- 142 mL  -MD      Flush Volume (mL) -- 50 mL  -MD      Volume Dispensed (mL) -- --         [REMOVED] Product 06/12/24 1044 HPC, Apheresis    Product Details Product Release Date: 06/12/24 -JP Product Release Time: 1044 -NB Product Type: HPC, Apheresis  -SCAR DIN: [id number]  -SCAR Product Description Code: [id number]  -SCAR Volume Dispensed (mL): 142 mL  -SCAR Completion Date (RN to complete): 06/12/24  -MD Completion Time (RN to complete): 1425  -MD    Checked by (Patient RN) Sonya BARNES --      Checked by (Witness) Jaimee BARNES --      Product Volume Infused (mL) 142 mL  -MD --      Flush Volume (mL) 50 mL  -MD --      Volume Dispensed (mL) -- --                User Key  (r) = Recorded By, (t) = Taken By, (c) = Cosigned By      Initials Name Effective Dates    Jaimee Fields 01/12/23 -     Sonya Medina RN 01/24/24 -     Ellie Hall 07/11/21 -     Cheryl Ulloa 01/08/24 -                   Preparation: RN Documentation  Patient was premedicated as ordered: yes  Line Type: central line, right  Patient Stable Prior to Infusion: yes  Time Infusion Started: 1313  Teaching: side effects/monitoring  Tolerated/Reaction: Patient tolerance of product infusion  Immediate suspected transfusion reaction to the product: none  Did patient have prior history of similar signs/symptoms during this hospitalization?: no  Symptoms during/after infusion:  (N/A)  Did the patient tolerate the infusion well: yes  Medications and  treatment for symptoms: N/A  Did the symptoms resolve?:  (N/A)  Enter comments if clots, leaks, broken bag, infusion delays, other issues with bag/infusion: N/A  Flush until: 6/12/24, 1800  Plan: monitor vitals. Continue with plan of care.

## 2024-06-13 ENCOUNTER — APPOINTMENT (OUTPATIENT)
Dept: PHYSICAL THERAPY | Facility: CLINIC | Age: 54
DRG: 016 | End: 2024-06-13
Payer: COMMERCIAL

## 2024-06-13 LAB
ANION GAP SERPL CALCULATED.3IONS-SCNC: 8 MMOL/L (ref 7–15)
BACTERIA SPEC CULT: NORMAL
BASOPHILS # BLD AUTO: 0.1 10E3/UL (ref 0–0.2)
BASOPHILS NFR BLD AUTO: 0 %
BUN SERPL-MCNC: 33.9 MG/DL (ref 6–20)
CALCIUM SERPL-MCNC: 8.5 MG/DL (ref 8.6–10)
CHLORIDE SERPL-SCNC: 109 MMOL/L (ref 98–107)
CREAT SERPL-MCNC: 1.89 MG/DL (ref 0.51–0.95)
DEPRECATED HCO3 PLAS-SCNC: 21 MMOL/L (ref 22–29)
EGFRCR SERPLBLD CKD-EPI 2021: 31 ML/MIN/1.73M2
EOSINOPHIL # BLD AUTO: 0 10E3/UL (ref 0–0.7)
EOSINOPHIL NFR BLD AUTO: 0 %
ERYTHROCYTE [DISTWIDTH] IN BLOOD BY AUTOMATED COUNT: 16.3 % (ref 10–15)
GLUCOSE SERPL-MCNC: 123 MG/DL (ref 70–99)
HCT VFR BLD AUTO: 29.7 % (ref 35–47)
HGB BLD-MCNC: 10.2 G/DL (ref 11.7–15.7)
IMM GRANULOCYTES # BLD: 0.8 10E3/UL
IMM GRANULOCYTES NFR BLD: 3 %
LYMPHOCYTES # BLD AUTO: 0.2 10E3/UL (ref 0.8–5.3)
LYMPHOCYTES NFR BLD AUTO: 1 %
MAGNESIUM SERPL-MCNC: 2.4 MG/DL (ref 1.7–2.3)
MCH RBC QN AUTO: 31.3 PG (ref 26.5–33)
MCHC RBC AUTO-ENTMCNC: 34.3 G/DL (ref 31.5–36.5)
MCV RBC AUTO: 91 FL (ref 78–100)
MONOCYTES # BLD AUTO: 0.2 10E3/UL (ref 0–1.3)
MONOCYTES NFR BLD AUTO: 1 %
NEUTROPHILS # BLD AUTO: 24.5 10E3/UL (ref 1.6–8.3)
NEUTROPHILS NFR BLD AUTO: 95 %
NRBC # BLD AUTO: 0 10E3/UL
NRBC BLD AUTO-RTO: 0 /100
PHOSPHATE SERPL-MCNC: 5.6 MG/DL (ref 2.5–4.5)
PLATELET # BLD AUTO: 229 10E3/UL (ref 150–450)
POTASSIUM SERPL-SCNC: 4.2 MMOL/L (ref 3.4–5.3)
RBC # BLD AUTO: 3.26 10E6/UL (ref 3.8–5.2)
SODIUM SERPL-SCNC: 138 MMOL/L (ref 135–145)
WBC # BLD AUTO: 25.7 10E3/UL (ref 4–11)

## 2024-06-13 PROCEDURE — 250N000011 HC RX IP 250 OP 636: Mod: JZ | Performed by: PHYSICIAN ASSISTANT

## 2024-06-13 PROCEDURE — 206N000001 HC R&B BMT UMMC

## 2024-06-13 PROCEDURE — 84100 ASSAY OF PHOSPHORUS: CPT | Performed by: INTERNAL MEDICINE

## 2024-06-13 PROCEDURE — 250N000012 HC RX MED GY IP 250 OP 636 PS 637: Performed by: STUDENT IN AN ORGANIZED HEALTH CARE EDUCATION/TRAINING PROGRAM

## 2024-06-13 PROCEDURE — 97116 GAIT TRAINING THERAPY: CPT | Mod: GP

## 2024-06-13 PROCEDURE — 83735 ASSAY OF MAGNESIUM: CPT | Performed by: INTERNAL MEDICINE

## 2024-06-13 PROCEDURE — 250N000013 HC RX MED GY IP 250 OP 250 PS 637: Performed by: INTERNAL MEDICINE

## 2024-06-13 PROCEDURE — 250N000013 HC RX MED GY IP 250 OP 250 PS 637: Performed by: PHYSICIAN ASSISTANT

## 2024-06-13 PROCEDURE — 97530 THERAPEUTIC ACTIVITIES: CPT | Mod: GP

## 2024-06-13 PROCEDURE — 85025 COMPLETE CBC W/AUTO DIFF WBC: CPT

## 2024-06-13 PROCEDURE — 97161 PT EVAL LOW COMPLEX 20 MIN: CPT | Mod: GP

## 2024-06-13 PROCEDURE — 250N000013 HC RX MED GY IP 250 OP 250 PS 637

## 2024-06-13 PROCEDURE — 80048 BASIC METABOLIC PNL TOTAL CA: CPT

## 2024-06-13 PROCEDURE — 99233 SBSQ HOSP IP/OBS HIGH 50: CPT | Mod: FS | Performed by: PHYSICIAN ASSISTANT

## 2024-06-13 PROCEDURE — 250N000011 HC RX IP 250 OP 636: Mod: JZ | Performed by: INTERNAL MEDICINE

## 2024-06-13 PROCEDURE — 99418 PROLNG IP/OBS E/M EA 15 MIN: CPT | Performed by: PHYSICIAN ASSISTANT

## 2024-06-13 RX ORDER — FUROSEMIDE 20 MG
20 TABLET ORAL EVERY EVENING
Status: DISCONTINUED | OUTPATIENT
Start: 2024-06-14 | End: 2024-06-14

## 2024-06-13 RX ORDER — FUROSEMIDE 10 MG/ML
20 INJECTION INTRAMUSCULAR; INTRAVENOUS ONCE
Status: COMPLETED | OUTPATIENT
Start: 2024-06-13 | End: 2024-06-13

## 2024-06-13 RX ADMIN — LEVOTHYROXINE SODIUM 200 MCG: 200 TABLET ORAL at 06:27

## 2024-06-13 RX ADMIN — ALLOPURINOL 300 MG: 300 TABLET ORAL at 08:04

## 2024-06-13 RX ADMIN — ACYCLOVIR 800 MG: 800 TABLET ORAL at 20:48

## 2024-06-13 RX ADMIN — MIDODRINE HYDROCHLORIDE 10 MG: 5 TABLET ORAL at 17:09

## 2024-06-13 RX ADMIN — FLUCONAZOLE 200 MG: 200 TABLET ORAL at 08:04

## 2024-06-13 RX ADMIN — DEXAMETHASONE 8 MG: 4 TABLET ORAL at 08:04

## 2024-06-13 RX ADMIN — LEVOFLOXACIN 250 MG: 250 TABLET, FILM COATED ORAL at 11:01

## 2024-06-13 RX ADMIN — ACYCLOVIR 800 MG: 800 TABLET ORAL at 08:04

## 2024-06-13 RX ADMIN — FUROSEMIDE 20 MG: 10 INJECTION, SOLUTION INTRAMUSCULAR; INTRAVENOUS at 13:35

## 2024-06-13 RX ADMIN — FUROSEMIDE 40 MG: 20 TABLET ORAL at 08:04

## 2024-06-13 RX ADMIN — OLANZAPINE 2.5 MG: 2.5 TABLET, FILM COATED ORAL at 08:04

## 2024-06-13 RX ADMIN — Medication 50 MCG: at 08:04

## 2024-06-13 RX ADMIN — PANTOPRAZOLE SODIUM 40 MG: 40 TABLET, DELAYED RELEASE ORAL at 08:04

## 2024-06-13 RX ADMIN — LIOTHYRONINE SODIUM 5 MCG: 5 TABLET ORAL at 13:35

## 2024-06-13 RX ADMIN — SPIRONOLACTONE 25 MG: 25 TABLET, FILM COATED ORAL at 08:04

## 2024-06-13 RX ADMIN — Medication 5 ML: at 13:39

## 2024-06-13 RX ADMIN — MIDODRINE HYDROCHLORIDE 10 MG: 5 TABLET ORAL at 08:06

## 2024-06-13 RX ADMIN — LIOTHYRONINE SODIUM 5 MCG: 5 TABLET ORAL at 08:04

## 2024-06-13 RX ADMIN — PROCHLORPERAZINE MALEATE 10 MG: 5 TABLET ORAL at 16:43

## 2024-06-13 RX ADMIN — MIDODRINE HYDROCHLORIDE 10 MG: 5 TABLET ORAL at 12:36

## 2024-06-13 RX ADMIN — OLANZAPINE 2.5 MG: 2.5 TABLET, FILM COATED ORAL at 20:48

## 2024-06-13 RX ADMIN — Medication 5 ML: at 03:10

## 2024-06-13 ASSESSMENT — ACTIVITIES OF DAILY LIVING (ADL)
ADLS_ACUITY_SCORE: 18

## 2024-06-13 NOTE — PLAN OF CARE
VSS on room air. Denies pain, nausea and vomiting. After transfusion, patient just wanted to rest. No BM this shift but voiding without issues. Poor appetite, only had apple sauce for dinner. Patient reports resting well overnight.     Problem: Adult Inpatient Plan of Care  Goal: Absence of Hospital-Acquired Illness or Injury  Outcome: Progressing  Intervention: Identify and Manage Fall Risk  Recent Flowsheet Documentation  Taken 6/13/2024 0331 by Merlin Will RN  Safety Promotion/Fall Prevention: safety round/check completed  Taken 6/13/2024 0011 by Merlin Will RN  Safety Promotion/Fall Prevention: safety round/check completed  Taken 6/13/2024 0000 by Merlin Will RN  Safety Promotion/Fall Prevention: safety round/check completed  Taken 6/12/2024 2240 by Merlin Will RN  Safety Promotion/Fall Prevention: safety round/check completed  Taken 6/12/2024 1941 by Merlin Will RN  Safety Promotion/Fall Prevention: safety round/check completed  Intervention: Prevent Skin Injury  Recent Flowsheet Documentation  Taken 6/12/2024 1941 by Merlin Will RN  Body Position: position changed independently  Skin Protection: adhesive use limited  Device Skin Pressure Protection: adhesive use limited  Intervention: Prevent Infection  Recent Flowsheet Documentation  Taken 6/13/2024 0331 by Merlin Will RN  Infection Prevention:   environmental surveillance performed   equipment surfaces disinfected   hand hygiene promoted   personal protective equipment utilized   rest/sleep promoted   single patient room provided   visitors restricted/screened  Taken 6/13/2024 0011 by Merlin Will RN  Infection Prevention:   environmental surveillance performed   equipment surfaces disinfected   hand hygiene promoted   personal protective equipment utilized   rest/sleep promoted   single patient room provided   visitors restricted/screened  Taken 6/12/2024 1941 by Merlin Will RN  Infection Prevention:   environmental  surveillance performed   equipment surfaces disinfected   hand hygiene promoted   personal protective equipment utilized   rest/sleep promoted   single patient room provided   visitors restricted/screened  Goal: Optimal Comfort and Wellbeing  Outcome: Progressing  Goal: Readiness for Transition of Care  Outcome: Progressing     Problem: Stem Cell/Bone Marrow Transplant  Goal: Optimal Coping with Transplant  Outcome: Progressing  Goal: Symptom-Free Urinary Elimination  Outcome: Progressing  Goal: Diarrhea Symptom Control  Outcome: Progressing  Intervention: Manage Diarrhea  Recent Flowsheet Documentation  Taken 6/12/2024 1941 by Merlin Will RN  Skin Protection: adhesive use limited  Fluid/Electrolyte Management: fluids provided  Goal: Improved Activity Tolerance  Outcome: Progressing  Intervention: Promote Improved Energy  Recent Flowsheet Documentation  Taken 6/12/2024 1941 by Merlin Will RN  Activity Management:   back to bed   ambulated in room  Goal: Blood Counts Within Acceptable Range  Outcome: Progressing  Intervention: Monitor and Manage Hematologic Symptoms  Recent Flowsheet Documentation  Taken 6/12/2024 1941 by Merlin Will RN  Bleeding Precautions: blood pressure closely monitored  Medication Review/Management: medications reviewed  Goal: Absence of Hypersensitivity Reaction  Outcome: Progressing  Goal: Absence of Infection  Outcome: Progressing  Intervention: Prevent and Manage Infection  Recent Flowsheet Documentation  Taken 6/13/2024 0331 by Merlin Will RN  Infection Prevention:   environmental surveillance performed   equipment surfaces disinfected   hand hygiene promoted   personal protective equipment utilized   rest/sleep promoted   single patient room provided   visitors restricted/screened  Isolation Precautions: protective environment maintained  Taken 6/13/2024 0011 by Merlin Will RN  Infection Prevention:   environmental surveillance performed   equipment surfaces  disinfected   hand hygiene promoted   personal protective equipment utilized   rest/sleep promoted   single patient room provided   visitors restricted/screened  Isolation Precautions: protective environment maintained  Taken 6/12/2024 1941 by Merlin Will RN  Infection Prevention:   environmental surveillance performed   equipment surfaces disinfected   hand hygiene promoted   personal protective equipment utilized   rest/sleep promoted   single patient room provided   visitors restricted/screened  Infection Management: aseptic technique maintained  Isolation Precautions: protective environment maintained  Goal: Improved Oral Mucous Membrane Health and Integrity  Outcome: Progressing  Goal: Nausea and Vomiting Symptom Relief  Outcome: Progressing  Goal: Optimal Nutrition Intake  Outcome: Progressing     Problem: Skin Injury Risk Increased  Goal: Skin Health and Integrity  Outcome: Progressing  Intervention: Optimize Skin Protection  Recent Flowsheet Documentation  Taken 6/12/2024 1941 by Merlin Will RN  Pressure Reduction Techniques: frequent weight shift encouraged  Skin Protection: adhesive use limited  Activity Management:   back to bed   ambulated in room

## 2024-06-13 NOTE — PLAN OF CARE
[id number]  VSS. Afebrile. Up ad maxi. Denies pain. Compazine given x1 for nausea.     Problem: Adult Inpatient Plan of Care  Goal: Plan of Care Review    Outcome: Progressing     Problem: Adult Inpatient Plan of Care  Goal: Patient-Specific Goal (Individualized)    Outcome: Progressing     Problem: Adult Inpatient Plan of Care  Goal: Absence of Hospital-Acquired Illness or Injury  Outcome: Progressing

## 2024-06-13 NOTE — PROGRESS NOTES
"BMT/Cell Therapy Daily Progress Note   06/13/2024    Patient ID:  Vivian Brown is a 54 year old female, currently day 1 of her autologous stem cell transplant for amyloidosis.    Admission date: 6/11/2024    INTERVAL  HISTORY   Vivian feels good this morning.  She struggled with intake yesterday, but she has ordered breakfast and she is feeling confident about intake this morning.  Her feet are a bit more swollen than usual.      Review of Systems: ROS negative except as noted above.      PHYSICAL EXAM     Weight In/Out     Wt Readings from Last 3 Encounters:   06/13/24 73.9 kg (163 lb)   06/06/24 69.7 kg (153 lb 10.6 oz)   06/05/24 69.8 kg (153 lb 14.4 oz)      I/O last 3 completed shifts:  In: 3470 [P.O.:520; I.V.:2240; Blood:710]  Out: 2850 [Urine:2850]       KPS:  90    /85 (BP Location: Right arm)   Pulse 99   Temp 97.6  F (36.4  C) (Oral)   Resp 16   Ht 1.63 m (5' 4.17\")   Wt 73.9 kg (163 lb)   SpO2 98%   BMI 27.83 kg/m       General: NAD   Eyes: : SUKUMAR, sclera anicteric   Lungs: CTA bilaterally  Cardiovascular: RRR, no M/R/G   Abdominal/Rectal: +BS, soft, NT, ND, No HSM   Lymphatics: 1+ edema bilateral feet  Skin: no rashes or petechiae  Neuro: A&O   Additional Findings: Hightower site NT, no drainage.    LABS AND IMAGING: I have assessed all abnormal lab values for their clinical significance and any values considered clinically significant have been addressed in the assessment and plan.        Lab Results   Component Value Date    WBC 25.7 (H) 06/13/2024    ANEU 45.5 (H) 06/07/2024    HGB 10.2 (L) 06/13/2024    HCT 29.7 (L) 06/13/2024     06/13/2024     06/13/2024    POTASSIUM 4.2 06/13/2024    CHLORIDE 109 (H) 06/13/2024    CO2 21 (L) 06/13/2024     (H) 06/13/2024    BUN 33.9 (H) 06/13/2024    CR 1.89 (H) 06/13/2024    MAG 2.4 (H) 06/13/2024    INR 0.97 06/11/2024           SYSTEMS-BASED ASSESSMENT AND PLAN   Vivianjhonny Brown is a 54 year old female with amyloidosis, " undergoing melphalan followed by autologous stem cell rescue.  She is day 1.    Prep: melphalan     BMT  - BMT doc/Coordinator: Wei  - [id number]  - Cell dose: total collections of 5.99 million CD34+ cells/kg.     - Prep as above. Flush and pre-meds prior to transplant.  - GCSF starts day +5, continue until ANC > 2500 for 2 consecutive days.      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     ID  Prophy: ACV; levaquin; fluconazole.  PJP ppx to start at day +28.      GI/NUTRITION  - Anti-emetics: zofran/dex prior to chemotherapy.  Ativan and compazine prn.   - Ulcer prophy: Protonix 40mg daily.   # Celiac Disease, see diet below     FLUIDS/NUTRITION  - continue PTA lasix (40mg qam) and change pm oral lasix to lasix 20mg IV for today; resume all PO 6/14.   - continue daily spironoloactone  - gluten free diet (h/o celiac disease)  - dietitian to follow     RENAL  # renal amyloidosis; diuresis as above  - Monitor Cr and electrolytes daily.   - Replace electrolytes per sliding scale     ENDOCRINE  # Hypothyroidism:  continue PTA cytomel and synthroid     CARDIAC  # cardiac amyloidosis: cautious fluid balance; diuresis as above  # h/o dizziness and chest pain with chemotherapy for amyloidosis; improved with addition of midodrine.  Continue.   # hyperlipidemia: rosuvastatin on hold during acute transplant course     SUPPORTIVE CARE  - PT/OT to evaluate on admission and follow as indicated.   - BMT Social work to follow.     Known issues that I take into account for medical decisions, with salient changes to the plan considering these complexities noted above.    Patient Active Problem List   Diagnosis    Hypothyroidism due to acquired atrophy of thyroid    Family hx of colon cancer    Nonallopathic lesion of cervical region    Cervicalgia    Nonallopathic lesion of sacral region    Nonallopathic lesion of lumbar region    Lumbago    Nonallopathic lesion of  "thoracic region    Hyperlipidemia LDL goal <100    Dependent edema R>L    AL amyloidosis (H)    Hypogammaglobulinemia (H24)    Chronic kidney disease, stage 3a (H)    Celiac disease    Autologous donor of stem cells   \  Medically Ready for Discharge: Anticipated in 5+ Days    Clinically Significant Risk Factors              # Hypoalbuminemia: Lowest albumin = 2.4 g/dL at 2024  8:06 AM, will monitor as appropriate    # Acute Kidney Injury, unspecified: based on a >150% or 0.3 mg/dL increase in last creatinine compared to past 90 day average, will monitor renal function               # Overweight: Estimated body mass index is 27.83 kg/m  as calculated from the following:    Height as of this encounter: 1.63 m (5' 4.17\").    Weight as of this encounter: 73.9 kg (163 lb)., PRESENT ON ADMISSION            I spent 30 minutes in the care of this patient today, which included time necessary for preparation for the visit, obtaining history, ordering medications/tests/procedures as medically indicated, review of pertinent medical literature, counseling of the patient, communication of recommendations to the care team, and documentation time.      BALJINDER KongC     Physician Attestation      I saw and evaluated Vivian Brown as part of a shared APRN/PA visit.      55 y/o F with lambda light chain AL amyloidosis with kidney (nephrotic syndrome) and cardiac involvement at diagnosis. She has been admitted for autologous stem cell transplant with yuridia 200mg/m2. She received stem cell today and tolerated it well. She is on lasix/ spironolactone for nephrotic syndrome and midodrine for hypotension.   Will give an additional dose of lasix today.  She will stay inpatient through engraftment.      I personally provided a substantive portion of care for this patient and I approve the care plan as written by the ZOHREH.  I was involved with Medical Decision Making includin MINUTES SPENT BY ME on the date of service " doing chart review, history, exam, documentation & further activities per the note.     Twila Lima  Department of Hematology, Oncology and Transplantation  Pager 1300/Text via Nok[employer]oker

## 2024-06-13 NOTE — PROGRESS NOTES
Infusion Procedure Note    Type: Autologous transplant  Diagnosis: multiple myeloma  Indication for Infusion: Cellular immunotherapy  Reviewed and Confirmed for the Infusion: Consent previously obtained  Donor:  self  Product Characteristics, Cell Dose and Volume: as described on the infusion form ([id number]).  Patient was Premedicated as Ordered: Yes  Complications: See infusion form    I was present at the beginning of the infusion and available on the floor/unit/clinic through the entire infusion.    Dominga Pacheco PA-C

## 2024-06-13 NOTE — PROGRESS NOTES
06/13/24 1000   Appointment Info   Signing Clinician's Name / Credentials (PT) Donn Mack, PT, DPT   Living Environment   People in Home spouse;child(andreas), dependent   Current Living Arrangements house   Home Accessibility stairs to enter home   Number of Stairs, Main Entrance 2   Stair Railings, Main Entrance railings on both sides of stairs   Transportation Anticipated family or friend will provide   Living Environment Comments pt reports she lives with her spouse and 2 children; stays on main level and has 2 RAKEL.   Self-Care   Usual Activity Tolerance excellent   Current Activity Tolerance good   Regular Exercise Yes   Activity/Exercise Type walking   Exercise Amount/Frequency daily   Equipment Currently Used at Home none   Fall history within last six months no   Activity/Exercise/Self-Care Comment IND at baseline with all mobility and ADLs   General Information   Onset of Illness/Injury or Date of Surgery 06/11/24   Referring Physician Sabrina Jain NP   Patient/Family Therapy Goals Statement (PT) to stay active   Pertinent History of Current Problem (include personal factors and/or comorbidities that impact the POC) per EMR: Vivian Brown is a 54 year old female, currently day 1 of her autologous stem cell transplant for amyloidosis   Existing Precautions/Restrictions immunosuppressed   Cognition   Affect/Mental Status (Cognition) WNL   Orientation Status (Cognition) oriented x 4   Follows Commands (Cognition) WNL   Pain Assessment   Patient Currently in Pain No   Integumentary/Edema   Integumentary/Edema no deficits were identifed   Posture    Posture Not impaired   Range of Motion (ROM)   Range of Motion ROM is WNL   Strength (Manual Muscle Testing)   Strength (Manual Muscle Testing) strength is WFL   Bed Mobility   Bed Mobility no deficits identified   Transfers   Transfers no deficits identified   Gait/Stairs (Locomotion)   Sayre Level (Gait) independent   Comment, (Gait/Stairs) IND  mobility   Balance   Balance no deficits were identified   Sensory Examination   Sensory Perception patient reports no sensory changes   Coordination   Coordination no deficits were identified   Muscle Tone   Muscle Tone no deficits were identified   Clinical Impression   Criteria for Skilled Therapeutic Intervention Yes, treatment indicated   PT Diagnosis (PT) at risk of deconditioning  due to prolonged hospitalization impacting functional mobility   Influenced by the following impairments fatigue   Functional limitations due to impairments continuous gait and activity   Clinical Presentation (PT Evaluation Complexity) stable   Clinical Presentation Rationale clinician judgement   Clinical Decision Making (Complexity) low complexity   Planned Therapy Interventions (PT) balance training;gait training;home exercise program;progressive activity/exercise;risk factor education;home program guidelines;stair training   Risk & Benefits of therapy have been explained evaluation/treatment results reviewed;care plan/treatment goals reviewed;risks/benefits reviewed;current/potential barriers reviewed;participants voiced agreement with care plan;participants included;patient   PT Total Evaluation Time   PT Eval, Low Complexity Minutes (34830) 8   Physical Therapy Goals   PT Frequency 3x/week   PT Predicted Duration/Target Date for Goal Attainment 06/30/24   PT Goals Gait;Aerobic Activity;PT Goal 1   PT: Gait Independent;Greater than 200 feet;Goal Met   PT: Perform aerobic activity with stable cardiovascular response continuous activity;25 minutes;ambulation;treadmill   PT: Goal 1 Pt will be IND with HEP and lab value education by discharge   Interventions   Interventions Quick Adds Gait Training;Therapeutic Activity   Therapeutic Activity   Therapeutic Activities: dynamic activities to improve functional performance Minutes (82167) 12   Treatment Detail/Skilled Intervention PT: pt greeted supine in bed; agreeable to session.  Educated on lab values and implications to activity. Verbalized understanding and handout provided for reference. Discussed course of treatment and what to expect as far as changes in lab values and importance of consistent activity to limit deconditioning. Pt verbalized agreement. Completed long distance gait in halls; provided TM at end and educated on safety on treadmill.   Gait Training   Gait Training Minutes (79536) 24   Symptoms Noted During/After Treatment (Gait Training) fatigue   Treatment Detail/Skilled Intervention PT: pt amb 1 mile IND around 5C unit; ambulates at moderate pace. Cued to energy conservation and focusing on PLB technique. No overt LOB or balance concerns noted. Requested treadmill in room at end as she would also like to work on TM.   PT Discharge Planning   PT Plan PT: review treadmill set up, gait, HEP   PT Discharge Recommendation (DC Rec) home with assist   PT Rationale for DC Rec pt mobilizing IND at baseline; anticipate safe d/c home when medically cleared. PT to follow during course of Auto transplant to prevent deconditioning.   PT Brief overview of current status IND   Total Session Time   Timed Code Treatment Minutes 36   Total Session Time (sum of timed and untimed services) 44

## 2024-06-13 NOTE — PLAN OF CARE
"Afebrile, vital signs stable.   Denies nausea this morning. Given aromatherapy to try for nausea as she says medication doesn't seem to be helpful. Plans to try small frequent meals to prevent nausea.  Reported visual disturbances this morning lasting about 2 hours, resolved without intervention. Notified Dominga Carrier.   +2 Edema in feet and ankles. Weight up 4 lbs for yesterday. Received scheduled 40mg oral lasix and additional 20mg IV lasix this afternoon  Up walking in the hallway.      Problem: Adult Inpatient Plan of Care  Goal: Plan of Care Review  Description: The Plan of Care Review/Shift note should be completed every shift.  The Outcome Evaluation is a brief statement about your assessment that the patient is improving, declining, or no change.  This information will be displayed automatically on your shift  note.  Outcome: Progressing  Goal: Patient-Specific Goal (Individualized)  Description: You can add care plan individualizations to a care plan. Examples of Individualization might be:  \"Parent requests to be called daily at 9am for status\", \"I have a hard time hearing out of my right ear\", or \"Do not touch me to wake me up as it startles  me\".  Outcome: Progressing  Goal: Absence of Hospital-Acquired Illness or Injury  Outcome: Progressing  Intervention: Identify and Manage Fall Risk  Recent Flowsheet Documentation  Taken 6/13/2024 0815 by Tania Vernon, RN  Safety Promotion/Fall Prevention:   assistive device/personal items within reach   clutter free environment maintained   patient and family education   nonskid shoes/slippers when out of bed   safety round/check completed  Intervention: Prevent Skin Injury  Recent Flowsheet Documentation  Taken 6/13/2024 0815 by Tania Vernon, RN  Body Position: position changed independently  Goal: Optimal Comfort and Wellbeing  Outcome: Progressing  Goal: Readiness for Transition of Care  Outcome: Progressing     "

## 2024-06-13 NOTE — PROGRESS NOTES
BMT CLINICAL SOCIAL WORK NOTE:    Focus: Supportive Counseling    Data: Pt is a 54 year old female, currently day +1 s/p auto BMT.    Interventions: Clinical  (CSW) met with Pt to assess coping, provide supportive counseling and assist with resources as needed. Pt shared that overall she is adjusting to her IP stay. Her daughter was able to come visit yesterday and then her son is coming today and she is grateful they are able to come. She discussed feeling worried about her side affects, expressing worry about depression once she starts to feel worse. We talked over her feelings and worries for what is to come. She shared she is working hard to focus on the positive things, but find herself thinking about the worse. CSW provided empathic listening, validation of concerns, and encouragement. CSW encouraged Pt to contact CSW for support, questions and/or resources.     Assessment: Pt presented as friendly, but tearful at times.  Pt appears to be coping well at this time. Pt continues to be supported by his mother, children and .     Plan: CSW will continue to provide supportive counseling and assistance with resources as needed. CSW will continue to collaborate with multidisciplinary team regarding Pt's plan of care.     THONY Ruiz, Southern [facility] Health CareSW  [facility]  Phone: [phone]  Vocera- BMT SW 3

## 2024-06-14 ENCOUNTER — APPOINTMENT (OUTPATIENT)
Dept: PHYSICAL THERAPY | Facility: CLINIC | Age: 54
DRG: 016 | End: 2024-06-14
Attending: INTERNAL MEDICINE
Payer: COMMERCIAL

## 2024-06-14 LAB
ABO/RH(D): NORMAL
ACANTHOCYTES BLD QL SMEAR: SLIGHT
ANION GAP SERPL CALCULATED.3IONS-SCNC: 9 MMOL/L (ref 7–15)
ANTIBODY SCREEN: NEGATIVE
BASOPHILS # BLD AUTO: ABNORMAL 10*3/UL
BASOPHILS # BLD MANUAL: 0 10E3/UL (ref 0–0.2)
BASOPHILS NFR BLD AUTO: ABNORMAL %
BASOPHILS NFR BLD MANUAL: 0 %
BUN SERPL-MCNC: 40.9 MG/DL (ref 6–20)
BURR CELLS BLD QL SMEAR: ABNORMAL
CALCIUM SERPL-MCNC: 8.1 MG/DL (ref 8.6–10)
CHLORIDE SERPL-SCNC: 107 MMOL/L (ref 98–107)
CREAT SERPL-MCNC: 1.86 MG/DL (ref 0.51–0.95)
DEPRECATED HCO3 PLAS-SCNC: 21 MMOL/L (ref 22–29)
EGFRCR SERPLBLD CKD-EPI 2021: 32 ML/MIN/1.73M2
EOSINOPHIL # BLD AUTO: ABNORMAL 10*3/UL
EOSINOPHIL # BLD MANUAL: 0 10E3/UL (ref 0–0.7)
EOSINOPHIL NFR BLD AUTO: ABNORMAL %
EOSINOPHIL NFR BLD MANUAL: 0 %
ERYTHROCYTE [DISTWIDTH] IN BLOOD BY AUTOMATED COUNT: 16.3 % (ref 10–15)
GLUCOSE SERPL-MCNC: 125 MG/DL (ref 70–99)
HCT VFR BLD AUTO: 27.6 % (ref 35–47)
HGB BLD-MCNC: 9.5 G/DL (ref 11.7–15.7)
HOWELL-JOLLY BOD BLD QL SMEAR: PRESENT
IMM GRANULOCYTES # BLD: ABNORMAL 10*3/UL
IMM GRANULOCYTES NFR BLD: ABNORMAL %
LYMPHOCYTES # BLD AUTO: ABNORMAL 10*3/UL
LYMPHOCYTES # BLD MANUAL: 0.1 10E3/UL (ref 0.8–5.3)
LYMPHOCYTES NFR BLD AUTO: ABNORMAL %
LYMPHOCYTES NFR BLD MANUAL: 2 %
MAGNESIUM SERPL-MCNC: 2.6 MG/DL (ref 1.7–2.3)
MCH RBC QN AUTO: 31.7 PG (ref 26.5–33)
MCHC RBC AUTO-ENTMCNC: 34.4 G/DL (ref 31.5–36.5)
MCV RBC AUTO: 92 FL (ref 78–100)
MONOCYTES # BLD AUTO: ABNORMAL 10*3/UL
MONOCYTES # BLD MANUAL: 0 10E3/UL (ref 0–1.3)
MONOCYTES NFR BLD AUTO: ABNORMAL %
MONOCYTES NFR BLD MANUAL: 0 %
NEUTROPHILS # BLD AUTO: ABNORMAL 10*3/UL
NEUTROPHILS # BLD MANUAL: 5 10E3/UL (ref 1.6–8.3)
NEUTROPHILS NFR BLD AUTO: ABNORMAL %
NEUTROPHILS NFR BLD MANUAL: 98 %
NRBC # BLD AUTO: 0 10E3/UL
NRBC BLD AUTO-RTO: 0 /100
PHOSPHATE SERPL-MCNC: 4 MG/DL (ref 2.5–4.5)
PLAT MORPH BLD: ABNORMAL
PLATELET # BLD AUTO: 224 10E3/UL (ref 150–450)
POTASSIUM SERPL-SCNC: 4.2 MMOL/L (ref 3.4–5.3)
RBC # BLD AUTO: 3 10E6/UL (ref 3.8–5.2)
RBC MORPH BLD: ABNORMAL
SODIUM SERPL-SCNC: 137 MMOL/L (ref 135–145)
SPECIMEN EXPIRATION DATE: NORMAL
WBC # BLD AUTO: 5.1 10E3/UL (ref 4–11)

## 2024-06-14 PROCEDURE — 250N000011 HC RX IP 250 OP 636: Mod: JZ | Performed by: PHYSICIAN ASSISTANT

## 2024-06-14 PROCEDURE — 99233 SBSQ HOSP IP/OBS HIGH 50: CPT | Mod: FS | Performed by: PHYSICIAN ASSISTANT

## 2024-06-14 PROCEDURE — 250N000013 HC RX MED GY IP 250 OP 250 PS 637: Performed by: PHYSICIAN ASSISTANT

## 2024-06-14 PROCEDURE — 85049 AUTOMATED PLATELET COUNT: CPT

## 2024-06-14 PROCEDURE — 83735 ASSAY OF MAGNESIUM: CPT

## 2024-06-14 PROCEDURE — 85007 BL SMEAR W/DIFF WBC COUNT: CPT

## 2024-06-14 PROCEDURE — 206N000001 HC R&B BMT UMMC

## 2024-06-14 PROCEDURE — 99418 PROLNG IP/OBS E/M EA 15 MIN: CPT | Performed by: PHYSICIAN ASSISTANT

## 2024-06-14 PROCEDURE — 250N000013 HC RX MED GY IP 250 OP 250 PS 637

## 2024-06-14 PROCEDURE — 84100 ASSAY OF PHOSPHORUS: CPT | Performed by: PHYSICIAN ASSISTANT

## 2024-06-14 PROCEDURE — 97116 GAIT TRAINING THERAPY: CPT | Mod: GP

## 2024-06-14 PROCEDURE — 80048 BASIC METABOLIC PNL TOTAL CA: CPT

## 2024-06-14 PROCEDURE — 86900 BLOOD TYPING SEROLOGIC ABO: CPT

## 2024-06-14 PROCEDURE — 250N000013 HC RX MED GY IP 250 OP 250 PS 637: Performed by: INTERNAL MEDICINE

## 2024-06-14 PROCEDURE — 250N000011 HC RX IP 250 OP 636: Mod: JZ | Performed by: INTERNAL MEDICINE

## 2024-06-14 RX ORDER — FUROSEMIDE 20 MG
20 TABLET ORAL DAILY
Status: DISCONTINUED | OUTPATIENT
Start: 2024-06-15 | End: 2024-06-30

## 2024-06-14 RX ORDER — FUROSEMIDE 10 MG/ML
20 INJECTION INTRAMUSCULAR; INTRAVENOUS ONCE
Status: COMPLETED | OUTPATIENT
Start: 2024-06-14 | End: 2024-06-14

## 2024-06-14 RX ADMIN — PANTOPRAZOLE SODIUM 40 MG: 40 TABLET, DELAYED RELEASE ORAL at 08:32

## 2024-06-14 RX ADMIN — LIOTHYRONINE SODIUM 5 MCG: 5 TABLET ORAL at 08:34

## 2024-06-14 RX ADMIN — ACYCLOVIR 800 MG: 800 TABLET ORAL at 19:59

## 2024-06-14 RX ADMIN — Medication 50 MCG: at 08:33

## 2024-06-14 RX ADMIN — MIDODRINE HYDROCHLORIDE 10 MG: 5 TABLET ORAL at 08:33

## 2024-06-14 RX ADMIN — ALLOPURINOL 300 MG: 300 TABLET ORAL at 08:33

## 2024-06-14 RX ADMIN — FLUCONAZOLE 200 MG: 200 TABLET ORAL at 08:33

## 2024-06-14 RX ADMIN — ACYCLOVIR 800 MG: 800 TABLET ORAL at 08:33

## 2024-06-14 RX ADMIN — SPIRONOLACTONE 25 MG: 25 TABLET, FILM COATED ORAL at 08:33

## 2024-06-14 RX ADMIN — OLANZAPINE 2.5 MG: 2.5 TABLET, FILM COATED ORAL at 19:59

## 2024-06-14 RX ADMIN — Medication 5 ML: at 14:17

## 2024-06-14 RX ADMIN — FUROSEMIDE 40 MG: 20 TABLET ORAL at 08:32

## 2024-06-14 RX ADMIN — LEVOFLOXACIN 250 MG: 250 TABLET, FILM COATED ORAL at 11:15

## 2024-06-14 RX ADMIN — MIDODRINE HYDROCHLORIDE 10 MG: 5 TABLET ORAL at 12:44

## 2024-06-14 RX ADMIN — LEVOTHYROXINE SODIUM 200 MCG: 200 TABLET ORAL at 05:57

## 2024-06-14 RX ADMIN — MIDODRINE HYDROCHLORIDE 10 MG: 5 TABLET ORAL at 17:30

## 2024-06-14 RX ADMIN — Medication 5 ML: at 04:23

## 2024-06-14 RX ADMIN — LIOTHYRONINE SODIUM 5 MCG: 5 TABLET ORAL at 14:16

## 2024-06-14 RX ADMIN — OLANZAPINE 2.5 MG: 2.5 TABLET, FILM COATED ORAL at 08:32

## 2024-06-14 RX ADMIN — FUROSEMIDE 20 MG: 10 INJECTION, SOLUTION INTRAMUSCULAR; INTRAVENOUS at 14:16

## 2024-06-14 RX ADMIN — ACETAMINOPHEN 650 MG: 325 TABLET, FILM COATED ORAL at 00:35

## 2024-06-14 ASSESSMENT — ACTIVITIES OF DAILY LIVING (ADL)
ADLS_ACUITY_SCORE: 18

## 2024-06-14 NOTE — PLAN OF CARE
VSS on room air. Mild bone pain to right thigh, tylenol effective. Up ind. No BM this shift. Good UOP. No nausea/vomiting this shift. Patient showered last night and did CHG bath. Patient rested well overnight.     Problem: Stem Cell/Bone Marrow Transplant  Goal: Optimal Coping with Transplant  Outcome: Progressing  Goal: Symptom-Free Urinary Elimination  Outcome: Progressing  Intervention: Prevent or Manage Bladder Irritation  Recent Flowsheet Documentation  Taken 6/14/2024 0034 by Merlin Will RN  Pain Management Interventions: medication (see MAR)  Goal: Diarrhea Symptom Control  Outcome: Progressing  Intervention: Manage Diarrhea  Recent Flowsheet Documentation  Taken 6/13/2024 2045 by Merlin Will RN  Skin Protection: adhesive use limited  Fluid/Electrolyte Management: fluids provided  Goal: Improved Activity Tolerance  Outcome: Progressing  Intervention: Promote Improved Energy  Recent Flowsheet Documentation  Taken 6/13/2024 2045 by Merlin Will RN  Activity Management: ambulated to bathroom  Goal: Blood Counts Within Acceptable Range  Outcome: Progressing  Intervention: Monitor and Manage Hematologic Symptoms  Recent Flowsheet Documentation  Taken 6/13/2024 2045 by Merlin Will RN  Medication Review/Management: medications reviewed  Goal: Absence of Hypersensitivity Reaction  Outcome: Progressing  Goal: Absence of Infection  Outcome: Progressing  Intervention: Prevent and Manage Infection  Recent Flowsheet Documentation  Taken 6/14/2024 0030 by Merlin Will RN  Infection Prevention:   environmental surveillance performed   equipment surfaces disinfected   hand hygiene promoted   personal protective equipment utilized   rest/sleep promoted   single patient room provided   visitors restricted/screened  Isolation Precautions: protective environment maintained  Taken 6/13/2024 2045 by Merlin Will RN  Infection Prevention:   environmental surveillance performed   equipment surfaces  disinfected   hand hygiene promoted   personal protective equipment utilized   rest/sleep promoted   single patient room provided   visitors restricted/screened  Isolation Precautions: protective environment maintained  Goal: Improved Oral Mucous Membrane Health and Integrity  Outcome: Progressing  Goal: Nausea and Vomiting Symptom Relief  Outcome: Progressing

## 2024-06-14 NOTE — PLAN OF CARE
"BP (!) 129/95 (BP Location: Right arm)   Pulse 93   Temp 98.2  F (36.8  C) (Oral)   Resp 16   Ht 1.63 m (5' 4.17\")   Wt 73.7 kg (162 lb 8 oz)   SpO2 97%   BMI 27.74 kg/m      VSS on room air. Alert and oriented x4. BM x1 this shift. Patient denies pain. Independent. Continue with plan of care.     Goal Outcome Evaluation:      Plan of Care Reviewed With: patient    Overall Patient Progress: improving      Problem: Adult Inpatient Plan of Care  Goal: Plan of Care Review  Description: The Plan of Care Review/Shift note should be completed every shift.  The Outcome Evaluation is a brief statement about your assessment that the patient is improving, declining, or no change.  This information will be displayed automatically on your shift  note.  Outcome: Progressing  Flowsheets (Taken 6/14/2024 1224)  Plan of Care Reviewed With: patient  Overall Patient Progress: improving  Goal: Patient-Specific Goal (Individualized)  Description: You can add care plan individualizations to a care plan. Examples of Individualization might be:  \"Parent requests to be called daily at 9am for status\", \"I have a hard time hearing out of my right ear\", or \"Do not touch me to wake me up as it startles  me\".  Outcome: Progressing  Goal: Absence of Hospital-Acquired Illness or Injury  Outcome: Progressing  Intervention: Identify and Manage Fall Risk  Recent Flowsheet Documentation  Taken 6/14/2024 0830 by Esperanza Mcgarry, RN  Safety Promotion/Fall Prevention:   safety round/check completed   room organization consistent   room near nurse's station   clutter free environment maintained   assistive device/personal items within reach   nonskid shoes/slippers when out of bed   patient and family education  Intervention: Prevent Skin Injury  Recent Flowsheet Documentation  Taken 6/14/2024 0830 by Esperanza Mcgarry, RN  Skin Protection: adhesive use limited  Device Skin Pressure Protection: adhesive use limited  Intervention: Prevent " Infection  Recent Flowsheet Documentation  Taken 6/14/2024 0830 by Esperanza Mcgarry RN  Infection Prevention:   environmental surveillance performed   equipment surfaces disinfected   hand hygiene promoted   personal protective equipment utilized   rest/sleep promoted   single patient room provided   visitors restricted/screened  Goal: Optimal Comfort and Wellbeing  Outcome: Progressing  Goal: Readiness for Transition of Care  Outcome: Progressing     Problem: Stem Cell/Bone Marrow Transplant  Goal: Optimal Coping with Transplant  Outcome: Progressing  Goal: Symptom-Free Urinary Elimination  Outcome: Progressing  Intervention: Prevent or Manage Bladder Irritation  Recent Flowsheet Documentation  Taken 6/14/2024 0830 by Esperanza Mcgarry RN  Urinary Elimination Promotion: frequent voiding encouraged  Hyperhydration Management: fluids provided  Goal: Diarrhea Symptom Control  Outcome: Progressing  Intervention: Manage Diarrhea  Recent Flowsheet Documentation  Taken 6/14/2024 0830 by Esperanza Mcgarry RN  Skin Protection: adhesive use limited  Fluid/Electrolyte Management: fluids provided  Goal: Improved Activity Tolerance  Outcome: Progressing  Intervention: Promote Improved Energy  Recent Flowsheet Documentation  Taken 6/14/2024 1145 by Esperanza Mcgarry RN  Activity Management:   activity adjusted per tolerance   activity encouraged  Taken 6/14/2024 0830 by Esperanza Mcgarry RN  Sleep/Rest Enhancement:   consistent schedule promoted   family presence promoted   natural light exposure provided   regular sleep/rest pattern promoted  Goal: Blood Counts Within Acceptable Range  Outcome: Progressing  Intervention: Monitor and Manage Hematologic Symptoms  Recent Flowsheet Documentation  Taken 6/14/2024 1145 by Esperanza Mcgarry RN  Bleeding Precautions: blood pressure closely monitored  Medication Review/Management: medications reviewed  Taken 6/14/2024 0830 by Esperanza Mcgarry RN  Sleep/Rest  Enhancement:   consistent schedule promoted   family presence promoted   natural light exposure provided   regular sleep/rest pattern promoted  Goal: Absence of Hypersensitivity Reaction  Outcome: Progressing  Goal: Absence of Infection  Outcome: Progressing  Intervention: Prevent and Manage Infection  Recent Flowsheet Documentation  Taken 6/14/2024 1145 by Esperanza Mcgarry RN  Infection Management: aseptic technique maintained  Taken 6/14/2024 0830 by Esperanza Mcgarry RN  Infection Prevention:   environmental surveillance performed   equipment surfaces disinfected   hand hygiene promoted   personal protective equipment utilized   rest/sleep promoted   single patient room provided   visitors restricted/screened  Isolation Precautions: protective environment maintained  Goal: Improved Oral Mucous Membrane Health and Integrity  Outcome: Progressing  Goal: Nausea and Vomiting Symptom Relief  Outcome: Progressing  Goal: Optimal Nutrition Intake  Outcome: Progressing     Problem: Skin Injury Risk Increased  Goal: Skin Health and Integrity  Outcome: Progressing  Intervention: Plan: Nurse Driven Intervention: Moisture Management  Recent Flowsheet Documentation  Taken 6/14/2024 0830 by Esperanza Mcgarry RN  Moisture Interventions: Encourage regular toileting  Intervention: Optimize Skin Protection  Recent Flowsheet Documentation  Taken 6/14/2024 1145 by Esperanza Mcgrary RN  Activity Management:   activity adjusted per tolerance   activity encouraged  Taken 6/14/2024 0830 by Esperanza Mcgarry RN  Pressure Reduction Techniques: frequent weight shift encouraged  Skin Protection: adhesive use limited

## 2024-06-14 NOTE — PROGRESS NOTES
"BMT/Cell Therapy Daily Progress Note   06/14/2024    Patient ID:  Vivian Brown is a 54 year old female, currently day 2 of her autologous stem cell transplant for amyloidosis.    Admission date: 6/11/2024    INTERVAL  HISTORY   Vivian is a bit fatigued, so she went back to bed after having breakfast this morning.   She had good urine output with the extra lasix yesterday.  Weight is still up. She has intermittent nausea without vomiting.  Compazine is helpful.  She also had right thigh bone pain last night, which is something she experiences periodically.      Review of Systems: ROS negative except as noted above.      PHYSICAL EXAM     Weight In/Out     Wt Readings from Last 3 Encounters:   06/13/24 73.9 kg (163 lb)   06/06/24 69.7 kg (153 lb 10.6 oz)   06/05/24 69.8 kg (153 lb 14.4 oz)      I/O last 3 completed shifts:  In: 2700 [P.O.:2700]  Out: 3700 [Urine:3700]       KPS:  90    /67 (BP Location: Right arm)   Pulse 84   Temp 98  F (36.7  C) (Oral)   Resp 16   Ht 1.63 m (5' 4.17\")   Wt 73.9 kg (163 lb)   SpO2 95%   BMI 27.83 kg/m       General: NAD   Eyes: : SUKUMAR, sclera anicteric   Lungs: CTA bilaterally  Cardiovascular: RRR, no M/R/G   Abdominal/Rectal: +BS, soft, NT, ND, No HSM   Lymphatics: 1+ edema bilateral feet  Skin: no rashes or petechiae  Neuro: A&O   Additional Findings: Hightower site NT, no drainage.    LABS AND IMAGING: I have assessed all abnormal lab values for their clinical significance and any values considered clinically significant have been addressed in the assessment and plan.        Lab Results   Component Value Date    WBC 5.1 06/14/2024    ANEU 5.0 06/14/2024    HGB 9.5 (L) 06/14/2024    HCT 27.6 (L) 06/14/2024     06/14/2024     06/14/2024    POTASSIUM 4.2 06/14/2024    CHLORIDE 107 06/14/2024    CO2 21 (L) 06/14/2024     (H) 06/14/2024    BUN 40.9 (H) 06/14/2024    CR 1.86 (H) 06/14/2024    MAG 2.6 (H) 06/14/2024    INR 0.97 06/11/2024 "           SYSTEMS-BASED ASSESSMENT AND PLAN   Vivian Brown is a 54 year old female with amyloidosis, undergoing melphalan followed by autologous stem cell rescue.  She is day 2.    Prep: melphalan     BMT  - BMT doc/Coordinator: Wei  - [id number]  - Cell dose: total collections of 5.99 million CD34+ cells/kg.     - Prep as above. Flush and pre-meds prior to transplant.  - GCSF starts day +5, continue until ANC > 2500 for 2 consecutive days.      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     ID  Prophy: ACV; levaquin; fluconazole.  PJP ppx to start at day +28.      GI/NUTRITION  - Anti-emetics: zofran/dex prior to chemotherapy.  Ativan and compazine prn.   - Ulcer prophy: Protonix 40mg daily.   # Celiac Disease, see diet below     FLUIDS/NUTRITION  - home regimen of lasix 40mg po qam and 20mg po q afternoon.  On 6/13 and 6/14, we substituted lasix 20mg IV for the afternoon oral dose, to help handle her lower extremity edema.    - continue daily spironoloactone  - gluten free diet (h/o celiac disease)  - dietitian to follow     RENAL  # renal amyloidosis; diuresis as above  - Monitor Cr and electrolytes daily.   - Replace electrolytes per sliding scale     ENDOCRINE  # Hypothyroidism:  continue PTA cytomel and synthroid     CARDIAC  # cardiac amyloidosis: cautious fluid balance; diuresis as above  # h/o dizziness and chest pain with chemotherapy for amyloidosis; improved with addition of midodrine.  Continue.   # hyperlipidemia: rosuvastatin on hold during acute transplant course     SUPPORTIVE CARE  - PT/OT to evaluate on admission and follow as indicated.   - BMT Social work to follow.     Known issues that I take into account for medical decisions, with salient changes to the plan considering these complexities noted above.    Patient Active Problem List   Diagnosis    Hypothyroidism due to acquired atrophy of thyroid    Family hx of colon  "cancer    Nonallopathic lesion of cervical region    Cervicalgia    Nonallopathic lesion of sacral region    Nonallopathic lesion of lumbar region    Lumbago    Nonallopathic lesion of thoracic region    Hyperlipidemia LDL goal <100    Dependent edema R>L    AL amyloidosis (H)    Hypogammaglobulinemia (H24)    Chronic kidney disease, stage 3a (H)    Celiac disease    Autologous donor of stem cells   \  Medically Ready for Discharge: Anticipated in 5+ Days    Clinically Significant Risk Factors              # Hypoalbuminemia: Lowest albumin = 2.4 g/dL at 6/11/2024  8:06 AM, will monitor as appropriate    # Acute Kidney Injury, unspecified: based on a >150% or 0.3 mg/dL increase in last creatinine compared to past 90 day average, will monitor renal function            #Precipitous drop in Hgb/Hct: Lowest Hgb this hospitalization: 9.5 g/dL. Will continue to monitor and treat/transfuse as appropriate.     # Overweight: Estimated body mass index is 27.83 kg/m  as calculated from the following:    Height as of this encounter: 1.63 m (5' 4.17\").    Weight as of this encounter: 73.9 kg (163 lb)., PRESENT ON ADMISSION            I spent 30 minutes in the care of this patient today, which included time necessary for preparation for the visit, obtaining history, ordering medications/tests/procedures as medically indicated, review of pertinent medical literature, counseling of the patient, communication of recommendations to the care team, and documentation time.      Dominga Pacheco PA-C     Physician Attestation      I saw and evaluated Vivian Brown as part of a shared APRN/PA visit.      55 y/o F with lambda light chain AL amyloidosis with kidney (nephrotic syndrome) and cardiac involvement at diagnosis. She has been admitted for autologous stem cell transplant with yuridia 200mg/m2. She received stem cell today and tolerated it well. She is on lasix/ spironolactone for nephrotic syndrome and midodrine for hypotension. " Will monitor fluid balance. She will stay inpatient through engraftment.      I personally provided a substantive portion of care for this patient and I approve the care plan as written by the ZOHREH.  I was involved with Medical Decision Making includin MINUTES SPENT BY ME on the date of service doing chart review, history, exam, documentation & further activities per the note.     Twila Lima  Department of Hematology, Oncology and Transplantation  Pager [phone]/Text via [employer]

## 2024-06-15 LAB
ANION GAP SERPL CALCULATED.3IONS-SCNC: 8 MMOL/L (ref 7–15)
BASOPHILS # BLD AUTO: 0 10E3/UL (ref 0–0.2)
BASOPHILS NFR BLD AUTO: 0 %
BUN SERPL-MCNC: 36.7 MG/DL (ref 6–20)
CALCIUM SERPL-MCNC: 8.1 MG/DL (ref 8.6–10)
CHLORIDE SERPL-SCNC: 108 MMOL/L (ref 98–107)
CREAT SERPL-MCNC: 1.56 MG/DL (ref 0.51–0.95)
DEPRECATED HCO3 PLAS-SCNC: 22 MMOL/L (ref 22–29)
EGFRCR SERPLBLD CKD-EPI 2021: 39 ML/MIN/1.73M2
EOSINOPHIL # BLD AUTO: 0 10E3/UL (ref 0–0.7)
EOSINOPHIL NFR BLD AUTO: 0 %
ERYTHROCYTE [DISTWIDTH] IN BLOOD BY AUTOMATED COUNT: 16.3 % (ref 10–15)
GLUCOSE SERPL-MCNC: 103 MG/DL (ref 70–99)
HCT VFR BLD AUTO: 28.7 % (ref 35–47)
HGB BLD-MCNC: 9.9 G/DL (ref 11.7–15.7)
IMM GRANULOCYTES # BLD: 0 10E3/UL
IMM GRANULOCYTES NFR BLD: 0 %
LYMPHOCYTES # BLD AUTO: 0.1 10E3/UL (ref 0.8–5.3)
LYMPHOCYTES NFR BLD AUTO: 2 %
MAGNESIUM SERPL-MCNC: 2.4 MG/DL (ref 1.7–2.3)
MCH RBC QN AUTO: 31.5 PG (ref 26.5–33)
MCHC RBC AUTO-ENTMCNC: 34.5 G/DL (ref 31.5–36.5)
MCV RBC AUTO: 91 FL (ref 78–100)
MONOCYTES # BLD AUTO: 0 10E3/UL (ref 0–1.3)
MONOCYTES NFR BLD AUTO: 0 %
NEUTROPHILS # BLD AUTO: 2.3 10E3/UL (ref 1.6–8.3)
NEUTROPHILS NFR BLD AUTO: 98 %
NRBC # BLD AUTO: 0 10E3/UL
NRBC BLD AUTO-RTO: 1 /100
PHOSPHATE SERPL-MCNC: 3.2 MG/DL (ref 2.5–4.5)
PLATELET # BLD AUTO: 208 10E3/UL (ref 150–450)
POTASSIUM SERPL-SCNC: 4.3 MMOL/L (ref 3.4–5.3)
RBC # BLD AUTO: 3.14 10E6/UL (ref 3.8–5.2)
SODIUM SERPL-SCNC: 138 MMOL/L (ref 135–145)
WBC # BLD AUTO: 2.4 10E3/UL (ref 4–11)

## 2024-06-15 PROCEDURE — 83735 ASSAY OF MAGNESIUM: CPT | Performed by: STUDENT IN AN ORGANIZED HEALTH CARE EDUCATION/TRAINING PROGRAM

## 2024-06-15 PROCEDURE — 99233 SBSQ HOSP IP/OBS HIGH 50: CPT | Mod: FS | Performed by: NURSE PRACTITIONER

## 2024-06-15 PROCEDURE — 250N000013 HC RX MED GY IP 250 OP 250 PS 637

## 2024-06-15 PROCEDURE — 84100 ASSAY OF PHOSPHORUS: CPT | Performed by: PHYSICIAN ASSISTANT

## 2024-06-15 PROCEDURE — 250N000011 HC RX IP 250 OP 636: Mod: JZ | Performed by: INTERNAL MEDICINE

## 2024-06-15 PROCEDURE — 250N000013 HC RX MED GY IP 250 OP 250 PS 637: Performed by: PHYSICIAN ASSISTANT

## 2024-06-15 PROCEDURE — 206N000001 HC R&B BMT UMMC

## 2024-06-15 PROCEDURE — 85025 COMPLETE CBC W/AUTO DIFF WBC: CPT

## 2024-06-15 PROCEDURE — 80048 BASIC METABOLIC PNL TOTAL CA: CPT

## 2024-06-15 PROCEDURE — 250N000013 HC RX MED GY IP 250 OP 250 PS 637: Performed by: INTERNAL MEDICINE

## 2024-06-15 RX ADMIN — ALLOPURINOL 300 MG: 300 TABLET ORAL at 08:22

## 2024-06-15 RX ADMIN — ACYCLOVIR 800 MG: 800 TABLET ORAL at 08:23

## 2024-06-15 RX ADMIN — LEVOTHYROXINE SODIUM 200 MCG: 200 TABLET ORAL at 06:24

## 2024-06-15 RX ADMIN — MIDODRINE HYDROCHLORIDE 10 MG: 5 TABLET ORAL at 17:26

## 2024-06-15 RX ADMIN — LIOTHYRONINE SODIUM 5 MCG: 5 TABLET ORAL at 08:22

## 2024-06-15 RX ADMIN — FLUCONAZOLE 200 MG: 200 TABLET ORAL at 08:21

## 2024-06-15 RX ADMIN — Medication 5 ML: at 04:31

## 2024-06-15 RX ADMIN — LIOTHYRONINE SODIUM 5 MCG: 5 TABLET ORAL at 13:18

## 2024-06-15 RX ADMIN — LEVOFLOXACIN 250 MG: 250 TABLET, FILM COATED ORAL at 10:56

## 2024-06-15 RX ADMIN — SPIRONOLACTONE 25 MG: 25 TABLET, FILM COATED ORAL at 08:22

## 2024-06-15 RX ADMIN — PANTOPRAZOLE SODIUM 40 MG: 40 TABLET, DELAYED RELEASE ORAL at 08:23

## 2024-06-15 RX ADMIN — OLANZAPINE 2.5 MG: 2.5 TABLET, FILM COATED ORAL at 08:23

## 2024-06-15 RX ADMIN — PROCHLORPERAZINE MALEATE 10 MG: 5 TABLET ORAL at 08:21

## 2024-06-15 RX ADMIN — FUROSEMIDE 40 MG: 20 TABLET ORAL at 08:22

## 2024-06-15 RX ADMIN — ACYCLOVIR 800 MG: 800 TABLET ORAL at 20:01

## 2024-06-15 RX ADMIN — PROCHLORPERAZINE MALEATE 10 MG: 5 TABLET ORAL at 17:57

## 2024-06-15 RX ADMIN — FUROSEMIDE 20 MG: 20 TABLET ORAL at 13:18

## 2024-06-15 RX ADMIN — MIDODRINE HYDROCHLORIDE 10 MG: 5 TABLET ORAL at 13:18

## 2024-06-15 RX ADMIN — MIDODRINE HYDROCHLORIDE 10 MG: 5 TABLET ORAL at 08:22

## 2024-06-15 RX ADMIN — Medication 50 MCG: at 08:22

## 2024-06-15 RX ADMIN — OLANZAPINE 2.5 MG: 2.5 TABLET, FILM COATED ORAL at 20:01

## 2024-06-15 ASSESSMENT — ACTIVITIES OF DAILY LIVING (ADL)
ADLS_ACUITY_SCORE: 18

## 2024-06-15 NOTE — PLAN OF CARE
"/86 (BP Location: Right arm)   Pulse 85   Temp 97.9  F (36.6  C) (Oral)   Resp 16   Ht 1.63 m (5' 4.17\")   Wt 70.9 kg (156 lb 4.8 oz)   SpO2 98%   BMI 26.68 kg/m      VSS on room air. Alert and oriented x4. Patient reported nausea in AM. Oral prn compazine given x2. Patient denies shortness of breath, dizziness or chest pain. Independent. Continue with plan of care.     Goal Outcome Evaluation:      Plan of Care Reviewed With: patient    Overall Patient Progress: improvingOverall Patient Progress: improving      Problem: Adult Inpatient Plan of Care  Goal: Plan of Care Review  Description: The Plan of Care Review/Shift note should be completed every shift.  The Outcome Evaluation is a brief statement about your assessment that the patient is improving, declining, or no change.  This information will be displayed automatically on your shift  note.  Outcome: Progressing  Flowsheets (Taken 6/15/2024 3831)  Plan of Care Reviewed With: patient  Overall Patient Progress: improving  Goal: Patient-Specific Goal (Individualized)  Description: You can add care plan individualizations to a care plan. Examples of Individualization might be:  \"Parent requests to be called daily at 9am for status\", \"I have a hard time hearing out of my right ear\", or \"Do not touch me to wake me up as it startles  me\".  Outcome: Progressing  Goal: Absence of Hospital-Acquired Illness or Injury  Outcome: Progressing  Intervention: Identify and Manage Fall Risk  Recent Flowsheet Documentation  Taken 6/15/2024 1530 by Esperanza Mcgarry, RN  Safety Promotion/Fall Prevention:   clutter free environment maintained   lighting adjusted   nonskid shoes/slippers when out of bed   patient and family education   room near nurse's station   room organization consistent   safety round/check completed  Taken 6/15/2024 1230 by Esperanza Mcgarry, RN  Safety Promotion/Fall Prevention:   clutter free environment maintained   lighting adjusted   " nonskid shoes/slippers when out of bed   patient and family education   room near nurse's station   room organization consistent   safety round/check completed  Taken 6/15/2024 0900 by Esperanza Mcgarry RN  Safety Promotion/Fall Prevention:   clutter free environment maintained   lighting adjusted   nonskid shoes/slippers when out of bed   patient and family education   room near nurse's station   room organization consistent   safety round/check completed  Intervention: Prevent Skin Injury  Recent Flowsheet Documentation  Taken 6/15/2024 0900 by Esperanza Mcgarry RN  Body Position: position changed independently  Skin Protection: adhesive use limited  Device Skin Pressure Protection: adhesive use limited  Intervention: Prevent Infection  Recent Flowsheet Documentation  Taken 6/15/2024 1530 by Esperanza Mcgarry RN  Infection Prevention: environmental surveillance performed  Taken 6/15/2024 1230 by Esperanza Mcgarry RN  Infection Prevention: environmental surveillance performed  Taken 6/15/2024 0900 by Esperanza Mcgarry RN  Infection Prevention: environmental surveillance performed  Goal: Optimal Comfort and Wellbeing  Outcome: Progressing  Goal: Readiness for Transition of Care  Outcome: Progressing     Problem: Stem Cell/Bone Marrow Transplant  Goal: Optimal Coping with Transplant  Outcome: Progressing  Goal: Symptom-Free Urinary Elimination  Outcome: Progressing  Intervention: Prevent or Manage Bladder Irritation  Recent Flowsheet Documentation  Taken 6/15/2024 0900 by Esperanza Mcgarry RN  Urinary Elimination Promotion: frequent voiding encouraged  Hyperhydration Management: fluids provided  Goal: Diarrhea Symptom Control  Outcome: Progressing  Intervention: Manage Diarrhea  Recent Flowsheet Documentation  Taken 6/15/2024 0900 by Esperanza Mcgarry RN  Skin Protection: adhesive use limited  Fluid/Electrolyte Management: fluids provided  Goal: Improved Activity Tolerance  Outcome:  Progressing  Intervention: Promote Improved Energy  Recent Flowsheet Documentation  Taken 6/15/2024 1054 by Esperanza Mcgarry RN  Sleep/Rest Enhancement:   consistent schedule promoted   natural light exposure provided  Taken 6/15/2024 0900 by Esperanza Mcgarry RN  Sleep/Rest Enhancement:   consistent schedule promoted   natural light exposure provided  Activity Management: activity adjusted per tolerance  Goal: Blood Counts Within Acceptable Range  Outcome: Progressing  Intervention: Monitor and Manage Hematologic Symptoms  Recent Flowsheet Documentation  Taken 6/15/2024 1530 by Esperanza Mcgarry RN  Bleeding Precautions:   coagulation study results reviewed   gentle oral care promoted   monitored for signs of bleeding  Medication Review/Management: medications reviewed  Taken 6/15/2024 1230 by Esperanza Mcgarry RN  Bleeding Precautions:   coagulation study results reviewed   gentle oral care promoted   monitored for signs of bleeding  Medication Review/Management: medications reviewed  Taken 6/15/2024 1054 by Esperanza Mcgarry RN  Sleep/Rest Enhancement:   consistent schedule promoted   natural light exposure provided  Taken 6/15/2024 0900 by Esperanza Mcgarry RN  Sleep/Rest Enhancement:   consistent schedule promoted   natural light exposure provided  Bleeding Precautions:   coagulation study results reviewed   gentle oral care promoted   monitored for signs of bleeding  Medication Review/Management: medications reviewed  Goal: Absence of Hypersensitivity Reaction  Outcome: Progressing  Goal: Absence of Infection  Outcome: Progressing  Intervention: Prevent and Manage Infection  Recent Flowsheet Documentation  Taken 6/15/2024 1530 by Esperanza Mcgarry RN  Infection Prevention: environmental surveillance performed  Infection Management: aseptic technique maintained  Isolation Precautions: protective environment maintained  Taken 6/15/2024 1230 by Esperanza Mcgarry RN  Infection Prevention:  environmental surveillance performed  Infection Management: aseptic technique maintained  Isolation Precautions: protective environment maintained  Taken 6/15/2024 0900 by Esperanza Mcgarry RN  Infection Prevention: environmental surveillance performed  Infection Management: aseptic technique maintained  Isolation Precautions: protective environment maintained  Goal: Improved Oral Mucous Membrane Health and Integrity  Outcome: Progressing  Intervention: Promote Oral Comfort and Health  Recent Flowsheet Documentation  Taken 6/15/2024 0900 by Esperanza Mcgarry RN  Oral Mucous Membrane Protection:   lip/mouth moisturizer applied   nonirritating oral fluids promoted  Goal: Nausea and Vomiting Symptom Relief  Outcome: Progressing  Intervention: Prevent and Manage Nausea and Vomiting  Recent Flowsheet Documentation  Taken 6/15/2024 0900 by Esperanza Mcgarry RN  Nausea/Vomiting Interventions:   sips of clear liquids given   nausea triggers minimized   antiemetic  Goal: Optimal Nutrition Intake  Outcome: Progressing     Problem: Skin Injury Risk Increased  Goal: Skin Health and Integrity  Outcome: Progressing  Intervention: Plan: Nurse Driven Intervention: Moisture Management  Recent Flowsheet Documentation  Taken 6/15/2024 0900 by Esperanza Mcgarry RN  Moisture Interventions: Encourage regular toileting  Taken 6/15/2024 0800 by Esperanza Mcgarry RN  Moisture Interventions: Encourage regular toileting  Intervention: Optimize Skin Protection  Recent Flowsheet Documentation  Taken 6/15/2024 0900 by Esperanza Mcgarry RN  Pressure Reduction Techniques: frequent weight shift encouraged  Skin Protection: adhesive use limited  Activity Management: activity adjusted per tolerance  Head of Bed (HOB) Positioning: HOB at 30-45 degrees

## 2024-06-15 NOTE — PLAN OF CARE
Afebrile. VSS on RA. Denies pain, N/V/D and SOB. Up independent and voiding adequately. Reports resting overnight, but did not sleep as well as previous nights. No replacements needed.     Problem: Adult Inpatient Plan of Care  Goal: Absence of Hospital-Acquired Illness or Injury  Outcome: Progressing  Intervention: Identify and Manage Fall Risk  Recent Flowsheet Documentation  Taken 6/15/2024 0427 by Clotilde Saavedra RN  Safety Promotion/Fall Prevention:   safety round/check completed   patient and family education   nonskid shoes/slippers when out of bed  Taken 6/14/2024 2335 by Clotilde Saavedra RN  Safety Promotion/Fall Prevention:   safety round/check completed   patient and family education   nonskid shoes/slippers when out of bed  Taken 6/14/2024 1950 by Clotilde Saavedra RN  Safety Promotion/Fall Prevention:   safety round/check completed   patient and family education   nonskid shoes/slippers when out of bed  Intervention: Prevent Skin Injury  Recent Flowsheet Documentation  Taken 6/14/2024 1950 by Clotilde Saavedra RN  Body Position: position changed independently  Skin Protection: adhesive use limited  Device Skin Pressure Protection: adhesive use limited  Intervention: Prevent Infection  Recent Flowsheet Documentation  Taken 6/15/2024 0427 by Clotilde Saavedra RN  Infection Prevention: environmental surveillance performed  Taken 6/14/2024 2335 by Clotilde Saavedra RN  Infection Prevention: environmental surveillance performed  Taken 6/14/2024 1950 by Clotilde Saavedra RN  Infection Prevention: environmental surveillance performed   Goal Outcome Evaluation:

## 2024-06-15 NOTE — PROGRESS NOTES
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    Admission date: 6/11/2024    INTERVAL  HISTORY   Vivian is up in her chair, just finished breakfast. No nausea but some of the food felt too dry to swallow, causing a gag. She is watching her daughter's softball game on her iphone. No other complaints this morning.    Review of Systems: ROS negative except as noted above.      PHYSICAL EXAM     Weight In/Out     Wt Readings from Last 3 Encounters:   06/15/24 70.9 kg (156 lb 4.8 oz)   06/06/24 69.7 kg (153 lb 10.6 oz)   06/05/24 69.8 kg (153 lb 14.4 oz)      I/O last 3 completed shifts:  In: 720 [P.O.:720]  Out: 3450 [Urine:3450]       KPS:  90    /86 (BP Location: Right arm)   Pulse 85   Temp 97.9  F (36.6  C) (Oral)   Resp 16   Ht 1.63 m (5' 4.17\")   Wt 70.9 kg (156 lb 4.8 oz)   SpO2 98%   BMI 26.68 kg/m       General: NAD   Eyes: : SUKUMAR, sclera anicteric   Lungs: CTA bilaterally  Cardiovascular: RRR, no M/R/G   Abdominal/Rectal: +BS, soft, NT, ND, No HSM   Lymphatics: 1+ edema bilateral feet  Skin: no rashes or petechiae  Neuro: A&O   Additional Findings: Hightower site NT, no drainage.    LABS AND IMAGING: I have assessed all abnormal lab values for their clinical significance and any values considered clinically significant have been addressed in the assessment and plan.        Lab Results   Component Value Date    WBC 2.4 (L) 06/15/2024    ANEU 5.0 06/14/2024    HGB 9.9 (L) 06/15/2024    HCT 28.7 (L) 06/15/2024     06/15/2024     06/15/2024    POTASSIUM 4.3 06/15/2024    CHLORIDE 108 (H) 06/15/2024    CO2 22 06/15/2024     (H) 06/15/2024    BUN 36.7 (H) 06/15/2024    CR 1.56 (H) 06/15/2024    MAG 2.4 (H) 06/15/2024    INR 0.97 06/11/2024           SYSTEMS-BASED ASSESSMENT AND PLAN   Vivian Brown is a 54 year old female with amyloidosis, " undergoing melphalan followed by autologous stem cell rescue.  She is day 3.    Prep: melphalan     BMT  - BMT doc/Coordinator: Wei  - [id number]  - Cell dose: total collections of 5.99 million CD34+ cells/kg.     - Prep as above. Flush and pre-meds prior to transplant.  - GCSF starts day +5, continue until ANC > 2500 for 2 consecutive days.      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     ID  Prophy: ACV; levaquin; fluconazole.  PJP ppx to start at day +28.      GI/NUTRITION  - Anti-emetics: zofran/dex prior to chemotherapy.  Ativan and compazine prn.   - Ulcer prophy: Protonix 40mg daily.   # Celiac Disease, see diet below     FLUIDS/NUTRITION  - home regimen of lasix 40mg po qam and 20mg po q afternoon.  On 6/13 and 6/14, we substituted lasix 20mg IV for the afternoon oral dose, to help handle her lower extremity edema.    - continue daily spironoloactone  - gluten free diet (h/o celiac disease)  - dietitian to follow     RENAL  # renal amyloidosis; diuresis as above  - Monitor Cr and electrolytes daily.   - Replace electrolytes per sliding scale     ENDOCRINE  # Hypothyroidism:  continue PTA cytomel and synthroid     CARDIAC  # cardiac amyloidosis: cautious fluid balance; diuresis as above  # h/o dizziness and chest pain with chemotherapy for amyloidosis; improved with addition of midodrine.  Continue.   # hyperlipidemia: rosuvastatin on hold during acute transplant course     SUPPORTIVE CARE  - PT/OT to evaluate on admission and follow as indicated.   - BMT Social work to follow.     Known issues that I take into account for medical decisions, with salient changes to the plan considering these complexities noted above.    Patient Active Problem List   Diagnosis    Hypothyroidism due to acquired atrophy of thyroid    Family hx of colon cancer    Nonallopathic lesion of cervical region    Cervicalgia    Nonallopathic lesion of sacral region     "Nonallopathic lesion of lumbar region    Lumbago    Nonallopathic lesion of thoracic region    Hyperlipidemia LDL goal <100    Dependent edema R>L    AL amyloidosis (H)    Hypogammaglobulinemia (H24)    Chronic kidney disease, stage 3a (H)    Celiac disease    Autologous donor of stem cells   \  Medically Ready for Discharge: Anticipated in 5+ Days    Clinically Significant Risk Factors              # Hypoalbuminemia: Lowest albumin = 2.4 g/dL at 6/11/2024  8:06 AM, will monitor as appropriate               #Precipitous drop in Hgb/Hct: Lowest Hgb this hospitalization: 9.5 g/dL. Will continue to monitor and treat/transfuse as appropriate.     # Overweight: Estimated body mass index is 26.68 kg/m  as calculated from the following:    Height as of this encounter: 1.63 m (5' 4.17\").    Weight as of this encounter: 70.9 kg (156 lb 4.8 oz).             I spent 30 minutes in the care of this patient today, which included time necessary for preparation for the visit, obtaining history, ordering medications/tests/procedures as medically indicated, review of pertinent medical literature, counseling of the patient, communication of recommendations to the care team, and documentation time.      Esthela Quinones, APRN CNP     "

## 2024-06-16 LAB
ANION GAP SERPL CALCULATED.3IONS-SCNC: 7 MMOL/L (ref 7–15)
BASOPHILS # BLD AUTO: 0 10E3/UL (ref 0–0.2)
BASOPHILS NFR BLD AUTO: 1 %
BUN SERPL-MCNC: 31.9 MG/DL (ref 6–20)
CALCIUM SERPL-MCNC: 8.1 MG/DL (ref 8.6–10)
CHLORIDE SERPL-SCNC: 107 MMOL/L (ref 98–107)
CREAT SERPL-MCNC: 1.4 MG/DL (ref 0.51–0.95)
DEPRECATED HCO3 PLAS-SCNC: 23 MMOL/L (ref 22–29)
EGFRCR SERPLBLD CKD-EPI 2021: 44 ML/MIN/1.73M2
EOSINOPHIL # BLD AUTO: 0 10E3/UL (ref 0–0.7)
EOSINOPHIL NFR BLD AUTO: 0 %
ERYTHROCYTE [DISTWIDTH] IN BLOOD BY AUTOMATED COUNT: 16 % (ref 10–15)
GLUCOSE SERPL-MCNC: 98 MG/DL (ref 70–99)
HCT VFR BLD AUTO: 28.2 % (ref 35–47)
HGB BLD-MCNC: 9.6 G/DL (ref 11.7–15.7)
IMM GRANULOCYTES # BLD: 0 10E3/UL
IMM GRANULOCYTES NFR BLD: 1 %
LYMPHOCYTES # BLD AUTO: 0.1 10E3/UL (ref 0.8–5.3)
LYMPHOCYTES NFR BLD AUTO: 7 %
MAGNESIUM SERPL-MCNC: 2.2 MG/DL (ref 1.7–2.3)
MCH RBC QN AUTO: 31.1 PG (ref 26.5–33)
MCHC RBC AUTO-ENTMCNC: 34 G/DL (ref 31.5–36.5)
MCV RBC AUTO: 91 FL (ref 78–100)
MONOCYTES # BLD AUTO: 0 10E3/UL (ref 0–1.3)
MONOCYTES NFR BLD AUTO: 0 %
NEUTROPHILS # BLD AUTO: 1.4 10E3/UL (ref 1.6–8.3)
NEUTROPHILS NFR BLD AUTO: 91 %
NRBC # BLD AUTO: 0 10E3/UL
NRBC BLD AUTO-RTO: 0 /100
PHOSPHATE SERPL-MCNC: 3.2 MG/DL (ref 2.5–4.5)
PLATELET # BLD AUTO: 173 10E3/UL (ref 150–450)
POTASSIUM SERPL-SCNC: 4.1 MMOL/L (ref 3.4–5.3)
RBC # BLD AUTO: 3.09 10E6/UL (ref 3.8–5.2)
SODIUM SERPL-SCNC: 137 MMOL/L (ref 135–145)
WBC # BLD AUTO: 1.5 10E3/UL (ref 4–11)

## 2024-06-16 PROCEDURE — 206N000001 HC R&B BMT UMMC

## 2024-06-16 PROCEDURE — 250N000013 HC RX MED GY IP 250 OP 250 PS 637: Performed by: INTERNAL MEDICINE

## 2024-06-16 PROCEDURE — 250N000013 HC RX MED GY IP 250 OP 250 PS 637: Performed by: NURSE PRACTITIONER

## 2024-06-16 PROCEDURE — 84100 ASSAY OF PHOSPHORUS: CPT | Performed by: STUDENT IN AN ORGANIZED HEALTH CARE EDUCATION/TRAINING PROGRAM

## 2024-06-16 PROCEDURE — 85025 COMPLETE CBC W/AUTO DIFF WBC: CPT

## 2024-06-16 PROCEDURE — 250N000011 HC RX IP 250 OP 636

## 2024-06-16 PROCEDURE — 83735 ASSAY OF MAGNESIUM: CPT | Performed by: STUDENT IN AN ORGANIZED HEALTH CARE EDUCATION/TRAINING PROGRAM

## 2024-06-16 PROCEDURE — 80048 BASIC METABOLIC PNL TOTAL CA: CPT

## 2024-06-16 PROCEDURE — 99233 SBSQ HOSP IP/OBS HIGH 50: CPT | Mod: FS | Performed by: NURSE PRACTITIONER

## 2024-06-16 PROCEDURE — 250N000013 HC RX MED GY IP 250 OP 250 PS 637

## 2024-06-16 PROCEDURE — 250N000013 HC RX MED GY IP 250 OP 250 PS 637: Performed by: PHYSICIAN ASSISTANT

## 2024-06-16 RX ORDER — OLANZAPINE 2.5 MG/1
2.5 TABLET, FILM COATED ORAL AT BEDTIME
Status: DISCONTINUED | OUTPATIENT
Start: 2024-06-16 | End: 2024-06-21

## 2024-06-16 RX ADMIN — MIDODRINE HYDROCHLORIDE 10 MG: 5 TABLET ORAL at 12:23

## 2024-06-16 RX ADMIN — MIDODRINE HYDROCHLORIDE 10 MG: 5 TABLET ORAL at 18:56

## 2024-06-16 RX ADMIN — FUROSEMIDE 40 MG: 20 TABLET ORAL at 08:46

## 2024-06-16 RX ADMIN — ACYCLOVIR 800 MG: 800 TABLET ORAL at 19:53

## 2024-06-16 RX ADMIN — MIDODRINE HYDROCHLORIDE 10 MG: 5 TABLET ORAL at 08:47

## 2024-06-16 RX ADMIN — PROCHLORPERAZINE EDISYLATE 10 MG: 5 INJECTION INTRAMUSCULAR; INTRAVENOUS at 12:23

## 2024-06-16 RX ADMIN — LIOTHYRONINE SODIUM 5 MCG: 5 TABLET ORAL at 08:48

## 2024-06-16 RX ADMIN — Medication 50 MCG: at 08:47

## 2024-06-16 RX ADMIN — PANTOPRAZOLE SODIUM 40 MG: 40 TABLET, DELAYED RELEASE ORAL at 08:47

## 2024-06-16 RX ADMIN — SPIRONOLACTONE 25 MG: 25 TABLET, FILM COATED ORAL at 08:46

## 2024-06-16 RX ADMIN — ACYCLOVIR 800 MG: 800 TABLET ORAL at 08:46

## 2024-06-16 RX ADMIN — FLUCONAZOLE 200 MG: 200 TABLET ORAL at 08:47

## 2024-06-16 RX ADMIN — FUROSEMIDE 20 MG: 20 TABLET ORAL at 14:43

## 2024-06-16 RX ADMIN — LEVOFLOXACIN 250 MG: 250 TABLET, FILM COATED ORAL at 10:43

## 2024-06-16 RX ADMIN — LEVOTHYROXINE SODIUM 200 MCG: 200 TABLET ORAL at 06:12

## 2024-06-16 RX ADMIN — LIOTHYRONINE SODIUM 5 MCG: 5 TABLET ORAL at 14:43

## 2024-06-16 RX ADMIN — ALLOPURINOL 300 MG: 300 TABLET ORAL at 08:46

## 2024-06-16 RX ADMIN — PROCHLORPERAZINE MALEATE 5 MG: 5 TABLET ORAL at 08:47

## 2024-06-16 RX ADMIN — OLANZAPINE 2.5 MG: 2.5 TABLET, FILM COATED ORAL at 22:54

## 2024-06-16 ASSESSMENT — ACTIVITIES OF DAILY LIVING (ADL)
ADLS_ACUITY_SCORE: 18

## 2024-06-16 NOTE — PROGRESS NOTES
"BMT/Cell Therapy Daily Progress Note   06/16/2024    Patient ID:  Vivian Brown is a 54 year old female, currently day 4 of her autologous stem cell transplant for amyloidosis.    Admission date: 6/11/2024    INTERVAL  HISTORY   Vivian is up in bed this morning. No new complaints.   Orthostatic with AM vitals but asymptomatic.       Review of Systems: ROS negative except as noted above.      PHYSICAL EXAM     Weight In/Out     Wt Readings from Last 3 Encounters:   06/15/24 70.9 kg (156 lb 4.8 oz)   06/06/24 69.7 kg (153 lb 10.6 oz)   06/05/24 69.8 kg (153 lb 14.4 oz)      I/O last 3 completed shifts:  In: 400 [P.O.:400]  Out: 4370 [Urine:4370]       KPS:  90    /84 (BP Location: Right arm)   Pulse 85   Temp 98.1  F (36.7  C) (Oral)   Resp 14   Ht 1.63 m (5' 4.17\")   Wt 70.9 kg (156 lb 4.8 oz)   SpO2 97%   BMI 26.68 kg/m       General: NAD   Eyes: : SUKUMAR, sclera anicteric   Lungs: CTA bilaterally  Cardiovascular: RRR, no M/R/G   Abdominal/Rectal: +BS, soft, NT, ND, No HSM   Lymphatics: 1+ edema bilateral feet  Skin: no rashes or petechiae  Neuro: A&O   Additional Findings: Hightower site NT, no drainage.    LABS AND IMAGING: I have assessed all abnormal lab values for their clinical significance and any values considered clinically significant have been addressed in the assessment and plan.        Lab Results   Component Value Date    WBC 1.5 (L) 06/16/2024    ANEU 5.0 06/14/2024    HGB 9.6 (L) 06/16/2024    HCT 28.2 (L) 06/16/2024     06/16/2024     06/16/2024    POTASSIUM 4.1 06/16/2024    CHLORIDE 107 06/16/2024    CO2 23 06/16/2024    GLC 98 06/16/2024    BUN 31.9 (H) 06/16/2024    CR 1.40 (H) 06/16/2024    MAG 2.2 06/16/2024    INR 0.97 06/11/2024           SYSTEMS-BASED ASSESSMENT AND PLAN   Vivian Brown is a 54 year old female with amyloidosis, undergoing melphalan followed by autologous stem cell rescue.  She is day 4.    Prep: melphalan     BMT  - BMT doc/Coordinator: " Ramesh/Carlos  - [id number]  - Cell dose: total collections of 5.99 million CD34+ cells/kg.     - Prep as above. Flush and pre-meds prior to transplant.  - GCSF starts day +5, continue until ANC > 2500 for 2 consecutive days.      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     ID  Prophy: ACV; levaquin; fluconazole.  PJP ppx to start at day +28.      GI/NUTRITION  - Anti-emetics: zofran/dex prior to chemotherapy.  Ativan and compazine prn.   - Ulcer prophy: Protonix 40mg daily.   # Celiac Disease, see diet below     FLUIDS/NUTRITION  - home regimen of lasix 40mg po qam and 20mg po q afternoon.  Ordered to hold afternoon PO dose if orthostatic.   - continue daily spironoloactone  - gluten free diet (h/o celiac disease)  - dietitian to follow     RENAL  # renal amyloidosis; diuresis as above  - Monitor Cr and electrolytes daily.   - Replace electrolytes per sliding scale     ENDOCRINE  # Hypothyroidism:  continue PTA cytomel and synthroid     CARDIAC  # cardiac amyloidosis: cautious fluid balance; diuresis as above  # h/o dizziness and chest pain with chemotherapy for amyloidosis; improved with addition of midodrine.  Continue.   # hyperlipidemia: rosuvastatin on hold during acute transplant course     SUPPORTIVE CARE  - PT/OT to evaluate on admission and follow as indicated.   - BMT Social work to follow.     Known issues that I take into account for medical decisions, with salient changes to the plan considering these complexities noted above.    Patient Active Problem List   Diagnosis    Hypothyroidism due to acquired atrophy of thyroid    Family hx of colon cancer    Nonallopathic lesion of cervical region    Cervicalgia    Nonallopathic lesion of sacral region    Nonallopathic lesion of lumbar region    Lumbago    Nonallopathic lesion of thoracic region    Hyperlipidemia LDL goal <100    Dependent edema R>L    AL amyloidosis (H)    Hypogammaglobulinemia  "(H24)    Chronic kidney disease, stage 3a (H)    Celiac disease    Autologous donor of stem cells   \  Medically Ready for Discharge: Anticipated in 5+ Days    Clinically Significant Risk Factors              # Hypoalbuminemia: Lowest albumin = 2.4 g/dL at 6/11/2024  8:06 AM, will monitor as appropriate               #Precipitous drop in Hgb/Hct: Lowest Hgb this hospitalization: 9.5 g/dL. Will continue to monitor and treat/transfuse as appropriate.     # Overweight: Estimated body mass index is 26.68 kg/m  as calculated from the following:    Height as of this encounter: 1.63 m (5' 4.17\").    Weight as of this encounter: 70.9 kg (156 lb 4.8 oz).             I spent 30 minutes in the care of this patient today, which included time necessary for preparation for the visit, obtaining history, ordering medications/tests/procedures as medically indicated, review of pertinent medical literature, counseling of the patient, communication of recommendations to the care team, and documentation time.      MARICRUZ Guzmán CNP     "

## 2024-06-16 NOTE — PLAN OF CARE
Afebrile, VSS on RA. Denies pain, N/V/D and SOB. Has a poor appetite, had few bites of dinner. Patient reports sleeping well between cares. No replacements needed.     Problem: Adult Inpatient Plan of Care  Goal: Absence of Hospital-Acquired Illness or Injury  Outcome: Progressing  Intervention: Identify and Manage Fall Risk  Recent Flowsheet Documentation  Taken 6/15/2024 2330 by Clotilde Saavedra RN  Safety Promotion/Fall Prevention: safety round/check completed  Taken 6/15/2024 2010 by Clotilde Saavedra RN  Safety Promotion/Fall Prevention: safety round/check completed  Intervention: Prevent Skin Injury  Recent Flowsheet Documentation  Taken 6/15/2024 2010 by Clotilde Saavedra RN  Body Position: position changed independently  Skin Protection: adhesive use limited  Device Skin Pressure Protection: adhesive use limited  Intervention: Prevent Infection  Recent Flowsheet Documentation  Taken 6/15/2024 2010 by Clotilde Saavedra RN  Infection Prevention: environmental surveillance performed   Goal Outcome Evaluation:

## 2024-06-16 NOTE — PLAN OF CARE
"/83 (BP Location: Right arm)   Pulse 114   Temp 98.4  F (36.9  C) (Oral)   Resp 16   Ht 1.63 m (5' 4.17\")   Wt 70.9 kg (156 lb 4.8 oz)   SpO2 97%   BMI 26.68 kg/m      VSS. Patient intermittently tachy with ambulation (independent). Voiding well. States she's started having more frequent stools - looser. Her baseline is constipation. At this time she does not want anything for diarrhea. Nausea is improving, although she gets anticipatory nausea looking at food menu/meal trays occasionally. Still had two meals today with some snacks. PRN compazine given x1 (5mg each time, may have 5-10mg). Edema improving, now mild in BLE. Scheduled PO lasix given. Continue to monitor.     Goal Outcome Evaluation:    Problem: Adult Inpatient Plan of Care  Goal: Plan of Care Review  Description: The Plan of Care Review/Shift note should be completed every shift.  The Outcome Evaluation is a brief statement about your assessment that the patient is improving, declining, or no change.  This information will be displayed automatically on your shift  note.  Outcome: Progressing  Goal: Patient-Specific Goal (Individualized)  Description: You can add care plan individualizations to a care plan. Examples of Individualization might be:  \"Parent requests to be called daily at 9am for status\", \"I have a hard time hearing out of my right ear\", or \"Do not touch me to wake me up as it startles  me\".  Outcome: Progressing  Goal: Absence of Hospital-Acquired Illness or Injury  Outcome: Progressing  Intervention: Identify and Manage Fall Risk  Recent Flowsheet Documentation  Taken 6/16/2024 0900 by Carlie Umana, RN  Safety Promotion/Fall Prevention: safety round/check completed  Intervention: Prevent Skin Injury  Recent Flowsheet Documentation  Taken 6/16/2024 0900 by Carlie Umana, RN  Body Position: position changed independently  Skin Protection: adhesive use limited  Device Skin Pressure Protection: adhesive use " limited  Intervention: Prevent Infection  Recent Flowsheet Documentation  Taken 6/16/2024 0900 by Carlie Umana RN  Infection Prevention: environmental surveillance performed  Goal: Optimal Comfort and Wellbeing  Outcome: Progressing  Goal: Readiness for Transition of Care  Outcome: Progressing     Problem: Stem Cell/Bone Marrow Transplant  Goal: Optimal Coping with Transplant  Outcome: Progressing  Goal: Symptom-Free Urinary Elimination  Outcome: Progressing  Intervention: Prevent or Manage Bladder Irritation  Recent Flowsheet Documentation  Taken 6/16/2024 0900 by Carlie Umana RN  Urinary Elimination Promotion: frequent voiding encouraged  Hyperhydration Management: fluids provided  Goal: Diarrhea Symptom Control  Outcome: Progressing  Intervention: Manage Diarrhea  Recent Flowsheet Documentation  Taken 6/16/2024 0900 by Carlie Umana RN  Skin Protection: adhesive use limited  Goal: Improved Activity Tolerance  Outcome: Progressing  Intervention: Promote Improved Energy  Recent Flowsheet Documentation  Taken 6/16/2024 0900 by Carlie Umana RN  Activity Management: activity adjusted per tolerance  Goal: Blood Counts Within Acceptable Range  Outcome: Progressing  Intervention: Monitor and Manage Hematologic Symptoms  Recent Flowsheet Documentation  Taken 6/16/2024 0900 by Carlie Umana RN  Medication Review/Management: medications reviewed  Goal: Absence of Hypersensitivity Reaction  Outcome: Progressing  Goal: Absence of Infection  Outcome: Progressing  Intervention: Prevent and Manage Infection  Recent Flowsheet Documentation  Taken 6/16/2024 0900 by Carlie Umana RN  Infection Prevention: environmental surveillance performed  Isolation Precautions: protective environment maintained  Goal: Improved Oral Mucous Membrane Health and Integrity  Outcome: Progressing  Goal: Nausea and Vomiting Symptom Relief  Outcome: Progressing  Intervention: Prevent and Manage Nausea and Vomiting  Recent Flowsheet  Documentation  Taken 6/16/2024 0900 by Carlie Umana, RN  Nausea/Vomiting Interventions: antiemetic  Goal: Optimal Nutrition Intake  Outcome: Progressing     Problem: Skin Injury Risk Increased  Goal: Skin Health and Integrity  Outcome: Progressing  Intervention: Plan: Nurse Driven Intervention: Moisture Management  Recent Flowsheet Documentation  Taken 6/16/2024 0900 by Carlie Umana, RN  Moisture Interventions: Encourage regular toileting  Intervention: Optimize Skin Protection  Recent Flowsheet Documentation  Taken 6/16/2024 0900 by Carlie Umana, RN  Pressure Reduction Techniques: frequent weight shift encouraged  Skin Protection: adhesive use limited  Activity Management: activity adjusted per tolerance

## 2024-06-17 ENCOUNTER — APPOINTMENT (OUTPATIENT)
Dept: PHYSICAL THERAPY | Facility: CLINIC | Age: 54
DRG: 016 | End: 2024-06-17
Attending: INTERNAL MEDICINE
Payer: COMMERCIAL

## 2024-06-17 LAB
ABO/RH(D): NORMAL
ACANTHOCYTES BLD QL SMEAR: ABNORMAL
ALBUMIN SERPL BCG-MCNC: 2.4 G/DL (ref 3.5–5.2)
ALP SERPL-CCNC: 105 U/L (ref 40–150)
ALT SERPL W P-5'-P-CCNC: 33 U/L (ref 0–50)
ANION GAP SERPL CALCULATED.3IONS-SCNC: 8 MMOL/L (ref 7–15)
ANTIBODY SCREEN: NEGATIVE
AST SERPL W P-5'-P-CCNC: 25 U/L (ref 0–45)
AUER BODIES BLD QL SMEAR: ABNORMAL
BASO STIPL BLD QL SMEAR: ABNORMAL
BASOPHILS # BLD AUTO: 0 10E3/UL (ref 0–0.2)
BASOPHILS NFR BLD AUTO: 0 %
BILIRUB DIRECT SERPL-MCNC: <0.2 MG/DL (ref 0–0.3)
BILIRUB SERPL-MCNC: 0.4 MG/DL
BITE CELLS BLD QL SMEAR: ABNORMAL
BLISTER CELLS BLD QL SMEAR: ABNORMAL
BUN SERPL-MCNC: 25.1 MG/DL (ref 6–20)
BURR CELLS BLD QL SMEAR: ABNORMAL
CALCIUM SERPL-MCNC: 8.5 MG/DL (ref 8.6–10)
CHLORIDE SERPL-SCNC: 108 MMOL/L (ref 98–107)
CREAT SERPL-MCNC: 1.47 MG/DL (ref 0.51–0.95)
DACRYOCYTES BLD QL SMEAR: ABNORMAL
DEPRECATED HCO3 PLAS-SCNC: 24 MMOL/L (ref 22–29)
EGFRCR SERPLBLD CKD-EPI 2021: 42 ML/MIN/1.73M2
ELLIPTOCYTES BLD QL SMEAR: ABNORMAL
EOSINOPHIL # BLD AUTO: 0 10E3/UL (ref 0–0.7)
EOSINOPHIL NFR BLD AUTO: 0 %
ERYTHROCYTE [DISTWIDTH] IN BLOOD BY AUTOMATED COUNT: 15.7 % (ref 10–15)
FRAGMENTS BLD QL SMEAR: ABNORMAL
GLUCOSE SERPL-MCNC: 91 MG/DL (ref 70–99)
HCT VFR BLD AUTO: 31.3 % (ref 35–47)
HGB BLD-MCNC: 10.6 G/DL (ref 11.7–15.7)
HGB C CRYSTALS: ABNORMAL
HOWELL-JOLLY BOD BLD QL SMEAR: ABNORMAL
IMM GRANULOCYTES # BLD: 0 10E3/UL
IMM GRANULOCYTES NFR BLD: 4 %
INR PPP: 0.94 (ref 0.85–1.15)
LYMPHOCYTES # BLD AUTO: 0.1 10E3/UL (ref 0.8–5.3)
LYMPHOCYTES NFR BLD AUTO: 23 %
MAGNESIUM SERPL-MCNC: 2.2 MG/DL (ref 1.7–2.3)
MCH RBC QN AUTO: 31.5 PG (ref 26.5–33)
MCHC RBC AUTO-ENTMCNC: 33.9 G/DL (ref 31.5–36.5)
MCV RBC AUTO: 93 FL (ref 78–100)
MONOCYTES # BLD AUTO: 0 10E3/UL (ref 0–1.3)
MONOCYTES NFR BLD AUTO: 0 %
NEUTROPHILS # BLD AUTO: 0.3 10E3/UL (ref 1.6–8.3)
NEUTROPHILS NFR BLD AUTO: 73 %
NEUTS HYPERSEG BLD QL SMEAR: ABNORMAL
NRBC # BLD AUTO: 0 10E3/UL
NRBC BLD AUTO-RTO: 0 /100
PHOSPHATE SERPL-MCNC: 4 MG/DL (ref 2.5–4.5)
PLAT MORPH BLD: ABNORMAL
PLATELET # BLD AUTO: 124 10E3/UL (ref 150–450)
POLYCHROMASIA BLD QL SMEAR: ABNORMAL
POTASSIUM SERPL-SCNC: 4.2 MMOL/L (ref 3.4–5.3)
PROT SERPL-MCNC: 4.4 G/DL (ref 6.4–8.3)
RBC # BLD AUTO: 3.37 10E6/UL (ref 3.8–5.2)
RBC AGGLUT BLD QL: ABNORMAL
RBC MORPH BLD: ABNORMAL
ROULEAUX BLD QL SMEAR: ABNORMAL
SICKLE CELLS BLD QL SMEAR: ABNORMAL
SMUDGE CELLS BLD QL SMEAR: ABNORMAL
SODIUM SERPL-SCNC: 140 MMOL/L (ref 135–145)
SPECIMEN EXPIRATION DATE: NORMAL
SPHEROCYTES BLD QL SMEAR: ABNORMAL
STOMATOCYTES BLD QL SMEAR: ABNORMAL
TARGETS BLD QL SMEAR: ABNORMAL
TOXIC GRANULES BLD QL SMEAR: ABNORMAL
VARIANT LYMPHS BLD QL SMEAR: ABNORMAL
WBC # BLD AUTO: 0.5 10E3/UL (ref 4–11)

## 2024-06-17 PROCEDURE — 250N000013 HC RX MED GY IP 250 OP 250 PS 637

## 2024-06-17 PROCEDURE — 250N000013 HC RX MED GY IP 250 OP 250 PS 637: Performed by: PHYSICIAN ASSISTANT

## 2024-06-17 PROCEDURE — 250N000013 HC RX MED GY IP 250 OP 250 PS 637: Performed by: NURSE PRACTITIONER

## 2024-06-17 PROCEDURE — 250N000013 HC RX MED GY IP 250 OP 250 PS 637: Performed by: INTERNAL MEDICINE

## 2024-06-17 PROCEDURE — 250N000011 HC RX IP 250 OP 636: Mod: JZ | Performed by: INTERNAL MEDICINE

## 2024-06-17 PROCEDURE — 84100 ASSAY OF PHOSPHORUS: CPT

## 2024-06-17 PROCEDURE — 250N000011 HC RX IP 250 OP 636: Mod: JZ | Performed by: STUDENT IN AN ORGANIZED HEALTH CARE EDUCATION/TRAINING PROGRAM

## 2024-06-17 PROCEDURE — 99233 SBSQ HOSP IP/OBS HIGH 50: CPT | Mod: FS

## 2024-06-17 PROCEDURE — 85004 AUTOMATED DIFF WBC COUNT: CPT

## 2024-06-17 PROCEDURE — 97530 THERAPEUTIC ACTIVITIES: CPT | Mod: GP

## 2024-06-17 PROCEDURE — 80053 COMPREHEN METABOLIC PANEL: CPT

## 2024-06-17 PROCEDURE — 206N000001 HC R&B BMT UMMC

## 2024-06-17 PROCEDURE — 85610 PROTHROMBIN TIME: CPT

## 2024-06-17 PROCEDURE — 82248 BILIRUBIN DIRECT: CPT

## 2024-06-17 PROCEDURE — 83735 ASSAY OF MAGNESIUM: CPT

## 2024-06-17 PROCEDURE — 258N000003 HC RX IP 258 OP 636: Mod: JZ | Performed by: STUDENT IN AN ORGANIZED HEALTH CARE EDUCATION/TRAINING PROGRAM

## 2024-06-17 PROCEDURE — 86900 BLOOD TYPING SEROLOGIC ABO: CPT

## 2024-06-17 RX ADMIN — FLUCONAZOLE 200 MG: 200 TABLET ORAL at 08:30

## 2024-06-17 RX ADMIN — MIDODRINE HYDROCHLORIDE 10 MG: 5 TABLET ORAL at 17:34

## 2024-06-17 RX ADMIN — LEVOFLOXACIN 250 MG: 250 TABLET, FILM COATED ORAL at 10:53

## 2024-06-17 RX ADMIN — MIDODRINE HYDROCHLORIDE 10 MG: 5 TABLET ORAL at 11:47

## 2024-06-17 RX ADMIN — ACETAMINOPHEN 650 MG: 325 TABLET, FILM COATED ORAL at 20:16

## 2024-06-17 RX ADMIN — LEVOTHYROXINE SODIUM 200 MCG: 200 TABLET ORAL at 06:11

## 2024-06-17 RX ADMIN — OLANZAPINE 2.5 MG: 2.5 TABLET, FILM COATED ORAL at 22:11

## 2024-06-17 RX ADMIN — LIOTHYRONINE SODIUM 5 MCG: 5 TABLET ORAL at 08:30

## 2024-06-17 RX ADMIN — ACYCLOVIR 800 MG: 800 TABLET ORAL at 20:17

## 2024-06-17 RX ADMIN — PROCHLORPERAZINE MALEATE 5 MG: 5 TABLET ORAL at 11:46

## 2024-06-17 RX ADMIN — ACYCLOVIR 800 MG: 800 TABLET ORAL at 08:30

## 2024-06-17 RX ADMIN — Medication 5 ML: at 22:11

## 2024-06-17 RX ADMIN — DEXTROSE MONOHYDRATE 10 ML: 50 INJECTION, SOLUTION INTRAVENOUS at 20:17

## 2024-06-17 RX ADMIN — Medication 5 ML: at 03:37

## 2024-06-17 RX ADMIN — MIDODRINE HYDROCHLORIDE 10 MG: 5 TABLET ORAL at 08:30

## 2024-06-17 RX ADMIN — PANTOPRAZOLE SODIUM 40 MG: 40 TABLET, DELAYED RELEASE ORAL at 08:30

## 2024-06-17 RX ADMIN — DEXTROSE MONOHYDRATE 330 MCG: 50 INJECTION, SOLUTION INTRAVENOUS at 20:17

## 2024-06-17 RX ADMIN — ALLOPURINOL 300 MG: 300 TABLET ORAL at 08:30

## 2024-06-17 RX ADMIN — DEXTROSE MONOHYDRATE 20 ML: 50 INJECTION, SOLUTION INTRAVENOUS at 20:17

## 2024-06-17 RX ADMIN — Medication 50 MCG: at 08:30

## 2024-06-17 RX ADMIN — LIOTHYRONINE SODIUM 5 MCG: 5 TABLET ORAL at 13:53

## 2024-06-17 ASSESSMENT — ACTIVITIES OF DAILY LIVING (ADL)
ADLS_ACUITY_SCORE: 18
ADLS_ACUITY_SCORE: 19
ADLS_ACUITY_SCORE: 18
ADLS_ACUITY_SCORE: 19
ADLS_ACUITY_SCORE: 18
ADLS_ACUITY_SCORE: 19
ADLS_ACUITY_SCORE: 18

## 2024-06-17 NOTE — PROGRESS NOTES
Since morning Pt complained dizziness, middle of taking show, going to bathroom, hold lasix and spironolactone, reduced urine output however improved dizziness, orth BP positive, continue to have small amount of stool continue to monitor, also complained dry throat and pain no ulcer was noted in mouth stated use mouth spry really help throat pain

## 2024-06-17 NOTE — PROGRESS NOTES
"BMT/Cell Therapy Daily Progress Note   06/17/2024    Patient ID:  Vivian Brown is a 54 year old female, currently day 5 of her autologous stem cell transplant for amyloidosis.    Admission date: 6/11/2024    INTERVAL  HISTORY     Vivian diuresed 2.3L of fluid yesterday, she reports being dizzy, she said she \"passed out\" while sitting in the shower chair but did not fall off the chair or hit her head. She diarrhea with urgency but no nausea or abdominal pain. She is drinking fluids better today. Diuretics on hold with above symptoms.     Review of Systems: ROS negative except as noted above.      PHYSICAL EXAM     Weight In/Out     Wt Readings from Last 3 Encounters:   06/17/24 65.6 kg (144 lb 9.6 oz)   06/06/24 69.7 kg (153 lb 10.6 oz)   06/05/24 69.8 kg (153 lb 14.4 oz)      I/O last 3 completed shifts:  In: 670 [P.O.:670]  Out: 3051 [Urine:3050; Emesis/NG output:1]       KPS:  90    /77 (BP Location: Right arm)   Pulse 112   Temp 98.2  F (36.8  C) (Oral)   Resp 18   Ht 1.63 m (5' 4.17\")   Wt 65.6 kg (144 lb 9.6 oz)   SpO2 96%   BMI 24.69 kg/m       General: NAD   Eyes: : SUKUMAR, sclera anicteric   Lungs: CTA bilaterally  Cardiovascular: RRR, no M/R/G   Abdominal/Rectal: +BS, soft, NT, ND  Lymphatics: no edema   Skin: no rashes or petechiae  Neuro: A&O   Additional Findings: Hightower site NT, no drainage.    LABS AND IMAGING: I have assessed all abnormal lab values for their clinical significance and any values considered clinically significant have been addressed in the assessment and plan.        Lab Results   Component Value Date    WBC 0.5 (LL) 06/17/2024    ANEU 5.0 06/14/2024    HGB 10.6 (L) 06/17/2024    HCT 31.3 (L) 06/17/2024     (L) 06/17/2024     06/17/2024    POTASSIUM 4.2 06/17/2024    CHLORIDE 108 (H) 06/17/2024    CO2 24 06/17/2024    GLC 91 06/17/2024    BUN 25.1 (H) 06/17/2024    CR 1.47 (H) 06/17/2024    MAG 2.2 06/17/2024    INR 0.94 06/17/2024 "           SYSTEMS-BASED ASSESSMENT AND PLAN   Vivian Brown is a 54 year old female with amyloidosis, undergoing melphalan followed by autologous stem cell rescue.  She is day 5.    Prep: melphalan     BMT  - BMT doc/Coordinator: Wei  - [id number]  - Cell dose: total collections of 5.99 million CD34+ cells/kg.     - Prep as above. Flush and pre-meds prior to transplant.  - GCSF starts day +5, continue until ANC > 2500 for 2 consecutive days.      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     ID  Prophy: ACV; levaquin; fluconazole.  PJP ppx to start at day +28.      GI/NUTRITION  - Anti-emetics: zofran/dex prior to chemotherapy.  Ativan and compazine prn.   - Ulcer prophy: Protonix 40mg daily.   # Celiac Disease, see diet below     FLUIDS/NUTRITION  - home regimen of lasix 40mg po qam and 20mg po q afternoon.  (On hold 6/17 d/t orthostasis and large volume diuresis on 6/16)   - continue daily spironoloactone (on hold 6/17)   - gluten free diet (h/o celiac disease)  - dietitian to follow     RENAL  # renal amyloidosis; diuresis as above  - Monitor Cr and electrolytes daily.   - Replace electrolytes per sliding scale     ENDOCRINE  # Hypothyroidism:  continue PTA cytomel and synthroid     CARDIAC  # cardiac amyloidosis: cautious fluid balance; diuresis as above  # h/o dizziness and chest pain with chemotherapy for amyloidosis; improved with addition of midodrine.  Continue.   # hyperlipidemia: rosuvastatin on hold during acute transplant course     SUPPORTIVE CARE  - PT/OT to evaluate on admission and follow as indicated.   - BMT Social work to follow.     Known issues that I take into account for medical decisions, with salient changes to the plan considering these complexities noted above.    Patient Active Problem List   Diagnosis    Hypothyroidism due to acquired atrophy of thyroid    Family hx of colon cancer    Nonallopathic lesion of  cervical region    Cervicalgia    Nonallopathic lesion of sacral region    Nonallopathic lesion of lumbar region    Lumbago    Nonallopathic lesion of thoracic region    Hyperlipidemia LDL goal <100    Dependent edema R>L    AL amyloidosis (H)    Hypogammaglobulinemia (H24)    Chronic kidney disease, stage 3a (H)    Celiac disease    Autologous donor of stem cells   \  Medically Ready for Discharge: Anticipated in 5+ Days    Clinically Significant Risk Factors              # Hypoalbuminemia: Lowest albumin = 2.4 g/dL at 6/17/2024  3:38 AM, will monitor as appropriate   # Thrombocytopenia: Lowest platelets = 124 in last 2 days, will monitor for bleeding             #Precipitous drop in Hgb/Hct: Lowest Hgb this hospitalization: 9.5 g/dL. Will continue to monitor and treat/transfuse as appropriate.                I spent 30 minutes in the care of this patient today, which included time necessary for preparation for the visit, obtaining history, ordering medications/tests/procedures as medically indicated, review of pertinent medical literature, counseling of the patient, communication of recommendations to the care team, and documentation time.      Skip Jerez PA-C

## 2024-06-17 NOTE — PLAN OF CARE
Afebrile. X1 soft BP this morning, 100/86 on recheck. Patient did report x1 episode of slight dizziness overnight, falls education reinforced and patient agreeable to calling for assistance if any further dizziness. Stools slightly improved overnight, fatigued and resting well between cares.     Problem: Adult Inpatient Plan of Care  Goal: Absence of Hospital-Acquired Illness or Injury  Outcome: Progressing  Intervention: Identify and Manage Fall Risk  Recent Flowsheet Documentation  Taken 6/16/2024 2134 by Clotilde Saavedra, RN  Safety Promotion/Fall Prevention: safety round/check completed  Taken 6/16/2024 2000 by Clotilde Saavedra, RN  Safety Promotion/Fall Prevention: safety round/check completed  Intervention: Prevent Skin Injury  Recent Flowsheet Documentation  Taken 6/16/2024 2000 by Clotilde Saavedra, RN  Body Position: position changed independently  Skin Protection: adhesive use limited  Device Skin Pressure Protection: adhesive use limited   Goal Outcome Evaluation:

## 2024-06-17 NOTE — PROGRESS NOTES
ZOHREH Attestation signed by Rebecca Batres MD      Attending note.     06/17/24       Mary Ann Brown was seen and evaluated today as part of a shared APRN/PA visit. I reviewed today s vital signs, labs, imaging, notes, and other studies.       54 year old female with amyloidosis, currently day 5 of her autologous stem cell transplant for amyloidosis.     Responded well to diuresis, now with 10lbs water weight loss, dizzy this am. Otherwise well, started to have loose BM.    My key exam findings include: doing well, BP ok, no mucositis, overall stable. Some orthostatic low BP, creat ok.     Key management decisions made by me and carried out under my direction include: hold diuretics today, check orthostic BP and encourage oral intake, give 500NS if persistent dizziness after eating/drinking.  Otherwise cont present management re cardiac amyloidosis with careful management of fluid status. Cont abx ppx.      I spent 30 minutes in the care of this patient today, which included time necessary for preparation for the visit, obtaining history, ordering medications/tests/procedures as medically indicated, review of pertinent medical literature, counseling of the patient, communication of recommendations to the care team, and documentation time      Rebecca Batres MD

## 2024-06-18 ENCOUNTER — APPOINTMENT (OUTPATIENT)
Dept: PHYSICAL THERAPY | Facility: CLINIC | Age: 54
DRG: 016 | End: 2024-06-18
Attending: INTERNAL MEDICINE
Payer: COMMERCIAL

## 2024-06-18 LAB
ANION GAP SERPL CALCULATED.3IONS-SCNC: 7 MMOL/L (ref 7–15)
BASOPHILS # BLD AUTO: ABNORMAL 10*3/UL
BASOPHILS # BLD MANUAL: 0 10E3/UL (ref 0–0.2)
BASOPHILS NFR BLD AUTO: ABNORMAL %
BASOPHILS NFR BLD MANUAL: 0 %
BUN SERPL-MCNC: 20 MG/DL (ref 6–20)
BURR CELLS BLD QL SMEAR: ABNORMAL
CALCIUM SERPL-MCNC: 8.5 MG/DL (ref 8.6–10)
CHLORIDE SERPL-SCNC: 108 MMOL/L (ref 98–107)
CREAT SERPL-MCNC: 1.45 MG/DL (ref 0.51–0.95)
DEPRECATED HCO3 PLAS-SCNC: 23 MMOL/L (ref 22–29)
EGFRCR SERPLBLD CKD-EPI 2021: 43 ML/MIN/1.73M2
EOSINOPHIL # BLD AUTO: ABNORMAL 10*3/UL
EOSINOPHIL # BLD MANUAL: 0 10E3/UL (ref 0–0.7)
EOSINOPHIL NFR BLD AUTO: ABNORMAL %
EOSINOPHIL NFR BLD MANUAL: 1 %
ERYTHROCYTE [DISTWIDTH] IN BLOOD BY AUTOMATED COUNT: 15.5 % (ref 10–15)
GLUCOSE SERPL-MCNC: 89 MG/DL (ref 70–99)
HCT VFR BLD AUTO: 31.8 % (ref 35–47)
HGB BLD-MCNC: 10.8 G/DL (ref 11.7–15.7)
IMM GRANULOCYTES # BLD: ABNORMAL 10*3/UL
IMM GRANULOCYTES NFR BLD: ABNORMAL %
LACTATE SERPL-SCNC: 0.9 MMOL/L (ref 0.7–2)
LYMPHOCYTES # BLD AUTO: ABNORMAL 10*3/UL
LYMPHOCYTES # BLD MANUAL: 0.1 10E3/UL (ref 0.8–5.3)
LYMPHOCYTES NFR BLD AUTO: ABNORMAL %
LYMPHOCYTES NFR BLD MANUAL: 20 %
MAGNESIUM SERPL-MCNC: 2.2 MG/DL (ref 1.7–2.3)
MCH RBC QN AUTO: 31.1 PG (ref 26.5–33)
MCHC RBC AUTO-ENTMCNC: 34 G/DL (ref 31.5–36.5)
MCV RBC AUTO: 92 FL (ref 78–100)
METAMYELOCYTES # BLD MANUAL: 0 10E3/UL
METAMYELOCYTES NFR BLD MANUAL: 1 %
MONOCYTES # BLD AUTO: ABNORMAL 10*3/UL
MONOCYTES # BLD MANUAL: 0 10E3/UL (ref 0–1.3)
MONOCYTES NFR BLD AUTO: ABNORMAL %
MONOCYTES NFR BLD MANUAL: 0 %
MYELOCYTES # BLD MANUAL: 0 10E3/UL
MYELOCYTES NFR BLD MANUAL: 1 %
NEUTROPHILS # BLD AUTO: ABNORMAL 10*3/UL
NEUTROPHILS # BLD MANUAL: 0.4 10E3/UL (ref 1.6–8.3)
NEUTROPHILS NFR BLD AUTO: ABNORMAL %
NEUTROPHILS NFR BLD MANUAL: 77 %
PHOSPHATE SERPL-MCNC: 4.5 MG/DL (ref 2.5–4.5)
PLAT MORPH BLD: ABNORMAL
PLATELET # BLD AUTO: 85 10E3/UL (ref 150–450)
POTASSIUM SERPL-SCNC: 3.9 MMOL/L (ref 3.4–5.3)
RBC # BLD AUTO: 3.47 10E6/UL (ref 3.8–5.2)
RBC MORPH BLD: ABNORMAL
SODIUM SERPL-SCNC: 138 MMOL/L (ref 135–145)
WBC # BLD AUTO: 0.5 10E3/UL (ref 4–11)

## 2024-06-18 PROCEDURE — 250N000013 HC RX MED GY IP 250 OP 250 PS 637

## 2024-06-18 PROCEDURE — 258N000003 HC RX IP 258 OP 636: Mod: JZ | Performed by: STUDENT IN AN ORGANIZED HEALTH CARE EDUCATION/TRAINING PROGRAM

## 2024-06-18 PROCEDURE — 85007 BL SMEAR W/DIFF WBC COUNT: CPT

## 2024-06-18 PROCEDURE — 83605 ASSAY OF LACTIC ACID: CPT | Performed by: STUDENT IN AN ORGANIZED HEALTH CARE EDUCATION/TRAINING PROGRAM

## 2024-06-18 PROCEDURE — 250N000011 HC RX IP 250 OP 636: Mod: JZ | Performed by: INTERNAL MEDICINE

## 2024-06-18 PROCEDURE — 206N000001 HC R&B BMT UMMC

## 2024-06-18 PROCEDURE — 84100 ASSAY OF PHOSPHORUS: CPT | Performed by: STUDENT IN AN ORGANIZED HEALTH CARE EDUCATION/TRAINING PROGRAM

## 2024-06-18 PROCEDURE — 258N000003 HC RX IP 258 OP 636: Mod: JZ

## 2024-06-18 PROCEDURE — 250N000013 HC RX MED GY IP 250 OP 250 PS 637: Performed by: INTERNAL MEDICINE

## 2024-06-18 PROCEDURE — 250N000013 HC RX MED GY IP 250 OP 250 PS 637: Performed by: NURSE PRACTITIONER

## 2024-06-18 PROCEDURE — 97530 THERAPEUTIC ACTIVITIES: CPT | Mod: GP

## 2024-06-18 PROCEDURE — 83735 ASSAY OF MAGNESIUM: CPT | Performed by: STUDENT IN AN ORGANIZED HEALTH CARE EDUCATION/TRAINING PROGRAM

## 2024-06-18 PROCEDURE — 250N000013 HC RX MED GY IP 250 OP 250 PS 637: Performed by: PHYSICIAN ASSISTANT

## 2024-06-18 PROCEDURE — 97110 THERAPEUTIC EXERCISES: CPT | Mod: GP

## 2024-06-18 PROCEDURE — 80048 BASIC METABOLIC PNL TOTAL CA: CPT

## 2024-06-18 PROCEDURE — 250N000011 HC RX IP 250 OP 636: Mod: JZ | Performed by: STUDENT IN AN ORGANIZED HEALTH CARE EDUCATION/TRAINING PROGRAM

## 2024-06-18 PROCEDURE — 85027 COMPLETE CBC AUTOMATED: CPT

## 2024-06-18 RX ADMIN — DEXTROSE MONOHYDRATE 20 ML: 50 INJECTION, SOLUTION INTRAVENOUS at 21:02

## 2024-06-18 RX ADMIN — SODIUM CHLORIDE 500 ML: 9 INJECTION, SOLUTION INTRAVENOUS at 15:49

## 2024-06-18 RX ADMIN — SODIUM CHLORIDE, POTASSIUM CHLORIDE, SODIUM LACTATE AND CALCIUM CHLORIDE 500 ML: 600; 310; 30; 20 INJECTION, SOLUTION INTRAVENOUS at 21:51

## 2024-06-18 RX ADMIN — LIOTHYRONINE SODIUM 5 MCG: 5 TABLET ORAL at 08:02

## 2024-06-18 RX ADMIN — Medication 5 ML: at 03:28

## 2024-06-18 RX ADMIN — PROCHLORPERAZINE MALEATE 5 MG: 5 TABLET ORAL at 08:04

## 2024-06-18 RX ADMIN — MIDODRINE HYDROCHLORIDE 10 MG: 5 TABLET ORAL at 08:02

## 2024-06-18 RX ADMIN — LEVOTHYROXINE SODIUM 200 MCG: 200 TABLET ORAL at 06:27

## 2024-06-18 RX ADMIN — OLANZAPINE 2.5 MG: 2.5 TABLET, FILM COATED ORAL at 21:02

## 2024-06-18 RX ADMIN — PANTOPRAZOLE SODIUM 40 MG: 40 TABLET, DELAYED RELEASE ORAL at 08:02

## 2024-06-18 RX ADMIN — FLUCONAZOLE 200 MG: 200 TABLET ORAL at 08:02

## 2024-06-18 RX ADMIN — MIDODRINE HYDROCHLORIDE 10 MG: 5 TABLET ORAL at 13:20

## 2024-06-18 RX ADMIN — PROCHLORPERAZINE MALEATE 10 MG: 5 TABLET ORAL at 16:00

## 2024-06-18 RX ADMIN — ALLOPURINOL 300 MG: 300 TABLET ORAL at 08:02

## 2024-06-18 RX ADMIN — MIDODRINE HYDROCHLORIDE 10 MG: 5 TABLET ORAL at 16:00

## 2024-06-18 RX ADMIN — LEVOFLOXACIN 250 MG: 250 TABLET, FILM COATED ORAL at 10:10

## 2024-06-18 RX ADMIN — ACYCLOVIR 800 MG: 800 TABLET ORAL at 21:01

## 2024-06-18 RX ADMIN — DEXTROSE MONOHYDRATE 330 MCG: 50 INJECTION, SOLUTION INTRAVENOUS at 21:01

## 2024-06-18 RX ADMIN — SODIUM CHLORIDE 500 ML: 9 INJECTION, SOLUTION INTRAVENOUS at 10:25

## 2024-06-18 RX ADMIN — LIOTHYRONINE SODIUM 5 MCG: 5 TABLET ORAL at 13:20

## 2024-06-18 RX ADMIN — Medication 50 MCG: at 08:02

## 2024-06-18 RX ADMIN — ACYCLOVIR 800 MG: 800 TABLET ORAL at 08:02

## 2024-06-18 ASSESSMENT — ACTIVITIES OF DAILY LIVING (ADL)
ADLS_ACUITY_SCORE: 19
ADLS_ACUITY_SCORE: 19
ADLS_ACUITY_SCORE: 23
ADLS_ACUITY_SCORE: 19
ADLS_ACUITY_SCORE: 19
ADLS_ACUITY_SCORE: 23
ADLS_ACUITY_SCORE: 23
ADLS_ACUITY_SCORE: 21
ADLS_ACUITY_SCORE: 23
ADLS_ACUITY_SCORE: 23
ADLS_ACUITY_SCORE: 19
ADLS_ACUITY_SCORE: 19
ADLS_ACUITY_SCORE: 23
ADLS_ACUITY_SCORE: 19
ADLS_ACUITY_SCORE: 23
ADLS_ACUITY_SCORE: 19
ADLS_ACUITY_SCORE: 23
ADLS_ACUITY_SCORE: 23
ADLS_ACUITY_SCORE: 19
ADLS_ACUITY_SCORE: 23

## 2024-06-18 NOTE — PLAN OF CARE
"/80 (BP Location: Right arm)   Pulse 88   Temp 98.1  F (36.7  C) (Oral)   Resp 16   Ht 1.63 m (5' 4.17\")   Wt 64.5 kg (142 lb 4.8 oz)   SpO2 98%   BMI 24.29 kg/m      Patient afebrile and on RA, but having issues with orthostatic hypotension today. Baseline takes midodrine, today her standing Bps were 73/47 and 67/37. Feeling dizzy and faint after a minute or two standing, or when wiping herself after using the restroom. MD's aware - lasix on hold and two, 500mL boluses administered today. Up with a SBA + gait belt to [facility]. Is also having anticipatory nausea/nausea brought on by smell of certain foods or juice. Compazine given x2 today. Is having more frequent stools, but more of a smear than diarrhea - refusing imodium at this time. Still eating three small meals and sipping on water/soda throughout the day. No edema. CVC heparin locked.     Goal Outcome Evaluation:    Problem: Adult Inpatient Plan of Care  Goal: Plan of Care Review  Description: The Plan of Care Review/Shift note should be completed every shift.  The Outcome Evaluation is a brief statement about your assessment that the patient is improving, declining, or no change.  This information will be displayed automatically on your shift  note.  Outcome: Progressing  Goal: Patient-Specific Goal (Individualized)  Description: You can add care plan individualizations to a care plan. Examples of Individualization might be:  \"Parent requests to be called daily at 9am for status\", \"I have a hard time hearing out of my right ear\", or \"Do not touch me to wake me up as it startles  me\".  Outcome: Progressing  Goal: Absence of Hospital-Acquired Illness or Injury  Outcome: Progressing  Intervention: Identify and Manage Fall Risk  Recent Flowsheet Documentation  Taken 6/18/2024 0800 by Carlie Umana RN  Safety Promotion/Fall Prevention:   safety round/check completed   room organization consistent   patient and family education   nonskid shoes/slippers " when out of bed   clutter free environment maintained   assistive device/personal items within reach  Intervention: Prevent Skin Injury  Recent Flowsheet Documentation  Taken 6/18/2024 0800 by Carlie Umana RN  Body Position: position changed independently  Intervention: Prevent Infection  Recent Flowsheet Documentation  Taken 6/18/2024 0800 by Carlie Umana RN  Infection Prevention:   single patient room provided   rest/sleep promoted   personal protective equipment utilized   hand hygiene promoted   equipment surfaces disinfected   environmental surveillance performed  Goal: Optimal Comfort and Wellbeing  Outcome: Progressing  Goal: Readiness for Transition of Care  Outcome: Progressing     Problem: Stem Cell/Bone Marrow Transplant  Goal: Optimal Coping with Transplant  Outcome: Progressing  Goal: Symptom-Free Urinary Elimination  Outcome: Progressing  Goal: Diarrhea Symptom Control  Outcome: Progressing  Intervention: Manage Diarrhea  Recent Flowsheet Documentation  Taken 6/18/2024 0800 by Carlie Umana RN  Perineal Care: perineal hygiene encouraged  Goal: Improved Activity Tolerance  Outcome: Progressing  Intervention: Promote Improved Energy  Recent Flowsheet Documentation  Taken 6/18/2024 0800 by Carlie Umana RN  Activity Management: activity adjusted per tolerance  Goal: Blood Counts Within Acceptable Range  Outcome: Progressing  Intervention: Monitor and Manage Hematologic Symptoms  Recent Flowsheet Documentation  Taken 6/18/2024 0800 by Carlie Umana RN  Bleeding Precautions: monitored for signs of bleeding  Medication Review/Management: medications reviewed  Goal: Absence of Hypersensitivity Reaction  Outcome: Progressing  Goal: Absence of Infection  Outcome: Progressing  Intervention: Prevent and Manage Infection  Recent Flowsheet Documentation  Taken 6/18/2024 0800 by Carlie Umana RN  Infection Prevention:   single patient room provided   rest/sleep promoted   personal protective equipment  utilized   hand hygiene promoted   equipment surfaces disinfected   environmental surveillance performed  Infection Management: aseptic technique maintained  Isolation Precautions: protective environment maintained  Goal: Improved Oral Mucous Membrane Health and Integrity  Outcome: Progressing  Intervention: Promote Oral Comfort and Health  Recent Flowsheet Documentation  Taken 6/18/2024 0800 by Carlie Umana RN  Oral Mucous Membrane Protection: nonirritating oral fluids promoted  Goal: Nausea and Vomiting Symptom Relief  Outcome: Progressing  Intervention: Prevent and Manage Nausea and Vomiting  Recent Flowsheet Documentation  Taken 6/18/2024 0800 by Carlie Umana RN  Nausea/Vomiting Interventions: antiemetic  Goal: Optimal Nutrition Intake  Outcome: Progressing     Problem: Skin Injury Risk Increased  Goal: Skin Health and Integrity  Outcome: Progressing  Intervention: Plan: Nurse Driven Intervention: Moisture Management  Recent Flowsheet Documentation  Taken 6/18/2024 0800 by Carlie Umana RN  Moisture Interventions: Encourage regular toileting  Intervention: Optimize Skin Protection  Recent Flowsheet Documentation  Taken 6/18/2024 0800 by Carlie Umana RN  Activity Management: activity adjusted per tolerance

## 2024-06-18 NOTE — PROGRESS NOTES
"BMT/Cell Therapy Daily Progress Note   06/18/2024    Patient ID:  Vivian Brown is a 54 year old female, currently day 6 of her autologous stem cell transplant for amyloidosis.    Admission date: 6/11/2024    INTERVAL  HISTORY     Vivian reports that her dizziness/LH is less severe today. She still has orthostasis if out of bed and up right longer than 2-3 minutes. She was net negative with her fluid balance yesterday and is down another 2 pounds. She will get a 500 ml bolus today. Diarrhea continues, mild anticipatory nausea and her appetite is better. Diuretics remain on hold.     Review of Systems: ROS negative except as noted above.      PHYSICAL EXAM     Weight In/Out     Wt Readings from Last 3 Encounters:   06/18/24 64.5 kg (142 lb 4.8 oz)   06/06/24 69.7 kg (153 lb 10.6 oz)   06/05/24 69.8 kg (153 lb 14.4 oz)      I/O last 3 completed shifts:  In: 740 [P.O.:740]  Out: 2025 [Urine:2000; Stool:25]       KPS:  90    /80 (BP Location: Right arm)   Pulse 105   Temp 97.8  F (36.6  C) (Oral)   Resp 16   Ht 1.63 m (5' 4.17\")   Wt 64.5 kg (142 lb 4.8 oz)   SpO2 96%   BMI 24.29 kg/m       General: NAD   Eyes: : SUKUMAR, sclera anicteric   Lungs: CTA bilaterally  Cardiovascular: RRR, no M/R/G   Abdominal/Rectal: +BS, soft, NT, ND  Lymphatics: no edema   Skin: no rashes or petechiae  Neuro: A&O   Additional Findings: Hightower site NT, no drainage.    LABS AND IMAGING: I have assessed all abnormal lab values for their clinical significance and any values considered clinically significant have been addressed in the assessment and plan.        Lab Results   Component Value Date    WBC 0.5 (LL) 06/18/2024    ANEU 0.4 (LL) 06/18/2024    HGB 10.8 (L) 06/18/2024    HCT 31.8 (L) 06/18/2024    PLT 85 (L) 06/18/2024     06/18/2024    POTASSIUM 3.9 06/18/2024    CHLORIDE 108 (H) 06/18/2024    CO2 23 06/18/2024    GLC 89 06/18/2024    BUN 20.0 06/18/2024    CR 1.45 (H) 06/18/2024    MAG 2.2 06/18/2024    INR " 0.94 06/17/2024           SYSTEMS-BASED ASSESSMENT AND PLAN   Vivian Brown is a 54 year old female with amyloidosis, undergoing melphalan followed by autologous stem cell rescue.  She is day 6.    Prep: melphalan     BMT  - BMT doc/Coordinator: Wei  - [id number]  - Cell dose: total collections of 5.99 million CD34+ cells/kg.     - Prep as above. Flush and pre-meds prior to transplant.  - GCSF starts day +5, continue until ANC > 2500 for 2 consecutive days.      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     ID  Prophy: ACV; levaquin; fluconazole.  PJP ppx to start at day +28.      GI/NUTRITION  - Anti-emetics: zofran/dex prior to chemotherapy.  Ativan and compazine prn.   - Ulcer prophy: Protonix 40mg daily.   # Celiac Disease, see diet below     FLUIDS/NUTRITION  - home regimen of lasix 40mg po qam and 20mg po q afternoon.  (On hold since 6/17 d/t orthostasis and large volume diuresis on 6/16)   - continue daily spironoloactone (on hold since 6/17)   - gluten free diet (h/o celiac disease)  - dietitian to follow  - 500 ml bolus on 6/18      RENAL  # renal amyloidosis; diuresis as above  - Monitor Cr and electrolytes daily.   - Replace electrolytes per sliding scale     ENDOCRINE  # Hypothyroidism:  continue PTA cytomel and synthroid     CARDIAC  # cardiac amyloidosis: cautious fluid balance; diuresis as above  # h/o dizziness and chest pain with chemotherapy for amyloidosis; improved with addition of midodrine.  Continue.   # hyperlipidemia: rosuvastatin on hold during acute transplant course     SUPPORTIVE CARE  - PT/OT to evaluate on admission and follow as indicated.   - BMT Social work to follow.     Known issues that I take into account for medical decisions, with salient changes to the plan considering these complexities noted above.    Patient Active Problem List   Diagnosis    Hypothyroidism due to acquired atrophy of thyroid    Family  hx of colon cancer    Nonallopathic lesion of cervical region    Cervicalgia    Nonallopathic lesion of sacral region    Nonallopathic lesion of lumbar region    Lumbago    Nonallopathic lesion of thoracic region    Hyperlipidemia LDL goal <100    Dependent edema R>L    AL amyloidosis (H)    Hypogammaglobulinemia (H24)    Chronic kidney disease, stage 3a (H)    Celiac disease    Autologous donor of stem cells   \  Medically Ready for Discharge: Anticipated in 5+ Days    Clinically Significant Risk Factors              # Hypoalbuminemia: Lowest albumin = 2.4 g/dL at 6/17/2024  3:38 AM, will monitor as appropriate   # Thrombocytopenia: Lowest platelets = 85 in last 2 days, will monitor for bleeding             #Precipitous drop in Hgb/Hct: Lowest Hgb this hospitalization: 9.5 g/dL. Will continue to monitor and treat/transfuse as appropriate.                I spent 30 minutes in the care of this patient today, which included time necessary for preparation for the visit, obtaining history, ordering medications/tests/procedures as medically indicated, review of pertinent medical literature, counseling of the patient, communication of recommendations to the care team, and documentation time.      Skip Jerez PA-C

## 2024-06-18 NOTE — PROGRESS NOTES
"CLINICAL NUTRITION SERVICES - ASSESSMENT NOTE     Nutrition Prescription    RECOMMENDATIONS FOR MDs/PROVIDERS TO ORDER:  None at this time     Malnutrition Status:    Patient does not meet two of the established criteria necessary for diagnosing malnutrition    Recommendations already ordered by Registered Dietitian (RD):  None at this time     Future/Additional Recommendations:  -Monitor PO intake  -Monitor wt trends  -Monitor labs      REASON FOR ASSESSMENT  Vivian Brown is a/an 54 year old female assessed by the dietitian for LOS       CLINICAL HISTORY  Pt with PMH of celiac disease, hypothyroidism, CKD stage 3a, AL amyloidosis, and dependent edema. Pt is currently day 6 s/p auto PBSC.       NUTRITION HISTORY  Met with pt and her mom at bedside. Pt reports she has a good appetite currently (was eating 4 meals + snacks daily at home). She reports nausea came in cycles at home, now better controlled with medication. Pt reports she has brought GF items from home due to anticipated long admission.       CURRENT NUTRITION ORDERS  Diet: Gluten free   Intake/Tolerance: Pt reports appetite has been good. % documented per RN flowsheets (3 meals ordered daily per [employer]).     GI  Pt reports she typically has a BM every 1-2 days, constipated at baseline.     LABS  Labs reviewed. Notable for:  Cr 1.45  Ca 8.5     MEDICATIONS  Medications reviewed. Notable for:  Levothyroxine, Olanzapine, Protonix, Vitamin D3  PRN Miralax, Compazine     ANTHROPOMETRICS  Height: 163 cm (5' 4.173\")  Most Recent Weight: 64.5 kg (142 lbs 4.8 oz)- currently 118.3% of IBW  IBW: 54.5 kg  BMI: Normal BMI  Weight History: Pt believes her current wt is a dry wt. She reports she has lost wt over some time (unsure of exact amount or timeframe) due to fluid shifts/being diuresed, denies this was intentional or due to shifts in eating.   Care Everywhere Records:  Wt 73.9 kg (163 lb)- 5/16 office visit  Wt 78.7 kg (173 lb 9.6 oz)- 3/18 " office visit   Wt 93 kg (205 lb)- 5/22/2023 office visit    Dosing Weight: 64.5 kg (actual wt)    ASSESSED NUTRITION NEEDS  Estimated Energy Needs: 6409-1949 kcals/day (25 - 30 kcals/kg)  Justification: Increased needs  Estimated Protein Needs: 77-97 grams protein/day (1.2 - 1.5 grams of pro/kg)  Justification: Increased needs  Estimated Fluid Needs: (1 mL/kcal) or per MD  Justification: Maintenance    PHYSICAL FINDINGS  See malnutrition section below.       MALNUTRITION  % Intake: No decreased intake noted  % Weight Loss: Weight loss does not meet criteria- likely due to fluids   Subcutaneous Fat Loss: None observed  Muscle Loss: None observed  Fluid Accumulation/Edema: Does not meet criteria- +1 generalized edema noted on RN flowsheets   Malnutrition Diagnosis: Patient does not meet two of the established criteria necessary for diagnosing malnutrition    NUTRITION DIAGNOSIS  Predicted inadequate nutrient intake (kcal/protein) related to recent PBSC with potential for side effects to affect ability to take adequate PO.        INTERVENTIONS  Implementation  Nutrition Education: Provided education on protein sources        Goals  Patient to consume % of nutritionally adequate meal trays TID, or the equivalent with supplements/snacks.     Monitoring/Evaluation  Progress toward goals will be monitored and evaluated per protocol.  Supriya Wiseman (Maggie), CORI, LD- 5C Clinical Dietitian   Available on [employer]  No longer available by paging

## 2024-06-18 NOTE — PLAN OF CARE
"BP 99/79 (BP Location: Right arm)   Pulse 96   Temp 98  F (36.7  C) (Oral)   Resp 18   Ht 1.63 m (5' 4.17\")   Wt 65.6 kg (144 lb 9.6 oz)   SpO2 97%   BMI 24.69 kg/m      Neuro: A&Ox4.   Cardiac: No tele orders. VSS.   Respiratory: Sating > 92% on RA.  GI/: Voiding spontaneously. No BM this shift  Diet/appetite: Regular diet. Gluten free.  Activity: Up w/ SBA. Bed alarm on  Pain: At acceptable level on current regimen.   Skin: No new deficits noted.  LDA's: DL CVC - heparin locked    Plan: AVSS on room air. Pt c/o neck cramping/soreness - prn Tylenol given x1 this shift, pt reported relief. No acute events overnight. No replacements indicated with AM labs. Continue with POC. Notify primary team with changes.    Goal Outcome Evaluation:      Plan of Care Reviewed With: patient                 "

## 2024-06-19 LAB
ACANTHOCYTES BLD QL SMEAR: ABNORMAL
ANION GAP SERPL CALCULATED.3IONS-SCNC: 7 MMOL/L (ref 7–15)
AUER BODIES BLD QL SMEAR: ABNORMAL
BASO STIPL BLD QL SMEAR: ABNORMAL
BASOPHILS # BLD AUTO: ABNORMAL 10*3/UL
BASOPHILS NFR BLD AUTO: ABNORMAL %
BITE CELLS BLD QL SMEAR: ABNORMAL
BLISTER CELLS BLD QL SMEAR: ABNORMAL
BUN SERPL-MCNC: 16 MG/DL (ref 6–20)
BURR CELLS BLD QL SMEAR: SLIGHT
CALCIUM SERPL-MCNC: 8.2 MG/DL (ref 8.6–10)
CHLORIDE SERPL-SCNC: 109 MMOL/L (ref 98–107)
CREAT SERPL-MCNC: 1.34 MG/DL (ref 0.51–0.95)
DACRYOCYTES BLD QL SMEAR: ABNORMAL
DEPRECATED HCO3 PLAS-SCNC: 21 MMOL/L (ref 22–29)
EGFRCR SERPLBLD CKD-EPI 2021: 47 ML/MIN/1.73M2
ELLIPTOCYTES BLD QL SMEAR: SLIGHT
EOSINOPHIL # BLD AUTO: ABNORMAL 10*3/UL
EOSINOPHIL NFR BLD AUTO: ABNORMAL %
ERYTHROCYTE [DISTWIDTH] IN BLOOD BY AUTOMATED COUNT: 15.1 % (ref 10–15)
FRAGMENTS BLD QL SMEAR: ABNORMAL
GLUCOSE SERPL-MCNC: 93 MG/DL (ref 70–99)
HCT VFR BLD AUTO: 29 % (ref 35–47)
HGB BLD-MCNC: 10 G/DL (ref 11.7–15.7)
HGB C CRYSTALS: ABNORMAL
HOWELL-JOLLY BOD BLD QL SMEAR: ABNORMAL
IMM GRANULOCYTES # BLD: ABNORMAL 10*3/UL
IMM GRANULOCYTES NFR BLD: ABNORMAL %
LACTATE SERPL-SCNC: 1.1 MMOL/L (ref 0.7–2)
LYMPHOCYTES # BLD AUTO: ABNORMAL 10*3/UL
LYMPHOCYTES NFR BLD AUTO: ABNORMAL %
MAGNESIUM SERPL-MCNC: 2.1 MG/DL (ref 1.7–2.3)
MCH RBC QN AUTO: 31.6 PG (ref 26.5–33)
MCHC RBC AUTO-ENTMCNC: 34.5 G/DL (ref 31.5–36.5)
MCV RBC AUTO: 92 FL (ref 78–100)
MONOCYTES # BLD AUTO: ABNORMAL 10*3/UL
MONOCYTES NFR BLD AUTO: ABNORMAL %
NEUTROPHILS # BLD AUTO: ABNORMAL 10*3/UL
NEUTROPHILS NFR BLD AUTO: ABNORMAL %
NEUTS HYPERSEG BLD QL SMEAR: ABNORMAL
PHOSPHATE SERPL-MCNC: 3.7 MG/DL (ref 2.5–4.5)
PLAT MORPH BLD: ABNORMAL
PLATELET # BLD AUTO: 42 10E3/UL (ref 150–450)
POLYCHROMASIA BLD QL SMEAR: ABNORMAL
POTASSIUM SERPL-SCNC: 3.6 MMOL/L (ref 3.4–5.3)
RBC # BLD AUTO: 3.16 10E6/UL (ref 3.8–5.2)
RBC AGGLUT BLD QL: PRESENT
RBC MORPH BLD: ABNORMAL
ROULEAUX BLD QL SMEAR: ABNORMAL
SICKLE CELLS BLD QL SMEAR: ABNORMAL
SMUDGE CELLS BLD QL SMEAR: ABNORMAL
SODIUM SERPL-SCNC: 137 MMOL/L (ref 135–145)
SPHEROCYTES BLD QL SMEAR: ABNORMAL
STOMATOCYTES BLD QL SMEAR: ABNORMAL
TARGETS BLD QL SMEAR: ABNORMAL
TOXIC GRANULES BLD QL SMEAR: ABNORMAL
URATE SERPL-MCNC: 3.3 MG/DL (ref 2.4–5.7)
VARIANT LYMPHS BLD QL SMEAR: ABNORMAL
WBC # BLD AUTO: 0.1 10E3/UL (ref 4–11)

## 2024-06-19 PROCEDURE — 250N000013 HC RX MED GY IP 250 OP 250 PS 637

## 2024-06-19 PROCEDURE — 84100 ASSAY OF PHOSPHORUS: CPT | Performed by: STUDENT IN AN ORGANIZED HEALTH CARE EDUCATION/TRAINING PROGRAM

## 2024-06-19 PROCEDURE — 250N000013 HC RX MED GY IP 250 OP 250 PS 637: Performed by: INTERNAL MEDICINE

## 2024-06-19 PROCEDURE — 258N000003 HC RX IP 258 OP 636: Mod: JZ | Performed by: STUDENT IN AN ORGANIZED HEALTH CARE EDUCATION/TRAINING PROGRAM

## 2024-06-19 PROCEDURE — 250N000013 HC RX MED GY IP 250 OP 250 PS 637: Performed by: PHYSICIAN ASSISTANT

## 2024-06-19 PROCEDURE — 250N000011 HC RX IP 250 OP 636: Mod: JZ | Performed by: HOSPITALIST

## 2024-06-19 PROCEDURE — 250N000011 HC RX IP 250 OP 636: Mod: JZ | Performed by: INTERNAL MEDICINE

## 2024-06-19 PROCEDURE — 83605 ASSAY OF LACTIC ACID: CPT | Performed by: STUDENT IN AN ORGANIZED HEALTH CARE EDUCATION/TRAINING PROGRAM

## 2024-06-19 PROCEDURE — 36415 COLL VENOUS BLD VENIPUNCTURE: CPT | Performed by: STUDENT IN AN ORGANIZED HEALTH CARE EDUCATION/TRAINING PROGRAM

## 2024-06-19 PROCEDURE — 250N000013 HC RX MED GY IP 250 OP 250 PS 637: Performed by: STUDENT IN AN ORGANIZED HEALTH CARE EDUCATION/TRAINING PROGRAM

## 2024-06-19 PROCEDURE — 250N000013 HC RX MED GY IP 250 OP 250 PS 637: Performed by: NURSE PRACTITIONER

## 2024-06-19 PROCEDURE — 99233 SBSQ HOSP IP/OBS HIGH 50: CPT | Mod: FS | Performed by: PHYSICIAN ASSISTANT

## 2024-06-19 PROCEDURE — 83735 ASSAY OF MAGNESIUM: CPT

## 2024-06-19 PROCEDURE — 258N000003 HC RX IP 258 OP 636: Mod: JZ | Performed by: INTERNAL MEDICINE

## 2024-06-19 PROCEDURE — 250N000011 HC RX IP 250 OP 636: Mod: JZ | Performed by: STUDENT IN AN ORGANIZED HEALTH CARE EDUCATION/TRAINING PROGRAM

## 2024-06-19 PROCEDURE — 99418 PROLNG IP/OBS E/M EA 15 MIN: CPT | Mod: FS | Performed by: PHYSICIAN ASSISTANT

## 2024-06-19 PROCEDURE — 999N000111 HC STATISTIC OT IP EVAL DEFER

## 2024-06-19 PROCEDURE — 84550 ASSAY OF BLOOD/URIC ACID: CPT

## 2024-06-19 PROCEDURE — 85027 COMPLETE CBC AUTOMATED: CPT

## 2024-06-19 PROCEDURE — 206N000001 HC R&B BMT UMMC

## 2024-06-19 PROCEDURE — 80048 BASIC METABOLIC PNL TOTAL CA: CPT

## 2024-06-19 RX ORDER — NALOXONE HYDROCHLORIDE 0.4 MG/ML
0.4 INJECTION, SOLUTION INTRAMUSCULAR; INTRAVENOUS; SUBCUTANEOUS
Status: DISCONTINUED | OUTPATIENT
Start: 2024-06-19 | End: 2024-07-01 | Stop reason: HOSPADM

## 2024-06-19 RX ORDER — OXYCODONE HYDROCHLORIDE 5 MG/1
5 TABLET ORAL EVERY 4 HOURS PRN
Status: DISCONTINUED | OUTPATIENT
Start: 2024-06-19 | End: 2024-07-01 | Stop reason: HOSPADM

## 2024-06-19 RX ORDER — LOPERAMIDE HCL 2 MG
2 CAPSULE ORAL 4 TIMES DAILY PRN
Status: DISCONTINUED | OUTPATIENT
Start: 2024-06-19 | End: 2024-06-21

## 2024-06-19 RX ORDER — DIPHENHYDRAMINE HYDROCHLORIDE AND LIDOCAINE HYDROCHLORIDE AND ALUMINUM HYDROXIDE AND MAGNESIUM HYDRO
10 KIT EVERY 6 HOURS PRN
Status: DISCONTINUED | OUTPATIENT
Start: 2024-06-19 | End: 2024-07-01 | Stop reason: HOSPADM

## 2024-06-19 RX ORDER — POTASSIUM CHLORIDE 750 MG/1
20 TABLET, EXTENDED RELEASE ORAL ONCE
Status: COMPLETED | OUTPATIENT
Start: 2024-06-19 | End: 2024-06-19

## 2024-06-19 RX ORDER — NALOXONE HYDROCHLORIDE 0.4 MG/ML
0.2 INJECTION, SOLUTION INTRAMUSCULAR; INTRAVENOUS; SUBCUTANEOUS
Status: DISCONTINUED | OUTPATIENT
Start: 2024-06-19 | End: 2024-07-01 | Stop reason: HOSPADM

## 2024-06-19 RX ADMIN — Medication 5 ML: at 03:36

## 2024-06-19 RX ADMIN — MIDODRINE HYDROCHLORIDE 10 MG: 5 TABLET ORAL at 11:36

## 2024-06-19 RX ADMIN — Medication 10 ML: at 10:50

## 2024-06-19 RX ADMIN — MIDODRINE HYDROCHLORIDE 10 MG: 5 TABLET ORAL at 18:02

## 2024-06-19 RX ADMIN — POTASSIUM CHLORIDE 20 MEQ: 750 TABLET, EXTENDED RELEASE ORAL at 06:29

## 2024-06-19 RX ADMIN — DEXTROSE MONOHYDRATE 20 ML: 50 INJECTION, SOLUTION INTRAVENOUS at 21:19

## 2024-06-19 RX ADMIN — ALTEPLASE 2 MG: 2.2 INJECTION, POWDER, LYOPHILIZED, FOR SOLUTION INTRAVENOUS at 07:40

## 2024-06-19 RX ADMIN — ACYCLOVIR 800 MG: 800 TABLET ORAL at 20:08

## 2024-06-19 RX ADMIN — PROCHLORPERAZINE MALEATE 10 MG: 5 TABLET ORAL at 12:18

## 2024-06-19 RX ADMIN — SODIUM CHLORIDE, POTASSIUM CHLORIDE, SODIUM LACTATE AND CALCIUM CHLORIDE 500 ML: 600; 310; 30; 20 INJECTION, SOLUTION INTRAVENOUS at 21:20

## 2024-06-19 RX ADMIN — MIDODRINE HYDROCHLORIDE 10 MG: 5 TABLET ORAL at 08:21

## 2024-06-19 RX ADMIN — PROCHLORPERAZINE MALEATE 10 MG: 5 TABLET ORAL at 06:31

## 2024-06-19 RX ADMIN — ALTEPLASE 2 MG: 2.2 INJECTION, POWDER, LYOPHILIZED, FOR SOLUTION INTRAVENOUS at 07:41

## 2024-06-19 RX ADMIN — ACYCLOVIR 800 MG: 800 TABLET ORAL at 08:21

## 2024-06-19 RX ADMIN — DIPHENHYDRAMINE HYDROCHLORIDE AND LIDOCAINE HYDROCHLORIDE AND ALUMINUM HYDROXIDE AND MAGNESIUM HYDRO 10 ML: KIT at 11:36

## 2024-06-19 RX ADMIN — FLUCONAZOLE 200 MG: 200 TABLET ORAL at 08:21

## 2024-06-19 RX ADMIN — Medication 50 MCG: at 08:21

## 2024-06-19 RX ADMIN — DEXTROSE MONOHYDRATE 20 ML: 50 INJECTION, SOLUTION INTRAVENOUS at 20:09

## 2024-06-19 RX ADMIN — LIOTHYRONINE SODIUM 5 MCG: 5 TABLET ORAL at 08:21

## 2024-06-19 RX ADMIN — LEVOFLOXACIN 250 MG: 250 TABLET, FILM COATED ORAL at 09:12

## 2024-06-19 RX ADMIN — ALLOPURINOL 300 MG: 300 TABLET ORAL at 08:21

## 2024-06-19 RX ADMIN — PROCHLORPERAZINE MALEATE 10 MG: 5 TABLET ORAL at 18:31

## 2024-06-19 RX ADMIN — LIOTHYRONINE SODIUM 5 MCG: 5 TABLET ORAL at 15:00

## 2024-06-19 RX ADMIN — Medication 5 ML: at 23:26

## 2024-06-19 RX ADMIN — DEXTROSE MONOHYDRATE 330 MCG: 50 INJECTION, SOLUTION INTRAVENOUS at 20:09

## 2024-06-19 RX ADMIN — LOPERAMIDE HYDROCHLORIDE 2 MG: 2 CAPSULE ORAL at 12:19

## 2024-06-19 RX ADMIN — LEVOTHYROXINE SODIUM 200 MCG: 200 TABLET ORAL at 06:29

## 2024-06-19 RX ADMIN — PANTOPRAZOLE SODIUM 40 MG: 40 TABLET, DELAYED RELEASE ORAL at 08:21

## 2024-06-19 RX ADMIN — OLANZAPINE 2.5 MG: 2.5 TABLET, FILM COATED ORAL at 23:26

## 2024-06-19 ASSESSMENT — ACTIVITIES OF DAILY LIVING (ADL)
ADLS_ACUITY_SCORE: 24
ADLS_ACUITY_SCORE: 23
ADLS_ACUITY_SCORE: 23
ADLS_ACUITY_SCORE: 24
ADLS_ACUITY_SCORE: 23
ADLS_ACUITY_SCORE: 24
ADLS_ACUITY_SCORE: 24
ADLS_ACUITY_SCORE: 23
ADLS_ACUITY_SCORE: 23
ADLS_ACUITY_SCORE: 24
ADLS_ACUITY_SCORE: 23
ADLS_ACUITY_SCORE: 23
ADLS_ACUITY_SCORE: 24
ADLS_ACUITY_SCORE: 23

## 2024-06-19 NOTE — PROVIDER NOTIFICATION
MD Rudy Lucas notified at 0513 that RN not able to get blood return from both lumens. Peripheral lab collect ordered for lactic acid d/t inability to draw blood from CVC. Provider asked to order TPA for both lumens.    TPA ordered per MD

## 2024-06-19 NOTE — PLAN OF CARE
"Soft BP's and +Orthostatics continue. Other VSS. Pt reports throat discomfort, MMW given x1.  Nausea with movement, Compazine given x2, 1 small emesis.  Loose frequent stools continue,  Imodium given x1, pt using bedside commode and calls for SBA appropriately.  Pt reported couple small streaks of blood upon wiping, uncertain if it was rectal or vaginal, continue to monitor.  Appetite fair/poor.  CVC dressing changed.  Refused shower, needs CHG wipes before bed.  Continue POC.        Problem: Adult Inpatient Plan of Care  Goal: Plan of Care Review  Description: The Plan of Care Review/Shift note should be completed every shift.  The Outcome Evaluation is a brief statement about your assessment that the patient is improving, declining, or no change.  This information will be displayed automatically on your shift  note.  Outcome: Progressing     Problem: Adult Inpatient Plan of Care  Goal: Patient-Specific Goal (Individualized)  Description: You can add care plan individualizations to a care plan. Examples of Individualization might be:  \"Parent requests to be called daily at 9am for status\", \"I have a hard time hearing out of my right ear\", or \"Do not touch me to wake me up as it startles  me\".  Outcome: Progressing     Problem: Adult Inpatient Plan of Care  Goal: Absence of Hospital-Acquired Illness or Injury  Outcome: Progressing     Problem: Adult Inpatient Plan of Care  Goal: Absence of Hospital-Acquired Illness or Injury  Intervention: Identify and Manage Fall Risk  Recent Flowsheet Documentation  Taken 6/19/2024 1600 by Lovely Reich, RN  Safety Promotion/Fall Prevention:   nonskid shoes/slippers when out of bed   assistive device/personal items within reach   clutter free environment maintained  Taken 6/19/2024 1200 by Lovely Reich, RN  Safety Promotion/Fall Prevention:   nonskid shoes/slippers when out of bed   assistive device/personal items within reach   clutter free environment maintained  Taken " 6/19/2024 0900 by Lovely Reich RN  Safety Promotion/Fall Prevention:   nonskid shoes/slippers when out of bed   assistive device/personal items within reach   clutter free environment maintained     Problem: Adult Inpatient Plan of Care  Goal: Absence of Hospital-Acquired Illness or Injury  Intervention: Prevent Skin Injury  Recent Flowsheet Documentation  Taken 6/19/2024 0900 by Lovely Reich RN  Body Position: position changed independently  Skin Protection: adhesive use limited  Device Skin Pressure Protection: adhesive use limited     Problem: Adult Inpatient Plan of Care  Goal: Absence of Hospital-Acquired Illness or Injury  Intervention: Prevent Infection  Recent Flowsheet Documentation  Taken 6/19/2024 1200 by Lovely Reich RN  Infection Prevention: single patient room provided  Taken 6/19/2024 0900 by Lovely Reich RN  Infection Prevention: single patient room provided     Problem: Adult Inpatient Plan of Care  Goal: Optimal Comfort and Wellbeing  Outcome: Progressing     Problem: Adult Inpatient Plan of Care  Goal: Optimal Comfort and Wellbeing  Intervention: Monitor Pain and Promote Comfort  Recent Flowsheet Documentation  Taken 6/19/2024 0836 by Lovely Reich RN  Pain Management Interventions: (MMW ordered, awaiting from pharamacy) declines     Problem: Stem Cell/Bone Marrow Transplant  Goal: Optimal Coping with Transplant  Outcome: Progressing     Problem: Stem Cell/Bone Marrow Transplant  Goal: Optimal Coping with Transplant  Intervention: Optimize Patient/Family Adjustment to Transplant  Recent Flowsheet Documentation  Taken 6/19/2024 0900 by Lovely Reich RN  Supportive Measures: active listening utilized     Problem: Stem Cell/Bone Marrow Transplant  Goal: Symptom-Free Urinary Elimination  Outcome: Progressing     Problem: Stem Cell/Bone Marrow Transplant  Goal: Diarrhea Symptom Control  Outcome: Progressing     Problem: Stem Cell/Bone Marrow Transplant  Goal: Diarrhea Symptom  Control  Intervention: Manage Diarrhea  Recent Flowsheet Documentation  Taken 6/19/2024 0900 by Lovley Reich RN  Skin Protection: adhesive use limited  Fluid/Electrolyte Management: fluids provided  Perineal Care: perineal hygiene encouraged     Problem: Stem Cell/Bone Marrow Transplant  Goal: Improved Activity Tolerance  Outcome: Progressing     Problem: Stem Cell/Bone Marrow Transplant  Goal: Improved Activity Tolerance  Intervention: Promote Improved Energy  Recent Flowsheet Documentation  Taken 6/19/2024 0900 by Lovely Reich RN  Activity Management: activity adjusted per tolerance  Environmental Support: calm environment promoted     Problem: Stem Cell/Bone Marrow Transplant  Goal: Blood Counts Within Acceptable Range  Outcome: Progressing     Problem: Stem Cell/Bone Marrow Transplant  Goal: Blood Counts Within Acceptable Range  Intervention: Monitor and Manage Hematologic Symptoms  Recent Flowsheet Documentation  Taken 6/19/2024 1600 by Lovely Reich RN  Medication Review/Management: medications reviewed  Taken 6/19/2024 1200 by Lovely Reich RN  Medication Review/Management: medications reviewed  Taken 6/19/2024 0900 by Lovely Reich RN  Bleeding Precautions: monitored for signs of bleeding  Medication Review/Management: medications reviewed     Problem: Stem Cell/Bone Marrow Transplant  Goal: Absence of Hypersensitivity Reaction  Outcome: Progressing     Problem: Stem Cell/Bone Marrow Transplant  Goal: Absence of Infection  Outcome: Progressing     Problem: Stem Cell/Bone Marrow Transplant  Goal: Absence of Infection  Intervention: Prevent and Manage Infection  Recent Flowsheet Documentation  Taken 6/19/2024 1200 by Lovely Reich RN  Infection Prevention: single patient room provided  Isolation Precautions: protective environment maintained  Taken 6/19/2024 0900 by Lovely Reich RN  Infection Prevention: single patient room provided  Infection Management: aseptic technique maintained  Isolation  Precautions: protective environment maintained     Problem: Stem Cell/Bone Marrow Transplant  Goal: Improved Oral Mucous Membrane Health and Integrity  Outcome: Progressing     Problem: Stem Cell/Bone Marrow Transplant  Goal: Improved Oral Mucous Membrane Health and Integrity  Intervention: Promote Oral Comfort and Health  Recent Flowsheet Documentation  Taken 6/19/2024 0900 by Lovely Reich RN  Oral Mucous Membrane Protection: nonirritating oral fluids promoted  Oral Care: oral rinse provided     Problem: Stem Cell/Bone Marrow Transplant  Goal: Nausea and Vomiting Symptom Relief  Outcome: Progressing     Problem: Stem Cell/Bone Marrow Transplant  Goal: Nausea and Vomiting Symptom Relief  Intervention: Prevent and Manage Nausea and Vomiting  Recent Flowsheet Documentation  Taken 6/19/2024 0900 by Lovely Reich RN  Nausea/Vomiting Interventions: antiemetic     Problem: Stem Cell/Bone Marrow Transplant  Goal: Optimal Nutrition Intake  Outcome: Progressing     Problem: Skin Injury Risk Increased  Goal: Skin Health and Integrity  Outcome: Progressing     Problem: Skin Injury Risk Increased  Goal: Skin Health and Integrity  Intervention: Plan: Nurse Driven Intervention: Moisture Management  Recent Flowsheet Documentation  Taken 6/19/2024 0900 by Lovely Reich RN  Moisture Interventions: Encourage regular toileting     Problem: Skin Injury Risk Increased  Goal: Skin Health and Integrity  Intervention: Optimize Skin Protection  Recent Flowsheet Documentation  Taken 6/19/2024 0900 by Lovely Reich RN  Pressure Reduction Techniques: frequent weight shift encouraged  Skin Protection: adhesive use limited  Activity Management: activity adjusted per tolerance  Head of Bed (HOB) Positioning: HOB at 20-30 degrees   Goal Outcome Evaluation:

## 2024-06-19 NOTE — PROVIDER NOTIFICATION
MD Odin Chisholm notified at 2116 for positive orthos. Standing BP 79/48, HR increased to 130's with standing. Pt reported slight dizziness while standing, no other complaints.     MD ordered 500ml bolus over 4hrs

## 2024-06-19 NOTE — PROGRESS NOTES
"BMT/Cell Therapy Daily Progress Note   06/19/2024    Patient ID:  Vivian Brown is a 54 year old female, currently day 7 of her autologous stem cell transplant for amyloidosis. Hospitalization complicated by significant orthostatic hypotension.   Admission date: 6/11/2024    INTERVAL  HISTORY   Required additional fluids overnight for a total of 1.5L in the last 24 hours due to profound orthostatic hypotension. This AM she is up sitting in bed and went to wash her hands and felt overall relatively stable. She had mild dizzyness but denied feeling like she was going to lose consciousness. Her blood pressures this AM are improved. She admits to mild nausea relieved by compazine. She admits to diarrhea but feels this is improving. She does admit to a sore throat, stated yesterday it was just red, no significant worsening in pain but now does have evidence of oral sores. She has been utilizing her own home spray, and normal saline rinses. Has yet to try MMW. No fevers. Otherwise remains stable.     Review of Systems: ROS negative except as noted above.      PHYSICAL EXAM     Weight In/Out     Wt Readings from Last 3 Encounters:   06/18/24 64.5 kg (142 lb 4.8 oz)   06/06/24 69.7 kg (153 lb 10.6 oz)   06/05/24 69.8 kg (153 lb 14.4 oz)      I/O last 3 completed shifts:  In: 2500 [P.O.:1500; I.V.:1000]  Out: 2100 [Urine:2100]       KPS:  90    /80 (BP Location: Right arm)   Pulse 98   Temp 98.1  F (36.7  C) (Oral)   Resp 18   Ht 1.63 m (5' 4.17\")   Wt 64.5 kg (142 lb 4.8 oz)   SpO2 97%   BMI 24.29 kg/m       General: NAD   Eyes: : SUKUMAR, sclera anicteric   Mouth/Pharynx: Diffusely erythematous posterior pharynx, white sloughing present, bilateral buccal sores and soft palate sores present.   Lungs: CTA bilaterally  Cardiovascular: RRR, no M/R/G   Abdominal/Rectal: +BS, soft, NT, ND  Lymphatics: no edema   Skin: no rashes or petechiae  Neuro: A&O   Additional Findings: Hightower site NT, no drainage.    LABS " AND IMAGING: I have assessed all abnormal lab values for their clinical significance and any values considered clinically significant have been addressed in the assessment and plan.        Lab Results   Component Value Date    WBC 0.1 (LL) 06/19/2024    ANEU 0.4 (LL) 06/18/2024    HGB 10.0 (L) 06/19/2024    HCT 29.0 (L) 06/19/2024    PLT 42 (LL) 06/19/2024     06/19/2024    POTASSIUM 3.6 06/19/2024    CHLORIDE 109 (H) 06/19/2024    CO2 21 (L) 06/19/2024    GLC 93 06/19/2024    BUN 16.0 06/19/2024    CR 1.34 (H) 06/19/2024    MAG 2.1 06/19/2024    INR 0.94 06/17/2024           SYSTEMS-BASED ASSESSMENT AND PLAN   Vivian Brown is a 54 year old female with amyloidosis, undergoing melphalan followed by autologous stem cell rescue.  She is day 7.    Prep: melphalan     BMT  - BMT doc/Coordinator: Wei  - [id number]  - Cell dose: total collections of 5.99 million CD34+ cells/kg.     - Prep as above. Flush and pre-meds prior to transplant.  - GCSF starts day +5, continue until ANC > 2500 for 2 consecutive days.    Remains on Allopurinol.     HEME/COAG  #Pancytopenia 2/2 to chemotherapy.   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     ID  Prophy: ACV; levaquin; fluconazole (200mg)   PJP ppx to start at day +28.      GI/NUTRITION  #Oropharyngeal mucositis  - Supportive cares; MMW, Saline rinses, Oxycodone Q4H PRN  #Diarrhea - C. Diff negative on 6/11. Loperamide prn added on 6/19.     - Anti-emetics: zofran/dex prior to chemotherapy.  Ativan and compazine prn.   - Ulcer prophy: Protonix 40mg daily.   # Celiac Disease, see diet below     FLUIDS/NUTRITION  - home regimen of lasix 40mg po qam and 20mg po q afternoon.  (On hold since 6/17 d/t orthostasis and large volume diuresis on 6/16)   - continue daily spironoloactone (on hold since 6/17)   - gluten free diet (h/o celiac disease)  - dietitian to follow  - IV Bolus as needed for blood pressure support.       RENAL  # renal amyloidosis; diuresis as above  - Monitor Cr and electrolytes daily.   - Replace electrolytes per sliding scale     ENDOCRINE  # Hypothyroidism:  continue PTA cytomel and synthroid     CARDIAC  # cardiac amyloidosis: cautious fluid balance; diuresis as above  # h/o dizziness and chest pain with chemotherapy for amyloidosis; improved with addition of midodrine.  Continue.   # hyperlipidemia: rosuvastatin on hold during acute transplant course     SUPPORTIVE CARE  - PT/OT to evaluate on admission and follow as indicated.   - BMT Social work to follow.     Known issues that I take into account for medical decisions, with salient changes to the plan considering these complexities noted above.    Patient Active Problem List   Diagnosis    Hypothyroidism due to acquired atrophy of thyroid    Family hx of colon cancer    Nonallopathic lesion of cervical region    Cervicalgia    Nonallopathic lesion of sacral region    Nonallopathic lesion of lumbar region    Lumbago    Nonallopathic lesion of thoracic region    Hyperlipidemia LDL goal <100    Dependent edema R>L    AL amyloidosis (H)    Hypogammaglobulinemia (H24)    Chronic kidney disease, stage 3a (H)    Celiac disease    Autologous donor of stem cells   \  Medically Ready for Discharge: Anticipated in 5+ Days    Clinically Significant Risk Factors              # Hypoalbuminemia: Lowest albumin = 2.4 g/dL at 6/17/2024  3:38 AM, will monitor as appropriate   # Thrombocytopenia: Lowest platelets = 42 in last 2 days, will monitor for bleeding             #Precipitous drop in Hgb/Hct: Lowest Hgb this hospitalization: 9.5 g/dL. Will continue to monitor and treat/transfuse as appropriate.                I spent 30 minutes in the care of this patient today, which included time necessary for preparation for the visit, obtaining history, ordering medications/tests/procedures as medically indicated, review of pertinent medical literature, counseling of the  patient, communication of recommendations to the care team, and documentation time.      JADE Hager.

## 2024-06-19 NOTE — PLAN OF CARE
"/80 (BP Location: Right arm)   Pulse 98   Temp 98.1  F (36.7  C) (Oral)   Resp 18   Ht 1.63 m (5' 4.17\")   Wt 64.5 kg (142 lb 4.8 oz)   SpO2 97%   BMI 24.29 kg/m      Neuro: A&Ox4.   Cardiac: No tele orders. VSS.   Respiratory: Sating > 92% on RA.  GI/: Voiding spontaneously. No BM this shift  Diet/appetite: Regular diet. Gluten free.  Activity: Up w/ SBA. Bed alarm on  Pain: Denied  Skin: No new deficits noted.  LDA's: DL CVC     Plan: MD notified of positive orthos - 500ml bolus over 4hrs given once this shift. Pt c/o nausea, prn Compazine given x1 this shift. 20mEq potassium replacement given, recheck next AM. Sepsis protocol triggered - lactic acid 1.1. No blood return from both lumens, MD notified, TPA ordered. Will need TPA once available from lab. Continue with POC. Notify primary team with changes.    Goal Outcome Evaluation:      Plan of Care Reviewed With: patient                 "

## 2024-06-20 ENCOUNTER — TELEPHONE (OUTPATIENT)
Dept: NEPHROLOGY | Facility: CLINIC | Age: 54
End: 2024-06-20
Payer: COMMERCIAL

## 2024-06-20 ENCOUNTER — APPOINTMENT (OUTPATIENT)
Dept: PHYSICAL THERAPY | Facility: CLINIC | Age: 54
DRG: 016 | End: 2024-06-20
Attending: INTERNAL MEDICINE
Payer: COMMERCIAL

## 2024-06-20 LAB
ABO/RH(D): NORMAL
ACANTHOCYTES BLD QL SMEAR: SLIGHT
ANION GAP SERPL CALCULATED.3IONS-SCNC: 7 MMOL/L (ref 7–15)
ANTIBODY SCREEN: NEGATIVE
AUER BODIES BLD QL SMEAR: ABNORMAL
BASO STIPL BLD QL SMEAR: ABNORMAL
BASOPHILS # BLD AUTO: ABNORMAL 10*3/UL
BASOPHILS NFR BLD AUTO: ABNORMAL %
BITE CELLS BLD QL SMEAR: ABNORMAL
BLISTER CELLS BLD QL SMEAR: ABNORMAL
BUN SERPL-MCNC: 14.7 MG/DL (ref 6–20)
BURR CELLS BLD QL SMEAR: ABNORMAL
CALCIUM SERPL-MCNC: 8.1 MG/DL (ref 8.6–10)
CHLORIDE SERPL-SCNC: 110 MMOL/L (ref 98–107)
CREAT SERPL-MCNC: 1.35 MG/DL (ref 0.51–0.95)
DACRYOCYTES BLD QL SMEAR: ABNORMAL
DEPRECATED HCO3 PLAS-SCNC: 21 MMOL/L (ref 22–29)
EGFRCR SERPLBLD CKD-EPI 2021: 46 ML/MIN/1.73M2
ELLIPTOCYTES BLD QL SMEAR: SLIGHT
EOSINOPHIL # BLD AUTO: ABNORMAL 10*3/UL
EOSINOPHIL NFR BLD AUTO: ABNORMAL %
ERYTHROCYTE [DISTWIDTH] IN BLOOD BY AUTOMATED COUNT: 14.8 % (ref 10–15)
FRAGMENTS BLD QL SMEAR: ABNORMAL
GLUCOSE SERPL-MCNC: 93 MG/DL (ref 70–99)
HCT VFR BLD AUTO: 27.1 % (ref 35–47)
HGB BLD-MCNC: 9.4 G/DL (ref 11.7–15.7)
HGB C CRYSTALS: ABNORMAL
HOWELL-JOLLY BOD BLD QL SMEAR: ABNORMAL
IMM GRANULOCYTES # BLD: ABNORMAL 10*3/UL
IMM GRANULOCYTES NFR BLD: ABNORMAL %
LYMPHOCYTES # BLD AUTO: ABNORMAL 10*3/UL
LYMPHOCYTES NFR BLD AUTO: ABNORMAL %
MAGNESIUM SERPL-MCNC: 2 MG/DL (ref 1.7–2.3)
MCH RBC QN AUTO: 31.8 PG (ref 26.5–33)
MCHC RBC AUTO-ENTMCNC: 34.7 G/DL (ref 31.5–36.5)
MCV RBC AUTO: 92 FL (ref 78–100)
MONOCYTES # BLD AUTO: ABNORMAL 10*3/UL
MONOCYTES NFR BLD AUTO: ABNORMAL %
NEUTROPHILS # BLD AUTO: ABNORMAL 10*3/UL
NEUTROPHILS NFR BLD AUTO: ABNORMAL %
NEUTS HYPERSEG BLD QL SMEAR: ABNORMAL
PHOSPHATE SERPL-MCNC: 3.6 MG/DL (ref 2.5–4.5)
PLAT MORPH BLD: ABNORMAL
PLATELET # BLD AUTO: 18 10E3/UL (ref 150–450)
POLYCHROMASIA BLD QL SMEAR: ABNORMAL
POTASSIUM SERPL-SCNC: 3.5 MMOL/L (ref 3.4–5.3)
RBC # BLD AUTO: 2.96 10E6/UL (ref 3.8–5.2)
RBC AGGLUT BLD QL: ABNORMAL
RBC MORPH BLD: ABNORMAL
ROULEAUX BLD QL SMEAR: ABNORMAL
SICKLE CELLS BLD QL SMEAR: ABNORMAL
SMUDGE CELLS BLD QL SMEAR: ABNORMAL
SODIUM SERPL-SCNC: 138 MMOL/L (ref 135–145)
SPECIMEN EXPIRATION DATE: NORMAL
SPHEROCYTES BLD QL SMEAR: ABNORMAL
STOMATOCYTES BLD QL SMEAR: ABNORMAL
TARGETS BLD QL SMEAR: ABNORMAL
TOXIC GRANULES BLD QL SMEAR: ABNORMAL
VARIANT LYMPHS BLD QL SMEAR: ABNORMAL
WBC # BLD AUTO: <0.1 10E3/UL (ref 4–11)

## 2024-06-20 PROCEDURE — 250N000011 HC RX IP 250 OP 636: Mod: JZ

## 2024-06-20 PROCEDURE — 250N000013 HC RX MED GY IP 250 OP 250 PS 637

## 2024-06-20 PROCEDURE — 250N000013 HC RX MED GY IP 250 OP 250 PS 637: Performed by: INTERNAL MEDICINE

## 2024-06-20 PROCEDURE — 258N000003 HC RX IP 258 OP 636: Mod: JZ | Performed by: STUDENT IN AN ORGANIZED HEALTH CARE EDUCATION/TRAINING PROGRAM

## 2024-06-20 PROCEDURE — 250N000013 HC RX MED GY IP 250 OP 250 PS 637: Performed by: PHYSICIAN ASSISTANT

## 2024-06-20 PROCEDURE — 84100 ASSAY OF PHOSPHORUS: CPT | Performed by: STUDENT IN AN ORGANIZED HEALTH CARE EDUCATION/TRAINING PROGRAM

## 2024-06-20 PROCEDURE — 258N000003 HC RX IP 258 OP 636: Mod: JZ | Performed by: PHYSICIAN ASSISTANT

## 2024-06-20 PROCEDURE — 250N000011 HC RX IP 250 OP 636: Mod: JZ | Performed by: PHYSICIAN ASSISTANT

## 2024-06-20 PROCEDURE — 258N000003 HC RX IP 258 OP 636: Mod: JZ | Performed by: HOSPITALIST

## 2024-06-20 PROCEDURE — 250N000011 HC RX IP 250 OP 636

## 2024-06-20 PROCEDURE — 80048 BASIC METABOLIC PNL TOTAL CA: CPT

## 2024-06-20 PROCEDURE — 250N000011 HC RX IP 250 OP 636: Mod: JZ | Performed by: STUDENT IN AN ORGANIZED HEALTH CARE EDUCATION/TRAINING PROGRAM

## 2024-06-20 PROCEDURE — 206N000001 HC R&B BMT UMMC

## 2024-06-20 PROCEDURE — 250N000011 HC RX IP 250 OP 636: Mod: JZ | Performed by: INTERNAL MEDICINE

## 2024-06-20 PROCEDURE — 85027 COMPLETE CBC AUTOMATED: CPT

## 2024-06-20 PROCEDURE — 97110 THERAPEUTIC EXERCISES: CPT | Mod: GP

## 2024-06-20 PROCEDURE — 86900 BLOOD TYPING SEROLOGIC ABO: CPT

## 2024-06-20 PROCEDURE — 83735 ASSAY OF MAGNESIUM: CPT | Performed by: STUDENT IN AN ORGANIZED HEALTH CARE EDUCATION/TRAINING PROGRAM

## 2024-06-20 PROCEDURE — 97530 THERAPEUTIC ACTIVITIES: CPT | Mod: GP

## 2024-06-20 RX ORDER — PROCHLORPERAZINE MALEATE 5 MG
5 TABLET ORAL EVERY 6 HOURS PRN
Status: DISCONTINUED | OUTPATIENT
Start: 2024-06-20 | End: 2024-07-01 | Stop reason: HOSPADM

## 2024-06-20 RX ORDER — LORAZEPAM 0.5 MG/1
0.5 TABLET ORAL EVERY 4 HOURS PRN
Status: DISCONTINUED | OUTPATIENT
Start: 2024-06-20 | End: 2024-07-01 | Stop reason: HOSPADM

## 2024-06-20 RX ORDER — LORAZEPAM 2 MG/ML
0.5 INJECTION INTRAMUSCULAR EVERY 4 HOURS PRN
Status: DISCONTINUED | OUTPATIENT
Start: 2024-06-20 | End: 2024-07-01 | Stop reason: HOSPADM

## 2024-06-20 RX ORDER — POTASSIUM CHLORIDE 29.8 MG/ML
20 INJECTION INTRAVENOUS ONCE
Status: COMPLETED | OUTPATIENT
Start: 2024-06-20 | End: 2024-06-20

## 2024-06-20 RX ADMIN — DEXTROSE MONOHYDRATE 330 MCG: 50 INJECTION, SOLUTION INTRAVENOUS at 19:47

## 2024-06-20 RX ADMIN — PANTOPRAZOLE SODIUM 40 MG: 40 TABLET, DELAYED RELEASE ORAL at 08:18

## 2024-06-20 RX ADMIN — LIOTHYRONINE SODIUM 5 MCG: 5 TABLET ORAL at 15:18

## 2024-06-20 RX ADMIN — LIOTHYRONINE SODIUM 5 MCG: 5 TABLET ORAL at 08:18

## 2024-06-20 RX ADMIN — LORAZEPAM 0.5 MG: 2 INJECTION INTRAMUSCULAR; INTRAVENOUS at 08:31

## 2024-06-20 RX ADMIN — Medication 5 ML: at 03:35

## 2024-06-20 RX ADMIN — Medication 5 ML: at 10:55

## 2024-06-20 RX ADMIN — ACYCLOVIR 800 MG: 800 TABLET ORAL at 08:18

## 2024-06-20 RX ADMIN — LOPERAMIDE HYDROCHLORIDE 2 MG: 2 CAPSULE ORAL at 20:58

## 2024-06-20 RX ADMIN — FLUCONAZOLE 200 MG: 200 TABLET ORAL at 08:18

## 2024-06-20 RX ADMIN — LEVOFLOXACIN 250 MG: 250 TABLET, FILM COATED ORAL at 10:54

## 2024-06-20 RX ADMIN — LEVOTHYROXINE SODIUM 200 MCG: 200 TABLET ORAL at 05:51

## 2024-06-20 RX ADMIN — DIPHENHYDRAMINE HYDROCHLORIDE AND LIDOCAINE HYDROCHLORIDE AND ALUMINUM HYDROXIDE AND MAGNESIUM HYDRO 10 ML: KIT at 05:51

## 2024-06-20 RX ADMIN — SODIUM CHLORIDE 500 ML: 9 INJECTION, SOLUTION INTRAVENOUS at 07:23

## 2024-06-20 RX ADMIN — MIDODRINE HYDROCHLORIDE 10 MG: 5 TABLET ORAL at 08:18

## 2024-06-20 RX ADMIN — DEXTROSE MONOHYDRATE 20 ML: 50 INJECTION, SOLUTION INTRAVENOUS at 19:56

## 2024-06-20 RX ADMIN — MIDODRINE HYDROCHLORIDE 10 MG: 5 TABLET ORAL at 12:39

## 2024-06-20 RX ADMIN — Medication 50 MCG: at 08:18

## 2024-06-20 RX ADMIN — Medication 5 ML: at 10:54

## 2024-06-20 RX ADMIN — MIDODRINE HYDROCHLORIDE 10 MG: 5 TABLET ORAL at 17:36

## 2024-06-20 RX ADMIN — DEXTROSE MONOHYDRATE 20 ML: 50 INJECTION, SOLUTION INTRAVENOUS at 19:47

## 2024-06-20 RX ADMIN — POTASSIUM CHLORIDE 20 MEQ: 29.8 INJECTION, SOLUTION INTRAVENOUS at 06:01

## 2024-06-20 RX ADMIN — PROCHLORPERAZINE EDISYLATE 10 MG: 5 INJECTION INTRAMUSCULAR; INTRAVENOUS at 06:00

## 2024-06-20 RX ADMIN — SODIUM CHLORIDE 500 ML: 9 INJECTION, SOLUTION INTRAVENOUS at 17:36

## 2024-06-20 RX ADMIN — LOPERAMIDE HYDROCHLORIDE 2 MG: 2 CAPSULE ORAL at 15:18

## 2024-06-20 RX ADMIN — Medication 5 ML: at 20:12

## 2024-06-20 RX ADMIN — ACYCLOVIR 800 MG: 800 TABLET ORAL at 19:47

## 2024-06-20 ASSESSMENT — ACTIVITIES OF DAILY LIVING (ADL)
ADLS_ACUITY_SCORE: 24
ADLS_ACUITY_SCORE: 25
ADLS_ACUITY_SCORE: 24
ADLS_ACUITY_SCORE: 25
ADLS_ACUITY_SCORE: 24

## 2024-06-20 NOTE — PLAN OF CARE
"Afebrile. Patient having orthostatic hypotension, 118/75 (laying), 95/74 (sitting),56/27 (standing) symptomatic, once back in bed laying down 113/77, provided notified and 500 ml bolus given. intermittently tachy, OVSS. Having throat pain, magic mouthwash given. SBA commode at bedside. Patient reports nausea with movement and loose stools. Patient reports some blood with wiping, continue to monitor. Appetite fair/poor. Potassium 3.5, IV replacement given. Patient had nausea this morning, gave IV compazine. Continue to monitor.       Around 0650 assisting patient to the bathroom, patient stated 'I have to get back to bed' patient got up fast and well walking back to bed blacked out, hit L knee small red spot. Staff assisted to ground, did not hit head. Provided notified, blood pressure soft 103/69 . Provider ordered 500 ml bolus.         Problem: Adult Inpatient Plan of Care  Goal: Plan of Care Review  Description: The Plan of Care Review/Shift note should be completed every shift.  The Outcome Evaluation is a brief statement about your assessment that the patient is improving, declining, or no change.  This information will be displayed automatically on your shift  note.  Outcome: Not Progressing  Goal: Patient-Specific Goal (Individualized)  Description: You can add care plan individualizations to a care plan. Examples of Individualization might be:  \"Parent requests to be called daily at 9am for status\", \"I have a hard time hearing out of my right ear\", or \"Do not touch me to wake me up as it startles  me\".  Outcome: Not Progressing  Goal: Absence of Hospital-Acquired Illness or Injury  Outcome: Not Progressing  Intervention: Identify and Manage Fall Risk  Recent Flowsheet Documentation  Taken 6/19/2024 2506 by Goyo Gates, RN  Safety Promotion/Fall Prevention: safety round/check completed  Goal: Optimal Comfort and Wellbeing  Outcome: Not Progressing  Goal: Readiness for Transition of Care  Outcome: Not " Progressing     Problem: Stem Cell/Bone Marrow Transplant  Goal: Optimal Coping with Transplant  Outcome: Not Progressing  Goal: Symptom-Free Urinary Elimination  Outcome: Not Progressing  Goal: Diarrhea Symptom Control  Outcome: Not Progressing  Goal: Improved Activity Tolerance  Outcome: Not Progressing  Goal: Blood Counts Within Acceptable Range  Outcome: Not Progressing  Goal: Absence of Hypersensitivity Reaction  Outcome: Not Progressing  Goal: Absence of Infection  Outcome: Not Progressing  Goal: Improved Oral Mucous Membrane Health and Integrity  Outcome: Not Progressing  Goal: Nausea and Vomiting Symptom Relief  Outcome: Not Progressing  Goal: Optimal Nutrition Intake  Outcome: Not Progressing     Problem: Skin Injury Risk Increased  Goal: Skin Health and Integrity  Outcome: Not Progressing   Goal Outcome Evaluation:

## 2024-06-20 NOTE — PROVIDER NOTIFICATION
"Text page provider on-call, \"Pt had an assisted fall this morning when ambulating to the bathroom. Did not hit her head. Per pt blacked out. Very orthostatic earlier tonight.\"     - Provider planning to write for fluids. Updated again that pt did hit her left knee and there is a slight red john   "

## 2024-06-20 NOTE — PROGRESS NOTES
BMT CLINICAL SOCIAL WORK NOTE:    Focus: Supportive Counseling    Data: Pt is a 54 year old female, currently day +8 s/p auto BMT.    Interventions: Clinical  (CSW) met with Pt to assess coping, provide supportive counseling and assist with resources as needed. Pt shared that overall she is doing ok, very tired, but overall feeling ok. She shared she feels things are going as she expected and reports her mental health as stable right now. She asked about grants and SW left the [facility] rey for her per request. CSW provided empathic listening, validation of concerns, and encouragement. CSW encouraged Pt to contact CSW for support, questions and/or resources.     Assessment: Pt presented as friendly, but tired.  Pt appears to be coping well at this time. Pt continues to be supported by her mother and family.     Plan: CSW will continue to provide supportive counseling and assistance with resources as needed. CSW will continue to collaborate with multidisciplinary team regarding Pt's plan of care.     THONY Ruiz, [facility]  Phone: [phone]  Vocera- BMT SW 3

## 2024-06-20 NOTE — PLAN OF CARE
Goal Outcome Evaluation:  Care from 7 am to 3 pm  A&Ox4. AVSS. Pt reported nausea after her morning med; pt had ativan 0.5 mg IV x 1 with relief and pt was able to keep her breakfast in. Ate 50% of breakfast.K 3.5; replaced with 10 meq potassium IV x 1.Pt had assisted fall at the end of the night shift due to positive orthostatic B/P; pt received 500 ml NS bolus; pt denied feeling dizzy or light headed afterwards. Pt is on midodrine 10 mg tab po three times daily. Up to bathroom with SBA and pt tolerated walking to bathroom and back. Watery stool x3 today so far. Bed alarm is maintained for safety.  Pt is receiving a second dose of  ml bolus this evening to help with hydration as pt is poor intake.  Continue with POC

## 2024-06-20 NOTE — PROGRESS NOTES
"BMT/Cell Therapy Daily Progress Note   06/20/2024    Patient ID:  Vivian Brown is a 54 year old female, currently day +8 s/p autologous stem cell transplant for amyloidosis. Hospitalization complicated by significant orthostatic hypotension.     Admission date: 6/11/2024    INTERVAL  HISTORY   Remains orthostatic and knows to move slowly and call for assistance when up. Controlled fall to R knee when in bathroom this am. No knee pain; pinky toe a little sore. Some nausea, no emesis. Throat/mouth sore but working to eat/drink. No fevers.    Review of Systems: ROS negative except as noted above.      PHYSICAL EXAM   KPS:  90    /77 (BP Location: Right arm)   Pulse 97   Temp 98.5  F (36.9  C) (Oral)   Resp 16   Ht 1.63 m (5' 4.17\")   Wt 66.4 kg (146 lb 4.8 oz)   SpO2 98%   BMI 24.98 kg/m       General: NAD   Eyes: sclera anicteric   Mouth/Pharynx: Diffusely erythematous posterior pharynx, white sloughing present, bilateral buccal sores and soft palate sores present.   Lungs: CTA bilaterally  Cardiovascular: RRR, no M/R/G   Abdominal/Rectal: +BS, soft, NT, ND  Lymphatics: no edema   Skin: no rash  Neuro: A&O   Access: R CVC NT, no drainage.    LABS: I have assessed all abnormal lab values for their clinical significance and any values considered clinically significant have been addressed in the assessment and plan.      Lab Results   Component Value Date    WBC <0.1 (LL) 06/20/2024    ANEU 0.4 (LL) 06/18/2024    HGB 9.4 (L) 06/20/2024    HCT 27.1 (L) 06/20/2024    PLT 18 (LL) 06/20/2024     06/20/2024    POTASSIUM 3.5 06/20/2024    CHLORIDE 110 (H) 06/20/2024    CO2 21 (L) 06/20/2024    GLC 93 06/20/2024    BUN 14.7 06/20/2024    CR 1.35 (H) 06/20/2024    MAG 2.0 06/20/2024    INR 0.94 06/17/2024    BILITOTAL 0.4 06/17/2024    AST 25 06/17/2024    ALT 33 06/17/2024    ALKPHOS 105 06/17/2024    PROTTOTAL 4.4 (L) 06/17/2024    ALBUMIN 2.4 (L) 06/17/2024       ASSESSMENT AND PLAN   Vivian Brown " is a 54 year old female with amyloidosis, undergoing melphalan followed by autologous stem cell rescue.  She is day +8.     BMT/MM  - BMT MD/Coordinator: Wei  - [id number]  - Cell dose: total collections of 5.99 million CD34+ cells/kg.     - Prep as above. Flush and pre-meds prior to transplant.  - GCSF started day +5, continue until ANC > 2500 for 2 consecutive days.     HEME/COAG  #Pancytopenia 2/2 to chemotherapy.   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     ID  Prophy: ACV; levaquin; fluconazole.  PJP ppx to start at day +28.      GI/NUTRITION  #Oropharyngeal mucositis  - Supportive cares; MMW, Saline rinses, Oxycodone Q4H PRN  #Diarrhea - C. Diff negative on 6/11. Loperamide prn added on 6/19.     - Anti-emetics: Ativan and compazine prn. Hold hs Zyprexa x6/20 with ongoing orthostasis. Consider Emend.  - Ulcer prophy: Protonix 40mg daily.   # Celiac Disease, see diet below     FLUIDS/NUTRITION  - PTA Lasix lasix 40mg am, 20mg afternoon- held since 6/17 d/t orthostasis and large volume diuresis on 6/16  - PTA spironoloactone -held since 6/17   - gluten free diet (h/o celiac disease)  - dietitian to follow  - IV Bolus as needed for blood pressure support.      RENAL  # renal amyloidosis; diuresis as above  - Monitor Cr and electrolytes daily.   - Replace electrolytes per sliding scale     ENDOCRINE  # Hypothyroidism:  continue PTA cytomel and synthroid     CARDIAC  # cardiac amyloidosis: cautious fluid balance; diuresis as above  # h/o dizziness and chest pain with chemotherapy for amyloidosis; improved with addition of midodrine.   # hyperlipidemia: rosuvastatin on hold during acute transplant course     SUPPORTIVE CARE  - PT/OT to evaluate on admission and follow as indicated.   - BMT Social work to follow.     Known issues that I take into account for medical decisions, with salient changes to the plan considering these complexities noted  above.    Patient Active Problem List   Diagnosis    Hypothyroidism due to acquired atrophy of thyroid    Family hx of colon cancer    Nonallopathic lesion of cervical region    Cervicalgia    Nonallopathic lesion of sacral region    Nonallopathic lesion of lumbar region    Lumbago    Nonallopathic lesion of thoracic region    Hyperlipidemia LDL goal <100    Dependent edema R>L    AL amyloidosis (H)    Hypogammaglobulinemia (H24)    Chronic kidney disease, stage 3a (H)    Celiac disease    Autologous donor of stem cells     Medically Ready for Discharge: Anticipated in 5+ Days    Clinically Significant Risk Factors              # Hypoalbuminemia: Lowest albumin = 2.4 g/dL at 6/17/2024  3:38 AM, will monitor as appropriate   # Thrombocytopenia: Lowest platelets = 18 in last 2 days, will monitor for bleeding             #Precipitous drop in Hgb/Hct: Lowest Hgb this hospitalization: 9.4 g/dL. Will continue to monitor and treat/transfuse as appropriate.                I spent 40 minutes in the care of this patient today, which included time necessary for preparation for the visit, obtaining history, ordering medications/tests/procedures as medically indicated, review of pertinent medical literature, counseling of the patient, communication of recommendations to the care team, and documentation time.      Aminata Matias PA-C

## 2024-06-21 ENCOUNTER — TELEPHONE (OUTPATIENT)
Dept: TRANSPLANT | Facility: CLINIC | Age: 54
End: 2024-06-21

## 2024-06-21 LAB
ACANTHOCYTES BLD QL SMEAR: ABNORMAL
ANION GAP SERPL CALCULATED.3IONS-SCNC: 7 MMOL/L (ref 7–15)
AUER BODIES BLD QL SMEAR: ABNORMAL
BASO STIPL BLD QL SMEAR: ABNORMAL
BASOPHILS # BLD AUTO: ABNORMAL 10*3/UL
BASOPHILS NFR BLD AUTO: ABNORMAL %
BITE CELLS BLD QL SMEAR: ABNORMAL
BLD PROD TYP BPU: NORMAL
BLD PROD TYP BPU: NORMAL
BLISTER CELLS BLD QL SMEAR: ABNORMAL
BLOOD COMPONENT TYPE: NORMAL
BLOOD COMPONENT TYPE: NORMAL
BUN SERPL-MCNC: 16.1 MG/DL (ref 6–20)
BURR CELLS BLD QL SMEAR: ABNORMAL
CALCIUM SERPL-MCNC: 8.2 MG/DL (ref 8.6–10)
CHLORIDE SERPL-SCNC: 110 MMOL/L (ref 98–107)
CODING SYSTEM: NORMAL
CODING SYSTEM: NORMAL
CREAT SERPL-MCNC: 1.33 MG/DL (ref 0.51–0.95)
DACRYOCYTES BLD QL SMEAR: ABNORMAL
DEPRECATED HCO3 PLAS-SCNC: 20 MMOL/L (ref 22–29)
EGFRCR SERPLBLD CKD-EPI 2021: 47 ML/MIN/1.73M2
ELLIPTOCYTES BLD QL SMEAR: ABNORMAL
EOSINOPHIL # BLD AUTO: ABNORMAL 10*3/UL
EOSINOPHIL NFR BLD AUTO: ABNORMAL %
ERYTHROCYTE [DISTWIDTH] IN BLOOD BY AUTOMATED COUNT: 14.8 % (ref 10–15)
FRAGMENTS BLD QL SMEAR: ABNORMAL
GLUCOSE SERPL-MCNC: 112 MG/DL (ref 70–99)
HCT VFR BLD AUTO: 27.1 % (ref 35–47)
HGB BLD-MCNC: 9.3 G/DL (ref 11.7–15.7)
HGB C CRYSTALS: ABNORMAL
HOWELL-JOLLY BOD BLD QL SMEAR: ABNORMAL
IMM GRANULOCYTES # BLD: ABNORMAL 10*3/UL
IMM GRANULOCYTES NFR BLD: ABNORMAL %
ISSUE DATE AND TIME: NORMAL
ISSUE DATE AND TIME: NORMAL
LYMPHOCYTES # BLD AUTO: ABNORMAL 10*3/UL
LYMPHOCYTES NFR BLD AUTO: ABNORMAL %
MAGNESIUM SERPL-MCNC: 1.9 MG/DL (ref 1.7–2.3)
MCH RBC QN AUTO: 31.3 PG (ref 26.5–33)
MCHC RBC AUTO-ENTMCNC: 34.3 G/DL (ref 31.5–36.5)
MCV RBC AUTO: 91 FL (ref 78–100)
MONOCYTES # BLD AUTO: ABNORMAL 10*3/UL
MONOCYTES NFR BLD AUTO: ABNORMAL %
NEUTROPHILS # BLD AUTO: ABNORMAL 10*3/UL
NEUTROPHILS NFR BLD AUTO: ABNORMAL %
NEUTS HYPERSEG BLD QL SMEAR: ABNORMAL
PHOSPHATE SERPL-MCNC: 3.4 MG/DL (ref 2.5–4.5)
PLAT MORPH BLD: ABNORMAL
PLATELET # BLD AUTO: 17 10E3/UL (ref 150–450)
PLATELET # BLD AUTO: 8 10E3/UL (ref 150–450)
POLYCHROMASIA BLD QL SMEAR: ABNORMAL
POTASSIUM SERPL-SCNC: 3.5 MMOL/L (ref 3.4–5.3)
RBC # BLD AUTO: 2.97 10E6/UL (ref 3.8–5.2)
RBC AGGLUT BLD QL: ABNORMAL
RBC MORPH BLD: ABNORMAL
ROULEAUX BLD QL SMEAR: ABNORMAL
SICKLE CELLS BLD QL SMEAR: ABNORMAL
SMUDGE CELLS BLD QL SMEAR: ABNORMAL
SODIUM SERPL-SCNC: 137 MMOL/L (ref 135–145)
SPHEROCYTES BLD QL SMEAR: ABNORMAL
STOMATOCYTES BLD QL SMEAR: ABNORMAL
TARGETS BLD QL SMEAR: ABNORMAL
TOXIC GRANULES BLD QL SMEAR: ABNORMAL
UNIT ABO/RH: NORMAL
UNIT ABO/RH: NORMAL
UNIT NUMBER: NORMAL
UNIT NUMBER: NORMAL
UNIT STATUS: NORMAL
UNIT STATUS: NORMAL
UNIT TYPE ISBT: 7300
UNIT TYPE ISBT: 7300
VARIANT LYMPHS BLD QL SMEAR: ABNORMAL
WBC # BLD AUTO: <0.1 10E3/UL (ref 4–11)

## 2024-06-21 PROCEDURE — C9113 INJ PANTOPRAZOLE SODIUM, VIA: HCPCS | Mod: JZ | Performed by: PHYSICIAN ASSISTANT

## 2024-06-21 PROCEDURE — 99418 PROLNG IP/OBS E/M EA 15 MIN: CPT | Mod: FS | Performed by: PHYSICIAN ASSISTANT

## 2024-06-21 PROCEDURE — 206N000001 HC R&B BMT UMMC

## 2024-06-21 PROCEDURE — 84100 ASSAY OF PHOSPHORUS: CPT

## 2024-06-21 PROCEDURE — 80048 BASIC METABOLIC PNL TOTAL CA: CPT

## 2024-06-21 PROCEDURE — 250N000013 HC RX MED GY IP 250 OP 250 PS 637: Performed by: INTERNAL MEDICINE

## 2024-06-21 PROCEDURE — 258N000003 HC RX IP 258 OP 636: Mod: JZ | Performed by: PHYSICIAN ASSISTANT

## 2024-06-21 PROCEDURE — 250N000011 HC RX IP 250 OP 636: Mod: JZ | Performed by: INTERNAL MEDICINE

## 2024-06-21 PROCEDURE — 99233 SBSQ HOSP IP/OBS HIGH 50: CPT | Mod: FS | Performed by: PHYSICIAN ASSISTANT

## 2024-06-21 PROCEDURE — 258N000003 HC RX IP 258 OP 636: Mod: JZ | Performed by: STUDENT IN AN ORGANIZED HEALTH CARE EDUCATION/TRAINING PROGRAM

## 2024-06-21 PROCEDURE — 83735 ASSAY OF MAGNESIUM: CPT

## 2024-06-21 PROCEDURE — 250N000011 HC RX IP 250 OP 636: Mod: JZ | Performed by: HOSPITALIST

## 2024-06-21 PROCEDURE — P9037 PLATE PHERES LEUKOREDU IRRAD: HCPCS

## 2024-06-21 PROCEDURE — 250N000011 HC RX IP 250 OP 636: Mod: JZ | Performed by: STUDENT IN AN ORGANIZED HEALTH CARE EDUCATION/TRAINING PROGRAM

## 2024-06-21 PROCEDURE — 250N000013 HC RX MED GY IP 250 OP 250 PS 637

## 2024-06-21 PROCEDURE — 250N000011 HC RX IP 250 OP 636: Performed by: PHYSICIAN ASSISTANT

## 2024-06-21 PROCEDURE — 250N000013 HC RX MED GY IP 250 OP 250 PS 637: Performed by: PHYSICIAN ASSISTANT

## 2024-06-21 PROCEDURE — P9037 PLATE PHERES LEUKOREDU IRRAD: HCPCS | Performed by: PHYSICIAN ASSISTANT

## 2024-06-21 PROCEDURE — 85048 AUTOMATED LEUKOCYTE COUNT: CPT

## 2024-06-21 PROCEDURE — 85049 AUTOMATED PLATELET COUNT: CPT | Performed by: PHYSICIAN ASSISTANT

## 2024-06-21 RX ORDER — LOPERAMIDE HCL 2 MG
2-4 CAPSULE ORAL 4 TIMES DAILY PRN
Status: DISCONTINUED | OUTPATIENT
Start: 2024-06-21 | End: 2024-06-24

## 2024-06-21 RX ORDER — ONDANSETRON 2 MG/ML
8 INJECTION INTRAMUSCULAR; INTRAVENOUS EVERY 8 HOURS
Status: DISCONTINUED | OUTPATIENT
Start: 2024-06-21 | End: 2024-06-25

## 2024-06-21 RX ORDER — HYDROMORPHONE HCL IN WATER/PF 6 MG/30 ML
0.2 PATIENT CONTROLLED ANALGESIA SYRINGE INTRAVENOUS
Status: DISCONTINUED | OUTPATIENT
Start: 2024-06-21 | End: 2024-07-01 | Stop reason: HOSPADM

## 2024-06-21 RX ORDER — SUCRALFATE ORAL 1 G/10ML
1 SUSPENSION ORAL
Status: DISCONTINUED | OUTPATIENT
Start: 2024-06-21 | End: 2024-06-29

## 2024-06-21 RX ORDER — POTASSIUM CHLORIDE 29.8 MG/ML
20 INJECTION INTRAVENOUS ONCE
Status: COMPLETED | OUTPATIENT
Start: 2024-06-21 | End: 2024-06-21

## 2024-06-21 RX ORDER — LEVOFLOXACIN 5 MG/ML
250 INJECTION, SOLUTION INTRAVENOUS EVERY 24 HOURS
Status: DISCONTINUED | OUTPATIENT
Start: 2024-06-21 | End: 2024-06-25

## 2024-06-21 RX ORDER — FLUCONAZOLE 2 MG/ML
200 INJECTION, SOLUTION INTRAVENOUS EVERY 24 HOURS
Status: DISCONTINUED | OUTPATIENT
Start: 2024-06-21 | End: 2024-06-25

## 2024-06-21 RX ADMIN — POTASSIUM CHLORIDE 20 MEQ: 29.8 INJECTION, SOLUTION INTRAVENOUS at 05:36

## 2024-06-21 RX ADMIN — MIDODRINE HYDROCHLORIDE 10 MG: 5 TABLET ORAL at 16:25

## 2024-06-21 RX ADMIN — LEVOTHYROXINE SODIUM 200 MCG: 200 TABLET ORAL at 05:36

## 2024-06-21 RX ADMIN — FLUCONAZOLE IN SODIUM CHLORIDE 200 MG: 2 INJECTION, SOLUTION INTRAVENOUS at 13:10

## 2024-06-21 RX ADMIN — Medication 5 ML: at 03:41

## 2024-06-21 RX ADMIN — DIPHENHYDRAMINE HYDROCHLORIDE AND LIDOCAINE HYDROCHLORIDE AND ALUMINUM HYDROXIDE AND MAGNESIUM HYDRO 10 ML: KIT at 03:41

## 2024-06-21 RX ADMIN — PROCHLORPERAZINE EDISYLATE 5 MG: 5 INJECTION INTRAMUSCULAR; INTRAVENOUS at 03:50

## 2024-06-21 RX ADMIN — ACYCLOVIR SODIUM 325 MG: 50 INJECTION, SOLUTION INTRAVENOUS at 10:47

## 2024-06-21 RX ADMIN — LOPERAMIDE HYDROCHLORIDE 4 MG: 2 CAPSULE ORAL at 08:54

## 2024-06-21 RX ADMIN — DEXTROSE MONOHYDRATE 10 ML: 50 INJECTION, SOLUTION INTRAVENOUS at 20:05

## 2024-06-21 RX ADMIN — DEXTROSE MONOHYDRATE 20 ML: 50 INJECTION, SOLUTION INTRAVENOUS at 21:11

## 2024-06-21 RX ADMIN — MIDODRINE HYDROCHLORIDE 10 MG: 5 TABLET ORAL at 13:16

## 2024-06-21 RX ADMIN — DEXTROSE MONOHYDRATE 330 MCG: 50 INJECTION, SOLUTION INTRAVENOUS at 20:05

## 2024-06-21 RX ADMIN — ONDANSETRON 8 MG: 2 INJECTION INTRAMUSCULAR; INTRAVENOUS at 08:51

## 2024-06-21 RX ADMIN — HYDROMORPHONE HYDROCHLORIDE 0.2 MG: 0.2 INJECTION, SOLUTION INTRAMUSCULAR; INTRAVENOUS; SUBCUTANEOUS at 13:06

## 2024-06-21 RX ADMIN — SODIUM CHLORIDE 500 ML: 9 INJECTION, SOLUTION INTRAVENOUS at 16:25

## 2024-06-21 RX ADMIN — LOPERAMIDE HYDROCHLORIDE 4 MG: 2 CAPSULE ORAL at 13:20

## 2024-06-21 RX ADMIN — HYDROMORPHONE HYDROCHLORIDE 0.2 MG: 0.2 INJECTION, SOLUTION INTRAMUSCULAR; INTRAVENOUS; SUBCUTANEOUS at 08:52

## 2024-06-21 RX ADMIN — LOPERAMIDE HYDROCHLORIDE 4 MG: 2 CAPSULE ORAL at 23:01

## 2024-06-21 RX ADMIN — LEVOFLOXACIN 250 MG: 5 INJECTION, SOLUTION INTRAVENOUS at 10:47

## 2024-06-21 RX ADMIN — LIOTHYRONINE SODIUM 5 MCG: 5 TABLET ORAL at 13:16

## 2024-06-21 RX ADMIN — ACYCLOVIR SODIUM 325 MG: 50 INJECTION, SOLUTION INTRAVENOUS at 20:05

## 2024-06-21 RX ADMIN — ONDANSETRON 8 MG: 2 INJECTION INTRAMUSCULAR; INTRAVENOUS at 16:18

## 2024-06-21 RX ADMIN — LIOTHYRONINE SODIUM 5 MCG: 5 TABLET ORAL at 08:53

## 2024-06-21 RX ADMIN — PANTOPRAZOLE SODIUM 40 MG: 40 INJECTION, POWDER, FOR SOLUTION INTRAVENOUS at 08:52

## 2024-06-21 RX ADMIN — SUCRALFATE 1 G: 1 SUSPENSION ORAL at 21:12

## 2024-06-21 RX ADMIN — SODIUM CHLORIDE 500 ML: 9 INJECTION, SOLUTION INTRAVENOUS at 08:51

## 2024-06-21 RX ADMIN — HYDROMORPHONE HYDROCHLORIDE 0.2 MG: 0.2 INJECTION, SOLUTION INTRAMUSCULAR; INTRAVENOUS; SUBCUTANEOUS at 17:28

## 2024-06-21 RX ADMIN — LORAZEPAM 0.5 MG: 2 INJECTION INTRAMUSCULAR; INTRAVENOUS at 23:12

## 2024-06-21 RX ADMIN — SUCRALFATE 1 G: 1 SUSPENSION ORAL at 16:25

## 2024-06-21 RX ADMIN — MIDODRINE HYDROCHLORIDE 10 MG: 5 TABLET ORAL at 08:53

## 2024-06-21 RX ADMIN — HYDROMORPHONE HYDROCHLORIDE 0.2 MG: 0.2 INJECTION, SOLUTION INTRAMUSCULAR; INTRAVENOUS; SUBCUTANEOUS at 21:12

## 2024-06-21 ASSESSMENT — ACTIVITIES OF DAILY LIVING (ADL)
ADLS_ACUITY_SCORE: 25
ADLS_ACUITY_SCORE: 23
ADLS_ACUITY_SCORE: 25
ADLS_ACUITY_SCORE: 23
ADLS_ACUITY_SCORE: 22
ADLS_ACUITY_SCORE: 25
ADLS_ACUITY_SCORE: 23
ADLS_ACUITY_SCORE: 25
ADLS_ACUITY_SCORE: 23
ADLS_ACUITY_SCORE: 23
ADLS_ACUITY_SCORE: 22
ADLS_ACUITY_SCORE: 25
ADLS_ACUITY_SCORE: 23
ADLS_ACUITY_SCORE: 25
ADLS_ACUITY_SCORE: 23
ADLS_ACUITY_SCORE: 25
ADLS_ACUITY_SCORE: 25
ADLS_ACUITY_SCORE: 23
ADLS_ACUITY_SCORE: 25

## 2024-06-21 NOTE — PLAN OF CARE
Temp: 98.2  F (36.8  C) Temp src: Axillary BP: 111/74 Pulse: 82   Resp: 16 SpO2: 98 % O2 Device: None (Room air)      A&Ox4, but very fatigued and weak.  AVSS.  Pt c/o mucositis throat sores with increasing pain.  Pt having lots of difficulty drinking, eating, and swallowing pills.  Meds changed to IV as able, 500 ml IV bolus x2 given, carafate started, and IV dilaudid given for pain x3.  Pt rated pain 6/10, but gets down to 2-4/10 with Dilaudid.  Pt able to eat 50-75% of cream of wheat if Dilaudid given before meals.  Imodium given 2x for liquid diarrhea.  Stool still dark cherry red.  PLT parameters increased to 20k.  PM recheck was 17; 1 unit PLT transfused.  Bed alarm on for safety.  Calling appropriately to get up to commode.  Pt felt too weak for a shower, but CHG wipes completed and linens changed.          Problem: Skin Injury Risk Increased  Goal: Skin Health and Integrity  Outcome: Progressing  Intervention: Plan: Nurse Driven Intervention: Moisture Management  Recent Flowsheet Documentation  Taken 6/21/2024 1600 by Adelina Vegas RN  Moisture Interventions:   Encourage regular toileting   Body-worn absorptive product when OOB (brief, etc.)   Perineal cleanser  Taken 6/21/2024 0800 by Adelina Vegas RN  Moisture Interventions:   Encourage regular toileting   Body-worn absorptive product when OOB (brief, etc.)   Perineal cleanser  Intervention: Optimize Skin Protection  Recent Flowsheet Documentation  Taken 6/21/2024 1600 by Adelina Vegas RN  Skin Protection: adhesive use limited  Activity Management:   activity adjusted per tolerance   up to bedside commode   sitting, edge of bed  Head of Bed (HOB) Positioning: HOB at 20-30 degrees  Taken 6/21/2024 0800 by Adelina Vegas RN  Skin Protection: adhesive use limited  Activity Management:   activity adjusted per tolerance   up to bedside commode   sitting, edge of bed  Head of Bed (HOB) Positioning: HOB at 20-30 degrees     Problem: Stem Cell/Bone Marrow  Transplant  Goal: Optimal Coping with Transplant  Outcome: Not Progressing  Intervention: Optimize Patient/Family Adjustment to Transplant  Recent Flowsheet Documentation  Taken 6/21/2024 1600 by Adelina Vegas RN  Supportive Measures:   active listening utilized   problem-solving facilitated   self-care encouraged  Taken 6/21/2024 0800 by Adelina Vegas RN  Supportive Measures:   active listening utilized   problem-solving facilitated   self-care encouraged  Goal: Symptom-Free Urinary Elimination  Outcome: Progressing  Intervention: Prevent or Manage Bladder Irritation  Recent Flowsheet Documentation  Taken 6/21/2024 1600 by Adelina Vegas RN  Pain Management Interventions: declines  Hyperhydration Management:   fluids provided   intravenous fluids adjusted  Taken 6/21/2024 1306 by Adelina Vegas RN  Pain Management Interventions: medication (see MAR)  Taken 6/21/2024 0852 by Adelina Vegas RN  Pain Management Interventions: medication (see MAR)  Taken 6/21/2024 0800 by Adelina Vegas RN  Hyperhydration Management:   fluids provided   intravenous fluids adjusted  Goal: Diarrhea Symptom Control  Outcome: Progressing  Intervention: Manage Diarrhea  Recent Flowsheet Documentation  Taken 6/21/2024 1600 by Adelina Vegas RN  Skin Protection: adhesive use limited  Fluid/Electrolyte Management: fluids provided  Perineal Care: perineum cleansed  Taken 6/21/2024 0800 by Adelina Vegas RN  Skin Protection: adhesive use limited  Fluid/Electrolyte Management: fluids provided  Perineal Care: perineum cleansed  Goal: Improved Activity Tolerance  Outcome: Not Progressing  Intervention: Promote Improved Energy  Recent Flowsheet Documentation  Taken 6/21/2024 1600 by Adelina Vegas RN  Fatigue Management:   activity schedule adjusted   activity assistance provided  Activity Management:   activity adjusted per tolerance   up to bedside commode   sitting, edge of bed  Environmental Support:   calm environment promoted   rest periods  encouraged  Taken 6/21/2024 0800 by Adelina Vegas RN  Fatigue Management:   activity schedule adjusted   activity assistance provided  Activity Management:   activity adjusted per tolerance   up to bedside commode   sitting, edge of bed  Environmental Support:   calm environment promoted   rest periods encouraged  Goal: Blood Counts Within Acceptable Range  Outcome: Progressing  Intervention: Monitor and Manage Hematologic Symptoms  Recent Flowsheet Documentation  Taken 6/21/2024 1600 by Adelina Vegas RN  Bleeding Precautions:   monitored for signs of bleeding   blood pressure closely monitored  Medication Review/Management:   medications reviewed   high-risk medications identified   infusion initiated  Taken 6/21/2024 0800 by Adelina Vegas RN  Bleeding Precautions:   monitored for signs of bleeding   blood pressure closely monitored  Medication Review/Management:   medications reviewed   high-risk medications identified   infusion initiated  Goal: Absence of Hypersensitivity Reaction  Outcome: Progressing  Intervention: Manage Infusion-Related Hypersensitivity  Recent Flowsheet Documentation  Taken 6/21/2024 1600 by Adelina Vegas RN  Stabilization Measures: blood products administered  Taken 6/21/2024 0800 by Adelina Vegas RN  Stabilization Measures: blood products administered  Goal: Absence of Infection  Outcome: Progressing  Intervention: Prevent and Manage Infection  Recent Flowsheet Documentation  Taken 6/21/2024 1600 by Adelina Vegas RN  Infection Prevention:   hand hygiene promoted   personal protective equipment utilized   rest/sleep promoted   single patient room provided  Infection Management: aseptic technique maintained  Isolation Precautions: protective environment maintained  Taken 6/21/2024 0800 by Adelina Vegas RN  Infection Prevention:   hand hygiene promoted   personal protective equipment utilized   rest/sleep promoted   single patient room provided  Infection Management: aseptic technique  "maintained  Isolation Precautions: protective environment maintained  Goal: Improved Oral Mucous Membrane Health and Integrity  Outcome: Not Progressing  Intervention: Promote Oral Comfort and Health  Recent Flowsheet Documentation  Taken 6/21/2024 1600 by Adelina Vegas RN  Oral Mucous Membrane Protection: nonirritating oral fluids promoted  Taken 6/21/2024 0800 by Adelina Vegas RN  Oral Mucous Membrane Protection: nonirritating oral fluids promoted  Goal: Nausea and Vomiting Symptom Relief  Outcome: Progressing  Intervention: Prevent and Manage Nausea and Vomiting  Recent Flowsheet Documentation  Taken 6/21/2024 1600 by Adelina Vegas RN  Nausea/Vomiting Interventions:   antiemetic   sips of clear liquids given   stimuli minimized   acupressure applied  Taken 6/21/2024 0800 by Adelina Vegas RN  Nausea/Vomiting Interventions:   antiemetic   sips of clear liquids given   stimuli minimized   acupressure applied  Goal: Optimal Nutrition Intake  Outcome: Not Progressing     Problem: Adult Inpatient Plan of Care  Goal: Plan of Care Review  Description: The Plan of Care Review/Shift note should be completed every shift.  The Outcome Evaluation is a brief statement about your assessment that the patient is improving, declining, or no change.  This information will be displayed automatically on your shift  note.  Outcome: Progressing  Goal: Patient-Specific Goal (Individualized)  Description: You can add care plan individualizations to a care plan. Examples of Individualization might be:  \"Parent requests to be called daily at 9am for status\", \"I have a hard time hearing out of my right ear\", or \"Do not touch me to wake me up as it startles  me\".  Outcome: Progressing  Goal: Absence of Hospital-Acquired Illness or Injury  Outcome: Not Progressing  Intervention: Identify and Manage Fall Risk  Recent Flowsheet Documentation  Taken 6/21/2024 1600 by Adelina Vegas RN  Safety Promotion/Fall Prevention:   safety round/check " completed   clutter free environment maintained   increased rounding and observation   activity supervised   assistive device/personal items within reach   lighting adjusted   nonskid shoes/slippers when out of bed   patient and family education  Taken 6/21/2024 0800 by Adelina Vegas RN  Safety Promotion/Fall Prevention:   safety round/check completed   clutter free environment maintained   increased rounding and observation   activity supervised   assistive device/personal items within reach   lighting adjusted   nonskid shoes/slippers when out of bed   patient and family education  Intervention: Prevent Skin Injury  Recent Flowsheet Documentation  Taken 6/21/2024 1600 by Adelina Vegas RN  Body Position: position changed independently  Skin Protection: adhesive use limited  Device Skin Pressure Protection: adhesive use limited  Taken 6/21/2024 0800 by Adelina Vegas RN  Body Position: position changed independently  Skin Protection: adhesive use limited  Device Skin Pressure Protection: adhesive use limited  Intervention: Prevent Infection  Recent Flowsheet Documentation  Taken 6/21/2024 1600 by Adelina Vegas RN  Infection Prevention:   hand hygiene promoted   personal protective equipment utilized   rest/sleep promoted   single patient room provided  Taken 6/21/2024 0800 by Adelina Vegas RN  Infection Prevention:   hand hygiene promoted   personal protective equipment utilized   rest/sleep promoted   single patient room provided  Goal: Optimal Comfort and Wellbeing  Outcome: Not Progressing  Intervention: Monitor Pain and Promote Comfort  Recent Flowsheet Documentation  Taken 6/21/2024 1600 by Adelina Vegas RN  Pain Management Interventions: declines  Taken 6/21/2024 1306 by Adelina Vegas RN  Pain Management Interventions: medication (see MAR)  Taken 6/21/2024 0852 by Adelina Vegas RN  Pain Management Interventions: medication (see MAR)  Goal: Readiness for Transition of Care  Outcome: Not Progressing

## 2024-06-21 NOTE — TELEPHONE ENCOUNTER
----- Message from Alecia LOPES sent at 6/12/2024  3:56 PM CDT -----  Regarding: Day 0 BAN Notification  Hey,    This patient is ready to schedule for BAN visits.    Admitting ZOHREH: Dominga Pacheco LT RNCC: Dalila  MD: Ramesh    Bmbx: Sedated  Weekly lab day preference:  unknown at this time    Other info: staying inpatient through engraftment    Restage per protocol. Orders are scanned into the media tab.  For allos only: please schedule with primary MD at D28, D60, D100, and D180.    Thanks!    Alecia    7/1 - Made day  appointments  7/5 - added on echo per Dalila, made letter    8/21 - edited recalls

## 2024-06-21 NOTE — PLAN OF CARE
"Goal Outcome Evaluation:      Plan of Care Reviewed With: patient    Overall Patient Progress: no changeOverall Patient Progress: no change    ./68 (BP Location: Right arm)   Pulse 102   Temp 98  F (36.7  C) (Oral)   Resp 16   Ht 1.63 m (5' 4.17\")   Wt 66.4 kg (146 lb 4.8 oz)   SpO2 98%   BMI 24.98 kg/m      Shift [id number]:   Pt alert and oriented. Avss on room air. C/o throat pain with swallowing, prn magic mouth wash x1. Having dry heaves and nausea after taking magic mouth wash, gave prn compazine once with relief. Using bedside commode and SBA to bathroom. Bed alarm on for safety. Denies dizziness with bathroom trips. Endorses dizziness with long periods of standing. One mixed urine and stool, slight blood tingled noted at start of shift. Had another small mucoid cherry red stool this morning. Blood noted with blowing nose. Platelet count of 8 this morning. Gave 1 unit of platelet. 20 mEq Potassium replaced per protocol. Cont with poc.      Problem: Adult Inpatient Plan of Care  Goal: Plan of Care Review  Description: The Plan of Care Review/Shift note should be completed every shift.  The Outcome Evaluation is a brief statement about your assessment that the patient is improving, declining, or no change.  This information will be displayed automatically on your shift  note.  Outcome: Not Progressing  Flowsheets (Taken 6/21/2024 0438)  Plan of Care Reviewed With: patient  Overall Patient Progress: no change     Problem: Stem Cell/Bone Marrow Transplant  Goal: Optimal Coping with Transplant  Outcome: Progressing     "

## 2024-06-21 NOTE — PLAN OF CARE
Goal Outcome Evaluation:      Plan of Care Reviewed With: patient    Overall Patient Progress: no changeOverall Patient Progress: no change     Day +8 auto stem cell rescue post-Melphalan. BP stable at 111/78. Received 2nd 500ml IV bolus at 1800 today. Up with assist for safety. Reports slight weakness w/activity this evening, but no dizziness. Ambulated to bathroom x1. Now using commode for urgency w/diarrhea x2. Bed alarm on. Imodium given. No reports of pain.

## 2024-06-21 NOTE — PROGRESS NOTES
"BMT/Cell Therapy Daily Progress Note   06/21/2024    Patient ID:  Vivian Brown is a 54 year old female, currently day +9 s/p autologous stem cell transplant for amyloidosis. Hospitalization complicated by significant orthostatic hypotension.     Admission date: 6/11/2024    INTERVAL  HISTORY   Mucositis pain is worse, mouth and throat. Hard to swallow and she has some nausea and dry heaves. Stools are loose. Dizzy only if stands too long. No fevers. Some blood streaks noted in stool, blood tinged nasal secretions.    Review of Systems: ROS negative except as noted above.      PHYSICAL EXAM   KPS:  70    /70 (BP Location: Right arm)   Pulse 94   Temp 98.7  F (37.1  C) (Oral)   Resp 16   Ht 1.63 m (5' 4.17\")   Wt 66.5 kg (146 lb 8 oz)   SpO2 97%   BMI 25.01 kg/m       General: NAD   Eyes: sclera anicteric   Mouth/Pharynx: Diffusely erythematous posterior pharynx, white sloughing present, bilateral buccal sores and soft palate sores present.   Lungs: CTA bilaterally  Cardiovascular: RRR, no M/R/G   Abdominal/Rectal: +BS, soft, NT, ND  Lymphatics: no edema   Skin: no rash  Neuro: A&O   Access: R CVC NT, no drainage.    LABS: I have assessed all abnormal lab values for their clinical significance and any values considered clinically significant have been addressed in the assessment and plan.      Lab Results   Component Value Date    WBC <0.1 (LL) 06/21/2024    ANEU 0.4 (LL) 06/18/2024    HGB 9.3 (L) 06/21/2024    HCT 27.1 (L) 06/21/2024    PLT 8 (LL) 06/21/2024     06/21/2024    POTASSIUM 3.5 06/21/2024    CHLORIDE 110 (H) 06/21/2024    CO2 20 (L) 06/21/2024     (H) 06/21/2024    BUN 16.1 06/21/2024    CR 1.33 (H) 06/21/2024    MAG 1.9 06/21/2024    INR 0.94 06/17/2024    BILITOTAL 0.4 06/17/2024    AST 25 06/17/2024    ALT 33 06/17/2024    ALKPHOS 105 06/17/2024    PROTTOTAL 4.4 (L) 06/17/2024    ALBUMIN 2.4 (L) 06/17/2024       ASSESSMENT AND PLAN   Vivian Brown is a 54 year old " female with amyloidosis, undergoing melphalan followed by autologous stem cell rescue. She is day +9.     BMT/MM/Amyloidosis  - BMT MD/Coordinator: Wei  - [id number]  - Cell dose: total collections of 5.99 million CD34+ cells/kg.     - Prep as above. Flush and pre-meds prior to transplant.  - GCSF started day +5, continue until ANC > 2500 for 2 consecutive days.     HEME/COAG  #Pancytopenia 2/2 to chemotherapy.   - Transfusion parameters: hgb <7g/dL and plts <20,000 (increased parameter 6/21 d/t brbpr). Plts today.  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     ID  Prophy: ACV; levaquin; fluconazole.  PJP ppx to start at day +28.      GI/NUTRITION  #mucositis, worsening. 6/21: meds to IV, Dilaudid IV prn, add Carafate.  - Supportive cares; MMW, Saline rinses, Oxycodone Q4H PRN  #Diarrhea - C. Diff negative on 6/11. Loperamide prn.   #CINV: scheduled Zofran IV x 6/21; prn Ativan and compazine prn.   Stopped hs Zyprexa x6/20 with ongoing orthostasis. Consider Emend.    - Ulcer prophy: Protonix 40mg daily.   # Celiac Disease, see diet below     FLUIDS/NUTRITION  - PTA Lasix lasix 40mg am, 20mg afternoon- held since 6/17 d/t orthostasis and large volume diuresis on 6/16  - PTA spironoloactone -held since 6/17   - gluten free diet (h/o celiac disease)  - dietitian to follow  - IV Bolus as needed for blood pressure support, recently 1-1.5L/d in divided doses     RENAL  # renal amyloidosis; diuresis as above  - Monitor Cr and electrolytes daily.   - Replace electrolytes per sliding scale     ENDOCRINE  # Hypothyroidism:  continue PTA cytomel and synthroid     CARDIAC  # cardiac amyloidosis: cautious fluid balance; diuresis as above  # h/o dizziness and chest pain with chemotherapy for amyloidosis; improved with addition of midodrine.   # hyperlipidemia: rosuvastatin on hold during acute transplant course     SUPPORTIVE CARE  - PT/OT to evaluate on admission and follow as indicated.   - BMT Social work to follow.    "  Known issues that I take into account for medical decisions, with salient changes to the plan considering these complexities noted above.    Patient Active Problem List   Diagnosis    Hypothyroidism due to acquired atrophy of thyroid    Family hx of colon cancer    Nonallopathic lesion of cervical region    Cervicalgia    Nonallopathic lesion of sacral region    Nonallopathic lesion of lumbar region    Lumbago    Nonallopathic lesion of thoracic region    Hyperlipidemia LDL goal <100    Dependent edema R>L    AL amyloidosis (H)    Hypogammaglobulinemia (H24)    Chronic kidney disease, stage 3a (H)    Celiac disease    Autologous donor of stem cells     Medically Ready for Discharge: Anticipated in 5+ Days    Clinically Significant Risk Factors              # Hypoalbuminemia: Lowest albumin = 2.4 g/dL at 6/17/2024  3:38 AM, will monitor as appropriate   # Thrombocytopenia: Lowest platelets = 8 in last 2 days, will monitor for bleeding             #Precipitous drop in Hgb/Hct: Lowest Hgb this hospitalization: 9.3 g/dL. Will continue to monitor and treat/transfuse as appropriate.     # Overweight: Estimated body mass index is 25.01 kg/m  as calculated from the following:    Height as of this encounter: 1.63 m (5' 4.17\").    Weight as of this encounter: 66.5 kg (146 lb 8 oz).             I spent 40 minutes in the care of this patient today, which included time necessary for preparation for the visit, obtaining history, ordering medications/tests/procedures as medically indicated, review of pertinent medical literature, counseling of the patient, communication of recommendations to the care team, and documentation time.      Aminata Matias PA-C       "

## 2024-06-22 LAB
ACANTHOCYTES BLD QL SMEAR: SLIGHT
ANION GAP SERPL CALCULATED.3IONS-SCNC: 9 MMOL/L (ref 7–15)
AUER BODIES BLD QL SMEAR: ABNORMAL
BASO STIPL BLD QL SMEAR: ABNORMAL
BASOPHILS # BLD AUTO: ABNORMAL 10*3/UL
BASOPHILS NFR BLD AUTO: ABNORMAL %
BITE CELLS BLD QL SMEAR: ABNORMAL
BLISTER CELLS BLD QL SMEAR: ABNORMAL
BUN SERPL-MCNC: 15.1 MG/DL (ref 6–20)
BURR CELLS BLD QL SMEAR: SLIGHT
CALCIUM SERPL-MCNC: 8.2 MG/DL (ref 8.6–10)
CHLORIDE SERPL-SCNC: 109 MMOL/L (ref 98–107)
CREAT SERPL-MCNC: 1.26 MG/DL (ref 0.51–0.95)
DACRYOCYTES BLD QL SMEAR: ABNORMAL
DEPRECATED HCO3 PLAS-SCNC: 19 MMOL/L (ref 22–29)
EGFRCR SERPLBLD CKD-EPI 2021: 50 ML/MIN/1.73M2
ELLIPTOCYTES BLD QL SMEAR: ABNORMAL
EOSINOPHIL # BLD AUTO: ABNORMAL 10*3/UL
EOSINOPHIL NFR BLD AUTO: ABNORMAL %
ERYTHROCYTE [DISTWIDTH] IN BLOOD BY AUTOMATED COUNT: 14.6 % (ref 10–15)
FRAGMENTS BLD QL SMEAR: ABNORMAL
GLUCOSE SERPL-MCNC: 105 MG/DL (ref 70–99)
HCT VFR BLD AUTO: 24.4 % (ref 35–47)
HGB BLD-MCNC: 8.2 G/DL (ref 11.7–15.7)
HGB C CRYSTALS: ABNORMAL
HOWELL-JOLLY BOD BLD QL SMEAR: ABNORMAL
IMM GRANULOCYTES # BLD: ABNORMAL 10*3/UL
IMM GRANULOCYTES NFR BLD: ABNORMAL %
LYMPHOCYTES # BLD AUTO: ABNORMAL 10*3/UL
LYMPHOCYTES NFR BLD AUTO: ABNORMAL %
MAGNESIUM SERPL-MCNC: 1.8 MG/DL (ref 1.7–2.3)
MCH RBC QN AUTO: 31.4 PG (ref 26.5–33)
MCHC RBC AUTO-ENTMCNC: 33.6 G/DL (ref 31.5–36.5)
MCV RBC AUTO: 94 FL (ref 78–100)
MONOCYTES # BLD AUTO: ABNORMAL 10*3/UL
MONOCYTES NFR BLD AUTO: ABNORMAL %
NEUTROPHILS # BLD AUTO: ABNORMAL 10*3/UL
NEUTROPHILS NFR BLD AUTO: ABNORMAL %
NEUTS HYPERSEG BLD QL SMEAR: ABNORMAL
PHOSPHATE SERPL-MCNC: 3.3 MG/DL (ref 2.5–4.5)
PLAT MORPH BLD: ABNORMAL
PLATELET # BLD AUTO: 42 10E3/UL (ref 150–450)
POLYCHROMASIA BLD QL SMEAR: ABNORMAL
POTASSIUM SERPL-SCNC: 3.7 MMOL/L (ref 3.4–5.3)
RBC # BLD AUTO: 2.61 10E6/UL (ref 3.8–5.2)
RBC AGGLUT BLD QL: ABNORMAL
RBC MORPH BLD: ABNORMAL
ROULEAUX BLD QL SMEAR: ABNORMAL
SICKLE CELLS BLD QL SMEAR: ABNORMAL
SMUDGE CELLS BLD QL SMEAR: ABNORMAL
SODIUM SERPL-SCNC: 137 MMOL/L (ref 135–145)
SPHEROCYTES BLD QL SMEAR: ABNORMAL
STOMATOCYTES BLD QL SMEAR: ABNORMAL
TARGETS BLD QL SMEAR: ABNORMAL
TOXIC GRANULES BLD QL SMEAR: ABNORMAL
VARIANT LYMPHS BLD QL SMEAR: ABNORMAL
WBC # BLD AUTO: <0.1 10E3/UL (ref 4–11)

## 2024-06-22 PROCEDURE — 250N000011 HC RX IP 250 OP 636: Mod: JZ | Performed by: INTERNAL MEDICINE

## 2024-06-22 PROCEDURE — 250N000013 HC RX MED GY IP 250 OP 250 PS 637

## 2024-06-22 PROCEDURE — 99418 PROLNG IP/OBS E/M EA 15 MIN: CPT | Performed by: PHYSICIAN ASSISTANT

## 2024-06-22 PROCEDURE — 250N000013 HC RX MED GY IP 250 OP 250 PS 637: Performed by: PHYSICIAN ASSISTANT

## 2024-06-22 PROCEDURE — 250N000011 HC RX IP 250 OP 636: Mod: JZ

## 2024-06-22 PROCEDURE — 84100 ASSAY OF PHOSPHORUS: CPT | Performed by: HOSPITALIST

## 2024-06-22 PROCEDURE — 80048 BASIC METABOLIC PNL TOTAL CA: CPT

## 2024-06-22 PROCEDURE — 83735 ASSAY OF MAGNESIUM: CPT | Performed by: HOSPITALIST

## 2024-06-22 PROCEDURE — 99233 SBSQ HOSP IP/OBS HIGH 50: CPT | Mod: FS | Performed by: PHYSICIAN ASSISTANT

## 2024-06-22 PROCEDURE — 250N000011 HC RX IP 250 OP 636: Mod: JZ | Performed by: STUDENT IN AN ORGANIZED HEALTH CARE EDUCATION/TRAINING PROGRAM

## 2024-06-22 PROCEDURE — 206N000001 HC R&B BMT UMMC

## 2024-06-22 PROCEDURE — C9113 INJ PANTOPRAZOLE SODIUM, VIA: HCPCS | Mod: JZ | Performed by: PHYSICIAN ASSISTANT

## 2024-06-22 PROCEDURE — 258N000003 HC RX IP 258 OP 636: Mod: JZ | Performed by: STUDENT IN AN ORGANIZED HEALTH CARE EDUCATION/TRAINING PROGRAM

## 2024-06-22 PROCEDURE — 85027 COMPLETE CBC AUTOMATED: CPT

## 2024-06-22 PROCEDURE — 258N000003 HC RX IP 258 OP 636: Mod: JZ | Performed by: PHYSICIAN ASSISTANT

## 2024-06-22 PROCEDURE — 250N000013 HC RX MED GY IP 250 OP 250 PS 637: Performed by: INTERNAL MEDICINE

## 2024-06-22 PROCEDURE — 250N000011 HC RX IP 250 OP 636: Mod: JZ | Performed by: PHYSICIAN ASSISTANT

## 2024-06-22 RX ORDER — POTASSIUM CHLORIDE 29.8 MG/ML
20 INJECTION INTRAVENOUS ONCE
Status: COMPLETED | OUTPATIENT
Start: 2024-06-22 | End: 2024-06-22

## 2024-06-22 RX ADMIN — MIDODRINE HYDROCHLORIDE 10 MG: 5 TABLET ORAL at 16:10

## 2024-06-22 RX ADMIN — MIDODRINE HYDROCHLORIDE 10 MG: 5 TABLET ORAL at 12:11

## 2024-06-22 RX ADMIN — ONDANSETRON 8 MG: 2 INJECTION INTRAMUSCULAR; INTRAVENOUS at 16:10

## 2024-06-22 RX ADMIN — SUCRALFATE 1 G: 1 SUSPENSION ORAL at 21:33

## 2024-06-22 RX ADMIN — SUCRALFATE 1 G: 1 SUSPENSION ORAL at 12:11

## 2024-06-22 RX ADMIN — PANTOPRAZOLE SODIUM 40 MG: 40 INJECTION, POWDER, FOR SOLUTION INTRAVENOUS at 08:08

## 2024-06-22 RX ADMIN — LIOTHYRONINE SODIUM 5 MCG: 5 TABLET ORAL at 08:22

## 2024-06-22 RX ADMIN — FOSAPREPITANT 150 MG: 150 INJECTION, POWDER, LYOPHILIZED, FOR SOLUTION INTRAVENOUS at 09:04

## 2024-06-22 RX ADMIN — ONDANSETRON 8 MG: 2 INJECTION INTRAMUSCULAR; INTRAVENOUS at 01:30

## 2024-06-22 RX ADMIN — OXYCODONE HYDROCHLORIDE 5 MG: 5 TABLET ORAL at 21:23

## 2024-06-22 RX ADMIN — Medication 10 ML: at 21:23

## 2024-06-22 RX ADMIN — HYDROMORPHONE HYDROCHLORIDE 0.2 MG: 0.2 INJECTION, SOLUTION INTRAMUSCULAR; INTRAVENOUS; SUBCUTANEOUS at 08:08

## 2024-06-22 RX ADMIN — LEVOFLOXACIN 250 MG: 5 INJECTION, SOLUTION INTRAVENOUS at 09:34

## 2024-06-22 RX ADMIN — FLUCONAZOLE IN SODIUM CHLORIDE 200 MG: 2 INJECTION, SOLUTION INTRAVENOUS at 12:11

## 2024-06-22 RX ADMIN — MIDODRINE HYDROCHLORIDE 10 MG: 5 TABLET ORAL at 08:23

## 2024-06-22 RX ADMIN — HYDROMORPHONE HYDROCHLORIDE 0.2 MG: 0.2 INJECTION, SOLUTION INTRAMUSCULAR; INTRAVENOUS; SUBCUTANEOUS at 19:42

## 2024-06-22 RX ADMIN — HYDROMORPHONE HYDROCHLORIDE 0.2 MG: 0.2 INJECTION, SOLUTION INTRAMUSCULAR; INTRAVENOUS; SUBCUTANEOUS at 16:34

## 2024-06-22 RX ADMIN — LOPERAMIDE HYDROCHLORIDE 4 MG: 2 CAPSULE ORAL at 13:11

## 2024-06-22 RX ADMIN — DEXTROSE MONOHYDRATE 20 ML: 50 INJECTION, SOLUTION INTRAVENOUS at 20:30

## 2024-06-22 RX ADMIN — LEVOTHYROXINE SODIUM 200 MCG: 200 TABLET ORAL at 05:39

## 2024-06-22 RX ADMIN — PROCHLORPERAZINE EDISYLATE 5 MG: 5 INJECTION INTRAMUSCULAR; INTRAVENOUS at 06:54

## 2024-06-22 RX ADMIN — SODIUM CHLORIDE 500 ML: 9 INJECTION, SOLUTION INTRAVENOUS at 08:14

## 2024-06-22 RX ADMIN — DEXTROSE MONOHYDRATE 15 ML: 50 INJECTION, SOLUTION INTRAVENOUS at 19:42

## 2024-06-22 RX ADMIN — SUCRALFATE 1 G: 1 SUSPENSION ORAL at 16:10

## 2024-06-22 RX ADMIN — ACYCLOVIR SODIUM 325 MG: 50 INJECTION, SOLUTION INTRAVENOUS at 19:42

## 2024-06-22 RX ADMIN — ONDANSETRON 8 MG: 2 INJECTION INTRAMUSCULAR; INTRAVENOUS at 08:08

## 2024-06-22 RX ADMIN — LOPERAMIDE HYDROCHLORIDE 4 MG: 2 CAPSULE ORAL at 18:05

## 2024-06-22 RX ADMIN — LIOTHYRONINE SODIUM 5 MCG: 5 TABLET ORAL at 13:11

## 2024-06-22 RX ADMIN — LOPERAMIDE HYDROCHLORIDE 4 MG: 2 CAPSULE ORAL at 21:23

## 2024-06-22 RX ADMIN — SUCRALFATE 1 G: 1 SUSPENSION ORAL at 06:54

## 2024-06-22 RX ADMIN — ACYCLOVIR SODIUM 325 MG: 50 INJECTION, SOLUTION INTRAVENOUS at 08:22

## 2024-06-22 RX ADMIN — POTASSIUM CHLORIDE 20 MEQ: 29.8 INJECTION, SOLUTION INTRAVENOUS at 05:40

## 2024-06-22 RX ADMIN — LOPERAMIDE HYDROCHLORIDE 4 MG: 2 CAPSULE ORAL at 08:22

## 2024-06-22 RX ADMIN — DEXTROSE MONOHYDRATE 330 MCG: 50 INJECTION, SOLUTION INTRAVENOUS at 19:42

## 2024-06-22 RX ADMIN — HYDROMORPHONE HYDROCHLORIDE 0.2 MG: 0.2 INJECTION, SOLUTION INTRAMUSCULAR; INTRAVENOUS; SUBCUTANEOUS at 12:36

## 2024-06-22 ASSESSMENT — ACTIVITIES OF DAILY LIVING (ADL)
ADLS_ACUITY_SCORE: 24

## 2024-06-22 NOTE — PLAN OF CARE
"/77   Pulse 95   Temp 98.3  F (36.8  C) (Axillary)   Resp 18   Ht 1.63 m (5' 4.17\")   Wt 66.5 kg (146 lb 8 oz)   SpO2 98%   BMI 25.01 kg/m  LTCVC C/D/I    Ms. Brown did not have a restful night, being kept awake by nausea and diarrhea. One dose of ativan and one dose of dilaudid given for nausea and pain respectively. SBA, patient unsteady on feet but able to pivot to commode. 50mEq K+ given for low blood level.    Problem: Adult Inpatient Plan of Care  Goal: Optimal Comfort and Wellbeing  Outcome: Not Progressing     Problem: Stem Cell/Bone Marrow Transplant  Goal: Symptom-Free Urinary Elimination  Outcome: Not Progressing  Intervention: Prevent or Manage Bladder Irritation  Recent Flowsheet Documentation  Taken 6/21/2024 2000 by Carlos Bansal RN  Hyperhydration Management:   fluids provided   intravenous fluids adjusted  Goal: Diarrhea Symptom Control  Outcome: Not Progressing  Intervention: Manage Diarrhea  Recent Flowsheet Documentation  Taken 6/21/2024 2315 by Carlos Bansal RN  Perineal Care: perineal hygiene encouraged  Taken 6/21/2024 2112 by Carlos Bansal RN  Perineal Care: perineal hygiene encouraged  Taken 6/21/2024 2000 by Carlos Bansal RN  Skin Protection: adhesive use limited  Fluid/Electrolyte Management: fluids provided  Goal: Nausea and Vomiting Symptom Relief  Outcome: Not Progressing  Intervention: Prevent and Manage Nausea and Vomiting  Recent Flowsheet Documentation  Taken 6/21/2024 2000 by Carlos Bansal RN  Nausea/Vomiting Interventions:   antiemetic   sips of clear liquids given     Problem: Adult Inpatient Plan of Care  Goal: Plan of Care Review  Description: The Plan of Care Review/Shift note should be completed every shift.  The Outcome Evaluation is a brief statement about your assessment that the patient is improving, declining, or no change.  This information will be displayed automatically on your shift  note.  Outcome: " Progressing  Flowsheets (Taken 6/22/2024 0553)  Plan of Care Reviewed With: patient  Overall Patient Progress: no change  Goal: Absence of Hospital-Acquired Illness or Injury  Outcome: Progressing  Intervention: Identify and Manage Fall Risk  Recent Flowsheet Documentation  Taken 6/22/2024 0336 by Carlos Bansal RN  Safety Promotion/Fall Prevention:   activity supervised   assistive device/personal items within reach   clutter free environment maintained   nonskid shoes/slippers when out of bed   patient and family education   safety round/check completed  Taken 6/21/2024 2315 by Carlos Bansal RN  Safety Promotion/Fall Prevention:   activity supervised   assistive device/personal items within reach   clutter free environment maintained   nonskid shoes/slippers when out of bed   patient and family education   safety round/check completed  Taken 6/21/2024 2000 by Rolf, Carlos K., RN  Safety Promotion/Fall Prevention:   activity supervised   assistive device/personal items within reach   clutter free environment maintained   nonskid shoes/slippers when out of bed   patient and family education   safety round/check completed  Intervention: Prevent Skin Injury  Recent Flowsheet Documentation  Taken 6/21/2024 2112 by Carlos Bansal RN  Body Position: position changed independently  Taken 6/21/2024 2000 by Carlos Bansal RN  Skin Protection: adhesive use limited  Device Skin Pressure Protection: adhesive use limited  Intervention: Prevent Infection  Recent Flowsheet Documentation  Taken 6/22/2024 0336 by Carlos Bansal RN  Infection Prevention:   environmental surveillance performed   hand hygiene promoted   personal protective equipment utilized   rest/sleep promoted   single patient room provided  Taken 6/21/2024 2315 by Carlos Bansal RN  Infection Prevention:   environmental surveillance performed   hand hygiene promoted   personal protective equipment utilized   rest/sleep  promoted   single patient room provided  Taken 6/21/2024 2000 by Carlos Bansal RN  Infection Prevention:   environmental surveillance performed   hand hygiene promoted   personal protective equipment utilized   rest/sleep promoted   single patient room provided  Goal: Readiness for Transition of Care  Outcome: Progressing     Problem: Stem Cell/Bone Marrow Transplant  Goal: Optimal Coping with Transplant  Outcome: Progressing  Intervention: Optimize Patient/Family Adjustment to Transplant  Recent Flowsheet Documentation  Taken 6/21/2024 2000 by Carlos Bansal RN  Supportive Measures:   active listening utilized   positive reinforcement provided   problem-solving facilitated  Goal: Improved Activity Tolerance  Outcome: Progressing  Intervention: Promote Improved Energy  Recent Flowsheet Documentation  Taken 6/22/2024 0336 by Carlos Bansal, RN  Activity Management:   ambulated in room   back to bed  Taken 6/21/2024 2315 by Carlos Bansal RN  Activity Management: ambulated in room  Taken 6/21/2024 2112 by Carlos Bansal RN  Activity Management:   activity adjusted per tolerance   ambulated in room   back to bed  Taken 6/21/2024 2000 by Carlos Bansal RN  Fatigue Management: activity assistance provided  Environmental Support: calm environment promoted  Goal: Blood Counts Within Acceptable Range  Outcome: Progressing  Intervention: Monitor and Manage Hematologic Symptoms  Recent Flowsheet Documentation  Taken 6/22/2024 0336 by Carlos Bansal, RN  Bleeding Precautions:   monitored for signs of bleeding   blood pressure closely monitored  Medication Review/Management:   medications reviewed   high-risk medications identified   infusion initiated  Taken 6/21/2024 2315 by Carlos Bansal RN  Bleeding Precautions:   monitored for signs of bleeding   blood pressure closely monitored  Medication Review/Management:   medications reviewed   high-risk medications identified    infusion initiated  Taken 6/21/2024 2000 by Carlos Bansal RN  Bleeding Precautions:   monitored for signs of bleeding   blood pressure closely monitored  Medication Review/Management:   medications reviewed   high-risk medications identified   infusion initiated  Goal: Absence of Hypersensitivity Reaction  Outcome: Progressing  Goal: Absence of Infection  Outcome: Progressing  Intervention: Prevent and Manage Infection  Recent Flowsheet Documentation  Taken 6/22/2024 0336 by Carlos Bansal RN  Infection Prevention:   environmental surveillance performed   hand hygiene promoted   personal protective equipment utilized   rest/sleep promoted   single patient room provided  Infection Management: aseptic technique maintained  Isolation Precautions: protective environment maintained  Taken 6/21/2024 2315 by Carlos Bansal RN  Infection Prevention:   environmental surveillance performed   hand hygiene promoted   personal protective equipment utilized   rest/sleep promoted   single patient room provided  Infection Management: aseptic technique maintained  Isolation Precautions: protective environment maintained  Taken 6/21/2024 2000 by Carlos Bansal RN  Infection Prevention:   environmental surveillance performed   hand hygiene promoted   personal protective equipment utilized   rest/sleep promoted   single patient room provided  Infection Management: aseptic technique maintained  Isolation Precautions: protective environment maintained  Goal: Improved Oral Mucous Membrane Health and Integrity  Outcome: Progressing  Intervention: Promote Oral Comfort and Health  Recent Flowsheet Documentation  Taken 6/21/2024 2112 by Carlos Bansal RN  Oral Care: oral rinse provided  Goal: Optimal Nutrition Intake  Outcome: Progressing     Problem: Skin Injury Risk Increased  Goal: Skin Health and Integrity  Outcome: Progressing  Intervention: Plan: Nurse Driven Intervention: Moisture Management  Recent  Flowsheet Documentation  Taken 6/21/2024 2000 by Carlos Bansal, RN  Moisture Interventions:   Encourage regular toileting   No brief in bed  Intervention: Optimize Skin Protection  Recent Flowsheet Documentation  Taken 6/22/2024 0336 by Carlos Bansal, RN  Activity Management:   ambulated in room   back to bed  Taken 6/21/2024 2315 by Carlos Bansal, RN  Activity Management: ambulated in room  Taken 6/21/2024 2112 by Carlos Bansal, RN  Activity Management:   activity adjusted per tolerance   ambulated in room   back to bed  Taken 6/21/2024 2000 by Carlos Banasl, RN  Skin Protection: adhesive use limited       Goal Outcome Evaluation:      Plan of Care Reviewed With: patient    Overall Patient Progress: no change

## 2024-06-22 NOTE — PLAN OF CARE
Temp: 97.1  F (36.2  C) Temp src: Axillary BP: 112/67 Pulse: 102   Resp: 16 SpO2: 96 % O2 Device: None (Room air)      A&Ox4, but very fatigued and weak.  Pt sleeping deeply in between cares most of the day.  Tmax 100.2 with occasional chills, but resolved on its own.  Tachycardic (-119) OVSS.  Pt c/o mucositis throat sores with increasing pain.  Pt continues to have difficulty drinking, eating, and swallowing pills. 500 ml IV bolus x1 given, carafate QID very helpful, and IV dilaudid given for pain x3.  Pt rated pain 6/10, but gets down to 2-4/10 with Dilaudid.  Eating minimal bites of cream of rice and boost shake.  Imodium given 3x for liquid diarrhea.  Pt had 5 watery green stools.  No dark red or bloody streaks noted.  Pt incontinent at times. Bed alarm on for safety.  Calling appropriately to get up to commode.  Pt felt too weak for a shower, but able to wash at sink and complete CHG wipes.      Problem: Stem Cell/Bone Marrow Transplant  Goal: Optimal Coping with Transplant  Outcome: Progressing  Intervention: Optimize Patient/Family Adjustment to Transplant  Recent Flowsheet Documentation  Taken 6/22/2024 1536 by Adelina Vegas RN  Supportive Measures:   active listening utilized   positive reinforcement provided   problem-solving facilitated  Taken 6/22/2024 0825 by Adelina Vegas, RN  Supportive Measures:   active listening utilized   positive reinforcement provided   problem-solving facilitated  Goal: Symptom-Free Urinary Elimination  Outcome: Progressing  Intervention: Prevent or Manage Bladder Irritation  Recent Flowsheet Documentation  Taken 6/22/2024 1634 by Adelina Vegas, RN  Pain Management Interventions: medication (see MAR)  Taken 6/22/2024 1536 by Adelina Vegas, RN  Urinary Elimination Promotion: frequent voiding encouraged  Hyperhydration Management:   fluids provided   intravenous fluids adjusted  Taken 6/22/2024 0825 by Adelina Vegas, RN  Urinary Elimination Promotion: frequent voiding  encouraged  Hyperhydration Management:   fluids provided   intravenous fluids adjusted  Taken 6/22/2024 0808 by Adelina Vegas RN  Pain Management Interventions: medication (see MAR)  Goal: Diarrhea Symptom Control  Outcome: Not Progressing  Intervention: Manage Diarrhea  Recent Flowsheet Documentation  Taken 6/22/2024 1536 by Adelina Vegas RN  Skin Protection:   adhesive use limited   transparent dressing maintained   incontinence pads utilized  Fluid/Electrolyte Management: fluids provided  Perineal Care: perineal hygiene encouraged  Taken 6/22/2024 0825 by Adelina Vegas RN  Skin Protection:   adhesive use limited   transparent dressing maintained   incontinence pads utilized  Fluid/Electrolyte Management: fluids provided  Perineal Care: perineal hygiene encouraged  Goal: Improved Activity Tolerance  Outcome: Not Progressing  Intervention: Promote Improved Energy  Recent Flowsheet Documentation  Taken 6/22/2024 1536 by Adelina Vegas RN  Fatigue Management: activity assistance provided  Activity Management:   ambulated in room   up to bedside commode   activity adjusted per tolerance  Environmental Support: calm environment promoted  Taken 6/22/2024 0825 by Adelina Vegas RN  Fatigue Management: activity assistance provided  Activity Management:   ambulated in room   up to bedside commode   activity adjusted per tolerance  Environmental Support: calm environment promoted  Goal: Blood Counts Within Acceptable Range  Outcome: Progressing  Intervention: Monitor and Manage Hematologic Symptoms  Recent Flowsheet Documentation  Taken 6/22/2024 1536 by Adelina Vegas RN  Bleeding Precautions:   monitored for signs of bleeding   blood pressure closely monitored   gentle oral care promoted  Medication Review/Management:   medications reviewed   high-risk medications identified   infusion initiated  Taken 6/22/2024 0825 by Adelina Vegas RN  Bleeding Precautions:   monitored for signs of bleeding   blood pressure closely  monitored   gentle oral care promoted  Medication Review/Management:   medications reviewed   high-risk medications identified   infusion initiated  Goal: Absence of Hypersensitivity Reaction  Outcome: Progressing  Goal: Absence of Infection  Outcome: Progressing  Intervention: Prevent and Manage Infection  Recent Flowsheet Documentation  Taken 6/22/2024 1536 by Adelina Vegas RN  Infection Prevention:   environmental surveillance performed   hand hygiene promoted   personal protective equipment utilized   rest/sleep promoted   single patient room provided  Infection Management: aseptic technique maintained  Isolation Precautions: protective environment maintained  Taken 6/22/2024 0825 by Adelina Vegas RN  Infection Prevention:   environmental surveillance performed   hand hygiene promoted   personal protective equipment utilized   rest/sleep promoted   single patient room provided  Infection Management: aseptic technique maintained  Isolation Precautions: protective environment maintained  Goal: Improved Oral Mucous Membrane Health and Integrity  Outcome: Not Progressing  Intervention: Promote Oral Comfort and Health  Recent Flowsheet Documentation  Taken 6/22/2024 1536 by Adelina Vegas RN  Oral Care: oral rinse provided  Taken 6/22/2024 0825 by Adelina Vegas RN  Oral Care: oral rinse provided  Goal: Nausea and Vomiting Symptom Relief  Outcome: Progressing  Intervention: Prevent and Manage Nausea and Vomiting  Recent Flowsheet Documentation  Taken 6/22/2024 1536 by Adelnia Vegas RN  Nausea/Vomiting Interventions:   antiemetic   sips of clear liquids given  Taken 6/22/2024 0825 by Adelina Vegas RN  Nausea/Vomiting Interventions:   antiemetic   sips of clear liquids given  Goal: Optimal Nutrition Intake  Outcome: Not Progressing  Intervention: Minimize and Manage Barriers to Oral Intake  Recent Flowsheet Documentation  Taken 6/22/2024 1536 by Adelina Vegas RN  Oral Nutrition Promotion:   calorie-dense liquids  "provided   calorie-dense foods provided   rest periods promoted  Taken 6/22/2024 0825 by Adelina Vegas RN  Oral Nutrition Promotion:   calorie-dense liquids provided   calorie-dense foods provided   rest periods promoted     Problem: Adult Inpatient Plan of Care  Goal: Plan of Care Review  Description: The Plan of Care Review/Shift note should be completed every shift.  The Outcome Evaluation is a brief statement about your assessment that the patient is improving, declining, or no change.  This information will be displayed automatically on your shift  note.  Outcome: Progressing  Goal: Patient-Specific Goal (Individualized)  Description: You can add care plan individualizations to a care plan. Examples of Individualization might be:  \"Parent requests to be called daily at 9am for status\", \"I have a hard time hearing out of my right ear\", or \"Do not touch me to wake me up as it startles  me\".  Outcome: Progressing  Goal: Absence of Hospital-Acquired Illness or Injury  Outcome: Progressing  Intervention: Identify and Manage Fall Risk  Recent Flowsheet Documentation  Taken 6/22/2024 0825 by Adelina Vegas RN  Safety Promotion/Fall Prevention:   safety round/check completed   assistive device/personal items within reach   clutter free environment maintained   lighting adjusted   nonskid shoes/slippers when out of bed  Intervention: Prevent Skin Injury  Recent Flowsheet Documentation  Taken 6/22/2024 1536 by Adelina Vegas, RN  Body Position: position changed independently  Skin Protection:   adhesive use limited   transparent dressing maintained   incontinence pads utilized  Device Skin Pressure Protection:   adhesive use limited   tubing/devices free from skin contact   absorbent pad utilized/changed  Taken 6/22/2024 0825 by Adelina Vegas, RN  Body Position: position changed independently  Skin Protection:   adhesive use limited   transparent dressing maintained   incontinence pads utilized  Device Skin Pressure " Protection:   adhesive use limited   tubing/devices free from skin contact   absorbent pad utilized/changed  Intervention: Prevent Infection  Recent Flowsheet Documentation  Taken 6/22/2024 1536 by Adelina Vegas RN  Infection Prevention:   environmental surveillance performed   hand hygiene promoted   personal protective equipment utilized   rest/sleep promoted   single patient room provided  Taken 6/22/2024 0825 by Adelina Vegas RN  Infection Prevention:   environmental surveillance performed   hand hygiene promoted   personal protective equipment utilized   rest/sleep promoted   single patient room provided  Goal: Optimal Comfort and Wellbeing  Outcome: Progressing  Intervention: Monitor Pain and Promote Comfort  Recent Flowsheet Documentation  Taken 6/22/2024 1634 by Adelina Vegas RN  Pain Management Interventions: medication (see MAR)  Taken 6/22/2024 0808 by Adelina Vegas RN  Pain Management Interventions: medication (see MAR)  Goal: Readiness for Transition of Care  Outcome: Progressing

## 2024-06-22 NOTE — PROGRESS NOTES
"BMT/Cell Therapy Daily Progress Note   06/22/2024    Patient ID:  Vivian Brown is a 54 year old female, currently day +10 s/p autologous stem cell transplant for amyloidosis. Hospitalization complicated by significant orthostatic hypotension.     Admission date: 6/11/2024    INTERVAL  HISTORY   Still with some nausea and loose stools. Stools more brown now (less brbpr). Mucositis pain is better controlled with prn IV Dilaudid. Carafate also helpful. Able to eat some cream of wheat and drink a couple of cartons of mild this am. No dizzy. Wt up, plan less IVF today since tolerating more PO. No fevers.    Review of Systems: ROS negative except as noted above.      PHYSICAL EXAM   KPS:  70    /76 (BP Location: Right arm)   Pulse 114   Temp 99  F (37.2  C) (Axillary)   Resp 16   Ht 1.63 m (5' 4.17\")   Wt 68.7 kg (151 lb 6.4 oz)   SpO2 98%   BMI 25.85 kg/m       General: NAD   HEENT: sclera anicteric. Thick secretions posteriorly, mild erythema, ridgling, white sloughing present, bilateral buccal sores and soft palate sores present.   Lungs: CTA bilaterally  Cardiovascular: RRR, no M/R/G   Abdominal/Rectal: +BS, soft, NT, ND  Lymphatics: no edema   Skin: no rash  Neuro: A&O   Access: R CVC NT, no drainage.    LABS: I have assessed all abnormal lab values for their clinical significance and any values considered clinically significant have been addressed in the assessment and plan.      Lab Results   Component Value Date    WBC <0.1 (LL) 06/22/2024    ANEU 0.4 (LL) 06/18/2024    HGB 8.2 (L) 06/22/2024    HCT 24.4 (L) 06/22/2024    PLT 42 (LL) 06/22/2024     06/22/2024    POTASSIUM 3.7 06/22/2024    CHLORIDE 109 (H) 06/22/2024    CO2 19 (L) 06/22/2024     (H) 06/22/2024    BUN 15.1 06/22/2024    CR 1.26 (H) 06/22/2024    MAG 1.8 06/22/2024    INR 0.94 06/17/2024    BILITOTAL 0.4 06/17/2024    AST 25 06/17/2024    ALT 33 06/17/2024    ALKPHOS 105 06/17/2024    PROTTOTAL 4.4 (L) 06/17/2024    " ALBUMIN 2.4 (L) 06/17/2024       ASSESSMENT AND PLAN   Vivian Brown is a 54 year old female with amyloidosis, undergoing melphalan followed by autologous stem cell rescue. She is day +10.     BMT/MM/Amyloidosis  - BMT MD/Coordinator: Wei  - [id number]  - Cell dose: total collections of 5.99 million CD34+ cells/kg.     - Prep as above. Flush and pre-meds prior to transplant.  - GCSF started day +5, continue until ANC > 2500 for 2 consecutive days.     HEME/COAG  #Pancytopenia 2/2 to chemotherapy.   - Transfusion parameters: hgb <7g/dL and plts <20,000 (increased parameter 6/21 d/t brbpr).  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     ID  Prophy: ACV; levaquin; fluconazole.  PJP ppx to start at day +28.      GI/NUTRITION  #mucositis, worsening. 6/21: meds to IV, Dilaudid IV prn, add Carafate.  - Supportive cares; MMW, Saline rinses, Oxycodone PO, Dilaudid IV prn.   #Diarrhea - C. Diff negative on 6/11. Loperamide prn.   #CINV: scheduled Zofran IV x 6/21; prn Ativan and compazine prn. *Emend 6/22.  Stopped hs Zyprexa x6/20 with ongoing orthostasis.     - Ulcer prophy: Protonix 40mg daily.   # Celiac Disease, see diet below     FLUIDS/NUTRITION  - PTA Lasix lasix 40mg am, 20mg afternoon- held since 6/17 d/t orthostasis and large volume diuresis on 6/16  - PTA spironoloactone -held since 6/17   - gluten free diet (h/o celiac disease)  - dietitian to follow  - IV Bolus as needed for blood pressure support, recently 1-1.5L/d in divided doses. 6/22: 500cc this am, push PO. Note of wt up and not on PTA diuretics.     RENAL  # renal amyloidosis; diuresis as above  - Monitor Cr and electrolytes daily.   - Replace electrolytes per sliding scale     ENDOCRINE  # Hypothyroidism:  continue PTA cytomel and synthroid     CARDIAC  # cardiac amyloidosis: cautious fluid balance; diuresis as above  # h/o dizziness and chest pain with chemotherapy for amyloidosis; improved with addition of midodrine.   # hyperlipidemia:  "rosuvastatin on hold during acute transplant course     SUPPORTIVE CARE  - PT/OT to evaluate on admission and follow as indicated.   - BMT Social work to follow.     Known issues that I take into account for medical decisions, with salient changes to the plan considering these complexities noted above.    Patient Active Problem List   Diagnosis    Hypothyroidism due to acquired atrophy of thyroid    Family hx of colon cancer    Nonallopathic lesion of cervical region    Cervicalgia    Nonallopathic lesion of sacral region    Nonallopathic lesion of lumbar region    Lumbago    Nonallopathic lesion of thoracic region    Hyperlipidemia LDL goal <100    Dependent edema R>L    AL amyloidosis (H)    Hypogammaglobulinemia (H24)    Chronic kidney disease, stage 3a (H)    Celiac disease    Autologous donor of stem cells     Medically Ready for Discharge: Anticipated in 5+ Days    Clinically Significant Risk Factors              # Hypoalbuminemia: Lowest albumin = 2.4 g/dL at 6/17/2024  3:38 AM, will monitor as appropriate   # Thrombocytopenia: Lowest platelets = 8 in last 2 days, will monitor for bleeding             #Precipitous drop in Hgb/Hct: Lowest Hgb this hospitalization: 8.2 g/dL. Will continue to monitor and treat/transfuse as appropriate.     # Overweight: Estimated body mass index is 25.85 kg/m  as calculated from the following:    Height as of this encounter: 1.63 m (5' 4.17\").    Weight as of this encounter: 68.7 kg (151 lb 6.4 oz).             I spent 40 minutes in the care of this patient today, which included time necessary for preparation for the visit, obtaining history, ordering medications/tests/procedures as medically indicated, review of pertinent medical literature, counseling of the patient, communication of recommendations to the care team, and documentation time.      Aminata Matias PA-C       "

## 2024-06-23 LAB
ABO/RH(D): NORMAL
ACANTHOCYTES BLD QL SMEAR: SLIGHT
ANION GAP SERPL CALCULATED.3IONS-SCNC: 9 MMOL/L (ref 7–15)
ANTIBODY SCREEN: NEGATIVE
AUER BODIES BLD QL SMEAR: ABNORMAL
BASO STIPL BLD QL SMEAR: ABNORMAL
BASOPHILS # BLD AUTO: ABNORMAL 10*3/UL
BASOPHILS NFR BLD AUTO: ABNORMAL %
BITE CELLS BLD QL SMEAR: ABNORMAL
BLD PROD TYP BPU: NORMAL
BLISTER CELLS BLD QL SMEAR: ABNORMAL
BLOOD COMPONENT TYPE: NORMAL
BUN SERPL-MCNC: 16.5 MG/DL (ref 6–20)
BURR CELLS BLD QL SMEAR: SLIGHT
CALCIUM SERPL-MCNC: 8 MG/DL (ref 8.6–10)
CHLORIDE SERPL-SCNC: 109 MMOL/L (ref 98–107)
CODING SYSTEM: NORMAL
CREAT SERPL-MCNC: 1.34 MG/DL (ref 0.51–0.95)
DACRYOCYTES BLD QL SMEAR: ABNORMAL
DEPRECATED HCO3 PLAS-SCNC: 18 MMOL/L (ref 22–29)
EGFRCR SERPLBLD CKD-EPI 2021: 47 ML/MIN/1.73M2
ELLIPTOCYTES BLD QL SMEAR: ABNORMAL
EOSINOPHIL # BLD AUTO: ABNORMAL 10*3/UL
EOSINOPHIL NFR BLD AUTO: ABNORMAL %
ERYTHROCYTE [DISTWIDTH] IN BLOOD BY AUTOMATED COUNT: 14.8 % (ref 10–15)
FRAGMENTS BLD QL SMEAR: ABNORMAL
GLUCOSE SERPL-MCNC: 102 MG/DL (ref 70–99)
HCT VFR BLD AUTO: 22.1 % (ref 35–47)
HGB BLD-MCNC: 7.4 G/DL (ref 11.7–15.7)
HGB C CRYSTALS: ABNORMAL
HOWELL-JOLLY BOD BLD QL SMEAR: ABNORMAL
IMM GRANULOCYTES # BLD: ABNORMAL 10*3/UL
IMM GRANULOCYTES NFR BLD: ABNORMAL %
ISSUE DATE AND TIME: NORMAL
LYMPHOCYTES # BLD AUTO: ABNORMAL 10*3/UL
LYMPHOCYTES NFR BLD AUTO: ABNORMAL %
MAGNESIUM SERPL-MCNC: 1.8 MG/DL (ref 1.7–2.3)
MCH RBC QN AUTO: 31.5 PG (ref 26.5–33)
MCHC RBC AUTO-ENTMCNC: 33.5 G/DL (ref 31.5–36.5)
MCV RBC AUTO: 94 FL (ref 78–100)
MONOCYTES # BLD AUTO: ABNORMAL 10*3/UL
MONOCYTES NFR BLD AUTO: ABNORMAL %
NEUTROPHILS # BLD AUTO: ABNORMAL 10*3/UL
NEUTROPHILS NFR BLD AUTO: ABNORMAL %
NEUTS HYPERSEG BLD QL SMEAR: ABNORMAL
PHOSPHATE SERPL-MCNC: 3.1 MG/DL (ref 2.5–4.5)
PLAT MORPH BLD: ABNORMAL
PLATELET # BLD AUTO: 14 10E3/UL (ref 150–450)
POLYCHROMASIA BLD QL SMEAR: ABNORMAL
POTASSIUM SERPL-SCNC: 3.7 MMOL/L (ref 3.4–5.3)
RBC # BLD AUTO: 2.35 10E6/UL (ref 3.8–5.2)
RBC AGGLUT BLD QL: ABNORMAL
RBC MORPH BLD: ABNORMAL
ROULEAUX BLD QL SMEAR: ABNORMAL
SICKLE CELLS BLD QL SMEAR: ABNORMAL
SMUDGE CELLS BLD QL SMEAR: ABNORMAL
SODIUM SERPL-SCNC: 136 MMOL/L (ref 135–145)
SPECIMEN EXPIRATION DATE: NORMAL
SPHEROCYTES BLD QL SMEAR: ABNORMAL
STOMATOCYTES BLD QL SMEAR: ABNORMAL
TARGETS BLD QL SMEAR: ABNORMAL
TOXIC GRANULES BLD QL SMEAR: ABNORMAL
UNIT ABO/RH: NORMAL
UNIT NUMBER: NORMAL
UNIT STATUS: NORMAL
UNIT TYPE ISBT: 7300
VARIANT LYMPHS BLD QL SMEAR: ABNORMAL
WBC # BLD AUTO: 0.1 10E3/UL (ref 4–11)

## 2024-06-23 PROCEDURE — P9037 PLATE PHERES LEUKOREDU IRRAD: HCPCS | Performed by: PHYSICIAN ASSISTANT

## 2024-06-23 PROCEDURE — 250N000013 HC RX MED GY IP 250 OP 250 PS 637: Performed by: PHYSICIAN ASSISTANT

## 2024-06-23 PROCEDURE — 250N000011 HC RX IP 250 OP 636: Mod: JZ | Performed by: STUDENT IN AN ORGANIZED HEALTH CARE EDUCATION/TRAINING PROGRAM

## 2024-06-23 PROCEDURE — 206N000001 HC R&B BMT UMMC

## 2024-06-23 PROCEDURE — 250N000011 HC RX IP 250 OP 636: Mod: JZ | Performed by: PHYSICIAN ASSISTANT

## 2024-06-23 PROCEDURE — 86922 COMPATIBILITY TEST ANTIGLOB: CPT

## 2024-06-23 PROCEDURE — 80048 BASIC METABOLIC PNL TOTAL CA: CPT

## 2024-06-23 PROCEDURE — 85027 COMPLETE CBC AUTOMATED: CPT

## 2024-06-23 PROCEDURE — 84100 ASSAY OF PHOSPHORUS: CPT

## 2024-06-23 PROCEDURE — C9113 INJ PANTOPRAZOLE SODIUM, VIA: HCPCS | Mod: JZ | Performed by: PHYSICIAN ASSISTANT

## 2024-06-23 PROCEDURE — 250N000011 HC RX IP 250 OP 636: Mod: JZ | Performed by: INTERNAL MEDICINE

## 2024-06-23 PROCEDURE — 99233 SBSQ HOSP IP/OBS HIGH 50: CPT | Mod: GC | Performed by: STUDENT IN AN ORGANIZED HEALTH CARE EDUCATION/TRAINING PROGRAM

## 2024-06-23 PROCEDURE — 250N000013 HC RX MED GY IP 250 OP 250 PS 637

## 2024-06-23 PROCEDURE — 258N000003 HC RX IP 258 OP 636: Mod: JZ | Performed by: PHYSICIAN ASSISTANT

## 2024-06-23 PROCEDURE — 258N000003 HC RX IP 258 OP 636: Mod: JZ | Performed by: STUDENT IN AN ORGANIZED HEALTH CARE EDUCATION/TRAINING PROGRAM

## 2024-06-23 PROCEDURE — 83735 ASSAY OF MAGNESIUM: CPT

## 2024-06-23 PROCEDURE — 86900 BLOOD TYPING SEROLOGIC ABO: CPT

## 2024-06-23 PROCEDURE — 250N000013 HC RX MED GY IP 250 OP 250 PS 637: Performed by: INTERNAL MEDICINE

## 2024-06-23 RX ORDER — POTASSIUM CHLORIDE 29.8 MG/ML
20 INJECTION INTRAVENOUS ONCE
Status: COMPLETED | OUTPATIENT
Start: 2024-06-23 | End: 2024-06-23

## 2024-06-23 RX ADMIN — PANTOPRAZOLE SODIUM 40 MG: 40 INJECTION, POWDER, FOR SOLUTION INTRAVENOUS at 08:07

## 2024-06-23 RX ADMIN — LOPERAMIDE HYDROCHLORIDE 4 MG: 2 CAPSULE ORAL at 17:22

## 2024-06-23 RX ADMIN — Medication 5 ML: at 07:58

## 2024-06-23 RX ADMIN — LEVOTHYROXINE SODIUM 200 MCG: 200 TABLET ORAL at 05:20

## 2024-06-23 RX ADMIN — LIOTHYRONINE SODIUM 5 MCG: 5 TABLET ORAL at 08:01

## 2024-06-23 RX ADMIN — LOPERAMIDE HYDROCHLORIDE 4 MG: 2 CAPSULE ORAL at 11:37

## 2024-06-23 RX ADMIN — ACYCLOVIR SODIUM 325 MG: 50 INJECTION, SOLUTION INTRAVENOUS at 08:00

## 2024-06-23 RX ADMIN — SUCRALFATE 1 G: 1 SUSPENSION ORAL at 06:53

## 2024-06-23 RX ADMIN — MIDODRINE HYDROCHLORIDE 10 MG: 5 TABLET ORAL at 11:37

## 2024-06-23 RX ADMIN — ONDANSETRON 8 MG: 2 INJECTION INTRAMUSCULAR; INTRAVENOUS at 08:01

## 2024-06-23 RX ADMIN — Medication 10 ML: at 04:31

## 2024-06-23 RX ADMIN — LOPERAMIDE HYDROCHLORIDE 4 MG: 2 CAPSULE ORAL at 19:33

## 2024-06-23 RX ADMIN — FLUCONAZOLE IN SODIUM CHLORIDE 200 MG: 2 INJECTION, SOLUTION INTRAVENOUS at 11:40

## 2024-06-23 RX ADMIN — Medication 50 MCG: at 08:02

## 2024-06-23 RX ADMIN — SUCRALFATE 1 G: 1 SUSPENSION ORAL at 17:23

## 2024-06-23 RX ADMIN — LEVOFLOXACIN 250 MG: 5 INJECTION, SOLUTION INTRAVENOUS at 09:48

## 2024-06-23 RX ADMIN — SUCRALFATE 1 G: 1 SUSPENSION ORAL at 22:31

## 2024-06-23 RX ADMIN — OXYCODONE HYDROCHLORIDE 5 MG: 5 TABLET ORAL at 19:34

## 2024-06-23 RX ADMIN — SUCRALFATE 1 G: 1 SUSPENSION ORAL at 11:37

## 2024-06-23 RX ADMIN — PROCHLORPERAZINE EDISYLATE 5 MG: 5 INJECTION INTRAMUSCULAR; INTRAVENOUS at 19:33

## 2024-06-23 RX ADMIN — LOPERAMIDE HYDROCHLORIDE 4 MG: 2 CAPSULE ORAL at 02:37

## 2024-06-23 RX ADMIN — DIPHENHYDRAMINE HYDROCHLORIDE AND LIDOCAINE HYDROCHLORIDE AND ALUMINUM HYDROXIDE AND MAGNESIUM HYDRO 10 ML: KIT at 22:32

## 2024-06-23 RX ADMIN — DEXTROSE MONOHYDRATE 20 ML: 50 INJECTION, SOLUTION INTRAVENOUS at 20:02

## 2024-06-23 RX ADMIN — DEXTROSE MONOHYDRATE 330 MCG: 50 INJECTION, SOLUTION INTRAVENOUS at 19:34

## 2024-06-23 RX ADMIN — LIOTHYRONINE SODIUM 5 MCG: 5 TABLET ORAL at 13:37

## 2024-06-23 RX ADMIN — DIPHENHYDRAMINE HYDROCHLORIDE AND LIDOCAINE HYDROCHLORIDE AND ALUMINUM HYDROXIDE AND MAGNESIUM HYDRO 10 ML: KIT at 02:37

## 2024-06-23 RX ADMIN — ONDANSETRON 8 MG: 2 INJECTION INTRAMUSCULAR; INTRAVENOUS at 17:23

## 2024-06-23 RX ADMIN — ONDANSETRON 8 MG: 2 INJECTION INTRAMUSCULAR; INTRAVENOUS at 00:26

## 2024-06-23 RX ADMIN — MIDODRINE HYDROCHLORIDE 10 MG: 5 TABLET ORAL at 08:01

## 2024-06-23 RX ADMIN — DEXTROSE MONOHYDRATE 10 ML: 50 INJECTION, SOLUTION INTRAVENOUS at 19:33

## 2024-06-23 RX ADMIN — PROCHLORPERAZINE EDISYLATE 5 MG: 5 INJECTION INTRAMUSCULAR; INTRAVENOUS at 05:20

## 2024-06-23 RX ADMIN — POTASSIUM CHLORIDE 20 MEQ: 29.8 INJECTION, SOLUTION INTRAVENOUS at 06:53

## 2024-06-23 RX ADMIN — MIDODRINE HYDROCHLORIDE 10 MG: 5 TABLET ORAL at 17:23

## 2024-06-23 RX ADMIN — ACYCLOVIR SODIUM 325 MG: 50 INJECTION, SOLUTION INTRAVENOUS at 19:33

## 2024-06-23 ASSESSMENT — ACTIVITIES OF DAILY LIVING (ADL)
ADLS_ACUITY_SCORE: 22
ADLS_ACUITY_SCORE: 24
ADLS_ACUITY_SCORE: 22
ADLS_ACUITY_SCORE: 24
ADLS_ACUITY_SCORE: 22
ADLS_ACUITY_SCORE: 24
ADLS_ACUITY_SCORE: 22
ADLS_ACUITY_SCORE: 24

## 2024-06-23 NOTE — PROGRESS NOTES
"BMT/Cell Therapy Daily Progress Note   06/23/2024    Patient ID:  Vivian Brown is a 54 year old female, currently day +11 s/p autologous stem cell transplant for amyloidosis. Hospitalization complicated by significant orthostatic hypotension.     Admission date: 6/11/2024    INTERVAL  HISTORY     No acute events overnight. Having ongoing diarrhea, intermittent abdominal pain.  Feels that mouth/esophageal spasm pain has significantly improved today. Continues to feel generalized fatigue. Denies chest pain, SOB, cough.  Slight lower extremity edema, but very mild.     Review of Systems: ROS negative except as noted above.      PHYSICAL EXAM   KPS:  70    /68 (BP Location: Right arm)   Pulse 119   Temp 98.5  F (36.9  C) (Oral)   Resp 16   Ht 1.63 m (5' 4.17\")   Wt 70 kg (154 lb 6.4 oz)   SpO2 97%   BMI 26.36 kg/m       General appearance: Patient is lying in bed, non-toxic appearing, in NAD  HEENT:  Eyes: Sclera & conjunctiva clear bilaterally  Throat - oral mucous membranes moist & pink. Bilateral buccal mucosa with white, shallow sores/ulcerations, without significant erythema and no bleeding.   Cardio: Tachycardic, regular rhythm; Grade I/VI systolic murmur  Pulmonary: normal RR; no apparent use of accessory respiratory muscles; clear to  auscultation bilaterally  Abdominal: Soft, non-distended abdomen; Non-tender to palpation in all 4 quadrants.   Extremities: Trace bilateral lower extremity non-pitting edema, non-tender to palpation.   Skin: warm and dry; no rash or concerning lesions.  Neuro: A&O, no focal neuro deficits.  Access: R CVC clean, dry and intact, non-tender, no drainage.      LABS: I have assessed all abnormal lab values for their clinical significance and any values considered clinically significant have been addressed in the assessment and plan.      Lab Results   Component Value Date    WBC 0.1 (LL) 06/23/2024    ANEU 0.4 (LL) 06/18/2024    HGB 7.4 (L) 06/23/2024    HCT 22.1 (L) " 06/23/2024    PLT 14 (LL) 06/23/2024     06/23/2024    POTASSIUM 3.7 06/23/2024    CHLORIDE 109 (H) 06/23/2024    CO2 18 (L) 06/23/2024     (H) 06/23/2024    BUN 16.5 06/23/2024    CR 1.34 (H) 06/23/2024    MAG 1.8 06/23/2024    INR 0.94 06/17/2024    BILITOTAL 0.4 06/17/2024    AST 25 06/17/2024    ALT 33 06/17/2024    ALKPHOS 105 06/17/2024    PROTTOTAL 4.4 (L) 06/17/2024    ALBUMIN 2.4 (L) 06/17/2024       ASSESSMENT AND PLAN   Vivian Brown is a 54 year old female with amyloidosis, undergoing melphalan followed by autologous stem cell rescue. She is day +11.     BMT/MM/Amyloidosis  - BMT MD/Coordinator: Wei  - [id number]  - Cell dose: total collections of 5.99 million CD34+ cells/kg.     - Prep as above. Flush and pre-meds prior to transplant.  - GCSF started day +5, continue until ANC > 2500 for 2 consecutive days.     HEME/COAG  #Pancytopenia 2/2 to chemotherapy.   - Transfusion parameters: hgb <7g/dL and plts <20,000 (increased parameter 6/21 d/t brbpr).  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     ID  Prophy: ACV; levaquin; fluconazole.  PJP ppx to start at day +28.      GI/NUTRITION  #mucositis, worsening. 6/21: meds to IV, Dilaudid IV prn, Carafate.  - Supportive cares; MMW, Saline rinses, Oxycodone PO, Dilaudid IV prn.   #Diarrhea - C. Diff negative on 6/11. Loperamide prn.   #CINV: scheduled Zofran IV x 6/21; prn Ativan and compazine prn. *Emend 6/22.  Stopped hs Zyprexa x6/20 with ongoing orthostasis.     - Ulcer prophy: Protonix 40mg daily.   # Celiac Disease, see diet below     FLUIDS/NUTRITION  - PTA Lasix lasix 40mg am, 20mg afternoon- held since 6/17 d/t orthostasis and large volume diuresis on 6/16  - PTA spironoloactone -held since 6/17   - gluten free diet (h/o celiac disease)  - dietitian to follow  - IV Bolus as needed for blood pressure support, recently 1-1.5L/d in divided doses. 6/22: 500cc bolus. Note of wt up and not on PTA diuretics.  6/23: holding fluid boluses,  "encouraging po intake.      RENAL  # renal amyloidosis; diuresis as above  - Monitor Cr and electrolytes daily.   - Replace electrolytes per sliding scale     ENDOCRINE  # Hypothyroidism:  continue PTA cytomel and synthroid     CARDIAC  # cardiac amyloidosis: cautious fluid balance; diuresis as above  # h/o dizziness and chest pain with chemotherapy for amyloidosis; improved with addition of midodrine.   # orthostatic hypotension: on midodrine, as above. PRN fluid boluses vs diuresis for volume balance.  # hyperlipidemia: rosuvastatin on hold during acute transplant course     SUPPORTIVE CARE  - PT/OT to evaluate on admission and follow as indicated.   - BMT Social work to follow.     Known issues that I take into account for medical decisions, with salient changes to the plan considering these complexities noted above.    Patient Active Problem List   Diagnosis    Hypothyroidism due to acquired atrophy of thyroid    Family hx of colon cancer    Nonallopathic lesion of cervical region    Cervicalgia    Nonallopathic lesion of sacral region    Nonallopathic lesion of lumbar region    Lumbago    Nonallopathic lesion of thoracic region    Hyperlipidemia LDL goal <100    Dependent edema R>L    AL amyloidosis (H)    Hypogammaglobulinemia (H24)    Chronic kidney disease, stage 3a (H)    Celiac disease    Autologous donor of stem cells     Medically Ready for Discharge: Anticipated in 5+ Days    Clinically Significant Risk Factors              # Hypoalbuminemia: Lowest albumin = 2.4 g/dL at 6/17/2024  3:38 AM, will monitor as appropriate   # Thrombocytopenia: Lowest platelets = 14 in last 2 days, will monitor for bleeding             #Precipitous drop in Hgb/Hct: Lowest Hgb this hospitalization: 7.4 g/dL. Will continue to monitor and treat/transfuse as appropriate.     # Overweight: Estimated body mass index is 26.36 kg/m  as calculated from the following:    Height as of this encounter: 1.63 m (5' 4.17\").    Weight as of " this encounter: 70 kg (154 lb 6.4 oz).             I spent 40 minutes in the care of this patient today, which included time necessary for preparation for the visit, obtaining history, ordering medications/tests/procedures as medically indicated, review of pertinent medical literature, counseling of the patient, communication of recommendations to the care team, and documentation time.      Patient was seen and staffed with Dr. Lima.    Jojo Menchaca MD  Hematology/Oncology Fellow, PGY-4  Pager: [phone]

## 2024-06-23 NOTE — PLAN OF CARE
"Goal Outcome Evaluation:      Plan of Care Reviewed With: patient    Blood pressure 113/72, pulse 112, temperature 100.1  F (37.8  C), temperature source Axillary, resp. rate 16, height 1.63 m (5' 4.17\"), weight 70 kg (154 lb 6.4 oz), SpO2 100%, not currently breastfeeding.    Patient T-max today 100.1, tachycardic at 100-110's,other vitals stable, patient c/o mild mouth/throat pain and lower abdominal quadrant pain- declined pain medications this shift. Patient is alert and oriented, on RA. Patient with poor PO intake, dietician consult maybe beneficial/ calorie count, continues with persistent diarrhea-Imodium given twice this shift- may benefit from an additional antidiarrhea. Patient up ambulating with assist of 1. Continue with plan of care, report changes to provider.   "

## 2024-06-23 NOTE — PLAN OF CARE
"BP 97/63   Pulse 110   Temp 99  F (37.2  C)   Resp 14   Ht 1.63 m (5' 4.17\")   Wt 68.7 kg (151 lb 6.4 oz)   SpO2 92%   BMI 25.85 kg/m   LTCVC C/D/I    Ms. Brown had a better evening than yesterday, with intermittent control of both pain and BMs. Dilaudid and oxycodone given for pain, immodium given for loose BMs/incontinence. Platelet level at 14, 1u ordered d/t recent history of bleeding. KCl ordered per protocol. Call light appropriate, remains SBA d/t recent fall.    Goal Outcome Evaluation:      Plan of Care Reviewed With: patient    Overall Patient Progress: no change      Problem: Adult Inpatient Plan of Care  Goal: Optimal Comfort and Wellbeing  Outcome: Not Progressing     Problem: Stem Cell/Bone Marrow Transplant  Goal: Diarrhea Symptom Control  Outcome: Not Progressing  Intervention: Manage Diarrhea  Recent Flowsheet Documentation  Taken 6/23/2024 0032 by Carlos Bansal, RN  Perineal Care: perineal hygiene encouraged  Taken 6/22/2024 1942 by Carlos Bansal, RN  Skin Protection:   adhesive use limited   transparent dressing maintained   incontinence pads utilized  Fluid/Electrolyte Management: fluids provided  Perineal Care:   absorbent brief/pad changed   perineal hygiene encouraged     Problem: Adult Inpatient Plan of Care  Goal: Absence of Hospital-Acquired Illness or Injury  Outcome: Progressing  Intervention: Identify and Manage Fall Risk  Recent Flowsheet Documentation  Taken 6/23/2024 0030 by Carlos Bansal, RN  Safety Promotion/Fall Prevention:   activity supervised   assistive device/personal items within reach   clutter free environment maintained   nonskid shoes/slippers when out of bed   patient and family education   safety round/check completed  Taken 6/22/2024 1942 by Carlos Bansal, RN  Safety Promotion/Fall Prevention:   activity supervised   assistive device/personal items within reach   clutter free environment maintained   nonskid shoes/slippers when out of " bed   patient and family education   safety round/check completed  Intervention: Prevent Skin Injury  Recent Flowsheet Documentation  Taken 6/22/2024 1942 by Carlos Bansal, RN  Body Position: position changed independently  Skin Protection:   adhesive use limited   transparent dressing maintained   incontinence pads utilized  Device Skin Pressure Protection:   adhesive use limited   tubing/devices free from skin contact   absorbent pad utilized/changed  Intervention: Prevent Infection  Recent Flowsheet Documentation  Taken 6/23/2024 0030 by Carlos Bansal, RN  Infection Prevention:   environmental surveillance performed   hand hygiene promoted   personal protective equipment utilized   rest/sleep promoted   single patient room provided  Taken 6/22/2024 1942 by Carlos Bansal, RN  Infection Prevention:   environmental surveillance performed   hand hygiene promoted   personal protective equipment utilized   rest/sleep promoted   single patient room provided  Goal: Readiness for Transition of Care  Outcome: Progressing     Problem: Stem Cell/Bone Marrow Transplant  Goal: Optimal Coping with Transplant  Outcome: Progressing  Intervention: Optimize Patient/Family Adjustment to Transplant  Recent Flowsheet Documentation  Taken 6/22/2024 1942 by Carlos Bansal, RN  Supportive Measures:   active listening utilized   decision-making supported   goal-setting facilitated   positive reinforcement provided   problem-solving facilitated  Goal: Symptom-Free Urinary Elimination  Outcome: Progressing  Intervention: Prevent or Manage Bladder Irritation  Recent Flowsheet Documentation  Taken 6/22/2024 1942 by Carlos Bansal, RN  Hyperhydration Management: fluids provided  Goal: Improved Activity Tolerance  Outcome: Progressing  Intervention: Promote Improved Energy  Recent Flowsheet Documentation  Taken 6/23/2024 0239 by Carlos Bansal, RN  Activity Management: up to bedside commode  Taken 6/23/2024 0032  by Carlos Bansal RN  Activity Management:   up to bedside commode   ambulated in room   back to bed  Taken 6/22/2024 1942 by Carlos Bansal RN  Activity Management:   ambulated to bathroom   back to bed  Environmental Support:   calm environment promoted   distractions minimized  Goal: Blood Counts Within Acceptable Range  Outcome: Progressing  Intervention: Monitor and Manage Hematologic Symptoms  Recent Flowsheet Documentation  Taken 6/23/2024 0030 by Carlos Bansal RN  Bleeding Precautions:   blood pressure closely monitored   coagulation study results reviewed   monitored for signs of bleeding  Medication Review/Management:   medications reviewed   high-risk medications identified  Taken 6/22/2024 1942 by Carlos Bansal RN  Bleeding Precautions:   blood pressure closely monitored   coagulation study results reviewed   monitored for signs of bleeding  Medication Review/Management:   medications reviewed   high-risk medications identified  Goal: Absence of Hypersensitivity Reaction  Outcome: Progressing  Goal: Absence of Infection  Outcome: Progressing  Intervention: Prevent and Manage Infection  Recent Flowsheet Documentation  Taken 6/23/2024 0030 by Carlos Bansal, RN  Infection Prevention:   environmental surveillance performed   hand hygiene promoted   personal protective equipment utilized   rest/sleep promoted   single patient room provided  Infection Management: aseptic technique maintained  Isolation Precautions: protective environment maintained  Taken 6/22/2024 1942 by Carlos Bansal, RN  Infection Prevention:   environmental surveillance performed   hand hygiene promoted   personal protective equipment utilized   rest/sleep promoted   single patient room provided  Infection Management: aseptic technique maintained  Isolation Precautions: protective environment maintained  Goal: Improved Oral Mucous Membrane Health and Integrity  Outcome: Progressing  Goal: Nausea and  Vomiting Symptom Relief  Outcome: Progressing  Intervention: Prevent and Manage Nausea and Vomiting  Recent Flowsheet Documentation  Taken 6/22/2024 1942 by Carlos Bansal RN  Nausea/Vomiting Interventions:   antiemetic   sips of clear liquids given  Goal: Optimal Nutrition Intake  Outcome: Progressing  Intervention: Minimize and Manage Barriers to Oral Intake  Recent Flowsheet Documentation  Taken 6/22/2024 1942 by Carlos Bansal RN  Oral Nutrition Promotion:   calorie-dense liquids provided   physical activity promoted     Problem: Skin Injury Risk Increased  Goal: Skin Health and Integrity  Outcome: Progressing  Intervention: Plan: Nurse Driven Intervention: Moisture Management  Recent Flowsheet Documentation  Taken 6/22/2024 1942 by Carlos Bansal RN  Moisture Interventions:   Encourage regular toileting   Incontinence pad  Bathing/Skin Care: linen changed  Intervention: Optimize Skin Protection  Recent Flowsheet Documentation  Taken 6/23/2024 0239 by Carlos Bansal, RN  Activity Management: up to bedside commode  Taken 6/23/2024 0032 by Carlos Bansal RN  Activity Management:   up to bedside commode   ambulated in room   back to bed  Taken 6/22/2024 1942 by Carlos Bansal RN  Skin Protection:   adhesive use limited   transparent dressing maintained   incontinence pads utilized  Activity Management:   ambulated to bathroom   back to bed  Intervention: Promote and Optimize Oral Intake  Recent Flowsheet Documentation  Taken 6/22/2024 1942 by Carlos Bansal RN  Oral Nutrition Promotion:   calorie-dense liquids provided   physical activity promoted

## 2024-06-24 ENCOUNTER — APPOINTMENT (OUTPATIENT)
Dept: PHYSICAL THERAPY | Facility: CLINIC | Age: 54
DRG: 016 | End: 2024-06-24
Attending: INTERNAL MEDICINE
Payer: COMMERCIAL

## 2024-06-24 ENCOUNTER — TRANSFERRED RECORDS (OUTPATIENT)
Dept: HEALTH INFORMATION MANAGEMENT | Facility: CLINIC | Age: 54
End: 2024-06-24
Payer: COMMERCIAL

## 2024-06-24 LAB
ACANTHOCYTES BLD QL SMEAR: SLIGHT
ALBUMIN SERPL BCG-MCNC: 2.1 G/DL (ref 3.5–5.2)
ALP SERPL-CCNC: 243 U/L (ref 40–150)
ALT SERPL W P-5'-P-CCNC: 14 U/L (ref 0–50)
ANION GAP SERPL CALCULATED.3IONS-SCNC: 10 MMOL/L (ref 7–15)
AST SERPL W P-5'-P-CCNC: 10 U/L (ref 0–45)
AUER BODIES BLD QL SMEAR: ABNORMAL
BASO STIPL BLD QL SMEAR: ABNORMAL
BASOPHILS # BLD AUTO: ABNORMAL 10*3/UL
BASOPHILS NFR BLD AUTO: ABNORMAL %
BILIRUB DIRECT SERPL-MCNC: 0.41 MG/DL (ref 0–0.3)
BILIRUB SERPL-MCNC: 0.6 MG/DL
BITE CELLS BLD QL SMEAR: ABNORMAL
BLD PROD TYP BPU: NORMAL
BLISTER CELLS BLD QL SMEAR: ABNORMAL
BLOOD COMPONENT TYPE: NORMAL
BUN SERPL-MCNC: 19.7 MG/DL (ref 6–20)
BURR CELLS BLD QL SMEAR: SLIGHT
CALCIUM SERPL-MCNC: 8.5 MG/DL (ref 8.6–10)
CHLORIDE SERPL-SCNC: 106 MMOL/L (ref 98–107)
CODING SYSTEM: NORMAL
CREAT SERPL-MCNC: 1.38 MG/DL (ref 0.51–0.95)
CROSSMATCH: NORMAL
DACRYOCYTES BLD QL SMEAR: ABNORMAL
DEPRECATED HCO3 PLAS-SCNC: 18 MMOL/L (ref 22–29)
EGFRCR SERPLBLD CKD-EPI 2021: 45 ML/MIN/1.73M2
ELLIPTOCYTES BLD QL SMEAR: ABNORMAL
EOSINOPHIL # BLD AUTO: ABNORMAL 10*3/UL
EOSINOPHIL NFR BLD AUTO: ABNORMAL %
ERYTHROCYTE [DISTWIDTH] IN BLOOD BY AUTOMATED COUNT: 14.7 % (ref 10–15)
FRAGMENTS BLD QL SMEAR: ABNORMAL
GLUCOSE SERPL-MCNC: 98 MG/DL (ref 70–99)
HCT VFR BLD AUTO: 21.3 % (ref 35–47)
HGB BLD-MCNC: 6.9 G/DL (ref 11.7–15.7)
HGB C CRYSTALS: ABNORMAL
HOWELL-JOLLY BOD BLD QL SMEAR: ABNORMAL
IMM GRANULOCYTES # BLD: ABNORMAL 10*3/UL
IMM GRANULOCYTES NFR BLD: ABNORMAL %
INR PPP: 1.31 (ref 0.85–1.15)
ISSUE DATE AND TIME: NORMAL
LYMPHOCYTES # BLD AUTO: ABNORMAL 10*3/UL
LYMPHOCYTES NFR BLD AUTO: ABNORMAL %
MAGNESIUM SERPL-MCNC: 1.9 MG/DL (ref 1.7–2.3)
MCH RBC QN AUTO: 31.1 PG (ref 26.5–33)
MCHC RBC AUTO-ENTMCNC: 32.4 G/DL (ref 31.5–36.5)
MCV RBC AUTO: 96 FL (ref 78–100)
MONOCYTES # BLD AUTO: ABNORMAL 10*3/UL
MONOCYTES NFR BLD AUTO: ABNORMAL %
NEUTROPHILS # BLD AUTO: ABNORMAL 10*3/UL
NEUTROPHILS NFR BLD AUTO: ABNORMAL %
NEUTS HYPERSEG BLD QL SMEAR: ABNORMAL
PHOSPHATE SERPL-MCNC: 3.2 MG/DL (ref 2.5–4.5)
PLAT MORPH BLD: ABNORMAL
PLATELET # BLD AUTO: 25 10E3/UL (ref 150–450)
POLYCHROMASIA BLD QL SMEAR: ABNORMAL
POTASSIUM SERPL-SCNC: 3.7 MMOL/L (ref 3.4–5.3)
PROT SERPL-MCNC: 4.6 G/DL (ref 6.4–8.3)
RBC # BLD AUTO: 2.22 10E6/UL (ref 3.8–5.2)
RBC AGGLUT BLD QL: ABNORMAL
RBC MORPH BLD: ABNORMAL
ROULEAUX BLD QL SMEAR: ABNORMAL
SICKLE CELLS BLD QL SMEAR: ABNORMAL
SMUDGE CELLS BLD QL SMEAR: ABNORMAL
SODIUM SERPL-SCNC: 134 MMOL/L (ref 135–145)
SPHEROCYTES BLD QL SMEAR: ABNORMAL
STOMATOCYTES BLD QL SMEAR: ABNORMAL
TARGETS BLD QL SMEAR: ABNORMAL
TOXIC GRANULES BLD QL SMEAR: ABNORMAL
UNIT ABO/RH: NORMAL
UNIT NUMBER: NORMAL
UNIT STATUS: NORMAL
UNIT TYPE ISBT: 9500
VARIANT LYMPHS BLD QL SMEAR: ABNORMAL
WBC # BLD AUTO: 0.2 10E3/UL (ref 4–11)

## 2024-06-24 PROCEDURE — C9113 INJ PANTOPRAZOLE SODIUM, VIA: HCPCS | Mod: JZ | Performed by: PHYSICIAN ASSISTANT

## 2024-06-24 PROCEDURE — 83735 ASSAY OF MAGNESIUM: CPT

## 2024-06-24 PROCEDURE — 206N000001 HC R&B BMT UMMC

## 2024-06-24 PROCEDURE — 250N000011 HC RX IP 250 OP 636: Mod: JZ | Performed by: STUDENT IN AN ORGANIZED HEALTH CARE EDUCATION/TRAINING PROGRAM

## 2024-06-24 PROCEDURE — 80053 COMPREHEN METABOLIC PANEL: CPT

## 2024-06-24 PROCEDURE — 84100 ASSAY OF PHOSPHORUS: CPT

## 2024-06-24 PROCEDURE — 85027 COMPLETE CBC AUTOMATED: CPT

## 2024-06-24 PROCEDURE — 250N000013 HC RX MED GY IP 250 OP 250 PS 637: Performed by: PHYSICIAN ASSISTANT

## 2024-06-24 PROCEDURE — P9040 RBC LEUKOREDUCED IRRADIATED: HCPCS

## 2024-06-24 PROCEDURE — 250N000011 HC RX IP 250 OP 636: Mod: JZ | Performed by: PHYSICIAN ASSISTANT

## 2024-06-24 PROCEDURE — 99233 SBSQ HOSP IP/OBS HIGH 50: CPT | Mod: FS | Performed by: PHYSICIAN ASSISTANT

## 2024-06-24 PROCEDURE — 99418 PROLNG IP/OBS E/M EA 15 MIN: CPT | Performed by: PHYSICIAN ASSISTANT

## 2024-06-24 PROCEDURE — 250N000013 HC RX MED GY IP 250 OP 250 PS 637

## 2024-06-24 PROCEDURE — 250N000013 HC RX MED GY IP 250 OP 250 PS 637: Performed by: INTERNAL MEDICINE

## 2024-06-24 PROCEDURE — 258N000003 HC RX IP 258 OP 636: Mod: JZ | Performed by: INTERNAL MEDICINE

## 2024-06-24 PROCEDURE — 258N000003 HC RX IP 258 OP 636: Mod: JZ | Performed by: STUDENT IN AN ORGANIZED HEALTH CARE EDUCATION/TRAINING PROGRAM

## 2024-06-24 PROCEDURE — 85610 PROTHROMBIN TIME: CPT

## 2024-06-24 PROCEDURE — 97530 THERAPEUTIC ACTIVITIES: CPT | Mod: GP

## 2024-06-24 PROCEDURE — 250N000011 HC RX IP 250 OP 636: Mod: JZ | Performed by: INTERNAL MEDICINE

## 2024-06-24 PROCEDURE — 97116 GAIT TRAINING THERAPY: CPT | Mod: GP

## 2024-06-24 PROCEDURE — 258N000003 HC RX IP 258 OP 636: Mod: JZ | Performed by: PHYSICIAN ASSISTANT

## 2024-06-24 RX ORDER — DIPHENOXYLATE HCL/ATROPINE 2.5-.025MG
1 TABLET ORAL 4 TIMES DAILY PRN
Status: DISCONTINUED | OUTPATIENT
Start: 2024-06-24 | End: 2024-07-01 | Stop reason: HOSPADM

## 2024-06-24 RX ORDER — LOPERAMIDE HCL 2 MG
4 CAPSULE ORAL 4 TIMES DAILY
Status: DISCONTINUED | OUTPATIENT
Start: 2024-06-24 | End: 2024-06-30

## 2024-06-24 RX ORDER — POTASSIUM CHLORIDE 29.8 MG/ML
20 INJECTION INTRAVENOUS ONCE
Status: COMPLETED | OUTPATIENT
Start: 2024-06-24 | End: 2024-06-24

## 2024-06-24 RX ORDER — CALCIUM CARBONATE 500 MG/1
500 TABLET, CHEWABLE ORAL 3 TIMES DAILY PRN
Status: DISCONTINUED | OUTPATIENT
Start: 2024-06-24 | End: 2024-07-01 | Stop reason: HOSPADM

## 2024-06-24 RX ADMIN — SUCRALFATE 1 G: 1 SUSPENSION ORAL at 10:54

## 2024-06-24 RX ADMIN — Medication 5 ML: at 08:50

## 2024-06-24 RX ADMIN — Medication 50 MCG: at 08:34

## 2024-06-24 RX ADMIN — DEXTROSE MONOHYDRATE 330 MCG: 50 INJECTION, SOLUTION INTRAVENOUS at 20:16

## 2024-06-24 RX ADMIN — SUCRALFATE 1 G: 1 SUSPENSION ORAL at 16:41

## 2024-06-24 RX ADMIN — ONDANSETRON 8 MG: 2 INJECTION INTRAMUSCULAR; INTRAVENOUS at 16:41

## 2024-06-24 RX ADMIN — DIPHENHYDRAMINE HYDROCHLORIDE AND LIDOCAINE HYDROCHLORIDE AND ALUMINUM HYDROXIDE AND MAGNESIUM HYDRO 10 ML: KIT at 06:37

## 2024-06-24 RX ADMIN — CALCIUM CARBONATE (ANTACID) CHEW TAB 500 MG 500 MG: 500 CHEW TAB at 14:56

## 2024-06-24 RX ADMIN — ACYCLOVIR SODIUM 325 MG: 50 INJECTION, SOLUTION INTRAVENOUS at 20:30

## 2024-06-24 RX ADMIN — DEXTROSE MONOHYDRATE 20 ML: 50 INJECTION, SOLUTION INTRAVENOUS at 20:41

## 2024-06-24 RX ADMIN — DEXTROSE MONOHYDRATE 20 ML: 50 INJECTION, SOLUTION INTRAVENOUS at 20:16

## 2024-06-24 RX ADMIN — ACYCLOVIR SODIUM 325 MG: 50 INJECTION, SOLUTION INTRAVENOUS at 08:33

## 2024-06-24 RX ADMIN — Medication 5 ML: at 03:01

## 2024-06-24 RX ADMIN — LOPERAMIDE HYDROCHLORIDE 4 MG: 2 CAPSULE ORAL at 08:34

## 2024-06-24 RX ADMIN — MIDODRINE HYDROCHLORIDE 10 MG: 5 TABLET ORAL at 12:02

## 2024-06-24 RX ADMIN — DIPHENOXYLATE HYDROCHLORIDE AND ATROPINE SULFATE 1 TABLET: 2.5; .025 TABLET ORAL at 08:48

## 2024-06-24 RX ADMIN — MIDODRINE HYDROCHLORIDE 10 MG: 5 TABLET ORAL at 08:34

## 2024-06-24 RX ADMIN — LOPERAMIDE HYDROCHLORIDE 4 MG: 2 CAPSULE ORAL at 16:41

## 2024-06-24 RX ADMIN — DIPHENHYDRAMINE HYDROCHLORIDE AND LIDOCAINE HYDROCHLORIDE AND ALUMINUM HYDROXIDE AND MAGNESIUM HYDRO 10 ML: KIT at 12:02

## 2024-06-24 RX ADMIN — LOPERAMIDE HYDROCHLORIDE 4 MG: 2 CAPSULE ORAL at 01:16

## 2024-06-24 RX ADMIN — ONDANSETRON 8 MG: 2 INJECTION INTRAMUSCULAR; INTRAVENOUS at 00:15

## 2024-06-24 RX ADMIN — DIPHENOXYLATE HYDROCHLORIDE AND ATROPINE SULFATE 1 TABLET: 2.5; .025 TABLET ORAL at 18:39

## 2024-06-24 RX ADMIN — MIDODRINE HYDROCHLORIDE 10 MG: 5 TABLET ORAL at 16:41

## 2024-06-24 RX ADMIN — PROCHLORPERAZINE EDISYLATE 5 MG: 5 INJECTION INTRAMUSCULAR; INTRAVENOUS at 03:01

## 2024-06-24 RX ADMIN — LORAZEPAM 0.5 MG: 2 INJECTION INTRAMUSCULAR; INTRAVENOUS at 17:47

## 2024-06-24 RX ADMIN — CALCIUM CARBONATE (ANTACID) CHEW TAB 500 MG 500 MG: 500 CHEW TAB at 17:29

## 2024-06-24 RX ADMIN — SUCRALFATE 1 G: 1 SUSPENSION ORAL at 06:37

## 2024-06-24 RX ADMIN — OXYCODONE HYDROCHLORIDE 5 MG: 5 TABLET ORAL at 03:01

## 2024-06-24 RX ADMIN — LEVOFLOXACIN 250 MG: 5 INJECTION, SOLUTION INTRAVENOUS at 10:54

## 2024-06-24 RX ADMIN — LIOTHYRONINE SODIUM 5 MCG: 5 TABLET ORAL at 13:05

## 2024-06-24 RX ADMIN — SUCRALFATE 1 G: 1 SUSPENSION ORAL at 23:02

## 2024-06-24 RX ADMIN — LEVOTHYROXINE SODIUM 200 MCG: 200 TABLET ORAL at 06:26

## 2024-06-24 RX ADMIN — PANTOPRAZOLE SODIUM 40 MG: 40 INJECTION, POWDER, FOR SOLUTION INTRAVENOUS at 20:21

## 2024-06-24 RX ADMIN — POTASSIUM CHLORIDE 20 MEQ: 29.8 INJECTION, SOLUTION INTRAVENOUS at 06:38

## 2024-06-24 RX ADMIN — PANTOPRAZOLE SODIUM 40 MG: 40 INJECTION, POWDER, FOR SOLUTION INTRAVENOUS at 08:34

## 2024-06-24 RX ADMIN — LOPERAMIDE HYDROCHLORIDE 4 MG: 2 CAPSULE ORAL at 12:02

## 2024-06-24 RX ADMIN — SODIUM CHLORIDE 500 ML: 9 INJECTION, SOLUTION INTRAVENOUS at 04:27

## 2024-06-24 RX ADMIN — OXYCODONE HYDROCHLORIDE 5 MG: 5 TABLET ORAL at 08:48

## 2024-06-24 RX ADMIN — FLUCONAZOLE IN SODIUM CHLORIDE 200 MG: 2 INJECTION, SOLUTION INTRAVENOUS at 12:02

## 2024-06-24 RX ADMIN — Medication 5 ML: at 13:05

## 2024-06-24 RX ADMIN — LOPERAMIDE HYDROCHLORIDE 4 MG: 2 CAPSULE ORAL at 20:27

## 2024-06-24 RX ADMIN — ONDANSETRON 8 MG: 2 INJECTION INTRAMUSCULAR; INTRAVENOUS at 08:34

## 2024-06-24 RX ADMIN — DIPHENOXYLATE HYDROCHLORIDE AND ATROPINE SULFATE 1 TABLET: 2.5; .025 TABLET ORAL at 12:02

## 2024-06-24 RX ADMIN — LIOTHYRONINE SODIUM 5 MCG: 5 TABLET ORAL at 08:34

## 2024-06-24 ASSESSMENT — ACTIVITIES OF DAILY LIVING (ADL)
ADLS_ACUITY_SCORE: 24
ADLS_ACUITY_SCORE: 25
ADLS_ACUITY_SCORE: 25
ADLS_ACUITY_SCORE: 24
ADLS_ACUITY_SCORE: 24
ADLS_ACUITY_SCORE: 27
ADLS_ACUITY_SCORE: 24
ADLS_ACUITY_SCORE: 25
ADLS_ACUITY_SCORE: 27
ADLS_ACUITY_SCORE: 24

## 2024-06-24 NOTE — PROGRESS NOTES
"Blood pressure 106/74, pulse 117, temperature 98.8  F (37.1  C), temperature source Axillary, resp. rate 18, height 1.63 m (5' 4.17\"), weight 71.4 kg (157 lb 4.8 oz), SpO2 95%, not currently breastfeeding.  One unit of RBC finished,no transfusion reactions.  "

## 2024-06-24 NOTE — PROVIDER NOTIFICATION
Communication: [id number]- , BP 97/68, denies chest discomfort but dizzy with exertion. Alvarez RN, [phone]    Sent to Dr. Ramirez, around 330    Response: Called to confirm around 345, 500mL bolus ordered over 2 hours

## 2024-06-24 NOTE — PROGRESS NOTES
"BMT/Cell Therapy Daily Progress Note   06/24/2024    Patient ID:  Vivian Brown is a 54 year old female, currently day +12 s/p autologous stem cell transplant for amyloidosis. Hospitalization complicated by significant orthostatic hypotension.     Admission date: 6/11/2024    INTERVAL  HISTORY   Mary Ann c/o some new mid-throat discomfort.  Her nausea is improved, but she did have a small, acidic episode of vomiting x 1.  She is eating various supplements.  She is having a lot of diarrhea, reported as 10 episodes in the last 24 hours, despite using imodium.  She was dizzy overnight and received a 500mL bolus to help.      Review of Systems: ROS negative except as noted above.      PHYSICAL EXAM   KPS:  70    /65   Pulse 114   Temp 99.9  F (37.7  C) (Axillary)   Resp 18   Ht 1.63 m (5' 4.17\")   Wt 70 kg (154 lb 6.4 oz)   SpO2 95%   BMI 26.36 kg/m       General appearance: Patient is lying in bed, non-toxic appearing, in NAD  Eyes: Sclera & conjunctiva clear bilaterally  Throat - oral mucous membranes moist & pink. White tissue on sublingual frenulum.  Cardio: Tachycardic, regular rhythm; Grade II/VI systolic murmur heard best at left 2nd ribspace  Pulmonary: normal RR; no apparent use of accessory respiratory muscles; clear to  auscultation bilaterally  Abdominal: Soft, non-distended abdomen; tender in RUQ  Extremities: Trace bilateral lower extremity non-pitting edema, non-tender to palpation.   Skin: warm and dry; no rash or concerning lesions.  Neuro: A&O, no focal neuro deficits.  Access: R CVC clean, dry and intact, non-tender, no drainage.      LABS: I have assessed all abnormal lab values for their clinical significance and any values considered clinically significant have been addressed in the assessment and plan.      Lab Results   Component Value Date    WBC 0.2 (LL) 06/24/2024    ANEU 0.4 (LL) 06/18/2024    HGB 6.9 (LL) 06/24/2024    HCT 21.3 (L) 06/24/2024    PLT 25 (LL) 06/24/2024     " (L) 06/24/2024    POTASSIUM 3.7 06/24/2024    CHLORIDE 106 06/24/2024    CO2 18 (L) 06/24/2024    GLC 98 06/24/2024    BUN 19.7 06/24/2024    CR 1.38 (H) 06/24/2024    MAG 1.9 06/24/2024    INR 1.31 (H) 06/24/2024    BILITOTAL 0.6 06/24/2024    AST 10 06/24/2024    ALT 14 06/24/2024    ALKPHOS 243 (H) 06/24/2024    PROTTOTAL 4.6 (L) 06/24/2024    ALBUMIN 2.1 (L) 06/24/2024       ASSESSMENT AND PLAN   Vivian Brown is a 54 year old female with amyloidosis, undergoing melphalan followed by autologous stem cell rescue. She is day +12.     BMT/MM/Amyloidosis  - BMT MD/Coordinator: Wei Araiza [id number]  - Cell dose: total collections of 5.99 million CD34+ cells/kg.     - Prep as above. Flush and pre-meds prior to transplant.  - GCSF started day +5, continue until ANC > 2500 for 2 consecutive days.     HEME/COAG  #Pancytopenia 2/2 to chemotherapy.   - Transfusion parameters: hgb <7g/dL and plts <20,000 (increased parameter 6/21 d/t brbpr).  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     ID  Prophy: ACV; levaquin; fluconazole.  PJP ppx to start at day +28.      GI/NUTRITION  #mucositis: meds to IV, Dilaudid IV prn, Carafate.  - Supportive cares; MMW, Saline rinses, Oxycodone PO, Dilaudid IV prn.   #Diarrhea - C. Diff negative on 6/11. Imodium 4mg qid; lomotil prn.  #CINV: scheduled Zofran IV x 6/21; prn Ativan and compazine prn. *Emend 6/22.  Stopped hs Zyprexa x6/20 with ongoing orthostasis.   #Elevated alk phos, possibly related to GCSF.  Trend hepatic panel.  With RUQ pain, will get RUQ ultrasound (not NPO, so this will happen 6/25).  #Acid reflux:  increase protonix to bid; TUMS prn.  Continue carafate.   - Ulcer prophy: Protonix (now bid for acid reflux)  # Celiac Disease, see diet below     FLUIDS/NUTRITION  - PTA Lasix lasix 40mg am, 20mg afternoon- held since 6/17 d/t orthostasis and large volume diuresis on 6/16  - PTA spironoloactone -held since 6/17   - gluten free diet (h/o celiac disease)  - dietitian to  follow     RENAL  # renal amyloidosis; diuresis as above  - Monitor Cr and electrolytes daily.   - Replace electrolytes per sliding scale     ENDOCRINE  # Hypothyroidism:  continue PTA cytomel and synthroid     CARDIAC  # cardiac amyloidosis: cautious fluid balance; diuresis as above  # h/o dizziness and chest pain with chemotherapy for amyloidosis; improved with addition of midodrine.   # orthostatic hypotension: on midodrine, as above. PRN fluid boluses vs diuresis for volume balance.  # hyperlipidemia: rosuvastatin on hold during acute transplant course     SUPPORTIVE CARE  - PT/OT to evaluate on admission and follow as indicated.   - BMT Social work to follow.     Known issues that I take into account for medical decisions, with salient changes to the plan considering these complexities noted above.    Patient Active Problem List   Diagnosis    Hypothyroidism due to acquired atrophy of thyroid    Family hx of colon cancer    Nonallopathic lesion of cervical region    Cervicalgia    Nonallopathic lesion of sacral region    Nonallopathic lesion of lumbar region    Lumbago    Nonallopathic lesion of thoracic region    Hyperlipidemia LDL goal <100    Dependent edema R>L    AL amyloidosis (H)    Hypogammaglobulinemia (H24)    Chronic kidney disease, stage 3a (H)    Celiac disease    Autologous donor of stem cells     Medically Ready for Discharge: Anticipated in 5+ Days    Clinically Significant Risk Factors              # Hypoalbuminemia: Lowest albumin = 2.1 g/dL at 6/24/2024  3:05 AM, will monitor as appropriate  # Coagulation Defect: INR = 1.31 (Ref range: 0.85 - 1.15) and/or PTT = 24 Seconds (Ref range: 22 - 38 Seconds), will monitor for bleeding  # Thrombocytopenia: Lowest platelets = 14 in last 2 days, will monitor for bleeding             #Precipitous drop in Hgb/Hct: Lowest Hgb this hospitalization: 6.9 g/dL. Will continue to monitor and treat/transfuse as appropriate.     # Overweight: Estimated body mass  "index is 26.36 kg/m  as calculated from the following:    Height as of this encounter: 1.63 m (5' 4.17\").    Weight as of this encounter: 70 kg (154 lb 6.4 oz).             I spent 30 minutes in the care of this patient today, which included time necessary for preparation for the visit, obtaining history, ordering medications/tests/procedures as medically indicated, review of pertinent medical literature, counseling of the patient, communication of recommendations to the care team, and documentation time.      Dominga Pacheco PA-C    Physician Attestation      I saw and evaluated Vivian Brown as part of a shared APRN/PA visit. I personally performed the substantive portion of the medical decision making for this visit - please see the [facility] s documentation for full details. Key management decisions made by me and carried out under my direction as below.      Vivian Brown is a 54 year old female with AL amyloidosis with renal and cardiac involvement at diagnosis who is s/p autologous stem cell transplant. Post transplant course has been complicated by mucositis.     Mary Ann continues to have throat pain, nausea and diarrhea. Able to tolerate liquids and soft foods.      BMT: D12  Heme: Neutropenic, engrafting   ID: Afebrile to date   Elevated ALP with right upper quadrant tenderness. Will get US abdomen.   GI: mucositis, nausea, diarrhea - continue supportive care  CKD/ nephrotic syndrome: She was on lasix and aldactone prior to transplant, which is currently on hold. Bolus IV fluids as needed for hypotension.   CV/ hypotension: continue midodrine      I spent 40 minutes on the patient unit personally reviewing medical records and medications, reviewing vital signs, labs, and imaging results as summarized above, discussing the patient's case on rounds with the [facility], obtaining a history from the patient, performing a physical exam, intensively monitoring treatments with high risk of toxicity, coordinating care, " and documenting in the electronic medical record.     Twila Lima  Department of Hematology, Oncology and Transplantation  Pager [phone]/Text via Tealetemily

## 2024-06-24 NOTE — PLAN OF CARE
"Blood pressure 99/76, pulse 108, temperature 99.1  F (37.3  C), temperature source Axillary, resp. rate 20, height 1.63 m (5' 4.17\"), weight 71.4 kg (157 lb 4.8 oz), SpO2 95%, not currently breastfeeding.  Goal Outcome Evaluation:A&Ox4,up with SBA c/o abdominal and mid throat pain oxycodone 5 mg given which was effective,magic mouth was given x 1 for mouth pain,reported x 1 mild nausea,scheduled antinausea medication given,LS clear,BS+,had multiple small,green loose BM's,lomotil given x2 per PRN order and imodium given x 2 per schedule.BLE trace of edema.Hgb 6.9 received one unit of RBC this morning.Pt. had shower,CVC dressing got wet ,dressing changed per protocol.Mother at bedside supportive with cares.Will continue to monitor.                        "

## 2024-06-24 NOTE — PLAN OF CARE
"/65   Pulse 114   Temp 99.9  F (37.7  C)   Resp 18   Ht 1.63 m (5' 4.17\")   Wt 70 kg (154 lb 6.4 oz)   SpO2 95%   BMI 26.36 kg/m  LTCVC C/D/I    Ms. Brown had a better night than last, as her pain was much better controlled and diarrhea was somewhat better controlled. Emend added on previous shift, despite this patient had one episode of emesis in the evening. Sleep was intermittent related to diarrhea and labs/vitals/blood, but improved from previous night. Lightheaded and pulse 140-150 after 0400 vitals, 500mL bolus ordered by provider. Hgb and K+ low, replacements ordered per protocol. Endorses new mid-throat discomfort since 0400, MMW given. 2 doses of oxycodone given, 2 doses of imodium, and 2 doses of compazine given. Continues to be SBA and call light appropriate, with bed alarm on for safety.     Problem: Adult Inpatient Plan of Care  Goal: Plan of Care Review  Description: The Plan of Care Review/Shift note should be completed every shift.  The Outcome Evaluation is a brief statement about your assessment that the patient is improving, declining, or no change.  This information will be displayed automatically on your shift  note.  Outcome: Progressing  Flowsheets (Taken 6/24/2024 0223)  Plan of Care Reviewed With: patient  Overall Patient Progress: (Improved pain control, slightly improve diarrhea control) improving  Goal: Patient-Specific Goal (Individualized)  Description: You can add care plan individualizations to a care plan. Examples of Individualization might be:  \"Parent requests to be called daily at 9am for status\", \"I have a hard time hearing out of my right ear\", or \"Do not touch me to wake me up as it startles  me\".  Outcome: Progressing  Flowsheets (Taken 6/24/2024 0223)  Patient/Family-Specific Goals (Include Timeframe): Nursing will respond quickly to call lights r/t bowel urgency  Goal: Absence of Hospital-Acquired Illness or Injury  Intervention: Identify and Manage Fall " Risk  Recent Flowsheet Documentation  Taken 6/24/2024 0115 by Carlos Bansal RN  Safety Promotion/Fall Prevention:   supervised activity   safety round/check completed  Taken 6/24/2024 0018 by Carlos Bansal RN  Safety Promotion/Fall Prevention:   activity supervised   assistive device/personal items within reach   clutter free environment maintained   nonskid shoes/slippers when out of bed   patient and family education   safety round/check completed  Taken 6/23/2024 2230 by Carlos Bansal RN  Safety Promotion/Fall Prevention: safety round/check completed  Taken 6/23/2024 1934 by Rolf, Carlos K., RN  Safety Promotion/Fall Prevention:   activity supervised   clutter free environment maintained   nonskid shoes/slippers when out of bed   patient and family education   safety round/check completed  Intervention: Prevent Skin Injury  Recent Flowsheet Documentation  Taken 6/23/2024 1934 by Rolf, Carlos K., RN  Body Position: position changed independently  Skin Protection: adhesive use limited  Device Skin Pressure Protection:   adhesive use limited   absorbent pad utilized/changed  Intervention: Prevent Infection  Recent Flowsheet Documentation  Taken 6/24/2024 0018 by Carlos Bansal RN  Infection Prevention:   environmental surveillance performed   hand hygiene promoted   personal protective equipment utilized   rest/sleep promoted   single patient room provided  Taken 6/23/2024 1934 by Carlos Bansal RN  Infection Prevention:   environmental surveillance performed   hand hygiene promoted   personal protective equipment utilized   rest/sleep promoted   single patient room provided     Problem: Stem Cell/Bone Marrow Transplant  Goal: Optimal Coping with Transplant  Intervention: Optimize Patient/Family Adjustment to Transplant  Recent Flowsheet Documentation  Taken 6/23/2024 1934 by Carlos Bansal RN  Supportive Measures:   active listening utilized   positive reinforcement  provided   problem-solving facilitated  Goal: Diarrhea Symptom Control  Intervention: Manage Diarrhea  Recent Flowsheet Documentation  Taken 6/24/2024 0115 by Carlos Bansal RN  Perineal Care: perineal hygiene encouraged  Taken 6/24/2024 0018 by Carlos Bansal RN  Perineal Care: perineal hygiene encouraged  Taken 6/23/2024 1934 by Carlos Bansal RN  Skin Protection: adhesive use limited  Fluid/Electrolyte Management: fluids provided  Goal: Improved Activity Tolerance  Intervention: Promote Improved Energy  Recent Flowsheet Documentation  Taken 6/24/2024 0115 by Carlos Bansal, RN  Activity Management:   ambulated to bathroom   back to bed  Taken 6/24/2024 0018 by Carlos Bansal RN  Activity Management:   ambulated to bathroom   back to bed  Taken 6/23/2024 1934 by Carlos Bansal RN  Fatigue Management:   activity assistance provided   fatigue-related activity identified   frequent rest breaks encouraged  Sleep/Rest Enhancement: awakenings minimized  Activity Management: up in chair  Environmental Support: calm environment promoted  Goal: Blood Counts Within Acceptable Range  Intervention: Monitor and Manage Hematologic Symptoms  Recent Flowsheet Documentation  Taken 6/23/2024 1934 by Carlos Bansal RN  Sleep/Rest Enhancement: awakenings minimized  Bleeding Precautions:   blood pressure closely monitored   coagulation study results reviewed   foot protection facilitated   gentle oral care promoted   monitored for signs of bleeding  Medication Review/Management:   medications reviewed   high-risk medications identified  Goal: Absence of Infection  Intervention: Prevent and Manage Infection  Recent Flowsheet Documentation  Taken 6/24/2024 0018 by Carlos Bansal, RN  Infection Prevention:   environmental surveillance performed   hand hygiene promoted   personal protective equipment utilized   rest/sleep promoted   single patient room provided  Isolation Precautions:  protective environment maintained  Taken 6/23/2024 1934 by Carlos Bansal RN  Infection Prevention:   environmental surveillance performed   hand hygiene promoted   personal protective equipment utilized   rest/sleep promoted   single patient room provided  Infection Management: aseptic technique maintained  Isolation Precautions: protective environment maintained  Goal: Improved Oral Mucous Membrane Health and Integrity  Intervention: Promote Oral Comfort and Health  Recent Flowsheet Documentation  Taken 6/24/2024 0018 by Carlos Bansal RN  Oral Care: oral rinse provided  Goal: Nausea and Vomiting Symptom Relief  Intervention: Prevent and Manage Nausea and Vomiting  Recent Flowsheet Documentation  Taken 6/23/2024 1934 by Carlos Bansal RN  Nausea/Vomiting Interventions: antiemetic  Goal: Optimal Nutrition Intake  Intervention: Minimize and Manage Barriers to Oral Intake  Recent Flowsheet Documentation  Taken 6/23/2024 1934 by Carlos Bansal RN  Oral Nutrition Promotion: calorie-dense liquids provided     Problem: Skin Injury Risk Increased  Goal: Skin Health and Integrity  Intervention: Plan: Nurse Driven Intervention: Moisture Management  Recent Flowsheet Documentation  Taken 6/23/2024 1934 by Carlos Bansal RN  Moisture Interventions:   Encourage regular toileting   Incontinence pad  Intervention: Optimize Skin Protection  Recent Flowsheet Documentation  Taken 6/24/2024 0115 by Carlos Bansal RN  Activity Management:   ambulated to bathroom   back to bed  Taken 6/24/2024 0018 by Carlos Bansal RN  Activity Management:   ambulated to bathroom   back to bed  Taken 6/23/2024 1934 by Carlos Bansal RN  Skin Protection: adhesive use limited  Activity Management: up in chair  Intervention: Promote and Optimize Oral Intake  Recent Flowsheet Documentation  Taken 6/23/2024 1934 by Carlos Bansal RN  Oral Nutrition Promotion: calorie-dense liquids provided   Goal  Outcome Evaluation:      Plan of Care Reviewed With: patient    Overall Patient Progress: improving (Improved pain control, slightly improve diarrhea control)

## 2024-06-25 ENCOUNTER — APPOINTMENT (OUTPATIENT)
Dept: PHYSICAL THERAPY | Facility: CLINIC | Age: 54
DRG: 016 | End: 2024-06-25
Attending: INTERNAL MEDICINE
Payer: COMMERCIAL

## 2024-06-25 ENCOUNTER — APPOINTMENT (OUTPATIENT)
Dept: ULTRASOUND IMAGING | Facility: CLINIC | Age: 54
DRG: 016 | End: 2024-06-25
Attending: PHYSICIAN ASSISTANT
Payer: COMMERCIAL

## 2024-06-25 LAB
ACANTHOCYTES BLD QL SMEAR: ABNORMAL
ALBUMIN SERPL BCG-MCNC: 2 G/DL (ref 3.5–5.2)
ALP SERPL-CCNC: 270 U/L (ref 40–150)
ALT SERPL W P-5'-P-CCNC: 12 U/L (ref 0–50)
ANION GAP SERPL CALCULATED.3IONS-SCNC: 8 MMOL/L (ref 7–15)
AST SERPL W P-5'-P-CCNC: 10 U/L (ref 0–45)
AUER BODIES BLD QL SMEAR: ABNORMAL
BASO STIPL BLD QL SMEAR: ABNORMAL
BASOPHILS # BLD AUTO: ABNORMAL 10*3/UL
BASOPHILS NFR BLD AUTO: ABNORMAL %
BILIRUB DIRECT SERPL-MCNC: 0.24 MG/DL (ref 0–0.3)
BILIRUB SERPL-MCNC: 0.4 MG/DL
BITE CELLS BLD QL SMEAR: ABNORMAL
BLD PROD TYP BPU: NORMAL
BLISTER CELLS BLD QL SMEAR: ABNORMAL
BLOOD COMPONENT TYPE: NORMAL
BUN SERPL-MCNC: 24.8 MG/DL (ref 6–20)
BURR CELLS BLD QL SMEAR: SLIGHT
C DIFF TOX B STL QL: NEGATIVE
CALCIUM SERPL-MCNC: 9.1 MG/DL (ref 8.6–10)
CHLORIDE SERPL-SCNC: 108 MMOL/L (ref 98–107)
CODING SYSTEM: NORMAL
CREAT SERPL-MCNC: 1.48 MG/DL (ref 0.51–0.95)
DACRYOCYTES BLD QL SMEAR: ABNORMAL
DEPRECATED HCO3 PLAS-SCNC: 18 MMOL/L (ref 22–29)
EGFRCR SERPLBLD CKD-EPI 2021: 42 ML/MIN/1.73M2
ELLIPTOCYTES BLD QL SMEAR: ABNORMAL
EOSINOPHIL # BLD AUTO: ABNORMAL 10*3/UL
EOSINOPHIL NFR BLD AUTO: ABNORMAL %
ERYTHROCYTE [DISTWIDTH] IN BLOOD BY AUTOMATED COUNT: 15.9 % (ref 10–15)
FRAGMENTS BLD QL SMEAR: ABNORMAL
GLUCOSE SERPL-MCNC: 99 MG/DL (ref 70–99)
HCT VFR BLD AUTO: 21.8 % (ref 35–47)
HGB BLD-MCNC: 7.5 G/DL (ref 11.7–15.7)
HGB C CRYSTALS: ABNORMAL
HOWELL-JOLLY BOD BLD QL SMEAR: ABNORMAL
IMM GRANULOCYTES # BLD: ABNORMAL 10*3/UL
IMM GRANULOCYTES NFR BLD: ABNORMAL %
ISSUE DATE AND TIME: NORMAL
LYMPHOCYTES # BLD AUTO: ABNORMAL 10*3/UL
LYMPHOCYTES NFR BLD AUTO: ABNORMAL %
MAGNESIUM SERPL-MCNC: 2 MG/DL (ref 1.7–2.3)
MCH RBC QN AUTO: 31.4 PG (ref 26.5–33)
MCHC RBC AUTO-ENTMCNC: 34.4 G/DL (ref 31.5–36.5)
MCV RBC AUTO: 91 FL (ref 78–100)
MONOCYTES # BLD AUTO: ABNORMAL 10*3/UL
MONOCYTES NFR BLD AUTO: ABNORMAL %
NEUTROPHILS # BLD AUTO: ABNORMAL 10*3/UL
NEUTROPHILS NFR BLD AUTO: ABNORMAL %
NEUTS HYPERSEG BLD QL SMEAR: ABNORMAL
PHOSPHATE SERPL-MCNC: 3.3 MG/DL (ref 2.5–4.5)
PLAT MORPH BLD: ABNORMAL
PLATELET # BLD AUTO: 9 10E3/UL (ref 150–450)
POLYCHROMASIA BLD QL SMEAR: ABNORMAL
POTASSIUM SERPL-SCNC: 3.8 MMOL/L (ref 3.4–5.3)
PROT SERPL-MCNC: 4.4 G/DL (ref 6.4–8.3)
RBC # BLD AUTO: 2.39 10E6/UL (ref 3.8–5.2)
RBC AGGLUT BLD QL: ABNORMAL
RBC MORPH BLD: ABNORMAL
ROULEAUX BLD QL SMEAR: ABNORMAL
SICKLE CELLS BLD QL SMEAR: ABNORMAL
SMUDGE CELLS BLD QL SMEAR: ABNORMAL
SODIUM SERPL-SCNC: 134 MMOL/L (ref 135–145)
SPHEROCYTES BLD QL SMEAR: ABNORMAL
STOMATOCYTES BLD QL SMEAR: ABNORMAL
TARGETS BLD QL SMEAR: ABNORMAL
TOXIC GRANULES BLD QL SMEAR: ABNORMAL
UNIT ABO/RH: NORMAL
UNIT NUMBER: NORMAL
UNIT STATUS: NORMAL
UNIT TYPE ISBT: 5100
VARIANT LYMPHS BLD QL SMEAR: ABNORMAL
WBC # BLD AUTO: 0.3 10E3/UL (ref 4–11)

## 2024-06-25 PROCEDURE — 82248 BILIRUBIN DIRECT: CPT | Performed by: PHYSICIAN ASSISTANT

## 2024-06-25 PROCEDURE — 99418 PROLNG IP/OBS E/M EA 15 MIN: CPT | Performed by: PHYSICIAN ASSISTANT

## 2024-06-25 PROCEDURE — P9037 PLATE PHERES LEUKOREDU IRRAD: HCPCS | Performed by: PHYSICIAN ASSISTANT

## 2024-06-25 PROCEDURE — 97116 GAIT TRAINING THERAPY: CPT | Mod: GP

## 2024-06-25 PROCEDURE — 258N000003 HC RX IP 258 OP 636: Performed by: PHYSICIAN ASSISTANT

## 2024-06-25 PROCEDURE — 97530 THERAPEUTIC ACTIVITIES: CPT | Mod: GP

## 2024-06-25 PROCEDURE — 97110 THERAPEUTIC EXERCISES: CPT | Mod: GP

## 2024-06-25 PROCEDURE — 206N000001 HC R&B BMT UMMC

## 2024-06-25 PROCEDURE — C9113 INJ PANTOPRAZOLE SODIUM, VIA: HCPCS | Mod: JZ | Performed by: PHYSICIAN ASSISTANT

## 2024-06-25 PROCEDURE — 250N000011 HC RX IP 250 OP 636: Mod: JZ | Performed by: STUDENT IN AN ORGANIZED HEALTH CARE EDUCATION/TRAINING PROGRAM

## 2024-06-25 PROCEDURE — 250N000011 HC RX IP 250 OP 636: Mod: JZ | Performed by: PHYSICIAN ASSISTANT

## 2024-06-25 PROCEDURE — 85027 COMPLETE CBC AUTOMATED: CPT

## 2024-06-25 PROCEDURE — 76705 ECHO EXAM OF ABDOMEN: CPT

## 2024-06-25 PROCEDURE — 87493 C DIFF AMPLIFIED PROBE: CPT | Performed by: PHYSICIAN ASSISTANT

## 2024-06-25 PROCEDURE — 250N000013 HC RX MED GY IP 250 OP 250 PS 637: Performed by: PHYSICIAN ASSISTANT

## 2024-06-25 PROCEDURE — 76705 ECHO EXAM OF ABDOMEN: CPT | Mod: 26 | Performed by: STUDENT IN AN ORGANIZED HEALTH CARE EDUCATION/TRAINING PROGRAM

## 2024-06-25 PROCEDURE — 250N000013 HC RX MED GY IP 250 OP 250 PS 637: Performed by: INTERNAL MEDICINE

## 2024-06-25 PROCEDURE — 84100 ASSAY OF PHOSPHORUS: CPT | Performed by: STUDENT IN AN ORGANIZED HEALTH CARE EDUCATION/TRAINING PROGRAM

## 2024-06-25 PROCEDURE — 83735 ASSAY OF MAGNESIUM: CPT | Performed by: STUDENT IN AN ORGANIZED HEALTH CARE EDUCATION/TRAINING PROGRAM

## 2024-06-25 PROCEDURE — 250N000013 HC RX MED GY IP 250 OP 250 PS 637

## 2024-06-25 PROCEDURE — 250N000011 HC RX IP 250 OP 636: Mod: JZ | Performed by: INTERNAL MEDICINE

## 2024-06-25 PROCEDURE — 80053 COMPREHEN METABOLIC PANEL: CPT

## 2024-06-25 PROCEDURE — 258N000003 HC RX IP 258 OP 636: Performed by: STUDENT IN AN ORGANIZED HEALTH CARE EDUCATION/TRAINING PROGRAM

## 2024-06-25 PROCEDURE — 250N000011 HC RX IP 250 OP 636: Performed by: PHYSICIAN ASSISTANT

## 2024-06-25 PROCEDURE — 99233 SBSQ HOSP IP/OBS HIGH 50: CPT | Mod: FS | Performed by: PHYSICIAN ASSISTANT

## 2024-06-25 RX ORDER — POTASSIUM CHLORIDE 29.8 MG/ML
20 INJECTION INTRAVENOUS ONCE
Status: COMPLETED | OUTPATIENT
Start: 2024-06-25 | End: 2024-06-25

## 2024-06-25 RX ORDER — LEVOFLOXACIN 250 MG/1
250 TABLET, FILM COATED ORAL
Status: DISCONTINUED | OUTPATIENT
Start: 2024-06-25 | End: 2024-06-30

## 2024-06-25 RX ORDER — ONDANSETRON 8 MG/1
8 TABLET, FILM COATED ORAL EVERY 8 HOURS
Status: DISCONTINUED | OUTPATIENT
Start: 2024-06-25 | End: 2024-07-01 | Stop reason: HOSPADM

## 2024-06-25 RX ORDER — OLANZAPINE 2.5 MG/1
2.5 TABLET, FILM COATED ORAL AT BEDTIME
Status: DISCONTINUED | OUTPATIENT
Start: 2024-06-25 | End: 2024-06-27

## 2024-06-25 RX ORDER — FLUCONAZOLE 200 MG/1
200 TABLET ORAL DAILY
Status: DISCONTINUED | OUTPATIENT
Start: 2024-06-25 | End: 2024-06-30

## 2024-06-25 RX ADMIN — FLUCONAZOLE 200 MG: 200 TABLET ORAL at 11:48

## 2024-06-25 RX ADMIN — LORAZEPAM 0.5 MG: 2 INJECTION INTRAMUSCULAR; INTRAVENOUS at 03:16

## 2024-06-25 RX ADMIN — SUCRALFATE 1 G: 1 SUSPENSION ORAL at 21:03

## 2024-06-25 RX ADMIN — LEVOTHYROXINE SODIUM 200 MCG: 200 TABLET ORAL at 06:04

## 2024-06-25 RX ADMIN — LORAZEPAM 0.5 MG: 2 INJECTION INTRAMUSCULAR; INTRAVENOUS at 10:15

## 2024-06-25 RX ADMIN — PANTOPRAZOLE SODIUM 40 MG: 40 INJECTION, POWDER, FOR SOLUTION INTRAVENOUS at 19:49

## 2024-06-25 RX ADMIN — ONDANSETRON HYDROCHLORIDE 8 MG: 8 TABLET, FILM COATED ORAL at 23:27

## 2024-06-25 RX ADMIN — SUCRALFATE 1 G: 1 SUSPENSION ORAL at 09:00

## 2024-06-25 RX ADMIN — ACYCLOVIR SODIUM 325 MG: 50 INJECTION, SOLUTION INTRAVENOUS at 20:02

## 2024-06-25 RX ADMIN — DIPHENOXYLATE HYDROCHLORIDE AND ATROPINE SULFATE 1 TABLET: 2.5; .025 TABLET ORAL at 01:33

## 2024-06-25 RX ADMIN — MIDODRINE HYDROCHLORIDE 10 MG: 5 TABLET ORAL at 14:15

## 2024-06-25 RX ADMIN — DIPHENHYDRAMINE HYDROCHLORIDE AND LIDOCAINE HYDROCHLORIDE AND ALUMINUM HYDROXIDE AND MAGNESIUM HYDRO 10 ML: KIT at 20:06

## 2024-06-25 RX ADMIN — ACYCLOVIR SODIUM 325 MG: 50 INJECTION, SOLUTION INTRAVENOUS at 07:58

## 2024-06-25 RX ADMIN — PROCHLORPERAZINE EDISYLATE 5 MG: 5 INJECTION INTRAMUSCULAR; INTRAVENOUS at 06:22

## 2024-06-25 RX ADMIN — Medication 5 ML: at 03:17

## 2024-06-25 RX ADMIN — Medication 5 ML: at 06:56

## 2024-06-25 RX ADMIN — PROCHLORPERAZINE EDISYLATE 5 MG: 5 INJECTION INTRAMUSCULAR; INTRAVENOUS at 19:43

## 2024-06-25 RX ADMIN — DEXTROSE MONOHYDRATE 10 ML: 50 INJECTION, SOLUTION INTRAVENOUS at 19:53

## 2024-06-25 RX ADMIN — DEXTROSE MONOHYDRATE 330 MCG: 50 INJECTION, SOLUTION INTRAVENOUS at 19:56

## 2024-06-25 RX ADMIN — Medication 50 MCG: at 09:00

## 2024-06-25 RX ADMIN — LEVOFLOXACIN 250 MG: 250 TABLET, FILM COATED ORAL at 11:48

## 2024-06-25 RX ADMIN — LOPERAMIDE HYDROCHLORIDE 4 MG: 2 CAPSULE ORAL at 09:00

## 2024-06-25 RX ADMIN — LOPERAMIDE HYDROCHLORIDE 4 MG: 2 CAPSULE ORAL at 18:26

## 2024-06-25 RX ADMIN — LOPERAMIDE HYDROCHLORIDE 4 MG: 2 CAPSULE ORAL at 14:15

## 2024-06-25 RX ADMIN — DIPHENOXYLATE HYDROCHLORIDE AND ATROPINE SULFATE 1 TABLET: 2.5; .025 TABLET ORAL at 11:59

## 2024-06-25 RX ADMIN — DIPHENOXYLATE HYDROCHLORIDE AND ATROPINE SULFATE 1 TABLET: 2.5; .025 TABLET ORAL at 05:17

## 2024-06-25 RX ADMIN — PANTOPRAZOLE SODIUM 40 MG: 40 INJECTION, POWDER, FOR SOLUTION INTRAVENOUS at 07:56

## 2024-06-25 RX ADMIN — DIPHENHYDRAMINE HYDROCHLORIDE AND LIDOCAINE HYDROCHLORIDE AND ALUMINUM HYDROXIDE AND MAGNESIUM HYDRO 10 ML: KIT at 10:15

## 2024-06-25 RX ADMIN — ONDANSETRON HYDROCHLORIDE 8 MG: 8 TABLET, FILM COATED ORAL at 18:26

## 2024-06-25 RX ADMIN — MIDODRINE HYDROCHLORIDE 10 MG: 5 TABLET ORAL at 09:00

## 2024-06-25 RX ADMIN — Medication 5 ML: at 20:59

## 2024-06-25 RX ADMIN — CALCIUM CARBONATE (ANTACID) CHEW TAB 500 MG 500 MG: 500 CHEW TAB at 19:07

## 2024-06-25 RX ADMIN — LIOTHYRONINE SODIUM 5 MCG: 5 TABLET ORAL at 14:15

## 2024-06-25 RX ADMIN — DEXTROSE MONOHYDRATE 20 ML: 50 INJECTION, SOLUTION INTRAVENOUS at 20:58

## 2024-06-25 RX ADMIN — LIOTHYRONINE SODIUM 5 MCG: 5 TABLET ORAL at 09:00

## 2024-06-25 RX ADMIN — LOPERAMIDE HYDROCHLORIDE 4 MG: 2 CAPSULE ORAL at 20:05

## 2024-06-25 RX ADMIN — OLANZAPINE 2.5 MG: 2.5 TABLET, FILM COATED ORAL at 21:03

## 2024-06-25 RX ADMIN — SUCRALFATE 1 G: 1 SUSPENSION ORAL at 14:15

## 2024-06-25 RX ADMIN — ONDANSETRON 8 MG: 2 INJECTION INTRAMUSCULAR; INTRAVENOUS at 00:05

## 2024-06-25 RX ADMIN — POTASSIUM CHLORIDE 20 MEQ: 29.8 INJECTION, SOLUTION INTRAVENOUS at 05:58

## 2024-06-25 RX ADMIN — CALCIUM CARBONATE (ANTACID) CHEW TAB 500 MG 500 MG: 500 CHEW TAB at 14:22

## 2024-06-25 RX ADMIN — ONDANSETRON 8 MG: 2 INJECTION INTRAMUSCULAR; INTRAVENOUS at 07:53

## 2024-06-25 RX ADMIN — Medication 5 ML: at 00:19

## 2024-06-25 ASSESSMENT — ACTIVITIES OF DAILY LIVING (ADL)
ADLS_ACUITY_SCORE: 29
ADLS_ACUITY_SCORE: 29
ADLS_ACUITY_SCORE: 28
ADLS_ACUITY_SCORE: 29
ADLS_ACUITY_SCORE: 27
ADLS_ACUITY_SCORE: 29
ADLS_ACUITY_SCORE: 27
ADLS_ACUITY_SCORE: 29
ADLS_ACUITY_SCORE: 29
ADLS_ACUITY_SCORE: 27
ADLS_ACUITY_SCORE: 29
ADLS_ACUITY_SCORE: 27
ADLS_ACUITY_SCORE: 29
ADLS_ACUITY_SCORE: 27
ADLS_ACUITY_SCORE: 29
ADLS_ACUITY_SCORE: 29
ADLS_ACUITY_SCORE: 28
ADLS_ACUITY_SCORE: 29
ADLS_ACUITY_SCORE: 29

## 2024-06-25 NOTE — PLAN OF CARE
[id number]    D/I: A&Ox4. Afebrile, VSS. Experienced 2 episodes of N/V, zofran & ativan given x1, intervention effective per patient. On RA, denies SOB and dizziness when up. Gluten free diet with poor appetite. Had 9 BMs this shift, imodium & lomotil given x2.     P: NPO @ midnight.   Liver ultrasound 6/25.

## 2024-06-25 NOTE — PLAN OF CARE
VSS.  AF. Has some throat and mouth pain.  Magic mouthwash given x 1.  Declines pain meds.  Indigestion/heartburn is making it difficult to get pills down as scheduled.  On scheduled Protonix and Carafate.  Tums x1.  Ativan 0.5 mg x 1 for nausea.  On scheduled Zofran.  Emesis this am.  Diarrhea x 5.  On scheduled Imodium.  Lomotil x 1.  Cdiff negative.  Attempted one sip of Metamucil but unable to drink this.  Minimal PO intake- few bites of cream of wheat and a few grapes.  Sipping on Coke Zero.  Will attempt a shake.  Would like to shower later.  Will continue with POC.      Problem: Stem Cell/Bone Marrow Transplant  Goal: Diarrhea Symptom Control  Outcome: Not Progressing  Intervention: Manage Diarrhea  Recent Flowsheet Documentation  Taken 6/25/2024 0751 by Rae Roche RN  Perineal Care:   perineal hygiene encouraged   perineum cleansed  Goal: Improved Activity Tolerance  Outcome: Not Progressing  Intervention: Promote Improved Energy  Recent Flowsheet Documentation  Taken 6/25/2024 1100 by Rae Roche RN  Activity Management:   activity adjusted per tolerance   back to bed  Taken 6/25/2024 0800 by Rae Roche RN  Fatigue Management:   activity schedule adjusted   frequent rest breaks encouraged  Taken 6/25/2024 0751 by Rae Roche RN  Activity Management:   activity adjusted per tolerance   up to bedside commode   back to bed  Goal: Optimal Nutrition Intake  Outcome: Not Progressing     Problem: Skin Injury Risk Increased  Goal: Skin Health and Integrity  Outcome: Not Progressing  Intervention: Plan: Nurse Driven Intervention: Moisture Management  Recent Flowsheet Documentation  Taken 6/25/2024 0800 by Rae Roche RN  Moisture Interventions:   Encourage regular toileting   Incontinence pad   Perineal cleanser  Intervention: Optimize Skin Protection  Recent Flowsheet Documentation  Taken 6/25/2024 1100 by Rae Roche RN  Activity Management:   activity adjusted per tolerance   back to  "bed  Taken 6/25/2024 0800 by Rae Roche RN  Pressure Reduction Techniques: frequent weight shift encouraged  Head of Bed (HOB) Positioning: HOB flat  Taken 6/25/2024 0751 by Rae Roche RN  Activity Management:   activity adjusted per tolerance   up to bedside commode   back to bed     Problem: Adult Inpatient Plan of Care  Goal: Plan of Care Review  Description: The Plan of Care Review/Shift note should be completed every shift.  The Outcome Evaluation is a brief statement about your assessment that the patient is improving, declining, or no change.  This information will be displayed automatically on your shift  note.  Outcome: Progressing  Goal: Patient-Specific Goal (Individualized)  Description: You can add care plan individualizations to a care plan. Examples of Individualization might be:  \"Parent requests to be called daily at 9am for status\", \"I have a hard time hearing out of my right ear\", or \"Do not touch me to wake me up as it startles  me\".  Outcome: Progressing  Goal: Absence of Hospital-Acquired Illness or Injury  Outcome: Progressing  Intervention: Identify and Manage Fall Risk  Recent Flowsheet Documentation  Taken 6/25/2024 1100 by Rae Roche RN  Safety Promotion/Fall Prevention:   safety round/check completed   assistive device/personal items within reach   activity supervised   clutter free environment maintained   increased rounding and observation   increase visualization of patient   lighting adjusted   nonskid shoes/slippers when out of bed   patient and family education   room near nurse's station   room organization consistent   treat reversible contributory factors   treat underlying cause  Taken 6/25/2024 1000 by Rae Roche RN  Safety Promotion/Fall Prevention: safety round/check completed  Taken 6/25/2024 0900 by Rae Roche RN  Safety Promotion/Fall Prevention: safety round/check completed  Taken 6/25/2024 0800 by Rae Roche RN  Safety Promotion/Fall " Prevention:   activity supervised   assistive device/personal items within reach   clutter free environment maintained   increased rounding and observation   increase visualization of patient   lighting adjusted   nonskid shoes/slippers when out of bed   patient and family education   room near nurse's station   room organization consistent   safety round/check completed   treat reversible contributory factors   treat underlying cause  Intervention: Prevent Skin Injury  Recent Flowsheet Documentation  Taken 6/25/2024 1100 by Rae Roche RN  Body Position: position changed independently  Taken 6/25/2024 0800 by Rae Roche RN  Device Skin Pressure Protection:   tubing/devices free from skin contact   adhesive use limited  Taken 6/25/2024 0751 by Rae Roche RN  Body Position: position changed independently  Intervention: Prevent Infection  Recent Flowsheet Documentation  Taken 6/25/2024 1100 by Rae Roche RN  Infection Prevention:   cohorting utilized   environmental surveillance performed   hand hygiene promoted   personal protective equipment utilized   rest/sleep promoted   single patient room provided   visitors restricted/screened  Taken 6/25/2024 0800 by Rae Roche RN  Infection Prevention:   cohorting utilized   environmental surveillance performed   hand hygiene promoted   personal protective equipment utilized   rest/sleep promoted   single patient room provided   visitors restricted/screened  Goal: Optimal Comfort and Wellbeing  Outcome: Progressing  Intervention: Monitor Pain and Promote Comfort  Recent Flowsheet Documentation  Taken 6/25/2024 1123 by Rae Roche RN  Pain Management Interventions: (only wants to use MMW) declines  Goal: Readiness for Transition of Care  Outcome: Progressing     Problem: Stem Cell/Bone Marrow Transplant  Goal: Optimal Coping with Transplant  Outcome: Progressing  Goal: Blood Counts Within Acceptable Range  Outcome: Progressing  Intervention:  Monitor and Manage Hematologic Symptoms  Recent Flowsheet Documentation  Taken 6/25/2024 1100 by Rae Roche RN  Bleeding Precautions:   blood pressure closely monitored   foot protection facilitated   gentle oral care promoted   monitored for signs of bleeding  Medication Review/Management:   medications reviewed   high-risk medications identified  Taken 6/25/2024 0800 by Rae Roche RN  Bleeding Precautions:   blood pressure closely monitored   gentle oral care promoted   monitored for signs of bleeding   foot protection facilitated  Medication Review/Management:   medications reviewed   high-risk medications identified  Goal: Absence of Infection  Outcome: Progressing  Intervention: Prevent and Manage Infection  Recent Flowsheet Documentation  Taken 6/25/2024 1100 by Rae Roche RN  Infection Prevention:   cohorting utilized   environmental surveillance performed   hand hygiene promoted   personal protective equipment utilized   rest/sleep promoted   single patient room provided   visitors restricted/screened  Isolation Precautions:   enteric precautions maintained   protective environment maintained  Taken 6/25/2024 0800 by Rae Roche RN  Infection Prevention:   cohorting utilized   environmental surveillance performed   hand hygiene promoted   personal protective equipment utilized   rest/sleep promoted   single patient room provided   visitors restricted/screened  Isolation Precautions:   enteric precautions initiated   protective environment maintained  Goal: Improved Oral Mucous Membrane Health and Integrity  Outcome: Progressing  Intervention: Promote Oral Comfort and Health  Recent Flowsheet Documentation  Taken 6/25/2024 0800 by Rae Roche RN  Oral Mucous Membrane Protection: (saline rinse) other (see comments)  Goal: Nausea and Vomiting Symptom Relief  Outcome: Progressing  Intervention: Prevent and Manage Nausea and Vomiting  Recent Flowsheet Documentation  Taken 6/25/2024 0800 by  Rae Roche, RN  Nausea/Vomiting Interventions: (oral cares)   antiemetic   nausea triggers minimized   sips of clear liquids given   other (see comments)     Problem: Stem Cell/Bone Marrow Transplant  Goal: Symptom-Free Urinary Elimination  Outcome: Adequate for Care Transition  Intervention: Prevent or Manage Bladder Irritation  Recent Flowsheet Documentation  Taken 6/25/2024 1123 by Rae Roche, RN  Pain Management Interventions: (only wants to use MMW) declines  Goal: Absence of Hypersensitivity Reaction  Outcome: Adequate for Care Transition   Goal Outcome Evaluation:

## 2024-06-25 NOTE — PROGRESS NOTES
CLINICAL NUTRITION SERVICES - REASSESSMENT NOTE     Nutrition Prescription    RECOMMENDATIONS FOR MDs/PROVIDERS TO ORDER:  None at this time.     Malnutrition Status:    Patient does not meet two of the established criteria necessary for diagnosing malnutrition but is at risk for malnutrition    Recommendations already ordered by Registered Dietitian (RD):  PRN snacks/supplements  Ensure Enlive milkshakes BID    Future/Additional Recommendations:  -Monitor ability to advance diet  -Monitor PO intake once diet advances  -Monitor wt trends     EVALUATION OF THE PROGRESS TOWARD GOALS   Diet: NPO- liver ultrasound     Intake: % of small meals per RN flowsheets. Pt reports she has been having a decreased appetite, eating about 50% of normal amounts. Per [facility], pt has been ordering 1-2 small meals (Ensure Enlive milkshakes and/or cream of rice daily).      NEW FINDINGS   Met with pt at bedside. Note pt was drowsy during visit and appeared confused at times. Pt reports she has been struggling to eat due to being tired and having limited options that are gluten free. She has tried Ensure Enlive mixed with ice cream and tolerated it well. Discussed scheduling these BID to maximize nutrition while she is tired and unable to eat full meals throughout the day.     GI:  9 stools (diarrhea) reported yesterday, 3-10 stools documented daily on I's/O's. C diff sample pending given increase in diarrhea.     SKIN:  No concerns noted.    LABS:  Reviewed. Notable for:   BUN 24.8  Cr 1.48  GFR 42    MEDICATIONS:  Reviewed. Notable for:  Synthroid  Imodium  Zofran  Protonix  Vitamin D3  PRN Lomotil, Magic Mouthwash, Oxycodone, Compazine     WEIGHTS:  Wts have fluctuated from 146-163# during admission- pt previously noted hx of fluid shifts after being diuresed.   Vitals:    06/21/24 0852 06/22/24 0825 06/23/24 0739 06/24/24 0735   Weight: 66.5 kg (146 lb 8 oz) 68.7 kg (151 lb 6.4 oz) 70 kg (154 lb 6.4 oz) 71.4 kg (157 lb 4.8  oz)    06/25/24 0751   Weight: 71.9 kg (158 lb 7.5 oz)        MALNUTRITION  % Intake: <50% for >5 days   % Weight Loss: None noted  Subcutaneous Fat Loss: None observed  Muscle Loss: None observed  Fluid Accumulation/Edema: Does not meet criteria (+1 trace edema on B/L ankles and feet per RN flowsheets)  Malnutrition Diagnosis: Patient does not meet two of the established criteria necessary for diagnosing malnutrition but is at risk for malnutrition    Previous Goals   Patient to consume % of nutritionally adequate meal trays TID, or the equivalent with supplements/snacks.   Evaluation: Not met    Previous Nutrition Diagnosis  Predicted inadequate nutrient intake (kcal/protein) related to recent PBSC with potential for side effects to affect ability to take adequate PO.   Evaluation: continues, now meets criteria for inadequate oral intake     CURRENT NUTRITION DIAGNOSIS  Inadequate oral intake related to being tired and having limited gluten free options as evidenced by pt report of eating 50% of usual amounts over the last week.     INTERVENTIONS  Implementation  Medical food supplement therapy- scheduled Ensure Enlive milkshakes BID to maximize nutrition     Goals  Patient to consume % of nutritionally adequate meal trays TID, or the equivalent with supplements/snacks.    Monitoring/Evaluation  Progress toward goals will be monitored and evaluated per protocol.    Supriya Wiseman (Maggie), CORI, LD- 5C Clinical Dietitian   Available on [employer]  No longer available by paging

## 2024-06-25 NOTE — PLAN OF CARE
Afebrile. Tachycardiac at times with movement. OVSS. Patient complains of N&V throughout the night, ativan and compazine given once each. Patient had 5 loose stools, lomotil given twice. Has PRN cream for skin irritation, refused. Potassium and platelet replacements given. NPO since midnight, liver ultrasound 6/25. Gluten free, high kcal/high protein diet. Mucositis in mouth and GI, complains of intermittent pain with swallowing. Started having confusion this morning, A&O x4. Will continue to monitor.       Problem: Adult Inpatient Plan of Care  Goal: Optimal Comfort and Wellbeing  Outcome: Not Progressing     Problem: Stem Cell/Bone Marrow Transplant  Goal: Diarrhea Symptom Control  Outcome: Not Progressing  Intervention: Manage Diarrhea  Recent Flowsheet Documentation  Taken 6/25/2024 0000 by Goyo Gates RN  Skin Protection:   adhesive use limited   incontinence pads utilized  Perineal Care:   perineum cleansed   perineal spray bottle/warm water use encouraged   absorbent brief/pad changed   diaper changed  Goal: Blood Counts Within Acceptable Range  Outcome: Not Progressing  Intervention: Monitor and Manage Hematologic Symptoms  Recent Flowsheet Documentation  Taken 6/25/2024 0222 by Goyo Gates RN  Medication Review/Management: medications reviewed  Taken 6/25/2024 0000 by Goyo Gates RN  Sleep/Rest Enhancement:   awakenings minimized   family presence promoted   consistent schedule promoted   regular sleep/rest pattern promoted   relaxation techniques promoted   noise level reduced  Bleeding Precautions:   blood pressure closely monitored   coagulation study results reviewed   gentle oral care promoted   monitored for signs of bleeding  Medication Review/Management: medications reviewed  Goal: Improved Oral Mucous Membrane Health and Integrity  Outcome: Not Progressing  Intervention: Promote Oral Comfort and Health  Recent Flowsheet Documentation  Taken 6/25/2024 0000 by Goyo Gates RN  Oral Mucous  "Membrane Protection:   nonirritating oral fluids promoted   nonirritating oral foods promoted  Goal: Nausea and Vomiting Symptom Relief  Outcome: Not Progressing  Goal: Optimal Nutrition Intake  Outcome: Not Progressing     Problem: Adult Inpatient Plan of Care  Goal: Plan of Care Review  Description: The Plan of Care Review/Shift note should be completed every shift.  The Outcome Evaluation is a brief statement about your assessment that the patient is improving, declining, or no change.  This information will be displayed automatically on your shift  note.  Outcome: Progressing  Goal: Patient-Specific Goal (Individualized)  Description: You can add care plan individualizations to a care plan. Examples of Individualization might be:  \"Parent requests to be called daily at 9am for status\", \"I have a hard time hearing out of my right ear\", or \"Do not touch me to wake me up as it startles  me\".  Outcome: Progressing  Goal: Absence of Hospital-Acquired Illness or Injury  Outcome: Progressing  Intervention: Identify and Manage Fall Risk  Recent Flowsheet Documentation  Taken 6/25/2024 0607 by Goyo Gates RN  Safety Promotion/Fall Prevention: safety round/check completed  Taken 6/25/2024 0315 by Goyo Gates, FREDDIE  Safety Promotion/Fall Prevention: safety round/check completed  Taken 6/25/2024 0222 by Goyo Gates RN  Safety Promotion/Fall Prevention:   activity supervised   clutter free environment maintained   increase visualization of patient   nonskid shoes/slippers when out of bed   patient and family education   safety round/check completed  Taken 6/25/2024 0134 by Goyo Gates, RN  Safety Promotion/Fall Prevention:   activity supervised   clutter free environment maintained   increase visualization of patient   nonskid shoes/slippers when out of bed   patient and family education   safety round/check completed  Taken 6/25/2024 0000 by Goyo Gates, RN  Safety Promotion/Fall Prevention:   activity supervised   " clutter free environment maintained   increase visualization of patient   nonskid shoes/slippers when out of bed   patient and family education   safety round/check completed  Intervention: Prevent Skin Injury  Recent Flowsheet Documentation  Taken 6/25/2024 0000 by Goyo Gates RN  Body Position: position changed independently  Skin Protection:   adhesive use limited   incontinence pads utilized  Device Skin Pressure Protection:   adhesive use limited   positioning supports utilized  Intervention: Prevent Infection  Recent Flowsheet Documentation  Taken 6/25/2024 0222 by Goyo Gates RN  Infection Prevention:   rest/sleep promoted   personal protective equipment utilized   hand hygiene promoted   cohorting utilized   environmental surveillance performed  Taken 6/25/2024 0134 by Goyo Gates RN  Infection Prevention:   rest/sleep promoted   personal protective equipment utilized   hand hygiene promoted   cohorting utilized   environmental surveillance performed  Taken 6/25/2024 0000 by Goyo Gates RN  Infection Prevention:   rest/sleep promoted   personal protective equipment utilized   hand hygiene promoted   cohorting utilized   environmental surveillance performed  Goal: Readiness for Transition of Care  Outcome: Progressing     Problem: Stem Cell/Bone Marrow Transplant  Goal: Optimal Coping with Transplant  Outcome: Progressing  Intervention: Optimize Patient/Family Adjustment to Transplant  Recent Flowsheet Documentation  Taken 6/25/2024 0000 by Goyo Gates RN  Supportive Measures:   active listening utilized   self-care encouraged   self-reflection promoted   self-responsibility promoted   verbalization of feelings encouraged  Goal: Symptom-Free Urinary Elimination  Outcome: Progressing  Goal: Improved Activity Tolerance  Outcome: Progressing  Intervention: Promote Improved Energy  Recent Flowsheet Documentation  Taken 6/25/2024 0000 by Goyo Gates RN  Fatigue Management: frequent rest breaks  encouraged  Sleep/Rest Enhancement:   awakenings minimized   family presence promoted   consistent schedule promoted   regular sleep/rest pattern promoted   relaxation techniques promoted   noise level reduced  Activity Management:   activity adjusted per tolerance   up to bedside commode   back to bed  Environmental Support:   calm environment promoted   rest periods encouraged   environmental consistency promoted  Goal: Absence of Hypersensitivity Reaction  Outcome: Progressing  Intervention: Manage Infusion-Related Hypersensitivity  Recent Flowsheet Documentation  Taken 6/25/2024 0000 by Goyo Gates RN  Stabilization Measures: blood products administered  Goal: Absence of Infection  Outcome: Progressing  Intervention: Prevent and Manage Infection  Recent Flowsheet Documentation  Taken 6/25/2024 0222 by Goyo Gates RN  Infection Prevention:   rest/sleep promoted   personal protective equipment utilized   hand hygiene promoted   cohorting utilized   environmental surveillance performed  Isolation Precautions: protective environment maintained  Taken 6/25/2024 0134 by Goyo Gates RN  Infection Prevention:   rest/sleep promoted   personal protective equipment utilized   hand hygiene promoted   cohorting utilized   environmental surveillance performed  Taken 6/25/2024 0000 by Goyo Gates RN  Infection Prevention:   rest/sleep promoted   personal protective equipment utilized   hand hygiene promoted   cohorting utilized   environmental surveillance performed  Infection Management: aseptic technique maintained  Isolation Precautions: protective environment maintained     Problem: Skin Injury Risk Increased  Goal: Skin Health and Integrity  Outcome: Progressing  Intervention: Plan: Nurse Driven Intervention: Moisture Management  Recent Flowsheet Documentation  Taken 6/25/2024 0000 by Goyo Gates RN  Moisture Interventions:   Encourage regular toileting   Perineal cleanser   Incontinence pad  Intervention:  Optimize Skin Protection  Recent Flowsheet Documentation  Taken 6/25/2024 0000 by Goyo Gates RN  Pressure Reduction Techniques:   frequent weight shift encouraged   rest period provided between sit times  Skin Protection:   adhesive use limited   incontinence pads utilized  Activity Management:   activity adjusted per tolerance   up to bedside commode   back to bed  Head of Bed (HOB) Positioning: HOB at 15 degrees   Goal Outcome Evaluation:

## 2024-06-25 NOTE — PROGRESS NOTES
I was contacted about ongoing watery diarrhea. 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 I do not have another medication to add overnight that I suspect will help control her diarrhea better.   She is having external rectal skin irritation due to frequent stool.   - Add topical zinc oxide barrier cream as needed for skin irritation.   - Rounding team to review options for diarrhea management.     Maegan Wilcox MD  06/24/24  10:43 PM

## 2024-06-25 NOTE — PROVIDER NOTIFICATION
Notified MD Wilcox, R. D/t the ongoing watery diarrhea.   Patient had 9 watery BMs from [id number]. MD aware.     Provider response: order topical zinc oxide barrier cream PRN.

## 2024-06-26 LAB
ABO/RH(D): NORMAL
ACANTHOCYTES BLD QL SMEAR: ABNORMAL
ALBUMIN SERPL BCG-MCNC: 2.1 G/DL (ref 3.5–5.2)
ALP SERPL-CCNC: 298 U/L (ref 40–150)
ALT SERPL W P-5'-P-CCNC: 14 U/L (ref 0–50)
ANION GAP SERPL CALCULATED.3IONS-SCNC: 8 MMOL/L (ref 7–15)
ANTIBODY SCREEN: NEGATIVE
AST SERPL W P-5'-P-CCNC: 11 U/L (ref 0–45)
BASOPHILS # BLD AUTO: ABNORMAL 10*3/UL
BASOPHILS # BLD MANUAL: 0 10E3/UL (ref 0–0.2)
BASOPHILS NFR BLD AUTO: ABNORMAL %
BASOPHILS NFR BLD MANUAL: 0 %
BILIRUB DIRECT SERPL-MCNC: <0.2 MG/DL (ref 0–0.3)
BILIRUB SERPL-MCNC: 0.4 MG/DL
BUN SERPL-MCNC: 25 MG/DL (ref 6–20)
BURR CELLS BLD QL SMEAR: SLIGHT
CALCIUM SERPL-MCNC: 9 MG/DL (ref 8.6–10)
CHLORIDE SERPL-SCNC: 107 MMOL/L (ref 98–107)
CREAT SERPL-MCNC: 1.46 MG/DL (ref 0.51–0.95)
DEPRECATED HCO3 PLAS-SCNC: 19 MMOL/L (ref 22–29)
EGFRCR SERPLBLD CKD-EPI 2021: 42 ML/MIN/1.73M2
ELLIPTOCYTES BLD QL SMEAR: SLIGHT
EOSINOPHIL # BLD AUTO: ABNORMAL 10*3/UL
EOSINOPHIL # BLD MANUAL: 0 10E3/UL (ref 0–0.7)
EOSINOPHIL NFR BLD AUTO: ABNORMAL %
EOSINOPHIL NFR BLD MANUAL: 0 %
ERYTHROCYTE [DISTWIDTH] IN BLOOD BY AUTOMATED COUNT: 15.6 % (ref 10–15)
GLUCOSE SERPL-MCNC: 98 MG/DL (ref 70–99)
HCT VFR BLD AUTO: 22 % (ref 35–47)
HGB BLD-MCNC: 7.3 G/DL (ref 11.7–15.7)
IMM GRANULOCYTES # BLD: ABNORMAL 10*3/UL
IMM GRANULOCYTES NFR BLD: ABNORMAL %
LACTATE SERPL-SCNC: 0.6 MMOL/L (ref 0.7–2)
LYMPHOCYTES # BLD AUTO: ABNORMAL 10*3/UL
LYMPHOCYTES # BLD MANUAL: 0.1 10E3/UL (ref 0.8–5.3)
LYMPHOCYTES NFR BLD AUTO: ABNORMAL %
LYMPHOCYTES NFR BLD MANUAL: 10 %
MAGNESIUM SERPL-MCNC: 1.9 MG/DL (ref 1.7–2.3)
MCH RBC QN AUTO: 30.7 PG (ref 26.5–33)
MCHC RBC AUTO-ENTMCNC: 33.2 G/DL (ref 31.5–36.5)
MCV RBC AUTO: 92 FL (ref 78–100)
MONOCYTES # BLD AUTO: ABNORMAL 10*3/UL
MONOCYTES # BLD MANUAL: 0 10E3/UL (ref 0–1.3)
MONOCYTES NFR BLD AUTO: ABNORMAL %
MONOCYTES NFR BLD MANUAL: 8 %
NEUTROPHILS # BLD AUTO: ABNORMAL 10*3/UL
NEUTROPHILS # BLD MANUAL: 0.5 10E3/UL (ref 1.6–8.3)
NEUTROPHILS NFR BLD AUTO: ABNORMAL %
NEUTROPHILS NFR BLD MANUAL: 82 %
NRBC # BLD AUTO: 0 10E3/UL
NRBC BLD AUTO-RTO: 0 /100
PHOSPHATE SERPL-MCNC: 3.3 MG/DL (ref 2.5–4.5)
PLAT MORPH BLD: ABNORMAL
PLATELET # BLD AUTO: 31 10E3/UL (ref 150–450)
POTASSIUM SERPL-SCNC: 3.7 MMOL/L (ref 3.4–5.3)
PROT SERPL-MCNC: 4.2 G/DL (ref 6.4–8.3)
RBC # BLD AUTO: 2.38 10E6/UL (ref 3.8–5.2)
RBC MORPH BLD: ABNORMAL
SODIUM SERPL-SCNC: 134 MMOL/L (ref 135–145)
SPECIMEN EXPIRATION DATE: NORMAL
TOXIC GRANULES BLD QL SMEAR: PRESENT
WBC # BLD AUTO: 0.6 10E3/UL (ref 4–11)

## 2024-06-26 PROCEDURE — 86920 COMPATIBILITY TEST SPIN: CPT

## 2024-06-26 PROCEDURE — 258N000003 HC RX IP 258 OP 636: Performed by: PHYSICIAN ASSISTANT

## 2024-06-26 PROCEDURE — 206N000001 HC R&B BMT UMMC

## 2024-06-26 PROCEDURE — 250N000011 HC RX IP 250 OP 636: Mod: JZ | Performed by: INTERNAL MEDICINE

## 2024-06-26 PROCEDURE — 250N000011 HC RX IP 250 OP 636: Mod: JZ | Performed by: PHYSICIAN ASSISTANT

## 2024-06-26 PROCEDURE — 85007 BL SMEAR W/DIFF WBC COUNT: CPT

## 2024-06-26 PROCEDURE — 99232 SBSQ HOSP IP/OBS MODERATE 35: CPT | Mod: FS | Performed by: PHYSICIAN ASSISTANT

## 2024-06-26 PROCEDURE — 83605 ASSAY OF LACTIC ACID: CPT | Performed by: PHYSICIAN ASSISTANT

## 2024-06-26 PROCEDURE — 258N000003 HC RX IP 258 OP 636: Performed by: STUDENT IN AN ORGANIZED HEALTH CARE EDUCATION/TRAINING PROGRAM

## 2024-06-26 PROCEDURE — C9113 INJ PANTOPRAZOLE SODIUM, VIA: HCPCS | Mod: JZ | Performed by: PHYSICIAN ASSISTANT

## 2024-06-26 PROCEDURE — 250N000013 HC RX MED GY IP 250 OP 250 PS 637: Performed by: PHYSICIAN ASSISTANT

## 2024-06-26 PROCEDURE — 250N000011 HC RX IP 250 OP 636: Mod: JZ | Performed by: STUDENT IN AN ORGANIZED HEALTH CARE EDUCATION/TRAINING PROGRAM

## 2024-06-26 PROCEDURE — 85027 COMPLETE CBC AUTOMATED: CPT

## 2024-06-26 PROCEDURE — 250N000011 HC RX IP 250 OP 636: Performed by: PHYSICIAN ASSISTANT

## 2024-06-26 PROCEDURE — 250N000013 HC RX MED GY IP 250 OP 250 PS 637

## 2024-06-26 PROCEDURE — 84100 ASSAY OF PHOSPHORUS: CPT | Performed by: STUDENT IN AN ORGANIZED HEALTH CARE EDUCATION/TRAINING PROGRAM

## 2024-06-26 PROCEDURE — 250N000011 HC RX IP 250 OP 636: Performed by: STUDENT IN AN ORGANIZED HEALTH CARE EDUCATION/TRAINING PROGRAM

## 2024-06-26 PROCEDURE — 80053 COMPREHEN METABOLIC PANEL: CPT | Performed by: PHYSICIAN ASSISTANT

## 2024-06-26 PROCEDURE — 86900 BLOOD TYPING SEROLOGIC ABO: CPT

## 2024-06-26 PROCEDURE — 250N000013 HC RX MED GY IP 250 OP 250 PS 637: Performed by: INTERNAL MEDICINE

## 2024-06-26 PROCEDURE — 83735 ASSAY OF MAGNESIUM: CPT

## 2024-06-26 RX ORDER — POTASSIUM CHLORIDE 29.8 MG/ML
20 INJECTION INTRAVENOUS ONCE
Status: COMPLETED | OUTPATIENT
Start: 2024-06-26 | End: 2024-06-26

## 2024-06-26 RX ORDER — LORATADINE 10 MG/1
10 TABLET ORAL DAILY
Status: DISCONTINUED | OUTPATIENT
Start: 2024-06-26 | End: 2024-06-30

## 2024-06-26 RX ADMIN — SUCRALFATE 1 G: 1 SUSPENSION ORAL at 21:11

## 2024-06-26 RX ADMIN — ACYCLOVIR SODIUM 325 MG: 50 INJECTION, SOLUTION INTRAVENOUS at 20:07

## 2024-06-26 RX ADMIN — LEVOTHYROXINE SODIUM 200 MCG: 200 TABLET ORAL at 06:06

## 2024-06-26 RX ADMIN — DEXTROSE MONOHYDRATE 330 MCG: 50 INJECTION, SOLUTION INTRAVENOUS at 20:07

## 2024-06-26 RX ADMIN — DEXTROSE MONOHYDRATE 10 ML: 50 INJECTION, SOLUTION INTRAVENOUS at 20:07

## 2024-06-26 RX ADMIN — PROCHLORPERAZINE EDISYLATE 5 MG: 5 INJECTION INTRAMUSCULAR; INTRAVENOUS at 15:11

## 2024-06-26 RX ADMIN — OLANZAPINE 2.5 MG: 2.5 TABLET, FILM COATED ORAL at 21:11

## 2024-06-26 RX ADMIN — MIDODRINE HYDROCHLORIDE 10 MG: 5 TABLET ORAL at 07:35

## 2024-06-26 RX ADMIN — POTASSIUM CHLORIDE 20 MEQ: 29.8 INJECTION, SOLUTION INTRAVENOUS at 06:08

## 2024-06-26 RX ADMIN — Medication 5 ML: at 21:47

## 2024-06-26 RX ADMIN — OXYCODONE HYDROCHLORIDE 5 MG: 5 TABLET ORAL at 08:17

## 2024-06-26 RX ADMIN — Medication 50 MCG: at 07:36

## 2024-06-26 RX ADMIN — CALCIUM CARBONATE (ANTACID) CHEW TAB 500 MG 500 MG: 500 CHEW TAB at 03:21

## 2024-06-26 RX ADMIN — LOPERAMIDE HYDROCHLORIDE 4 MG: 2 CAPSULE ORAL at 11:31

## 2024-06-26 RX ADMIN — ONDANSETRON HYDROCHLORIDE 8 MG: 8 TABLET, FILM COATED ORAL at 15:01

## 2024-06-26 RX ADMIN — OXYCODONE HYDROCHLORIDE 5 MG: 5 TABLET ORAL at 20:07

## 2024-06-26 RX ADMIN — LEVOFLOXACIN 250 MG: 250 TABLET, FILM COATED ORAL at 11:27

## 2024-06-26 RX ADMIN — LIOTHYRONINE SODIUM 5 MCG: 5 TABLET ORAL at 13:48

## 2024-06-26 RX ADMIN — PANTOPRAZOLE SODIUM 40 MG: 40 INJECTION, POWDER, FOR SOLUTION INTRAVENOUS at 20:07

## 2024-06-26 RX ADMIN — ONDANSETRON HYDROCHLORIDE 8 MG: 8 TABLET, FILM COATED ORAL at 07:36

## 2024-06-26 RX ADMIN — ACYCLOVIR SODIUM 325 MG: 50 INJECTION, SOLUTION INTRAVENOUS at 07:32

## 2024-06-26 RX ADMIN — MIDODRINE HYDROCHLORIDE 10 MG: 5 TABLET ORAL at 18:14

## 2024-06-26 RX ADMIN — ONDANSETRON HYDROCHLORIDE 8 MG: 8 TABLET, FILM COATED ORAL at 23:44

## 2024-06-26 RX ADMIN — SUCRALFATE 1 G: 1 SUSPENSION ORAL at 18:14

## 2024-06-26 RX ADMIN — DIPHENOXYLATE HYDROCHLORIDE AND ATROPINE SULFATE 1 TABLET: 2.5; .025 TABLET ORAL at 03:38

## 2024-06-26 RX ADMIN — FOSAPREPITANT 150 MG: 150 INJECTION, POWDER, LYOPHILIZED, FOR SOLUTION INTRAVENOUS at 11:31

## 2024-06-26 RX ADMIN — LORATADINE 10 MG: 10 TABLET ORAL at 11:27

## 2024-06-26 RX ADMIN — DEXTROSE MONOHYDRATE 20 ML: 50 INJECTION, SOLUTION INTRAVENOUS at 21:11

## 2024-06-26 RX ADMIN — PANTOPRAZOLE SODIUM 40 MG: 40 INJECTION, POWDER, FOR SOLUTION INTRAVENOUS at 07:32

## 2024-06-26 RX ADMIN — Medication 3 CAPSULE: at 07:51

## 2024-06-26 RX ADMIN — SUCRALFATE 1 G: 1 SUSPENSION ORAL at 07:37

## 2024-06-26 RX ADMIN — MIDODRINE HYDROCHLORIDE 10 MG: 5 TABLET ORAL at 11:31

## 2024-06-26 RX ADMIN — LOPERAMIDE HYDROCHLORIDE 4 MG: 2 CAPSULE ORAL at 07:35

## 2024-06-26 RX ADMIN — LOPERAMIDE HYDROCHLORIDE 4 MG: 2 CAPSULE ORAL at 20:07

## 2024-06-26 RX ADMIN — FLUCONAZOLE 200 MG: 200 TABLET ORAL at 07:35

## 2024-06-26 RX ADMIN — LIOTHYRONINE SODIUM 5 MCG: 5 TABLET ORAL at 07:35

## 2024-06-26 RX ADMIN — DIPHENOXYLATE HYDROCHLORIDE AND ATROPINE SULFATE 1 TABLET: 2.5; .025 TABLET ORAL at 20:07

## 2024-06-26 RX ADMIN — Medication 5 ML: at 13:51

## 2024-06-26 RX ADMIN — LOPERAMIDE HYDROCHLORIDE 4 MG: 2 CAPSULE ORAL at 15:01

## 2024-06-26 RX ADMIN — SUCRALFATE 1 G: 1 SUSPENSION ORAL at 11:31

## 2024-06-26 RX ADMIN — Medication 5 ML: at 15:12

## 2024-06-26 RX ADMIN — Medication 5 ML: at 21:46

## 2024-06-26 RX ADMIN — LORAZEPAM 0.5 MG: 2 INJECTION INTRAMUSCULAR; INTRAVENOUS at 00:57

## 2024-06-26 ASSESSMENT — ACTIVITIES OF DAILY LIVING (ADL)
ADLS_ACUITY_SCORE: 26
ADLS_ACUITY_SCORE: 26
ADLS_ACUITY_SCORE: 28
ADLS_ACUITY_SCORE: 27
ADLS_ACUITY_SCORE: 28
ADLS_ACUITY_SCORE: 26
ADLS_ACUITY_SCORE: 28
ADLS_ACUITY_SCORE: 24
ADLS_ACUITY_SCORE: 24
ADLS_ACUITY_SCORE: 27
ADLS_ACUITY_SCORE: 27
ADLS_ACUITY_SCORE: 28
ADLS_ACUITY_SCORE: 27
ADLS_ACUITY_SCORE: 28
ADLS_ACUITY_SCORE: 26
ADLS_ACUITY_SCORE: 28
ADLS_ACUITY_SCORE: 27
ADLS_ACUITY_SCORE: 27
ADLS_ACUITY_SCORE: 28
ADLS_ACUITY_SCORE: 26
ADLS_ACUITY_SCORE: 28
ADLS_ACUITY_SCORE: 27
ADLS_ACUITY_SCORE: 27

## 2024-06-26 NOTE — PROVIDER NOTIFICATION
MD notified  'We saw a new order for  ml bolus, did you place these? Just wondering what the indication is for?'   Order not placed by hospitalitis covering overnight, referred to primary team for farther instruction on indication.

## 2024-06-26 NOTE — PLAN OF CARE
"Afebrile, tachycardic at time of exertion, OVSS. Has 4 loose stools throughout the night, became more formed as the night went on.  Lomotil given x1. On scheduled Imodium. Refused CHG wipes and shower. Some throat and mouth pain with swallowing, magic mouthwash given x1. Emesis once, some other N/V noted, gave ativan and compazine once. On scheduled Zofran. No complaints well taking oral medications. Tums given x1. On gluten free high kcal/high protein diet. Potassium replacement infusing.         Problem: Stem Cell/Bone Marrow Transplant  Goal: Optimal Nutrition Intake  Outcome: Not Progressing     Problem: Adult Inpatient Plan of Care  Goal: Plan of Care Review  Description: The Plan of Care Review/Shift note should be completed every shift.  The Outcome Evaluation is a brief statement about your assessment that the patient is improving, declining, or no change.  This information will be displayed automatically on your shift  note.  Outcome: Progressing  Goal: Patient-Specific Goal (Individualized)  Description: You can add care plan individualizations to a care plan. Examples of Individualization might be:  \"Parent requests to be called daily at 9am for status\", \"I have a hard time hearing out of my right ear\", or \"Do not touch me to wake me up as it startles  me\".  Outcome: Progressing  Goal: Absence of Hospital-Acquired Illness or Injury  Outcome: Progressing  Intervention: Identify and Manage Fall Risk  Recent Flowsheet Documentation  Taken 6/26/2024 0430 by Goyo Gates, RN  Safety Promotion/Fall Prevention: safety round/check completed  Taken 6/26/2024 0252 by Goyo Gates, RN  Safety Promotion/Fall Prevention:   clutter free environment maintained   increased rounding and observation   increase visualization of patient   nonskid shoes/slippers when out of bed   patient and family education   room near nurse's station   room organization consistent   supervised activity  Taken 6/26/2024 0052 by Goyo Gates, " RN  Safety Promotion/Fall Prevention: safety round/check completed  Taken 6/25/2024 2320 by Goyo Gates RN  Safety Promotion/Fall Prevention:   clutter free environment maintained   increased rounding and observation   increase visualization of patient   nonskid shoes/slippers when out of bed   patient and family education   room near nurse's station   room organization consistent   supervised activity  Taken 6/25/2024 1930 by Goyo Gates RN  Safety Promotion/Fall Prevention:   clutter free environment maintained   increased rounding and observation   increase visualization of patient   nonskid shoes/slippers when out of bed   patient and family education   room near nurse's station   room organization consistent   supervised activity  Intervention: Prevent Skin Injury  Recent Flowsheet Documentation  Taken 6/25/2024 1930 by Goyo Gates RN  Body Position: position changed independently  Skin Protection: incontinence pads utilized  Device Skin Pressure Protection:   tubing/devices free from skin contact   adhesive use limited  Intervention: Prevent Infection  Recent Flowsheet Documentation  Taken 6/26/2024 0252 by Goyo Gates RN  Infection Prevention:   cohorting utilized   hand hygiene promoted   personal protective equipment utilized   rest/sleep promoted   single patient room provided   visitors restricted/screened  Taken 6/25/2024 2320 by Goyo Gates RN  Infection Prevention:   cohorting utilized   hand hygiene promoted   personal protective equipment utilized   rest/sleep promoted   single patient room provided   visitors restricted/screened  Taken 6/25/2024 1930 by Goyo Gates RN  Infection Prevention:   cohorting utilized   hand hygiene promoted   personal protective equipment utilized   rest/sleep promoted   single patient room provided   visitors restricted/screened  Goal: Optimal Comfort and Wellbeing  Outcome: Progressing  Goal: Readiness for Transition of Care  Outcome: Progressing      Problem: Stem Cell/Bone Marrow Transplant  Goal: Optimal Coping with Transplant  Outcome: Progressing  Intervention: Optimize Patient/Family Adjustment to Transplant  Recent Flowsheet Documentation  Taken 6/25/2024 1930 by Goyo Gates RN  Supportive Measures:   active listening utilized   self-care encouraged   self-reflection promoted   self-responsibility promoted   verbalization of feelings encouraged  Goal: Diarrhea Symptom Control  Outcome: Progressing  Intervention: Manage Diarrhea  Recent Flowsheet Documentation  Taken 6/25/2024 1930 by Goyo Gates RN  Skin Protection: incontinence pads utilized  Goal: Improved Activity Tolerance  Outcome: Progressing  Intervention: Promote Improved Energy  Recent Flowsheet Documentation  Taken 6/25/2024 1930 by Goyo Gates RN  Fatigue Management: frequent rest breaks encouraged  Sleep/Rest Enhancement:   awakenings minimized   family presence promoted   consistent schedule promoted   regular sleep/rest pattern promoted   relaxation techniques promoted   noise level reduced  Activity Management: ambulated to bathroom  Environmental Support:   calm environment promoted   rest periods encouraged   environmental consistency promoted  Goal: Blood Counts Within Acceptable Range  Outcome: Progressing  Intervention: Monitor and Manage Hematologic Symptoms  Recent Flowsheet Documentation  Taken 6/26/2024 0252 by Goyo Gates RN  Medication Review/Management:   medications reviewed   high-risk medications identified  Taken 6/25/2024 2320 by Goyo Gates RN  Medication Review/Management:   medications reviewed   high-risk medications identified  Taken 6/25/2024 1930 by Goyo Gates RN  Sleep/Rest Enhancement:   awakenings minimized   family presence promoted   consistent schedule promoted   regular sleep/rest pattern promoted   relaxation techniques promoted   noise level reduced  Bleeding Precautions:   blood pressure closely monitored   gentle oral care promoted    monitored for signs of bleeding  Medication Review/Management:   medications reviewed   high-risk medications identified  Goal: Absence of Infection  Outcome: Progressing  Intervention: Prevent and Manage Infection  Recent Flowsheet Documentation  Taken 6/26/2024 0252 by Goyo Gates RN  Infection Prevention:   cohorting utilized   hand hygiene promoted   personal protective equipment utilized   rest/sleep promoted   single patient room provided   visitors restricted/screened  Isolation Precautions: protective environment maintained  Taken 6/25/2024 2320 by Goyo Gates RN  Infection Prevention:   cohorting utilized   hand hygiene promoted   personal protective equipment utilized   rest/sleep promoted   single patient room provided   visitors restricted/screened  Isolation Precautions: protective environment maintained  Taken 6/25/2024 1930 by Goyo Gates RN  Infection Prevention:   cohorting utilized   hand hygiene promoted   personal protective equipment utilized   rest/sleep promoted   single patient room provided   visitors restricted/screened  Isolation Precautions: protective environment maintained  Goal: Improved Oral Mucous Membrane Health and Integrity  Outcome: Progressing  Intervention: Promote Oral Comfort and Health  Recent Flowsheet Documentation  Taken 6/25/2024 1930 by Goyo Gates RN  Oral Mucous Membrane Protection: (Magic Mouthwash) lip/mouth moisturizer applied  Goal: Nausea and Vomiting Symptom Relief  Outcome: Progressing  Intervention: Prevent and Manage Nausea and Vomiting  Recent Flowsheet Documentation  Taken 6/25/2024 1930 by Goyo Gates RN  Nausea/Vomiting Interventions:   nausea triggers minimized   stimuli minimized     Problem: Skin Injury Risk Increased  Goal: Skin Health and Integrity  Outcome: Progressing  Intervention: Plan: Nurse Driven Intervention: Moisture Management  Recent Flowsheet Documentation  Taken 6/25/2024 1930 by Goyo Gates RN  Moisture Interventions: (PRN  cream)   Encourage regular toileting   Incontinence pad   Perineal cleanser  Bathing/Skin Care: (educated on importance of CHG)   patient refused   wipes, CHG   shower  Intervention: Optimize Skin Protection  Recent Flowsheet Documentation  Taken 6/25/2024 1930 by Goyo Gates, RN  Pressure Reduction Techniques: (pillow between legs)   frequent weight shift encouraged   other (see comments)  Skin Protection: incontinence pads utilized  Activity Management: ambulated to bathroom  Head of Bed (HOB) Positioning: HOB flat   Goal Outcome Evaluation:

## 2024-06-26 NOTE — PROGRESS NOTES
"BMT/Cell Therapy Daily Progress Note   06/26/2024    Patient ID:  Vivian Brown is a 54 year old female, currently day +14 s/p autologous stem cell transplant for amyloidosis. Hospitalization complicated by significant orthostatic hypotension.     Admission date: 6/11/2024    INTERVAL  HISTORY   Mary Ann is fatigued, continues with diarrhea which interrupts her sleep.  She ate small amounts of egg with cheese, cream of rice, grapes and a couple bites of a nutritional shake.  She still has nausea with intermittent emesis, usually about once per day.  Pills are challenging with mouth/throat discomfort, but they are manageable.     Review of Systems: ROS negative except as noted above.      PHYSICAL EXAM   KPS:  70    /70 (BP Location: Right arm)   Pulse 119   Temp 98.4  F (36.9  C) (Axillary)   Resp 16   Ht 1.63 m (5' 4.17\")   Wt 71.3 kg (157 lb 1.6 oz)   SpO2 99%   BMI 26.82 kg/m       General appearance: Patient is lying in bed, non-toxic appearing, in NAD but pale.  Hair falling out.   Eyes: Sclera & conjunctiva clear bilaterally  Cardio: Tachycardic, regular rhythm; II/V systolic murmur  Pulmonary: normal RR; no apparent use of accessory respiratory muscles; clear to  auscultation bilaterally  Abdominal: Soft, non-distended abdomen; tender in RUQ, RLQ  Extremities: Trace bilateral lower extremity non-pitting edema, non-tender to palpation.   Skin: warm and dry; no rash or concerning lesions.  Neuro: A&O, no focal neuro deficits.  Access: R CVC clean, dry and intact, non-tender, no drainage.      LABS: I have assessed all abnormal lab values for their clinical significance and any values considered clinically significant have been addressed in the assessment and plan.      Lab Results   Component Value Date    WBC 0.6 (LL) 06/26/2024    ANEU 0.5 (L) 06/26/2024    HGB 7.3 (L) 06/26/2024    HCT 22.0 (L) 06/26/2024    PLT 31 (LL) 06/26/2024     (L) 06/26/2024    POTASSIUM 3.7 06/26/2024    CHLORIDE " 107 06/26/2024    CO2 19 (L) 06/26/2024    GLC 98 06/26/2024    BUN 25.0 (H) 06/26/2024    CR 1.46 (H) 06/26/2024    MAG 1.9 06/26/2024    INR 1.31 (H) 06/24/2024    BILITOTAL 0.4 06/26/2024    AST 11 06/26/2024    ALT 14 06/26/2024    ALKPHOS 298 (H) 06/26/2024    PROTTOTAL 4.2 (L) 06/26/2024    ALBUMIN 2.1 (L) 06/26/2024       ASSESSMENT AND PLAN   Vivian Brown is a 54 year old female with amyloidosis, undergoing melphalan followed by autologous stem cell rescue. She is day +14.     BMT/MM/Amyloidosis  - BMT MD/Coordinator: Ramesh/Carlos Araiza [id number]  - Cell dose: total collections of 5.99 million CD34+ cells/kg.     - Prep as above. Flush and pre-meds prior to transplant.  - GCSF started day +5, continue until ANC > 2500 for 2 consecutive days.     HEME/COAG  #Pancytopenia 2/2 to chemotherapy.   - Transfusion parameters: hgb <7g/dL and plts <20,000 (increased parameter 6/21 d/t brbpr).  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     ID  Prophy: ACV; levaquin; fluconazole.  PJP ppx to start at day +28.      GI/NUTRITION  #mucositis: meds to IV, Dilaudid IV prn, Carafate.  - Supportive cares; MMW, Saline rinses, Oxycodone PO, Dilaudid IV prn.   #Diarrhea - C. Diff negative on 6/11. Imodium 4mg qid; lomotil prn. Repeat c diff testing 6/25; metamucil for stool bulking.   #CINV: scheduled Zofran IV x 6/21; prn Ativan and compazine prn. *Emend 6/22.  Stopped hs Zyprexa x 6/20 with ongoing orthostasis; resumed zyprexa 6/25 pm for nausea control.  Will administer dose of emend on 6/26.   #Elevated alk phos, possibly related to GCSF.  Trend hepatic panel. GB sludge noted on 6/25 ultrasound.   #Acid reflux:  increase protonix to bid; TUMS prn.  Continue carafate.   - Ulcer prophy: Protonix (now bid for acid reflux)  # Celiac Disease, see diet below     FLUIDS/NUTRITION  - PTA Lasix lasix 40mg am, 20mg afternoon- held since 6/17 d/t orthostasis and large volume diuresis on 6/16  - PTA spironoloactone -held since 6/17   -  gluten free diet (h/o celiac disease)  - dietitian to follow     RENAL  # renal amyloidosis; diuresis as above  - Monitor Cr and electrolytes daily.   - Replace electrolytes per sliding scale     ENDOCRINE  # Hypothyroidism:  continue PTA cytomel and synthroid     CARDIAC  # cardiac amyloidosis: cautious fluid balance; diuresis as above  # h/o dizziness and chest pain with chemotherapy for amyloidosis; improved with addition of midodrine.   # orthostatic hypotension: on midodrine, as above. PRN fluid boluses vs diuresis for volume balance.  # hyperlipidemia: rosuvastatin on hold during acute transplant course     SUPPORTIVE CARE  - PT/OT to evaluate on admission and follow as indicated.   - BMT Social work to follow.     Known issues that I take into account for medical decisions, with salient changes to the plan considering these complexities noted above.    Patient Active Problem List   Diagnosis    Hypothyroidism due to acquired atrophy of thyroid    Family hx of colon cancer    Nonallopathic lesion of cervical region    Cervicalgia    Nonallopathic lesion of sacral region    Nonallopathic lesion of lumbar region    Lumbago    Nonallopathic lesion of thoracic region    Hyperlipidemia LDL goal <100    Dependent edema R>L    AL amyloidosis (H)    Hypogammaglobulinemia (H24)    Chronic kidney disease, stage 3a (H)    Celiac disease    Autologous donor of stem cells     Medically Ready for Discharge: Anticipated in 5+ Days    Clinically Significant Risk Factors           # Hypercalcemia: corrected calcium is >10.1, will monitor as appropriate    # Hypoalbuminemia: Lowest albumin = 2 g/dL at 6/25/2024  3:26 AM, will monitor as appropriate  # Coagulation Defect: INR = 1.31 (Ref range: 0.85 - 1.15) and/or PTT = 24 Seconds (Ref range: 22 - 38 Seconds), will monitor for bleeding  # Thrombocytopenia: Lowest platelets = 9 in last 2 days, will monitor for bleeding             #Precipitous drop in Hgb/Hct: Lowest Hgb this  "hospitalization: 6.9 g/dL. Will continue to monitor and treat/transfuse as appropriate.     # Overweight: Estimated body mass index is 26.82 kg/m  as calculated from the following:    Height as of this encounter: 1.63 m (5' 4.17\").    Weight as of this encounter: 71.3 kg (157 lb 1.6 oz).             I spent 30 minutes in the care of this patient today, which included time necessary for preparation for the visit, obtaining history, ordering medications/tests/procedures as medically indicated, review of pertinent medical literature, counseling of the patient, communication of recommendations to the care team, and documentation time.      Dominga Pacheco PA-C       "

## 2024-06-27 ENCOUNTER — APPOINTMENT (OUTPATIENT)
Dept: PHYSICAL THERAPY | Facility: CLINIC | Age: 54
DRG: 016 | End: 2024-06-27
Attending: INTERNAL MEDICINE
Payer: COMMERCIAL

## 2024-06-27 LAB
ACANTHOCYTES BLD QL SMEAR: SLIGHT
ALBUMIN SERPL BCG-MCNC: 1.9 G/DL (ref 3.5–5.2)
ALP SERPL-CCNC: 260 U/L (ref 40–150)
ALT SERPL W P-5'-P-CCNC: 11 U/L (ref 0–50)
ANION GAP SERPL CALCULATED.3IONS-SCNC: 6 MMOL/L (ref 7–15)
AST SERPL W P-5'-P-CCNC: 6 U/L (ref 0–45)
BASOPHILS # BLD AUTO: ABNORMAL 10*3/UL
BASOPHILS # BLD MANUAL: 0 10E3/UL (ref 0–0.2)
BASOPHILS NFR BLD AUTO: ABNORMAL %
BASOPHILS NFR BLD MANUAL: 1 %
BILIRUB DIRECT SERPL-MCNC: <0.2 MG/DL (ref 0–0.3)
BILIRUB SERPL-MCNC: 0.3 MG/DL
BLD PROD TYP BPU: NORMAL
BLOOD COMPONENT TYPE: NORMAL
BUN SERPL-MCNC: 24.2 MG/DL (ref 6–20)
BURR CELLS BLD QL SMEAR: SLIGHT
CALCIUM SERPL-MCNC: 8.4 MG/DL (ref 8.6–10)
CHLORIDE SERPL-SCNC: 108 MMOL/L (ref 98–107)
CODING SYSTEM: NORMAL
CREAT SERPL-MCNC: 1.43 MG/DL (ref 0.51–0.95)
DEPRECATED HCO3 PLAS-SCNC: 20 MMOL/L (ref 22–29)
EGFRCR SERPLBLD CKD-EPI 2021: 43 ML/MIN/1.73M2
EOSINOPHIL # BLD AUTO: ABNORMAL 10*3/UL
EOSINOPHIL # BLD MANUAL: 0 10E3/UL (ref 0–0.7)
EOSINOPHIL NFR BLD AUTO: ABNORMAL %
EOSINOPHIL NFR BLD MANUAL: 0 %
ERYTHROCYTE [DISTWIDTH] IN BLOOD BY AUTOMATED COUNT: 15.2 % (ref 10–15)
GLUCOSE SERPL-MCNC: 94 MG/DL (ref 70–99)
HCT VFR BLD AUTO: 21.1 % (ref 35–47)
HGB BLD-MCNC: 7.2 G/DL (ref 11.7–15.7)
IMM GRANULOCYTES # BLD: ABNORMAL 10*3/UL
IMM GRANULOCYTES NFR BLD: ABNORMAL %
ISSUE DATE AND TIME: NORMAL
LACTATE SERPL-SCNC: 0.7 MMOL/L (ref 0.7–2)
LYMPHOCYTES # BLD AUTO: ABNORMAL 10*3/UL
LYMPHOCYTES # BLD MANUAL: 0.1 10E3/UL (ref 0.8–5.3)
LYMPHOCYTES NFR BLD AUTO: ABNORMAL %
LYMPHOCYTES NFR BLD MANUAL: 9 %
MAGNESIUM SERPL-MCNC: 1.7 MG/DL (ref 1.7–2.3)
MCH RBC QN AUTO: 31.4 PG (ref 26.5–33)
MCHC RBC AUTO-ENTMCNC: 34.1 G/DL (ref 31.5–36.5)
MCV RBC AUTO: 92 FL (ref 78–100)
METAMYELOCYTES # BLD MANUAL: 0 10E3/UL
METAMYELOCYTES NFR BLD MANUAL: 1 %
MONOCYTES # BLD AUTO: ABNORMAL 10*3/UL
MONOCYTES # BLD MANUAL: 0.1 10E3/UL (ref 0–1.3)
MONOCYTES NFR BLD AUTO: ABNORMAL %
MONOCYTES NFR BLD MANUAL: 5 %
NEUTROPHILS # BLD AUTO: ABNORMAL 10*3/UL
NEUTROPHILS # BLD MANUAL: 0.9 10E3/UL (ref 1.6–8.3)
NEUTROPHILS NFR BLD AUTO: ABNORMAL %
NEUTROPHILS NFR BLD MANUAL: 84 %
NRBC # BLD AUTO: 0 10E3/UL
NRBC BLD AUTO-RTO: 0 /100
PHOSPHATE SERPL-MCNC: 3 MG/DL (ref 2.5–4.5)
PLAT MORPH BLD: ABNORMAL
PLATELET # BLD AUTO: 14 10E3/UL (ref 150–450)
POTASSIUM SERPL-SCNC: 3.6 MMOL/L (ref 3.4–5.3)
PROT SERPL-MCNC: 3.9 G/DL (ref 6.4–8.3)
RBC # BLD AUTO: 2.29 10E6/UL (ref 3.8–5.2)
RBC MORPH BLD: ABNORMAL
SODIUM SERPL-SCNC: 134 MMOL/L (ref 135–145)
UNIT ABO/RH: NORMAL
UNIT NUMBER: NORMAL
UNIT STATUS: NORMAL
UNIT TYPE ISBT: 5100
WBC # BLD AUTO: 1.1 10E3/UL (ref 4–11)

## 2024-06-27 PROCEDURE — 250N000011 HC RX IP 250 OP 636: Performed by: PHYSICIAN ASSISTANT

## 2024-06-27 PROCEDURE — 99232 SBSQ HOSP IP/OBS MODERATE 35: CPT | Mod: FS | Performed by: PHYSICIAN ASSISTANT

## 2024-06-27 PROCEDURE — 250N000013 HC RX MED GY IP 250 OP 250 PS 637: Performed by: PHYSICIAN ASSISTANT

## 2024-06-27 PROCEDURE — 250N000011 HC RX IP 250 OP 636: Performed by: STUDENT IN AN ORGANIZED HEALTH CARE EDUCATION/TRAINING PROGRAM

## 2024-06-27 PROCEDURE — 83735 ASSAY OF MAGNESIUM: CPT | Performed by: STUDENT IN AN ORGANIZED HEALTH CARE EDUCATION/TRAINING PROGRAM

## 2024-06-27 PROCEDURE — 80053 COMPREHEN METABOLIC PANEL: CPT | Performed by: PHYSICIAN ASSISTANT

## 2024-06-27 PROCEDURE — 85027 COMPLETE CBC AUTOMATED: CPT

## 2024-06-27 PROCEDURE — 97110 THERAPEUTIC EXERCISES: CPT | Mod: GP

## 2024-06-27 PROCEDURE — 97140 MANUAL THERAPY 1/> REGIONS: CPT | Mod: GP

## 2024-06-27 PROCEDURE — 84100 ASSAY OF PHOSPHORUS: CPT | Performed by: STUDENT IN AN ORGANIZED HEALTH CARE EDUCATION/TRAINING PROGRAM

## 2024-06-27 PROCEDURE — 258N000003 HC RX IP 258 OP 636: Performed by: PHYSICIAN ASSISTANT

## 2024-06-27 PROCEDURE — 250N000011 HC RX IP 250 OP 636: Mod: JZ | Performed by: INTERNAL MEDICINE

## 2024-06-27 PROCEDURE — P9037 PLATE PHERES LEUKOREDU IRRAD: HCPCS | Performed by: PHYSICIAN ASSISTANT

## 2024-06-27 PROCEDURE — C9113 INJ PANTOPRAZOLE SODIUM, VIA: HCPCS | Mod: JZ | Performed by: PHYSICIAN ASSISTANT

## 2024-06-27 PROCEDURE — 250N000013 HC RX MED GY IP 250 OP 250 PS 637

## 2024-06-27 PROCEDURE — 85007 BL SMEAR W/DIFF WBC COUNT: CPT

## 2024-06-27 PROCEDURE — 250N000013 HC RX MED GY IP 250 OP 250 PS 637: Performed by: INTERNAL MEDICINE

## 2024-06-27 PROCEDURE — 258N000003 HC RX IP 258 OP 636: Performed by: STUDENT IN AN ORGANIZED HEALTH CARE EDUCATION/TRAINING PROGRAM

## 2024-06-27 PROCEDURE — 97530 THERAPEUTIC ACTIVITIES: CPT | Mod: GP

## 2024-06-27 PROCEDURE — 206N000001 HC R&B BMT UMMC

## 2024-06-27 PROCEDURE — 83605 ASSAY OF LACTIC ACID: CPT | Performed by: PHYSICIAN ASSISTANT

## 2024-06-27 RX ORDER — POTASSIUM CHLORIDE 29.8 MG/ML
20 INJECTION INTRAVENOUS ONCE
Status: COMPLETED | OUTPATIENT
Start: 2024-06-27 | End: 2024-06-27

## 2024-06-27 RX ORDER — OLANZAPINE 2.5 MG/1
2.5 TABLET, FILM COATED ORAL 2 TIMES DAILY
Status: DISCONTINUED | OUTPATIENT
Start: 2024-06-27 | End: 2024-07-01 | Stop reason: HOSPADM

## 2024-06-27 RX ADMIN — SUCRALFATE 1 G: 1 SUSPENSION ORAL at 07:33

## 2024-06-27 RX ADMIN — SUCRALFATE 1 G: 1 SUSPENSION ORAL at 11:45

## 2024-06-27 RX ADMIN — CALCIUM CARBONATE (ANTACID) CHEW TAB 500 MG 500 MG: 500 CHEW TAB at 18:16

## 2024-06-27 RX ADMIN — LORAZEPAM 0.5 MG: 0.5 TABLET ORAL at 01:45

## 2024-06-27 RX ADMIN — LORATADINE 10 MG: 10 TABLET ORAL at 07:36

## 2024-06-27 RX ADMIN — Medication 3 CAPSULE: at 07:36

## 2024-06-27 RX ADMIN — PANTOPRAZOLE SODIUM 40 MG: 40 INJECTION, POWDER, FOR SOLUTION INTRAVENOUS at 07:31

## 2024-06-27 RX ADMIN — Medication 5 ML: at 03:12

## 2024-06-27 RX ADMIN — DEXTROSE MONOHYDRATE 330 MCG: 50 INJECTION, SOLUTION INTRAVENOUS at 19:55

## 2024-06-27 RX ADMIN — MIDODRINE HYDROCHLORIDE 10 MG: 5 TABLET ORAL at 11:44

## 2024-06-27 RX ADMIN — SUCRALFATE 1 G: 1 SUSPENSION ORAL at 21:09

## 2024-06-27 RX ADMIN — DEXTROSE MONOHYDRATE 20 ML: 50 INJECTION, SOLUTION INTRAVENOUS at 21:12

## 2024-06-27 RX ADMIN — LOPERAMIDE HYDROCHLORIDE 4 MG: 2 CAPSULE ORAL at 15:18

## 2024-06-27 RX ADMIN — LEVOTHYROXINE SODIUM 200 MCG: 200 TABLET ORAL at 06:13

## 2024-06-27 RX ADMIN — DIPHENOXYLATE HYDROCHLORIDE AND ATROPINE SULFATE 1 TABLET: 2.5; .025 TABLET ORAL at 19:56

## 2024-06-27 RX ADMIN — DIPHENOXYLATE HYDROCHLORIDE AND ATROPINE SULFATE 1 TABLET: 2.5; .025 TABLET ORAL at 03:12

## 2024-06-27 RX ADMIN — LEVOFLOXACIN 250 MG: 250 TABLET, FILM COATED ORAL at 11:44

## 2024-06-27 RX ADMIN — LIOTHYRONINE SODIUM 5 MCG: 5 TABLET ORAL at 13:42

## 2024-06-27 RX ADMIN — Medication 50 MCG: at 07:35

## 2024-06-27 RX ADMIN — Medication 5 ML: at 13:42

## 2024-06-27 RX ADMIN — LOPERAMIDE HYDROCHLORIDE 4 MG: 2 CAPSULE ORAL at 19:56

## 2024-06-27 RX ADMIN — MIDODRINE HYDROCHLORIDE 10 MG: 5 TABLET ORAL at 17:20

## 2024-06-27 RX ADMIN — PROCHLORPERAZINE EDISYLATE 5 MG: 5 INJECTION INTRAMUSCULAR; INTRAVENOUS at 03:12

## 2024-06-27 RX ADMIN — FLUCONAZOLE 200 MG: 200 TABLET ORAL at 07:36

## 2024-06-27 RX ADMIN — PROCHLORPERAZINE EDISYLATE 5 MG: 5 INJECTION INTRAMUSCULAR; INTRAVENOUS at 19:57

## 2024-06-27 RX ADMIN — ONDANSETRON HYDROCHLORIDE 8 MG: 8 TABLET, FILM COATED ORAL at 07:35

## 2024-06-27 RX ADMIN — CALCIUM CARBONATE (ANTACID) CHEW TAB 500 MG 500 MG: 500 CHEW TAB at 07:42

## 2024-06-27 RX ADMIN — OLANZAPINE 2.5 MG: 2.5 TABLET, FILM COATED ORAL at 11:44

## 2024-06-27 RX ADMIN — ONDANSETRON HYDROCHLORIDE 8 MG: 8 TABLET, FILM COATED ORAL at 15:18

## 2024-06-27 RX ADMIN — PANTOPRAZOLE SODIUM 40 MG: 40 INJECTION, POWDER, FOR SOLUTION INTRAVENOUS at 19:55

## 2024-06-27 RX ADMIN — OLANZAPINE 2.5 MG: 2.5 TABLET, FILM COATED ORAL at 19:56

## 2024-06-27 RX ADMIN — LIOTHYRONINE SODIUM 5 MCG: 5 TABLET ORAL at 07:36

## 2024-06-27 RX ADMIN — DEXTROSE MONOHYDRATE 10 ML: 50 INJECTION, SOLUTION INTRAVENOUS at 19:56

## 2024-06-27 RX ADMIN — POTASSIUM CHLORIDE 20 MEQ: 29.8 INJECTION, SOLUTION INTRAVENOUS at 06:13

## 2024-06-27 RX ADMIN — Medication 5 ML: at 07:51

## 2024-06-27 RX ADMIN — LOPERAMIDE HYDROCHLORIDE 4 MG: 2 CAPSULE ORAL at 11:44

## 2024-06-27 RX ADMIN — ACYCLOVIR SODIUM 325 MG: 50 INJECTION, SOLUTION INTRAVENOUS at 19:58

## 2024-06-27 RX ADMIN — MIDODRINE HYDROCHLORIDE 10 MG: 5 TABLET ORAL at 07:37

## 2024-06-27 RX ADMIN — LORAZEPAM 0.5 MG: 0.5 TABLET ORAL at 17:19

## 2024-06-27 RX ADMIN — ACYCLOVIR SODIUM 325 MG: 50 INJECTION, SOLUTION INTRAVENOUS at 07:33

## 2024-06-27 RX ADMIN — ONDANSETRON HYDROCHLORIDE 8 MG: 8 TABLET, FILM COATED ORAL at 23:55

## 2024-06-27 RX ADMIN — LOPERAMIDE HYDROCHLORIDE 4 MG: 2 CAPSULE ORAL at 07:33

## 2024-06-27 ASSESSMENT — ACTIVITIES OF DAILY LIVING (ADL)
ADLS_ACUITY_SCORE: 24
ADLS_ACUITY_SCORE: 25
ADLS_ACUITY_SCORE: 24

## 2024-06-27 NOTE — PLAN OF CARE
"/74   Pulse 109   Temp 99.3  F (37.4  C) (Axillary)   Resp 16   Ht 1.63 m (5' 4.17\")   Wt 71.3 kg (157 lb 1.6 oz)   SpO2 93%   BMI 26.82 kg/m      Ms. Brown had a more restful night than she did this weekend, rarely calling out to use the restroom. Ativan and compazine  given for nausea, lomotil given two times    Problem: Adult Inpatient Plan of Care  Goal: Plan of Care Review  Description: The Plan of Care Review/Shift note should be completed every shift.  The Outcome Evaluation is a brief statement about your assessment that the patient is improving, declining, or no change.  This information will be displayed automatically on your shift  note.  Outcome: Progressing  Flowsheets (Taken 6/27/2024 0535)  Plan of Care Reviewed With: patient  Overall Patient Progress: improving  Goal: Patient-Specific Goal (Individualized)  Description: You can add care plan individualizations to a care plan. Examples of Individualization might be:  \"Parent requests to be called daily at 9am for status\", \"I have a hard time hearing out of my right ear\", or \"Do not touch me to wake me up as it startles  me\".  Outcome: Progressing  Flowsheets (Taken 6/27/2024 0535)  Patient/Family-Specific Goals (Include Timeframe): Nursing will minimize interruptions to sleep overnight  Goal: Absence of Hospital-Acquired Illness or Injury  Intervention: Identify and Manage Fall Risk  Recent Flowsheet Documentation  Taken 6/27/2024 0305 by Carlos Bansal, RN  Safety Promotion/Fall Prevention:   activity supervised   increase visualization of patient   nonskid shoes/slippers when out of bed   patient and family education   room organization consistent   safety round/check completed  Taken 6/27/2024 0030 by Carlos Bansal, RN  Safety Promotion/Fall Prevention: safety round/check completed  Taken 6/26/2024 2345 by Carlso Bansal, RN  Safety Promotion/Fall Prevention:   clutter free environment maintained   increased rounding " and observation   nonskid shoes/slippers when out of bed   patient and family education   safety round/check completed   toileting scheduled  Taken 6/26/2024 2007 by Rolf, Carlos K., RN  Safety Promotion/Fall Prevention:   clutter free environment maintained   increased rounding and observation   nonskid shoes/slippers when out of bed   patient and family education   safety round/check completed   toileting scheduled  Intervention: Prevent Skin Injury  Recent Flowsheet Documentation  Taken 6/26/2024 2007 by Carlos Bansal RN  Body Position: position changed independently  Device Skin Pressure Protection:   adhesive use limited   tubing/devices free from skin contact  Intervention: Prevent and Manage VTE (Venous Thromboembolism) Risk  Recent Flowsheet Documentation  Taken 6/26/2024 2007 by Carlos Bansal RN  VTE Prevention/Management: SCDs (sequential compression devices) off  Intervention: Prevent Infection  Recent Flowsheet Documentation  Taken 6/27/2024 0305 by Carlos Bansal RN  Infection Prevention:   environmental surveillance performed   hand hygiene promoted   personal protective equipment utilized   rest/sleep promoted   single patient room provided  Taken 6/26/2024 2345 by Carlos Bansal RN  Infection Prevention:   environmental surveillance performed   hand hygiene promoted   personal protective equipment utilized   rest/sleep promoted   single patient room provided  Taken 6/26/2024 2007 by Carlos Bansal RN  Infection Prevention:   environmental surveillance performed   hand hygiene promoted   personal protective equipment utilized   rest/sleep promoted   single patient room provided     Problem: Stem Cell/Bone Marrow Transplant  Goal: Optimal Coping with Transplant  Intervention: Optimize Patient/Family Adjustment to Transplant  Recent Flowsheet Documentation  Taken 6/26/2024 2007 by Carlos Bansal RN  Supportive Measures: active listening utilized  Goal:  Symptom-Free Urinary Elimination  Intervention: Prevent or Manage Bladder Irritation  Recent Flowsheet Documentation  Taken 6/26/2024 2007 by Carlos Bansal RN  Hyperhydration Management: fluids provided  Goal: Diarrhea Symptom Control  Intervention: Manage Diarrhea  Recent Flowsheet Documentation  Taken 6/27/2024 0305 by Carlos Bansal RN  Perineal Care: perineal hygiene encouraged  Taken 6/27/2024 0145 by Carlos Bansal RN  Perineal Care: perineal hygiene encouraged  Taken 6/26/2024 2110 by Carlos Bansal RN  Perineal Care:   absorbent brief/pad changed   perineal hygiene encouraged  Taken 6/26/2024 2007 by Carlos Bansal RN  Fluid/Electrolyte Management: fluids provided  Taken 6/26/2024 2000 by Carlos Bansal RN  Perineal Care: perineal hygiene encouraged  Goal: Improved Activity Tolerance  Intervention: Promote Improved Energy  Recent Flowsheet Documentation  Taken 6/27/2024 0305 by Carlos Bansal RN  Activity Management:   ambulated to bathroom   back to bed  Taken 6/27/2024 0145 by Carlos Bansal RN  Activity Management:   ambulated to bathroom   back to bed  Taken 6/26/2024 2110 by Carlos Bansal RN  Activity Management:   ambulated to bathroom   back to bed  Taken 6/26/2024 2007 by Carlos Bansal RN  Environmental Support: calm environment promoted  Taken 6/26/2024 2000 by Carlos Bansal RN  Activity Management:   up to bedside commode   back to bed  Goal: Blood Counts Within Acceptable Range  Intervention: Monitor and Manage Hematologic Symptoms  Recent Flowsheet Documentation  Taken 6/27/2024 0305 by Carlos Bansal RN  Bleeding Precautions:   blood pressure closely monitored   coagulation study results reviewed   monitored for signs of bleeding  Medication Review/Management:   medications reviewed   high-risk medications identified  Taken 6/26/2024 2345 by Carlos Bansal RN  Bleeding Precautions:   blood pressure closely  monitored   coagulation study results reviewed   monitored for signs of bleeding  Medication Review/Management:   medications reviewed   high-risk medications identified  Taken 6/26/2024 2007 by Carlos Bansal RN  Bleeding Precautions:   blood pressure closely monitored   coagulation study results reviewed   monitored for signs of bleeding  Medication Review/Management:   medications reviewed   high-risk medications identified  Goal: Absence of Infection  Intervention: Prevent and Manage Infection  Recent Flowsheet Documentation  Taken 6/27/2024 0305 by Carlos Bansal RN  Infection Prevention:   environmental surveillance performed   hand hygiene promoted   personal protective equipment utilized   rest/sleep promoted   single patient room provided  Infection Management: aseptic technique maintained  Isolation Precautions: protective environment maintained  Taken 6/26/2024 2345 by Carlos Bansal RN  Infection Prevention:   environmental surveillance performed   hand hygiene promoted   personal protective equipment utilized   rest/sleep promoted   single patient room provided  Infection Management: aseptic technique maintained  Isolation Precautions: protective environment maintained  Taken 6/26/2024 2007 by Carlos Bansal RN  Infection Prevention:   environmental surveillance performed   hand hygiene promoted   personal protective equipment utilized   rest/sleep promoted   single patient room provided  Infection Management: aseptic technique maintained  Isolation Precautions: protective environment maintained  Goal: Improved Oral Mucous Membrane Health and Integrity  Intervention: Promote Oral Comfort and Health  Recent Flowsheet Documentation  Taken 6/26/2024 2110 by Carlos Bansal RN  Oral Care: oral rinse provided  Goal: Nausea and Vomiting Symptom Relief  Intervention: Prevent and Manage Nausea and Vomiting  Recent Flowsheet Documentation  Taken 6/26/2024 2007 by Carlos Bansal,  RN  Nausea/Vomiting Interventions:   antiemetic   nausea triggers minimized   slow deep breathing encouraged  Goal: Optimal Nutrition Intake  Intervention: Minimize and Manage Barriers to Oral Intake  Recent Flowsheet Documentation  Taken 6/26/2024 2007 by Carlos Bansal RN  Oral Nutrition Promotion:   calorie-dense liquids provided   calorie-dense foods provided   rest periods promoted     Problem: Skin Injury Risk Increased  Goal: Skin Health and Integrity  Intervention: Plan: Nurse Driven Intervention: Moisture Management  Recent Flowsheet Documentation  Taken 6/26/2024 2110 by Carlos Bansal RN  Bathing/Skin Care:   bath, partial   wipes, CHG  Taken 6/26/2024 2007 by Carlos Bansal, RN  Moisture Interventions: Encourage regular toileting  Intervention: Optimize Skin Protection  Recent Flowsheet Documentation  Taken 6/27/2024 0305 by Carlos Bansal RN  Activity Management:   ambulated to bathroom   back to bed  Taken 6/27/2024 0145 by Carlos Bansal RN  Activity Management:   ambulated to bathroom   back to bed  Taken 6/26/2024 2110 by Carlos Bansal RN  Activity Management:   ambulated to bathroom   back to bed  Taken 6/26/2024 2000 by Carlos Bansal RN  Activity Management:   up to bedside commode   back to bed  Intervention: Promote and Optimize Oral Intake  Recent Flowsheet Documentation  Taken 6/26/2024 2007 by Carlos Bansal RN  Oral Nutrition Promotion:   calorie-dense liquids provided   calorie-dense foods provided   rest periods promoted   Goal Outcome Evaluation:      Plan of Care Reviewed With: patient    Overall Patient Progress: improving

## 2024-06-27 NOTE — PROGRESS NOTES
"BMT/Cell Therapy Daily Progress Note   06/27/2024    Patient ID:  Vivian Brown is a 54 year old female, currently day +15 s/p autologous stem cell transplant for amyloidosis. Hospitalization complicated by significant orthostatic hypotension.     Admission date: 6/11/2024    INTERVAL  HISTORY   Mary Ann continues to feel tired and nauseated.  She is attempting to eat various supplements.  She is hoping for a gluten free lemon scone or blueberry muffin, this morning.  She vomited yesterday (usually vomiting a small amount once per day).  Six loose stools in the last 24 hours, despite 4 doses of imodium and 3 doses of lomotil. No longer having any dizziness.     Review of Systems: ROS negative except as noted above.      PHYSICAL EXAM   KPS:  70    /75   Pulse 109   Temp 98.7  F (37.1  C)   Resp 16   Ht 1.63 m (5' 4.17\")   Wt 71.3 kg (157 lb 1.6 oz)   SpO2 93%   BMI 26.82 kg/m       General appearance: Patient is lying in bed, non-toxic appearing, in NAD but pale.  Hair falling out.   Eyes: Sclera & conjunctiva clear bilaterally  Cardio: Tachycardic, regular rhythm; II/V systolic murmur  Pulmonary: normal RR; no apparent use of accessory respiratory muscles; clear to  auscultation bilaterally  Abdominal: Soft, non-distended abdomen; no tenderness  Extremities: Trace bilateral feet non-pitting edema, non-tender to palpation.   Skin: warm and dry; no rash or concerning lesions.  Neuro: A&O, no focal neuro deficits.  Access: R CVC clean, dry and intact, non-tender, no drainage.      LABS: I have assessed all abnormal lab values for their clinical significance and any values considered clinically significant have been addressed in the assessment and plan.      Lab Results   Component Value Date    WBC 1.1 (L) 06/27/2024    ANEU 0.9 (L) 06/27/2024    HGB 7.2 (L) 06/27/2024    HCT 21.1 (L) 06/27/2024    PLT 14 (LL) 06/27/2024     (L) 06/27/2024    POTASSIUM 3.6 06/27/2024    CHLORIDE 108 (H) 06/27/2024    " CO2 20 (L) 06/27/2024    GLC 94 06/27/2024    BUN 24.2 (H) 06/27/2024    CR 1.43 (H) 06/27/2024    MAG 1.7 06/27/2024    INR 1.31 (H) 06/24/2024    BILITOTAL 0.3 06/27/2024    AST 6 06/27/2024    ALT 11 06/27/2024    ALKPHOS 260 (H) 06/27/2024    PROTTOTAL 3.9 (L) 06/27/2024    ALBUMIN 1.9 (L) 06/27/2024       ASSESSMENT AND PLAN   Vivian Brown is a 54 year old female with amyloidosis, undergoing melphalan followed by autologous stem cell rescue. She is day +15.     BMT/MM/Amyloidosis  - BMT MD/Coordinator: Wei Araiza [id number]  - Cell dose: total collections of 5.99 million CD34+ cells/kg.     - Prep as above. Flush and pre-meds prior to transplant.  - GCSF started day +5, continue until ANC > 2500 for 2 consecutive days.     HEME/COAG  #Pancytopenia 2/2 to chemotherapy.   - Transfusion parameters: hgb <7g/dL and plts <20,000 (increased parameter 6/21 d/t brbpr).  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     ID  Prophy: ACV; levaquin; fluconazole.  PJP ppx to start at day +28.      GI/NUTRITION  #mucositis: meds to IV, Dilaudid IV prn, Carafate.  - Supportive cares; MMW, Saline rinses, Oxycodone PO, Dilaudid IV prn.   #Diarrhea - C. Diff negative on 6/11, 6/25. Imodium 4mg qid; lomotil prn.    #CINV: scheduled Zofran IV x 6/21; prn Ativan and compazine prn. *Emend 6/22. Zyprexa 2.5mg bid (monitor for orthostasis).  Emend 6/26.    #Elevated alk phos, possibly related to GCSF.  Trend hepatic panel. GB sludge noted on 6/25 ultrasound.   #Acid reflux:  increase protonix to bid; TUMS prn.  Continue carafate.   - Ulcer prophy: Protonix (now bid for acid reflux)  # Celiac Disease, see diet below     FLUIDS/NUTRITION  - PTA Lasix lasix 40mg am, 20mg afternoon- held since 6/17 d/t orthostasis and large volume diuresis on 6/16  - PTA spironoloactone -held since 6/17   - gluten free diet (h/o celiac disease)  - dietitian to follow     RENAL  # renal amyloidosis; diuresis as above  - Monitor Cr and electrolytes daily.   -  "Replace electrolytes per sliding scale     ENDOCRINE  # Hypothyroidism:  continue PTA cytomel and synthroid     CARDIAC  # cardiac amyloidosis: cautious fluid balance; diuresis as above  # h/o dizziness and chest pain with chemotherapy for amyloidosis; improved with addition of midodrine.   # orthostatic hypotension: on midodrine, as above. PRN fluid boluses vs diuresis for volume balance.  # hyperlipidemia: rosuvastatin on hold during acute transplant course     SUPPORTIVE CARE  - PT/OT to evaluate on admission and follow as indicated.   - BMT Social work to follow.     Known issues that I take into account for medical decisions, with salient changes to the plan considering these complexities noted above.    Patient Active Problem List   Diagnosis    Hypothyroidism due to acquired atrophy of thyroid    Family hx of colon cancer    Nonallopathic lesion of cervical region    Cervicalgia    Nonallopathic lesion of sacral region    Nonallopathic lesion of lumbar region    Lumbago    Nonallopathic lesion of thoracic region    Hyperlipidemia LDL goal <100    Dependent edema R>L    AL amyloidosis (H)    Hypogammaglobulinemia (H24)    Chronic kidney disease, stage 3a (H)    Celiac disease    Autologous donor of stem cells     Medically Ready for Discharge: Anticipated in 5+ Days    Clinically Significant Risk Factors           # Hypercalcemia: corrected calcium is >10.1, will monitor as appropriate    # Hypoalbuminemia: Lowest albumin = 1.9 g/dL at 6/27/2024  3:22 AM, will monitor as appropriate   # Thrombocytopenia: Lowest platelets = 14 in last 2 days, will monitor for bleeding             #Precipitous drop in Hgb/Hct: Lowest Hgb this hospitalization: 6.9 g/dL. Will continue to monitor and treat/transfuse as appropriate.     # Overweight: Estimated body mass index is 26.82 kg/m  as calculated from the following:    Height as of this encounter: 1.63 m (5' 4.17\").    Weight as of this encounter: 71.3 kg (157 lb 1.6 oz).    "          I spent 30 minutes in the care of this patient today, which included time necessary for preparation for the visit, obtaining history, ordering medications/tests/procedures as medically indicated, review of pertinent medical literature, counseling of the patient, communication of recommendations to the care team, and documentation time.      Dominga Pacheco PA-C

## 2024-06-27 NOTE — PLAN OF CARE
"AVSS. Patient trying to eat small amounts. Nausea seems to be worst in the evening. Ativan given x1.  Patient showered. Worked with therapy. Continue POC.   Problem: Adult Inpatient Plan of Care  Goal: Plan of Care Review  Description: The Plan of Care Review/Shift note should be completed every shift.  The Outcome Evaluation is a brief statement about your assessment that the patient is improving, declining, or no change.  This information will be displayed automatically on your shift  note.  Outcome: Progressing  Goal: Patient-Specific Goal (Individualized)  Description: You can add care plan individualizations to a care plan. Examples of Individualization might be:  \"Parent requests to be called daily at 9am for status\", \"I have a hard time hearing out of my right ear\", or \"Do not touch me to wake me up as it startles  me\".  Outcome: Progressing  Goal: Absence of Hospital-Acquired Illness or Injury  Outcome: Progressing  Intervention: Identify and Manage Fall Risk  Recent Flowsheet Documentation  Taken 6/27/2024 1600 by Liat Pyle RN  Safety Promotion/Fall Prevention: safety round/check completed  Taken 6/27/2024 1200 by Liat Pyle RN  Safety Promotion/Fall Prevention: safety round/check completed  Taken 6/27/2024 0800 by Liat Pyle RN  Safety Promotion/Fall Prevention: safety round/check completed  Intervention: Prevent Skin Injury  Recent Flowsheet Documentation  Taken 6/27/2024 0800 by Liat Pyle RN  Device Skin Pressure Protection: adhesive use limited  Goal: Optimal Comfort and Wellbeing  Outcome: Progressing  Goal: Readiness for Transition of Care  Outcome: Progressing     Problem: Stem Cell/Bone Marrow Transplant  Goal: Optimal Coping with Transplant  Outcome: Progressing  Intervention: Optimize Patient/Family Adjustment to Transplant  Recent Flowsheet Documentation  Taken 6/27/2024 0800 by Liat Pyle RN  Supportive Measures: active listening utilized  Goal: Diarrhea Symptom " Control  Outcome: Progressing  Intervention: Manage Diarrhea  Recent Flowsheet Documentation  Taken 6/27/2024 0800 by Liat Pyle RN  Fluid/Electrolyte Management: fluids provided  Goal: Improved Activity Tolerance  Outcome: Progressing  Goal: Blood Counts Within Acceptable Range  Outcome: Progressing  Goal: Absence of Infection  Outcome: Progressing  Goal: Improved Oral Mucous Membrane Health and Integrity  Outcome: Progressing  Goal: Nausea and Vomiting Symptom Relief  Outcome: Progressing  Intervention: Prevent and Manage Nausea and Vomiting  Recent Flowsheet Documentation  Taken 6/27/2024 0800 by Liat Pyle RN  Nausea/Vomiting Interventions: antiemetic  Goal: Optimal Nutrition Intake  Outcome: Progressing     Problem: Skin Injury Risk Increased  Goal: Skin Health and Integrity  Outcome: Progressing  Intervention: Plan: Nurse Driven Intervention: Moisture Management  Recent Flowsheet Documentation  Taken 6/27/2024 0800 by Liat Pyle RN  Moisture Interventions:   Encourage regular toileting   No brief in bed  Bathing/Skin Care: dressed/undressed   Goal Outcome Evaluation:

## 2024-06-27 NOTE — PLAN OF CARE
AVSS. Pt with nausea/emesis x1 and no appetite. Emend given and compazine x1. Oxycodone x1 for shoulder pain with relief. Fatigued, took several naps. Taking pills ok. Declined shower, will do CHG wipes. Continue POC.   Problem: Adult Inpatient Plan of Care  Goal: Absence of Hospital-Acquired Illness or Injury  Intervention: Identify and Manage Fall Risk  Recent Flowsheet Documentation  Taken 6/26/2024 1700 by Liat Pyle RN  Safety Promotion/Fall Prevention: safety round/check completed  Taken 6/26/2024 1600 by Liat Pyle RN  Safety Promotion/Fall Prevention: safety round/check completed  Taken 6/26/2024 1400 by Liat Pyle RN  Safety Promotion/Fall Prevention: safety round/check completed  Taken 6/26/2024 1201 by Liat Pyle RN  Safety Promotion/Fall Prevention: safety round/check completed  Taken 6/26/2024 0800 by Liat Pyle RN  Safety Promotion/Fall Prevention: safety round/check completed  Intervention: Prevent Skin Injury  Recent Flowsheet Documentation  Taken 6/26/2024 0800 by Liat Pyle RN  Body Position: position changed independently  Device Skin Pressure Protection: tubing/devices free from skin contact     Problem: Stem Cell/Bone Marrow Transplant  Goal: Optimal Coping with Transplant  Intervention: Optimize Patient/Family Adjustment to Transplant  Recent Flowsheet Documentation  Taken 6/26/2024 0800 by Liat Pyle RN  Supportive Measures: active listening utilized  Goal: Improved Activity Tolerance  Intervention: Promote Improved Energy  Recent Flowsheet Documentation  Taken 6/26/2024 0800 by Liat Pyle RN  Activity Management: ambulated in room  Environmental Support: calm environment promoted     Problem: Skin Injury Risk Increased  Goal: Skin Health and Integrity  Intervention: Plan: Nurse Driven Intervention: Moisture Management  Recent Flowsheet Documentation  Taken 6/26/2024 0800 by Liat Pyle RN  Moisture Interventions:   Encourage regular toileting   No brief in  bed  Bathing/Skin Care: dressed/undressed  Intervention: Optimize Skin Protection  Recent Flowsheet Documentation  Taken 6/26/2024 0800 by Liat Pyle, RN  Pressure Reduction Techniques: frequent weight shift encouraged  Activity Management: ambulated in room   Goal Outcome Evaluation:

## 2024-06-28 ENCOUNTER — APPOINTMENT (OUTPATIENT)
Dept: PHYSICAL THERAPY | Facility: CLINIC | Age: 54
DRG: 016 | End: 2024-06-28
Attending: INTERNAL MEDICINE
Payer: COMMERCIAL

## 2024-06-28 LAB
ANION GAP SERPL CALCULATED.3IONS-SCNC: 6 MMOL/L (ref 7–15)
BASOPHILS # BLD AUTO: ABNORMAL 10*3/UL
BASOPHILS # BLD MANUAL: 0 10E3/UL (ref 0–0.2)
BASOPHILS NFR BLD AUTO: ABNORMAL %
BASOPHILS NFR BLD MANUAL: 0 %
BLD PROD TYP BPU: NORMAL
BLOOD COMPONENT TYPE: NORMAL
BUN SERPL-MCNC: 23.5 MG/DL (ref 6–20)
BURR CELLS BLD QL SMEAR: SLIGHT
CALCIUM SERPL-MCNC: 8.2 MG/DL (ref 8.6–10)
CHLORIDE SERPL-SCNC: 108 MMOL/L (ref 98–107)
CODING SYSTEM: NORMAL
CREAT SERPL-MCNC: 1.42 MG/DL (ref 0.51–0.95)
CROSSMATCH: NORMAL
DEPRECATED HCO3 PLAS-SCNC: 21 MMOL/L (ref 22–29)
EGFRCR SERPLBLD CKD-EPI 2021: 44 ML/MIN/1.73M2
ELLIPTOCYTES BLD QL SMEAR: SLIGHT
EOSINOPHIL # BLD AUTO: ABNORMAL 10*3/UL
EOSINOPHIL # BLD MANUAL: 0 10E3/UL (ref 0–0.7)
EOSINOPHIL NFR BLD AUTO: ABNORMAL %
EOSINOPHIL NFR BLD MANUAL: 0 %
ERYTHROCYTE [DISTWIDTH] IN BLOOD BY AUTOMATED COUNT: 15.2 % (ref 10–15)
GLUCOSE SERPL-MCNC: 87 MG/DL (ref 70–99)
HCT VFR BLD AUTO: 19.8 % (ref 35–47)
HGB BLD-MCNC: 6.5 G/DL (ref 11.7–15.7)
IMM GRANULOCYTES # BLD: ABNORMAL 10*3/UL
IMM GRANULOCYTES NFR BLD: ABNORMAL %
ISSUE DATE AND TIME: NORMAL
LACTATE SERPL-SCNC: 0.8 MMOL/L (ref 0.7–2)
LYMPHOCYTES # BLD AUTO: ABNORMAL 10*3/UL
LYMPHOCYTES # BLD MANUAL: 0.1 10E3/UL (ref 0.8–5.3)
LYMPHOCYTES NFR BLD AUTO: ABNORMAL %
LYMPHOCYTES NFR BLD MANUAL: 3 %
MAGNESIUM SERPL-MCNC: 1.6 MG/DL (ref 1.7–2.3)
MCH RBC QN AUTO: 30.5 PG (ref 26.5–33)
MCHC RBC AUTO-ENTMCNC: 32.8 G/DL (ref 31.5–36.5)
MCV RBC AUTO: 93 FL (ref 78–100)
METAMYELOCYTES # BLD MANUAL: 0.1 10E3/UL
METAMYELOCYTES NFR BLD MANUAL: 4 %
MONOCYTES # BLD AUTO: ABNORMAL 10*3/UL
MONOCYTES # BLD MANUAL: 0.1 10E3/UL (ref 0–1.3)
MONOCYTES NFR BLD AUTO: ABNORMAL %
MONOCYTES NFR BLD MANUAL: 4 %
NEUTROPHILS # BLD AUTO: ABNORMAL 10*3/UL
NEUTROPHILS # BLD MANUAL: 1.8 10E3/UL (ref 1.6–8.3)
NEUTROPHILS NFR BLD AUTO: ABNORMAL %
NEUTROPHILS NFR BLD MANUAL: 89 %
NRBC # BLD AUTO: 0 10E3/UL
NRBC BLD AUTO-RTO: 0 /100
PHOSPHATE SERPL-MCNC: 2.7 MG/DL (ref 2.5–4.5)
PLAT MORPH BLD: ABNORMAL
PLATELET # BLD AUTO: 26 10E3/UL (ref 150–450)
POTASSIUM SERPL-SCNC: 3.5 MMOL/L (ref 3.4–5.3)
RBC # BLD AUTO: 2.13 10E6/UL (ref 3.8–5.2)
RBC MORPH BLD: ABNORMAL
SODIUM SERPL-SCNC: 135 MMOL/L (ref 135–145)
UNIT ABO/RH: NORMAL
UNIT NUMBER: NORMAL
UNIT STATUS: NORMAL
UNIT TYPE ISBT: 5100
WBC # BLD AUTO: 2 10E3/UL (ref 4–11)

## 2024-06-28 PROCEDURE — 250N000011 HC RX IP 250 OP 636: Performed by: STUDENT IN AN ORGANIZED HEALTH CARE EDUCATION/TRAINING PROGRAM

## 2024-06-28 PROCEDURE — 80048 BASIC METABOLIC PNL TOTAL CA: CPT

## 2024-06-28 PROCEDURE — 85027 COMPLETE CBC AUTOMATED: CPT

## 2024-06-28 PROCEDURE — 97530 THERAPEUTIC ACTIVITIES: CPT | Mod: GP

## 2024-06-28 PROCEDURE — 83735 ASSAY OF MAGNESIUM: CPT

## 2024-06-28 PROCEDURE — 250N000013 HC RX MED GY IP 250 OP 250 PS 637: Performed by: PHYSICIAN ASSISTANT

## 2024-06-28 PROCEDURE — 99232 SBSQ HOSP IP/OBS MODERATE 35: CPT | Mod: FS | Performed by: PHYSICIAN ASSISTANT

## 2024-06-28 PROCEDURE — 97110 THERAPEUTIC EXERCISES: CPT | Mod: GP

## 2024-06-28 PROCEDURE — 258N000003 HC RX IP 258 OP 636: Performed by: STUDENT IN AN ORGANIZED HEALTH CARE EDUCATION/TRAINING PROGRAM

## 2024-06-28 PROCEDURE — 206N000001 HC R&B BMT UMMC

## 2024-06-28 PROCEDURE — 258N000003 HC RX IP 258 OP 636: Performed by: PHYSICIAN ASSISTANT

## 2024-06-28 PROCEDURE — 83605 ASSAY OF LACTIC ACID: CPT | Performed by: INTERNAL MEDICINE

## 2024-06-28 PROCEDURE — 250N000013 HC RX MED GY IP 250 OP 250 PS 637: Performed by: INTERNAL MEDICINE

## 2024-06-28 PROCEDURE — 250N000011 HC RX IP 250 OP 636: Performed by: INTERNAL MEDICINE

## 2024-06-28 PROCEDURE — C9113 INJ PANTOPRAZOLE SODIUM, VIA: HCPCS | Performed by: PHYSICIAN ASSISTANT

## 2024-06-28 PROCEDURE — 250N000013 HC RX MED GY IP 250 OP 250 PS 637

## 2024-06-28 PROCEDURE — P9040 RBC LEUKOREDUCED IRRADIATED: HCPCS

## 2024-06-28 PROCEDURE — 250N000011 HC RX IP 250 OP 636: Performed by: PHYSICIAN ASSISTANT

## 2024-06-28 PROCEDURE — 85007 BL SMEAR W/DIFF WBC COUNT: CPT

## 2024-06-28 PROCEDURE — 84100 ASSAY OF PHOSPHORUS: CPT | Performed by: STUDENT IN AN ORGANIZED HEALTH CARE EDUCATION/TRAINING PROGRAM

## 2024-06-28 RX ORDER — POTASSIUM CHLORIDE 29.8 MG/ML
20 INJECTION INTRAVENOUS ONCE
Status: COMPLETED | OUTPATIENT
Start: 2024-06-28 | End: 2024-06-28

## 2024-06-28 RX ADMIN — ACYCLOVIR SODIUM 325 MG: 50 INJECTION, SOLUTION INTRAVENOUS at 08:11

## 2024-06-28 RX ADMIN — Medication 50 MCG: at 09:07

## 2024-06-28 RX ADMIN — PROCHLORPERAZINE EDISYLATE 5 MG: 5 INJECTION INTRAMUSCULAR; INTRAVENOUS at 18:14

## 2024-06-28 RX ADMIN — LEVOTHYROXINE SODIUM 200 MCG: 200 TABLET ORAL at 06:37

## 2024-06-28 RX ADMIN — FLUCONAZOLE 200 MG: 200 TABLET ORAL at 09:07

## 2024-06-28 RX ADMIN — LIOTHYRONINE SODIUM 5 MCG: 5 TABLET ORAL at 09:07

## 2024-06-28 RX ADMIN — Medication 3 CAPSULE: at 09:07

## 2024-06-28 RX ADMIN — Medication 10 ML: at 03:01

## 2024-06-28 RX ADMIN — MIDODRINE HYDROCHLORIDE 10 MG: 5 TABLET ORAL at 17:08

## 2024-06-28 RX ADMIN — DIPHENOXYLATE HYDROCHLORIDE AND ATROPINE SULFATE 1 TABLET: 2.5; .025 TABLET ORAL at 10:27

## 2024-06-28 RX ADMIN — ACYCLOVIR SODIUM 325 MG: 50 INJECTION, SOLUTION INTRAVENOUS at 20:58

## 2024-06-28 RX ADMIN — PANTOPRAZOLE SODIUM 40 MG: 40 INJECTION, POWDER, FOR SOLUTION INTRAVENOUS at 20:52

## 2024-06-28 RX ADMIN — DIPHENOXYLATE HYDROCHLORIDE AND ATROPINE SULFATE 1 TABLET: 2.5; .025 TABLET ORAL at 13:43

## 2024-06-28 RX ADMIN — PROCHLORPERAZINE EDISYLATE 5 MG: 5 INJECTION INTRAMUSCULAR; INTRAVENOUS at 10:27

## 2024-06-28 RX ADMIN — SUCRALFATE 1 G: 1 SUSPENSION ORAL at 17:08

## 2024-06-28 RX ADMIN — MIDODRINE HYDROCHLORIDE 10 MG: 5 TABLET ORAL at 13:38

## 2024-06-28 RX ADMIN — OLANZAPINE 2.5 MG: 2.5 TABLET, FILM COATED ORAL at 20:54

## 2024-06-28 RX ADMIN — OLANZAPINE 2.5 MG: 2.5 TABLET, FILM COATED ORAL at 08:16

## 2024-06-28 RX ADMIN — DEXTROSE MONOHYDRATE 330 MCG: 50 INJECTION, SOLUTION INTRAVENOUS at 20:48

## 2024-06-28 RX ADMIN — ONDANSETRON HYDROCHLORIDE 8 MG: 8 TABLET, FILM COATED ORAL at 08:16

## 2024-06-28 RX ADMIN — DEXTROSE MONOHYDRATE 20 ML: 50 INJECTION, SOLUTION INTRAVENOUS at 20:47

## 2024-06-28 RX ADMIN — DIPHENHYDRAMINE HYDROCHLORIDE AND LIDOCAINE HYDROCHLORIDE AND ALUMINUM HYDROXIDE AND MAGNESIUM HYDRO 10 ML: KIT at 09:05

## 2024-06-28 RX ADMIN — Medication 10 ML: at 10:10

## 2024-06-28 RX ADMIN — DIPHENHYDRAMINE HYDROCHLORIDE AND LIDOCAINE HYDROCHLORIDE AND ALUMINUM HYDROXIDE AND MAGNESIUM HYDRO 10 ML: KIT at 13:43

## 2024-06-28 RX ADMIN — SUCRALFATE 1 G: 1 SUSPENSION ORAL at 13:38

## 2024-06-28 RX ADMIN — Medication 5 ML: at 18:14

## 2024-06-28 RX ADMIN — LIOTHYRONINE SODIUM 5 MCG: 5 TABLET ORAL at 13:38

## 2024-06-28 RX ADMIN — ONDANSETRON HYDROCHLORIDE 8 MG: 8 TABLET, FILM COATED ORAL at 23:23

## 2024-06-28 RX ADMIN — LOPERAMIDE HYDROCHLORIDE 4 MG: 2 CAPSULE ORAL at 17:08

## 2024-06-28 RX ADMIN — LOPERAMIDE HYDROCHLORIDE 4 MG: 2 CAPSULE ORAL at 09:07

## 2024-06-28 RX ADMIN — SUCRALFATE 1 G: 1 SUSPENSION ORAL at 08:16

## 2024-06-28 RX ADMIN — DEXTROSE MONOHYDRATE 20 ML: 50 INJECTION, SOLUTION INTRAVENOUS at 20:48

## 2024-06-28 RX ADMIN — DIPHENOXYLATE HYDROCHLORIDE AND ATROPINE SULFATE 1 TABLET: 2.5; .025 TABLET ORAL at 17:08

## 2024-06-28 RX ADMIN — PANTOPRAZOLE SODIUM 40 MG: 40 INJECTION, POWDER, FOR SOLUTION INTRAVENOUS at 08:12

## 2024-06-28 RX ADMIN — SUCRALFATE 1 G: 1 SUSPENSION ORAL at 23:04

## 2024-06-28 RX ADMIN — LEVOFLOXACIN 250 MG: 250 TABLET, FILM COATED ORAL at 09:07

## 2024-06-28 RX ADMIN — ONDANSETRON HYDROCHLORIDE 8 MG: 8 TABLET, FILM COATED ORAL at 17:08

## 2024-06-28 RX ADMIN — LOPERAMIDE HYDROCHLORIDE 4 MG: 2 CAPSULE ORAL at 13:38

## 2024-06-28 RX ADMIN — LOPERAMIDE HYDROCHLORIDE 4 MG: 2 CAPSULE ORAL at 20:54

## 2024-06-28 RX ADMIN — POTASSIUM CHLORIDE 20 MEQ: 29.8 INJECTION, SOLUTION INTRAVENOUS at 06:37

## 2024-06-28 RX ADMIN — MIDODRINE HYDROCHLORIDE 10 MG: 5 TABLET ORAL at 09:06

## 2024-06-28 RX ADMIN — LORATADINE 10 MG: 10 TABLET ORAL at 09:07

## 2024-06-28 ASSESSMENT — ACTIVITIES OF DAILY LIVING (ADL)
ADLS_ACUITY_SCORE: 23
ADLS_ACUITY_SCORE: 25
ADLS_ACUITY_SCORE: 23
ADLS_ACUITY_SCORE: 24
ADLS_ACUITY_SCORE: 23
ADLS_ACUITY_SCORE: 24
ADLS_ACUITY_SCORE: 25
ADLS_ACUITY_SCORE: 25
ADLS_ACUITY_SCORE: 24
ADLS_ACUITY_SCORE: 23
ADLS_ACUITY_SCORE: 23
ADLS_ACUITY_SCORE: 25
ADLS_ACUITY_SCORE: 24
ADLS_ACUITY_SCORE: 25
ADLS_ACUITY_SCORE: 25
ADLS_ACUITY_SCORE: 24
ADLS_ACUITY_SCORE: 25
ADLS_ACUITY_SCORE: 25
ADLS_ACUITY_SCORE: 23

## 2024-06-28 NOTE — CONSULTS
"SPIRITUAL HEALTH SERVICES Consult Note  [facility] ([place] 5C    Referral Source/Reason for Visit: Routine consult for initial assessment.    Summary and Recommendations -   Rebecca is spiritually and emotionally doing well and has minimal distress.      ~Vivian requested communion, which I will arrange.     Plan:  support remains available upon request. Currently there are no plans to follow up unless Vivian's stay extends beyond next week or upon request.    Rev. SHAQUILLE Powell.  Chaplain Resident  Pager [phone]    * [facility] remains available 24/7 for emergent requests/referrals, either by having the switchboard page the on-call  or by entering an ASAP/STAT consult in Epic (this will also page the on-call ). Routine Epic consults receive an initial response within 24 hours.*    Assessment      Patient/Family Understanding of Illness and Goals of Care -   ~Vivian received a stem cell transplant on 6.12 and her numbers are going in the right direction. There is talk of discharging on Monday to [facility].      Distress and Loss -  ~Vivian has minimal distress. Previously she missed being away from family so long but once she got sick that subsided. \"I knew I was in the right place.\"      Strengths, Coping, and Resources -   ~Vivian is well supported by her family. Her mother, Lovely, was present by her bedside. Also, Vivian's Sabianism alisa and the prayers of others have been central to giving her strength and hope.      Meaning, Beliefs, and Spirituality -   ~Vivian is Sabianism. I put in a request for communion from our . Vivian does need gluten free host.     ~Vivian, Lovely, and I shared a prayer together for healing and gratitude for those who have taken care of her.    "

## 2024-06-28 NOTE — PLAN OF CARE
"Vital signs:  Temp: 98.3  F (36.8  C) Temp src: Oral BP: 108/78 Pulse: 100   Resp: 18 SpO2: 98 % O2 Device: None (Room air)   Height: 163 cm (5' 4.17\") Weight: 71.6 kg (157 lb 12.8 oz)  Estimated body mass index is 26.94 kg/m  as calculated from the following:    Height as of this encounter: 1.63 m (5' 4.17\").    Weight as of this encounter: 71.6 kg (157 lb 12.8 oz).      AVSS on room air. Orthostatics negative. Pt denies pain, nausea, or SOB. Did have one emesis while trying to take meds without precipitating nausea. Given compazine and able to tolerate the rest of her pills. Additional emesis this evening, 2nd dose of compazine given. Appetite improving, eating small amounts throughout the day. MMW given x2 with meals for sore throat. Diarrhea improving, more loose than watery, x3 today. On scheduled imodium and given lomotil x3. RBC given without complications. Lactic 0.8. CVC heparin locked. Bath and CHG wipes completed today. SBA in room and cooperative of cares. Plan to discharge home on Monday.      Goal Outcome Evaluation:      Plan of Care Reviewed With: patient, parent    Overall Patient Progress: improvingOverall Patient Progress: improving           Problem: Adult Inpatient Plan of Care  Goal: Plan of Care Review  Description: The Plan of Care Review/Shift note should be completed every shift.  The Outcome Evaluation is a brief statement about your assessment that the patient is improving, declining, or no change.  This information will be displayed automatically on your shift  note.  Outcome: Progressing  Flowsheets (Taken 6/28/2024 1437)  Plan of Care Reviewed With:   patient   parent  Overall Patient Progress: improving  Goal: Absence of Hospital-Acquired Illness or Injury  Intervention: Identify and Manage Fall Risk  Recent Flowsheet Documentation  Taken 6/28/2024 1337 by Sarah Cameron, RN  Safety Promotion/Fall Prevention: safety round/check completed  Taken 6/28/2024 1243 by Sarah Cameron, RN  Safety " Promotion/Fall Prevention: safety round/check completed  Taken 6/28/2024 1113 by Sarah Cameron RN  Safety Promotion/Fall Prevention:   activity supervised   clutter free environment maintained   nonskid shoes/slippers when out of bed   safety round/check completed   check orthostatic blood pressure   increased rounding and observation   increase visualization of patient   lighting adjusted   mobility aid in reach   room near nurse's station   room organization consistent   treat reversible contributory factors  Taken 6/28/2024 1045 by Sarah Cameron RN  Safety Promotion/Fall Prevention: safety round/check completed  Taken 6/28/2024 1026 by Sarah Cameron RN  Safety Promotion/Fall Prevention: safety round/check completed  Taken 6/28/2024 1008 by Sarah Cameron RN  Safety Promotion/Fall Prevention: safety round/check completed  Taken 6/28/2024 0905 by Sarah Cameron RN  Safety Promotion/Fall Prevention: safety round/check completed  Taken 6/28/2024 0806 by Sarah Cameron RN  Safety Promotion/Fall Prevention:   activity supervised   clutter free environment maintained   nonskid shoes/slippers when out of bed   safety round/check completed   check orthostatic blood pressure   increased rounding and observation   increase visualization of patient   lighting adjusted   mobility aid in reach   room near nurse's station   room organization consistent   treat reversible contributory factors  Taken 6/28/2024 0805 by Sarah Cameron RN  Safety Promotion/Fall Prevention: safety round/check completed  Intervention: Prevent Skin Injury  Recent Flowsheet Documentation  Taken 6/28/2024 0806 by Sarah Cameron RN  Body Position: position changed independently  Device Skin Pressure Protection: adhesive use limited  Intervention: Prevent Infection  Recent Flowsheet Documentation  Taken 6/28/2024 1113 by Sarah Cameron RN  Infection Prevention:   environmental surveillance performed   hand hygiene promoted   personal protective equipment  utilized   rest/sleep promoted   single patient room provided  Taken 6/28/2024 0806 by Sarah Cameron, RN  Infection Prevention:   environmental surveillance performed   hand hygiene promoted   personal protective equipment utilized   rest/sleep promoted   single patient room provided  Goal: Optimal Comfort and Wellbeing  Outcome: Progressing  Goal: Readiness for Transition of Care  Outcome: Progressing

## 2024-06-28 NOTE — PROGRESS NOTES
"BMT/Cell Therapy Daily Progress Note   06/28/2024    Patient ID:  Vivian Brown is a 54 year old female, currently day +16 s/p autologous stem cell transplant for amyloidosis. Hospitalization complicated by significant orthostatic hypotension.     Admission date: 6/11/2024    INTERVAL  HISTORY   Mary Ann notes that her diarrhea is slowing down.  She is still on qid scheduled imodium, but only needed two doses of lomotil in addition.  She ate 1/2 grilled cheese and \"a bit less than half\" of tomato soup with it.     Review of Systems: ROS negative except as noted above.      PHYSICAL EXAM   KPS:  70    /78   Pulse 106   Temp 98  F (36.7  C)   Resp 14   Ht 1.63 m (5' 4.17\")   Wt 71.3 kg (157 lb 3.2 oz)   SpO2 95%   BMI 26.84 kg/m       General appearance: Patient is lying in bed, non-toxic appearing, in NAD but pale.  Hair falling out.   Eyes: Sclera & conjunctiva clear bilaterally  Cardio: Tachycardic, regular rhythm; II/V systolic murmur  Pulmonary: normal RR; no apparent use of accessory respiratory muscles; clear to  auscultation bilaterally  Abdominal: Soft, non-distended abdomen; no tenderness  Extremities: Trace bilateral feet non-pitting edema, non-tender to palpation.   Skin: warm and dry; no rash or concerning lesions.  Neuro: A&O, no focal neuro deficits.  Access: R CVC clean, dry and intact, non-tender, no drainage.      LABS: I have assessed all abnormal lab values for their clinical significance and any values considered clinically significant have been addressed in the assessment and plan.      Lab Results   Component Value Date    WBC 2.0 (L) 06/28/2024    ANEU 1.8 06/28/2024    HGB 6.5 (LL) 06/28/2024    HCT 19.8 (L) 06/28/2024    PLT 26 (LL) 06/28/2024     06/28/2024    POTASSIUM 3.5 06/28/2024    CHLORIDE 108 (H) 06/28/2024    CO2 21 (L) 06/28/2024    GLC 87 06/28/2024    BUN 23.5 (H) 06/28/2024    CR 1.42 (H) 06/28/2024    MAG 1.6 (L) 06/28/2024    INR 1.31 (H) 06/24/2024    " BILITOTAL 0.3 06/27/2024    AST 6 06/27/2024    ALT 11 06/27/2024    ALKPHOS 260 (H) 06/27/2024    PROTTOTAL 3.9 (L) 06/27/2024    ALBUMIN 1.9 (L) 06/27/2024       ASSESSMENT AND PLAN   Vivian Brown is a 54 year old female with amyloidosis, undergoing melphalan followed by autologous stem cell rescue. She is day +16.     BMT/MM/Amyloidosis  - BMT MD/Coordinator: Wei  - [id number]  - Cell dose: total collections of 5.99 million CD34+ cells/kg.     - Prep as above. Flush and pre-meds prior to transplant.  - GCSF started day +5, continue until ANC > 2500 for 2 consecutive days.     HEME/COAG  #Pancytopenia 2/2 to chemotherapy.   - Transfusion parameters: hgb <7g/dL and plts <20,000 (increased parameter 6/21 d/t brbpr).  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     ID  Prophy: ACV; levaquin; fluconazole.  PJP ppx to start at day +28.      GI/NUTRITION  #mucositis: meds to IV, Dilaudid IV prn, Carafate.  - Supportive cares; MMW, Saline rinses, Oxycodone PO, Dilaudid IV prn.   #Diarrhea - C. Diff negative on 6/11, 6/25. Imodium 4mg qid; lomotil prn.    #CINV: scheduled Zofran IV x 6/21; prn Ativan and compazine prn. *Emend 6/22. Zyprexa 2.5mg bid (monitor for orthostasis).  Emend 6/26.    #Elevated alk phos, possibly related to GCSF.  Trend hepatic panel. GB sludge noted on 6/25 ultrasound.   #Acid reflux:  increase protonix to bid; TUMS prn.  Continue carafate.   - Ulcer prophy: Protonix (now bid for acid reflux)  # Celiac Disease, see diet below     FLUIDS/NUTRITION  - PTA Lasix lasix 40mg am, 20mg afternoon- held since 6/17 d/t orthostasis and large volume diuresis on 6/16  - PTA spironoloactone -held since 6/17   - gluten free diet (h/o celiac disease)  - dietitian to follow  - Hypomagnesemia: replete per sliding scale       RENAL  # renal amyloidosis; diuresis as above  - Monitor Cr and electrolytes daily.   - Replace electrolytes per sliding scale     ENDOCRINE  # Hypothyroidism:  continue PTA cytomel and  "synthroid     CARDIAC  # cardiac amyloidosis: cautious fluid balance; diuresis as above  # h/o dizziness and chest pain with chemotherapy for amyloidosis; improved with addition of midodrine.   # orthostatic hypotension: on midodrine, as above. PRN fluid boluses vs diuresis for volume balance.  # hyperlipidemia: rosuvastatin on hold during acute transplant course     SUPPORTIVE CARE  - PT/OT to evaluate on admission and follow as indicated.   - BMT Social work to follow.     Known issues that I take into account for medical decisions, with salient changes to the plan considering these complexities noted above.    Patient Active Problem List   Diagnosis    Hypothyroidism due to acquired atrophy of thyroid    Family hx of colon cancer    Nonallopathic lesion of cervical region    Cervicalgia    Nonallopathic lesion of sacral region    Nonallopathic lesion of lumbar region    Lumbago    Nonallopathic lesion of thoracic region    Hyperlipidemia LDL goal <100    Dependent edema R>L    AL amyloidosis (H)    Hypogammaglobulinemia (H24)    Chronic kidney disease, stage 3a (H)    Celiac disease    Autologous donor of stem cells     Medically Ready for Discharge: Anticipated in 5+ Days    Clinically Significant Risk Factors            # Hypomagnesemia: Lowest Mg = 1.6 mg/dL in last 2 days, will replace as needed   # Hypoalbuminemia: Lowest albumin = 1.9 g/dL at 6/27/2024  3:22 AM, will monitor as appropriate   # Thrombocytopenia: Lowest platelets = 14 in last 2 days, will monitor for bleeding             #Precipitous drop in Hgb/Hct: Lowest Hgb this hospitalization: 6.5 g/dL. Will continue to monitor and treat/transfuse as appropriate.     # Overweight: Estimated body mass index is 26.84 kg/m  as calculated from the following:    Height as of this encounter: 1.63 m (5' 4.17\").    Weight as of this encounter: 71.3 kg (157 lb 3.2 oz).             I spent 30 minutes in the care of this patient today, which included time " necessary for preparation for the visit, obtaining history, ordering medications/tests/procedures as medically indicated, review of pertinent medical literature, counseling of the patient, communication of recommendations to the care team, and documentation time.      Dominga Pacheco PA-C

## 2024-06-28 NOTE — PROGRESS NOTES
Blood and Marrow Transplant Discharge Teach    RNCC met with Joi, patient's mother, to discuss upcoming discharge. Reviewed plan for line care supplies, PT/OT recommendations and upcoming clinic visits.    Patient demonstrates understanding of the following:  Which situations necessitate calling provider and whom to contact: Yes  Proper use and care of (medical equipment, care aids, etc.) Yes  Reviewed Post Auto Transplant guidelines and patient verbalizes understanding    Infection Control:  Patient instructed on hand hygiene: Yes  Signs and symptoms of infection taught: Yes    For CAR-T patient: Verified that patient has product specific wallet card. Patient instructed to remain within close proximity of facility (within 30-40 minutes per program standard) for at least four weeks post infusion. CAR-T patient is instructed not to operate motorized vehicle or heavy machinery for a period of 8 weeks.    Time spent with patient: 25 minutes.  Specific Concerns: NA    Discharge location: [facility]    [ x ] Home Infusion Company: [facility]    [ x ] Can fill meds at  pharmacy (BMT benefits soft check): yes    [ x ] PT/OT recommendations: home with assist    [ x ] 1st time discharge? Yes    [ x ] Discharge teach    [ x ] Micafungin teach- n/a    [ x ] Weekly Lab Day Preference? Wednesday mornings    [ x ] D0 BAN scheduling notification    [ x ] clonoSEQ testing needed? yes    [ x ] Car-T wallet card - NA     Alecia Boone  BMT Nurse Coordinator  Phone: [phone]  Pager: [phone]

## 2024-06-28 NOTE — PROGRESS NOTES
BMT INPATIENT ATTESTATION:  I saw and evaluated Vivian Brown as part of a shared APRN/PA visit.      I personally reviewed the vital signs, medications, labs, and imaging.     I personally performed the substantive portion of the medical decision making for this visit - please see the ZOHREH's documentation for full details.       Key management decisions made by me and carried out under my direction:   Pleasant 54 year old female with history of AL amyloidosis with renal and cardiac involvement, currently day +16 of melphalan auto PBSCT.      # BMT: s/p stem cell infusion on 6/12. Now engrafting with ANC up to 1.8. Continue G-CSF per protocol and transfusions as needed.   # GI: had significant mucositis, nausea, and diarrhea but improving slowly. S/p Emend redose 6/26, reintroduce Zyprexa and continue Imodium/PRN Lomotil.   # CV: PTA Lasix/spironolactone on hold with recent orthostatic hypotension, may be able to resume soon especially if edema recurs.   # Ppx: ACV, fluc, levo.   # Dispo: remain inpatient through engraftment, tentatively plan to discharge 7/1     Mary Disla MD   Date of Service (when I saw the patient): 06/28/2024

## 2024-06-29 ENCOUNTER — APPOINTMENT (OUTPATIENT)
Dept: PHYSICAL THERAPY | Facility: CLINIC | Age: 54
DRG: 016 | End: 2024-06-29
Attending: INTERNAL MEDICINE
Payer: COMMERCIAL

## 2024-06-29 LAB
ABO/RH(D): NORMAL
ANION GAP SERPL CALCULATED.3IONS-SCNC: 5 MMOL/L (ref 7–15)
ANTIBODY SCREEN: NEGATIVE
BASOPHILS # BLD AUTO: ABNORMAL 10*3/UL
BASOPHILS # BLD MANUAL: 0 10E3/UL (ref 0–0.2)
BASOPHILS NFR BLD AUTO: ABNORMAL %
BASOPHILS NFR BLD MANUAL: 0 %
BLD PROD TYP BPU: NORMAL
BLOOD COMPONENT TYPE: NORMAL
BUN SERPL-MCNC: 22 MG/DL (ref 6–20)
BURR CELLS BLD QL SMEAR: ABNORMAL
CALCIUM SERPL-MCNC: 8.1 MG/DL (ref 8.6–10)
CHLORIDE SERPL-SCNC: 107 MMOL/L (ref 98–107)
CODING SYSTEM: NORMAL
CREAT SERPL-MCNC: 1.43 MG/DL (ref 0.51–0.95)
DEPRECATED HCO3 PLAS-SCNC: 22 MMOL/L (ref 22–29)
EGFRCR SERPLBLD CKD-EPI 2021: 43 ML/MIN/1.73M2
EOSINOPHIL # BLD AUTO: ABNORMAL 10*3/UL
EOSINOPHIL # BLD MANUAL: 0 10E3/UL (ref 0–0.7)
EOSINOPHIL NFR BLD AUTO: ABNORMAL %
EOSINOPHIL NFR BLD MANUAL: 0 %
ERYTHROCYTE [DISTWIDTH] IN BLOOD BY AUTOMATED COUNT: 14.7 % (ref 10–15)
GLUCOSE SERPL-MCNC: 89 MG/DL (ref 70–99)
HCT VFR BLD AUTO: 22.3 % (ref 35–47)
HGB BLD-MCNC: 7.6 G/DL (ref 11.7–15.7)
IMM GRANULOCYTES # BLD: ABNORMAL 10*3/UL
IMM GRANULOCYTES NFR BLD: ABNORMAL %
ISSUE DATE AND TIME: NORMAL
LYMPHOCYTES # BLD AUTO: ABNORMAL 10*3/UL
LYMPHOCYTES # BLD MANUAL: 0 10E3/UL (ref 0.8–5.3)
LYMPHOCYTES NFR BLD AUTO: ABNORMAL %
LYMPHOCYTES NFR BLD MANUAL: 1 %
MAGNESIUM SERPL-MCNC: 1.6 MG/DL (ref 1.7–2.3)
MCH RBC QN AUTO: 31 PG (ref 26.5–33)
MCHC RBC AUTO-ENTMCNC: 34.1 G/DL (ref 31.5–36.5)
MCV RBC AUTO: 91 FL (ref 78–100)
METAMYELOCYTES # BLD MANUAL: 0.1 10E3/UL
METAMYELOCYTES NFR BLD MANUAL: 3 %
MONOCYTES # BLD AUTO: ABNORMAL 10*3/UL
MONOCYTES # BLD MANUAL: 0.2 10E3/UL (ref 0–1.3)
MONOCYTES NFR BLD AUTO: ABNORMAL %
MONOCYTES NFR BLD MANUAL: 4 %
NEUTROPHILS # BLD AUTO: ABNORMAL 10*3/UL
NEUTROPHILS # BLD MANUAL: 3.5 10E3/UL (ref 1.6–8.3)
NEUTROPHILS NFR BLD AUTO: ABNORMAL %
NEUTROPHILS NFR BLD MANUAL: 92 %
NRBC # BLD AUTO: 0 10E3/UL
NRBC # BLD AUTO: 0 10E3/UL
NRBC BLD AUTO-RTO: 0 /100
NRBC BLD MANUAL-RTO: 1 %
PHOSPHATE SERPL-MCNC: 2.4 MG/DL (ref 2.5–4.5)
PLAT MORPH BLD: ABNORMAL
PLATELET # BLD AUTO: 18 10E3/UL (ref 150–450)
POTASSIUM SERPL-SCNC: 3.7 MMOL/L (ref 3.4–5.3)
RBC # BLD AUTO: 2.45 10E6/UL (ref 3.8–5.2)
RBC MORPH BLD: ABNORMAL
SODIUM SERPL-SCNC: 134 MMOL/L (ref 135–145)
SPECIMEN EXPIRATION DATE: NORMAL
UNIT ABO/RH: NORMAL
UNIT NUMBER: NORMAL
UNIT STATUS: NORMAL
UNIT TYPE ISBT: 7300
WBC # BLD AUTO: 3.8 10E3/UL (ref 4–11)

## 2024-06-29 PROCEDURE — 258N000003 HC RX IP 258 OP 636: Performed by: STUDENT IN AN ORGANIZED HEALTH CARE EDUCATION/TRAINING PROGRAM

## 2024-06-29 PROCEDURE — 250N000013 HC RX MED GY IP 250 OP 250 PS 637: Performed by: INTERNAL MEDICINE

## 2024-06-29 PROCEDURE — 85007 BL SMEAR W/DIFF WBC COUNT: CPT

## 2024-06-29 PROCEDURE — 250N000011 HC RX IP 250 OP 636: Performed by: PHYSICIAN ASSISTANT

## 2024-06-29 PROCEDURE — 258N000003 HC RX IP 258 OP 636: Performed by: INTERNAL MEDICINE

## 2024-06-29 PROCEDURE — 80048 BASIC METABOLIC PNL TOTAL CA: CPT

## 2024-06-29 PROCEDURE — 86920 COMPATIBILITY TEST SPIN: CPT

## 2024-06-29 PROCEDURE — 258N000003 HC RX IP 258 OP 636: Performed by: PHYSICIAN ASSISTANT

## 2024-06-29 PROCEDURE — 250N000009 HC RX 250: Performed by: INTERNAL MEDICINE

## 2024-06-29 PROCEDURE — 250N000011 HC RX IP 250 OP 636: Performed by: INTERNAL MEDICINE

## 2024-06-29 PROCEDURE — 97530 THERAPEUTIC ACTIVITIES: CPT | Mod: GP

## 2024-06-29 PROCEDURE — 99232 SBSQ HOSP IP/OBS MODERATE 35: CPT | Mod: FS | Performed by: STUDENT IN AN ORGANIZED HEALTH CARE EDUCATION/TRAINING PROGRAM

## 2024-06-29 PROCEDURE — 84100 ASSAY OF PHOSPHORUS: CPT | Performed by: INTERNAL MEDICINE

## 2024-06-29 PROCEDURE — 250N000013 HC RX MED GY IP 250 OP 250 PS 637: Performed by: PHYSICIAN ASSISTANT

## 2024-06-29 PROCEDURE — 86900 BLOOD TYPING SEROLOGIC ABO: CPT

## 2024-06-29 PROCEDURE — P9037 PLATE PHERES LEUKOREDU IRRAD: HCPCS | Performed by: PHYSICIAN ASSISTANT

## 2024-06-29 PROCEDURE — 250N000011 HC RX IP 250 OP 636: Performed by: STUDENT IN AN ORGANIZED HEALTH CARE EDUCATION/TRAINING PROGRAM

## 2024-06-29 PROCEDURE — 97110 THERAPEUTIC EXERCISES: CPT | Mod: GP

## 2024-06-29 PROCEDURE — 250N000013 HC RX MED GY IP 250 OP 250 PS 637

## 2024-06-29 PROCEDURE — 85027 COMPLETE CBC AUTOMATED: CPT

## 2024-06-29 PROCEDURE — 206N000001 HC R&B BMT UMMC

## 2024-06-29 PROCEDURE — 83735 ASSAY OF MAGNESIUM: CPT | Performed by: INTERNAL MEDICINE

## 2024-06-29 RX ORDER — SUCRALFATE ORAL 1 G/10ML
1 SUSPENSION ORAL 4 TIMES DAILY PRN
Status: DISCONTINUED | OUTPATIENT
Start: 2024-06-29 | End: 2024-07-01 | Stop reason: HOSPADM

## 2024-06-29 RX ORDER — POTASSIUM CHLORIDE 29.8 MG/ML
20 INJECTION INTRAVENOUS ONCE
Status: COMPLETED | OUTPATIENT
Start: 2024-06-29 | End: 2024-06-29

## 2024-06-29 RX ADMIN — MIDODRINE HYDROCHLORIDE 10 MG: 5 TABLET ORAL at 12:37

## 2024-06-29 RX ADMIN — ONDANSETRON HYDROCHLORIDE 8 MG: 8 TABLET, FILM COATED ORAL at 08:03

## 2024-06-29 RX ADMIN — Medication 10 ML: at 12:38

## 2024-06-29 RX ADMIN — MIDODRINE HYDROCHLORIDE 10 MG: 5 TABLET ORAL at 17:24

## 2024-06-29 RX ADMIN — ONDANSETRON HYDROCHLORIDE 8 MG: 8 TABLET, FILM COATED ORAL at 17:24

## 2024-06-29 RX ADMIN — LIOTHYRONINE SODIUM 5 MCG: 5 TABLET ORAL at 14:08

## 2024-06-29 RX ADMIN — ONDANSETRON HYDROCHLORIDE 8 MG: 8 TABLET, FILM COATED ORAL at 23:02

## 2024-06-29 RX ADMIN — LEVOFLOXACIN 250 MG: 250 TABLET, FILM COATED ORAL at 10:15

## 2024-06-29 RX ADMIN — PANTOPRAZOLE SODIUM 40 MG: 40 INJECTION, POWDER, FOR SOLUTION INTRAVENOUS at 08:03

## 2024-06-29 RX ADMIN — OLANZAPINE 2.5 MG: 2.5 TABLET, FILM COATED ORAL at 08:03

## 2024-06-29 RX ADMIN — DEXTROSE MONOHYDRATE 20 ML: 50 INJECTION, SOLUTION INTRAVENOUS at 20:25

## 2024-06-29 RX ADMIN — MIDODRINE HYDROCHLORIDE 10 MG: 5 TABLET ORAL at 08:03

## 2024-06-29 RX ADMIN — Medication 50 MCG: at 08:03

## 2024-06-29 RX ADMIN — LORATADINE 10 MG: 10 TABLET ORAL at 08:03

## 2024-06-29 RX ADMIN — LEVOTHYROXINE SODIUM 200 MCG: 200 TABLET ORAL at 06:44

## 2024-06-29 RX ADMIN — FLUCONAZOLE 200 MG: 200 TABLET ORAL at 08:03

## 2024-06-29 RX ADMIN — LIOTHYRONINE SODIUM 5 MCG: 5 TABLET ORAL at 08:03

## 2024-06-29 RX ADMIN — Medication 3 CAPSULE: at 08:03

## 2024-06-29 RX ADMIN — Medication 10 ML: at 23:02

## 2024-06-29 RX ADMIN — DEXTROSE MONOHYDRATE 330 MCG: 50 INJECTION, SOLUTION INTRAVENOUS at 20:25

## 2024-06-29 RX ADMIN — PROCHLORPERAZINE EDISYLATE 5 MG: 5 INJECTION INTRAMUSCULAR; INTRAVENOUS at 03:24

## 2024-06-29 RX ADMIN — ACYCLOVIR SODIUM 325 MG: 50 INJECTION, SOLUTION INTRAVENOUS at 07:59

## 2024-06-29 RX ADMIN — PANTOPRAZOLE SODIUM 40 MG: 40 INJECTION, POWDER, FOR SOLUTION INTRAVENOUS at 20:19

## 2024-06-29 RX ADMIN — ACETAMINOPHEN 650 MG: 325 TABLET, FILM COATED ORAL at 17:48

## 2024-06-29 RX ADMIN — POTASSIUM CHLORIDE 20 MEQ: 29.8 INJECTION, SOLUTION INTRAVENOUS at 05:21

## 2024-06-29 RX ADMIN — POTASSIUM PHOSPHATE, MONOBASIC POTASSIUM PHOSPHATE, DIBASIC 9 MMOL: 224; 236 INJECTION, SOLUTION, CONCENTRATE INTRAVENOUS at 06:25

## 2024-06-29 RX ADMIN — Medication 5 ML: at 03:24

## 2024-06-29 RX ADMIN — PROCHLORPERAZINE EDISYLATE 5 MG: 5 INJECTION INTRAMUSCULAR; INTRAVENOUS at 20:16

## 2024-06-29 RX ADMIN — SUCRALFATE 1 G: 1 SUSPENSION ORAL at 08:03

## 2024-06-29 RX ADMIN — ACYCLOVIR SODIUM 325 MG: 50 INJECTION, SOLUTION INTRAVENOUS at 20:24

## 2024-06-29 RX ADMIN — LOPERAMIDE HYDROCHLORIDE 4 MG: 2 CAPSULE ORAL at 08:03

## 2024-06-29 RX ADMIN — OLANZAPINE 2.5 MG: 2.5 TABLET, FILM COATED ORAL at 21:11

## 2024-06-29 ASSESSMENT — ACTIVITIES OF DAILY LIVING (ADL)
ADLS_ACUITY_SCORE: 25

## 2024-06-29 NOTE — PLAN OF CARE
"/70 (BP Location: Right arm)   Pulse 97   Temp 99  F (37.2  C) (Oral)   Resp 17   Ht 1.63 m (5' 4.17\")   Wt 71.6 kg (157 lb 12.8 oz)   SpO2 96%   BMI 26.94 kg/m      AVSS on RA. A&O x4. Up with SBA, bed alarm is on. Denies nausea. Received tylenol for shoulder pain with relief. Poor/fair PO intake. No bm since midnight. Planning for discharge on  Monday. Continue with POC    Problem: Adult Inpatient Plan of Care  Goal: Optimal Comfort and Wellbeing  Outcome: Progressing     Problem: Stem Cell/Bone Marrow Transplant  Goal: Diarrhea Symptom Control  Outcome: Progressing    "

## 2024-06-29 NOTE — PLAN OF CARE
"/75   Pulse 92   Temp 98.2  F (36.8  C) (Oral)   Resp 15   Ht 1.63 m (5' 4.17\")   Wt 71.6 kg (157 lb 12.8 oz)   SpO2 95%   BMI 26.94 kg/m      Pt slept comfortably throughout the night between cares. VSS on room air, afebrile. Pt denies pain, endorses intermittent nausea. PRN compazine given with adequate relief per pt. Pt's potassium, phosphorus, and platelet levels below goal this morning. Potassium replacement given. Phos and platelets currently infusing. Other heme and electrolyte levels above goal, no other replacements necessary. Continue with current POC.    Goal Outcome Evaluation:    Problem: Stem Cell/Bone Marrow Transplant  Goal: Absence of Infection  6/29/2024 0527 by Adelina Trevino RN  Outcome: Progressing  6/28/2024 2239 by Adelina Trevino RN  Outcome: Progressing  Intervention: Prevent and Manage Infection  Recent Flowsheet Documentation  Taken 6/28/2024 2000 by Adelina Trevino RN  Infection Prevention:   environmental surveillance performed   hand hygiene promoted   personal protective equipment utilized   rest/sleep promoted   single patient room provided  Infection Management: aseptic technique maintained  Isolation Precautions: protective environment maintained     Problem: Stem Cell/Bone Marrow Transplant  Goal: Nausea and Vomiting Symptom Relief  6/29/2024 0527 by Adelina Trevino RN  Outcome: Not Progressing  6/28/2024 2239 by Adelina Trevino RN  Outcome: Not Progressing  Intervention: Prevent and Manage Nausea and Vomiting  Recent Flowsheet Documentation  Taken 6/28/2024 2000 by Adelina Trevino RN  Nausea/Vomiting Interventions:   nausea triggers minimized   sips of clear liquids given   slow deep breathing encouraged   stimuli minimized     Problem: Stem Cell/Bone Marrow Transplant  Goal: Diarrhea Symptom Control  Outcome: Not Progressing  Intervention: Manage Diarrhea  Recent Flowsheet Documentation  Taken 6/28/2024 2000 by Adelina Trevino RN  Skin Protection: incontinence pads " utilized

## 2024-06-29 NOTE — PROGRESS NOTES
"BMT/Cell Therapy Daily Progress Note   06/29/2024    Patient ID:  Vivian Brown is a 54 year old female, currently day +17 s/p autologous stem cell transplant for amyloidosis. Hospitalization complicated by significant orthostatic hypotension.     Admission date: 6/11/2024    INTERVAL  HISTORY   She reports improvement in her stool frequency, also no longer incontinent. Maybe about 5 stools yesterday. Vomited x1. Overall feeling better and notes how much better compared to a few days ago. No dizziness of lasix, no LE edema.    Review of Systems: ROS negative except as noted above.      PHYSICAL EXAM   KPS:  70    /75   Pulse 92   Temp 98.2  F (36.8  C) (Oral)   Resp 15   Ht 1.63 m (5' 4.17\")   Wt 71.6 kg (157 lb 12.8 oz)   SpO2 95%   BMI 26.94 kg/m       General appearance: Patient is sitting up in bed. NAD but pale.  Hair falling out.   Eyes: Sclera & conjunctiva clear bilaterally  Cardio: RRR; II/V systolic murmur  Pulmonary: normal RR; no apparent use of accessory respiratory muscles; clear to  auscultation bilaterally  Abdominal: Soft, non-distended abdomen; no tenderness  Extremities: No edema  Skin: warm and dry; no rash or concerning lesions.  Neuro: A&O, no focal neuro deficits.  Access: R CVC clean, dry and intact, non-tender, no drainage.      LABS: I have assessed all abnormal lab values for their clinical significance and any values considered clinically significant have been addressed in the assessment and plan.      Lab Results   Component Value Date    WBC 3.8 (L) 06/29/2024    ANEU 3.5 06/29/2024    HGB 7.6 (L) 06/29/2024    HCT 22.3 (L) 06/29/2024    PLT 18 (LL) 06/29/2024     (L) 06/29/2024    POTASSIUM 3.7 06/29/2024    CHLORIDE 107 06/29/2024    CO2 22 06/29/2024    GLC 89 06/29/2024    BUN 22.0 (H) 06/29/2024    CR 1.43 (H) 06/29/2024    MAG 1.6 (L) 06/29/2024    INR 1.31 (H) 06/24/2024    BILITOTAL 0.3 06/27/2024    AST 6 06/27/2024    ALT 11 06/27/2024    ALKPHOS 260 (H) " 06/27/2024    PROTTOTAL 3.9 (L) 06/27/2024    ALBUMIN 1.9 (L) 06/27/2024       ASSESSMENT AND PLAN   Vivian Brown is a 54 year old female with amyloidosis, undergoing melphalan followed by autologous stem cell rescue. She is day +17.     BMT/MM/Amyloidosis  - BMT MD/Coordinator: Wei Araiza [id number]  - Cell dose: total collections of 5.99 million CD34+ cells/kg.     - Prep as above. Flush and pre-meds prior to transplant.  - GCSF started day +5, continue until ANC > 2500 for 2 consecutive days. Last day 6/29!    HEME/COAG  #Pancytopenia 2/2 to chemotherapy.   - Transfusion parameters: hgb <7g/dL and plts <20,000 (increased parameter 6/21 d/t brbpr).  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     ID  Prophy: ACV; levaquin; fluconazole.  PJP ppx to start at day +28.      GI/NUTRITION  #mucositis: meds to IV, Dilaudid IV prn, Carafate.  - Supportive cares; MMW, Saline rinses, Oxycodone PO, Dilaudid IV prn.   #Diarrhea - C. Diff negative on 6/11, 6/25. 6/29 Moving imodium to prn as stools become less frequent  #CINV: scheduled Zofran IV x 6/21; prn Ativan and compazine prn. *Emend 6/22. Zyprexa 2.5mg bid (monitor for orthostasis). Emend 6/26.    #Elevated alk phos, possibly related to GCSF.  Trend hepatic panel. GB sludge noted on 6/25 ultrasound.   #Acid reflux:  increase protonix to bid; TUMS prn. Carafate prn  - Ulcer prophy: Protonix (now bid for acid reflux)  # Celiac Disease, see diet below     FLUIDS/NUTRITION  - PTA Lasix lasix 40mg am, 20mg afternoon- held since 6/17 d/t orthostasis and large volume diuresis on 6/16.  - PTA spironoloactone -held since 6/17   - gluten free diet (h/o celiac disease)  - dietitian to follow  - Hypomagnesemia: replete per sliding scale     RENAL  # renal amyloidosis; diuresis as above  - Monitor Cr and electrolytes daily.   - Replace electrolytes per sliding scale     ENDOCRINE  # Hypothyroidism:  continue PTA cytomel and synthroid     CARDIAC  # cardiac amyloidosis: cautious  "fluid balance; diuresis as above  # h/o dizziness and chest pain with chemotherapy for amyloidosis; improved with addition of midodrine.   # orthostatic hypotension: on midodrine, as above. PRN fluid boluses vs diuresis for volume balance.  # hyperlipidemia: rosuvastatin on hold during acute transplant course     SUPPORTIVE CARE  - PT/OT to evaluate on admission and follow as indicated.   - BMT Social work to follow.     Known issues that I take into account for medical decisions, with salient changes to the plan considering these complexities noted above.    Patient Active Problem List   Diagnosis    Hypothyroidism due to acquired atrophy of thyroid    Family hx of colon cancer    Nonallopathic lesion of cervical region    Cervicalgia    Nonallopathic lesion of sacral region    Nonallopathic lesion of lumbar region    Lumbago    Nonallopathic lesion of thoracic region    Hyperlipidemia LDL goal <100    Dependent edema R>L    AL amyloidosis (H)    Hypogammaglobulinemia (H24)    Chronic kidney disease, stage 3a (H)    Celiac disease    Autologous donor of stem cells     Medically Ready for Discharge: Anticipated in 2-4 Days  Anticipate discharge Monday 7/1, requested clinic appointments.    Clinically Significant Risk Factors            # Hypomagnesemia: Lowest Mg = 1.6 mg/dL in last 2 days, will replace as needed   # Hypoalbuminemia: Lowest albumin = 1.9 g/dL at 6/27/2024  3:22 AM, will monitor as appropriate   # Thrombocytopenia: Lowest platelets = 18 in last 2 days, will monitor for bleeding             #Precipitous drop in Hgb/Hct: Lowest Hgb this hospitalization: 6.5 g/dL. Will continue to monitor and treat/transfuse as appropriate.     # Overweight: Estimated body mass index is 26.94 kg/m  as calculated from the following:    Height as of this encounter: 1.63 m (5' 4.17\").    Weight as of this encounter: 71.6 kg (157 lb 12.8 oz).             I spent 30 minutes in the care of this patient today, which included " time necessary for preparation for the visit, obtaining history, ordering medications/tests/procedures as medically indicated, review of pertinent medical literature, counseling of the patient, communication of recommendations to the care team, and documentation time.      Deana Bose PA-C on 6/29/2024 at 10:14 AM

## 2024-06-29 NOTE — PLAN OF CARE
Goal Outcome Evaluation:    Pt endorses intermittent nausea, declined intervention at this time. Up with SBA in room, uses call light appropriately. VSS on room air, afebrile. Continue with current POC.    Problem: Stem Cell/Bone Marrow Transplant  Goal: Nausea and Vomiting Symptom Relief  Outcome: Not Progressing  Intervention: Prevent and Manage Nausea and Vomiting  Recent Flowsheet Documentation  Taken 6/28/2024 2000 by Adelina Trevino, RN  Nausea/Vomiting Interventions:   nausea triggers minimized   sips of clear liquids given   slow deep breathing encouraged   stimuli minimized     Problem: Skin Injury Risk Increased  Goal: Skin Health and Integrity  Intervention: Optimize Skin Protection  Recent Flowsheet Documentation  Taken 6/28/2024 2000 by Adelina Trevino, RN  Pressure Reduction Techniques: frequent weight shift encouraged  Skin Protection: incontinence pads utilized  Activity Management:   ambulated to bathroom   back to bed  Head of Bed (HOB) Positioning: HOB at 30-45 degrees     Problem: Adult Inpatient Plan of Care  Goal: Absence of Hospital-Acquired Illness or Injury  Intervention: Identify and Manage Fall Risk  Recent Flowsheet Documentation  Taken 6/28/2024 2000 by Adelina Trevino, RN  Safety Promotion/Fall Prevention: safety round/check completed

## 2024-06-30 LAB
ACANTHOCYTES BLD QL SMEAR: SLIGHT
ANION GAP SERPL CALCULATED.3IONS-SCNC: 7 MMOL/L (ref 7–15)
BASOPHILS # BLD AUTO: ABNORMAL 10*3/UL
BASOPHILS # BLD MANUAL: 0 10E3/UL (ref 0–0.2)
BASOPHILS NFR BLD AUTO: ABNORMAL %
BASOPHILS NFR BLD MANUAL: 0 %
BUN SERPL-MCNC: 19.4 MG/DL (ref 6–20)
BURR CELLS BLD QL SMEAR: SLIGHT
CALCIUM SERPL-MCNC: 7.4 MG/DL (ref 8.6–10)
CHLORIDE SERPL-SCNC: 106 MMOL/L (ref 98–107)
CREAT SERPL-MCNC: 1.44 MG/DL (ref 0.51–0.95)
DEPRECATED HCO3 PLAS-SCNC: 22 MMOL/L (ref 22–29)
EGFRCR SERPLBLD CKD-EPI 2021: 43 ML/MIN/1.73M2
ELLIPTOCYTES BLD QL SMEAR: SLIGHT
EOSINOPHIL # BLD AUTO: ABNORMAL 10*3/UL
EOSINOPHIL # BLD MANUAL: 0 10E3/UL (ref 0–0.7)
EOSINOPHIL NFR BLD AUTO: ABNORMAL %
EOSINOPHIL NFR BLD MANUAL: 0 %
ERYTHROCYTE [DISTWIDTH] IN BLOOD BY AUTOMATED COUNT: 14.9 % (ref 10–15)
GLUCOSE SERPL-MCNC: 82 MG/DL (ref 70–99)
HCT VFR BLD AUTO: 21.3 % (ref 35–47)
HGB BLD-MCNC: 7.2 G/DL (ref 11.7–15.7)
IMM GRANULOCYTES # BLD: ABNORMAL 10*3/UL
IMM GRANULOCYTES NFR BLD: ABNORMAL %
LYMPHOCYTES # BLD AUTO: ABNORMAL 10*3/UL
LYMPHOCYTES # BLD MANUAL: 0 10E3/UL (ref 0.8–5.3)
LYMPHOCYTES NFR BLD AUTO: ABNORMAL %
LYMPHOCYTES NFR BLD MANUAL: 1 %
MAGNESIUM SERPL-MCNC: 1.5 MG/DL (ref 1.7–2.3)
MAGNESIUM SERPL-MCNC: 2.4 MG/DL (ref 1.7–2.3)
MCH RBC QN AUTO: 31 PG (ref 26.5–33)
MCHC RBC AUTO-ENTMCNC: 33.8 G/DL (ref 31.5–36.5)
MCV RBC AUTO: 92 FL (ref 78–100)
MONOCYTES # BLD AUTO: ABNORMAL 10*3/UL
MONOCYTES # BLD MANUAL: 0.1 10E3/UL (ref 0–1.3)
MONOCYTES NFR BLD AUTO: ABNORMAL %
MONOCYTES NFR BLD MANUAL: 3 %
NEUTROPHILS # BLD AUTO: ABNORMAL 10*3/UL
NEUTROPHILS # BLD MANUAL: 4.7 10E3/UL (ref 1.6–8.3)
NEUTROPHILS NFR BLD AUTO: ABNORMAL %
NEUTROPHILS NFR BLD MANUAL: 96 %
NRBC # BLD AUTO: 0 10E3/UL
NRBC BLD AUTO-RTO: 0 /100
PHOSPHATE SERPL-MCNC: 2.5 MG/DL (ref 2.5–4.5)
PLAT MORPH BLD: ABNORMAL
PLATELET # BLD AUTO: 39 10E3/UL (ref 150–450)
POTASSIUM SERPL-SCNC: 3.7 MMOL/L (ref 3.4–5.3)
RBC # BLD AUTO: 2.32 10E6/UL (ref 3.8–5.2)
RBC MORPH BLD: ABNORMAL
SODIUM SERPL-SCNC: 135 MMOL/L (ref 135–145)
WBC # BLD AUTO: 4.9 10E3/UL (ref 4–11)

## 2024-06-30 PROCEDURE — 258N000003 HC RX IP 258 OP 636: Performed by: STUDENT IN AN ORGANIZED HEALTH CARE EDUCATION/TRAINING PROGRAM

## 2024-06-30 PROCEDURE — 80048 BASIC METABOLIC PNL TOTAL CA: CPT

## 2024-06-30 PROCEDURE — 250N000011 HC RX IP 250 OP 636: Performed by: PHYSICIAN ASSISTANT

## 2024-06-30 PROCEDURE — 93010 ELECTROCARDIOGRAM REPORT: CPT | Performed by: INTERNAL MEDICINE

## 2024-06-30 PROCEDURE — 258N000003 HC RX IP 258 OP 636: Performed by: PHYSICIAN ASSISTANT

## 2024-06-30 PROCEDURE — 83735 ASSAY OF MAGNESIUM: CPT | Performed by: INTERNAL MEDICINE

## 2024-06-30 PROCEDURE — 93005 ELECTROCARDIOGRAM TRACING: CPT

## 2024-06-30 PROCEDURE — 250N000013 HC RX MED GY IP 250 OP 250 PS 637

## 2024-06-30 PROCEDURE — 206N000001 HC R&B BMT UMMC

## 2024-06-30 PROCEDURE — 250N000013 HC RX MED GY IP 250 OP 250 PS 637: Performed by: PHYSICIAN ASSISTANT

## 2024-06-30 PROCEDURE — 85014 HEMATOCRIT: CPT

## 2024-06-30 PROCEDURE — 250N000013 HC RX MED GY IP 250 OP 250 PS 637: Performed by: STUDENT IN AN ORGANIZED HEALTH CARE EDUCATION/TRAINING PROGRAM

## 2024-06-30 PROCEDURE — 85007 BL SMEAR W/DIFF WBC COUNT: CPT

## 2024-06-30 PROCEDURE — 250N000011 HC RX IP 250 OP 636: Performed by: INTERNAL MEDICINE

## 2024-06-30 PROCEDURE — 250N000013 HC RX MED GY IP 250 OP 250 PS 637: Performed by: INTERNAL MEDICINE

## 2024-06-30 PROCEDURE — 99233 SBSQ HOSP IP/OBS HIGH 50: CPT | Mod: FS | Performed by: STUDENT IN AN ORGANIZED HEALTH CARE EDUCATION/TRAINING PROGRAM

## 2024-06-30 PROCEDURE — 84100 ASSAY OF PHOSPHORUS: CPT | Performed by: INTERNAL MEDICINE

## 2024-06-30 RX ORDER — OLANZAPINE 2.5 MG/1
2.5 TABLET, FILM COATED ORAL 2 TIMES DAILY
Qty: 30 TABLET | Refills: 0 | Status: SHIPPED | OUTPATIENT
Start: 2024-06-30 | End: 2024-07-15

## 2024-06-30 RX ORDER — PANTOPRAZOLE SODIUM 40 MG/1
40 TABLET, DELAYED RELEASE ORAL
Status: DISCONTINUED | OUTPATIENT
Start: 2024-07-01 | End: 2024-07-01 | Stop reason: HOSPADM

## 2024-06-30 RX ORDER — FLUCONAZOLE 100 MG/1
100 TABLET ORAL DAILY
Qty: 30 TABLET | Refills: 0 | Status: SHIPPED | OUTPATIENT
Start: 2024-07-01 | End: 2024-07-15

## 2024-06-30 RX ORDER — ACYCLOVIR 800 MG/1
800 TABLET ORAL 2 TIMES DAILY
Status: DISCONTINUED | OUTPATIENT
Start: 2024-06-30 | End: 2024-07-01 | Stop reason: HOSPADM

## 2024-06-30 RX ORDER — FLUCONAZOLE 100 MG/1
100 TABLET ORAL DAILY
Status: DISCONTINUED | OUTPATIENT
Start: 2024-06-30 | End: 2024-07-01 | Stop reason: HOSPADM

## 2024-06-30 RX ORDER — FUROSEMIDE 20 MG
20 TABLET ORAL DAILY
COMMUNITY
Start: 2024-06-30 | End: 2024-07-08

## 2024-06-30 RX ORDER — SULFAMETHOXAZOLE/TRIMETHOPRIM 800-160 MG
1 TABLET ORAL
Qty: 16 TABLET | Refills: 11 | Status: SHIPPED | OUTPATIENT
Start: 2024-07-15

## 2024-06-30 RX ORDER — POTASSIUM CHLORIDE 29.8 MG/ML
20 INJECTION INTRAVENOUS ONCE
Status: COMPLETED | OUTPATIENT
Start: 2024-06-30 | End: 2024-06-30

## 2024-06-30 RX ORDER — PANTOPRAZOLE SODIUM 40 MG/1
40 TABLET, DELAYED RELEASE ORAL
Qty: 30 TABLET | Refills: 1 | Status: SHIPPED | OUTPATIENT
Start: 2024-07-01

## 2024-06-30 RX ORDER — MAGNESIUM SULFATE HEPTAHYDRATE 40 MG/ML
4 INJECTION, SOLUTION INTRAVENOUS ONCE
Status: COMPLETED | OUTPATIENT
Start: 2024-06-30 | End: 2024-06-30

## 2024-06-30 RX ORDER — ACYCLOVIR 800 MG/1
800 TABLET ORAL 2 TIMES DAILY
Qty: 60 TABLET | Refills: 3 | Status: SHIPPED | OUTPATIENT
Start: 2024-06-30 | End: 2024-07-03

## 2024-06-30 RX ADMIN — MAGNESIUM SULFATE IN WATER 4 G: 40 INJECTION, SOLUTION INTRAVENOUS at 06:05

## 2024-06-30 RX ADMIN — OLANZAPINE 2.5 MG: 2.5 TABLET, FILM COATED ORAL at 20:32

## 2024-06-30 RX ADMIN — LIOTHYRONINE SODIUM 5 MCG: 5 TABLET ORAL at 15:02

## 2024-06-30 RX ADMIN — Medication 50 MCG: at 08:00

## 2024-06-30 RX ADMIN — LIOTHYRONINE SODIUM 5 MCG: 5 TABLET ORAL at 08:00

## 2024-06-30 RX ADMIN — PROCHLORPERAZINE EDISYLATE 5 MG: 5 INJECTION INTRAMUSCULAR; INTRAVENOUS at 06:05

## 2024-06-30 RX ADMIN — SODIUM CHLORIDE 500 ML: 9 INJECTION, SOLUTION INTRAVENOUS at 11:18

## 2024-06-30 RX ADMIN — ONDANSETRON HYDROCHLORIDE 8 MG: 8 TABLET, FILM COATED ORAL at 15:02

## 2024-06-30 RX ADMIN — OLANZAPINE 2.5 MG: 2.5 TABLET, FILM COATED ORAL at 08:00

## 2024-06-30 RX ADMIN — ONDANSETRON HYDROCHLORIDE 8 MG: 8 TABLET, FILM COATED ORAL at 23:30

## 2024-06-30 RX ADMIN — ACYCLOVIR 800 MG: 800 TABLET ORAL at 20:31

## 2024-06-30 RX ADMIN — MIDODRINE HYDROCHLORIDE 10 MG: 5 TABLET ORAL at 16:58

## 2024-06-30 RX ADMIN — MIDODRINE HYDROCHLORIDE 10 MG: 5 TABLET ORAL at 11:18

## 2024-06-30 RX ADMIN — Medication 10 ML: at 09:46

## 2024-06-30 RX ADMIN — MIDODRINE HYDROCHLORIDE 10 MG: 5 TABLET ORAL at 08:00

## 2024-06-30 RX ADMIN — POTASSIUM CHLORIDE 20 MEQ: 29.8 INJECTION, SOLUTION INTRAVENOUS at 04:53

## 2024-06-30 RX ADMIN — ACYCLOVIR SODIUM 325 MG: 50 INJECTION, SOLUTION INTRAVENOUS at 08:14

## 2024-06-30 RX ADMIN — Medication 5 ML: at 13:33

## 2024-06-30 RX ADMIN — PANTOPRAZOLE SODIUM 40 MG: 40 INJECTION, POWDER, FOR SOLUTION INTRAVENOUS at 08:00

## 2024-06-30 RX ADMIN — PROCHLORPERAZINE MALEATE 5 MG: 5 TABLET ORAL at 21:30

## 2024-06-30 RX ADMIN — LEVOTHYROXINE SODIUM 200 MCG: 200 TABLET ORAL at 06:06

## 2024-06-30 RX ADMIN — Medication 5 ML: at 03:31

## 2024-06-30 RX ADMIN — ONDANSETRON HYDROCHLORIDE 8 MG: 8 TABLET, FILM COATED ORAL at 08:00

## 2024-06-30 RX ADMIN — FLUCONAZOLE 100 MG: 100 TABLET ORAL at 08:00

## 2024-06-30 ASSESSMENT — ACTIVITIES OF DAILY LIVING (ADL)
ADLS_ACUITY_SCORE: 25

## 2024-06-30 NOTE — PLAN OF CARE
"/80 (BP Location: Right arm)   Pulse 105   Temp 98  F (36.7  C) (Oral)   Resp 17   Ht 1.63 m (5' 4.17\")   Wt 71.6 kg (157 lb 12.8 oz)   SpO2 98%   BMI 26.94 kg/m      Tachy up to 150s, EKG obtained showing sinus tach and received a 500 ml NS bolus. HR sustained in 140s for a while but is now back to baseline. Afebrile, OVSS on RA. A&O x4. Up with SBA. Denies pain and nausea. Fair PO intake. Good UOP and 1 formed bm. Mag recheck was 2.4, no further replacement was needed. CVC caps were changed. Shaved head. Planning for discharge tomorrow. Continue with POC    Problem: Adult Inpatient Plan of Care  Goal: Optimal Comfort and Wellbeing  Outcome: Progressing     Problem: Stem Cell/Bone Marrow Transplant  Goal: Diarrhea Symptom Control  Outcome: Progressing  Goal: Nausea and Vomiting Symptom Relief  Outcome: Progressing  Intervention: Prevent and Manage Nausea and Vomiting  Recent Flowsheet Documentation  Taken 6/30/2024 0800 by Stanislav Townsend, RN  Nausea/Vomiting Interventions: antiemetic  "

## 2024-06-30 NOTE — PROGRESS NOTES
"BMT/Cell Therapy Daily Progress Note   06/30/2024    Patient ID:  Vivian Brown is a 54 year old female, currently day +18 s/p autologous stem cell transplant for amyloidosis. Hospitalization complicated by significant orthostatic hypotension.     Admission date: 6/11/2024    INTERVAL  HISTORY   No stool x12 hours. No GI complaints this morning. She reported no symptoms of dizziness, light headedness during tachycardia. She denies chest pain or SOB.    She experienced a ring in her vision like the stamp of a [place] which line was blurry, however inside the [place] was clear. No black out of vision. It resolved over about an hour. She does not think that has happened to her before. No pain with eye movement, no light sensitivity, no headache, no flashers, floaters or change in her field of vision.      Review of Systems: ROS negative except as noted above.      PHYSICAL EXAM   KPS:  70    /79 (BP Location: Right arm)   Pulse (!) 147   Temp 98.3  F (36.8  C) (Oral)   Resp 18   Ht 1.63 m (5' 4.17\")   Wt 71.6 kg (157 lb 12.8 oz)   SpO2 95%   BMI 26.94 kg/m       General appearance: Patient is sitting up in bed. NAD but pale.  Hair falling out.   Eyes: Sclera & conjunctiva clear bilaterally  Cardio: RRR; II/V systolic murmur  Pulmonary: Tachy in ~130-140s, regular rhythm on exam; no apparent use of accessory respiratory muscles; clear to  auscultation bilaterally  Abdominal: Soft, non-distended abdomen; no tenderness  Extremities: No edema  Skin: warm and dry; no rash or concerning lesions.  Neuro: A&O, CNII-XII with NO focal deficits. Normal field of vision without changes on exam  Access: R CVC clean, dry and intact, non-tender, no drainage.      LABS: I have assessed all abnormal lab values for their clinical significance and any values considered clinically significant have been addressed in the assessment and plan.      Lab Results   Component Value Date    WBC 4.9 06/30/2024    ANEU 4.7 06/30/2024 "    HGB 7.2 (L) 06/30/2024    HCT 21.3 (L) 06/30/2024    PLT 39 (LL) 06/30/2024     06/30/2024    POTASSIUM 3.7 06/30/2024    CHLORIDE 106 06/30/2024    CO2 22 06/30/2024    GLC 82 06/30/2024    BUN 19.4 06/30/2024    CR 1.44 (H) 06/30/2024    MAG 1.5 (L) 06/30/2024    INR 1.31 (H) 06/24/2024    BILITOTAL 0.3 06/27/2024    AST 6 06/27/2024    ALT 11 06/27/2024    ALKPHOS 260 (H) 06/27/2024    PROTTOTAL 3.9 (L) 06/27/2024    ALBUMIN 1.9 (L) 06/27/2024       ASSESSMENT AND PLAN   Vivian Brown is a 54 year old female with amyloidosis, undergoing melphalan followed by autologous stem cell rescue. She is day +18.     BMT/MM/Amyloidosis  - BMT MD/Coordinator: Wei  - [id number]  - Cell dose: total collections of 5.99 million CD34+ cells/kg.     - Prep as above. Flush and pre-meds prior to transplant.  - GCSF started day +5, continue until ANC > 2500 for 2 consecutive days. Last day 6/29!    HEME/COAG  #Pancytopenia 2/2 to chemotherapy.   - Transfusion parameters: hgb <7g/dL and plts <20,000 (increased parameter 6/21 d/t brbpr).  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     ID  Prophy: ACV; levaquin; fluconazole.  PJP ppx to start at day +28.      GI/NUTRITION  #mucositis: resolving  #Diarrhea - C. Diff negative, resolved  #CINV: scheduled Zofran IV x 6/21; prn Ativan and compazine prn. *Emend 6/22. Zyprexa 2.5mg bid (monitor for orthostasis). Emend 6/26. Significantly improved 6/30  #Elevated alk phos, possibly related to GCSF.  Trend hepatic panel. GB sludge noted on 6/25 ultrasound.   #Acid reflux:  increase protonix to bid; TUMS prn. Carafate prn  - Ulcer prophy: Protonix (now bid for acid reflux)  # Celiac Disease- dietitian to follow     FLUIDS/NUTRITION  - PTA Lasix 40mg am, 20mg afternoon- held since 6/17 d/t orthostasis and large volume diuresis on 6/16.   - PTA spironoloactone -held since 6/17   - Hypomagnesemia: replete per sliding scale     RENAL  # renal amyloidosis; sensitive to fluid changes so  giving fluids slowly for tachycardia: 1/2 L NS over 2 hours  - Monitor Cr and electrolytes daily.   - Replace electrolytes per sliding scale     ENDOCRINE  # Hypothyroidism:  continue PTA cytomel and synthroid     CARDIAC  # cardiac amyloidosis: cautious fluid balance; diuresis as above  # h/o dizziness and chest pain with chemotherapy for amyloidosis; improved with addition of midodrine.   # orthostatic hypotension: on midodrine, as above. PRN fluid boluses vs diuresis for volume balance.  # hyperlipidemia: rosuvastatin on hold during acute transplant course     Neuro: 6/30 vision change x1 hour with ring of blurriness (not black out). Neuro exam unremarkable. Monitor    SUPPORTIVE CARE  - PT/OT to evaluate on admission and follow as indicated.   - BMT Social work to follow.     Known issues that I take into account for medical decisions, with salient changes to the plan considering these complexities noted above.    Patient Active Problem List   Diagnosis    Hypothyroidism due to acquired atrophy of thyroid    Family hx of colon cancer    Nonallopathic lesion of cervical region    Cervicalgia    Nonallopathic lesion of sacral region    Nonallopathic lesion of lumbar region    Lumbago    Nonallopathic lesion of thoracic region    Hyperlipidemia LDL goal <100    Dependent edema R>L    AL amyloidosis (H)    Hypogammaglobulinemia (H24)    Chronic kidney disease, stage 3a (H)    Celiac disease    Autologous donor of stem cells     Medically Ready for Discharge: tomorrow  Anticipate discharge Monday 7/1, requested clinic appointments, meds sent to discharge pharmacy    Clinically Significant Risk Factors            # Hypomagnesemia: Lowest Mg = 1.5 mg/dL in last 2 days, will replace as needed   # Hypoalbuminemia: Lowest albumin = 1.9 g/dL at 6/27/2024  3:22 AM, will monitor as appropriate   # Thrombocytopenia: Lowest platelets = 18 in last 2 days, will monitor for bleeding             #Precipitous drop in Hgb/Hct:  "Lowest Hgb this hospitalization: 6.5 g/dL. Will continue to monitor and treat/transfuse as appropriate.     # Overweight: Estimated body mass index is 26.94 kg/m  as calculated from the following:    Height as of this encounter: 1.63 m (5' 4.17\").    Weight as of this encounter: 71.6 kg (157 lb 12.8 oz).             I spent 40 minutes in the care of this patient today, which included time necessary for preparation for the visit, obtaining history, ordering medications/tests/procedures as medically indicated, review of pertinent medical literature, counseling of the patient, communication of recommendations to the care team, and documentation time.      Deana Bose PA-C       "

## 2024-06-30 NOTE — PROVIDER NOTIFICATION
RENETTA Torres notified in person of pt's HR sustaining in the 140s. Pt also having vision changes.    EKG was ordered and PA assessed pt at bedside.

## 2024-06-30 NOTE — PLAN OF CARE
"/66 (BP Location: Right arm)   Pulse 101   Temp 98.4  F (36.9  C) (Oral)   Resp 16   Ht 1.63 m (5' 4.17\")   Wt 71.6 kg (157 lb 12.8 oz)   SpO2 98%   BMI 26.94 kg/m    Tmax=98.4F    Neuro: A&Ox4.  Intermittent nausea. Compazine x2  Cardiac: VSS. K+ & Mag replaced. Need Mag post infusion recheck.   Respiratory: Sating 98%+ on RA.  GI/: Adequate urine output. BM X0.  Pt denied scheduled imodium  Diet/appetite: Tolerating gluten free diet. Takes medication with cold 1% milk.   Activity:  SBA up to bathroom.  Pain: Pt did not report any pain at this time.   Skin: No new deficits noted.  LDA's: CVC Blue Hep Locked. Brown infusing electrolyte.     Plan: Planning for discharge 7/1. Continue with POC. Notify primary team with changes.  " Xray cartside

## 2024-07-01 ENCOUNTER — HOSPITAL ENCOUNTER (OUTPATIENT)
Facility: AMBULATORY SURGERY CENTER | Age: 54
End: 2024-07-01
Attending: PHYSICIAN ASSISTANT

## 2024-07-01 VITALS
SYSTOLIC BLOOD PRESSURE: 104 MMHG | WEIGHT: 158.2 LBS | HEART RATE: 96 BPM | HEIGHT: 64 IN | TEMPERATURE: 98 F | BODY MASS INDEX: 27.01 KG/M2 | RESPIRATION RATE: 16 BRPM | OXYGEN SATURATION: 97 % | DIASTOLIC BLOOD PRESSURE: 76 MMHG

## 2024-07-01 DIAGNOSIS — E85.81 LIGHT CHAIN (AL) AMYLOIDOSIS (H): ICD-10-CM

## 2024-07-01 DIAGNOSIS — Z86.2 PERSONAL HISTORY OF DISEASES OF BLOOD AND BLOOD-FORMING ORGANS: Primary | ICD-10-CM

## 2024-07-01 DIAGNOSIS — Z94.84 HISTORY OF PERIPHERAL STEM CELL TRANSPLANT (H): ICD-10-CM

## 2024-07-01 LAB
ALBUMIN SERPL BCG-MCNC: 1.9 G/DL (ref 3.5–5.2)
ALP SERPL-CCNC: 163 U/L (ref 40–150)
ALT SERPL W P-5'-P-CCNC: 9 U/L (ref 0–50)
ANION GAP SERPL CALCULATED.3IONS-SCNC: 5 MMOL/L (ref 7–15)
AST SERPL W P-5'-P-CCNC: 11 U/L (ref 0–45)
ATRIAL RATE - MUSE: 138 BPM
BASOPHILS # BLD AUTO: ABNORMAL 10*3/UL
BASOPHILS # BLD MANUAL: 0 10E3/UL (ref 0–0.2)
BASOPHILS NFR BLD AUTO: ABNORMAL %
BASOPHILS NFR BLD MANUAL: 0 %
BILIRUB DIRECT SERPL-MCNC: <0.2 MG/DL (ref 0–0.3)
BILIRUB SERPL-MCNC: <0.2 MG/DL
BLD PROD TYP BPU: NORMAL
BLOOD COMPONENT TYPE: NORMAL
BUN SERPL-MCNC: 16.2 MG/DL (ref 6–20)
CALCIUM SERPL-MCNC: 7.3 MG/DL (ref 8.6–10)
CHLORIDE SERPL-SCNC: 104 MMOL/L (ref 98–107)
CODING SYSTEM: NORMAL
CREAT SERPL-MCNC: 1.42 MG/DL (ref 0.51–0.95)
CROSSMATCH: NORMAL
CROSSMATCH: NORMAL
DEPRECATED HCO3 PLAS-SCNC: 23 MMOL/L (ref 22–29)
DIASTOLIC BLOOD PRESSURE - MUSE: NORMAL MMHG
EGFRCR SERPLBLD CKD-EPI 2021: 44 ML/MIN/1.73M2
EOSINOPHIL # BLD AUTO: ABNORMAL 10*3/UL
EOSINOPHIL # BLD MANUAL: 0 10E3/UL (ref 0–0.7)
EOSINOPHIL NFR BLD AUTO: ABNORMAL %
EOSINOPHIL NFR BLD MANUAL: 0 %
ERYTHROCYTE [DISTWIDTH] IN BLOOD BY AUTOMATED COUNT: 14.9 % (ref 10–15)
FRAGMENTS BLD QL SMEAR: SLIGHT
GLUCOSE SERPL-MCNC: 88 MG/DL (ref 70–99)
HCT VFR BLD AUTO: 17.9 % (ref 35–47)
HGB BLD-MCNC: 6.3 G/DL (ref 11.7–15.7)
IMM GRANULOCYTES # BLD: ABNORMAL 10*3/UL
IMM GRANULOCYTES NFR BLD: ABNORMAL %
INR PPP: 1.1 (ref 0.85–1.15)
INTERPRETATION ECG - MUSE: NORMAL
ISSUE DATE AND TIME: NORMAL
LYMPHOCYTES # BLD AUTO: ABNORMAL 10*3/UL
LYMPHOCYTES # BLD MANUAL: 0.1 10E3/UL (ref 0.8–5.3)
LYMPHOCYTES NFR BLD AUTO: ABNORMAL %
LYMPHOCYTES NFR BLD MANUAL: 3 %
MAGNESIUM SERPL-MCNC: 2.1 MG/DL (ref 1.7–2.3)
MCH RBC QN AUTO: 31.7 PG (ref 26.5–33)
MCHC RBC AUTO-ENTMCNC: 35.2 G/DL (ref 31.5–36.5)
MCV RBC AUTO: 90 FL (ref 78–100)
MONOCYTES # BLD AUTO: ABNORMAL 10*3/UL
MONOCYTES # BLD MANUAL: 0.1 10E3/UL (ref 0–1.3)
MONOCYTES NFR BLD AUTO: ABNORMAL %
MONOCYTES NFR BLD MANUAL: 3 %
NEUTROPHILS # BLD AUTO: ABNORMAL 10*3/UL
NEUTROPHILS # BLD MANUAL: 3.4 10E3/UL (ref 1.6–8.3)
NEUTROPHILS NFR BLD AUTO: ABNORMAL %
NEUTROPHILS NFR BLD MANUAL: 94 %
NRBC # BLD AUTO: 0 10E3/UL
NRBC # BLD AUTO: 0 10E3/UL
NRBC BLD AUTO-RTO: 1 /100
NRBC BLD MANUAL-RTO: 1 %
P AXIS - MUSE: 37 DEGREES
PHOSPHATE SERPL-MCNC: 2.5 MG/DL (ref 2.5–4.5)
PLAT MORPH BLD: ABNORMAL
PLATELET # BLD AUTO: 35 10E3/UL (ref 150–450)
POTASSIUM SERPL-SCNC: 3.8 MMOL/L (ref 3.4–5.3)
PR INTERVAL - MUSE: 152 MS
PROT SERPL-MCNC: 3.5 G/DL (ref 6.4–8.3)
QRS DURATION - MUSE: 90 MS
QT - MUSE: 280 MS
QTC - MUSE: 424 MS
R AXIS - MUSE: 136 DEGREES
RBC # BLD AUTO: 1.99 10E6/UL (ref 3.8–5.2)
RBC MORPH BLD: ABNORMAL
SODIUM SERPL-SCNC: 132 MMOL/L (ref 135–145)
SYSTOLIC BLOOD PRESSURE - MUSE: NORMAL MMHG
T AXIS - MUSE: 20 DEGREES
UNIT ABO/RH: NORMAL
UNIT NUMBER: NORMAL
UNIT STATUS: NORMAL
UNIT TYPE ISBT: 5100
VENTRICULAR RATE- MUSE: 138 BPM
WBC # BLD AUTO: 3.6 10E3/UL (ref 4–11)

## 2024-07-01 PROCEDURE — 250N000013 HC RX MED GY IP 250 OP 250 PS 637

## 2024-07-01 PROCEDURE — 250N000013 HC RX MED GY IP 250 OP 250 PS 637: Performed by: PHYSICIAN ASSISTANT

## 2024-07-01 PROCEDURE — 250N000011 HC RX IP 250 OP 636: Performed by: PHYSICIAN ASSISTANT

## 2024-07-01 PROCEDURE — P9040 RBC LEUKOREDUCED IRRADIATED: HCPCS

## 2024-07-01 PROCEDURE — 82248 BILIRUBIN DIRECT: CPT

## 2024-07-01 PROCEDURE — 85027 COMPLETE CBC AUTOMATED: CPT

## 2024-07-01 PROCEDURE — 250N000013 HC RX MED GY IP 250 OP 250 PS 637: Performed by: STUDENT IN AN ORGANIZED HEALTH CARE EDUCATION/TRAINING PROGRAM

## 2024-07-01 PROCEDURE — 99239 HOSP IP/OBS DSCHRG MGMT >30: CPT | Mod: FS

## 2024-07-01 PROCEDURE — 85007 BL SMEAR W/DIFF WBC COUNT: CPT

## 2024-07-01 PROCEDURE — 250N000011 HC RX IP 250 OP 636: Performed by: INTERNAL MEDICINE

## 2024-07-01 PROCEDURE — 84100 ASSAY OF PHOSPHORUS: CPT

## 2024-07-01 PROCEDURE — 250N000013 HC RX MED GY IP 250 OP 250 PS 637: Performed by: INTERNAL MEDICINE

## 2024-07-01 PROCEDURE — 85610 PROTHROMBIN TIME: CPT

## 2024-07-01 PROCEDURE — 83735 ASSAY OF MAGNESIUM: CPT

## 2024-07-01 PROCEDURE — 80053 COMPREHEN METABOLIC PANEL: CPT

## 2024-07-01 RX ORDER — ONDANSETRON 8 MG/1
8 TABLET, FILM COATED ORAL EVERY 8 HOURS
Qty: 30 TABLET | Refills: 1 | Status: SHIPPED | OUTPATIENT
Start: 2024-07-01

## 2024-07-01 RX ORDER — HEPARIN SODIUM (PORCINE) LOCK FLUSH IV SOLN 100 UNIT/ML 100 UNIT/ML
5 SOLUTION INTRAVENOUS
Status: CANCELLED | OUTPATIENT
Start: 2024-07-01

## 2024-07-01 RX ORDER — HEPARIN SODIUM,PORCINE 10 UNIT/ML
5-20 VIAL (ML) INTRAVENOUS DAILY PRN
Status: CANCELLED | OUTPATIENT
Start: 2024-07-02

## 2024-07-01 RX ORDER — LOPERAMIDE HCL 2 MG
2 CAPSULE ORAL ONCE
Status: COMPLETED | OUTPATIENT
Start: 2024-07-01 | End: 2024-07-01

## 2024-07-01 RX ORDER — HEPARIN SODIUM (PORCINE) LOCK FLUSH IV SOLN 100 UNIT/ML 100 UNIT/ML
5 SOLUTION INTRAVENOUS
Status: CANCELLED | OUTPATIENT
Start: 2024-07-02

## 2024-07-01 RX ORDER — DIPHENHYDRAMINE HYDROCHLORIDE 50 MG/ML
50 INJECTION INTRAMUSCULAR; INTRAVENOUS
Status: CANCELLED
Start: 2024-07-02

## 2024-07-01 RX ORDER — POTASSIUM CHLORIDE 29.8 MG/ML
20 INJECTION INTRAVENOUS ONCE
Status: COMPLETED | OUTPATIENT
Start: 2024-07-01 | End: 2024-07-01

## 2024-07-01 RX ORDER — EPINEPHRINE 1 MG/ML
0.3 INJECTION, SOLUTION INTRAMUSCULAR; SUBCUTANEOUS EVERY 5 MIN PRN
Status: CANCELLED | OUTPATIENT
Start: 2024-07-01

## 2024-07-01 RX ORDER — DIPHENHYDRAMINE HYDROCHLORIDE 50 MG/ML
50 INJECTION INTRAMUSCULAR; INTRAVENOUS
Status: CANCELLED
Start: 2024-07-01

## 2024-07-01 RX ORDER — EPINEPHRINE 1 MG/ML
0.3 INJECTION, SOLUTION, CONCENTRATE INTRAVENOUS EVERY 5 MIN PRN
Status: CANCELLED | OUTPATIENT
Start: 2024-07-02

## 2024-07-01 RX ORDER — PROCHLORPERAZINE MALEATE 10 MG
5 TABLET ORAL EVERY 6 HOURS PRN
Qty: 30 TABLET | Refills: 1 | Status: SHIPPED | OUTPATIENT
Start: 2024-07-01

## 2024-07-01 RX ORDER — HEPARIN SODIUM,PORCINE 10 UNIT/ML
5-20 VIAL (ML) INTRAVENOUS DAILY PRN
Status: CANCELLED | OUTPATIENT
Start: 2024-07-01

## 2024-07-01 RX ORDER — LOPERAMIDE HYDROCHLORIDE 2 MG/1
2 TABLET ORAL 4 TIMES DAILY PRN
Qty: 30 TABLET | Refills: 1 | Status: SHIPPED | OUTPATIENT
Start: 2024-07-01

## 2024-07-01 RX ADMIN — FLUCONAZOLE 100 MG: 100 TABLET ORAL at 08:55

## 2024-07-01 RX ADMIN — POTASSIUM CHLORIDE 20 MEQ: 29.8 INJECTION, SOLUTION INTRAVENOUS at 08:55

## 2024-07-01 RX ADMIN — MIDODRINE HYDROCHLORIDE 10 MG: 5 TABLET ORAL at 12:06

## 2024-07-01 RX ADMIN — Medication 1 CAPSULE: at 12:08

## 2024-07-01 RX ADMIN — PROCHLORPERAZINE MALEATE 5 MG: 5 TABLET ORAL at 06:33

## 2024-07-01 RX ADMIN — LOPERAMIDE HYDROCHLORIDE 2 MG: 2 CAPSULE ORAL at 12:06

## 2024-07-01 RX ADMIN — LEVOTHYROXINE SODIUM 200 MCG: 200 TABLET ORAL at 06:36

## 2024-07-01 RX ADMIN — OLANZAPINE 2.5 MG: 2.5 TABLET, FILM COATED ORAL at 08:55

## 2024-07-01 RX ADMIN — ONDANSETRON HYDROCHLORIDE 8 MG: 8 TABLET, FILM COATED ORAL at 08:55

## 2024-07-01 RX ADMIN — Medication 5 ML: at 05:31

## 2024-07-01 RX ADMIN — ACYCLOVIR 800 MG: 800 TABLET ORAL at 08:55

## 2024-07-01 RX ADMIN — PANTOPRAZOLE SODIUM 40 MG: 40 TABLET, DELAYED RELEASE ORAL at 08:55

## 2024-07-01 RX ADMIN — LIOTHYRONINE SODIUM 5 MCG: 5 TABLET ORAL at 08:55

## 2024-07-01 RX ADMIN — MIDODRINE HYDROCHLORIDE 10 MG: 5 TABLET ORAL at 08:55

## 2024-07-01 RX ADMIN — ACETAMINOPHEN 650 MG: 325 TABLET, FILM COATED ORAL at 08:53

## 2024-07-01 RX ADMIN — Medication 50 MCG: at 08:55

## 2024-07-01 ASSESSMENT — ACTIVITIES OF DAILY LIVING (ADL)
ADLS_ACUITY_SCORE: 25

## 2024-07-01 NOTE — PLAN OF CARE
"Goal Outcome Evaluation: /66 (BP Location: Right arm)   Pulse 98   Temp 98.3  F (36.8  C) (Oral)   Resp 18   Ht 1.63 m (5' 4.17\")   Wt 71.6 kg (157 lb 12.8 oz)   SpO2 97%   BMI 26.94 kg/m       A&Ox4. Steady and calls appropriately.  Slightly tachy. All other VS stable. No pain or nausea. Compazine given prn as profilactin. Had a pudding cup. Need RBC and K need to be replaced. Continue with plan of care.   Problem: Adult Inpatient Plan of Care  Goal: Plan of Care Review  Description: The Plan of Care Review/Shift note should be completed every shift.  The Outcome Evaluation is a brief statement about your assessment that the patient is improving, declining, or no change.  This information will be displayed automatically on your shift  note.  Outcome: Progressing  Goal: Patient-Specific Goal (Individualized)  Description: You can add care plan individualizations to a care plan. Examples of Individualization might be:  \"Parent requests to be called daily at 9am for status\", \"I have a hard time hearing out of my right ear\", or \"Do not touch me to wake me up as it startles  me\".  Outcome: Progressing  Goal: Absence of Hospital-Acquired Illness or Injury  Outcome: Progressing  Intervention: Identify and Manage Fall Risk  Recent Flowsheet Documentation  Taken 6/30/2024 2331 by Gretchen Cota RN  Safety Promotion/Fall Prevention: safety round/check completed  Intervention: Prevent Skin Injury  Recent Flowsheet Documentation  Taken 6/30/2024 2331 by Gretchen Cota, RN  Body Position: position changed independently  Skin Protection: incontinence pads utilized  Device Skin Pressure Protection: adhesive use limited  Intervention: Prevent and Manage VTE (Venous Thromboembolism) Risk  Recent Flowsheet Documentation  Taken 6/30/2024 2331 by Gretchen Cota, RN  VTE Prevention/Management: SCDs off (sequential compression devices)  Intervention: Prevent Infection  Recent Flowsheet Documentation  Taken " 6/30/2024 2331 by Gretchen Cota RN  Infection Prevention:   environmental surveillance performed   hand hygiene promoted   personal protective equipment utilized   rest/sleep promoted   single patient room provided  Goal: Optimal Comfort and Wellbeing  Outcome: Progressing  Goal: Readiness for Transition of Care  Outcome: Progressing     Problem: Stem Cell/Bone Marrow Transplant  Goal: Optimal Coping with Transplant  Outcome: Progressing  Intervention: Optimize Patient/Family Adjustment to Transplant  Recent Flowsheet Documentation  Taken 6/30/2024 2331 by Gretchen Cota RN  Supportive Measures:   active listening utilized   decision-making supported   goal-setting facilitated   positive reinforcement provided  Goal: Diarrhea Symptom Control  Outcome: Progressing  Intervention: Manage Diarrhea  Recent Flowsheet Documentation  Taken 6/30/2024 2331 by Gretchen Cota RN  Skin Protection: incontinence pads utilized  Fluid/Electrolyte Management: fluids provided  Perineal Care: perineal hygiene encouraged  Goal: Improved Activity Tolerance  Outcome: Progressing  Intervention: Promote Improved Energy  Recent Flowsheet Documentation  Taken 6/30/2024 2331 by Gretchen Cota RN  Fatigue Management: frequent rest breaks encouraged  Sleep/Rest Enhancement:   awakenings minimized   family presence promoted   consistent schedule promoted   regular sleep/rest pattern promoted   relaxation techniques promoted   noise level reduced  Activity Management:   ambulated to bathroom   back to bed  Environmental Support: calm environment promoted  Goal: Blood Counts Within Acceptable Range  Outcome: Progressing  Intervention: Monitor and Manage Hematologic Symptoms  Recent Flowsheet Documentation  Taken 6/30/2024 2331 by Gretchen Cota RN  Sleep/Rest Enhancement:   awakenings minimized   family presence promoted   consistent schedule promoted   regular sleep/rest pattern promoted   relaxation techniques promoted    noise level reduced  Bleeding Precautions:   blood pressure closely monitored   coagulation study results reviewed   monitored for signs of bleeding  Medication Review/Management: medications reviewed  Goal: Absence of Infection  Outcome: Progressing  Intervention: Prevent and Manage Infection  Recent Flowsheet Documentation  Taken 6/30/2024 2331 by Gretchen Cota RN  Infection Prevention:   environmental surveillance performed   hand hygiene promoted   personal protective equipment utilized   rest/sleep promoted   single patient room provided  Infection Management: aseptic technique maintained  Isolation Precautions: protective environment maintained  Goal: Improved Oral Mucous Membrane Health and Integrity  Outcome: Progressing  Intervention: Promote Oral Comfort and Health  Recent Flowsheet Documentation  Taken 6/30/2024 2331 by Gretchen Cota RN  Oral Mucous Membrane Protection: lip/mouth moisturizer applied  Oral Care: oral rinse provided  Goal: Nausea and Vomiting Symptom Relief  Outcome: Progressing  Goal: Optimal Nutrition Intake  Outcome: Progressing  Intervention: Minimize and Manage Barriers to Oral Intake  Recent Flowsheet Documentation  Taken 6/30/2024 2331 by Gretchen Cota RN  Oral Nutrition Promotion:   calorie-dense liquids provided   calorie-dense foods provided   rest periods promoted     Problem: Skin Injury Risk Increased  Goal: Skin Health and Integrity  Outcome: Progressing  Intervention: Plan: Nurse Driven Intervention: Moisture Management  Recent Flowsheet Documentation  Taken 6/30/2024 2331 by Gretchen Cota RN  Moisture Interventions: Encourage regular toileting  Intervention: Optimize Skin Protection  Recent Flowsheet Documentation  Taken 6/30/2024 2331 by Gretchen Cota RN  Pressure Reduction Techniques: frequent weight shift encouraged  Skin Protection: incontinence pads utilized  Activity Management:   ambulated to bathroom   back to bed  Intervention: Promote and  Optimize Oral Intake  Recent Flowsheet Documentation  Taken 6/30/2024 2331 by Gretchen Cota, RN  Oral Nutrition Promotion:   calorie-dense liquids provided   calorie-dense foods provided   rest periods promoted

## 2024-07-01 NOTE — PLAN OF CARE
Physical Therapy Discharge Summary    Reason for therapy discharge:    Discharged to home.Discharged to [facility]    Progress towards therapy goal(s). See goals on Care Plan in [facility] electronic health record for goal details.  Goals met    Therapy recommendation(s):    Continue home exercise program.

## 2024-07-01 NOTE — PROGRESS NOTES
Care Management Discharge Note    Discharge Date: 07/01/2024       Discharge Disposition: Home    Discharge Services: None    Discharge DME: None    Discharge Transportation: family or friend will provide    Private pay costs discussed: Not applicable    Does the patient's insurance plan have a 3 day qualifying hospital stay waiver?  No    PAS Confirmation Code:    Patient/family educated on Medicare website which has current facility and service quality ratings: no    Education Provided on the Discharge Plan: Yes  Persons Notified of Discharge Plans: Pt, mother  Patient/Family in Agreement with the Plan: yes    Handoff Referral Completed: No    Additional Information:  Per medical team the pt is ready for discharge to the [facility] today. OSCAR met with the pt and her mother Lovely regarding discharge today. The pt is day +19 s/p auto BMT. She notes she is feeling good at this time and feeling ready for discharge. The pt's mother will be her main caregiver, neither had any questions or concerns about discharge. OSCAR reviewed the discharge folder with the pt and encouraged to reach out should any questions come up.    Rylie Aguilar, THONY, [facility]  [facility]  Phone [phone]  Vocera- BMT OSCAR 3

## 2024-07-01 NOTE — DISCHARGE SUMMARY
[facility] Discharge Summary   Vivian Brown MRN# [id number]   Age: 54 year old  YOB: 1970   Date of Admission: 6/11/2024  Date of Discharge: 7/1/2024   Admitting Physician: Jomar Viramontes MD  Discharge Physician: Carlito Canela MD   BMT Physician: Dr. Viramontes   Primary MD: Alin Torres    Discharge Diagnoses:    DISCHARGE DIAGNOSES:  1. Hx amyloidosis s/p autologous transplant   2. Hypothyroidism   3. Orthostatic hypotension   4. Pancytopenia 2/2 chemotherapy   5. CINV   6. GERD   7. Hyperlipidemia     Discharge Medications:         Medication List        Started      fluconazole 100 MG tablet  Commonly known as: DIFLUCAN  100 mg, Oral, DAILY     loperamide 2 MG tablet  Commonly known as: IMODIUM A-D  2 mg, Oral, 4 TIMES DAILY PRN     OLANZapine 2.5 MG tablet  Commonly known as: zyPREXA  2.5 mg, Oral, 2 TIMES DAILY     pantoprazole 40 MG EC tablet  Commonly known as: PROTONIX  40 mg, Oral, EVERY MORNING BEFORE BREAKFAST     psyllium Packet  Commonly known as: METAMUCIL/KONSYL  1 packet, Oral, DAILY     sulfamethoxazole-trimethoprim 800-160 MG tablet  Commonly known as: BACTRIM DS  1 tablet, Oral, EVERY MONDAY TUESDAY BID  Start taking on: July 15, 2024            Modified      acyclovir 800 MG tablet  Commonly known as: ZOVIRAX  800 mg, Oral, 2 TIMES DAILY  What changed:   medication strength  how much to take     furosemide 20 MG tablet  Commonly known as: LASIX  What changed:   how much to take  how to take this  when to take this     * ondansetron 8 MG tablet  Commonly known as: ZOFRAN  What changed: Another medication with the same name was added. Make sure you understand how and when to take each.     * ondansetron 8 MG tablet  Commonly known as: ZOFRAN  8 mg, Oral, EVERY 8 HOURS  What changed: You were already taking a medication with the same name, and this prescription was added. Make sure you understand how and when to take each.     prochlorperazine 10 MG tablet  Commonly known  as: COMPAZINE  5 mg, Oral, EVERY 6 HOURS PRN  What changed: medication strength           * This list has 2 medication(s) that are the same as other medications prescribed for you. Read the directions carefully, and ask your doctor or other care provider to review them with you.                Discontinued      acetaminophen 500 MG tablet  Commonly known as: TYLENOL     loratadine 10 MG tablet  Commonly known as: CLARITIN     Multi-vitamin per tablet  Generic drug: multivitamin w/minerals     rosuvastatin 10 MG tablet  Commonly known as: CRESTOR     spironolactone 25 MG tablet  Commonly known as: ALDACTONE            Brief History of Illness:    HISTORY OF PRESENT ILLNESS:   Vivian Ya 54F with PMH of amyloidosis, hypothyroidism, celiac disease, and orthostatic hypotension. Admitted on 6/11/2024 for an autologous transplant. Her hospital course was large complicated by orthostatic hypotension, mucositis, and fluid imbalance issues. Her orthostatic hypotension has resolved, she is able to ambulate without having a syncopal episode. She was on diuretics for hypervolemia p/t amdission which were discontinued during her hospital stay and not continued on discharge. Her mucositis is resolving, on day of discharge she was able to eat and tolerate PO intake much better.     For the patient's family history, social history, past medical and surgical history, bone marrow transplant workup, admission medications, hospital admission review of systems and physical exam, please refer to hospital admission H&P dated 6/11/2024.       Hospital Course:    BMT/MM/Amyloidosis  - BMT MD/Coordinator: Wei Araiza [id number]  - Cell dose: total collections of 5.99 million CD34+ cells/kg.     - Prep as above. Flush and pre-meds prior to transplant.  - GCSF started day +5, continue until ANC > 2500 for 2 consecutive days. Last day 6/29!     HEME/COAG  #Pancytopenia 2/2 to chemotherapy.   - Transfusion parameters: hgb <7g/dL and  plts <20,000 (increased parameter 6/21 d/t brbpr).  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     ID  Prophy: ACV; fluconazole.  PJP ppx to start at day +28.      GI/NUTRITION  #Diarrhea - C. Diff negative, metamucil.   #CINV: scheduled Zofran IV x 6/21; prn Ativan and compazine prn. *Emend 6/22. Zyprexa 2.5mg bid (monitor for orthostasis). Emend 6/26. Significantly improved 6/30  #Elevated alk phos, possibly related to GCSF.  Trend hepatic panel. GB sludge noted on 6/25 ultrasound.   #Acid reflux: protonix daily  # Celiac Disease- dietitian to follow     FLUIDS/NUTRITION  - PTA Lasix 40mg am, 20mg afternoon- held since 6/17 d/t orthostasis and large volume diuresis on 6/16. Not continued on discharge, assess need OP.   - PTA spironoloactone -held since 6/17. Not continued on discharge, assess need while outpatient.   - Hypomagnesemia: replete per sliding scale     RENAL  # renal amyloidosis; sensitive to fluid changes so giving fluids slowly for tachycardia: 1/2 L NS over 2 hours  - Monitor Cr and electrolytes daily.   - Replace electrolytes per sliding scale     ENDOCRINE  # Hypothyroidism:  continue PTA cytomel and synthroid     CARDIAC  # cardiac amyloidosis: cautious fluid balance; diuresis as above  # h/o dizziness and chest pain with chemotherapy for amyloidosis; improved with addition of midodrine.   # orthostatic hypotension: on midodrine, as above. PRN fluid boluses vs diuresis for volume balance.  # hyperlipidemia: rosuvastatin on hold during acute transplant course     Neuro: 6/30 vision change x1 hour with ring of blurriness (not black out). Neuro exam unremarkable. Monitor     SUPPORTIVE CARE  - PT/OT to evaluate on admission and follow as indicated.   - BMT Social work to follow.     Summary:   - message sent to Dr. Viramontes regarding cardiac MRI on 7/10 (will likely cancel)   - scheduled Zofran with PRN compazine for nausea (in discharge instructions)   - 1 unit of RBC p/t discharge   - Lasix and  spironolactone not continued on discharge, edema much improved, weight has been stable   - 7/2: lab, infusion   - 7/3: lab, infusion, pharmacy and ZOHREH   - 7/5: lab and infusion (requested)   - 7/9: lab, infusion, ZOHREH (requested)   - message sent to [facility] for day 28 visit with Dr. Viramontes     Discharge Instructions and Follow-Up:    Discharge diet: Regular diet as tolerated  Discharge activity: Activity as tolerated   Discharge follow-up: Follow up with BMT Clinic as follows:  DISPOSITION: Vivian Brown will be discharged home today 7/1.  Vivian Brown has been scheduled to follow up in the BMT Clinic on 7/2 and 7/3.    Discharge Disposition:    Discharged to home in chronic stable condition.  Travon Jerez PA-C

## 2024-07-01 NOTE — SIGNIFICANT EVENT
SPIRITUAL HEALTH SERVICES Significant Event  [employer] [facility] [employer]  [facility] 5c    Pt anointed by Father Timoteo Vasquez   Pager [phone]

## 2024-07-01 NOTE — PLAN OF CARE
0730- 1400: Tachy to 104. OVSS on RA. 1 unit RBC's given for Hgb 6.3. K 3.8 and replaced IV. 2 watery stools. PRN Metamucil and Imodium given. Reviewed line teaching with patient and her mother. Discharge instructions provided and questions answered. Discharging to [facility] with mother. Ride via mother. Left unit via Wheelchair.

## 2024-07-01 NOTE — DISCHARGE INSTRUCTIONS
For Nausea:  - 8:00 am: Zofran 8 mg   - 12:00 pm: take compazine if nauseated   - 4:00 pm: Zofran 8 mg   - 6:00 pm: take compazine if nauseated   - If nauseated at bedtime you can take another Zofran or Compazine     Diarrhea:   - You can use the imodium up to 4x/day (2 mg at a time)   - Target 1-2 stools/day  - Do not take if constipated       BMT Contact Information  For issues including fevers 100.5 or more:  Please call during the week: Monday through Friday between hours of 8:00 am and 4:30 pm- Call BMT office- [phone]  After hours/Weekends: Please call [facility]  and ask for BMT physician on call and the  will have the BMT Fellow Physician call you back: [phone]    [facility] phone #[phone]     Advice for Patients on COVID19:  a. Avoid contact with individuals:   i. Who are sick or have recently been sick  ii. Have traveled to high risk areas (per CDC guidelines) or have been on a cruise in the last 14 days  iii. Who were or could have been exposed to COVID-19   b. If experiencing symptoms such as: Fever, cough or shortness of breath contact BMT at [phone] Mon-Fri 8am-4:30pm or After Hours at [phone] (ask to speak to a BMT Fellow) for guidance on need for clinical assessment  c. Avoid all non- essential travel at this time; if traveling is necessary use mask (N-95)   d. Wear a mask when in public areas  d. Avoid crowded places, if possible  f. Follow CDC advice https://www.cdc.gov/coronavirus/2019-ncov/index.html and travel guidelines https://www.cdc.gov/coronavirus/2019-ncov/travelers/index.html

## 2024-07-01 NOTE — PROGRESS NOTES
"BMT/Cell Therapy Daily Progress Note   07/01/2024    Patient ID:  Vivian Brown is a 54 year old female, currently day +19 s/p autologous stem cell transplant for amyloidosis. Hospitalization complicated by significant orthostatic hypotension.     Admission date: 6/11/2024    INTERVAL  HISTORY   Last night Mary Ann was tachycardic in th 150's, she was given a 500 ml bolus and an EKG was checked which showed sinus tachycardia. Her hgb this AM was 6.3 which may have been a contributing factor. Her intake is improving, nausea well controlled with scheduled antiemetics, large loose bowel movement this morning. No reports of dizziness/LH, she was up out of bed throughout the day yesterday getting her room packed up.     Review of Systems: ROS negative except as noted above.      PHYSICAL EXAM   KPS:  70    /76 (BP Location: Right arm)   Pulse 96   Temp 98  F (36.7  C) (Oral)   Resp 16   Ht 1.63 m (5' 4.17\")   Wt 71.8 kg (158 lb 3.2 oz)   SpO2 97%   BMI 27.01 kg/m       General appearance: Patient is sitting up in bed. NAD but pale.  Hair falling out.   Eyes: Sclera & conjunctiva clear bilaterally  Cardio: RRR; II/V systolic murmur  Pulmonary: no apparent use of accessory respiratory muscles; clear to  auscultation bilaterally  Abdominal: Soft, non-distended abdomen; no tenderness  Extremities: No edema  Skin: warm and dry; no rash or concerning lesions.  Neuro: A&O, CNII-XII with NO focal deficits.   Access: R CVC clean, dry and intact, non-tender, no drainage.      LABS: I have assessed all abnormal lab values for their clinical significance and any values considered clinically significant have been addressed in the assessment and plan.      Lab Results   Component Value Date    WBC 3.6 (L) 07/01/2024    ANEU 3.4 07/01/2024    HGB 6.3 (LL) 07/01/2024    HCT 17.9 (L) 07/01/2024    PLT 35 (LL) 07/01/2024     (L) 07/01/2024    POTASSIUM 3.8 07/01/2024    CHLORIDE 104 07/01/2024    CO2 23 07/01/2024    GLC " 88 07/01/2024    BUN 16.2 07/01/2024    CR 1.42 (H) 07/01/2024    MAG 2.1 07/01/2024    INR 1.10 07/01/2024    BILITOTAL <0.2 07/01/2024    AST 11 07/01/2024    ALT 9 07/01/2024    ALKPHOS 163 (H) 07/01/2024    PROTTOTAL 3.5 (L) 07/01/2024    ALBUMIN 1.9 (L) 07/01/2024       ASSESSMENT AND PLAN   Vivian Brown is a 54 year old female with amyloidosis, undergoing melphalan followed by autologous stem cell rescue. She is day +19.     BMT/MM/Amyloidosis  - BMT MD/Coordinator: Ramesh/Carlos  - [id number]  - Cell dose: total collections of 5.99 million CD34+ cells/kg.     - Prep as above. Flush and pre-meds prior to transplant.  - GCSF started day +5, continue until ANC > 2500 for 2 consecutive days. Last day 6/29!    HEME/COAG  #Pancytopenia 2/2 to chemotherapy.   - Transfusion parameters: hgb <7g/dL and plts <20,000 (increased parameter 6/21 d/t brbpr).  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     ID  Prophy: ACV; fluconazole.  PJP ppx to start at day +28.      GI/NUTRITION  #Diarrhea - C. Diff negative, metamucil.   #CINV: scheduled Zofran IV x 6/21; prn Ativan and compazine prn. *Emend 6/22. Zyprexa 2.5mg bid (monitor for orthostasis). Emend 6/26. Significantly improved 6/30  #Elevated alk phos, possibly related to GCSF.  Trend hepatic panel. GB sludge noted on 6/25 ultrasound.   #Acid reflux: protonix daily  # Celiac Disease- dietitian to follow     FLUIDS/NUTRITION  - PTA Lasix 40mg am, 20mg afternoon- held since 6/17 d/t orthostasis and large volume diuresis on 6/16. Not continued on discharge, assess need OP.   - PTA spironoloactone -held since 6/17. Not continued on discharge, assess need while outpatient.   - Hypomagnesemia: replete per sliding scale     RENAL  # renal amyloidosis; sensitive to fluid changes so giving fluids slowly for tachycardia: 1/2 L NS over 2 hours  - Monitor Cr and electrolytes daily.   - Replace electrolytes per sliding scale     ENDOCRINE  # Hypothyroidism:  continue PTA cytomel and  synthroid     CARDIAC  # cardiac amyloidosis: cautious fluid balance; diuresis as above  # h/o dizziness and chest pain with chemotherapy for amyloidosis; improved with addition of midodrine.   # orthostatic hypotension: on midodrine, as above. PRN fluid boluses vs diuresis for volume balance.  # hyperlipidemia: rosuvastatin on hold during acute transplant course     Neuro: 6/30 vision change x1 hour with ring of blurriness (not black out). Neuro exam unremarkable. Monitor    SUPPORTIVE CARE  - PT/OT to evaluate on admission and follow as indicated.   - BMT Social work to follow.    Summary:   - message sent to Dr. Viramontes regarding cardiac MRI on 7/10 (will likely cancel)   - scheduled Zofran with PRN compazine for nausea (in discharge instructions)   - 1 unit of RBC p/t discharge   - Lasix and spironolactone not continued on discharge, edema much improved, weight has been stable   - 7/2: lab, infusion   - 7/3: lab, infusion, pharmacy and ZOHREH   - 7/5: lab and infusion (requested)   - 7/9: lab, infusion, ZOHREH (requested)   - message sent to [facility] for day 28 visit with Dr. Viramontes      Known issues that I take into account for medical decisions, with salient changes to the plan considering these complexities noted above.    Patient Active Problem List   Diagnosis    Hypothyroidism due to acquired atrophy of thyroid    Family hx of colon cancer    Nonallopathic lesion of cervical region    Cervicalgia    Nonallopathic lesion of sacral region    Nonallopathic lesion of lumbar region    Lumbago    Nonallopathic lesion of thoracic region    Hyperlipidemia LDL goal <100    Dependent edema R>L    AL amyloidosis (H)    Hypogammaglobulinemia (H24)    Chronic kidney disease, stage 3a (H)    Celiac disease    Autologous donor of stem cells     Medically Ready for Discharge: today     Clinically Significant Risk Factors            # Hypomagnesemia: Lowest Mg = 1.5 mg/dL in last 2 days, will replace as needed   # Hypoalbuminemia:  "Lowest albumin = 1.9 g/dL at 7/1/2024  3:55 AM, will monitor as appropriate   # Thrombocytopenia: Lowest platelets = 35 in last 2 days, will monitor for bleeding             #Precipitous drop in Hgb/Hct: Lowest Hgb this hospitalization: 6.3 g/dL. Will continue to monitor and treat/transfuse as appropriate.     # Overweight: Estimated body mass index is 27.01 kg/m  as calculated from the following:    Height as of this encounter: 1.63 m (5' 4.17\").    Weight as of this encounter: 71.8 kg (158 lb 3.2 oz).             I spent 40 minutes in the care of this patient today, which included time necessary for preparation for the visit, obtaining history, ordering medications/tests/procedures as medically indicated, review of pertinent medical literature, counseling of the patient, communication of recommendations to the care team, and documentation time.      Skip Jeerz PA-C       "

## 2024-07-02 ENCOUNTER — APPOINTMENT (OUTPATIENT)
Dept: LAB | Facility: CLINIC | Age: 54
End: 2024-07-02
Attending: STUDENT IN AN ORGANIZED HEALTH CARE EDUCATION/TRAINING PROGRAM
Payer: COMMERCIAL

## 2024-07-02 ENCOUNTER — INFUSION THERAPY VISIT (OUTPATIENT)
Dept: TRANSPLANT | Facility: CLINIC | Age: 54
End: 2024-07-02
Attending: STUDENT IN AN ORGANIZED HEALTH CARE EDUCATION/TRAINING PROGRAM
Payer: COMMERCIAL

## 2024-07-02 VITALS
HEART RATE: 84 BPM | OXYGEN SATURATION: 99 % | TEMPERATURE: 98.9 F | DIASTOLIC BLOOD PRESSURE: 73 MMHG | WEIGHT: 159.5 LBS | SYSTOLIC BLOOD PRESSURE: 107 MMHG | RESPIRATION RATE: 16 BRPM | BODY MASS INDEX: 27.23 KG/M2

## 2024-07-02 DIAGNOSIS — R60.9 DEPENDENT EDEMA: ICD-10-CM

## 2024-07-02 DIAGNOSIS — N18.31 CHRONIC KIDNEY DISEASE, STAGE 3A (H): ICD-10-CM

## 2024-07-02 DIAGNOSIS — E85.81 AL AMYLOIDOSIS (H): Primary | ICD-10-CM

## 2024-07-02 DIAGNOSIS — D80.1 HYPOGAMMAGLOBULINEMIA (H): ICD-10-CM

## 2024-07-02 LAB
ANION GAP SERPL CALCULATED.3IONS-SCNC: 9 MMOL/L (ref 7–15)
BASOPHILS # BLD AUTO: ABNORMAL 10*3/UL
BASOPHILS # BLD MANUAL: 0 10E3/UL (ref 0–0.2)
BASOPHILS NFR BLD AUTO: ABNORMAL %
BASOPHILS NFR BLD MANUAL: 0 %
BUN SERPL-MCNC: 16.6 MG/DL (ref 6–20)
CALCIUM SERPL-MCNC: 7.4 MG/DL (ref 8.6–10)
CHLORIDE SERPL-SCNC: 102 MMOL/L (ref 98–107)
CREAT SERPL-MCNC: 1.53 MG/DL (ref 0.51–0.95)
DEPRECATED HCO3 PLAS-SCNC: 21 MMOL/L (ref 22–29)
EGFRCR SERPLBLD CKD-EPI 2021: 40 ML/MIN/1.73M2
EOSINOPHIL # BLD AUTO: ABNORMAL 10*3/UL
EOSINOPHIL # BLD MANUAL: 0 10E3/UL (ref 0–0.7)
EOSINOPHIL NFR BLD AUTO: ABNORMAL %
EOSINOPHIL NFR BLD MANUAL: 0 %
ERYTHROCYTE [DISTWIDTH] IN BLOOD BY AUTOMATED COUNT: 14.7 % (ref 10–15)
GLUCOSE SERPL-MCNC: 123 MG/DL (ref 70–99)
HCT VFR BLD AUTO: 25.9 % (ref 35–47)
HGB BLD-MCNC: 9.1 G/DL (ref 11.7–15.7)
IMM GRANULOCYTES # BLD: ABNORMAL 10*3/UL
IMM GRANULOCYTES NFR BLD: ABNORMAL %
LYMPHOCYTES # BLD AUTO: ABNORMAL 10*3/UL
LYMPHOCYTES # BLD MANUAL: 0.4 10E3/UL (ref 0.8–5.3)
LYMPHOCYTES NFR BLD AUTO: ABNORMAL %
LYMPHOCYTES NFR BLD MANUAL: 10 %
MCH RBC QN AUTO: 31.5 PG (ref 26.5–33)
MCHC RBC AUTO-ENTMCNC: 35.1 G/DL (ref 31.5–36.5)
MCV RBC AUTO: 90 FL (ref 78–100)
METAMYELOCYTES # BLD MANUAL: 0.1 10E3/UL
METAMYELOCYTES NFR BLD MANUAL: 2 %
MONOCYTES # BLD AUTO: ABNORMAL 10*3/UL
MONOCYTES # BLD MANUAL: 0.1 10E3/UL (ref 0–1.3)
MONOCYTES NFR BLD AUTO: ABNORMAL %
MONOCYTES NFR BLD MANUAL: 4 %
NEUTROPHILS # BLD AUTO: ABNORMAL 10*3/UL
NEUTROPHILS # BLD MANUAL: 2.9 10E3/UL (ref 1.6–8.3)
NEUTROPHILS NFR BLD AUTO: ABNORMAL %
NEUTROPHILS NFR BLD MANUAL: 84 %
NRBC # BLD AUTO: 0 10E3/UL
NRBC # BLD AUTO: 0 10E3/UL
NRBC BLD AUTO-RTO: 1 /100
NRBC BLD MANUAL-RTO: 1 %
PLAT MORPH BLD: ABNORMAL
PLATELET # BLD AUTO: 32 10E3/UL (ref 150–450)
POTASSIUM SERPL-SCNC: 3.4 MMOL/L (ref 3.4–5.3)
RBC # BLD AUTO: 2.89 10E6/UL (ref 3.8–5.2)
RBC MORPH BLD: ABNORMAL
SODIUM SERPL-SCNC: 132 MMOL/L (ref 135–145)
WBC # BLD AUTO: 3.5 10E3/UL (ref 4–11)

## 2024-07-02 PROCEDURE — 36592 COLLECT BLOOD FROM PICC: CPT

## 2024-07-02 PROCEDURE — 258N000003 HC RX IP 258 OP 636: Performed by: PHYSICIAN ASSISTANT

## 2024-07-02 PROCEDURE — 96360 HYDRATION IV INFUSION INIT: CPT

## 2024-07-02 PROCEDURE — 85007 BL SMEAR W/DIFF WBC COUNT: CPT

## 2024-07-02 PROCEDURE — 80048 BASIC METABOLIC PNL TOTAL CA: CPT

## 2024-07-02 PROCEDURE — 85027 COMPLETE CBC AUTOMATED: CPT

## 2024-07-02 PROCEDURE — 250N000011 HC RX IP 250 OP 636: Performed by: STUDENT IN AN ORGANIZED HEALTH CARE EDUCATION/TRAINING PROGRAM

## 2024-07-02 RX ORDER — HEPARIN SODIUM,PORCINE 10 UNIT/ML
5 VIAL (ML) INTRAVENOUS ONCE
Status: COMPLETED | OUTPATIENT
Start: 2024-07-02 | End: 2024-07-02

## 2024-07-02 RX ADMIN — Medication 5 ML: at 09:51

## 2024-07-02 RX ADMIN — SODIUM CHLORIDE 1000 ML: 9 INJECTION, SOLUTION INTRAVENOUS at 10:40

## 2024-07-02 ASSESSMENT — PAIN SCALES - GENERAL: PAINLEVEL: MODERATE PAIN (5)

## 2024-07-02 NOTE — PROGRESS NOTES
Infusion Nursing Note:  Vivian Brown presents today for possible blood products and IVF.    Patient seen by provider today: No   present during visit today: Not Applicable.    Note: Lab results monitored. Allergies and home medications reviewed. Pt reports feeling very fatigued and weak, able to eat and drink but not enough she feels is adequate. Pt received 1L NS, tolerated well. Pt experiencing calf pain, heat packs given and improved comfort. Pt discharged with no further clinical concerns at this time.       Intravenous Access:  [place].    Treatment Conditions:  Lab Results   Component Value Date    HGB 9.1 (L) 07/02/2024    WBC 3.5 (L) 07/02/2024    ANEU 2.9 07/02/2024    ANEUTAUTO 0.3 (LL) 06/17/2024    PLT 32 (LL) 07/02/2024        Lab Results   Component Value Date     (L) 07/02/2024    POTASSIUM 3.4 07/02/2024    MAG 2.1 07/01/2024    CR 1.53 (H) 07/02/2024    TANNER 7.4 (L) 07/02/2024    BILITOTAL <0.2 07/01/2024    ALBUMIN 1.9 (L) 07/01/2024    ALT 9 07/01/2024    AST 11 07/01/2024       Results reviewed, labs MET treatment parameters, ok to proceed with treatment.      Post Infusion Assessment:  Patient tolerated infusion without incident.  Blood return noted pre and post infusion.       Discharge Plan:   Patient and/or family verbalized understanding of discharge instructions and all questions answered.  Patient discharged in stable condition accompanied by: friend.      Carolina Montes RN

## 2024-07-02 NOTE — NURSING NOTE
Chief Complaint   Patient presents with    Blood Draw     Labs drawn via CVC by RN in lab.  VS taken       Labs collected from CVC by RN, line flushed with saline and heparin.  Vitals taken. Pt checked in for appointment(s).    Yudith Elena RN

## 2024-07-03 ENCOUNTER — APPOINTMENT (OUTPATIENT)
Dept: LAB | Facility: CLINIC | Age: 54
End: 2024-07-03
Attending: STUDENT IN AN ORGANIZED HEALTH CARE EDUCATION/TRAINING PROGRAM
Payer: COMMERCIAL

## 2024-07-03 ENCOUNTER — ONCOLOGY VISIT (OUTPATIENT)
Dept: TRANSPLANT | Facility: CLINIC | Age: 54
End: 2024-07-03
Attending: STUDENT IN AN ORGANIZED HEALTH CARE EDUCATION/TRAINING PROGRAM
Payer: COMMERCIAL

## 2024-07-03 ENCOUNTER — ALLIED HEALTH/NURSE VISIT (OUTPATIENT)
Dept: TRANSPLANT | Facility: CLINIC | Age: 54
End: 2024-07-03
Attending: STUDENT IN AN ORGANIZED HEALTH CARE EDUCATION/TRAINING PROGRAM

## 2024-07-03 VITALS
OXYGEN SATURATION: 97 % | SYSTOLIC BLOOD PRESSURE: 106 MMHG | DIASTOLIC BLOOD PRESSURE: 70 MMHG | WEIGHT: 161.7 LBS | TEMPERATURE: 99.1 F | HEART RATE: 107 BPM | RESPIRATION RATE: 16 BRPM | BODY MASS INDEX: 27.61 KG/M2

## 2024-07-03 DIAGNOSIS — Z52.011 AUTOLOGOUS DONOR OF STEM CELLS: ICD-10-CM

## 2024-07-03 DIAGNOSIS — E85.81 AL AMYLOIDOSIS (H): Primary | ICD-10-CM

## 2024-07-03 DIAGNOSIS — E85.81 AL AMYLOIDOSIS (H): ICD-10-CM

## 2024-07-03 DIAGNOSIS — E03.4 HYPOTHYROIDISM DUE TO ACQUIRED ATROPHY OF THYROID: ICD-10-CM

## 2024-07-03 LAB
ACANTHOCYTES BLD QL SMEAR: SLIGHT
ALBUMIN SERPL BCG-MCNC: 1.8 G/DL (ref 3.5–5.2)
ALP SERPL-CCNC: 204 U/L (ref 40–150)
ALT SERPL W P-5'-P-CCNC: 13 U/L (ref 0–50)
ANION GAP SERPL CALCULATED.3IONS-SCNC: 8 MMOL/L (ref 7–15)
AST SERPL W P-5'-P-CCNC: 17 U/L (ref 0–45)
BASOPHILS # BLD AUTO: ABNORMAL 10*3/UL
BASOPHILS # BLD MANUAL: 0 10E3/UL (ref 0–0.2)
BASOPHILS NFR BLD AUTO: ABNORMAL %
BASOPHILS NFR BLD MANUAL: 0 %
BILIRUB SERPL-MCNC: <0.2 MG/DL
BUN SERPL-MCNC: 16.4 MG/DL (ref 6–20)
CALCIUM SERPL-MCNC: 7.1 MG/DL (ref 8.6–10)
CHLORIDE SERPL-SCNC: 103 MMOL/L (ref 98–107)
CREAT SERPL-MCNC: 1.46 MG/DL (ref 0.51–0.95)
DEPRECATED HCO3 PLAS-SCNC: 20 MMOL/L (ref 22–29)
EGFRCR SERPLBLD CKD-EPI 2021: 42 ML/MIN/1.73M2
EOSINOPHIL # BLD AUTO: ABNORMAL 10*3/UL
EOSINOPHIL # BLD MANUAL: 0 10E3/UL (ref 0–0.7)
EOSINOPHIL NFR BLD AUTO: ABNORMAL %
EOSINOPHIL NFR BLD MANUAL: 0 %
ERYTHROCYTE [DISTWIDTH] IN BLOOD BY AUTOMATED COUNT: 14.9 % (ref 10–15)
GLUCOSE SERPL-MCNC: 139 MG/DL (ref 70–99)
HCT VFR BLD AUTO: 24.8 % (ref 35–47)
HGB BLD-MCNC: 8.5 G/DL (ref 11.7–15.7)
HOLD SPECIMEN: NORMAL
IMM GRANULOCYTES # BLD: ABNORMAL 10*3/UL
IMM GRANULOCYTES NFR BLD: ABNORMAL %
LYMPHOCYTES # BLD AUTO: ABNORMAL 10*3/UL
LYMPHOCYTES # BLD MANUAL: 0.2 10E3/UL (ref 0.8–5.3)
LYMPHOCYTES NFR BLD AUTO: ABNORMAL %
LYMPHOCYTES NFR BLD MANUAL: 6 %
MCH RBC QN AUTO: 31 PG (ref 26.5–33)
MCHC RBC AUTO-ENTMCNC: 34.3 G/DL (ref 31.5–36.5)
MCV RBC AUTO: 91 FL (ref 78–100)
METAMYELOCYTES # BLD MANUAL: 0.1 10E3/UL
METAMYELOCYTES NFR BLD MANUAL: 2 %
MONOCYTES # BLD AUTO: ABNORMAL 10*3/UL
MONOCYTES # BLD MANUAL: 0.1 10E3/UL (ref 0–1.3)
MONOCYTES NFR BLD AUTO: ABNORMAL %
MONOCYTES NFR BLD MANUAL: 3 %
NEUTROPHILS # BLD AUTO: ABNORMAL 10*3/UL
NEUTROPHILS # BLD MANUAL: 3.3 10E3/UL (ref 1.6–8.3)
NEUTROPHILS NFR BLD AUTO: ABNORMAL %
NEUTROPHILS NFR BLD MANUAL: 89 %
NRBC # BLD AUTO: 0 10E3/UL
NRBC BLD AUTO-RTO: 0 /100
PLAT MORPH BLD: ABNORMAL
PLATELET # BLD AUTO: 31 10E3/UL (ref 150–450)
POTASSIUM SERPL-SCNC: 3.4 MMOL/L (ref 3.4–5.3)
PROT SERPL-MCNC: 3.5 G/DL (ref 6.4–8.3)
RBC # BLD AUTO: 2.74 10E6/UL (ref 3.8–5.2)
RBC MORPH BLD: ABNORMAL
SODIUM SERPL-SCNC: 131 MMOL/L (ref 135–145)
WBC # BLD AUTO: 3.7 10E3/UL (ref 4–11)

## 2024-07-03 PROCEDURE — 99213 OFFICE O/P EST LOW 20 MIN: CPT

## 2024-07-03 PROCEDURE — 36592 COLLECT BLOOD FROM PICC: CPT

## 2024-07-03 PROCEDURE — 99215 OFFICE O/P EST HI 40 MIN: CPT

## 2024-07-03 PROCEDURE — 80053 COMPREHEN METABOLIC PANEL: CPT

## 2024-07-03 PROCEDURE — 250N000011 HC RX IP 250 OP 636: Performed by: STUDENT IN AN ORGANIZED HEALTH CARE EDUCATION/TRAINING PROGRAM

## 2024-07-03 PROCEDURE — 85027 COMPLETE CBC AUTOMATED: CPT

## 2024-07-03 PROCEDURE — 88240 CELL CRYOPRESERVE/STORAGE: CPT | Performed by: STUDENT IN AN ORGANIZED HEALTH CARE EDUCATION/TRAINING PROGRAM

## 2024-07-03 PROCEDURE — 85007 BL SMEAR W/DIFF WBC COUNT: CPT

## 2024-07-03 RX ORDER — LANOLIN ALCOHOL/MO/W.PET/CERES
3 CREAM (GRAM) TOPICAL
Qty: 30 TABLET | Refills: 1 | Status: SHIPPED | OUTPATIENT
Start: 2024-07-03

## 2024-07-03 RX ORDER — HEPARIN SODIUM,PORCINE 10 UNIT/ML
5 VIAL (ML) INTRAVENOUS ONCE
Status: COMPLETED | OUTPATIENT
Start: 2024-07-03 | End: 2024-07-03

## 2024-07-03 RX ORDER — ACYCLOVIR 400 MG/1
800 TABLET ORAL 2 TIMES DAILY
Qty: 120 TABLET | Refills: 11 | Status: SHIPPED | OUTPATIENT
Start: 2024-07-03

## 2024-07-03 RX ADMIN — Medication 5 ML: at 07:14

## 2024-07-03 RX ADMIN — Medication 5 ML: at 07:13

## 2024-07-03 ASSESSMENT — PAIN SCALES - GENERAL: PAINLEVEL: MODERATE PAIN (5)

## 2024-07-03 NOTE — LETTER
7/3/2024      Vivian Brown  [address] [facility] [address]      Dear Colleague,    Thank you for referring your patient, Vivian Brown, to the [facility] BLOOD AND MARROW TRANSPLANT PROGRAM [place]. Please see a copy of my visit note below.    BMT/Cell Therapy Daily Progress Note   07/03/2024     Patient ID:  Vivian Brown is a 54 year old female, currently day +21 s/p autologous stem cell transplant for amyloidosis. Hospitalization complicated by significant orthostatic hypotension.      Admission date: 6/11/2024     INTERVAL  HISTORY   Mary Ann presents today for follow up after discharging from the hospital. She is fatigued. She is very short of breath today, however this improves almost instantaneously once she removes her mask. She has been having calf discomfort in both calves. She thinks this is from increased activity at home- it improves significantly with heat application. No reports of dizziness/LH. Eating is getting a little easier.      Review of Systems: ROS negative except as noted above.        PHYSICAL EXAM   KPS:  70     /70   Pulse 107   Temp 99.1  F (37.3  C) (Oral)   Resp 16   Wt 73.3 kg (161 lb 11.2 oz)   SpO2 97%   BMI 27.61 kg/m         General appearance: Patient is sitting up. NAD but pale.    Eyes: Sclera & conjunctiva clear bilaterally  Cardio: RRR; II/V systolic murmur  Pulmonary: no apparent use of accessory respiratory muscles; clear to  auscultation bilaterally  Abdominal: Soft, non-distended abdomen; no tenderness  Extremities: No edema  Skin: warm and dry; no rash or concerning lesions.  Neuro: A&O, CNII-XII with NO focal deficits.   Access: R CVC clean, dry and intact, non-tender, no drainage.        LABS: I have assessed all abnormal lab values for their clinical significance and any values considered clinically significant have been addressed in the assessment and plan.       Lab Results   Component Value Date    WBC 3.7 (L) 07/03/2024     ANEU 3.3 07/03/2024    HGB 8.5 (L) 07/03/2024    HCT 24.8 (L) 07/03/2024    PLT 31 (LL) 07/03/2024     (L) 07/03/2024    POTASSIUM 3.4 07/03/2024    CHLORIDE 103 07/03/2024    CO2 20 (L) 07/03/2024     (H) 07/03/2024    BUN 16.4 07/03/2024    CR 1.46 (H) 07/03/2024    MAG 2.1 07/01/2024    INR 1.10 07/01/2024          ASSESSMENT AND PLAN   Vivian Brown is a 54 year old female with amyloidosis, undergoing melphalan followed by autologous stem cell rescue. She is day +21.     BMT/MM/Amyloidosis  - BMT MD/Coordinator: Ramesh/Carlos Araiza [id number]  - Cell dose: total collections of 5.99 million CD34+ cells/kg.     - Prep as above. Flush and pre-meds prior to transplant.  - GCSF started day +5, continue until ANC > 2500 for 2 consecutive days. Last day 6/29.  -OK to cancel cardiac MRI scheduled 7/10- discussed with Dr. Chandler.      HEME/COAG  #Pancytopenia 2/2 to chemotherapy.   - Transfusion parameters: hgb <7g/dL and plts <20,000 (increased parameter 6/21 d/t brbpr).  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     ID  Prophy: ACV; fluconazole.  PJP ppx to start at day +28.      GI/NUTRITION  #Diarrhea - C. Diff negative, metamucil.   #CINV: scheduled Zofran IV x 6/21; prn Ativan and compazine prn. *Emend 6/22. Zyprexa 2.5mg bid (monitor for orthostasis). Emend 6/26. Significantly improved 6/30  #Elevated alk phos, possibly related to GCSF.  Trend hepatic panel. GB sludge noted on 6/25 ultrasound.   #Acid reflux: protonix daily  # Celiac Disease- dietitian to follow     FLUIDS/NUTRITION  - PTA Lasix 40mg am, 20mg afternoon- held since 6/17 d/t orthostasis and large volume diuresis on 6/16. Not continued on discharge, assess need OP.   - PTA spironoloactone -held since 6/17. Not continued on discharge, assess need while outpatient.   - Hypomagnesemia: replete per sliding scale     RENAL  # renal amyloidosis; sensitive to fluid changes so giving fluids slowly for tachycardia: 1/2 L NS over 2 hours  - Monitor  Cr and electrolytes daily.   - Replace electrolytes per sliding scale     ENDOCRINE  # Hypothyroidism:  continue PTA cytomel and synthroid     CARDIAC  # cardiac amyloidosis: cautious fluid balance; diuresis as above  # h/o dizziness and chest pain with chemotherapy for amyloidosis; improved with addition of midodrine.   # orthostatic hypotension: on midodrine, as above. PRN fluid boluses vs diuresis for volume balance.  # hyperlipidemia: rosuvastatin on hold during acute transplant course     Neuro: 6/30 vision change x1 hour with ring of blurriness (not black out). Neuro exam unremarkable. Monitor     SUPPORTIVE CARE  - PT/OT to evaluate on admission and follow as indicated.   - BMT Social work to follow.     Summary:    - Reviewed medications  - Melatonin 3mg at bedtime for sleep    - 7/5: lab and infusion  - 7/9: lab, infusion, ZOHREH        I spent 45 minutes in the care of this patient today, which included time necessary for preparation for the visit, obtaining history, ordering medications/tests/procedures as medically indicated, review of pertinent medical literature, counseling of the patient, communication of recommendations to the care team, and documentation time.      Sabrina Jain NP

## 2024-07-03 NOTE — PROGRESS NOTES
BMT/Cell Therapy Daily Progress Note   07/03/2024     Patient ID:  Vivian Brown is a 54 year old female, currently day +21 s/p autologous stem cell transplant for amyloidosis. Hospitalization complicated by significant orthostatic hypotension.      Admission date: 6/11/2024     INTERVAL  HISTORY   Mary Ann presents today for follow up after discharging from the hospital. She is fatigued. She is very short of breath today, however this improves almost instantaneously once she removes her mask. She has been having calf discomfort in both calves. She thinks this is from increased activity at home- it improves significantly with heat application. No reports of dizziness/LH. Eating is getting a little easier.      Review of Systems: ROS negative except as noted above.        PHYSICAL EXAM   KPS:  70     /70   Pulse 107   Temp 99.1  F (37.3  C) (Oral)   Resp 16   Wt 73.3 kg (161 lb 11.2 oz)   SpO2 97%   BMI 27.61 kg/m         General appearance: Patient is sitting up. NAD but pale.    Eyes: Sclera & conjunctiva clear bilaterally  Cardio: RRR; II/V systolic murmur  Pulmonary: no apparent use of accessory respiratory muscles; clear to  auscultation bilaterally  Abdominal: Soft, non-distended abdomen; no tenderness  Extremities: No edema  Skin: warm and dry; no rash or concerning lesions.  Neuro: A&O, CNII-XII with NO focal deficits.   Access: R CVC clean, dry and intact, non-tender, no drainage.        LABS: I have assessed all abnormal lab values for their clinical significance and any values considered clinically significant have been addressed in the assessment and plan.       Lab Results   Component Value Date    WBC 3.7 (L) 07/03/2024    ANEU 3.3 07/03/2024    HGB 8.5 (L) 07/03/2024    HCT 24.8 (L) 07/03/2024    PLT 31 (LL) 07/03/2024     (L) 07/03/2024    POTASSIUM 3.4 07/03/2024    CHLORIDE 103 07/03/2024    CO2 20 (L) 07/03/2024     (H) 07/03/2024    BUN 16.4 07/03/2024    CR 1.46 (H)  07/03/2024    MAG 2.1 07/01/2024    INR 1.10 07/01/2024          ASSESSMENT AND PLAN   Vivian Brown is a 54 year old female with amyloidosis, undergoing melphalan followed by autologous stem cell rescue. She is day +21.     BMT/MM/Amyloidosis  - BMT MD/Coordinator: Ramesh/Carlos  - [id number]  - Cell dose: total collections of 5.99 million CD34+ cells/kg.     - Prep as above. Flush and pre-meds prior to transplant.  - GCSF started day +5, continue until ANC > 2500 for 2 consecutive days. Last day 6/29.  -OK to cancel cardiac MRI scheduled 7/10- discussed with Dr. Chandler.      HEME/COAG  #Pancytopenia 2/2 to chemotherapy.   - Transfusion parameters: hgb <7g/dL and plts <20,000 (increased parameter 6/21 d/t brbpr).  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     ID  Prophy: ACV; fluconazole.  PJP ppx to start at day +28.      GI/NUTRITION  #Diarrhea - C. Diff negative, metamucil.   #CINV: scheduled Zofran IV x 6/21; prn Ativan and compazine prn. *Emend 6/22. Zyprexa 2.5mg bid (monitor for orthostasis). Emend 6/26. Significantly improved 6/30  #Elevated alk phos, possibly related to GCSF.  Trend hepatic panel. GB sludge noted on 6/25 ultrasound.   #Acid reflux: protonix daily  # Celiac Disease- dietitian to follow     FLUIDS/NUTRITION  - PTA Lasix 40mg am, 20mg afternoon- held since 6/17 d/t orthostasis and large volume diuresis on 6/16. Not continued on discharge, assess need OP.   - PTA spironoloactone -held since 6/17. Not continued on discharge, assess need while outpatient.   - Hypomagnesemia: replete per sliding scale     RENAL  # renal amyloidosis; sensitive to fluid changes so giving fluids slowly for tachycardia: 1/2 L NS over 2 hours  - Monitor Cr and electrolytes daily.   - Replace electrolytes per sliding scale     ENDOCRINE  # Hypothyroidism:  continue PTA cytomel and synthroid     CARDIAC  # cardiac amyloidosis: cautious fluid balance; diuresis as above  # h/o dizziness and chest pain with chemotherapy for  amyloidosis; improved with addition of midodrine.   # orthostatic hypotension: on midodrine, as above. PRN fluid boluses vs diuresis for volume balance.  # hyperlipidemia: rosuvastatin on hold during acute transplant course     Neuro: 6/30 vision change x1 hour with ring of blurriness (not black out). Neuro exam unremarkable. Monitor     SUPPORTIVE CARE  - PT/OT to evaluate on admission and follow as indicated.   - BMT Social work to follow.     Summary:    - Reviewed medications  - Melatonin 3mg at bedtime for sleep    - 7/5: lab and infusion  - 7/9: lab, infusion, ZOHREH        I spent 45 minutes in the care of this patient today, which included time necessary for preparation for the visit, obtaining history, ordering medications/tests/procedures as medically indicated, review of pertinent medical literature, counseling of the patient, communication of recommendations to the care team, and documentation time.      Sabrina Jain NP

## 2024-07-03 NOTE — NURSING NOTE
"Oncology Rooming Note    July 3, 2024 7:32 AM   Vivian Brown is a 54 year old female who presents for:    Chief Complaint   Patient presents with    Blood Draw     Labs drawn via CVC by lab RN    Oncology Clinic Visit     AL Amyloidosis     Initial Vitals: /70   Pulse 107   Temp 99.1  F (37.3  C) (Oral)   Resp 16   Wt 73.3 kg (161 lb 11.2 oz)   SpO2 97%   BMI 27.61 kg/m   Estimated body mass index is 27.61 kg/m  as calculated from the following:    Height as of 6/11/24: 1.63 m (5' 4.17\").    Weight as of this encounter: 73.3 kg (161 lb 11.2 oz). Body surface area is 1.82 meters squared.  Moderate Pain (5) Comment: Data Unavailable   No LMP recorded. (Menstrual status: IUD).  Allergies reviewed: Yes  Medications reviewed: Yes    Medications: Medication refills not needed today.  Pharmacy name entered into [facility]:    [facility] - [place], [place] - [address] [facility] [place], [place] - [address] [id number] - [place] - [address] [id number] - [place], [place] - [address]  [facility] - [place], [place] - [address]  [facility] - [place], [place] - [address] [facility]    Frailty Screening:   Is the patient here for a new oncology consult visit in cancer care? 2. No      Clinical concerns: New onset  of bilateral calf pain which she describes as cramping and sharp.       Cheryl See LPN  7/3/2024                "

## 2024-07-03 NOTE — PROGRESS NOTES
I met with Vivian Brown to review her medication list after hospital discharge post-transplant. Vivian Brown and her caregiver had the opportunity to ask questions regarding her therapy which were answered to their satisfaction. We specifically discussed the following items:    1. BMT: Medication box was filled appropriately.  2. ID: Has some trouble with 800 mg acyclovir tablets. Changing to 400 mg tablets. She is comfortable making this change in her medication box.    Changes made to scheduled medication list by today's provider include:  Added: Melatonin  Deleted: None  Changed: None    Current Outpatient Medications   Medication Sig Dispense Refill    acyclovir (ZOVIRAX) 400 MG tablet Take 2 tablets (800 mg) by mouth 2 times daily 120 tablet 11    Albuterol-Budesonide (AIRSUPRA) 90-80 MCG/ACT AERO Inhale 2 Inhalations into the lungs every 4 hours as needed (Wheezing, chest tightness, shortness of breath, or persistent cough) 10.7 g 3    azelastine (ASTELIN) 0.1 % nasal spray Spray 2 sprays into both nostrils 2 times daily as needed for rhinitis 30 mL 3    EPINEPHrine (ANY BX GENERIC EQUIV) 0.3 MG/0.3ML injection 2-pack Inject 0.3 mLs (0.3 mg) into the muscle as needed for anaphylaxis May repeat one time in 5-15 minutes if response to initial dose is inadequate. 2 each 3    fluconazole (DIFLUCAN) 100 MG tablet Take 1 tablet (100 mg) by mouth daily 30 tablet 0    fluticasone (FLONASE) 50 MCG/ACT nasal spray Spray 2 sprays into both nostrils daily 16 g 3    furosemide (LASIX) 20 MG tablet Take 1 tablet (20 mg) by mouth daily Take 40 mg by mouth in the AM and 20 mg by mouth in the afternoon (Patient not taking: Reported on 7/2/2024)      levothyroxine (SYNTHROID) 200 MCG tablet Take 200 mcg by mouth daily Pt takes Synthroid brand name. FRANKI      liothyronine (CYTOMEL) 5 MCG tablet Take 5 mcg by mouth 2 times daily      loperamide (IMODIUM A-D) 2 MG tablet Take 1 tablet (2 mg) by mouth 4 times daily as  needed for diarrhea 30 tablet 1    melatonin 3 MG tablet Take 1 tablet (3 mg) by mouth nightly as needed for sleep 30 tablet 1    midodrine (PROAMATINE) 10 MG tablet Take 1 tablet (10 mg) by mouth 3 times daily 270 tablet 1    OLANZapine (ZYPREXA) 2.5 MG tablet Take 1 tablet (2.5 mg) by mouth 2 times daily 30 tablet 0    ondansetron (ZOFRAN) 8 MG tablet Take 1 tablet (8 mg) by mouth every 8 hours 30 tablet 1    ondansetron (ZOFRAN) 8 MG tablet Take 8 mg by mouth every 8 hours as needed for nausea      pantoprazole (PROTONIX) 40 MG EC tablet Take 1 tablet (40 mg) by mouth every morning (before breakfast) 30 tablet 1    prochlorperazine (COMPAZINE) 10 MG tablet Take 0.5 tablets (5 mg) by mouth every 6 hours as needed for nausea or vomiting 30 tablet 1    psyllium (METAMUCIL/KONSYL) Packet Take 1 packet by mouth daily 30 packet 1    [START ON 7/15/2024] sulfamethoxazole-trimethoprim (BACTRIM DS) 800-160 MG tablet Take 1 tablet by mouth Every Mon, Tues two times daily 16 tablet 11    vitamin D3 (CHOLECALCIFEROL) 50 mcg (2000 units) tablet Take 1 tablet (50 mcg) by mouth daily 30 tablet 6        Pertinent labs considered today:  Lab Results   Component Value Date     07/03/2024     02/24/2021                 normal sodium 136-148  Lab Results   Component Value Date    POTASSIUM 3.4 07/03/2024    POTASSIUM 3.9 12/19/2022    POTASSIUM 4.3 02/24/2021       normal potassium 3.5-5.2   Lab Results   Component Value Date    CHLORIDE 103 07/03/2024    CHLORIDE 108 12/19/2022    CHLORIDE 107 02/24/2021           normal chloride    Lab Results   Component Value Date    CO2 20 07/03/2024    CO2 33 12/19/2022    CO2 29 02/24/2021                normal CO2 20-32  Lab Results   Component Value Date    BUN 16.4 07/03/2024    BUN 15 12/19/2022    BUN 16 02/24/2021                 normal BUN 5-24  Lab Results   Component Value Date     07/03/2024    GLC 62 05/29/2024                 normal glucose   Lab  Results   Component Value Date    CR 1.46 07/03/2024    CR 0.80 02/24/2021                 normal cr 0.8-1.5  Lab Results   Component Value Date    TANNER 7.1 07/03/2024    TANNER 9.2 02/24/2021               normal calcium  8.5-10.4  Lab Results   Component Value Date    BILITOTAL <0.2 07/03/2024    BILITOTAL 0.5 02/24/2021          normal bilirubin 0.2-1.3  Lab Results   Component Value Date    PROTTOTAL 3.5 07/03/2024    PROTTOTAL 7.1 02/24/2021          normal total protein 6.0-8.2  Lab Results   Component Value Date    ALBUMIN 1.8 07/03/2024    ALBUMIN 2.2 12/19/2022    ALBUMIN 3.5 02/24/2021             normal albumin 3.2-4.5  Lab Results   Component Value Date    ALKPHOS 204 07/03/2024    ALKPHOS 87 02/24/2021            normal alkphos   Lab Results   Component Value Date    ALT 13 07/03/2024    ALT 65 02/24/2021         normal ALT 0-65  Lab Results   Component Value Date    AST 17 07/03/2024    AST 29 02/24/2021         normal AST 0-37  Lab Results   Component Value Date    HGB 8.5 07/03/2024    HGB 14.2 02/24/2021     Lab Results   Component Value Date    WBC 3.7 07/03/2024    WBC 11.1 02/24/2021      Lab Results   Component Value Date    PLT 31 07/03/2024     02/24/2021     Estimated Creatinine Clearance: 43.4 mL/min (A) (based on SCr of 1.46 mg/dL (H)).      RAMIN FRIAS, [facility], PharmD

## 2024-07-03 NOTE — NURSING NOTE
Chief Complaint   Patient presents with    Blood Draw     Labs drawn via CVC by lab RN       Blood draw via CVC and flushed with heparin by lab RN. Vitals taken and appointment arrived. Central line dressing change due today. Message sent to BMT.    Maria Luisa Gracia RN

## 2024-07-04 RX ORDER — HEPARIN SODIUM (PORCINE) LOCK FLUSH IV SOLN 100 UNIT/ML 100 UNIT/ML
5 SOLUTION INTRAVENOUS
OUTPATIENT
Start: 2024-07-04

## 2024-07-04 RX ORDER — DIPHENHYDRAMINE HYDROCHLORIDE 50 MG/ML
50 INJECTION INTRAMUSCULAR; INTRAVENOUS
Start: 2024-07-04

## 2024-07-04 RX ORDER — EPINEPHRINE 1 MG/ML
0.3 INJECTION, SOLUTION INTRAMUSCULAR; SUBCUTANEOUS EVERY 5 MIN PRN
OUTPATIENT
Start: 2024-07-04

## 2024-07-04 RX ORDER — HEPARIN SODIUM,PORCINE 10 UNIT/ML
5-20 VIAL (ML) INTRAVENOUS DAILY PRN
OUTPATIENT
Start: 2024-07-04

## 2024-07-05 ENCOUNTER — APPOINTMENT (OUTPATIENT)
Dept: LAB | Facility: CLINIC | Age: 54
End: 2024-07-05
Attending: INTERNAL MEDICINE
Payer: COMMERCIAL

## 2024-07-05 ENCOUNTER — INFUSION THERAPY VISIT (OUTPATIENT)
Dept: TRANSPLANT | Facility: CLINIC | Age: 54
End: 2024-07-05
Attending: INTERNAL MEDICINE
Payer: COMMERCIAL

## 2024-07-05 VITALS
BODY MASS INDEX: 27.11 KG/M2 | WEIGHT: 158.8 LBS | SYSTOLIC BLOOD PRESSURE: 109 MMHG | TEMPERATURE: 98.3 F | OXYGEN SATURATION: 99 % | DIASTOLIC BLOOD PRESSURE: 73 MMHG | HEART RATE: 86 BPM | RESPIRATION RATE: 16 BRPM

## 2024-07-05 DIAGNOSIS — E85.81 AL AMYLOIDOSIS (H): Primary | ICD-10-CM

## 2024-07-05 LAB
ACANTHOCYTES BLD QL SMEAR: SLIGHT
ALBUMIN SERPL BCG-MCNC: 1.9 G/DL (ref 3.5–5.2)
ALP SERPL-CCNC: 189 U/L (ref 40–150)
ALT SERPL W P-5'-P-CCNC: 12 U/L (ref 0–50)
ANION GAP SERPL CALCULATED.3IONS-SCNC: 6 MMOL/L (ref 7–15)
AST SERPL W P-5'-P-CCNC: 16 U/L (ref 0–45)
BASOPHILS # BLD AUTO: ABNORMAL 10*3/UL
BASOPHILS # BLD MANUAL: 0 10E3/UL (ref 0–0.2)
BASOPHILS NFR BLD AUTO: ABNORMAL %
BASOPHILS NFR BLD MANUAL: 1 %
BILIRUB SERPL-MCNC: <0.2 MG/DL
BUN SERPL-MCNC: 14.7 MG/DL (ref 6–20)
CALCIUM SERPL-MCNC: 7.3 MG/DL (ref 8.6–10)
CHLORIDE SERPL-SCNC: 105 MMOL/L (ref 98–107)
CREAT SERPL-MCNC: 1.43 MG/DL (ref 0.51–0.95)
DEPRECATED HCO3 PLAS-SCNC: 22 MMOL/L (ref 22–29)
EGFRCR SERPLBLD CKD-EPI 2021: 43 ML/MIN/1.73M2
EOSINOPHIL # BLD AUTO: ABNORMAL 10*3/UL
EOSINOPHIL # BLD MANUAL: 0.1 10E3/UL (ref 0–0.7)
EOSINOPHIL NFR BLD AUTO: ABNORMAL %
EOSINOPHIL NFR BLD MANUAL: 2 %
ERYTHROCYTE [DISTWIDTH] IN BLOOD BY AUTOMATED COUNT: 15 % (ref 10–15)
GLUCOSE SERPL-MCNC: 117 MG/DL (ref 70–99)
HCT VFR BLD AUTO: 25.3 % (ref 35–47)
HGB BLD-MCNC: 8.6 G/DL (ref 11.7–15.7)
IMM GRANULOCYTES # BLD: ABNORMAL 10*3/UL
IMM GRANULOCYTES NFR BLD: ABNORMAL %
LYMPHOCYTES # BLD AUTO: ABNORMAL 10*3/UL
LYMPHOCYTES # BLD MANUAL: 0.8 10E3/UL (ref 0.8–5.3)
LYMPHOCYTES NFR BLD AUTO: ABNORMAL %
LYMPHOCYTES NFR BLD MANUAL: 19 %
MCH RBC QN AUTO: 31 PG (ref 26.5–33)
MCHC RBC AUTO-ENTMCNC: 34 G/DL (ref 31.5–36.5)
MCV RBC AUTO: 91 FL (ref 78–100)
MONOCYTES # BLD AUTO: ABNORMAL 10*3/UL
MONOCYTES # BLD MANUAL: 0.6 10E3/UL (ref 0–1.3)
MONOCYTES NFR BLD AUTO: ABNORMAL %
MONOCYTES NFR BLD MANUAL: 14 %
NEUTROPHILS # BLD AUTO: ABNORMAL 10*3/UL
NEUTROPHILS # BLD MANUAL: 2.7 10E3/UL (ref 1.6–8.3)
NEUTROPHILS NFR BLD AUTO: ABNORMAL %
NEUTROPHILS NFR BLD MANUAL: 64 %
NRBC # BLD AUTO: 0 10E3/UL
NRBC # BLD AUTO: 0.1 10E3/UL
NRBC BLD AUTO-RTO: 0 /100
NRBC BLD MANUAL-RTO: 2 %
PLAT MORPH BLD: ABNORMAL
PLATELET # BLD AUTO: 38 10E3/UL (ref 150–450)
POTASSIUM SERPL-SCNC: 3.7 MMOL/L (ref 3.4–5.3)
PROT SERPL-MCNC: 3.7 G/DL (ref 6.4–8.3)
RBC # BLD AUTO: 2.77 10E6/UL (ref 3.8–5.2)
RBC MORPH BLD: ABNORMAL
SODIUM SERPL-SCNC: 133 MMOL/L (ref 135–145)
WBC # BLD AUTO: 4.2 10E3/UL (ref 4–11)

## 2024-07-05 PROCEDURE — 36592 COLLECT BLOOD FROM PICC: CPT

## 2024-07-05 PROCEDURE — 96360 HYDRATION IV INFUSION INIT: CPT

## 2024-07-05 PROCEDURE — 82040 ASSAY OF SERUM ALBUMIN: CPT

## 2024-07-05 PROCEDURE — 258N000003 HC RX IP 258 OP 636: Performed by: INTERNAL MEDICINE

## 2024-07-05 PROCEDURE — 250N000011 HC RX IP 250 OP 636: Performed by: INTERNAL MEDICINE

## 2024-07-05 PROCEDURE — 85027 COMPLETE CBC AUTOMATED: CPT

## 2024-07-05 PROCEDURE — 85007 BL SMEAR W/DIFF WBC COUNT: CPT

## 2024-07-05 RX ORDER — HEPARIN SODIUM,PORCINE 10 UNIT/ML
5 VIAL (ML) INTRAVENOUS ONCE
Status: COMPLETED | OUTPATIENT
Start: 2024-07-05 | End: 2024-07-05

## 2024-07-05 RX ADMIN — Medication 5 ML: at 15:52

## 2024-07-05 RX ADMIN — SODIUM CHLORIDE 500 ML: 9 INJECTION, SOLUTION INTRAVENOUS at 14:46

## 2024-07-05 RX ADMIN — Medication 5 ML: at 13:04

## 2024-07-05 RX ADMIN — Medication 5 ML: at 13:05

## 2024-07-05 ASSESSMENT — PAIN SCALES - GENERAL: PAINLEVEL: MODERATE PAIN (4)

## 2024-07-05 NOTE — NURSING NOTE
Chief Complaint   Patient presents with    Blood Draw     Labs drawn via cvc by RN in lab. VS taken.     Infusion     Pt here for IV Fluids. Pt is s/p BMT for amyloidosis.       Nithya Venegas RN

## 2024-07-05 NOTE — PROGRESS NOTES
Infusion Nursing Note:  Vivian Brown presents today for IV Fluids. See MAR.     Patient seen by provider today: No   present during visit today: Not Applicable.    Note: Pt arrives today with a Creat of 1.43. Pt is requesting IVF. States she had some N/V at home yesterday (none today). Has not been drinking as much fluid as she should be. Pt with a Hx of significant orthostatic hypotension while inpatient. Sabrina Jain NP contacted to notify of Pt's request.  ml IV administered over 1 hour per orders. Prior to IVF BP was 93/64; following IVF BP up to 109/73. Pt states she's feeling better upon discharge.        Intravenous Access:  [place].    Treatment Conditions:  Lab Results   Component Value Date     (L) 07/05/2024    POTASSIUM 3.7 07/05/2024    MAG 2.1 07/01/2024    CR 1.43 (H) 07/05/2024    TANNER 7.3 (L) 07/05/2024    BILITOTAL <0.2 07/05/2024    ALBUMIN 1.9 (L) 07/05/2024    ALT 12 07/05/2024    AST 16 07/05/2024         Post Infusion Assessment:  Patient tolerated infusion without incident.       Discharge Plan:   Copy of AVS reviewed with patient and/or family.  Patient will return Tuesday, 7/9 for next appointment and possible infusion.  Patient discharged in stable condition accompanied by: mother.  Departure Mode: Ambulatory.      Nithya Venegas RN

## 2024-07-05 NOTE — NURSING NOTE
Chief Complaint   Patient presents with    Blood Draw     Labs drawn via cvc by RN in lab. VS taken.      Labs drawn via CVC by RN. Flushed with saline and heparin. Vitals taken. Pt checked into next appt.     Sudha Ramos RN

## 2024-07-06 ENCOUNTER — TELEPHONE (OUTPATIENT)
Dept: ONCOLOGY | Facility: CLINIC | Age: 54
End: 2024-07-06
Payer: COMMERCIAL

## 2024-07-06 NOTE — TELEPHONE ENCOUNTER
Brief Hematology/Oncology on call page    Patient called stating she passed about an inch of a blood clot while trying to poop today. The blood clot was dark and was not mixed with stool. She denies anal or abdominal pain. Denies being constipated but stated that her stools are rather small and fluffy and she actually had an episode of diarrhea about 3 hours ago that was non-blody. Denies any vomiting today. No known history of hemorrhoid or fissure. She says she noticed some blood mixed with stool earlier in the week but she though it was from some colorful gummies she has been eating. Her last Hb yesterday was 8.6 and PLT 38. I recommended she watches her symptoms and call back if she notices more clots or bleeding, lightheadedness or extreme fatigue, or fever. She has appts on 6/8 and 6/9 early next week. For labs and provider visit.    Patricia Matthews MD  Hematology/Oncology Fellow

## 2024-07-08 ENCOUNTER — VIRTUAL VISIT (OUTPATIENT)
Dept: NEPHROLOGY | Facility: CLINIC | Age: 54
End: 2024-07-08
Attending: INTERNAL MEDICINE
Payer: COMMERCIAL

## 2024-07-08 VITALS — WEIGHT: 157 LBS | BODY MASS INDEX: 26.8 KG/M2

## 2024-07-08 DIAGNOSIS — I95.1 ORTHOSTATIC HYPOTENSION: ICD-10-CM

## 2024-07-08 DIAGNOSIS — N18.31 CHRONIC KIDNEY DISEASE, STAGE 3A (H): ICD-10-CM

## 2024-07-08 DIAGNOSIS — N04.9 NEPHROTIC SYNDROME: ICD-10-CM

## 2024-07-08 DIAGNOSIS — E85.81 AL AMYLOIDOSIS (H): Primary | ICD-10-CM

## 2024-07-08 PROCEDURE — 99215 OFFICE O/P EST HI 40 MIN: CPT | Mod: 95 | Performed by: INTERNAL MEDICINE

## 2024-07-08 PROCEDURE — G2211 COMPLEX E/M VISIT ADD ON: HCPCS | Mod: 95 | Performed by: INTERNAL MEDICINE

## 2024-07-08 RX ORDER — BUMETANIDE 1 MG/1
1 TABLET ORAL 2 TIMES DAILY
Qty: 60 TABLET | Refills: 6 | Status: SHIPPED | OUTPATIENT
Start: 2024-07-08

## 2024-07-08 ASSESSMENT — PAIN SCALES - GENERAL: PAINLEVEL: NO PAIN (0)

## 2024-07-08 NOTE — LETTER
7/8/2024       RE: Vivian Brown  [address] [facility] [id number]     Dear Colleague,    Thank you for referring your patient, Vivian Brown, to the [facility] [place] at [facility]. Please see a copy of my visit note below.      Nephrology Progress Note  07/08/2024   Chief complaint: Follow up AL amyloidosis  History of Present Illness:    Vivian Brown is a 54 year old F  with hypothyroidism, who is here for AL amyloidosis consult.     The patient initially presented with fatigue and LE edema for about 2 years. Initially, she was diagnosed with Hashimoto's and was started on thyroid supplement in August 2023. Symptoms has partially improved. Later on she was found to have hypoalbuminemia dn massiv eproteinuria hence was referred to Dr. Sen. She saw Dr. Sen in November 2023 and was started on Lisinopril 2.5 mg and lasix 20 mg per day. I 1 week, she has lsot 20 Lbs and swelling has been better and remained in control.  She underwent a kidney Bx on 12/21/23. The Bx read by Dr. Garcia which showed Amyloidosis of the kidney, AL-type, lambda light chain-restricted, affecting the glomeruli, interstitium, and arterioles. She has mild chronic changes of the parenchyma, including: focal global glomerulosclerosis (3% of glomeruli), focal tubular atrophy and interstitial fibrosis (5% of the cortex), severe arterial sclerosis.   LM: There were 37 glomeruli (LM-29; IF-7; EM-1), of which 1 is globally sclerosed. The non-sclerosed glomeruli are of normal size and cellularity. Significant endocapillary proliferation or cellular crescents are not seen in the glomeruli. The glomeruli show deposits of amorphous, homogeneous, acellular, eosinophilic material that infiltrates and expands the mesangial areas and extends down the capillaries of the glomerular tuft. Tubules are well-preserved and show no signs of acute injury. Obvious  signs of a viral cytopathic effect are not seen in the sample. No oxalate, urate, or phosphate crystals are present in the sample. The interstitium reveals occasional collection of foam cells and occasional deposits of amorphous material. The interstitium contains mild interstitial inflammation in areas of atrophy, and the infiltrates are composed of mononuclear cells. About 5% of the renal cortex shows tubular atrophy and interstitial fibrosis. Arteries show severe sclerosis. Examination of a Congo red-stained section reveals deposits of amorphous material with apple-green birefringence under polarized light, in all glomeruli and focally in arterioles and interstitium. EM: showed 1 glomerulus; 1 glomerulus is examined ultrastructurally. There is prominent deposition of fibrillary material in the mesangial areas, without significant hypercellularity.  There is focal effacement of visceral epithelial cell foot processes in areas of the glomerular basement membranes that show segmental widening of the subepithelial space and distortion of the lamina densa due to deposits of fibrillary material.  The mesangial and basement membrane deposits consist of non-branching, randomly arranged fibrils of 6 to 15 nm width. Glomerular endothelial cells show focal loss of their normal fenestrations. Tubules show no specific changes. Deposits are Congo red positive. The amyloid deposits affect the glomeruli extensively, and interstitium and arterioles focally. Other potential complications of paraproteins in the kidney are not seen, in particular, there is no evidence of light chain cast nephropathy, light chain deposition disease, or light chain tubulopathy.      For kidney function, she has baseline Cr of 0.9 in 12/19/22. However, Cr started to increase to 1.05 in 5/22/23 and now 1.11 on 11/28/23. Serum albumin was 3.5 in 2/24/21 and 2.2 in 12/19/22. Monoclonal work-up on 11/28/23 showed kappa 3.05, lambda 7.82 and K/L ratio 0.39  "and L/K ratio 2.56. dFLC 4.77. Other serologies including MPO, PMND1 were negative. UPCR 12.50 mg/g. No 24 hr urine protein yet. UA showed small blood but no RBC. She also has LCL of 174. TSH 29.16 and + Tissue TTG.      She has Echo on 1/3/24. There is mild concentric left ventricular hypertrophy and borderline RVH. Global peak LV longitudinal strain is averaged at -15.3%. This suggests abnormal strain (normal <-18%).The visual ejection fraction is estimated at 54-56%.Trivial posterior pericardial effusion Clinical history is listed as renal amyloid--on this echo the atria are normal size, the valves are not thickend but there is mild LVH and borderline RVH, there is questionable \"scintillation\" of LV myocardium, the global longitudinal strain is abnormal and the best strain values are at the apex (borderline \"apical sparing\" strain pattern) and there is trace pericardial effusion along with borderline criteria for diastolic dysfunction grade2--take together this could represent some features of cardiac amyloid. Additional studies such as cardiac MRI, cardiac biopsy etc may be useful. She is scheduled to see Cardiology on 1/31/24.      1/8/24: I saw her for consultation via video. She is with her  at home. Still feels fatigue and upset stomach last night.  She is currently on furosemide 20 mg daily, lisinopril 2.5 mg daily and crestor 10 mg daily. She reports having lower extremities edema for about 2 years. She was diagnosed with Hashimoto's in August 2023.  She has been feeling better after started taking thyroid supplement. She has leg swelling but not as bad as before. Furosemide was started in December 2023 and she has lost 20 Lbs wit that. She has seen more bubbly in the urineShe sometimes feels lightheadedness and dizziness and her BP was low sometimes. However, mostly > BP /60s. HR in the 80s.  BP today is 114/76, HR is 76.  She has no problem with swallowing, numbness or tingling sensation. No " CTS symptoms. No tongue enlargement. No periorbital ecchymoses. No easy bruising. She has no other medical problem before this. She has been active and exercise at least twice a week.   3/18/24: In the interim, she underwent BmBx on 1/18/24 which showed  lambda restricted plasma cell, 5%. Congo red positive in the BmBx. She was started on Zenia-CyBorD, C1D1 on 1/26/24. Now s/p 2 cycles, and will start 3rd cycle soon. ASCT is plan on 1st week of Vijaya. In the interim, she was started on Jardiance 2/3/24 and spironolactone on 2/29/24. She does not feel lightheadedness and dizziness. Lisinopril was on hold because of dizziness. Lasix was increased to 40 mg BID on early February. Overall she feels better.  She denies any lightheadedness or dizziness.  She intermittently has numbness and tingling sensation around her thoracic cage typically after the chemo but it usually disappear within 1 week.  Labs on 3/18/24 show creatinine 1.41, BUN 21.1, bicarb 32, potassium 3.7, bicarb 32. Albumin 2.3.  White blood cell 10.8, hemoglobin 13.5, platelet 333. UA showed small blood, glucose 150 and protein 300. UPCR and UACR pending.   5/16/24: In the interim, she continued with Zenia-CyBorD and so far has received 4 cycles. Her Cr has increased gradually and now at 1.90 on 5/14/24. UA showed large protein with hematuria, RBC 5 and WBC 8. Granular cast 8. Serum albumin improved slightly to 2.4. UPCR is stable at 16.04 on 5/8/24 and 24 hour urine protein was 17 g/d. On 4/19/24 Kappa 1.13, lambda 1.71 (8.31 upon diagnosis) with monoclonal protein IgG kappa 0.1 (could be zenia peak). Today, she does not feel good. She has several SE with chemo: feeling nausea, lightheadedness or dizziness. She has no diarrhea. Swelling has been better controlled.  She also has chest pain and just went to [facility].Cardiology thought that chest pain could be from hypotension so she was started on midodrine 2.5 mg for 1 week. She will start work-up next week  for BMT. They plan to possibly do BMT on early June. Home BP are 107-117/60-70. She feels lightheadedness with exertion. I stopped Jardiance due to rising in Cr and hypotension.   7/8/24: In the interim, the patient underwent PBSCT, D0 on 6/12/24 with melphalan induction.  She has significant mucositis nausea vomiting and diarrhea.  Her Lasix and spironolactone were on hold due to orthostatic hypotension and decreased p.o. intake.  She is on acyclovir and fluconazole prophylaxis. She has been receiving IVF for orthostatic hypotension. Today, she is a little bit tired today. She is slowly getting better every day. She does not feel lightheadedness or dizziness. Her swelling has comes back last night considerably mostly at her feet. She still feels nausea intermittently. She has diarrhea this week and has to take imodium. She takes midodrine 10 mg TID. Labs on 7/5/2024 showed sodium 133, potassium 3.7, bicarb 22, creatinine 1.43, GFR 43, calcium 7.3, albumin 1.9.  Hemoglobin 8.6 white blood cell 4.2 and platelet 38.    Past medical history  Past Medical History:   Diagnosis Date    Celiac disease     Family history of colon cancer     Colonoscoy q5 years next 9/2020     Past surgical history  Past Surgical History:   Procedure Laterality Date    BONE MARROW BIOPSY, BONE SPECIMEN, NEEDLE/TROCAR Left 5/24/2024    Procedure: BIOPSY, BONE MARROW;  Surgeon: Sabrina Jain NP;  Location: [facility] OR    COLONOSCOPY N/A 9/28/2015    Procedure: COLONOSCOPY;  Surgeon: Eugenio Travis MD;  Location: [facility]    ESOPHAGOSCOPY, GASTROSCOPY, DUODENOSCOPY (EGD), COMBINED N/A 1/30/2024    Procedure: ESOPHAGOGASTRODUODENOSCOPY, WITH BIOPSY;  Surgeon: Melo Cloud MD;  Location: [facility]    IR CVC TUNNEL PLACEMENT > 5 YRS OF AGE  6/5/2024    IR RENAL BIOPSY RIGHT  12/21/2023    TONSILLECTOMY       Review of Systems:   14 systems were reviewed and all negative except as mentioned above.   Current Medications:  Current Outpatient  Medications   Medication Sig Dispense Refill    bumetanide (BUMEX) 1 MG tablet Take 1 tablet (1 mg) by mouth 2 times daily 60 tablet 6    acyclovir (ZOVIRAX) 400 MG tablet Take 2 tablets (800 mg) by mouth 2 times daily 120 tablet 11    Albuterol-Budesonide (AIRSUPRA) 90-80 MCG/ACT AERO Inhale 2 Inhalations into the lungs every 4 hours as needed (Wheezing, chest tightness, shortness of breath, or persistent cough) 10.7 g 3    azelastine (ASTELIN) 0.1 % nasal spray Spray 2 sprays into both nostrils 2 times daily as needed for rhinitis 30 mL 3    EPINEPHrine (ANY BX GENERIC EQUIV) 0.3 MG/0.3ML injection 2-pack Inject 0.3 mLs (0.3 mg) into the muscle as needed for anaphylaxis May repeat one time in 5-15 minutes if response to initial dose is inadequate. 2 each 3    fluconazole (DIFLUCAN) 100 MG tablet Take 1 tablet (100 mg) by mouth daily 30 tablet 0    fluticasone (FLONASE) 50 MCG/ACT nasal spray Spray 2 sprays into both nostrils daily 16 g 3    levothyroxine (SYNTHROID) 200 MCG tablet Take 200 mcg by mouth daily Pt takes Synthroid brand name. FRANKI      liothyronine (CYTOMEL) 5 MCG tablet Take 5 mcg by mouth 2 times daily      loperamide (IMODIUM A-D) 2 MG tablet Take 1 tablet (2 mg) by mouth 4 times daily as needed for diarrhea (Patient not taking: Reported on 7/5/2024) 30 tablet 1    melatonin 3 MG tablet Take 1 tablet (3 mg) by mouth nightly as needed for sleep 30 tablet 1    midodrine (PROAMATINE) 10 MG tablet Take 1 tablet (10 mg) by mouth 3 times daily 270 tablet 1    OLANZapine (ZYPREXA) 2.5 MG tablet Take 1 tablet (2.5 mg) by mouth 2 times daily 30 tablet 0    ondansetron (ZOFRAN) 8 MG tablet Take 1 tablet (8 mg) by mouth every 8 hours 30 tablet 1    ondansetron (ZOFRAN) 8 MG tablet Take 8 mg by mouth every 8 hours as needed for nausea      pantoprazole (PROTONIX) 40 MG EC tablet Take 1 tablet (40 mg) by mouth every morning (before breakfast) 30 tablet 1    prochlorperazine (COMPAZINE) 10 MG tablet Take 0.5  tablets (5 mg) by mouth every 6 hours as needed for nausea or vomiting 30 tablet 1    psyllium (METAMUCIL/KONSYL) Packet Take 1 packet by mouth daily 30 packet 1    [START ON 7/15/2024] sulfamethoxazole-trimethoprim (BACTRIM DS) 800-160 MG tablet Take 1 tablet by mouth Every Mon, Tues two times daily (Patient not taking: Reported on 7/5/2024) 16 tablet 11    vitamin D3 (CHOLECALCIFEROL) 50 mcg (2000 units) tablet Take 1 tablet (50 mcg) by mouth daily 30 tablet 6     No current facility-administered medications for this visit.       Physical Exam:   Wt 71.2 kg (157 lb)   BMI 26.80 kg/m     Body mass index is 26.8 kg/m .    She appears tired, + pitting edema. No respiratory distress.     Labs:   All labs reviewed by me  Last Renal Panel:  Sodium   Date Value Ref Range Status   07/05/2024 133 (L) 135 - 145 mmol/L Final   02/24/2021 140 133 - 144 mmol/L Final     Potassium   Date Value Ref Range Status   07/05/2024 3.7 3.4 - 5.3 mmol/L Final   12/19/2022 3.9 3.4 - 5.3 mmol/L Final   02/24/2021 4.3 3.4 - 5.3 mmol/L Final     Chloride   Date Value Ref Range Status   07/05/2024 105 98 - 107 mmol/L Final   12/19/2022 108 94 - 109 mmol/L Final   02/24/2021 107 94 - 109 mmol/L Final     Carbon Dioxide   Date Value Ref Range Status   02/24/2021 29 20 - 32 mmol/L Final     Carbon Dioxide (CO2)   Date Value Ref Range Status   07/05/2024 22 22 - 29 mmol/L Final   12/19/2022 33 (H) 20 - 32 mmol/L Final     Anion Gap   Date Value Ref Range Status   07/05/2024 6 (L) 7 - 15 mmol/L Final   12/19/2022 2 (L) 3 - 14 mmol/L Final   02/24/2021 4 3 - 14 mmol/L Final     Glucose   Date Value Ref Range Status   07/05/2024 117 (H) 70 - 99 mg/dL Final   05/29/2024 62 (A) 70 - 99 mg/dL Final     Urea Nitrogen   Date Value Ref Range Status   07/05/2024 14.7 6.0 - 20.0 mg/dL Final   12/19/2022 15 7 - 30 mg/dL Final   02/24/2021 16 7 - 30 mg/dL Final     Creatinine   Date Value Ref Range Status   07/05/2024 1.43 (H) 0.51 - 0.95 mg/dL Final    02/24/2021 0.80 0.52 - 1.04 mg/dL Final     GFR Estimate   Date Value Ref Range Status   07/05/2024 43 (L) >60 mL/min/1.73m2 Final     Comment:     eGFR calculated using 2021 CKD-EPI equation.   02/24/2021 85 >60 mL/min/[1.73_m2] Final     Comment:     Non  GFR Calc  Starting 12/18/2018, serum creatinine based estimated GFR (eGFR) will be   calculated using the Chronic Kidney Disease Epidemiology Collaboration   (CKD-EPI) equation.       Calcium   Date Value Ref Range Status   07/05/2024 7.3 (L) 8.6 - 10.0 mg/dL Final   02/24/2021 9.2 8.5 - 10.1 mg/dL Final     Phosphorus   Date Value Ref Range Status   07/01/2024 2.5 2.5 - 4.5 mg/dL Final     Albumin   Date Value Ref Range Status   07/05/2024 1.9 (L) 3.5 - 5.2 g/dL Final   12/19/2022 2.2 (L) 3.4 - 5.0 g/dL Final   02/24/2021 3.5 3.4 - 5.0 g/dL Final       Imaging:  I reviewed imaging studies.     Assessment & Recommendations:   Problem list  # Lambda LC AL involved: kidneys, cardiac,  BmBx; stage I based on Hall 2012 staging s/p Zenia-CyBorD C1D1 1/26/24; and PBSCT with melphalan induction on 6/12/24  # Renal-proven AL amyloidosis  # Nephrotic syndrome  # Borderline Hypotension  # Family Hx of MM and MDS (maternal aunt and uncle) and HL (paternal uncle)  # CKD stage 3 2/2 lambda LC AL  The patient presented with fatigue and swelling since 2022. Noted hypoalbuminemia since 12/22. UPCR 12.5g with Salb 2.2 mg/dl. Cr 1.1 with eGFR 59. She has kidney Bx which showed lambda AL involved mainly glomeruli, some mesangium and vessels. Of note kidney sizes are normal on US.  Echo 1/3/23 showed mild LVH and RVH with strained pattern suggestive of possible amyloidosis.  She is at stage I per HALL 2012 classification. She has bone marrow biopsy done which showed lambda light chain restricted plasma cells less than 5%.  She was started on Zenia CyBorD C1 D1 on 1/26/24. Now her LC has come down and attained at least VGPR.  He still has positive immunofixation but  that was IgG kappa which could be from Zenia. If IgG lambda was negative and negative monofixation in the urine then she would be having CR.  Now she is approved for ASCT and she hao start the work-uo process in late May 2024.     For treatment of nephrotic syndrome, she was on lasix 20 mg daily and lisinopril 2.5 mg daily.  Lisinopril was discontinued due to dizziness.  And cardiology has adjusted medication and increase Lasix to 40 mg twice a day.  She was also started on Jardiance 10 mg daily on 2/3/2024 and spironolactone on 2/28/24.  I noticed that her creatinine has increased from 1-1.1 to 1.2-1.4.  I doubt that this is from progression of AL amyloidosis given significant improvement in hematological parameters.  This is likely due to hemodynamics effects of Jardiance and spironolactone and increasing Lasix.  This regimen has improved her swelling so I plan not to change any regimen at this time.  Since last visit her Cr has increased to ~ 1.5 and now 1.9 on 5/14 hemodynamic so I stopped Jardince in May 2024.Urine protein was 17 g/day but serum albumin has improved and it was 2.4 g/dl pre-ASCT.     She underwent PBSCT on 6/12/24 c/b orthostatics and her midodrine has been increased to 10 mg 3 times daily.  Lasix and spironolactone is stopped and now swelling has recurred.  Labs on 7/5/2024 showed creatinine 1.43, albumin 1.9. My labs were not drawn yet including Urine studies.     - Start Bumex 1 mg BID  - Measure blood pressure and weight daily and let me know in 2 weeks  - If she develops hypokalemia, spironolactone 12.5 mg my be added  - Previously on  on Lasix 20 mg twice daily, spironolactone 25 mg daily  - Previously on Jardiance but was stopped   - Continue midodrine 10 mg 3 times daily  - Start measuring BP and weight  # Mild hyponatremia  Likely from SIADH and nausea. Na 133.  - Bumex will help with hyponatremia  # Hypothyroidism  TPO ab is positive so Dx with Hashimoto's. On thyroid supplement  presently. Qondering wheter this could be from amyloid as well.   # Celiac disease Bx proven  She had EGD but no Amyloid presence.   # Vitamin D deficiency  # Rising calcium  Vit D 7 on 1/8/24. She is on on Vit D 5000 U daily. Calcium was 10 but corrected calcium was 11.28! So I asked her to reduce vitamin D to 2000 U daily. Stay well hydrated. Now calcium ~7.0s with corrected calcium 8.6.     Follow-up in 3 months with labs    The longitudinal plan of care for Renal AL, nephrotic syndrome, hypotension, low Vit D, CKD 3 was addressed during this visit. Due to the added complexity in care, I will continue to support Vivian Brown  in the subsequent management of this condition(s) and with the ongoing continuity of care of this condition(s).    I spent  40 minutes on the date of the encounter doing chart review, history and exam, documentation and further activities as noted above. 16 (3.01-3.17) minutes of this visit is dedicated to direct patient interaction via face to face.     Tracie Dobson MD on 07/08/2024

## 2024-07-08 NOTE — PROGRESS NOTES
Virtual Visit Details    Type of service:  Video Visit 3.01-3.17    Originating Location (pt. Location): Home    Distant Location (provider location):  On-site  Platform used for Video Visit: Robert

## 2024-07-08 NOTE — NURSING NOTE
Current patient location: [address] [place] [id number]    Is the patient currently in the state of [place]? YES    Visit mode:VIDEO    If the visit is dropped, the patient can be reconnected by: VIDEO VISIT: Text to cell phone:   Telephone Information:   Mobile [phone]       Will anyone else be joining the visit? NO  (If patient encounters technical issues they should call [phone]    How would you like to obtain your AVS? MyChart    Are changes needed to the allergy or medication list? No    Are refills needed on medications prescribed by this physician? NO    Reason for visit: Video Visit (Recheck)    Carmen ESCOBEDO

## 2024-07-08 NOTE — PATIENT INSTRUCTIONS
Starting Bumex 1 mg twice a day  My labs tomorrow including urine test  Monitor blood pressure and weight and let me know in 2 weeks  See you again in 3 months

## 2024-07-08 NOTE — PROGRESS NOTES
Nephrology Progress Note  07/08/2024   Chief complaint: Follow up AL amyloidosis  History of Present Illness:    Vivian Brown is a 54 year old F  with hypothyroidism, who is here for AL amyloidosis consult.     The patient initially presented with fatigue and LE edema for about 2 years. Initially, she was diagnosed with Hashimoto's and was started on thyroid supplement in August 2023. Symptoms has partially improved. Later on she was found to have hypoalbuminemia dn massiv eproteinuria hence was referred to Dr. Sen. She saw Dr. Sen in November 2023 and was started on Lisinopril 2.5 mg and lasix 20 mg per day. I 1 week, she has lsot 20 Lbs and swelling has been better and remained in control.  She underwent a kidney Bx on 12/21/23. The Bx read by Dr. Garcia which showed Amyloidosis of the kidney, AL-type, lambda light chain-restricted, affecting the glomeruli, interstitium, and arterioles. She has mild chronic changes of the parenchyma, including: focal global glomerulosclerosis (3% of glomeruli), focal tubular atrophy and interstitial fibrosis (5% of the cortex), severe arterial sclerosis.   LM: There were 37 glomeruli (LM-29; IF-7; EM-1), of which 1 is globally sclerosed. The non-sclerosed glomeruli are of normal size and cellularity. Significant endocapillary proliferation or cellular crescents are not seen in the glomeruli. The glomeruli show deposits of amorphous, homogeneous, acellular, eosinophilic material that infiltrates and expands the mesangial areas and extends down the capillaries of the glomerular tuft. Tubules are well-preserved and show no signs of acute injury. Obvious signs of a viral cytopathic effect are not seen in the sample. No oxalate, urate, or phosphate crystals are present in the sample. The interstitium reveals occasional collection of foam cells and occasional deposits of amorphous material. The interstitium contains mild interstitial inflammation in areas of atrophy, and  the infiltrates are composed of mononuclear cells. About 5% of the renal cortex shows tubular atrophy and interstitial fibrosis. Arteries show severe sclerosis. Examination of a Congo red-stained section reveals deposits of amorphous material with apple-green birefringence under polarized light, in all glomeruli and focally in arterioles and interstitium. EM: showed 1 glomerulus; 1 glomerulus is examined ultrastructurally. There is prominent deposition of fibrillary material in the mesangial areas, without significant hypercellularity.  There is focal effacement of visceral epithelial cell foot processes in areas of the glomerular basement membranes that show segmental widening of the subepithelial space and distortion of the lamina densa due to deposits of fibrillary material.  The mesangial and basement membrane deposits consist of non-branching, randomly arranged fibrils of 6 to 15 nm width. Glomerular endothelial cells show focal loss of their normal fenestrations. Tubules show no specific changes. Deposits are Congo red positive. The amyloid deposits affect the glomeruli extensively, and interstitium and arterioles focally. Other potential complications of paraproteins in the kidney are not seen, in particular, there is no evidence of light chain cast nephropathy, light chain deposition disease, or light chain tubulopathy.      For kidney function, she has baseline Cr of 0.9 in 12/19/22. However, Cr started to increase to 1.05 in 5/22/23 and now 1.11 on 11/28/23. Serum albumin was 3.5 in 2/24/21 and 2.2 in 12/19/22. Monoclonal work-up on 11/28/23 showed kappa 3.05, lambda 7.82 and K/L ratio 0.39 and L/K ratio 2.56. dFLC 4.77. Other serologies including MPO, PMND1 were negative. UPCR 12.50 mg/g. No 24 hr urine protein yet. UA showed small blood but no RBC. She also has LCL of 174. TSH 29.16 and + Tissue TTG.      She has Echo on 1/3/24. There is mild concentric left ventricular hypertrophy and borderline RVH.  "Global peak LV longitudinal strain is averaged at -15.3%. This suggests abnormal strain (normal <-18%).The visual ejection fraction is estimated at 54-56%.Trivial posterior pericardial effusion Clinical history is listed as renal amyloid--on this echo the atria are normal size, the valves are not thickend but there is mild LVH and borderline RVH, there is questionable \"scintillation\" of LV myocardium, the global longitudinal strain is abnormal and the best strain values are at the apex (borderline \"apical sparing\" strain pattern) and there is trace pericardial effusion along with borderline criteria for diastolic dysfunction grade2--take together this could represent some features of cardiac amyloid. Additional studies such as cardiac MRI, cardiac biopsy etc may be useful. She is scheduled to see Cardiology on 1/31/24.      1/8/24: I saw her for consultation via video. She is with her  at home. Still feels fatigue and upset stomach last night.  She is currently on furosemide 20 mg daily, lisinopril 2.5 mg daily and crestor 10 mg daily. She reports having lower extremities edema for about 2 years. She was diagnosed with Hashimoto's in August 2023.  She has been feeling better after started taking thyroid supplement. She has leg swelling but not as bad as before. Furosemide was started in December 2023 and she has lost 20 Lbs wit that. She has seen more bubbly in the urineShe sometimes feels lightheadedness and dizziness and her BP was low sometimes. However, mostly > BP /60s. HR in the 80s.  BP today is 114/76, HR is 76.  She has no problem with swallowing, numbness or tingling sensation. No CTS symptoms. No tongue enlargement. No periorbital ecchymoses. No easy bruising. She has no other medical problem before this. She has been active and exercise at least twice a week.   3/18/24: In the interim, she underwent BmBx on 1/18/24 which showed  lambda restricted plasma cell, 5%. Congo red positive in the " BmBx. She was started on Zenia-CyBorD, C1D1 on 1/26/24. Now s/p 2 cycles, and will start 3rd cycle soon. ASCT is plan on 1st week of Vijaya. In the interim, she was started on Jardiance 2/3/24 and spironolactone on 2/29/24. She does not feel lightheadedness and dizziness. Lisinopril was on hold because of dizziness. Lasix was increased to 40 mg BID on early February. Overall she feels better.  She denies any lightheadedness or dizziness.  She intermittently has numbness and tingling sensation around her thoracic cage typically after the chemo but it usually disappear within 1 week.  Labs on 3/18/24 show creatinine 1.41, BUN 21.1, bicarb 32, potassium 3.7, bicarb 32. Albumin 2.3.  White blood cell 10.8, hemoglobin 13.5, platelet 333. UA showed small blood, glucose 150 and protein 300. UPCR and UACR pending.   5/16/24: In the interim, she continued with Zenia-CyBorD and so far has received 4 cycles. Her Cr has increased gradually and now at 1.90 on 5/14/24. UA showed large protein with hematuria, RBC 5 and WBC 8. Granular cast 8. Serum albumin improved slightly to 2.4. UPCR is stable at 16.04 on 5/8/24 and 24 hour urine protein was 17 g/d. On 4/19/24 Kappa 1.13, lambda 1.71 (8.31 upon diagnosis) with monoclonal protein IgG kappa 0.1 (could be zenia peak). Today, she does not feel good. She has several SE with chemo: feeling nausea, lightheadedness or dizziness. She has no diarrhea. Swelling has been better controlled.  She also has chest pain and just went to [facility].Cardiology thought that chest pain could be from hypotension so she was started on midodrine 2.5 mg for 1 week. She will start work-up next week for BMT. They plan to possibly do BMT on early June. Home BP are 107-117/60-70. She feels lightheadedness with exertion. I stopped Jardiance due to rising in Cr and hypotension.   7/8/24: In the interim, the patient underwent PBSCT, D0 on 6/12/24 with melphalan induction.  She has significant mucositis nausea  vomiting and diarrhea.  Her Lasix and spironolactone were on hold due to orthostatic hypotension and decreased p.o. intake.  She is on acyclovir and fluconazole prophylaxis. She has been receiving IVF for orthostatic hypotension. Today, she is a little bit tired today. She is slowly getting better every day. She does not feel lightheadedness or dizziness. Her swelling has comes back last night considerably mostly at her feet. She still feels nausea intermittently. She has diarrhea this week and has to take imodium. She takes midodrine 10 mg TID. Labs on 7/5/2024 showed sodium 133, potassium 3.7, bicarb 22, creatinine 1.43, GFR 43, calcium 7.3, albumin 1.9.  Hemoglobin 8.6 white blood cell 4.2 and platelet 38.    Past medical history  Past Medical History:   Diagnosis Date    Celiac disease     Family history of colon cancer     Colonoscoy q5 years next 9/2020     Past surgical history  Past Surgical History:   Procedure Laterality Date    BONE MARROW BIOPSY, BONE SPECIMEN, NEEDLE/TROCAR Left 5/24/2024    Procedure: BIOPSY, BONE MARROW;  Surgeon: Sabrina Jain NP;  Location: [facility] OR    COLONOSCOPY N/A 9/28/2015    Procedure: COLONOSCOPY;  Surgeon: Eugenio Travis MD;  Location: [facility]    ESOPHAGOSCOPY, GASTROSCOPY, DUODENOSCOPY (EGD), COMBINED N/A 1/30/2024    Procedure: ESOPHAGOGASTRODUODENOSCOPY, WITH BIOPSY;  Surgeon: Melo Cloud MD;  Location: [facility]    IR CVC TUNNEL PLACEMENT > 5 YRS OF AGE  6/5/2024    IR RENAL BIOPSY RIGHT  12/21/2023    TONSILLECTOMY       Review of Systems:   14 systems were reviewed and all negative except as mentioned above.   Current Medications:  Current Outpatient Medications   Medication Sig Dispense Refill    bumetanide (BUMEX) 1 MG tablet Take 1 tablet (1 mg) by mouth 2 times daily 60 tablet 6    acyclovir (ZOVIRAX) 400 MG tablet Take 2 tablets (800 mg) by mouth 2 times daily 120 tablet 11    Albuterol-Budesonide (AIRSUPRA) 90-80 MCG/ACT AERO Inhale 2 Inhalations  into the lungs every 4 hours as needed (Wheezing, chest tightness, shortness of breath, or persistent cough) 10.7 g 3    azelastine (ASTELIN) 0.1 % nasal spray Spray 2 sprays into both nostrils 2 times daily as needed for rhinitis 30 mL 3    EPINEPHrine (ANY BX GENERIC EQUIV) 0.3 MG/0.3ML injection 2-pack Inject 0.3 mLs (0.3 mg) into the muscle as needed for anaphylaxis May repeat one time in 5-15 minutes if response to initial dose is inadequate. 2 each 3    fluconazole (DIFLUCAN) 100 MG tablet Take 1 tablet (100 mg) by mouth daily 30 tablet 0    fluticasone (FLONASE) 50 MCG/ACT nasal spray Spray 2 sprays into both nostrils daily 16 g 3    levothyroxine (SYNTHROID) 200 MCG tablet Take 200 mcg by mouth daily Pt takes Synthroid brand name. FRANKI      liothyronine (CYTOMEL) 5 MCG tablet Take 5 mcg by mouth 2 times daily      loperamide (IMODIUM A-D) 2 MG tablet Take 1 tablet (2 mg) by mouth 4 times daily as needed for diarrhea (Patient not taking: Reported on 7/5/2024) 30 tablet 1    melatonin 3 MG tablet Take 1 tablet (3 mg) by mouth nightly as needed for sleep 30 tablet 1    midodrine (PROAMATINE) 10 MG tablet Take 1 tablet (10 mg) by mouth 3 times daily 270 tablet 1    OLANZapine (ZYPREXA) 2.5 MG tablet Take 1 tablet (2.5 mg) by mouth 2 times daily 30 tablet 0    ondansetron (ZOFRAN) 8 MG tablet Take 1 tablet (8 mg) by mouth every 8 hours 30 tablet 1    ondansetron (ZOFRAN) 8 MG tablet Take 8 mg by mouth every 8 hours as needed for nausea      pantoprazole (PROTONIX) 40 MG EC tablet Take 1 tablet (40 mg) by mouth every morning (before breakfast) 30 tablet 1    prochlorperazine (COMPAZINE) 10 MG tablet Take 0.5 tablets (5 mg) by mouth every 6 hours as needed for nausea or vomiting 30 tablet 1    psyllium (METAMUCIL/KONSYL) Packet Take 1 packet by mouth daily 30 packet 1    [START ON 7/15/2024] sulfamethoxazole-trimethoprim (BACTRIM DS) 800-160 MG tablet Take 1 tablet by mouth Every Mon, Tues two times daily (Patient  not taking: Reported on 7/5/2024) 16 tablet 11    vitamin D3 (CHOLECALCIFEROL) 50 mcg (2000 units) tablet Take 1 tablet (50 mcg) by mouth daily 30 tablet 6     No current facility-administered medications for this visit.       Physical Exam:   Wt 71.2 kg (157 lb)   BMI 26.80 kg/m     Body mass index is 26.8 kg/m .    She appears tired, + pitting edema. No respiratory distress.     Labs:   All labs reviewed by me  Last Renal Panel:  Sodium   Date Value Ref Range Status   07/05/2024 133 (L) 135 - 145 mmol/L Final   02/24/2021 140 133 - 144 mmol/L Final     Potassium   Date Value Ref Range Status   07/05/2024 3.7 3.4 - 5.3 mmol/L Final   12/19/2022 3.9 3.4 - 5.3 mmol/L Final   02/24/2021 4.3 3.4 - 5.3 mmol/L Final     Chloride   Date Value Ref Range Status   07/05/2024 105 98 - 107 mmol/L Final   12/19/2022 108 94 - 109 mmol/L Final   02/24/2021 107 94 - 109 mmol/L Final     Carbon Dioxide   Date Value Ref Range Status   02/24/2021 29 20 - 32 mmol/L Final     Carbon Dioxide (CO2)   Date Value Ref Range Status   07/05/2024 22 22 - 29 mmol/L Final   12/19/2022 33 (H) 20 - 32 mmol/L Final     Anion Gap   Date Value Ref Range Status   07/05/2024 6 (L) 7 - 15 mmol/L Final   12/19/2022 2 (L) 3 - 14 mmol/L Final   02/24/2021 4 3 - 14 mmol/L Final     Glucose   Date Value Ref Range Status   07/05/2024 117 (H) 70 - 99 mg/dL Final   05/29/2024 62 (A) 70 - 99 mg/dL Final     Urea Nitrogen   Date Value Ref Range Status   07/05/2024 14.7 6.0 - 20.0 mg/dL Final   12/19/2022 15 7 - 30 mg/dL Final   02/24/2021 16 7 - 30 mg/dL Final     Creatinine   Date Value Ref Range Status   07/05/2024 1.43 (H) 0.51 - 0.95 mg/dL Final   02/24/2021 0.80 0.52 - 1.04 mg/dL Final     GFR Estimate   Date Value Ref Range Status   07/05/2024 43 (L) >60 mL/min/1.73m2 Final     Comment:     eGFR calculated using 2021 CKD-EPI equation.   02/24/2021 85 >60 mL/min/[1.73_m2] Final     Comment:     Non  GFR Calc  Starting 12/18/2018, serum  creatinine based estimated GFR (eGFR) will be   calculated using the Chronic Kidney Disease Epidemiology Collaboration   (CKD-EPI) equation.       Calcium   Date Value Ref Range Status   07/05/2024 7.3 (L) 8.6 - 10.0 mg/dL Final   02/24/2021 9.2 8.5 - 10.1 mg/dL Final     Phosphorus   Date Value Ref Range Status   07/01/2024 2.5 2.5 - 4.5 mg/dL Final     Albumin   Date Value Ref Range Status   07/05/2024 1.9 (L) 3.5 - 5.2 g/dL Final   12/19/2022 2.2 (L) 3.4 - 5.0 g/dL Final   02/24/2021 3.5 3.4 - 5.0 g/dL Final       Imaging:  I reviewed imaging studies.     Assessment & Recommendations:   Problem list  # Lambda LC AL involved: kidneys, cardiac,  BmBx; stage I based on Hlal 2012 staging s/p Zenia-CyBorD C1D1 1/26/24; and PBSCT with melphalan induction on 6/12/24  # Renal-proven AL amyloidosis  # Nephrotic syndrome  # Borderline Hypotension  # Family Hx of MM and MDS (maternal aunt and uncle) and HL (paternal uncle)  # CKD stage 3 2/2 lambda LC AL  The patient presented with fatigue and swelling since 2022. Noted hypoalbuminemia since 12/22. UPCR 12.5g with Salb 2.2 mg/dl. Cr 1.1 with eGFR 59. She has kidney Bx which showed lambda AL involved mainly glomeruli, some mesangium and vessels. Of note kidney sizes are normal on US.  Echo 1/3/23 showed mild LVH and RVH with strained pattern suggestive of possible amyloidosis.  She is at stage I per HALL 2012 classification. She has bone marrow biopsy done which showed lambda light chain restricted plasma cells less than 5%.  She was started on Zenia CyBorD C1 D1 on 1/26/24. Now her LC has come down and attained at least VGPR.  He still has positive immunofixation but that was IgG kappa which could be from Zenia. If IgG lambda was negative and negative monofixation in the urine then she would be having CR.  Now she is approved for ASCT and she hao start the work-uo process in late May 2024.     For treatment of nephrotic syndrome, she was on lasix 20 mg daily and lisinopril 2.5  mg daily.  Lisinopril was discontinued due to dizziness.  And cardiology has adjusted medication and increase Lasix to 40 mg twice a day.  She was also started on Jardiance 10 mg daily on 2/3/2024 and spironolactone on 2/28/24.  I noticed that her creatinine has increased from 1-1.1 to 1.2-1.4.  I doubt that this is from progression of AL amyloidosis given significant improvement in hematological parameters.  This is likely due to hemodynamics effects of Jardiance and spironolactone and increasing Lasix.  This regimen has improved her swelling so I plan not to change any regimen at this time.  Since last visit her Cr has increased to ~ 1.5 and now 1.9 on 5/14 hemodynamic so I stopped Jardince in May 2024.Urine protein was 17 g/day but serum albumin has improved and it was 2.4 g/dl pre-ASCT.     She underwent PBSCT on 6/12/24 c/b orthostatics and her midodrine has been increased to 10 mg 3 times daily.  Lasix and spironolactone is stopped and now swelling has recurred.  Labs on 7/5/2024 showed creatinine 1.43, albumin 1.9. My labs were not drawn yet including Urine studies.     - Start Bumex 1 mg BID  - Measure blood pressure and weight daily and let me know in 2 weeks  - If she develops hypokalemia, spironolactone 12.5 mg my be added  - Previously on  on Lasix 20 mg twice daily, spironolactone 25 mg daily  - Previously on Jardiance but was stopped   - Continue midodrine 10 mg 3 times daily  - Start measuring BP and weight  # Mild hyponatremia  Likely from SIADH and nausea. Na 133.  - Bumex will help with hyponatremia  # Hypothyroidism  TPO ab is positive so Dx with Hashimoto's. On thyroid supplement presently. Qondering wheter this could be from amyloid as well.   # Celiac disease Bx proven  She had EGD but no Amyloid presence.   # Vitamin D deficiency  # Rising calcium  Vit D 7 on 1/8/24. She is on on Vit D 5000 U daily. Calcium was 10 but corrected calcium was 11.28! So I asked her to reduce vitamin D to 2000 U  daily. Stay well hydrated. Now calcium ~7.0s with corrected calcium 8.6.     Follow-up in 3 months with labs    The longitudinal plan of care for Renal AL, nephrotic syndrome, hypotension, low Vit D, CKD 3 was addressed during this visit. Due to the added complexity in care, I will continue to support Vivian Brown  in the subsequent management of this condition(s) and with the ongoing continuity of care of this condition(s).    I spent  40 minutes on the date of the encounter doing chart review, history and exam, documentation and further activities as noted above. 16 (3.01-3.17) minutes of this visit is dedicated to direct patient interaction via face to face.     Tracie Dobson MD on 07/08/2024

## 2024-07-09 ENCOUNTER — LAB (OUTPATIENT)
Dept: LAB | Facility: CLINIC | Age: 54
End: 2024-07-09
Attending: INTERNAL MEDICINE
Payer: COMMERCIAL

## 2024-07-09 ENCOUNTER — DOCUMENTATION ONLY (OUTPATIENT)
Dept: TRANSPLANT | Facility: CLINIC | Age: 54
End: 2024-07-09

## 2024-07-09 ENCOUNTER — ONCOLOGY VISIT (OUTPATIENT)
Dept: TRANSPLANT | Facility: CLINIC | Age: 54
End: 2024-07-09
Attending: INTERNAL MEDICINE
Payer: COMMERCIAL

## 2024-07-09 VITALS
BODY MASS INDEX: 26.63 KG/M2 | RESPIRATION RATE: 16 BRPM | HEART RATE: 103 BPM | DIASTOLIC BLOOD PRESSURE: 78 MMHG | TEMPERATURE: 97.8 F | WEIGHT: 156 LBS | SYSTOLIC BLOOD PRESSURE: 118 MMHG | OXYGEN SATURATION: 98 %

## 2024-07-09 DIAGNOSIS — N18.31 CHRONIC KIDNEY DISEASE, STAGE 3A (H): ICD-10-CM

## 2024-07-09 DIAGNOSIS — E85.81 AL AMYLOIDOSIS (H): Primary | ICD-10-CM

## 2024-07-09 DIAGNOSIS — E85.81 AL AMYLOIDOSIS (H): ICD-10-CM

## 2024-07-09 LAB
ACANTHOCYTES BLD QL SMEAR: SLIGHT
ALBUMIN MFR UR ELPH: 1559 MG/DL
ALBUMIN SERPL BCG-MCNC: 2 G/DL (ref 3.5–5.2)
ALBUMIN SERPL BCG-MCNC: 2 G/DL (ref 3.5–5.2)
ALBUMIN UR-MCNC: 600 MG/DL
ALP SERPL-CCNC: 171 U/L (ref 40–150)
ALT SERPL W P-5'-P-CCNC: 10 U/L (ref 0–50)
ANION GAP SERPL CALCULATED.3IONS-SCNC: 5 MMOL/L (ref 7–15)
ANION GAP SERPL CALCULATED.3IONS-SCNC: 6 MMOL/L (ref 7–15)
APPEARANCE UR: ABNORMAL
AST SERPL W P-5'-P-CCNC: 13 U/L (ref 0–45)
BACTERIA #/AREA URNS HPF: ABNORMAL /HPF
BASOPHILS # BLD AUTO: ABNORMAL 10*3/UL
BASOPHILS # BLD MANUAL: 0 10E3/UL (ref 0–0.2)
BASOPHILS NFR BLD AUTO: ABNORMAL %
BASOPHILS NFR BLD MANUAL: 0 %
BILIRUB SERPL-MCNC: 0.2 MG/DL
BILIRUB UR QL STRIP: NEGATIVE
BUN SERPL-MCNC: 10.8 MG/DL (ref 6–20)
BUN SERPL-MCNC: 10.9 MG/DL (ref 6–20)
CALCIUM SERPL-MCNC: 7.6 MG/DL (ref 8.6–10)
CALCIUM SERPL-MCNC: 7.6 MG/DL (ref 8.6–10)
CHLORIDE SERPL-SCNC: 105 MMOL/L (ref 98–107)
CHLORIDE SERPL-SCNC: 106 MMOL/L (ref 98–107)
COLOR UR AUTO: YELLOW
CREAT SERPL-MCNC: 1.4 MG/DL (ref 0.51–0.95)
CREAT SERPL-MCNC: 1.41 MG/DL (ref 0.51–0.95)
CREAT UR-MCNC: 125 MG/DL
DEPRECATED HCO3 PLAS-SCNC: 23 MMOL/L (ref 22–29)
DEPRECATED HCO3 PLAS-SCNC: 23 MMOL/L (ref 22–29)
EGFRCR SERPLBLD CKD-EPI 2021: 44 ML/MIN/1.73M2
EGFRCR SERPLBLD CKD-EPI 2021: 44 ML/MIN/1.73M2
EOSINOPHIL # BLD AUTO: ABNORMAL 10*3/UL
EOSINOPHIL # BLD MANUAL: 0.4 10E3/UL (ref 0–0.7)
EOSINOPHIL NFR BLD AUTO: ABNORMAL %
EOSINOPHIL NFR BLD MANUAL: 8 %
ERYTHROCYTE [DISTWIDTH] IN BLOOD BY AUTOMATED COUNT: 15.6 % (ref 10–15)
ERYTHROCYTE [DISTWIDTH] IN BLOOD BY AUTOMATED COUNT: 15.7 % (ref 10–15)
GLUCOSE SERPL-MCNC: 111 MG/DL (ref 70–99)
GLUCOSE SERPL-MCNC: 111 MG/DL (ref 70–99)
GLUCOSE UR STRIP-MCNC: 50 MG/DL
HCT VFR BLD AUTO: 25.5 % (ref 35–47)
HCT VFR BLD AUTO: 25.6 % (ref 35–47)
HGB BLD-MCNC: 8.5 G/DL (ref 11.7–15.7)
HGB BLD-MCNC: 8.6 G/DL (ref 11.7–15.7)
HGB UR QL STRIP: ABNORMAL
HYALINE CASTS: 22 /LPF
IMM GRANULOCYTES # BLD: ABNORMAL 10*3/UL
IMM GRANULOCYTES NFR BLD: ABNORMAL %
KETONES UR STRIP-MCNC: NEGATIVE MG/DL
LDH SERPL L TO P-CCNC: 276 U/L (ref 0–250)
LEUKOCYTE ESTERASE UR QL STRIP: ABNORMAL
LYMPHOCYTES # BLD AUTO: ABNORMAL 10*3/UL
LYMPHOCYTES # BLD MANUAL: 0.8 10E3/UL (ref 0.8–5.3)
LYMPHOCYTES NFR BLD AUTO: ABNORMAL %
LYMPHOCYTES NFR BLD MANUAL: 19 %
MAGNESIUM SERPL-MCNC: 1.6 MG/DL (ref 1.7–2.3)
MCH RBC QN AUTO: 30.7 PG (ref 26.5–33)
MCH RBC QN AUTO: 31.2 PG (ref 26.5–33)
MCHC RBC AUTO-ENTMCNC: 33.2 G/DL (ref 31.5–36.5)
MCHC RBC AUTO-ENTMCNC: 33.7 G/DL (ref 31.5–36.5)
MCV RBC AUTO: 92 FL (ref 78–100)
MCV RBC AUTO: 92 FL (ref 78–100)
MONOCYTES # BLD AUTO: ABNORMAL 10*3/UL
MONOCYTES # BLD MANUAL: 0.6 10E3/UL (ref 0–1.3)
MONOCYTES NFR BLD AUTO: ABNORMAL %
MONOCYTES NFR BLD MANUAL: 14 %
MUCOUS THREADS #/AREA URNS LPF: PRESENT /LPF
NEUTROPHILS # BLD AUTO: ABNORMAL 10*3/UL
NEUTROPHILS # BLD MANUAL: 2.6 10E3/UL (ref 1.6–8.3)
NEUTROPHILS NFR BLD AUTO: ABNORMAL %
NEUTROPHILS NFR BLD MANUAL: 59 %
NITRATE UR QL: POSITIVE
NRBC # BLD AUTO: 0 10E3/UL
NRBC BLD AUTO-RTO: 0 /100
NT-PROBNP SERPL-MCNC: 3128 PG/ML (ref 0–900)
PH UR STRIP: 6.5 [PH] (ref 5–7)
PHOSPHATE SERPL-MCNC: 3.7 MG/DL (ref 2.5–4.5)
PHOSPHATE SERPL-MCNC: 3.7 MG/DL (ref 2.5–4.5)
PLAT MORPH BLD: ABNORMAL
PLATELET # BLD AUTO: 50 10E3/UL (ref 150–450)
PLATELET # BLD AUTO: 50 10E3/UL (ref 150–450)
POLYCHROMASIA BLD QL SMEAR: SLIGHT
POTASSIUM SERPL-SCNC: 3.8 MMOL/L (ref 3.4–5.3)
POTASSIUM SERPL-SCNC: 3.9 MMOL/L (ref 3.4–5.3)
PROT SERPL-MCNC: 4.1 G/DL (ref 6.4–8.3)
PROT/CREAT 24H UR: 12.47 MG/MG CR (ref 0–0.2)
PTH-INTACT SERPL-MCNC: 38 PG/ML (ref 15–65)
RBC # BLD AUTO: 2.76 10E6/UL (ref 3.8–5.2)
RBC # BLD AUTO: 2.77 10E6/UL (ref 3.8–5.2)
RBC MORPH BLD: ABNORMAL
RBC URINE: 5 /HPF
SODIUM SERPL-SCNC: 134 MMOL/L (ref 135–145)
SODIUM SERPL-SCNC: 134 MMOL/L (ref 135–145)
SP GR UR STRIP: 1.02 (ref 1–1.03)
TOTAL PROTEIN SERUM FOR ELP: 3.8 G/DL (ref 6.4–8.3)
TROPONIN T SERPL HS-MCNC: 34 NG/L
URATE SERPL-MCNC: 4.2 MG/DL (ref 2.4–5.7)
UROBILINOGEN UR STRIP-MCNC: NORMAL MG/DL
VIT D+METAB SERPL-MCNC: 8 NG/ML (ref 20–50)
WBC # BLD AUTO: 4.4 10E3/UL (ref 4–11)
WBC # BLD AUTO: 4.8 10E3/UL (ref 4–11)
WBC URINE: 40 /HPF
YEAST #/AREA URNS HPF: ABNORMAL /HPF

## 2024-07-09 PROCEDURE — 36415 COLL VENOUS BLD VENIPUNCTURE: CPT | Performed by: PHYSICIAN ASSISTANT

## 2024-07-09 PROCEDURE — 84550 ASSAY OF BLOOD/URIC ACID: CPT | Performed by: PHYSICIAN ASSISTANT

## 2024-07-09 PROCEDURE — 84165 PROTEIN E-PHORESIS SERUM: CPT | Mod: 26 | Performed by: PATHOLOGY

## 2024-07-09 PROCEDURE — 83735 ASSAY OF MAGNESIUM: CPT | Performed by: PHYSICIAN ASSISTANT

## 2024-07-09 PROCEDURE — 81001 URINALYSIS AUTO W/SCOPE: CPT

## 2024-07-09 PROCEDURE — 84165 PROTEIN E-PHORESIS SERUM: CPT | Mod: TC | Performed by: PATHOLOGY

## 2024-07-09 PROCEDURE — 83880 ASSAY OF NATRIURETIC PEPTIDE: CPT | Performed by: STUDENT IN AN ORGANIZED HEALTH CARE EDUCATION/TRAINING PROGRAM

## 2024-07-09 PROCEDURE — 83521 IG LIGHT CHAINS FREE EACH: CPT

## 2024-07-09 PROCEDURE — 85027 COMPLETE CBC AUTOMATED: CPT

## 2024-07-09 PROCEDURE — 86334 IMMUNOFIX E-PHORESIS SERUM: CPT | Performed by: PATHOLOGY

## 2024-07-09 PROCEDURE — 82374 ASSAY BLOOD CARBON DIOXIDE: CPT

## 2024-07-09 PROCEDURE — 36415 COLL VENOUS BLD VENIPUNCTURE: CPT

## 2024-07-09 PROCEDURE — 84156 ASSAY OF PROTEIN URINE: CPT

## 2024-07-09 PROCEDURE — 36592 COLLECT BLOOD FROM PICC: CPT

## 2024-07-09 PROCEDURE — 80069 RENAL FUNCTION PANEL: CPT

## 2024-07-09 PROCEDURE — 86334 IMMUNOFIX E-PHORESIS SERUM: CPT | Mod: 26 | Performed by: PATHOLOGY

## 2024-07-09 PROCEDURE — 83615 LACTATE (LD) (LDH) ENZYME: CPT | Performed by: PHYSICIAN ASSISTANT

## 2024-07-09 PROCEDURE — 250N000011 HC RX IP 250 OP 636: Performed by: INTERNAL MEDICINE

## 2024-07-09 PROCEDURE — 83521 IG LIGHT CHAINS FREE EACH: CPT | Performed by: PHYSICIAN ASSISTANT

## 2024-07-09 PROCEDURE — 82040 ASSAY OF SERUM ALBUMIN: CPT

## 2024-07-09 PROCEDURE — 99214 OFFICE O/P EST MOD 30 MIN: CPT

## 2024-07-09 PROCEDURE — 82784 ASSAY IGA/IGD/IGG/IGM EACH: CPT | Performed by: PHYSICIAN ASSISTANT

## 2024-07-09 PROCEDURE — 84155 ASSAY OF PROTEIN SERUM: CPT | Performed by: PHYSICIAN ASSISTANT

## 2024-07-09 PROCEDURE — 85007 BL SMEAR W/DIFF WBC COUNT: CPT

## 2024-07-09 PROCEDURE — 82306 VITAMIN D 25 HYDROXY: CPT

## 2024-07-09 PROCEDURE — 84484 ASSAY OF TROPONIN QUANT: CPT | Performed by: STUDENT IN AN ORGANIZED HEALTH CARE EDUCATION/TRAINING PROGRAM

## 2024-07-09 PROCEDURE — 83970 ASSAY OF PARATHORMONE: CPT

## 2024-07-09 PROCEDURE — 99213 OFFICE O/P EST LOW 20 MIN: CPT

## 2024-07-09 PROCEDURE — 84100 ASSAY OF PHOSPHORUS: CPT | Performed by: PHYSICIAN ASSISTANT

## 2024-07-09 RX ORDER — HEPARIN SODIUM,PORCINE 10 UNIT/ML
5 VIAL (ML) INTRAVENOUS
Status: DISCONTINUED | OUTPATIENT
Start: 2024-07-09 | End: 2024-07-09 | Stop reason: HOSPADM

## 2024-07-09 RX ADMIN — Medication 5 ML: at 10:15

## 2024-07-09 RX ADMIN — Medication 5 ML: at 10:14

## 2024-07-09 ASSESSMENT — EJECTION FRACTION: LAST EJECTION FRACTION (EF) PRIOR TO CONDITIONING (%): NO

## 2024-07-09 ASSESSMENT — PAIN SCALES - GENERAL: PAINLEVEL: NO PAIN (0)

## 2024-07-09 ASSESSMENT — PULMONARY FUNCTION TESTS: FEV1/FVC_PERCENT_PREDICTED: 92

## 2024-07-09 NOTE — NURSING NOTE
"Oncology Rooming Note    July 9, 2024 10:29 AM   Vivian Brown is a 54 year old female who presents for:    Chief Complaint   Patient presents with    Blood Draw     Vitals taken, cvc labs drawn, heparin locked, checked into next appt    Oncology Clinic Visit     Multiple Myeloma     Initial Vitals: /78 (BP Location: Left arm, Patient Position: Sitting, Cuff Size: Adult Small)   Pulse 103   Temp 97.8  F (36.6  C) (Oral)   Resp 16   Wt 70.8 kg (156 lb)   SpO2 98%   BMI 26.63 kg/m   Estimated body mass index is 26.63 kg/m  as calculated from the following:    Height as of 6/11/24: 1.63 m (5' 4.17\").    Weight as of this encounter: 70.8 kg (156 lb). Body surface area is 1.79 meters squared.  No Pain (0) Comment: Data Unavailable   No LMP recorded. (Menstrual status: IUD).  Allergies reviewed: Yes  Medications reviewed: Yes    Medications: Medication refills not needed today.  Pharmacy name entered into [facility]:    [facility] [place], [place] - [address] [facility] [place], [place] - [address] [facility] [id number] - [place] - [address] [id number] - [place], [place] - [address]  [facility] - [place], [place] - [address]  [facility] - [place], [place] - [address]  [facility] [place], [place] - [phone]    Frailty Screening:   Is the patient here for a new oncology consult visit in cancer care? 2. No      Clinical concerns: None       Cheryl See LPN  7/9/2024              "

## 2024-07-09 NOTE — LETTER
"7/9/2024      Vivian Brown  [address] [facility] [address]      Dear Colleague,    Thank you for referring your patient, Vivian Brown, to the [facility] BLOOD AND MARROW TRANSPLANT PROGRAM Outing. Please see a copy of my visit note below.    BMT/Cell Therapy Daily Progress Note   07/09/2024     Patient ID:  Vivian Brown is a 54 year old female, currently day +27 s/p autologous stem cell transplant for amyloidosis. Hospitalization complicated by significant orthostatic hypotension.      Admission date: 6/11/2024     INTERVAL  HISTORY   Mary Ann presents today for follow up. She again had a \"zig zag\" line appear in right eye this morning. This happened while in the hospital but in the left eye. Disappeared after 15m. No other new neuro symptoms. Seen by renal yesterday and started on Bumex for LE edema-hasn't picked up Rx yet.  Still with intermittent vomiting-no nausea. Not eating/drinking much. Will try protein drinks.  Still some blood in stool but less.   Stools still loose (not water). Taking imodium and metamucil gummies as needed.     Review of Systems: ROS negative except as noted above.        PHYSICAL EXAM   KPS:  70     /78 (BP Location: Left arm, Patient Position: Sitting, Cuff Size: Adult Small)   Pulse 103   Temp 97.8  F (36.6  C) (Oral)   Resp 16   Wt 70.8 kg (156 lb)   SpO2 98%   BMI 26.63 kg/m         General appearance: Patient is sitting up. NAD but pale.    Eyes: Sclera & conjunctiva clear bilaterally  Cardio: RRR; II/V systolic murmur  Pulmonary: cta b/l  Abdominal: Soft, non-distended abdomen; no tenderness  Extremities: No edema  Skin: warm and dry; no rash or concerning lesions.  Neuro: A&O,  NO focal deficits.   Access: R CVC clean, dry and intact, non-tender, no drainage.        LABS: I have assessed all abnormal lab values for their clinical significance and any values considered clinically significant have been addressed in the assessment and " plan.       Lab Results   Component Value Date    WBC 4.8 07/09/2024    ANEU 2.7 07/05/2024    HGB 8.6 (L) 07/09/2024    HCT 25.5 (L) 07/09/2024    PLT 50 (L) 07/09/2024     (L) 07/09/2024    POTASSIUM 3.9 07/09/2024    CHLORIDE 105 07/09/2024    CO2 23 07/09/2024     (H) 07/09/2024    BUN 10.8 07/09/2024    CR 1.40 (H) 07/09/2024    MAG 1.6 (L) 07/09/2024    INR 1.10 07/01/2024          ASSESSMENT AND PLAN   Vivian Brown is a 54 year old female with amyloidosis, undergoing melphalan followed by autologous stem cell rescue. She is day +27.     BMT/MM/Amyloidosis  - BMT MD/Coordinator: Ramesh/Carlos  - [id number]  - Cell dose: total collections of 5.99 million CD34+ cells/kg.     - Prep as above. Flush and pre-meds prior to transplant.  - GCSF started day +5, continue until ANC > 2500 for 2 consecutive days. Last day 6/29.  -OK to cancel cardiac MRI scheduled 7/10- discussed with Dr. Chandler.      HEME/COAG  #Pancytopenia 2/2 to chemotherapy.   - Transfusion parameters: hgb <7g/dL and plts <20,000 (increased parameter 6/21 d/t brbpr).  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     ID  Prophy: ACV; fluconazole.  PJP ppx to start at day +28-start today.      GI/NUTRITION  #Diarrhea - C. Diff negative, metamucil.   #CINV: scheduled Zofran IV x 6/21; prn Ativan and compazine prn. *Emend 6/22. Zyprexa 2.5mg bid (monitor for orthostasis). Emend 6/26. Significantly improved 6/30. 7/9 will try to have food in stomach atc   #Elevated alk phos, possibly related to GCSF.  Trend hepatic panel. GB sludge noted on 6/25 ultrasound.   #Acid reflux: protonix daily  # Celiac Disease- dietitian to follow  -7/9 discussed adding protein shakes/supplements since she is tolerating milk products well     FLUIDS/NUTRITION  - PTA Lasix 40mg am, 20mg afternoon- held since 6/17 d/t orthostasis and large volume diuresis on 6/16. Not continued on discharge, assess need OP.   - PTA spironoloactone -held since 6/17. Not continued on  discharge, assess need while outpatient.   - Hypomagnesemia: replete per sliding scale  - Started on Bumex 1mg BID 7/9     RENAL  # renal amyloidosis; sensitive to fluid changes so giving fluids slowly for tachycardia: 1/2 L NS over 2 hours  - Monitor Cr and electrolytes daily.   - Replace electrolytes per sliding scale  - Bumex as above-if develops hypokalemia could start spironolactone     ENDOCRINE  # Hypothyroidism:  continue PTA cytomel and synthroid     CARDIAC  # cardiac amyloidosis: cautious fluid balance; diuresis as above  # h/o dizziness and chest pain with chemotherapy for amyloidosis; improved with addition of midodrine.   # orthostatic hypotension: on midodrine, as above. PRN fluid boluses vs diuresis for volume balance.  # hyperlipidemia: rosuvastatin on hold during acute transplant course     Neuro: 6/30 vision change x1 hour with ring of blurriness (not black out). Neuro exam unremarkable. Monitor     SUPPORTIVE CARE  - PT/OT to evaluate on admission and follow as indicated.   - BMT Social work to follow.       RETURN TO CLINIC:  -7/11 labs/infusion  -7/12 Dr. Chandler  - line removal referral placed today       I spent 30 minutes in the care of this patient today, which included time necessary for preparation for the visit, obtaining history, ordering medications/tests/procedures as medically indicated, review of pertinent medical literature, counseling of the patient, communication of recommendations to the care team, and documentation time.     Jacqueline Quinones NP

## 2024-07-09 NOTE — NURSING NOTE
Chief Complaint   Patient presents with    Blood Draw     Vitals taken, cvc labs drawn, heparin locked, checked into next appt     /78 (BP Location: Left arm, Patient Position: Sitting, Cuff Size: Adult Small)   Pulse 103   Temp 97.8  F (36.6  C) (Oral)   Resp 16   Wt 70.8 kg (156 lb)   SpO2 98%   BMI 26.63 kg/m    Shine Rangel RN on 7/9/2024 at 10:23 AM

## 2024-07-09 NOTE — PROGRESS NOTES
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 Hospitalization complicated by significant orthostatic hypotension.      Admission date: 6/11/2024     INTERVAL  HISTORY   Mary Ann presents today for follow up. She again had a \"zig zag\" line appear in right eye this morning. This happened while in the hospital but in the left eye. Disappeared after 15m. No other new neuro symptoms. Seen by renal yesterday and started on Bumex for LE edema-hasn't picked up Rx yet.  Still with intermittent vomiting-no nausea. Not eating/drinking much. Will try protein drinks.  Still some blood in stool but less.   Stools still loose (not water). Taking imodium and metamucil gummies as needed.     Review of Systems: ROS negative except as noted above.        PHYSICAL EXAM   KPS:  70     /78 (BP Location: Left arm, Patient Position: Sitting, Cuff Size: Adult Small)   Pulse 103   Temp 97.8  F (36.6  C) (Oral)   Resp 16   Wt 70.8 kg (156 lb)   SpO2 98%   BMI 26.63 kg/m         General appearance: Patient is sitting up. NAD but pale.    Eyes: Sclera & conjunctiva clear bilaterally  Cardio: RRR; II/V systolic murmur  Pulmonary: cta b/l  Abdominal: Soft, non-distended abdomen; no tenderness  Extremities: No edema  Skin: warm and dry; no rash or concerning lesions.  Neuro: A&O,  NO focal deficits.   Access: R CVC clean, dry and intact, non-tender, no drainage.        LABS: I have assessed all abnormal lab values for their clinical significance and any values considered clinically significant have been addressed in the assessment and plan.       Lab Results   Component Value Date    WBC 4.8 07/09/2024    ANEU 2.7 07/05/2024    HGB 8.6 (L) 07/09/2024    HCT 25.5 (L) 07/09/2024    PLT 50 (L) 07/09/2024     (L) 07/09/2024    POTASSIUM 3.9 07/09/2024    CHLORIDE 105 07/09/2024    CO2 23 07/09/2024    GLC " 111 (H) 07/09/2024    BUN 10.8 07/09/2024    CR 1.40 (H) 07/09/2024    MAG 1.6 (L) 07/09/2024    INR 1.10 07/01/2024          ASSESSMENT AND PLAN   Vivian Brown is a 54 year old female with amyloidosis, undergoing melphalan followed by autologous stem cell rescue. She is day +27.     BMT/MM/Amyloidosis  - BMT MD/Coordinator: Ramesh/Carlos  - [id number]  - Cell dose: total collections of 5.99 million CD34+ cells/kg.     - Prep as above. Flush and pre-meds prior to transplant.  - GCSF started day +5, continue until ANC > 2500 for 2 consecutive days. Last day 6/29.  -OK to cancel cardiac MRI scheduled 7/10- discussed with Dr. Chandler.      HEME/COAG  #Pancytopenia 2/2 to chemotherapy.   - Transfusion parameters: hgb <7g/dL and plts <20,000 (increased parameter 6/21 d/t brbpr).  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     ID  Prophy: ACV; fluconazole.  PJP ppx to start at day +28-start today.      GI/NUTRITION  #Diarrhea - C. Diff negative, metamucil.   #CINV: scheduled Zofran IV x 6/21; prn Ativan and compazine prn. *Emend 6/22. Zyprexa 2.5mg bid (monitor for orthostasis). Emend 6/26. Significantly improved 6/30. 7/9 will try to have food in stomach atc   #Elevated alk phos, possibly related to GCSF.  Trend hepatic panel. GB sludge noted on 6/25 ultrasound.   #Acid reflux: protonix daily  # Celiac Disease- dietitian to follow  -7/9 discussed adding protein shakes/supplements since she is tolerating milk products well     FLUIDS/NUTRITION  - PTA Lasix 40mg am, 20mg afternoon- held since 6/17 d/t orthostasis and large volume diuresis on 6/16. Not continued on discharge, assess need OP.   - PTA spironoloactone -held since 6/17. Not continued on discharge, assess need while outpatient.   - Hypomagnesemia: replete per sliding scale  - Started on Bumex 1mg BID 7/9     RENAL  # renal amyloidosis; sensitive to fluid changes so giving fluids slowly for tachycardia: 1/2 L NS over 2 hours  - Monitor Cr and electrolytes daily.   -  Replace electrolytes per sliding scale  - Bumex as above-if develops hypokalemia could start spironolactone     ENDOCRINE  # Hypothyroidism:  continue PTA cytomel and synthroid     CARDIAC  # cardiac amyloidosis: cautious fluid balance; diuresis as above  # h/o dizziness and chest pain with chemotherapy for amyloidosis; improved with addition of midodrine.   # orthostatic hypotension: on midodrine, as above. PRN fluid boluses vs diuresis for volume balance.  # hyperlipidemia: rosuvastatin on hold during acute transplant course     Neuro: 6/30 vision change x1 hour with ring of blurriness (not black out). Neuro exam unremarkable. Monitor     SUPPORTIVE CARE  - PT/OT to evaluate on admission and follow as indicated.   - BMT Social work to follow.       RETURN TO CLINIC:  -7/11 labs/infusion  -7/12 Dr. Chandler  - line removal referral placed today       I spent 30 minutes in the care of this patient today, which included time necessary for preparation for the visit, obtaining history, ordering medications/tests/procedures as medically indicated, review of pertinent medical literature, counseling of the patient, communication of recommendations to the care team, and documentation time.     Jacqueline Quinones NP

## 2024-07-10 LAB
ALBUMIN SERPL ELPH-MCNC: 1.6 G/DL (ref 3.7–5.1)
ALPHA1 GLOB SERPL ELPH-MCNC: 0.4 G/DL (ref 0.2–0.4)
ALPHA2 GLOB SERPL ELPH-MCNC: 0.9 G/DL (ref 0.5–0.9)
B-GLOBULIN SERPL ELPH-MCNC: 0.5 G/DL (ref 0.6–1)
GAMMA GLOB SERPL ELPH-MCNC: 0.4 G/DL (ref 0.7–1.6)
IGA SERPL-MCNC: 218 MG/DL (ref 84–499)
IGG SERPL-MCNC: 366 MG/DL (ref 610–1616)
IGM SERPL-MCNC: 101 MG/DL (ref 35–242)
KAPPA LC FREE SER-MCNC: 5.79 MG/DL (ref 0.33–1.94)
KAPPA LC FREE SER-MCNC: 6.05 MG/DL (ref 0.33–1.94)
KAPPA LC FREE/LAMBDA FREE SER NEPH: 0.81 {RATIO} (ref 0.26–1.65)
KAPPA LC FREE/LAMBDA FREE SER NEPH: 0.85 {RATIO} (ref 0.26–1.65)
LAMBDA LC FREE SERPL-MCNC: 7.1 MG/DL (ref 0.57–2.63)
LAMBDA LC FREE SERPL-MCNC: 7.14 MG/DL (ref 0.57–2.63)
M PROTEIN SERPL ELPH-MCNC: 0 G/DL
PATH REPORT.COMMENTS IMP SPEC: ABNORMAL
PATH REPORT.COMMENTS IMP SPEC: NORMAL
PROT PATTERN SERPL ELPH-IMP: ABNORMAL
PROT PATTERN SERPL IFE-IMP: NORMAL

## 2024-07-11 ENCOUNTER — APPOINTMENT (OUTPATIENT)
Dept: LAB | Facility: CLINIC | Age: 54
End: 2024-07-11
Attending: INTERNAL MEDICINE
Payer: COMMERCIAL

## 2024-07-11 ENCOUNTER — INFUSION THERAPY VISIT (OUTPATIENT)
Dept: TRANSPLANT | Facility: CLINIC | Age: 54
End: 2024-07-11
Attending: INTERNAL MEDICINE
Payer: COMMERCIAL

## 2024-07-11 VITALS
DIASTOLIC BLOOD PRESSURE: 70 MMHG | TEMPERATURE: 98.6 F | SYSTOLIC BLOOD PRESSURE: 101 MMHG | BODY MASS INDEX: 25.34 KG/M2 | RESPIRATION RATE: 16 BRPM | OXYGEN SATURATION: 97 % | WEIGHT: 148.4 LBS | HEART RATE: 94 BPM

## 2024-07-11 DIAGNOSIS — N18.31 CHRONIC KIDNEY DISEASE, STAGE 3A (H): ICD-10-CM

## 2024-07-11 DIAGNOSIS — E85.81 AL AMYLOIDOSIS (H): ICD-10-CM

## 2024-07-11 DIAGNOSIS — E85.81 AL AMYLOIDOSIS (H): Primary | ICD-10-CM

## 2024-07-11 LAB
ALBUMIN SERPL BCG-MCNC: 2 G/DL (ref 3.5–5.2)
ALP SERPL-CCNC: 171 U/L (ref 40–150)
ALT SERPL W P-5'-P-CCNC: 9 U/L (ref 0–50)
ANION GAP SERPL CALCULATED.3IONS-SCNC: 6 MMOL/L (ref 7–15)
AST SERPL W P-5'-P-CCNC: 15 U/L (ref 0–45)
BASOPHILS # BLD AUTO: ABNORMAL 10*3/UL
BASOPHILS # BLD MANUAL: 0 10E3/UL (ref 0–0.2)
BASOPHILS NFR BLD AUTO: ABNORMAL %
BASOPHILS NFR BLD MANUAL: 0 %
BILIRUB SERPL-MCNC: <0.2 MG/DL
BUN SERPL-MCNC: 11.4 MG/DL (ref 6–20)
BURR CELLS BLD QL SMEAR: SLIGHT
CALCIUM SERPL-MCNC: 7.9 MG/DL (ref 8.6–10)
CHLORIDE SERPL-SCNC: 101 MMOL/L (ref 98–107)
CREAT SERPL-MCNC: 1.57 MG/DL (ref 0.51–0.95)
DEPRECATED HCO3 PLAS-SCNC: 26 MMOL/L (ref 22–29)
EGFRCR SERPLBLD CKD-EPI 2021: 39 ML/MIN/1.73M2
ELLIPTOCYTES BLD QL SMEAR: SLIGHT
EOSINOPHIL # BLD AUTO: ABNORMAL 10*3/UL
EOSINOPHIL # BLD MANUAL: 0.3 10E3/UL (ref 0–0.7)
EOSINOPHIL NFR BLD AUTO: ABNORMAL %
EOSINOPHIL NFR BLD MANUAL: 7 %
ERYTHROCYTE [DISTWIDTH] IN BLOOD BY AUTOMATED COUNT: 15.9 % (ref 10–15)
GLUCOSE SERPL-MCNC: 105 MG/DL (ref 70–99)
HCT VFR BLD AUTO: 26.5 % (ref 35–47)
HGB BLD-MCNC: 8.9 G/DL (ref 11.7–15.7)
IMM GRANULOCYTES # BLD: ABNORMAL 10*3/UL
IMM GRANULOCYTES NFR BLD: ABNORMAL %
LYMPHOCYTES # BLD AUTO: ABNORMAL 10*3/UL
LYMPHOCYTES # BLD MANUAL: 0.7 10E3/UL (ref 0.8–5.3)
LYMPHOCYTES NFR BLD AUTO: ABNORMAL %
LYMPHOCYTES NFR BLD MANUAL: 15 %
MAGNESIUM SERPL-MCNC: 1.5 MG/DL (ref 1.7–2.3)
MCH RBC QN AUTO: 31.1 PG (ref 26.5–33)
MCHC RBC AUTO-ENTMCNC: 33.6 G/DL (ref 31.5–36.5)
MCV RBC AUTO: 93 FL (ref 78–100)
MONOCYTES # BLD AUTO: ABNORMAL 10*3/UL
MONOCYTES # BLD MANUAL: 1 10E3/UL (ref 0–1.3)
MONOCYTES NFR BLD AUTO: ABNORMAL %
MONOCYTES NFR BLD MANUAL: 23 %
NEUTROPHILS # BLD AUTO: ABNORMAL 10*3/UL
NEUTROPHILS # BLD MANUAL: 2.5 10E3/UL (ref 1.6–8.3)
NEUTROPHILS NFR BLD AUTO: ABNORMAL %
NEUTROPHILS NFR BLD MANUAL: 55 %
NRBC # BLD AUTO: 0 10E3/UL
NRBC BLD AUTO-RTO: 0 /100
PLAT MORPH BLD: ABNORMAL
PLATELET # BLD AUTO: 52 10E3/UL (ref 150–450)
POTASSIUM SERPL-SCNC: 3.9 MMOL/L (ref 3.4–5.3)
PROT SERPL-MCNC: 4.3 G/DL (ref 6.4–8.3)
RBC # BLD AUTO: 2.86 10E6/UL (ref 3.8–5.2)
RBC MORPH BLD: ABNORMAL
SODIUM SERPL-SCNC: 133 MMOL/L (ref 135–145)
WBC # BLD AUTO: 4.5 10E3/UL (ref 4–11)

## 2024-07-11 PROCEDURE — 85027 COMPLETE CBC AUTOMATED: CPT

## 2024-07-11 PROCEDURE — 96365 THER/PROPH/DIAG IV INF INIT: CPT

## 2024-07-11 PROCEDURE — 83735 ASSAY OF MAGNESIUM: CPT

## 2024-07-11 PROCEDURE — 36592 COLLECT BLOOD FROM PICC: CPT

## 2024-07-11 PROCEDURE — 82040 ASSAY OF SERUM ALBUMIN: CPT

## 2024-07-11 PROCEDURE — 250N000011 HC RX IP 250 OP 636: Performed by: INTERNAL MEDICINE

## 2024-07-11 PROCEDURE — 85007 BL SMEAR W/DIFF WBC COUNT: CPT

## 2024-07-11 PROCEDURE — 250N000011 HC RX IP 250 OP 636: Performed by: STUDENT IN AN ORGANIZED HEALTH CARE EDUCATION/TRAINING PROGRAM

## 2024-07-11 RX ORDER — HEPARIN SODIUM,PORCINE 10 UNIT/ML
5 VIAL (ML) INTRAVENOUS ONCE
Status: COMPLETED | OUTPATIENT
Start: 2024-07-11 | End: 2024-07-11

## 2024-07-11 RX ORDER — MAGNESIUM SULFATE HEPTAHYDRATE 40 MG/ML
2 INJECTION, SOLUTION INTRAVENOUS ONCE
Status: COMPLETED | OUTPATIENT
Start: 2024-07-11 | End: 2024-07-11

## 2024-07-11 RX ADMIN — Medication 5 ML: at 11:03

## 2024-07-11 RX ADMIN — MAGNESIUM SULFATE 2 G: 2 INJECTION INTRAVENOUS at 12:18

## 2024-07-11 ASSESSMENT — PAIN SCALES - GENERAL: PAINLEVEL: NO PAIN (0)

## 2024-07-11 NOTE — PROGRESS NOTES
"Infusion Nursing Note:  Vivian Brown presents today for possible IVF/electrolytes.    Patient seen by provider today: No   present during visit today: Not Applicable.    Note: VSS. Meds and allergies reviewed. Pt reports feeling generally well today, still has ongoing diarrhea a couple times per day but feels Imodium is helping. Nausea/vomiting well controlled for the last few days and feels like PO intake is improving slightly: \"a few more bites here and there\" but states she is still probably not eating/drinking enough. Encouraged to keep pushing PO intake. Mild swelling bilateral ankles is unchanged over the last few days. Confirms she did start taking Bumex BID as ordered by nephrology. Labs monitored; Mag 1.5- meets parameters for replacement. Pt received 2G Magnesium sulfate IV without complication. Creatinine 1.57 (up from 1.4 two days ago). Sabrina Jain, CNP notified- no need for IVF today d/t starting Bumex, but should recheck labs tomorrow before visit with Dr. Chandler.  Lab appt made and pt aware.      Intravenous Access:  [place].    Treatment Conditions:  Lab Results   Component Value Date     (L) 07/11/2024    POTASSIUM 3.9 07/11/2024    MAG 1.5 (L) 07/11/2024    CR 1.57 (H) 07/11/2024    TANNER 7.9 (L) 07/11/2024    BILITOTAL <0.2 07/11/2024    ALBUMIN 2.0 (L) 07/11/2024    ALT 9 07/11/2024    AST 15 07/11/2024       Results reviewed, labs MET treatment parameters, ok to proceed with treatment.      Post Infusion Assessment:  Patient tolerated infusion without incident.  Blood return noted pre and post infusion.  No evidence of extravasations.        Discharge Plan:   Patient discharged in stable condition accompanied by: mother.  Departure Mode: Ambulatory.      Ninoska Love RN    "

## 2024-07-11 NOTE — NURSING NOTE
Chief Complaint   Patient presents with    Blood Draw     Labs drawn via CVC by RN. VS taken.     Labs collected from CVC by RN, line flushed with saline and heparin.  Vitals taken. Pt checked in for appointment(s).     Lovely Ortega RN

## 2024-07-12 ENCOUNTER — APPOINTMENT (OUTPATIENT)
Dept: LAB | Facility: CLINIC | Age: 54
End: 2024-07-12
Attending: STUDENT IN AN ORGANIZED HEALTH CARE EDUCATION/TRAINING PROGRAM
Payer: COMMERCIAL

## 2024-07-12 ENCOUNTER — OFFICE VISIT (OUTPATIENT)
Dept: TRANSPLANT | Facility: CLINIC | Age: 54
End: 2024-07-12
Attending: STUDENT IN AN ORGANIZED HEALTH CARE EDUCATION/TRAINING PROGRAM
Payer: COMMERCIAL

## 2024-07-12 VITALS
RESPIRATION RATE: 16 BRPM | SYSTOLIC BLOOD PRESSURE: 104 MMHG | HEART RATE: 107 BPM | OXYGEN SATURATION: 98 % | WEIGHT: 146.1 LBS | BODY MASS INDEX: 24.94 KG/M2 | DIASTOLIC BLOOD PRESSURE: 73 MMHG | TEMPERATURE: 97.8 F

## 2024-07-12 DIAGNOSIS — Z52.011 AUTOLOGOUS DONOR OF STEM CELLS: ICD-10-CM

## 2024-07-12 DIAGNOSIS — E85.81 AL AMYLOIDOSIS (H): ICD-10-CM

## 2024-07-12 DIAGNOSIS — N18.31 CHRONIC KIDNEY DISEASE, STAGE 3A (H): ICD-10-CM

## 2024-07-12 DIAGNOSIS — Z94.81 STATUS POST BONE MARROW TRANSPLANT (H): Primary | ICD-10-CM

## 2024-07-12 DIAGNOSIS — E03.4 HYPOTHYROIDISM DUE TO ACQUIRED ATROPHY OF THYROID: ICD-10-CM

## 2024-07-12 LAB
ACANTHOCYTES BLD QL SMEAR: ABNORMAL
ANION GAP SERPL CALCULATED.3IONS-SCNC: 7 MMOL/L (ref 7–15)
BASOPHILS # BLD AUTO: ABNORMAL 10*3/UL
BASOPHILS # BLD MANUAL: 0.1 10E3/UL (ref 0–0.2)
BASOPHILS NFR BLD AUTO: ABNORMAL %
BASOPHILS NFR BLD MANUAL: 3 %
BUN SERPL-MCNC: 13.7 MG/DL (ref 6–20)
CALCIUM SERPL-MCNC: 8 MG/DL (ref 8.6–10)
CHLORIDE SERPL-SCNC: 99 MMOL/L (ref 98–107)
CREAT SERPL-MCNC: 1.63 MG/DL (ref 0.51–0.95)
DACRYOCYTES BLD QL SMEAR: SLIGHT
DEPRECATED HCO3 PLAS-SCNC: 26 MMOL/L (ref 22–29)
EGFRCR SERPLBLD CKD-EPI 2021: 37 ML/MIN/1.73M2
EOSINOPHIL # BLD AUTO: ABNORMAL 10*3/UL
EOSINOPHIL # BLD MANUAL: 0.3 10E3/UL (ref 0–0.7)
EOSINOPHIL NFR BLD AUTO: ABNORMAL %
EOSINOPHIL NFR BLD MANUAL: 6 %
ERYTHROCYTE [DISTWIDTH] IN BLOOD BY AUTOMATED COUNT: 15.9 % (ref 10–15)
GLUCOSE SERPL-MCNC: 111 MG/DL (ref 70–99)
HCT VFR BLD AUTO: 27.1 % (ref 35–47)
HGB BLD-MCNC: 9.3 G/DL (ref 11.7–15.7)
IMM GRANULOCYTES # BLD: ABNORMAL 10*3/UL
IMM GRANULOCYTES NFR BLD: ABNORMAL %
LYMPHOCYTES # BLD AUTO: ABNORMAL 10*3/UL
LYMPHOCYTES # BLD MANUAL: 0.3 10E3/UL (ref 0.8–5.3)
LYMPHOCYTES NFR BLD AUTO: ABNORMAL %
LYMPHOCYTES NFR BLD MANUAL: 7 %
MCH RBC QN AUTO: 31.4 PG (ref 26.5–33)
MCHC RBC AUTO-ENTMCNC: 34.3 G/DL (ref 31.5–36.5)
MCV RBC AUTO: 92 FL (ref 78–100)
MONOCYTES # BLD AUTO: ABNORMAL 10*3/UL
MONOCYTES # BLD MANUAL: 0.7 10E3/UL (ref 0–1.3)
MONOCYTES NFR BLD AUTO: ABNORMAL %
MONOCYTES NFR BLD MANUAL: 17 %
NEUTROPHILS # BLD AUTO: ABNORMAL 10*3/UL
NEUTROPHILS # BLD MANUAL: 2.9 10E3/UL (ref 1.6–8.3)
NEUTROPHILS NFR BLD AUTO: ABNORMAL %
NEUTROPHILS NFR BLD MANUAL: 67 %
NRBC # BLD AUTO: 0 10E3/UL
NRBC # BLD AUTO: 0 10E3/UL
NRBC BLD AUTO-RTO: 0 /100
NRBC BLD MANUAL-RTO: 1 %
PLAT MORPH BLD: ABNORMAL
PLATELET # BLD AUTO: 55 10E3/UL (ref 150–450)
POTASSIUM SERPL-SCNC: 3.8 MMOL/L (ref 3.4–5.3)
RBC # BLD AUTO: 2.96 10E6/UL (ref 3.8–5.2)
RBC MORPH BLD: ABNORMAL
SODIUM SERPL-SCNC: 132 MMOL/L (ref 135–145)
WBC # BLD AUTO: 4.4 10E3/UL (ref 4–11)

## 2024-07-12 PROCEDURE — G2211 COMPLEX E/M VISIT ADD ON: HCPCS | Performed by: STUDENT IN AN ORGANIZED HEALTH CARE EDUCATION/TRAINING PROGRAM

## 2024-07-12 PROCEDURE — 85027 COMPLETE CBC AUTOMATED: CPT | Performed by: STUDENT IN AN ORGANIZED HEALTH CARE EDUCATION/TRAINING PROGRAM

## 2024-07-12 PROCEDURE — 36592 COLLECT BLOOD FROM PICC: CPT | Performed by: STUDENT IN AN ORGANIZED HEALTH CARE EDUCATION/TRAINING PROGRAM

## 2024-07-12 PROCEDURE — 85007 BL SMEAR W/DIFF WBC COUNT: CPT | Performed by: STUDENT IN AN ORGANIZED HEALTH CARE EDUCATION/TRAINING PROGRAM

## 2024-07-12 PROCEDURE — 99215 OFFICE O/P EST HI 40 MIN: CPT | Performed by: STUDENT IN AN ORGANIZED HEALTH CARE EDUCATION/TRAINING PROGRAM

## 2024-07-12 PROCEDURE — 99213 OFFICE O/P EST LOW 20 MIN: CPT | Performed by: STUDENT IN AN ORGANIZED HEALTH CARE EDUCATION/TRAINING PROGRAM

## 2024-07-12 PROCEDURE — 250N000011 HC RX IP 250 OP 636: Performed by: STUDENT IN AN ORGANIZED HEALTH CARE EDUCATION/TRAINING PROGRAM

## 2024-07-12 PROCEDURE — 80048 BASIC METABOLIC PNL TOTAL CA: CPT | Performed by: STUDENT IN AN ORGANIZED HEALTH CARE EDUCATION/TRAINING PROGRAM

## 2024-07-12 RX ORDER — HEPARIN SODIUM,PORCINE 10 UNIT/ML
5 VIAL (ML) INTRAVENOUS ONCE
Status: COMPLETED | OUTPATIENT
Start: 2024-07-12 | End: 2024-07-12

## 2024-07-12 RX ADMIN — Medication 5 ML: at 15:31

## 2024-07-12 ASSESSMENT — PAIN SCALES - GENERAL: PAINLEVEL: NO PAIN (0)

## 2024-07-12 NOTE — NURSING NOTE
Chief Complaint   Patient presents with    Blood Draw     CVc blood draw by lab RN       Labs drawn via CVC line and flushed with heparin by lab RN. Vitals taken and next appointment arrived.    Tania Livingston RN

## 2024-07-12 NOTE — NURSING NOTE
"Oncology Rooming Note    July 12, 2024 3:51 PM   Vivian Brown is a 54 year old female who presents for:    Chief Complaint   Patient presents with    Blood Draw     CVc blood draw by lab RN    Oncology Clinic Visit     Multiple Myeloma      Initial Vitals: /73   Pulse 107   Temp 97.8  F (36.6  C) (Oral)   Resp 16   Wt 66.3 kg (146 lb 1.6 oz)   SpO2 98%   BMI 24.94 kg/m   Estimated body mass index is 24.94 kg/m  as calculated from the following:    Height as of 6/11/24: 1.63 m (5' 4.17\").    Weight as of this encounter: 66.3 kg (146 lb 1.6 oz). Body surface area is 1.73 meters squared.  No Pain (0) Comment: Data Unavailable   No LMP recorded. (Menstrual status: IUD).  Allergies reviewed: Yes  Medications reviewed: Yes    Medications: Medication refills not needed today.  Pharmacy name entered into [employer]:    [facility] - [place], [place] - [address] [facility] [place] - [address] [id number] - [place] - [address] [id number] - [place], [place] - [address]  [facility] - [place], [place] - [address]  [facility] - [place], [place] - [address]  [facility] - [place], [place] - [phone]    Frailty Screening:   Is the patient here for a new oncology consult visit in cancer care? 2. No      Clinical concerns: Experiencing nausea and diarrhea; wondering if there should be a timeline that she can expect those symptoms to subside       Linda Dahl              "

## 2024-07-12 NOTE — LETTER
7/12/2024      Vivian Brown  [address] [facility] [id number]      Dear Colleague,    Thank you for referring your patient, Vivian Brown, to the [facility] [place]. Please see a copy of my visit note below.         BMT Progress Note    Disease presentation and baseline characteristics:   12/21/2023:  Final Diagnosis   A. kidney biopsy (needle):     Amyloidosis of the kidney, AL-type, lambda light chain-restricted, affecting the glomeruli, interstitium, and arterioles (see comment)     Mild chronic changes of the parenchyma, including:   - focal global glomerulosclerosis (3% of glomeruli)   - focal tubular atrophy and interstitial fibrosis (5% of the cortex)   - severe arterial sclerosis   Electronically signed by Joe Garcia MD on 12/23/2023 at  3:21 PM   Comment  UULAB   The biopsy reveals findings diagnostic of amyloidosis; the amyloid shows lambda light chain-restriction (AL amyloidosis), and the deposits are Congo red positive. The amyloid deposits affect the glomeruli extensively, and interstitium and arterioles focally. Other potential complications of paraproteins in the kidney are not seen, in particular, there is no evidence of light chain cast nephropathy, light chain deposition disease, or light chain tubulopathy.     1/8/24: NT-Pro , Troponin T 15    Date Treatment Name Response Side Effects / Toxicities   1/19/24 D-CyBorD x 4 cycles     6/12/24 HDT/ASCT VGPR           HPI:  Please see my entry above for hematologic history.  Mrs. Brown presents today for her D+28 restaging.  She reports ongoing fatigue and moderate nausea.  She is still using her anti-emetics on a scheduled basis and feels this is necessary, but notes that her nausea is well-controlled on the regimen.  Energy and appetite are slowly recovering.      ASSESSMENT AND PLAN:  We reviewed D+28 peripheral restaging labs which are consistent with hematologic VGPR.  Full  restaging will be done at D+100 with PET/CT and BMB.  Her BNP and troponin T are elevated, but given her difficulty tolerating transplant with significant mucositis and complications thereof this may be an indicator of physiologic stress rather than cardiac progression.  We will include repeat labs as well as an echo as part of her D+100 restaging to better assess treatment response.    Continue acyclovir and bactrim for one year post-transplant.  Ok to stop fluconazole now.    We will work on adjusting and hopefully decreasing her anti-emetic regimen over the next few weeks.    COVID and influenza vaccinations can be given at D+100.  The remainder of vaccinations will be given at one and two years post-transplant per protocol.    Continue to hold spironolactone, statin.    RTC with me in one week, re-evaluate diuretic and antihypertensive regimen.    I spent 40 minutes in the care of this patient today, which included time necessary for preparation for the visit, obtaining history, ordering medications/tests/procedures as medically indicated, review of pertinent medical literature, counseling of the patient, communication of recommendations to the care team, and documentation time.    Jomar Chandler MD    ROS:    10 point ROS neg other than the symptoms noted above in the HPI.      Current Outpatient Medications   Medication Sig Dispense Refill    acyclovir (ZOVIRAX) 400 MG tablet Take 2 tablets (800 mg) by mouth 2 times daily 120 tablet 11    Albuterol-Budesonide (AIRSUPRA) 90-80 MCG/ACT AERO Inhale 2 Inhalations into the lungs every 4 hours as needed (Wheezing, chest tightness, shortness of breath, or persistent cough) 10.7 g 3    azelastine (ASTELIN) 0.1 % nasal spray Spray 2 sprays into both nostrils 2 times daily as needed for rhinitis 30 mL 3    bumetanide (BUMEX) 1 MG tablet Take 1 tablet (1 mg) by mouth 2 times daily 60 tablet 6    fluconazole (DIFLUCAN) 100 MG tablet Take 1 tablet (100 mg) by mouth daily  30 tablet 0    fluticasone (FLONASE) 50 MCG/ACT nasal spray Spray 2 sprays into both nostrils daily 16 g 3    levothyroxine (SYNTHROID) 200 MCG tablet Take 200 mcg by mouth daily Pt takes Synthroid brand name. FRANKI      liothyronine (CYTOMEL) 5 MCG tablet Take 5 mcg by mouth 2 times daily      loperamide (IMODIUM A-D) 2 MG tablet Take 1 tablet (2 mg) by mouth 4 times daily as needed for diarrhea 30 tablet 1    melatonin 3 MG tablet Take 1 tablet (3 mg) by mouth nightly as needed for sleep 30 tablet 1    midodrine (PROAMATINE) 10 MG tablet Take 1 tablet (10 mg) by mouth 3 times daily 270 tablet 1    OLANZapine (ZYPREXA) 2.5 MG tablet Take 1 tablet (2.5 mg) by mouth 2 times daily 30 tablet 0    ondansetron (ZOFRAN) 8 MG tablet Take 1 tablet (8 mg) by mouth every 8 hours 30 tablet 1    ondansetron (ZOFRAN) 8 MG tablet Take 8 mg by mouth every 8 hours as needed for nausea      pantoprazole (PROTONIX) 40 MG EC tablet Take 1 tablet (40 mg) by mouth every morning (before breakfast) 30 tablet 1    prochlorperazine (COMPAZINE) 10 MG tablet Take 0.5 tablets (5 mg) by mouth every 6 hours as needed for nausea or vomiting 30 tablet 1    psyllium (METAMUCIL/KONSYL) Packet Take 1 packet by mouth daily 30 packet 1    sulfamethoxazole-trimethoprim (BACTRIM DS) 800-160 MG tablet Take 1 tablet by mouth Every Mon, Tues two times daily 16 tablet 11    vitamin D3 (CHOLECALCIFEROL) 50 mcg (2000 units) tablet Take 1 tablet (50 mcg) by mouth daily 30 tablet 6    EPINEPHrine (ANY BX GENERIC EQUIV) 0.3 MG/0.3ML injection 2-pack Inject 0.3 mLs (0.3 mg) into the muscle as needed for anaphylaxis May repeat one time in 5-15 minutes if response to initial dose is inadequate. (Patient not taking: Reported on 7/11/2024) 2 each 3         Physical Exam:     /73   Pulse 107   Temp 97.8  F (36.6  C) (Oral)   Resp 16   Wt 66.3 kg (146 lb 1.6 oz)   SpO2 98%   BMI 24.94 kg/m      KPS:  80  General Appearance: alert, and no distress  Eyes: PERRL,  conjunctiva and lids normal, sclera nonicteric  Ears/Nose/M/Throat: Oral mucosa and posterior oropharynx normal, moist mucous membranes  Cardio/Vascular:regular rate and rhythm, normal S1 and S2, no murmur  Resp Effort And Auscultation: Normal - Clear to auscultation without rales, rhonchi, or wheezing.  Edema: none  Skin: Skin color, texture, turgor normal. No rashes or lesions.  Neurologic: Gait normal.  Sensation grossly WNL.  Psych/Affect: Mood and affect are appropriate.    Blood Counts     Recent Labs   Lab Test 07/12/24  1530 07/11/24  1101 07/09/24  1011 07/09/24  1001 06/18/24  0327 06/17/24  0338 06/16/24  0318 06/15/24  0429   HGB 9.3* 8.9* 8.6* 8.5*   < > 10.6* 9.6* 9.9*   HCT 27.1* 26.5* 25.5* 25.6*   < > 31.3* 28.2* 28.7*   WBC 4.4 4.5 4.8 4.4   < > 0.5* 1.5* 2.4*   ANEUTAUTO  --   --   --   --   --  0.3* 1.4* 2.3   ALYMPAUTO  --   --   --   --   --  0.1* 0.1* 0.1*   AMONOAUTO  --   --   --   --   --  0.0 0.0 0.0   AEOSAUTO  --   --   --   --   --  0.0 0.0 0.0   ABSBASO  --   --   --   --   --  0.0 0.0 0.0   NRBCMAN 0.0 0.0  --  0.0   < > 0.0 0.0 0.0   PLT 55* 52* 50* 50*   < > 124* 173 208    < > = values in this interval not displayed.       Chemistries     Basic Panel  Recent Labs   Lab Test 07/12/24  1530 07/11/24  1101 07/09/24  1011   * 133* 134*   POTASSIUM 3.8 3.9 3.9   CHLORIDE 99 101 105   CO2 26 26 23   BUN 13.7 11.4 10.8   CR 1.63* 1.57* 1.40*   * 105* 111*        Calcium, Magnesium, Phosphorus  Recent Labs   Lab Test 07/12/24  1530 07/11/24  1101 07/09/24  1011 07/09/24  1001 07/02/24  0949 07/01/24  0355   TANNER 8.0* 7.9* 7.6* 7.6*   < > 7.3*   MAG  --  1.5*  --  1.6*  --  2.1   PHOS  --   --  3.7 3.7  --  2.5    < > = values in this interval not displayed.        LFTs  Recent Labs   Lab Test 07/11/24  1101 07/09/24  1011 07/09/24  1001 07/05/24  1303   BILITOTAL <0.2  --  0.2 <0.2   ALKPHOS 171*  --  171* 189*   AST 15  --  13 16   ALT 9  --  10 12   ALBUMIN 2.0* 2.0* 2.0*  1.9*       LDH  Recent Labs   Lab Test 07/09/24  1001 05/24/24  1112 01/18/24  1433   * 248 248       B2-Microglobulin  Recent Labs   Lab Test 05/21/24  1338 01/18/24  1433   SSLO7TDQI 4.3* 5.8*         Immunoglobulins     Recent Labs   Lab Test 07/09/24  1001 05/21/24  1338 04/19/24  0956 03/15/24  0820 02/02/24  1437 01/18/24  1444   * 86* 101* 116* 204* 294*       Recent Labs   Lab Test 07/09/24  1001 05/21/24  1338 04/19/24  0956 03/15/24  0820 01/18/24  1444    35* 37* 36* 164       Recent Labs   Lab Test 07/09/24  1001 05/21/24  1338 04/19/24  0956 03/15/24  0820 01/18/24  1444    <10* 21* 33* 127         Monocloncal Protein Studies     M spike    Recent Labs   Lab Test 07/09/24  1001 05/24/24  1112 05/21/24  1338 04/19/24  0956 03/15/24  0820 01/08/24  1201   ELPM 0.0 0.0 0.0 0.1* 0.1* 0.0       Kappa FLC    Recent Labs   Lab Test 07/09/24  1011 07/09/24  1001 05/24/24  1108 05/21/24  1338 04/19/24  0956 03/15/24  0820   KFLCA 6.05* 5.79* 1.12 1.07 1.13 1.33       Lambda FLC    Recent Labs   Lab Test 07/09/24  1011 07/09/24  1001 05/24/24  1108 05/21/24  1338 04/19/24  0956 03/15/24  0820   LFLCA 7.10* 7.14* 1.74 1.49 1.71 1.96       FLC Ratio    Recent Labs   Lab Test 07/09/24  1011 07/09/24  1001 05/24/24  1108 05/21/24  1338 04/19/24  0956 03/15/24  0820   KLRA 0.85 0.81 0.64 0.72 0.66 0.68         Bone Marrow Biopsy       Morphology    Results for orders placed or performed in visit on 05/24/24 (from the past 8760 hour(s))   Bone marrow biopsy   Result Value    Final Diagnosis      Bone marrow, posterior iliac crest, left decalcified trephine biopsy and touch imprint; left aspirate clot, direct aspirate smear, concentrate aspirate smear, and peripheral blood smear:               - Minute level involvement by recurrent/persistent plasma cell neoplasm  - Normocellular marrow (40-50% estimated cellularity), with , trilineage hematopoiesis, no overt dysplasia, and no increase in  blasts (1%)  - Positive for amyloid deposisiton by congo erika   - Peripheral blood showing slight thrombocytosis and no circulating plasma cells  - See comment      Comment      Flow cytometry analysis on concurrent specimen ([id number]) showed rare lambda monotypic plasma cells (0.001%) and polytypic B cells. The flow findings coupled with the positive congo red and lambda monotypic plasma cells by immunohistochemistry are consistent with a plasma cell neoplasm.     Concurrent ancillary studies are in progress and will be reported separately. Correlation with the results of ancillary tests and clinical findings is recommended.      Clinical Information      From Epic electronic medical record; 54 year old female with a history of AL amyloidosis, lambda light chain-restricted, with kidney (nephrotic syndrome), bone marrow, and cardiac involvement treated with chemotherapy. Her most recent bone marrow biopsy ([id number] on 1/18/2024), pre-therapy, showed lambda-monotypic plasma cells with monotypic B cells. She is undergoing evaluation prior to hematopoietic cell transplant.       Peripheral Hematologic Data      CBC WITH DIFFERENTIAL (05/24/2024 11:17 AM CDT):     RESULT VALUE REF. RANGE UNITS   WBC Count  Hemoglobin  Hematocrit   Platelet Count   RBC Count  MCV  MCH  MCHC   RDW  9.7 (NORMAL)    13.0 (NORMAL)     39.6 (NORMAL)   483  ( H )  4.31 (NORMAL)       92 (NORMAL)     30.2 (NORMAL)     32.8 (NORMAL)      16.8  ( H ) 4.0-11.0  11.7-15.7  35.0-47.0  150-450  3.80-5.20    26.5-33.0  31.5-36.5  10.0-15.0 10e3/uL  g/dL  %  10e3/uL  10e6/uL  fL  pg  g/dL  %   % Neutrophils  % Lymphocytes  % Monocytes  % Eosinophils  % Basophils  % Immature Granulocytes  Absolute Neutrophils  Absolute Lymphocytes  Absolute Monocytes  Absolute Eosinophils  Absolute Basophils  Absolute Immature Granulocytes  NRBCs per 100 WBC  Absolute NRBCs  71  15  10  2  2  0  6.9 (NORMAL)  1.4 (NORMAL)  1.0 (NORMAL)  0.2 (NORMAL)  0.2 (NORMAL)  0.0 (NORMAL)  0 (NORMAL)  0.0 () N/A  N/A  N/A  N/A  N/A  N/A  1.6-8.3  0.8-5.3  0.0-1.3  0.0-0.7  0.0-0.2  <=0.4  <1  <=0.0 %  %  %  %  %  %  10e3/uL  10e3/uL  10e3/uL  10e3/uL  10e3/uL  10e3/uL  /100  10e3/uL         Microscopic Description      PERIPHERAL BLOOD SMEAR MORPHOLOGY:  The red blood cells appear normochromic.  Poikilocytosis  includes numerous acanthocytes and echinocytes .  Polychromasia is not increased increased.  Rouleaux formation is not increased. The morphology of the platelets is normal. No circulating plasma cells.     Bone marrow aspirates and trephine core biopsy touch imprints are reviewed.    BONE MARROW DIFFERENTIAL (500 cells on direct aspirate smears)  Percent (%) Cell Population Reference Range (%)   1 Blasts  (0 - 1)   3 Neutrophil promyelocytes (2 - 4)   53 Neutrophils and precursors (54 - 63)   27 Erythroid precursors (18 - 24)   1 Monocytes (1 - 1.5)   1 Eosinophils (1 - 3)   1 Basophils (0 - 1)   13 Lymphocytes (8 - 12)   0 Plasma cells (0 - 1.5)     The aspirate smears are adequate for evaluation.    Neutrophil maturation is complete. Erythroid maturation is complete. Megakaryocytes exhibit a spectrum of sizes and morphologies, within the limits of normal .    CLOT SECTIONS:   The clot sections show fibrin and blood.    TREPHINE SECTIONS:  Hematoxylin and eosin stains are reviewed. The quality of the trephine core biopsy is good, with slight crush and fragmentation artifact. The marrow cellularity is estimated at 40-50%. The cellular composition reflects the aspirate smear differential. The number of megakaryocytes appears consistent with the degree of marrow cellularity, with unremarkable morphology. No aggregates of plasma cells are identified. There are few thickened blood vessels identified.    IMMUNOHISTOCHEMISTRY:  Immunohistochemical and special stains are performed on the  paraffin-embedded trephine core with appropriate controls.     highlights scattered plasma cells.  Stains for cytoplasmic kappa and lambda immunoglobulin light chains show lambda-monotypic plasma cells.  Congo red is positive    Note: These immunohistochemical stains are deemed medically necessary. Some of the antigens may also be evaluated by flow cytometry. Concurrent evaluation by immunohistochemistry on clot and/or trephine sections is indicated in this case in order to correlate immunophenotype with cell morphology and determine extent of involvement, spatial pattern, and focality of potential disease distribution.     Case was reviewed by the following:  Resident Pathologist: Loida Ott MD  A resident or fellow in a training program was involved in the initial review, preparation, and/or interpretation of this case.  I, as the senior physician, attest that I have personally reviewed all specimens and or slides, including the listed special stains, and used them with my medical judgement to determine the final diagnosis.              Gross Description      Procedure/Gross Description   Aspirate(s) and trephine(s) procured by SHAQUILLE JACKSON    Specimen sent for Special Studies:         Flow Cytometry: left aspirate        Cytogenetics: left aspirate        Molecular Diagnostics: left aspirate        Other: left aspirate- ClonoSEQ    Biopsy aspiration site: left posterior iliac crest                                                      (Reference Range)          Amount of aspirate           2.5   mL        Fat and P.V. cell layer        2    %               (1 - 3)        Particles                             0   %        Myeloid-erythroid layer    2    %               (5 - 8)          Clot Section: yes    Trephine biopsy site: left posterior iliac crest    Designated left posterior iliac crest 1 cylinder of gritty tissue, labeled with the patient's name and hospital number, obtained with 11 gauge needle  and a length of 14 mm; entirely submitted in 1 cassette; acetic zinc formalin fixed, decalcified, processed, and stained for hematoxylin and eosin per laboratory protocol.        MCRS Yes (A)    Performing Labs      The technical component of this testing was completed at [facility].     Stain controls for all stains resulted within this report have been reviewed and show appropriate reactivity.            Echocardiogram       Results for orders placed during the hospital encounter of 24    ECHOCARDIOGRAM COMPLETE    Status: Normal 2024    [facility]  [id number]  [id number]  [id number]  [id number]^ERWIN^PETE    [facility],[place]  Echocardiography Laboratory  [address] [place] [address]    Name: ADE NAVARRO  MRN: [id number]  : 1970  Study Date: 2024 09:04 AM  Age: 54 yrs  Gender: Female  Patient Location: [facility]  Reason For Study: AL amyloidosis  Ordering Physician: PETE HOOD  Referring Physician: PETE HOOD  Performed By: Jw Park RDCS    BSA: 1.8 m2  Height: 64 in  Weight: 164 lb  ______________________________________________________________________________  Procedure  Echocardiogram with two-dimensional, color and spectral Doppler performed.  ______________________________________________________________________________  Interpretation Summary  Global and regional left ventricular function is normal with an EF of 55-60%.  Diastolic Doppler findings (E/E' ratio and/or other parameters) suggest left  ventricular filling pressures are increased.  Global peak LV longitudinal strain is averaged at -11.3%. This suggests  abnormal strain (normal <-18%).  Global right ventricular function is normal. There is mild right ventricular  hypertrophy.  Normal IVC.  Small circumferential pericardial effusion is present without any hemodynamic  significance.    This  study was compared with the study from 1/3/24 .  No significant changes. Findings of increased biventricular wall thickness,  abnormal diastology, GLS, and presence of pericardial effusion in the setting  of renal amyloidosis raise suspicion of cardiac amyloidosis.  ______________________________________________________________________________  Left Ventricle  Left ventricular size is normal. Global and regional left ventricular function  is normal with an EF of 55-60%. Mild concentric wall thickening consistent  with left ventricular hypertrophy is present. Left ventricular diastolic  function is indeterminate. Diastolic Doppler findings (E/E' ratio and/or other  parameters) suggest left ventricular filling pressures are increased. Global  peak LV longitudinal strain is averaged at -11.3%. This suggests abnormal  strain (normal <-18%). No regional wall motion abnormalities are seen.    Right Ventricle  The right ventricle is normal size. Global right ventricular function is  normal. There is mild right ventricular hypertrophy.    Atria  Both atria appear normal.    Mitral Valve  The mitral valve is normal.    Aortic Valve  Aortic valve is normal in structure and function. The aortic valve is  tricuspid.    Tricuspid Valve  The tricuspid valve is normal. Trace tricuspid insufficiency is present. The  peak velocity of the tricuspid regurgitant jet is not obtainable. Pulmonary  artery systolic pressure cannot be assessed.    Pulmonic Valve  The pulmonic valve is normal. Trace pulmonic insufficiency is present.    Vessels  The aorta root is normal. The thoracic aorta is normal. The inferior vena cava  was normal in size with preserved respiratory variability. IVC diameter <2.1  cm collapsing >50% with sniff suggests a normal RA pressure of 3 mmHg.    Pericardium  Small circumferential pericardial effusion is present without any hemodynamic  significance.    Miscellaneous  No significant valvular abnormalities  present.    Compared to Previous Study  This study was compared with the study from 1/3/24 . No significant changes.  Findings of increased biventricular wall thickness, abnormal diastology, GLS,  and presence of pericardial effusion in the setting of renal amyloidosis raise  suspicion of cardiac amyloidosis.    Attestation  I have personally viewed the imaging and agree with the interpretation and  report as documented by the fellow, Mario Vila, and/or edited by me.  ______________________________________________________________________________  MMode/2D Measurements & Calculations  IVSd: 1.2 cm  LVIDd: 4.2 cm  LVIDs: 2.7 cm  LVPWd: 1.1 cm  FS: 35.4 %  LV mass(C)d: 165.4 grams  LV mass(C)dI: 92.0 grams/m2  Ao root diam: 3.0 cm  asc Aorta Diam: 3.1 cm  LVOT diam: 2.2 cm  LVOT area: 3.7 cm2  Ao root diam index Ht(cm/m): 1.8  Ao root diam index BSA (cm/m2): 1.7    Asc Ao diam index BSA (cm/m2): 1.8  Asc Ao diam index Ht(cm/m): 1.9  LA Volume (BP): 41.9 ml  LA Volume Index (BP): 23.3 ml/m2  RWT: 0.51  TAPSE: 1.7 cm    Doppler Measurements & Calculations  MV E max benedicto: 61.3 cm/sec  MV A max benedicto: 58.2 cm/sec  MV E/A: 1.1  MV dec slope: 331.4 cm/sec2  MV dec time: 0.19 sec    PA acc time: 0.08 sec  E/E' av.9  Lateral E/e': 17.6  Medial E/e': 18.2  RV S Benedicto: 13.6 cm/sec    ______________________________________________________________________________  Report approved by: Danie Hernandez 2024 01:50 PM        PET Scan       Results for orders placed during the hospital encounter of 24    PET ONCOLOGY WHOLE BODY    Status: Normal 2024    Narrative  Combined Report of: PET and CT on  2024 9:59 AM:    1. PET of the neck, chest, abdomen, and pelvis.  2. PET CT Fusion for Attenuation Correction and Anatomical  Localization:  3. 3D MIP and PET-CT fused images were processed on an independent  workstation and archived to PACS and reviewed by a radiologist.    Technique:    1. PET: The patient  received 10.54 mCi of F-18-FDG; the serum glucose  was 103 mg/dL prior to administration, body weight was 80 kg. Images  were evaluated in the axial, sagittal, and coronal planes as well as  the rotational whole body MIP. Images were acquired from the Vertex to  the Feet.    UPTAKE WAS MEASURED AT 62 MINUTES.    BACKGROUND:  Liver SUV max= 2.8,   Aorta Blood SUV max= 2.5.    2. CT: CT only obtained for attenuation correction and not diagnostic  purposes.    INDICATION: Biopsy-proven AL amyloidosis of kidney, workup for plasma  cell disorder; AL amyloidosis (H)    ADDITIONAL INFORMATION OBTAINED FROM EMR: 53-year-old female with  biopsy-proven renal amyloidosis (AL amyloidosis) and cardiac  involvement by MRI, PET CT requested for plasma cell disorder workup.    COMPARISON: None.    FOLLOW-UP APPOINTMENT: Unknown    FINDINGS:    HEAD/NECK:  Mildly hypermetabolic mildly enlarged bilateral level 2A nodes for  example a 1.5 x 1.2 cm right level IIA node, SUV max 4.2, series 3  image 71 and a 1.1 x 0.7 cm left level 2 node, SUV max 3.7, series 3  image 71.    Moderate diffuse thyroid uptake, SUV max 6.0, series 3 image 97.    CHEST:  Left lower lobe series 4 image [id number] there is nodularity along the  left lower lobe anterior segment bronchus, largest nodule measuring 8  mm there is focal hypermetabolism in this area max SUV 4.5.  Differential amyloid, infection, atelectasis.    There is mild uptake throughout the left ventricle which could  represent active amyloidosis however in the absence of a cardiac  preparation may simply be physiologic.  Calcified left hilar nodes and left lower lobe calcified granuloma. No  pneumothorax or pleural effusion.    ABDOMEN AND PELVIS:  No abnormal uptake.    No calcified gallstones. Calcified splenic granulomas. No adrenal  nodule. Left inferior pole nonobstructing intrarenal calculus, no  hydronephrosis. No small or large bowel obstruction. Intrauterine  device in place. No free  air or free fluid in the abdomen or pelvis.    LOWER EXTREMITIES:  A single area of focal moderate to markedly hypermetabolic region in  the proximal posterior left gastrocnemius muscle, SUV max 8.6 series 3  image 416.    BONES AND SOFT TISSUES:  No abnormal uptake.  Old anterior rib fracture deformities.    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 No definite active amyloid on FDG pet.  2. Single focal moderate to markedly avid intramuscular uptake,  proximal left gastrocnemius, no etiology demonstrated on unenhanced  non diagnostic CT.  Recommend dedicated MRI.    3. Small to mildly enlarged mildly hypermetabolic bilateral level 2A  lymph nodes, most likely reactive.    4. A few equivocal findings - unlikely to be active amyloid (patient  has been under treatment, so FDG may be decreased).  Recommend future  PET scans be done with a sarcoid cardiac prep and and Lasix protocol  to decrease background and make them more sensitive for cardiac and  renal activity.    4a. Mildly hypermetabolic left lower lobe anterior segment  peribronchial nodularity and hypermetabolism. Differential favors RSV  (positive 1/18/24), focal amyloid unlikely.  4b. Cardiac (MRI positive) - equivocal FDG throughout left ventricle,  likely physiologic. Given findings on MRI 1/16/2024, can not rule out  active amyloidosis. However in the absence of a cardiac dietary prep  (which switches myocardium from glucose to fatty metabolism), this is  likely normal physiology.  Suggest future PET scans be done with a  cardiac sarcoid dietary protocol (3 day ketogenic diet).  4c. Kidney cortex (bx. 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        I have personally reviewed the examination and initial  interpretation  and I agree with the findings.    JOMAR VELASQUEZ MD      SYSTEM ID:  [id number]     The longitudinal plan of care for the diagnosis(es)/condition(s) as documented were addressed during this visit. Due to the added complexity in care, I will continue to support Vivian in the subsequent management and with ongoing continuity of care.    ------------------------------------------------------------------------------------------------------------------------------------------------    Patient Care Team         Relationship Specialty Notifications Start End    Alin Torres, PA-C PCP - General Family Medicine  11/4/20     Phone: [phone] Fax: [phone] [address] [facility] [address]    Sarthak Ontiveros MD Assigned OBGYN Provider   9/24/22     Phone: [phone] Fax: [phone]         [address] [facility] [address]    Alin Trores PA-C Assigned PCP   12/31/22     Phone: [phone] Fax: [phone]         [address] [facility] [address]    Carmen Waggoner APRN CNP Nurse Practitioner Gastroenterology  11/21/23     Phone: [phone] Fax: [phone] [address] [place] [address]    Audrey Sen DO Assigned Nephrology Provider   12/9/23     Phone: [phone] Fax: [phone]         [address] [facility] [address] [facility] [address]    Musa Adrian MD Physician Hematology & Oncology  12/26/23     Phone: [phone] Fax: [phone]         [address] [facility] [address] [facility] [address]    Jomar Chandler MD MD Hematology & Oncology  12/26/23     Phone: [phone] Fax: [phone]         [address] [place] [address] [address] [facility] [address]    Gaston Flores MD MD Cardiovascular Disease  1/5/24     Phone: [phone] Fax: [phone] [address] [facility] [address]

## 2024-07-15 ENCOUNTER — TELEPHONE (OUTPATIENT)
Dept: TRANSPLANT | Facility: CLINIC | Age: 54
End: 2024-07-15

## 2024-07-15 ENCOUNTER — ANCILLARY PROCEDURE (OUTPATIENT)
Dept: INTERVENTIONAL RADIOLOGY/VASCULAR | Facility: CLINIC | Age: 54
End: 2024-07-15
Attending: NURSE PRACTITIONER
Payer: COMMERCIAL

## 2024-07-15 DIAGNOSIS — E85.81 AL AMYLOIDOSIS (H): ICD-10-CM

## 2024-07-15 DIAGNOSIS — N18.31 CHRONIC KIDNEY DISEASE, STAGE 3A (H): ICD-10-CM

## 2024-07-15 PROCEDURE — 36589 REMOVAL TUNNELED CV CATH: CPT | Performed by: PHYSICIAN ASSISTANT

## 2024-07-15 RX ORDER — OLANZAPINE 2.5 MG/1
2.5 TABLET, FILM COATED ORAL AT BEDTIME
Qty: 30 TABLET | Refills: 0 | Status: SHIPPED | OUTPATIENT
Start: 2024-07-15 | End: 2024-08-19

## 2024-07-15 NOTE — TELEPHONE ENCOUNTER
Mary Ann called today regarding med refills for anti-emetics. Her nausea is slowly improving. After discussing with Dr. Chandler, she will reduce olanzapine to taking at bedtime only. Will also trial stopping compazine but leave Zofran as scheduled. If she has persistent nausea over the next few days she will return to her previous schedule for the time being.      Dalila Gonzalez RN Care Coordinator - BMT  Phone: [phone]  Pager: [phone]

## 2024-07-15 NOTE — PROGRESS NOTES
Interventional Radiology Brief Post Procedure Note    Procedure: IR Tunneled Central Venous Line Removal-RIGHT     Proceduralist: Darlyn Uribe PA-C     Time Out: Prior to the start of the procedure and with procedural staff participation, I verbally confirmed the patient s identity using two indicators, relevant allergies, that the procedure was appropriate and matched the consent or emergent situation, and that the correct equipment/implants were available. Immediately prior to starting the procedure I conducted the Time Out with the procedural staff and re-confirmed the patient s name, procedure, and site/side. (The Joint Commission universal protocol was followed.)  Yes    Sedation: None.     Findings: Removal of RIGHT tunneled catheter in its entirety.      Estimated Blood Loss: Minimal    SPECIMENS: None    Complications: 1. None     Condition: Stable    Plan: Discharge    Comments: See dictated procedure note for full details.    Darlyn Uribe PA-C

## 2024-07-15 NOTE — DISCHARGE INSTRUCTIONS
A collaboration between [facility] and [facility]  Experts in minimally invasive, targeted treatments performed using imaging guidance    Tunneled Central Venous Catheter Removal    Today you had your existing tunneled central venous catheter removed because it was no longer needed for treatment.    Your catheter was removed by Darlyn Uribe PA-C    After you go home:  Avoid lying flat or bending at the waist for 2 hours following removal of the catheter to reduce risk of bleeding from catheter site.  Keep any applied tape/gauze dressings clean and dry. Change tape/gauze dressings if they get wet or soiled.  You may shower following the procedure, however cover and protect from moisture any tape/gauze dressings.   You may remove tape/gauze dressings after 3 days if the site looks like it is in the process of healing or scabbing over.  Avoid strenuous activities (elevating heart rate) or lifting more than 10 pounds for the next 3 days. Walking, elliptical and golf are examples of acceptable activities.  If there is bleeding or oozing from the procedure site, apply firm pressure to the area above your collar bone (where the catheter entered the bloodstream) for 10 minutes.  If the bleeding continues, continue to hold pressure and seek medical attention at the phone numbers below.  Mild procedure site discomfort can be treated with an ice pack and over-the-counter pain relievers.           Call our Interventional Radiology (IR) service if:  If you start bleeding from the procedure site. Our radiology provider can help you decide if you need to return to the hospital.  If you have new or worsening pain related to the procedure.  If you have concerning swelling at the procedure site.  If you develop hives or a rash or any unexplained itching.  If you have a fever of greater than 100.5  F and chills in the first 5 days after procedure.  Any other concerns related to your  procedure.    Contact Number:  [phone]  (IR control desk)  Monday - Friday 8:00 am - 4:30 pm    After hours for urgent concerns:  [phone]  After 4:30 pm Monday - Friday, Weekends and Holidays.   Ask for Interventional Radiology on-call.  Someone is available 24 hours a day.  [facility] toll free number:  [phone]

## 2024-07-16 ENCOUNTER — TELEPHONE (OUTPATIENT)
Dept: NEPHROLOGY | Facility: CLINIC | Age: 54
End: 2024-07-16
Payer: COMMERCIAL

## 2024-07-16 NOTE — PROGRESS NOTES
BMT Progress Note    Disease presentation and baseline characteristics:   12/21/2023:  Final Diagnosis   A. kidney biopsy (needle):     Amyloidosis of the kidney, AL-type, lambda light chain-restricted, affecting the glomeruli, interstitium, and arterioles (see comment)     Mild chronic changes of the parenchyma, including:   - focal global glomerulosclerosis (3% of glomeruli)   - focal tubular atrophy and interstitial fibrosis (5% of the cortex)   - severe arterial sclerosis   Electronically signed by Joe Garcia MD on 12/23/2023 at  3:21 PM   Comment  UULAB   The biopsy reveals findings diagnostic of amyloidosis; the amyloid shows lambda light chain-restriction (AL amyloidosis), and the deposits are Congo red positive. The amyloid deposits affect the glomeruli extensively, and interstitium and arterioles focally. Other potential complications of paraproteins in the kidney are not seen, in particular, there is no evidence of light chain cast nephropathy, light chain deposition disease, or light chain tubulopathy.     1/8/24: NT-Pro , Troponin T 15    Date Treatment Name Response Side Effects / Toxicities   1/19/24 D-CyBorD x 4 cycles     6/12/24 HDT/ASCT VGPR           HPI:  Please see my entry above for hematologic history.  Mrs. Brown presents today for her D+28 restaging.  She reports ongoing fatigue and moderate nausea.  She is still using her anti-emetics on a scheduled basis and feels this is necessary, but notes that her nausea is well-controlled on the regimen.  Energy and appetite are slowly recovering.      ASSESSMENT AND PLAN:  We reviewed D+28 peripheral restaging labs which are consistent with hematologic VGPR.  Full restaging will be done at D+100 with PET/CT and BMB.  Her BNP and troponin T are elevated, but given her difficulty tolerating transplant with significant mucositis and complications thereof this may be an indicator of physiologic stress rather than cardiac progression.  We  will include repeat labs as well as an echo as part of her D+100 restaging to better assess treatment response.    Continue acyclovir and bactrim for one year post-transplant.  Ok to stop fluconazole now.    We will work on adjusting and hopefully decreasing her anti-emetic regimen over the next few weeks.    COVID and influenza vaccinations can be given at D+100.  The remainder of vaccinations will be given at one and two years post-transplant per protocol.    Continue to hold spironolactone, statin.    RTC with me in one week, re-evaluate diuretic and antihypertensive regimen.    I spent 40 minutes in the care of this patient today, which included time necessary for preparation for the visit, obtaining history, ordering medications/tests/procedures as medically indicated, review of pertinent medical literature, counseling of the patient, communication of recommendations to the care team, and documentation time.    Jomar Chandler MD    ROS:    10 point ROS neg other than the symptoms noted above in the HPI.      Current Outpatient Medications   Medication Sig Dispense Refill    acyclovir (ZOVIRAX) 400 MG tablet Take 2 tablets (800 mg) by mouth 2 times daily 120 tablet 11    Albuterol-Budesonide (AIRSUPRA) 90-80 MCG/ACT AERO Inhale 2 Inhalations into the lungs every 4 hours as needed (Wheezing, chest tightness, shortness of breath, or persistent cough) 10.7 g 3    azelastine (ASTELIN) 0.1 % nasal spray Spray 2 sprays into both nostrils 2 times daily as needed for rhinitis 30 mL 3    bumetanide (BUMEX) 1 MG tablet Take 1 tablet (1 mg) by mouth 2 times daily 60 tablet 6    fluconazole (DIFLUCAN) 100 MG tablet Take 1 tablet (100 mg) by mouth daily 30 tablet 0    fluticasone (FLONASE) 50 MCG/ACT nasal spray Spray 2 sprays into both nostrils daily 16 g 3    levothyroxine (SYNTHROID) 200 MCG tablet Take 200 mcg by mouth daily Pt takes Synthroid brand name. FRANKI      liothyronine (CYTOMEL) 5 MCG tablet Take 5 mcg by mouth  2 times daily      loperamide (IMODIUM A-D) 2 MG tablet Take 1 tablet (2 mg) by mouth 4 times daily as needed for diarrhea 30 tablet 1    melatonin 3 MG tablet Take 1 tablet (3 mg) by mouth nightly as needed for sleep 30 tablet 1    midodrine (PROAMATINE) 10 MG tablet Take 1 tablet (10 mg) by mouth 3 times daily 270 tablet 1    OLANZapine (ZYPREXA) 2.5 MG tablet Take 1 tablet (2.5 mg) by mouth 2 times daily 30 tablet 0    ondansetron (ZOFRAN) 8 MG tablet Take 1 tablet (8 mg) by mouth every 8 hours 30 tablet 1    ondansetron (ZOFRAN) 8 MG tablet Take 8 mg by mouth every 8 hours as needed for nausea      pantoprazole (PROTONIX) 40 MG EC tablet Take 1 tablet (40 mg) by mouth every morning (before breakfast) 30 tablet 1    prochlorperazine (COMPAZINE) 10 MG tablet Take 0.5 tablets (5 mg) by mouth every 6 hours as needed for nausea or vomiting 30 tablet 1    psyllium (METAMUCIL/KONSYL) Packet Take 1 packet by mouth daily 30 packet 1    sulfamethoxazole-trimethoprim (BACTRIM DS) 800-160 MG tablet Take 1 tablet by mouth Every Mon, Tues two times daily 16 tablet 11    vitamin D3 (CHOLECALCIFEROL) 50 mcg (2000 units) tablet Take 1 tablet (50 mcg) by mouth daily 30 tablet 6    EPINEPHrine (ANY BX GENERIC EQUIV) 0.3 MG/0.3ML injection 2-pack Inject 0.3 mLs (0.3 mg) into the muscle as needed for anaphylaxis May repeat one time in 5-15 minutes if response to initial dose is inadequate. (Patient not taking: Reported on 7/11/2024) 2 each 3         Physical Exam:     /73   Pulse 107   Temp 97.8  F (36.6  C) (Oral)   Resp 16   Wt 66.3 kg (146 lb 1.6 oz)   SpO2 98%   BMI 24.94 kg/m      KPS:  80  General Appearance: alert, and no distress  Eyes: PERRL, conjunctiva and lids normal, sclera nonicteric  Ears/Nose/M/Throat: Oral mucosa and posterior oropharynx normal, moist mucous membranes  Cardio/Vascular:regular rate and rhythm, normal S1 and S2, no murmur  Resp Effort And Auscultation: Normal - Clear to auscultation  without rales, rhonchi, or wheezing.  Edema: none  Skin: Skin color, texture, turgor normal. No rashes or lesions.  Neurologic: Gait normal.  Sensation grossly WNL.  Psych/Affect: Mood and affect are appropriate.    Blood Counts     Recent Labs   Lab Test 07/12/24  1530 07/11/24  1101 07/09/24  1011 07/09/24  1001 06/18/24  0327 06/17/24  0338 06/16/24  0318 06/15/24  0429   HGB 9.3* 8.9* 8.6* 8.5*   < > 10.6* 9.6* 9.9*   HCT 27.1* 26.5* 25.5* 25.6*   < > 31.3* 28.2* 28.7*   WBC 4.4 4.5 4.8 4.4   < > 0.5* 1.5* 2.4*   ANEUTAUTO  --   --   --   --   --  0.3* 1.4* 2.3   ALYMPAUTO  --   --   --   --   --  0.1* 0.1* 0.1*   AMONOAUTO  --   --   --   --   --  0.0 0.0 0.0   AEOSAUTO  --   --   --   --   --  0.0 0.0 0.0   ABSBASO  --   --   --   --   --  0.0 0.0 0.0   NRBCMAN 0.0 0.0  --  0.0   < > 0.0 0.0 0.0   PLT 55* 52* 50* 50*   < > 124* 173 208    < > = values in this interval not displayed.       Chemistries     Basic Panel  Recent Labs   Lab Test 07/12/24  1530 07/11/24  1101 07/09/24  1011   * 133* 134*   POTASSIUM 3.8 3.9 3.9   CHLORIDE 99 101 105   CO2 26 26 23   BUN 13.7 11.4 10.8   CR 1.63* 1.57* 1.40*   * 105* 111*        Calcium, Magnesium, Phosphorus  Recent Labs   Lab Test 07/12/24  1530 07/11/24  1101 07/09/24  1011 07/09/24  1001 07/02/24  0949 07/01/24  0355   TANNER 8.0* 7.9* 7.6* 7.6*   < > 7.3*   MAG  --  1.5*  --  1.6*  --  2.1   PHOS  --   --  3.7 3.7  --  2.5    < > = values in this interval not displayed.        LFTs  Recent Labs   Lab Test 07/11/24  1101 07/09/24  1011 07/09/24  1001 07/05/24  1303   BILITOTAL <0.2  --  0.2 <0.2   ALKPHOS 171*  --  171* 189*   AST 15  --  13 16   ALT 9  --  10 12   ALBUMIN 2.0* 2.0* 2.0* 1.9*       LDH  Recent Labs   Lab Test 07/09/24  1001 05/24/24  1112 01/18/24  1433   * 248 248       B2-Microglobulin  Recent Labs   Lab Test 05/21/24  1338 01/18/24  1433   [id number] 4.3* 5.8*         Immunoglobulins     Recent Labs   Lab Test 07/09/24  1001  05/21/24  1338 04/19/24  0956 03/15/24  0820 02/02/24  1437 01/18/24  1444   * 86* 101* 116* 204* 294*       Recent Labs   Lab Test 07/09/24  1001 05/21/24  1338 04/19/24  0956 03/15/24  0820 01/18/24  1444    35* 37* 36* 164       Recent Labs   Lab Test 07/09/24  1001 05/21/24  1338 04/19/24  0956 03/15/24  0820 01/18/24  1444    <10* 21* 33* 127         Monocloncal Protein Studies     M spike    Recent Labs   Lab Test 07/09/24  1001 05/24/24  1112 05/21/24  1338 04/19/24  0956 03/15/24  0820 01/08/24  1201   ELPM 0.0 0.0 0.0 0.1* 0.1* 0.0       Kappa FLC    Recent Labs   Lab Test 07/09/24  1011 07/09/24  1001 05/24/24  1108 05/21/24  1338 04/19/24  0956 03/15/24  0820   KFLCA 6.05* 5.79* 1.12 1.07 1.13 1.33       Lambda FLC    Recent Labs   Lab Test 07/09/24  1011 07/09/24  1001 05/24/24  1108 05/21/24  1338 04/19/24  0956 03/15/24  0820   LFLCA 7.10* 7.14* 1.74 1.49 1.71 1.96       FLC Ratio    Recent Labs   Lab Test 07/09/24  1011 07/09/24  1001 05/24/24  1108 05/21/24  1338 04/19/24  0956 03/15/24  0820   KLRA 0.85 0.81 0.64 0.72 0.66 0.68         Bone Marrow Biopsy       Morphology    Results for orders placed or performed in visit on 05/24/24 (from the past 8760 hour(s))   Bone marrow biopsy   Result Value    Final Diagnosis      Bone marrow, posterior iliac crest, left decalcified trephine biopsy and touch imprint; left aspirate clot, direct aspirate smear, concentrate aspirate smear, and peripheral blood smear:               - Minute level involvement by recurrent/persistent plasma cell neoplasm  - Normocellular marrow (40-50% estimated cellularity), with , trilineage hematopoiesis, no overt dysplasia, and no increase in blasts (1%)  - Positive for amyloid deposisiton by congo erika   - Peripheral blood showing slight thrombocytosis and no circulating plasma cells  - See comment      Comment      Flow cytometry analysis on concurrent specimen ([id number]) showed rare lambda monotypic  plasma cells (0.001%) and polytypic B cells. The flow findings coupled with the positive congo red and lambda monotypic plasma cells by immunohistochemistry are consistent with a plasma cell neoplasm.     Concurrent ancillary studies are in progress and will be reported separately. Correlation with the results of ancillary tests and clinical findings is recommended.      Clinical Information      From Epic electronic medical record; 54 year old female with a history of AL amyloidosis, lambda light chain-restricted, with kidney (nephrotic syndrome), bone marrow, and cardiac involvement treated with chemotherapy. Her most recent bone marrow biopsy ([id number] on 1/18/2024), pre-therapy, showed lambda-monotypic plasma cells with monotypic B cells. She is undergoing evaluation prior to hematopoietic cell transplant.       Peripheral Hematologic Data      CBC WITH DIFFERENTIAL (05/24/2024 11:17 AM CDT):     RESULT VALUE REF. RANGE UNITS   WBC Count  Hemoglobin  Hematocrit   Platelet Count   RBC Count  MCV  MCH  MCHC   RDW  9.7 (NORMAL)    13.0 (NORMAL)     39.6 (NORMAL)   483  ( H )  4.31 (NORMAL)       92 (NORMAL)     30.2 (NORMAL)     32.8 (NORMAL)      16.8  ( H ) 4.0-11.0  11.7-15.7  35.0-47.0  150-450  3.80-5.20    26.5-33.0  31.5-36.5  10.0-15.0 10e3/uL  g/dL  %  10e3/uL  10e6/uL  fL  pg  g/dL  %   % Neutrophils  % Lymphocytes  % Monocytes  % Eosinophils  % Basophils  % Immature Granulocytes  Absolute Neutrophils  Absolute Lymphocytes  Absolute Monocytes  Absolute Eosinophils  Absolute Basophils  Absolute Immature Granulocytes  NRBCs per 100 WBC  Absolute NRBCs 71  15  10  2  2  0  6.9 (NORMAL)  1.4 (NORMAL)  1.0 (NORMAL)  0.2 (NORMAL)  0.2 (NORMAL)  0.0 (NORMAL)  0 (NORMAL)  0.0 () N/A  N/A  N/A  N/A  N/A  N/A  1.6-8.3  0.8-5.3  0.0-1.3  0.0-0.7  0.0-0.2  <=0.4  <1  <=0.0 %  %  %  %  %  %  10e3/uL  10e3/uL  10e3/uL  10e3/uL  10e3/uL  10e3/uL  /100  10e3/uL         Microscopic Description      PERIPHERAL  BLOOD SMEAR MORPHOLOGY:  The red blood cells appear normochromic.  Poikilocytosis  includes numerous acanthocytes and echinocytes .  Polychromasia is not increased increased.  Rouleaux formation is not increased. The morphology of the platelets is normal. No circulating plasma cells.     Bone marrow aspirates and trephine core biopsy touch imprints are reviewed.    BONE MARROW DIFFERENTIAL (500 cells on direct aspirate smears)  Percent (%) Cell Population Reference Range (%)   1 Blasts  (0 - 1)   3 Neutrophil promyelocytes (2 - 4)   53 Neutrophils and precursors (54 - 63)   27 Erythroid precursors (18 - 24)   1 Monocytes (1 - 1.5)   1 Eosinophils (1 - 3)   1 Basophils (0 - 1)   13 Lymphocytes (8 - 12)   0 Plasma cells (0 - 1.5)     The aspirate smears are adequate for evaluation.    Neutrophil maturation is complete. Erythroid maturation is complete. Megakaryocytes exhibit a spectrum of sizes and morphologies, within the limits of normal .    CLOT SECTIONS:   The clot sections show fibrin and blood.    TREPHINE SECTIONS:  Hematoxylin and eosin stains are reviewed. The quality of the trephine core biopsy is good, with slight crush and fragmentation artifact. The marrow cellularity is estimated at 40-50%. The cellular composition reflects the aspirate smear differential. The number of megakaryocytes appears consistent with the degree of marrow cellularity, with unremarkable morphology. No aggregates of plasma cells are identified. There are few thickened blood vessels identified.    IMMUNOHISTOCHEMISTRY:  Immunohistochemical and special stains are performed on the paraffin-embedded trephine core with appropriate controls.     highlights scattered plasma cells.  Stains for cytoplasmic kappa and lambda immunoglobulin light chains show lambda-monotypic plasma cells.  Congo red is positive    Note: These immunohistochemical stains are deemed medically necessary. Some of the antigens may also be evaluated by flow  cytometry. Concurrent evaluation by immunohistochemistry on clot and/or trephine sections is indicated in this case in order to correlate immunophenotype with cell morphology and determine extent of involvement, spatial pattern, and focality of potential disease distribution.     Case was reviewed by the following:  Resident Pathologist: Loida Ott MD  A resident or fellow in a training program was involved in the initial review, preparation, and/or interpretation of this case.  I, as the senior physician, attest that I have personally reviewed all specimens and or slides, including the listed special stains, and used them with my medical judgement to determine the final diagnosis.              Gross Description      Procedure/Gross Description   Aspirate(s) and trephine(s) procured by SHAQUILLE JACKSON    Specimen sent for Special Studies:         Flow Cytometry: left aspirate        Cytogenetics: left aspirate        Molecular Diagnostics: left aspirate        Other: left aspirate- ClonoSEQ    Biopsy aspiration site: left posterior iliac crest                                                      (Reference Range)          Amount of aspirate           2.5   mL        Fat and P.V. cell layer        2    %               (1 - 3)        Particles                             0   %        Myeloid-erythroid layer    2    %               (5 - 8)          Clot Section: yes    Trephine biopsy site: left posterior iliac crest    Designated left posterior iliac crest 1 cylinder of gritty tissue, labeled with the patient's name and hospital number, obtained with 11 gauge needle and a length of 14 mm; entirely submitted in 1 cassette; acetic zinc formalin fixed, decalcified, processed, and stained for hematoxylin and eosin per laboratory protocol.        MCRS Yes (A)    Performing Labs      The technical component of this testing was completed at [facility].      Stain controls for all stains resulted within this report have been reviewed and show appropriate reactivity.            Echocardiogram       Results for orders placed during the hospital encounter of 24    ECHOCARDIOGRAM COMPLETE    Status: Normal 2024    [facility]  [id number]  [id number]  [id number]  [id number]^PETE    [facility],[place]  Echocardiography Laboratory  [address] [place] [address]    Name: ADE NAVARRO  MRN: [id number]  : 1970  Study Date: 2024 09:04 AM  Age: 54 yrs  Gender: Female  Patient [facility]  Reason For Study: AL amyloidosis  Ordering Physician: PETE HOOD  Referring Physician: PETE HOOD  Performed By: Jw Park RDCS    BSA: 1.8 m2  Height: 64 in  Weight: 164 lb  ______________________________________________________________________________  Procedure  Echocardiogram with two-dimensional, color and spectral Doppler performed.  ______________________________________________________________________________  Interpretation Summary  Global and regional left ventricular function is normal with an EF of 55-60%.  Diastolic Doppler findings (E/E' ratio and/or other parameters) suggest left  ventricular filling pressures are increased.  Global peak LV longitudinal strain is averaged at -11.3%. This suggests  abnormal strain (normal <-18%).  Global right ventricular function is normal. There is mild right ventricular  hypertrophy.  Normal IVC.  Small circumferential pericardial effusion is present without any hemodynamic  significance.    This study was compared with the study from 1/3/24 .  No significant changes. Findings of increased biventricular wall thickness,  abnormal diastology, GLS, and presence of pericardial effusion in the setting  of renal amyloidosis raise suspicion of cardiac amyloidosis.  ______________________________________________________________________________  Left  Ventricle  Left ventricular size is normal. Global and regional left ventricular function  is normal with an EF of 55-60%. Mild concentric wall thickening consistent  with left ventricular hypertrophy is present. Left ventricular diastolic  function is indeterminate. Diastolic Doppler findings (E/E' ratio and/or other  parameters) suggest left ventricular filling pressures are increased. Global  peak LV longitudinal strain is averaged at -11.3%. This suggests abnormal  strain (normal <-18%). No regional wall motion abnormalities are seen.    Right Ventricle  The right ventricle is normal size. Global right ventricular function is  normal. There is mild right ventricular hypertrophy.    Atria  Both atria appear normal.    Mitral Valve  The mitral valve is normal.    Aortic Valve  Aortic valve is normal in structure and function. The aortic valve is  tricuspid.    Tricuspid Valve  The tricuspid valve is normal. Trace tricuspid insufficiency is present. The  peak velocity of the tricuspid regurgitant jet is not obtainable. Pulmonary  artery systolic pressure cannot be assessed.    Pulmonic Valve  The pulmonic valve is normal. Trace pulmonic insufficiency is present.    Vessels  The aorta root is normal. The thoracic aorta is normal. The inferior vena cava  was normal in size with preserved respiratory variability. IVC diameter <2.1  cm collapsing >50% with sniff suggests a normal RA pressure of 3 mmHg.    Pericardium  Small circumferential pericardial effusion is present without any hemodynamic  significance.    Miscellaneous  No significant valvular abnormalities present.    Compared to Previous Study  This study was compared with the study from 1/3/24 . No significant changes.  Findings of increased biventricular wall thickness, abnormal diastology, GLS,  and presence of pericardial effusion in the setting of renal amyloidosis raise  suspicion of cardiac amyloidosis.    Attestation  I have personally viewed the  imaging and agree with the interpretation and  report as documented by the fellow, Mario Vila, and/or edited by me.  ______________________________________________________________________________  MMode/2D Measurements & Calculations  IVSd: 1.2 cm  LVIDd: 4.2 cm  LVIDs: 2.7 cm  LVPWd: 1.1 cm  FS: 35.4 %  LV mass(C)d: 165.4 grams  LV mass(C)dI: 92.0 grams/m2  Ao root diam: 3.0 cm  asc Aorta Diam: 3.1 cm  LVOT diam: 2.2 cm  LVOT area: 3.7 cm2  Ao root diam index Ht(cm/m): 1.8  Ao root diam index BSA (cm/m2): 1.7    Asc Ao diam index BSA (cm/m2): 1.8  Asc Ao diam index Ht(cm/m): 1.9  LA Volume (BP): 41.9 ml  LA Volume Index (BP): 23.3 ml/m2  RWT: 0.51  TAPSE: 1.7 cm    Doppler Measurements & Calculations  MV E max benedicto: 61.3 cm/sec  MV A max benedicto: 58.2 cm/sec  MV E/A: 1.1  MV dec slope: 331.4 cm/sec2  MV dec time: 0.19 sec    PA acc time: 0.08 sec  E/E' av.9  Lateral E/e': 17.6  Medial E/e': 18.2  RV S Benedicto: 13.6 cm/sec    ______________________________________________________________________________  Report approved by: Danie Hernandez 2024 01:50 PM        PET Scan       Results for orders placed during the hospital encounter of 24    PET ONCOLOGY WHOLE BODY    Status: Normal 2024    Narrative  Combined Report of: PET and CT on  2024 9:59 AM:    1. PET of the neck, chest, abdomen, and pelvis.  2. PET CT Fusion for Attenuation Correction and Anatomical  Localization:  3. 3D MIP and PET-CT fused images were processed on an independent  workstation and archived to PACS and reviewed by a radiologist.    Technique:    1. PET: The patient received 10.54 mCi of F-18-FDG; the serum glucose  was 103 mg/dL prior to administration, body weight was 80 kg. Images  were evaluated in the axial, sagittal, and coronal planes as well as  the rotational whole body MIP. Images were acquired from the Vertex to  the Feet.    UPTAKE WAS MEASURED AT 62 MINUTES.    BACKGROUND:  Liver SUV max= 2.8,   Aorta  Blood SUV max= 2.5.    2. CT: CT only obtained for attenuation correction and not diagnostic  purposes.    INDICATION: Biopsy-proven AL amyloidosis of kidney, workup for plasma  cell disorder; AL amyloidosis (H)    ADDITIONAL INFORMATION OBTAINED FROM EMR: 53-year-old female with  biopsy-proven renal amyloidosis (AL amyloidosis) and cardiac  involvement by MRI, PET CT requested for plasma cell disorder workup.    COMPARISON: None.    FOLLOW-UP APPOINTMENT: Unknown    FINDINGS:    HEAD/NECK:  Mildly hypermetabolic mildly enlarged bilateral level 2A nodes for  example a 1.5 x 1.2 cm right level IIA node, SUV max 4.2, series 3  image 71 and a 1.1 x 0.7 cm left level 2 node, SUV max 3.7, series 3  image 71.    Moderate diffuse thyroid uptake, SUV max 6.0, series 3 image 97.    CHEST:  Left lower lobe series 4 image [id number] there is nodularity along the  left lower lobe anterior segment bronchus, largest nodule measuring 8  mm there is focal hypermetabolism in this area max SUV 4.5.  Differential amyloid, infection, atelectasis.    There is mild uptake throughout the left ventricle which could  represent active amyloidosis however in the absence of a cardiac  preparation may simply be physiologic.  Calcified left hilar nodes and left lower lobe calcified granuloma. No  pneumothorax or pleural effusion.    ABDOMEN AND PELVIS:  No abnormal uptake.    No calcified gallstones. Calcified splenic granulomas. No adrenal  nodule. Left inferior pole nonobstructing intrarenal calculus, no  hydronephrosis. No small or large bowel obstruction. Intrauterine  device in place. No free air or free fluid in the abdomen or pelvis.    LOWER EXTREMITIES:  A single area of focal moderate to markedly hypermetabolic region in  the proximal posterior left gastrocnemius muscle, SUV max 8.6 series 3  image 416.    BONES AND SOFT TISSUES:  No abnormal uptake.  Old anterior rib fracture deformities.    Impression  IMPRESSION: In this patient with  history of light chain amyloidosis  with renal and cardiac involvement,  1. No definite active amyloid on FDG pet.  2. Single focal moderate to markedly avid intramuscular uptake,  proximal left gastrocnemius, no etiology demonstrated on unenhanced  non diagnostic CT.  Recommend dedicated MRI.    3. Small to mildly enlarged mildly hypermetabolic bilateral level 2A  lymph nodes, most likely reactive.    4. A few equivocal findings - unlikely to be active amyloid (patient  has been under treatment, so FDG may be decreased).  Recommend future  PET scans be done with a sarcoid cardiac prep and and Lasix protocol  to decrease background and make them more sensitive for cardiac and  renal activity.    4a. Mildly hypermetabolic left lower lobe anterior segment  peribronchial nodularity and hypermetabolism. Differential favors RSV  (positive 1/18/24), focal amyloid unlikely.  4b. Cardiac (MRI positive) - equivocal FDG throughout left ventricle,  likely physiologic. Given findings on MRI 1/16/2024, can not rule out  active amyloidosis. However in the absence of a cardiac dietary prep  (which switches myocardium from glucose to fatty metabolism), this is  likely normal physiology.  Suggest future PET scans be done with a  cardiac sarcoid dietary protocol (3 day ketogenic diet).  4c. Kidney cortex (bx. 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        I have personally reviewed the examination and initial interpretation  and I agree with the findings.    PETE VELASQUEZ MD      SYSTEM ID:  [id number]     The longitudinal plan of care for the diagnosis(es)/condition(s) as documented were addressed during this visit. Due to the added complexity in care, I will continue to support Vivian in the subsequent management and with ongoing continuity of  care.    ------------------------------------------------------------------------------------------------------------------------------------------------    Patient Care Team         Relationship Specialty Notifications Start End    Alin Torres, PA-C PCP - General Family Medicine  11/4/20     Phone: [phone] Fax: [phone] [address] [facility] [address]    Sarthak Ontiveros MD Assigned OBGYN Provider   9/24/22     Phone: [phone] Fax: [phone]         [address] [facility] [address] [facility] [address]    Alin Torres, JADE Assigned PCP   12/31/22     Phone: [phone] Fax: [phone]         [address] [facility] [address]    Carmen Waggoner APRN CNP Nurse Practitioner Gastroenterology  11/21/23     Phone: [phone] Fax: [phone]         [address] [place] [address]    Audrey Sen DO Assigned Nephrology Provider   12/9/23     Phone: [phone] Fax: [phone]         [address] [facility] [address] [facility] [address]    Musa Adrian MD Physician Hematology & Oncology  12/26/23     Phone: [phone] Fax: [phone]         [address] [facility] [address] [facility] [address]    Jomar Chandler MD MD Hematology & Oncology  12/26/23     Phone: [phone] Fax: [phone]         [address] [facility] [address] [facility] [address]    Gaston Flores MD MD Cardiovascular Disease  1/5/24     Phone: [phone] Fax: [phone] [address] [facility] [address]

## 2024-07-16 NOTE — TELEPHONE ENCOUNTER
Patient confirmed scheduled appointment:     Date: 12/9/24  Time: 2 PM  Visit type: Return nephrology - Video visit  Provider: Dr. Dobson  Location: [facility]  Testing/imaging: [facility] on 12/2/24 @ 7:45 AM  Additonal Notes: Patient requested that writer send message to Dr. Dobson asking him to write her Vitamin D prescription as 200 mg TID. Pt confirmed will be in [place] for appt

## 2024-07-19 ENCOUNTER — PATIENT OUTREACH (OUTPATIENT)
Dept: ONCOLOGY | Facility: CLINIC | Age: 54
End: 2024-07-19

## 2024-07-19 ENCOUNTER — APPOINTMENT (OUTPATIENT)
Dept: LAB | Facility: CLINIC | Age: 54
End: 2024-07-19
Attending: STUDENT IN AN ORGANIZED HEALTH CARE EDUCATION/TRAINING PROGRAM
Payer: COMMERCIAL

## 2024-07-19 ENCOUNTER — MYC MEDICAL ADVICE (OUTPATIENT)
Dept: FAMILY MEDICINE | Facility: OTHER | Age: 54
End: 2024-07-19

## 2024-07-19 ENCOUNTER — ONCOLOGY VISIT (OUTPATIENT)
Dept: TRANSPLANT | Facility: CLINIC | Age: 54
End: 2024-07-19
Attending: STUDENT IN AN ORGANIZED HEALTH CARE EDUCATION/TRAINING PROGRAM
Payer: COMMERCIAL

## 2024-07-19 VITALS
SYSTOLIC BLOOD PRESSURE: 112 MMHG | TEMPERATURE: 98.3 F | HEART RATE: 103 BPM | DIASTOLIC BLOOD PRESSURE: 79 MMHG | WEIGHT: 135.1 LBS | OXYGEN SATURATION: 99 % | RESPIRATION RATE: 16 BRPM | BODY MASS INDEX: 23.06 KG/M2

## 2024-07-19 DIAGNOSIS — N18.31 CHRONIC KIDNEY DISEASE, STAGE 3A (H): ICD-10-CM

## 2024-07-19 DIAGNOSIS — E85.81 LIGHT CHAIN (AL) AMYLOIDOSIS (H): Primary | ICD-10-CM

## 2024-07-19 DIAGNOSIS — E85.81 AL AMYLOIDOSIS (H): ICD-10-CM

## 2024-07-19 DIAGNOSIS — Z94.81 STATUS POST BONE MARROW TRANSPLANT (H): ICD-10-CM

## 2024-07-19 DIAGNOSIS — E85.81 AL AMYLOIDOSIS (H): Primary | ICD-10-CM

## 2024-07-19 DIAGNOSIS — Z94.84 HISTORY OF PERIPHERAL STEM CELL TRANSPLANT (H): ICD-10-CM

## 2024-07-19 LAB
ACANTHOCYTES BLD QL SMEAR: ABNORMAL
ALBUMIN SERPL BCG-MCNC: 2.3 G/DL (ref 3.5–5.2)
ALP SERPL-CCNC: 177 U/L (ref 40–150)
ALT SERPL W P-5'-P-CCNC: 12 U/L (ref 0–50)
ANION GAP SERPL CALCULATED.3IONS-SCNC: 8 MMOL/L (ref 7–15)
AST SERPL W P-5'-P-CCNC: 17 U/L (ref 0–45)
BASOPHILS # BLD AUTO: ABNORMAL 10*3/UL
BASOPHILS # BLD MANUAL: 0 10E3/UL (ref 0–0.2)
BASOPHILS NFR BLD AUTO: ABNORMAL %
BASOPHILS NFR BLD MANUAL: 0 %
BILIRUB SERPL-MCNC: <0.2 MG/DL
BUN SERPL-MCNC: 19.6 MG/DL (ref 6–20)
CALCIUM SERPL-MCNC: 9.3 MG/DL (ref 8.8–10.4)
CHLORIDE SERPL-SCNC: 101 MMOL/L (ref 98–107)
CREAT SERPL-MCNC: 2.03 MG/DL (ref 0.51–0.95)
EGFRCR SERPLBLD CKD-EPI 2021: 28 ML/MIN/1.73M2
EOSINOPHIL # BLD AUTO: ABNORMAL 10*3/UL
EOSINOPHIL # BLD MANUAL: 0.2 10E3/UL (ref 0–0.7)
EOSINOPHIL NFR BLD AUTO: ABNORMAL %
EOSINOPHIL NFR BLD MANUAL: 4 %
ERYTHROCYTE [DISTWIDTH] IN BLOOD BY AUTOMATED COUNT: 16.8 % (ref 10–15)
FRAGMENTS BLD QL SMEAR: SLIGHT
GLUCOSE SERPL-MCNC: 126 MG/DL (ref 70–99)
HCO3 SERPL-SCNC: 26 MMOL/L (ref 22–29)
HCT VFR BLD AUTO: 30 % (ref 35–47)
HGB BLD-MCNC: 10 G/DL (ref 11.7–15.7)
IMM GRANULOCYTES # BLD: ABNORMAL 10*3/UL
IMM GRANULOCYTES NFR BLD: ABNORMAL %
LYMPHOCYTES # BLD AUTO: ABNORMAL 10*3/UL
LYMPHOCYTES # BLD MANUAL: 0.7 10E3/UL (ref 0.8–5.3)
LYMPHOCYTES NFR BLD AUTO: ABNORMAL %
LYMPHOCYTES NFR BLD MANUAL: 11 %
MCH RBC QN AUTO: 31.1 PG (ref 26.5–33)
MCHC RBC AUTO-ENTMCNC: 33.3 G/DL (ref 31.5–36.5)
MCV RBC AUTO: 93 FL (ref 78–100)
MONOCYTES # BLD AUTO: ABNORMAL 10*3/UL
MONOCYTES # BLD MANUAL: 0.4 10E3/UL (ref 0–1.3)
MONOCYTES NFR BLD AUTO: ABNORMAL %
MONOCYTES NFR BLD MANUAL: 6 %
NEUTROPHILS # BLD AUTO: ABNORMAL 10*3/UL
NEUTROPHILS # BLD MANUAL: 4.9 10E3/UL (ref 1.6–8.3)
NEUTROPHILS NFR BLD AUTO: ABNORMAL %
NEUTROPHILS NFR BLD MANUAL: 79 %
NRBC # BLD AUTO: 0 10E3/UL
NRBC BLD AUTO-RTO: 0 /100
PLAT MORPH BLD: ABNORMAL
PLATELET # BLD AUTO: 122 10E3/UL (ref 150–450)
POTASSIUM SERPL-SCNC: 4.1 MMOL/L (ref 3.4–5.3)
PROT SERPL-MCNC: 5.5 G/DL (ref 6.4–8.3)
RBC # BLD AUTO: 3.22 10E6/UL (ref 3.8–5.2)
RBC MORPH BLD: ABNORMAL
SODIUM SERPL-SCNC: 135 MMOL/L (ref 135–145)
TOXIC GRANULES BLD QL SMEAR: PRESENT
WBC # BLD AUTO: 6.2 10E3/UL (ref 4–11)

## 2024-07-19 PROCEDURE — G2211 COMPLEX E/M VISIT ADD ON: HCPCS | Performed by: STUDENT IN AN ORGANIZED HEALTH CARE EDUCATION/TRAINING PROGRAM

## 2024-07-19 PROCEDURE — 99214 OFFICE O/P EST MOD 30 MIN: CPT | Performed by: STUDENT IN AN ORGANIZED HEALTH CARE EDUCATION/TRAINING PROGRAM

## 2024-07-19 PROCEDURE — 85027 COMPLETE CBC AUTOMATED: CPT | Performed by: STUDENT IN AN ORGANIZED HEALTH CARE EDUCATION/TRAINING PROGRAM

## 2024-07-19 PROCEDURE — 36415 COLL VENOUS BLD VENIPUNCTURE: CPT | Performed by: STUDENT IN AN ORGANIZED HEALTH CARE EDUCATION/TRAINING PROGRAM

## 2024-07-19 PROCEDURE — 82040 ASSAY OF SERUM ALBUMIN: CPT | Performed by: STUDENT IN AN ORGANIZED HEALTH CARE EDUCATION/TRAINING PROGRAM

## 2024-07-19 PROCEDURE — 85007 BL SMEAR W/DIFF WBC COUNT: CPT | Performed by: STUDENT IN AN ORGANIZED HEALTH CARE EDUCATION/TRAINING PROGRAM

## 2024-07-19 ASSESSMENT — PAIN SCALES - GENERAL: PAINLEVEL: NO PAIN (0)

## 2024-07-19 NOTE — NURSING NOTE
Chief Complaint   Patient presents with    Blood Draw     Labs drawn via  by RN in lab. VS taken.      Labs collected from venipuncture by RN. Vitals taken. Checked in for appointment(s).    Sudha Ramos RN

## 2024-07-19 NOTE — PROGRESS NOTES
[facility]: Cancer Care Chart Review                                                                                        Situation: Patient chart reviewed by care coordinator.     Background: Pt seen by Dr. Chandler on 7/19/24     Assessment: No coordination needed at this time by [facility]. Status set as Maintenance for enrollment.     Plan/Recommendations: [facility] follow-up in 6 months.     FILI LynchN, RN  RN Care Coordinator   [phone]

## 2024-07-19 NOTE — LETTER
7/19/2024      Vivian Brown  [address] [facility] [address]      Dear Colleague,    Thank you for referring your patient, Vivian Brown, to the [facility] BLOOD AND MARROW TRANSPLANT PROGRAM [place]. Please see a copy of my visit note below.         BMT Progress Note    Disease presentation and baseline characteristics:   12/21/2023:  Final Diagnosis   A. kidney biopsy (needle):     Amyloidosis of the kidney, AL-type, lambda light chain-restricted, affecting the glomeruli, interstitium, and arterioles (see comment)     Mild chronic changes of the parenchyma, including:   - focal global glomerulosclerosis (3% of glomeruli)   - focal tubular atrophy and interstitial fibrosis (5% of the cortex)   - severe arterial sclerosis   Electronically signed by Joe Garcia MD on 12/23/2023 at  3:21 PM   Comment  UULAB   The biopsy reveals findings diagnostic of amyloidosis; the amyloid shows lambda light chain-restriction (AL amyloidosis), and the deposits are Congo red positive. The amyloid deposits affect the glomeruli extensively, and interstitium and arterioles focally. Other potential complications of paraproteins in the kidney are not seen, in particular, there is no evidence of light chain cast nephropathy, light chain deposition disease, or light chain tubulopathy.     1/8/24: NT-Pro , Troponin T 15    Date Treatment Name Response Side Effects / Toxicities   1/19/24 D-CyBorD x 4 cycles     6/12/24 HDT/ASCT VGPR           HPI:  Please see my entry above for hematologic history.  Mrs. Brown presents today accompanied by her mother for follow up.  She continues to feel fatigued.  She notes she is not drinking enough and her mood is down.  She has been decreasing her anti-emetic regimen and has not noticed an increase in nausea.  No further loose stools.  She does get dizzy still when standing rapidly but has been moving more slowly to account for this.      ASSESSMENT AND PLAN:  Ms.  Stephanie continues to slowly recover after transplant.  A prolonged recovery can often be seen after transplant for amyloid and we discussed this today.  Typically physical recovery continues until 2-3 months after transplant.  I noted that if her mood remains poor at that time we could consider further interventions, but she agrees with holding off until her post-transplant physical effects have fully resolved.    CHRIS on CKD is likely secondary to dehydration.  Encouraged her to continue pushing fluids.    She has decreased zyprex to once daily at bedtime.  She continues to wean her other antiemetics and has not experienced worsening nausea with this.    Continue to hold spironolactone, statin.    RTC with me in two week, re-evaluate diuretic and antihypertensive regimen.    Continue acyclovir, bactrim for one year post-transplant.    I spent 30 minutes in the care of this patient today, which included time necessary for preparation for the visit, obtaining history, ordering medications/tests/procedures as medically indicated, review of pertinent medical literature, counseling of the patient, communication of recommendations to the care team, and documentation time.    Jomar Chandler MD    ROS:    10 point ROS neg other than the symptoms noted above in the HPI.      Current Outpatient Medications   Medication Sig Dispense Refill    acyclovir (ZOVIRAX) 400 MG tablet Take 2 tablets (800 mg) by mouth 2 times daily 120 tablet 11    Albuterol-Budesonide (AIRSUPRA) 90-80 MCG/ACT AERO Inhale 2 Inhalations into the lungs every 4 hours as needed (Wheezing, chest tightness, shortness of breath, or persistent cough) 10.7 g 3    azelastine (ASTELIN) 0.1 % nasal spray Spray 2 sprays into both nostrils 2 times daily as needed for rhinitis 30 mL 3    bumetanide (BUMEX) 1 MG tablet Take 1 tablet (1 mg) by mouth 2 times daily 60 tablet 6    EPINEPHrine (ANY BX GENERIC EQUIV) 0.3 MG/0.3ML injection 2-pack Inject 0.3 mLs (0.3 mg) into  the muscle as needed for anaphylaxis May repeat one time in 5-15 minutes if response to initial dose is inadequate. (Patient not taking: Reported on 7/11/2024) 2 each 3    fluticasone (FLONASE) 50 MCG/ACT nasal spray Spray 2 sprays into both nostrils daily 16 g 3    levothyroxine (SYNTHROID) 200 MCG tablet Take 200 mcg by mouth daily Pt takes Synthroid brand name. FRANKI      liothyronine (CYTOMEL) 5 MCG tablet Take 5 mcg by mouth 2 times daily      loperamide (IMODIUM A-D) 2 MG tablet Take 1 tablet (2 mg) by mouth 4 times daily as needed for diarrhea 30 tablet 1    melatonin 3 MG tablet Take 1 tablet (3 mg) by mouth nightly as needed for sleep 30 tablet 1    midodrine (PROAMATINE) 10 MG tablet Take 1 tablet (10 mg) by mouth 3 times daily 270 tablet 1    OLANZapine (ZYPREXA) 2.5 MG tablet Take 1 tablet (2.5 mg) by mouth at bedtime 30 tablet 0    ondansetron (ZOFRAN) 8 MG tablet Take 1 tablet (8 mg) by mouth every 8 hours 30 tablet 1    ondansetron (ZOFRAN) 8 MG tablet Take 8 mg by mouth every 8 hours as needed for nausea      pantoprazole (PROTONIX) 40 MG EC tablet Take 1 tablet (40 mg) by mouth every morning (before breakfast) 30 tablet 1    prochlorperazine (COMPAZINE) 10 MG tablet Take 0.5 tablets (5 mg) by mouth every 6 hours as needed for nausea or vomiting 30 tablet 1    psyllium (METAMUCIL/KONSYL) Packet Take 1 packet by mouth daily 30 packet 1    sulfamethoxazole-trimethoprim (BACTRIM DS) 800-160 MG tablet Take 1 tablet by mouth Every Mon, Tues two times daily 16 tablet 11    vitamin D3 (CHOLECALCIFEROL) 50 mcg (2000 units) tablet Take 1 tablet (50 mcg) by mouth daily 90 tablet 6         Physical Exam:     /79 (BP Location: Right arm, Patient Position: Sitting, Cuff Size: Adult Regular)   Pulse 103   Temp 98.3  F (36.8  C) (Oral)   Resp 16   Wt 61.3 kg (135 lb 1.6 oz)   SpO2 99%   BMI 23.06 kg/m      KPS:  80  General Appearance: alert, and no distress  Eyes: PERRL, conjunctiva and lids normal,  sclera nonicteric  Ears/Nose/M/Throat: Oral mucosa and posterior oropharynx normal, moist mucous membranes  Cardio/Vascular:regular rate and rhythm, normal S1 and S2, no murmur  Resp Effort And Auscultation: Normal - Clear to auscultation without rales, rhonchi, or wheezing.  Edema: none  Skin: Skin color, texture, turgor normal. No rashes or lesions.  Neurologic: Gait normal.  Sensation grossly WNL.  Psych/Affect: Mood and affect are appropriate.    Blood Counts     Recent Labs   Lab Test 07/19/24  0948 07/12/24  1530 07/11/24  1101 06/18/24  0327 06/17/24  0338 06/16/24  0318 06/15/24  0429   HGB 10.0* 9.3* 8.9*   < > 10.6* 9.6* 9.9*   HCT 30.0* 27.1* 26.5*   < > 31.3* 28.2* 28.7*   WBC 6.2 4.4 4.5   < > 0.5* 1.5* 2.4*   ANEUTAUTO  --   --   --   --  0.3* 1.4* 2.3   ALYMPAUTO  --   --   --   --  0.1* 0.1* 0.1*   AMONOAUTO  --   --   --   --  0.0 0.0 0.0   AEOSAUTO  --   --   --   --  0.0 0.0 0.0   ABSBASO  --   --   --   --  0.0 0.0 0.0   NRBCMAN 0.0 0.0 0.0   < > 0.0 0.0 0.0   * 55* 52*   < > 124* 173 208    < > = values in this interval not displayed.       Chemistries     Basic Panel  Recent Labs   Lab Test 07/19/24 0948 07/12/24  1530 07/11/24  1101    132* 133*   POTASSIUM 4.1 3.8 3.9   CHLORIDE 101 99 101   CO2 26 26 26   BUN 19.6 13.7 11.4   CR 2.03* 1.63* 1.57*   * 111* 105*        Calcium, Magnesium, Phosphorus  Recent Labs   Lab Test 07/19/24  0948 07/12/24  1530 07/11/24  1101 07/09/24  1011 07/09/24  1001 07/02/24  0949 07/01/24  0355   TANNER 9.3 8.0* 7.9* 7.6* 7.6*   < > 7.3*   MAG  --   --  1.5*  --  1.6*  --  2.1   PHOS  --   --   --  3.7 3.7  --  2.5    < > = values in this interval not displayed.        LFTs  Recent Labs   Lab Test 07/19/24  0948 07/11/24  1101 07/09/24  1011 07/09/24  1001   BILITOTAL <0.2 <0.2  --  0.2   ALKPHOS 177* 171*  --  171*   AST 17 15  --  13   ALT 12 9  --  10   ALBUMIN 2.3* 2.0* 2.0* 2.0*       LDH  Recent Labs   Lab Test 07/09/24  1001  05/24/24  1112 01/18/24  1433   * 248 248       B2-Microglobulin  Recent Labs   Lab Test 05/21/24  1338 01/18/24  1433   [id number] 4.3* 5.8*         Immunoglobulins     Recent Labs   Lab Test 07/09/24  1001 05/21/24  1338 04/19/24  0956 03/15/24  0820 02/02/24  1437 01/18/24  1444   * 86* 101* 116* 204* 294*       Recent Labs   Lab Test 07/09/24  1001 05/21/24  1338 04/19/24  0956 03/15/24  0820 01/18/24  1444    35* 37* 36* 164       Recent Labs   Lab Test 07/09/24  1001 05/21/24  1338 04/19/24  0956 03/15/24  0820 01/18/24  1444    <10* 21* 33* 127         Monocloncal Protein Studies     M spike    Recent Labs   Lab Test 07/09/24  1001 05/24/24  1112 05/21/24  1338 04/19/24  0956 03/15/24  0820 01/08/24  1201   ELPM 0.0 0.0 0.0 0.1* 0.1* 0.0       Kappa FLC    Recent Labs   Lab Test 07/09/24  1011 07/09/24  1001 05/24/24  1108 05/21/24  1338 04/19/24  0956 03/15/24  0820   KFLCA 6.05* 5.79* 1.12 1.07 1.13 1.33       Lambda FLC    Recent Labs   Lab Test 07/09/24  1011 07/09/24  1001 05/24/24  1108 05/21/24  1338 04/19/24  0956 03/15/24  0820   LFLCA 7.10* 7.14* 1.74 1.49 1.71 1.96       FLC Ratio    Recent Labs   Lab Test 07/09/24  1011 07/09/24  1001 05/24/24  1108 05/21/24  1338 04/19/24  0956 03/15/24  0820   KLRA 0.85 0.81 0.64 0.72 0.66 0.68         Bone Marrow Biopsy       Morphology    Results for orders placed or performed in visit on 05/24/24 (from the past 8760 hour(s))   Bone marrow biopsy   Result Value    Final Diagnosis      Bone marrow, posterior iliac crest, left decalcified trephine biopsy and touch imprint; left aspirate clot, direct aspirate smear, concentrate aspirate smear, and peripheral blood smear:               - Minute level involvement by recurrent/persistent plasma cell neoplasm  - Normocellular marrow (40-50% estimated cellularity), with , trilineage hematopoiesis, no overt dysplasia, and no increase in blasts (1%)  - Positive for amyloid deposisiton by congo  erika   - Peripheral blood showing slight thrombocytosis and no circulating plasma cells  - See comment      Comment      Flow cytometry analysis on concurrent specimen ([id number]) showed rare lambda monotypic plasma cells (0.001%) and polytypic B cells. The flow findings coupled with the positive congo red and lambda monotypic plasma cells by immunohistochemistry are consistent with a plasma cell neoplasm.     Concurrent ancillary studies are in progress and will be reported separately. Correlation with the results of ancillary tests and clinical findings is recommended.      Clinical Information      From Epic electronic medical record; 54 year old female with a history of AL amyloidosis, lambda light chain-restricted, with kidney (nephrotic syndrome), bone marrow, and cardiac involvement treated with chemotherapy. Her most recent bone marrow biopsy ([id number] on 1/18/2024), pre-therapy, showed lambda-monotypic plasma cells with monotypic B cells. She is undergoing evaluation prior to hematopoietic cell transplant.       Peripheral Hematologic Data      CBC WITH DIFFERENTIAL (05/24/2024 11:17 AM CDT):     RESULT VALUE REF. RANGE UNITS   WBC Count  Hemoglobin  Hematocrit   Platelet Count   RBC Count  MCV  MCH  MCHC   RDW  9.7 (NORMAL)    13.0 (NORMAL)     39.6 (NORMAL)   483  ( H )  4.31 (NORMAL)       92 (NORMAL)     30.2 (NORMAL)     32.8 (NORMAL)      16.8  ( H ) 4.0-11.0  11.7-15.7  35.0-47.0  150-450  3.80-5.20    26.5-33.0  31.5-36.5  10.0-15.0 10e3/uL  g/dL  %  10e3/uL  10e6/uL  fL  pg  g/dL  %   % Neutrophils  % Lymphocytes  % Monocytes  % Eosinophils  % Basophils  % Immature Granulocytes  Absolute Neutrophils  Absolute Lymphocytes  Absolute Monocytes  Absolute Eosinophils  Absolute Basophils  Absolute Immature Granulocytes  NRBCs per 100 WBC  Absolute NRBCs 71  15  10  2  2  0  6.9 (NORMAL)  1.4 (NORMAL)  1.0 (NORMAL)  0.2 (NORMAL)  0.2 (NORMAL)  0.0 (NORMAL)  0 (NORMAL)  0.0 ()  N/A  N/A  N/A  N/A  N/A  N/A  1.6-8.3  0.8-5.3  0.0-1.3  0.0-0.7  0.0-0.2  <=0.4  <1  <=0.0 %  %  %  %  %  %  10e3/uL  10e3/uL  10e3/uL  10e3/uL  10e3/uL  10e3/uL  /100  10e3/uL         Microscopic Description      PERIPHERAL BLOOD SMEAR MORPHOLOGY:  The red blood cells appear normochromic.  Poikilocytosis  includes numerous acanthocytes and echinocytes .  Polychromasia is not increased increased.  Rouleaux formation is not increased. The morphology of the platelets is normal. No circulating plasma cells.     Bone marrow aspirates and trephine core biopsy touch imprints are reviewed.    BONE MARROW DIFFERENTIAL (500 cells on direct aspirate smears)  Percent (%) Cell Population Reference Range (%)   1 Blasts  (0 - 1)   3 Neutrophil promyelocytes (2 - 4)   53 Neutrophils and precursors (54 - 63)   27 Erythroid precursors (18 - 24)   1 Monocytes (1 - 1.5)   1 Eosinophils (1 - 3)   1 Basophils (0 - 1)   13 Lymphocytes (8 - 12)   0 Plasma cells (0 - 1.5)     The aspirate smears are adequate for evaluation.    Neutrophil maturation is complete. Erythroid maturation is complete. Megakaryocytes exhibit a spectrum of sizes and morphologies, within the limits of normal .    CLOT SECTIONS:   The clot sections show fibrin and blood.    TREPHINE SECTIONS:  Hematoxylin and eosin stains are reviewed. The quality of the trephine core biopsy is good, with slight crush and fragmentation artifact. The marrow cellularity is estimated at 40-50%. The cellular composition reflects the aspirate smear differential. The number of megakaryocytes appears consistent with the degree of marrow cellularity, with unremarkable morphology. No aggregates of plasma cells are identified. There are few thickened blood vessels identified.    IMMUNOHISTOCHEMISTRY:  Immunohistochemical and special stains are performed on the paraffin-embedded trephine core with appropriate controls.     highlights scattered plasma cells.  Stains for cytoplasmic kappa  and lambda immunoglobulin light chains show lambda-monotypic plasma cells.  Congo red is positive    Note: These immunohistochemical stains are deemed medically necessary. Some of the antigens may also be evaluated by flow cytometry. Concurrent evaluation by immunohistochemistry on clot and/or trephine sections is indicated in this case in order to correlate immunophenotype with cell morphology and determine extent of involvement, spatial pattern, and focality of potential disease distribution.     Case was reviewed by the following:  Resident Pathologist: Loida Ott MD  A resident or fellow in a training program was involved in the initial review, preparation, and/or interpretation of this case.  I, as the senior physician, attest that I have personally reviewed all specimens and or slides, including the listed special stains, and used them with my medical judgement to determine the final diagnosis.              Gross Description      Procedure/Gross Description   Aspirate(s) and trephine(s) procured by SHAQUIL[facility]ACKSON    Specimen sent for Special Studies:         Flow Cytometry: left aspirate        Cytogenetics: left aspirate        Molecular Diagnostics: left aspirate        Other: left aspirate- ClonoSEQ    Biopsy aspiration site: left posterior iliac crest                                                      (Reference Range)          Amount of aspirate           2.5   mL        Fat and P.V. cell layer        2    %               (1 - 3)        Particles                             0   %        Myeloid-erythroid layer    2    %               (5 - 8)          Clot Section: yes    Trephine biopsy site: left posterior iliac crest    Designated left posterior iliac crest 1 cylinder of gritty tissue, labeled with the patient's name and hospital number, obtained with 11 gauge needle and a length of 14 mm; entirely submitted in 1 cassette; acetic zinc formalin fixed, decalcified, processed, and stained for  hematoxylin and eosin per laboratory protocol.        MCRS Yes (A)    Performing Labs      The technical component of this testing was completed at [facility].     Stain controls for all stains resulted within this report have been reviewed and show appropriate reactivity.        PET Scan       Results for orders placed during the hospital encounter of 01/25/24    PET ONCOLOGY WHOLE BODY    Status: Normal 1/25/2024    Narrative  Combined Report of: PET and CT on  1/25/2024 9:59 AM:    1. PET of the neck, chest, abdomen, and pelvis.  2. PET CT Fusion for Attenuation Correction and Anatomical  Localization:  3. 3D MIP and PET-CT fused images were processed on an independent  workstation and archived to PACS and reviewed by a radiologist.    Technique:    1. PET: The patient received 10.54 mCi of F-18-FDG; the serum glucose  was 103 mg/dL prior to administration, body weight was 80 kg. Images  were evaluated in the axial, sagittal, and coronal planes as well as  the rotational whole body MIP. Images were acquired from the Vertex to  the Feet.    UPTAKE WAS MEASURED AT 62 MINUTES.    BACKGROUND:  Liver SUV max= 2.8,   Aorta Blood SUV max= 2.5.    2. CT: CT only obtained for attenuation correction and not diagnostic  purposes.    INDICATION: Biopsy-proven AL amyloidosis of kidney, workup for plasma  cell disorder; AL amyloidosis (H)    ADDITIONAL INFORMATION OBTAINED FROM EMR: 53-year-old female with  biopsy-proven renal amyloidosis (AL amyloidosis) and cardiac  involvement by MRI, PET CT requested for plasma cell disorder workup.    COMPARISON: None.    FOLLOW-UP APPOINTMENT: Unknown    FINDINGS:    HEAD/NECK:  Mildly hypermetabolic mildly enlarged bilateral level 2A nodes for  example a 1.5 x 1.2 cm right level IIA node, SUV max 4.2, series 3  image 71 and a 1.1 x 0.7 cm left level 2 node, SUV max 3.7, series 3  image 71.    Moderate diffuse  thyroid uptake, SUV max 6.0, series 3 image 97.    CHEST:  Left lower lobe series 4 image 159-163 there is nodularity along the  left lower lobe anterior segment bronchus, largest nodule measuring 8  mm there is focal hypermetabolism in this area max SUV 4.5.  Differential amyloid, infection, atelectasis.    There is mild uptake throughout the left ventricle which could  represent active amyloidosis however in the absence of a cardiac  preparation may simply be physiologic.  Calcified left hilar nodes and left lower lobe calcified granuloma. No  pneumothorax or pleural effusion.    ABDOMEN AND PELVIS:  No abnormal uptake.    No calcified gallstones. Calcified splenic granulomas. No adrenal  nodule. Left inferior pole nonobstructing intrarenal calculus, no  hydronephrosis. No small or large bowel obstruction. Intrauterine  device in place. No free air or free fluid in the abdomen or pelvis.    LOWER EXTREMITIES:  A single area of focal moderate to markedly hypermetabolic region in  the proximal posterior left gastrocnemius muscle, SUV max 8.6 series 3  image 416.    BONES AND SOFT TISSUES:  No abnormal uptake.  Old anterior rib fracture deformities.    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 No definite active amyloid on FDG pet.  2. Single focal moderate to markedly avid intramuscular uptake,  proximal left gastrocnemius, no etiology demonstrated on unenhanced  non diagnostic CT.  Recommend dedicated MRI.    3. Small to mildly enlarged mildly hypermetabolic bilateral level 2A  lymph nodes, most likely reactive.    4. A few equivocal findings - unlikely to be active amyloid (patient  has been under treatment, so FDG may be decreased).  Recommend future  PET scans be done with a sarcoid cardiac prep and and Lasix protocol  to decrease background and make them more sensitive for cardiac and  renal activity.    4a. Mildly hypermetabolic  left lower lobe anterior segment  peribronchial nodularity and hypermetabolism. Differential favors RSV  (positive 1/18/24), focal amyloid unlikely.  4b. Cardiac (MRI positive) - equivocal FDG throughout left ventricle,  likely physiologic. Given findings on MRI 1/16/2024, can not rule out  active amyloidosis. However in the absence of a cardiac dietary prep  (which switches myocardium from glucose to fatty metabolism), this is  likely normal physiology.  Suggest future PET scans be done with a  cardiac sarcoid dietary protocol (3 day ketogenic diet).  4c. Kidney cortex (bx. 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        I have personally reviewed the examination and initial interpretation  and I agree with the findings.    PETE VELASQUEZ MD      SYSTEM ID:  [id number]     The longitudinal plan of care for the diagnosis(es)/condition(s) as documented were addressed during this visit. Due to the added complexity in care, I will continue to support Vivian in the subsequent management and with ongoing continuity of care.    ------------------------------------------------------------------------------------------------------------------------------------------------    Patient Care Team         Relationship Specialty Notifications Start End    Alin Torres PA-C PCP - General Family Medicine  11/4/20     Phone: [phone] Fax: [phone] [address] [employer] [facility] [address]    Sarthak Ontiveros MD Assigned OBGYN Provider   9/24/22     Phone: [phone] Fax: [phone]         [address] [address]    Alin Torres PA-C Assigned PCP   12/31/22     Phone: [phone] Fax: [phone]         [address] [place] [address]    Carmen Waggoner APRN CNP Nurse Practitioner Gastroenterology   11/21/23     Phone: [phone] Fax: [phone]         [address] [place] [address]    Audrey Sen, DO Assigned Nephrology Provider   12/9/23     Phone: [phone] Fax: [phone]         [address] [facility] [address] [facility] [address]    Musa Adrian MD Physician Hematology & Oncology  12/26/23     Phone: [phone] Fax: [phone]         [address] [facility] [address] [facility] [address]    Jomar Chandler MD MD Hematology & Oncology  12/26/23     Phone: [phone] Fax: [phone]         [address] [place] [address]    Gaston Flores MD MD Cardiovascular Disease  1/5/24     Phone: [phone] Fax: [phone] [address] [facility] [address]              Jomar Chandler MD

## 2024-07-19 NOTE — NURSING NOTE
"Oncology Rooming Note    July 19, 2024 9:56 AM   Vivian Brown is a 54 year old female who presents for:    Chief Complaint   Patient presents with    Blood Draw     Labs drawn via  by RN in lab. VS taken.     Oncology Clinic Visit     AL amyloidosis      Initial Vitals: /79 (BP Location: Right arm, Patient Position: Sitting, Cuff Size: Adult Regular)   Pulse 103   Temp 98.3  F (36.8  C) (Oral)   Resp 16   Wt 61.3 kg (135 lb 1.6 oz)   SpO2 99%   BMI 23.06 kg/m   Estimated body mass index is 23.06 kg/m  as calculated from the following:    Height as of 6/11/24: 1.63 m (5' 4.17\").    Weight as of this encounter: 61.3 kg (135 lb 1.6 oz). Body surface area is 1.67 meters squared.  No Pain (0) Comment: Data Unavailable   No LMP recorded. (Menstrual status: IUD).  Allergies reviewed: Yes  Medications reviewed: Yes    Medications: Medication refills not needed today.  Pharmacy name entered into [facility]:    [facility] - [place], [place] - [address] [facility] [place] - [address] [facility] [id number] - [place] - [address] [id number] - [place], [place] - [address]  [facility] - [place], [place] - [address]  [facility] - [place], [place] - [address]  [facility] - [place], [place] - [phone]    Frailty Screening:   Is the patient here for a new oncology consult visit in cancer care? 2. No      Clinical concerns: no other complaints      Nestor Mares"

## 2024-07-21 NOTE — PROGRESS NOTES
BMT Progress Note    Disease presentation and baseline characteristics:   12/21/2023:  Final Diagnosis   A. kidney biopsy (needle):     Amyloidosis of the kidney, AL-type, lambda light chain-restricted, affecting the glomeruli, interstitium, and arterioles (see comment)     Mild chronic changes of the parenchyma, including:   - focal global glomerulosclerosis (3% of glomeruli)   - focal tubular atrophy and interstitial fibrosis (5% of the cortex)   - severe arterial sclerosis   Electronically signed by Joe Garcia MD on 12/23/2023 at  3:21 PM   Comment  [facility]   The biopsy reveals findings diagnostic of amyloidosis; the amyloid shows lambda light chain-restriction (AL amyloidosis), and the deposits are Congo red positive. The amyloid deposits affect the glomeruli extensively, and interstitium and arterioles focally. Other potential complications of paraproteins in the kidney are not seen, in particular, there is no evidence of light chain cast nephropathy, light chain deposition disease, or light chain tubulopathy.     1/8/24: NT-Pro , Troponin T 15    Date Treatment Name Response Side Effects / Toxicities   1/19/24 D-CyBorD x 4 cycles     6/12/24 HDT/ASCT VGPR           HPI:  Please see my entry above for hematologic history.  Mrs. Brown presents today accompanied by her mother for follow up.  She continues to feel fatigued.  She notes she is not drinking enough and her mood is down.  She has been decreasing her anti-emetic regimen and has not noticed an increase in nausea.  No further loose stools.  She does get dizzy still when standing rapidly but has been moving more slowly to account for this.      ASSESSMENT AND PLAN:  Ms. Brown continues to slowly recover after transplant.  A prolonged recovery can often be seen after transplant for amyloid and we discussed this today.  Typically physical recovery continues until 2-3 months after transplant.  I noted that if her mood remains poor at that time we  could consider further interventions, but she agrees with holding off until her post-transplant physical effects have fully resolved.    CHRIS on CKD is likely secondary to dehydration.  Encouraged her to continue pushing fluids.    She has decreased zyprex to once daily at bedtime.  She continues to wean her other antiemetics and has not experienced worsening nausea with this.    Continue to hold spironolactone, statin.    RTC with me in two week, re-evaluate diuretic and antihypertensive regimen.    Continue acyclovir, bactrim for one year post-transplant.    I spent 30 minutes in the care of this patient today, which included time necessary for preparation for the visit, obtaining history, ordering medications/tests/procedures as medically indicated, review of pertinent medical literature, counseling of the patient, communication of recommendations to the care team, and documentation time.    Jomar Chandler MD    ROS:    10 point ROS neg other than the symptoms noted above in the HPI.      Current Outpatient Medications   Medication Sig Dispense Refill    acyclovir (ZOVIRAX) 400 MG tablet Take 2 tablets (800 mg) by mouth 2 times daily 120 tablet 11    Albuterol-Budesonide (AIRSUPRA) 90-80 MCG/ACT AERO Inhale 2 Inhalations into the lungs every 4 hours as needed (Wheezing, chest tightness, shortness of breath, or persistent cough) 10.7 g 3    azelastine (ASTELIN) 0.1 % nasal spray Spray 2 sprays into both nostrils 2 times daily as needed for rhinitis 30 mL 3    bumetanide (BUMEX) 1 MG tablet Take 1 tablet (1 mg) by mouth 2 times daily 60 tablet 6    EPINEPHrine (ANY BX GENERIC EQUIV) 0.3 MG/0.3ML injection 2-pack Inject 0.3 mLs (0.3 mg) into the muscle as needed for anaphylaxis May repeat one time in 5-15 minutes if response to initial dose is inadequate. (Patient not taking: Reported on 7/11/2024) 2 each 3    fluticasone (FLONASE) 50 MCG/ACT nasal spray Spray 2 sprays into both nostrils daily 16 g 3     levothyroxine (SYNTHROID) 200 MCG tablet Take 200 mcg by mouth daily Pt takes Synthroid brand name. FRANKI      liothyronine (CYTOMEL) 5 MCG tablet Take 5 mcg by mouth 2 times daily      loperamide (IMODIUM A-D) 2 MG tablet Take 1 tablet (2 mg) by mouth 4 times daily as needed for diarrhea 30 tablet 1    melatonin 3 MG tablet Take 1 tablet (3 mg) by mouth nightly as needed for sleep 30 tablet 1    midodrine (PROAMATINE) 10 MG tablet Take 1 tablet (10 mg) by mouth 3 times daily 270 tablet 1    OLANZapine (ZYPREXA) 2.5 MG tablet Take 1 tablet (2.5 mg) by mouth at bedtime 30 tablet 0    ondansetron (ZOFRAN) 8 MG tablet Take 1 tablet (8 mg) by mouth every 8 hours 30 tablet 1    ondansetron (ZOFRAN) 8 MG tablet Take 8 mg by mouth every 8 hours as needed for nausea      pantoprazole (PROTONIX) 40 MG EC tablet Take 1 tablet (40 mg) by mouth every morning (before breakfast) 30 tablet 1    prochlorperazine (COMPAZINE) 10 MG tablet Take 0.5 tablets (5 mg) by mouth every 6 hours as needed for nausea or vomiting 30 tablet 1    psyllium (METAMUCIL/KONSYL) Packet Take 1 packet by mouth daily 30 packet 1    sulfamethoxazole-trimethoprim (BACTRIM DS) 800-160 MG tablet Take 1 tablet by mouth Every Mon, Tues two times daily 16 tablet 11    vitamin D3 (CHOLECALCIFEROL) 50 mcg (2000 units) tablet Take 1 tablet (50 mcg) by mouth daily 90 tablet 6         Physical Exam:     /79 (BP Location: Right arm, Patient Position: Sitting, Cuff Size: Adult Regular)   Pulse 103   Temp 98.3  F (36.8  C) (Oral)   Resp 16   Wt 61.3 kg (135 lb 1.6 oz)   SpO2 99%   BMI 23.06 kg/m      KPS:  80  General Appearance: alert, and no distress  Eyes: PERRL, conjunctiva and lids normal, sclera nonicteric  Ears/Nose/M/Throat: Oral mucosa and posterior oropharynx normal, moist mucous membranes  Cardio/Vascular:regular rate and rhythm, normal S1 and S2, no murmur  Resp Effort And Auscultation: Normal - Clear to auscultation without rales, rhonchi, or  wheezing.  Edema: none  Skin: Skin color, texture, turgor normal. No rashes or lesions.  Neurologic: Gait normal.  Sensation grossly WNL.  Psych/Affect: Mood and affect are appropriate.    Blood Counts     Recent Labs   Lab Test 07/19/24  0948 07/12/24  1530 07/11/24  1101 06/18/24  0327 06/17/24  0338 06/16/24  0318 06/15/24  0429   HGB 10.0* 9.3* 8.9*   < > 10.6* 9.6* 9.9*   HCT 30.0* 27.1* 26.5*   < > 31.3* 28.2* 28.7*   WBC 6.2 4.4 4.5   < > 0.5* 1.5* 2.4*   ANEUTAUTO  --   --   --   --  0.3* 1.4* 2.3   ALYMPAUTO  --   --   --   --  0.1* 0.1* 0.1*   AMONOAUTO  --   --   --   --  0.0 0.0 0.0   AEOSAUTO  --   --   --   --  0.0 0.0 0.0   ABSBASO  --   --   --   --  0.0 0.0 0.0   NRBCMAN 0.0 0.0 0.0   < > 0.0 0.0 0.0   * 55* 52*   < > 124* 173 208    < > = values in this interval not displayed.       Chemistries     Basic Panel  Recent Labs   Lab Test 07/19/24  0948 07/12/24  1530 07/11/24  1101    132* 133*   POTASSIUM 4.1 3.8 3.9   CHLORIDE 101 99 101   CO2 26 26 26   BUN 19.6 13.7 11.4   CR 2.03* 1.63* 1.57*   * 111* 105*        Calcium, Magnesium, Phosphorus  Recent Labs   Lab Test 07/19/24  0948 07/12/24  1530 07/11/24  1101 07/09/24  1011 07/09/24  1001 07/02/24  0949 07/01/24  0355   TANNER 9.3 8.0* 7.9* 7.6* 7.6*   < > 7.3*   MAG  --   --  1.5*  --  1.6*  --  2.1   PHOS  --   --   --  3.7 3.7  --  2.5    < > = values in this interval not displayed.        LFTs  Recent Labs   Lab Test 07/19/24  0948 07/11/24  1101 07/09/24  1011 07/09/24  1001   BILITOTAL <0.2 <0.2  --  0.2   ALKPHOS 177* 171*  --  171*   AST 17 15  --  13   ALT 12 9  --  10   ALBUMIN 2.3* 2.0* 2.0* 2.0*       LDH  Recent Labs   Lab Test 07/09/24  1001 05/24/24  1112 01/18/24  1433   * 248 248       B2-Microglobulin  Recent Labs   Lab Test 05/21/24  1338 01/18/24  1433   [id number] 4.3* 5.8*         Immunoglobulins     Recent Labs   Lab Test 07/09/24  1001 05/21/24  1338 04/19/24  0956 03/15/24  0820 02/02/24  1437  01/18/24  1444   * 86* 101* 116* 204* 294*       Recent Labs   Lab Test 07/09/24  1001 05/21/24  1338 04/19/24  0956 03/15/24  0820 01/18/24  1444    35* 37* 36* 164       Recent Labs   Lab Test 07/09/24  1001 05/21/24  1338 04/19/24  0956 03/15/24  0820 01/18/24  1444    <10* 21* 33* 127         Monocloncal Protein Studies     M spike    Recent Labs   Lab Test 07/09/24  1001 05/24/24  1112 05/21/24  1338 04/19/24  0956 03/15/24  0820 01/08/24  1201   ELPM 0.0 0.0 0.0 0.1* 0.1* 0.0       Kappa FLC    Recent Labs   Lab Test 07/09/24  1011 07/09/24  1001 05/24/24  1108 05/21/24  1338 04/19/24  0956 03/15/24  0820   KFLCA 6.05* 5.79* 1.12 1.07 1.13 1.33       Lambda FLC    Recent Labs   Lab Test 07/09/24  1011 07/09/24  1001 05/24/24  1108 05/21/24  1338 04/19/24  0956 03/15/24  0820   LFLCA 7.10* 7.14* 1.74 1.49 1.71 1.96       FLC Ratio    Recent Labs   Lab Test 07/09/24  1011 07/09/24  1001 05/24/24  1108 05/21/24  1338 04/19/24  0956 03/15/24  0820   KLRA 0.85 0.81 0.64 0.72 0.66 0.68         Bone Marrow Biopsy       Morphology    Results for orders placed or performed in visit on 05/24/24 (from the past 8760 hour(s))   Bone marrow biopsy   Result Value    Final Diagnosis      Bone marrow, posterior iliac crest, left decalcified trephine biopsy and touch imprint; left aspirate clot, direct aspirate smear, concentrate aspirate smear, and peripheral blood smear:               - Minute level involvement by recurrent/persistent plasma cell neoplasm  - Normocellular marrow (40-50% estimated cellularity), with , trilineage hematopoiesis, no overt dysplasia, and no increase in blasts (1%)  - Positive for amyloid deposisiton by congo erika   - Peripheral blood showing slight thrombocytosis and no circulating plasma cells  - See comment      Comment      Flow cytometry analysis on concurrent specimen ([id number]) showed rare lambda monotypic plasma cells (0.001%) and polytypic B cells. The flow findings  coupled with the positive congo red and lambda monotypic plasma cells by immunohistochemistry are consistent with a plasma cell neoplasm.     Concurrent ancillary studies are in progress and will be reported separately. Correlation with the results of ancillary tests and clinical findings is recommended.      Clinical Information      From Epic electronic medical record; 54 year old female with a history of AL amyloidosis, lambda light chain-restricted, with kidney (nephrotic syndrome), bone marrow, and cardiac involvement treated with chemotherapy. Her most recent bone marrow biopsy ([id number] on 1/18/2024), pre-therapy, showed lambda-monotypic plasma cells with monotypic B cells. She is undergoing evaluation prior to hematopoietic cell transplant.       Peripheral Hematologic Data      CBC WITH DIFFERENTIAL (05/24/2024 11:17 AM CDT):     RESULT VALUE REF. RANGE UNITS   WBC Count  Hemoglobin  Hematocrit   Platelet Count   RBC Count  MCV  MCH  MCHC   RDW  9.7 (NORMAL)    13.0 (NORMAL)     39.6 (NORMAL)   483  ( H )  4.31 (NORMAL)       92 (NORMAL)     30.2 (NORMAL)     32.8 (NORMAL)      16.8  ( H ) 4.0-11.0  11.7-15.7  35.0-47.0  150-450  3.80-5.20    26.5-33.0  31.5-36.5  10.0-15.0 10e3/uL  g/dL  %  10e3/uL  10e6/uL  fL  pg  g/dL  %   % Neutrophils  % Lymphocytes  % Monocytes  % Eosinophils  % Basophils  % Immature Granulocytes  Absolute Neutrophils  Absolute Lymphocytes  Absolute Monocytes  Absolute Eosinophils  Absolute Basophils  Absolute Immature Granulocytes  NRBCs per 100 WBC  Absolute NRBCs 71  15  10  2  2  0  6.9 (NORMAL)  1.4 (NORMAL)  1.0 (NORMAL)  0.2 (NORMAL)  0.2 (NORMAL)  0.0 (NORMAL)  0 (NORMAL)  0.0 () N/A  N/A  N/A  N/A  N/A  N/A  1.6-8.3  0.8-5.3  0.0-1.3  0.0-0.7  0.0-0.2  <=0.4  <1  <=0.0 %  %  %  %  %  %  10e3/uL  10e3/uL  10e3/uL  10e3/uL  10e3/uL  10e3/uL  /100  10e3/uL         Microscopic Description      PERIPHERAL BLOOD SMEAR MORPHOLOGY:  The red blood cells appear normochromic.   Poikilocytosis  includes numerous acanthocytes and echinocytes .  Polychromasia is not increased increased.  Rouleaux formation is not increased. The morphology of the platelets is normal. No circulating plasma cells.     Bone marrow aspirates and trephine core biopsy touch imprints are reviewed.    BONE MARROW DIFFERENTIAL (500 cells on direct aspirate smears)  Percent (%) Cell Population Reference Range (%)   1 Blasts  (0 - 1)   3 Neutrophil promyelocytes (2 - 4)   53 Neutrophils and precursors (54 - 63)   27 Erythroid precursors (18 - 24)   1 Monocytes (1 - 1.5)   1 Eosinophils (1 - 3)   1 Basophils (0 - 1)   13 Lymphocytes (8 - 12)   0 Plasma cells (0 - 1.5)     The aspirate smears are adequate for evaluation.    Neutrophil maturation is complete. Erythroid maturation is complete. Megakaryocytes exhibit a spectrum of sizes and morphologies, within the limits of normal .    CLOT SECTIONS:   The clot sections show fibrin and blood.    TREPHINE SECTIONS:  Hematoxylin and eosin stains are reviewed. The quality of the trephine core biopsy is good, with slight crush and fragmentation artifact. The marrow cellularity is estimated at 40-50%. The cellular composition reflects the aspirate smear differential. The number of megakaryocytes appears consistent with the degree of marrow cellularity, with unremarkable morphology. No aggregates of plasma cells are identified. There are few thickened blood vessels identified.    IMMUNOHISTOCHEMISTRY:  Immunohistochemical and special stains are performed on the paraffin-embedded trephine core with appropriate controls.     highlights scattered plasma cells.  Stains for cytoplasmic kappa and lambda immunoglobulin light chains show lambda-monotypic plasma cells.  Congo red is positive    Note: These immunohistochemical stains are deemed medically necessary. Some of the antigens may also be evaluated by flow cytometry. Concurrent evaluation by immunohistochemistry on clot and/or  trephine sections is indicated in this case in order to correlate immunophenotype with cell morphology and determine extent of involvement, spatial pattern, and focality of potential disease distribution.     Case was reviewed by the following:  Resident Pathologist: Loida Ott MD  A resident or fellow in a training program was involved in the initial review, preparation, and/or interpretation of this case.  I, as the senior physician, attest that I have personally reviewed all specimens and or slides, including the listed special stains, and used them with my medical judgement to determine the final diagnosis.              Gross Description      Procedure/Gross Description   Aspirate(s) and trephine(s) procured by SHAQUILLE JACKSON    Specimen sent for Special Studies:         Flow Cytometry: left aspirate        Cytogenetics: left aspirate        Molecular Diagnostics: left aspirate        Other: left aspirate- ClonoSEQ    Biopsy aspiration site: left posterior iliac crest                                                      (Reference Range)          Amount of aspirate           2.5   mL        Fat and P.V. cell layer        2    %               (1 - 3)        Particles                             0   %        Myeloid-erythroid layer    2    %               (5 - 8)          Clot Section: yes    Trephine biopsy site: left posterior iliac crest    Designated left posterior iliac crest 1 cylinder of gritty tissue, labeled with the patient's name and hospital number, obtained with 11 gauge needle and a length of 14 mm; entirely submitted in 1 cassette; acetic zinc formalin fixed, decalcified, processed, and stained for hematoxylin and eosin per laboratory protocol.        MCRS Yes (A)    Performing Labs      The technical component of this testing was completed at [facility].     Stain controls for all stains resulted within this report have been  reviewed and show appropriate reactivity.        PET Scan       Results for orders placed during the hospital encounter of 01/25/24    PET ONCOLOGY WHOLE BODY    Status: Normal 1/25/2024    Narrative  Combined Report of: PET and CT on  1/25/2024 9:59 AM:    1. PET of the neck, chest, abdomen, and pelvis.  2. PET CT Fusion for Attenuation Correction and Anatomical  Localization:  3. 3D MIP and PET-CT fused images were processed on an independent  workstation and archived to PACS and reviewed by a radiologist.    Technique:    1. PET: The patient received 10.54 mCi of F-18-FDG; the serum glucose  was 103 mg/dL prior to administration, body weight was 80 kg. Images  were evaluated in the axial, sagittal, and coronal planes as well as  the rotational whole body MIP. Images were acquired from the Vertex to  the Feet.    UPTAKE WAS MEASURED AT 62 MINUTES.    BACKGROUND:  Liver SUV max= 2.8,   Aorta Blood SUV max= 2.5.    2. CT: CT only obtained for attenuation correction and not diagnostic  purposes.    INDICATION: Biopsy-proven AL amyloidosis of kidney, workup for plasma  cell disorder; AL amyloidosis (H)    ADDITIONAL INFORMATION OBTAINED FROM EMR: 53-year-old female with  biopsy-proven renal amyloidosis (AL amyloidosis) and cardiac  involvement by MRI, PET CT requested for plasma cell disorder workup.    COMPARISON: None.    FOLLOW-UP APPOINTMENT: Unknown    FINDINGS:    HEAD/NECK:  Mildly hypermetabolic mildly enlarged bilateral level 2A nodes for  example a 1.5 x 1.2 cm right level IIA node, SUV max 4.2, series 3  image 71 and a 1.1 x 0.7 cm left level 2 node, SUV max 3.7, series 3  image 71.    Moderate diffuse thyroid uptake, SUV max 6.0, series 3 image 97.    CHEST:  Left lower lobe series 4 image [id number] there is nodularity along the  left lower lobe anterior segment bronchus, largest nodule measuring 8  mm there is focal hypermetabolism in this area max SUV 4.5.  Differential amyloid, infection,  atelectasis.    There is mild uptake throughout the left ventricle which could  represent active amyloidosis however in the absence of a cardiac  preparation may simply be physiologic.  Calcified left hilar nodes and left lower lobe calcified granuloma. No  pneumothorax or pleural effusion.    ABDOMEN AND PELVIS:  No abnormal uptake.    No calcified gallstones. Calcified splenic granulomas. No adrenal  nodule. Left inferior pole nonobstructing intrarenal calculus, no  hydronephrosis. No small or large bowel obstruction. Intrauterine  device in place. No free air or free fluid in the abdomen or pelvis.    LOWER EXTREMITIES:  A single area of focal moderate to markedly hypermetabolic region in  the proximal posterior left gastrocnemius muscle, SUV max 8.6 series 3  image 416.    BONES AND SOFT TISSUES:  No abnormal uptake.  Old anterior rib fracture deformities.    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 No definite active amyloid on FDG pet.  2. Single focal moderate to markedly avid intramuscular uptake,  proximal left gastrocnemius, no etiology demonstrated on unenhanced  non diagnostic CT.  Recommend dedicated MRI.    3. Small to mildly enlarged mildly hypermetabolic bilateral level 2A  lymph nodes, most likely reactive.    4. A few equivocal findings - unlikely to be active amyloid (patient  has been under treatment, so FDG may be decreased).  Recommend future  PET scans be done with a sarcoid cardiac prep and and Lasix protocol  to decrease background and make them more sensitive for cardiac and  renal activity.    4a. Mildly hypermetabolic left lower lobe anterior segment  peribronchial nodularity and hypermetabolism. Differential favors RSV  (positive 1/18/24), focal amyloid unlikely.  4b. Cardiac (MRI positive) - equivocal FDG throughout left ventricle,  likely physiologic. Given findings on MRI 1/16/2024, can not rule  out  active amyloidosis. However in the absence of a cardiac dietary prep  (which switches myocardium from glucose to fatty metabolism), this is  likely normal physiology.  Suggest future PET scans be done with a  cardiac sarcoid dietary protocol (3 day ketogenic diet).  4c. Kidney cortex (bx. 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        I have personally reviewed the examination and initial interpretation  and I agree with the findings.    PETE VELASQUEZ MD      SYSTEM ID:  [id number]     The longitudinal plan of care for the diagnosis(es)/condition(s) as documented were addressed during this visit. Due to the added complexity in care, I will continue to support Vivian in the subsequent management and with ongoing continuity of care.    ------------------------------------------------------------------------------------------------------------------------------------------------    Patient Care Team         Relationship Specialty Notifications Start End    Alin Torres PA-C PCP - General Family Medicine  11/4/20     Phone: [phone] Fax: [phone] [address] [facility] [address]    Sarthak Ontiveros MD Assigned OBGYN Provider   9/24/22     Phone: [phone] Fax: [phone]         [address] [address]    Alin Torres PA-C Assigned PCP   12/31/22     Phone: [phone] Fax: [phone]         [address] [facility] [address]    Carmen Waggoner APRN CNP Nurse Practitioner Gastroenterology  11/21/23     Phone: [phone] Fax: [phone] [address] [place] [address]    Audrey Sen,  Assigned Nephrology Provider   12/9/23     Phone: [phone] Fax: [phone]         [address] [place] [id number]    Musa Adrian MD Physician  Hematology & Oncology  12/26/23     Phone: [phone] Fax: [phone] [address] [facility], [facility] [address] [facility] [address]    Jomar Chandler MD MD Hematology & Oncology  12/26/23     Phone: [phone] Fax: [phone]         [address] [facility] [address] [facility] [address]    Gaston Flores MD MD Cardiovascular Disease  1/5/24     Phone: [phone] Fax: [phone] [address] [facility] [address]

## 2024-07-22 ENCOUNTER — MYC MEDICAL ADVICE (OUTPATIENT)
Dept: NEPHROLOGY | Facility: CLINIC | Age: 54
End: 2024-07-22
Payer: COMMERCIAL

## 2024-07-22 DIAGNOSIS — N18.31 CHRONIC KIDNEY DISEASE, STAGE 3A (H): ICD-10-CM

## 2024-07-22 RX ORDER — CHOLECALCIFEROL (VITAMIN D3) 50 MCG
1 TABLET ORAL DAILY
Qty: 90 TABLET | Refills: 6 | Status: SHIPPED | OUTPATIENT
Start: 2024-07-22 | End: 2024-07-24

## 2024-07-22 NOTE — TELEPHONE ENCOUNTER
7/22- Medication filled and sent to pts pharmacy.  London CACERES RN  Nephrology care coordinator  [facility] dwayne HEALY

## 2024-07-24 RX ORDER — CHOLECALCIFEROL (VITAMIN D3) 50 MCG
3 TABLET ORAL DAILY
Qty: 270 TABLET | Refills: 3 | Status: SHIPPED | OUTPATIENT
Start: 2024-07-24

## 2024-07-25 ENCOUNTER — PATIENT OUTREACH (OUTPATIENT)
Dept: NEPHROLOGY | Facility: CLINIC | Age: 54
End: 2024-07-25
Payer: COMMERCIAL

## 2024-07-25 ENCOUNTER — PRE VISIT (OUTPATIENT)
Dept: CARDIOLOGY | Facility: CLINIC | Age: 54
End: 2024-07-25
Payer: COMMERCIAL

## 2024-07-25 DIAGNOSIS — E85.81 AL AMYLOIDOSIS (H): Primary | ICD-10-CM

## 2024-07-25 NOTE — PROGRESS NOTES
Update from Dr. Dobson to follow up with patient on post clinic needs:    Starting Bumex 1 mg twice a day  My labs tomorrow including urine test  Monitor blood pressure and weight and let me know in 2 weeks  See you again in 3 months    Reached out to patient via [employer] to offer support.  Annabelle Leyva RN, BSN, PHN  [facility] Nephrology Nurse Navigator  [phone]

## 2024-07-29 NOTE — PROGRESS NOTES
Update from Dr. Dobson he has placed order for vitamin D to 2000 U 3 tabs daily.   Updated patient via [employer].  Annabelle Leyva RN, BSN, PHN  [facility] Nephrology Nurse Navigator  [phone]

## 2024-08-02 ENCOUNTER — LAB (OUTPATIENT)
Dept: LAB | Facility: CLINIC | Age: 54
End: 2024-08-02
Attending: INTERNAL MEDICINE
Payer: COMMERCIAL

## 2024-08-02 ENCOUNTER — OFFICE VISIT (OUTPATIENT)
Dept: CARDIOLOGY | Facility: CLINIC | Age: 54
End: 2024-08-02
Attending: INTERNAL MEDICINE
Payer: COMMERCIAL

## 2024-08-02 ENCOUNTER — TELEPHONE (OUTPATIENT)
Dept: CARDIOLOGY | Facility: CLINIC | Age: 54
End: 2024-08-02

## 2024-08-02 ENCOUNTER — ONCOLOGY VISIT (OUTPATIENT)
Dept: TRANSPLANT | Facility: CLINIC | Age: 54
End: 2024-08-02
Attending: STUDENT IN AN ORGANIZED HEALTH CARE EDUCATION/TRAINING PROGRAM
Payer: COMMERCIAL

## 2024-08-02 VITALS
BODY MASS INDEX: 22.72 KG/M2 | HEART RATE: 88 BPM | SYSTOLIC BLOOD PRESSURE: 109 MMHG | WEIGHT: 133.1 LBS | OXYGEN SATURATION: 99 % | DIASTOLIC BLOOD PRESSURE: 78 MMHG

## 2024-08-02 DIAGNOSIS — E85.81 AL AMYLOIDOSIS (H): ICD-10-CM

## 2024-08-02 DIAGNOSIS — Z94.81 STATUS POST BONE MARROW TRANSPLANT (H): ICD-10-CM

## 2024-08-02 DIAGNOSIS — N18.31 CHRONIC KIDNEY DISEASE, STAGE 3A (H): ICD-10-CM

## 2024-08-02 DIAGNOSIS — I95.9 HYPOTENSION, UNSPECIFIED HYPOTENSION TYPE: ICD-10-CM

## 2024-08-02 DIAGNOSIS — I43 AMYLOID HEART DISEASE (H): ICD-10-CM

## 2024-08-02 DIAGNOSIS — E85.4 AMYLOID HEART DISEASE (H): ICD-10-CM

## 2024-08-02 DIAGNOSIS — E85.81 AL AMYLOIDOSIS (H): Primary | ICD-10-CM

## 2024-08-02 LAB
ALBUMIN SERPL BCG-MCNC: 2.7 G/DL (ref 3.5–5.2)
ALP SERPL-CCNC: 122 U/L (ref 40–150)
ALT SERPL W P-5'-P-CCNC: 23 U/L (ref 0–50)
ANION GAP SERPL CALCULATED.3IONS-SCNC: 8 MMOL/L (ref 7–15)
ANION GAP SERPL CALCULATED.3IONS-SCNC: 8 MMOL/L (ref 7–15)
AST SERPL W P-5'-P-CCNC: 21 U/L (ref 0–45)
BASOPHILS # BLD AUTO: 0.1 10E3/UL (ref 0–0.2)
BASOPHILS NFR BLD AUTO: 1 %
BILIRUB SERPL-MCNC: 0.2 MG/DL
BUN SERPL-MCNC: 28.5 MG/DL (ref 6–20)
BUN SERPL-MCNC: 28.5 MG/DL (ref 6–20)
CALCIUM SERPL-MCNC: 9.4 MG/DL (ref 8.8–10.4)
CALCIUM SERPL-MCNC: 9.4 MG/DL (ref 8.8–10.4)
CHLORIDE SERPL-SCNC: 100 MMOL/L (ref 98–107)
CHLORIDE SERPL-SCNC: 100 MMOL/L (ref 98–107)
CREAT SERPL-MCNC: 2.07 MG/DL (ref 0.51–0.95)
CREAT SERPL-MCNC: 2.07 MG/DL (ref 0.51–0.95)
EGFRCR SERPLBLD CKD-EPI 2021: 28 ML/MIN/1.73M2
EGFRCR SERPLBLD CKD-EPI 2021: 28 ML/MIN/1.73M2
EOSINOPHIL # BLD AUTO: 0.2 10E3/UL (ref 0–0.7)
EOSINOPHIL NFR BLD AUTO: 2 %
ERYTHROCYTE [DISTWIDTH] IN BLOOD BY AUTOMATED COUNT: 18.8 % (ref 10–15)
GLUCOSE SERPL-MCNC: 103 MG/DL (ref 70–99)
GLUCOSE SERPL-MCNC: 103 MG/DL (ref 70–99)
HCO3 SERPL-SCNC: 27 MMOL/L (ref 22–29)
HCO3 SERPL-SCNC: 27 MMOL/L (ref 22–29)
HCT VFR BLD AUTO: 32.4 % (ref 35–47)
HGB BLD-MCNC: 10.8 G/DL (ref 11.7–15.7)
IMM GRANULOCYTES # BLD: 0 10E3/UL
IMM GRANULOCYTES NFR BLD: 0 %
LYMPHOCYTES # BLD AUTO: 1.6 10E3/UL (ref 0.8–5.3)
LYMPHOCYTES NFR BLD AUTO: 24 %
MCH RBC QN AUTO: 32.2 PG (ref 26.5–33)
MCHC RBC AUTO-ENTMCNC: 33.3 G/DL (ref 31.5–36.5)
MCV RBC AUTO: 97 FL (ref 78–100)
MONOCYTES # BLD AUTO: 0.9 10E3/UL (ref 0–1.3)
MONOCYTES NFR BLD AUTO: 13 %
NEUTROPHILS # BLD AUTO: 4 10E3/UL (ref 1.6–8.3)
NEUTROPHILS NFR BLD AUTO: 60 %
NRBC # BLD AUTO: 0 10E3/UL
NRBC BLD AUTO-RTO: 0 /100
NT-PROBNP SERPL-MCNC: 1576 PG/ML (ref 0–900)
PLATELET # BLD AUTO: 177 10E3/UL (ref 150–450)
POTASSIUM SERPL-SCNC: 3.5 MMOL/L (ref 3.4–5.3)
POTASSIUM SERPL-SCNC: 3.5 MMOL/L (ref 3.4–5.3)
PROT SERPL-MCNC: 5.4 G/DL (ref 6.4–8.3)
RBC # BLD AUTO: 3.35 10E6/UL (ref 3.8–5.2)
SODIUM SERPL-SCNC: 135 MMOL/L (ref 135–145)
SODIUM SERPL-SCNC: 135 MMOL/L (ref 135–145)
TROPONIN T SERPL HS-MCNC: 37 NG/L
WBC # BLD AUTO: 6.7 10E3/UL (ref 4–11)

## 2024-08-02 PROCEDURE — G2211 COMPLEX E/M VISIT ADD ON: HCPCS | Performed by: STUDENT IN AN ORGANIZED HEALTH CARE EDUCATION/TRAINING PROGRAM

## 2024-08-02 PROCEDURE — 99214 OFFICE O/P EST MOD 30 MIN: CPT | Performed by: STUDENT IN AN ORGANIZED HEALTH CARE EDUCATION/TRAINING PROGRAM

## 2024-08-02 PROCEDURE — 84484 ASSAY OF TROPONIN QUANT: CPT | Performed by: PATHOLOGY

## 2024-08-02 PROCEDURE — 99213 OFFICE O/P EST LOW 20 MIN: CPT | Performed by: INTERNAL MEDICINE

## 2024-08-02 PROCEDURE — 99214 OFFICE O/P EST MOD 30 MIN: CPT | Performed by: INTERNAL MEDICINE

## 2024-08-02 PROCEDURE — 36415 COLL VENOUS BLD VENIPUNCTURE: CPT | Performed by: PATHOLOGY

## 2024-08-02 PROCEDURE — 83880 ASSAY OF NATRIURETIC PEPTIDE: CPT | Performed by: PATHOLOGY

## 2024-08-02 PROCEDURE — 99213 OFFICE O/P EST LOW 20 MIN: CPT | Mod: 27 | Performed by: STUDENT IN AN ORGANIZED HEALTH CARE EDUCATION/TRAINING PROGRAM

## 2024-08-02 PROCEDURE — 85025 COMPLETE CBC W/AUTO DIFF WBC: CPT | Performed by: PATHOLOGY

## 2024-08-02 PROCEDURE — 80053 COMPREHEN METABOLIC PANEL: CPT | Performed by: PATHOLOGY

## 2024-08-02 PROCEDURE — G2211 COMPLEX E/M VISIT ADD ON: HCPCS | Performed by: INTERNAL MEDICINE

## 2024-08-02 RX ORDER — MIDODRINE HYDROCHLORIDE 10 MG/1
10 TABLET ORAL 3 TIMES DAILY
Qty: 270 TABLET | Refills: 3 | Status: SHIPPED | OUTPATIENT
Start: 2024-08-02

## 2024-08-02 ASSESSMENT — PAIN SCALES - GENERAL: PAINLEVEL: NO PAIN (0)

## 2024-08-02 NOTE — LETTER
8/2/2024      RE: Vivian Brown  [address] [facility] [address]       Dear Colleague,    Thank you for the opportunity to participate in the care of your patient, Vivian Brown, at the [facility] [facility]. Please see a copy of my visit note below.    Cardiology Clinic Note    HPI    Dear colleagues,     I had the pleasure of seeing Ms. Vivian Brown in the Cardiology clinic.  As you know, Ms. Vivian Brown is a pleasant 54 year old female with a past medical history of AL amyloidosis.  I first met her in January 2024 and she is here for follow-up.     She was first diagnosed after being found to have nephrotic syndrome.  She underwent kidney biopsy December 2023.  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 She then underwent a bone marrow biopsy on 1/18/2024 which also showed  lambda-monotypic plasma cells. 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  She had some issues with mucositis, nausea, diarrhea, also had issues with orthostatic hypotension requiring holding her Lasix and spironolactone.      She has been following in the BMT clinic and nephrology clinic since.  She is try to increase her oral intake.  She has very mild peripheral swelling and is managed with a 1 mg of Bumex twice a day.  She is about 50 days post transplant and feels that her strength is constantly improving.  She will still have occasional lightheadedness and dizziness and continues to take midodrine.    CURRENT MEDICATIONS:  Current Outpatient Medications   Medication Sig Dispense Refill     acyclovir (ZOVIRAX) 400 MG tablet Take 2 tablets (800 mg) by mouth 2 times daily 120 tablet 11     Albuterol-Budesonide (AIRSUPRA) 90-80 MCG/ACT AERO Inhale 2  Inhalations into the lungs every 4 hours as needed (Wheezing, chest tightness, shortness of breath, or persistent cough) 10.7 g 3     azelastine (ASTELIN) 0.1 % nasal spray Spray 2 sprays into both nostrils 2 times daily as needed for rhinitis 30 mL 3     bumetanide (BUMEX) 1 MG tablet Take 1 tablet (1 mg) by mouth 2 times daily 60 tablet 6     EPINEPHrine (ANY BX GENERIC EQUIV) 0.3 MG/0.3ML injection 2-pack Inject 0.3 mLs (0.3 mg) into the muscle as needed for anaphylaxis May repeat one time in 5-15 minutes if response to initial dose is inadequate. 2 each 3     fluticasone (FLONASE) 50 MCG/ACT nasal spray Spray 2 sprays into both nostrils daily 16 g 3     levothyroxine (SYNTHROID) 200 MCG tablet Take 200 mcg by mouth daily Pt takes Synthroid brand name. FRANKI       liothyronine (CYTOMEL) 5 MCG tablet Take 5 mcg by mouth 2 times daily       loperamide (IMODIUM A-D) 2 MG tablet Take 1 tablet (2 mg) by mouth 4 times daily as needed for diarrhea 30 tablet 1     midodrine (PROAMATINE) 10 MG tablet Take 1 tablet (10 mg) by mouth 3 times daily 270 tablet 1     OLANZapine (ZYPREXA) 2.5 MG tablet Take 1 tablet (2.5 mg) by mouth at bedtime 30 tablet 0     ondansetron (ZOFRAN) 8 MG tablet Take 1 tablet (8 mg) by mouth every 8 hours 30 tablet 1     ondansetron (ZOFRAN) 8 MG tablet Take 8 mg by mouth every 8 hours as needed for nausea       pantoprazole (PROTONIX) 40 MG EC tablet Take 1 tablet (40 mg) by mouth every morning (before breakfast) 30 tablet 1     prochlorperazine (COMPAZINE) 10 MG tablet Take 0.5 tablets (5 mg) by mouth every 6 hours as needed for nausea or vomiting 30 tablet 1     psyllium (METAMUCIL/KONSYL) Packet Take 1 packet by mouth daily 30 packet 1     sulfamethoxazole-trimethoprim (BACTRIM DS) 800-160 MG tablet Take 1 tablet by mouth Every Mon, Tues two times daily 16 tablet 11     vitamin D3 (CHOLECALCIFEROL) 50 mcg (2000 units) tablet Take 3 tablets (150 mcg) by mouth daily 270 tablet 3     melatonin 3 MG  tablet Take 1 tablet (3 mg) by mouth nightly as needed for sleep (Patient not taking: Reported on 8/2/2024) 30 tablet 1       EXAM:  /78 (BP Location: Right arm, Patient Position: Chair, Cuff Size: Adult Small)   Pulse 88   Wt 60.4 kg (133 lb 1.6 oz)   SpO2 99%   BMI 22.72 kg/m      General: appears comfortable, alert and articulate  Heart: regular, no murmur, estimated JVP 6  Extremities: Trace bilateral pedal edema    Weight  Wt Readings from Last 10 Encounters:   08/02/24 60.4 kg (133 lb 1.6 oz)   07/19/24 61.3 kg (135 lb 1.6 oz)   07/12/24 66.3 kg (146 lb 1.6 oz)   07/11/24 67.3 kg (148 lb 6.4 oz)   07/09/24 70.8 kg (156 lb)   07/08/24 71.2 kg (157 lb)   07/05/24 72 kg (158 lb 12.8 oz)   07/03/24 73.3 kg (161 lb 11.2 oz)   07/02/24 72.3 kg (159 lb 8 oz)   07/01/24 71.8 kg (158 lb 3.2 oz)       Testing/Procedures:  I personally visualized and interpreted:    CMR 1/16/2024  1. The LV is normal in cavity size with mild increase in wall thickness. The global systolic function is mildly reduced. The LVEF is 51%. There is mildly diffuse hypokinesis with apical sparing.   2. The RV is normal in cavity size. The global systolic function is normal. The RVEF is 54%.   3. Both atria are normal in size.  4. There is no significant valvular disease.   5. Late gadolinium enhancement imaging shows diffuse subendocardial hyperenhancement involving mainly the LV with extension into the RV and both atria to a lesser extend. These findings are compatible with cardiac amyloid.   6. Small circumferential pericardial effusion is present.   7. There is no intracardiac thrombus.  CONCLUSIONS:  Cardiac amyloidosis with diffuse subendocardial LGE, mildly reduced LV function (LVEF of 51%) and small pericardial effusion.      TTE with strain 1/3/2024  Interpretation Summary     There is mild concentric left ventricular hypertrophy.  The right ventricle is normal in size and function.  There is borderline right ventricular  "hypertrophy.  Global peak LV longitudinal strain is averaged at -15.3%. This suggests abnormal strain (normal <-18%).  The visual ejection fraction is estimated at 54-56%.  Trivial posterior pericardial effusion  Clinical history is listed as renal amyloid--on this echo the atria are normal size, the valves are not thickend but there is mild LVH and borderline RVH, there is questionable \"scintillation\" of LV myocardium, the global longitudinal strain is abnormal and the best strain values are at the apex  (borderline \"apical sparing\" strain pattern) and there is trace pericardial effusion along with borderline criteria for diastolic dysfunction grade2--take together this could represent some features of cardiac amyloid. Additional studies such as cardiac MRI, cardiac biopsy etc may be useful.    Echocardiogram 5/23/2024:  Interpretation Summary  Global and regional left ventricular function is normal with an EF of 55-60%.  Diastolic Doppler findings (E/E' ratio and/or other parameters) suggest left ventricular filling pressures are increased.  Global peak LV longitudinal strain is averaged at -11.3%. This suggests abnormal strain (normal <-18%).  Global right ventricular function is normal. There is mild right ventricular hypertrophy.  Normal IVC.  Small circumferential pericardial effusion is present without any hemodynamic significance.     This study was compared with the study from 1/3/24 .  No significant changes. Findings of increased biventricular wall thickness, abnormal diastology, GLS, and presence of pericardial effusion in the setting  of renal amyloidosis raise suspicion of cardiac amyloidosis.     Assessment and Plan:   Vivian Brown is a 54 year old female with a past medical history of AL amyloidosis.  I first met her in January 2024 and she is here for follow-up.  She started chemotherapy January 2024 and underwent bone marrow transplant June 2024.     AL cardiac amyloidosis, initially stage " I  Current medications include  midodrine 10mg TID, eventual will wean as she is further away from transplant.  Bumex 1mg BID  Her eGFR is < 30, which would preclude MRA right now  Currently not on SGLT2 inhibitor    Advised that she increase her oral protein intake given low albumin.  This would help reduce her diuretic needs and improve her orthostasis and renal perfusion.  She certainly has gastrointestinal involvement so oral intake will be difficult.    Follow-up in 6 months    The patient states understanding and is agreeable with plan.   Feel free to contact myself regarding questions or concerns.  It was a pleasure to see this patient today.    Suha Armstrong MD   of Medicine  Advanced Heart Failure and Transplant Cardiology    Today's clinic visit entailed:  35 minutes spent on the date of the encounter doing chart review, history and exam, documentation and further activities per the note    The level of medical decision making during this visit was of moderate complexity.     The longitudinal plan of care for heart failure and AL amyloid was addressed during this visit. Due to the added complexity in care, I will continue to support Vivian Brown in the subsequent management of this condition(s) and with the ongoing continuity of care of this condition(s)           Please do not hesitate to contact me if you have any questions/concerns.     Sincerely,     Suha Armstrong MD

## 2024-08-02 NOTE — NURSING NOTE
Chief Complaint   Patient presents with    Follow Up     Dr. Armstrong, Return Amyloid- 53 year old female referred by Dr. Chandler for amyloid management with labs prior.       Vitals were taken, medications reconciled.    Leena Driver, Clinic Assistant   3:02 PM

## 2024-08-02 NOTE — PROGRESS NOTES
Cardiology Clinic Note    HPI    Dear colleagues,     I had the pleasure of seeing Ms. Vivian Brown in the Cardiology clinic.  As you know, Ms. Vivian Brown is a pleasant 54 year old female with a past medical history of AL amyloidosis.  I first met her in January 2024 and she is here for follow-up.     She was first diagnosed after being found to have nephrotic syndrome.  She underwent kidney biopsy December 2023.  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 She then underwent a bone marrow biopsy on 1/18/2024 which also showed  lambda-monotypic plasma cells. 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  She had some issues with mucositis, nausea, diarrhea, also had issues with orthostatic hypotension requiring holding her Lasix and spironolactone.      She has been following in the BMT clinic and nephrology clinic since.  She is try to increase her oral intake.  She has very mild peripheral swelling and is managed with a 1 mg of Bumex twice a day.  She is about 50 days post transplant and feels that her strength is constantly improving.  She will still have occasional lightheadedness and dizziness and continues to take midodrine.    CURRENT MEDICATIONS:  Current Outpatient Medications   Medication Sig Dispense Refill    acyclovir (ZOVIRAX) 400 MG tablet Take 2 tablets (800 mg) by mouth 2 times daily 120 tablet 11    Albuterol-Budesonide (AIRSUPRA) 90-80 MCG/ACT AERO Inhale 2 Inhalations into the lungs every 4 hours as needed (Wheezing, chest tightness, shortness of breath, or persistent cough) 10.7 g 3    azelastine (ASTELIN) 0.1 % nasal spray Spray 2 sprays into both nostrils 2 times daily as needed for rhinitis 30 mL 3    bumetanide (BUMEX) 1 MG tablet Take 1 tablet (1 mg) by mouth 2 times daily 60 tablet 6    EPINEPHrine (ANY  BX GENERIC EQUIV) 0.3 MG/0.3ML injection 2-pack Inject 0.3 mLs (0.3 mg) into the muscle as needed for anaphylaxis May repeat one time in 5-15 minutes if response to initial dose is inadequate. 2 each 3    fluticasone (FLONASE) 50 MCG/ACT nasal spray Spray 2 sprays into both nostrils daily 16 g 3    levothyroxine (SYNTHROID) 200 MCG tablet Take 200 mcg by mouth daily Pt takes Synthroid brand name. FRANKI      liothyronine (CYTOMEL) 5 MCG tablet Take 5 mcg by mouth 2 times daily      loperamide (IMODIUM A-D) 2 MG tablet Take 1 tablet (2 mg) by mouth 4 times daily as needed for diarrhea 30 tablet 1    midodrine (PROAMATINE) 10 MG tablet Take 1 tablet (10 mg) by mouth 3 times daily 270 tablet 1    OLANZapine (ZYPREXA) 2.5 MG tablet Take 1 tablet (2.5 mg) by mouth at bedtime 30 tablet 0    ondansetron (ZOFRAN) 8 MG tablet Take 1 tablet (8 mg) by mouth every 8 hours 30 tablet 1    ondansetron (ZOFRAN) 8 MG tablet Take 8 mg by mouth every 8 hours as needed for nausea      pantoprazole (PROTONIX) 40 MG EC tablet Take 1 tablet (40 mg) by mouth every morning (before breakfast) 30 tablet 1    prochlorperazine (COMPAZINE) 10 MG tablet Take 0.5 tablets (5 mg) by mouth every 6 hours as needed for nausea or vomiting 30 tablet 1    psyllium (METAMUCIL/KONSYL) Packet Take 1 packet by mouth daily 30 packet 1    sulfamethoxazole-trimethoprim (BACTRIM DS) 800-160 MG tablet Take 1 tablet by mouth Every Mon, Tues two times daily 16 tablet 11    vitamin D3 (CHOLECALCIFEROL) 50 mcg (2000 units) tablet Take 3 tablets (150 mcg) by mouth daily 270 tablet 3    melatonin 3 MG tablet Take 1 tablet (3 mg) by mouth nightly as needed for sleep (Patient not taking: Reported on 8/2/2024) 30 tablet 1       EXAM:  /78 (BP Location: Right arm, Patient Position: Chair, Cuff Size: Adult Small)   Pulse 88   Wt 60.4 kg (133 lb 1.6 oz)   SpO2 99%   BMI 22.72 kg/m      General: appears comfortable, alert and articulate  Heart: regular, no murmur,  estimated JVP 6  Extremities: Trace bilateral pedal edema    Weight  Wt Readings from Last 10 Encounters:   08/02/24 60.4 kg (133 lb 1.6 oz)   07/19/24 61.3 kg (135 lb 1.6 oz)   07/12/24 66.3 kg (146 lb 1.6 oz)   07/11/24 67.3 kg (148 lb 6.4 oz)   07/09/24 70.8 kg (156 lb)   07/08/24 71.2 kg (157 lb)   07/05/24 72 kg (158 lb 12.8 oz)   07/03/24 73.3 kg (161 lb 11.2 oz)   07/02/24 72.3 kg (159 lb 8 oz)   07/01/24 71.8 kg (158 lb 3.2 oz)       Testing/Procedures:  I personally visualized and interpreted:    CMR 1/16/2024  1. The LV is normal in cavity size with mild increase in wall thickness. The global systolic function is mildly reduced. The LVEF is 51%. There is mildly diffuse hypokinesis with apical sparing.   2. The RV is normal in cavity size. The global systolic function is normal. The RVEF is 54%.   3. Both atria are normal in size.  4. There is no significant valvular disease.   5. Late gadolinium enhancement imaging shows diffuse subendocardial hyperenhancement involving mainly the LV with extension into the RV and both atria to a lesser extend. These findings are compatible with cardiac amyloid.   6. Small circumferential pericardial effusion is present.   7. There is no intracardiac thrombus.  CONCLUSIONS:  Cardiac amyloidosis with diffuse subendocardial LGE, mildly reduced LV function (LVEF of 51%) and small pericardial effusion.      TTE with strain 1/3/2024  Interpretation Summary     There is mild concentric left ventricular hypertrophy.  The right ventricle is normal in size and function.  There is borderline right ventricular hypertrophy.  Global peak LV longitudinal strain is averaged at -15.3%. This suggests abnormal strain (normal <-18%).  The visual ejection fraction is estimated at 54-56%.  Trivial posterior pericardial effusion  Clinical history is listed as renal amyloid--on this echo the atria are normal size, the valves are not thickend but there is mild LVH and borderline RVH, there is  "questionable \"scintillation\" of LV myocardium, the global longitudinal strain is abnormal and the best strain values are at the apex  (borderline \"apical sparing\" strain pattern) and there is trace pericardial effusion along with borderline criteria for diastolic dysfunction grade2--take together this could represent some features of cardiac amyloid. Additional studies such as cardiac MRI, cardiac biopsy etc may be useful.    Echocardiogram 5/23/2024:  Interpretation Summary  Global and regional left ventricular function is normal with an EF of 55-60%.  Diastolic Doppler findings (E/E' ratio and/or other parameters) suggest left ventricular filling pressures are increased.  Global peak LV longitudinal strain is averaged at -11.3%. This suggests abnormal strain (normal <-18%).  Global right ventricular function is normal. There is mild right ventricular hypertrophy.  Normal IVC.  Small circumferential pericardial effusion is present without any hemodynamic significance.     This study was compared with the study from 1/3/24 .  No significant changes. Findings of increased biventricular wall thickness, abnormal diastology, GLS, and presence of pericardial effusion in the setting  of renal amyloidosis raise suspicion of cardiac amyloidosis.     Assessment and Plan:   Vivian Brown is a 54 year old female with a past medical history of AL amyloidosis.  I first met her in January 2024 and she is here for follow-up.  She started chemotherapy January 2024 and underwent bone marrow transplant June 2024.     AL cardiac amyloidosis, initially stage I  Current medications include  midodrine 10mg TID, eventual will wean as she is further away from transplant.  Bumex 1mg BID  Her eGFR is < 30, which would preclude MRA right now  Currently not on SGLT2 inhibitor    Advised that she increase her oral protein intake given low albumin.  This would help reduce her diuretic needs and improve her orthostasis and renal perfusion.  " She certainly has gastrointestinal involvement so oral intake will be difficult.    Follow-up in 6 months    The patient states understanding and is agreeable with plan.   Feel free to contact myself regarding questions or concerns.  It was a pleasure to see this patient today.    Suha Armstrong MD   of Medicine  Advanced Heart Failure and Transplant Cardiology    Today's clinic visit entailed:  35 minutes spent on the date of the encounter doing chart review, history and exam, documentation and further activities per the note    The level of medical decision making during this visit was of moderate complexity.     The longitudinal plan of care for heart failure and AL amyloid was addressed during this visit. Due to the added complexity in care, I will continue to support Vivian Brown in the subsequent management of this condition(s) and with the ongoing continuity of care of this condition(s)

## 2024-08-02 NOTE — PATIENT INSTRUCTIONS
"Cardiology Providers you saw during your visit:  Dr. Armstrong    Medication changes:  -No changes     Follow up:  -Follow-up with Dr. Armstrong in 6 months with labs prior.     Please call if you have :  1. Weight gain of more than 2 pounds in a day or 5 pounds in a week  2. Increased shortness of breath, swelling or bloating  3. Dizziness, lightheadedness   4. Any questions or concerns.       Follow the American Heart Association Diet and Lifestyle recommendations:  Limit saturated fat, trans fat, sodium, red meat, sweets and sugar-sweetened beverages. If you choose to eat red meat, compare labels and select the leanest cuts available.  Aim for at least 150 minutes of moderate physical activity or 75 minutes of vigorous physical activity - or an equal combination of both - each week.      During business hours: [phone], press option # 1 to schedule or leave a message for your care team      After hours, weekends or holidays: On Call Cardiologist- [phone]   option #4 and ask to speak to the on-call Cardiologist. Inform them you are a CORE/heart failure patient at the [facility].      Please consider attending our virtual Heart Failure support group which is held monthly. Please reach out to Hernan at [phone] for more information if you are interested in attending.      dates:    Monday, , 1-2pm     Monday,  , 1-2pm     Monday,  , 1-2pm     Monday, , 1-2pm     Monday, May 6th, 1-2pm     Monday, , 1-2pm     Monday, , 1-2pm     Monday, , 1-2pm     Monday, , 1-2pm     Monday, , 1-2pm     Monday, , 1-2pm     Monday, , 1-2pm       Loida FRAZIER   Cardiology Care Coordinator - Heart Failure/ [facility]  [facility]   Questions and [phone]   First press #1 for the [employer] and then press #4 for \"To send a message to your care team\"    "

## 2024-08-02 NOTE — LETTER
8/2/2024      Vivian Brown  [address] [facility] [address]      Dear Colleague,    Thank you for referring your patient, Vivian Brown, to the [facility] [place]. Please see a copy of my visit note below.         Hematology/Oncology Progress Note    Disease presentation and baseline characteristics:   12/21/2023:  Final Diagnosis   A. kidney biopsy (needle):     Amyloidosis of the kidney, AL-type, lambda light chain-restricted, affecting the glomeruli, interstitium, and arterioles (see comment)     Mild chronic changes of the parenchyma, including:   - focal global glomerulosclerosis (3% of glomeruli)   - focal tubular atrophy and interstitial fibrosis (5% of the cortex)   - severe arterial sclerosis   Electronically signed by Joe Garcia MD on 12/23/2023 at  3:21 PM   Comment  UULAB   The biopsy reveals findings diagnostic of amyloidosis; the amyloid shows lambda light chain-restriction (AL amyloidosis), and the deposits are Congo red positive. The amyloid deposits affect the glomeruli extensively, and interstitium and arterioles focally. Other potential complications of paraproteins in the kidney are not seen, in particular, there is no evidence of light chain cast nephropathy, light chain deposition disease, or light chain tubulopathy.     1/8/24: NT-Pro , Troponin T 15    Date Treatment Name Response Side Effects / Toxicities   1/19/24 D-CyBorD x 4 cycles     6/12/24 HDT/ASCT VGPR           HPI:  Please see my entry above for hematologic history.  Mrs. Brown presents today accompanied by her mother for follow up.  She notes she is finally feeling better.  Still slightly fatigued but this is improving and she is now more active at home.  Nausea is now minimal.  Mood is improved as she is feeling better.      ASSESSMENT AND PLAN:  Stable CHRIS on CKD, likely due to poor PO intake recently.  Will undergo full restaging at D+100.    She has  decreased zyprex to once daily at bedtime.  She continues to wean her other antiemetics and has not experienced worsening nausea with this.    Continue to hold spironolactone, statin.    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    I spent 30 minutes in the care of this patient today, which included time necessary for preparation for the visit, obtaining history, ordering medications/tests/procedures as medically indicated, review of pertinent medical literature, counseling of the patient, communication of recommendations to the care team, and documentation time.    Jomar Chandler MD    ROS:    10 point ROS neg other than the symptoms noted above in the HPI.      Current Outpatient Medications   Medication Sig Dispense Refill     acyclovir (ZOVIRAX) 400 MG tablet Take 2 tablets (800 mg) by mouth 2 times daily 120 tablet 11     Albuterol-Budesonide (AIRSUPRA) 90-80 MCG/ACT AERO Inhale 2 Inhalations into the lungs every 4 hours as needed (Wheezing, chest tightness, shortness of breath, or persistent cough) 10.7 g 3     azelastine (ASTELIN) 0.1 % nasal spray Spray 2 sprays into both nostrils 2 times daily as needed for rhinitis 30 mL 3     bumetanide (BUMEX) 1 MG tablet Take 1 tablet (1 mg) by mouth 2 times daily 60 tablet 6     EPINEPHrine (ANY BX GENERIC EQUIV) 0.3 MG/0.3ML injection 2-pack Inject 0.3 mLs (0.3 mg) into the muscle as needed for anaphylaxis May repeat one time in 5-15 minutes if response to initial dose is inadequate. 2 each 3     fluticasone (FLONASE) 50 MCG/ACT nasal spray Spray 2 sprays into both nostrils daily 16 g 3     levothyroxine (SYNTHROID) 200 MCG tablet Take 200 mcg by mouth daily Pt takes Synthroid brand name. FRANKI       liothyronine (CYTOMEL) 5 MCG tablet Take 5 mcg by mouth 2 times daily       loperamide (IMODIUM A-D) 2 MG tablet Take 1 tablet (2 mg) by mouth 4  times daily as needed for diarrhea 30 tablet 1     melatonin 3 MG tablet Take 1 tablet (3 mg) by mouth nightly as needed for sleep (Patient not taking: Reported on 8/2/2024) 30 tablet 1     midodrine (PROAMATINE) 10 MG tablet Take 1 tablet (10 mg) by mouth 3 times daily 270 tablet 3     OLANZapine (ZYPREXA) 2.5 MG tablet Take 1 tablet (2.5 mg) by mouth at bedtime 30 tablet 0     ondansetron (ZOFRAN) 8 MG tablet Take 1 tablet (8 mg) by mouth every 8 hours 30 tablet 1     ondansetron (ZOFRAN) 8 MG tablet Take 8 mg by mouth every 8 hours as needed for nausea       pantoprazole (PROTONIX) 40 MG EC tablet Take 1 tablet (40 mg) by mouth every morning (before breakfast) 30 tablet 1     prochlorperazine (COMPAZINE) 10 MG tablet Take 0.5 tablets (5 mg) by mouth every 6 hours as needed for nausea or vomiting 30 tablet 1     psyllium (METAMUCIL/KONSYL) Packet Take 1 packet by mouth daily 30 packet 1     sulfamethoxazole-trimethoprim (BACTRIM DS) 800-160 MG tablet Take 1 tablet by mouth Every Mon, Tues two times daily 16 tablet 11     vitamin D3 (CHOLECALCIFEROL) 50 mcg (2000 units) tablet Take 3 tablets (150 mcg) by mouth daily 270 tablet 3         Physical Exam:   Vitals reviewed in flowsheets, unremarkable.    KPS:  80  General Appearance: alert, and no distress  Eyes: PERRL, conjunctiva and lids normal, sclera nonicteric  Ears/Nose/M/Throat: Oral mucosa and posterior oropharynx normal, moist mucous membranes  Cardio/Vascular:regular rate and rhythm, normal S1 and S2, no murmur  Resp Effort And Auscultation: Normal - Clear to auscultation without rales, rhonchi, or wheezing.  Edema: none  Skin: Skin color, texture, turgor normal. No rashes or lesions.  Neurologic: Gait normal.  Sensation grossly WNL.  Psych/Affect: Mood and affect are appropriate.    Blood Counts     Recent Labs   Lab Test 08/02/24  1448 07/19/24  0948 07/12/24  1530 06/18/24  0327 06/17/24  0338 06/16/24  0318   HGB 10.8* 10.0* 9.3*   < > 10.6* 9.6*   HCT  32.4* 30.0* 27.1*   < > 31.3* 28.2*   WBC 6.7 6.2 4.4   < > 0.5* 1.5*   ANEUTAUTO 4.0  --   --   --  0.3* 1.4*   ALYMPAUTO 1.6  --   --   --  0.1* 0.1*   AMONOAUTO 0.9  --   --   --  0.0 0.0   AEOSAUTO 0.2  --   --   --  0.0 0.0   ABSBASO 0.1  --   --   --  0.0 0.0   NRBCMAN 0.0 0.0 0.0   < > 0.0 0.0    122* 55*   < > 124* 173    < > = values in this interval not displayed.       Chemistries     Basic Panel  Recent Labs   Lab Test 08/02/24 1448 07/19/24  0948 07/12/24  1530     135 135 132*   POTASSIUM 3.5  3.5 4.1 3.8   CHLORIDE 100  100 101 99   CO2 27  27 26 26   BUN 28.5*  28.5* 19.6 13.7   CR 2.07*  2.07* 2.03* 1.63*   *  103* 126* 111*        Calcium, Magnesium, Phosphorus  Recent Labs   Lab Test 08/02/24 1448 07/19/24  0948 07/12/24  1530 07/11/24  1101 07/09/24  1011 07/09/24  1001 07/02/24  0949 07/01/24  0355   TANNER 9.4  9.4 9.3 8.0* 7.9* 7.6* 7.6*   < > 7.3*   MAG  --   --   --  1.5*  --  1.6*  --  2.1   PHOS  --   --   --   --  3.7 3.7  --  2.5    < > = values in this interval not displayed.        LFTs  Recent Labs   Lab Test 08/02/24 1448 07/19/24  0948 07/11/24  1101   BILITOTAL 0.2 <0.2 <0.2   ALKPHOS 122 177* 171*   AST 21 17 15   ALT 23 12 9   ALBUMIN 2.7* 2.3* 2.0*       LDH  Recent Labs   Lab Test 07/09/24  1001 05/24/24  1112 01/18/24  1433   * 248 248       B2-Microglobulin  Recent Labs   Lab Test 05/21/24  1338 01/18/24  1433   [id number] 4.3* 5.8*         Immunoglobulins     Recent Labs   Lab Test 07/09/24  1001 05/21/24  1338 04/19/24  0956 03/15/24  0820 02/02/24  1437 01/18/24  1444   * 86* 101* 116* 204* 294*       Recent Labs   Lab Test 07/09/24  1001 05/21/24  1338 04/19/24  0956 03/15/24  0820 01/18/24  1444    35* 37* 36* 164       Recent Labs   Lab Test 07/09/24  1001 05/21/24  1338 04/19/24  0956 03/15/24  0820 01/18/24  1444    <10* 21* 33* 127         Monocloncal Protein Studies     M spike    Recent Labs   Lab Test  07/09/24  1001 05/24/24  1112 05/21/24  1338 04/19/24  0956 03/15/24  0820 01/08/24  1201   ELPM 0.0 0.0 0.0 0.1* 0.1* 0.0       Kappa FLC    Recent Labs   Lab Test 07/09/24  1011 07/09/24  1001 05/24/24  1108 05/21/24  1338 04/19/24  0956 03/15/24  0820   KFLCA 6.05* 5.79* 1.12 1.07 1.13 1.33       Lambda FLC    Recent Labs   Lab Test 07/09/24  1011 07/09/24  1001 05/24/24  1108 05/21/24  1338 04/19/24  0956 03/15/24  0820   LFLCA 7.10* 7.14* 1.74 1.49 1.71 1.96       FLC Ratio    Recent Labs   Lab Test 07/09/24  1011 07/09/24  1001 05/24/24  1108 05/21/24  1338 04/19/24  0956 03/15/24  0820   KLRA 0.85 0.81 0.64 0.72 0.66 0.68         Bone Marrow Biopsy       Morphology    Results for orders placed or performed in visit on 05/24/24 (from the past 8760 hour(s))   Bone marrow biopsy   Result Value    Final Diagnosis      Bone marrow, posterior iliac crest, left decalcified trephine biopsy and touch imprint; left aspirate clot, direct aspirate smear, concentrate aspirate smear, and peripheral blood smear:               - Minute level involvement by recurrent/persistent plasma cell neoplasm  - Normocellular marrow (40-50% estimated cellularity), with , trilineage hematopoiesis, no overt dysplasia, and no increase in blasts (1%)  - Positive for amyloid deposisiton by congo erika   - Peripheral blood showing slight thrombocytosis and no circulating plasma cells  - See comment      Comment      Flow cytometry analysis on concurrent specimen ([id number]) showed rare lambda monotypic plasma cells (0.001%) and polytypic B cells. The flow findings coupled with the positive congo red and lambda monotypic plasma cells by immunohistochemistry are consistent with a plasma cell neoplasm.     Concurrent ancillary studies are in progress and will be reported separately. Correlation with the results of ancillary tests and clinical findings is recommended.      Clinical Information      From Epic electronic medical record; 54 year old  female with a history of AL amyloidosis, lambda light chain-restricted, with kidney (nephrotic syndrome), bone marrow, and cardiac involvement treated with chemotherapy. Her most recent bone marrow biopsy ([id number] on 1/18/2024), pre-therapy, showed lambda-monotypic plasma cells with monotypic B cells. She is undergoing evaluation prior to hematopoietic cell transplant.       Peripheral Hematologic Data      CBC WITH DIFFERENTIAL (05/24/2024 11:17 AM CDT):     RESULT VALUE REF. RANGE UNITS   WBC Count  Hemoglobin  Hematocrit   Platelet Count   RBC Count  MCV  MCH  MCHC   RDW  9.7 (NORMAL)    13.0 (NORMAL)     39.6 (NORMAL)   483  ( H )  4.31 (NORMAL)       92 (NORMAL)     30.2 (NORMAL)     32.8 (NORMAL)      16.8  ( H ) 4.0-11.0  11.7-15.7  35.0-47.0  150-450  3.80-5.20    26.5-33.0  31.5-36.5  10.0-15.0 10e3/uL  g/dL  %  10e3/uL  10e6/uL  fL  pg  g/dL  %   % Neutrophils  % Lymphocytes  % Monocytes  % Eosinophils  % Basophils  % Immature Granulocytes  Absolute Neutrophils  Absolute Lymphocytes  Absolute Monocytes  Absolute Eosinophils  Absolute Basophils  Absolute Immature Granulocytes  NRBCs per 100 WBC  Absolute NRBCs 71  15  10  2  2  0  6.9 (NORMAL)  1.4 (NORMAL)  1.0 (NORMAL)  0.2 (NORMAL)  0.2 (NORMAL)  0.0 (NORMAL)  0 (NORMAL)  0.0 () N/A  N/A  N/A  N/A  N/A  N/A  1.6-8.3  0.8-5.3  0.0-1.3  0.0-0.7  0.0-0.2  <=0.4  <1  <=0.0 %  %  %  %  %  %  10e3/uL  10e3/uL  10e3/uL  10e3/uL  10e3/uL  10e3/uL  /100  10e3/uL         Microscopic Description      PERIPHERAL BLOOD SMEAR MORPHOLOGY:  The red blood cells appear normochromic.  Poikilocytosis  includes numerous acanthocytes and echinocytes .  Polychromasia is not increased increased.  Rouleaux formation is not increased. The morphology of the platelets is normal. No circulating plasma cells.     Bone marrow aspirates and trephine core biopsy touch imprints are reviewed.    BONE MARROW DIFFERENTIAL (500 cells on direct aspirate smears)  Percent (%) Cell  Population Reference Range (%)   1 Blasts  (0 - 1)   3 Neutrophil promyelocytes (2 - 4)   53 Neutrophils and precursors (54 - 63)   27 Erythroid precursors (18 - 24)   1 Monocytes (1 - 1.5)   1 Eosinophils (1 - 3)   1 Basophils (0 - 1)   13 Lymphocytes (8 - 12)   0 Plasma cells (0 - 1.5)     The aspirate smears are adequate for evaluation.    Neutrophil maturation is complete. Erythroid maturation is complete. Megakaryocytes exhibit a spectrum of sizes and morphologies, within the limits of normal .    CLOT SECTIONS:   The clot sections show fibrin and blood.    TREPHINE SECTIONS:  Hematoxylin and eosin stains are reviewed. The quality of the trephine core biopsy is good, with slight crush and fragmentation artifact. The marrow cellularity is estimated at 40-50%. The cellular composition reflects the aspirate smear differential. The number of megakaryocytes appears consistent with the degree of marrow cellularity, with unremarkable morphology. No aggregates of plasma cells are identified. There are few thickened blood vessels identified.    IMMUNOHISTOCHEMISTRY:  Immunohistochemical and special stains are performed on the paraffin-embedded trephine core with appropriate controls.     highlights scattered plasma cells.  Stains for cytoplasmic kappa and lambda immunoglobulin light chains show lambda-monotypic plasma cells.  Congo red is positive    Note: These immunohistochemical stains are deemed medically necessary. Some of the antigens may also be evaluated by flow cytometry. Concurrent evaluation by immunohistochemistry on clot and/or trephine sections is indicated in this case in order to correlate immunophenotype with cell morphology and determine extent of involvement, spatial pattern, and focality of potential disease distribution.     Case was reviewed by the following:  Resident Pathologist: Loida Ott MD  A resident or fellow in a training program was involved in the initial review, preparation,  and/or interpretation of this case.  I, as the senior physician, attest that I have personally reviewed all specimens and or slides, including the listed special stains, and used them with my medical judgement to determine the final diagnosis.              Gross Description      Procedure/Gross Description   Aspirate(s) and trephine(s) procured by SHAQUILLE JACKSON    Specimen sent for Special Studies:         Flow Cytometry: left aspirate        Cytogenetics: left aspirate        Molecular Diagnostics: left aspirate        Other: left aspirate- ClonoSEQ    Biopsy aspiration site: left posterior iliac crest                                                      (Reference Range)          Amount of aspirate           2.5   mL        Fat and P.V. cell layer        2    %               (1 - 3)        Particles                             0   %        Myeloid-erythroid layer    2    %               (5 - 8)          Clot Section: yes    Trephine biopsy site: left posterior iliac crest    Designated left posterior iliac crest 1 cylinder of gritty tissue, labeled with the patient's name and hospital number, obtained with 11 gauge needle and a length of 14 mm; entirely submitted in 1 cassette; acetic zinc formalin fixed, decalcified, processed, and stained for hematoxylin and eosin per laboratory protocol.        MCRS Yes (A)    Performing Labs      The technical component of this testing was completed at [facility].     Stain controls for all stains resulted within this report have been reviewed and show appropriate reactivity.        PET Scan       Results for orders placed during the hospital encounter of 01/25/24    PET ONCOLOGY WHOLE BODY    Status: Normal 1/25/2024    Narrative  Combined Report of: PET and CT on  1/25/2024 9:59 AM:    1. PET of the neck, chest, abdomen, and pelvis.  2. PET CT Fusion for Attenuation Correction and  Anatomical  Localization:  3. 3D MIP and PET-CT fused images were processed on an independent  workstation and archived to PACS and reviewed by a radiologist.    Technique:    1. PET: The patient received 10.54 mCi of F-18-FDG; the serum glucose  was 103 mg/dL prior to administration, body weight was 80 kg. Images  were evaluated in the axial, sagittal, and coronal planes as well as  the rotational whole body MIP. Images were acquired from the Vertex to  the Feet.    UPTAKE WAS MEASURED AT 62 MINUTES.    BACKGROUND:  Liver SUV max= 2.8,   Aorta Blood SUV max= 2.5.    2. CT: CT only obtained for attenuation correction and not diagnostic  purposes.    INDICATION: Biopsy-proven AL amyloidosis of kidney, workup for plasma  cell disorder; AL amyloidosis (H)    ADDITIONAL INFORMATION OBTAINED FROM EMR: 53-year-old female with  biopsy-proven renal amyloidosis (AL amyloidosis) and cardiac  involvement by MRI, PET CT requested for plasma cell disorder workup.    COMPARISON: None.    FOLLOW-UP APPOINTMENT: Unknown    FINDINGS:    HEAD/NECK:  Mildly hypermetabolic mildly enlarged bilateral level 2A nodes for  example a 1.5 x 1.2 cm right level IIA node, SUV max 4.2, series 3  image 71 and a 1.1 x 0.7 cm left level 2 node, SUV max 3.7, series 3  image 71.    Moderate diffuse thyroid uptake, SUV max 6.0, series 3 image 97.    CHEST:  Left lower lobe series 4 image 159-163 there is nodularity along the  left lower lobe anterior segment bronchus, largest nodule measuring 8  mm there is focal hypermetabolism in this area max SUV 4.5.  Differential amyloid, infection, atelectasis.    There is mild uptake throughout the left ventricle which could  represent active amyloidosis however in the absence of a cardiac  preparation may simply be physiologic.  Calcified left hilar nodes and left lower lobe calcified granuloma. No  pneumothorax or pleural effusion.    ABDOMEN AND PELVIS:  No abnormal uptake.    No calcified gallstones.  Calcified splenic granulomas. No adrenal  nodule. Left inferior pole nonobstructing intrarenal calculus, no  hydronephrosis. No small or large bowel obstruction. Intrauterine  device in place. No free air or free fluid in the abdomen or pelvis.    LOWER EXTREMITIES:  A single area of focal moderate to markedly hypermetabolic region in  the proximal posterior left gastrocnemius muscle, SUV max 8.6 series 3  image 416.    BONES AND SOFT TISSUES:  No abnormal uptake.  Old anterior rib fracture deformities.    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 No definite active amyloid on FDG pet.  2. Single focal moderate to markedly avid intramuscular uptake,  proximal left gastrocnemius, no etiology demonstrated on unenhanced  non diagnostic CT.  Recommend dedicated MRI.    3. Small to mildly enlarged mildly hypermetabolic bilateral level 2A  lymph nodes, most likely reactive.    4. A few equivocal findings - unlikely to be active amyloid (patient  has been under treatment, so FDG may be decreased).  Recommend future  PET scans be done with a sarcoid cardiac prep and and Lasix protocol  to decrease background and make them more sensitive for cardiac and  renal activity.    4a. Mildly hypermetabolic left lower lobe anterior segment  peribronchial nodularity and hypermetabolism. Differential favors RSV  (positive 1/18/24), focal amyloid unlikely.  4b. Cardiac (MRI positive) - equivocal FDG throughout left ventricle,  likely physiologic. Given findings on MRI 1/16/2024, can not rule out  active amyloidosis. However in the absence of a cardiac dietary prep  (which switches myocardium from glucose to fatty metabolism), this is  likely normal physiology.  Suggest future PET scans be done with a  cardiac sarcoid dietary protocol (3 day ketogenic diet).  4c. Kidney cortex (bx. 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        I have personally reviewed the examination and initial interpretation  and I agree with the findings.    JOMAR VELASQUEZ MD      SYSTEM ID:  [id number]     The longitudinal plan of care for the diagnosis(es)/condition(s) as documented were addressed during this visit. Due to the added complexity in care, I will continue to support Vivian in the subsequent management and with ongoing continuity of care.    ------------------------------------------------------------------------------------------------------------------------------------------------    Patient Care Team         Relationship Specialty Notifications Start End    Alin Torres PA-C PCP - General Family Medicine  11/4/20     Phone: [phone] Fax: [phone] [address] [facility] [address]    Sarthak Ontiveros MD Assigned OBGYN Provider   9/24/22     Phone: [phone] Fax: [phone]         [address] [address]    Alin Torres PA-C Assigned PCP   12/31/22     Phone: [phone] Fax: [phone]         [address] [facility] [address]    Carmen Waggoner APRN CNP Nurse Practitioner Gastroenterology  11/21/23     Phone: [phone] Fax: [phone]         [address] [place] [address]    Audrey Sen DO Assigned Nephrology Provider   12/9/23     Phone: [phone] Fax: [phone]         [id number] [facility] [address]    Musa Adrian MD Physician Hematology & Oncology  12/26/23     Phone: [phone] Fax: [phone]         [address] [facility] [address] [facility] [address]    Jomar Chandler MD MD Hematology & Oncology  12/26/23     Phone: [phone] Fax: [phone]         [address] [address]    Gaston Flores MD MD Cardiovascular Disease  1/5/24     Phone: [phone] Fax:  [phone]         [address] [facility] [address]          Again, thank you for allowing me to participate in the care of your patient.        Sincerely,        Jomar Chandler MD

## 2024-08-02 NOTE — TELEPHONE ENCOUNTER
8/2/2024 1:02PM Madonna Schilling  Called patient and offered an earlier Return Amyloidosis appointment with Dr. Armstrong on this date 8/2/2024 & time 2:15PM with labs prior at 1:45PM at this location [facility], [address] [place] [address], 3rd Floor [address], [place] [address]    8/2 LVM to offer sooner Return Amyloidosis w/ Dr. Armstrong at 2:15PM w/ labs prior at 1:45PM. MJ      Please note there is no guarantee this appointment will be available as it is first come first serve.   Madonna Schilling 8/2/2024 1:02PM

## 2024-08-02 NOTE — NURSING NOTE
"Oncology Rooming Note    August 2, 2024 3:51 PM   Vivian Brown is a 54 year old female who presents for:    Chief Complaint   Patient presents with    Oncology Clinic Visit     Autologous donor of stem cells     Initial Vitals: There were no vitals taken for this visit. Estimated body mass index is 22.72 kg/m  as calculated from the following:    Height as of 6/11/24: 1.63 m (5' 4.17\").    Weight as of an earlier encounter on 8/2/24: 60.4 kg (133 lb 1.6 oz). There is no height or weight on file to calculate BSA.  Data Unavailable Comment: Data Unavailable   No LMP recorded. (Menstrual status: IUD).  Allergies reviewed: Yes  Medications reviewed: Yes    Medications: Medication refills not needed today.  Pharmacy name entered into EPIC:    [facility] [place] - [place], [place] - [address] [facility] [place], [place] - [address] [facility] [id number] - [place] - [address] [id number] - [place], [place] - [address]  [facility] - [place], [place] - [address]  [facility] - [place], [place] - [address]  [facility] - [place], [place] - [address]    Frailty Screening:   Is the patient here for a new oncology consult visit in cancer care? 2. No      Clinical concerns: pt wonders if zyprexa is used until gone, no taper?     Has questions about upcoming appts.     Has questions about how to contact Dr. Wander Manuel              "

## 2024-08-02 NOTE — NURSING NOTE
Return Appointment:   -Follow-up with Dr. Armstrong in 6 months with labs prior.   Patient given instructions regarding scheduling next clinic visit. Patient demonstrated understanding of this information and agreed to call with further questions or concerns.    Patient stated she understood all health information given and agreed to call with further questions or concerns.     Loida Germain RN

## 2024-08-07 NOTE — PROGRESS NOTES
Hematology/Oncology Progress Note    Disease presentation and baseline characteristics:   12/21/2023:  Final Diagnosis   A. kidney biopsy (needle):     Amyloidosis of the kidney, AL-type, lambda light chain-restricted, affecting the glomeruli, interstitium, and arterioles (see comment)     Mild chronic changes of the parenchyma, including:   - focal global glomerulosclerosis (3% of glomeruli)   - focal tubular atrophy and interstitial fibrosis (5% of the cortex)   - severe arterial sclerosis   Electronically signed by Joe Garcia MD on 12/23/2023 at  3:21 PM   Comment  UULAB   The biopsy reveals findings diagnostic of amyloidosis; the amyloid shows lambda light chain-restriction (AL amyloidosis), and the deposits are Congo red positive. The amyloid deposits affect the glomeruli extensively, and interstitium and arterioles focally. Other potential complications of paraproteins in the kidney are not seen, in particular, there is no evidence of light chain cast nephropathy, light chain deposition disease, or light chain tubulopathy.     1/8/24: NT-Pro , Troponin T 15    Date Treatment Name Response Side Effects / Toxicities   1/19/24 D-CyBorD x 4 cycles     6/12/24 HDT/ASCT VGPR           HPI:  Please see my entry above for hematologic history.  Mrs. Brown presents today accompanied by her mother for follow up.  She notes she is finally feeling better.  Still slightly fatigued but this is improving and she is now more active at home.  Nausea is now minimal.  Mood is improved as she is feeling better.      ASSESSMENT AND PLAN:  Stable CHRIS on CKD, likely due to poor PO intake recently.  Will undergo full restaging at D+100.    She has decreased zyprex to once daily at bedtime.  She continues to wean her other antiemetics and has not experienced worsening nausea with this.    Continue to hold spironolactone, statin.    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    I spent 30 minutes in the care of this patient today, which included time necessary for preparation for the visit, obtaining history, ordering medications/tests/procedures as medically indicated, review of pertinent medical literature, counseling of the patient, communication of recommendations to the care team, and documentation time.    Jomar Chandler MD    ROS:    10 point ROS neg other than the symptoms noted above in the HPI.      Current Outpatient Medications   Medication Sig Dispense Refill    acyclovir (ZOVIRAX) 400 MG tablet Take 2 tablets (800 mg) by mouth 2 times daily 120 tablet 11    Albuterol-Budesonide (AIRSUPRA) 90-80 MCG/ACT AERO Inhale 2 Inhalations into the lungs every 4 hours as needed (Wheezing, chest tightness, shortness of breath, or persistent cough) 10.7 g 3    azelastine (ASTELIN) 0.1 % nasal spray Spray 2 sprays into both nostrils 2 times daily as needed for rhinitis 30 mL 3    bumetanide (BUMEX) 1 MG tablet Take 1 tablet (1 mg) by mouth 2 times daily 60 tablet 6    EPINEPHrine (ANY BX GENERIC EQUIV) 0.3 MG/0.3ML injection 2-pack Inject 0.3 mLs (0.3 mg) into the muscle as needed for anaphylaxis May repeat one time in 5-15 minutes if response to initial dose is inadequate. 2 each 3    fluticasone (FLONASE) 50 MCG/ACT nasal spray Spray 2 sprays into both nostrils daily 16 g 3    levothyroxine (SYNTHROID) 200 MCG tablet Take 200 mcg by mouth daily Pt takes Synthroid brand name. FRANKI      liothyronine (CYTOMEL) 5 MCG tablet Take 5 mcg by mouth 2 times daily      loperamide (IMODIUM A-D) 2 MG tablet Take 1 tablet (2 mg) by mouth 4 times daily as needed for diarrhea 30 tablet 1    melatonin 3 MG tablet Take 1 tablet (3 mg) by mouth nightly as needed for sleep (Patient not taking: Reported on 8/2/2024) 30 tablet 1    midodrine (PROAMATINE) 10 MG tablet Take 1 tablet (10 mg) by mouth 3 times daily 270 tablet 3     OLANZapine (ZYPREXA) 2.5 MG tablet Take 1 tablet (2.5 mg) by mouth at bedtime 30 tablet 0    ondansetron (ZOFRAN) 8 MG tablet Take 1 tablet (8 mg) by mouth every 8 hours 30 tablet 1    ondansetron (ZOFRAN) 8 MG tablet Take 8 mg by mouth every 8 hours as needed for nausea      pantoprazole (PROTONIX) 40 MG EC tablet Take 1 tablet (40 mg) by mouth every morning (before breakfast) 30 tablet 1    prochlorperazine (COMPAZINE) 10 MG tablet Take 0.5 tablets (5 mg) by mouth every 6 hours as needed for nausea or vomiting 30 tablet 1    psyllium (METAMUCIL/KONSYL) Packet Take 1 packet by mouth daily 30 packet 1    sulfamethoxazole-trimethoprim (BACTRIM DS) 800-160 MG tablet Take 1 tablet by mouth Every Mon, Tues two times daily 16 tablet 11    vitamin D3 (CHOLECALCIFEROL) 50 mcg (2000 units) tablet Take 3 tablets (150 mcg) by mouth daily 270 tablet 3         Physical Exam:   Vitals reviewed in flowsheets, unremarkable.    KPS:  80  General Appearance: alert, and no distress  Eyes: PERRL, conjunctiva and lids normal, sclera nonicteric  Ears/Nose/M/Throat: Oral mucosa and posterior oropharynx normal, moist mucous membranes  Cardio/Vascular:regular rate and rhythm, normal S1 and S2, no murmur  Resp Effort And Auscultation: Normal - Clear to auscultation without rales, rhonchi, or wheezing.  Edema: none  Skin: Skin color, texture, turgor normal. No rashes or lesions.  Neurologic: Gait normal.  Sensation grossly WNL.  Psych/Affect: Mood and affect are appropriate.    Blood Counts     Recent Labs   Lab Test 08/02/24  1448 07/19/24  0948 07/12/24  1530 06/18/24  0327 06/17/24  0338 06/16/24  0318   HGB 10.8* 10.0* 9.3*   < > 10.6* 9.6*   HCT 32.4* 30.0* 27.1*   < > 31.3* 28.2*   WBC 6.7 6.2 4.4   < > 0.5* 1.5*   ANEUTAUTO 4.0  --   --   --  0.3* 1.4*   ALYMPAUTO 1.6  --   --   --  0.1* 0.1*   AMONOAUTO 0.9  --   --   --  0.0 0.0   AEOSAUTO 0.2  --   --   --  0.0 0.0   ABSBASO 0.1  --   --   --  0.0 0.0   NRBCMAN 0.0 0.0 0.0   < >  0.0 0.0    122* 55*   < > 124* 173    < > = values in this interval not displayed.       Chemistries     Basic Panel  Recent Labs   Lab Test 08/02/24  1448 07/19/24  0948 07/12/24  1530     135 135 132*   POTASSIUM 3.5  3.5 4.1 3.8   CHLORIDE 100  100 101 99   CO2 27  27 26 26   BUN 28.5*  28.5* 19.6 13.7   CR 2.07*  2.07* 2.03* 1.63*   *  103* 126* 111*        Calcium, Magnesium, Phosphorus  Recent Labs   Lab Test 08/02/24  1448 07/19/24  0948 07/12/24  1530 07/11/24  1101 07/09/24  1011 07/09/24  1001 07/02/24  0949 07/01/24  0355   TANNER 9.4  9.4 9.3 8.0* 7.9* 7.6* 7.6*   < > 7.3*   MAG  --   --   --  1.5*  --  1.6*  --  2.1   PHOS  --   --   --   --  3.7 3.7  --  2.5    < > = values in this interval not displayed.        LFTs  Recent Labs   Lab Test 08/02/24  1448 07/19/24  0948 07/11/24  1101   BILITOTAL 0.2 <0.2 <0.2   ALKPHOS 122 177* 171*   AST 21 17 15   ALT 23 12 9   ALBUMIN 2.7* 2.3* 2.0*       LDH  Recent Labs   Lab Test 07/09/24  1001 05/24/24  1112 01/18/24  1433   * 248 248       B2-Microglobulin  Recent Labs   Lab Test 05/21/24  1338 01/18/24  1433   [id number] 4.3* 5.8*         Immunoglobulins     Recent Labs   Lab Test 07/09/24  1001 05/21/24  1338 04/19/24  0956 03/15/24  0820 02/02/24  1437 01/18/24  1444   * 86* 101* 116* 204* 294*       Recent Labs   Lab Test 07/09/24  1001 05/21/24  1338 04/19/24  0956 03/15/24  0820 01/18/24  1444    35* 37* 36* 164       Recent Labs   Lab Test 07/09/24  1001 05/21/24  1338 04/19/24  0956 03/15/24  0820 01/18/24  1444    <10* 21* 33* 127         Monocloncal Protein Studies     M spike    Recent Labs   Lab Test 07/09/24  1001 05/24/24  1112 05/21/24  1338 04/19/24  0956 03/15/24  0820 01/08/24  1201   ELPM 0.0 0.0 0.0 0.1* 0.1* 0.0       Kappa FLC    Recent Labs   Lab Test 07/09/24  1011 07/09/24  1001 05/24/24  1108 05/21/24  1338 04/19/24  0956 03/15/24  0820   KFLCA 6.05* 5.79* 1.12 1.07 1.13 1.33        Lambda FLC    Recent Labs   Lab Test 07/09/24  1011 07/09/24  1001 05/24/24  1108 05/21/24  1338 04/19/24  0956 03/15/24  0820   LFLCA 7.10* 7.14* 1.74 1.49 1.71 1.96       FLC Ratio    Recent Labs   Lab Test 07/09/24  1011 07/09/24  1001 05/24/24  1108 05/21/24  1338 04/19/24  0956 03/15/24  0820   KLRA 0.85 0.81 0.64 0.72 0.66 0.68         Bone Marrow Biopsy       Morphology    Results for orders placed or performed in visit on 05/24/24 (from the past 8760 hour(s))   Bone marrow biopsy   Result Value    Final Diagnosis      Bone marrow, posterior iliac crest, left decalcified trephine biopsy and touch imprint; left aspirate clot, direct aspirate smear, concentrate aspirate smear, and peripheral blood smear:               - Minute level involvement by recurrent/persistent plasma cell neoplasm  - Normocellular marrow (40-50% estimated cellularity), with , trilineage hematopoiesis, no overt dysplasia, and no increase in blasts (1%)  - Positive for amyloid deposisiton by congo erika   - Peripheral blood showing slight thrombocytosis and no circulating plasma cells  - See comment      Comment      Flow cytometry analysis on concurrent specimen ([id number]) showed rare lambda monotypic plasma cells (0.001%) and polytypic B cells. The flow findings coupled with the positive congo red and lambda monotypic plasma cells by immunohistochemistry are consistent with a plasma cell neoplasm.     Concurrent ancillary studies are in progress and will be reported separately. Correlation with the results of ancillary tests and clinical findings is recommended.      Clinical Information      From Epic electronic medical record; 54 year old female with a history of AL amyloidosis, lambda light chain-restricted, with kidney (nephrotic syndrome), bone marrow, and cardiac involvement treated with chemotherapy. Her most recent bone marrow biopsy ([id number] on 1/18/2024), pre-therapy, showed lambda-monotypic plasma cells with  monotypic B cells. She is undergoing evaluation prior to hematopoietic cell transplant.       Peripheral Hematologic Data      CBC WITH DIFFERENTIAL (05/24/2024 11:17 AM CDT):     RESULT VALUE REF. RANGE UNITS   WBC Count  Hemoglobin  Hematocrit   Platelet Count   RBC Count  MCV  MCH  MCHC   RDW  9.7 (NORMAL)    13.0 (NORMAL)     39.6 (NORMAL)   483  ( H )  4.31 (NORMAL)       92 (NORMAL)     30.2 (NORMAL)     32.8 (NORMAL)      16.8  ( H ) 4.0-11.0  11.7-15.7  35.0-47.0  150-450  3.80-5.20    26.5-33.0  31.5-36.5  10.0-15.0 10e3/uL  g/dL  %  10e3/uL  10e6/uL  fL  pg  g/dL  %   % Neutrophils  % Lymphocytes  % Monocytes  % Eosinophils  % Basophils  % Immature Granulocytes  Absolute Neutrophils  Absolute Lymphocytes  Absolute Monocytes  Absolute Eosinophils  Absolute Basophils  Absolute Immature Granulocytes  NRBCs per 100 WBC  Absolute NRBCs 71  15  10  2  2  0  6.9 (NORMAL)  1.4 (NORMAL)  1.0 (NORMAL)  0.2 (NORMAL)  0.2 (NORMAL)  0.0 (NORMAL)  0 (NORMAL)  0.0 () N/A  N/A  N/A  N/A  N/A  N/A  1.6-8.3  0.8-5.3  0.0-1.3  0.0-0.7  0.0-0.2  <=0.4  <1  <=0.0 %  %  %  %  %  %  10e3/uL  10e3/uL  10e3/uL  10e3/uL  10e3/uL  10e3/uL  /100  10e3/uL         Microscopic Description      PERIPHERAL BLOOD SMEAR MORPHOLOGY:  The red blood cells appear normochromic.  Poikilocytosis  includes numerous acanthocytes and echinocytes .  Polychromasia is not increased increased.  Rouleaux formation is not increased. The morphology of the platelets is normal. No circulating plasma cells.     Bone marrow aspirates and trephine core biopsy touch imprints are reviewed.    BONE MARROW DIFFERENTIAL (500 cells on direct aspirate smears)  Percent (%) Cell Population Reference Range (%)   1 Blasts  (0 - 1)   3 Neutrophil promyelocytes (2 - 4)   53 Neutrophils and precursors (54 - 63)   27 Erythroid precursors (18 - 24)   1 Monocytes (1 - 1.5)   1 Eosinophils (1 - 3)   1 Basophils (0 - 1)   13 Lymphocytes (8 - 12)   0 Plasma cells (0 - 1.5)      The aspirate smears are adequate for evaluation.    Neutrophil maturation is complete. Erythroid maturation is complete. Megakaryocytes exhibit a spectrum of sizes and morphologies, within the limits of normal .    CLOT SECTIONS:   The clot sections show fibrin and blood.    TREPHINE SECTIONS:  Hematoxylin and eosin stains are reviewed. The quality of the trephine core biopsy is good, with slight crush and fragmentation artifact. The marrow cellularity is estimated at 40-50%. The cellular composition reflects the aspirate smear differential. The number of megakaryocytes appears consistent with the degree of marrow cellularity, with unremarkable morphology. No aggregates of plasma cells are identified. There are few thickened blood vessels identified.    IMMUNOHISTOCHEMISTRY:  Immunohistochemical and special stains are performed on the paraffin-embedded trephine core with appropriate controls.     highlights scattered plasma cells.  Stains for cytoplasmic kappa and lambda immunoglobulin light chains show lambda-monotypic plasma cells.  Congo red is positive    Note: These immunohistochemical stains are deemed medically necessary. Some of the antigens may also be evaluated by flow cytometry. Concurrent evaluation by immunohistochemistry on clot and/or trephine sections is indicated in this case in order to correlate immunophenotype with cell morphology and determine extent of involvement, spatial pattern, and focality of potential disease distribution.     Case was reviewed by the following:  Resident Pathologist: Loida Ott MD  A resident or fellow in a training program was involved in the initial review, preparation, and/or interpretation of this case.  I, as the senior physician, attest that I have personally reviewed all specimens and or slides, including the listed special stains, and used them with my medical judgement to determine the final diagnosis.              Gross Description       Procedure/Gross Description   Aspirate(s) and trephine(s) procured by SHAQUILLE JACKSON    Specimen sent for Special Studies:         Flow Cytometry: left aspirate        Cytogenetics: left aspirate        Molecular Diagnostics: left aspirate        Other: left aspirate- ClonoSEQ    Biopsy aspiration site: left posterior iliac crest                                                      (Reference Range)          Amount of aspirate           2.5   mL        Fat and P.V. cell layer        2    %               (1 - 3)        Particles                             0   %        Myeloid-erythroid layer    2    %               (5 - 8)          Clot Section: yes    Trephine biopsy site: left posterior iliac crest    Designated left posterior iliac crest 1 cylinder of gritty tissue, labeled with the patient's name and hospital number, obtained with 11 gauge needle and a length of 14 mm; entirely submitted in 1 cassette; acetic zinc formalin fixed, decalcified, processed, and stained for hematoxylin and eosin per laboratory protocol.        [facility] Yes (A)    Performing Labs      The technical component of this testing was completed at [facility].     Stain controls for all stains resulted within this report have been reviewed and show appropriate reactivity.        PET Scan       Results for orders placed during the hospital encounter of 01/25/24    PET ONCOLOGY WHOLE BODY    Status: Normal 1/25/2024    Narrative  Combined Report of: PET and CT on  1/25/2024 9:59 AM:    1. PET of the neck, chest, abdomen, and pelvis.  2. PET CT Fusion for Attenuation Correction and Anatomical  Localization:  3. 3D MIP and PET-CT fused images were processed on an independent  workstation and archived to PACS and reviewed by a radiologist.    Technique:    1. PET: The patient received 10.54 mCi of F-18-FDG; the serum glucose  was 103 mg/dL prior to administration, body weight was  80 kg. Images  were evaluated in the axial, sagittal, and coronal planes as well as  the rotational whole body MIP. Images were acquired from the Vertex to  the Feet.    UPTAKE WAS MEASURED AT 62 MINUTES.    BACKGROUND:  Liver SUV max= 2.8,   Aorta Blood SUV max= 2.5.    2. CT: CT only obtained for attenuation correction and not diagnostic  purposes.    INDICATION: Biopsy-proven AL amyloidosis of kidney, workup for plasma  cell disorder; AL amyloidosis (H)    ADDITIONAL INFORMATION OBTAINED FROM EMR: 53-year-old female with  biopsy-proven renal amyloidosis (AL amyloidosis) and cardiac  involvement by MRI, PET CT requested for plasma cell disorder workup.    COMPARISON: None.    FOLLOW-UP APPOINTMENT: Unknown    FINDINGS:    HEAD/NECK:  Mildly hypermetabolic mildly enlarged bilateral level 2A nodes for  example a 1.5 x 1.2 cm right level IIA node, SUV max 4.2, series 3  image 71 and a 1.1 x 0.7 cm left level 2 node, SUV max 3.7, series 3  image 71.    Moderate diffuse thyroid uptake, SUV max 6.0, series 3 image 97.    CHEST:  Left lower lobe series 4 image [id number] there is nodularity along the  left lower lobe anterior segment bronchus, largest nodule measuring 8  mm there is focal hypermetabolism in this area max SUV 4.5.  Differential amyloid, infection, atelectasis.    There is mild uptake throughout the left ventricle which could  represent active amyloidosis however in the absence of a cardiac  preparation may simply be physiologic.  Calcified left hilar nodes and left lower lobe calcified granuloma. No  pneumothorax or pleural effusion.    ABDOMEN AND PELVIS:  No abnormal uptake.    No calcified gallstones. Calcified splenic granulomas. No adrenal  nodule. Left inferior pole nonobstructing intrarenal calculus, no  hydronephrosis. No small or large bowel obstruction. Intrauterine  device in place. No free air or free fluid in the abdomen or pelvis.    LOWER EXTREMITIES:  A single area of focal moderate to  markedly hypermetabolic region in  the proximal posterior left gastrocnemius muscle, SUV max 8.6 series 3  image 416.    BONES AND SOFT TISSUES:  No abnormal uptake.  Old anterior rib fracture deformities.    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 No definite active amyloid on FDG pet.  2. Single focal moderate to markedly avid intramuscular uptake,  proximal left gastrocnemius, no etiology demonstrated on unenhanced  non diagnostic CT.  Recommend dedicated MRI.    3. Small to mildly enlarged mildly hypermetabolic bilateral level 2A  lymph nodes, most likely reactive.    4. A few equivocal findings - unlikely to be active amyloid (patient  has been under treatment, so FDG may be decreased).  Recommend future  PET scans be done with a sarcoid cardiac prep and and Lasix protocol  to decrease background and make them more sensitive for cardiac and  renal activity.    4a. Mildly hypermetabolic left lower lobe anterior segment  peribronchial nodularity and hypermetabolism. Differential favors RSV  (positive 1/18/24), focal amyloid unlikely.  4b. Cardiac (MRI positive) - equivocal FDG throughout left ventricle,  likely physiologic. Given findings on MRI 1/16/2024, can not rule out  active amyloidosis. However in the absence of a cardiac dietary prep  (which switches myocardium from glucose to fatty metabolism), this is  likely normal physiology.  Suggest future PET scans be done with a  cardiac sarcoid dietary protocol (3 day ketogenic diet).  4c. Kidney cortex (bx. 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        I have personally reviewed the examination and initial interpretation  and I agree with the findings.    PETE VELASQUEZ MD      SYSTEM ID:  [id number]      The longitudinal plan of care for the diagnosis(es)/condition(s) as documented were addressed during this visit. Due to the added complexity in care, I will continue to support Vivian in the subsequent management and with ongoing continuity of care.    ------------------------------------------------------------------------------------------------------------------------------------------------    Patient Care Team         Relationship Specialty Notifications Start End    Alin Torres PA-C PCP - General Family Medicine  11/4/20     Phone: [phone] Fax: [phone] [address] [place] [address]    Sarthak Ontiveros MD Assigned OBGYN Provider   9/24/22     Phone: [phone] Fax: [phone]         [address] [address]    Alin Torres, PA-C Assigned PCP   12/31/22     Phone: [phone] Fax: [phone]         [address] [facility] [address]    Carmen Waggoner APRN CNP Nurse Practitioner Gastroenterology  11/21/23     Phone: [phone] Fax: [phone]         [address] [place] [address]    Audrey Sen DO Assigned Nephrology Provider   12/9/23     Phone: [phone] Fax: [phone]         [address] [place] [address] [address] [facility] [address]    Musa Adrian MD Physician Hematology & Oncology  12/26/23     Phone: [phone] Fax: [phone]         [address] [facility] [address] [facility] [address]    Jomar Chandler MD MD Hematology & Oncology  12/26/23     Phone: [phone] Fax: [phone] [address] [facility] [address] [facility] [address]    Gaston Flores MD MD Cardiovascular Disease  1/5/24     Phone: [phone] Fax: [phone] [address] [facility] [address]

## 2024-08-19 DIAGNOSIS — Z52.011 AUTOLOGOUS DONOR OF STEM CELLS: ICD-10-CM

## 2024-08-19 DIAGNOSIS — E03.4 HYPOTHYROIDISM DUE TO ACQUIRED ATROPHY OF THYROID: ICD-10-CM

## 2024-08-19 DIAGNOSIS — E85.81 AL AMYLOIDOSIS (H): ICD-10-CM

## 2024-08-19 RX ORDER — OLANZAPINE 2.5 MG/1
2.5 TABLET, FILM COATED ORAL AT BEDTIME
Qty: 30 TABLET | Refills: 0 | Status: SHIPPED | OUTPATIENT
Start: 2024-08-19

## 2024-08-19 NOTE — TELEPHONE ENCOUNTER
"Olanzapine 2.5mg tab  Last prescribing provider: Dr. Jomar Chandler    Last clinic visit date: 8/2/24    Recommendations for requested medication (if none, N/A): 8/2/24, \"She has decreased zyprex to once daily at bedtime.  She continues to wean her other antiemetics and has not experienced worsening nausea with this.\"    Any other pertinent information (if none, N/A): fax request    Refilled: Y/N, if NO, why?    "

## 2024-09-10 ENCOUNTER — NURSE TRIAGE (OUTPATIENT)
Dept: TRANSPLANT | Facility: CLINIC | Age: 54
End: 2024-09-10
Payer: COMMERCIAL

## 2024-09-10 DIAGNOSIS — R11.10 VOMITING: Primary | ICD-10-CM

## 2024-09-10 NOTE — TELEPHONE ENCOUNTER
Oncology Nurse Triage - Reporting Symptoms  Situation:   Vivian reporting the following symptoms: vomiting 1x/day, with and without nausea    Background:   Treating Provider: Jomar Chandler MD  Date of last office visit: 8/2/24 with Dr. Chandler  Recent treatments: Yes: BMT--Auto PBSC Txp, Day 90    Assessment  Onset of symptoms: Last week  Pt reports that she has been vomiting one time per day since last week.  Occurs randomly.  Happened this morning first thing; Saturday, she ate lunch at 1130 and threw up at 1630.  Was very nauseous last week, but is not necessarily nauseated before she vomits.  Has tried using antiemetics but not on a scheduled basis.  No fever/chills, problems with bowel or bladder. Energy is still low; feels slightly dehydrated--sips on water during the day and drinks glass of milk with meals. Eating small, frequent meals. Cannot tolerate Pedialyte, Gatorade or anything acidic.     Recommendations:   Take antinausea meds on scheduled basis  Take ondansetron 30 min before taking morning medication/eating.  Alternate w/prochorperazine--Start w/ondansetron 0600, then prochlorperazine at 1000, then ondansetron at 1400, then prochlorperazine at 1800, then ondansetron at 2200.  Try to push fluids as much as possible w/goal of 64 oz/day.  Call back if sx worsen or the above plan does not improve vomiting.     Secure chat sent to Care Team:  Per Dr. Chandler: she should follow your plan and we should have me see her this Friday with cbc/cmp, I have a couple of openings. if we can add an infusion appointment Friday for possible fluids that would be good too     Lab orders entered  IB sent to Charge Nurse Pool to secure Infusion time. Waiting on response.    Called Mary Ann. Updated. She can come Friday at 0830. Apt slot held.  Does not need zofran refill. She will call back if sx worsen.    Message sent to [facility] to call pt and schedule apt.    Routed to Care Team.

## 2024-09-13 ENCOUNTER — TELEPHONE (OUTPATIENT)
Dept: GASTROENTEROLOGY | Facility: CLINIC | Age: 54
End: 2024-09-13

## 2024-09-13 ENCOUNTER — ONCOLOGY VISIT (OUTPATIENT)
Dept: TRANSPLANT | Facility: CLINIC | Age: 54
End: 2024-09-13
Attending: STUDENT IN AN ORGANIZED HEALTH CARE EDUCATION/TRAINING PROGRAM
Payer: COMMERCIAL

## 2024-09-13 ENCOUNTER — APPOINTMENT (OUTPATIENT)
Dept: LAB | Facility: CLINIC | Age: 54
End: 2024-09-13
Attending: STUDENT IN AN ORGANIZED HEALTH CARE EDUCATION/TRAINING PROGRAM
Payer: COMMERCIAL

## 2024-09-13 ENCOUNTER — INFUSION THERAPY VISIT (OUTPATIENT)
Dept: ONCOLOGY | Facility: CLINIC | Age: 54
End: 2024-09-13
Attending: STUDENT IN AN ORGANIZED HEALTH CARE EDUCATION/TRAINING PROGRAM
Payer: COMMERCIAL

## 2024-09-13 VITALS
WEIGHT: 123 LBS | OXYGEN SATURATION: 99 % | BODY MASS INDEX: 21 KG/M2 | HEART RATE: 94 BPM | TEMPERATURE: 97.7 F | DIASTOLIC BLOOD PRESSURE: 83 MMHG | SYSTOLIC BLOOD PRESSURE: 115 MMHG | RESPIRATION RATE: 18 BRPM

## 2024-09-13 DIAGNOSIS — E85.81 AL AMYLOIDOSIS (H): ICD-10-CM

## 2024-09-13 DIAGNOSIS — Z94.81 STATUS POST BONE MARROW TRANSPLANT (H): ICD-10-CM

## 2024-09-13 DIAGNOSIS — E85.81 AL AMYLOIDOSIS (H): Primary | ICD-10-CM

## 2024-09-13 DIAGNOSIS — R11.15 CYCLICAL VOMITING SYNDROME NOT ASSOCIATED WITH MIGRAINE: ICD-10-CM

## 2024-09-13 DIAGNOSIS — N18.4 CKD (CHRONIC KIDNEY DISEASE) STAGE 4, GFR 15-29 ML/MIN (H): ICD-10-CM

## 2024-09-13 DIAGNOSIS — Z52.011 AUTOLOGOUS DONOR OF STEM CELLS: Primary | ICD-10-CM

## 2024-09-13 LAB
ALBUMIN SERPL BCG-MCNC: 3.1 G/DL (ref 3.5–5.2)
ALP SERPL-CCNC: 119 U/L (ref 40–150)
ALT SERPL W P-5'-P-CCNC: 22 U/L (ref 0–50)
ANION GAP SERPL CALCULATED.3IONS-SCNC: 9 MMOL/L (ref 7–15)
AST SERPL W P-5'-P-CCNC: 23 U/L (ref 0–45)
BASOPHILS # BLD AUTO: 0.1 10E3/UL (ref 0–0.2)
BASOPHILS NFR BLD AUTO: 1 %
BILIRUB SERPL-MCNC: 0.2 MG/DL
BUN SERPL-MCNC: 40 MG/DL (ref 6–20)
CALCIUM SERPL-MCNC: 11.9 MG/DL (ref 8.8–10.4)
CHLORIDE SERPL-SCNC: 101 MMOL/L (ref 98–107)
CREAT SERPL-MCNC: 2.92 MG/DL (ref 0.51–0.95)
EGFRCR SERPLBLD CKD-EPI 2021: 18 ML/MIN/1.73M2
EOSINOPHIL # BLD AUTO: 0.2 10E3/UL (ref 0–0.7)
EOSINOPHIL NFR BLD AUTO: 2 %
ERYTHROCYTE [DISTWIDTH] IN BLOOD BY AUTOMATED COUNT: 16 % (ref 10–15)
GLUCOSE SERPL-MCNC: 108 MG/DL (ref 70–99)
HCO3 SERPL-SCNC: 26 MMOL/L (ref 22–29)
HCT VFR BLD AUTO: 35.6 % (ref 35–47)
HGB BLD-MCNC: 12.1 G/DL (ref 11.7–15.7)
IMM GRANULOCYTES # BLD: 0 10E3/UL
IMM GRANULOCYTES NFR BLD: 0 %
LYMPHOCYTES # BLD AUTO: 1.2 10E3/UL (ref 0.8–5.3)
LYMPHOCYTES NFR BLD AUTO: 16 %
MCH RBC QN AUTO: 33.1 PG (ref 26.5–33)
MCHC RBC AUTO-ENTMCNC: 34 G/DL (ref 31.5–36.5)
MCV RBC AUTO: 97 FL (ref 78–100)
MONOCYTES # BLD AUTO: 0.8 10E3/UL (ref 0–1.3)
MONOCYTES NFR BLD AUTO: 11 %
NEUTROPHILS # BLD AUTO: 4.9 10E3/UL (ref 1.6–8.3)
NEUTROPHILS NFR BLD AUTO: 70 %
NRBC # BLD AUTO: 0 10E3/UL
NRBC BLD AUTO-RTO: 0 /100
PLATELET # BLD AUTO: 217 10E3/UL (ref 150–450)
POTASSIUM SERPL-SCNC: 3.9 MMOL/L (ref 3.4–5.3)
PROT SERPL-MCNC: 5.8 G/DL (ref 6.4–8.3)
RBC # BLD AUTO: 3.66 10E6/UL (ref 3.8–5.2)
SODIUM SERPL-SCNC: 136 MMOL/L (ref 135–145)
WBC # BLD AUTO: 7.1 10E3/UL (ref 4–11)

## 2024-09-13 PROCEDURE — 80053 COMPREHEN METABOLIC PANEL: CPT | Performed by: STUDENT IN AN ORGANIZED HEALTH CARE EDUCATION/TRAINING PROGRAM

## 2024-09-13 PROCEDURE — 96360 HYDRATION IV INFUSION INIT: CPT

## 2024-09-13 PROCEDURE — 258N000003 HC RX IP 258 OP 636: Performed by: STUDENT IN AN ORGANIZED HEALTH CARE EDUCATION/TRAINING PROGRAM

## 2024-09-13 PROCEDURE — G2211 COMPLEX E/M VISIT ADD ON: HCPCS | Performed by: STUDENT IN AN ORGANIZED HEALTH CARE EDUCATION/TRAINING PROGRAM

## 2024-09-13 PROCEDURE — 85041 AUTOMATED RBC COUNT: CPT | Performed by: STUDENT IN AN ORGANIZED HEALTH CARE EDUCATION/TRAINING PROGRAM

## 2024-09-13 PROCEDURE — 36415 COLL VENOUS BLD VENIPUNCTURE: CPT | Performed by: STUDENT IN AN ORGANIZED HEALTH CARE EDUCATION/TRAINING PROGRAM

## 2024-09-13 PROCEDURE — 99215 OFFICE O/P EST HI 40 MIN: CPT | Performed by: STUDENT IN AN ORGANIZED HEALTH CARE EDUCATION/TRAINING PROGRAM

## 2024-09-13 PROCEDURE — 99213 OFFICE O/P EST LOW 20 MIN: CPT | Performed by: STUDENT IN AN ORGANIZED HEALTH CARE EDUCATION/TRAINING PROGRAM

## 2024-09-13 RX ORDER — HEPARIN SODIUM,PORCINE 10 UNIT/ML
5-20 VIAL (ML) INTRAVENOUS DAILY PRN
Status: CANCELLED | OUTPATIENT
Start: 2024-09-13

## 2024-09-13 RX ORDER — HEPARIN SODIUM (PORCINE) LOCK FLUSH IV SOLN 100 UNIT/ML 100 UNIT/ML
5 SOLUTION INTRAVENOUS
Status: CANCELLED | OUTPATIENT
Start: 2024-09-13

## 2024-09-13 RX ADMIN — SODIUM CHLORIDE 1000 ML: 9 INJECTION, SOLUTION INTRAVENOUS at 11:22

## 2024-09-13 ASSESSMENT — PAIN SCALES - GENERAL: PAINLEVEL: NO PAIN (0)

## 2024-09-13 NOTE — LETTER
9/13/2024      Vivian Brown  [address] [facility] [address]      Dear Colleague,    Thank you for referring your patient, Vivian Brown, to the [facility] BLOOD AND MARROW TRANSPLANT PROGRAM [employer]. Please see a copy of my visit note below.         Hematology/Oncology Progress Note    Disease presentation and baseline characteristics:   12/21/2023:  Final Diagnosis   A. kidney biopsy (needle):     Amyloidosis of the kidney, AL-type, lambda light chain-restricted, affecting the glomeruli, interstitium, and arterioles (see comment)     Mild chronic changes of the parenchyma, including:   - focal global glomerulosclerosis (3% of glomeruli)   - focal tubular atrophy and interstitial fibrosis (5% of the cortex)   - severe arterial sclerosis   Electronically signed by Joe Garcia MD on 12/23/2023 at  3:21 PM   Comment  UULAB   The biopsy reveals findings diagnostic of amyloidosis; the amyloid shows lambda light chain-restriction (AL amyloidosis), and the deposits are Congo red positive. The amyloid deposits affect the glomeruli extensively, and interstitium and arterioles focally. Other potential complications of paraproteins in the kidney are not seen, in particular, there is no evidence of light chain cast nephropathy, light chain deposition disease, or light chain tubulopathy.      1/8/24: NT-Pro , Troponin T 15    Date Treatment Name Response Side Effects / Toxicities   1/19/24 D-CyBorD x 4 cycles     6/12/24 HDT/ASCT VGPR           HPI:  Please see my entry above for hematologic history.  Mrs. Brown presents today accompanied by her .  She reports she has been vomiting 1-2x per day for the past 2-3 weeks.  She notes her nausea/vomiting resolved some time ago and she was surprised when it restarted recently.  The vomiting usually occurs around the time that she eats and/or takes her medications.  She does not have any anticipatory nausea associated with the emesis and  she often has minimal nausea in general until she eats, at which point she rapidly throws up.  She has not noted any green/bilious emesis.  She has been taking zofran/compazine scheduled and alternating throughout the day but is not certain this is helping.      ASSESSMENT AND PLAN:  Ms. Brown has progression of subacute on chronic kidney injury today, with mild uremia and hypercalcemia.  Clinically she appears relatively well despite her concerning labs.  It's not clear if her emesis is caused by uremia or if her uremia is caused by dehydration.  We will give her IVF today and she will push fluids at home.  I will repeat labs and fluids over the weekend.  If no improvement on repeat labs would plan on renal ultrasound and reach out to nephrology.    She previously underwent EGD without evidence of amyloid, but given her symptoms will plan to repeat an EGD.  She also has a history of celiac disease which may be contributing, although she notes she remains on a gluten-free diet.    She recently has had mild elevation of both kappa and lambda light chains with a normal FLC ratio; with a normal ratio this is less concerning for persistent/active amyloid.  Her last urine ABHILASH on 5/24 showed no evidence of monoclonal deposition, and her serum ABHILASH on 7/9 showed a monoclonal kappa light chain (likely due to carmen given she has lambda disease).  Full restaging is upcoming next week.    Continue acyclovir, bactrim for one year post-transplant.  If CHRIS is persistent on next check may switch to atovaquone or pentamidine to limit bactrim complicating the testing.    Emesis is unlikely to be a sequelae of transplant this far out.    Plan:  - IVF today  - Repeat labs and IVF this weekend; if no improvement plan for renal US, discussion with nephrology, hold bactrim  - Referral for repeat EGD with ?amyloid or other inflammatory upper GI process    I spent 40 minutes in the care of this patient today, which included time necessary  for preparation for the visit, obtaining history, ordering medications/tests/procedures as medically indicated, review of pertinent medical literature, counseling of the patient, communication of recommendations to the care team, and documentation time.    Jomar Chandler MD    ROS:    10 point ROS neg other than the symptoms noted above in the HPI.      Current Outpatient Medications   Medication Sig Dispense Refill     acyclovir (ZOVIRAX) 400 MG tablet Take 2 tablets (800 mg) by mouth 2 times daily 120 tablet 11     Albuterol-Budesonide (AIRSUPRA) 90-80 MCG/ACT AERO Inhale 2 Inhalations into the lungs every 4 hours as needed (Wheezing, chest tightness, shortness of breath, or persistent cough) 10.7 g 3     azelastine (ASTELIN) 0.1 % nasal spray Spray 2 sprays into both nostrils 2 times daily as needed for rhinitis 30 mL 3     bumetanide (BUMEX) 1 MG tablet Take 1 tablet (1 mg) by mouth 2 times daily 60 tablet 6     EPINEPHrine (ANY BX GENERIC EQUIV) 0.3 MG/0.3ML injection 2-pack Inject 0.3 mLs (0.3 mg) into the muscle as needed for anaphylaxis May repeat one time in 5-15 minutes if response to initial dose is inadequate. 2 each 3     fluticasone (FLONASE) 50 MCG/ACT nasal spray Spray 2 sprays into both nostrils daily 16 g 3     levothyroxine (SYNTHROID) 200 MCG tablet Take 200 mcg by mouth daily Pt takes Synthroid brand name. FRANKI       liothyronine (CYTOMEL) 5 MCG tablet Take 5 mcg by mouth 2 times daily       loperamide (IMODIUM A-D) 2 MG tablet Take 1 tablet (2 mg) by mouth 4 times daily as needed for diarrhea 30 tablet 1     melatonin 3 MG tablet Take 1 tablet (3 mg) by mouth nightly as needed for sleep (Patient not taking: Reported on 8/2/2024) 30 tablet 1     midodrine (PROAMATINE) 10 MG tablet Take 1 tablet (10 mg) by mouth 3 times daily 270 tablet 3     OLANZapine (ZYPREXA) 2.5 MG tablet Take 1 tablet (2.5 mg) by mouth at bedtime 30 tablet 0     ondansetron (ZOFRAN) 8 MG tablet Take 1 tablet (8 mg) by mouth  every 8 hours 30 tablet 1     ondansetron (ZOFRAN) 8 MG tablet Take 8 mg by mouth every 8 hours as needed for nausea       pantoprazole (PROTONIX) 40 MG EC tablet Take 1 tablet (40 mg) by mouth every morning (before breakfast) 30 tablet 1     prochlorperazine (COMPAZINE) 10 MG tablet Take 0.5 tablets (5 mg) by mouth every 6 hours as needed for nausea or vomiting 30 tablet 1     psyllium (METAMUCIL/KONSYL) Packet Take 1 packet by mouth daily 30 packet 1     sulfamethoxazole-trimethoprim (BACTRIM DS) 800-160 MG tablet Take 1 tablet by mouth Every Mon, Tues two times daily 16 tablet 11     vitamin D3 (CHOLECALCIFEROL) 50 mcg (2000 units) tablet Take 3 tablets (150 mcg) by mouth daily 270 tablet 3         Physical Exam:   /83 (BP Location: Right arm, Patient Position: Sitting, Cuff Size: Adult Regular)   Pulse 94   Temp 97.7  F (36.5  C) (Oral)   Resp 18   Wt 55.8 kg (123 lb)   SpO2 99%   BMI 21.00 kg/m      KPS:  90  General Appearance: alert, and no distress  Eyes: PERRL, conjunctiva and lids normal, sclera nonicteric  Ears/Nose/M/Throat: Oral mucosa and posterior oropharynx normal, moist mucous membranes  Cardio/Vascular:regular rate and rhythm, normal S1 and S2, no murmur  Resp Effort And Auscultation: Normal - Clear to auscultation without rales, rhonchi, or wheezing.  GI: Non-tender, non-distended, normoactive bowel sounds  : No CVA tenderness  Edema: none  Skin: Skin color, texture, turgor normal. No rashes or lesions.  Neurologic: Gait normal.  Sensation grossly WNL.  Psych/Affect: Mood and affect are appropriate.    Blood Counts     Recent Labs   Lab Test 09/13/24  0823 08/02/24  1448 07/19/24  0948 06/18/24  0327 06/17/24  0338   HGB 12.1 10.8* 10.0*   < > 10.6*   HCT 35.6 32.4* 30.0*   < > 31.3*   WBC 7.1 6.7 6.2   < > 0.5*   ANEUTAUTO 4.9 4.0  --   --  0.3*   ALYMPAUTO 1.2 1.6  --   --  0.1*   AMONOAUTO 0.8 0.9  --   --  0.0   AEOSAUTO 0.2 0.2  --   --  0.0   ABSBASO 0.1 0.1  --   --  0.0    NRBCMAN 0.0 0.0 0.0   < > 0.0    177 122*   < > 124*    < > = values in this interval not displayed.       Chemistries     Basic Panel  Recent Labs   Lab Test 09/13/24  0823 08/02/24  1448 07/19/24  0948    135  135 135   POTASSIUM 3.9 3.5  3.5 4.1   CHLORIDE 101 100  100 101   CO2 26 27  27 26   BUN 40.0* 28.5*  28.5* 19.6   CR 2.92* 2.07*  2.07* 2.03*   * 103*  103* 126*        Calcium, Magnesium, Phosphorus  Recent Labs   Lab Test 09/13/24  0823 08/02/24  1448 07/19/24  0948 07/12/24  1530 07/11/24  1101 07/09/24  1011 07/09/24  1001 07/02/24  0949 07/01/24  0355   TANNER 11.9* 9.4  9.4 9.3   < > 7.9* 7.6* 7.6*   < > 7.3*   MAG  --   --   --   --  1.5*  --  1.6*  --  2.1   PHOS  --   --   --   --   --  3.7 3.7  --  2.5    < > = values in this interval not displayed.        LFTs  Recent Labs   Lab Test 09/13/24  0823 08/02/24  1448 07/19/24  0948   BILITOTAL 0.2 0.2 <0.2   ALKPHOS 119 122 177*   AST 23 21 17   ALT 22 23 12   ALBUMIN 3.1* 2.7* 2.3*       LDH  Recent Labs   Lab Test 07/09/24  1001 05/24/24  1112 01/18/24  1433   * 248 248       B2-Microglobulin  Recent Labs   Lab Test 05/21/24  1338 01/18/24  1433   [id number] 4.3* 5.8*         Immunoglobulins     Recent Labs   Lab Test 07/09/24  1001 05/21/24  1338 04/19/24  0956 03/15/24  0820 02/02/24  1437 01/18/24  1444   * 86* 101* 116* 204* 294*       Recent Labs   Lab Test 07/09/24  1001 05/21/24  1338 04/19/24  0956 03/15/24  0820 01/18/24  1444    35* 37* 36* 164       Recent Labs   Lab Test 07/09/24  1001 05/21/24  1338 04/19/24  0956 03/15/24  0820 01/18/24  1444    <10* 21* 33* 127         Monocloncal Protein Studies     M spike    Recent Labs   Lab Test 07/09/24  1001 05/24/24  1112 05/21/24  1338 04/19/24  0956 03/15/24  0820 01/08/24  1201   ELPM 0.0 0.0 0.0 0.1* 0.1* 0.0       Kappa FLC    Recent Labs   Lab Test 07/09/24  1011 07/09/24  1001 05/24/24  1108 05/21/24  1338 04/19/24  0956  03/15/24  0820   KFLCA 6.05* 5.79* 1.12 1.07 1.13 1.33       Lambda FLC    Recent Labs   Lab Test 07/09/24  1011 07/09/24  1001 05/24/24  1108 05/21/24  1338 04/19/24  0956 03/15/24  0820   LFLCA 7.10* 7.14* 1.74 1.49 1.71 1.96       FLC Ratio    Recent Labs   Lab Test 07/09/24  1011 07/09/24  1001 05/24/24  1108 05/21/24  1338 04/19/24  0956 03/15/24  0820   KLRA 0.85 0.81 0.64 0.72 0.66 0.68         Bone Marrow Biopsy       Morphology    Results for orders placed or performed in visit on 05/24/24 (from the past 8760 hour(s))   Bone marrow biopsy   Result Value    Final Diagnosis      Bone marrow, posterior iliac crest, left decalcified trephine biopsy and touch imprint; left aspirate clot, direct aspirate smear, concentrate aspirate smear, and peripheral blood smear:               - Minute level involvement by recurrent/persistent plasma cell neoplasm  - Normocellular marrow (40-50% estimated cellularity), with , trilineage hematopoiesis, no overt dysplasia, and no increase in blasts (1%)  - Positive for amyloid deposisiton by congo erika   - Peripheral blood showing slight thrombocytosis and no circulating plasma cells  - See comment      Comment      Flow cytometry analysis on concurrent specimen ([id number]) showed rare lambda monotypic plasma cells (0.001%) and polytypic B cells. The flow findings coupled with the positive congo red and lambda monotypic plasma cells by immunohistochemistry are consistent with a plasma cell neoplasm.     Concurrent ancillary studies are in progress and will be reported separately. Correlation with the results of ancillary tests and clinical findings is recommended.      Clinical Information      From Epic electronic medical record; 54 year old female with a history of AL amyloidosis, lambda light chain-restricted, with kidney (nephrotic syndrome), bone marrow, and cardiac involvement treated with chemotherapy. Her most recent bone marrow biopsy ([id number] on 1/18/2024),  pre-therapy, showed lambda-monotypic plasma cells with monotypic B cells. She is undergoing evaluation prior to hematopoietic cell transplant.       Peripheral Hematologic Data      CBC WITH DIFFERENTIAL (05/24/2024 11:17 AM CDT):     RESULT VALUE REF. RANGE UNITS   WBC Count  Hemoglobin  Hematocrit   Platelet Count   RBC Count  MCV  MCH  MCHC   RDW  9.7 (NORMAL)    13.0 (NORMAL)     39.6 (NORMAL)   483  ( H )  4.31 (NORMAL)       92 (NORMAL)     30.2 (NORMAL)     32.8 (NORMAL)      16.8  ( H ) 4.0-11.0  11.7-15.7  35.0-47.0  150-450  3.80-5.20    26.5-33.0  31.5-36.5  10.0-15.0 10e3/uL  g/dL  %  10e3/uL  10e6/uL  fL  pg  g/dL  %   % Neutrophils  % Lymphocytes  % Monocytes  % Eosinophils  % Basophils  % Immature Granulocytes  Absolute Neutrophils  Absolute Lymphocytes  Absolute Monocytes  Absolute Eosinophils  Absolute Basophils  Absolute Immature Granulocytes  NRBCs per 100 WBC  Absolute NRBCs 71  15  10  2  2  0  6.9 (NORMAL)  1.4 (NORMAL)  1.0 (NORMAL)  0.2 (NORMAL)  0.2 (NORMAL)  0.0 (NORMAL)  0 (NORMAL)  0.0 () N/A  N/A  N/A  N/A  N/A  N/A  1.6-8.3  0.8-5.3  0.0-1.3  0.0-0.7  0.0-0.2  <=0.4  <1  <=0.0 %  %  %  %  %  %  10e3/uL  10e3/uL  10e3/uL  10e3/uL  10e3/uL  10e3/uL  /100  10e3/uL         Microscopic Description      PERIPHERAL BLOOD SMEAR MORPHOLOGY:  The red blood cells appear normochromic.  Poikilocytosis  includes numerous acanthocytes and echinocytes .  Polychromasia is not increased increased.  Rouleaux formation is not increased. The morphology of the platelets is normal. No circulating plasma cells.     Bone marrow aspirates and trephine core biopsy touch imprints are reviewed.    BONE MARROW DIFFERENTIAL (500 cells on direct aspirate smears)  Percent (%) Cell Population Reference Range (%)   1 Blasts  (0 - 1)   3 Neutrophil promyelocytes (2 - 4)   53 Neutrophils and precursors (54 - 63)   27 Erythroid precursors (18 - 24)   1 Monocytes (1 - 1.5)   1 Eosinophils (1 - 3)   1 Basophils (0 - 1)    13 Lymphocytes (8 - 12)   0 Plasma cells (0 - 1.5)     The aspirate smears are adequate for evaluation.    Neutrophil maturation is complete. Erythroid maturation is complete. Megakaryocytes exhibit a spectrum of sizes and morphologies, within the limits of normal .    CLOT SECTIONS:   The clot sections show fibrin and blood.    TREPHINE SECTIONS:  Hematoxylin and eosin stains are reviewed. The quality of the trephine core biopsy is good, with slight crush and fragmentation artifact. The marrow cellularity is estimated at 40-50%. The cellular composition reflects the aspirate smear differential. The number of megakaryocytes appears consistent with the degree of marrow cellularity, with unremarkable morphology. No aggregates of plasma cells are identified. There are few thickened blood vessels identified.    IMMUNOHISTOCHEMISTRY:  Immunohistochemical and special stains are performed on the paraffin-embedded trephine core with appropriate controls.     highlights scattered plasma cells.  Stains for cytoplasmic kappa and lambda immunoglobulin light chains show lambda-monotypic plasma cells.  Congo red is positive    Note: These immunohistochemical stains are deemed medically necessary. Some of the antigens may also be evaluated by flow cytometry. Concurrent evaluation by immunohistochemistry on clot and/or trephine sections is indicated in this case in order to correlate immunophenotype with cell morphology and determine extent of involvement, spatial pattern, and focality of potential disease distribution.     Case was reviewed by the following:  Resident Pathologist: Loida Ott MD  A resident or fellow in a training program was involved in the initial review, preparation, and/or interpretation of this case.  I, as the senior physician, attest that I have personally reviewed all specimens and or slides, including the listed special stains, and used them with my medical judgement to determine the final  diagnosis.              Gross Description      Procedure/Gross Description   Aspirate(s) and trephine(s) procured by [facility]    Specimen sent for Special Studies:         Flow Cytometry: left aspirate        Cytogenetics: left aspirate        Molecular Diagnostics: left aspirate        Other: left aspirate- ClonoSEQ    Biopsy aspiration site: left posterior iliac crest                                                      (Reference Range)          Amount of aspirate           2.5   mL        Fat and P.V. cell layer        2    %               (1 - 3)        Particles                             0   %        Myeloid-erythroid layer    2    %               (5 - 8)          Clot Section: yes    Trephine biopsy site: left posterior iliac crest    Designated left posterior iliac crest 1 cylinder of gritty tissue, labeled with the patient's name and hospital number, obtained with 11 gauge needle and a length of 14 mm; entirely submitted in 1 cassette; acetic zinc formalin fixed, decalcified, processed, and stained for hematoxylin and eosin per laboratory protocol.        MCRS Yes (A)    Performing Labs      The technical component of this testing was completed at [facility].     Stain controls for all stains resulted within this report have been reviewed and show appropriate reactivity.        PET Scan       Results for orders placed during the hospital encounter of 01/25/24    PET ONCOLOGY WHOLE BODY    Status: Normal 1/25/2024    Narrative  Combined Report of: PET and CT on  1/25/2024 9:59 AM:    1. PET of the neck, chest, abdomen, and pelvis.  2. PET CT Fusion for Attenuation Correction and Anatomical  Localization:  3. 3D MIP and PET-CT fused images were processed on an independent  workstation and archived to PACS and reviewed by a radiologist.    Technique:    1. PET: The patient received 10.54 mCi of F-18-FDG; the serum glucose  was 103  mg/dL prior to administration, body weight was 80 kg. Images  were evaluated in the axial, sagittal, and coronal planes as well as  the rotational whole body MIP. Images were acquired from the Vertex to  the Feet.    UPTAKE WAS MEASURED AT 62 MINUTES.    BACKGROUND:  Liver SUV max= 2.8,   Aorta Blood SUV max= 2.5.    2. CT: CT only obtained for attenuation correction and not diagnostic  purposes.    INDICATION: Biopsy-proven AL amyloidosis of kidney, workup for plasma  cell disorder; AL amyloidosis (H)    ADDITIONAL INFORMATION OBTAINED FROM EMR: 53-year-old female with  biopsy-proven renal amyloidosis (AL amyloidosis) and cardiac  involvement by MRI, PET CT requested for plasma cell disorder workup.    COMPARISON: None.    FOLLOW-UP APPOINTMENT: Unknown    FINDINGS:    HEAD/NECK:  Mildly hypermetabolic mildly enlarged bilateral level 2A nodes for  example a 1.5 x 1.2 cm right level IIA node, SUV max 4.2, series 3  image 71 and a 1.1 x 0.7 cm left level 2 node, SUV max 3.7, series 3  image 71.    Moderate diffuse thyroid uptake, SUV max 6.0, series 3 image 97.    CHEST:  Left lower lobe series 4 image [id number] there is nodularity along the  left lower lobe anterior segment bronchus, largest nodule measuring 8  mm there is focal hypermetabolism in this area max SUV 4.5.  Differential amyloid, infection, atelectasis.    There is mild uptake throughout the left ventricle which could  represent active amyloidosis however in the absence of a cardiac  preparation may simply be physiologic.  Calcified left hilar nodes and left lower lobe calcified granuloma. No  pneumothorax or pleural effusion.    ABDOMEN AND PELVIS:  No abnormal uptake.    No calcified gallstones. Calcified splenic granulomas. No adrenal  nodule. Left inferior pole nonobstructing intrarenal calculus, no  hydronephrosis. No small or large bowel obstruction. Intrauterine  device in place. No free air or free fluid in the abdomen or pelvis.    LOWER  EXTREMITIES:  A single area of focal moderate to markedly hypermetabolic region in  the proximal posterior left gastrocnemius muscle, SUV max 8.6 series 3  image 416.    BONES AND SOFT TISSUES:  No abnormal uptake.  Old anterior rib fracture deformities.    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 No definite active amyloid on FDG pet.  2. Single focal moderate to markedly avid intramuscular uptake,  proximal left gastrocnemius, no etiology demonstrated on unenhanced  non diagnostic CT.  Recommend dedicated MRI.    3. Small to mildly enlarged mildly hypermetabolic bilateral level 2A  lymph nodes, most likely reactive.    4. A few equivocal findings - unlikely to be active amyloid (patient  has been under treatment, so FDG may be decreased).  Recommend future  PET scans be done with a sarcoid cardiac prep and and Lasix protocol  to decrease background and make them more sensitive for cardiac and  renal activity.    4a. Mildly hypermetabolic left lower lobe anterior segment  peribronchial nodularity and hypermetabolism. Differential favors RSV  (positive 1/18/24), focal amyloid unlikely.  4b. Cardiac (MRI positive) - equivocal FDG throughout left ventricle,  likely physiologic. Given findings on MRI 1/16/2024, can not rule out  active amyloidosis. However in the absence of a cardiac dietary prep  (which switches myocardium from glucose to fatty metabolism), this is  likely normal physiology.  Suggest future PET scans be done with a  cardiac sarcoid dietary protocol (3 day ketogenic diet).  4c. Kidney cortex (bx. 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        I have personally reviewed the examination and initial interpretation  and I agree with the  findings.    JOMAR VELASQUEZ MD      SYSTEM ID:  [id number]     The longitudinal plan of care for the diagnosis(es)/condition(s) as documented were addressed during this visit. Due to the added complexity in care, I will continue to support Vivian in the subsequent management and with ongoing continuity of care.    ------------------------------------------------------------------------------------------------------------------------------------------------    Patient Care Team         Relationship Specialty Notifications Start End    Alin Torres PA-C PCP - General Family Medicine  11/4/20     Phone: [phone] Fax: [phone] [address] [place] [address]    Sarthak Ontiveros MD Assigned OBGYN Provider   9/24/22     Phone: [phone] Fax: [phone]         [address] [address]    Alin Torres PA-C Assigned PCP   12/31/22     Phone: [phone] Fax: [phone]         [address] [facility] [address]    Carmen Waggoner APRN CNP Nurse Practitioner Gastroenterology  11/21/23     Phone: [phone] Fax: [phone]         [address] [place] [address]    Audrey Sen DO Assigned Nephrology Provider   12/9/23     Phone: [phone] Fax: [phone]         [address] [facility] [address] [facility] [address]    Musa Adrian MD Physician Hematology & Oncology  12/26/23     Phone: [phone] Fax: [phone]         [address] [facility] [address] [facility] [address]    Jomar Chandler MD MD Hematology & Oncology  12/26/23     Phone: [phone] Fax: [phone]         [address] [place] [address] [address] [facility] [address]    Gaston Flores MD MD Cardiovascular Disease  1/5/24     Phone: [phone] Fax: [phone] [address] [facility] [address]          Again, thank you for allowing me to participate in the care of your patient.        Sincerely,        Jomar Chandler MD

## 2024-09-13 NOTE — NURSING NOTE
"Oncology Rooming Note    September 13, 2024 8:35 AM   Vivian Brown is a 54 year old female who presents for:    Chief Complaint   Patient presents with    Blood Draw     Labs drawn via  by RN in lab, vitals taken.      Initial Vitals: /83 (BP Location: Right arm, Patient Position: Sitting, Cuff Size: Adult Regular)   Pulse 94   Temp 97.7  F (36.5  C) (Oral)   Resp 18   Wt 55.8 kg (123 lb)   SpO2 99%   BMI 21.00 kg/m   Estimated body mass index is 21 kg/m  as calculated from the following:    Height as of 6/11/24: 1.63 m (5' 4.17\").    Weight as of this encounter: 55.8 kg (123 lb). Body surface area is 1.59 meters squared.  No Pain (0) Comment: Data Unavailable   No LMP recorded. (Menstrual status: IUD).  Allergies reviewed: Yes  Medications reviewed: Yes    Medications: Medication refills not needed today.  Pharmacy name entered into [employer]:    [facility] - [place], [place] - [address] [facility] [place] - [address] [id number] - [place] [address] [id number] - [place], [place] - [address]  [facility] - [place] [address]  [facility] - [place], [place] - [address] [facility] - [place], [place] - [phone]    Frailty Screening:   Is the patient here for a new oncology consult visit in cancer care? 2. No      Clinical concerns: Pt would like to discuss medication management with Zofran going forward. If provider would like Pt to stay on medication a refill may be needed.       Lucy Anders              "

## 2024-09-13 NOTE — NURSING NOTE
Chief Complaint   Patient presents with    Blood Draw     Labs drawn via  by RN in lab, vitals taken.      Labs collected from venipuncture by RN. Vitals taken. Checked in for appointment(s).    Debbie Elena RN

## 2024-09-13 NOTE — TELEPHONE ENCOUNTER
"Endoscopy Scheduling Screen    Have you had any respiratory illness or flu-like symptoms in the last 10 days?  No    What is your communication preference for Instructions and/or Bowel Prep?   MyChart    What insurance is in the chart?  Other:  [facility]-patient is working on cobra right now, but all appts with be covered after August 1    Ordering/Referring Provider: Sohail   (If ordering provider performs procedure, schedule with ordering provider unless otherwise instructed. )    BMI: Estimated body mass index is 21 kg/m  as calculated from the following:    Height as of 6/11/24: 1.63 m (5' 4.17\").    Weight as of an earlier encounter on 9/13/24: 55.8 kg (123 lb).     Sedation Ordered  moderate sedation.   If patient BMI > 50 do not schedule in ASC.    If patient BMI > 45 do not schedule at [facility].    Are you taking methadone or Suboxone?  NO, No RN review required.    Have you been diagnosed and are being treated for severe PTSD or severe anxiety?  NO, No RN review required.    Are you taking any prescription medications for pain 3 or more times per week?   NO, No RN review required.    Do you have a history of malignant hyperthermia?  No    (Females) Are you currently pregnant?        Have you been diagnosed or told you have pulmonary hypertension?   No    Do you have an LVAD?  No    Have you been told you have moderate to severe sleep apnea?  No.    Have you been told you have COPD, asthma, or any other lung disease?  No    Do you have any heart conditions?  Yes-Light chain (AL) amyloidosis (H)     In the past year, have you had any hospitalizations for heart related issues including cardiomyopathy, heart attack, or stent placement?  No    Do you have any implantable devices in your body (pacemaker, ICD)?  No    Do you take nitroglycerine?  No    Have you ever had or are you waiting for an organ transplant? Blood transplant  Yes. Have you had or are on on the wait list for a heart/lung transplant? No, may schedule at " "all facilities except [facility]    Have you had a stroke or transient ischemic attack (TIA aka \"mini stroke\" in the last 6 months?   No    Have you been diagnosed with or been told you have cirrhosis of the liver?   No.    Are you currently on dialysis?   No    Do you need assistance transferring?   No    BMI: Estimated body mass index is 21 kg/m  as calculated from the following:    Height as of 6/11/24: 1.63 m (5' 4.17\").    Weight as of an earlier encounter on 9/13/24: 55.8 kg (123 lb).     Is patients BMI > 40 and scheduling location [facility]?  No    Do you take an injectable or oral medication for weight loss or diabetes (excluding insulin)?  No    Do you take the medication Naltrexone?  No    Do you take blood thinners?  No       Prep   Are you currently on dialysis or do you have chronic kidney disease?  Yes (Golytely Prep)    Do you have a diagnosis of diabetes?  No    Do you have a diagnosis of cystic fibrosis (CF)?  No    On a regular basis do you go 3 -5 days between bowel movements?      BMI > 40?  No    Preferred Pharmacy:    [facility] [address] [place] [address] [facility] [address] [facility]  [address] [facility] [address]  Phone: [phone] Fax: [phone]    Final Scheduling Details     Procedure scheduled  Upper endoscopy (EGD)    Surgeon:  Pedro Luis     Date of procedure:  9/18/24     Pre-OP / PAC:   No - Not required for this site.    Location  PH - Patient preference.    Sedation   MAC/Deep Sedation  location      Patient Reminders:   You will receive a call from a Nurse to review instructions and health history.  This assessment must be completed prior to your procedure.  Failure to complete the Nurse assessment may result in the procedure being cancelled.      On the day of your procedure, please designate an adult(s) who can drive you home stay with you for the next 24 hours. The medicines used in the exam will make you sleepy. You will not be able to drive.      You cannot take public transportation, ride share services, " or non-medical taxi service without a responsible caregiver.  Medical transport services are allowed with the requirement that a responsible caregiver will receive you at your destination.  We require that drivers and caregivers are confirmed prior to your procedure.

## 2024-09-13 NOTE — PROGRESS NOTES
Infusion Nursing Note:  Vivian Brown presents today for IVF.    Patient seen by provider today: Yes: Dr. Chandler   present during visit today: Not Applicable.    Note: Mary Ann presents to infusion today following her clinic appointment. She denies any new concerns. Education provided for future PET scan and BMBx coming up.    Intravenous Access:  Peripheral IV placed.    Treatment Conditions:  Lab Results   Component Value Date    HGB 12.1 09/13/2024    WBC 7.1 09/13/2024    ANEU 4.9 07/19/2024    ANEUTAUTO 4.9 09/13/2024     09/13/2024     Lab Results   Component Value Date     09/13/2024    POTASSIUM 3.9 09/13/2024    MAG 1.5 (L) 07/11/2024    CR 2.92 (H) 09/13/2024    TANNER 11.9 (H) 09/13/2024    BILITOTAL 0.2 09/13/2024    ALBUMIN 3.1 (L) 09/13/2024    ALT 22 09/13/2024    AST 23 09/13/2024     Post Infusion Assessment:  Patient tolerated infusion without incident.  Blood return noted pre and post infusion.  Site patent and intact, free from redness, edema or discomfort.  No evidence of extravasations.  Access discontinued per protocol.     Discharge Plan:   Patient declined prescription refills.  Discharge instructions reviewed with: Patient and Family.  Patient and/or family verbalized understanding of discharge instructions and all questions answered.  Copy of AVS reviewed with patient and/or family.  Patient will return 9/16 for next appointment.  Patient discharged in stable condition accompanied by: .  Departure Mode: Ambulatory.      Peg Mejia RN

## 2024-09-13 NOTE — PROGRESS NOTES
Hematology/Oncology Progress Note    Disease presentation and baseline characteristics:   12/21/2023:  Final Diagnosis   A. kidney biopsy (needle):     Amyloidosis of the kidney, AL-type, lambda light chain-restricted, affecting the glomeruli, interstitium, and arterioles (see comment)     Mild chronic changes of the parenchyma, including:   - focal global glomerulosclerosis (3% of glomeruli)   - focal tubular atrophy and interstitial fibrosis (5% of the cortex)   - severe arterial sclerosis   Electronically signed by Joe Garcia MD on 12/23/2023 at  3:21 PM   Comment  UULAB   The biopsy reveals findings diagnostic of amyloidosis; the amyloid shows lambda light chain-restriction (AL amyloidosis), and the deposits are Congo red positive. The amyloid deposits affect the glomeruli extensively, and interstitium and arterioles focally. Other potential complications of paraproteins in the kidney are not seen, in particular, there is no evidence of light chain cast nephropathy, light chain deposition disease, or light chain tubulopathy.      1/8/24: NT-Pro , Troponin T 15    Date Treatment Name Response Side Effects / Toxicities   1/19/24 D-CyBorD x 4 cycles     6/12/24 HDT/ASCT VGPR           HPI:  Please see my entry above for hematologic history.  Mrs. Brown presents today accompanied by her .  She reports she has been vomiting 1-2x per day for the past 2-3 weeks.  She notes her nausea/vomiting resolved some time ago and she was surprised when it restarted recently.  The vomiting usually occurs around the time that she eats and/or takes her medications.  She does not have any anticipatory nausea associated with the emesis and she often has minimal nausea in general until she eats, at which point she rapidly throws up.  She has not noted any green/bilious emesis.  She has been taking zofran/compazine scheduled and alternating throughout the day but is not certain this is helping.      ASSESSMENT AND  PLAN:  Ms. Brown has progression of subacute on chronic kidney injury today, with mild uremia and hypercalcemia.  Clinically she appears relatively well despite her concerning labs.  It's not clear if her emesis is caused by uremia or if her uremia is caused by dehydration.  We will give her IVF today and she will push fluids at home.  I will repeat labs and fluids over the weekend.  If no improvement on repeat labs would plan on renal ultrasound and reach out to nephrology.    She previously underwent EGD without evidence of amyloid, but given her symptoms will plan to repeat an EGD.  She also has a history of celiac disease which may be contributing, although she notes she remains on a gluten-free diet.    She recently has had mild elevation of both kappa and lambda light chains with a normal FLC ratio; with a normal ratio this is less concerning for persistent/active amyloid.  Her last urine ABHILASH on 5/24 showed no evidence of monoclonal deposition, and her serum ABHILASH on 7/9 showed a monoclonal kappa light chain (likely due to carmen given she has lambda disease).  Full restaging is upcoming next week.    Continue acyclovir, bactrim for one year post-transplant.  If CHRIS is persistent on next check may switch to atovaquone or pentamidine to limit bactrim complicating the testing.    Emesis is unlikely to be a sequelae of transplant this far out.    Plan:  - IVF today  - Repeat labs and IVF this weekend; if no improvement plan for renal US, discussion with nephrology, hold bactrim  - Referral for repeat EGD with ?amyloid or other inflammatory upper GI process    I spent 40 minutes in the care of this patient today, which included time necessary for preparation for the visit, obtaining history, ordering medications/tests/procedures as medically indicated, review of pertinent medical literature, counseling of the patient, communication of recommendations to the care team, and documentation time.    Jomar Chandler,  MD    ROS:    10 point ROS neg other than the symptoms noted above in the HPI.      Current Outpatient Medications   Medication Sig Dispense Refill    acyclovir (ZOVIRAX) 400 MG tablet Take 2 tablets (800 mg) by mouth 2 times daily 120 tablet 11    Albuterol-Budesonide (AIRSUPRA) 90-80 MCG/ACT AERO Inhale 2 Inhalations into the lungs every 4 hours as needed (Wheezing, chest tightness, shortness of breath, or persistent cough) 10.7 g 3    azelastine (ASTELIN) 0.1 % nasal spray Spray 2 sprays into both nostrils 2 times daily as needed for rhinitis 30 mL 3    bumetanide (BUMEX) 1 MG tablet Take 1 tablet (1 mg) by mouth 2 times daily 60 tablet 6    EPINEPHrine (ANY BX GENERIC EQUIV) 0.3 MG/0.3ML injection 2-pack Inject 0.3 mLs (0.3 mg) into the muscle as needed for anaphylaxis May repeat one time in 5-15 minutes if response to initial dose is inadequate. 2 each 3    fluticasone (FLONASE) 50 MCG/ACT nasal spray Spray 2 sprays into both nostrils daily 16 g 3    levothyroxine (SYNTHROID) 200 MCG tablet Take 200 mcg by mouth daily Pt takes Synthroid brand name. FRANKI      liothyronine (CYTOMEL) 5 MCG tablet Take 5 mcg by mouth 2 times daily      loperamide (IMODIUM A-D) 2 MG tablet Take 1 tablet (2 mg) by mouth 4 times daily as needed for diarrhea 30 tablet 1    melatonin 3 MG tablet Take 1 tablet (3 mg) by mouth nightly as needed for sleep (Patient not taking: Reported on 8/2/2024) 30 tablet 1    midodrine (PROAMATINE) 10 MG tablet Take 1 tablet (10 mg) by mouth 3 times daily 270 tablet 3    OLANZapine (ZYPREXA) 2.5 MG tablet Take 1 tablet (2.5 mg) by mouth at bedtime 30 tablet 0    ondansetron (ZOFRAN) 8 MG tablet Take 1 tablet (8 mg) by mouth every 8 hours 30 tablet 1    ondansetron (ZOFRAN) 8 MG tablet Take 8 mg by mouth every 8 hours as needed for nausea      pantoprazole (PROTONIX) 40 MG EC tablet Take 1 tablet (40 mg) by mouth every morning (before breakfast) 30 tablet 1    prochlorperazine (COMPAZINE) 10 MG tablet  Take 0.5 tablets (5 mg) by mouth every 6 hours as needed for nausea or vomiting 30 tablet 1    psyllium (METAMUCIL/KONSYL) Packet Take 1 packet by mouth daily 30 packet 1    sulfamethoxazole-trimethoprim (BACTRIM DS) 800-160 MG tablet Take 1 tablet by mouth Every Mon, Tues two times daily 16 tablet 11    vitamin D3 (CHOLECALCIFEROL) 50 mcg (2000 units) tablet Take 3 tablets (150 mcg) by mouth daily 270 tablet 3         Physical Exam:   /83 (BP Location: Right arm, Patient Position: Sitting, Cuff Size: Adult Regular)   Pulse 94   Temp 97.7  F (36.5  C) (Oral)   Resp 18   Wt 55.8 kg (123 lb)   SpO2 99%   BMI 21.00 kg/m      KPS:  90  General Appearance: alert, and no distress  Eyes: PERRL, conjunctiva and lids normal, sclera nonicteric  Ears/Nose/M/Throat: Oral mucosa and posterior oropharynx normal, moist mucous membranes  Cardio/Vascular:regular rate and rhythm, normal S1 and S2, no murmur  Resp Effort And Auscultation: Normal - Clear to auscultation without rales, rhonchi, or wheezing.  GI: Non-tender, non-distended, normoactive bowel sounds  : No CVA tenderness  Edema: none  Skin: Skin color, texture, turgor normal. No rashes or lesions.  Neurologic: Gait normal.  Sensation grossly WNL.  Psych/Affect: Mood and affect are appropriate.    Blood Counts     Recent Labs   Lab Test 09/13/24  0823 08/02/24  1448 07/19/24  0948 06/18/24  0327 06/17/24  0338   HGB 12.1 10.8* 10.0*   < > 10.6*   HCT 35.6 32.4* 30.0*   < > 31.3*   WBC 7.1 6.7 6.2   < > 0.5*   ANEUTAUTO 4.9 4.0  --   --  0.3*   ALYMPAUTO 1.2 1.6  --   --  0.1*   AMONOAUTO 0.8 0.9  --   --  0.0   AEOSAUTO 0.2 0.2  --   --  0.0   ABSBASO 0.1 0.1  --   --  0.0   NRBCMAN 0.0 0.0 0.0   < > 0.0    177 122*   < > 124*    < > = values in this interval not displayed.       Chemistries     Basic Panel  Recent Labs   Lab Test 09/13/24  0823 08/02/24  1448 07/19/24  0948    135  135 135   POTASSIUM 3.9 3.5  3.5 4.1   CHLORIDE 101 100  100  101   CO2 26 27  27 26   BUN 40.0* 28.5*  28.5* 19.6   CR 2.92* 2.07*  2.07* 2.03*   * 103*  103* 126*        Calcium, Magnesium, Phosphorus  Recent Labs   Lab Test 09/13/24  0823 08/02/24  1448 07/19/24  0948 07/12/24  1530 07/11/24  1101 07/09/24  1011 07/09/24  1001 07/02/24  0949 07/01/24  0355   TANNER 11.9* 9.4  9.4 9.3   < > 7.9* 7.6* 7.6*   < > 7.3*   MAG  --   --   --   --  1.5*  --  1.6*  --  2.1   PHOS  --   --   --   --   --  3.7 3.7  --  2.5    < > = values in this interval not displayed.        LFTs  Recent Labs   Lab Test 09/13/24  0823 08/02/24  1448 07/19/24  0948   BILITOTAL 0.2 0.2 <0.2   ALKPHOS 119 122 177*   AST 23 21 17   ALT 22 23 12   ALBUMIN 3.1* 2.7* 2.3*       LDH  Recent Labs   Lab Test 07/09/24  1001 05/24/24  1112 01/18/24  1433   * 248 248       B2-Microglobulin  Recent Labs   Lab Test 05/21/24  1338 01/18/24  1433   HYNS9HCGG 4.3* 5.8*         Immunoglobulins     Recent Labs   Lab Test 07/09/24  1001 05/21/24  1338 04/19/24  0956 03/15/24  0820 02/02/24  1437 01/18/24  1444   * 86* 101* 116* 204* 294*       Recent Labs   Lab Test 07/09/24  1001 05/21/24  1338 04/19/24  0956 03/15/24  0820 01/18/24  1444    35* 37* 36* 164       Recent Labs   Lab Test 07/09/24  1001 05/21/24  1338 04/19/24  0956 03/15/24  0820 01/18/24  1444    <10* 21* 33* 127         Monocloncal Protein Studies     M spike    Recent Labs   Lab Test 07/09/24  1001 05/24/24  1112 05/21/24  1338 04/19/24  0956 03/15/24  0820 01/08/24  1201   ELPM 0.0 0.0 0.0 0.1* 0.1* 0.0       Kappa FLC    Recent Labs   Lab Test 07/09/24  1011 07/09/24  1001 05/24/24  1108 05/21/24  1338 04/19/24  0956 03/15/24  0820   KFLCA 6.05* 5.79* 1.12 1.07 1.13 1.33       Lambda FLC    Recent Labs   Lab Test 07/09/24  1011 07/09/24  1001 05/24/24  1108 05/21/24  1338 04/19/24  0956 03/15/24  0820   LFLCA 7.10* 7.14* 1.74 1.49 1.71 1.96       FLC Ratio    Recent Labs   Lab Test 07/09/24  1011 07/09/24  1001  05/24/24  1108 05/21/24  1338 04/19/24  0956 03/15/24  0820   KLRA 0.85 0.81 0.64 0.72 0.66 0.68         Bone Marrow Biopsy       Morphology    Results for orders placed or performed in visit on 05/24/24 (from the past 8760 hour(s))   Bone marrow biopsy   Result Value    Final Diagnosis      Bone marrow, posterior iliac crest, left decalcified trephine biopsy and touch imprint; left aspirate clot, direct aspirate smear, concentrate aspirate smear, and peripheral blood smear:               - Minute level involvement by recurrent/persistent plasma cell neoplasm  - Normocellular marrow (40-50% estimated cellularity), with , trilineage hematopoiesis, no overt dysplasia, and no increase in blasts (1%)  - Positive for amyloid deposisiton by congo erika   - Peripheral blood showing slight thrombocytosis and no circulating plasma cells  - See comment      Comment      Flow cytometry analysis on concurrent specimen ([id number]) showed rare lambda monotypic plasma cells (0.001%) and polytypic B cells. The flow findings coupled with the positive congo red and lambda monotypic plasma cells by immunohistochemistry are consistent with a plasma cell neoplasm.     Concurrent ancillary studies are in progress and will be reported separately. Correlation with the results of ancillary tests and clinical findings is recommended.      Clinical Information      From Epic electronic medical record; 54 year old female with a history of AL amyloidosis, lambda light chain-restricted, with kidney (nephrotic syndrome), bone marrow, and cardiac involvement treated with chemotherapy. Her most recent bone marrow biopsy ([id number] on 1/18/2024), pre-therapy, showed lambda-monotypic plasma cells with monotypic B cells. She is undergoing evaluation prior to hematopoietic cell transplant.       Peripheral Hematologic Data      CBC WITH DIFFERENTIAL (05/24/2024 11:17 AM CDT):     RESULT VALUE REF. RANGE UNITS   WBC Count  Hemoglobin  Hematocrit    Platelet Count   RBC Count  MCV  MCH  MCHC   RDW  9.7 (NORMAL)    13.0 (NORMAL)     39.6 (NORMAL)   483  ( H )  4.31 (NORMAL)       92 (NORMAL)     30.2 (NORMAL)     32.8 (NORMAL)      16.8  ( H ) 4.0-11.0  11.7-15.7  35.0-47.0  150-450  3.80-5.20    26.5-33.0  31.5-36.5  10.0-15.0 10e3/uL  g/dL  %  10e3/uL  10e6/uL  fL  pg  g/dL  %   % Neutrophils  % Lymphocytes  % Monocytes  % Eosinophils  % Basophils  % Immature Granulocytes  Absolute Neutrophils  Absolute Lymphocytes  Absolute Monocytes  Absolute Eosinophils  Absolute Basophils  Absolute Immature Granulocytes  NRBCs per 100 WBC  Absolute NRBCs 71  15  10  2  2  0  6.9 (NORMAL)  1.4 (NORMAL)  1.0 (NORMAL)  0.2 (NORMAL)  0.2 (NORMAL)  0.0 (NORMAL)  0 (NORMAL)  0.0 () N/A  N/A  N/A  N/A  N/A  N/A  1.6-8.3  0.8-5.3  0.0-1.3  0.0-0.7  0.0-0.2  <=0.4  <1  <=0.0 %  %  %  %  %  %  10e3/uL  10e3/uL  10e3/uL  10e3/uL  10e3/uL  10e3/uL  /100  10e3/uL         Microscopic Description      PERIPHERAL BLOOD SMEAR MORPHOLOGY:  The red blood cells appear normochromic.  Poikilocytosis  includes numerous acanthocytes and echinocytes .  Polychromasia is not increased increased.  Rouleaux formation is not increased. The morphology of the platelets is normal. No circulating plasma cells.     Bone marrow aspirates and trephine core biopsy touch imprints are reviewed.    BONE MARROW DIFFERENTIAL (500 cells on direct aspirate smears)  Percent (%) Cell Population Reference Range (%)   1 Blasts  (0 - 1)   3 Neutrophil promyelocytes (2 - 4)   53 Neutrophils and precursors (54 - 63)   27 Erythroid precursors (18 - 24)   1 Monocytes (1 - 1.5)   1 Eosinophils (1 - 3)   1 Basophils (0 - 1)   13 Lymphocytes (8 - 12)   0 Plasma cells (0 - 1.5)     The aspirate smears are adequate for evaluation.    Neutrophil maturation is complete. Erythroid maturation is complete. Megakaryocytes exhibit a spectrum of sizes and morphologies, within the limits of normal .    CLOT SECTIONS:   The clot  sections show fibrin and blood.    TREPHINE SECTIONS:  Hematoxylin and eosin stains are reviewed. The quality of the trephine core biopsy is good, with slight crush and fragmentation artifact. The marrow cellularity is estimated at 40-50%. The cellular composition reflects the aspirate smear differential. The number of megakaryocytes appears consistent with the degree of marrow cellularity, with unremarkable morphology. No aggregates of plasma cells are identified. There are few thickened blood vessels identified.    IMMUNOHISTOCHEMISTRY:  Immunohistochemical and special stains are performed on the paraffin-embedded trephine core with appropriate controls.     highlights scattered plasma cells.  Stains for cytoplasmic kappa and lambda immunoglobulin light chains show lambda-monotypic plasma cells.  Congo red is positive    Note: These immunohistochemical stains are deemed medically necessary. Some of the antigens may also be evaluated by flow cytometry. Concurrent evaluation by immunohistochemistry on clot and/or trephine sections is indicated in this case in order to correlate immunophenotype with cell morphology and determine extent of involvement, spatial pattern, and focality of potential disease distribution.     Case was reviewed by the following:  Resident Pathologist: Loida Ott MD  A resident or fellow in a training program was involved in the initial review, preparation, and/or interpretation of this case.  I, as the senior physician, attest that I have personally reviewed all specimens and or slides, including the listed special stains, and used them with my medical judgement to determine the final diagnosis.              Gross Description      Procedure/Gross Description   Aspirate(s) and trephine(s) procured by SHAQUILLE JACKSON    Specimen sent for Special Studies:         Flow Cytometry: left aspirate        Cytogenetics: left aspirate        Molecular Diagnostics: left aspirate        Other: left  aspirate- ClonoSEQ    Biopsy aspiration site: left posterior iliac crest                                                      (Reference Range)          Amount of aspirate           2.5   mL        Fat and P.V. cell layer        2    %               (1 - 3)        Particles                             0   %        Myeloid-erythroid layer    2    %               (5 - 8)          Clot Section: yes    Trephine biopsy site: left posterior iliac crest    Designated left posterior iliac crest 1 cylinder of gritty tissue, labeled with the patient's name and hospital number, obtained with 11 gauge needle and a length of 14 mm; entirely submitted in 1 cassette; acetic zinc formalin fixed, decalcified, processed, and stained for hematoxylin and eosin per laboratory protocol.        [facility] Yes (A)    Performing Labs      The technical component of this testing was completed at [facility].     Stain controls for all stains resulted within this report have been reviewed and show appropriate reactivity.        PET Scan       Results for orders placed during the hospital encounter of 01/25/24    PET ONCOLOGY WHOLE BODY    Status: Normal 1/25/2024    Narrative  Combined Report of: PET and CT on  1/25/2024 9:59 AM:    1. PET of the neck, chest, abdomen, and pelvis.  2. PET CT Fusion for Attenuation Correction and Anatomical  Localization:  3. 3D MIP and PET-CT fused images were processed on an independent  workstation and archived to PACS and reviewed by a radiologist.    Technique:    1. PET: The patient received 10.54 mCi of F-18-FDG; the serum glucose  was 103 mg/dL prior to administration, body weight was 80 kg. Images  were evaluated in the axial, sagittal, and coronal planes as well as  the rotational whole body MIP. Images were acquired from the Vertex to  the Feet.    UPTAKE WAS MEASURED AT 62 MINUTES.    BACKGROUND:  Liver SUV max= 2.8,   Aorta Blood SUV  max= 2.5.    2. CT: CT only obtained for attenuation correction and not diagnostic  purposes.    INDICATION: Biopsy-proven AL amyloidosis of kidney, workup for plasma  cell disorder; AL amyloidosis (H)    ADDITIONAL INFORMATION OBTAINED FROM EMR: 53-year-old female with  biopsy-proven renal amyloidosis (AL amyloidosis) and cardiac  involvement by MRI, PET CT requested for plasma cell disorder workup.    COMPARISON: None.    FOLLOW-UP APPOINTMENT: Unknown    FINDINGS:    HEAD/NECK:  Mildly hypermetabolic mildly enlarged bilateral level 2A nodes for  example a 1.5 x 1.2 cm right level IIA node, SUV max 4.2, series 3  image 71 and a 1.1 x 0.7 cm left level 2 node, SUV max 3.7, series 3  image 71.    Moderate diffuse thyroid uptake, SUV max 6.0, series 3 image 97.    CHEST:  Left lower lobe series 4 image [id number] there is nodularity along the  left lower lobe anterior segment bronchus, largest nodule measuring 8  mm there is focal hypermetabolism in this area max SUV 4.5.  Differential amyloid, infection, atelectasis.    There is mild uptake throughout the left ventricle which could  represent active amyloidosis however in the absence of a cardiac  preparation may simply be physiologic.  Calcified left hilar nodes and left lower lobe calcified granuloma. No  pneumothorax or pleural effusion.    ABDOMEN AND PELVIS:  No abnormal uptake.    No calcified gallstones. Calcified splenic granulomas. No adrenal  nodule. Left inferior pole nonobstructing intrarenal calculus, no  hydronephrosis. No small or large bowel obstruction. Intrauterine  device in place. No free air or free fluid in the abdomen or pelvis.    LOWER EXTREMITIES:  A single area of focal moderate to markedly hypermetabolic region in  the proximal posterior left gastrocnemius muscle, SUV max 8.6 series 3  image 416.    BONES AND SOFT TISSUES:  No abnormal uptake.  Old anterior rib fracture deformities.    Impression  IMPRESSION: In this patient with history of  light chain amyloidosis  with renal and cardiac involvement,  1. No definite active amyloid on FDG pet.  2. Single focal moderate to markedly avid intramuscular uptake,  proximal left gastrocnemius, no etiology demonstrated on unenhanced  non diagnostic CT.  Recommend dedicated MRI.    3. Small to mildly enlarged mildly hypermetabolic bilateral level 2A  lymph nodes, most likely reactive.    4. A few equivocal findings - unlikely to be active amyloid (patient  has been under treatment, so FDG may be decreased).  Recommend future  PET scans be done with a sarcoid cardiac prep and and Lasix protocol  to decrease background and make them more sensitive for cardiac and  renal activity.    4a. Mildly hypermetabolic left lower lobe anterior segment  peribronchial nodularity and hypermetabolism. Differential favors RSV  (positive 1/18/24), focal amyloid unlikely.  4b. Cardiac (MRI positive) - equivocal FDG throughout left ventricle,  likely physiologic. Given findings on MRI 1/16/2024, can not rule out  active amyloidosis. However in the absence of a cardiac dietary prep  (which switches myocardium from glucose to fatty metabolism), this is  likely normal physiology.  Suggest future PET scans be done with a  cardiac sarcoid dietary protocol (3 day ketogenic diet).  4c. Kidney cortex (bx. 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        I have personally reviewed the examination and initial interpretation  and I agree with the findings.    PETE VELASQUEZ MD      SYSTEM ID:  [id number]     The longitudinal plan of care for the diagnosis(es)/condition(s) as documented were addressed during this visit. Due to the added complexity in care, I will continue to support Vivian in the subsequent management and with ongoing continuity of  care.    ------------------------------------------------------------------------------------------------------------------------------------------------    Patient Care Team         Relationship Specialty Notifications Start End    Alin Torres, PA-C PCP - General Family Medicine  11/4/20     Phone: [phone] Fax: [phone] [address] [facility] [address]    Sarthak Ontiveros MD Assigned OBGYN Provider   9/24/22     Phone: [phone] Fax: [phone]         [address] [facility] [address] [facility] [address]    Alin Torres, JADE Assigned PCP   12/31/22     Phone: [phone] Fax: [phone]         [address] [facility] [address]    Carmen Waggoner APRN CNP Nurse Practitioner Gastroenterology  11/21/23     Phone: [phone] Fax: [phone]         [address] [place] [address]    Audery Sen DO Assigned Nephrology Provider   12/9/23     Phone: [phone] Fax: [phone]         [address] [facility] [address] [facility] [address]    Musa Adrian MD Physician Hematology & Oncology  12/26/23     Phone: [phone] Fax: [phone]         [address] [facility] [address] [facility] [address]    Jomar Chandler MD MD Hematology & Oncology  12/26/23     Phone: [phone] Fax: [phone]         [address] [facility] [address] [facility] [address]    Gaston Flores MD MD Cardiovascular Disease  1/5/24     Phone: [phone] Fax: [phone] [address] [facility] [address]

## 2024-09-13 NOTE — PATIENT INSTRUCTIONS
Contact Numbers  [facility]: [phone] (for symptom and scheduling needs)    Please call the [facility] Triage line if you experience a temperature greater than or equal to 100.4, shaking chills, have uncontrolled nausea, vomiting and/or diarrhea, dizziness, shortness of breath, chest pain, bleeding, unexplained bruising, or if you have any other new/concerning symptoms, questions or concerns.     If you are having any concerning symptoms or wish to speak to a provider before your next infusion visit, please call your care coordinator or triage to notify them so we can adequately serve you.     If you need a refill on a narcotic prescription or other medication, please call triage before your infusion appointment.      Recommending surgery be scheduled between ???11/17- Thanks :  - Procedure(s): Hysteroscopy w polypectomy and uterine ablation  - Facility: [facility]  - Length of Procedure: 1 hour  - Assist: none  - Type of Admit: Day Surgery  - PreOp Visit: No  - Medical Clearance from PMD: No  - Bowel Prep Needed? no  - Special Equipment: Yes- Myosure poss. resectoscope and Novasure  - Post-op visit: 2 weeks    Thank U

## 2024-09-15 ENCOUNTER — HEALTH MAINTENANCE LETTER (OUTPATIENT)
Age: 54
End: 2024-09-15

## 2024-09-16 ENCOUNTER — INFUSION THERAPY VISIT (OUTPATIENT)
Dept: INFUSION THERAPY | Facility: CLINIC | Age: 54
End: 2024-09-16
Attending: PHYSICIAN ASSISTANT
Payer: COMMERCIAL

## 2024-09-16 ENCOUNTER — HOSPITAL ENCOUNTER (OUTPATIENT)
Dept: ULTRASOUND IMAGING | Facility: CLINIC | Age: 54
Discharge: HOME | End: 2024-09-16
Attending: STUDENT IN AN ORGANIZED HEALTH CARE EDUCATION/TRAINING PROGRAM
Payer: COMMERCIAL

## 2024-09-16 ENCOUNTER — LAB (OUTPATIENT)
Dept: LAB | Facility: CLINIC | Age: 54
End: 2024-09-16
Payer: COMMERCIAL

## 2024-09-16 VITALS
BODY MASS INDEX: 20.97 KG/M2 | OXYGEN SATURATION: 98 % | TEMPERATURE: 97.1 F | SYSTOLIC BLOOD PRESSURE: 106 MMHG | HEART RATE: 106 BPM | WEIGHT: 122.8 LBS | DIASTOLIC BLOOD PRESSURE: 74 MMHG | RESPIRATION RATE: 16 BRPM

## 2024-09-16 DIAGNOSIS — N17.9 AKI (ACUTE KIDNEY INJURY) (H): ICD-10-CM

## 2024-09-16 DIAGNOSIS — Z52.011 AUTOLOGOUS DONOR OF STEM CELLS: ICD-10-CM

## 2024-09-16 DIAGNOSIS — N17.9 AKI (ACUTE KIDNEY INJURY) (H): Primary | ICD-10-CM

## 2024-09-16 DIAGNOSIS — E85.81 AL AMYLOIDOSIS (H): Primary | ICD-10-CM

## 2024-09-16 DIAGNOSIS — E85.81 AL AMYLOIDOSIS (H): ICD-10-CM

## 2024-09-16 LAB
ALBUMIN SERPL BCG-MCNC: 3.1 G/DL (ref 3.5–5.2)
ALBUMIN UR-MCNC: >499 MG/DL
ALP SERPL-CCNC: 130 U/L (ref 40–150)
ALT SERPL W P-5'-P-CCNC: 22 U/L (ref 0–50)
ANION GAP SERPL CALCULATED.3IONS-SCNC: 10 MMOL/L (ref 7–15)
APPEARANCE UR: CLEAR
AST SERPL W P-5'-P-CCNC: 23 U/L (ref 0–45)
BACTERIA #/AREA URNS HPF: ABNORMAL /HPF
BASOPHILS # BLD AUTO: 0.1 10E3/UL (ref 0–0.2)
BASOPHILS NFR BLD AUTO: 1 %
BILIRUB SERPL-MCNC: 0.3 MG/DL
BILIRUB UR QL STRIP: NEGATIVE
BUN SERPL-MCNC: 36.1 MG/DL (ref 6–20)
CALCIUM SERPL-MCNC: 11.9 MG/DL (ref 8.8–10.4)
CHLORIDE SERPL-SCNC: 101 MMOL/L (ref 98–107)
COLOR UR AUTO: YELLOW
CREAT SERPL-MCNC: 2.89 MG/DL (ref 0.51–0.95)
EGFRCR SERPLBLD CKD-EPI 2021: 19 ML/MIN/1.73M2
EOSINOPHIL # BLD AUTO: 0.2 10E3/UL (ref 0–0.7)
EOSINOPHIL NFR BLD AUTO: 3 %
ERYTHROCYTE [DISTWIDTH] IN BLOOD BY AUTOMATED COUNT: 15.8 % (ref 10–15)
GLUCOSE SERPL-MCNC: 113 MG/DL (ref 70–99)
GLUCOSE UR STRIP-MCNC: 150 MG/DL
HCO3 SERPL-SCNC: 27 MMOL/L (ref 22–29)
HCT VFR BLD AUTO: 36.7 % (ref 35–47)
HGB BLD-MCNC: 12.4 G/DL (ref 11.7–15.7)
HGB UR QL STRIP: NEGATIVE
HYALINE CASTS: 19 /LPF
IMM GRANULOCYTES # BLD: 0 10E3/UL
IMM GRANULOCYTES NFR BLD: 1 %
KETONES UR STRIP-MCNC: NEGATIVE MG/DL
LEUKOCYTE ESTERASE UR QL STRIP: NEGATIVE
LYMPHOCYTES # BLD AUTO: 1.3 10E3/UL (ref 0.8–5.3)
LYMPHOCYTES NFR BLD AUTO: 16 %
MCH RBC QN AUTO: 32.8 PG (ref 26.5–33)
MCHC RBC AUTO-ENTMCNC: 33.8 G/DL (ref 31.5–36.5)
MCV RBC AUTO: 97 FL (ref 78–100)
MONOCYTES # BLD AUTO: 0.8 10E3/UL (ref 0–1.3)
MONOCYTES NFR BLD AUTO: 10 %
MUCOUS THREADS #/AREA URNS LPF: PRESENT /LPF
NEUTROPHILS # BLD AUTO: 5.4 10E3/UL (ref 1.6–8.3)
NEUTROPHILS NFR BLD AUTO: 70 %
NITRATE UR QL: NEGATIVE
NRBC # BLD AUTO: 0 10E3/UL
NRBC BLD AUTO-RTO: 0 /100
PH UR STRIP: 6 [PH] (ref 5–7)
PLATELET # BLD AUTO: 210 10E3/UL (ref 150–450)
POTASSIUM SERPL-SCNC: 3.9 MMOL/L (ref 3.4–5.3)
PROT SERPL-MCNC: 6.1 G/DL (ref 6.4–8.3)
RBC # BLD AUTO: 3.78 10E6/UL (ref 3.8–5.2)
RBC URINE: 2 /HPF
SODIUM SERPL-SCNC: 138 MMOL/L (ref 135–145)
SP GR UR STRIP: 1.03 (ref 1–1.03)
SQUAMOUS EPITHELIAL: 1 /HPF
UROBILINOGEN UR STRIP-MCNC: NORMAL MG/DL
WBC # BLD AUTO: 7.8 10E3/UL (ref 4–11)
WBC URINE: 11 /HPF

## 2024-09-16 PROCEDURE — 81001 URINALYSIS AUTO W/SCOPE: CPT | Performed by: STUDENT IN AN ORGANIZED HEALTH CARE EDUCATION/TRAINING PROGRAM

## 2024-09-16 PROCEDURE — 36415 COLL VENOUS BLD VENIPUNCTURE: CPT

## 2024-09-16 PROCEDURE — 258N000003 HC RX IP 258 OP 636: Performed by: STUDENT IN AN ORGANIZED HEALTH CARE EDUCATION/TRAINING PROGRAM

## 2024-09-16 PROCEDURE — 76770 US EXAM ABDO BACK WALL COMP: CPT

## 2024-09-16 PROCEDURE — 85025 COMPLETE CBC W/AUTO DIFF WBC: CPT | Performed by: STUDENT IN AN ORGANIZED HEALTH CARE EDUCATION/TRAINING PROGRAM

## 2024-09-16 PROCEDURE — 80053 COMPREHEN METABOLIC PANEL: CPT | Performed by: STUDENT IN AN ORGANIZED HEALTH CARE EDUCATION/TRAINING PROGRAM

## 2024-09-16 PROCEDURE — 96360 HYDRATION IV INFUSION INIT: CPT

## 2024-09-16 RX ORDER — HEPARIN SODIUM,PORCINE 10 UNIT/ML
5-20 VIAL (ML) INTRAVENOUS DAILY PRN
Status: CANCELLED | OUTPATIENT
Start: 2024-09-16

## 2024-09-16 RX ORDER — HEPARIN SODIUM (PORCINE) LOCK FLUSH IV SOLN 100 UNIT/ML 100 UNIT/ML
5 SOLUTION INTRAVENOUS
Status: CANCELLED | OUTPATIENT
Start: 2024-09-16

## 2024-09-16 RX ADMIN — SODIUM CHLORIDE 1000 ML: 9 INJECTION, SOLUTION INTRAVENOUS at 09:10

## 2024-09-16 ASSESSMENT — PAIN SCALES - GENERAL: PAINLEVEL: NO PAIN (0)

## 2024-09-16 NOTE — PROGRESS NOTES
Infusion Nursing Note:  Vivian MARVIN Brown presents today for IVF.    Patient seen by provider today: No   present during visit today: Not Applicable.    Note: Arrives ambulatory with mother.      Intravenous Access:  Peripheral IV placed.    Treatment Conditions:  Not Applicable.      Post Infusion Assessment:  Patient tolerated infusion without incident.       Discharge Plan:   Patient discharged in stable condition accompanied by: mother.  Departure Mode: Ambulatory.      Sarah Peck RN

## 2024-09-17 ENCOUNTER — ANESTHESIA EVENT (OUTPATIENT)
Dept: GASTROENTEROLOGY | Facility: CLINIC | Age: 54
End: 2024-09-17
Payer: COMMERCIAL

## 2024-09-17 NOTE — H&P
[facility] Anesthesia Pre-op History and Physical    Vivian Brown MRN# [id number]   Age: 54 year old YOB: 1970      Date of Surgery: 9/18/2024 Location [facility]      Date of Exam 9/18/2024 Facility (In hospital)       Home clinic: [facility]  Primary care provider: Alin Torres         Chief Complaint and/or Reason for Procedure:   No chief complaint on file.  EGD. amyloid  Congo red stain neg Jan 2024. Amaro 3C prior.  N/V       Active problem list:     Patient Active Problem List    Diagnosis Date Noted    Light chain (AL) amyloidosis (H) 07/01/2024     Priority: Medium    History of peripheral stem cell transplant (H) 07/01/2024     Priority: Medium    Autologous donor of stem cells 05/31/2024     Priority: Medium    Celiac disease 04/08/2024     Priority: Medium    Chronic kidney disease, stage 3a (H) 03/18/2024     Priority: Medium    Hypogammaglobulinemia (H24) 02/05/2024     Priority: Medium    AL amyloidosis (H) 01/14/2024     Priority: Medium    Dependent edema R>L 05/22/2023     Priority: Medium    Hyperlipidemia LDL goal <100 12/23/2022     Priority: Medium    Nonallopathic lesion of cervical region 09/23/2015     Priority: Medium     Problem list name updated by automated process. Provider to review      Cervicalgia 09/23/2015     Priority: Medium    Nonallopathic lesion of sacral region 09/23/2015     Priority: Medium     Problem list name updated by automated process. Provider to review      Nonallopathic lesion of lumbar region 09/23/2015     Priority: Medium     Problem list name updated by automated process. Provider to review      Lumbago 09/23/2015     Priority: Medium    Nonallopathic lesion of thoracic region 09/23/2015     Priority: Medium     Problem list name updated by automated process. Provider to review      Family hx of colon cancer 08/12/2015     Priority: Medium    Hypothyroidism due to acquired atrophy  of thyroid 05/21/2013     Priority: Medium            Medications (include herbals and vitamins):   Any Plavix use in the last 7 days? No     No current facility-administered medications for this encounter.     Current Outpatient Medications   Medication Sig Dispense Refill    acyclovir (ZOVIRAX) 400 MG tablet Take 2 tablets (800 mg) by mouth 2 times daily 120 tablet 11    Albuterol-Budesonide (AIRSUPRA) 90-80 MCG/ACT AERO Inhale 2 Inhalations into the lungs every 4 hours as needed (Wheezing, chest tightness, shortness of breath, or persistent cough) 10.7 g 3    azelastine (ASTELIN) 0.1 % nasal spray Spray 2 sprays into both nostrils 2 times daily as needed for rhinitis 30 mL 3    bumetanide (BUMEX) 1 MG tablet Take 1 tablet (1 mg) by mouth 2 times daily 60 tablet 6    EPINEPHrine (ANY BX GENERIC EQUIV) 0.3 MG/0.3ML injection 2-pack Inject 0.3 mLs (0.3 mg) into the muscle as needed for anaphylaxis May repeat one time in 5-15 minutes if response to initial dose is inadequate. 2 each 3    fluticasone (FLONASE) 50 MCG/ACT nasal spray Spray 2 sprays into both nostrils daily 16 g 3    levothyroxine (SYNTHROID) 200 MCG tablet Take 200 mcg by mouth daily Pt takes Synthroid brand name. FRANKI      liothyronine (CYTOMEL) 5 MCG tablet Take 5 mcg by mouth 2 times daily      loperamide (IMODIUM A-D) 2 MG tablet Take 1 tablet (2 mg) by mouth 4 times daily as needed for diarrhea (Patient not taking: Reported on 9/16/2024) 30 tablet 1    melatonin 3 MG tablet Take 1 tablet (3 mg) by mouth nightly as needed for sleep (Patient not taking: Reported on 8/2/2024) 30 tablet 1    midodrine (PROAMATINE) 10 MG tablet Take 1 tablet (10 mg) by mouth 3 times daily 270 tablet 3    OLANZapine (ZYPREXA) 2.5 MG tablet Take 1 tablet (2.5 mg) by mouth at bedtime (Patient not taking: Reported on 9/16/2024) 30 tablet 0    ondansetron (ZOFRAN) 8 MG tablet Take 1 tablet (8 mg) by mouth every 8 hours 30 tablet 1    ondansetron (ZOFRAN) 8 MG tablet Take 8  mg by mouth every 8 hours as needed for nausea      pantoprazole (PROTONIX) 40 MG EC tablet Take 1 tablet (40 mg) by mouth every morning (before breakfast) (Patient not taking: Reported on 9/16/2024) 30 tablet 1    prochlorperazine (COMPAZINE) 10 MG tablet Take 0.5 tablets (5 mg) by mouth every 6 hours as needed for nausea or vomiting 30 tablet 1    psyllium (METAMUCIL/KONSYL) Packet Take 1 packet by mouth daily (Patient not taking: Reported on 9/16/2024) 30 packet 1    sulfamethoxazole-trimethoprim (BACTRIM DS) 800-160 MG tablet Take 1 tablet by mouth Every Mon, Tues two times daily 16 tablet 11    vitamin D3 (CHOLECALCIFEROL) 50 mcg (2000 units) tablet Take 3 tablets (150 mcg) by mouth daily 270 tablet 3             Allergies:      Allergies   Allergen Reactions    Blood Transfusion Related (Informational Only)      Patient has a history of a clinically significant antibody against RBC antigens.  A delay in compatible RBCs may occur. Anti-CD38 interfering in all testing at [facility] 05/21/24.    Gluten Meal     Latex Itching    Seasonal Allergies      Allergy to Latex? No  Allergy to tape?   No  Intolerances:             Physical Exam:   All vitals have been reviewed  No data found.  No intake/output data recorded.  Lungs:   No increased work of breathing, good air exchange, clear to auscultation bilaterally, no crackles or wheezing     Cardiovascular:   Normal apical impulse, regular rate and rhythm, normal S1 and S2, no S3 or S4, and no murmur noted             Lab / Radiology Results:            Anesthetic risk and/or ASA classification:       Melo Cloud MD

## 2024-09-18 ENCOUNTER — HOSPITAL ENCOUNTER (OUTPATIENT)
Facility: CLINIC | Age: 54
Discharge: HOME | End: 2024-09-18
Attending: INTERNAL MEDICINE | Admitting: INTERNAL MEDICINE
Payer: COMMERCIAL

## 2024-09-18 ENCOUNTER — ANESTHESIA (OUTPATIENT)
Dept: GASTROENTEROLOGY | Facility: CLINIC | Age: 54
End: 2024-09-18
Payer: COMMERCIAL

## 2024-09-18 VITALS
HEART RATE: 73 BPM | DIASTOLIC BLOOD PRESSURE: 87 MMHG | OXYGEN SATURATION: 99 % | RESPIRATION RATE: 16 BRPM | SYSTOLIC BLOOD PRESSURE: 127 MMHG

## 2024-09-18 LAB — UPPER GI ENDOSCOPY: NORMAL

## 2024-09-18 PROCEDURE — 88342 IMHCHEM/IMCYTCHM 1ST ANTB: CPT | Mod: 26 | Performed by: PATHOLOGY

## 2024-09-18 PROCEDURE — 370N000017 HC ANESTHESIA TECHNICAL FEE, PER MIN: Performed by: INTERNAL MEDICINE

## 2024-09-18 PROCEDURE — 258N000003 HC RX IP 258 OP 636: Performed by: NURSE ANESTHETIST, CERTIFIED REGISTERED

## 2024-09-18 PROCEDURE — 250N000011 HC RX IP 250 OP 636: Performed by: NURSE ANESTHETIST, CERTIFIED REGISTERED

## 2024-09-18 PROCEDURE — 250N000009 HC RX 250: Performed by: NURSE ANESTHETIST, CERTIFIED REGISTERED

## 2024-09-18 PROCEDURE — 88313 SPECIAL STAINS GROUP 2: CPT | Mod: 26 | Performed by: PATHOLOGY

## 2024-09-18 PROCEDURE — 88305 TISSUE EXAM BY PATHOLOGIST: CPT | Mod: 26 | Performed by: PATHOLOGY

## 2024-09-18 PROCEDURE — 43239 EGD BIOPSY SINGLE/MULTIPLE: CPT | Performed by: INTERNAL MEDICINE

## 2024-09-18 PROCEDURE — 88342 IMHCHEM/IMCYTCHM 1ST ANTB: CPT | Mod: TC | Performed by: INTERNAL MEDICINE

## 2024-09-18 RX ORDER — OXYCODONE HYDROCHLORIDE 5 MG/1
5 TABLET ORAL
Status: CANCELLED | OUTPATIENT
Start: 2024-09-18

## 2024-09-18 RX ORDER — PROPOFOL 10 MG/ML
INJECTION, EMULSION INTRAVENOUS CONTINUOUS PRN
Status: DISCONTINUED | OUTPATIENT
Start: 2024-09-18 | End: 2024-09-18

## 2024-09-18 RX ORDER — LIDOCAINE HYDROCHLORIDE 20 MG/ML
INJECTION, SOLUTION INFILTRATION; PERINEURAL PRN
Status: DISCONTINUED | OUTPATIENT
Start: 2024-09-18 | End: 2024-09-18

## 2024-09-18 RX ORDER — DEXAMETHASONE SODIUM PHOSPHATE 10 MG/ML
4 INJECTION, SOLUTION INTRAMUSCULAR; INTRAVENOUS
Status: CANCELLED | OUTPATIENT
Start: 2024-09-18

## 2024-09-18 RX ORDER — PROPOFOL 10 MG/ML
INJECTION, EMULSION INTRAVENOUS PRN
Status: DISCONTINUED | OUTPATIENT
Start: 2024-09-18 | End: 2024-09-18

## 2024-09-18 RX ORDER — ONDANSETRON 2 MG/ML
4 INJECTION INTRAMUSCULAR; INTRAVENOUS EVERY 30 MIN PRN
Status: CANCELLED | OUTPATIENT
Start: 2024-09-18

## 2024-09-18 RX ORDER — SODIUM CHLORIDE, SODIUM LACTATE, POTASSIUM CHLORIDE, CALCIUM CHLORIDE 600; 310; 30; 20 MG/100ML; MG/100ML; MG/100ML; MG/100ML
INJECTION, SOLUTION INTRAVENOUS CONTINUOUS
Status: DISCONTINUED | OUTPATIENT
Start: 2024-09-18 | End: 2024-09-18 | Stop reason: HOSPADM

## 2024-09-18 RX ORDER — OXYCODONE HYDROCHLORIDE 5 MG/1
10 TABLET ORAL
Status: CANCELLED | OUTPATIENT
Start: 2024-09-18

## 2024-09-18 RX ORDER — LIDOCAINE 40 MG/G
CREAM TOPICAL
Status: DISCONTINUED | OUTPATIENT
Start: 2024-09-18 | End: 2024-09-18 | Stop reason: HOSPADM

## 2024-09-18 RX ORDER — ONDANSETRON 4 MG/1
4 TABLET, ORALLY DISINTEGRATING ORAL EVERY 30 MIN PRN
Status: CANCELLED | OUTPATIENT
Start: 2024-09-18

## 2024-09-18 RX ORDER — NALOXONE HYDROCHLORIDE 0.4 MG/ML
0.1 INJECTION, SOLUTION INTRAMUSCULAR; INTRAVENOUS; SUBCUTANEOUS
Status: CANCELLED | OUTPATIENT
Start: 2024-09-18

## 2024-09-18 RX ADMIN — LIDOCAINE HYDROCHLORIDE 30 MG: 20 INJECTION, SOLUTION INFILTRATION; PERINEURAL at 10:08

## 2024-09-18 RX ADMIN — PROPOFOL 80 MG: 10 INJECTION, EMULSION INTRAVENOUS at 10:08

## 2024-09-18 RX ADMIN — PHENYLEPHRINE HYDROCHLORIDE 100 MCG: 10 INJECTION INTRAVENOUS at 10:10

## 2024-09-18 RX ADMIN — PROPOFOL 150 MCG/KG/MIN: 10 INJECTION, EMULSION INTRAVENOUS at 10:08

## 2024-09-18 RX ADMIN — SODIUM CHLORIDE, POTASSIUM CHLORIDE, SODIUM LACTATE AND CALCIUM CHLORIDE: 600; 310; 30; 20 INJECTION, SOLUTION INTRAVENOUS at 10:04

## 2024-09-18 ASSESSMENT — ACTIVITIES OF DAILY LIVING (ADL)
ADLS_ACUITY_SCORE: 38
ADLS_ACUITY_SCORE: 38

## 2024-09-18 NOTE — ANESTHESIA PREPROCEDURE EVALUATION
Anesthesia Pre-Procedure Evaluation    Patient: Vivian Brown   MRN: [id number] : 1970        Procedure : Procedure(s):  Esophagoscopy, gastroscopy, duodenoscopy (EGD), combined          Past Medical History:   Diagnosis Date     Celiac disease      Family history of colon cancer     Colonoscoy q5 years next 2020      Past Surgical History:   Procedure Laterality Date     BONE MARROW BIOPSY, BONE SPECIMEN, NEEDLE/TROCAR Left 2024    Procedure: BIOPSY, BONE MARROW;  Surgeon: Sabrina Jain NP;  Location: [facility] OR     COLONOSCOPY N/A 2015    Procedure: COLONOSCOPY;  Surgeon: Eugenio Travis MD;  Location: [facility]     ESOPHAGOSCOPY, GASTROSCOPY, DUODENOSCOPY (EGD), COMBINED N/A 2024    Procedure: ESOPHAGOGASTRODUODENOSCOPY, WITH BIOPSY;  Surgeon: Melo Cloud MD;  Location: [facility]     IR CVC TUNNEL PLACEMENT > 5 YRS OF AGE  2024     IR CVC TUNNEL REMOVAL RIGHT  7/15/2024     IR RENAL BIOPSY RIGHT  2023     TONSILLECTOMY        Allergies   Allergen Reactions     Blood Transfusion Related (Informational Only)      Patient has a history of a clinically significant antibody against RBC antigens.  A delay in compatible RBCs may occur. Anti-CD38 interfering in all testing at [facility].     Gluten Meal      Latex Itching     Seasonal Allergies       Social History     Tobacco Use     Smoking status: Never     Passive exposure: Past     Smokeless tobacco: Never     Tobacco comments:     Parents smoked during childhood in the house   Substance Use Topics     Alcohol use: Not Currently     Alcohol/week: 0.0 standard drinks of alcohol     Comment: 1-2 times/month      Wt Readings from Last 1 Encounters:   24 55.7 kg (122 lb 12.8 oz)        Anesthesia Evaluation   Pt has had prior anesthetic. Type: MAC.    No history of anesthetic complications       ROS/MED HX  ENT/Pulmonary:  - neg pulmonary ROS     Neurologic:  - neg neurologic ROS     Cardiovascular: Comment:  "Dependent edema    (+) Dyslipidemia - -   -  - -                                 Previous cardiac testing   Echo: Date: 1-3-24 Results:  Reading Physician Reading Date Result Priority  Fred Devlin MD  [phone] 1/3/2024     Narrative & Impression  [id number]  [id number]  [id number]  [id number]^LEENA^IQRA^DOMINIC     [facility]  Echocardiography Laboratory  [address] [place] [address]     Name: ADE NAVARRO  MRN: [id number]  : 1970  Study Date: 2024 09:59 AM  Age: 53 yrs  Gender: Female  Patient Location: [facility]  Reason For Study: Renal amyloidosis (H), Renal amyloidosis (H)  History: hyperlipidemia  Ordering Physician: IQRA STERN  Referring Physician: Alin Torres  Performed By: Beronica Dang     BSA: 1.9 m2  Height: 64 in  Weight: 186 lb  HR: 82  BP: 90/60 mmHg  ______________________________________________________________________________  Procedure  Complete Echo Adult. Myocardial Strain Imaging performed.  ______________________________________________________________________________  Interpretation Summary     There is mild concentric left ventricular hypertrophy.  The right ventricle is normal in size and function.  There is borderline right ventricular hypertrophy.  Global peak LV longitudinal strain is averaged at -15.3%. This suggests  abnormal strain (normal <-18%).  The visual ejection fraction is estimated at 54-56%.  Trivial posterior pericardial effusion  Clinical history is listed as renal amyloid--on this echo the atria are normal  size, the valves are not thickend but there is mild LVH and borderline RVH,  there is questionable \"scintillation\" of LV myocardium, the global  longitudinal strain is abnormal and the best strain values are at the apex  (borderline \"apical sparing\" strain pattern) and there is trace pericardial  effusion along with borderline criteria for diastolic dysfunction grade2--take  together this could " represent some features of cardiac amyloid. Additional  studies such as cardiac MRI, cardiac biopsy etc may be useful.  ______________________________________________________________________________  Left Ventricle  The left ventricle is normal in size. There is mild concentric left  ventricular hypertrophy. Global peak LV longitudinal strain is averaged at -  15.3%. This suggests abnormal strain (normal <-18%). The visual ejection  fraction is estimated at 54-56%. Normal left ventricular wall motion. There is  no thrombus seen in the left ventricle.     Right Ventricle  There is borderline right ventricular hypertrophy. The right ventricle is  normal in size and function.     Atria  Normal left atrial size. Right atrial size is normal.     Mitral Valve  The mitral valve leaflets appear normal. There is no evidence of stenosis,  fluttering, or prolapse. There is physiologic mitral regurgitation.     Tricuspid Valve  Right ventricular systolic pressure could not be approximated due to  inadequate tricuspid regurgitation.     Aortic Valve  Normal tricuspid aortic valve.     Pulmonic Valve  The pulmonic valve is not well seen, but is grossly normal.     Vessels  Normal size aorta. The inferior vena cava is normal.     Pericardium  Trivial posterior pericardial effusion.     Rhythm  Sinus rhythm was noted.  ______________________________________________________________________________  MMode/2D Measurements & Calculations  IVSd: 1.3 cm     LVIDd: 3.7 cm  LVIDs: 2.2 cm  LVPWd: 1.4 cm  FS: 39.1 %  LV mass(C)d: 176.0 grams  LV mass(C)dI: 92.8 grams/m2  Ao root diam: 2.6 cm  LA dimension: 3.0 cm  asc Aorta Diam: 3.1 cm  LA/Ao: 1.1  Ao root diam index Ht(cm/m): 1.6  Ao root diam index BSA (cm/m2): 1.4  Asc Ao diam index BSA (cm/m2): 1.6  Asc Ao diam index Ht(cm/m): 1.9  LA Volume (BP): 26.8 ml     LA Volume Index (BP): 14.1 ml/m2  RWT: 0.77  TAPSE: 1.7 cm     Doppler Measurements & Calculations  MV E max romero: 77.6 cm/sec  MV  A max benedicto: 61.7 cm/sec  MV E/A: 1.3  MV dec time: 0.17 sec  Ao V2 max: 124.2 cm/sec  Ao max P.0 mmHg  LV V1 max P.4 mmHg  LV V1 max: 76.7 cm/sec  PA V2 max: 85.7 cm/sec  PA max PG: 3.0 mmHg  PA acc time: 0.10 sec  AV Benedicto Ratio (DI): 0.62  E/E' av.8  Lateral E/e': 11.3  Medial E/e': 14.3  RV S Benedicto: 11.7 cm/sec     ______________________________________________________________________________  Report approved by: Danie Ragland 2024 02:25 PM             Component 3 wk ago  LVEF 54-56%        Stress Test:  Date: Results:    ECG Reviewed:  Date: 23 Results:  Sinus Rhythm -occasional ectopic ventricular beat    -Prominent R(V1) and right axis -consider right ventricular hypertrophy Low voltage with rightward P-axis and rotation -possible pulmonary disease.    ABNORMAL    Cath:  Date: Results:      METS/Exercise Tolerance:     Hematologic:  - neg hematologic  ROS   (+)       history of blood transfusion, previous transfusion reaction, Known PRBC Anitbodies: Yes,       Musculoskeletal: Comment: CLBP      GI/Hepatic: Comment: Vomiting 1-2x/day for 2-3 weeks.  Unclear if uremia is causing emesis or dehydration is causing uremia. Celiac disease - neg GI/hepatic ROS  (-) GERD   Renal/Genitourinary: Comment: Amyloidosis, CKD stage 3a    (+) renal disease, type: CRI,   Pt has history of transplant, date: Peripheral stem cell transplant,        Endo:     (+)          thyroid problem, hypothyroidism,           Psychiatric/Substance Use:  - neg psychiatric ROS     Infectious Disease:  - neg infectious disease ROS     Malignancy:  - neg malignancy ROS     Other:  - neg other ROS        Physical Exam    Airway        Mallampati: II   TM distance: > 3 FB   Neck ROM: full   Mouth opening: > 3 cm    Respiratory Devices and Support         Dental       (+) Minor Abnormalities - some fillings, tiny chips      Cardiovascular   cardiovascular exam normal       Rhythm and rate: regular and normal     Pulmonary    pulmonary exam normal        breath sounds clear to auscultation       OUTSIDE LABS:  CBC:   Lab Results   Component Value Date    WBC 7.8 09/16/2024    WBC 7.1 09/13/2024    HGB 12.4 09/16/2024    HGB 12.1 09/13/2024    HCT 36.7 09/16/2024    HCT 35.6 09/13/2024     09/16/2024     09/13/2024     BMP:   Lab Results   Component Value Date     09/16/2024     09/13/2024    POTASSIUM 3.9 09/16/2024    POTASSIUM 3.9 09/13/2024    CHLORIDE 101 09/16/2024    CHLORIDE 101 09/13/2024    CO2 27 09/16/2024    CO2 26 09/13/2024    BUN 36.1 (H) 09/16/2024    BUN 40.0 (H) 09/13/2024    CR 2.89 (H) 09/16/2024    CR 2.92 (H) 09/13/2024     (H) 09/16/2024     (H) 09/13/2024     COAGS:   Lab Results   Component Value Date    PTT 24 06/11/2024    INR 1.10 07/01/2024     POC:   Lab Results   Component Value Date    HCG Negative 06/02/2024     HEPATIC:   Lab Results   Component Value Date    ALBUMIN 3.1 (L) 09/16/2024    PROTTOTAL 6.1 (L) 09/16/2024    ALT 22 09/16/2024    AST 23 09/16/2024    ALKPHOS 130 09/16/2024    BILITOTAL 0.3 09/16/2024     OTHER:   Lab Results   Component Value Date    LACT 0.8 06/28/2024    A1C 5.7 (H) 05/14/2024    TANNER 11.9 (H) 09/16/2024    PHOS 3.7 07/09/2024    MAG 1.5 (L) 07/11/2024    TSH 0.13 (L) 05/14/2024    T4 1.34 05/14/2024    T3 87 07/20/2023       Anesthesia Plan    ASA Status:  3    NPO Status:  NPO Appropriate    Anesthesia Type: MAC.     - Reason for MAC: immobility needed   Induction: Propofol.   Maintenance: TIVA.        Consents    Anesthesia Plan(s) and associated risks, benefits, and realistic alternatives discussed. Questions answered and patient/representative(s) expressed understanding.     - Discussed:     - Discussed with:  Patient            Postoperative Care            Comments:    Other Comments: I have discussed all the risks and benefits of the anesthetic with the patient and they wish to proceed.            Belle Ferguson, APRN  CRNA    I have reviewed the pertinent notes and labs in the chart from the past 30 days and (re)examined the patient.  Any updates or changes from those notes are reflected in this note.      # Hypercalcemia: Highest Ca = 11.9 mg/dL in last 2 days, will monitor as appropriate     # Hypoalbuminemia: Lowest albumin = 3.1 g/dL in the past 30 days , will monitor as appropriate

## 2024-09-18 NOTE — ANESTHESIA POSTPROCEDURE EVALUATION
Patient: Vivian Brown    Procedure: Procedure(s):  ESOPHAGOGASTRODUODENOSCOPY, WITH BIOPSY       Anesthesia Type:  MAC    Note:  Disposition: Outpatient   Postop Pain Control: Uneventful            Sign Out: Well controlled pain   PONV: No   Neuro/Psych: Uneventful            Sign Out: Acceptable/Baseline neuro status   Airway/Respiratory: Uneventful            Sign Out: Acceptable/Baseline resp. status   CV/Hemodynamics: Uneventful            Sign Out: Acceptable CV status; No obvious hypovolemia; No obvious fluid overload   Other NRE: NONE   DID A NON-ROUTINE EVENT OCCUR? No           Last vitals:  Vitals Value Taken Time   /72 09/18/24 1150   Temp     Pulse 72 09/18/24 1150   Resp 16 09/18/24 1030   SpO2 99 % 09/18/24 1152   Vitals shown include unfiled device data.    Electronically Signed By: MARICRUZ Helms CRNA  September 18, 2024  1:21 PM

## 2024-09-18 NOTE — DISCHARGE INSTRUCTIONS
[facility]    Home Care Following Endoscopy          Activity:  You have just undergone an endoscopic procedure usually performed with conscious sedation.  Do not work or operate machinery (including a car) for at least 12 hours.    I encourage you to walk and attempt to pass this air as soon as possible.    Diet:  Return to the diet you were on before your procedure but eat lightly for the first 12-24 hours.  Drink plenty of water.  Resume any regular medications unless otherwise advised by your physician.  Please begin any new medication prescribed as a result of your procedure as directed by your physician.   If you had any biopsy or polyp removed please refrain from aspirin or aspirin products for 2 days.  If on Coumadin please restart as instructed by your physician.   Pain:  You may take Tylenol as needed for pain.  Expected Recovery:  You can expect some mild abdominal fullness and/or discomfort due to the air used to inflate your intestinal tract. It is also normal to have a mild sore throat after upper endoscopy.    Call Your Physician if You Have:  After Upper Endoscopy:  Shoulder, back or chest pain.  Difficulty breathing or swallowing.  Vomiting blood.    Any questions or concerns about your recovery, please call [phone] or after hours [facility] ([phone]) Nurse Advice Line.    Follow-up Care:  You did have biopsy tissue sample(s) removed.  The biopsy tissue sample(s) will be sent to pathology.    You should receive letter in your My Chart from Dr. Cloud with your results within 1-2 weeks. If you do not participate in My Chart a physical letter will come in the mail in 2-3 weeks.  Please call if you have not received a notification of your results.  If asked to return to clinic please make an appointment 1 week after your procedure.  Call [phone].

## 2024-09-18 NOTE — LETTER
September 30, 2024      Mary Ann Brown  [address] [facility] [address]        Dear ,    We are writing to inform you of your test results.    Changes of amyloid seen.  No stomach infection that would require treatment.      Resulted Orders   Surgical Pathology Exam   Result Value Ref Range    Case Report       Surgical Pathology Report                         Case: [id number]                                  Authorizing Provider:  Melo Cloud MD        Collected:           09/18/2024 10:11 AM          Ordering Location:     [facility]          Received:            09/18/2024 10:59 AM                                 [facility] Endoscopy                                                          Pathologist:           Leonardo Stevenson MD                                                                           Specimen:    Stomach, Body, gastric r/o amyloid and H-pylori                                            Addendum       For further evaluation, additional stains on block A1 (Congo Red) are performed with appropriate controls and support the interpretation.  Under polarized light, the Congo Red stain shows very focal birefringence in a pattern consistent with amyloid.    For further evaluation, additional stains on block A1 (Helicobacter pylori) are performed with appropriate controls and support the interpretation.  The Helicobacter pylori stain is negative for organisms.      Final Diagnosis       Stomach, body, rule out amyloid and Helicobacter pylori, biopsy:  - Gastric body mucosa negative for inflammation, dysplasia, and malignancy.  - Additional Congo Red stains (to assess for presence of amyloid) are pending and will be reported in an addendum.  - Additional stains for Helicobacter pylori are pending and will be reported in an addendum.      Clinical Information        Procedure:  ESOPHAGOGASTRODUODENOSCOPY, WITH BIOPSY      Gross Description       A(1). Stomach, Body, gastric r/o amyloid and H-pylori:  The specimen is received in formalin, labeled with the patient's name, medical record number and other identifying information and designated  gastric rule out amyloid and H. pylori . It consists of 7 tan soft tissue fragments ranging from 0.2-0.5 cm. Entirely submitted in one cassette.   (RENETTA Krishna (ASCP) 9/19/2024 7:47 AM       Microscopic Description       A microscopic examination is performed.       Performing Labs       The technical component of this testing was completed at [facility].    Stain controls for all stains resulted within this report have been reviewed and show appropriate reactivity.       Case Images         If you have any questions or concerns, please call the clinic at the number listed above.       Sincerely,      Melo Cloud MD

## 2024-09-19 ENCOUNTER — LAB (OUTPATIENT)
Dept: LAB | Facility: CLINIC | Age: 54
End: 2024-09-19
Payer: COMMERCIAL

## 2024-09-19 DIAGNOSIS — N17.9 AKI (ACUTE KIDNEY INJURY) (H): ICD-10-CM

## 2024-09-19 LAB
ALBUMIN SERPL BCG-MCNC: 2.9 G/DL (ref 3.5–5.2)
ALP SERPL-CCNC: 114 U/L (ref 40–150)
ALT SERPL W P-5'-P-CCNC: 22 U/L (ref 0–50)
ANION GAP SERPL CALCULATED.3IONS-SCNC: 7 MMOL/L (ref 7–15)
AST SERPL W P-5'-P-CCNC: 19 U/L (ref 0–45)
BASOPHILS # BLD AUTO: 0.1 10E3/UL (ref 0–0.2)
BASOPHILS NFR BLD AUTO: 1 %
BILIRUB SERPL-MCNC: 0.2 MG/DL
BUN SERPL-MCNC: 26.6 MG/DL (ref 6–20)
CA-I BLD-MCNC: 6.3 MG/DL (ref 4.4–5.2)
CALCIUM SERPL-MCNC: 11.6 MG/DL (ref 8.8–10.4)
CHLORIDE SERPL-SCNC: 103 MMOL/L (ref 98–107)
CREAT SERPL-MCNC: 2.84 MG/DL (ref 0.51–0.95)
EGFRCR SERPLBLD CKD-EPI 2021: 19 ML/MIN/1.73M2
EOSINOPHIL # BLD AUTO: 0.1 10E3/UL (ref 0–0.7)
EOSINOPHIL NFR BLD AUTO: 2 %
ERYTHROCYTE [DISTWIDTH] IN BLOOD BY AUTOMATED COUNT: 15.8 % (ref 10–15)
GLUCOSE SERPL-MCNC: 80 MG/DL (ref 70–99)
HCO3 SERPL-SCNC: 29 MMOL/L (ref 22–29)
HCT VFR BLD AUTO: 33.3 % (ref 35–47)
HGB BLD-MCNC: 11.3 G/DL (ref 11.7–15.7)
IMM GRANULOCYTES # BLD: 0 10E3/UL
IMM GRANULOCYTES NFR BLD: 0 %
LYMPHOCYTES # BLD AUTO: 1.2 10E3/UL (ref 0.8–5.3)
LYMPHOCYTES NFR BLD AUTO: 20 %
MCH RBC QN AUTO: 33 PG (ref 26.5–33)
MCHC RBC AUTO-ENTMCNC: 33.9 G/DL (ref 31.5–36.5)
MCV RBC AUTO: 97 FL (ref 78–100)
MONOCYTES # BLD AUTO: 0.7 10E3/UL (ref 0–1.3)
MONOCYTES NFR BLD AUTO: 12 %
NEUTROPHILS # BLD AUTO: 3.7 10E3/UL (ref 1.6–8.3)
NEUTROPHILS NFR BLD AUTO: 65 %
NRBC # BLD AUTO: 0 10E3/UL
NRBC BLD AUTO-RTO: 0 /100
PLATELET # BLD AUTO: 205 10E3/UL (ref 150–450)
POTASSIUM SERPL-SCNC: 4.1 MMOL/L (ref 3.4–5.3)
PROT SERPL-MCNC: 5.6 G/DL (ref 6.4–8.3)
PTH-INTACT SERPL-MCNC: 18 PG/ML (ref 15–65)
RBC # BLD AUTO: 3.42 10E6/UL (ref 3.8–5.2)
SODIUM SERPL-SCNC: 139 MMOL/L (ref 135–145)
VIT D+METAB SERPL-MCNC: 22 NG/ML (ref 20–50)
WBC # BLD AUTO: 5.8 10E3/UL (ref 4–11)

## 2024-09-19 PROCEDURE — 80053 COMPREHEN METABOLIC PANEL: CPT | Performed by: STUDENT IN AN ORGANIZED HEALTH CARE EDUCATION/TRAINING PROGRAM

## 2024-09-19 PROCEDURE — 99000 SPECIMEN HANDLING OFFICE-LAB: CPT | Performed by: STUDENT IN AN ORGANIZED HEALTH CARE EDUCATION/TRAINING PROGRAM

## 2024-09-19 PROCEDURE — 83970 ASSAY OF PARATHORMONE: CPT | Performed by: STUDENT IN AN ORGANIZED HEALTH CARE EDUCATION/TRAINING PROGRAM

## 2024-09-19 PROCEDURE — 36415 COLL VENOUS BLD VENIPUNCTURE: CPT

## 2024-09-19 PROCEDURE — 82306 VITAMIN D 25 HYDROXY: CPT | Performed by: STUDENT IN AN ORGANIZED HEALTH CARE EDUCATION/TRAINING PROGRAM

## 2024-09-19 PROCEDURE — 82330 ASSAY OF CALCIUM: CPT | Performed by: STUDENT IN AN ORGANIZED HEALTH CARE EDUCATION/TRAINING PROGRAM

## 2024-09-19 PROCEDURE — 82397 CHEMILUMINESCENT ASSAY: CPT | Performed by: STUDENT IN AN ORGANIZED HEALTH CARE EDUCATION/TRAINING PROGRAM

## 2024-09-19 PROCEDURE — 85025 COMPLETE CBC W/AUTO DIFF WBC: CPT | Performed by: STUDENT IN AN ORGANIZED HEALTH CARE EDUCATION/TRAINING PROGRAM

## 2024-09-20 ENCOUNTER — INFUSION THERAPY VISIT (OUTPATIENT)
Dept: INFUSION THERAPY | Facility: CLINIC | Age: 54
End: 2024-09-20
Attending: STUDENT IN AN ORGANIZED HEALTH CARE EDUCATION/TRAINING PROGRAM
Payer: COMMERCIAL

## 2024-09-20 VITALS
RESPIRATION RATE: 18 BRPM | WEIGHT: 124.8 LBS | SYSTOLIC BLOOD PRESSURE: 148 MMHG | HEART RATE: 88 BPM | TEMPERATURE: 98 F | BODY MASS INDEX: 21.31 KG/M2 | DIASTOLIC BLOOD PRESSURE: 44 MMHG | OXYGEN SATURATION: 98 %

## 2024-09-20 DIAGNOSIS — Z52.011 AUTOLOGOUS DONOR OF STEM CELLS: ICD-10-CM

## 2024-09-20 DIAGNOSIS — E85.81 AL AMYLOIDOSIS (H): Primary | ICD-10-CM

## 2024-09-20 PROCEDURE — 258N000003 HC RX IP 258 OP 636: Performed by: STUDENT IN AN ORGANIZED HEALTH CARE EDUCATION/TRAINING PROGRAM

## 2024-09-20 PROCEDURE — 96360 HYDRATION IV INFUSION INIT: CPT

## 2024-09-20 RX ORDER — NALOXONE HYDROCHLORIDE 0.4 MG/ML
0.1 INJECTION, SOLUTION INTRAMUSCULAR; INTRAVENOUS; SUBCUTANEOUS
Status: CANCELLED | OUTPATIENT
Start: 2024-09-20

## 2024-09-20 RX ORDER — ONDANSETRON 2 MG/ML
4 INJECTION INTRAMUSCULAR; INTRAVENOUS EVERY 30 MIN PRN
Status: CANCELLED | OUTPATIENT
Start: 2024-09-20

## 2024-09-20 RX ORDER — LIDOCAINE 40 MG/G
CREAM TOPICAL
Status: CANCELLED | OUTPATIENT
Start: 2024-09-20

## 2024-09-20 RX ORDER — ONDANSETRON 4 MG/1
4 TABLET, ORALLY DISINTEGRATING ORAL EVERY 30 MIN PRN
Status: CANCELLED | OUTPATIENT
Start: 2024-09-20

## 2024-09-20 RX ORDER — HEPARIN SODIUM (PORCINE) LOCK FLUSH IV SOLN 100 UNIT/ML 100 UNIT/ML
5 SOLUTION INTRAVENOUS
Status: CANCELLED | OUTPATIENT
Start: 2024-09-20

## 2024-09-20 RX ORDER — HEPARIN SODIUM,PORCINE 10 UNIT/ML
5-20 VIAL (ML) INTRAVENOUS DAILY PRN
Status: CANCELLED | OUTPATIENT
Start: 2024-09-20

## 2024-09-20 RX ORDER — OXYCODONE HYDROCHLORIDE 5 MG/1
10 TABLET ORAL
Status: CANCELLED | OUTPATIENT
Start: 2024-09-20

## 2024-09-20 RX ORDER — SODIUM CHLORIDE, SODIUM LACTATE, POTASSIUM CHLORIDE, CALCIUM CHLORIDE 600; 310; 30; 20 MG/100ML; MG/100ML; MG/100ML; MG/100ML
INJECTION, SOLUTION INTRAVENOUS CONTINUOUS
Status: CANCELLED | OUTPATIENT
Start: 2024-09-20

## 2024-09-20 RX ORDER — OXYCODONE HYDROCHLORIDE 5 MG/1
5 TABLET ORAL
Status: CANCELLED | OUTPATIENT
Start: 2024-09-20

## 2024-09-20 RX ORDER — ACETAMINOPHEN 325 MG/1
975 TABLET ORAL ONCE
Status: CANCELLED | OUTPATIENT
Start: 2024-09-20 | End: 2024-09-20

## 2024-09-20 RX ORDER — FENTANYL CITRATE 50 UG/ML
25 INJECTION, SOLUTION INTRAMUSCULAR; INTRAVENOUS
Status: CANCELLED | OUTPATIENT
Start: 2024-09-20

## 2024-09-20 RX ORDER — DEXAMETHASONE SODIUM PHOSPHATE 10 MG/ML
4 INJECTION, SOLUTION INTRAMUSCULAR; INTRAVENOUS
Status: CANCELLED | OUTPATIENT
Start: 2024-09-20

## 2024-09-20 RX ADMIN — SODIUM CHLORIDE 1000 ML: 9 INJECTION, SOLUTION INTRAVENOUS at 09:17

## 2024-09-20 ASSESSMENT — PAIN SCALES - GENERAL: PAINLEVEL: NO PAIN (0)

## 2024-09-20 NOTE — PROGRESS NOTES
Infusion Nursing Note:  Vivian Brown presents today for IVF.    Patient seen by provider today: No   present during visit today: Not Applicable.    Note: Mary Ann is feeling well today. Appetite fair. Did have mild nausea earlier but no emesis..      Intravenous Access:  Peripheral IV placed.    Treatment Conditions:  Not Applicable.      Post Infusion Assessment:  Patient tolerated infusion without incident.  Site patent and intact, free from redness, edema or discomfort.  No evidence of extravasations.  Access discontinued per protocol.       Discharge Plan:   Discharge instructions reviewed with: Patient.  Patient and/or family verbalized understanding of discharge instructions and all questions answered.  Patient discharged in stable condition accompanied by: self.  Departure Mode: Ambulatory.      Loida Oleary RN

## 2024-09-23 LAB
PATH REPORT.ADDENDUM SPEC: NORMAL
PATH REPORT.COMMENTS IMP SPEC: NORMAL
PATH REPORT.COMMENTS IMP SPEC: NORMAL
PATH REPORT.FINAL DX SPEC: NORMAL
PATH REPORT.GROSS SPEC: NORMAL
PATH REPORT.MICROSCOPIC SPEC OTHER STN: NORMAL
PATH REPORT.RELEVANT HX SPEC: NORMAL
PHOTO IMAGE: NORMAL

## 2024-09-24 ENCOUNTER — LAB (OUTPATIENT)
Dept: LAB | Facility: CLINIC | Age: 54
End: 2024-09-24
Payer: COMMERCIAL

## 2024-09-24 DIAGNOSIS — E85.81 AL AMYLOIDOSIS (H): ICD-10-CM

## 2024-09-24 LAB
ALBUMIN SERPL BCG-MCNC: 3 G/DL (ref 3.5–5.2)
ALP SERPL-CCNC: 128 U/L (ref 40–150)
ALT SERPL W P-5'-P-CCNC: 22 U/L (ref 0–50)
ANION GAP SERPL CALCULATED.3IONS-SCNC: 8 MMOL/L (ref 7–15)
AST SERPL W P-5'-P-CCNC: 20 U/L (ref 0–45)
BASOPHILS # BLD AUTO: 0.1 10E3/UL (ref 0–0.2)
BASOPHILS NFR BLD AUTO: 1 %
BILIRUB SERPL-MCNC: 0.2 MG/DL
BUN SERPL-MCNC: 28.9 MG/DL (ref 6–20)
CALCIUM SERPL-MCNC: 11.1 MG/DL (ref 8.8–10.4)
CHLORIDE SERPL-SCNC: 103 MMOL/L (ref 98–107)
CREAT SERPL-MCNC: 2.68 MG/DL (ref 0.51–0.95)
EGFRCR SERPLBLD CKD-EPI 2021: 20 ML/MIN/1.73M2
EOSINOPHIL # BLD AUTO: 0.1 10E3/UL (ref 0–0.7)
EOSINOPHIL NFR BLD AUTO: 2 %
ERYTHROCYTE [DISTWIDTH] IN BLOOD BY AUTOMATED COUNT: 15.9 % (ref 10–15)
GLUCOSE SERPL-MCNC: 94 MG/DL (ref 70–99)
HCO3 SERPL-SCNC: 25 MMOL/L (ref 22–29)
HCT VFR BLD AUTO: 32.8 % (ref 35–47)
HGB BLD-MCNC: 11.1 G/DL (ref 11.7–15.7)
IMM GRANULOCYTES # BLD: 0 10E3/UL
IMM GRANULOCYTES NFR BLD: 1 %
LYMPHOCYTES # BLD AUTO: 1.2 10E3/UL (ref 0.8–5.3)
LYMPHOCYTES NFR BLD AUTO: 14 %
MCH RBC QN AUTO: 32.9 PG (ref 26.5–33)
MCHC RBC AUTO-ENTMCNC: 33.8 G/DL (ref 31.5–36.5)
MCV RBC AUTO: 97 FL (ref 78–100)
MONOCYTES # BLD AUTO: 1.1 10E3/UL (ref 0–1.3)
MONOCYTES NFR BLD AUTO: 13 %
NEUTROPHILS # BLD AUTO: 6.2 10E3/UL (ref 1.6–8.3)
NEUTROPHILS NFR BLD AUTO: 71 %
NRBC # BLD AUTO: 0 10E3/UL
NRBC BLD AUTO-RTO: 0 /100
PLATELET # BLD AUTO: 190 10E3/UL (ref 150–450)
POTASSIUM SERPL-SCNC: 4.4 MMOL/L (ref 3.4–5.3)
PROT SERPL-MCNC: 5.8 G/DL (ref 6.4–8.3)
RBC # BLD AUTO: 3.37 10E6/UL (ref 3.8–5.2)
SODIUM SERPL-SCNC: 136 MMOL/L (ref 135–145)
WBC # BLD AUTO: 8.8 10E3/UL (ref 4–11)

## 2024-09-24 PROCEDURE — 36415 COLL VENOUS BLD VENIPUNCTURE: CPT

## 2024-09-24 PROCEDURE — 80053 COMPREHEN METABOLIC PANEL: CPT | Performed by: STUDENT IN AN ORGANIZED HEALTH CARE EDUCATION/TRAINING PROGRAM

## 2024-09-24 PROCEDURE — 85025 COMPLETE CBC W/AUTO DIFF WBC: CPT | Performed by: STUDENT IN AN ORGANIZED HEALTH CARE EDUCATION/TRAINING PROGRAM

## 2024-09-25 ENCOUNTER — INFUSION THERAPY VISIT (OUTPATIENT)
Dept: INFUSION THERAPY | Facility: CLINIC | Age: 54
End: 2024-09-25
Attending: STUDENT IN AN ORGANIZED HEALTH CARE EDUCATION/TRAINING PROGRAM
Payer: COMMERCIAL

## 2024-09-25 VITALS
DIASTOLIC BLOOD PRESSURE: 80 MMHG | HEART RATE: 83 BPM | TEMPERATURE: 98.2 F | RESPIRATION RATE: 16 BRPM | SYSTOLIC BLOOD PRESSURE: 130 MMHG | WEIGHT: 124.1 LBS | OXYGEN SATURATION: 98 % | BODY MASS INDEX: 21.19 KG/M2

## 2024-09-25 DIAGNOSIS — E85.81 AL AMYLOIDOSIS (H): Primary | ICD-10-CM

## 2024-09-25 DIAGNOSIS — Z52.011 AUTOLOGOUS DONOR OF STEM CELLS: ICD-10-CM

## 2024-09-25 PROCEDURE — 96360 HYDRATION IV INFUSION INIT: CPT

## 2024-09-25 PROCEDURE — 258N000003 HC RX IP 258 OP 636: Performed by: STUDENT IN AN ORGANIZED HEALTH CARE EDUCATION/TRAINING PROGRAM

## 2024-09-25 RX ORDER — HEPARIN SODIUM (PORCINE) LOCK FLUSH IV SOLN 100 UNIT/ML 100 UNIT/ML
5 SOLUTION INTRAVENOUS
OUTPATIENT
Start: 2024-09-25

## 2024-09-25 RX ORDER — HEPARIN SODIUM,PORCINE 10 UNIT/ML
5-20 VIAL (ML) INTRAVENOUS DAILY PRN
OUTPATIENT
Start: 2024-09-25

## 2024-09-25 RX ADMIN — SODIUM CHLORIDE 1000 ML: 9 INJECTION, SOLUTION INTRAVENOUS at 08:48

## 2024-09-25 ASSESSMENT — PAIN SCALES - GENERAL: PAINLEVEL: NO PAIN (0)

## 2024-09-25 NOTE — PROGRESS NOTES
Infusion Nursing Note:  Vivian Brown presents today for IVF.    Patient seen by provider today: No   present during visit today: Not Applicable.    Note: Mary Ann has had a head cold since Monday. Improving.      Intravenous Access:  Peripheral IV placed.    Treatment Conditions:  Not Applicable.      Post Infusion Assessment:  Patient tolerated infusion without incident.       Discharge Plan:   Patient discharged in stable condition accompanied by: self.  Departure Mode: Ambulatory.      Sarah Peck RN  
Occult Anemia
eval for rehab
AMS
Compartment Syndrome
anemia, DVT, malignancy w/u
left LE fasciotomy wounds
leukocytosis

## 2024-09-30 DIAGNOSIS — Z94.84 HISTORY OF PERIPHERAL STEM CELL TRANSPLANT (H): ICD-10-CM

## 2024-09-30 DIAGNOSIS — E85.81 LIGHT CHAIN (AL) AMYLOIDOSIS (H): Primary | ICD-10-CM

## 2024-09-30 DIAGNOSIS — E85.81 AL AMYLOIDOSIS (H): ICD-10-CM

## 2024-10-01 ENCOUNTER — HOSPITAL ENCOUNTER (OUTPATIENT)
Facility: AMBULATORY SURGERY CENTER | Age: 54
End: 2024-10-01
Attending: PHYSICIAN ASSISTANT
Payer: COMMERCIAL

## 2024-10-01 LAB — PTH RELATED PROT SERPL-SCNC: 7.4 PMOL/L

## 2024-10-03 ENCOUNTER — LAB (OUTPATIENT)
Dept: LAB | Facility: CLINIC | Age: 54
End: 2024-10-03
Payer: COMMERCIAL

## 2024-10-03 DIAGNOSIS — E85.81 AL AMYLOIDOSIS (H): ICD-10-CM

## 2024-10-03 LAB
ALBUMIN SERPL BCG-MCNC: 3.1 G/DL (ref 3.5–5.2)
ALP SERPL-CCNC: 145 U/L (ref 40–150)
ALT SERPL W P-5'-P-CCNC: 23 U/L (ref 0–50)
ANION GAP SERPL CALCULATED.3IONS-SCNC: 10 MMOL/L (ref 7–15)
AST SERPL W P-5'-P-CCNC: 20 U/L (ref 0–45)
BASOPHILS # BLD AUTO: 0.1 10E3/UL (ref 0–0.2)
BASOPHILS NFR BLD AUTO: 1 %
BILIRUB SERPL-MCNC: 0.3 MG/DL
BUN SERPL-MCNC: 35.8 MG/DL (ref 6–20)
CALCIUM SERPL-MCNC: 11.8 MG/DL (ref 8.8–10.4)
CHLORIDE SERPL-SCNC: 103 MMOL/L (ref 98–107)
CREAT SERPL-MCNC: 2.76 MG/DL (ref 0.51–0.95)
EGFRCR SERPLBLD CKD-EPI 2021: 20 ML/MIN/1.73M2
EOSINOPHIL # BLD AUTO: 0.2 10E3/UL (ref 0–0.7)
EOSINOPHIL NFR BLD AUTO: 2 %
ERYTHROCYTE [DISTWIDTH] IN BLOOD BY AUTOMATED COUNT: 15.1 % (ref 10–15)
GLUCOSE SERPL-MCNC: 117 MG/DL (ref 70–99)
HCO3 SERPL-SCNC: 23 MMOL/L (ref 22–29)
HCT VFR BLD AUTO: 34.4 % (ref 35–47)
HGB BLD-MCNC: 11.5 G/DL (ref 11.7–15.7)
IMM GRANULOCYTES # BLD: 0.1 10E3/UL
IMM GRANULOCYTES NFR BLD: 1 %
LYMPHOCYTES # BLD AUTO: 1.3 10E3/UL (ref 0.8–5.3)
LYMPHOCYTES NFR BLD AUTO: 18 %
MCH RBC QN AUTO: 32.7 PG (ref 26.5–33)
MCHC RBC AUTO-ENTMCNC: 33.4 G/DL (ref 31.5–36.5)
MCV RBC AUTO: 98 FL (ref 78–100)
MONOCYTES # BLD AUTO: 0.7 10E3/UL (ref 0–1.3)
MONOCYTES NFR BLD AUTO: 9 %
NEUTROPHILS # BLD AUTO: 5 10E3/UL (ref 1.6–8.3)
NEUTROPHILS NFR BLD AUTO: 69 %
NRBC # BLD AUTO: 0 10E3/UL
NRBC BLD AUTO-RTO: 0 /100
PLATELET # BLD AUTO: 238 10E3/UL (ref 150–450)
POTASSIUM SERPL-SCNC: 4.5 MMOL/L (ref 3.4–5.3)
PROT SERPL-MCNC: 5.9 G/DL (ref 6.4–8.3)
RBC # BLD AUTO: 3.52 10E6/UL (ref 3.8–5.2)
SODIUM SERPL-SCNC: 136 MMOL/L (ref 135–145)
WBC # BLD AUTO: 7.2 10E3/UL (ref 4–11)

## 2024-10-03 PROCEDURE — 85025 COMPLETE CBC W/AUTO DIFF WBC: CPT | Performed by: STUDENT IN AN ORGANIZED HEALTH CARE EDUCATION/TRAINING PROGRAM

## 2024-10-03 PROCEDURE — 36415 COLL VENOUS BLD VENIPUNCTURE: CPT

## 2024-10-03 PROCEDURE — 80053 COMPREHEN METABOLIC PANEL: CPT | Performed by: STUDENT IN AN ORGANIZED HEALTH CARE EDUCATION/TRAINING PROGRAM

## 2024-10-08 ENCOUNTER — LAB (OUTPATIENT)
Dept: LAB | Facility: CLINIC | Age: 54
End: 2024-10-08
Payer: COMMERCIAL

## 2024-10-08 DIAGNOSIS — E85.81 AL AMYLOIDOSIS (H): ICD-10-CM

## 2024-10-08 DIAGNOSIS — I51.9 MYXEDEMA HEART DISEASE: Primary | ICD-10-CM

## 2024-10-08 DIAGNOSIS — E03.9 MYXEDEMA HEART DISEASE: Primary | ICD-10-CM

## 2024-10-08 DIAGNOSIS — E78.2 MIXED HYPERLIPIDEMIA: ICD-10-CM

## 2024-10-08 DIAGNOSIS — R73.01 IMPAIRED FASTING GLUCOSE: ICD-10-CM

## 2024-10-08 LAB
ALBUMIN MFR UR ELPH: 1224.2 MG/DL
ALBUMIN SERPL BCG-MCNC: 3 G/DL (ref 3.5–5.2)
ALBUMIN UR-MCNC: >=300 MG/DL
ALP SERPL-CCNC: 132 U/L (ref 40–150)
ALT SERPL W P-5'-P-CCNC: 20 U/L (ref 0–50)
ANION GAP SERPL CALCULATED.3IONS-SCNC: 11 MMOL/L (ref 7–15)
APPEARANCE UR: CLEAR
AST SERPL W P-5'-P-CCNC: 19 U/L (ref 0–45)
BACTERIA #/AREA URNS HPF: ABNORMAL /HPF
BASOPHILS # BLD AUTO: 0.1 10E3/UL (ref 0–0.2)
BASOPHILS NFR BLD AUTO: 1 %
BILIRUB SERPL-MCNC: 0.2 MG/DL
BILIRUB UR QL STRIP: NEGATIVE
BUN SERPL-MCNC: 42.1 MG/DL (ref 6–20)
CALCIUM SERPL-MCNC: 11.5 MG/DL (ref 8.8–10.4)
CD19 CELLS # BLD: 586 CELLS/UL (ref 107–698)
CD19 CELLS NFR BLD: 33 % (ref 6–27)
CD3 CELLS # BLD: 949 CELLS/UL (ref 603–2990)
CD3 CELLS NFR BLD: 54 % (ref 49–84)
CD3+CD4+ CELLS # BLD: 435 CELLS/UL (ref 441–2156)
CD3+CD4+ CELLS NFR BLD: 25 % (ref 28–63)
CD3+CD4+ CELLS/CD3+CD8+ CLL BLD: 0.88 % (ref 1.4–2.6)
CD3+CD8+ CELLS # BLD: 497 CELLS/UL (ref 125–1312)
CD3+CD8+ CELLS NFR BLD: 28 % (ref 10–40)
CD3-CD16+CD56+ CELLS # BLD: 184 CELLS/UL (ref 95–640)
CD3-CD16+CD56+ CELLS NFR BLD: 10 % (ref 4–25)
CHLORIDE SERPL-SCNC: 103 MMOL/L (ref 98–107)
CHOLEST SERPL-MCNC: 345 MG/DL
COLOR UR AUTO: YELLOW
CREAT SERPL-MCNC: 2.54 MG/DL (ref 0.51–0.95)
CREAT UR-MCNC: 83.8 MG/DL
EGFRCR SERPLBLD CKD-EPI 2021: 22 ML/MIN/1.73M2
EOSINOPHIL # BLD AUTO: 0.2 10E3/UL (ref 0–0.7)
EOSINOPHIL NFR BLD AUTO: 2 %
ERYTHROCYTE [DISTWIDTH] IN BLOOD BY AUTOMATED COUNT: 14.7 % (ref 10–15)
EST. AVERAGE GLUCOSE BLD GHB EST-MCNC: 103 MG/DL
FASTING STATUS PATIENT QL REPORTED: YES
GLUCOSE SERPL-MCNC: 97 MG/DL (ref 70–99)
GLUCOSE SERPL-MCNC: 97 MG/DL (ref 70–99)
GLUCOSE UR STRIP-MCNC: 100 MG/DL
HBA1C MFR BLD: 5.2 %
HCO3 SERPL-SCNC: 22 MMOL/L (ref 22–29)
HCT VFR BLD AUTO: 31.4 % (ref 35–47)
HDLC SERPL-MCNC: 36 MG/DL
HGB BLD-MCNC: 10.6 G/DL (ref 11.7–15.7)
HGB UR QL STRIP: ABNORMAL
HYALINE CASTS: 4 /LPF
IMM GRANULOCYTES # BLD: 0 10E3/UL
IMM GRANULOCYTES NFR BLD: 0 %
KETONES UR STRIP-MCNC: NEGATIVE MG/DL
LDH SERPL L TO P-CCNC: 168 U/L (ref 0–250)
LDLC SERPL CALC-MCNC: 234 MG/DL
LEUKOCYTE ESTERASE UR QL STRIP: NEGATIVE
LYMPHOCYTES # BLD AUTO: 1.5 10E3/UL (ref 0.8–5.3)
LYMPHOCYTES NFR BLD AUTO: 20 %
MAGNESIUM SERPL-MCNC: 2.1 MG/DL (ref 1.7–2.3)
MCH RBC QN AUTO: 32.8 PG (ref 26.5–33)
MCHC RBC AUTO-ENTMCNC: 33.8 G/DL (ref 31.5–36.5)
MCV RBC AUTO: 97 FL (ref 78–100)
MONOCYTES # BLD AUTO: 0.8 10E3/UL (ref 0–1.3)
MONOCYTES NFR BLD AUTO: 11 %
NEUTROPHILS # BLD AUTO: 4.7 10E3/UL (ref 1.6–8.3)
NEUTROPHILS NFR BLD AUTO: 65 %
NITRATE UR QL: NEGATIVE
NONHDLC SERPL-MCNC: 309 MG/DL
NRBC # BLD AUTO: 0 10E3/UL
NRBC BLD AUTO-RTO: 0 /100
NT-PROBNP SERPL-MCNC: 1760 PG/ML (ref 0–900)
PH UR STRIP: 6.5 [PH] (ref 5–7)
PHOSPHATE SERPL-MCNC: 3.7 MG/DL (ref 2.5–4.5)
PLATELET # BLD AUTO: 219 10E3/UL (ref 150–450)
POTASSIUM SERPL-SCNC: 3.9 MMOL/L (ref 3.4–5.3)
PROT SERPL-MCNC: 5.7 G/DL (ref 6.4–8.3)
PROT/CREAT 24H UR: 14.61 MG/MG CR (ref 0–0.2)
RBC # BLD AUTO: 3.23 10E6/UL (ref 3.8–5.2)
RBC URINE: 2 /HPF
SODIUM SERPL-SCNC: 136 MMOL/L (ref 135–145)
SP GR UR STRIP: >=1.03 (ref 1–1.03)
SQUAMOUS EPITHELIAL: <1 /HPF
T CELL EXTENDED COMMENT: ABNORMAL
T4 FREE SERPL-MCNC: 1.88 NG/DL (ref 0.9–1.7)
TOTAL PROTEIN SERUM FOR ELP: 5.1 G/DL (ref 6.4–8.3)
TRIGL SERPL-MCNC: 377 MG/DL
TROPONIN T SERPL HS-MCNC: 29 NG/L
TSH SERPL DL<=0.005 MIU/L-ACNC: 0.01 UIU/ML (ref 0.3–4.2)
URATE SERPL-MCNC: 4.9 MG/DL (ref 2.4–5.7)
UROBILINOGEN UR STRIP-MCNC: NORMAL MG/DL
WBC # BLD AUTO: 7.2 10E3/UL (ref 4–11)
WBC URINE: 5 /HPF

## 2024-10-08 PROCEDURE — 84100 ASSAY OF PHOSPHORUS: CPT | Performed by: PHYSICIAN ASSISTANT

## 2024-10-08 PROCEDURE — 83880 ASSAY OF NATRIURETIC PEPTIDE: CPT | Performed by: STUDENT IN AN ORGANIZED HEALTH CARE EDUCATION/TRAINING PROGRAM

## 2024-10-08 PROCEDURE — 84484 ASSAY OF TROPONIN QUANT: CPT | Performed by: STUDENT IN AN ORGANIZED HEALTH CARE EDUCATION/TRAINING PROGRAM

## 2024-10-08 PROCEDURE — 83615 LACTATE (LD) (LDH) ENZYME: CPT | Performed by: PHYSICIAN ASSISTANT

## 2024-10-08 PROCEDURE — 84165 PROTEIN E-PHORESIS SERUM: CPT | Mod: 26 | Performed by: PATHOLOGY

## 2024-10-08 PROCEDURE — 84439 ASSAY OF FREE THYROXINE: CPT

## 2024-10-08 PROCEDURE — 84155 ASSAY OF PROTEIN SERUM: CPT | Performed by: PHYSICIAN ASSISTANT

## 2024-10-08 PROCEDURE — 84550 ASSAY OF BLOOD/URIC ACID: CPT | Performed by: PHYSICIAN ASSISTANT

## 2024-10-08 PROCEDURE — 86334 IMMUNOFIX E-PHORESIS SERUM: CPT | Performed by: PATHOLOGY

## 2024-10-08 PROCEDURE — 83735 ASSAY OF MAGNESIUM: CPT | Performed by: PHYSICIAN ASSISTANT

## 2024-10-08 PROCEDURE — 85025 COMPLETE CBC W/AUTO DIFF WBC: CPT | Performed by: PHYSICIAN ASSISTANT

## 2024-10-08 PROCEDURE — 84443 ASSAY THYROID STIM HORMONE: CPT

## 2024-10-08 PROCEDURE — 80061 LIPID PANEL: CPT

## 2024-10-08 PROCEDURE — 86359 T CELLS TOTAL COUNT: CPT | Performed by: PHYSICIAN ASSISTANT

## 2024-10-08 PROCEDURE — 84165 PROTEIN E-PHORESIS SERUM: CPT | Mod: TC | Performed by: PATHOLOGY

## 2024-10-08 PROCEDURE — 81001 URINALYSIS AUTO W/SCOPE: CPT

## 2024-10-08 PROCEDURE — 83521 IG LIGHT CHAINS FREE EACH: CPT

## 2024-10-08 PROCEDURE — 82947 ASSAY GLUCOSE BLOOD QUANT: CPT

## 2024-10-08 PROCEDURE — 83036 HEMOGLOBIN GLYCOSYLATED A1C: CPT

## 2024-10-08 PROCEDURE — 84156 ASSAY OF PROTEIN URINE: CPT

## 2024-10-08 PROCEDURE — 82784 ASSAY IGA/IGD/IGG/IGM EACH: CPT | Performed by: PHYSICIAN ASSISTANT

## 2024-10-09 ENCOUNTER — HOSPITAL ENCOUNTER (OUTPATIENT)
Dept: PET IMAGING | Facility: CLINIC | Age: 54
Discharge: HOME | End: 2024-10-09
Attending: STUDENT IN AN ORGANIZED HEALTH CARE EDUCATION/TRAINING PROGRAM
Payer: COMMERCIAL

## 2024-10-09 ENCOUNTER — HOSPITAL ENCOUNTER (OUTPATIENT)
Dept: CARDIOLOGY | Facility: CLINIC | Age: 54
Discharge: HOME | End: 2024-10-09
Attending: STUDENT IN AN ORGANIZED HEALTH CARE EDUCATION/TRAINING PROGRAM
Payer: COMMERCIAL

## 2024-10-09 DIAGNOSIS — E85.81 AL AMYLOIDOSIS (H): ICD-10-CM

## 2024-10-09 LAB
ALBUMIN SERPL ELPH-MCNC: 2.9 G/DL (ref 3.7–5.1)
ALPHA1 GLOB SERPL ELPH-MCNC: 0.3 G/DL (ref 0.2–0.4)
ALPHA2 GLOB SERPL ELPH-MCNC: 0.9 G/DL (ref 0.5–0.9)
B-GLOBULIN SERPL ELPH-MCNC: 0.6 G/DL (ref 0.6–1)
GAMMA GLOB SERPL ELPH-MCNC: 0.5 G/DL (ref 0.7–1.6)
IGA SERPL-MCNC: 112 MG/DL (ref 84–499)
IGG SERPL-MCNC: 496 MG/DL (ref 610–1616)
IGM SERPL-MCNC: 39 MG/DL (ref 35–242)
KAPPA LC FREE SER-MCNC: 3.17 MG/DL (ref 0.33–1.94)
KAPPA LC FREE/LAMBDA FREE SER NEPH: 0.98 {RATIO} (ref 0.26–1.65)
LAMBDA LC FREE SERPL-MCNC: 3.23 MG/DL (ref 0.57–2.63)
LOCATION OF TASK: ABNORMAL
LOCATION OF TASK: NORMAL
LVEF ECHO: NORMAL
M PROTEIN SERPL ELPH-MCNC: 0.1 G/DL
PROT PATTERN SERPL ELPH-IMP: ABNORMAL
PROT PATTERN SERPL IFE-IMP: NORMAL

## 2024-10-09 PROCEDURE — 78816 PET IMAGE W/CT FULL BODY: CPT | Mod: PS

## 2024-10-09 PROCEDURE — 71250 CT THORAX DX C-: CPT

## 2024-10-09 PROCEDURE — A9552 F18 FDG: HCPCS | Performed by: STUDENT IN AN ORGANIZED HEALTH CARE EDUCATION/TRAINING PROGRAM

## 2024-10-09 PROCEDURE — 343N000001 HC RX 343 MED OP 636: Performed by: STUDENT IN AN ORGANIZED HEALTH CARE EDUCATION/TRAINING PROGRAM

## 2024-10-09 PROCEDURE — 250N000011 HC RX IP 250 OP 636: Performed by: STUDENT IN AN ORGANIZED HEALTH CARE EDUCATION/TRAINING PROGRAM

## 2024-10-09 PROCEDURE — 71250 CT THORAX DX C-: CPT | Mod: 26 | Performed by: RADIOLOGY

## 2024-10-09 PROCEDURE — 93306 TTE W/DOPPLER COMPLETE: CPT | Mod: 26 | Performed by: INTERNAL MEDICINE

## 2024-10-09 PROCEDURE — 93306 TTE W/DOPPLER COMPLETE: CPT

## 2024-10-09 PROCEDURE — 93356 MYOCRD STRAIN IMG SPCKL TRCK: CPT | Performed by: INTERNAL MEDICINE

## 2024-10-09 PROCEDURE — 74176 CT ABD & PELVIS W/O CONTRAST: CPT | Mod: 26 | Performed by: RADIOLOGY

## 2024-10-09 PROCEDURE — 78816 PET IMAGE W/CT FULL BODY: CPT | Mod: 26 | Performed by: RADIOLOGY

## 2024-10-09 RX ORDER — FLUDEOXYGLUCOSE F 18 200 MCI/ML
10-18 INJECTION, SOLUTION INTRAVENOUS ONCE
Status: COMPLETED | OUTPATIENT
Start: 2024-10-09 | End: 2024-10-09

## 2024-10-09 RX ORDER — FUROSEMIDE 10 MG/ML
20 INJECTION INTRAMUSCULAR; INTRAVENOUS ONCE
Status: DISCONTINUED | OUTPATIENT
Start: 2024-10-09 | End: 2024-10-09

## 2024-10-09 RX ORDER — FUROSEMIDE 10 MG/ML
40 INJECTION INTRAMUSCULAR; INTRAVENOUS ONCE
Status: COMPLETED | OUTPATIENT
Start: 2024-10-09 | End: 2024-10-09

## 2024-10-09 RX ADMIN — FUROSEMIDE 40 MG: 10 INJECTION, SOLUTION INTRAMUSCULAR; INTRAVENOUS at 10:21

## 2024-10-09 RX ADMIN — FLUDEOXYGLUCOSE F 18 10.03 MILLICURIE: 200 INJECTION, SOLUTION INTRAVENOUS at 09:14

## 2024-10-10 ENCOUNTER — APPOINTMENT (OUTPATIENT)
Dept: LAB | Facility: CLINIC | Age: 54
End: 2024-10-10
Attending: STUDENT IN AN ORGANIZED HEALTH CARE EDUCATION/TRAINING PROGRAM
Payer: COMMERCIAL

## 2024-10-10 ENCOUNTER — OFFICE VISIT (OUTPATIENT)
Dept: TRANSPLANT | Facility: CLINIC | Age: 54
End: 2024-10-10
Payer: COMMERCIAL

## 2024-10-10 VITALS
DIASTOLIC BLOOD PRESSURE: 72 MMHG | WEIGHT: 120 LBS | RESPIRATION RATE: 16 BRPM | OXYGEN SATURATION: 97 % | BODY MASS INDEX: 20.49 KG/M2 | SYSTOLIC BLOOD PRESSURE: 106 MMHG | HEART RATE: 83 BPM | TEMPERATURE: 97.7 F

## 2024-10-10 DIAGNOSIS — E85.81 AL AMYLOIDOSIS (H): Primary | ICD-10-CM

## 2024-10-10 LAB
ALBUMIN SERPL BCG-MCNC: 3.3 G/DL (ref 3.5–5.2)
ALP SERPL-CCNC: 133 U/L (ref 40–150)
ALT SERPL W P-5'-P-CCNC: 23 U/L (ref 0–50)
ANION GAP SERPL CALCULATED.3IONS-SCNC: 9 MMOL/L (ref 7–15)
AST SERPL W P-5'-P-CCNC: 20 U/L (ref 0–45)
BASOPHILS # BLD AUTO: 0.1 10E3/UL (ref 0–0.2)
BASOPHILS NFR BLD AUTO: 1 %
BILIRUB SERPL-MCNC: 0.3 MG/DL
BUN SERPL-MCNC: 38.4 MG/DL (ref 6–20)
CALCIUM SERPL-MCNC: 12.3 MG/DL (ref 8.8–10.4)
CHLORIDE SERPL-SCNC: 103 MMOL/L (ref 98–107)
CREAT SERPL-MCNC: 2.97 MG/DL (ref 0.51–0.95)
EGFRCR SERPLBLD CKD-EPI 2021: 18 ML/MIN/1.73M2
EOSINOPHIL # BLD AUTO: 0.2 10E3/UL (ref 0–0.7)
EOSINOPHIL NFR BLD AUTO: 2 %
ERYTHROCYTE [DISTWIDTH] IN BLOOD BY AUTOMATED COUNT: 14.6 % (ref 10–15)
GLUCOSE SERPL-MCNC: 96 MG/DL (ref 70–99)
HCO3 SERPL-SCNC: 25 MMOL/L (ref 22–29)
HCT VFR BLD AUTO: 34.5 % (ref 35–47)
HGB BLD-MCNC: 11.8 G/DL (ref 11.7–15.7)
IMM GRANULOCYTES # BLD: 0 10E3/UL
IMM GRANULOCYTES NFR BLD: 0 %
KAPPA LC FREE SER-MCNC: 3.55 MG/DL (ref 0.33–1.94)
KAPPA LC FREE/LAMBDA FREE SER NEPH: 1.03 {RATIO} (ref 0.26–1.65)
LAMBDA LC FREE SERPL-MCNC: 3.44 MG/DL (ref 0.57–2.63)
LYMPHOCYTES # BLD AUTO: 1.3 10E3/UL (ref 0.8–5.3)
LYMPHOCYTES NFR BLD AUTO: 19 %
MCH RBC QN AUTO: 32.5 PG (ref 26.5–33)
MCHC RBC AUTO-ENTMCNC: 34.2 G/DL (ref 31.5–36.5)
MCV RBC AUTO: 95 FL (ref 78–100)
MONOCYTES # BLD AUTO: 0.7 10E3/UL (ref 0–1.3)
MONOCYTES NFR BLD AUTO: 11 %
NEUTROPHILS # BLD AUTO: 4.6 10E3/UL (ref 1.6–8.3)
NEUTROPHILS NFR BLD AUTO: 67 %
NRBC # BLD AUTO: 0 10E3/UL
NRBC BLD AUTO-RTO: 0 /100
PATH REPORT.COMMENTS IMP SPEC: NORMAL
PATH REPORT.FINAL DX SPEC: NORMAL
PATH REPORT.MICROSCOPIC SPEC OTHER STN: NORMAL
PATH REPORT.RELEVANT HX SPEC: NORMAL
PLATELET # BLD AUTO: 251 10E3/UL (ref 150–450)
POTASSIUM SERPL-SCNC: 3.9 MMOL/L (ref 3.4–5.3)
PROT SERPL-MCNC: 6.1 G/DL (ref 6.4–8.3)
RBC # BLD AUTO: 3.63 10E6/UL (ref 3.8–5.2)
SODIUM SERPL-SCNC: 137 MMOL/L (ref 135–145)
WBC # BLD AUTO: 6.9 10E3/UL (ref 4–11)

## 2024-10-10 PROCEDURE — 88305 TISSUE EXAM BY PATHOLOGIST: CPT | Mod: 26 | Performed by: PATHOLOGY

## 2024-10-10 PROCEDURE — 88311 DECALCIFY TISSUE: CPT | Mod: 26 | Performed by: PATHOLOGY

## 2024-10-10 PROCEDURE — 82040 ASSAY OF SERUM ALBUMIN: CPT

## 2024-10-10 PROCEDURE — 86334 IMMUNOFIX E-PHORESIS SERUM: CPT | Performed by: PATHOLOGY

## 2024-10-10 PROCEDURE — 36415 COLL VENOUS BLD VENIPUNCTURE: CPT | Performed by: PHYSICIAN ASSISTANT

## 2024-10-10 PROCEDURE — 85060 BLOOD SMEAR INTERPRETATION: CPT | Performed by: PATHOLOGY

## 2024-10-10 PROCEDURE — 86334 IMMUNOFIX E-PHORESIS SERUM: CPT | Mod: 26 | Performed by: PATHOLOGY

## 2024-10-10 PROCEDURE — 88305 TISSUE EXAM BY PATHOLOGIST: CPT | Mod: TC | Performed by: PHYSICIAN ASSISTANT

## 2024-10-10 PROCEDURE — 83521 IG LIGHT CHAINS FREE EACH: CPT | Performed by: PHYSICIAN ASSISTANT

## 2024-10-10 PROCEDURE — 88185 FLOWCYTOMETRY/TC ADD-ON: CPT | Performed by: PHYSICIAN ASSISTANT

## 2024-10-10 PROCEDURE — 88184 FLOWCYTOMETRY/ TC 1 MARKER: CPT | Performed by: PATHOLOGY

## 2024-10-10 PROCEDURE — 88161 CYTOPATH SMEAR OTHER SOURCE: CPT | Mod: TC | Performed by: PHYSICIAN ASSISTANT

## 2024-10-10 PROCEDURE — 88188 FLOWCYTOMETRY/READ 9-15: CPT | Performed by: PATHOLOGY

## 2024-10-10 PROCEDURE — 85097 BONE MARROW INTERPRETATION: CPT | Performed by: PATHOLOGY

## 2024-10-10 PROCEDURE — 250N000011 HC RX IP 250 OP 636

## 2024-10-10 PROCEDURE — 88313 SPECIAL STAINS GROUP 2: CPT | Mod: 26 | Performed by: PATHOLOGY

## 2024-10-10 PROCEDURE — 88342 IMHCHEM/IMCYTCHM 1ST ANTB: CPT | Mod: 26 | Performed by: PATHOLOGY

## 2024-10-10 PROCEDURE — 38221 DX BONE MARROW BIOPSIES: CPT

## 2024-10-10 PROCEDURE — 85025 COMPLETE CBC W/AUTO DIFF WBC: CPT

## 2024-10-10 PROCEDURE — 88341 IMHCHEM/IMCYTCHM EA ADD ANTB: CPT | Mod: 26 | Performed by: PATHOLOGY

## 2024-10-10 PROCEDURE — 38222 DX BONE MARROW BX & ASPIR: CPT | Mod: RT

## 2024-10-10 PROCEDURE — 88184 FLOWCYTOMETRY/ TC 1 MARKER: CPT | Performed by: PHYSICIAN ASSISTANT

## 2024-10-10 RX ADMIN — MIDAZOLAM 2 MG: 1 INJECTION INTRAMUSCULAR; INTRAVENOUS at 08:30

## 2024-10-10 ASSESSMENT — PAIN SCALES - GENERAL: PAINLEVEL: NO PAIN (0)

## 2024-10-10 NOTE — LETTER
10/10/2024      Vivian Brown  [address] [facility] [address]      Dear Colleague,    Thank you for referring your patient, Vivian Brown, to the [facility] BLOOD AND MARROW TRANSPLANT PROGRAM [place]. Please see a copy of my visit note below.    BMT ONC Adult Bone Marrow Biopsy Procedure Note  October 10, 2024  There were no vitals taken for this visit.     Learning needs assessment complete within 12 months? YES    DIAGNOSIS: Amyloidosis     PROCEDURE: Unilateral Bone Marrow Biopsy and Unilateral Aspirate    LOCATION: [facility] 2nd Floor    Patient s identification was positively verified by verbal identification and invasive procedure safety checklist was completed. Informed consent was obtained. Following the administration of Midazolam as pre-medication, patient was placed in the prone position and prepped and draped in a sterile manner. Approximately 14 cc of 1% Lidocaine was used over the right posterior iliac spine. Following this a 3 mm incision was made. Trephine bone marrow core(s) was (were) obtained from the [facility]. Bone marrow aspirates were obtained from the [facility]. Aspirates were sent for morphology, immunophenotyping, and clonoseq. A total of approximately 12 ml of marrow was aspirated. Following this procedure a sterile dressing was applied to the bone marrow biopsy site(s). The patient was placed in the supine position to maintain pressure on the biopsy site. Post-procedure wound care instructions were given.     Complications: NO    Pre-procedural pain: 0 out of 10 on the numeric pain rating scale.     Procedural pain: 7 out of 10 on the numeric pain rating scale. (Only significant pain with aspirate pull, brief).     Post-procedural pain assessment: 0 out of 10 on the numeric pain rating scale.     Interventions: NO    Length of procedure:20 minutes or less      Procedure performed by: Sabrina Jain NP      Again, thank you for allowing me to participate in the care of  your patient.        Sincerely,        BMT Advanced Practice Provider

## 2024-10-10 NOTE — NURSING NOTE
Patient supine for 30 minutes following biopsy. After 30 minutes, dressing clean, dry and intact. Vital signs stable. See flowsheet for details. Left ambulatory with family member.    Instructional Materials Used/Given: Pt instructed to keep bmbx site clean and dry for 24hrs. Pt educated to monitor site for signs of infection such as redness, rash, oozing, pus, bleeding, pain, and fever.  Pt instructed to go to call the [facility] triage line or go to the ER if any signs of infection should occur. Pt educated to not drive or operate heavy machinery if receiving versed. Pt verbalized understanding.         Mary Ann Weller RN

## 2024-10-10 NOTE — NURSING NOTE
Provider order received to administer Versed 2 mg IVP as premed for BMBX. Procedural consent discussed and pt's signature obtained.  Allergies reviewed.  PT currently alert and oriented to plan of care.  Pt lying prone on stretcher. Call light w/in reach. Provider and  at bedside.  Mary Ann Weller RN

## 2024-10-10 NOTE — PROGRESS NOTES
BMT ONC Adult Bone Marrow Biopsy Procedure Note  October 10, 2024  There were no vitals taken for this visit.     Learning needs assessment complete within 12 months? YES    DIAGNOSIS: Amyloidosis     PROCEDURE: Unilateral Bone Marrow Biopsy and Unilateral Aspirate    LOCATION: [facility] 2nd Floor    Patient s identification was positively verified by verbal identification and invasive procedure safety checklist was completed. Informed consent was obtained. Following the administration of Midazolam as pre-medication, patient was placed in the prone position and prepped and draped in a sterile manner. Approximately 14 cc of 1% Lidocaine was used over the right posterior iliac spine. Following this a 3 mm incision was made. Trephine bone marrow core(s) was (were) obtained from the [facility]. Bone marrow aspirates were obtained from the [facility]. Aspirates were sent for morphology, immunophenotyping, and clonoseq. A total of approximately 12 ml of marrow was aspirated. Following this procedure a sterile dressing was applied to the bone marrow biopsy site(s). The patient was placed in the supine position to maintain pressure on the biopsy site. Post-procedure wound care instructions were given.     Complications: NO    Pre-procedural pain: 0 out of 10 on the numeric pain rating scale.     Procedural pain: 7 out of 10 on the numeric pain rating scale. (Only significant pain with aspirate pull, brief).     Post-procedural pain assessment: 0 out of 10 on the numeric pain rating scale.     Interventions: NO    Length of procedure:20 minutes or less      Procedure performed by: Sabrina Jain NP

## 2024-10-10 NOTE — NURSING NOTE
Pt arrives ambulatory, alert and oriented x 3. Family here with patient and is  home. Labs drawn with IV start by RN in clinic. Vital signs taken. See flowsheet. Pt would like versed for procedure.    BMT Teaching Flowsheet   Teaching Topic: post biopsy instructions    Person(s) involved in teaching: Patient  Motivation Level  Asks Questions: Yes  Eager to Learn: Yes  Cooperative: Yes  Receptive (willing/able to accept information): Yes    Patient demonstrates understanding of the following:   - Reason for the appointment, diagnosis and treatment plan: Yes  - Knowledge of proper use of medications and conditions for which they are ordered (with special attention to potential side effects or drug interactions): Yes  - Which situations necessitate calling provider and whom to contact: Yes    Teaching concerns addressed: reviewed activity restrictions if received premeds, potential for bleeding and actions to take if develops any of the issues below    Proper use and care of (medical equipment, care aids, etc.) Yes  Pain management techniques: Yes  Patient instructed on hand hygiene: Yes  How and/when to access community resources: Yes    Infection Control:  Patient demonstrates understanding of the following:   Surgical procedure site care taught NA  Signs and symptoms of infection taught Yes  Wound care taught Yes  Central venous catheter care taught NA    Instructional Materials Used/Given: verbal, print out of post biopsy instructions.     Time spent with patient: 0 minutes.    Specific Concerns: NA    Pt requests premeds: yes  Mary Ann Weller RN

## 2024-10-12 LAB — PROT PATTERN SERPL IFE-IMP: NORMAL

## 2024-10-15 ENCOUNTER — TELEPHONE (OUTPATIENT)
Dept: NEPHROLOGY | Facility: CLINIC | Age: 54
End: 2024-10-15
Payer: COMMERCIAL

## 2024-10-15 ENCOUNTER — MYC MEDICAL ADVICE (OUTPATIENT)
Dept: NEPHROLOGY | Facility: CLINIC | Age: 54
End: 2024-10-15
Payer: COMMERCIAL

## 2024-10-15 ENCOUNTER — PATIENT OUTREACH (OUTPATIENT)
Dept: ONCOLOGY | Facility: CLINIC | Age: 54
End: 2024-10-15
Payer: COMMERCIAL

## 2024-10-15 DIAGNOSIS — E55.9 VITAMIN D DEFICIENCY: ICD-10-CM

## 2024-10-15 DIAGNOSIS — E83.52 HYPERCALCEMIA: Primary | ICD-10-CM

## 2024-10-15 RX ORDER — HEPARIN SODIUM (PORCINE) LOCK FLUSH IV SOLN 100 UNIT/ML 100 UNIT/ML
5 SOLUTION INTRAVENOUS
Status: CANCELLED | OUTPATIENT
Start: 2024-10-16

## 2024-10-15 RX ORDER — ALBUTEROL SULFATE 0.83 MG/ML
2.5 SOLUTION RESPIRATORY (INHALATION)
Status: CANCELLED | OUTPATIENT
Start: 2024-10-16

## 2024-10-15 RX ORDER — METHYLPREDNISOLONE SODIUM SUCCINATE 125 MG/2ML
125 INJECTION INTRAMUSCULAR; INTRAVENOUS
Status: CANCELLED
Start: 2024-10-16

## 2024-10-15 RX ORDER — DIPHENHYDRAMINE HYDROCHLORIDE 50 MG/ML
50 INJECTION INTRAMUSCULAR; INTRAVENOUS
Status: CANCELLED
Start: 2024-10-16

## 2024-10-15 RX ORDER — HEPARIN SODIUM,PORCINE 10 UNIT/ML
5-20 VIAL (ML) INTRAVENOUS DAILY PRN
Status: CANCELLED | OUTPATIENT
Start: 2024-10-16

## 2024-10-15 RX ORDER — MEPERIDINE HYDROCHLORIDE 25 MG/ML
25 INJECTION INTRAMUSCULAR; INTRAVENOUS; SUBCUTANEOUS EVERY 30 MIN PRN
Status: CANCELLED | OUTPATIENT
Start: 2024-10-16

## 2024-10-15 RX ORDER — EPINEPHRINE 1 MG/ML
0.3 INJECTION, SOLUTION, CONCENTRATE INTRAVENOUS EVERY 5 MIN PRN
Status: CANCELLED | OUTPATIENT
Start: 2024-10-16

## 2024-10-15 RX ORDER — ALBUTEROL SULFATE 90 UG/1
1-2 INHALANT RESPIRATORY (INHALATION)
Status: CANCELLED
Start: 2024-10-16

## 2024-10-15 NOTE — PROGRESS NOTES
[facility]: Cancer Care Short Note                                                                                          RNCC called patient to inform her that [facility] could get her in for fluids and pamidronate tomorrow at 9:30am. Patient was wondering if she needed more labs drawn. Per Dr. Chandler, labs from 10/10 is fine.     Patient appreciative of call and had no further questions.    Kristen Henley, BSN, RN  RN Care Coordinator   [phone]

## 2024-10-15 NOTE — TELEPHONE ENCOUNTER
I called Mary Ann to discuss with her about the progression of her CKD. I share with her that I have been in communication with Dr. Chandler about her case. I doubt that CKD progression is from amyloid but rather hypercalcemia. She has no problem with urination. Ca is now 12.3. PTH is low normal at 18, Vit D 27 and PTHrP mildly low at 7.4. PET showed no bony lesion or lesion concerning for cancer. Still unclear why she has hypercalcemia. Not taking vitamin D for about 2 months now. Not taking calcium supplement.  Plan  - will give NS 1 L and Pamidronate 30 mg x 1 dose and repeat labs in 1 week  - Repeat iCa, PTHrP ([place] [facility] send out) and will check 1,25 dihydroxy vit D tmr  - If calcium is not improving next week then we will admit her for further work-up, treatment and possible kidney Bx    Tracie Dobson MD on 10/15/2024 at 1:14 PM

## 2024-10-16 ENCOUNTER — LAB (OUTPATIENT)
Dept: LAB | Facility: CLINIC | Age: 54
End: 2024-10-16
Payer: COMMERCIAL

## 2024-10-16 ENCOUNTER — INFUSION THERAPY VISIT (OUTPATIENT)
Dept: INFUSION THERAPY | Facility: CLINIC | Age: 54
End: 2024-10-16
Attending: PHYSICIAN ASSISTANT
Payer: COMMERCIAL

## 2024-10-16 VITALS
SYSTOLIC BLOOD PRESSURE: 114 MMHG | RESPIRATION RATE: 16 BRPM | DIASTOLIC BLOOD PRESSURE: 72 MMHG | BODY MASS INDEX: 20.64 KG/M2 | OXYGEN SATURATION: 100 % | WEIGHT: 120.9 LBS | TEMPERATURE: 97.9 F | HEART RATE: 93 BPM

## 2024-10-16 DIAGNOSIS — E83.52 HYPERCALCEMIA: Primary | ICD-10-CM

## 2024-10-16 DIAGNOSIS — E85.81 AL AMYLOIDOSIS (H): ICD-10-CM

## 2024-10-16 DIAGNOSIS — E85.81 AL AMYLOIDOSIS (H): Primary | ICD-10-CM

## 2024-10-16 LAB
1,25(OH)2D SERPL-MCNC: 9.2 PG/ML (ref 19.9–79.3)
ALBUMIN SERPL BCG-MCNC: 3 G/DL (ref 3.5–5.2)
ALBUMIN SERPL BCG-MCNC: 3 G/DL (ref 3.5–5.2)
ALP SERPL-CCNC: 128 U/L (ref 40–150)
ALT SERPL W P-5'-P-CCNC: 22 U/L (ref 0–50)
ANION GAP SERPL CALCULATED.3IONS-SCNC: 11 MMOL/L (ref 7–15)
ANION GAP SERPL CALCULATED.3IONS-SCNC: 12 MMOL/L (ref 7–15)
AST SERPL W P-5'-P-CCNC: 21 U/L (ref 0–45)
BASOPHILS # BLD AUTO: 0.1 10E3/UL (ref 0–0.2)
BASOPHILS NFR BLD AUTO: 1 %
BILIRUB SERPL-MCNC: 0.2 MG/DL
BUN SERPL-MCNC: 31.8 MG/DL (ref 6–20)
BUN SERPL-MCNC: 32.2 MG/DL (ref 6–20)
CA-I BLD-MCNC: 6.2 MG/DL (ref 4.4–5.2)
CALCIUM SERPL-MCNC: 10.9 MG/DL (ref 8.8–10.4)
CALCIUM SERPL-MCNC: 10.9 MG/DL (ref 8.8–10.4)
CHLORIDE SERPL-SCNC: 103 MMOL/L (ref 98–107)
CHLORIDE SERPL-SCNC: 105 MMOL/L (ref 98–107)
CREAT SERPL-MCNC: 2.72 MG/DL (ref 0.51–0.95)
CREAT SERPL-MCNC: 2.72 MG/DL (ref 0.51–0.95)
EGFRCR SERPLBLD CKD-EPI 2021: 20 ML/MIN/1.73M2
EGFRCR SERPLBLD CKD-EPI 2021: 20 ML/MIN/1.73M2
EOSINOPHIL # BLD AUTO: 0.2 10E3/UL (ref 0–0.7)
EOSINOPHIL NFR BLD AUTO: 2 %
ERYTHROCYTE [DISTWIDTH] IN BLOOD BY AUTOMATED COUNT: 14.7 % (ref 10–15)
GLUCOSE SERPL-MCNC: 97 MG/DL (ref 70–99)
GLUCOSE SERPL-MCNC: 98 MG/DL (ref 70–99)
HCO3 SERPL-SCNC: 21 MMOL/L (ref 22–29)
HCO3 SERPL-SCNC: 22 MMOL/L (ref 22–29)
HCT VFR BLD AUTO: 27.9 % (ref 35–47)
HGB BLD-MCNC: 9.3 G/DL (ref 11.7–15.7)
IMM GRANULOCYTES # BLD: 0 10E3/UL
IMM GRANULOCYTES NFR BLD: 1 %
LYMPHOCYTES # BLD AUTO: 1.5 10E3/UL (ref 0.8–5.3)
LYMPHOCYTES NFR BLD AUTO: 17 %
Lab: NORMAL
MCH RBC QN AUTO: 32.5 PG (ref 26.5–33)
MCHC RBC AUTO-ENTMCNC: 33.3 G/DL (ref 31.5–36.5)
MCV RBC AUTO: 98 FL (ref 78–100)
MONOCYTES # BLD AUTO: 0.9 10E3/UL (ref 0–1.3)
MONOCYTES NFR BLD AUTO: 11 %
NEUTROPHILS # BLD AUTO: 5.8 10E3/UL (ref 1.6–8.3)
NEUTROPHILS NFR BLD AUTO: 69 %
NRBC # BLD AUTO: 0 10E3/UL
NRBC BLD AUTO-RTO: 0 /100
PERFORMING LABORATORY: NORMAL
PHOSPHATE SERPL-MCNC: 3.8 MG/DL (ref 2.5–4.5)
PLATELET # BLD AUTO: 211 10E3/UL (ref 150–450)
POTASSIUM SERPL-SCNC: 4.2 MMOL/L (ref 3.4–5.3)
POTASSIUM SERPL-SCNC: 4.3 MMOL/L (ref 3.4–5.3)
PROT SERPL-MCNC: 5.6 G/DL (ref 6.4–8.3)
RBC # BLD AUTO: 2.86 10E6/UL (ref 3.8–5.2)
SODIUM SERPL-SCNC: 136 MMOL/L (ref 135–145)
SODIUM SERPL-SCNC: 138 MMOL/L (ref 135–145)
SPECIMEN STATUS: NORMAL
TEST NAME: NORMAL
VIT D+METAB SERPL-MCNC: 17 NG/ML (ref 20–50)
WBC # BLD AUTO: 8.4 10E3/UL (ref 4–11)

## 2024-10-16 PROCEDURE — 82652 VIT D 1 25-DIHYDROXY: CPT

## 2024-10-16 PROCEDURE — 36415 COLL VENOUS BLD VENIPUNCTURE: CPT

## 2024-10-16 PROCEDURE — 250N000011 HC RX IP 250 OP 636: Performed by: INTERNAL MEDICINE

## 2024-10-16 PROCEDURE — 96366 THER/PROPH/DIAG IV INF ADDON: CPT

## 2024-10-16 PROCEDURE — 80053 COMPREHEN METABOLIC PANEL: CPT

## 2024-10-16 PROCEDURE — 96361 HYDRATE IV INFUSION ADD-ON: CPT

## 2024-10-16 PROCEDURE — 258N000003 HC RX IP 258 OP 636: Performed by: INTERNAL MEDICINE

## 2024-10-16 PROCEDURE — 82040 ASSAY OF SERUM ALBUMIN: CPT | Mod: 91

## 2024-10-16 PROCEDURE — 82397 CHEMILUMINESCENT ASSAY: CPT

## 2024-10-16 PROCEDURE — 85025 COMPLETE CBC W/AUTO DIFF WBC: CPT

## 2024-10-16 PROCEDURE — 82306 VITAMIN D 25 HYDROXY: CPT

## 2024-10-16 PROCEDURE — 96365 THER/PROPH/DIAG IV INF INIT: CPT

## 2024-10-16 PROCEDURE — 82330 ASSAY OF CALCIUM: CPT

## 2024-10-16 RX ORDER — HEPARIN SODIUM (PORCINE) LOCK FLUSH IV SOLN 100 UNIT/ML 100 UNIT/ML
5 SOLUTION INTRAVENOUS
OUTPATIENT
Start: 2025-01-14

## 2024-10-16 RX ORDER — ALBUTEROL SULFATE 0.83 MG/ML
2.5 SOLUTION RESPIRATORY (INHALATION)
OUTPATIENT
Start: 2025-01-14

## 2024-10-16 RX ORDER — HEPARIN SODIUM,PORCINE 10 UNIT/ML
5-20 VIAL (ML) INTRAVENOUS DAILY PRN
OUTPATIENT
Start: 2025-01-14

## 2024-10-16 RX ORDER — METHYLPREDNISOLONE SODIUM SUCCINATE 125 MG/2ML
125 INJECTION INTRAMUSCULAR; INTRAVENOUS
Start: 2025-01-14

## 2024-10-16 RX ORDER — EPINEPHRINE 1 MG/ML
0.3 INJECTION, SOLUTION, CONCENTRATE INTRAVENOUS EVERY 5 MIN PRN
OUTPATIENT
Start: 2025-01-14

## 2024-10-16 RX ORDER — MEPERIDINE HYDROCHLORIDE 25 MG/ML
25 INJECTION INTRAMUSCULAR; INTRAVENOUS; SUBCUTANEOUS EVERY 30 MIN PRN
OUTPATIENT
Start: 2025-01-14

## 2024-10-16 RX ORDER — DIPHENHYDRAMINE HYDROCHLORIDE 50 MG/ML
50 INJECTION INTRAMUSCULAR; INTRAVENOUS
Start: 2025-01-14

## 2024-10-16 RX ORDER — ALBUTEROL SULFATE 90 UG/1
1-2 INHALANT RESPIRATORY (INHALATION)
Start: 2025-01-14

## 2024-10-16 RX ADMIN — PAMIDRONATE DISODIUM 30 MG: 3 INJECTION, SOLUTION INTRAVENOUS at 12:49

## 2024-10-16 RX ADMIN — SODIUM CHLORIDE 1000 ML: 9 INJECTION, SOLUTION INTRAVENOUS at 10:54

## 2024-10-16 ASSESSMENT — PAIN SCALES - GENERAL: PAINLEVEL: NO PAIN (0)

## 2024-10-16 NOTE — PROGRESS NOTES
Infusion Nursing Note:  Vivian Brown presents today for Aredia/ IVFs.    Patient seen by provider today: No   present during visit today: Not Applicable.    Note: Pt arrived after going to lab.  Aredia not yet approved when pt arrived.  Notified Dr Dobson who wanted pt to get liter of IVFs even though Aredia not approved.  During time that IVF was infusing Aredia was approved and ordered.  Pt had canceled her appt w/ dr Chandler in the mean time in hopes that it would be approved and she could get the Aredia. Liter of IVFs given prior to starting Aredia.      Intravenous Access:  Peripheral IV placed.    Treatment Conditions:  Lab Results   Component Value Date     10/16/2024    POTASSIUM 4.3 10/16/2024    MAG 2.1 10/08/2024    CR 2.72 (H) 10/16/2024    TANNER 10.9 (H) 10/16/2024    BILITOTAL 0.3 10/10/2024    ALBUMIN 3.0 (L) 10/16/2024    ALT 23 10/10/2024    AST 20 10/10/2024         Post Infusion Assessment:  Patient tolerated infusion without incident.  Patient observed for 15 minutes post Aredia.  Site patent and intact, free from redness, edema or discomfort.  No evidence of extravasations.  Access discontinued per protocol.       Discharge Plan:   Discharge instructions reviewed with: Patient.  Patient discharged in stable condition accompanied by: self.  Departure Mode: Ambulatory.      Yeny Garcia RN

## 2024-10-17 ENCOUNTER — MYC MEDICAL ADVICE (OUTPATIENT)
Dept: GASTROENTEROLOGY | Facility: CLINIC | Age: 54
End: 2024-10-17

## 2024-10-17 ENCOUNTER — OFFICE VISIT (OUTPATIENT)
Dept: TRANSPLANT | Facility: CLINIC | Age: 54
End: 2024-10-17
Attending: STUDENT IN AN ORGANIZED HEALTH CARE EDUCATION/TRAINING PROGRAM
Payer: COMMERCIAL

## 2024-10-17 VITALS
OXYGEN SATURATION: 100 % | RESPIRATION RATE: 14 BRPM | WEIGHT: 124.5 LBS | DIASTOLIC BLOOD PRESSURE: 91 MMHG | TEMPERATURE: 98 F | BODY MASS INDEX: 21.26 KG/M2 | HEART RATE: 85 BPM | SYSTOLIC BLOOD PRESSURE: 144 MMHG

## 2024-10-17 DIAGNOSIS — E03.4 HYPOTHYROIDISM DUE TO ACQUIRED ATROPHY OF THYROID: ICD-10-CM

## 2024-10-17 DIAGNOSIS — E85.81 AL AMYLOIDOSIS (H): Primary | ICD-10-CM

## 2024-10-17 DIAGNOSIS — Z94.81 STATUS POST BONE MARROW TRANSPLANT (H): ICD-10-CM

## 2024-10-17 DIAGNOSIS — E83.52 HYPERCALCEMIA: ICD-10-CM

## 2024-10-17 DIAGNOSIS — Z52.011 AUTOLOGOUS DONOR OF STEM CELLS: ICD-10-CM

## 2024-10-17 LAB
ABC 95% CONFIDENCE INTERVAL (B-CELL): NORMAL
ABC CLONOSEQ B-CELL TRACKING (MRD) RESULT: NORMAL
ABC DOMINANT SEQUENCES (B-CELL): 1
ABC RESIDUAL CLONAL CELLS/ MILL NUCLEATED CELLS BCELL: 298 PER MILLION NUCLEATED CELLS

## 2024-10-17 PROCEDURE — 99215 OFFICE O/P EST HI 40 MIN: CPT

## 2024-10-17 PROCEDURE — 99213 OFFICE O/P EST LOW 20 MIN: CPT

## 2024-10-17 PROCEDURE — G2211 COMPLEX E/M VISIT ADD ON: HCPCS

## 2024-10-17 RX ORDER — ONDANSETRON 8 MG/1
8 TABLET, FILM COATED ORAL EVERY 8 HOURS
Qty: 30 TABLET | Refills: 1 | Status: SHIPPED | OUTPATIENT
Start: 2024-10-17

## 2024-10-17 RX ORDER — OLANZAPINE 2.5 MG/1
2.5 TABLET, FILM COATED ORAL AT BEDTIME
Qty: 30 TABLET | Refills: 0 | Status: SHIPPED | OUTPATIENT
Start: 2024-10-17

## 2024-10-17 RX ORDER — PROCHLORPERAZINE MALEATE 5 MG/1
5 TABLET ORAL EVERY 6 HOURS PRN
Qty: 30 TABLET | Refills: 1 | Status: SHIPPED | OUTPATIENT
Start: 2024-10-17

## 2024-10-17 ASSESSMENT — PAIN SCALES - GENERAL: PAINLEVEL: NO PAIN (0)

## 2024-10-17 NOTE — LETTER
10/17/2024      Vivian Brown  [address] [facility] [address]      Dear Colleague,    Thank you for referring your patient, Vivian Brown, to the [facility]LOOD AND MARROW [facility]ANSPLANT [facility] [place]. Please see a copy of my visit note below.         BMT Progress Note    Disease presentation and baseline characteristics:   12/21/2023:  Final Diagnosis   A. kidney biopsy (needle):     Amyloidosis of the kidney, AL-type, lambda light chain-restricted, affecting the glomeruli, interstitium, and arterioles (see comment)     Mild chronic changes of the parenchyma, including:   - focal global glomerulosclerosis (3% of glomeruli)   - focal tubular atrophy and interstitial fibrosis (5% of the cortex)   - severe arterial sclerosis   Electronically signed by Joe Garcia MD on 12/23/2023 at  3:21 PM   Comment  UULAB   The biopsy reveals findings diagnostic of amyloidosis; the amyloid shows lambda light chain-restriction (AL amyloidosis), and the deposits are Congo red positive. The amyloid deposits affect the glomeruli extensively, and interstitium and arterioles focally. Other potential complications of paraproteins in the kidney are not seen, in particular, there is no evidence of light chain cast nephropathy, light chain deposition disease, or light chain tubulopathy.        Date Treatment Name Response Side Effects / Toxicities   1/19/24 D-CyBorD x 4 cycles     6/12/24 HDT/ASCT Hematologic VGPR           HPI:  Please see my entry above for hematologic history.  Mrs. Brown presents today to review her D+100 restaging.  She continues to have intermittent nausea, vomiting approximately once a day.  She has been taking her anti-emetics but thinks these only give her minimal relief.  She is staying active despite this, stil going to work and planning to leave on hunting trip with her family tomorrow.      ASSESSMENT AND PLAN:  Mrs. Brown had her restaging delayed due to insurance issues but  has now completed her workup.    Bone marrow biopsy showed no evidence of plasma cell neoplasm by morphology or flow.  Peripheral studies show immunofixation positive for a florinda monoclonal kappa; she has lambda light chain disease and so her immunofixation is negative from a disease standpoint.  Monoclonal peak is detectable at 0.1 g/dL.  Kappa and lambda free light chains are slightly elevated at 3.55/3.44 mg/dL with a normal ratio.  PET showed no evidence of metabolically-active systemic amyloid deposition.  A CT component was done with her PET given the elevated PTHRP but did not show any obvious new/other malignancy.    Upper endoscopy showed focal birefringence consistent with amyloid; this is new compared to prior endoscopy on 1/30/24.  The clinical significance of this is unclear to me as I had a strong suspicion for upper GI involvement in January and this may have been a sampling issue.    Renal function remains decreased since 7/24 with associated moderate hypercalcemia.  PThRP was elevated at 7.4 pmol/L with normal PTH at 18 pg/mL.    Her restaging overall does not appear consistent with overt amyloid relapse but her decreased renal function is concerning.  I have reached out to her nephrology team; they are concerned her hypercalcemia may be driving her CHRIS/nausea and Mrs. Brown received pamidronate yesterday.  A repeat PTHrP from nephrology is in process.    The underlying cause of the hypercalcemia is not entirely clear.  If attributed to amyloid the appropriate treatment would be further amyloid-directed therapy but I am hesitant to commit her to this given her restaging results.    Her hemoglobin decreased to 9.3 yesterday.  She has not noted any bleeding in general and denies bloody/black/tarry stools.  Plan to recheck early next week as well.    Restart zyprexa at bedtime to help with nausea.    Plan:  - Repeat CBC/CMP next week  - Restart zyprexa at bedtime  - Follow up with nephrology    I spent  40 minutes in the care of this patient today, which included time necessary for preparation for the visit, obtaining history, ordering medications/tests/procedures as medically indicated, review of pertinent medical literature, counseling of the patient, communication of recommendations to the care team, and documentation time.    Jomar Chandler MD    ROS:    10 point ROS neg other than the symptoms noted above in the HPI.      Current Outpatient Medications   Medication Sig Dispense Refill     OLANZapine (ZYPREXA) 2.5 MG tablet Take 1 tablet (2.5 mg) by mouth at bedtime. 30 tablet 0     ondansetron (ZOFRAN) 8 MG tablet Take 1 tablet (8 mg) by mouth every 8 hours. 30 tablet 1     prochlorperazine (COMPAZINE) 5 MG tablet Take 1 tablet (5 mg) by mouth every 6 hours as needed for nausea or vomiting. 30 tablet 1     acyclovir (ZOVIRAX) 400 MG tablet Take 2 tablets (800 mg) by mouth 2 times daily 120 tablet 11     Albuterol-Budesonide (AIRSUPRA) 90-80 MCG/ACT AERO Inhale 2 Inhalations into the lungs every 4 hours as needed (Wheezing, chest tightness, shortness of breath, or persistent cough) (Patient not taking: Reported on 10/10/2024) 10.7 g 3     azelastine (ASTELIN) 0.1 % nasal spray Spray 2 sprays into both nostrils 2 times daily as needed for rhinitis (Patient not taking: Reported on 10/10/2024) 30 mL 3     bumetanide (BUMEX) 1 MG tablet Take 1 tablet (1 mg) by mouth 2 times daily (Patient not taking: Reported on 10/10/2024) 60 tablet 6     EPINEPHrine (ANY BX GENERIC EQUIV) 0.3 MG/0.3ML injection 2-pack Inject 0.3 mLs (0.3 mg) into the muscle as needed for anaphylaxis May repeat one time in 5-15 minutes if response to initial dose is inadequate. 2 each 3     fluticasone (FLONASE) 50 MCG/ACT nasal spray Spray 2 sprays into both nostrils daily (Patient not taking: Reported on 10/10/2024) 16 g 3     levothyroxine (SYNTHROID) 200 MCG tablet Take 200 mcg by mouth daily. Pt takes Synthroid brand name. FRANKI Taking 175mg        liothyronine (CYTOMEL) 5 MCG tablet Take 5 mcg by mouth 2 times daily       loperamide (IMODIUM A-D) 2 MG tablet Take 1 tablet (2 mg) by mouth 4 times daily as needed for diarrhea (Patient not taking: Reported on 9/16/2024) 30 tablet 1     melatonin 3 MG tablet Take 1 tablet (3 mg) by mouth nightly as needed for sleep (Patient not taking: Reported on 8/2/2024) 30 tablet 1     midodrine (PROAMATINE) 10 MG tablet Take 1 tablet (10 mg) by mouth 3 times daily (Patient taking differently: Take 5 mg by mouth 3 times daily.) 270 tablet 3     ondansetron (ZOFRAN) 8 MG tablet Take 8 mg by mouth every 8 hours as needed for nausea (Patient not taking: Reported on 10/10/2024)       pantoprazole (PROTONIX) 40 MG EC tablet Take 1 tablet (40 mg) by mouth every morning (before breakfast) (Patient not taking: Reported on 9/16/2024) 30 tablet 1     prochlorperazine (COMPAZINE) 10 MG tablet Take 0.5 tablets (5 mg) by mouth every 6 hours as needed for nausea or vomiting 30 tablet 1     psyllium (METAMUCIL/KONSYL) Packet Take 1 packet by mouth daily (Patient not taking: Reported on 9/16/2024) 30 packet 1     sulfamethoxazole-trimethoprim (BACTRIM DS) 800-160 MG tablet Take 1 tablet by mouth Every Mon, Tues two times daily 16 tablet 11     vitamin D3 (CHOLECALCIFEROL) 50 mcg (2000 units) tablet Take 3 tablets (150 mcg) by mouth daily (Patient not taking: Reported on 10/10/2024) 270 tablet 3         Physical Exam:   BP (!) 144/91 (BP Location: Right arm, Patient Position: Right side, Cuff Size: Adult Regular)   Pulse 85   Temp 98  F (36.7  C) (Oral)   Resp 14   Wt 56.5 kg (124 lb 8 oz)   SpO2 100%   BMI 21.26 kg/m      KPS:  90  General Appearance: alert, and no distress  Eyes: PERRL, conjunctiva and lids normal, sclera nonicteric  Ears/Nose/M/Throat: Oral mucosa and posterior oropharynx normal, moist mucous membranes  Cardio/Vascular: extremities WWP  Resp Effort And Auscultation: Breathing comfortably on room air  Edema:  none  Skin: Skin color, texture, turgor normal. No rashes or lesions.  Neurologic: Gait normal.  Sensation grossly WNL.  Psych/Affect: Mood and affect are appropriate.    Blood Counts     Recent Labs   Lab Test 10/16/24  1249 10/10/24  0808 10/08/24  0818   HGB 9.3* 11.8 10.6*   HCT 27.9* 34.5* 31.4*   WBC 8.4 6.9 7.2   ANEUTAUTO 5.8 4.6 4.7   ALYMPAUTO 1.5 1.3 1.5   AMONOAUTO 0.9 0.7 0.8   AEOSAUTO 0.2 0.2 0.2   ABSBASO 0.1 0.1 0.1   NRBCMAN 0.0 0.0 0.0    251 219       Chemistries     Basic Panel  Recent Labs   Lab Test 10/16/24  1012 10/10/24  0808 10/08/24  0818     138 137 136   POTASSIUM 4.2  4.3 3.9 3.9   CHLORIDE 103  105 103 103   CO2 21*  22 25 22   BUN 32.2*  31.8* 38.4* 42.1*   CR 2.72*  2.72* 2.97* 2.54*   GLC 97  98 96 97  97        Calcium, Magnesium, Phosphorus  Recent Labs   Lab Test 10/16/24  1012 10/10/24  0808 10/08/24  0818 07/12/24  1530 07/11/24  1101 07/09/24  1011 07/09/24  1001   TANNER 10.9*  10.9* 12.3* 11.5*   < > 7.9* 7.6* 7.6*   MAG  --   --  2.1  --  1.5*  --  1.6*   PHOS 3.8  --  3.7  --   --  3.7 3.7    < > = values in this interval not displayed.        LFTs  Recent Labs   Lab Test 10/16/24  1012 10/10/24  0808 10/08/24  0818   BILITOTAL 0.2 0.3 0.2   ALKPHOS 128 133 132   AST 21 20 19   ALT 22 23 20   ALBUMIN 3.0*  3.0* 3.3* 3.0*       LDH  Recent Labs   Lab Test 10/08/24  0818 07/09/24  1001 05/24/24  1112    276* 248       B2-Microglobulin  Recent Labs   Lab Test 05/21/24  1338 01/18/24  1433   [id number] 4.3* 5.8*         Immunoglobulins     Recent Labs   Lab Test 10/08/24  0818 07/09/24  1001 05/21/24  1338 04/19/24  0956 03/15/24  0820 02/02/24  1437   * 366* 86* 101* 116* 204*       Recent Labs   Lab Test 10/08/24  0818 07/09/24  1001 05/21/24  1338 04/19/24  0956 03/15/24  0820 01/18/24  1444    218 35* 37* 36* 164       Recent Labs   Lab Test 10/08/24  0818 07/09/24  1001 05/21/24  1338 04/19/24  0956 03/15/24  0820 01/18/24  1444    IGM 39 101 <10* 21* 33* 127         Monocloncal Protein Studies     M spike    Recent Labs   Lab Test 10/08/24  0818 07/09/24  1001 05/24/24  1112 05/21/24  1338 04/19/24  0956 03/15/24  0820   ELPM 0.1* 0.0 0.0 0.0 0.1* 0.1*       [place] FLC    Recent Labs   Lab Test 10/10/24  0808 10/08/24  0818 07/09/24  1011 07/09/24  1001 05/24/24  1108 05/21/24  1338   KFLCA 3.55* 3.17* 6.05* 5.79* 1.12 1.07       Lambda FLC    Recent Labs   Lab Test 10/10/24  0808 10/08/24  0818 07/09/24  1011 07/09/24  1001 05/24/24  1108 05/21/24  1338   LFLCA 3.44* 3.23* 7.10* 7.14* 1.74 1.49       FLC Ratio    Recent Labs   Lab Test 10/10/24  0808 10/08/24  0818 07/09/24  1011 07/09/24  1001 05/24/24  1108 05/21/24  1338   KLRA 1.03 0.98 0.85 0.81 0.64 0.72         Bone Marrow Biopsy       Morphology    Results for orders placed or performed in visit on 10/10/24 (from the past 8760 hour(s))   Bone marrow biopsy   Result Value    Final Diagnosis      Bone marrow, posterior iliac crest, right decalcified trephine biopsy and touch imprint; right aspirate clot, direct aspirate smear, concentrate aspirate smear, and peripheral blood smear:  - No morphological or immunophenotypic evidence of plasma cell neoplasm  - Normocellular marrow (50% estimated cellularity) with normal trilineage hematopoiesis  - Peripheral blood showing mild normocytic anemia  - See comment       Comment      Concurrent flow cytometry study ([id number]) shows polytypic plasma cells. Other concurrent ancillary studies are in progress and will be reported separately. Correlation with the results of ancillary tests and clinical findings is recommended.     Histologically, based on H&E stains, there appears to be persistent amyloid within the trephine core biopsy.  A Congo red stain has been ordered and will be reported in an addendum.      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 A recent stomach biopsy ([id number]) showed focal amyloid deposition. This was not seen on pre-transplant biopsy of the duodenum ([id number]). This bone marrow biopsy is performed for post-therapy follow-up.      Peripheral Hematologic Data      CBC WITH DIFFERENTIAL(10/10/2024 08:15 AM CDT):     RESULT VALUE REF. RANGE UNITS   WBC Count  Hemoglobin   Hematocrit   Platelet Count   RBC Count   MCV  MCH  MCHC  RDW 6.9 (NORMAL)    11.8 (NORMAL)      34.5  ( L )  251 (NORMAL)   3.63  ( L )       95 (NORMAL)     32.5 (NORMAL)     34.2 (NORMAL)     14.6 (NORMAL) 4.0-11.0  11.7-15.7  35.0-47.0  150-450  3.80-5.20    26.5-33.0  31.5-36.5  10.0-15.0 10e3/uL  g/dL  %  10e3/uL  10e6/uL  fL  pg  g/dL  %   % Neutrophils  % Lymphocytes  % Monocytes  % Eosinophils  % Basophils  % Immature Granulocytes  Absolute Neutrophils  Absolute Lymphocytes  Absolute Monocytes  Absolute Eosinophils  Absolute Basophils  Absolute Immature Granulocytes  NRBCs per 100 WBC  Absolute NRBCs 67  19  11  2  1  0  4.6 (NORMAL)  1.3 (NORMAL)  0.7 (NORMAL)  0.2 (NORMAL)  0.1 (NORMAL)  0.0 (NORMAL)  0 (NORMAL)  0.0 () N/A  N/A  N/A  N/A  N/A  N/A  1.6-8.3  0.8-5.3  0.0-1.3  0.0-0.7  0.0-0.2  <=0.4  <1  <=0.0 %  %  %  %  %  %  10e3/uL  10e3/uL  10e3/uL  10e3/uL  10e3/uL  10e3/uL  /100  10e3/uL         Microscopic Description      PERIPHERAL BLOOD SMEAR MORPHOLOGY:  The red blood cells appear normochromic.  Poikilocytosis  includes rare helmet cells, acanthocytes, and elliptocytes .  Polychromasia is present, but not increased.  Rouleaux formation is not increased. The morphology of the platelets is normal. Lymphocyte morphology is polymorphous. The neutrophil series displays normal cytoplasmic granulation and unremarkable nuclear morphology.     Bone marrow aspirates and trephine core biopsy touch imprints are reviewed.    BONE MARROW DIFFERENTIAL (500 cells on  direct aspirate smears)  Percent (%) Cell Population Reference Range (%)   1 Blasts  (0 - 1)   2 Neutrophil promyelocytes (2 - 4)   59 Neutrophils and precursors (54 - 63)   15 Erythroid precursors (18 - 24)   16 Lymphocytes (8 - 12)   3 Monocytes (1 - 1.5)   2 Eosinophils (1 - 3)   1 Basophils (0 - 1)   1 Plasma cells (0 - 1.5)     The aspirate smears are adequate for evaluation.    Neutrophil maturation is complete. Erythroid maturation is complete. Megakaryocytes exhibit a spectrum of sizes and morphologies, within normal limits.    Particle crush slides are reviewed and do not significantly differ from the aspirate smear.    CLOT SECTIONS:   The clot sections show fibrin and blood with fragments of marrow.  A reactive lymphoid aggregate is present.    TREPHINE SECTIONS:  Hematoxylin and eosin stains are reviewed. The quality of the trephine core biopsy is adequate. The marrow cellularity is estimated at 50%. The cellular composition reflects the aspirate smears differential. The number of megakaryocytes appears consistent with the degree of marrow cellularity with normal morphology. The bone marrow biopsy core sections contain a few reactive appearing lymphoid aggregates that are well circumscribed, composed of small mature lymphocytes, and predominately non-paratrabecular.  There is interstitial deposition of amorphous material; some of the vessel walls appear thickened - Congo red pending.    IMMUNOHISTOCHEMISTRY:  Immunohistochemical stains are performed on the paraffin-embedded trephine core with appropriate controls.    Stains for , cytoplasmic kappa and lambda immunoglobulin light chains show scattered polytypic plasma cells, not increased (<5%).    Note: These immunohistochemical stains are deemed medically necessary. Some of the antigens may also be evaluated by flow cytometry. Concurrent evaluation by immunohistochemistry on clot and/or trephine sections is indicated in this case in order to  correlate immunophenotype with cell morphology and determine extent of involvement, spatial pattern, and focality of potential disease distribution.      Gross Description      Procedure/Gross Description   Aspirate(s) and trephine(s) procured by SHAQUILLE Jain NP    Specimen sent for Special Studies:         Flow Cytometry: right aspirate         Biopsy aspiration site: right posterior iliac crest                                                      (Reference Range)          Amount of aspirate           1.5   mL        Fat and P.V. cell layer        1    %               (1 - 3)        Particles                             trace   %        Myeloid-erythroid layer    trace    %               (5 - 8)          Clot Section: yes    Trephine biopsy site: right posterior iliac crest    Designated right posterior iliac crest is 1 cylinder of gritty tissue, labeled with the patient's name and hospital number, obtained with 11 gauge needle and a length of 20 mm; entirely submitted in 1 cassette; acetic zinc formalin fixed, decalcified, processed, and stained for hematoxylin and eosin per laboratory protocol.        Performing Labs      The technical component of this testing was completed at [facility].     Stain controls for all stains resulted within this report have been reviewed and show appropriate reactivity.        PET Scan       Results for orders placed during the hospital encounter of 10/09/24    PET ONCOLOGY WHOLE BODY    Status: Normal 10/10/2024    Narrative  Combined Report of: PET and CT on 10/9/2024 11:01 AM:    1. PET of the neck, chest, abdomen, and pelvis.  2. PET CT Fusion for Attenuation Correction and Anatomical  Localization.  3. Diagnostic CT of the chest, abdomen and pelvis without intravenous  contrast obtained for diagnostic interpretation.  4. 3D MIP and PET-CT fused images were processed on an independent  workstation and  archived to PACS and reviewed by a radiologist.    Technique:    1. PET: The patient received 10.03 mCi of F-18-FDG. The serum glucose  was 90 mg/dL prior to administration. Body weight was 56.3 kg. Images  were evaluated in the axial, sagittal, and coronal planes as well as  the rotational whole body MIP. Images were acquired from at least eyes  to proximal thighs.    UPTAKE WAS MEASURED AT 60 MINUTES.    2. CT: Volumetric acquisition for clinical interpretation of the  chest, abdomen, and pelvis acquired at 3 mm sections. The chest,  abdomen, and pelvis were evaluated at 5 mm sections in bone, soft  tissue, and lung windows.    Contrast and Medications:  IV contrast: None, due to low GFR  PO contrast: 500 of water  Additional Medications: The patient received 40 mg intravenous Lasix,  after which additional postvoid PET and CT images were obtained.    3. 3D MIP and PET-CT fused images were processed on an independent  workstation and archived to PACS and reviewed by a radiologist.    INDICATION: amyloidosis s/p auto BMT, N/V, hypercalcemia, kidney  dysfunction, concern for progressive disease; AL amyloidosis    ADDITIONAL INFORMATION OBTAINED FROM EMR: 54-year-old female with  biopsy-proven renal amyloidosis and cardiac involvement by MRI.    COMPARISON: PET/CT dated 1/25/2024    FINDINGS:    BACKGROUND: Liver SUV max = 2.3, Aorta Blood SUV max = 2.4.    HEAD/NECK:    Pharyngeal mucosal spaces: Nasopharynx and palatine tonsils are within  normal limits. Tongue base is normal. Oral tongue and buccal mucosa of  the oral cavity is normal. Oropharynx, hypopharynx and larynx are  within normal limits.    Sinonasal region: Paranasal sinuses are clear.    Lymph nodes: Multiple bilateral, mildly hypermetabolic prominent  cervical lymph nodes, for example right level 2A lymph node with max  SUV 3.8, previously 3.1.    Salivary glands: The major salivary glands are within normal limits.    Thyroid gland: Persistent  diffusely increased FDG uptake in both  thyroid lobes.    CHEST:    Lymph nodes: No FDG avid or abnormally enlarged lymph nodes. Calcified  left hilar lymph nodes.    Lungs: The central tracheobronchial tree is clear. No acute  consolidation.  No pleural effusion or pneumothorax. Calcified  pulmonary granuloma in the left lower lobe. There are a few scattered  sub-4 mm pulmonary nodules, for example 2 mm groundglass nodule in the  anterolateral right middle lobe (8/62). No suspicious pulmonary  nodules or masses.    Heart and great vessels: Heart size is within normal limits. Trace  pericardial effusion, likely physiologic. The thoracic aorta and main  pulmonary artery are within normal limits. The esophagus is  unremarkable.    Breasts: No abnormal uptake in the breasts.    ABDOMEN AND PELVIS:    Liver: No FDG avid lesion. No intrahepatic or extrahepatic biliary  ductal dilatation. Gallbladder is within normal limits.    Pancreas: The pancreas is within normal limits. No pancreatic ductal  dilatation.    Spleen: No FDG avid lesion. Calcified splenic granulomas.    Adrenal glands: No FDG avid foci.    Kidneys: No FDG avid lesion. No hydronephrosis. Nonobstructive 6 mm  renal calculus in the left lower pole. Bifid left collecting system  The urinary bladder is decompressed.    Reproductive organs are within normal limits. IUD in good position.    Gastrointestinal system: Normal caliber of the small and large bowel.    Lymph nodes: No FDG avid or abnormally enlarged lymph nodes.    Vascular structures: Normal caliber of the abdominal aorta. Mild  diffuse uptake in the abdominal aortic wall is nonspecific/probably  related to atherosclerotic disease.    No free air, free fluid, or fluid collection.    EXTREMITIES:    No abnormal FDG uptake in the visualized extremities. Resolution of  hypermetabolic focus in the left gastrocnemius.    BONES AND SOFT TISSUES:    No abnormal FDG uptake in the skeleton. No abnormal lytic  or blastic  osseous lesions. Mild multilevel degenerative changes in the spine.    Impression  IMPRESSION:    1. No evidence of metabolically-active systemic amyloid deposition. Of  note, current exam is not optimized to evaluate for cardiac  amyloidosis (no cardiac preparation).    2. Mildly FDG avid prominent bilateral cervical lymph nodes are  favored to be reactive.    3. Continued diffuse FDG uptake in the thyroid gland is suggestive of  thyroiditis.    4. Nonobstructive 6 mm calculus in the left lower renal pole.    I have personally reviewed the examination and initial interpretation  and I agree with the findings.    RONAN HAWK MD      SYSTEM ID:  [id number]     The longitudinal plan of care for the diagnosis(es)/condition(s) as documented were addressed during this visit. Due to the added complexity in care, I will continue to support Vivian in the subsequent management and with ongoing continuity of care.    ------------------------------------------------------------------------------------------------------------------------------------------------    Patient Care Team         Relationship Specialty Notifications Start End    Alin Torres PA-C PCP - General Family Medicine  11/4/20     Phone: [phone] Fax: [phone] [address] [facility] [address]    Sarthak Ontiveros MD Assigned OBGYN Provider   9/24/22     Phone: [phone] Fax: [phone]         [address] [address]    Alin Torres PA-C Assigned PCP   12/31/22     Phone: [phone] Fax: [phone]         [address] [facility] [address]    Carmen Waggoner APRN CNP Nurse Practitioner Gastroenterology  11/21/23     Phone: [phone] Fax: [phone] [address] [place] [address]    Audrey Sen,  Assigned Nephrology Provider   12/9/23     Phone: [phone] Fax: [phone]         [address] [place] [address]    Musa Adrian MD  Physician Hematology & Oncology  12/26/23     Phone: [phone] Fax: [phone] [address] [facility] [address] [facility] [address]    Jomar Chandler MD MD Hematology & Oncology  12/26/23     Phone: [phone] Fax: [phone]         [address] [facility] [address] [facility] [address]    Gaston Flores MD MD Cardiovascular Disease  1/5/24     Phone: [phone] Fax: [phone] [address] [facility] [address]          Again, thank you for allowing me to participate in the care of your patient.        Sincerely,         BMT Auto Cell Therapy

## 2024-10-17 NOTE — PROGRESS NOTES
BMT Progress Note    Disease presentation and baseline characteristics:   12/21/2023:  Final Diagnosis   A. kidney biopsy (needle):     Amyloidosis of the kidney, AL-type, lambda light chain-restricted, affecting the glomeruli, interstitium, and arterioles (see comment)     Mild chronic changes of the parenchyma, including:   - focal global glomerulosclerosis (3% of glomeruli)   - focal tubular atrophy and interstitial fibrosis (5% of the cortex)   - severe arterial sclerosis   Electronically signed by Joe Garcia MD on 12/23/2023 at  3:21 PM   Comment  UULAB   The biopsy reveals findings diagnostic of amyloidosis; the amyloid shows lambda light chain-restriction (AL amyloidosis), and the deposits are Congo red positive. The amyloid deposits affect the glomeruli extensively, and interstitium and arterioles focally. Other potential complications of paraproteins in the kidney are not seen, in particular, there is no evidence of light chain cast nephropathy, light chain deposition disease, or light chain tubulopathy.        Date Treatment Name Response Side Effects / Toxicities   1/19/24 D-CyBorD x 4 cycles     6/12/24 HDT/ASCT Hematologic VGPR           HPI:  Please see my entry above for hematologic history.  Mrs. Brown presents today to review her D+100 restaging.  She continues to have intermittent nausea, vomiting approximately once a day.  She has been taking her anti-emetics but thinks these only give her minimal relief.  She is staying active despite this, stil going to work and planning to leave on hunting trip with her family tomorrow.      ASSESSMENT AND PLAN:  Mrs. Brown had her restaging delayed due to insurance issues but has now completed her workup.    Bone marrow biopsy showed no evidence of plasma cell neoplasm by morphology or flow.  Peripheral studies show immunofixation positive for a florinda monoclonal kappa; she has lambda light chain disease and so her immunofixation is negative from a  disease standpoint.  Monoclonal peak is detectable at 0.1 g/dL.  Kappa and lambda free light chains are slightly elevated at 3.55/3.44 mg/dL with a normal ratio.  PET showed no evidence of metabolically-active systemic amyloid deposition.  A CT component was done with her PET given the elevated PTHRP but did not show any obvious new/other malignancy.    Upper endoscopy showed focal birefringence consistent with amyloid; this is new compared to prior endoscopy on 1/30/24.  The clinical significance of this is unclear to me as I had a strong suspicion for upper GI involvement in January and this may have been a sampling issue.    Renal function remains decreased since 7/24 with associated moderate hypercalcemia.  PThRP was elevated at 7.4 pmol/L with normal PTH at 18 pg/mL.    Her restaging overall does not appear consistent with overt amyloid relapse but her decreased renal function is concerning.  I have reached out to her nephrology team; they are concerned her hypercalcemia may be driving her CHRIS/nausea and Mrs. Brown received pamidronate yesterday.  A repeat PTHrP from nephrology is in process.    The underlying cause of the hypercalcemia is not entirely clear.  If attributed to amyloid the appropriate treatment would be further amyloid-directed therapy but I am hesitant to commit her to this given her restaging results.    Her hemoglobin decreased to 9.3 yesterday.  She has not noted any bleeding in general and denies bloody/black/tarry stools.  Plan to recheck early next week as well.    Restart zyprexa at bedtime to help with nausea.    Plan:  - Repeat CBC/CMP next week  - Restart zyprexa at bedtime  - Follow up with nephrology    I spent 40 minutes in the care of this patient today, which included time necessary for preparation for the visit, obtaining history, ordering medications/tests/procedures as medically indicated, review of pertinent medical literature, counseling of the patient, communication of  recommendations to the care team, and documentation time.    Jomar Chandler MD    ROS:    10 point ROS neg other than the symptoms noted above in the HPI.      Current Outpatient Medications   Medication Sig Dispense Refill    OLANZapine (ZYPREXA) 2.5 MG tablet Take 1 tablet (2.5 mg) by mouth at bedtime. 30 tablet 0    ondansetron (ZOFRAN) 8 MG tablet Take 1 tablet (8 mg) by mouth every 8 hours. 30 tablet 1    prochlorperazine (COMPAZINE) 5 MG tablet Take 1 tablet (5 mg) by mouth every 6 hours as needed for nausea or vomiting. 30 tablet 1    acyclovir (ZOVIRAX) 400 MG tablet Take 2 tablets (800 mg) by mouth 2 times daily 120 tablet 11    Albuterol-Budesonide (AIRSUPRA) 90-80 MCG/ACT AERO Inhale 2 Inhalations into the lungs every 4 hours as needed (Wheezing, chest tightness, shortness of breath, or persistent cough) (Patient not taking: Reported on 10/10/2024) 10.7 g 3    azelastine (ASTELIN) 0.1 % nasal spray Spray 2 sprays into both nostrils 2 times daily as needed for rhinitis (Patient not taking: Reported on 10/10/2024) 30 mL 3    bumetanide (BUMEX) 1 MG tablet Take 1 tablet (1 mg) by mouth 2 times daily (Patient not taking: Reported on 10/10/2024) 60 tablet 6    EPINEPHrine (ANY BX GENERIC EQUIV) 0.3 MG/0.3ML injection 2-pack Inject 0.3 mLs (0.3 mg) into the muscle as needed for anaphylaxis May repeat one time in 5-15 minutes if response to initial dose is inadequate. 2 each 3    fluticasone (FLONASE) 50 MCG/ACT nasal spray Spray 2 sprays into both nostrils daily (Patient not taking: Reported on 10/10/2024) 16 g 3    levothyroxine (SYNTHROID) 200 MCG tablet Take 200 mcg by mouth daily. Pt takes Synthroid brand name. FRANKI Taking 175mg      liothyronine (CYTOMEL) 5 MCG tablet Take 5 mcg by mouth 2 times daily      loperamide (IMODIUM A-D) 2 MG tablet Take 1 tablet (2 mg) by mouth 4 times daily as needed for diarrhea (Patient not taking: Reported on 9/16/2024) 30 tablet 1    melatonin 3 MG tablet Take 1 tablet (3  mg) by mouth nightly as needed for sleep (Patient not taking: Reported on 8/2/2024) 30 tablet 1    midodrine (PROAMATINE) 10 MG tablet Take 1 tablet (10 mg) by mouth 3 times daily (Patient taking differently: Take 5 mg by mouth 3 times daily.) 270 tablet 3    ondansetron (ZOFRAN) 8 MG tablet Take 8 mg by mouth every 8 hours as needed for nausea (Patient not taking: Reported on 10/10/2024)      pantoprazole (PROTONIX) 40 MG EC tablet Take 1 tablet (40 mg) by mouth every morning (before breakfast) (Patient not taking: Reported on 9/16/2024) 30 tablet 1    prochlorperazine (COMPAZINE) 10 MG tablet Take 0.5 tablets (5 mg) by mouth every 6 hours as needed for nausea or vomiting 30 tablet 1    psyllium (METAMUCIL/KONSYL) Packet Take 1 packet by mouth daily (Patient not taking: Reported on 9/16/2024) 30 packet 1    sulfamethoxazole-trimethoprim (BACTRIM DS) 800-160 MG tablet Take 1 tablet by mouth Every Mon, Tues two times daily 16 tablet 11    vitamin D3 (CHOLECALCIFEROL) 50 mcg (2000 units) tablet Take 3 tablets (150 mcg) by mouth daily (Patient not taking: Reported on 10/10/2024) 270 tablet 3         Physical Exam:   BP (!) 144/91 (BP Location: Right arm, Patient Position: Right side, Cuff Size: Adult Regular)   Pulse 85   Temp 98  F (36.7  C) (Oral)   Resp 14   Wt 56.5 kg (124 lb 8 oz)   SpO2 100%   BMI 21.26 kg/m      KPS:  90  General Appearance: alert, and no distress  Eyes: PERRL, conjunctiva and lids normal, sclera nonicteric  Ears/Nose/M/Throat: Oral mucosa and posterior oropharynx normal, moist mucous membranes  Cardio/Vascular: extremities WWP  Resp Effort And Auscultation: Breathing comfortably on room air  Edema: none  Skin: Skin color, texture, turgor normal. No rashes or lesions.  Neurologic: Gait normal.  Sensation grossly WNL.  Psych/Affect: Mood and affect are appropriate.    Blood Counts     Recent Labs   Lab Test 10/16/24  1249 10/10/24  0808 10/08/24  0818   HGB 9.3* 11.8 10.6*   HCT 27.9* 34.5*  31.4*   WBC 8.4 6.9 7.2   ANEUTAUTO 5.8 4.6 4.7   ALYMPAUTO 1.5 1.3 1.5   AMONOAUTO 0.9 0.7 0.8   AEOSAUTO 0.2 0.2 0.2   ABSBASO 0.1 0.1 0.1   NRBCMAN 0.0 0.0 0.0    251 219       Chemistries     Basic Panel  Recent Labs   Lab Test 10/16/24  1012 10/10/24  0808 10/08/24  0818     138 137 136   POTASSIUM 4.2  4.3 3.9 3.9   CHLORIDE 103  105 103 103   CO2 21*  22 25 22   BUN 32.2*  31.8* 38.4* 42.1*   CR 2.72*  2.72* 2.97* 2.54*   GLC 97  98 96 97  97        Calcium, Magnesium, Phosphorus  Recent Labs   Lab Test 10/16/24  1012 10/10/24  0808 10/08/24  0818 07/12/24  1530 07/11/24  1101 07/09/24  1011 07/09/24  1001   TANNER 10.9*  10.9* 12.3* 11.5*   < > 7.9* 7.6* 7.6*   MAG  --   --  2.1  --  1.5*  --  1.6*   PHOS 3.8  --  3.7  --   --  3.7 3.7    < > = values in this interval not displayed.        LFTs  Recent Labs   Lab Test 10/16/24  1012 10/10/24  0808 10/08/24  0818   BILITOTAL 0.2 0.3 0.2   ALKPHOS 128 133 132   AST 21 20 19   ALT 22 23 20   ALBUMIN 3.0*  3.0* 3.3* 3.0*       LDH  Recent Labs   Lab Test 10/08/24  0818 07/09/24  1001 05/24/24  1112    276* 248       B2-Microglobulin  Recent Labs   Lab Test 05/21/24  1338 01/18/24  1433   HHSM1PXCO 4.3* 5.8*         Immunoglobulins     Recent Labs   Lab Test 10/08/24  0818 07/09/24  1001 05/21/24  1338 04/19/24  0956 03/15/24  0820 02/02/24  1437   * 366* 86* 101* 116* 204*       Recent Labs   Lab Test 10/08/24  0818 07/09/24  1001 05/21/24  1338 04/19/24  0956 03/15/24  0820 01/18/24  1444    218 35* 37* 36* 164       Recent Labs   Lab Test 10/08/24  0818 07/09/24  1001 05/21/24  1338 04/19/24  0956 03/15/24  0820 01/18/24  1444   IGM 39 101 <10* 21* 33* 127         Monocloncal Protein Studies     M spike    Recent Labs   Lab Test 10/08/24  0818 07/09/24  1001 05/24/24  1112 05/21/24  1338 04/19/24  0956 03/15/24  0820   ELPM 0.1* 0.0 0.0 0.0 0.1* 0.1*       [place] FLC    Recent Labs   Lab Test 10/10/24  0808  10/08/24  0818 07/09/24  1011 07/09/24  1001 05/24/24  1108 05/21/24  1338   KFLCA 3.55* 3.17* 6.05* 5.79* 1.12 1.07       Lambda FLC    Recent Labs   Lab Test 10/10/24  0808 10/08/24  0818 07/09/24  1011 07/09/24  1001 05/24/24  1108 05/21/24  1338   LFLCA 3.44* 3.23* 7.10* 7.14* 1.74 1.49       FLC Ratio    Recent Labs   Lab Test 10/10/24  0808 10/08/24  0818 07/09/24  1011 07/09/24  1001 05/24/24  1108 05/21/24  1338   KLRA 1.03 0.98 0.85 0.81 0.64 0.72         Bone Marrow Biopsy       Morphology    Results for orders placed or performed in visit on 10/10/24 (from the past 8760 hour(s))   Bone marrow biopsy   Result Value    Final Diagnosis      Bone marrow, posterior iliac crest, right decalcified trephine biopsy and touch imprint; right aspirate clot, direct aspirate smear, concentrate aspirate smear, and peripheral blood smear:  - No morphological or immunophenotypic evidence of plasma cell neoplasm  - Normocellular marrow (50% estimated cellularity) with normal trilineage hematopoiesis  - Peripheral blood showing mild normocytic anemia  - See comment       Comment      Concurrent flow cytometry study ([id number]) shows polytypic plasma cells. Other concurrent ancillary studies are in progress and will be reported separately. Correlation with the results of ancillary tests and clinical findings is recommended.     Histologically, based on H&E stains, there appears to be persistent amyloid within the trephine core biopsy.  A Congo red stain has been ordered and will be reported in an addendum.      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 A recent stomach biopsy ([id number]) showed focal amyloid deposition. This was not seen on pre-transplant biopsy of the duodenum ([id number]). This bone marrow biopsy is performed for post-therapy  follow-up.      Peripheral Hematologic Data      CBC WITH DIFFERENTIAL(10/10/2024 08:15 AM CDT):     RESULT VALUE REF. RANGE UNITS   WBC Count  Hemoglobin   Hematocrit   Platelet Count   RBC Count   MCV  MCH  MCHC  RDW 6.9 (NORMAL)    11.8 (NORMAL)      34.5  ( L )  251 (NORMAL)   3.63  ( L )       95 (NORMAL)     32.5 (NORMAL)     34.2 (NORMAL)     14.6 (NORMAL) 4.0-11.0  11.7-15.7  35.0-47.0  150-450  3.80-5.20    26.5-33.0  31.5-36.5  10.0-15.0 10e3/uL  g/dL  %  10e3/uL  10e6/uL  fL  pg  g/dL  %   % Neutrophils  % Lymphocytes  % Monocytes  % Eosinophils  % Basophils  % Immature Granulocytes  Absolute Neutrophils  Absolute Lymphocytes  Absolute Monocytes  Absolute Eosinophils  Absolute Basophils  Absolute Immature Granulocytes  NRBCs per 100 WBC  Absolute NRBCs 67  19  11  2  1  0  4.6 (NORMAL)  1.3 (NORMAL)  0.7 (NORMAL)  0.2 (NORMAL)  0.1 (NORMAL)  0.0 (NORMAL)  0 (NORMAL)  0.0 () N/A  N/A  N/A  N/A  N/A  N/A  1.6-8.3  0.8-5.3  0.0-1.3  0.0-0.7  0.0-0.2  <=0.4  <1  <=0.0 %  %  %  %  %  %  10e3/uL  10e3/uL  10e3/uL  10e3/uL  10e3/uL  10e3/uL  /100  10e3/uL         Microscopic Description      PERIPHERAL BLOOD SMEAR MORPHOLOGY:  The red blood cells appear normochromic.  Poikilocytosis  includes rare helmet cells, acanthocytes, and elliptocytes .  Polychromasia is present, but not increased.  Rouleaux formation is not increased. The morphology of the platelets is normal. Lymphocyte morphology is polymorphous. The neutrophil series displays normal cytoplasmic granulation and unremarkable nuclear morphology.     Bone marrow aspirates and trephine core biopsy touch imprints are reviewed.    BONE MARROW DIFFERENTIAL (500 cells on direct aspirate smears)  Percent (%) Cell Population Reference Range (%)   1 Blasts  (0 - 1)   2 Neutrophil promyelocytes (2 - 4)   59 Neutrophils and precursors (54 - 63)   15 Erythroid precursors (18 - 24)   16 Lymphocytes (8 - 12)   3 Monocytes (1 - 1.5)   2 Eosinophils (1 - 3)   1  Basophils (0 - 1)   1 Plasma cells (0 - 1.5)     The aspirate smears are adequate for evaluation.    Neutrophil maturation is complete. Erythroid maturation is complete. Megakaryocytes exhibit a spectrum of sizes and morphologies, within normal limits.    Particle crush slides are reviewed and do not significantly differ from the aspirate smear.    CLOT SECTIONS:   The clot sections show fibrin and blood with fragments of marrow.  A reactive lymphoid aggregate is present.    TREPHINE SECTIONS:  Hematoxylin and eosin stains are reviewed. The quality of the trephine core biopsy is adequate. The marrow cellularity is estimated at 50%. The cellular composition reflects the aspirate smears differential. The number of megakaryocytes appears consistent with the degree of marrow cellularity with normal morphology. The bone marrow biopsy core sections contain a few reactive appearing lymphoid aggregates that are well circumscribed, composed of small mature lymphocytes, and predominately non-paratrabecular.  There is interstitial deposition of amorphous material; some of the vessel walls appear thickened - Congo red pending.    IMMUNOHISTOCHEMISTRY:  Immunohistochemical stains are performed on the paraffin-embedded trephine core with appropriate controls.    Stains for , cytoplasmic kappa and lambda immunoglobulin light chains show scattered polytypic plasma cells, not increased (<5%).    Note: These immunohistochemical stains are deemed medically necessary. Some of the antigens may also be evaluated by flow cytometry. Concurrent evaluation by immunohistochemistry on clot and/or trephine sections is indicated in this case in order to correlate immunophenotype with cell morphology and determine extent of involvement, spatial pattern, and focality of potential disease distribution.      Gross Description      Procedure/Gross Description   Aspirate(s) and trephine(s) procured by SHAQUILLE Jain NP    Specimen sent for Special  Studies:         Flow Cytometry: right aspirate         Biopsy aspiration site: right posterior iliac crest                                                      (Reference Range)          Amount of aspirate           1.5   mL        Fat and P.V. cell layer        1    %               (1 - 3)        Particles                             trace   %        Myeloid-erythroid layer    trace    %               (5 - 8)          Clot Section: yes    Trephine biopsy site: right posterior iliac crest    Designated right posterior iliac crest is 1 cylinder of gritty tissue, labeled with the patient's name and hospital number, obtained with 11 gauge needle and a length of 20 mm; entirely submitted in 1 cassette; acetic zinc formalin fixed, decalcified, processed, and stained for hematoxylin and eosin per laboratory protocol.        Performing Labs      The technical component of this testing was completed at [facility].     Stain controls for all stains resulted within this report have been reviewed and show appropriate reactivity.        PET Scan       Results for orders placed during the hospital encounter of 10/09/24    PET ONCOLOGY WHOLE BODY    Status: Normal 10/10/2024    Narrative  Combined Report of: PET and CT on 10/9/2024 11:01 AM:    1. PET of the neck, chest, abdomen, and pelvis.  2. PET CT Fusion for Attenuation Correction and Anatomical  Localization.  3. Diagnostic CT of the chest, abdomen and pelvis without intravenous  contrast obtained for diagnostic interpretation.  4. 3D MIP and PET-CT fused images were processed on an independent  workstation and archived to PACS and reviewed by a radiologist.    Technique:    1. PET: The patient received 10.03 mCi of F-18-FDG. The serum glucose  was 90 mg/dL prior to administration. Body weight was 56.3 kg. Images  were evaluated in the axial, sagittal, and coronal planes as well as  the rotational  whole body MIP. Images were acquired from at least eyes  to proximal thighs.    UPTAKE WAS MEASURED AT 60 MINUTES.    2. CT: Volumetric acquisition for clinical interpretation of the  chest, abdomen, and pelvis acquired at 3 mm sections. The chest,  abdomen, and pelvis were evaluated at 5 mm sections in bone, soft  tissue, and lung windows.    Contrast and Medications:  IV contrast: None, due to low GFR  PO contrast: 500 of water  Additional Medications: The patient received 40 mg intravenous Lasix,  after which additional postvoid PET and CT images were obtained.    3. 3D MIP and PET-CT fused images were processed on an independent  workstation and archived to PACS and reviewed by a radiologist.    INDICATION: amyloidosis s/p auto BMT, N/V, hypercalcemia, kidney  dysfunction, concern for progressive disease; AL amyloidosis    ADDITIONAL INFORMATION OBTAINED FROM EMR: 54-year-old female with  biopsy-proven renal amyloidosis and cardiac involvement by MRI.    COMPARISON: PET/CT dated 1/25/2024    FINDINGS:    BACKGROUND: Liver SUV max = 2.3, Aorta Blood SUV max = 2.4.    HEAD/NECK:    Pharyngeal mucosal spaces: Nasopharynx and palatine tonsils are within  normal limits. Tongue base is normal. Oral tongue and buccal mucosa of  the oral cavity is normal. Oropharynx, hypopharynx and larynx are  within normal limits.    Sinonasal region: Paranasal sinuses are clear.    Lymph nodes: Multiple bilateral, mildly hypermetabolic prominent  cervical lymph nodes, for example right level 2A lymph node with max  SUV 3.8, previously 3.1.    Salivary glands: The major salivary glands are within normal limits.    Thyroid gland: Persistent diffusely increased FDG uptake in both  thyroid lobes.    CHEST:    Lymph nodes: No FDG avid or abnormally enlarged lymph nodes. Calcified  left hilar lymph nodes.    Lungs: The central tracheobronchial tree is clear. No acute  consolidation.  No pleural effusion or pneumothorax.  Calcified  pulmonary granuloma in the left lower lobe. There are a few scattered  sub-4 mm pulmonary nodules, for example 2 mm groundglass nodule in the  anterolateral right middle lobe (8/62). No suspicious pulmonary  nodules or masses.    Heart and great vessels: Heart size is within normal limits. Trace  pericardial effusion, likely physiologic. The thoracic aorta and main  pulmonary artery are within normal limits. The esophagus is  unremarkable.    Breasts: No abnormal uptake in the breasts.    ABDOMEN AND PELVIS:    Liver: No FDG avid lesion. No intrahepatic or extrahepatic biliary  ductal dilatation. Gallbladder is within normal limits.    Pancreas: The pancreas is within normal limits. No pancreatic ductal  dilatation.    Spleen: No FDG avid lesion. Calcified splenic granulomas.    Adrenal glands: No FDG avid foci.    Kidneys: No FDG avid lesion. No hydronephrosis. Nonobstructive 6 mm  renal calculus in the left lower pole. Bifid left collecting system  The urinary bladder is decompressed.    Reproductive organs are within normal limits. IUD in good position.    Gastrointestinal system: Normal caliber of the small and large bowel.    Lymph nodes: No FDG avid or abnormally enlarged lymph nodes.    Vascular structures: Normal caliber of the abdominal aorta. Mild  diffuse uptake in the abdominal aortic wall is nonspecific/probably  related to atherosclerotic disease.    No free air, free fluid, or fluid collection.    EXTREMITIES:    No abnormal FDG uptake in the visualized extremities. Resolution of  hypermetabolic focus in the left gastrocnemius.    BONES AND SOFT TISSUES:    No abnormal FDG uptake in the skeleton. No abnormal lytic or blastic  osseous lesions. Mild multilevel degenerative changes in the spine.    Impression  IMPRESSION:    1. No evidence of metabolically-active systemic amyloid deposition. Of  note, current exam is not optimized to evaluate for cardiac  amyloidosis (no cardiac  preparation).    2. Mildly FDG avid prominent bilateral cervical lymph nodes are  favored to be reactive.    3. Continued diffuse FDG uptake in the thyroid gland is suggestive of  thyroiditis.    4. Nonobstructive 6 mm calculus in the left lower renal pole.    I have personally reviewed the examination and initial interpretation  and I agree with the findings.    RONAN HAWK MD      SYSTEM ID:  [id number]     The longitudinal plan of care for the diagnosis(es)/condition(s) as documented were addressed during this visit. Due to the added complexity in care, I will continue to support Vivian in the subsequent management and with ongoing continuity of care.    ------------------------------------------------------------------------------------------------------------------------------------------------    Patient Care Team         Relationship Specialty Notifications Start End    Alin Torres PA-C PCP - General Family Medicine  11/4/20     Phone: [phone] Fax: [phone] [address] [facility] [address]    Sarthak Ontiveros MD Assigned OBGYN Provider   9/24/22     Phone: [phone] Fax: [phone]         [address] [address]    Alin Torres PA-C Assigned PCP   12/31/22     Phone: [phone] Fax: [phone]         [address] [facility] [address]    Carmen Waggoner APRN CNP Nurse Practitioner Gastroenterology  11/21/23     Phone: [phone] Fax: [phone]         [address] [place] [address]    Audrey Sen DO Assigned Nephrology Provider   12/9/23     Phone: [phone] Fax: [phone]         [address] [facility] [address] [facility] [address]    Musa Adrian MD Physician Hematology & Oncology  12/26/23     Phone: [phone] Fax: [phone]         [address] [facility] [address] [facility] [address]    Jomar Chandler MD MD Hematology & Oncology  12/26/23     Phone: [phone] Fax: [phone]         [address] [place] [address]  [facility] [address]    Gaston Flores MD MD Cardiovascular Disease  1/5/24     Phone: [phone] Fax: [phone] [address] [facility] [address]

## 2024-10-17 NOTE — NURSING NOTE
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    Weight as of this encounter: 56.5 kg (124 lb 8 oz). Body surface area is 1.6 meters squared.  No Pain (0) Comment: Data Unavailable   No LMP recorded. (Menstrual status: IUD).  Allergies reviewed: Yes  Medications reviewed: Yes    Medications: Medication refills not needed today.  Pharmacy name entered into [facility]:    [facility] [place], [place] - [address] [facility] [place], [place] - [address] [facility] [id number] - [place] - [address] [id number] - [place], [place] - [address]  [facility] - [place], [place] - [address]  [facility] - [place], [place] - [address]  [facility] - [place], [place] - [address]    Frailty Screening:   Is the patient here for a new oncology consult visit in cancer care? 2. No      Clinical concerns: none       Iveth Dawn MA             "

## 2024-10-21 ENCOUNTER — LAB (OUTPATIENT)
Dept: LAB | Facility: CLINIC | Age: 54
End: 2024-10-21
Payer: COMMERCIAL

## 2024-10-21 ENCOUNTER — OFFICE VISIT (OUTPATIENT)
Dept: GASTROENTEROLOGY | Facility: CLINIC | Age: 54
End: 2024-10-21
Payer: COMMERCIAL

## 2024-10-21 ENCOUNTER — MYC MEDICAL ADVICE (OUTPATIENT)
Dept: CARDIOLOGY | Facility: CLINIC | Age: 54
End: 2024-10-21

## 2024-10-21 VITALS
WEIGHT: 124 LBS | SYSTOLIC BLOOD PRESSURE: 116 MMHG | TEMPERATURE: 97 F | DIASTOLIC BLOOD PRESSURE: 68 MMHG | BODY MASS INDEX: 21.17 KG/M2

## 2024-10-21 DIAGNOSIS — E85.9 AMYLOIDOSIS, UNSPECIFIED TYPE (H): ICD-10-CM

## 2024-10-21 DIAGNOSIS — K90.0 CELIAC DISEASE: Primary | ICD-10-CM

## 2024-10-21 DIAGNOSIS — E85.81 AL AMYLOIDOSIS (H): ICD-10-CM

## 2024-10-21 DIAGNOSIS — K90.0 CELIAC DISEASE: ICD-10-CM

## 2024-10-21 LAB
ALBUMIN SERPL BCG-MCNC: 2.8 G/DL (ref 3.5–5.2)
ALP SERPL-CCNC: 140 U/L (ref 40–150)
ALT SERPL W P-5'-P-CCNC: 29 U/L (ref 0–50)
ANION GAP SERPL CALCULATED.3IONS-SCNC: 8 MMOL/L (ref 7–15)
AST SERPL W P-5'-P-CCNC: 22 U/L (ref 0–45)
BASOPHILS # BLD AUTO: 0.1 10E3/UL (ref 0–0.2)
BASOPHILS NFR BLD AUTO: 1 %
BILIRUB SERPL-MCNC: <0.2 MG/DL
BUN SERPL-MCNC: 40.5 MG/DL (ref 6–20)
CALCIUM SERPL-MCNC: 9 MG/DL (ref 8.8–10.4)
CHLORIDE SERPL-SCNC: 108 MMOL/L (ref 98–107)
CREAT SERPL-MCNC: 2.07 MG/DL (ref 0.51–0.95)
EGFRCR SERPLBLD CKD-EPI 2021: 28 ML/MIN/1.73M2
EOSINOPHIL # BLD AUTO: 0.3 10E3/UL (ref 0–0.7)
EOSINOPHIL NFR BLD AUTO: 3 %
ERYTHROCYTE [DISTWIDTH] IN BLOOD BY AUTOMATED COUNT: 15.1 % (ref 10–15)
FOLATE SERPL-MCNC: 3.4 NG/ML (ref 4.6–34.8)
GLUCOSE SERPL-MCNC: 90 MG/DL (ref 70–99)
HCO3 SERPL-SCNC: 23 MMOL/L (ref 22–29)
HCT VFR BLD AUTO: 29.1 % (ref 35–47)
HGB BLD-MCNC: 9.7 G/DL (ref 11.7–15.7)
IMM GRANULOCYTES # BLD: 0 10E3/UL
IMM GRANULOCYTES NFR BLD: 1 %
LYMPHOCYTES # BLD AUTO: 1.2 10E3/UL (ref 0.8–5.3)
LYMPHOCYTES NFR BLD AUTO: 16 %
MCH RBC QN AUTO: 32.7 PG (ref 26.5–33)
MCHC RBC AUTO-ENTMCNC: 33.3 G/DL (ref 31.5–36.5)
MCV RBC AUTO: 98 FL (ref 78–100)
MONOCYTES # BLD AUTO: 0.9 10E3/UL (ref 0–1.3)
MONOCYTES NFR BLD AUTO: 12 %
NEUTROPHILS # BLD AUTO: 5.1 10E3/UL (ref 1.6–8.3)
NEUTROPHILS NFR BLD AUTO: 68 %
NRBC # BLD AUTO: 0 10E3/UL
NRBC BLD AUTO-RTO: 0 /100
PHOSPHATE SERPL-MCNC: 3.3 MG/DL (ref 2.5–4.5)
PLATELET # BLD AUTO: 223 10E3/UL (ref 150–450)
POTASSIUM SERPL-SCNC: 4.4 MMOL/L (ref 3.4–5.3)
PROT SERPL-MCNC: 5.4 G/DL (ref 6.4–8.3)
RBC # BLD AUTO: 2.97 10E6/UL (ref 3.8–5.2)
SODIUM SERPL-SCNC: 139 MMOL/L (ref 135–145)
VIT B12 SERPL-MCNC: 355 PG/ML (ref 232–1245)
VIT D+METAB SERPL-MCNC: 17 NG/ML (ref 20–50)
WBC # BLD AUTO: 7.6 10E3/UL (ref 4–11)

## 2024-10-21 PROCEDURE — 82306 VITAMIN D 25 HYDROXY: CPT

## 2024-10-21 PROCEDURE — 36415 COLL VENOUS BLD VENIPUNCTURE: CPT

## 2024-10-21 PROCEDURE — 80053 COMPREHEN METABOLIC PANEL: CPT | Performed by: STUDENT IN AN ORGANIZED HEALTH CARE EDUCATION/TRAINING PROGRAM

## 2024-10-21 PROCEDURE — 82746 ASSAY OF FOLIC ACID SERUM: CPT

## 2024-10-21 PROCEDURE — 99000 SPECIMEN HANDLING OFFICE-LAB: CPT

## 2024-10-21 PROCEDURE — 86364 TISS TRNSGLTMNASE EA IG CLAS: CPT

## 2024-10-21 PROCEDURE — 99213 OFFICE O/P EST LOW 20 MIN: CPT | Performed by: INTERNAL MEDICINE

## 2024-10-21 PROCEDURE — 85025 COMPLETE CBC W/AUTO DIFF WBC: CPT | Performed by: STUDENT IN AN ORGANIZED HEALTH CARE EDUCATION/TRAINING PROGRAM

## 2024-10-21 PROCEDURE — 84100 ASSAY OF PHOSPHORUS: CPT

## 2024-10-21 PROCEDURE — 84590 ASSAY OF VITAMIN A: CPT | Mod: 90

## 2024-10-21 PROCEDURE — 82607 VITAMIN B-12: CPT

## 2024-10-21 ASSESSMENT — PAIN SCALES - GENERAL: PAINLEVEL: NO PAIN (0)

## 2024-10-21 NOTE — PATIENT INSTRUCTIONS
Discussed    Laboratory studies added celiac monitoring    To the tests were run on previous blood work 5 days ago    Continue gluten-free diet.    Return to clinic 6 months and as needed

## 2024-10-21 NOTE — LETTER
10/21/2024      Vivian Brown  [address] [place] [facility] [address]      Dear Colleague,    Thank you for referring your patient, Vivian Brown, to the [facility]. Please see a copy of my visit note below.    Gastroenterology return    54-year-old status post transplant.  Episodic nausea vomiting although after infusion for hypercalcemia nausea is somewhat better.    We discussed the spectrum of celiac disease how this can present as anemia osteoporosis etc. it is not uncommon 1 and 300 individuals.  It is important to stay gluten-free because of the future risk of intestinal lymphoma is increased the exact amount is uncertain.  Potential free the background is similar to individuals without celiac disease.  Patient notes some future vaccines are not permitted until her children.  He also notes siblings and daughter testing for celiac disease was negative.    No other concerns today.    No exam was performed    Impression/plan: Discussed amyloid may be sampling of concern previous duodenal biopsies [place] red was negative gastric biopsies revealed gastric amyloid.  It is not uncommon for amyloid to affect the GI system.  AL amyloid.  DEXA scan in January was normal Z-score.  Due for B12 folate and TTG vitamin A vitamin D.  Future small bowel biopsy January 2026.  Consider copper and zinc levels in the distant future.  Continue gluten-free.  Consideration of pneumonia vaccine when able.  Return to clinic 6 months.      Again, thank you for allowing me to participate in the care of your patient.        Sincerely,        Melo Cloud MD

## 2024-10-21 NOTE — TELEPHONE ENCOUNTER
Patient rescheduled for 10/21/2024 with Dr. Cloud.  Melida Rain, julio cesar.....10/21/2024 at 9:21 AM

## 2024-10-21 NOTE — PROGRESS NOTES
Gastroenterology return    54-year-old status post transplant.  Episodic nausea vomiting although after infusion for hypercalcemia nausea is somewhat better.    We discussed the spectrum of celiac disease how this can present as anemia osteoporosis etc. it is not uncommon 1 and 300 individuals.  It is important to stay gluten-free because of the future risk of intestinal lymphoma is increased the exact amount is uncertain.  Potential free the background is similar to individuals without celiac disease.  Patient notes some future vaccines are not permitted until her children.  He also notes siblings and daughter testing for celiac disease was negative.    No other concerns today.    No exam was performed    Impression/plan: Discussed amyloid may be sampling of concern previous duodenal biopsies [place] red was negative gastric biopsies revealed gastric amyloid.  It is not uncommon for amyloid to affect the GI system.  AL amyloid.  DEXA scan in January was normal Z-score.  Due for B12 folate and TTG vitamin A vitamin D.  Future small bowel biopsy January 2026.  Consider copper and zinc levels in the distant future.  Continue gluten-free.  Consideration of pneumonia vaccine when able.  Return to clinic 6 months.

## 2024-10-22 LAB
PATH REPORT.ADDENDUM SPEC: NORMAL
PATH REPORT.COMMENTS IMP SPEC: NORMAL
PATH REPORT.COMMENTS IMP SPEC: NORMAL
PATH REPORT.FINAL DX SPEC: NORMAL
PATH REPORT.GROSS SPEC: NORMAL
PATH REPORT.MICROSCOPIC SPEC OTHER STN: NORMAL
PATH REPORT.MICROSCOPIC SPEC OTHER STN: NORMAL
PATH REPORT.RELEVANT HX SPEC: NORMAL

## 2024-10-24 LAB
ANNOTATION COMMENT IMP: ABNORMAL
MAYO MISC RESULT: ABNORMAL
RETINYL PALMITATE SERPL-MCNC: 0.11 MG/L
TTG IGA SER-ACNC: 0.5 U/ML
TTG IGG SER-ACNC: 0.7 U/ML
VIT A SERPL-MCNC: 0.81 MG/L

## 2024-11-11 ENCOUNTER — TELEPHONE (OUTPATIENT)
Dept: TRANSPLANT | Facility: CLINIC | Age: 54
End: 2024-11-11
Payer: COMMERCIAL

## 2024-11-11 NOTE — TELEPHONE ENCOUNTER
LTD forms received via FAX from [employer].      Forms to be completed and put in folder for provider to approve.    Fax #:  [phone]  Claim: [phone]    Monica Weeks

## 2024-11-15 NOTE — TELEPHONE ENCOUNTER
LTD paperwork completed, checked for accuracy, signed and faxed to [employer] @ [phone]. A copy was made, sent to scanning and original mailed to patient at home address.    Successful transmission verified in Right Fax.      Nithya Huddleston

## 2024-11-22 ENCOUNTER — ONCOLOGY VISIT (OUTPATIENT)
Dept: TRANSPLANT | Facility: CLINIC | Age: 54
End: 2024-11-22
Attending: STUDENT IN AN ORGANIZED HEALTH CARE EDUCATION/TRAINING PROGRAM
Payer: COMMERCIAL

## 2024-11-22 ENCOUNTER — APPOINTMENT (OUTPATIENT)
Dept: LAB | Facility: CLINIC | Age: 54
End: 2024-11-22
Attending: STUDENT IN AN ORGANIZED HEALTH CARE EDUCATION/TRAINING PROGRAM
Payer: COMMERCIAL

## 2024-11-22 VITALS
OXYGEN SATURATION: 96 % | TEMPERATURE: 98.2 F | HEART RATE: 98 BPM | SYSTOLIC BLOOD PRESSURE: 135 MMHG | BODY MASS INDEX: 22.4 KG/M2 | WEIGHT: 131.2 LBS | RESPIRATION RATE: 16 BRPM | DIASTOLIC BLOOD PRESSURE: 81 MMHG

## 2024-11-22 DIAGNOSIS — E85.81 AL AMYLOIDOSIS (H): Primary | ICD-10-CM

## 2024-11-22 DIAGNOSIS — Z94.81 STATUS POST BONE MARROW TRANSPLANT (H): ICD-10-CM

## 2024-11-22 DIAGNOSIS — E83.52 HYPERCALCEMIA: ICD-10-CM

## 2024-11-22 DIAGNOSIS — D50.9 IRON DEFICIENCY ANEMIA, UNSPECIFIED IRON DEFICIENCY ANEMIA TYPE: ICD-10-CM

## 2024-11-22 LAB
ALBUMIN SERPL BCG-MCNC: 3 G/DL (ref 3.5–5.2)
ALP SERPL-CCNC: 212 U/L (ref 40–150)
ALT SERPL W P-5'-P-CCNC: 33 U/L (ref 0–50)
ANION GAP SERPL CALCULATED.3IONS-SCNC: 7 MMOL/L (ref 7–15)
AST SERPL W P-5'-P-CCNC: 27 U/L (ref 0–45)
BASOPHILS # BLD AUTO: 0.1 10E3/UL (ref 0–0.2)
BASOPHILS NFR BLD AUTO: 1 %
BILIRUB SERPL-MCNC: 0.2 MG/DL
BUN SERPL-MCNC: 35.5 MG/DL (ref 6–20)
CALCIUM SERPL-MCNC: 9.4 MG/DL (ref 8.8–10.4)
CHLORIDE SERPL-SCNC: 105 MMOL/L (ref 98–107)
CREAT SERPL-MCNC: 2.17 MG/DL (ref 0.51–0.95)
EGFRCR SERPLBLD CKD-EPI 2021: 26 ML/MIN/1.73M2
EOSINOPHIL # BLD AUTO: 0.2 10E3/UL (ref 0–0.7)
EOSINOPHIL NFR BLD AUTO: 3 %
ERYTHROCYTE [DISTWIDTH] IN BLOOD BY AUTOMATED COUNT: 15.9 % (ref 10–15)
GLUCOSE SERPL-MCNC: 69 MG/DL (ref 70–99)
HCO3 SERPL-SCNC: 25 MMOL/L (ref 22–29)
HCT VFR BLD AUTO: 28.9 % (ref 35–47)
HGB BLD-MCNC: 9.7 G/DL (ref 11.7–15.7)
IMM GRANULOCYTES # BLD: 0 10E3/UL
IMM GRANULOCYTES NFR BLD: 0 %
LYMPHOCYTES # BLD AUTO: 1.8 10E3/UL (ref 0.8–5.3)
LYMPHOCYTES NFR BLD AUTO: 25 %
MCH RBC QN AUTO: 32.3 PG (ref 26.5–33)
MCHC RBC AUTO-ENTMCNC: 33.6 G/DL (ref 31.5–36.5)
MCV RBC AUTO: 96 FL (ref 78–100)
MONOCYTES # BLD AUTO: 1.2 10E3/UL (ref 0–1.3)
MONOCYTES NFR BLD AUTO: 17 %
NEUTROPHILS # BLD AUTO: 4 10E3/UL (ref 1.6–8.3)
NEUTROPHILS NFR BLD AUTO: 54 %
NRBC # BLD AUTO: 0 10E3/UL
NRBC BLD AUTO-RTO: 0 /100
PLATELET # BLD AUTO: 241 10E3/UL (ref 150–450)
POTASSIUM SERPL-SCNC: 4.7 MMOL/L (ref 3.4–5.3)
PROT SERPL-MCNC: 5.5 G/DL (ref 6.4–8.3)
RBC # BLD AUTO: 3 10E6/UL (ref 3.8–5.2)
SODIUM SERPL-SCNC: 137 MMOL/L (ref 135–145)
WBC # BLD AUTO: 7.4 10E3/UL (ref 4–11)

## 2024-11-22 PROCEDURE — 83550 IRON BINDING TEST: CPT | Performed by: STUDENT IN AN ORGANIZED HEALTH CARE EDUCATION/TRAINING PROGRAM

## 2024-11-22 PROCEDURE — 85004 AUTOMATED DIFF WBC COUNT: CPT | Performed by: STUDENT IN AN ORGANIZED HEALTH CARE EDUCATION/TRAINING PROGRAM

## 2024-11-22 PROCEDURE — 36415 COLL VENOUS BLD VENIPUNCTURE: CPT | Performed by: STUDENT IN AN ORGANIZED HEALTH CARE EDUCATION/TRAINING PROGRAM

## 2024-11-22 PROCEDURE — 82728 ASSAY OF FERRITIN: CPT | Performed by: STUDENT IN AN ORGANIZED HEALTH CARE EDUCATION/TRAINING PROGRAM

## 2024-11-22 PROCEDURE — 83540 ASSAY OF IRON: CPT | Performed by: STUDENT IN AN ORGANIZED HEALTH CARE EDUCATION/TRAINING PROGRAM

## 2024-11-22 PROCEDURE — 84155 ASSAY OF PROTEIN SERUM: CPT | Performed by: STUDENT IN AN ORGANIZED HEALTH CARE EDUCATION/TRAINING PROGRAM

## 2024-11-22 PROCEDURE — 82040 ASSAY OF SERUM ALBUMIN: CPT | Performed by: STUDENT IN AN ORGANIZED HEALTH CARE EDUCATION/TRAINING PROGRAM

## 2024-11-22 PROCEDURE — 82247 BILIRUBIN TOTAL: CPT | Performed by: STUDENT IN AN ORGANIZED HEALTH CARE EDUCATION/TRAINING PROGRAM

## 2024-11-22 PROCEDURE — G2211 COMPLEX E/M VISIT ADD ON: HCPCS | Performed by: STUDENT IN AN ORGANIZED HEALTH CARE EDUCATION/TRAINING PROGRAM

## 2024-11-22 PROCEDURE — 85048 AUTOMATED LEUKOCYTE COUNT: CPT | Performed by: STUDENT IN AN ORGANIZED HEALTH CARE EDUCATION/TRAINING PROGRAM

## 2024-11-22 PROCEDURE — 99213 OFFICE O/P EST LOW 20 MIN: CPT | Performed by: STUDENT IN AN ORGANIZED HEALTH CARE EDUCATION/TRAINING PROGRAM

## 2024-11-22 PROCEDURE — 99214 OFFICE O/P EST MOD 30 MIN: CPT | Performed by: STUDENT IN AN ORGANIZED HEALTH CARE EDUCATION/TRAINING PROGRAM

## 2024-11-22 PROCEDURE — 85018 HEMOGLOBIN: CPT | Performed by: STUDENT IN AN ORGANIZED HEALTH CARE EDUCATION/TRAINING PROGRAM

## 2024-11-22 ASSESSMENT — PAIN SCALES - GENERAL: PAINLEVEL_OUTOF10: NO PAIN (0)

## 2024-11-22 NOTE — LETTER
11/22/2024      Vivian Brown  [address] [facility] [address]      Dear Colleague,    Thank you for referring your patient, Vivian Brown, to the [facility] BLOOD AND MARROW TRANSPLANT PROGRAM [place]. Please see a copy of my visit note below.         BMT Progress Note    Disease presentation and baseline characteristics:   12/21/2023:  Final Diagnosis   A. kidney biopsy (needle):     Amyloidosis of the kidney, AL-type, lambda light chain-restricted, affecting the glomeruli, interstitium, and arterioles (see comment)     Mild chronic changes of the parenchyma, including:   - focal global glomerulosclerosis (3% of glomeruli)   - focal tubular atrophy and interstitial fibrosis (5% of the cortex)   - severe arterial sclerosis   Electronically signed by Joe Garcia MD on 12/23/2023 at  3:21 PM   Comment  UULAB   The biopsy reveals findings diagnostic of amyloidosis; the amyloid shows lambda light chain-restriction (AL amyloidosis), and the deposits are Congo red positive. The amyloid deposits affect the glomeruli extensively, and interstitium and arterioles focally. Other potential complications of paraproteins in the kidney are not seen, in particular, there is no evidence of light chain cast nephropathy, light chain deposition disease, or light chain tubulopathy.        Date Treatment Name Response Side Effects / Toxicities   1/19/24 D-CyBorD x 4 cycles     6/12/24 HDT/ASCT Hematologic VGPR           HPI:  Please see my entry above for hematologic history.  Mrs. Brown presents today for follow up.  She reports since receiving pamidronate she has not had any further episodes of nausea/vomiting.  She does still have significant fatigue and finds herself getting worn down quickly.  She denies fevers/chills, tongue thickening, numbness/tingling, or other concerning symptoms.      ASSESSMENT AND PLAN:  Mrs. Brown has shown clinical improvement since receiving pamidronate, suggesting her  nausea was secondary to hypercalcemia.  She has had elevated PTH-RP likely driving her hypercalcemia, but the underlying etiology of this remains unclear.  She recently had extensive post-transplant restaging which did not show any evidence of solid tumor malignancy on PET/CT.  Bone marrow biopsy was unremarkable and free light chains are both only slightly elevated with a normal ratio.    CKD is stable.  She will follow up with nephrology in several weeks.    Normocytic anemia is likely secondary to CKD but will add on iron studies to recent labs.      Plan:  - Follow up 12/17 for restaging    I spent 30 minutes in the care of this patient today, which included time necessary for preparation for the visit, obtaining history, ordering medications/tests/procedures as medically indicated, review of pertinent medical literature, counseling of the patient, communication of recommendations to the care team, and documentation time.    Jomar Chandler MD    ROS:    10 point ROS neg other than the symptoms noted above in the HPI.      Current Outpatient Medications   Medication Sig Dispense Refill     acyclovir (ZOVIRAX) 400 MG tablet Take 2 tablets (800 mg) by mouth 2 times daily 120 tablet 11     levothyroxine (SYNTHROID) 200 MCG tablet Take 200 mcg by mouth daily. Pt takes Synthroid brand name. FRANKI Taking 175mg       liothyronine (CYTOMEL) 5 MCG tablet Take 5 mcg by mouth 2 times daily       OLANZapine (ZYPREXA) 2.5 MG tablet Take 1 tablet (2.5 mg) by mouth at bedtime. 30 tablet 0     sulfamethoxazole-trimethoprim (BACTRIM DS) 800-160 MG tablet Take 1 tablet by mouth Every Mon, Tues two times daily 16 tablet 11     EPINEPHrine (ANY BX GENERIC EQUIV) 0.3 MG/0.3ML injection 2-pack Inject 0.3 mLs (0.3 mg) into the muscle as needed for anaphylaxis May repeat one time in 5-15 minutes if response to initial dose is inadequate. (Patient not taking: Reported on 10/21/2024) 2 each 3     loperamide (IMODIUM A-D) 2 MG tablet Take 1  tablet (2 mg) by mouth 4 times daily as needed for diarrhea (Patient not taking: Reported on 11/22/2024) 30 tablet 1     midodrine (PROAMATINE) 10 MG tablet Take 1 tablet (10 mg) by mouth 3 times daily (Patient not taking: Reported on 11/22/2024) 270 tablet 3     ondansetron (ZOFRAN) 8 MG tablet Take 1 tablet (8 mg) by mouth every 8 hours. (Patient not taking: Reported on 11/22/2024) 30 tablet 1     prochlorperazine (COMPAZINE) 10 MG tablet Take 0.5 tablets (5 mg) by mouth every 6 hours as needed for nausea or vomiting (Patient not taking: Reported on 11/22/2024) 30 tablet 1     prochlorperazine (COMPAZINE) 5 MG tablet Take 1 tablet (5 mg) by mouth every 6 hours as needed for nausea or vomiting. (Patient not taking: Reported on 11/22/2024) 30 tablet 1         Physical Exam:   /81   Pulse 98   Temp 98.2  F (36.8  C) (Oral)   Resp 16   Wt 59.5 kg (131 lb 3.2 oz)   SpO2 96%   BMI 22.40 kg/m      KPS:  90  General Appearance: alert, and no distress  Eyes: PERRL, conjunctiva and lids normal, sclera nonicteric  Ears/Nose/M/Throat: Oral mucosa and posterior oropharynx normal, moist mucous membranes  Cardio/Vascular: extremities WWP  Resp Effort And Auscultation: Breathing comfortably on room air  Edema: none  Skin: Skin color, texture, turgor normal. No rashes or lesions.  Neurologic: Gait normal.  Sensation grossly WNL.  Psych/Affect: Mood and affect are appropriate.    Blood Counts     Recent Labs   Lab Test 11/22/24  0928 10/21/24  1108 10/16/24  1249   HGB 9.7* 9.7* 9.3*   HCT 28.9* 29.1* 27.9*   WBC 7.4 7.6 8.4   ANEUTAUTO 4.0 5.1 5.8   ALYMPAUTO 1.8 1.2 1.5   AMONOAUTO 1.2 0.9 0.9   AEOSAUTO 0.2 0.3 0.2   ABSBASO 0.1 0.1 0.1   NRBCMAN 0.0 0.0 0.0    223 211       Chemistries     Basic Panel  Recent Labs   Lab Test 10/21/24  1108 10/16/24  1012 10/10/24  0808    136  138 137   POTASSIUM 4.4 4.2  4.3 3.9   CHLORIDE 108* 103  105 103   CO2 23 21*  22 25   BUN 40.5* 32.2*  31.8* 38.4*   CR  2.07* 2.72*  2.72* 2.97*   GLC 90 97  98 96        Calcium, Magnesium, Phosphorus  Recent Labs   Lab Test 10/21/24  1108 10/16/24  1012 10/10/24  0808 10/08/24  0818 07/12/24  1530 07/11/24  1101 07/09/24  1011 07/09/24  1001   TANNER 9.0 10.9*  10.9* 12.3* 11.5*   < > 7.9*   < > 7.6*   MAG  --   --   --  2.1  --  1.5*  --  1.6*   PHOS 3.3 3.8  --  3.7  --   --    < > 3.7    < > = values in this interval not displayed.        LFTs  Recent Labs   Lab Test 10/21/24  1108 10/16/24  1012 10/10/24  0808   BILITOTAL <0.2 0.2 0.3   ALKPHOS 140 128 133   AST 22 21 20   ALT 29 22 23   ALBUMIN 2.8* 3.0*  3.0* 3.3*       LDH  Recent Labs   Lab Test 10/08/24  0818 07/09/24  1001 05/24/24  1112    276* 248       B2-Microglobulin  Recent Labs   Lab Test 05/21/24  1338 01/18/24  1433   [id number] 4.3* 5.8*         Immunoglobulins     Recent Labs   Lab Test 10/08/24  0818 07/09/24  1001 05/21/24  1338 04/19/24  0956 03/15/24  0820 02/02/24  1437   * 366* 86* 101* 116* 204*       Recent Labs   Lab Test 10/08/24  0818 07/09/24  1001 05/21/24  1338 04/19/24  0956 03/15/24  0820 01/18/24  1444    218 35* 37* 36* 164       Recent Labs   Lab Test 10/08/24  0818 07/09/24  1001 05/21/24  1338 04/19/24  0956 03/15/24  0820 01/18/24  1444   IGM 39 101 <10* 21* 33* 127         Monocloncal Protein Studies     M spike    Recent Labs   Lab Test 10/08/24  0818 07/09/24  1001 05/24/24  1112 05/21/24  1338 04/19/24  0956 03/15/24  0820   ELPM 0.1* 0.0 0.0 0.0 0.1* 0.1*       [place] FLC    Recent Labs   Lab Test 10/10/24  0808 10/08/24  0818 07/09/24  1011 07/09/24  1001 05/24/24  1108 05/21/24  1338   KFLCA 3.55* 3.17* 6.05* 5.79* 1.12 1.07       Lambda FLC    Recent Labs   Lab Test 10/10/24  0808 10/08/24  0818 07/09/24  1011 07/09/24  1001 05/24/24  1108 05/21/24  1338   LFLCA 3.44* 3.23* 7.10* 7.14* 1.74 1.49       FLC Ratio    Recent Labs   Lab Test 10/10/24  0808 10/08/24  0818 07/09/24  1011 07/09/24  1001 05/24/24  1108  05/21/24  1338   KLRA 1.03 0.98 0.85 0.81 0.64 0.72         Bone Marrow Biopsy       Morphology    Results for orders placed or performed in visit on 10/10/24 (from the past 8760 hours)   Bone marrow biopsy   Result Value    Addendum      A Congo red stain was performed and also repeated.  The on-slide control stains appropriately on the repeat slide.  There is amorphous material in thickened blood vessel walls and also in the interstitial space - it does not  the Congo red stain well (unpolarized light).  Upon polarization, there is a suggestion of birefringence within the wall of one vessel.  Regardless, the findings are indeterminate for persistent amyloid.  This may represent partially resorbed amyloid, but the findings are not definitive.        Final Diagnosis      Bone marrow, posterior iliac crest, right decalcified trephine biopsy and touch imprint; right aspirate clot, direct aspirate smear, concentrate aspirate smear, and peripheral blood smear:  - No morphological or immunophenotypic evidence of plasma cell neoplasm  - Normocellular marrow (50% estimated cellularity) with normal trilineage hematopoiesis  - Peripheral blood showing mild normocytic anemia  - See comment       Comment      Concurrent flow cytometry study ([id number]) shows polytypic plasma cells. Other concurrent ancillary studies are in progress and will be reported separately. Correlation with the results of ancillary tests and clinical findings is recommended.     Histologically, based on H&E stains, there appears to be persistent amyloid within the trephine core biopsy.  A Congo red stain has been ordered and will be reported in an addendum.      Clinical Information      From Epic electronic medical record; Vivian Brown is a 55 y/o F with a history of AL amyloidosis (lambda restricted, with marrow, renal, and cardiac involvement) s/p auto PBSCT on 6/12/2024. A recent stomach biopsy ([id number]) showed focal amyloid  deposition. This was not seen on pre-transplant biopsy of the duodenum ([id number]). This bone marrow biopsy is performed for post-therapy follow-up.      Peripheral Hematologic Data      CBC WITH DIFFERENTIAL(10/10/2024 08:15 AM CDT):     RESULT VALUE REF. RANGE UNITS   WBC Count  Hemoglobin   Hematocrit   Platelet Count   RBC Count   MCV  MCH  MCHC  RDW 6.9 (NORMAL)    11.8 (NORMAL)      34.5  ( L )  251 (NORMAL)   3.63  ( L )       95 (NORMAL)     32.5 (NORMAL)     34.2 (NORMAL)     14.6 (NORMAL) 4.0-11.0  11.7-15.7  35.0-47.0  150-450  3.80-5.20    26.5-33.0  31.5-36.5  10.0-15.0 10e3/uL  g/dL  %  10e3/uL  10e6/uL  fL  pg  g/dL  %   % Neutrophils  % Lymphocytes  % Monocytes  % Eosinophils  % Basophils  % Immature Granulocytes  Absolute Neutrophils  Absolute Lymphocytes  Absolute Monocytes  Absolute Eosinophils  Absolute Basophils  Absolute Immature Granulocytes  NRBCs per 100 WBC  Absolute NRBCs 67  19  11  2  1  0  4.6 (NORMAL)  1.3 (NORMAL)  0.7 (NORMAL)  0.2 (NORMAL)  0.1 (NORMAL)  0.0 (NORMAL)  0 (NORMAL)  0.0 () N/A  N/A  N/A  N/A  N/A  N/A  1.6-8.3  0.8-5.3  0.0-1.3  0.0-0.7  0.0-0.2  <=0.4  <1  <=0.0 %  %  %  %  %  %  10e3/uL  10e3/uL  10e3/uL  10e3/uL  10e3/uL  10e3/uL  /100  10e3/uL         Microscopic Description      PERIPHERAL BLOOD SMEAR MORPHOLOGY:  The red blood cells appear normochromic.  Poikilocytosis  includes rare helmet cells, acanthocytes, and elliptocytes .  Polychromasia is present, but not increased.  Rouleaux formation is not increased. The morphology of the platelets is normal. Lymphocyte morphology is polymorphous. The neutrophil series displays normal cytoplasmic granulation and unremarkable nuclear morphology.     Bone marrow aspirates and trephine core biopsy touch imprints are reviewed.    BONE MARROW DIFFERENTIAL (500 cells on direct aspirate smears)  Percent (%) Cell Population Reference Range (%)   1 Blasts  (0 - 1)   2 Neutrophil promyelocytes (2 - 4)   59 Neutrophils  and precursors (54 - 63)   15 Erythroid precursors (18 - 24)   16 Lymphocytes (8 - 12)   3 Monocytes (1 - 1.5)   2 Eosinophils (1 - 3)   1 Basophils (0 - 1)   1 Plasma cells (0 - 1.5)     The aspirate smears are adequate for evaluation.    Neutrophil maturation is complete. Erythroid maturation is complete. Megakaryocytes exhibit a spectrum of sizes and morphologies, within normal limits.    Particle crush slides are reviewed and do not significantly differ from the aspirate smear.    CLOT SECTIONS:   The clot sections show fibrin and blood with fragments of marrow.  A reactive lymphoid aggregate is present.    TREPHINE SECTIONS:  Hematoxylin and eosin stains are reviewed. The quality of the trephine core biopsy is adequate. The marrow cellularity is estimated at 50%. The cellular composition reflects the aspirate smears differential. The number of megakaryocytes appears consistent with the degree of marrow cellularity with normal morphology. The bone marrow biopsy core sections contain a few reactive appearing lymphoid aggregates that are well circumscribed, composed of small mature lymphocytes, and predominately non-paratrabecular.  There is interstitial deposition of amorphous material; some of the vessel walls appear thickened - Congo red pending.    IMMUNOHISTOCHEMISTRY:  Immunohistochemical stains are performed on the paraffin-embedded trephine core with appropriate controls.    Stains for , cytoplasmic kappa and lambda immunoglobulin light chains show scattered polytypic plasma cells, not increased (<5%).    Note: These immunohistochemical stains are deemed medically necessary. Some of the antigens may also be evaluated by flow cytometry. Concurrent evaluation by immunohistochemistry on clot and/or trephine sections is indicated in this case in order to correlate immunophenotype with cell morphology and determine extent of involvement, spatial pattern, and focality of potential disease distribution.       Gross Description      Procedure/Gross Description   Aspirate(s) and trephine(s) procured by SHAQUILLE Jain NP    Specimen sent for Special Studies:         Flow Cytometry: right aspirate         Biopsy aspiration site: right posterior iliac crest                                                      (Reference Range)          Amount of aspirate           1.5   mL        Fat and P.V. cell layer        1    %               (1 - 3)        Particles                             trace   %        Myeloid-erythroid layer    trace    %               (5 - 8)          Clot Section: yes    Trephine biopsy site: right posterior iliac crest    Designated right posterior iliac crest is 1 cylinder of gritty tissue, labeled with the patient's name and hospital number, obtained with 11 gauge needle and a length of 20 mm; entirely submitted in 1 cassette; acetic zinc formalin fixed, decalcified, processed, and stained for hematoxylin and eosin per laboratory protocol.        Performing Labs      The technical component of this testing was completed at [facility].     Stain controls for all stains resulted within this report have been reviewed and show appropriate reactivity.        PET Scan       Results for orders placed during the hospital encounter of 10/09/24    PET ONCOLOGY WHOLE BODY    Status: Normal 10/9/2024    Narrative  Combined Report of: PET and CT on 10/9/2024 11:01 AM:    1. PET of the neck, chest, abdomen, and pelvis.  2. PET CT Fusion for Attenuation Correction and Anatomical  Localization.  3. Diagnostic CT of the chest, abdomen and pelvis without intravenous  contrast obtained for diagnostic interpretation.  4. 3D MIP and PET-CT fused images were processed on an independent  workstation and archived to PACS and reviewed by a radiologist.    Technique:    1. PET: The patient received 10.03 mCi of F-18-FDG. The serum glucose  was 90 mg/dL prior to  administration. Body weight was 56.3 kg. Images  were evaluated in the axial, sagittal, and coronal planes as well as  the rotational whole body MIP. Images were acquired from at least eyes  to proximal thighs.    UPTAKE WAS MEASURED AT 60 MINUTES.    2. CT: Volumetric acquisition for clinical interpretation of the  chest, abdomen, and pelvis acquired at 3 mm sections. The chest,  abdomen, and pelvis were evaluated at 5 mm sections in bone, soft  tissue, and lung windows.    Contrast and Medications:  IV contrast: None, due to low GFR  PO contrast: 500 of water  Additional Medications: The patient received 40 mg intravenous Lasix,  after which additional postvoid PET and CT images were obtained.    3. 3D MIP and PET-CT fused images were processed on an independent  workstation and archived to PACS and reviewed by a radiologist.    INDICATION: amyloidosis s/p auto BMT, N/V, hypercalcemia, kidney  dysfunction, concern for progressive disease; AL amyloidosis    ADDITIONAL INFORMATION OBTAINED FROM EMR: 54-year-old female with  biopsy-proven renal amyloidosis and cardiac involvement by MRI.    COMPARISON: PET/CT dated 1/25/2024    FINDINGS:    BACKGROUND: Liver SUV max = 2.3, Aorta Blood SUV max = 2.4.    HEAD/NECK:    Pharyngeal mucosal spaces: Nasopharynx and palatine tonsils are within  normal limits. Tongue base is normal. Oral tongue and buccal mucosa of  the oral cavity is normal. Oropharynx, hypopharynx and larynx are  within normal limits.    Sinonasal region: Paranasal sinuses are clear.    Lymph nodes: Multiple bilateral, mildly hypermetabolic prominent  cervical lymph nodes, for example right level 2A lymph node with max  SUV 3.8, previously 3.1.    Salivary glands: The major salivary glands are within normal limits.    Thyroid gland: Persistent diffusely increased FDG uptake in both  thyroid lobes.    CHEST:    Lymph nodes: No FDG avid or abnormally enlarged lymph nodes. Calcified  left hilar lymph  nodes.    Lungs: The central tracheobronchial tree is clear. No acute  consolidation.  No pleural effusion or pneumothorax. Calcified  pulmonary granuloma in the left lower lobe. There are a few scattered  sub-4 mm pulmonary nodules, for example 2 mm groundglass nodule in the  anterolateral right middle lobe (8/62). No suspicious pulmonary  nodules or masses.    Heart and great vessels: Heart size is within normal limits. Trace  pericardial effusion, likely physiologic. The thoracic aorta and main  pulmonary artery are within normal limits. The esophagus is  unremarkable.    Breasts: No abnormal uptake in the breasts.    ABDOMEN AND PELVIS:    Liver: No FDG avid lesion. No intrahepatic or extrahepatic biliary  ductal dilatation. Gallbladder is within normal limits.    Pancreas: The pancreas is within normal limits. No pancreatic ductal  dilatation.    Spleen: No FDG avid lesion. Calcified splenic granulomas.    Adrenal glands: No FDG avid foci.    Kidneys: No FDG avid lesion. No hydronephrosis. Nonobstructive 6 mm  renal calculus in the left lower pole. Bifid left collecting system  The urinary bladder is decompressed.    Reproductive organs are within normal limits. IUD in good position.    Gastrointestinal system: Normal caliber of the small and large bowel.    Lymph nodes: No FDG avid or abnormally enlarged lymph nodes.    Vascular structures: Normal caliber of the abdominal aorta. Mild  diffuse uptake in the abdominal aortic wall is nonspecific/probably  related to atherosclerotic disease.    No free air, free fluid, or fluid collection.    EXTREMITIES:    No abnormal FDG uptake in the visualized extremities. Resolution of  hypermetabolic focus in the left gastrocnemius.    BONES AND SOFT TISSUES:    No abnormal FDG uptake in the skeleton. No abnormal lytic or blastic  osseous lesions. Mild multilevel degenerative changes in the spine.    Impression  IMPRESSION:    1. No evidence of metabolically-active systemic  amyloid deposition. Of  note, current exam is not optimized to evaluate for cardiac  amyloidosis (no cardiac preparation).    2. Mildly FDG avid prominent bilateral cervical lymph nodes are  favored to be reactive.    3. Continued diffuse FDG uptake in the thyroid gland is suggestive of  thyroiditis.    4. Nonobstructive 6 mm calculus in the left lower renal pole.    I have personally reviewed the examination and initial interpretation  and I agree with the findings.    RONAN HAWK MD      SYSTEM ID:  [id number]     The longitudinal plan of care for the diagnosis(es)/condition(s) as documented were addressed during this visit. Due to the added complexity in care, I will continue to support Vivian in the subsequent management and with ongoing continuity of care.    ------------------------------------------------------------------------------------------------------------------------------------------------    Patient Care Team         Relationship Specialty Notifications Start End    Alin Torres PA-C PCP - General Family Medicine  11/4/20     Phone: [phone] Fax: [phone] [address] [facility] [address]    Sarthak Ontiveros MD Assigned OBGYN Provider   9/24/22     Phone: [phone] Fax: [phone]         [address] [facility] [address]    Alin Torres PA-C Assigned PCP   12/31/22     Phone: [phone] Fax: [phone]         [address] [facility] [address]    Carmen Waggoner APRN CNP Nurse Practitioner Gastroenterology  11/21/23     Phone: [phone] Fax: [phone]         [address] [facility] [address]    Audrey Sen, DO Assigned Nephrology Provider   12/9/23     Phone: [phone] Fax: [phone]         [address] [address]    Musa Adrian MD Physician Hematology & Oncology  12/26/23     Phone: [phone] Fax: [phone]         [address] [place] [address], [address] [address]    Jomar Chandler MD MD  Hematology & Oncology  12/26/23     Phone: [phone] Fax: [phone] [address] [facility]  [facility] [address]    Gaston Flores MD MD Cardiovascular Disease  1/5/24     Phone: [phone] Fax: [phone] [address] [facility] [address]          Again, thank you for allowing me to participate in the care of your patient.        Sincerely,        Jomar Chandler MD

## 2024-11-22 NOTE — NURSING NOTE
Chief Complaint   Patient presents with    Blood Draw     Vitals, blood draw,  by MA. Pt checked into appt.     Pam Akbar MA

## 2024-11-22 NOTE — NURSING NOTE
"Oncology Rooming Note    November 22, 2024 9:50 AM   Vivian Brown is a 54 year old female who presents for:    Chief Complaint   Patient presents with    Blood Draw     Vitals, blood draw,  by MA. Pt checked into appt.    Oncology Clinic Visit     Multiple Myeloma      Initial Vitals: /81   Pulse 98   Temp 98.2  F (36.8  C) (Oral)   Resp 16   Wt 59.5 kg (131 lb 3.2 oz)   SpO2 96%   BMI 22.40 kg/m   Estimated body mass index is 22.4 kg/m  as calculated from the following:    Height as of 6/11/24: 1.63 m (5' 4.17\").    Weight as of this encounter: 59.5 kg (131 lb 3.2 oz). Body surface area is 1.64 meters squared.  No Pain (0) Comment: Data Unavailable   No LMP recorded. (Menstrual status: IUD).  Allergies reviewed: Yes  Medications reviewed: Yes    Medications: MEDICATION REFILLS NEEDED TODAY. Provider was notified.  Pharmacy name entered into [employer]:    [facility] - [place], [place] - [address] [facility] [place], [place] - [address] [facility] [id number] - [place] - [address] [id number] - [place] - [address]  [facility] - [place], [place] [address]  [facility] - [place], [place] - [address] [facility] - [place], [place] - [id number]    Frailty Screening:   Is the patient here for a new oncology consult visit in cancer care? 2. No      Clinical concerns: Would need olanzapine refilled if continuing      Linda Hesham              "

## 2024-11-22 NOTE — PROGRESS NOTES
BMT Progress Note    Disease presentation and baseline characteristics:   12/21/2023:  Final Diagnosis   A. kidney biopsy (needle):     Amyloidosis of the kidney, AL-type, lambda light chain-restricted, affecting the glomeruli, interstitium, and arterioles (see comment)     Mild chronic changes of the parenchyma, including:   - focal global glomerulosclerosis (3% of glomeruli)   - focal tubular atrophy and interstitial fibrosis (5% of the cortex)   - severe arterial sclerosis   Electronically signed by Joe Garcia MD on 12/23/2023 at  3:21 PM   Comment  U[facility]   The biopsy reveals findings diagnostic of amyloidosis; the amyloid shows lambda light chain-restriction (AL amyloidosis), and the deposits are Congo red positive. The amyloid deposits affect the glomeruli extensively, and interstitium and arterioles focally. Other potential complications of paraproteins in the kidney are not seen, in particular, there is no evidence of light chain cast nephropathy, light chain deposition disease, or light chain tubulopathy.        Date Treatment Name Response Side Effects / Toxicities   1/19/24 D-CyBorD x 4 cycles     6/12/24 HDT/ASCT Hematologic VGPR           HPI:  Please see my entry above for hematologic history.  Mrs. Brown presents today for follow up.  She reports since receiving pamidronate she has not had any further episodes of nausea/vomiting.  She does still have significant fatigue and finds herself getting worn down quickly.  She denies fevers/chills, tongue thickening, numbness/tingling, or other concerning symptoms.      ASSESSMENT AND PLAN:  Mrs. Brown has shown clinical improvement since receiving pamidronate, suggesting her nausea was secondary to hypercalcemia.  She has had elevated PTH-RP likely driving her hypercalcemia, but the underlying etiology of this remains unclear.  She recently had extensive post-transplant restaging which did not show any evidence of solid tumor malignancy on PET/CT.   Bone marrow biopsy was unremarkable and free light chains are both only slightly elevated with a normal ratio.    CKD is stable.  She will follow up with nephrology in several weeks.    Normocytic anemia is likely secondary to CKD but will add on iron studies to recent labs.      Plan:  - Follow up 12/17 for restaging    I spent 30 minutes in the care of this patient today, which included time necessary for preparation for the visit, obtaining history, ordering medications/tests/procedures as medically indicated, review of pertinent medical literature, counseling of the patient, communication of recommendations to the care team, and documentation time.    Jomar Chandler MD    ROS:    10 point ROS neg other than the symptoms noted above in the HPI.      Current Outpatient Medications   Medication Sig Dispense Refill    acyclovir (ZOVIRAX) 400 MG tablet Take 2 tablets (800 mg) by mouth 2 times daily 120 tablet 11    levothyroxine (SYNTHROID) 200 MCG tablet Take 200 mcg by mouth daily. Pt takes Synthroid brand name. FRANKI Taking 175mg      liothyronine (CYTOMEL) 5 MCG tablet Take 5 mcg by mouth 2 times daily      OLANZapine (ZYPREXA) 2.5 MG tablet Take 1 tablet (2.5 mg) by mouth at bedtime. 30 tablet 0    sulfamethoxazole-trimethoprim (BACTRIM DS) 800-160 MG tablet Take 1 tablet by mouth Every Mon, Tues two times daily 16 tablet 11    EPINEPHrine (ANY BX GENERIC EQUIV) 0.3 MG/0.3ML injection 2-pack Inject 0.3 mLs (0.3 mg) into the muscle as needed for anaphylaxis May repeat one time in 5-15 minutes if response to initial dose is inadequate. (Patient not taking: Reported on 10/21/2024) 2 each 3    loperamide (IMODIUM A-D) 2 MG tablet Take 1 tablet (2 mg) by mouth 4 times daily as needed for diarrhea (Patient not taking: Reported on 11/22/2024) 30 tablet 1    midodrine (PROAMATINE) 10 MG tablet Take 1 tablet (10 mg) by mouth 3 times daily (Patient not taking: Reported on 11/22/2024) 270 tablet 3    ondansetron (ZOFRAN) 8  MG tablet Take 1 tablet (8 mg) by mouth every 8 hours. (Patient not taking: Reported on 11/22/2024) 30 tablet 1    prochlorperazine (COMPAZINE) 10 MG tablet Take 0.5 tablets (5 mg) by mouth every 6 hours as needed for nausea or vomiting (Patient not taking: Reported on 11/22/2024) 30 tablet 1    prochlorperazine (COMPAZINE) 5 MG tablet Take 1 tablet (5 mg) by mouth every 6 hours as needed for nausea or vomiting. (Patient not taking: Reported on 11/22/2024) 30 tablet 1         Physical Exam:   /81   Pulse 98   Temp 98.2  F (36.8  C) (Oral)   Resp 16   Wt 59.5 kg (131 lb 3.2 oz)   SpO2 96%   BMI 22.40 kg/m      KPS:  90  General Appearance: alert, and no distress  Eyes: PERRL, conjunctiva and lids normal, sclera nonicteric  Ears/Nose/M/Throat: Oral mucosa and posterior oropharynx normal, moist mucous membranes  Cardio/Vascular: extremities WWP  Resp Effort And Auscultation: Breathing comfortably on room air  Edema: none  Skin: Skin color, texture, turgor normal. No rashes or lesions.  Neurologic: Gait normal.  Sensation grossly WNL.  Psych/Affect: Mood and affect are appropriate.    Blood Counts     Recent Labs   Lab Test 11/22/24  0928 10/21/24  1108 10/16/24  1249   HGB 9.7* 9.7* 9.3*   HCT 28.9* 29.1* 27.9*   WBC 7.4 7.6 8.4   ANEUTAUTO 4.0 5.1 5.8   ALYMPAUTO 1.8 1.2 1.5   AMONOAUTO 1.2 0.9 0.9   AEOSAUTO 0.2 0.3 0.2   ABSBASO 0.1 0.1 0.1   NRBCMAN 0.0 0.0 0.0    223 211       Chemistries     Basic Panel  Recent Labs   Lab Test 10/21/24  1108 10/16/24  1012 10/10/24  0808    136  138 137   POTASSIUM 4.4 4.2  4.3 3.9   CHLORIDE 108* 103  105 103   CO2 23 21*  22 25   BUN 40.5* 32.2*  31.8* 38.4*   CR 2.07* 2.72*  2.72* 2.97*   GLC 90 97  98 96        Calcium, Magnesium, Phosphorus  Recent Labs   Lab Test 10/21/24  1108 10/16/24  1012 10/10/24  0808 10/08/24  0818 07/12/24  1530 07/11/24  1101 07/09/24  1011 07/09/24  1001   TANNER 9.0 10.9*  10.9* 12.3* 11.5*   < > 7.9*   < > 7.6*    MAG  --   --   --  2.1  --  1.5*  --  1.6*   PHOS 3.3 3.8  --  3.7  --   --    < > 3.7    < > = values in this interval not displayed.        LFTs  Recent Labs   Lab Test 10/21/24  1108 10/16/24  1012 10/10/24  0808   BILITOTAL <0.2 0.2 0.3   ALKPHOS 140 128 133   AST 22 21 20   ALT 29 22 23   ALBUMIN 2.8* 3.0*  3.0* 3.3*       LDH  Recent Labs   Lab Test 10/08/24  0818 07/09/24  1001 05/24/24  1112    276* 248       B2-Microglobulin  Recent Labs   Lab Test 05/21/24  1338 01/18/24  1433   KFHQ0YAOD 4.3* 5.8*         Immunoglobulins     Recent Labs   Lab Test 10/08/24  0818 07/09/24  1001 05/21/24  1338 04/19/24  0956 03/15/24  0820 02/02/24  1437   * 366* 86* 101* 116* 204*       Recent Labs   Lab Test 10/08/24  0818 07/09/24  1001 05/21/24  1338 04/19/24  0956 03/15/24  0820 01/18/24  1444    218 35* 37* 36* 164       Recent Labs   Lab Test 10/08/24  0818 07/09/24  1001 05/21/24  1338 04/19/24  0956 03/15/24  0820 01/18/24  1444   IGM 39 101 <10* 21* 33* 127         Monocloncal Protein Studies     M spike    Recent Labs   Lab Test 10/08/24  0818 07/09/24  1001 05/24/24  1112 05/21/24  1338 04/19/24  0956 03/15/24  0820   ELPM 0.1* 0.0 0.0 0.0 0.1* 0.1*       Muniz FLC    Recent Labs   Lab Test 10/10/24  0808 10/08/24  0818 07/09/24  1011 07/09/24  1001 05/24/24  1108 05/21/24  1338   KFLCA 3.55* 3.17* 6.05* 5.79* 1.12 1.07       Lambda FLC    Recent Labs   Lab Test 10/10/24  0808 10/08/24  0818 07/09/24  1011 07/09/24  1001 05/24/24  1108 05/21/24  1338   LFLCA 3.44* 3.23* 7.10* 7.14* 1.74 1.49       FLC Ratio    Recent Labs   Lab Test 10/10/24  0808 10/08/24  0818 07/09/24  1011 07/09/24  1001 05/24/24  1108 05/21/24  1338   KLRA 1.03 0.98 0.85 0.81 0.64 0.72         Bone Marrow Biopsy       Morphology    Results for orders placed or performed in visit on 10/10/24 (from the past 8760 hours)   Bone marrow biopsy   Result Value    Addendum      A Congo red stain was performed and also  repeated.  The on-slide control stains appropriately on the repeat slide.  There is amorphous material in thickened blood vessel walls and also in the interstitial space - it does not  the Congo red stain well (unpolarized light).  Upon polarization, there is a suggestion of birefringence within the wall of one vessel.  Regardless, the findings are indeterminate for persistent amyloid.  This may represent partially resorbed amyloid, but the findings are not definitive.        Final Diagnosis      Bone marrow, posterior iliac crest, right decalcified trephine biopsy and touch imprint; right aspirate clot, direct aspirate smear, concentrate aspirate smear, and peripheral blood smear:  - No morphological or immunophenotypic evidence of plasma cell neoplasm  - Normocellular marrow (50% estimated cellularity) with normal trilineage hematopoiesis  - Peripheral blood showing mild normocytic anemia  - See comment       Comment      Concurrent flow cytometry study ([id number]) shows polytypic plasma cells. Other concurrent ancillary studies are in progress and will be reported separately. Correlation with the results of ancillary tests and clinical findings is recommended.     Histologically, based on H&E stains, there appears to be persistent amyloid within the trephine core biopsy.  A Congo red stain has been ordered and will be reported in an addendum.      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 A recent stomach biopsy ([id number]) showed focal amyloid deposition. This was not seen on pre-transplant biopsy of the duodenum ([id number]). This bone marrow biopsy is performed for post-therapy follow-up.      Peripheral Hematologic Data      CBC WITH DIFFERENTIAL(10/10/2024 08:15 AM CDT):     RESULT VALUE REF. RANGE UNITS   WBC  Count  Hemoglobin   Hematocrit   Platelet Count   RBC Count   MCV  MCH  MCHC  RDW 6.9 (NORMAL)    11.8 (NORMAL)      34.5  ( L )  251 (NORMAL)   3.63  ( L )       95 (NORMAL)     32.5 (NORMAL)     34.2 (NORMAL)     14.6 (NORMAL) 4.0-11.0  11.7-15.7  35.0-47.0  150-450  3.80-5.20    26.5-33.0  31.5-36.5  10.0-15.0 10e3/uL  g/dL  %  10e3/uL  10e6/uL  fL  pg  g/dL  %   % Neutrophils  % Lymphocytes  % Monocytes  % Eosinophils  % Basophils  % Immature Granulocytes  Absolute Neutrophils  Absolute Lymphocytes  Absolute Monocytes  Absolute Eosinophils  Absolute Basophils  Absolute Immature Granulocytes  NRBCs per 100 WBC  Absolute NRBCs 67  19  11  2  1  0  4.6 (NORMAL)  1.3 (NORMAL)  0.7 (NORMAL)  0.2 (NORMAL)  0.1 (NORMAL)  0.0 (NORMAL)  0 (NORMAL)  0.0 () N/A  N/A  N/A  N/A  N/A  N/A  1.6-8.3  0.8-5.3  0.0-1.3  0.0-0.7  0.0-0.2  <=0.4  <1  <=0.0 %  %  %  %  %  %  10e3/uL  10e3/uL  10e3/uL  10e3/uL  10e3/uL  10e3/uL  /100  10e3/uL         Microscopic Description      PERIPHERAL BLOOD SMEAR MORPHOLOGY:  The red blood cells appear normochromic.  Poikilocytosis  includes rare helmet cells, acanthocytes, and elliptocytes .  Polychromasia is present, but not increased.  Rouleaux formation is not increased. The morphology of the platelets is normal. Lymphocyte morphology is polymorphous. The neutrophil series displays normal cytoplasmic granulation and unremarkable nuclear morphology.     Bone marrow aspirates and trephine core biopsy touch imprints are reviewed.    BONE MARROW DIFFERENTIAL (500 cells on direct aspirate smears)  Percent (%) Cell Population Reference Range (%)   1 Blasts  (0 - 1)   2 Neutrophil promyelocytes (2 - 4)   59 Neutrophils and precursors (54 - 63)   15 Erythroid precursors (18 - 24)   16 Lymphocytes (8 - 12)   3 Monocytes (1 - 1.5)   2 Eosinophils (1 - 3)   1 Basophils (0 - 1)   1 Plasma cells (0 - 1.5)     The aspirate smears are adequate for evaluation.    Neutrophil maturation is complete.  Erythroid maturation is complete. Megakaryocytes exhibit a spectrum of sizes and morphologies, within normal limits.    Particle crush slides are reviewed and do not significantly differ from the aspirate smear.    CLOT SECTIONS:   The clot sections show fibrin and blood with fragments of marrow.  A reactive lymphoid aggregate is present.    TREPHINE SECTIONS:  Hematoxylin and eosin stains are reviewed. The quality of the trephine core biopsy is adequate. The marrow cellularity is estimated at 50%. The cellular composition reflects the aspirate smears differential. The number of megakaryocytes appears consistent with the degree of marrow cellularity with normal morphology. The bone marrow biopsy core sections contain a few reactive appearing lymphoid aggregates that are well circumscribed, composed of small mature lymphocytes, and predominately non-paratrabecular.  There is interstitial deposition of amorphous material; some of the vessel walls appear thickened - Congo red pending.    IMMUNOHISTOCHEMISTRY:  Immunohistochemical stains are performed on the paraffin-embedded trephine core with appropriate controls.    Stains for , cytoplasmic kappa and lambda immunoglobulin light chains show scattered polytypic plasma cells, not increased (<5%).    Note: These immunohistochemical stains are deemed medically necessary. Some of the antigens may also be evaluated by flow cytometry. Concurrent evaluation by immunohistochemistry on clot and/or trephine sections is indicated in this case in order to correlate immunophenotype with cell morphology and determine extent of involvement, spatial pattern, and focality of potential disease distribution.      Gross Description      Procedure/Gross Description   Aspirate(s) and trephine(s) procured by SHAQUILLE Jain NP    Specimen sent for Special Studies:         Flow Cytometry: right aspirate         Biopsy aspiration site: right posterior iliac crest                                                       (Reference Range)          Amount of aspirate           1.5   mL        Fat and P.V. cell layer        1    %               (1 - 3)        Particles                             trace   %        Myeloid-erythroid layer    trace    %               (5 - 8)          Clot Section: yes    Trephine biopsy site: right posterior iliac crest    Designated right posterior iliac crest is 1 cylinder of gritty tissue, labeled with the patient's name and hospital number, obtained with 11 gauge needle and a length of 20 mm; entirely submitted in 1 cassette; acetic zinc formalin fixed, decalcified, processed, and stained for hematoxylin and eosin per laboratory protocol.        Performing Labs      The technical component of this testing was completed at [facility].     Stain controls for all stains resulted within this report have been reviewed and show appropriate reactivity.        PET Scan       Results for orders placed during the hospital encounter of 10/09/24    PET ONCOLOGY WHOLE BODY    Status: Normal 10/9/2024    Narrative  Combined Report of: PET and CT on 10/9/2024 11:01 AM:    1. PET of the neck, chest, abdomen, and pelvis.  2. PET CT Fusion for Attenuation Correction and Anatomical  Localization.  3. Diagnostic CT of the chest, abdomen and pelvis without intravenous  contrast obtained for diagnostic interpretation.  4. 3D MIP and PET-CT fused images were processed on an independent  workstation and archived to PACS and reviewed by a radiologist.    Technique:    1. PET: The patient received 10.03 mCi of F-18-FDG. The serum glucose  was 90 mg/dL prior to administration. Body weight was 56.3 kg. Images  were evaluated in the axial, sagittal, and coronal planes as well as  the rotational whole body MIP. Images were acquired from at least eyes  to proximal thighs.    UPTAKE WAS MEASURED AT 60 MINUTES.    2. CT: Volumetric  acquisition for clinical interpretation of the  chest, abdomen, and pelvis acquired at 3 mm sections. The chest,  abdomen, and pelvis were evaluated at 5 mm sections in bone, soft  tissue, and lung windows.    Contrast and Medications:  IV contrast: None, due to low GFR  PO contrast: 500 of water  Additional Medications: The patient received 40 mg intravenous Lasix,  after which additional postvoid PET and CT images were obtained.    3. 3D MIP and PET-CT fused images were processed on an independent  workstation and archived to PACS and reviewed by a radiologist.    INDICATION: amyloidosis s/p auto BMT, N/V, hypercalcemia, kidney  dysfunction, concern for progressive disease; AL amyloidosis    ADDITIONAL INFORMATION OBTAINED FROM EMR: 54-year-old female with  biopsy-proven renal amyloidosis and cardiac involvement by MRI.    COMPARISON: PET/CT dated 1/25/2024    FINDINGS:    BACKGROUND: Liver SUV max = 2.3, Aorta Blood SUV max = 2.4.    HEAD/NECK:    Pharyngeal mucosal spaces: Nasopharynx and palatine tonsils are within  normal limits. Tongue base is normal. Oral tongue and buccal mucosa of  the oral cavity is normal. Oropharynx, hypopharynx and larynx are  within normal limits.    Sinonasal region: Paranasal sinuses are clear.    Lymph nodes: Multiple bilateral, mildly hypermetabolic prominent  cervical lymph nodes, for example right level 2A lymph node with max  SUV 3.8, previously 3.1.    Salivary glands: The major salivary glands are within normal limits.    Thyroid gland: Persistent diffusely increased FDG uptake in both  thyroid lobes.    CHEST:    Lymph nodes: No FDG avid or abnormally enlarged lymph nodes. Calcified  left hilar lymph nodes.    Lungs: The central tracheobronchial tree is clear. No acute  consolidation.  No pleural effusion or pneumothorax. Calcified  pulmonary granuloma in the left lower lobe. There are a few scattered  sub-4 mm pulmonary nodules, for example 2 mm groundglass nodule in  the  anterolateral right middle lobe (8/62). No suspicious pulmonary  nodules or masses.    Heart and great vessels: Heart size is within normal limits. Trace  pericardial effusion, likely physiologic. The thoracic aorta and main  pulmonary artery are within normal limits. The esophagus is  unremarkable.    Breasts: No abnormal uptake in the breasts.    ABDOMEN AND PELVIS:    Liver: No FDG avid lesion. No intrahepatic or extrahepatic biliary  ductal dilatation. Gallbladder is within normal limits.    Pancreas: The pancreas is within normal limits. No pancreatic ductal  dilatation.    Spleen: No FDG avid lesion. Calcified splenic granulomas.    Adrenal glands: No FDG avid foci.    Kidneys: No FDG avid lesion. No hydronephrosis. Nonobstructive 6 mm  renal calculus in the left lower pole. Bifid left collecting system  The urinary bladder is decompressed.    Reproductive organs are within normal limits. IUD in good position.    Gastrointestinal system: Normal caliber of the small and large bowel.    Lymph nodes: No FDG avid or abnormally enlarged lymph nodes.    Vascular structures: Normal caliber of the abdominal aorta. Mild  diffuse uptake in the abdominal aortic wall is nonspecific/probably  related to atherosclerotic disease.    No free air, free fluid, or fluid collection.    EXTREMITIES:    No abnormal FDG uptake in the visualized extremities. Resolution of  hypermetabolic focus in the left gastrocnemius.    BONES AND SOFT TISSUES:    No abnormal FDG uptake in the skeleton. No abnormal lytic or blastic  osseous lesions. Mild multilevel degenerative changes in the spine.    Impression  IMPRESSION:    1. No evidence of metabolically-active systemic amyloid deposition. Of  note, current exam is not optimized to evaluate for cardiac  amyloidosis (no cardiac preparation).    2. Mildly FDG avid prominent bilateral cervical lymph nodes are  favored to be reactive.    3. Continued diffuse FDG uptake in the thyroid gland  is suggestive of  thyroiditis.    4. Nonobstructive 6 mm calculus in the left lower renal pole.    I have personally reviewed the examination and initial interpretation  and I agree with the findings.    RONAN HAWK MD      SYSTEM ID:  [id number]     The longitudinal plan of care for the diagnosis(es)/condition(s) as documented were addressed during this visit. Due to the added complexity in care, I will continue to support Vivian in the subsequent management and with ongoing continuity of care.    ------------------------------------------------------------------------------------------------------------------------------------------------    Patient Care Team         Relationship Specialty Notifications Start End    Alin Torres PA-C PCP - General Family Medicine  11/4/20     Phone: [phone] Fax: [phone] [address] [place] [address]    Sarthak Ontiveros MD Assigned OBGYN Provider   9/24/22     Phone: [phone] Fax: [phone]         [address] [facility] [address] [facility] [address]    Alin Torres PA-C Assigned PCP   12/31/22     Phone: [phone] Fax: [phone]         [address] [facility] [address]    Carmen Waggoner APRN CNP Nurse Practitioner Gastroenterology  11/21/23     Phone: [phone] Fax: [phone]         [address] [place] [address]    Audrey Sen DO Assigned Nephrology Provider   12/9/23     Phone: [phone] Fax: [phone]         [address] [address] [facility] [address]    Musa Adrian MD Physician Hematology & Oncology  12/26/23     Phone: [phone] Fax: [phone]         [address] [facility] [address] [facility] [address]    Jomar Chandler MD MD Hematology & Oncology  12/26/23     Phone: [phone] Fax: [phone]         [address] [place] [address] [address] [facility] [address]    Gaston Flores MD MD Cardiovascular Disease  1/5/24     Phone: [phone] Fax: [phone] [address] [facility]  [address]

## 2024-11-26 ENCOUNTER — LAB (OUTPATIENT)
Dept: LAB | Facility: CLINIC | Age: 54
End: 2024-11-26
Payer: COMMERCIAL

## 2024-11-26 DIAGNOSIS — Z52.011 AUTOLOGOUS DONOR OF STEM CELLS: ICD-10-CM

## 2024-11-26 DIAGNOSIS — I51.9 MYXEDEMA HEART DISEASE: Primary | ICD-10-CM

## 2024-11-26 DIAGNOSIS — E03.9 MYXEDEMA HEART DISEASE: Primary | ICD-10-CM

## 2024-11-26 DIAGNOSIS — E85.81 AL AMYLOIDOSIS (H): ICD-10-CM

## 2024-11-26 DIAGNOSIS — E03.4 HYPOTHYROIDISM DUE TO ACQUIRED ATROPHY OF THYROID: ICD-10-CM

## 2024-11-26 LAB
FERRITIN SERPL-MCNC: 474 NG/ML (ref 11–328)
IRON BINDING CAPACITY (ROCHE): 186 UG/DL (ref 240–430)
IRON SATN MFR SERPL: 40 % (ref 15–46)
IRON SERPL-MCNC: 74 UG/DL (ref 37–145)
T4 FREE SERPL-MCNC: 1.56 NG/DL (ref 0.9–1.7)
TSH SERPL DL<=0.005 MIU/L-ACNC: 0.01 UIU/ML (ref 0.3–4.2)

## 2024-11-26 PROCEDURE — 84439 ASSAY OF FREE THYROXINE: CPT

## 2024-11-26 PROCEDURE — 84443 ASSAY THYROID STIM HORMONE: CPT

## 2024-11-26 PROCEDURE — 36415 COLL VENOUS BLD VENIPUNCTURE: CPT

## 2024-11-26 NOTE — TELEPHONE ENCOUNTER
Olanzapine 2.5mg tab  Last prescribing provider: Dr. Jomar Chandler     Last clinic visit date: 11/22/24    Recommendations for requested medication (if none, N/A): NA    Any other pertinent information (if none, N/A): fax request    Refilled: Y/N, if NO, why?

## 2024-11-27 RX ORDER — OLANZAPINE 2.5 MG/1
2.5 TABLET, FILM COATED ORAL AT BEDTIME
Qty: 30 TABLET | Refills: 0 | Status: SHIPPED | OUTPATIENT
Start: 2024-11-27

## 2024-12-02 ENCOUNTER — LAB (OUTPATIENT)
Dept: LAB | Facility: CLINIC | Age: 54
End: 2024-12-02
Payer: COMMERCIAL

## 2024-12-02 DIAGNOSIS — E85.4 AMYLOID KIDNEY (H): ICD-10-CM

## 2024-12-02 DIAGNOSIS — E85.81 AL AMYLOIDOSIS (H): ICD-10-CM

## 2024-12-02 DIAGNOSIS — N08 AMYLOID KIDNEY (H): ICD-10-CM

## 2024-12-02 DIAGNOSIS — N18.31 CHRONIC KIDNEY DISEASE, STAGE 3A (H): ICD-10-CM

## 2024-12-02 LAB
ALBUMIN MFR UR ELPH: 801 MG/DL
ALBUMIN SERPL BCG-MCNC: 3.2 G/DL (ref 3.5–5.2)
ALBUMIN UR-MCNC: 300 MG/DL
ANION GAP SERPL CALCULATED.3IONS-SCNC: 9 MMOL/L (ref 7–15)
APPEARANCE UR: CLEAR
BASOPHILS # BLD AUTO: 0.1 10E3/UL (ref 0–0.2)
BASOPHILS NFR BLD AUTO: 1 %
BILIRUB UR QL STRIP: NEGATIVE
BUN SERPL-MCNC: 40.3 MG/DL (ref 6–20)
CALCIUM SERPL-MCNC: 9.2 MG/DL (ref 8.8–10.4)
CHLORIDE SERPL-SCNC: 108 MMOL/L (ref 98–107)
COLOR UR AUTO: ABNORMAL
CREAT SERPL-MCNC: 2.06 MG/DL (ref 0.51–0.95)
CREAT UR-MCNC: 96 MG/DL
CREAT UR-MCNC: 96 MG/DL
EGFRCR SERPLBLD CKD-EPI 2021: 28 ML/MIN/1.73M2
EOSINOPHIL # BLD AUTO: 0.2 10E3/UL (ref 0–0.7)
EOSINOPHIL NFR BLD AUTO: 3 %
ERYTHROCYTE [DISTWIDTH] IN BLOOD BY AUTOMATED COUNT: 15.7 % (ref 10–15)
GLUCOSE SERPL-MCNC: 95 MG/DL (ref 70–99)
GLUCOSE UR STRIP-MCNC: NEGATIVE MG/DL
HCO3 SERPL-SCNC: 22 MMOL/L (ref 22–29)
HCT VFR BLD AUTO: 30.4 % (ref 35–47)
HGB BLD-MCNC: 10.1 G/DL (ref 11.7–15.7)
HGB UR QL STRIP: ABNORMAL
IMM GRANULOCYTES # BLD: 0 10E3/UL
IMM GRANULOCYTES NFR BLD: 0 %
KETONES UR STRIP-MCNC: NEGATIVE MG/DL
LEUKOCYTE ESTERASE UR QL STRIP: NEGATIVE
LYMPHOCYTES # BLD AUTO: 2 10E3/UL (ref 0.8–5.3)
LYMPHOCYTES NFR BLD AUTO: 32 %
MCH RBC QN AUTO: 32.1 PG (ref 26.5–33)
MCHC RBC AUTO-ENTMCNC: 33.2 G/DL (ref 31.5–36.5)
MCV RBC AUTO: 97 FL (ref 78–100)
MICROALBUMIN UR-MCNC: >4400 MG/L
MICROALBUMIN/CREAT UR: NORMAL MG/G{CREAT}
MONOCYTES # BLD AUTO: 0.6 10E3/UL (ref 0–1.3)
MONOCYTES NFR BLD AUTO: 9 %
MUCOUS THREADS #/AREA URNS LPF: PRESENT /LPF
NEUTROPHILS # BLD AUTO: 3.5 10E3/UL (ref 1.6–8.3)
NEUTROPHILS NFR BLD AUTO: 55 %
NITRATE UR QL: NEGATIVE
NRBC # BLD AUTO: 0 10E3/UL
NRBC BLD AUTO-RTO: 0 /100
PH UR STRIP: 6.5 [PH] (ref 5–7)
PHOSPHATE SERPL-MCNC: 4.1 MG/DL (ref 2.5–4.5)
PLATELET # BLD AUTO: 229 10E3/UL (ref 150–450)
POTASSIUM SERPL-SCNC: 4.4 MMOL/L (ref 3.4–5.3)
PROT/CREAT 24H UR: 8.34 MG/MG CR (ref 0–0.2)
RBC # BLD AUTO: 3.15 10E6/UL (ref 3.8–5.2)
RBC URINE: 3 /HPF
SODIUM SERPL-SCNC: 139 MMOL/L (ref 135–145)
SP GR UR STRIP: 1.02 (ref 1–1.03)
UROBILINOGEN UR STRIP-MCNC: NORMAL MG/DL
WBC # BLD AUTO: 6.4 10E3/UL (ref 4–11)
WBC URINE: 1 /HPF

## 2024-12-02 PROCEDURE — 80069 RENAL FUNCTION PANEL: CPT

## 2024-12-02 PROCEDURE — 85025 COMPLETE CBC W/AUTO DIFF WBC: CPT

## 2024-12-02 PROCEDURE — 81001 URINALYSIS AUTO W/SCOPE: CPT

## 2024-12-02 PROCEDURE — 84156 ASSAY OF PROTEIN URINE: CPT

## 2024-12-02 PROCEDURE — 36415 COLL VENOUS BLD VENIPUNCTURE: CPT

## 2024-12-02 PROCEDURE — 82570 ASSAY OF URINE CREATININE: CPT

## 2024-12-02 PROCEDURE — 82043 UR ALBUMIN QUANTITATIVE: CPT

## 2024-12-08 NOTE — PROGRESS NOTES
Nephrology Progress Note  12/09/2024     Virtual Visit Details    Type of service:  Video Visit 2.07-2.25 PM    Originating Location (pt. Location): Home    Distant Location (provider location):  On-site  Platform used for Video Visit: Robert  Chief complaint: Follow up AL amyloidosis  History of Present Illness:    Vivian Brown is a 54 year old F  with hypothyroidism, who is here for AL amyloidosis consult.     The patient initially presented with fatigue and LE edema for about 2 years. Initially, she was diagnosed with Hashimoto's and was started on thyroid supplement in August 2023. Symptoms has partially improved. Later on she was found to have hypoalbuminemia dn massiv eproteinuria hence was referred to Dr. Sen. She saw Dr. Sen in November 2023 and was started on Lisinopril 2.5 mg and lasix 20 mg per day. I 1 week, she has lsot 20 Lbs and swelling has been better and remained in control.  She underwent a kidney Bx on 12/21/23. The Bx read by Dr. Garcia which showed Amyloidosis of the kidney, AL-type, lambda light chain-restricted, affecting the glomeruli, interstitium, and arterioles. She has mild chronic changes of the parenchyma, including: focal global glomerulosclerosis (3% of glomeruli), focal tubular atrophy and interstitial fibrosis (5% of the cortex), severe arterial sclerosis.   LM: There were 37 glomeruli (LM-29; IF-7; EM-1), of which 1 is globally sclerosed. The non-sclerosed glomeruli are of normal size and cellularity. Significant endocapillary proliferation or cellular crescents are not seen in the glomeruli. The glomeruli show deposits of amorphous, homogeneous, acellular, eosinophilic material that infiltrates and expands the mesangial areas and extends down the capillaries of the glomerular tuft. Tubules are well-preserved and show no signs of acute injury. Obvious signs of a viral cytopathic effect are not seen in the sample. No oxalate, urate, or phosphate crystals are present in  the sample. The interstitium reveals occasional collection of foam cells and occasional deposits of amorphous material. The interstitium contains mild interstitial inflammation in areas of atrophy, and the infiltrates are composed of mononuclear cells. About 5% of the renal cortex shows tubular atrophy and interstitial fibrosis. Arteries show severe sclerosis. Examination of a Congo red-stained section reveals deposits of amorphous material with apple-green birefringence under polarized light, in all glomeruli and focally in arterioles and interstitium. EM: showed 1 glomerulus; 1 glomerulus is examined ultrastructurally. There is prominent deposition of fibrillary material in the mesangial areas, without significant hypercellularity.  There is focal effacement of visceral epithelial cell foot processes in areas of the glomerular basement membranes that show segmental widening of the subepithelial space and distortion of the lamina densa due to deposits of fibrillary material.  The mesangial and basement membrane deposits consist of non-branching, randomly arranged fibrils of 6 to 15 nm width. Glomerular endothelial cells show focal loss of their normal fenestrations. Tubules show no specific changes. Deposits are Congo red positive. The amyloid deposits affect the glomeruli extensively, and interstitium and arterioles focally. Other potential complications of paraproteins in the kidney are not seen, in particular, there is no evidence of light chain cast nephropathy, light chain deposition disease, or light chain tubulopathy.      For kidney function, she has baseline Cr of 0.9 in 12/19/22. However, Cr started to increase to 1.05 in 5/22/23 and now 1.11 on 11/28/23. Serum albumin was 3.5 in 2/24/21 and 2.2 in 12/19/22. Monoclonal work-up on 11/28/23 showed kappa 3.05, lambda 7.82 and K/L ratio 0.39 and L/K ratio 2.56. dFLC 4.77. Other serologies including MPO, PMND1 were negative. UPCR 12.50 mg/g. No 24 hr urine  "protein yet. UA showed small blood but no RBC. She also has LCL of 174. TSH 29.16 and + Tissue TTG.      She has Echo on 1/3/24. There is mild concentric left ventricular hypertrophy and borderline RVH. Global peak LV longitudinal strain is averaged at -15.3%. This suggests abnormal strain (normal <-18%).The visual ejection fraction is estimated at 54-56%.Trivial posterior pericardial effusion Clinical history is listed as renal amyloid--on this echo the atria are normal size, the valves are not thickend but there is mild LVH and borderline RVH, there is questionable \"scintillation\" of LV myocardium, the global longitudinal strain is abnormal and the best strain values are at the apex (borderline \"apical sparing\" strain pattern) and there is trace pericardial effusion along with borderline criteria for diastolic dysfunction grade2--take together this could represent some features of cardiac amyloid. Additional studies such as cardiac MRI, cardiac biopsy etc may be useful. She is scheduled to see Cardiology on 1/31/24.      1/8/24: I saw her for consultation via video. She is with her  at home. Still feels fatigue and upset stomach last night.  She is currently on furosemide 20 mg daily, lisinopril 2.5 mg daily and crestor 10 mg daily. She reports having lower extremities edema for about 2 years. She was diagnosed with Hashimoto's in August 2023.  She has been feeling better after started taking thyroid supplement. She has leg swelling but not as bad as before. Furosemide was started in December 2023 and she has lost 20 Lbs wit that. She has seen more bubbly in the urineShe sometimes feels lightheadedness and dizziness and her BP was low sometimes. However, mostly > BP /60s. HR in the 80s.  BP today is 114/76, HR is 76.  She has no problem with swallowing, numbness or tingling sensation. No CTS symptoms. No tongue enlargement. No periorbital ecchymoses. No easy bruising. She has no other medical problem " before this. She has been active and exercise at least twice a week.   3/18/24: In the interim, she underwent BmBx on 1/18/24 which showed  lambda restricted plasma cell, 5%. Congo red positive in the BmBx. She was started on Zenia-CyBorD, C1D1 on 1/26/24. Now s/p 2 cycles, and will start 3rd cycle soon. ASCT is plan on 1st week of Vij[place]. In the interim, she was started on Jardiance 2/3/24 and spironolactone on 2/29/24. She does not feel lightheadedness and dizziness. Lisinopril was on hold because of dizziness. Lasix was increased to 40 mg BID on early February. Overall she feels better.  She denies any lightheadedness or dizziness.  She intermittently has numbness and tingling sensation around her thoracic cage typically after the chemo but it usually disappear within 1 week.  Labs on 3/18/24 show creatinine 1.41, BUN 21.1, bicarb 32, potassium 3.7, bicarb 32. Albumin 2.3.  White blood cell 10.8, hemoglobin 13.5, platelet 333. UA showed small blood, glucose 150 and protein 300. UPCR and UACR pending.   5/16/24: In the interim, she continued with Zenia-CyBorD and so far has received 4 cycles. Her Cr has increased gradually and now at 1.90 on 5/14/24. UA showed large protein with hematuria, RBC 5 and WBC 8. Granular cast 8. Serum albumin improved slightly to 2.4. UPCR is stable at 16.04 on 5/8/24 and 24 hour urine protein was 17 g/d. On 4/19/24 Kappa 1.13, lambda 1.71 (8.31 upon diagnosis) with monoclonal protein IgG kappa 0.1 (could be zenia peak). Today, she does not feel good. She has several SE with chemo: feeling nausea, lightheadedness or dizziness. She has no diarrhea. Swelling has been better controlled.  She also has chest pain and just went to [facility].Cardiology thought that chest pain could be from hypotension so she was started on midodrine 2.5 mg for 1 week. She will start work-up next week for BMT. They plan to possibly do BMT on early June. Home BP are 107-117/60-70. She feels lightheadedness with  exertion. I stopped Jardiance due to rising in Cr and hypotension.   7/8/24: In the interim, the patient underwent PBSCT, D0 on 6/12/24 with melphalan induction.  She has significant mucositis nausea vomiting and diarrhea.  Her Lasix and spironolactone were on hold due to orthostatic hypotension and decreased p.o. intake.  She is on acyclovir and fluconazole prophylaxis. She has been receiving IVF for orthostatic hypotension. Today, she is a little bit tired today. She is slowly getting better every day. She does not feel lightheadedness or dizziness. Her swelling has comes back last night considerably mostly at her feet. She still feels nausea intermittently. She has diarrhea this week and has to take imodium. She takes midodrine 10 mg TID. Labs on 7/5/2024 showed sodium 133, potassium 3.7, bicarb 22, creatinine 1.43, GFR 43, calcium 7.3, albumin 1.9.  Hemoglobin 8.6 white blood cell 4.2 and platelet 38.  10/15/24: I called Mary Ann to discuss with her about the progression of her CKD. I shareed with her that I have been in communication with Dr. Chandler about her case. I doubt that CKD progression is from amyloid but rather hypercalcemia. She has no problem with urination. Ca was 12.3. PTH is low normal at 18, Vit D 27 and PTHrP mildly low at 7.4 ([place] lab later came back 6.7, (nl <4.2)) PET showed no bony lesion or lesion concerning for cancer. Still unclear why she has hypercalcemia. Not taking vitamin D for about 2 months now. Not taking calcium supplement. We gave NS 1 L and Pamidronate 30 mg x 1 dose (on 10/16/24) and repeat labs in 1 week. Repeat iCa, PTHrP ([place] send out, came back 6.7, mild abnormal) and check 1,25 dihydroxy vit D which was low at 9.2.   12/9/24: Since pamidronate, her calcium level has improved and now down to 9.1 (corrected 9.9) from peak at 12.3 in October 2024.  Her creatinine also improved and down to 1.94 from peak of 2.97.  She has a cold 3 weeks ago but now feeling better.  She has been  feeling nauseous for the past weekend but has resolved today. She has leg swelling last week but this week is better, only sock dent. She has not been taking blood pressure lately but BP was 97/63 mmHg last visit. She does not feel dizziness or lightheadedness.   Labs on 12/9/24 showed creatinine 1.94, GFR 30, potassium 4.5, dioxide 22, calcium 9.1, albumin 3.0.  Hemoglobin 10, less than 6.8 and platelets 227.  UA shows small blood and RBC 3 cells.  UPCR 8.34 g/g.   Oh  Past medical history  Past Medical History:   Diagnosis Date    Celiac disease     Family history of colon cancer     Colonoscoy q5 years next 9/2020     Past surgical history  Past Surgical History:   Procedure Laterality Date    BONE MARROW BIOPSY, BONE SPECIMEN, NEEDLE/TROCAR Left 5/24/2024    Procedure: BIOPSY, BONE MARROW;  Surgeon: Sabrnia Jain NP;  Location: [facility] OR    COLONOSCOPY N/A 9/28/2015    Procedure: COLONOSCOPY;  Surgeon: Eugenio Travis MD;  Location: [facility]    ESOPHAGOSCOPY, GASTROSCOPY, DUODENOSCOPY (EGD), COMBINED N/A 1/30/2024    Procedure: ESOPHAGOGASTRODUODENOSCOPY, WITH BIOPSY;  Surgeon: Melo Cloud MD;  Location: [facility]    ESOPHAGOSCOPY, GASTROSCOPY, DUODENOSCOPY (EGD), COMBINED N/A 9/18/2024    Procedure: ESOPHAGOGASTRODUODENOSCOPY, WITH BIOPSY;  Surgeon: Melo Cloud MD;  Location: [facility]    IR CVC TUNNEL PLACEMENT > 5 YRS OF AGE  6/5/2024    IR CVC TUNNEL REMOVAL RIGHT  7/15/2024    IR RENAL BIOPSY RIGHT  12/21/2023    TONSILLECTOMY       Review of Systems:   14 systems were reviewed and all negative except as mentioned above.   Current Medications:  Current Outpatient Medications   Medication Sig Dispense Refill    acyclovir (ZOVIRAX) 400 MG tablet Take 2 tablets (800 mg) by mouth 2 times daily 120 tablet 11    levothyroxine (SYNTHROID) 200 MCG tablet Take 200 mcg by mouth daily. Pt takes Synthroid brand name. FRANKI Taking 175mg      liothyronine (CYTOMEL) 5 MCG tablet Take 5 mcg by mouth 2 times daily       OLANZapine (ZYPREXA) 2.5 MG tablet Take 1 tablet (2.5 mg) by mouth at bedtime. 30 tablet 0    sulfamethoxazole-trimethoprim (BACTRIM DS) 800-160 MG tablet Take 1 tablet by mouth Every Mon, Tues two times daily 16 tablet 11     No current facility-administered medications for this visit.       Physical Exam:   There were no vitals taken for this visit.   There is no height or weight on file to calculate BMI.    She appears tired, + pitting edema. No respiratory distress.     Labs:   All labs reviewed by me  Last Renal Panel:  Sodium   Date Value Ref Range Status   12/09/2024 138 135 - 145 mmol/L Final   02/24/2021 140 133 - 144 mmol/L Final     Potassium   Date Value Ref Range Status   12/09/2024 4.5 3.4 - 5.3 mmol/L Final   12/19/2022 3.9 3.4 - 5.3 mmol/L Final   02/24/2021 4.3 3.4 - 5.3 mmol/L Final     Chloride   Date Value Ref Range Status   12/09/2024 107 98 - 107 mmol/L Final   12/19/2022 108 94 - 109 mmol/L Final   02/24/2021 107 94 - 109 mmol/L Final     Carbon Dioxide   Date Value Ref Range Status   02/24/2021 29 20 - 32 mmol/L Final     Carbon Dioxide (CO2)   Date Value Ref Range Status   12/09/2024 22 22 - 29 mmol/L Final   12/19/2022 33 (H) 20 - 32 mmol/L Final     Anion Gap   Date Value Ref Range Status   12/09/2024 9 7 - 15 mmol/L Final   12/19/2022 2 (L) 3 - 14 mmol/L Final   02/24/2021 4 3 - 14 mmol/L Final     Glucose   Date Value Ref Range Status   12/09/2024 99 70 - 99 mg/dL Final   05/29/2024 62 (A) 70 - 99 mg/dL Final     Urea Nitrogen   Date Value Ref Range Status   12/09/2024 33.3 (H) 6.0 - 20.0 mg/dL Final   12/19/2022 15 7 - 30 mg/dL Final   02/24/2021 16 7 - 30 mg/dL Final     Creatinine   Date Value Ref Range Status   12/09/2024 1.94 (H) 0.51 - 0.95 mg/dL Final   02/24/2021 0.80 0.52 - 1.04 mg/dL Final     GFR Estimate   Date Value Ref Range Status   12/09/2024 30 (L) >60 mL/min/1.73m2 Final     Comment:     eGFR calculated using 2021 CKD-EPI equation.   02/24/2021 85 >60  mL/min/[1.73_m2] Final     Comment:     Non  GFR Calc  Starting 12/18/2018, serum creatinine based estimated GFR (eGFR) will be   calculated using the Chronic Kidney Disease Epidemiology Collaboration   (CKD-EPI) equation.       Calcium   Date Value Ref Range Status   12/09/2024 9.1 8.8 - 10.4 mg/dL Final     Comment:     Reference intervals for this test were updated on 7/16/2024 to reflect our healthy population more accurately. There may be differences in the flagging of prior results with similar values performed with this method. Those prior results can be interpreted in the context of the updated reference intervals.   02/24/2021 9.2 8.5 - 10.1 mg/dL Final     Phosphorus   Date Value Ref Range Status   12/02/2024 4.1 2.5 - 4.5 mg/dL Final     Albumin   Date Value Ref Range Status   12/09/2024 3.0 (L) 3.5 - 5.2 g/dL Final   12/19/2022 2.2 (L) 3.4 - 5.0 g/dL Final   02/24/2021 3.5 3.4 - 5.0 g/dL Final       Imaging:  I reviewed imaging studies.     Assessment & Recommendations:   Problem list  # Lambda LC AL involved: kidneys, cardiac,  BmBx; stage I based on Hall 2012 staging s/p Zenia-CyBorD C1D1 1/26/24; and PBSCT with melphalan induction on 6/12/24  # Renal-proven AL amyloidosis  # Nephrotic syndrome  # Borderline Hypotension  # Family Hx of MM and MDS (maternal aunt and uncle) and HL (paternal uncle)  # CKD stage 3 2/2 lambda LC AL  The patient presented with fatigue and swelling since 2022. Noted hypoalbuminemia since 12/22. UPCR 12.5g with Salb 2.2 mg/dl. Cr 1.1 with eGFR 59. She has kidney Bx which showed lambda AL involved mainly glomeruli, some mesangium and vessels. Of note kidney sizes are normal on US.  Echo 1/3/23 showed mild LVH and RVH with strained pattern suggestive of possible amyloidosis.  She is at stage I per HALL 2012 classification. She has bone marrow biopsy done which showed lambda light chain restricted plasma cells less than 5%.  She was started on Zenia CyBorD C1 D1 on  1/26/24. Now her LC has come down and attained at least VGPR.  He still has positive immunofixation but that was IgG kappa which could be from Zenia. If IgG lambda was negative and negative monofixation in the urine then she would be having CR.  Now she is approved for ASCT and she hao start the work-uo process in late May 2024.     For treatment of nephrotic syndrome, she was on lasix 20 mg daily and lisinopril 2.5 mg daily.  Lisinopril was discontinued due to dizziness.  And cardiology has adjusted medication and increase Lasix to 40 mg twice a day.  She was also started on Jardiance 10 mg daily on 2/3/2024 and spironolactone on 2/28/24.  I noticed that her creatinine has increased from 1-1.1 to 1.2-1.4.  I doubt that this is from progression of AL amyloidosis given significant improvement in hematological parameters.  This is likely due to hemodynamics effects of Jardiance and spironolactone and increasing Lasix.  This regimen has improved her swelling so I plan not to change any regimen at this time.   Cr has increased to ~ 1.5 and now 1.9 on 5/14/24 so I stopped Jardince in May 2024. She underwent PBSCT on 6/12/24 c/b orthostatics and her midodrine has been increased to 10 mg 3 times daily.  Later on midodrine was stopped as well as lasix and spironolactone. In September 2024, she started to have hyperkalemia and 11.9 along with progressive CHRIS.  Hypercalcemia peaked at 12.3 on 10/10/2024 with creatinine 2.97 on the same day.  Extensive workup of hypercalcemia was done and the only thing in bed positive was PTHrP.  She was treated with pamidronate 30 mg IV x1 on 10/16/24 with resolution in hypercalcemia and improvement in serum creatinine.  Now her calcium is 9.1 decreased calcium 9.0 creatinine 1.94. Urine protein also improved to 8.38 g/g, lowest it has been.     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 She was on on Vit D 5000 U daily. Calcium was 10 but corrected calcium was 11.28! So I asked her to reduce vitamin D to 2000 U daily.  Onset of hypercalcemia in September 2024 and peaked at 12.3. 1,25 vit D was low. PTHrP ([place]) was 6.5. PET CT neg. unclear what is the source of PTH RP secretion but there are some case reports of AL associated with PThrP. S/p pamidronate on 10/16/2024 and now calium is normalized. Another infusion scheduled on 1/14/25 we will check labs 1 week prior to that and if no recurrent hypercalcemia I would not proceed dose.  # Mild hyponatremia; resolved  Likely from SIADH and nausea. Na now normalized.   # Hypothyroidism  TPO ab is positive so Dx with Hashimoto's. On thyroid supplement presently. Qondering wheter this could be from amyloid as well.   # Celiac disease Bx proven  She had EGD but no Amyloid presence.     Follow-up in 3 months with labs    The longitudinal plan of care for Renal AL, nephrotic syndrome, hypotension, low Vit D, CKD 3 was addressed during this visit. Due to the added complexity in care, I will continue to support Vivian Brown  in the subsequent management of this condition(s) and with the ongoing continuity of care of this condition(s).    I spent  40 minutes on the date of the encounter doing chart review, history and exam, documentation and further activities as noted above. 18 (2.07-2.25) minutes of this visit is dedicated to direct patient interaction via face to face.     Tracie Dobson MD on 12/09/2024

## 2024-12-09 ENCOUNTER — PATIENT OUTREACH (OUTPATIENT)
Dept: NEPHROLOGY | Facility: CLINIC | Age: 54
End: 2024-12-09

## 2024-12-09 ENCOUNTER — LAB (OUTPATIENT)
Dept: LAB | Facility: CLINIC | Age: 54
End: 2024-12-09
Payer: COMMERCIAL

## 2024-12-09 ENCOUNTER — VIRTUAL VISIT (OUTPATIENT)
Dept: NEPHROLOGY | Facility: CLINIC | Age: 54
End: 2024-12-09
Attending: INTERNAL MEDICINE
Payer: COMMERCIAL

## 2024-12-09 DIAGNOSIS — N18.31 CHRONIC KIDNEY DISEASE, STAGE 3A (H): ICD-10-CM

## 2024-12-09 DIAGNOSIS — E83.52 HYPERCALCEMIA: ICD-10-CM

## 2024-12-09 DIAGNOSIS — E85.81 AL AMYLOIDOSIS (H): ICD-10-CM

## 2024-12-09 DIAGNOSIS — N18.32 CKD STAGE 3B, GFR 30-44 ML/MIN (H): Primary | ICD-10-CM

## 2024-12-09 DIAGNOSIS — Z86.2 PERSONAL HISTORY OF DISEASES OF BLOOD AND BLOOD-FORMING ORGANS: ICD-10-CM

## 2024-12-09 LAB
ALBUMIN SERPL BCG-MCNC: 3 G/DL (ref 3.5–5.2)
ALP SERPL-CCNC: 194 U/L (ref 40–150)
ALT SERPL W P-5'-P-CCNC: 40 U/L (ref 0–50)
ANION GAP SERPL CALCULATED.3IONS-SCNC: 9 MMOL/L (ref 7–15)
AST SERPL W P-5'-P-CCNC: 27 U/L (ref 0–45)
BASOPHILS # BLD AUTO: 0.1 10E3/UL (ref 0–0.2)
BASOPHILS NFR BLD AUTO: 1 %
BILIRUB SERPL-MCNC: 0.2 MG/DL
BUN SERPL-MCNC: 33.3 MG/DL (ref 6–20)
CALCIUM SERPL-MCNC: 9.1 MG/DL (ref 8.8–10.4)
CHLORIDE SERPL-SCNC: 107 MMOL/L (ref 98–107)
CREAT SERPL-MCNC: 1.94 MG/DL (ref 0.51–0.95)
EGFRCR SERPLBLD CKD-EPI 2021: 30 ML/MIN/1.73M2
EOSINOPHIL # BLD AUTO: 0.3 10E3/UL (ref 0–0.7)
EOSINOPHIL NFR BLD AUTO: 4 %
ERYTHROCYTE [DISTWIDTH] IN BLOOD BY AUTOMATED COUNT: 15.7 % (ref 10–15)
GLUCOSE SERPL-MCNC: 99 MG/DL (ref 70–99)
HCO3 SERPL-SCNC: 22 MMOL/L (ref 22–29)
HCT VFR BLD AUTO: 30 % (ref 35–47)
HGB BLD-MCNC: 10 G/DL (ref 11.7–15.7)
IMM GRANULOCYTES # BLD: 0 10E3/UL
IMM GRANULOCYTES NFR BLD: 0 %
LYMPHOCYTES # BLD AUTO: 1.9 10E3/UL (ref 0.8–5.3)
LYMPHOCYTES NFR BLD AUTO: 28 %
MCH RBC QN AUTO: 32.3 PG (ref 26.5–33)
MCHC RBC AUTO-ENTMCNC: 33.3 G/DL (ref 31.5–36.5)
MCV RBC AUTO: 97 FL (ref 78–100)
MONOCYTES # BLD AUTO: 0.7 10E3/UL (ref 0–1.3)
MONOCYTES NFR BLD AUTO: 11 %
NEUTROPHILS # BLD AUTO: 3.8 10E3/UL (ref 1.6–8.3)
NEUTROPHILS NFR BLD AUTO: 56 %
NRBC # BLD AUTO: 0 10E3/UL
NRBC BLD AUTO-RTO: 0 /100
PLATELET # BLD AUTO: 227 10E3/UL (ref 150–450)
POTASSIUM SERPL-SCNC: 4.5 MMOL/L (ref 3.4–5.3)
PROT SERPL-MCNC: 5.5 G/DL (ref 6.4–8.3)
RBC # BLD AUTO: 3.1 10E6/UL (ref 3.8–5.2)
SODIUM SERPL-SCNC: 138 MMOL/L (ref 135–145)
WBC # BLD AUTO: 6.8 10E3/UL (ref 4–11)

## 2024-12-09 PROCEDURE — 36415 COLL VENOUS BLD VENIPUNCTURE: CPT

## 2024-12-09 PROCEDURE — 99215 OFFICE O/P EST HI 40 MIN: CPT | Mod: 95 | Performed by: INTERNAL MEDICINE

## 2024-12-09 PROCEDURE — 80053 COMPREHEN METABOLIC PANEL: CPT | Performed by: STUDENT IN AN ORGANIZED HEALTH CARE EDUCATION/TRAINING PROGRAM

## 2024-12-09 PROCEDURE — 85025 COMPLETE CBC W/AUTO DIFF WBC: CPT

## 2024-12-09 PROCEDURE — G2211 COMPLEX E/M VISIT ADD ON: HCPCS | Mod: 95 | Performed by: INTERNAL MEDICINE

## 2024-12-09 NOTE — NURSING NOTE
Current patient location: [address] [place] [id number]    Is the patient currently in the state of [place]? YES    Visit mode:VIDEO    If the visit is dropped, the patient can be reconnected by:VIDEO VISIT: Send to e-mail at: [email or web address]    Will anyone else be joining the visit? YES: How would they like to receive their invitation? Send to e-mail: PT's mom will be around  (If patient encounters technical issues they should call [phone])    Are changes needed to the allergy or medication list? No    Are refills needed on medications prescribed by this physician? NO    Rooming Documentation:  Questionnaire(s) completed    Reason for visit: RECHECK    Herson ESCOBEDO

## 2024-12-09 NOTE — PATIENT INSTRUCTIONS
Kidney function and calcium is getting better!  Will check labs on 1/7/2015 to the kidney with diabetes the next dose of pamidronate  See you in 3 months with labs

## 2024-12-09 NOTE — PROGRESS NOTES
Update from Dr. Dobson that labs are needed 1/7 or a week before her infusion is due to see if pamidronate (AREDIA) 30 mg IV is needed on 1/14/25 or can be cancelled for that date.  [facility] sent to support scheduling labs and follow up to patient.  Annabelle Leyva RN, BSN, PHN   Care Coordinator  [phone]

## 2024-12-10 NOTE — PROGRESS NOTES
Update from patient ok for March virtual appt.  Patient also confirmed she will get follow up labs scheduled at [facility] for labs due soon and also prior to March 2025 appt.  Appt scheduled with Dr. Dobson for 3/27 at 4pm virtual as only [facility] for March, patient is not available for.  Updated patient to scheduled appts.  Annabelle Leyva RN, BSN, PHN   Care Coordinator  [phone]

## 2024-12-17 ENCOUNTER — LAB (OUTPATIENT)
Dept: LAB | Facility: CLINIC | Age: 54
End: 2024-12-17
Attending: STUDENT IN AN ORGANIZED HEALTH CARE EDUCATION/TRAINING PROGRAM
Payer: COMMERCIAL

## 2024-12-17 DIAGNOSIS — E85.81 AL AMYLOIDOSIS (H): Primary | ICD-10-CM

## 2024-12-17 LAB
ALBUMIN SERPL BCG-MCNC: 2.9 G/DL (ref 3.5–5.2)
ALP SERPL-CCNC: 194 U/L (ref 40–150)
ALT SERPL W P-5'-P-CCNC: 37 U/L (ref 0–50)
ANION GAP SERPL CALCULATED.3IONS-SCNC: 8 MMOL/L (ref 7–15)
AST SERPL W P-5'-P-CCNC: 29 U/L (ref 0–45)
BASOPHILS # BLD AUTO: 0.1 10E3/UL (ref 0–0.2)
BASOPHILS NFR BLD AUTO: 1 %
BILIRUB SERPL-MCNC: 0.2 MG/DL
BUN SERPL-MCNC: 38.7 MG/DL (ref 6–20)
CALCIUM SERPL-MCNC: 8.8 MG/DL (ref 8.8–10.4)
CD19 CELLS # BLD: 1065 CELLS/UL (ref 107–698)
CD19 CELLS NFR BLD: 48 % (ref 6–27)
CD3 CELLS # BLD: 981 CELLS/UL (ref 603–2990)
CD3 CELLS NFR BLD: 44 % (ref 49–84)
CD3+CD4+ CELLS # BLD: 553 CELLS/UL (ref 441–2156)
CD3+CD4+ CELLS NFR BLD: 25 % (ref 28–63)
CD3+CD4+ CELLS/CD3+CD8+ CLL BLD: 1.43 % (ref 1.4–2.6)
CD3+CD8+ CELLS # BLD: 388 CELLS/UL (ref 125–1312)
CD3+CD8+ CELLS NFR BLD: 18 % (ref 10–40)
CD3-CD16+CD56+ CELLS # BLD: 149 CELLS/UL (ref 95–640)
CD3-CD16+CD56+ CELLS NFR BLD: 7 % (ref 4–25)
CHLORIDE SERPL-SCNC: 108 MMOL/L (ref 98–107)
CREAT SERPL-MCNC: 2.17 MG/DL (ref 0.51–0.95)
EGFRCR SERPLBLD CKD-EPI 2021: 26 ML/MIN/1.73M2
EOSINOPHIL # BLD AUTO: 0.3 10E3/UL (ref 0–0.7)
EOSINOPHIL NFR BLD AUTO: 4 %
ERYTHROCYTE [DISTWIDTH] IN BLOOD BY AUTOMATED COUNT: 15.4 % (ref 10–15)
GLUCOSE SERPL-MCNC: 99 MG/DL (ref 70–99)
HCO3 SERPL-SCNC: 23 MMOL/L (ref 22–29)
HCT VFR BLD AUTO: 28.8 % (ref 35–47)
HGB BLD-MCNC: 9.6 G/DL (ref 11.7–15.7)
IMM GRANULOCYTES # BLD: 0 10E3/UL
IMM GRANULOCYTES NFR BLD: 0 %
LDH SERPL L TO P-CCNC: 188 U/L (ref 0–250)
LYMPHOCYTES # BLD AUTO: 2 10E3/UL (ref 0.8–5.3)
LYMPHOCYTES NFR BLD AUTO: 28 %
MAGNESIUM SERPL-MCNC: 2 MG/DL (ref 1.7–2.3)
MCH RBC QN AUTO: 31.9 PG (ref 26.5–33)
MCHC RBC AUTO-ENTMCNC: 33.3 G/DL (ref 31.5–36.5)
MCV RBC AUTO: 96 FL (ref 78–100)
MONOCYTES # BLD AUTO: 0.8 10E3/UL (ref 0–1.3)
MONOCYTES NFR BLD AUTO: 11 %
NEUTROPHILS # BLD AUTO: 3.9 10E3/UL (ref 1.6–8.3)
NEUTROPHILS NFR BLD AUTO: 56 %
NRBC # BLD AUTO: 0 10E3/UL
NRBC BLD AUTO-RTO: 0 /100
PHOSPHATE SERPL-MCNC: 3.6 MG/DL (ref 2.5–4.5)
PLATELET # BLD AUTO: 222 10E3/UL (ref 150–450)
POTASSIUM SERPL-SCNC: 4.6 MMOL/L (ref 3.4–5.3)
PROT SERPL-MCNC: 5.4 G/DL (ref 6.4–8.3)
RBC # BLD AUTO: 3.01 10E6/UL (ref 3.8–5.2)
SODIUM SERPL-SCNC: 139 MMOL/L (ref 135–145)
T CELL EXTENDED COMMENT: ABNORMAL
TOTAL PROTEIN SERUM FOR ELP: 4.9 G/DL (ref 6.4–8.3)
URATE SERPL-MCNC: 5.7 MG/DL (ref 2.4–5.7)
WBC # BLD AUTO: 7.1 10E3/UL (ref 4–11)

## 2024-12-17 PROCEDURE — 83735 ASSAY OF MAGNESIUM: CPT | Performed by: PHYSICIAN ASSISTANT

## 2024-12-17 PROCEDURE — 84165 PROTEIN E-PHORESIS SERUM: CPT | Mod: 26 | Performed by: PATHOLOGY

## 2024-12-17 PROCEDURE — 84155 ASSAY OF PROTEIN SERUM: CPT

## 2024-12-17 PROCEDURE — 86334 IMMUNOFIX E-PHORESIS SERUM: CPT | Mod: 26 | Performed by: PATHOLOGY

## 2024-12-17 PROCEDURE — 86359 T CELLS TOTAL COUNT: CPT | Performed by: PHYSICIAN ASSISTANT

## 2024-12-17 PROCEDURE — 86334 IMMUNOFIX E-PHORESIS SERUM: CPT | Performed by: PATHOLOGY

## 2024-12-17 PROCEDURE — 82306 VITAMIN D 25 HYDROXY: CPT | Performed by: PHYSICIAN ASSISTANT

## 2024-12-17 PROCEDURE — 86360 T CELL ABSOLUTE COUNT/RATIO: CPT | Performed by: PHYSICIAN ASSISTANT

## 2024-12-17 PROCEDURE — 84155 ASSAY OF PROTEIN SERUM: CPT | Performed by: PHYSICIAN ASSISTANT

## 2024-12-17 PROCEDURE — 84100 ASSAY OF PHOSPHORUS: CPT | Performed by: PHYSICIAN ASSISTANT

## 2024-12-17 PROCEDURE — 85025 COMPLETE CBC W/AUTO DIFF WBC: CPT

## 2024-12-17 PROCEDURE — 36415 COLL VENOUS BLD VENIPUNCTURE: CPT | Performed by: PHYSICIAN ASSISTANT

## 2024-12-17 PROCEDURE — 36415 COLL VENOUS BLD VENIPUNCTURE: CPT

## 2024-12-17 PROCEDURE — 84165 PROTEIN E-PHORESIS SERUM: CPT | Mod: TC | Performed by: PATHOLOGY

## 2024-12-17 PROCEDURE — 80053 COMPREHEN METABOLIC PANEL: CPT | Performed by: PHYSICIAN ASSISTANT

## 2024-12-17 PROCEDURE — 83615 LACTATE (LD) (LDH) ENZYME: CPT | Performed by: PHYSICIAN ASSISTANT

## 2024-12-17 PROCEDURE — 82784 ASSAY IGA/IGD/IGG/IGM EACH: CPT | Performed by: PHYSICIAN ASSISTANT

## 2024-12-17 PROCEDURE — 83521 IG LIGHT CHAINS FREE EACH: CPT | Performed by: PHYSICIAN ASSISTANT

## 2024-12-17 PROCEDURE — 84550 ASSAY OF BLOOD/URIC ACID: CPT | Performed by: PHYSICIAN ASSISTANT

## 2024-12-17 NOTE — NURSING NOTE
Chief Complaint   Patient presents with    Blood Draw     Vitals, blood draw,  by MA.     Pam Akbar MA

## 2024-12-18 LAB
ALBUMIN SERPL ELPH-MCNC: 2.8 G/DL (ref 3.7–5.1)
ALPHA1 GLOB SERPL ELPH-MCNC: 0.3 G/DL (ref 0.2–0.4)
ALPHA2 GLOB SERPL ELPH-MCNC: 0.8 G/DL (ref 0.5–0.9)
B-GLOBULIN SERPL ELPH-MCNC: 0.5 G/DL (ref 0.6–1)
GAMMA GLOB SERPL ELPH-MCNC: 0.5 G/DL (ref 0.7–1.6)
IGA SERPL-MCNC: 110 MG/DL (ref 84–499)
IGG SERPL-MCNC: 494 MG/DL (ref 610–1616)
IGM SERPL-MCNC: 49 MG/DL (ref 35–242)
KAPPA LC FREE SER-MCNC: 2.62 MG/DL (ref 0.33–1.94)
KAPPA LC FREE/LAMBDA FREE SER NEPH: 0.91 {RATIO} (ref 0.26–1.65)
LAMBDA LC FREE SERPL-MCNC: 2.89 MG/DL (ref 0.57–2.63)
LOCATION OF TASK: ABNORMAL
LOCATION OF TASK: NORMAL
M PROTEIN SERPL ELPH-MCNC: 0.1 G/DL
PROT PATTERN SERPL ELPH-IMP: ABNORMAL
PROT PATTERN SERPL IFE-IMP: NORMAL

## 2024-12-18 NOTE — PROGRESS NOTES
BMT Progress Note    Disease presentation and baseline characteristics:   12/21/2023:  Final Diagnosis   A. kidney biopsy (needle):     Amyloidosis of the kidney, AL-type, lambda light chain-restricted, affecting the glomeruli, interstitium, and arterioles (see comment)     Mild chronic changes of the parenchyma, including:   - focal global glomerulosclerosis (3% of glomeruli)   - focal tubular atrophy and interstitial fibrosis (5% of the cortex)   - severe arterial sclerosis   Electronically signed by Joe Garcia MD on 12/23/2023 at  3:21 PM   Comment  UULAB   The biopsy reveals findings diagnostic of amyloidosis; the amyloid shows lambda light chain-restriction (AL amyloidosis), and the deposits are Congo red positive. The amyloid deposits affect the glomeruli extensively, and interstitium and arterioles focally. Other potential complications of paraproteins in the kidney are not seen, in particular, there is no evidence of light chain cast nephropathy, light chain deposition disease, or light chain tubulopathy.        Date Treatment Name Response Side Effects / Toxicities   1/19/24 D-CyBorD x 4 cycles     6/12/24 HDT/ASCT Hematologic VGPR           HPI:  Please see my entry above for hematologic history.  Mrs. Brown presents today for follow up. Patient mentioned that she had nausea and vomiting on 12/15/24 which has now resolved. Her Calcium levels are doing well. She also is noted to have a diagnosis of celiac disease. She ocassionally has a bowel movement which is not completely in her voluntary control. Pt is noted to have celiac disease and is established with Dr. Cloud from GI.  She denies fevers/chills, tongue thickening, numbness/tingling, or other concerning symptoms.    ASSESSMENT AND PLAN:  Mrs. Brown has shown clinical improvement. She has no active issues at this point which require active treatment. Her kappa and lamda light chains are continuing to show downtrend. Her M spike remains low at  "0.1    CKD is stable.  She will follow up with nephrology in several weeks.    Normocytic anemia is likely secondary to CKD.     Plan:  -Continue follow up with Dr. Chandler for further care.   - Continue follow up with GI in the setting of celiac disease.   - Continue follow up with Cardiology for AL amyloidosis.     Patient was seen and evaluated with Dr. Rubio. Attestation to follow.     Jr Lowery MD  Hematology/Oncology/BMT Fellow PGY4  Pager: [phone]      ROS:    10 point ROS neg other than the symptoms noted above in the HPI.      Current Outpatient Medications   Medication Sig Dispense Refill    acyclovir (ZOVIRAX) 400 MG tablet Take 2 tablets (800 mg) by mouth 2 times daily 120 tablet 11    levothyroxine (SYNTHROID) 200 MCG tablet Take 200 mcg by mouth daily. Pt takes Synthroid brand name. FRANKI Taking 175mg      liothyronine (CYTOMEL) 5 MCG tablet Take 5 mcg by mouth 2 times daily      sulfamethoxazole-trimethoprim (BACTRIM DS) 800-160 MG tablet Take 1 tablet by mouth Every Mon, Tues two times daily 16 tablet 11    OLANZapine (ZYPREXA) 2.5 MG tablet Take 1 tablet (2.5 mg) by mouth at bedtime. (Patient not taking: Reported on 12/19/2024) 30 tablet 0         Physical Exam:   /81   Pulse 84   Temp 98.3  F (36.8  C) (Tympanic)   Resp 18   Ht 1.638 m (5' 4.5\")   Wt 59 kg (130 lb)   SpO2 100%   BMI 21.97 kg/m      KPS:  90  General Appearance: alert, and no distress  Eyes: PERRL, conjunctiva and lids normal, sclera nonicteric  Ears/Nose/M/Throat: Oral mucosa and posterior oropharynx normal, moist mucous membranes  Cardio/Vascular: extremities WWP  Resp Effort And Auscultation: Breathing comfortably on room air  Edema: none  Skin: Skin color, texture, turgor normal. No rashes or lesions.  Neurologic: Gait normal.  Sensation grossly WNL.  Psych/Affect: Mood and affect are appropriate.    Blood Counts     Recent Labs   Lab Test 12/17/24  1003 12/09/24  0755 12/02/24  0750   HGB 9.6* 10.0* 10.1* "   HCT 28.8* 30.0* 30.4*   WBC 7.1 6.8 6.4   ANEUTAUTO 3.9 3.8 3.5   ALYMPAUTO 2.0 1.9 2.0   AMONOAUTO 0.8 0.7 0.6   AEOSAUTO 0.3 0.3 0.2   ABSBASO 0.1 0.1 0.1   NRBCMAN 0.0 0.0 0.0    227 229       Chemistries     Basic Panel  Recent Labs   Lab Test 12/17/24  1003 12/09/24  0755 12/02/24  0750    138 139   POTASSIUM 4.6 4.5 4.4   CHLORIDE 108* 107 108*   CO2 23 22 22   BUN 38.7* 33.3* 40.3*   CR 2.17* 1.94* 2.06*   GLC 99 99 95        Calcium, Magnesium, Phosphorus  Recent Labs   Lab Test 12/17/24  1011 12/17/24  1003 12/09/24  0755 12/02/24  0750 11/22/24  0928 10/21/24  1108 10/10/24  0808 10/08/24  0818 07/12/24  1530 07/11/24  1101   TANNER  --  8.8 9.1 9.2   < > 9.0   < > 11.5*   < > 7.9*   MAG 2.0  --   --   --   --   --   --  2.1  --  1.5*   PHOS 3.6  --   --  4.1  --  3.3   < > 3.7  --   --     < > = values in this interval not displayed.        LFTs  Recent Labs   Lab Test 12/17/24  1003 12/09/24  0755 12/02/24  0750 11/22/24  0928   BILITOTAL 0.2 0.2  --  0.2   ALKPHOS 194* 194*  --  212*   AST 29 27  --  27   ALT 37 40  --  33   ALBUMIN 2.9* 3.0* 3.2* 3.0*       LDH  Recent Labs   Lab Test 12/17/24  1011 10/08/24  0818 07/09/24  1001    168 276*       B2-Microglobulin  Recent Labs   Lab Test 05/21/24  1338 01/18/24  1433   ONKV4XAEM 4.3* 5.8*         Immunoglobulins     Recent Labs   Lab Test 12/17/24  1011 10/08/24  0818 07/09/24  1001 05/21/24  1338 04/19/24  0956 03/15/24  0820   * 496* 366* 86* 101* 116*       Recent Labs   Lab Test 12/17/24  1011 10/08/24  0818 07/09/24  1001 05/21/24  1338 04/19/24  0956 03/15/24  0820    112 218 35* 37* 36*       Recent Labs   Lab Test 12/17/24  1011 10/08/24  0818 07/09/24  1001 05/21/24  1338 04/19/24  0956 03/15/24  0820   IGM 49 39 101 <10* 21* 33*         Monocloncal Protein Studies     M spike    Recent Labs   Lab Test 12/17/24  1011 10/08/24  0818 07/09/24  1001 05/24/24  1112 05/21/24  1338 04/19/24  0956   ELPM 0.1* 0.1* 0.0  0.0 0.0 0.1*       Starbuck FLC    Recent Labs   Lab Test 12/17/24  1011 10/10/24  0808 10/08/24  0818 07/09/24  1011 07/09/24  1001 05/24/24  1108   KFLCA 2.62* 3.55* 3.17* 6.05* 5.79* 1.12       Lambda FLC    Recent Labs   Lab Test 12/17/24  1011 10/10/24  0808 10/08/24  0818 07/09/24  1011 07/09/24  1001 05/24/24  1108   LFLCA 2.89* 3.44* 3.23* 7.10* 7.14* 1.74       FLC Ratio    Recent Labs   Lab Test 12/17/24  1011 10/10/24  0808 10/08/24  0818 07/09/24  1011 07/09/24  1001 05/24/24  1108   KLRA 0.91 1.03 0.98 0.85 0.81 0.64         Bone Marrow Biopsy       Morphology    Results for orders placed or performed in visit on 10/10/24 (from the past 8760 hours)   Bone marrow biopsy   Result Value    Addendum      A Congo red stain was performed and also repeated.  The on-slide control stains appropriately on the repeat slide.  There is amorphous material in thickened blood vessel walls and also in the interstitial space - it does not  the Congo red stain well (unpolarized light).  Upon polarization, there is a suggestion of birefringence within the wall of one vessel.  Regardless, the findings are indeterminate for persistent amyloid.  This may represent partially resorbed amyloid, but the findings are not definitive.        Final Diagnosis      Bone marrow, posterior iliac crest, right decalcified trephine biopsy and touch imprint; right aspirate clot, direct aspirate smear, concentrate aspirate smear, and peripheral blood smear:  - No morphological or immunophenotypic evidence of plasma cell neoplasm  - Normocellular marrow (50% estimated cellularity) with normal trilineage hematopoiesis  - Peripheral blood showing mild normocytic anemia  - See comment       Comment      Concurrent flow cytometry study ([id number]) shows polytypic plasma cells. Other concurrent ancillary studies are in progress and will be reported separately. Correlation with the results of ancillary tests and clinical findings is  recommended.     Histologically, based on H&E stains, there appears to be persistent amyloid within the trephine core biopsy.  A Congo red stain has been ordered and will be reported in an addendum.      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 A recent stomach biopsy ([id number]) showed focal amyloid deposition. This was not seen on pre-transplant biopsy of the duodenum ([id number]). This bone marrow biopsy is performed for post-therapy follow-up.      Peripheral Hematologic Data      CBC WITH DIFFERENTIAL(10/10/2024 08:15 AM CDT):     RESULT VALUE REF. RANGE UNITS   WBC Count  Hemoglobin   Hematocrit   Platelet Count   RBC Count   MCV  MCH  MCHC  RDW 6.9 (NORMAL)    11.8 (NORMAL)      34.5  ( L )  251 (NORMAL)   3.63  ( L )       95 (NORMAL)     32.5 (NORMAL)     34.2 (NORMAL)     14.6 (NORMAL) 4.0-11.0  11.7-15.7  35.0-47.0  150-450  3.80-5.20    26.5-33.0  31.5-36.5  10.0-15.0 10e3/uL  g/dL  %  10e3/uL  10e6/uL  fL  pg  g/dL  %   % Neutrophils  % Lymphocytes  % Monocytes  % Eosinophils  % Basophils  % Immature Granulocytes  Absolute Neutrophils  Absolute Lymphocytes  Absolute Monocytes  Absolute Eosinophils  Absolute Basophils  Absolute Immature Granulocytes  NRBCs per 100 WBC  Absolute NRBCs 67  19  11  2  1  0  4.6 (NORMAL)  1.3 (NORMAL)  0.7 (NORMAL)  0.2 (NORMAL)  0.1 (NORMAL)  0.0 (NORMAL)  0 (NORMAL)  0.0 () N/A  N/A  N/A  N/A  N/A  N/A  1.6-8.3  0.8-5.3  0.0-1.3  0.0-0.7  0.0-0.2  <=0.4  <1  <=0.0 %  %  %  %  %  %  10e3/uL  10e3/uL  10e3/uL  10e3/uL  10e3/uL  10e3/uL  /100  10e3/uL         Microscopic Description      PERIPHERAL BLOOD SMEAR MORPHOLOGY:  The red blood cells appear normochromic.  Poikilocytosis  includes rare helmet cells, acanthocytes, and elliptocytes .  Polychromasia is present, but not increased.   Rouleaux formation is not increased. The morphology of the platelets is normal. Lymphocyte morphology is polymorphous. The neutrophil series displays normal cytoplasmic granulation and unremarkable nuclear morphology.     Bone marrow aspirates and trephine core biopsy touch imprints are reviewed.    BONE MARROW DIFFERENTIAL (500 cells on direct aspirate smears)  Percent (%) Cell Population Reference Range (%)   1 Blasts  (0 - 1)   2 Neutrophil promyelocytes (2 - 4)   59 Neutrophils and precursors (54 - 63)   15 Erythroid precursors (18 - 24)   16 Lymphocytes (8 - 12)   3 Monocytes (1 - 1.5)   2 Eosinophils (1 - 3)   1 Basophils (0 - 1)   1 Plasma cells (0 - 1.5)     The aspirate smears are adequate for evaluation.    Neutrophil maturation is complete. Erythroid maturation is complete. Megakaryocytes exhibit a spectrum of sizes and morphologies, within normal limits.    Particle crush slides are reviewed and do not significantly differ from the aspirate smear.    CLOT SECTIONS:   The clot sections show fibrin and blood with fragments of marrow.  A reactive lymphoid aggregate is present.    TREPHINE SECTIONS:  Hematoxylin and eosin stains are reviewed. The quality of the trephine core biopsy is adequate. The marrow cellularity is estimated at 50%. The cellular composition reflects the aspirate smears differential. The number of megakaryocytes appears consistent with the degree of marrow cellularity with normal morphology. The bone marrow biopsy core sections contain a few reactive appearing lymphoid aggregates that are well circumscribed, composed of small mature lymphocytes, and predominately non-paratrabecular.  There is interstitial deposition of amorphous material; some of the vessel walls appear thickened - Congo red pending.    IMMUNOHISTOCHEMISTRY:  Immunohistochemical stains are performed on the paraffin-embedded trephine core with appropriate controls.    Stains for , cytoplasmic kappa and lambda  immunoglobulin light chains show scattered polytypic plasma cells, not increased (<5%).    Note: These immunohistochemical stains are deemed medically necessary. Some of the antigens may also be evaluated by flow cytometry. Concurrent evaluation by immunohistochemistry on clot and/or trephine sections is indicated in this case in order to correlate immunophenotype with cell morphology and determine extent of involvement, spatial pattern, and focality of potential disease distribution.      Gross Description      Procedure/Gross Description   Aspirate(s) and trephine(s) procured by SHAQUILLE Jain NP    Specimen sent for Special Studies:         Flow Cytometry: right aspirate         Biopsy aspiration site: right posterior iliac crest                                                      (Reference Range)          Amount of aspirate           1.5   mL        Fat and P.V. cell layer        1    %               (1 - 3)        Particles                             trace   %        Myeloid-erythroid layer    trace    %               (5 - 8)          Clot Section: yes    Trephine biopsy site: right posterior iliac crest    Designated right posterior iliac crest is 1 cylinder of gritty tissue, labeled with the patient's name and hospital number, obtained with 11 gauge needle and a length of 20 mm; entirely submitted in 1 cassette; acetic zinc formalin fixed, decalcified, processed, and stained for hematoxylin and eosin per laboratory protocol.        Performing Labs      The technical component of this testing was completed at [facility].     Stain controls for all stains resulted within this report have been reviewed and show appropriate reactivity.        PET Scan       Results for orders placed during the hospital encounter of 10/09/24    PET ONCOLOGY WHOLE BODY    Status: Normal 10/9/2024    Narrative  Combined Report of: PET and CT on 10/9/2024 11:01  AM:    1. PET of the neck, chest, abdomen, and pelvis.  2. PET CT Fusion for Attenuation Correction and Anatomical  Localization.  3. Diagnostic CT of the chest, abdomen and pelvis without intravenous  contrast obtained for diagnostic interpretation.  4. 3D MIP and PET-CT fused images were processed on an independent  workstation and archived to PACS and reviewed by a radiologist.    Technique:    1. PET: The patient received 10.03 mCi of F-18-FDG. The serum glucose  was 90 mg/dL prior to administration. Body weight was 56.3 kg. Images  were evaluated in the axial, sagittal, and coronal planes as well as  the rotational whole body MIP. Images were acquired from at least eyes  to proximal thighs.    UPTAKE WAS MEASURED AT 60 MINUTES.    2. CT: Volumetric acquisition for clinical interpretation of the  chest, abdomen, and pelvis acquired at 3 mm sections. The chest,  abdomen, and pelvis were evaluated at 5 mm sections in bone, soft  tissue, and lung windows.    Contrast and Medications:  IV contrast: None, due to low GFR  PO contrast: 500 of water  Additional Medications: The patient received 40 mg intravenous Lasix,  after which additional postvoid PET and CT images were obtained.    3. 3D MIP and PET-CT fused images were processed on an independent  workstation and archived to PACS and reviewed by a radiologist.    INDICATION: amyloidosis s/p auto BMT, N/V, hypercalcemia, kidney  dysfunction, concern for progressive disease; AL amyloidosis    ADDITIONAL INFORMATION OBTAINED FROM EMR: 54-year-old female with  biopsy-proven renal amyloidosis and cardiac involvement by MRI.    COMPARISON: PET/CT dated 1/25/2024    FINDINGS:    BACKGROUND: Liver SUV max = 2.3, Aorta Blood SUV max = 2.4.    HEAD/NECK:    Pharyngeal mucosal spaces: Nasopharynx and palatine tonsils are within  normal limits. Tongue base is normal. Oral tongue and buccal mucosa of  the oral cavity is normal. Oropharynx, hypopharynx and larynx are  within  normal limits.    Sinonasal region: Paranasal sinuses are clear.    Lymph nodes: Multiple bilateral, mildly hypermetabolic prominent  cervical lymph nodes, for example right level 2A lymph node with max  SUV 3.8, previously 3.1.    Salivary glands: The major salivary glands are within normal limits.    Thyroid gland: Persistent diffusely increased FDG uptake in both  thyroid lobes.    CHEST:    Lymph nodes: No FDG avid or abnormally enlarged lymph nodes. Calcified  left hilar lymph nodes.    Lungs: The central tracheobronchial tree is clear. No acute  consolidation.  No pleural effusion or pneumothorax. Calcified  pulmonary granuloma in the left lower lobe. There are a few scattered  sub-4 mm pulmonary nodules, for example 2 mm groundglass nodule in the  anterolateral right middle lobe (8/62). No suspicious pulmonary  nodules or masses.    Heart and great vessels: Heart size is within normal limits. Trace  pericardial effusion, likely physiologic. The thoracic aorta and main  pulmonary artery are within normal limits. The esophagus is  unremarkable.    Breasts: No abnormal uptake in the breasts.    ABDOMEN AND PELVIS:    Liver: No FDG avid lesion. No intrahepatic or extrahepatic biliary  ductal dilatation. Gallbladder is within normal limits.    Pancreas: The pancreas is within normal limits. No pancreatic ductal  dilatation.    Spleen: No FDG avid lesion. Calcified splenic granulomas.    Adrenal glands: No FDG avid foci.    Kidneys: No FDG avid lesion. No hydronephrosis. Nonobstructive 6 mm  renal calculus in the left lower pole. Bifid left collecting system  The urinary bladder is decompressed.    Reproductive organs are within normal limits. IUD in good position.    Gastrointestinal system: Normal caliber of the small and large bowel.    Lymph nodes: No FDG avid or abnormally enlarged lymph nodes.    Vascular structures: Normal caliber of the abdominal aorta. Mild  diffuse uptake in the abdominal aortic wall is  nonspecific/probably  related to atherosclerotic disease.    No free air, free fluid, or fluid collection.    EXTREMITIES:    No abnormal FDG uptake in the visualized extremities. Resolution of  hypermetabolic focus in the left gastrocnemius.    BONES AND SOFT TISSUES:    No abnormal FDG uptake in the skeleton. No abnormal lytic or blastic  osseous lesions. Mild multilevel degenerative changes in the spine.    Impression  IMPRESSION:    1. No evidence of metabolically-active systemic amyloid deposition. Of  note, current exam is not optimized to evaluate for cardiac  amyloidosis (no cardiac preparation).    2. Mildly FDG avid prominent bilateral cervical lymph nodes are  favored to be reactive.    3. Continued diffuse FDG uptake in the thyroid gland is suggestive of  thyroiditis.    4. Nonobstructive 6 mm calculus in the left lower renal pole.    I have personally reviewed the examination and initial interpretation  and I agree with the findings.    RONAN HAWK MD      SYSTEM ID:  [id number]     The longitudinal plan of care for the diagnosis(es)/condition(s) as documented were addressed during this visit. Due to the added complexity in care, I will continue to support Vivian in the subsequent management and with ongoing continuity of care.    Patient was seen and evaluated with Dr. Rubio. Attestation to follow.     Jr Lowery MD  Hematology/Oncology/BMT Fellow PGY4  Pager: [phone]      ------------------------------------------------------------------------------------------------------------------------------------------------    Patient Care Team         Relationship Specialty Notifications Start End    Alin Torres PA-C PCP - General Family Medicine  11/4/20     Phone: [phone] Fax: [phone] [address] [facility] [address]    Sarthak Ontiveros MD Assigned OBGYN Provider   9/24/22     Phone: [phone] Fax: [phone]         [address]  [place] [address]    Alin Torres PA-C Assigned PCP   12/31/22     Phone: [phone] Fax: [phone]         [address] [place] [id number]    Carmen Waggoner APRN CNP Nurse Practitioner Gastroenterology  11/21/23     Phone: [phone] Fax: [phone]         [address] [place] [address]    Audrey Sen DO Assigned Nephrology Provider   12/9/23     Phone: [phone] Fax: [phone]         [address] [facility] [address] [facility] [address]    Musa Adrian MD Physician Hematology & Oncology  12/26/23     Phone: [phone] Fax: [phone]         [address] [facility] [address] [facility] [address]    Jomar Chandler MD MD Hematology & Oncology  12/26/23     Phone: [phone] Fax: [phone]         [address] [place] [address] [address] [facility] [address]    Gaston Flores MD MD Cardiovascular Disease  1/5/24     Phone: [phone] Fax: [phone] [address] [facility] [address]

## 2024-12-19 ENCOUNTER — OFFICE VISIT (OUTPATIENT)
Dept: TRANSPLANT | Facility: CLINIC | Age: 54
End: 2024-12-19
Attending: STUDENT IN AN ORGANIZED HEALTH CARE EDUCATION/TRAINING PROGRAM
Payer: COMMERCIAL

## 2024-12-19 VITALS
BODY MASS INDEX: 21.66 KG/M2 | TEMPERATURE: 98.3 F | RESPIRATION RATE: 18 BRPM | OXYGEN SATURATION: 100 % | WEIGHT: 130 LBS | DIASTOLIC BLOOD PRESSURE: 81 MMHG | HEIGHT: 65 IN | HEART RATE: 84 BPM | SYSTOLIC BLOOD PRESSURE: 119 MMHG

## 2024-12-19 DIAGNOSIS — E85.81 AL AMYLOIDOSIS (H): Primary | ICD-10-CM

## 2024-12-19 DIAGNOSIS — N18.4 CKD (CHRONIC KIDNEY DISEASE) STAGE 4, GFR 15-29 ML/MIN (H): ICD-10-CM

## 2024-12-19 DIAGNOSIS — Z94.81 STATUS POST BONE MARROW TRANSPLANT (H): ICD-10-CM

## 2024-12-19 DIAGNOSIS — R11.15 CYCLICAL VOMITING SYNDROME NOT ASSOCIATED WITH MIGRAINE: ICD-10-CM

## 2024-12-19 PROCEDURE — 99215 OFFICE O/P EST HI 40 MIN: CPT | Mod: GC

## 2024-12-19 PROCEDURE — G2211 COMPLEX E/M VISIT ADD ON: HCPCS

## 2024-12-19 PROCEDURE — 99213 OFFICE O/P EST LOW 20 MIN: CPT

## 2024-12-19 ASSESSMENT — PAIN SCALES - GENERAL: PAINLEVEL_OUTOF10: NO PAIN (0)

## 2024-12-19 NOTE — LETTER
12/19/2024      Vivian Brown  [address] [facility] [address]      Dear Colleague,    Thank you for referring your patient, Vivian Brown, to the [facility] BLOOD AND MARROW TRANSPLANT PROGRAM [place]. Please see a copy of my visit note below.         BMT Progress Note    Disease presentation and baseline characteristics:   12/21/2023:  Final Diagnosis   A. kidney biopsy (needle):     Amyloidosis of the kidney, AL-type, lambda light chain-restricted, affecting the glomeruli, interstitium, and arterioles (see comment)     Mild chronic changes of the parenchyma, including:   - focal global glomerulosclerosis (3% of glomeruli)   - focal tubular atrophy and interstitial fibrosis (5% of the cortex)   - severe arterial sclerosis   Electronically signed by Joe Garcia MD on 12/23/2023 at  3:21 PM   Comment  UULAB   The biopsy reveals findings diagnostic of amyloidosis; the amyloid shows lambda light chain-restriction (AL amyloidosis), and the deposits are Congo red positive. The amyloid deposits affect the glomeruli extensively, and interstitium and arterioles focally. Other potential complications of paraproteins in the kidney are not seen, in particular, there is no evidence of light chain cast nephropathy, light chain deposition disease, or light chain tubulopathy.        Date Treatment Name Response Side Effects / Toxicities   1/19/24 D-CyBorD x 4 cycles     6/12/24 HDT/ASCT Hematologic VGPR           HPI:  Please see my entry above for hematologic history.  Mrs. Brown presents today for follow up. Patient mentioned that she had nausea and vomiting on 12/15/24 which has now resolved. Her Calcium levels are doing well. She also is noted to have a diagnosis of celiac disease. She ocassionally has a bowel movement which is not completely in her voluntary control. Pt is noted to have celiac disease and is established with Dr. Cloud from GI.  She denies fevers/chills, tongue thickening,  "numbness/tingling, or other concerning symptoms.    ASSESSMENT AND PLAN:  Mrs. Brown has shown clinical improvement. She has no active issues at this point which require active treatment. Her kappa and lamda light chains are continuing to show downtrend. Her M spike remains low at 0.1    CKD is stable.  She will follow up with nephrology in several weeks.    Normocytic anemia is likely secondary to CKD.     Plan:  -Continue follow up with Dr. Chandler for further care.   - Continue follow up with GI in the setting of celiac disease.   - Continue follow up with Cardiology for AL amyloidosis.     Patient was seen and evaluated with Dr. Rubio. Attestation to follow.     Jr Lowery MD  Hematology/Oncology/BMT Fellow PGY4  Pager: [phone]      ROS:    10 point ROS neg other than the symptoms noted above in the HPI.      Current Outpatient Medications   Medication Sig Dispense Refill     acyclovir (ZOVIRAX) 400 MG tablet Take 2 tablets (800 mg) by mouth 2 times daily 120 tablet 11     levothyroxine (SYNTHROID) 200 MCG tablet Take 200 mcg by mouth daily. Pt takes Synthroid brand name. FRANKI Taking 175mg       liothyronine (CYTOMEL) 5 MCG tablet Take 5 mcg by mouth 2 times daily       sulfamethoxazole-trimethoprim (BACTRIM DS) 800-160 MG tablet Take 1 tablet by mouth Every Mon, Tues two times daily 16 tablet 11     OLANZapine (ZYPREXA) 2.5 MG tablet Take 1 tablet (2.5 mg) by mouth at bedtime. (Patient not taking: Reported on 12/19/2024) 30 tablet 0         Physical Exam:   /81   Pulse 84   Temp 98.3  F (36.8  C) (Tympanic)   Resp 18   Ht 1.638 m (5' 4.5\")   Wt 59 kg (130 lb)   SpO2 100%   BMI 21.97 kg/m      KPS:  90  General Appearance: alert, and no distress  Eyes: PERRL, conjunctiva and lids normal, sclera nonicteric  Ears/Nose/M/Throat: Oral mucosa and posterior oropharynx normal, moist mucous membranes  Cardio/Vascular: extremities WWP  Resp Effort And Auscultation: Breathing comfortably on room " air  Edema: none  Skin: Skin color, texture, turgor normal. No rashes or lesions.  Neurologic: Gait normal.  Sensation grossly WNL.  Psych/Affect: Mood and affect are appropriate.    Blood Counts     Recent Labs   Lab Test 12/17/24  1003 12/09/24  0755 12/02/24  0750   HGB 9.6* 10.0* 10.1*   HCT 28.8* 30.0* 30.4*   WBC 7.1 6.8 6.4   ANEUTAUTO 3.9 3.8 3.5   ALYMPAUTO 2.0 1.9 2.0   AMONOAUTO 0.8 0.7 0.6   AEOSAUTO 0.3 0.3 0.2   ABSBASO 0.1 0.1 0.1   NRBCMAN 0.0 0.0 0.0    227 229       Chemistries     Basic Panel  Recent Labs   Lab Test 12/17/24  1003 12/09/24  0755 12/02/24  0750    138 139   POTASSIUM 4.6 4.5 4.4   CHLORIDE 108* 107 108*   CO2 23 22 22   BUN 38.7* 33.3* 40.3*   CR 2.17* 1.94* 2.06*   GLC 99 99 95        Calcium, Magnesium, Phosphorus  Recent Labs   Lab Test 12/17/24  1011 12/17/24  1003 12/09/24  0755 12/02/24  0750 11/22/24  0928 10/21/24  1108 10/10/24  0808 10/08/24  0818 07/12/24  1530 07/11/24  1101   TANNER  --  8.8 9.1 9.2   < > 9.0   < > 11.5*   < > 7.9*   MAG 2.0  --   --   --   --   --   --  2.1  --  1.5*   PHOS 3.6  --   --  4.1  --  3.3   < > 3.7  --   --     < > = values in this interval not displayed.        LFTs  Recent Labs   Lab Test 12/17/24  1003 12/09/24  0755 12/02/24  0750 11/22/24  0928   BILITOTAL 0.2 0.2  --  0.2   ALKPHOS 194* 194*  --  212*   AST 29 27  --  27   ALT 37 40  --  33   ALBUMIN 2.9* 3.0* 3.2* 3.0*       LDH  Recent Labs   Lab Test 12/17/24  1011 10/08/24  0818 07/09/24  1001    168 276*       B2-Microglobulin  Recent Labs   Lab Test 05/21/24  1338 01/18/24  1433   [id number] 4.3* 5.8*         Immunoglobulins     Recent Labs   Lab Test 12/17/24  1011 10/08/24  0818 07/09/24  1001 05/21/24  1338 04/19/24  0956 03/15/24  0820   * 496* 366* 86* 101* 116*       Recent Labs   Lab Test 12/17/24  1011 10/08/24  0818 07/09/24  1001 05/21/24  1338 04/19/24  0956 03/15/24  0820    112 218 35* 37* 36*       Recent Labs   Lab Test  12/17/24  1011 10/08/24  0818 07/09/24  1001 05/21/24  1338 04/19/24  0956 03/15/24  0820   IGM 49 39 101 <10* 21* 33*         Monocloncal Protein Studies     M spike    Recent Labs   Lab Test 12/17/24  1011 10/08/24  0818 07/09/24  1001 05/24/24  1112 05/21/24  1338 04/19/24  0956   ELPM 0.1* 0.1* 0.0 0.0 0.0 0.1*       [place] FLC    Recent Labs   Lab Test 12/17/24  1011 10/10/24  0808 10/08/24  0818 07/09/24  1011 07/09/24  1001 05/24/24  1108   KFLCA 2.62* 3.55* 3.17* 6.05* 5.79* 1.12       Lambda FLC    Recent Labs   Lab Test 12/17/24  1011 10/10/24  0808 10/08/24  0818 07/09/24  1011 07/09/24  1001 05/24/24  1108   LFLCA 2.89* 3.44* 3.23* 7.10* 7.14* 1.74       FLC Ratio    Recent Labs   Lab Test 12/17/24  1011 10/10/24  0808 10/08/24  0818 07/09/24  1011 07/09/24  1001 05/24/24  1108   KLRA 0.91 1.03 0.98 0.85 0.81 0.64         Bone Marrow Biopsy       Morphology    Results for orders placed or performed in visit on 10/10/24 (from the past 8760 hours)   Bone marrow biopsy   Result Value    Addendum      A Congo red stain was performed and also repeated.  The on-slide control stains appropriately on the repeat slide.  There is amorphous material in thickened blood vessel walls and also in the interstitial space - it does not  the Congo red stain well (unpolarized light).  Upon polarization, there is a suggestion of birefringence within the wall of one vessel.  Regardless, the findings are indeterminate for persistent amyloid.  This may represent partially resorbed amyloid, but the findings are not definitive.        Final Diagnosis      Bone marrow, posterior iliac crest, right decalcified trephine biopsy and touch imprint; right aspirate clot, direct aspirate smear, concentrate aspirate smear, and peripheral blood smear:  - No morphological or immunophenotypic evidence of plasma cell neoplasm  - Normocellular marrow (50% estimated cellularity) with normal trilineage hematopoiesis  - Peripheral blood  showing mild normocytic anemia  - See comment       Comment      Concurrent flow cytometry study ([id number]) shows polytypic plasma cells. Other concurrent ancillary studies are in progress and will be reported separately. Correlation with the results of ancillary tests and clinical findings is recommended.     Histologically, based on H&E stains, there appears to be persistent amyloid within the trephine core biopsy.  A Congo red stain has been ordered and will be reported in an addendum.      Clinical Information      From Epic electronic medical record; Vivian Brown is a 55 y/o F with a history of AL amyloidosis (lambda restricted, with marrow, renal, and cardiac involvement) s/p auto PBSCT on 6/12/2024. A recent stomach biopsy ([id number]) showed focal amyloid deposition. This was not seen on pre-transplant biopsy of the duodenum ([id number]). This bone marrow biopsy is performed for post-therapy follow-up.      Peripheral Hematologic Data      CBC WITH DIFFERENTIAL(10/10/2024 08:15 AM CDT):     RESULT VALUE REF. RANGE UNITS   WBC Count  Hemoglobin   Hematocrit   Platelet Count   RBC Count   MCV  MCH  MCHC  RDW 6.9 (NORMAL)    11.8 (NORMAL)      34.5  ( L )  251 (NORMAL)   3.63  ( L )       95 (NORMAL)     32.5 (NORMAL)     34.2 (NORMAL)     14.6 (NORMAL) 4.0-11.0  11.7-15.7  35.0-47.0  150-450  3.80-5.20    26.5-33.0  31.5-36.5  10.0-15.0 10e3/uL  g/dL  %  10e3/uL  10e6/uL  fL  pg  g/dL  %   % Neutrophils  % Lymphocytes  % Monocytes  % Eosinophils  % Basophils  % Immature Granulocytes  Absolute Neutrophils  Absolute Lymphocytes  Absolute Monocytes  Absolute Eosinophils  Absolute Basophils  Absolute Immature Granulocytes  NRBCs per 100 WBC  Absolute NRBCs 67  19  11  2  1  0  4.6 (NORMAL)  1.3 (NORMAL)  0.7 (NORMAL)  0.2 (NORMAL)  0.1 (NORMAL)  0.0 (NORMAL)  0 (NORMAL)  0.0 () N/A  N/A  N/A  N/A  N/A  N/A  1.6-8.3  0.8-5.3  0.0-1.3  0.0-0.7  0.0-0.2  <=0.4  <1  <=0.0  %  %  %  %  %  %  10e3/uL  10e3/uL  10e3/uL  10e3/uL  10e3/uL  10e3/uL  /100  10e3/uL         Microscopic Description      PERIPHERAL BLOOD SMEAR MORPHOLOGY:  The red blood cells appear normochromic.  Poikilocytosis  includes rare helmet cells, acanthocytes, and elliptocytes .  Polychromasia is present, but not increased.  Rouleaux formation is not increased. The morphology of the platelets is normal. Lymphocyte morphology is polymorphous. The neutrophil series displays normal cytoplasmic granulation and unremarkable nuclear morphology.     Bone marrow aspirates and trephine core biopsy touch imprints are reviewed.    BONE MARROW DIFFERENTIAL (500 cells on direct aspirate smears)  Percent (%) Cell Population Reference Range (%)   1 Blasts  (0 - 1)   2 Neutrophil promyelocytes (2 - 4)   59 Neutrophils and precursors (54 - 63)   15 Erythroid precursors (18 - 24)   16 Lymphocytes (8 - 12)   3 Monocytes (1 - 1.5)   2 Eosinophils (1 - 3)   1 Basophils (0 - 1)   1 Plasma cells (0 - 1.5)     The aspirate smears are adequate for evaluation.    Neutrophil maturation is complete. Erythroid maturation is complete. Megakaryocytes exhibit a spectrum of sizes and morphologies, within normal limits.    Particle crush slides are reviewed and do not significantly differ from the aspirate smear.    CLOT SECTIONS:   The clot sections show fibrin and blood with fragments of marrow.  A reactive lymphoid aggregate is present.    TREPHINE SECTIONS:  Hematoxylin and eosin stains are reviewed. The quality of the trephine core biopsy is adequate. The marrow cellularity is estimated at 50%. The cellular composition reflects the aspirate smears differential. The number of megakaryocytes appears consistent with the degree of marrow cellularity with normal morphology. The bone marrow biopsy core sections contain a few reactive appearing lymphoid aggregates that are well circumscribed, composed of small mature lymphocytes, and predominately  non-paratrabecular.  There is interstitial deposition of amorphous material; some of the vessel walls appear thickened - Congo red pending.    IMMUNOHISTOCHEMISTRY:  Immunohistochemical stains are performed on the paraffin-embedded trephine core with appropriate controls.    Stains for , cytoplasmic kappa and lambda immunoglobulin light chains show scattered polytypic plasma cells, not increased (<5%).    Note: These immunohistochemical stains are deemed medically necessary. Some of the antigens may also be evaluated by flow cytometry. Concurrent evaluation by immunohistochemistry on clot and/or trephine sections is indicated in this case in order to correlate immunophenotype with cell morphology and determine extent of involvement, spatial pattern, and focality of potential disease distribution.      Gross Description      Procedure/Gross Description   Aspirate(s) and trephine(s) procured by SHAQUILLE Jain NP    Specimen sent for Special Studies:         Flow Cytometry: right aspirate         Biopsy aspiration site: right posterior iliac crest                                                      (Reference Range)          Amount of aspirate           1.5   mL        Fat and P.V. cell layer        1    %               (1 - 3)        Particles                             trace   %        Myeloid-erythroid layer    trace    %               (5 - 8)          Clot Section: yes    Trephine biopsy site: right posterior iliac crest    Designated right posterior iliac crest is 1 cylinder of gritty tissue, labeled with the patient's name and hospital number, obtained with 11 gauge needle and a length of 20 mm; entirely submitted in 1 cassette; acetic zinc formalin fixed, decalcified, processed, and stained for hematoxylin and eosin per laboratory protocol.        Performing Labs      The technical component of this testing was completed at  [facility].      Stain controls for all stains resulted within this report have been reviewed and show appropriate reactivity.        PET Scan       Results for orders placed during the hospital encounter of 10/09/24    PET ONCOLOGY WHOLE BODY    Status: Normal 10/9/2024    Narrative  Combined Report of: PET and CT on 10/9/2024 11:01 AM:    1. PET of the neck, chest, abdomen, and pelvis.  2. PET CT Fusion for Attenuation Correction and Anatomical  Localization.  3. Diagnostic CT of the chest, abdomen and pelvis without intravenous  contrast obtained for diagnostic interpretation.  4. 3D MIP and PET-CT fused images were processed on an independent  workstation and archived to PACS and reviewed by a radiologist.    Technique:    1. PET: The patient received 10.03 mCi of F-18-FDG. The serum glucose  was 90 mg/dL prior to administration. Body weight was 56.3 kg. Images  were evaluated in the axial, sagittal, and coronal planes as well as  the rotational whole body MIP. Images were acquired from at least eyes  to proximal thighs.    UPTAKE WAS MEASURED AT 60 MINUTES.    2. CT: Volumetric acquisition for clinical interpretation of the  chest, abdomen, and pelvis acquired at 3 mm sections. The chest,  abdomen, and pelvis were evaluated at 5 mm sections in bone, soft  tissue, and lung windows.    Contrast and Medications:  IV contrast: None, due to low GFR  PO contrast: 500 of water  Additional Medications: The patient received 40 mg intravenous Lasix,  after which additional postvoid PET and CT images were obtained.    3. 3D MIP and PET-CT fused images were processed on an independent  workstation and archived to PACS and reviewed by a radiologist.    INDICATION: amyloidosis s/p auto BMT, N/V, hypercalcemia, kidney  dysfunction, concern for progressive disease; AL amyloidosis    ADDITIONAL INFORMATION OBTAINED FROM EMR: 54-year-old female with  biopsy-proven renal amyloidosis and cardiac involvement by MRI.    COMPARISON: PET/CT dated  1/25/2024    FINDINGS:    BACKGROUND: Liver SUV max = 2.3, Aorta Blood SUV max = 2.4.    HEAD/NECK:    Pharyngeal mucosal spaces: Nasopharynx and palatine tonsils are within  normal limits. Tongue base is normal. Oral tongue and buccal mucosa of  the oral cavity is normal. Oropharynx, hypopharynx and larynx are  within normal limits.    Sinonasal region: Paranasal sinuses are clear.    Lymph nodes: Multiple bilateral, mildly hypermetabolic prominent  cervical lymph nodes, for example right level 2A lymph node with max  SUV 3.8, previously 3.1.    Salivary glands: The major salivary glands are within normal limits.    Thyroid gland: Persistent diffusely increased FDG uptake in both  thyroid lobes.    CHEST:    Lymph nodes: No FDG avid or abnormally enlarged lymph nodes. Calcified  left hilar lymph nodes.    Lungs: The central tracheobronchial tree is clear. No acute  consolidation.  No pleural effusion or pneumothorax. Calcified  pulmonary granuloma in the left lower lobe. There are a few scattered  sub-4 mm pulmonary nodules, for example 2 mm groundglass nodule in the  anterolateral right middle lobe (8/62). No suspicious pulmonary  nodules or masses.    Heart and great vessels: Heart size is within normal limits. Trace  pericardial effusion, likely physiologic. The thoracic aorta and main  pulmonary artery are within normal limits. The esophagus is  unremarkable.    Breasts: No abnormal uptake in the breasts.    ABDOMEN AND PELVIS:    Liver: No FDG avid lesion. No intrahepatic or extrahepatic biliary  ductal dilatation. Gallbladder is within normal limits.    Pancreas: The pancreas is within normal limits. No pancreatic ductal  dilatation.    Spleen: No FDG avid lesion. Calcified splenic granulomas.    Adrenal glands: No FDG avid foci.    Kidneys: No FDG avid lesion. No hydronephrosis. Nonobstructive 6 mm  renal calculus in the left lower pole. Bifid left collecting system  The urinary bladder is  decompressed.    Reproductive organs are within normal limits. IUD in good position.    Gastrointestinal system: Normal caliber of the small and large bowel.    Lymph nodes: No FDG avid or abnormally enlarged lymph nodes.    Vascular structures: Normal caliber of the abdominal aorta. Mild  diffuse uptake in the abdominal aortic wall is nonspecific/probably  related to atherosclerotic disease.    No free air, free fluid, or fluid collection.    EXTREMITIES:    No abnormal FDG uptake in the visualized extremities. Resolution of  hypermetabolic focus in the left gastrocnemius.    BONES AND SOFT TISSUES:    No abnormal FDG uptake in the skeleton. No abnormal lytic or blastic  osseous lesions. Mild multilevel degenerative changes in the spine.    Impression  IMPRESSION:    1. No evidence of metabolically-active systemic amyloid deposition. Of  note, current exam is not optimized to evaluate for cardiac  amyloidosis (no cardiac preparation).    2. Mildly FDG avid prominent bilateral cervical lymph nodes are  favored to be reactive.    3. Continued diffuse FDG uptake in the thyroid gland is suggestive of  thyroiditis.    4. Nonobstructive 6 mm calculus in the left lower renal pole.    I have personally reviewed the examination and initial interpretation  and I agree with the findings.    RONAN HAWK MD      SYSTEM ID:  [id number]     The longitudinal plan of care for the diagnosis(es)/condition(s) as documented were addressed during this visit. Due to the added complexity in care, I will continue to support Vivian in the subsequent management and with ongoing continuity of care.    Patient was seen and evaluated with Dr. Rubio. Attestation to follow.     Jr Lowery MD  Hematology/Oncology/BMT Fellow PGY4  Pager: [phone]      ------------------------------------------------------------------------------------------------------------------------------------------------    Patient Care Team         Relationship  Specialty Notifications Start End    Alin Torres PA-C PCP - General Family Medicine  11/4/20     Phone: [phone] Fax: [phone] [address] [facility] [address]    Sarthak Ontiveros MD Assigned OBGYN Provider   9/24/22     Phone: [phone] Fax: [phone]         [address] [facility] [address]    Alin Torres PA-C Assigned PCP   12/31/22     Phone: [phone] Fax: [phone]         [address] [facility] [address]    Carmen Waggoner APRN CNP Nurse Practitioner Gastroenterology  11/21/23     Phone: [phone] Fax: [phone]         [address] [place] [address]    Audrey Sen DO Assigned Nephrology Provider   12/9/23     Phone: [phone] Fax: [phone]         [address] [facility] [address] [facility] [address]    Musa Adrian MD Physician Hematology & Oncology  12/26/23     Phone: [phone] Fax: [phone]         [address] [facility] [address] [facility] [address]    Jomar Chandler MD MD Hematology & Oncology  12/26/23     Phone: [phone] Fax: [phone]         [address] [facility] [address] [facility] [address]    Gaston Flores MD MD Cardiovascular Disease  1/5/24     Phone: [phone] Fax: [phone] [address] [facility] [address]            Attestation signed by Arabella Rubio MD at 12/21/2024 10:02 PM (Updated):  Attending Note:   I saw this patient with Dr. Casanova and agree with the findings and plan of care as documented in the note above. I personally reviewed the history, salient aspects of the exam, laboratory and imaging as needed.  The key findings are:    Amyloidosis of the kidney, AL-type, lambda light chain-restricted, affecting the glomeruli, interstitium, and arterioles. She is s/p Zenia-CyBorD followed by an autologous stem cell transplant in 6/2024. Now six months out from transplant. Had one episode of nausea and vomiting a few days ago (which was how she presented initially and found to have  PTHrP associated hypercalcemia). Calcium within normal range now. Continues to report significant fatigue. No other concerning symptoms. FLC remain very slightly elevated with a normal ratio. Will have patient RTC in 3 months for follow-up with Dr. Chandler.    I spent 45 minutes in the care of this patient today, which included time necessary for preparation for the visit, obtaining history, ordering medications/tests/procedures as medically indicated, review of pertinent medical literature, counseling of the patient, communication of recommendations to the care team, and documentation time.    Arabella Rubio MD  December 21, 2024      Again, thank you for allowing me to participate in the care of your patient.        Sincerely,         BMT Auto Cell Therapy

## 2024-12-19 NOTE — NURSING NOTE
Chief Complaint   Patient presents with    Blood Draw     Labs drawn via  by RN in lab.  VS taken       Labs collected from venipuncture by RN. Vitals taken. Checked in for appointment(s).    Yudith Elena RN    
none

## 2024-12-20 LAB
DEPRECATED CALCIDIOL+CALCIFEROL SERPL-MC: <28 UG/L (ref 20–75)
VITAMIN D2 SERPL-MCNC: <5 UG/L
VITAMIN D3 SERPL-MCNC: 23 UG/L

## 2025-01-07 ENCOUNTER — LAB (OUTPATIENT)
Dept: LAB | Facility: CLINIC | Age: 55
End: 2025-01-07
Payer: COMMERCIAL

## 2025-01-07 DIAGNOSIS — E83.52 HYPERCALCEMIA: ICD-10-CM

## 2025-01-07 LAB
ANION GAP SERPL CALCULATED.3IONS-SCNC: 10 MMOL/L (ref 7–15)
BUN SERPL-MCNC: 41.1 MG/DL (ref 6–20)
CA-I BLD-MCNC: 4.9 MG/DL (ref 4.4–5.2)
CALCIUM SERPL-MCNC: 8.4 MG/DL (ref 8.8–10.4)
CHLORIDE SERPL-SCNC: 106 MMOL/L (ref 98–107)
CREAT SERPL-MCNC: 2.05 MG/DL (ref 0.51–0.95)
EGFRCR SERPLBLD CKD-EPI 2021: 28 ML/MIN/1.73M2
GLUCOSE SERPL-MCNC: 92 MG/DL (ref 70–99)
HCO3 SERPL-SCNC: 23 MMOL/L (ref 22–29)
POTASSIUM SERPL-SCNC: 4.8 MMOL/L (ref 3.4–5.3)
SODIUM SERPL-SCNC: 139 MMOL/L (ref 135–145)

## 2025-01-07 PROCEDURE — 36415 COLL VENOUS BLD VENIPUNCTURE: CPT

## 2025-01-07 PROCEDURE — 80048 BASIC METABOLIC PNL TOTAL CA: CPT

## 2025-01-07 PROCEDURE — 82330 ASSAY OF CALCIUM: CPT

## 2025-02-05 DIAGNOSIS — Z52.011 AUTOLOGOUS DONOR OF STEM CELLS: ICD-10-CM

## 2025-02-05 DIAGNOSIS — E85.81 AL AMYLOIDOSIS (H): ICD-10-CM

## 2025-02-05 DIAGNOSIS — E03.4 HYPOTHYROIDISM DUE TO ACQUIRED ATROPHY OF THYROID: ICD-10-CM

## 2025-02-06 RX ORDER — OLANZAPINE 2.5 MG/1
2.5 TABLET, FILM COATED ORAL AT BEDTIME
Qty: 30 TABLET | Refills: 0 | Status: SHIPPED | OUTPATIENT
Start: 2025-02-06

## 2025-02-11 ENCOUNTER — PRE VISIT (OUTPATIENT)
Dept: CARDIOLOGY | Facility: CLINIC | Age: 55
End: 2025-02-11
Payer: COMMERCIAL

## 2025-02-11 DIAGNOSIS — E85.81 AL AMYLOIDOSIS (H): Primary | ICD-10-CM

## 2025-02-14 ENCOUNTER — LAB (OUTPATIENT)
Dept: LAB | Facility: CLINIC | Age: 55
End: 2025-02-14
Payer: COMMERCIAL

## 2025-02-14 DIAGNOSIS — E85.81 AL AMYLOIDOSIS (H): ICD-10-CM

## 2025-02-14 LAB
ANION GAP SERPL CALCULATED.3IONS-SCNC: 8 MMOL/L (ref 7–15)
BUN SERPL-MCNC: 46.2 MG/DL (ref 6–20)
CALCIUM SERPL-MCNC: 10.5 MG/DL (ref 8.8–10.4)
CHLORIDE SERPL-SCNC: 108 MMOL/L (ref 98–107)
CREAT SERPL-MCNC: 2.15 MG/DL (ref 0.51–0.95)
EGFRCR SERPLBLD CKD-EPI 2021: 27 ML/MIN/1.73M2
GLUCOSE SERPL-MCNC: 76 MG/DL (ref 70–99)
HCO3 SERPL-SCNC: 25 MMOL/L (ref 22–29)
NT-PROBNP SERPL-MCNC: 351 PG/ML (ref 0–900)
POTASSIUM SERPL-SCNC: 4.8 MMOL/L (ref 3.4–5.3)
SODIUM SERPL-SCNC: 141 MMOL/L (ref 135–145)
TROPONIN T SERPL HS-MCNC: 17 NG/L

## 2025-02-14 PROCEDURE — 80048 BASIC METABOLIC PNL TOTAL CA: CPT | Performed by: PATHOLOGY

## 2025-02-14 PROCEDURE — 83880 ASSAY OF NATRIURETIC PEPTIDE: CPT | Performed by: PATHOLOGY

## 2025-02-14 PROCEDURE — 84484 ASSAY OF TROPONIN QUANT: CPT | Performed by: PATHOLOGY

## 2025-02-14 PROCEDURE — 36415 COLL VENOUS BLD VENIPUNCTURE: CPT | Performed by: PATHOLOGY

## 2025-02-26 NOTE — PATIENT INSTRUCTIONS
Wound Care After a Biopsy    What is a skin biopsy?  A skin biopsy allows the doctor to examine a very small piece of tissue under the microscope to determine the diagnosis and the best treatment for the skin condition. A local anesthetic (numbing medicine)  is injected with a very small needle into the skin area to be tested. A small piece of skin is taken from the area. Sometimes a suture (stitch) is used.     What are the risks of a skin biopsy?  I will experience scar, bleeding, swelling, pain, crusting and redness. I may experience incomplete removal or recurrence. Risks of this procedure are excessive bleeding, bruising, infection, nerve damage, numbness, thick (hypertrophic or keloidal) scar and non-diagnostic biopsy.    How should I care for my wound for the first 24 hours?  Keep the wound dry and covered for 24 hours  If it bleeds, hold direct pressure on the area for 15 minutes. If bleeding does not stop then go to the emergency room  Avoid strenuous exercise the first 1-2 days or as your doctor instructs you    How should I care for the wound after 24 hours?  After 24 hours, remove the bandage  You may bathe or shower as normal  If you had a scalp biopsy, you can shampoo as usual and can use shower water to clean the biopsy site daily  Clean the wound twice a day with gentle soap and water  Do not scrub, be gentle  Apply white petroleum/Vaseline after cleaning the wound with a cotton swab or a clean finger, and keep the site covered with a Bandaid /bandage. Bandages are not necessary with a scalp biopsy  If you are unable to cover the site with a Bandaid /bandage, re-apply ointment 2-3 times a day to keep the site moist. Moisture will help with healing  Avoid strenuous activity for first 1-2 days  Avoid lakes, rivers, pools, and oceans until the stitches are removed or the site is healed    How do I clean my wound?  Wash hands thoroughly with soap or use hand  before all wound care  Clean the  wound with gentle soap and water  Apply white petroleum/Vaseline  to wound after it is clean  Replace the Bandaid /bandage to keep the wound covered for the first few days or as instructed by your doctor  If you had a scalp biopsy, warm shower water to the area on a daily basis should suffice    What should I use to clean my wound?   Cotton-tipped applicators (Qtips )  White petroleum jelly (Vaseline ). Use a clean new container and use Q-tips to apply.  Bandaids   as needed  Gentle soap     How should I care for my wound long term?  Do not get your wound dirty  Keep up with wound care for one week or until the area is healed.  A small scab will form and fall off by itself when the area is completely healed. The area will be red and will become pink in color as it heals. Sun protection is very important for how your scar will turn out. Sunscreen with an SPF 30 or greater is recommended once the area is healed.  If you have stitches, stitches need to be removed in 7 days on the face/neck, or 10-14 days on the body days. You may return to our clinic for this or you may have it done locally at your doctor s office.  You should have some soreness but it should be mild and slowly go away over several days. Talk to your doctor about using tylenol for pain,    When should I call my doctor?  If you have increased:   Pain or swelling  Pus or drainage (clear or slightly yellow drainage is ok)  Temperature over 100F  Spreading redness or warmth around wound    When will I hear about my results?  The biopsy results can take 2 weeks to come back.  Your results will automatically release to [employer] before your provider has even reviewed them.  The clinic will call you with the results, send you a [facility] message, or have you schedule a follow-up clinic or phone time to discuss the results.  Contact our clinics if you do not hear from us in 2 weeks. If further treatment is needed for a skin cancer, this will be done at either our  [place] or [place] office.    Who should I call with questions?  [facility]: [phone]  [facility]: [phone]  For urgent needs outside of business hours call the [facility] at [phone] and ask for the dermatology resident on call   Patient Education       Proper skin care from [facility]:    -Eliminate harsh soaps as they strip the natural oils from the skin, often resulting in dry itchy skin ( i.e. Dial, Zest, [place])  -Use mild soaps such as Cetaphil or Dove Sensitive Skin in the shower. You do not need to use soap on arms, legs, and trunk every time you shower unless visibly soiled.   -Avoid hot or cold showers.  -After showering, lightly dry off and apply moisturizing within 2-3 minutes. This will help trap moisture in the skin.   -Aggressive use of a moisturizer at least 1-2 times a day to the entire body (including -Vanicream, Cetaphil, Aquaphor or Cerave) and moisturize hands after every washing.  -We recommend using moisturizers that come in a tub that needs to be scooped out, not a pump. This has more of an oil base. It will hold moisture in your skin much better than a water base moisturizer. The above recommended are non-pore clogging.      Wear a sunscreen with at least SPF 30 on your face, ears, neck and V of the chest daily. Wear sunscreen on other areas of the body if those areas are exposed to the sun throughout the day. Sunscreens can contain physical and/or chemical blockers. Physical blockers are less likely to clog pores, these include zinc oxide and titanium dioxide. Reapply every two hour and after swimming.     Sunscreen examples: https://www.ewg.org/sunscreen/    UV radiation  UVA radiation remains constant throughout the day and throughout the year. It is a longer wavelength than UVB and therefore penetrates deeper into the skin leading to immediate and delayed tanning, photoaging, and  skin cancer. 70-80% of UVA and UVB radiation occurs between the hours of 10am-2pm.  UVB radiation  UVB radiation causes the most harmful effects and is more significant during the summer months. However, snow and ice can reflect UVB radiation leading to skin damage during the winter months as well. UVB radiation is responsible for tanning, burning, inflammation, delayed erythema (pinkness), pigmentation (brown spots), and skin cancer.     I recommend self monthly full body exams and yearly full body exams with a dermatology provider. If you develop a new or changing lesion please follow up for examination. Most skin cancers are pink and scaly or pink and pearly. However, we do see blue/brown/black skin cancers.  Consider the ABCDEs of melanoma when giving yourself your monthly full body exam ( don't forget the groin, buttocks, feet, toes, etc). A-asymmetry, B-borders, C-color, D-diameter, E-elevation or evolving. If you see any of these changes please follow up in clinic. If you cannot see your back I recommend purchasing a hand held mirror to use with a larger wall mirror.       Checking for Skin Cancer  You can find cancer early by checking your skin each month. There are 3 kinds of skin cancer. They are melanoma, basal cell carcinoma, and squamous cell carcinoma. Doing monthly skin checks is the best way to find new marks or skin changes. Follow the instructions below for checking your skin.   The ABCDEs of checking moles for melanoma   Check your moles or growths for signs of melanoma using ABCDE:   Asymmetry: the sides of the mole or growth don t match  Border: the edges are ragged, notched, or blurred  Color: the color within the mole or growth varies  Diameter: the mole or growth is larger than 6 mm (size of a pencil eraser)  Evolving: the size, shape, or color of the mole or growth is changing (evolving is not shown in the images below)    Checking for other types of skin cancer  Basal cell carcinoma or  squamous cell carcinoma have symptoms such as:     A spot or mole that looks different from all other marks on your skin  Changes in how an area feels, such as itching, tenderness, or pain  Changes in the skin's surface, such as oozing, bleeding, or scaliness  A sore that does not heal  New swelling or redness beyond the border of a mole    Who s at risk?  Anyone can get skin cancer. But you are at greater risk if you have:   Fair skin, light-colored hair, or light-colored eyes  Many moles or abnormal moles on your skin  A history of sunburns from sunlight or tanning beds  A family history of skin cancer  A history of exposure to radiation or chemicals  A weakened immune system  If you have had skin cancer in the past, you are at risk for recurring skin cancer.   How to check your skin  Do your monthly skin checkups in front of a full-length mirror. Check all parts of your body, including your:   Head (ears, face, neck, and scalp)  Torso (front, back, and sides)  Arms (tops, undersides, upper, and lower armpits)  Hands (palms, backs, and fingers, including under the nails)  Buttocks and genitals  Legs (front, back, and sides)  Feet (tops, soles, toes, including under the nails, and between toes)  If you have a lot of moles, take digital photos of them each month. Make sure to take photos both up close and from a distance. These can help you see if any moles change over time.   Most skin changes are not cancer. But if you see any changes in your skin, call your doctor right away. Only he or she can diagnose a problem. If you have skin cancer, seeing your doctor can be the first step toward getting the treatment that could save your life.   The Shared Web last reviewed this educational content on 4/1/2019 2000-2020 [employer]. [address], [place] [address]. All rights reserved. This information is not intended as a substitute for professional medical care. Always follow your healthcare  professional's instructions.       When should I call my doctor?  If you are worsening or not improving, please, contact us or seek urgent care as noted below.     Who should I call with questions (adults)?  [facility] (adult and pediatric): [phone]  [facility] (adult): [phone]  [facility] ([facility] and [place]) [phone]  For urgent needs outside of business hours call the [facility] at [phone] and ask for the dermatology resident on call to be paged  If this is a medical emergency and you are unable to reach an ER, Call [phone]      If you need a prescription refill, please contact your pharmacy. Refills are approved or denied by our Physicians during normal business hours, Monday through Fridays  Per office policy, refills will not be granted if you have not been seen within the past year (or sooner depending on your child's condition Wound Care After a Biopsy    What is a skin biopsy?  A skin biopsy allows the doctor to examine a very small piece of tissue under the microscope to determine the diagnosis and the best treatment for the skin condition. A local anesthetic (numbing medicine)  is injected with a very small needle into the skin area to be tested. A small piece of skin is taken from the area. Sometimes a suture (stitch) is used.     What are the risks of a skin biopsy?  I will experience scar, bleeding, swelling, pain, crusting and redness. I may experience incomplete removal or recurrence. Risks of this procedure are excessive bleeding, bruising, infection, nerve damage, numbness, thick (hypertrophic or keloidal) scar and non-diagnostic biopsy.    How should I care for my wound for the first 24 hours?  Keep the wound dry and covered for 24 hours  If it bleeds, hold direct pressure on the area for 15 minutes. If bleeding does not stop then go to the emergency  room  Avoid strenuous exercise the first 1-2 days or as your doctor instructs you    How should I care for the wound after 24 hours?  After 24 hours, remove the bandage  You may bathe or shower as normal  If you had a scalp biopsy, you can shampoo as usual and can use shower water to clean the biopsy site daily  Clean the wound twice a day with gentle soap and water  Do not scrub, be gentle  Apply white petroleum/Vaseline after cleaning the wound with a cotton swab or a clean finger, and keep the site covered with a Bandaid /bandage. Bandages are not necessary with a scalp biopsy  If you are unable to cover the site with a Bandaid /bandage, re-apply ointment 2-3 times a day to keep the site moist. Moisture will help with healing  Avoid strenuous activity for first 1-2 days  Avoid lakes, rivers, pools, and oceans until the stitches are removed or the site is healed    How do I clean my wound?  Wash hands thoroughly with soap or use hand  before all wound care  Clean the wound with gentle soap and water  Apply white petroleum/Vaseline  to wound after it is clean  Replace the Bandaid /bandage to keep the wound covered for the first few days or as instructed by your doctor  If you had a scalp biopsy, warm shower water to the area on a daily basis should suffice    What should I use to clean my wound?   Cotton-tipped applicators (Qtips )  White petroleum jelly (Vaseline ). Use a clean new container and use Q-tips to apply.  Bandaids   as needed  Gentle soap     How should I care for my wound long term?  Do not get your wound dirty  Keep up with wound care for one week or until the area is healed.  A small scab will form and fall off by itself when the area is completely healed. The area will be red and will become pink in color as it heals. Sun protection is very important for how your scar will turn out. Sunscreen with an SPF 30 or greater is recommended once the area is healed.  If you have stitches, stitches  need to be removed in 7 days on the face/neck, or 10-14 days on the body days. You may return to our clinic for this or you may have it done locally at your doctor s office.  You should have some soreness but it should be mild and slowly go away over several days. Talk to your doctor about using tylenol for pain,    When should I call my doctor?  If you have increased:   Pain or swelling  Pus or drainage (clear or slightly yellow drainage is ok)  Temperature over 100F  Spreading redness or warmth around wound    When will I hear about my results?  The biopsy results can take 2 weeks to come back.  Your results will automatically release to [employer] before your provider has even reviewed them.  The clinic will call you with the results, send you a "[employer] message, or have you schedule a follow-up clinic or phone time to discuss the results.  Contact our clinics if you do not hear from us in 2 weeks. If further treatment is needed for a skin cancer, this will be done at either our [place] or [place] office.    Who should I call with questions?  [facility]: [phone]  [facility]: [phone]  For urgent needs outside of business hours call the [facility] at [phone] and ask for the dermatology resident on call   The ABCDEs of Melanoma    Skin cancer can develop anywhere on the skin. Ask someone for help when checking your skin, especially in hard to see places. If you notice a mole different from others, or that changes, enlarges, itches, or bleeds (even if it is small), you should see a dermatologist.

## 2025-02-26 NOTE — PROGRESS NOTES
[facility] Dermatology Note  Encounter Date: Mar 6, 2025  Office Visit     Reviewed patients past medical history and pertinent chart review prior to patients visit today.     Dermatology Problem List:  Last skin check: 03/06/25    0. NUB right lateral third toe and right upper abdomen, shave biopsies 03/06/25 .    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 Shave biopsy today.  # Neoplasm of uncertain behavior:  right lateral third toe  DDx includes DN vs MM. Shave biopsy today.    Procedure Note: Biopsy by shave technique  The risks and benefits of the procedure were described to the patient. These include but are not limited to bleeding, infection, scar, incomplete removal, and non-diagnostic biopsy. Verbal informed consent was obtained. The above site(s) was cleansed with an alcohol pad and injected with 1% lidocaine with epinephrine. Once anesthesia was obtained, a biopsy(ies) was performed with Gilette blade. The tissue(s) was placed in a labeled container(s) with formalin and sent to pathology. Hemostasis was achieved with aluminum chloride. Vaseline and a bandage were applied to the wound(s). The patient tolerated the procedure well and was given post biopsy care instructions.    # Benign skin findings including: seborrheic keratoses, cherry angioma, lentigines and benign nevi.   - No further intervention required. Patient to report changes.   - Patient reassured of the benign nature of these lesions.    #Signs and Symptoms of non-melanoma skin cancer and ABCDEs of melanoma reviewed with patient. Patient encouraged to perform monthly self skin exams and educated on how to perform them. UV precautions reviewed with patient. Patient was asked about new or changing moles/lesions  "on body.     #Reviewed Sunscreen: Apply 20 minutes prior to going outdoors and reapply every two hours, when wet or sweating. We recommend using an SPF 30 or higher, and to use one that is water resistant.       Follow-up: 1 year for follow up full body skin exam, prn for new or changing lesions or new concerns    Grecia Hathaway PA-C  [facility]  Dermatology     ____________________________________________    CC: Skin Check (Pt states, : Here for a skin check, is a transplant patient so recommended to have a skin check done yearly, no personal hx of skin cancer, mom had something removed from face, unknown type of skin cancer, she has no areas of concern\" )    HPI:  Ms. Vivian Brown is a(n) 55 year old female who presents today as a new patient for a full body skin cancer screening. Patient has concerns today about no lesions in particular. Patient is being diligent with photoprotection.     Patient is otherwise feeling well, without additional skin concerns.     Physical Exam:  Vitals: Ht 1.626 m (5' 4\")   Wt 58.1 kg (128 lb)   BMI 21.97 kg/m    SKIN: Total skin examination performed and the genital area was evaluated with a chaperone in the room. The exam included the head/face, neck, both arms, chest, back, abdomen, both legs, digits, mons pubis, buttock and nails.   -NUB, right lateral third toe, dark brown asymmetric papule  -NUB, right upper abdomen, dark brown asymmetric macule   -several 1-2mm red dome shaped symmetric papules scattered on the trunk  -multiple tan/brown flat round macules and raised papules scattered throughout trunk, extremities and head. No worrisome features for malignancy noted on examination.  -scattered tan, homogenous macules scattered on sun exposed areas of trunk, extremities and face.   -scattered waxy, stuck on tan/brown papules and patches on the trunk           - No other lesions of concern on areas examined.     Medications:  Current Outpatient Medications "   Medication Sig Dispense Refill    acyclovir (ZOVIRAX) 400 MG tablet Take 2 tablets (800 mg) by mouth 2 times daily 120 tablet 11    levothyroxine (SYNTHROID) 200 MCG tablet Take 200 mcg by mouth daily. Pt takes Synthroid brand name. FRANKI Taking 175mg      liothyronine (CYTOMEL) 5 MCG tablet Take 5 mcg by mouth 2 times daily      sulfamethoxazole-trimethoprim (BACTRIM DS) 800-160 MG tablet Take 1 tablet by mouth Every Mon, Tues two times daily 16 tablet 11    OLANZapine (ZYPREXA) 2.5 MG tablet Take 1 tablet (2.5 mg) by mouth at bedtime. (Patient not taking: Reported on 3/6/2025) 30 tablet 0     No current facility-administered medications for this visit.      Past Medical History:   Patient Active Problem List   Diagnosis    Hypothyroidism due to acquired atrophy of thyroid    Family hx of colon cancer    Nonallopathic lesion of cervical region    Cervicalgia    Nonallopathic lesion of sacral region    Nonallopathic lesion of lumbar region    Lumbago    Nonallopathic lesion of thoracic region    Hyperlipidemia LDL goal <100    Dependent edema R>L    AL amyloidosis (H)    Hypogammaglobulinemia    Chronic kidney disease, stage 3a (H)    Celiac disease    Autologous donor of stem cells    Light chain (AL) amyloidosis (H)    History of peripheral stem cell transplant (H)    Hypercalcemia     Past Medical History:   Diagnosis Date    Celiac disease     Family history of colon cancer     Colonoscoy q5 years next 9/2020       CC Referred Self, MD  No address on file on close of this encounter.

## 2025-03-06 ENCOUNTER — OFFICE VISIT (OUTPATIENT)
Dept: DERMATOLOGY | Facility: CLINIC | Age: 55
End: 2025-03-06
Payer: COMMERCIAL

## 2025-03-06 VITALS — BODY MASS INDEX: 21.85 KG/M2 | WEIGHT: 128 LBS | HEIGHT: 64 IN

## 2025-03-06 DIAGNOSIS — Z94.84 HISTORY OF PERIPHERAL STEM CELL TRANSPLANT (H): Primary | ICD-10-CM

## 2025-03-06 DIAGNOSIS — L82.1 SEBORRHEIC KERATOSIS: ICD-10-CM

## 2025-03-06 DIAGNOSIS — D22.9 MULTIPLE BENIGN NEVI: ICD-10-CM

## 2025-03-06 DIAGNOSIS — D18.01 CHERRY ANGIOMA: ICD-10-CM

## 2025-03-06 DIAGNOSIS — D48.5 NEOPLASM OF UNCERTAIN BEHAVIOR OF SKIN: ICD-10-CM

## 2025-03-06 DIAGNOSIS — L81.4 LENTIGINES: ICD-10-CM

## 2025-03-06 ASSESSMENT — PAIN SCALES - GENERAL: PAINLEVEL_OUTOF10: NO PAIN (0)

## 2025-03-06 NOTE — LETTER
3/6/2025      Vivian Brown  [address] [facility] [address]      Dear Colleague,    Thank you for referring your patient, Vivian Brown, to the [facility]. Please see a copy of my visit note below.    [facility] Dermatology Note  Encounter Date: Mar 6, 2025  Office Visit     Reviewed patients past medical history and pertinent chart review prior to patients visit today.     Dermatology Problem List:  Last skin check: 03/06/25    0. NUB right lateral third toe and right upper abdomen, shave biopsies 03/06/25 .    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 Shave biopsy today.  # Neoplasm of uncertain behavior:  right lateral third toe  DDx includes DN vs MM. Shave biopsy today.    Procedure Note: Biopsy by shave technique  The risks and benefits of the procedure were described to the patient. These include but are not limited to bleeding, infection, scar, incomplete removal, and non-diagnostic biopsy. Verbal informed consent was obtained. The above site(s) was cleansed with an alcohol pad and injected with 1% lidocaine with epinephrine. Once anesthesia was obtained, a biopsy(ies) was performed with Gilette blade. The tissue(s) was placed in a labeled container(s) with formalin and sent to pathology. Hemostasis was achieved with aluminum chloride. Vaseline and a bandage were applied to the wound(s). The patient tolerated the procedure well and was given post biopsy care instructions.    # Benign skin findings including: seborrheic keratoses, cherry angioma, lentigines and benign nevi.   - No further intervention required. Patient to report changes.   - Patient reassured of the benign nature of these lesions.    #Signs and Symptoms of  "non-melanoma skin cancer and ABCDEs of melanoma reviewed with patient. Patient encouraged to perform monthly self skin exams and educated on how to perform them. UV precautions reviewed with patient. Patient was asked about new or changing moles/lesions on body.     #Reviewed Sunscreen: Apply 20 minutes prior to going outdoors and reapply every two hours, when wet or sweating. We recommend using an SPF 30 or higher, and to use one that is water resistant.       Follow-up: 1 year for follow up full body skin exam, prn for new or changing lesions or new concerns    Grecia Hathaway PA-C  [facility]  Dermatology     ____________________________________________    CC: Skin Check (Pt states, : Here for a skin check, is a transplant patient so recommended to have a skin check done yearly, no personal hx of skin cancer, mom had something removed from face, unknown type of skin cancer, she has no areas of concern\" )    HPI:  Ms. Vivian Brown is a(n) 55 year old female who presents today as a new patient for a full body skin cancer screening. Patient has concerns today about no lesions in particular. Patient is being diligent with photoprotection.     Patient is otherwise feeling well, without additional skin concerns.     Physical Exam:  Vitals: Ht 1.626 m (5' 4\")   Wt 58.1 kg (128 lb)   BMI 21.97 kg/m    SKIN: Total skin examination performed and the genital area was evaluated with a chaperone in the room. The exam included the head/face, neck, both arms, chest, back, abdomen, both legs, digits, mons pubis, buttock and nails.   -NUB, right lateral third toe, dark brown asymmetric papule  -NUB, right upper abdomen, dark brown asymmetric macule   -several 1-2mm red dome shaped symmetric papules scattered on the trunk  -multiple tan/brown flat round macules and raised papules scattered throughout trunk, extremities and head. No worrisome features for malignancy noted on examination.  -scattered tan, homogenous " macules scattered on sun exposed areas of trunk, extremities and face.   -scattered waxy, stuck on tan/brown papules and patches on the trunk           - No other lesions of concern on areas examined.     Medications:  Current Outpatient Medications   Medication Sig Dispense Refill     acyclovir (ZOVIRAX) 400 MG tablet Take 2 tablets (800 mg) by mouth 2 times daily 120 tablet 11     levothyroxine (SYNTHROID) 200 MCG tablet Take 200 mcg by mouth daily. Pt takes Synthroid brand name. FRANKI Taking 175mg       liothyronine (CYTOMEL) 5 MCG tablet Take 5 mcg by mouth 2 times daily       sulfamethoxazole-trimethoprim (BACTRIM DS) 800-160 MG tablet Take 1 tablet by mouth Every Mon, Tues two times daily 16 tablet 11     OLANZapine (ZYPREXA) 2.5 MG tablet Take 1 tablet (2.5 mg) by mouth at bedtime. (Patient not taking: Reported on 3/6/2025) 30 tablet 0     No current facility-administered medications for this visit.      Past Medical History:   Patient Active Problem List   Diagnosis     Hypothyroidism due to acquired atrophy of thyroid     Family hx of colon cancer     Nonallopathic lesion of cervical region     Cervicalgia     Nonallopathic lesion of sacral region     Nonallopathic lesion of lumbar region     Lumbago     Nonallopathic lesion of thoracic region     Hyperlipidemia LDL goal <100     Dependent edema R>L     AL amyloidosis (H)     Hypogammaglobulinemia     Chronic kidney disease, stage 3a (H)     Celiac disease     Autologous donor of stem cells     Light chain (AL) amyloidosis (H)     History of peripheral stem cell transplant (H)     Hypercalcemia     Past Medical History:   Diagnosis Date     Celiac disease      Family history of colon cancer     Colonoscoy q5 years next 9/2020       CC Referred Self, MD  No address on file on close of this encounter.      Again, thank you for allowing me to participate in the care of your patient.        Sincerely,        Grecia Hathaway PA-C    Electronically signed

## 2025-03-06 NOTE — PROGRESS NOTES
The following medication was given:     MEDICATION:  Lidocaine with epinephrine 1% 1:[id number]  ROUTE: SQ  SITE: see procedure note  DOSE: 0.8 ml  LOT #: [id number]  : Pfizer  EXPIRATION DATE: 11-30-25  NDC#: 28968-635-63  Was there drug waste? Yes, 0.2 ml  Multi-dose vial: Yes    Viviana Vizcarra MA  March 6, 2025

## 2025-03-09 ENCOUNTER — HEALTH MAINTENANCE LETTER (OUTPATIENT)
Age: 55
End: 2025-03-09

## 2025-03-12 DIAGNOSIS — N18.31 CHRONIC KIDNEY DISEASE, STAGE 3A (H): Primary | ICD-10-CM

## 2025-03-17 DIAGNOSIS — E85.81 AL AMYLOIDOSIS (H): Primary | ICD-10-CM

## 2025-03-24 ENCOUNTER — LAB (OUTPATIENT)
Dept: LAB | Facility: CLINIC | Age: 55
End: 2025-03-24
Payer: COMMERCIAL

## 2025-03-24 DIAGNOSIS — N18.31 CHRONIC KIDNEY DISEASE, STAGE 3A (H): ICD-10-CM

## 2025-03-24 DIAGNOSIS — E85.81 AL AMYLOIDOSIS (H): Primary | ICD-10-CM

## 2025-03-24 DIAGNOSIS — E83.52 HYPERCALCEMIA: Primary | ICD-10-CM

## 2025-03-24 DIAGNOSIS — N18.32 CKD STAGE 3B, GFR 30-44 ML/MIN (H): ICD-10-CM

## 2025-03-24 LAB
ALBUMIN MFR UR ELPH: 893 MG/DL
ALBUMIN SERPL BCG-MCNC: 3.5 G/DL (ref 3.5–5.2)
ALBUMIN UR-MCNC: 300 MG/DL
ALP SERPL-CCNC: 229 U/L (ref 40–150)
ALT SERPL W P-5'-P-CCNC: 62 U/L (ref 0–50)
ANION GAP SERPL CALCULATED.3IONS-SCNC: 8 MMOL/L (ref 7–15)
APPEARANCE UR: CLEAR
AST SERPL W P-5'-P-CCNC: 33 U/L (ref 0–45)
BILIRUB SERPL-MCNC: 0.3 MG/DL
BILIRUB UR QL STRIP: NEGATIVE
BUN SERPL-MCNC: 35.3 MG/DL (ref 6–20)
CA-I BLD-MCNC: 5.8 MG/DL (ref 4.4–5.2)
CALCIUM SERPL-MCNC: 10.1 MG/DL (ref 8.8–10.4)
CHLORIDE SERPL-SCNC: 107 MMOL/L (ref 98–107)
COLOR UR AUTO: ABNORMAL
CREAT SERPL-MCNC: 1.79 MG/DL (ref 0.51–0.95)
CREAT UR-MCNC: 122.1 MG/DL
CREAT UR-MCNC: 122.1 MG/DL
EGFRCR SERPLBLD CKD-EPI 2021: 33 ML/MIN/1.73M2
ERYTHROCYTE [DISTWIDTH] IN BLOOD BY AUTOMATED COUNT: 15.3 % (ref 10–15)
GLUCOSE SERPL-MCNC: 106 MG/DL (ref 70–99)
GLUCOSE UR STRIP-MCNC: 30 MG/DL
HCO3 SERPL-SCNC: 25 MMOL/L (ref 22–29)
HCT VFR BLD AUTO: 33.7 % (ref 35–47)
HGB BLD-MCNC: 11.4 G/DL (ref 11.7–15.7)
HGB UR QL STRIP: ABNORMAL
KETONES UR STRIP-MCNC: NEGATIVE MG/DL
LEUKOCYTE ESTERASE UR QL STRIP: NEGATIVE
MCH RBC QN AUTO: 31.7 PG (ref 26.5–33)
MCHC RBC AUTO-ENTMCNC: 33.8 G/DL (ref 31.5–36.5)
MCV RBC AUTO: 94 FL (ref 78–100)
MICROALBUMIN UR-MCNC: >4400 MG/L
MICROALBUMIN/CREAT UR: NORMAL MG/G{CREAT}
MUCOUS THREADS #/AREA URNS LPF: PRESENT /LPF
NITRATE UR QL: NEGATIVE
PH UR STRIP: 6 [PH] (ref 5–7)
PHOSPHATE SERPL-MCNC: 3.9 MG/DL (ref 2.5–4.5)
PLATELET # BLD AUTO: 265 10E3/UL (ref 150–450)
POTASSIUM SERPL-SCNC: 4.5 MMOL/L (ref 3.4–5.3)
PROT SERPL-MCNC: 5.7 G/DL (ref 6.4–8.3)
PROT/CREAT 24H UR: 7.31 MG/MG CR (ref 0–0.2)
PTH-INTACT SERPL-MCNC: 17 PG/ML (ref 15–65)
RBC # BLD AUTO: 3.6 10E6/UL (ref 3.8–5.2)
RBC URINE: 1 /HPF
SODIUM SERPL-SCNC: 140 MMOL/L (ref 135–145)
SP GR UR STRIP: 1.02 (ref 1–1.03)
UROBILINOGEN UR STRIP-MCNC: NORMAL MG/DL
VIT D+METAB SERPL-MCNC: 19 NG/ML (ref 20–50)
WBC # BLD AUTO: 7.1 10E3/UL (ref 4–11)
WBC URINE: 2 /HPF

## 2025-03-24 PROCEDURE — 82330 ASSAY OF CALCIUM: CPT

## 2025-03-24 PROCEDURE — 82043 UR ALBUMIN QUANTITATIVE: CPT

## 2025-03-24 PROCEDURE — 82306 VITAMIN D 25 HYDROXY: CPT

## 2025-03-24 PROCEDURE — 82570 ASSAY OF URINE CREATININE: CPT

## 2025-03-24 PROCEDURE — 36415 COLL VENOUS BLD VENIPUNCTURE: CPT

## 2025-03-24 PROCEDURE — 99000 SPECIMEN HANDLING OFFICE-LAB: CPT

## 2025-03-24 PROCEDURE — 84100 ASSAY OF PHOSPHORUS: CPT

## 2025-03-24 PROCEDURE — 82397 CHEMILUMINESCENT ASSAY: CPT | Mod: 90

## 2025-03-24 PROCEDURE — 81001 URINALYSIS AUTO W/SCOPE: CPT

## 2025-03-24 PROCEDURE — 80053 COMPREHEN METABOLIC PANEL: CPT

## 2025-03-24 PROCEDURE — 85027 COMPLETE CBC AUTOMATED: CPT

## 2025-03-24 PROCEDURE — 83970 ASSAY OF PARATHORMONE: CPT

## 2025-03-24 PROCEDURE — 84156 ASSAY OF PROTEIN URINE: CPT

## 2025-03-25 ENCOUNTER — APPOINTMENT (OUTPATIENT)
Dept: LAB | Facility: CLINIC | Age: 55
End: 2025-03-25
Attending: STUDENT IN AN ORGANIZED HEALTH CARE EDUCATION/TRAINING PROGRAM
Payer: COMMERCIAL

## 2025-03-25 ENCOUNTER — ONCOLOGY VISIT (OUTPATIENT)
Dept: TRANSPLANT | Facility: CLINIC | Age: 55
End: 2025-03-25
Attending: STUDENT IN AN ORGANIZED HEALTH CARE EDUCATION/TRAINING PROGRAM
Payer: COMMERCIAL

## 2025-03-25 VITALS
DIASTOLIC BLOOD PRESSURE: 71 MMHG | OXYGEN SATURATION: 98 % | HEART RATE: 83 BPM | BODY MASS INDEX: 23.55 KG/M2 | SYSTOLIC BLOOD PRESSURE: 104 MMHG | WEIGHT: 137.2 LBS | TEMPERATURE: 97.8 F | RESPIRATION RATE: 17 BRPM

## 2025-03-25 DIAGNOSIS — E85.81 AL AMYLOIDOSIS (H): ICD-10-CM

## 2025-03-25 LAB
IGA SERPL-MCNC: 107 MG/DL (ref 84–499)
IGG SERPL-MCNC: 425 MG/DL (ref 610–1616)
IGM SERPL-MCNC: 51 MG/DL (ref 35–242)
KAPPA LC FREE SER-MCNC: 2.15 MG/DL (ref 0.33–1.94)
KAPPA LC FREE/LAMBDA FREE SER NEPH: 1 {RATIO} (ref 0.26–1.65)
LAMBDA LC FREE SERPL-MCNC: 2.16 MG/DL (ref 0.57–2.63)
TOTAL PROTEIN SERUM FOR ELP: 5 G/DL (ref 6.4–8.3)

## 2025-03-25 PROCEDURE — 84165 PROTEIN E-PHORESIS SERUM: CPT | Mod: TC | Performed by: STUDENT IN AN ORGANIZED HEALTH CARE EDUCATION/TRAINING PROGRAM

## 2025-03-25 PROCEDURE — 86334 IMMUNOFIX E-PHORESIS SERUM: CPT | Performed by: STUDENT IN AN ORGANIZED HEALTH CARE EDUCATION/TRAINING PROGRAM

## 2025-03-25 PROCEDURE — 84155 ASSAY OF PROTEIN SERUM: CPT | Performed by: STUDENT IN AN ORGANIZED HEALTH CARE EDUCATION/TRAINING PROGRAM

## 2025-03-25 PROCEDURE — 83521 IG LIGHT CHAINS FREE EACH: CPT | Performed by: STUDENT IN AN ORGANIZED HEALTH CARE EDUCATION/TRAINING PROGRAM

## 2025-03-25 PROCEDURE — 36415 COLL VENOUS BLD VENIPUNCTURE: CPT | Performed by: STUDENT IN AN ORGANIZED HEALTH CARE EDUCATION/TRAINING PROGRAM

## 2025-03-25 PROCEDURE — 82784 ASSAY IGA/IGD/IGG/IGM EACH: CPT | Performed by: STUDENT IN AN ORGANIZED HEALTH CARE EDUCATION/TRAINING PROGRAM

## 2025-03-25 ASSESSMENT — PAIN SCALES - GENERAL: PAINLEVEL_OUTOF10: NO PAIN (0)

## 2025-03-25 NOTE — CONFIDENTIAL NOTE
FUTURE VISIT INFORMATION      SURGERY INFORMATION:  Date: 6.5.25   [place] [facility] [place]  Surgeon:  Jomar Chandler MD   Anesthesia Type:    Procedure: BIOPSY, BONE MARROW   Consult:     RECORDS REQUESTED FROM:       Primary Care Provider:  Alin Torres PA-C    Pertinent Medical History:    Most recent EKG+ Tracing:  --Internal - no tracing- 6.11.24    Most recent ECHO:  --Internal - 3.17.25, 10.9.24    Most recent PFT's:  5.21.24

## 2025-03-25 NOTE — NURSING NOTE
"Oncology Rooming Note    March 25, 2025 9:40 AM   Vivian Brown is a 55 year old female who presents for:    Chief Complaint   Patient presents with    Blood Draw     Labs drawn with  by RN. Vitals taken. Pt checked into next appointment.      Oncology Clinic Visit     AL amyloidosis     Initial Vitals: /71   Pulse 83   Temp 97.8  F (36.6  C) (Oral)   Resp 17   Wt 62.2 kg (137 lb 3.2 oz)   SpO2 98%   BMI 23.55 kg/m   Estimated body mass index is 23.55 kg/m  as calculated from the following:    Height as of 3/6/25: 1.626 m (5' 4\").    Weight as of this encounter: 62.2 kg (137 lb 3.2 oz). Body surface area is 1.68 meters squared.  No Pain (0) Comment: Data Unavailable   No LMP recorded. (Menstrual status: IUD).  Allergies reviewed: Yes  Medications reviewed: Yes    Medications: Medication refills not needed today.  Pharmacy name entered into [employer]:    [facility] [place] - [place], [place] - [address] [facility] [place] [place] [facility] [address] [facility] [id number] - [place] [address] [id number] - [place] - [address]  [facility] - [place] [address]  [facility] - [place], [place] - [address] [facility] - [place], [place] - [phone]    Frailty Screening:   Is the patient here for a new oncology consult visit in cancer care? 2. No    PHQ9:  Did this patient require a PHQ9?: No      Clinical concerns: Patient noticed that her ALT was elevated from the blood work she did for her nephrology appointment later this week. She also noticed that her calcium was higher. She has also had some mild swelling, mostly in her right foot and ankle.       Heena Dixon, EMT            "

## 2025-03-26 LAB
ALBUMIN SERPL ELPH-MCNC: 2.9 G/DL (ref 3.7–5.1)
ALPHA1 GLOB SERPL ELPH-MCNC: 0.3 G/DL (ref 0.2–0.4)
ALPHA2 GLOB SERPL ELPH-MCNC: 0.8 G/DL (ref 0.5–0.9)
B-GLOBULIN SERPL ELPH-MCNC: 0.6 G/DL (ref 0.6–1)
GAMMA GLOB SERPL ELPH-MCNC: 0.4 G/DL (ref 0.7–1.6)
LOCATION OF TASK: ABNORMAL
LOCATION OF TASK: NORMAL
M PROTEIN SERPL ELPH-MCNC: 0.1 G/DL
PROT PATTERN SERPL ELPH-IMP: ABNORMAL
PROT PATTERN SERPL IFE-IMP: NORMAL

## 2025-03-27 ENCOUNTER — VIRTUAL VISIT (OUTPATIENT)
Dept: NEPHROLOGY | Facility: CLINIC | Age: 55
End: 2025-03-27
Attending: INTERNAL MEDICINE
Payer: COMMERCIAL

## 2025-03-27 VITALS — WEIGHT: 135 LBS | HEIGHT: 64 IN | BODY MASS INDEX: 23.05 KG/M2

## 2025-03-27 DIAGNOSIS — E85.4 AMYLOID KIDNEY (H): ICD-10-CM

## 2025-03-27 DIAGNOSIS — N08 AMYLOID KIDNEY (H): ICD-10-CM

## 2025-03-27 DIAGNOSIS — E85.81 AL AMYLOIDOSIS (H): ICD-10-CM

## 2025-03-27 DIAGNOSIS — E83.52 HYPERCALCEMIA: ICD-10-CM

## 2025-03-27 DIAGNOSIS — N04.9 NEPHROTIC SYNDROME: ICD-10-CM

## 2025-03-27 DIAGNOSIS — N18.32 CKD STAGE 3B, GFR 30-44 ML/MIN (H): Primary | ICD-10-CM

## 2025-03-27 ASSESSMENT — PAIN SCALES - GENERAL: PAINLEVEL_OUTOF10: NO PAIN (0)

## 2025-03-27 NOTE — LETTER
3/27/2025       RE: Vivian Brown  [id number] [facility] [id number]     Dear Colleague,    Thank you for referring your patient, Vivian Brown, to the [facility]PH[facility] [place] at [facility]. Please see a copy of my visit note below.    Virtual Visit Details    Type of service:  Video Visit 4.12-4.30    Originating Location (pt. Location): Home    Distant Location (provider location):  On-site  Platform used for Video Visit: Robert  Nephrology Progress Note  03/27/2025     Chief complaint: Follow up AL amyloidosis  History of Present Illness:    Vivian Brown is a 55 year old F  with hypothyroidism, who is here for AL amyloidosis follow-up.      The patient initially presented with fatigue and LE edema for about 2 years. Initially, she was diagnosed with Hashimoto's and was started on thyroid supplement in August 2023. Symptoms has partially improved. Later on she was found to have hypoalbuminemia dn massiv eproteinuria hence was referred to Dr. Sen. She saw Dr. Sen in November 2023 and was started on Lisinopril 2.5 mg and lasix 20 mg per day. I 1 week, she has lsot 20 Lbs and swelling has been better and remained in control.  She underwent a kidney Bx on 12/21/23. The Bx read by Dr. Garcia which showed Amyloidosis of the kidney, AL-type, lambda light chain-restricted, affecting the glomeruli, interstitium, and arterioles. She has mild chronic changes of the parenchyma, including: focal global glomerulosclerosis (3% of glomeruli), focal tubular atrophy and interstitial fibrosis (5% of the cortex), severe arterial sclerosis.   LM: There were 37 glomeruli (LM-29; IF-7; EM-1), of which 1 is globally sclerosed. The non-sclerosed glomeruli are of normal size and cellularity. Significant endocapillary proliferation or cellular crescents are not seen in the glomeruli. The glomeruli show deposits of amorphous, homogeneous,  acellular, eosinophilic material that infiltrates and expands the mesangial areas and extends down the capillaries of the glomerular tuft. Tubules are well-preserved and show no signs of acute injury. Obvious signs of a viral cytopathic effect are not seen in the sample. No oxalate, urate, or phosphate crystals are present in the sample. The interstitium reveals occasional collection of foam cells and occasional deposits of amorphous material. The interstitium contains mild interstitial inflammation in areas of atrophy, and the infiltrates are composed of mononuclear cells. About 5% of the renal cortex shows tubular atrophy and interstitial fibrosis. Arteries show severe sclerosis. Examination of a Congo red-stained section reveals deposits of amorphous material with apple-green birefringence under polarized light, in all glomeruli and focally in arterioles and interstitium. EM: showed 1 glomerulus; 1 glomerulus is examined ultrastructurally. There is prominent deposition of fibrillary material in the mesangial areas, without significant hypercellularity.  There is focal effacement of visceral epithelial cell foot processes in areas of the glomerular basement membranes that show segmental widening of the subepithelial space and distortion of the lamina densa due to deposits of fibrillary material.  The mesangial and basement membrane deposits consist of non-branching, randomly arranged fibrils of 6 to 15 nm width. Glomerular endothelial cells show focal loss of their normal fenestrations. Tubules show no specific changes. Deposits are Congo red positive. The amyloid deposits affect the glomeruli extensively, and interstitium and arterioles focally. Other potential complications of paraproteins in the kidney are not seen, in particular, there is no evidence of light chain cast nephropathy, light chain deposition disease, or light chain tubulopathy.      For kidney function, she has baseline Cr of 0.9 in 12/19/22.  "However, Cr started to increase to 1.05 in 5/22/23 and now 1.11 on 11/28/23. Serum albumin was 3.5 in 2/24/21 and 2.2 in 12/19/22. Monoclonal work-up on 11/28/23 showed kappa 3.05, lambda 7.82 and K/L ratio 0.39 and L/K ratio 2.56. dFLC 4.77. Other serologies including MPO, PMND1 were negative. UPCR 12.50 mg/g. No 24 hr urine protein yet. UA showed small blood but no RBC. She also has LCL of 174. TSH 29.16 and + Tissue TTG.      She has Echo on 1/3/24. There is mild concentric left ventricular hypertrophy and borderline RVH. Global peak LV longitudinal strain is averaged at -15.3%. This suggests abnormal strain (normal <-18%).The visual ejection fraction is estimated at 54-56%.Trivial posterior pericardial effusion Clinical history is listed as renal amyloid--on this echo the atria are normal size, the valves are not thickend but there is mild LVH and borderline RVH, there is questionable \"scintillation\" of LV myocardium, the global longitudinal strain is abnormal and the best strain values are at the apex (borderline \"apical sparing\" strain pattern) and there is trace pericardial effusion along with borderline criteria for diastolic dysfunction grade2--take together this could represent some features of cardiac amyloid. Additional studies such as cardiac MRI, cardiac biopsy etc may be useful. She is scheduled to see Cardiology on 1/31/24.      1/8/24: I saw her for consultation via video. She is with her  at home. Still feels fatigue and upset stomach last night.  She is currently on furosemide 20 mg daily, lisinopril 2.5 mg daily and crestor 10 mg daily. She reports having lower extremities edema for about 2 years. She was diagnosed with Hashimoto's in August 2023.  She has been feeling better after started taking thyroid supplement. She has leg swelling but not as bad as before. Furosemide was started in December 2023 and she has lost 20 Lbs wit that. She has seen more bubbly in the urineShe sometimes " feels lightheadedness and dizziness and her BP was low sometimes. However, mostly > BP /60s. HR in the 80s.  BP today is 114/76, HR is 76.  She has no problem with swallowing, numbness or tingling sensation. No CTS symptoms. No tongue enlargement. No periorbital ecchymoses. No easy bruising. She has no other medical problem before this. She has been active and exercise at least twice a week.   3/18/24: In the interim, she underwent BmBx on 1/18/24 which showed  lambda restricted plasma cell, 5%. Congo red positive in the BmBx. She was started on Zenia-CyBorD, C1D1 on 1/26/24. Now s/p 2 cycles, and will start 3rd cycle soon. ASCT is plan on 1st week of Vijaya. In the interim, she was started on Jardiance 2/3/24 and spironolactone on 2/29/24. She does not feel lightheadedness and dizziness. Lisinopril was on hold because of dizziness. Lasix was increased to 40 mg BID on early February. Overall she feels better.  She denies any lightheadedness or dizziness.  She intermittently has numbness and tingling sensation around her thoracic cage typically after the chemo but it usually disappear within 1 week.  Labs on 3/18/24 show creatinine 1.41, BUN 21.1, bicarb 32, potassium 3.7, bicarb 32. Albumin 2.3.  White blood cell 10.8, hemoglobin 13.5, platelet 333. UA showed small blood, glucose 150 and protein 300. UPCR and UACR pending.   5/16/24: In the interim, she continued with Zenia-CyBorD and so far has received 4 cycles. Her Cr has increased gradually and now at 1.90 on 5/14/24. UA showed large protein with hematuria, RBC 5 and WBC 8. Granular cast 8. Serum albumin improved slightly to 2.4. UPCR is stable at 16.04 on 5/8/24 and 24 hour urine protein was 17 g/d. On 4/19/24 Kappa 1.13, lambda 1.71 (8.31 upon diagnosis) with monoclonal protein IgG kappa 0.1 (could be zenia peak). Today, she does not feel good. She has several SE with chemo: feeling nausea, lightheadedness or dizziness. She has no diarrhea. Swelling has been  better controlled.  She also has chest pain and just went to [facility].Cardiology thought that chest pain could be from hypotension so she was started on midodrine 2.5 mg for 1 week. She will start work-up next week for BMT. They plan to possibly do BMT on early June. Home BP are 107-117/60-70. She feels lightheadedness with exertion. I stopped Jardiance due to rising in Cr and hypotension.   7/8/24: In the interim, the patient underwent PBSCT, D0 on 6/12/24 with melphalan induction.  She has significant mucositis nausea vomiting and diarrhea.  Her Lasix and spironolactone were on hold due to orthostatic hypotension and decreased p.o. intake.  She is on acyclovir and fluconazole prophylaxis. She has been receiving IVF for orthostatic hypotension. Today, she is a little bit tired today. She is slowly getting better every day. She does not feel lightheadedness or dizziness. Her swelling has comes back last night considerably mostly at her feet. She still feels nausea intermittently. She has diarrhea this week and has to take imodium. She takes midodrine 10 mg TID. Labs on 7/5/2024 showed sodium 133, potassium 3.7, bicarb 22, creatinine 1.43, GFR 43, calcium 7.3, albumin 1.9.  Hemoglobin 8.6 white blood cell 4.2 and platelet 38.  10/15/24: I called Mary Ann to discuss with her about the progression of her CKD. I shareed with her that I have been in communication with Dr. Chandler about her case. I doubt that CKD progression is from amyloid but rather hypercalcemia. She has no problem with urination. Ca was 12.3. PTH is low normal at 18, Vit D 27 and PTHrP mildly low at 7.4 ([facility] lab later came back 6.7, (nl <4.2)) PET showed no bony lesion or lesion concerning for cancer. Still unclear why she has hypercalcemia. Not taking vitamin D for about 2 months now. Not taking calcium supplement. We gave NS 1 L and Pamidronate 30 mg x 1 dose (on 10/16/24) and repeat labs in 1 week. Repeat iCa, PTHrP ([facility] send out, came back 6.7, mild  abnormal) and check 1,25 dihydroxy vit D which was low at 9.2.   12/9/24: Since pamidronate, her calcium level has improved and now down to 9.1 (corrected 9.9) from peak at 12.3 in October 2024.  Her creatinine also improved and down to 1.94 from peak of 2.97.  She has a cold 3 weeks ago but now feeling better.  She has been feeling nauseous for the past weekend but has resolved today. She has leg swelling last week but this week is better, only sock dent. She has not been taking blood pressure lately but BP was 97/63 mmHg last visit. She does not feel dizziness or lightheadedness.   Labs on 12/9/24 showed creatinine 1.94, GFR 30, potassium 4.5, dioxide 22, calcium 9.1, albumin 3.0.  Hemoglobin 10, less than 6.8 and platelets 227.  UA shows small blood and RBC 3 cells.  UPCR 8.34 g/g.     3/27/25: In the interim, we skipped pamidronate on 1/14/25 due to normocalcemia. She has been doing well. However, swelling in right foot come back slightly. Her BP has been good. She does not feel dizzy. She is not on any chemotherapy currently.     Labs on 3/20/2025 showed creatinine 1.79, BUN 35.3, potassium 4.5, bicarb 25, GFR 33, calcium 10.1, phosphorus 3.9, albumin 3.5.  Vitamin D 19.  Hemoglobin 11.4 and platelet 265.  UA shows small blood. UPCR 7.31, improving.  Immunofixation showed IgG kappa with peaks 0.1.  Kappa 2.15 and lambda 2.16 with ratio 1.0.  PTH 17. PTHrP pending. Ionized calcium is 5.8.     Past medical history  Past Medical History:   Diagnosis Date     Celiac disease      Family history of colon cancer     Colonoscoy q5 years next 9/2020     Past surgical history  Past Surgical History:   Procedure Laterality Date     BONE MARROW BIOPSY, BONE SPECIMEN, NEEDLE/TROCAR Left 5/24/2024    Procedure: BIOPSY, BONE MARROW;  Surgeon: Sabrina Jain NP;  Location: [facility] OR     COLONOSCOPY N/A 9/28/2015    Procedure: COLONOSCOPY;  Surgeon: Eugenio Travis MD;  Location:  GI     ESOPHAGOSCOPY, GASTROSCOPY,  DUODENOSCOPY (EGD), COMBINED N/A 1/30/2024    Procedure: ESOPHAGOGASTRODUODENOSCOPY, WITH BIOPSY;  Surgeon: Melo Cloud MD;  Location: [facility]     ESOPHAGOSCOPY, GASTROSCOPY, DUODENOSCOPY (EGD), COMBINED N/A 9/18/2024    Procedure: ESOPHAGOGASTRODUODENOSCOPY, WITH BIOPSY;  Surgeon: Melo Cloud MD;  Location: [facility]     IR CVC TUNNEL PLACEMENT > 5 YRS OF AGE  6/5/2024     IR CVC TUNNEL REMOVAL RIGHT  7/15/2024     IR RENAL BIOPSY RIGHT  12/21/2023     TONSILLECTOMY       Review of Systems:   14 systems were reviewed and all negative except as mentioned above.   Current Medications:  Current Outpatient Medications   Medication Sig Dispense Refill     acyclovir (ZOVIRAX) 400 MG tablet Take 2 tablets (800 mg) by mouth 2 times daily 120 tablet 11     levothyroxine (SYNTHROID) 200 MCG tablet Take 200 mcg by mouth daily. Pt takes Synthroid brand name. FRANKI Taking 175mg       liothyronine (CYTOMEL) 5 MCG tablet Take 5 mcg by mouth 2 times daily       OLANZapine (ZYPREXA) 2.5 MG tablet Take 1 tablet (2.5 mg) by mouth at bedtime. (Patient not taking: Reported on 3/25/2025) 30 tablet 0     sulfamethoxazole-trimethoprim (BACTRIM DS) 800-160 MG tablet Take 1 tablet by mouth Every Mon, Tues two times daily 16 tablet 11     No current facility-administered medications for this visit.       Physical Exam:   There were no vitals taken for this visit.   There is no height or weight on file to calculate BMI.     + mild pitting edema on the right. No respiratory distress.     Labs:   All labs reviewed by me  Last Renal Panel:  Sodium   Date Value Ref Range Status   03/24/2025 140 135 - 145 mmol/L Final   02/24/2021 140 133 - 144 mmol/L Final     Potassium   Date Value Ref Range Status   03/24/2025 4.5 3.4 - 5.3 mmol/L Final   12/19/2022 3.9 3.4 - 5.3 mmol/L Final   02/24/2021 4.3 3.4 - 5.3 mmol/L Final     Chloride   Date Value Ref Range Status   03/24/2025 107 98 - 107 mmol/L Final   12/19/2022 108 94 - 109 mmol/L Final    02/24/2021 107 94 - 109 mmol/L Final     Carbon Dioxide   Date Value Ref Range Status   02/24/2021 29 20 - 32 mmol/L Final     Carbon Dioxide (CO2)   Date Value Ref Range Status   03/24/2025 25 22 - 29 mmol/L Final   12/19/2022 33 (H) 20 - 32 mmol/L Final     Anion Gap   Date Value Ref Range Status   03/24/2025 8 7 - 15 mmol/L Final   12/19/2022 2 (L) 3 - 14 mmol/L Final   02/24/2021 4 3 - 14 mmol/L Final     Glucose   Date Value Ref Range Status   03/24/2025 106 (H) 70 - 99 mg/dL Final   05/29/2024 62 (A) 70 - 99 mg/dL Final     Urea Nitrogen   Date Value Ref Range Status   03/24/2025 35.3 (H) 6.0 - 20.0 mg/dL Final   12/19/2022 15 7 - 30 mg/dL Final   02/24/2021 16 7 - 30 mg/dL Final     Creatinine   Date Value Ref Range Status   03/24/2025 1.79 (H) 0.51 - 0.95 mg/dL Final   02/24/2021 0.80 0.52 - 1.04 mg/dL Final     GFR Estimate   Date Value Ref Range Status   03/24/2025 33 (L) >60 mL/min/1.73m2 Final     Comment:     eGFR calculated using 2021 CKD-EPI equation.   02/24/2021 85 >60 mL/min/[1.73_m2] Final     Comment:     Non  GFR Calc  Starting 12/18/2018, serum creatinine based estimated GFR (eGFR) will be   calculated using the Chronic Kidney Disease Epidemiology Collaboration   (CKD-EPI) equation.       Calcium   Date Value Ref Range Status   03/24/2025 10.1 8.8 - 10.4 mg/dL Final   02/24/2021 9.2 8.5 - 10.1 mg/dL Final     Phosphorus   Date Value Ref Range Status   03/24/2025 3.9 2.5 - 4.5 mg/dL Final     Albumin   Date Value Ref Range Status   03/24/2025 3.5 3.5 - 5.2 g/dL Final   12/19/2022 2.2 (L) 3.4 - 5.0 g/dL Final   02/24/2021 3.5 3.4 - 5.0 g/dL Final       Imaging:  I reviewed imaging studies.     Assessment & Recommendations:   Problem list  # Lambda LC AL involved: kidneys, cardiac,  BmBx; stage I based on Hall 2012 staging s/p Zenia-CyBorD C1D1 1/26/24; and PBSCT with melphalan induction on 6/12/24  # Renal-proven AL amyloidosis  # Nephrotic syndrome  # Borderline Hypotension  #  Family Hx of MM and MDS (maternal aunt and uncle) and HL (paternal uncle)  # CKD stage 3 2/2 lambda LC AL  The patient presented with fatigue and swelling since 2022. Noted hypoalbuminemia since 12/22. UPCR 12.5g with Salb 2.2 mg/dl. Cr 1.1 with eGFR 59. She has kidney Bx which showed lambda AL involved mainly glomeruli, some mesangium and vessels. Of note kidney sizes are normal on US.  Echo 1/3/23 showed mild LVH and RVH with strained pattern suggestive of possible amyloidosis.  She is at stage I per SANTO 2012 classification. She has bone marrow biopsy done which showed lambda light chain restricted plasma cells less than 5%.  She was started on Zenia CyBorD C1 D1 on 1/26/24. Now her LC has come down and attained at least VGPR.  He still has positive immunofixation but that was IgG kappa which could be from Zenia. If IgG lambda was negative and negative monofixation in the urine then she would be having CR.  Now she is approved for ASCT and she hao start the work-uo process in late May 2024.     For treatment of nephrotic syndrome, she was on lasix 20 mg daily and lisinopril 2.5 mg daily.  Lisinopril was discontinued due to dizziness.  And cardiology has adjusted medication and increase Lasix to 40 mg twice a day.  She was also started on Jardiance 10 mg daily on 2/3/2024 and spironolactone on 2/28/24.  I noticed that her creatinine has increased from 1-1.1 to 1.2-1.4.  I doubt that this is from progression of AL amyloidosis given significant improvement in hematological parameters.  This is likely due to hemodynamics effects of Jardiance and spironolactone and increasing Lasix.  This regimen has improved her swelling so I plan not to change any regimen at this time.   Cr has increased to ~ 1.5 and now 1.9 on 5/14/24 so I stopped Jardince in May 2024. She underwent PBSCT on 6/12/24 c/b orthostatics and her midodrine has been increased to 10 mg 3 times daily.  Later on midodrine was stopped as well as lasix and  spironolactone. In September 2024, she started to have hyperkalemia and 11.9 along with progressive CHRIS.  Hypercalcemia peaked at 12.3 on 10/10/2024 with creatinine 2.97 on the same day.  Extensive workup of hypercalcemia was done and the only thing that was positive was PTHrP.  She was treated with pamidronate 30 mg IV x1 on 10/16/24 with resolution in hypercalcemia and improvement in serum creatinine.  At this time her calcium started to increase again with ionized calcium 5.8 so we will repeat the dose of pamidronate 30 mg.  From a kidney standpoint, her creatinine has improved to 1.74 with EGFR 33.  Her UA shows small blood and UPCR has improved with proteinuria down from 8.38 to 7.31 g/g.  More importantly her serum albumin is normal for the first time is 3.5.  The patient still had small monoclonal peak at 0.1 by with IgG kappa but her amyloid was lambda light chain. Still in at least VGPR and not on maintenance at this time.       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 She was on on Vit D 5000 U daily. Calcium was 10 but corrected calcium was 11.28! So I asked her to reduce vitamin D to 2000 U daily.  Onset of hypercalcemia in September 2024 and peaked at 12.3. 1,25 vit D was low. PTHrP ([place]) was 6.5. PET CT neg. unclear what is the source of PTH RP secretion but there are some case reports of AL associated with PThrP. S/p pamidronate on 10/16/2024  With improvement in serum calcium but started to increase again.  Ionized calcium 5.8 and total calcium 10.1.  PTH 17 and vitamin D mildly low at 19.  - Will  give pamidronate 30 mg IV x 1 (last dose 10/16/2024)  - Pending PTHrP  -Will monitor vitamin D next visit, if continues to get lower we may start lower dose of vitamin D  # Mild hyponatremia; resolved  Likely from SIADH and nausea. Na now normalized.   # Hypothyroidism  TPO ab is positive so Dx with Hashimoto's. On thyroid supplement presently. Qondering wheter this could be from amyloid as well.   # Celiac disease Bx proven  She had EGD but no Amyloid presence.   # Elevated ALP  # Elevated ALT  Unlikely related to CKD. BMT team is monitoring.     Follow-up in 3 months with labs    The longitudinal plan of care for Renal AL, nephrotic syndrome, hypotension, low Vit D, CKD 3 was addressed during this visit. Due to the added complexity in care, I will continue to support Vivian Brown  in the subsequent management of this condition(s) and with the ongoing continuity of care of this condition(s).    I spent  40 minutes on the date of the encounter doing chart review, history and exam, documentation and further activities as noted above. 18 (4.12-4.30) minutes of this visit is dedicated to direct patient interaction via video/phone     Tracie Dobson MD on 03/27/2025            Again, thank you for allowing me to participate in the care of your patient.      Sincerely,    Tracie Dobson MD

## 2025-03-27 NOTE — PROGRESS NOTES
Virtual Visit Details    Type of service:  Video Visit 4.12-4.30    Originating Location (pt. Location): Home    Distant Location (provider location):  On-site  Platform used for Video Visit: Robert  Nephrology Progress Note  03/27/2025     Chief complaint: Follow up AL amyloidosis  History of Present Illness:    Vivian Brown is a 55 year old F  with hypothyroidism, who is here for AL amyloidosis follow-up.      The patient initially presented with fatigue and LE edema for about 2 years. Initially, she was diagnosed with Hashimoto's and was started on thyroid supplement in August 2023. Symptoms has partially improved. Later on she was found to have hypoalbuminemia dn massiv eproteinuria hence was referred to Dr. Sen. She saw Dr. Sen in November 2023 and was started on Lisinopril 2.5 mg and lasix 20 mg per day. I 1 week, she has lsot 20 Lbs and swelling has been better and remained in control.  She underwent a kidney Bx on 12/21/23. The Bx read by Dr. Garcia which showed Amyloidosis of the kidney, AL-type, lambda light chain-restricted, affecting the glomeruli, interstitium, and arterioles. She has mild chronic changes of the parenchyma, including: focal global glomerulosclerosis (3% of glomeruli), focal tubular atrophy and interstitial fibrosis (5% of the cortex), severe arterial sclerosis.   LM: There were 37 glomeruli (LM-29; IF-7; EM-1), of which 1 is globally sclerosed. The non-sclerosed glomeruli are of normal size and cellularity. Significant endocapillary proliferation or cellular crescents are not seen in the glomeruli. The glomeruli show deposits of amorphous, homogeneous, acellular, eosinophilic material that infiltrates and expands the mesangial areas and extends down the capillaries of the glomerular tuft. Tubules are well-preserved and show no signs of acute injury. Obvious signs of a viral cytopathic effect are not seen in the sample. No oxalate, urate, or phosphate crystals are present in  the sample. The interstitium reveals occasional collection of foam cells and occasional deposits of amorphous material. The interstitium contains mild interstitial inflammation in areas of atrophy, and the infiltrates are composed of mononuclear cells. About 5% of the renal cortex shows tubular atrophy and interstitial fibrosis. Arteries show severe sclerosis. Examination of a Congo red-stained section reveals deposits of amorphous material with apple-green birefringence under polarized light, in all glomeruli and focally in arterioles and interstitium. EM: showed 1 glomerulus; 1 glomerulus is examined ultrastructurally. There is prominent deposition of fibrillary material in the mesangial areas, without significant hypercellularity.  There is focal effacement of visceral epithelial cell foot processes in areas of the glomerular basement membranes that show segmental widening of the subepithelial space and distortion of the lamina densa due to deposits of fibrillary material.  The mesangial and basement membrane deposits consist of non-branching, randomly arranged fibrils of 6 to 15 nm width. Glomerular endothelial cells show focal loss of their normal fenestrations. Tubules show no specific changes. Deposits are Congo red positive. The amyloid deposits affect the glomeruli extensively, and interstitium and arterioles focally. Other potential complications of paraproteins in the kidney are not seen, in particular, there is no evidence of light chain cast nephropathy, light chain deposition disease, or light chain tubulopathy.      For kidney function, she has baseline Cr of 0.9 in 12/19/22. However, Cr started to increase to 1.05 in 5/22/23 and now 1.11 on 11/28/23. Serum albumin was 3.5 in 2/24/21 and 2.2 in 12/19/22. Monoclonal work-up on 11/28/23 showed kappa 3.05, lambda 7.82 and K/L ratio 0.39 and L/K ratio 2.56. dFLC 4.77. Other serologies including MPO, PMND1 were negative. UPCR 12.50 mg/g. No 24 hr urine  "protein yet. UA showed small blood but no RBC. She also has LCL of 174. TSH 29.16 and + Tissue TTG.      She has Echo on 1/3/24. There is mild concentric left ventricular hypertrophy and borderline RVH. Global peak LV longitudinal strain is averaged at -15.3%. This suggests abnormal strain (normal <-18%).The visual ejection fraction is estimated at 54-56%.Trivial posterior pericardial effusion Clinical history is listed as renal amyloid--on this echo the atria are normal size, the valves are not thickend but there is mild LVH and borderline RVH, there is questionable \"scintillation\" of LV myocardium, the global longitudinal strain is abnormal and the best strain values are at the apex (borderline \"apical sparing\" strain pattern) and there is trace pericardial effusion along with borderline criteria for diastolic dysfunction grade2--take together this could represent some features of cardiac amyloid. Additional studies such as cardiac MRI, cardiac biopsy etc may be useful. She is scheduled to see Cardiology on 1/31/24.      1/8/24: I saw her for consultation via video. She is with her  at home. Still feels fatigue and upset stomach last night.  She is currently on furosemide 20 mg daily, lisinopril 2.5 mg daily and crestor 10 mg daily. She reports having lower extremities edema for about 2 years. She was diagnosed with Hashimoto's in August 2023.  She has been feeling better after started taking thyroid supplement. She has leg swelling but not as bad as before. Furosemide was started in December 2023 and she has lost 20 Lbs wit that. She has seen more bubbly in the urineShe sometimes feels lightheadedness and dizziness and her BP was low sometimes. However, mostly > BP /60s. HR in the 80s.  BP today is 114/76, HR is 76.  She has no problem with swallowing, numbness or tingling sensation. No CTS symptoms. No tongue enlargement. No periorbital ecchymoses. No easy bruising. She has no other medical problem " before this. She has been active and exercise at least twice a week.   3/18/24: In the interim, she underwent BmBx on 1/18/24 which showed  lambda restricted plasma cell, 5%. Congo red positive in the BmBx. She was started on Zenia-CyBorD, C1D1 on 1/26/24. Now s/p 2 cycles, and will start 3rd cycle soon. ASCT is plan on 1st week of Vijaya. In the interim, she was started on Jardiance 2/3/24 and spironolactone on 2/29/24. She does not feel lightheadedness and dizziness. Lisinopril was on hold because of dizziness. Lasix was increased to 40 mg BID on early February. Overall she feels better.  She denies any lightheadedness or dizziness.  She intermittently has numbness and tingling sensation around her thoracic cage typically after the chemo but it usually disappear within 1 week.  Labs on 3/18/24 show creatinine 1.41, BUN 21.1, bicarb 32, potassium 3.7, bicarb 32. Albumin 2.3.  White blood cell 10.8, hemoglobin 13.5, platelet 333. UA showed small blood, glucose 150 and protein 300. UPCR and UACR pending.   5/16/24: In the interim, she continued with Zenia-CyBorD and so far has received 4 cycles. Her Cr has increased gradually and now at 1.90 on 5/14/24. UA showed large protein with hematuria, RBC 5 and WBC 8. Granular cast 8. Serum albumin improved slightly to 2.4. UPCR is stable at 16.04 on 5/8/24 and 24 hour urine protein was 17 g/d. On 4/19/24 Kappa 1.13, lambda 1.71 (8.31 upon diagnosis) with monoclonal protein IgG kappa 0.1 (could be zenia peak). Today, she does not feel good. She has several SE with chemo: feeling nausea, lightheadedness or dizziness. She has no diarrhea. Swelling has been better controlled.  She also has chest pain and just went to [facility].Cardiology thought that chest pain could be from hypotension so she was started on midodrine 2.5 mg for 1 week. She will start work-up next week for BMT. They plan to possibly do BMT on early June. Home BP are 107-117/60-70. She feels lightheadedness with  exertion. I stopped Jardiance due to rising in Cr and hypotension.   7/8/24: In the interim, the patient underwent PBSCT, D0 on 6/12/24 with melphalan induction.  She has significant mucositis nausea vomiting and diarrhea.  Her Lasix and spironolactone were on hold due to orthostatic hypotension and decreased p.o. intake.  She is on acyclovir and fluconazole prophylaxis. She has been receiving IVF for orthostatic hypotension. Today, she is a little bit tired today. She is slowly getting better every day. She does not feel lightheadedness or dizziness. Her swelling has comes back last night considerably mostly at her feet. She still feels nausea intermittently. She has diarrhea this week and has to take imodium. She takes midodrine 10 mg TID. Labs on 7/5/2024 showed sodium 133, potassium 3.7, bicarb 22, creatinine 1.43, GFR 43, calcium 7.3, albumin 1.9.  Hemoglobin 8.6 white blood cell 4.2 and platelet 38.  10/15/24: I called Mary Ann to discuss with her about the progression of her CKD. I shareed with her that I have been in communication with Dr. Chandler about her case. I doubt that CKD progression is from amyloid but rather hypercalcemia. She has no problem with urination. Ca was 12.3. PTH is low normal at 18, Vit D 27 and PTHrP mildly low at 7.4 ([place] lab later came back 6.7, (nl <4.2)) PET showed no bony lesion or lesion concerning for cancer. Still unclear why she has hypercalcemia. Not taking vitamin D for about 2 months now. Not taking calcium supplement. We gave NS 1 L and Pamidronate 30 mg x 1 dose (on 10/16/24) and repeat labs in 1 week. Repeat iCa, PTHrP ([place] send out, came back 6.7, mild abnormal) and check 1,25 dihydroxy vit D which was low at 9.2.   12/9/24: Since pamidronate, her calcium level has improved and now down to 9.1 (corrected 9.9) from peak at 12.3 in October 2024.  Her creatinine also improved and down to 1.94 from peak of 2.97.  She has a cold 3 weeks ago but now feeling better.  She has been  feeling nauseous for the past weekend but has resolved today. She has leg swelling last week but this week is better, only sock dent. She has not been taking blood pressure lately but BP was 97/63 mmHg last visit. She does not feel dizziness or lightheadedness.   Labs on 12/9/24 showed creatinine 1.94, GFR 30, potassium 4.5, dioxide 22, calcium 9.1, albumin 3.0.  Hemoglobin 10, less than 6.8 and platelets 227.  UA shows small blood and RBC 3 cells.  UPCR 8.34 g/g.     3/27/25: In the interim, we skipped pamidronate on 1/14/25 due to normocalcemia. She has been doing well. However, swelling in right foot come back slightly. Her BP has been good. She does not feel dizzy. She is not on any chemotherapy currently.     Labs on 3/20/2025 showed creatinine 1.79, BUN 35.3, potassium 4.5, bicarb 25, GFR 33, calcium 10.1, phosphorus 3.9, albumin 3.5.  Vitamin D 19.  Hemoglobin 11.4 and platelet 265.  UA shows small blood. UPCR 7.31, improving.  Immunofixation showed IgG kappa with peaks 0.1.  Kappa 2.15 and lambda 2.16 with ratio 1.0.  PTH 17. PTHrP pending. Ionized calcium is 5.8.     Past medical history  Past Medical History:   Diagnosis Date    Celiac disease     Family history of colon cancer     Colonoscoy q5 years next 9/2020     Past surgical history  Past Surgical History:   Procedure Laterality Date    BONE MARROW BIOPSY, BONE SPECIMEN, NEEDLE/TROCAR Left 5/24/2024    Procedure: BIOPSY, BONE MARROW;  Surgeon: Sabrina Jain NP;  Location: [facility] OR    COLONOSCOPY N/A 9/28/2015    Procedure: COLONOSCOPY;  Surgeon: Eugenio Travis MD;  Location:  GI    ESOPHAGOSCOPY, GASTROSCOPY, DUODENOSCOPY (EGD), COMBINED N/A 1/30/2024    Procedure: ESOPHAGOGASTRODUODENOSCOPY, WITH BIOPSY;  Surgeon: Melo Cloud MD;  Location:  GI    ESOPHAGOSCOPY, GASTROSCOPY, DUODENOSCOPY (EGD), COMBINED N/A 9/18/2024    Procedure: ESOPHAGOGASTRODUODENOSCOPY, WITH BIOPSY;  Surgeon: Melo Cloud MD;  Location:  GI    IR  CVC TUNNEL PLACEMENT > 5 YRS OF AGE  6/5/2024    IR CVC TUNNEL REMOVAL RIGHT  7/15/2024    IR RENAL BIOPSY RIGHT  12/21/2023    TONSILLECTOMY       Review of Systems:   14 systems were reviewed and all negative except as mentioned above.   Current Medications:  Current Outpatient Medications   Medication Sig Dispense Refill    acyclovir (ZOVIRAX) 400 MG tablet Take 2 tablets (800 mg) by mouth 2 times daily 120 tablet 11    levothyroxine (SYNTHROID) 200 MCG tablet Take 200 mcg by mouth daily. Pt takes Synthroid brand name. FRANKI Taking 175mg      liothyronine (CYTOMEL) 5 MCG tablet Take 5 mcg by mouth 2 times daily      OLANZapine (ZYPREXA) 2.5 MG tablet Take 1 tablet (2.5 mg) by mouth at bedtime. (Patient not taking: Reported on 3/25/2025) 30 tablet 0    sulfamethoxazole-trimethoprim (BACTRIM DS) 800-160 MG tablet Take 1 tablet by mouth Every Mon, Tues two times daily 16 tablet 11     No current facility-administered medications for this visit.       Physical Exam:   There were no vitals taken for this visit.   There is no height or weight on file to calculate BMI.     + mild pitting edema on the right. No respiratory distress.     Labs:   All labs reviewed by me  Last Renal Panel:  Sodium   Date Value Ref Range Status   03/24/2025 140 135 - 145 mmol/L Final   02/24/2021 140 133 - 144 mmol/L Final     Potassium   Date Value Ref Range Status   03/24/2025 4.5 3.4 - 5.3 mmol/L Final   12/19/2022 3.9 3.4 - 5.3 mmol/L Final   02/24/2021 4.3 3.4 - 5.3 mmol/L Final     Chloride   Date Value Ref Range Status   03/24/2025 107 98 - 107 mmol/L Final   12/19/2022 108 94 - 109 mmol/L Final   02/24/2021 107 94 - 109 mmol/L Final     Carbon Dioxide   Date Value Ref Range Status   02/24/2021 29 20 - 32 mmol/L Final     Carbon Dioxide (CO2)   Date Value Ref Range Status   03/24/2025 25 22 - 29 mmol/L Final   12/19/2022 33 (H) 20 - 32 mmol/L Final     Anion Gap   Date Value Ref Range Status   03/24/2025 8 7 - 15 mmol/L Final    12/19/2022 2 (L) 3 - 14 mmol/L Final   02/24/2021 4 3 - 14 mmol/L Final     Glucose   Date Value Ref Range Status   03/24/2025 106 (H) 70 - 99 mg/dL Final   05/29/2024 62 (A) 70 - 99 mg/dL Final     Urea Nitrogen   Date Value Ref Range Status   03/24/2025 35.3 (H) 6.0 - 20.0 mg/dL Final   12/19/2022 15 7 - 30 mg/dL Final   02/24/2021 16 7 - 30 mg/dL Final     Creatinine   Date Value Ref Range Status   03/24/2025 1.79 (H) 0.51 - 0.95 mg/dL Final   02/24/2021 0.80 0.52 - 1.04 mg/dL Final     GFR Estimate   Date Value Ref Range Status   03/24/2025 33 (L) >60 mL/min/1.73m2 Final     Comment:     eGFR calculated using 2021 CKD-EPI equation.   02/24/2021 85 >60 mL/min/[1.73_m2] Final     Comment:     Non  GFR Calc  Starting 12/18/2018, serum creatinine based estimated GFR (eGFR) will be   calculated using the Chronic Kidney Disease Epidemiology Collaboration   (CKD-EPI) equation.       Calcium   Date Value Ref Range Status   03/24/2025 10.1 8.8 - 10.4 mg/dL Final   02/24/2021 9.2 8.5 - 10.1 mg/dL Final     Phosphorus   Date Value Ref Range Status   03/24/2025 3.9 2.5 - 4.5 mg/dL Final     Albumin   Date Value Ref Range Status   03/24/2025 3.5 3.5 - 5.2 g/dL Final   12/19/2022 2.2 (L) 3.4 - 5.0 g/dL Final   02/24/2021 3.5 3.4 - 5.0 g/dL Final       Imaging:  I reviewed imaging studies.     Assessment & Recommendations:   Problem list  # Lambda LC AL involved: kidneys, cardiac,  BmBx; stage I based on Hall 2012 staging s/p Zenia-CyBorD C1D1 1/26/24; and PBSCT with melphalan induction on 6/12/24  # Renal-proven AL amyloidosis  # Nephrotic syndrome  # Borderline Hypotension  # Family Hx of MM and MDS (maternal aunt and uncle) and HL (paternal uncle)  # CKD stage 3 2/2 lambda LC AL  The patient presented with fatigue and swelling since 2022. Noted hypoalbuminemia since 12/22. UPCR 12.5g with Salb 2.2 mg/dl. Cr 1.1 with eGFR 59. She has kidney Bx which showed lambda AL involved mainly glomeruli, some mesangium  and vessels. Of note kidney sizes are normal on US.  Echo 1/3/23 showed mild LVH and RVH with strained pattern suggestive of possible amyloidosis.  She is at stage I per SANTO 2012 classification. She has bone marrow biopsy done which showed lambda light chain restricted plasma cells less than 5%.  She was started on Zenia CyBorD C1 D1 on 1/26/24. Now her LC has come down and attained at least VGPR.  He still has positive immunofixation but that was IgG kappa which could be from Zenia. If IgG lambda was negative and negative monofixation in the urine then she would be having CR.  Now she is approved for ASCT and she hao start the work-uo process in late May 2024.     For treatment of nephrotic syndrome, she was on lasix 20 mg daily and lisinopril 2.5 mg daily.  Lisinopril was discontinued due to dizziness.  And cardiology has adjusted medication and increase Lasix to 40 mg twice a day.  She was also started on Jardiance 10 mg daily on 2/3/2024 and spironolactone on 2/28/24.  I noticed that her creatinine has increased from 1-1.1 to 1.2-1.4.  I doubt that this is from progression of AL amyloidosis given significant improvement in hematological parameters.  This is likely due to hemodynamics effects of Jardiance and spironolactone and increasing Lasix.  This regimen has improved her swelling so I plan not to change any regimen at this time.   Cr has increased to ~ 1.5 and now 1.9 on 5/14/24 so I stopped Jardince in May 2024. She underwent PBSCT on 6/12/24 c/b orthostatics and her midodrine has been increased to 10 mg 3 times daily.  Later on midodrine was stopped as well as lasix and spironolactone. In September 2024, she started to have hyperkalemia and 11.9 along with progressive CHRIS.  Hypercalcemia peaked at 12.3 on 10/10/2024 with creatinine 2.97 on the same day.  Extensive workup of hypercalcemia was done and the only thing that was positive was PTHrP.  She was treated with pamidronate 30 mg IV x1 on 10/16/24 with  resolution in hypercalcemia and improvement in serum creatinine.  At this time her calcium started to increase again with ionized calcium 5.8 so we will repeat the dose of pamidronate 30 mg.  From a kidney standpoint, her creatinine has improved to 1.74 with EGFR 33.  Her UA shows small blood and UPCR has improved with proteinuria down from 8.38 to 7.31 g/g.  More importantly her serum albumin is normal for the first time is 3.5.  The patient still had small monoclonal peak at 0.1 by with IgG kappa but her amyloid was lambda light chain. Still in at least VGPR and not on maintenance at this time.       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 She was on on Vit D 5000 U daily. Calcium was 10 but corrected calcium was 11.28! So I asked her to reduce vitamin D to 2000 U daily.  Onset of hypercalcemia in September 2024 and peaked at 12.3. 1,25 vit D was low. PTHrP ([place]) was 6.5. PET CT neg. unclear what is the source of PTH RP secretion but there are some case reports of AL associated with PThrP. S/p pamidronate on 10/16/2024  With improvement in serum calcium but started to increase again.  Ionized calcium 5.8 and total calcium 10.1.  PTH 17 and vitamin D mildly low at 19.  - Will give pamidronate 30 mg IV x 1 (last dose 10/16/2024)  - Pending PTHrP  -Will monitor vitamin D next visit, if continues to get lower we may start lower dose of vitamin D  # Mild hyponatremia; resolved  Likely from SIADH and nausea. Na now normalized.   # Hypothyroidism  TPO ab is positive so Dx with Hashimoto's. On thyroid supplement  presently. Qondering wheter this could be from amyloid as well.   # Celiac disease Bx proven  She had EGD but no Amyloid presence.   # Elevated ALP  # Elevated ALT  Unlikely related to CKD. BMT team is monitoring.     Follow-up in 3 months with labs    The longitudinal plan of care for Renal AL, nephrotic syndrome, hypotension, low Vit D, CKD 3 was addressed during this visit. Due to the added complexity in care, I will continue to support Vivian Brown  in the subsequent management of this condition(s) and with the ongoing continuity of care of this condition(s).    I spent  40 minutes on the date of the encounter doing chart review, history and exam, documentation and further activities as noted above. 18 (4.12-4.30) minutes of this visit is dedicated to direct patient interaction via video/phone     Tracie Dobson MD on 03/27/2025

## 2025-03-27 NOTE — PATIENT INSTRUCTIONS
Your calcium started to rise again so we will repeat the dose of pamidronate  No change in order medication at this time  We will see you again in 3 months

## 2025-03-27 NOTE — NURSING NOTE
Current patient location: Patient declined to provide     Is the patient currently in the state of [place]? YES    Visit mode: VIDEO    If the visit is dropped, the patient can be reconnected by:VIDEO VISIT: Text to cell phone:   Telephone Information:   Mobile [phone]       Will anyone else be joining the visit? NO  (If patient encounters technical issues they should call [phone])    Are changes needed to the allergy or medication list? No    Are refills needed on medications prescribed by this physician? NO    Rooming Documentation:  Questionnaire(s) completed    Reason for visit: RECHECK (F/U)    Carmen ESCOBEDO

## 2025-03-31 ENCOUNTER — MYC MEDICAL ADVICE (OUTPATIENT)
Dept: NEPHROLOGY | Facility: CLINIC | Age: 55
End: 2025-03-31
Payer: COMMERCIAL

## 2025-04-02 NOTE — TELEPHONE ENCOUNTER
Update by Dr. Dobson of need to redose pamidronate 30 mg IV x 1.  MyChart sent to patient to schedule infusion in 2-3 wks time to allow

## 2025-04-03 ENCOUNTER — ENROLLMENT (OUTPATIENT)
Dept: HOME HEALTH SERVICES | Facility: HOME HEALTH | Age: 55
End: 2025-04-03
Payer: COMMERCIAL

## 2025-04-03 LAB — PTH RELATED PROT SERPL-SCNC: 6.9 PMOL/L

## 2025-04-07 ENCOUNTER — INFUSION THERAPY VISIT (OUTPATIENT)
Dept: INFUSION THERAPY | Facility: CLINIC | Age: 55
End: 2025-04-07
Attending: INTERNAL MEDICINE
Payer: COMMERCIAL

## 2025-04-07 VITALS
SYSTOLIC BLOOD PRESSURE: 131 MMHG | RESPIRATION RATE: 16 BRPM | HEART RATE: 75 BPM | OXYGEN SATURATION: 98 % | TEMPERATURE: 97.8 F | DIASTOLIC BLOOD PRESSURE: 84 MMHG

## 2025-04-07 DIAGNOSIS — E83.52 HYPERCALCEMIA: Primary | ICD-10-CM

## 2025-04-07 PROCEDURE — 258N000003 HC RX IP 258 OP 636: Performed by: INTERNAL MEDICINE

## 2025-04-07 PROCEDURE — 250N000011 HC RX IP 250 OP 636: Mod: JZ | Performed by: INTERNAL MEDICINE

## 2025-04-07 PROCEDURE — 96365 THER/PROPH/DIAG IV INF INIT: CPT

## 2025-04-07 PROCEDURE — 96361 HYDRATE IV INFUSION ADD-ON: CPT

## 2025-04-07 PROCEDURE — 99207 PR NO CHARGE LOS: CPT

## 2025-04-07 PROCEDURE — 96366 THER/PROPH/DIAG IV INF ADDON: CPT

## 2025-04-07 RX ORDER — MEPERIDINE HYDROCHLORIDE 25 MG/ML
25 INJECTION INTRAMUSCULAR; INTRAVENOUS; SUBCUTANEOUS EVERY 30 MIN PRN
OUTPATIENT
Start: 2025-04-14

## 2025-04-07 RX ORDER — ALBUTEROL SULFATE 0.83 MG/ML
2.5 SOLUTION RESPIRATORY (INHALATION)
OUTPATIENT
Start: 2025-04-14

## 2025-04-07 RX ORDER — ALBUTEROL SULFATE 90 UG/1
1-2 INHALANT RESPIRATORY (INHALATION)
Start: 2025-04-14

## 2025-04-07 RX ORDER — HEPARIN SODIUM (PORCINE) LOCK FLUSH IV SOLN 100 UNIT/ML 100 UNIT/ML
5 SOLUTION INTRAVENOUS
OUTPATIENT
Start: 2025-04-14

## 2025-04-07 RX ORDER — METHYLPREDNISOLONE SODIUM SUCCINATE 125 MG/2ML
125 INJECTION INTRAMUSCULAR; INTRAVENOUS
Start: 2025-04-14

## 2025-04-07 RX ORDER — HEPARIN SODIUM,PORCINE 10 UNIT/ML
5-20 VIAL (ML) INTRAVENOUS DAILY PRN
OUTPATIENT
Start: 2025-04-14

## 2025-04-07 RX ORDER — EPINEPHRINE 1 MG/ML
0.3 INJECTION, SOLUTION INTRAMUSCULAR; SUBCUTANEOUS EVERY 5 MIN PRN
OUTPATIENT
Start: 2025-04-14

## 2025-04-07 RX ORDER — DIPHENHYDRAMINE HYDROCHLORIDE 50 MG/ML
50 INJECTION, SOLUTION INTRAMUSCULAR; INTRAVENOUS
Start: 2025-04-14

## 2025-04-07 RX ADMIN — PAMIDRONATE DISODIUM 30 MG: 3 INJECTION, SOLUTION INTRAVENOUS at 13:58

## 2025-04-07 RX ADMIN — SODIUM CHLORIDE 1000 ML: 9 INJECTION, SOLUTION INTRAVENOUS at 11:59

## 2025-04-07 NOTE — PROGRESS NOTES
Infusion Nursing Note:  Vivian Brown presents today for [place].    Patient seen by provider today: No   present during visit today: Not Applicable.    Note:  Patient reports continued fatigue to along with intermittent episodes of nausea. Managing both well but eager to have her infusion today.     Patient stated that she is not taking Calcium and Vitamin D currently because her calcium is too high. Discussed taking vitamin D only with her provider but it would raise her calcium even higher.     Patient also shared that she is debating switching to [facility] or to their speciality infusion clinic in [place]. She is unsure which route she will go for her next infusion appt. Encouraged her to get one scheduled with our infusion center in advance to be proactive. Patient verbalized understanding.     Intravenous Access:  Peripheral IV placed.    Treatment Conditions:  Lab Results   Component Value Date    HGB 11.4 (L) 03/24/2025    WBC 7.1 03/24/2025    ANEU 4.9 07/19/2024    ANEUTAUTO 3.9 12/17/2024     03/24/2025        Lab Results   Component Value Date     03/24/2025    POTASSIUM 4.5 03/24/2025    MAG 2.0 12/17/2024    CR 1.79 (H) 03/24/2025    TANNER 10.1 03/24/2025    BILITOTAL 0.3 03/24/2025    ALBUMIN 3.5 03/24/2025    ALT 62 (H) 03/24/2025    AST 33 03/24/2025       Results reviewed, labs MET treatment parameters, ok to proceed with treatment.      Post Infusion Assessment:  Patient tolerated infusion without incident.  Blood return noted pre and post infusion.  Site patent and intact, free from redness, edema or discomfort.  No evidence of extravasations.  Access discontinued per protocol.       Discharge Plan:   Discharge instructions reviewed with: Patient.  Patient and/or family verbalized understanding of discharge instructions and all questions answered.  Patient discharged in stable condition accompanied by: self.  Departure Mode: Ambulatory.      Aminata Judd RN

## 2025-04-21 ENCOUNTER — MYC MEDICAL ADVICE (OUTPATIENT)
Dept: GASTROENTEROLOGY | Facility: CLINIC | Age: 55
End: 2025-04-21
Payer: COMMERCIAL

## 2025-04-28 ENCOUNTER — OFFICE VISIT (OUTPATIENT)
Dept: GASTROENTEROLOGY | Facility: CLINIC | Age: 55
End: 2025-04-28
Attending: INTERNAL MEDICINE
Payer: COMMERCIAL

## 2025-04-28 VITALS
HEIGHT: 64 IN | BODY MASS INDEX: 23.22 KG/M2 | SYSTOLIC BLOOD PRESSURE: 118 MMHG | WEIGHT: 136 LBS | DIASTOLIC BLOOD PRESSURE: 62 MMHG | TEMPERATURE: 97 F

## 2025-04-28 DIAGNOSIS — K90.0 CELIAC DISEASE: Primary | ICD-10-CM

## 2025-04-28 PROCEDURE — 99213 OFFICE O/P EST LOW 20 MIN: CPT | Performed by: INTERNAL MEDICINE

## 2025-04-28 PROCEDURE — 1126F AMNT PAIN NOTED NONE PRSNT: CPT | Performed by: INTERNAL MEDICINE

## 2025-04-28 PROCEDURE — 3074F SYST BP LT 130 MM HG: CPT | Performed by: INTERNAL MEDICINE

## 2025-04-28 PROCEDURE — 3078F DIAST BP <80 MM HG: CPT | Performed by: INTERNAL MEDICINE

## 2025-04-28 ASSESSMENT — PAIN SCALES - GENERAL: PAINLEVEL_OUTOF10: NO PAIN (0)

## 2025-04-28 NOTE — PROGRESS NOTES
Gastroenterology return    55-year-old with history of celiac disease and patient's been gluten free.  Previous labs vitamin A vitamin D B12 folate ferritin and iron have been within normal limits.  This fall TTG was improved at 0.5.  Patient is eligible for future vaccinations in June we discussed pneumonia vaccines.  Suggest PCV 21 with broader serotype coverage although other available pneumonia vaccines would be reasonable.  Handout from up-to-date for hidden sources of gluten.  Patient notes bloating has resolved.  Will be due for follow-up upper endoscopy in January 2026.    Past medical history reviewed on epic    Medications reviewed on epic    Allergies reviewed on [facility]    No exam was performed    Impression/Plan: History of amyloidosis.  History of stem cell transplant.  Celiac disease.  Continue gluten-free.  Will add zinc level to labs for June.  Pneumonia vaccine when able in June.  Return to clinic 6 to 9 months.  EGD January 2026.  Discussed

## 2025-04-28 NOTE — LETTER
4/28/2025      Vivian Brown  [address] [facility] [address]      Dear Colleague,    Thank you for referring your patient, Vivian Brown, to the [facility]. Please see a copy of my visit note below.    Gastroenterology return    55-year-old with history of celiac disease and patient's been gluten free.  Previous labs vitamin A vitamin D B12 folate ferritin and iron have been within normal limits.  This fall TTG was improved at 0.5.  Patient is eligible for future vaccinations in June we discussed pneumonia vaccines.  Suggest PCV 21 with broader serotype coverage although other available pneumonia vaccines would be reasonable.  Handout from up-to-date for hidden sources of gluten.  Patient notes bloating has resolved.  Will be due for follow-up upper endoscopy in January 2026.    Past medical history reviewed on epic    Medications reviewed on epic    Allergies reviewed on [facility]    No exam was performed    Impression/Plan: History of amyloidosis.  History of stem cell transplant.  Celiac disease.  Continue gluten-free.  Will add zinc level to labs for June.  Pneumonia vaccine when able in June.  Return to clinic 6 to 9 months.  EGD January 2026.  Discussed      Again, thank you for allowing me to participate in the care of your patient.        Sincerely,        Melo Cloud MD    Electronically signed

## 2025-04-28 NOTE — PATIENT INSTRUCTIONS
Discussed    Up-to-date handout of items with hidden gluten    Added lab test for June labs    Recommend pneumonia vaccine when able in June-favor PCV 21    Return to clinic 6 to 9 months.  Due for upper endoscopy January 2026

## 2025-05-16 ENCOUNTER — RESULTS FOLLOW-UP (OUTPATIENT)
Dept: FAMILY MEDICINE | Facility: OTHER | Age: 55
End: 2025-05-16

## 2025-05-16 PROBLEM — Z97.5 IUD (INTRAUTERINE DEVICE) IN PLACE: Status: ACTIVE | Noted: 2025-05-16

## 2025-05-19 ENCOUNTER — PATIENT OUTREACH (OUTPATIENT)
Dept: CARE COORDINATION | Facility: CLINIC | Age: 55
End: 2025-05-19
Payer: COMMERCIAL

## 2025-05-19 ENCOUNTER — RESULTS FOLLOW-UP (OUTPATIENT)
Dept: GASTROENTEROLOGY | Facility: CLINIC | Age: 55
End: 2025-05-19

## 2025-05-21 ENCOUNTER — PATIENT OUTREACH (OUTPATIENT)
Dept: CARE COORDINATION | Facility: CLINIC | Age: 55
End: 2025-05-21
Payer: COMMERCIAL

## 2025-06-05 ENCOUNTER — PRE VISIT (OUTPATIENT)
Dept: SURGERY | Facility: CLINIC | Age: 55
End: 2025-06-05

## 2025-06-06 ENCOUNTER — APPOINTMENT (OUTPATIENT)
Dept: LAB | Facility: CLINIC | Age: 55
End: 2025-06-06
Attending: STUDENT IN AN ORGANIZED HEALTH CARE EDUCATION/TRAINING PROGRAM
Payer: COMMERCIAL

## 2025-06-06 ENCOUNTER — ANCILLARY PROCEDURE (OUTPATIENT)
Dept: PET IMAGING | Facility: CLINIC | Age: 55
End: 2025-06-06
Attending: STUDENT IN AN ORGANIZED HEALTH CARE EDUCATION/TRAINING PROGRAM
Payer: COMMERCIAL

## 2025-06-06 ENCOUNTER — ANCILLARY PROCEDURE (OUTPATIENT)
Dept: CARDIOLOGY | Facility: CLINIC | Age: 55
End: 2025-06-06
Attending: STUDENT IN AN ORGANIZED HEALTH CARE EDUCATION/TRAINING PROGRAM
Payer: COMMERCIAL

## 2025-06-06 DIAGNOSIS — E85.81 AL AMYLOIDOSIS (H): ICD-10-CM

## 2025-06-06 LAB
GLUCOSE SERPL-MCNC: 85 MG/DL (ref 70–99)
LVEF ECHO: NORMAL

## 2025-06-06 PROCEDURE — 93356 MYOCRD STRAIN IMG SPCKL TRCK: CPT | Performed by: INTERNAL MEDICINE

## 2025-06-06 PROCEDURE — 93306 TTE W/DOPPLER COMPLETE: CPT | Performed by: INTERNAL MEDICINE

## 2025-06-06 RX ORDER — FLUDEOXYGLUCOSE F 18 200 MCI/ML
10-18 INJECTION, SOLUTION INTRAVENOUS ONCE
Status: COMPLETED | OUTPATIENT
Start: 2025-06-06 | End: 2025-06-06

## 2025-06-06 RX ADMIN — FLUDEOXYGLUCOSE F 18 13.15 MILLICURIE: 200 INJECTION, SOLUTION INTRAVENOUS at 10:31

## 2025-06-13 ENCOUNTER — OFFICE VISIT (OUTPATIENT)
Dept: TRANSPLANT | Facility: CLINIC | Age: 55
End: 2025-06-13
Attending: STUDENT IN AN ORGANIZED HEALTH CARE EDUCATION/TRAINING PROGRAM
Payer: COMMERCIAL

## 2025-06-13 VITALS
BODY MASS INDEX: 22.57 KG/M2 | RESPIRATION RATE: 16 BRPM | TEMPERATURE: 98.2 F | OXYGEN SATURATION: 98 % | WEIGHT: 133.8 LBS | DIASTOLIC BLOOD PRESSURE: 81 MMHG | SYSTOLIC BLOOD PRESSURE: 120 MMHG | HEART RATE: 79 BPM

## 2025-06-13 DIAGNOSIS — Z94.81 STATUS POST BONE MARROW TRANSPLANT (H): ICD-10-CM

## 2025-06-13 DIAGNOSIS — Z52.011 AUTOLOGOUS DONOR OF STEM CELLS: ICD-10-CM

## 2025-06-13 DIAGNOSIS — E85.81 AL AMYLOIDOSIS (H): Primary | ICD-10-CM

## 2025-06-13 PROCEDURE — 3074F SYST BP LT 130 MM HG: CPT | Performed by: STUDENT IN AN ORGANIZED HEALTH CARE EDUCATION/TRAINING PROGRAM

## 2025-06-13 PROCEDURE — G2211 COMPLEX E/M VISIT ADD ON: HCPCS | Performed by: STUDENT IN AN ORGANIZED HEALTH CARE EDUCATION/TRAINING PROGRAM

## 2025-06-13 PROCEDURE — 90697 DTAP-IPV-HIB-HEPB VACCINE IM: CPT | Performed by: STUDENT IN AN ORGANIZED HEALTH CARE EDUCATION/TRAINING PROGRAM

## 2025-06-13 PROCEDURE — 99213 OFFICE O/P EST LOW 20 MIN: CPT | Performed by: STUDENT IN AN ORGANIZED HEALTH CARE EDUCATION/TRAINING PROGRAM

## 2025-06-13 PROCEDURE — 90472 IMMUNIZATION ADMIN EACH ADD: CPT | Performed by: STUDENT IN AN ORGANIZED HEALTH CARE EDUCATION/TRAINING PROGRAM

## 2025-06-13 PROCEDURE — 1126F AMNT PAIN NOTED NONE PRSNT: CPT | Performed by: STUDENT IN AN ORGANIZED HEALTH CARE EDUCATION/TRAINING PROGRAM

## 2025-06-13 PROCEDURE — 250N000021 HC RX MED A9270 GY (STAT IND- M) 250: Performed by: STUDENT IN AN ORGANIZED HEALTH CARE EDUCATION/TRAINING PROGRAM

## 2025-06-13 PROCEDURE — 90677 PCV20 VACCINE IM: CPT | Performed by: STUDENT IN AN ORGANIZED HEALTH CARE EDUCATION/TRAINING PROGRAM

## 2025-06-13 PROCEDURE — 90750 HZV VACC RECOMBINANT IM: CPT | Performed by: STUDENT IN AN ORGANIZED HEALTH CARE EDUCATION/TRAINING PROGRAM

## 2025-06-13 PROCEDURE — 99215 OFFICE O/P EST HI 40 MIN: CPT | Performed by: STUDENT IN AN ORGANIZED HEALTH CARE EDUCATION/TRAINING PROGRAM

## 2025-06-13 PROCEDURE — 250N000011 HC RX IP 250 OP 636: Performed by: STUDENT IN AN ORGANIZED HEALTH CARE EDUCATION/TRAINING PROGRAM

## 2025-06-13 PROCEDURE — 90471 IMMUNIZATION ADMIN: CPT | Performed by: STUDENT IN AN ORGANIZED HEALTH CARE EDUCATION/TRAINING PROGRAM

## 2025-06-13 PROCEDURE — 3079F DIAST BP 80-89 MM HG: CPT | Performed by: STUDENT IN AN ORGANIZED HEALTH CARE EDUCATION/TRAINING PROGRAM

## 2025-06-13 PROCEDURE — G0009 ADMIN PNEUMOCOCCAL VACCINE: HCPCS | Performed by: STUDENT IN AN ORGANIZED HEALTH CARE EDUCATION/TRAINING PROGRAM

## 2025-06-13 RX ORDER — HEPARIN SODIUM (PORCINE) LOCK FLUSH IV SOLN 100 UNIT/ML 100 UNIT/ML
5 SOLUTION INTRAVENOUS
OUTPATIENT
Start: 2025-08-08

## 2025-06-13 RX ORDER — HEPARIN SODIUM,PORCINE 10 UNIT/ML
5-20 VIAL (ML) INTRAVENOUS DAILY PRN
OUTPATIENT
Start: 2025-08-08

## 2025-06-13 RX ADMIN — ZOSTER VACCINE RECOMBINANT, ADJUVANTED 0.5 ML: KIT at 15:26

## 2025-06-13 RX ADMIN — DIPHTHERIA AND TETANUS TOXOIDS AND ACELLULAR PERTUSSIS, INACTIVATED POLIOVIRUS, HAEMOPHILUS B CONJUGATE AND HEPATITIS B VACCINE 0.5 ML: 15; 5; 20; 20; 3; 5; 29; 7; 26; 10; 3 INJECTION, SUSPENSION INTRAMUSCULAR at 15:48

## 2025-06-13 RX ADMIN — PNEUMOCOCCAL 20-VALENT CONJUGATE VACCINE 0.5 ML
2.2; 2.2; 2.2; 2.2; 2.2; 2.2; 2.2; 2.2; 2.2; 2.2; 2.2; 2.2; 2.2; 2.2; 2.2; 2.2; 4.4; 2.2; 2.2; 2.2 INJECTION, SUSPENSION INTRAMUSCULAR at 15:47

## 2025-06-13 ASSESSMENT — PAIN SCALES - GENERAL: PAINLEVEL_OUTOF10: NO PAIN (0)

## 2025-06-13 NOTE — NURSING NOTE
Vaccines administered without complication per orders from Jomar Chandler MD. Patient tolerated well and was discharged. See MAR for details.        Lucy Anders on 6/13/2025 at 3:51 PM

## 2025-06-13 NOTE — NURSING NOTE
"Oncology Rooming Note    June 13, 2025 2:41 PM   Vivian Brown is a 55 year old female who presents for:    Chief Complaint   Patient presents with    Oncology Clinic Visit     Multiple Myeloma     Initial Vitals: /81   Pulse 79   Temp 98.2  F (36.8  C) (Oral)   Resp 16   Wt 60.7 kg (133 lb 12.8 oz)   SpO2 98%   BMI 22.57 kg/m   Estimated body mass index is 22.57 kg/m  as calculated from the following:    Height as of 5/16/25: 1.64 m (5' 4.57\").    Weight as of this encounter: 60.7 kg (133 lb 12.8 oz). Body surface area is 1.66 meters squared.  No Pain (0) Comment: Data Unavailable   No LMP recorded. (Menstrual status: IUD).  Allergies reviewed: Yes  Medications reviewed: Yes    Medications: Medication refills not needed today.  Pharmacy name entered into EPIC:    [facility] [place] [facility] - [place], [place] - [address] [place] [id number] - [place], [place] - [address] [id number] - [place], [place] - [address]  [facility] - [place], [place] [address]  [facility] - [place], [place] - [address]  [facility] - [place], [place] - [phone]    Frailty Screening:   Is the patient here for a new oncology consult visit in cancer care? 2. No    PHQ9:  Did this patient require a PHQ9?: No      Clinical concerns: None       SHERIN De León  6/13/2025              "

## 2025-06-13 NOTE — LETTER
6/13/2025      Vivian Brown  [address] [place] [address]      Dear Colleague,    Thank you for referring your patient, Vivian Brown, to the [facility]. Please see a copy of my visit note below.         BMT Progress Note    Disease presentation and baseline characteristics:   12/21/2023:  Final Diagnosis   A. kidney biopsy (needle):     Amyloidosis of the kidney, AL-type, lambda light chain-restricted, affecting the glomeruli, interstitium, and arterioles (see comment)     Mild chronic changes of the parenchyma, including:   - focal global glomerulosclerosis (3% of glomeruli)   - focal tubular atrophy and interstitial fibrosis (5% of the cortex)   - severe arterial sclerosis   Electronically signed by Joe Garcia MD on 12/23/2023 at  3:21 PM   Comment  UULAB   The biopsy reveals findings diagnostic of amyloidosis; the amyloid shows lambda light chain-restriction (AL amyloidosis), and the deposits are Congo red positive. The amyloid deposits affect the glomeruli extensively, and interstitium and arterioles focally. Other potential complications of paraproteins in the kidney are not seen, in particular, there is no evidence of light chain cast nephropathy, light chain deposition disease, or light chain tubulopathy.        Date Treatment Name Response Side Effects / Toxicities   1/19/24 D-CyBorD x 4 cycles     6/12/24 HDT/ASCT Hematologic CR           HPI:  Please see my entry above for hematologic history.  Mrs. Brown presents today to review her one year restaging studies.  She reports one episode of nausea/vomiting earlier this week but otherwise is feeling well.  Energy/appetite are good.  She denies abdominal pain, diarrhea/constipation, fevers/chills, tongue thickening or other acute concerns.    ASSESSMENT AND PLAN:  We reviewed restaging studies including labs, echo, BMB and PET which are consistent with hematologic CR.      She  has had elevated PTHrP and associated symptomatic hypercalcemia.  Her amyloid is in remission and workup for other malignancies has been unremarkable.  Recommended to stay up to date on routine cancer monitoring.      CKD is stable, follows with nephrology.  Moderate increase in alkaline phosphatase is likely due to increased bone turnover rather than amyloid.  Will check GGT     Very mild increase in ALT, monitor.    One year vaccines given today.    RTC with me in three months.    I spent 40 minutes in the care of this patient today, which included time necessary for preparation for the visit, obtaining history, ordering medications/tests/procedures as medically indicated, review of pertinent medical literature, counseling of the patient, communication of recommendations to the care team, and documentation time.    Jomar [facility] MD    ROS:    10 point ROS neg other than the symptoms noted above in the HPI.      Current Outpatient Medications   Medication Sig Dispense Refill     acyclovir (ZOVIRAX) 400 MG tablet Take 2 tablets (800 mg) by mouth 2 times daily 120 tablet 11     cetirizine (ZYRTEC) 10 MG tablet Take 10 mg by mouth as needed.       levothyroxine (SYNTHROID) 200 MCG tablet Take 200 mcg by mouth. Pt takes Synthroid brand name. FRANKI Taking 175mg  Taking 4 days per week       liothyronine (CYTOMEL) 5 MCG tablet Take 5 mcg by mouth 2 times daily       rosuvastatin (CRESTOR) 5 MG tablet Take 1 tablet by mouth daily at 2 pm.       sulfamethoxazole-trimethoprim (BACTRIM DS) 800-160 MG tablet Take 1 tablet by mouth Every Mon, Tues two times daily 16 tablet 11         Physical Exam:   /81   Pulse 79   Temp 98.2  F (36.8  C) (Oral)   Resp 16   Wt 60.7 kg (133 lb 12.8 oz)   SpO2 98%   BMI 22.57 kg/m      KPS:  90  General Appearance: alert, and no distress  Eyes: PERRL, conjunctiva and lids normal, sclera nonicteric  Ears/Nose/M/Throat: Oral mucosa and posterior oropharynx normal, moist mucous  membranes.  No tongue thickening  Cardio/Vascular: extremities WWP  Resp Effort And Auscultation: Breathing comfortably on room air  Edema: none  Skin: Skin color, texture, turgor normal. No rashes or lesions.  Neurologic: Gait normal.  Sensation grossly WNL.  Psych/Affect: Mood and affect are appropriate.    Blood Counts     Recent Labs   Lab Test 06/06/25  1426 05/16/25  0846 04/25/25  0815   HGB 12.1 10.8* 11.0*   HCT 35.8 32.8* 33.4*   WBC 8.5 6.4 6.3    240 260       Chemistries     Basic Panel  Recent Labs   Lab Test 06/06/25  1426 06/06/25  1025 05/16/25  0846 04/25/25  0815     --  141 142   POTASSIUM 3.9  --  4.6 4.8   CHLORIDE 104  --  107 109*   CO2 22  --  23 27   BUN 56.3*  --  37.3* 35.4*   CR 2.35*  --  2.33* 2.20*   * 85 93 98        Calcium, Magnesium, Phosphorus  Recent Labs   Lab Test 06/06/25  1426 05/16/25  0846 04/25/25  0815 03/24/25  0855 01/07/25  0751 12/17/24  1011 10/10/24  0808 10/08/24  0818   TANNER 10.4 9.4 9.8 10.1   < >  --    < > 11.5*   MAG 2.2  --   --   --   --  2.0  --  2.1   PHOS 3.6  --   --  3.9  --  3.6   < > 3.7    < > = values in this interval not displayed.        LFTs  Recent Labs   Lab Test 06/06/25  1426 05/16/25  0846 04/25/25  0815   BILITOTAL 0.3 0.3 0.3   ALKPHOS 258* 227* 271*   AST 34 35 33   ALT 52* 54* 54*   ALBUMIN 3.7 3.6 3.3*       LDH  Recent Labs   Lab Test 06/06/25  1426 12/17/24  1011 10/08/24  0818    188 168       B2-Microglobulin  Recent Labs   Lab Test 05/21/24  1338 01/18/24  1433   [id number] 4.3* 5.8*         Immunoglobulins     Recent Labs   Lab Test 06/06/25  1426 03/25/25  0933 12/17/24  1011 10/08/24  0818 07/09/24  1001 05/21/24  1338   * 425* 494* 496* 366* 86*       Recent Labs   Lab Test 06/06/25  1426 03/25/25  0933 12/17/24  1011 10/08/24  0818 07/09/24  1001 05/21/24  1338    107 110 112 218 35*       Recent Labs   Lab Test 06/06/25  1426 03/25/25  0933 12/17/24  1011 10/08/24  0818 07/09/24  1001  05/21/24  1338   IGM 85 51 49 39 101 <10*         Monocloncal Protein Studies     M spike    Recent Labs   Lab Test 06/06/25  1426 04/25/25  0815 03/25/25  0933 12/17/24  1011 10/08/24  0818 07/09/24  1001   ELPM 0.0 0.0 0.1* 0.1* 0.1* 0.0       Kappa FLC    Recent Labs   Lab Test 06/06/25  1426 04/25/25  0815 03/25/25  0933 12/17/24  1011 10/10/24  0808 10/08/24  0818   KFLCA 2.50* 2.10* 2.15* 2.62* 3.55* 3.17*       Lambda FLC    Recent Labs   Lab Test 06/06/25  1426 04/25/25  0815 03/25/25  0933 12/17/24  1011 10/10/24  0808 10/08/24  0818   LFLCA 2.48 2.14 2.16 2.89* 3.44* 3.23*       FLC Ratio    Recent Labs   Lab Test 06/06/25  1426 04/25/25  0815 03/25/25  0933 12/17/24  1011 10/10/24  0808 10/08/24  0818   KLRA 1.01 0.98 1.00 0.91 1.03 0.98         Bone Marrow Biopsy       Morphology    Results for orders placed or performed in visit on 06/06/25 (from the past 8760 hours)   Bone marrow biopsy   Result Value    Final Diagnosis      Bone marrow, posterior iliac crest, left decalcified trephine biopsy, touch imprint, particle crush, direct aspirate smear, concentrated aspirate smear, and peripheral blood smear:    - No morphological or immunophenotypic evidence of plasma cell neoplasm   - Normocellular marrow for age (cellularity estimated at 40%) with trilineage hematopoietic maturation, no overt dysplasia, no increase in blasts,  and  amyloid deposition  - Congo red is positive for amyloid deposition   - Peripheral blood showing normal hemogram and differential   - See comment        Comment      Concurrent flow cytometry ([id number]) showed polytypic plasma cells.    Concurrent ancillary studies are in progress and will be reported separately. Correlation with the results of ancillary tests and clinical findings is recommended.        Clinical Information      From Epic electronic medical record; 55 year old female with a history of AL amyloidosis (lambda restricted, with marrow, renal, and cardiac  involvement. They are now day 359 status post autologous peripheral blood stem cell transplant. The most recent bone marrow biopsy ([id number]; 10/10/24) showed a normocellular marrow with trilineage hematopoiesis, no overt dysplasia, no increase in blasts, and no morphologic or immunophenotypic evidence of plasma cell neoplasm Flow cytometry studies from that biopsy ([id number]) showed polytypic plasma cells.         Peripheral Hematologic Data      CBC WITH DIFFERENTIAL (06/06/2025 02:38 PM CDT):     RESULT VALUE REF. RANGE UNITS   WBC Count  RBC Count  Hemoglobin  Hematocrit  MCV  MCH  MCHC  RDW  Platelet Count 8.5 (NORMAL)    3.87 (NORMAL)   12.1 (NORMAL)     35.8 (NORMAL)  93 (NORMAL)     31.3 (NORMAL)     33.8 (NORMAL)     14.4 (NORMAL)  317 (NORMAL) 4.0-11.0  3.80-5.20  11.7-15.7  35.0-47.0    26.5-33.0  31.5-36.5  10.0-15.0  150-450 10e3/uL  10e6/uL  g/dL  %  fL  pg  g/dL  %  10e3/uL   % Neutrophils  % Lymphocytes  % Monocytes  % Eosinophils  % Basophils  % Immature Granulocytes  Absolute Neutrophils  Absolute Lymphocytes  Absolute Monocytes  Absolute Eosinophils  Absolute Basophils  Absolute Immature Granulocytes  NRBCs per 100 WBC  Absolute NRBCs 68  23  7  2  1  1  5.7 (NORMAL)  1.9 (NORMAL)  0.6 (NORMAL)  0.1 (NORMAL)  0.1 (NORMAL)  0.1 (NORMAL)  0 (NORMAL)  0.0 () N/A  N/A  N/A  N/A  N/A  N/A  1.6-8.3  0.8-5.3  0.0-1.3  0.0-0.7  0.0-0.2  <=0.4  <1  <=0.0 %  %  %  %  %  %  10e3/uL  10e3/uL  10e3/uL  10e3/uL  10e3/uL  10e3/uL  /100  10e3/uL         Microscopic Description      PERIPHERAL BLOOD  The red cells appear normochromic.  Poikilocytosis includes rare degmacytes and numerous echinocytes. Polychromasia is not increased. Rouleaux formation is not increased. The morphology of platelets is unremarkable. Lymphocytes are polymorphous. Neutrophils show unremarkable cytoplasmic granularity and nuclear morphology. Platelet clumps are observed. Numerous smudge cells are observed. No circulating plasma  cells are identified on scanning.    BONE MARROW   Bone marrow aspirates and touch imprints of bone biopsy are reviewed.  (500 cells on bone marrow biopsy touch imprints  by )  Percent (%) Cell Population Reference Range (%)   0.2 Blasts  (0 - 1)   0.2 Neutrophil promyelocytes (2 - 4)   70.2 Neutrophils and precursors (54 - 63)   11.4 Erythroid precursors (18 - 24)   1.6 Monocytes (1- 1.5)   4.6 Eosinophils (1 - 3)   0.4 Basophils (0 - 1)   10.8 Lymphocytes (8 - 12)   0.6 Plasma cells (0 - 1.5)     The aspirate smears and touch imprints are adequate for evaluation.    Granulocytic cells are relatively increased in number with progressive and complete maturation; no overt dysplasia is observed. Erythroid precursors are relatively decreased in number with progressive and complete maturation; no overt dysplasia is observed. Megakaryocytes are present and show an overall normal morphologic spectrum.     SPECIAL STAINS:  Congo red is positive for amyloid deposition.     TREPHINE SECTIONS:  Hematoxylin and eosin stains are reviewed. The trephine core biopsy is adequate. The marrow cellularity is estimated at 40%. Megakaryocytes are present, with unremarkable morphology. There are multiple thickened vessels by pink amorphous material which is consistent with amyloid. No clusters or aggregates of plasma cells are identified.    IMMUNOHISTOCHEMISTRY  Immunohistochemical stains are performed on the paraffin-embedded trephine sections with appropriately reactive control tissues.    , kappa, and lambda light chains highlight rare polytypic plasma cells comprising 2-3% of marrow cellularity; no abnormal clustering or aggregates of plasma cells are noted.    Note: These immunohistochemical stains are deemed medically necessary. Some of the antigens may also be evaluated by flow cytometry. Concurrent evaluation by immunohistochemistry on clot and/or trephine sections is indicated in this case in order to correlate  immunophenotype with cell morphology and determine extent of involvement, spatial pattern, and focality of potential disease distribution.        Gross Description      Procedure/Gross Description   Aspirate(s) and trephine(s) procured by RENETTA Dang    Specimen sent for Special Studies:         Flow Cytometry: left aspirate         Biopsy aspiration site: left posterior iliac crest                                                      (Reference Range)          Amount of aspirate           3   mL        Fat and P.V. cell layer        2    %               (1 - 3)        Particles                             3   %        Myeloid-erythroid layer    6    %               (5 - 8)          Clot Section: no    Trephine biopsy site: left posterior iliac crest    Designated left posterior iliac crest is 1 cylinder of gritty tissue, labeled with the patient's name and hospital number, obtained with 11 gauge needle and a length of 14 mm; entirely submitted in 1 cassette; acetic zinc formalin fixed, decalcified, processed, and stained for hematoxylin and eosin per laboratory protocol.        MCRS Yes (A)    Performing Labs      The technical component of this testing was completed at [facility].     Stain controls for all stains resulted within this report have been reviewed and show appropriate reactivity.        PET Scan       Results for orders placed during the hospital encounter of 10/09/24    PET ONCOLOGY WHOLE BODY    Status: Normal 10/9/2024    Narrative  Combined Report of: PET and CT on 10/9/2024 11:01 AM:    1. PET of the neck, chest, abdomen, and pelvis.  2. PET CT Fusion for Attenuation Correction and Anatomical  Localization.  3. Diagnostic CT of the chest, abdomen and pelvis without intravenous  contrast obtained for diagnostic interpretation.  4. 3D MIP and PET-CT fused images were processed on an independent  workstation and archived to  PACS and reviewed by a radiologist.    Technique:    1. PET: The patient received 10.03 mCi of F-18-FDG. The serum glucose  was 90 mg/dL prior to administration. Body weight was 56.3 kg. Images  were evaluated in the axial, sagittal, and coronal planes as well as  the rotational whole body MIP. Images were acquired from at least eyes  to proximal thighs.    UPTAKE WAS MEASURED AT 60 MINUTES.    2. CT: Volumetric acquisition for clinical interpretation of the  chest, abdomen, and pelvis acquired at 3 mm sections. The chest,  abdomen, and pelvis were evaluated at 5 mm sections in bone, soft  tissue, and lung windows.    Contrast and Medications:  IV contrast: None, due to low GFR  PO contrast: 500 of water  Additional Medications: The patient received 40 mg intravenous Lasix,  after which additional postvoid PET and CT images were obtained.    3. 3D MIP and PET-CT fused images were processed on an independent  workstation and archived to PACS and reviewed by a radiologist.    INDICATION: amyloidosis s/p auto BMT, N/V, hypercalcemia, kidney  dysfunction, concern for progressive disease; AL amyloidosis    ADDITIONAL INFORMATION OBTAINED FROM EMR: 54-year-old female with  biopsy-proven renal amyloidosis and cardiac involvement by MRI.    COMPARISON: PET/CT dated 1/25/2024    FINDINGS:    BACKGROUND: Liver SUV max = 2.3, Aorta Blood SUV max = 2.4.    HEAD/NECK:    Pharyngeal mucosal spaces: Nasopharynx and palatine tonsils are within  normal limits. Tongue base is normal. Oral tongue and buccal mucosa of  the oral cavity is normal. Oropharynx, hypopharynx and larynx are  within normal limits.    Sinonasal region: Paranasal sinuses are clear.    Lymph nodes: Multiple bilateral, mildly hypermetabolic prominent  cervical lymph nodes, for example right level 2A lymph node with max  SUV 3.8, previously 3.1.    Salivary glands: The major salivary glands are within normal limits.    Thyroid gland: Persistent diffusely increased  FDG uptake in both  thyroid lobes.    CHEST:    Lymph nodes: No FDG avid or abnormally enlarged lymph nodes. Calcified  left hilar lymph nodes.    Lungs: The central tracheobronchial tree is clear. No acute  consolidation.  No pleural effusion or pneumothorax. Calcified  pulmonary granuloma in the left lower lobe. There are a few scattered  sub-4 mm pulmonary nodules, for example 2 mm groundglass nodule in the  anterolateral right middle lobe (8/62). No suspicious pulmonary  nodules or masses.    Heart and great vessels: Heart size is within normal limits. Trace  pericardial effusion, likely physiologic. The thoracic aorta and main  pulmonary artery are within normal limits. The esophagus is  unremarkable.    Breasts: No abnormal uptake in the breasts.    ABDOMEN AND PELVIS:    Liver: No FDG avid lesion. No intrahepatic or extrahepatic biliary  ductal dilatation. Gallbladder is within normal limits.    Pancreas: The pancreas is within normal limits. No pancreatic ductal  dilatation.    Spleen: No FDG avid lesion. Calcified splenic granulomas.    Adrenal glands: No FDG avid foci.    Kidneys: No FDG avid lesion. No hydronephrosis. Nonobstructive 6 mm  renal calculus in the left lower pole. Bifid left collecting system  The urinary bladder is decompressed.    Reproductive organs are within normal limits. IUD in good position.    Gastrointestinal system: Normal caliber of the small and large bowel.    Lymph nodes: No FDG avid or abnormally enlarged lymph nodes.    Vascular structures: Normal caliber of the abdominal aorta. Mild  diffuse uptake in the abdominal aortic wall is nonspecific/probably  related to atherosclerotic disease.    No free air, free fluid, or fluid collection.    EXTREMITIES:    No abnormal FDG uptake in the visualized extremities. Resolution of  hypermetabolic focus in the left gastrocnemius.    BONES AND SOFT TISSUES:    No abnormal FDG uptake in the skeleton. No abnormal lytic or blastic  osseous  lesions. Mild multilevel degenerative changes in the spine.    Impression  IMPRESSION:    1. No evidence of metabolically-active systemic amyloid deposition. Of  note, current exam is not optimized to evaluate for cardiac  amyloidosis (no cardiac preparation).    2. Mildly FDG avid prominent bilateral cervical lymph nodes are  favored to be reactive.    3. Continued diffuse FDG uptake in the thyroid gland is suggestive of  thyroiditis.    4. Nonobstructive 6 mm calculus in the left lower renal pole.    I have personally reviewed the examination and initial interpretation  and I agree with the findings.    RONAN HAWK MD      SYSTEM ID:  [id number]     The longitudinal plan of care for the diagnosis(es)/condition(s) as documented were addressed during this visit. Due to the added complexity in care, I will continue to support Vivian in the subsequent management and with ongoing continuity of care.    ------------------------------------------------------------------------------------------------------------------------------------------------    Patient Care Team         Relationship Specialty Notifications Start End    Alin Torres PA-C PCP - General Family Medicine  11/4/20     Phone: [phone] Fax: [phone] [address] [facility] [address]    Sarthak Ontiveros MD Assigned OBGYN Provider   9/24/22     Phone: [phone] Fax: [phone]         [address] [address]    Alin Torres PA-C Assigned PCP   12/31/22     Phone: [phone] Fax: [phone]         [address] [facility] [address]    Carmen Waggoner APRN CNP Nurse Practitioner Gastroenterology  11/21/23     Phone: [phone] Fax: [phone] [address] [place] [address]    Audrey Sen,  Assigned Nephrology Provider   12/9/23     Phone: [phone] Fax: [phone]         [address] [place] [address]    Musa Adrian MD Physician Hematology &  Oncology  12/26/23     Phone: [phone] Fax: [phone] [address] [facility] [address] [facility] [address]    Jomar [facility] MD MD Hematology & Oncology  12/26/23     Phone: [phone] Fax: [phone]         [address] [facility] [address] [facility] [address]    Gaston Flores MD MD Cardiovascular Disease  1/5/24     Phone: [phone] Fax: [phone] [address] [facility] [address]          Again, thank you for allowing me to participate in the care of your patient.        Sincerely,        Jomar [facility] MD    Electronically signed

## 2025-06-17 NOTE — PROGRESS NOTES
BMT Progress Note    Disease presentation and baseline characteristics:   12/21/2023:  Final Diagnosis   A. kidney biopsy (needle):     Amyloidosis of the kidney, AL-type, lambda light chain-restricted, affecting the glomeruli, interstitium, and arterioles (see comment)     Mild chronic changes of the parenchyma, including:   - focal global glomerulosclerosis (3% of glomeruli)   - focal tubular atrophy and interstitial fibrosis (5% of the cortex)   - severe arterial sclerosis   Electronically signed by Joe Garcia MD on 12/23/2023 at  3:21 PM   Comment  U[facility]   The biopsy reveals findings diagnostic of amyloidosis; the amyloid shows lambda light chain-restriction (AL amyloidosis), and the deposits are Congo red positive. The amyloid deposits affect the glomeruli extensively, and interstitium and arterioles focally. Other potential complications of paraproteins in the kidney are not seen, in particular, there is no evidence of light chain cast nephropathy, light chain deposition disease, or light chain tubulopathy.        Date Treatment Name Response Side Effects / Toxicities   1/19/24 D-CyBorD x 4 cycles     6/12/24 HDT/ASCT Hematologic CR           HPI:  Please see my entry above for hematologic history.  Mrs. Brown presents today to review her one year restaging studies.  She reports one episode of nausea/vomiting earlier this week but otherwise is feeling well.  Energy/appetite are good.  She denies abdominal pain, diarrhea/constipation, fevers/chills, tongue thickening or other acute concerns.    ASSESSMENT AND PLAN:  We reviewed restaging studies including labs, echo, BMB and PET which are consistent with hematologic CR.      She has had elevated PTHrP and associated symptomatic hypercalcemia.  Her amyloid is in remission and workup for other malignancies has been unremarkable.  Recommended to stay up to date on routine cancer monitoring.      CKD is stable, follows with nephrology.  Moderate increase in  alkaline phosphatase is likely due to increased bone turnover rather than amyloid.  Will check GGT     Very mild increase in ALT, monitor.    One year vaccines given today.    RTC with me in three months.    I spent 40 minutes in the care of this patient today, which included time necessary for preparation for the visit, obtaining history, ordering medications/tests/procedures as medically indicated, review of pertinent medical literature, counseling of the patient, communication of recommendations to the care team, and documentation time.    Jomar Chandler MD    ROS:    10 point ROS neg other than the symptoms noted above in the HPI.      Current Outpatient Medications   Medication Sig Dispense Refill    acyclovir (ZOVIRAX) 400 MG tablet Take 2 tablets (800 mg) by mouth 2 times daily 120 tablet 11    cetirizine (ZYRTEC) 10 MG tablet Take 10 mg by mouth as needed.      levothyroxine (SYNTHROID) 200 MCG tablet Take 200 mcg by mouth. Pt takes Synthroid brand name. FRANKI Taking 175mg  Taking 4 days per week      liothyronine (CYTOMEL) 5 MCG tablet Take 5 mcg by mouth 2 times daily      rosuvastatin (CRESTOR) 5 MG tablet Take 1 tablet by mouth daily at 2 pm.      sulfamethoxazole-trimethoprim (BACTRIM DS) 800-160 MG tablet Take 1 tablet by mouth Every Mon, Tues two times daily 16 tablet 11         Physical Exam:   /81   Pulse 79   Temp 98.2  F (36.8  C) (Oral)   Resp 16   Wt 60.7 kg (133 lb 12.8 oz)   SpO2 98%   BMI 22.57 kg/m      KPS:  90  General Appearance: alert, and no distress  Eyes: PERRL, conjunctiva and lids normal, sclera nonicteric  Ears/Nose/M/Throat: Oral mucosa and posterior oropharynx normal, moist mucous membranes.  No tongue thickening  Cardio/Vascular: extremities WWP  Resp Effort And Auscultation: Breathing comfortably on room air  Edema: none  Skin: Skin color, texture, turgor normal. No rashes or lesions.  Neurologic: Gait normal.  Sensation grossly WNL.  Psych/Affect: Mood and affect  are appropriate.    Blood Counts     Recent Labs   Lab Test 06/06/25  1426 05/16/25  0846 04/25/25  0815   HGB 12.1 10.8* 11.0*   HCT 35.8 32.8* 33.4*   WBC 8.5 6.4 6.3    240 260       Chemistries     Basic Panel  Recent Labs   Lab Test 06/06/25  1426 06/06/25  1025 05/16/25  0846 04/25/25  0815     --  141 142   POTASSIUM 3.9  --  4.6 4.8   CHLORIDE 104  --  107 109*   CO2 22  --  23 27   BUN 56.3*  --  37.3* 35.4*   CR 2.35*  --  2.33* 2.20*   * 85 93 98        Calcium, Magnesium, Phosphorus  Recent Labs   Lab Test 06/06/25  1426 05/16/25  0846 04/25/25  0815 03/24/25  0855 01/07/25  0751 12/17/24  1011 10/10/24  0808 10/08/24  0818   TANNER 10.4 9.4 9.8 10.1   < >  --    < > 11.5*   MAG 2.2  --   --   --   --  2.0  --  2.1   PHOS 3.6  --   --  3.9  --  3.6   < > 3.7    < > = values in this interval not displayed.        LFTs  Recent Labs   Lab Test 06/06/25  1426 05/16/25  0846 04/25/25  0815   BILITOTAL 0.3 0.3 0.3   ALKPHOS 258* 227* 271*   AST 34 35 33   ALT 52* 54* 54*   ALBUMIN 3.7 3.6 3.3*       LDH  Recent Labs   Lab Test 06/06/25  1426 12/17/24  1011 10/08/24  0818    188 168       B2-Microglobulin  Recent Labs   Lab Test 05/21/24  1338 01/18/24  1433   EIET0HUVM 4.3* 5.8*         Immunoglobulins     Recent Labs   Lab Test 06/06/25  1426 03/25/25  0933 12/17/24  1011 10/08/24  0818 07/09/24  1001 05/21/24  1338   * 425* 494* 496* 366* 86*       Recent Labs   Lab Test 06/06/25  1426 03/25/25  0933 12/17/24  1011 10/08/24  0818 07/09/24  1001 05/21/24  1338    107 110 112 218 35*       Recent Labs   Lab Test 06/06/25  1426 03/25/25  0933 12/17/24  1011 10/08/24  0818 07/09/24  1001 05/21/24  1338   IGM 85 51 49 39 101 <10*         Monocloncal Protein Studies     M spike    Recent Labs   Lab Test 06/06/25  1426 04/25/25  0815 03/25/25  0933 12/17/24  1011 10/08/24  0818 07/09/24  1001   ELPM 0.0 0.0 0.1* 0.1* 0.1* 0.0       Kappa FLC    Recent Labs   Lab Test  06/06/25  1426 04/25/25  0815 03/25/25  0933 12/17/24  1011 10/10/24  0808 10/08/24  0818   KFLCA 2.50* 2.10* 2.15* 2.62* 3.55* 3.17*       Lambda FLC    Recent Labs   Lab Test 06/06/25  1426 04/25/25  0815 03/25/25  0933 12/17/24  1011 10/10/24  0808 10/08/24  0818   LFLCA 2.48 2.14 2.16 2.89* 3.44* 3.23*       FLC Ratio    Recent Labs   Lab Test 06/06/25  1426 04/25/25  0815 03/25/25  0933 12/17/24  1011 10/10/24  0808 10/08/24  0818   KLRA 1.01 0.98 1.00 0.91 1.03 0.98         Bone Marrow Biopsy       Morphology    Results for orders placed or performed in visit on 06/06/25 (from the past 8760 hours)   Bone marrow biopsy   Result Value    Final Diagnosis      Bone marrow, posterior iliac crest, left decalcified trephine biopsy, touch imprint, particle crush, direct aspirate smear, concentrated aspirate smear, and peripheral blood smear:    - No morphological or immunophenotypic evidence of plasma cell neoplasm   - Normocellular marrow for age (cellularity estimated at 40%) with trilineage hematopoietic maturation, no overt dysplasia, no increase in blasts,  and  amyloid deposition  - Congo red is positive for amyloid deposition   - Peripheral blood showing normal hemogram and differential   - See comment        Comment      Concurrent flow cytometry ([id number]) showed polytypic plasma cells.    Concurrent ancillary studies are in progress and will be reported separately. Correlation with the results of ancillary tests and clinical findings is recommended.        Clinical Information      From Epic electronic medical record; 55 year old female with a history of AL amyloidosis (lambda restricted, with marrow, renal, and cardiac involvement. They are now day 359 status post autologous peripheral blood stem cell transplant. The most recent bone marrow biopsy ([id number]; 10/10/24) showed a normocellular marrow with trilineage hematopoiesis, no overt dysplasia, no increase in blasts, and no morphologic or  immunophenotypic evidence of plasma cell neoplasm Flow cytometry studies from that biopsy ([id number]) showed polytypic plasma cells.         Peripheral Hematologic Data      CBC WITH DIFFERENTIAL (06/06/2025 02:38 PM CDT):     RESULT VALUE REF. RANGE UNITS   WBC Count  RBC Count  Hemoglobin  Hematocrit  MCV  MCH  MCHC  RDW  Platelet Count 8.5 (NORMAL)    3.87 (NORMAL)   12.1 (NORMAL)     35.8 (NORMAL)  93 (NORMAL)     31.3 (NORMAL)     33.8 (NORMAL)     14.4 (NORMAL)  317 (NORMAL) 4.0-11.0  3.80-5.20  11.7-15.7  35.0-47.0    26.5-33.0  31.5-36.5  10.0-15.0  150-450 10e3/uL  10e6/uL  g/dL  %  fL  pg  g/dL  %  10e3/uL   % Neutrophils  % Lymphocytes  % Monocytes  % Eosinophils  % Basophils  % Immature Granulocytes  Absolute Neutrophils  Absolute Lymphocytes  Absolute Monocytes  Absolute Eosinophils  Absolute Basophils  Absolute Immature Granulocytes  NRBCs per 100 WBC  Absolute NRBCs 68  23  7  2  1  1  5.7 (NORMAL)  1.9 (NORMAL)  0.6 (NORMAL)  0.1 (NORMAL)  0.1 (NORMAL)  0.1 (NORMAL)  0 (NORMAL)  0.0 () N/A  N/A  N/A  N/A  N/A  N/A  1.6-8.3  0.8-5.3  0.0-1.3  0.0-0.7  0.0-0.2  <=0.4  <1  <=0.0 %  %  %  %  %  %  10e3/uL  10e3/uL  10e3/uL  10e3/uL  10e3/uL  10e3/uL  /100  10e3/uL         Microscopic Description      PERIPHERAL BLOOD  The red cells appear normochromic.  Poikilocytosis includes rare degmacytes and numerous echinocytes. Polychromasia is not increased. Rouleaux formation is not increased. The morphology of platelets is unremarkable. Lymphocytes are polymorphous. Neutrophils show unremarkable cytoplasmic granularity and nuclear morphology. Platelet clumps are observed. Numerous smudge cells are observed. No circulating plasma cells are identified on scanning.    BONE MARROW   Bone marrow aspirates and touch imprints of bone biopsy are reviewed.  (500 cells on bone marrow biopsy touch imprints  by )  Percent (%) Cell Population Reference Range (%)   0.2 Blasts  (0 - 1)   0.2 Neutrophil promyelocytes  (2 - 4)   70.2 Neutrophils and precursors (54 - 63)   11.4 Erythroid precursors (18 - 24)   1.6 Monocytes (1- 1.5)   4.6 Eosinophils (1 - 3)   0.4 Basophils (0 - 1)   10.8 Lymphocytes (8 - 12)   0.6 Plasma cells (0 - 1.5)     The aspirate smears and touch imprints are adequate for evaluation.    Granulocytic cells are relatively increased in number with progressive and complete maturation; no overt dysplasia is observed. Erythroid precursors are relatively decreased in number with progressive and complete maturation; no overt dysplasia is observed. Megakaryocytes are present and show an overall normal morphologic spectrum.     SPECIAL STAINS:  Congo red is positive for amyloid deposition.     TREPHINE SECTIONS:  Hematoxylin and eosin stains are reviewed. The trephine core biopsy is adequate. The marrow cellularity is estimated at 40%. Megakaryocytes are present, with unremarkable morphology. There are multiple thickened vessels by pink amorphous material which is consistent with amyloid. No clusters or aggregates of plasma cells are identified.    IMMUNOHISTOCHEMISTRY  Immunohistochemical stains are performed on the paraffin-embedded trephine sections with appropriately reactive control tissues.    , kappa, and lambda light chains highlight rare polytypic plasma cells comprising 2-3% of marrow cellularity; no abnormal clustering or aggregates of plasma cells are noted.    Note: These immunohistochemical stains are deemed medically necessary. Some of the antigens may also be evaluated by flow cytometry. Concurrent evaluation by immunohistochemistry on clot and/or trephine sections is indicated in this case in order to correlate immunophenotype with cell morphology and determine extent of involvement, spatial pattern, and focality of potential disease distribution.        Gross Description      Procedure/Gross Description   Aspirate(s) and trephine(s) procured by RENETTA Dang    Specimen sent for Special  Studies:         Flow Cytometry: left aspirate         Biopsy aspiration site: left posterior iliac crest                                                      (Reference Range)          Amount of aspirate           3   mL        Fat and P.V. cell layer        2    %               (1 - 3)        Particles                             3   %        Myeloid-erythroid layer    6    %               (5 - 8)          Clot Section: no    Trephine biopsy site: left posterior iliac crest    Designated left posterior iliac crest is 1 cylinder of gritty tissue, labeled with the patient's name and hospital number, obtained with 11 gauge needle and a length of 14 mm; entirely submitted in 1 cassette; acetic zinc formalin fixed, decalcified, processed, and stained for hematoxylin and eosin per laboratory protocol.        MCRS Yes (A)    Performing Labs      The technical component of this testing was completed at [facility].     Stain controls for all stains resulted within this report have been reviewed and show appropriate reactivity.        PET Scan       Results for orders placed during the hospital encounter of 10/09/24    PET ONCOLOGY WHOLE BODY    Status: Normal 10/9/2024    Narrative  Combined Report of: PET and CT on 10/9/2024 11:01 AM:    1. PET of the neck, chest, abdomen, and pelvis.  2. PET CT Fusion for Attenuation Correction and Anatomical  Localization.  3. Diagnostic CT of the chest, abdomen and pelvis without intravenous  contrast obtained for diagnostic interpretation.  4. 3D MIP and PET-CT fused images were processed on an independent  workstation and archived to PACS and reviewed by a radiologist.    Technique:    1. PET: The patient received 10.03 mCi of F-18-FDG. The serum glucose  was 90 mg/dL prior to administration. Body weight was 56.3 kg. Images  were evaluated in the axial, sagittal, and coronal planes as well as  the rotational  whole body MIP. Images were acquired from at least eyes  to proximal thighs.    UPTAKE WAS MEASURED AT 60 MINUTES.    2. CT: Volumetric acquisition for clinical interpretation of the  chest, abdomen, and pelvis acquired at 3 mm sections. The chest,  abdomen, and pelvis were evaluated at 5 mm sections in bone, soft  tissue, and lung windows.    Contrast and Medications:  IV contrast: None, due to low GFR  PO contrast: 500 of water  Additional Medications: The patient received 40 mg intravenous Lasix,  after which additional postvoid PET and CT images were obtained.    3. 3D MIP and PET-CT fused images were processed on an independent  workstation and archived to PACS and reviewed by a radiologist.    INDICATION: amyloidosis s/p auto BMT, N/V, hypercalcemia, kidney  dysfunction, concern for progressive disease; AL amyloidosis    ADDITIONAL INFORMATION OBTAINED FROM EMR: 54-year-old female with  biopsy-proven renal amyloidosis and cardiac involvement by MRI.    COMPARISON: PET/CT dated 1/25/2024    FINDINGS:    BACKGROUND: Liver SUV max = 2.3, Aorta Blood SUV max = 2.4.    HEAD/NECK:    Pharyngeal mucosal spaces: Nasopharynx and palatine tonsils are within  normal limits. Tongue base is normal. Oral tongue and buccal mucosa of  the oral cavity is normal. Oropharynx, hypopharynx and larynx are  within normal limits.    Sinonasal region: Paranasal sinuses are clear.    Lymph nodes: Multiple bilateral, mildly hypermetabolic prominent  cervical lymph nodes, for example right level 2A lymph node with max  SUV 3.8, previously 3.1.    Salivary glands: The major salivary glands are within normal limits.    Thyroid gland: Persistent diffusely increased FDG uptake in both  thyroid lobes.    CHEST:    Lymph nodes: No FDG avid or abnormally enlarged lymph nodes. Calcified  left hilar lymph nodes.    Lungs: The central tracheobronchial tree is clear. No acute  consolidation.  No pleural effusion or pneumothorax.  Calcified  pulmonary granuloma in the left lower lobe. There are a few scattered  sub-4 mm pulmonary nodules, for example 2 mm groundglass nodule in the  anterolateral right middle lobe (8/62). No suspicious pulmonary  nodules or masses.    Heart and great vessels: Heart size is within normal limits. Trace  pericardial effusion, likely physiologic. The thoracic aorta and main  pulmonary artery are within normal limits. The esophagus is  unremarkable.    Breasts: No abnormal uptake in the breasts.    ABDOMEN AND PELVIS:    Liver: No FDG avid lesion. No intrahepatic or extrahepatic biliary  ductal dilatation. Gallbladder is within normal limits.    Pancreas: The pancreas is within normal limits. No pancreatic ductal  dilatation.    Spleen: No FDG avid lesion. Calcified splenic granulomas.    Adrenal glands: No FDG avid foci.    Kidneys: No FDG avid lesion. No hydronephrosis. Nonobstructive 6 mm  renal calculus in the left lower pole. Bifid left collecting system  The urinary bladder is decompressed.    Reproductive organs are within normal limits. IUD in good position.    Gastrointestinal system: Normal caliber of the small and large bowel.    Lymph nodes: No FDG avid or abnormally enlarged lymph nodes.    Vascular structures: Normal caliber of the abdominal aorta. Mild  diffuse uptake in the abdominal aortic wall is nonspecific/probably  related to atherosclerotic disease.    No free air, free fluid, or fluid collection.    EXTREMITIES:    No abnormal FDG uptake in the visualized extremities. Resolution of  hypermetabolic focus in the left gastrocnemius.    BONES AND SOFT TISSUES:    No abnormal FDG uptake in the skeleton. No abnormal lytic or blastic  osseous lesions. Mild multilevel degenerative changes in the spine.    Impression  IMPRESSION:    1. No evidence of metabolically-active systemic amyloid deposition. Of  note, current exam is not optimized to evaluate for cardiac  amyloidosis (no cardiac  preparation).    2. Mildly FDG avid prominent bilateral cervical lymph nodes are  favored to be reactive.    3. Continued diffuse FDG uptake in the thyroid gland is suggestive of  thyroiditis.    4. Nonobstructive 6 mm calculus in the left lower renal pole.    I have personally reviewed the examination and initial interpretation  and I agree with the findings.    RONAN HAWK MD      SYSTEM ID:  [id number]     The longitudinal plan of care for the diagnosis(es)/condition(s) as documented were addressed during this visit. Due to the added complexity in care, I will continue to support Vivian in the subsequent management and with ongoing continuity of care.    ------------------------------------------------------------------------------------------------------------------------------------------------    Patient Care Team         Relationship Specialty Notifications Start End    Alin Torres PA-C PCP - General Family Medicine  11/4/20     Phone: [phone] Fax: [phone] [address] [facility] [address]    Sarthak Ontiveros MD Assigned OBGYN Provider   9/24/22     Phone: [phone] Fax: [phone]         [address] [address]    Alin Torres PA-C Assigned PCP   12/31/22     Phone: [phone] Fax: [phone]         [address] [facility] [address]    Carmen Waggoner APRN CNP Nurse Practitioner Gastroenterology  11/21/23     Phone: [phone] Fax: [phone]         [address] [facility] [address] [place] [address]    Audrey Sen DO Assigned Nephrology Provider   12/9/23     Phone: [phone] Fax: [phone]         [address] [facility] [address] [facility] [address]    Musa Adrian MD Physician Hematology & Oncology  12/26/23     Phone: [phone] Fax: [phone]         [address] [facility] [address] [facility] [address]    Jomar Chandler MD MD Hematology & Oncology  12/26/23     Phone: [phone] Fax: [phone]         [address] [place] [address]  [facility] [address]    Gaston Flores MD MD Cardiovascular Disease  1/5/24     Phone: [phone] Fax: [phone] [address] [facility] [address]

## 2025-06-24 DIAGNOSIS — N18.32 CKD STAGE 3B, GFR 30-44 ML/MIN (H): Primary | ICD-10-CM

## 2025-07-01 ENCOUNTER — LAB (OUTPATIENT)
Dept: LAB | Facility: CLINIC | Age: 55
End: 2025-07-01
Attending: INTERNAL MEDICINE
Payer: COMMERCIAL

## 2025-07-01 ENCOUNTER — OFFICE VISIT (OUTPATIENT)
Dept: NEPHROLOGY | Facility: CLINIC | Age: 55
End: 2025-07-01
Attending: INTERNAL MEDICINE
Payer: COMMERCIAL

## 2025-07-01 VITALS
DIASTOLIC BLOOD PRESSURE: 80 MMHG | OXYGEN SATURATION: 97 % | SYSTOLIC BLOOD PRESSURE: 115 MMHG | WEIGHT: 141.6 LBS | HEIGHT: 65 IN | HEART RATE: 78 BPM | TEMPERATURE: 98.4 F | BODY MASS INDEX: 23.59 KG/M2

## 2025-07-01 DIAGNOSIS — Z94.81 STATUS POST BONE MARROW TRANSPLANT (H): ICD-10-CM

## 2025-07-01 DIAGNOSIS — E85.4 AMYLOID KIDNEY (H): ICD-10-CM

## 2025-07-01 DIAGNOSIS — N18.32 CKD STAGE 3B, GFR 30-44 ML/MIN (H): Primary | ICD-10-CM

## 2025-07-01 DIAGNOSIS — E85.81 AL AMYLOIDOSIS (H): ICD-10-CM

## 2025-07-01 DIAGNOSIS — E83.52 HYPERCALCEMIA: ICD-10-CM

## 2025-07-01 DIAGNOSIS — R79.89 ELEVATED PTHRP LEVEL: ICD-10-CM

## 2025-07-01 DIAGNOSIS — N04.9 NEPHROTIC SYNDROME: ICD-10-CM

## 2025-07-01 DIAGNOSIS — N08 AMYLOID KIDNEY (H): ICD-10-CM

## 2025-07-01 DIAGNOSIS — M79.89 RIGHT LEG SWELLING: ICD-10-CM

## 2025-07-01 LAB
ALBUMIN MFR UR ELPH: 375 MG/DL
ALBUMIN SERPL BCG-MCNC: 3.5 G/DL (ref 3.5–5.2)
ALBUMIN UR-MCNC: 100 MG/DL
ALP SERPL-CCNC: 224 U/L (ref 40–150)
ALT SERPL W P-5'-P-CCNC: 63 U/L (ref 0–50)
ANION GAP SERPL CALCULATED.3IONS-SCNC: 11 MMOL/L (ref 7–15)
APPEARANCE UR: CLEAR
AST SERPL W P-5'-P-CCNC: 38 U/L (ref 0–45)
BILIRUB SERPL-MCNC: 0.2 MG/DL
BILIRUB UR QL STRIP: NEGATIVE
BUN SERPL-MCNC: 34.4 MG/DL (ref 6–20)
CA-I BLD-MCNC: 5.2 MG/DL (ref 4.4–5.2)
CALCIUM SERPL-MCNC: 9.6 MG/DL (ref 8.8–10.4)
CHLORIDE SERPL-SCNC: 104 MMOL/L (ref 98–107)
COLOR UR AUTO: ABNORMAL
CREAT SERPL-MCNC: 2.01 MG/DL (ref 0.51–0.95)
CREAT UR-MCNC: 54.1 MG/DL
CREAT UR-MCNC: 54.6 MG/DL
EGFRCR SERPLBLD CKD-EPI 2021: 29 ML/MIN/1.73M2
ERYTHROCYTE [DISTWIDTH] IN BLOOD BY AUTOMATED COUNT: 15.1 % (ref 10–15)
GGT SERPL-CCNC: 168 U/L (ref 5–36)
GLUCOSE SERPL-MCNC: 93 MG/DL (ref 70–99)
GLUCOSE UR STRIP-MCNC: NEGATIVE MG/DL
HCO3 SERPL-SCNC: 24 MMOL/L (ref 22–29)
HCT VFR BLD AUTO: 30.7 % (ref 35–47)
HGB BLD-MCNC: 10.1 G/DL (ref 11.7–15.7)
HGB UR QL STRIP: ABNORMAL
KETONES UR STRIP-MCNC: NEGATIVE MG/DL
LEUKOCYTE ESTERASE UR QL STRIP: ABNORMAL
MCH RBC QN AUTO: 31.3 PG (ref 26.5–33)
MCHC RBC AUTO-ENTMCNC: 32.9 G/DL (ref 31.5–36.5)
MCV RBC AUTO: 95 FL (ref 78–100)
MICROALBUMIN UR-MCNC: 2703 MG/L
MICROALBUMIN/CREAT UR: 4950.55 MG/G CR (ref 0–25)
NITRATE UR QL: NEGATIVE
PH UR STRIP: 6.5 [PH] (ref 5–7)
PHOSPHATE SERPL-MCNC: 4.1 MG/DL (ref 2.5–4.5)
PLATELET # BLD AUTO: 239 10E3/UL (ref 150–450)
POTASSIUM SERPL-SCNC: 4.1 MMOL/L (ref 3.4–5.3)
PROT SERPL-MCNC: 5.9 G/DL (ref 6.4–8.3)
PROT/CREAT 24H UR: 6.93 MG/MG CR (ref 0–0.2)
PTH-INTACT SERPL-MCNC: 24 PG/ML (ref 15–65)
RBC # BLD AUTO: 3.23 10E6/UL (ref 3.8–5.2)
RBC URINE: 3 /HPF
SODIUM SERPL-SCNC: 139 MMOL/L (ref 135–145)
SP GR UR STRIP: 1.01 (ref 1–1.03)
SQUAMOUS EPITHELIAL: <1 /HPF
UROBILINOGEN UR STRIP-MCNC: NORMAL MG/DL
VIT D+METAB SERPL-MCNC: 20 NG/ML (ref 20–50)
WBC # BLD AUTO: 9.7 10E3/UL (ref 4–11)
WBC URINE: 11 /HPF

## 2025-07-01 PROCEDURE — 99000 SPECIMEN HANDLING OFFICE-LAB: CPT | Performed by: PATHOLOGY

## 2025-07-01 PROCEDURE — 99213 OFFICE O/P EST LOW 20 MIN: CPT | Performed by: INTERNAL MEDICINE

## 2025-07-01 PROCEDURE — 82330 ASSAY OF CALCIUM: CPT | Performed by: PATHOLOGY

## 2025-07-01 PROCEDURE — 84100 ASSAY OF PHOSPHORUS: CPT | Performed by: PATHOLOGY

## 2025-07-01 PROCEDURE — 99215 OFFICE O/P EST HI 40 MIN: CPT | Performed by: INTERNAL MEDICINE

## 2025-07-01 PROCEDURE — 99417 PROLNG OP E/M EACH 15 MIN: CPT | Performed by: INTERNAL MEDICINE

## 2025-07-01 PROCEDURE — 82397 CHEMILUMINESCENT ASSAY: CPT | Mod: 90 | Performed by: PATHOLOGY

## 2025-07-01 PROCEDURE — 3079F DIAST BP 80-89 MM HG: CPT | Performed by: INTERNAL MEDICINE

## 2025-07-01 PROCEDURE — 83521 IG LIGHT CHAINS FREE EACH: CPT | Performed by: INTERNAL MEDICINE

## 2025-07-01 PROCEDURE — 36415 COLL VENOUS BLD VENIPUNCTURE: CPT | Performed by: PATHOLOGY

## 2025-07-01 PROCEDURE — 85027 COMPLETE CBC AUTOMATED: CPT | Performed by: PATHOLOGY

## 2025-07-01 PROCEDURE — 83970 ASSAY OF PARATHORMONE: CPT | Performed by: PATHOLOGY

## 2025-07-01 PROCEDURE — 82977 ASSAY OF GGT: CPT | Performed by: PATHOLOGY

## 2025-07-01 PROCEDURE — 3074F SYST BP LT 130 MM HG: CPT | Performed by: INTERNAL MEDICINE

## 2025-07-01 PROCEDURE — 82306 VITAMIN D 25 HYDROXY: CPT | Performed by: INTERNAL MEDICINE

## 2025-07-01 PROCEDURE — G2211 COMPLEX E/M VISIT ADD ON: HCPCS | Performed by: INTERNAL MEDICINE

## 2025-07-01 PROCEDURE — 81001 URINALYSIS AUTO W/SCOPE: CPT | Performed by: PATHOLOGY

## 2025-07-01 PROCEDURE — 84156 ASSAY OF PROTEIN URINE: CPT | Performed by: PATHOLOGY

## 2025-07-01 PROCEDURE — 82570 ASSAY OF URINE CREATININE: CPT | Performed by: INTERNAL MEDICINE

## 2025-07-01 PROCEDURE — 1126F AMNT PAIN NOTED NONE PRSNT: CPT | Performed by: INTERNAL MEDICINE

## 2025-07-01 PROCEDURE — 80053 COMPREHEN METABOLIC PANEL: CPT | Performed by: PATHOLOGY

## 2025-07-01 ASSESSMENT — PAIN SCALES - GENERAL: PAINLEVEL_OUTOF10: NO PAIN (0)

## 2025-07-01 NOTE — PATIENT INSTRUCTIONS
Calcium is normalized, we will cancer infusion at this time  Stay well hydrated  Get right leg ultrasound  See you in 4 months with labs

## 2025-07-01 NOTE — NURSING NOTE
"Chief Complaint   Patient presents with    RECHECK     Follow up PT reports bilateral ankle swelling and her hands wont make a fist in the morning when she wakes up.      Vitals:    07/01/25 1601   BP: 115/80   BP Location: Left arm   Patient Position: Sitting   Cuff Size: Adult Regular   Pulse: 78   Temp: 98.4  F (36.9  C)   TempSrc: Oral   SpO2: 97%   Weight: 64.2 kg (141 lb 9.6 oz)   Height: 1.651 m (5' 5\")       BP Readings from Last 3 Encounters:   07/01/25 115/80   06/13/25 120/81   06/06/25 100/66       /80 (BP Location: Left arm, Patient Position: Sitting, Cuff Size: Adult Regular)   Pulse 78   Temp 98.4  F (36.9  C) (Oral)   Ht 1.651 m (5' 5\")   Wt 64.2 kg (141 lb 9.6 oz)   SpO2 97%   BMI 23.56 kg/m       Mary Zepeda    "

## 2025-07-01 NOTE — PROGRESS NOTES
Nephrology Progress Note  07/01/2025     Chief complaint: Follow up AL amyloidosis  History of Present Illness:    Vivian Brown is a 55 year old F  with hypothyroidism, who is here for AL amyloidosis follow-up.      The patient initially presented with fatigue and LE edema for about 2 years. Initially, she was diagnosed with Hashimoto's and was started on thyroid supplement in August 2023. Symptoms has partially improved. Later on she was found to have hypoalbuminemia dn massiv eproteinuria hence was referred to Dr. Sen. She saw Dr. Sen in November 2023 and was started on Lisinopril 2.5 mg and lasix 20 mg per day. I 1 week, she has lsot 20 Lbs and swelling has been better and remained in control.  She underwent a kidney Bx on 12/21/23. The Bx read by Dr. Garcia which showed Amyloidosis of the kidney, AL-type, lambda light chain-restricted, affecting the glomeruli, interstitium, and arterioles. She has mild chronic changes of the parenchyma, including: focal global glomerulosclerosis (3% of glomeruli), focal tubular atrophy and interstitial fibrosis (5% of the cortex), severe arterial sclerosis.   LM: There were 37 glomeruli (LM-29; IF-7; EM-1), of which 1 is globally sclerosed. The non-sclerosed glomeruli are of normal size and cellularity. Significant endocapillary proliferation or cellular crescents are not seen in the glomeruli. The glomeruli show deposits of amorphous, homogeneous, acellular, eosinophilic material that infiltrates and expands the mesangial areas and extends down the capillaries of the glomerular tuft. Tubules are well-preserved and show no signs of acute injury. Obvious signs of a viral cytopathic effect are not seen in the sample. No oxalate, urate, or phosphate crystals are present in the sample. The interstitium reveals occasional collection of foam cells and occasional deposits of amorphous material. The interstitium contains mild interstitial inflammation in areas of  atrophy, and the infiltrates are composed of mononuclear cells. About 5% of the renal cortex shows tubular atrophy and interstitial fibrosis. Arteries show severe sclerosis. Examination of a Congo red-stained section reveals deposits of amorphous material with apple-green birefringence under polarized light, in all glomeruli and focally in arterioles and interstitium. EM: showed 1 glomerulus; 1 glomerulus is examined ultrastructurally. There is prominent deposition of fibrillary material in the mesangial areas, without significant hypercellularity.  There is focal effacement of visceral epithelial cell foot processes in areas of the glomerular basement membranes that show segmental widening of the subepithelial space and distortion of the lamina densa due to deposits of fibrillary material.  The mesangial and basement membrane deposits consist of non-branching, randomly arranged fibrils of 6 to 15 nm width. Glomerular endothelial cells show focal loss of their normal fenestrations. Tubules show no specific changes. Deposits are Congo red positive. The amyloid deposits affect the glomeruli extensively, and interstitium and arterioles focally. Other potential complications of paraproteins in the kidney are not seen, in particular, there is no evidence of light chain cast nephropathy, light chain deposition disease, or light chain tubulopathy.      For kidney function, she has baseline Cr of 0.9 in 12/19/22. However, Cr started to increase to 1.05 in 5/22/23 and now 1.11 on 11/28/23. Serum albumin was 3.5 in 2/24/21 and 2.2 in 12/19/22. Monoclonal work-up on 11/28/23 showed kappa 3.05, lambda 7.82 and K/L ratio 0.39 and L/K ratio 2.56. dFLC 4.77. Other serologies including MPO, PMND1 were negative. UPCR 12.50 mg/g. No 24 hr urine protein yet. UA showed small blood but no RBC. She also has LCL of 174. TSH 29.16 and + Tissue TTG.      She has Echo on 1/3/24. There is mild concentric left ventricular hypertrophy and  "borderline RVH. Global peak LV longitudinal strain is averaged at -15.3%. This suggests abnormal strain (normal <-18%).The visual ejection fraction is estimated at 54-56%.Trivial posterior pericardial effusion Clinical history is listed as renal amyloid--on this echo the atria are normal size, the valves are not thickend but there is mild LVH and borderline RVH, there is questionable \"scintillation\" of LV myocardium, the global longitudinal strain is abnormal and the best strain values are at the apex (borderline \"apical sparing\" strain pattern) and there is trace pericardial effusion along with borderline criteria for diastolic dysfunction grade2--take together this could represent some features of cardiac amyloid. Additional studies such as cardiac MRI, cardiac biopsy etc may be useful. She is scheduled to see Cardiology on 1/31/24.      1/8/24: I saw her for consultation via video. She is with her  at home. Still feels fatigue and upset stomach last night.  She is currently on furosemide 20 mg daily, lisinopril 2.5 mg daily and crestor 10 mg daily. She reports having lower extremities edema for about 2 years. She was diagnosed with Hashimoto's in August 2023.  She has been feeling better after started taking thyroid supplement. She has leg swelling but not as bad as before. Furosemide was started in December 2023 and she has lost 20 Lbs wit that. She has seen more bubbly in the urineShe sometimes feels lightheadedness and dizziness and her BP was low sometimes. However, mostly > BP /60s. HR in the 80s.  BP today is 114/76, HR is 76.  She has no problem with swallowing, numbness or tingling sensation. No CTS symptoms. No tongue enlargement. No periorbital ecchymoses. No easy bruising. She has no other medical problem before this. She has been active and exercise at least twice a week.   3/18/24: In the interim, she underwent BmBx on 1/18/24 which showed  lambda restricted plasma cell, 5%. Congo red " positive in the BmBx. She was started on Zenia-CyBorD, C1D1 on 1/26/24. Now s/p 2 cycles, and will start 3rd cycle soon. ASCT is plan on 1st week of [place]ij[place]. In the interim, she was started on Jardiance 2/3/24 and spironolactone on 2/29/24. She does not feel lightheadedness and dizziness. Lisinopril was on hold because of dizziness. Lasix was increased to 40 mg BID on early February. Overall she feels better.  She denies any lightheadedness or dizziness.  She intermittently has numbness and tingling sensation around her thoracic cage typically after the chemo but it usually disappear within 1 week.  Labs on 3/18/24 show creatinine 1.41, BUN 21.1, bicarb 32, potassium 3.7, bicarb 32. Albumin 2.3.  White blood cell 10.8, hemoglobin 13.5, platelet 333. UA showed small blood, glucose 150 and protein 300. UPCR and UACR pending.   5/16/24: In the interim, she continued with Zenia-CyBorD and so far has received 4 cycles. Her Cr has increased gradually and now at 1.90 on 5/14/24. UA showed large protein with hematuria, RBC 5 and WBC 8. Granular cast 8. Serum albumin improved slightly to 2.4. UPCR is stable at 16.04 on 5/8/24 and 24 hour urine protein was 17 g/d. On 4/19/24 Kappa 1.13, lambda 1.71 (8.31 upon diagnosis) with monoclonal protein IgG kappa 0.1 (could be zenia peak). Today, she does not feel good. She has several SE with chemo: feeling nausea, lightheadedness or dizziness. She has no diarrhea. Swelling has been better controlled.  She also has chest pain and just went to [facility].Cardiology thought that chest pain could be from hypotension so she was started on midodrine 2.5 mg for 1 week. She will start work-up next week for BMT. They plan to possibly do BMT on early June. Home BP are 107-117/60-70. She feels lightheadedness with exertion. I stopped Jardiance due to rising in Cr and hypotension.   7/8/24: In the interim, the patient underwent PBSCT, D0 on 6/12/24 with melphalan induction.  She has significant  mucositis nausea vomiting and diarrhea.  Her Lasix and spironolactone were on hold due to orthostatic hypotension and decreased p.o. intake.  She is on acyclovir and fluconazole prophylaxis. She has been receiving IVF for orthostatic hypotension. Today, she is a little bit tired today. She is slowly getting better every day. She does not feel lightheadedness or dizziness. Her swelling has comes back last night considerably mostly at her feet. She still feels nausea intermittently. She has diarrhea this week and has to take imodium. She takes midodrine 10 mg TID. Labs on 7/5/2024 showed sodium 133, potassium 3.7, bicarb 22, creatinine 1.43, GFR 43, calcium 7.3, albumin 1.9.  Hemoglobin 8.6 white blood cell 4.2 and platelet 38.  10/15/24: I called Mary Ann to discuss with her about the progression of her CKD. I shareed with her that I have been in communication with Dr. Chandler about her case. I doubt that CKD progression is from amyloid but rather hypercalcemia. She has no problem with urination. Ca was 12.3. PTH is low normal at 18, Vit D 27 and PTHrP mildly low at 7.4 ([facility] lab later came back 6.7, (nl <4.2)) PET showed no bony lesion or lesion concerning for cancer. Still unclear why she has hypercalcemia. Not taking vitamin D for about 2 months now. Not taking calcium supplement. We gave NS 1 L and Pamidronate 30 mg x 1 dose (on 10/16/24) and repeat labs in 1 week. Repeat iCa, PTHrP ([facility] send out, came back 6.7, mild abnormal) and check 1,25 dihydroxy vit D which was low at 9.2.   12/9/24: Since pamidronate, her calcium level has improved and now down to 9.1 (corrected 9.9) from peak at 12.3 in October 2024.  Her creatinine also improved and down to 1.94 from peak of 2.97.  She has a cold 3 weeks ago but now feeling better.  She has been feeling nauseous for the past weekend but has resolved today. She has leg swelling last week but this week is better, only sock dent. She has not been taking blood pressure lately  but BP was 97/63 mmHg last visit. She does not feel dizziness or lightheadedness.   Labs on 12/9/24 showed creatinine 1.94, GFR 30, potassium 4.5, dioxide 22, calcium 9.1, albumin 3.0.  Hemoglobin 10, less than 6.8 and platelets 227.  UA shows small blood and RBC 3 cells.  UPCR 8.34 g/g.     3/27/25: In the interim, we skipped pamidronate on 1/14/25 due to normocalcemia. She has been doing well. However, swelling in right foot come back slightly. Her BP has been good. She does not feel dizzy. She is not on any chemotherapy currently.     Labs on 3/20/2025 showed creatinine 1.79, BUN 35.3, potassium 4.5, bicarb 25, GFR 33, calcium 10.1, phosphorus 3.9, albumin 3.5.  Vitamin D 19.  Hemoglobin 11.4 and platelet 265.  UA shows small blood. UPCR 7.31, improving.  Immunofixation showed IgG kappa with peaks 0.1.  Kappa 2.15 and lambda 2.16 with ratio 1.0.  PTH 17. PTHrP pending. Ionized calcium is 5.8.       7/1/2025: Int he interim, she received pamidronate 30 mg on 4/7/25. Calcium has improved. She has been doing well. Has more leg swelling more on right leg. She is eat more but not more on salt. She has no fever or chills.  Denies any chest pain or short of breath.  No abdominal pain no shortness of vomiting.   Labs today show creatinine 2.01, BUN 34, GFR 29, sodium 139, potassium 4.1 and bicarb 24.  Calcium 9.6, phosphorus 4.1 in the room and 3.5.  Alkaline phos 224, ALT 63 and AST 38.  .  CBC showed white blood cell 9.7, hemoglobin 10.1 and platelet 239.  UA shows small blood small leukocyte Estrace and white blood cells 11 red blood cell 3.  PTH 24, vitamin D pending. UPCR 5.15 g/g.     Past medical history  Past Medical History:   Diagnosis Date    Celiac disease     Family history of colon cancer     Colonoscoy q5 years next 9/2020     Past surgical history  Past Surgical History:   Procedure Laterality Date    BONE MARROW BIOPSY, BONE SPECIMEN, NEEDLE/TROCAR Left 5/24/2024    Procedure: BIOPSY, BONE MARROW;   Surgeon: Sabrina Jain NP;  Location: [facility] OR    COLONOSCOPY N/A 9/28/2015    Procedure: COLONOSCOPY;  Surgeon: Eugenio Travis MD;  Location: [facility]    ESOPHAGOSCOPY, GASTROSCOPY, DUODENOSCOPY (EGD), COMBINED N/A 1/30/2024    Procedure: ESOPHAGOGASTRODUODENOSCOPY, WITH BIOPSY;  Surgeon: Melo Cloud MD;  Location: [facility]    ESOPHAGOSCOPY, GASTROSCOPY, DUODENOSCOPY (EGD), COMBINED N/A 9/18/2024    Procedure: ESOPHAGOGASTRODUODENOSCOPY, WITH BIOPSY;  Surgeon: Melo Cloud MD;  Location: [facility]    IR CVC TUNNEL PLACEMENT > 5 YRS OF AGE  6/5/2024    IR CVC TUNNEL REMOVAL RIGHT  7/15/2024    IR RENAL BIOPSY RIGHT  12/21/2023    TONSILLECTOMY       Review of Systems:   14 systems were reviewed and all negative except as mentioned above.   Current Medications:  Current Outpatient Medications   Medication Sig Dispense Refill    acyclovir (ZOVIRAX) 400 MG tablet Take 2 tablets (800 mg) by mouth 2 times daily 120 tablet 11    cetirizine (ZYRTEC) 10 MG tablet Take 10 mg by mouth as needed.      levothyroxine (SYNTHROID) 200 MCG tablet Take 200 mcg by mouth. Pt takes Synthroid brand name. FRANKI Taking 175mg  Taking 4 days per week      liothyronine (CYTOMEL) 5 MCG tablet Take 5 mcg by mouth 2 times daily      rosuvastatin (CRESTOR) 5 MG tablet Take 1 tablet by mouth daily at 2 pm.      sulfamethoxazole-trimethoprim (BACTRIM DS) 800-160 MG tablet Take 1 tablet by mouth Every Mon, Tues two times daily 16 tablet 11     No current facility-administered medications for this visit.       Physical Exam:   There were no vitals taken for this visit.   There is no height or weight on file to calculate BMI.    Physical Exam  Constitutional:       Appearance: Normal appearance.   HENT:      Head: Normocephalic.      Nose: Nose normal. No congestion.      Mouth/Throat:      Mouth: Mucous membranes are moist.      Pharynx: No oropharyngeal exudate.   Eyes:      Conjunctiva/sclera: Conjunctivae normal.   Cardiovascular:       Rate and Rhythm: Normal rate.      Pulses: Normal pulses.      Heart sounds: No murmur heard.  Abdominal:      Palpations: Abdomen is soft.   Musculoskeletal:         General: Swelling present. Normal range of motion.      Cervical back: Normal range of motion.      Comments: Rt ankle swelling > Left ankle swelling.   Skin:     General: Skin is warm.      Capillary Refill: Capillary refill takes less than 2 seconds.   Neurological:      General: No focal deficit present.      Mental Status: She is alert.   Psychiatric:         Mood and Affect: Mood normal.       Labs:   All labs reviewed by me  Last Renal Panel:  Sodium   Date Value Ref Range Status   06/06/2025 140 135 - 145 mmol/L Final   02/24/2021 140 133 - 144 mmol/L Final     Potassium   Date Value Ref Range Status   06/06/2025 3.9 3.4 - 5.3 mmol/L Final   12/19/2022 3.9 3.4 - 5.3 mmol/L Final   02/24/2021 4.3 3.4 - 5.3 mmol/L Final     Chloride   Date Value Ref Range Status   06/06/2025 104 98 - 107 mmol/L Final   12/19/2022 108 94 - 109 mmol/L Final   02/24/2021 107 94 - 109 mmol/L Final     Carbon Dioxide   Date Value Ref Range Status   02/24/2021 29 20 - 32 mmol/L Final     Carbon Dioxide (CO2)   Date Value Ref Range Status   06/06/2025 22 22 - 29 mmol/L Final   12/19/2022 33 (H) 20 - 32 mmol/L Final     Anion Gap   Date Value Ref Range Status   06/06/2025 14 7 - 15 mmol/L Final   12/19/2022 2 (L) 3 - 14 mmol/L Final   02/24/2021 4 3 - 14 mmol/L Final     Glucose   Date Value Ref Range Status   06/06/2025 157 (H) 70 - 99 mg/dL Final   06/06/2025 85 70 - 99 mg/dL Final     Urea Nitrogen   Date Value Ref Range Status   06/06/2025 56.3 (H) 6.0 - 20.0 mg/dL Final   12/19/2022 15 7 - 30 mg/dL Final   02/24/2021 16 7 - 30 mg/dL Final     Creatinine   Date Value Ref Range Status   06/06/2025 2.35 (H) 0.51 - 0.95 mg/dL Final   02/24/2021 0.80 0.52 - 1.04 mg/dL Final     GFR Estimate   Date Value Ref Range Status   06/06/2025 24 (L) >60 mL/min/1.73m2 Final      Comment:     eGFR calculated using 2021 CKD-EPI equation.   02/24/2021 85 >60 mL/min/[1.73_m2] Final     Comment:     Non  GFR Calc  Starting 12/18/2018, serum creatinine based estimated GFR (eGFR) will be   calculated using the Chronic Kidney Disease Epidemiology Collaboration   (CKD-EPI) equation.       Calcium   Date Value Ref Range Status   06/06/2025 10.4 8.8 - 10.4 mg/dL Final   02/24/2021 9.2 8.5 - 10.1 mg/dL Final     Phosphorus   Date Value Ref Range Status   06/06/2025 3.6 2.5 - 4.5 mg/dL Final     Albumin   Date Value Ref Range Status   06/06/2025 3.7 3.5 - 5.2 g/dL Final   12/19/2022 2.2 (L) 3.4 - 5.0 g/dL Final   02/24/2021 3.5 3.4 - 5.0 g/dL Final       Imaging:  I reviewed imaging studies.     CT abdomen pelvis on 10/9/2024    Kidneys: No FDG avid lesion. No hydronephrosis. Nonobstructive 6 mm  renal calculus in the left lower pole. Bifid left collecting system  The urinary bladder is decompressed.     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 Noted hypoalbuminemia since 12/22. UPCR 12.5g with Salb 2.2 mg/dl. Cr 1.1 with eGFR 59. She has kidney Bx which showed lambda AL involved mainly glomeruli, some mesangium and vessels. Of note kidney sizes are normal on US.  Echo 1/3/23 showed mild LVH and RVH with strained pattern suggestive of possible amyloidosis.  She is at stage I per HALL 2012 classification. She has bone marrow biopsy done which showed lambda light chain restricted plasma cells less than 5%.  She was started on Zenia CyBorD C1 D1 on 1/26/24. Now her LC  has come down and attained at least VGPR.  He still has positive immunofixation but that was IgG kappa which could be from Zenia. If IgG lambda was negative and negative monofixation in the urine then she would be having CR.  Now she is approved for ASCT and she hao start the work-uo process in late May 2024.     For treatment of nephrotic syndrome, she was on lasix 20 mg daily and lisinopril 2.5 mg daily.  Lisinopril was discontinued due to dizziness.  And cardiology has adjusted medication and increase Lasix to 40 mg twice a day.  She was also started on Jardiance 10 mg daily on 2/3/2024 and spironolactone on 2/28/24.  I noticed that her creatinine has increased from 1-1.1 to 1.2-1.4.  I doubt that this is from progression of AL amyloidosis given significant improvement in hematological parameters.  This is likely due to hemodynamics effects of Jardiance and spironolactone and increasing Lasix.  This regimen has improved her swelling so I plan not to change any regimen at this time.   Cr has increased to ~ 1.5 and now 1.9 on 5/14/24 so I stopped Jardince in May 2024. She underwent PBSCT on 6/12/24 c/b orthostatics and her midodrine has been increased to 10 mg 3 times daily.  Later on midodrine was stopped as well as lasix and spironolactone. In September 2024, she started to have hyperkalemia and 11.9 along with progressive CHRIS.  Hypercalcemia peaked at 12.3 on 10/10/2024 with creatinine 2.97 on the same day.  Extensive workup of hypercalcemia was done and the only thing that was positive was PTHrP.  She was treated with pamidronate 30 mg IV x1 on 10/16/24 with resolution in hypercalcemia and improvement in serum creatinine.  She has recurrent hypercalcemia again in March 2025 and received another dose of pamidronate 30 mg in early April 2025.  Now her creatinine has been fluctuating between 2-2.3, likely her new baseline.  Her urine protein creatinine ratio has improved gradually and now at 6.93 g/g with normal  serum albumin.  Today, she has right lower extremity edema which I doubt that this is from AL amyloidosis given improvement in proteinuria.  Therefore I will check kidney ultrasound.  Discussed with her about adding diuretics but the patient is hesitant about its so she would like to try conservative management at this time.     Her AL amyloid remains in CR per hematology.     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 She was on on Vit D 5000 U daily. Calcium was 10 but corrected calcium was 11.28! So I asked her to reduce vitamin D to 2000 U daily.  Onset of hypercalcemia in September 2024 and peaked at 12.3. 1,25 vit D was low. PTHrP ([place]) was 6.5. PET CT neg. unclear what is the source of PTH RP secretion but there are some case reports of AL associated with PThrP. S/p pamidronate on 10/16/2024  With improvement in serum calcium but started to increase again.  She had recurrent hypercalcemia in March 2025 and received another dose of 30 mg pamidronate in April 2025.  - PTHrP trending down 7.4 (9/2024) -> 6.9 (3/24/2025)  - Vitamin D pending  - Redose Pamidronate as needed when recurrent hypercalcemia    # Hypothyroidism  TPO ab is positive so Dx with Hashimoto's. On thyroid supplement presently.   # Celiac disease Bx proven  She had EGD but no Amyloid presence.   # Elevated  ALP  # Elevated ALT  Unlikely related to CKD. BMT team is monitoring. Ddx from Bactrim? She will complete Bactrim  today. If this persists, she would benefit from hepatology referral.     Follow-up in 4 months with labs    The longitudinal plan of care for Renal AL, nephrotic syndrome, hypotension, low Vit D, CKD 3 was addressed during this visit. Due to the added complexity in care, I will continue to support Vivian Brown  in the subsequent management of this condition(s) and with the ongoing continuity of care of this condition(s).    I spent  60 minutes on the date of the encounter doing chart review, history and exam, documentation and further activities as noted above. 32 minutes of this visit is dedicated to direct patient interaction via face to face.     Tracie Dobson MD on 07/01/2025

## 2025-07-01 NOTE — LETTER
7/1/2025       RE: Vivian Brown  [address] [facility] [address]     Dear Colleague,    Thank you for referring your patient, Vivian Brown, to the [facility] [place] [facility]. Please see a copy of my visit note below.          Nephrology Progress Note  07/01/2025     Chief complaint: Follow up AL amyloidosis  History of Present Illness:    Vivian Brown is a 55 year old F  with hypothyroidism, who is here for AL amyloidosis follow-up.      The patient initially presented with fatigue and LE edema for about 2 years. Initially, she was diagnosed with Hashimoto's and was started on thyroid supplement in August 2023. Symptoms has partially improved. Later on she was found to have hypoalbuminemia dn massiv eproteinuria hence was referred to Dr. Sen. She saw Dr. Sen in November 2023 and was started on Lisinopril 2.5 mg and lasix 20 mg per day. I 1 week, she has lsot 20 Lbs and swelling has been better and remained in control.  She underwent a kidney Bx on 12/21/23. The Bx read by Dr. Garcia which showed Amyloidosis of the kidney, AL-type, lambda light chain-restricted, affecting the glomeruli, interstitium, and arterioles. She has mild chronic changes of the parenchyma, including: focal global glomerulosclerosis (3% of glomeruli), focal tubular atrophy and interstitial fibrosis (5% of the cortex), severe arterial sclerosis.   LM: There were 37 glomeruli (LM-29; IF-7; EM-1), of which 1 is globally sclerosed. The non-sclerosed glomeruli are of normal size and cellularity. Significant endocapillary proliferation or cellular crescents are not seen in the glomeruli. The glomeruli show deposits of amorphous, homogeneous, acellular, eosinophilic material that infiltrates and expands the mesangial areas and extends down the capillaries of the glomerular tuft. Tubules are well-preserved and show no signs of acute injury.  Obvious signs of a viral cytopathic effect are not seen in the sample. No oxalate, urate, or phosphate crystals are present in the sample. The interstitium reveals occasional collection of foam cells and occasional deposits of amorphous material. The interstitium contains mild interstitial inflammation in areas of atrophy, and the infiltrates are composed of mononuclear cells. About 5% of the renal cortex shows tubular atrophy and interstitial fibrosis. Arteries show severe sclerosis. Examination of a Congo red-stained section reveals deposits of amorphous material with apple-green birefringence under polarized light, in all glomeruli and focally in arterioles and interstitium. EM: showed 1 glomerulus; 1 glomerulus is examined ultrastructurally. There is prominent deposition of fibrillary material in the mesangial areas, without significant hypercellularity.  There is focal effacement of visceral epithelial cell foot processes in areas of the glomerular basement membranes that show segmental widening of the subepithelial space and distortion of the lamina densa due to deposits of fibrillary material.  The mesangial and basement membrane deposits consist of non-branching, randomly arranged fibrils of 6 to 15 nm width. Glomerular endothelial cells show focal loss of their normal fenestrations. Tubules show no specific changes. Deposits are Congo red positive. The amyloid deposits affect the glomeruli extensively, and interstitium and arterioles focally. Other potential complications of paraproteins in the kidney are not seen, in particular, there is no evidence of light chain cast nephropathy, light chain deposition disease, or light chain tubulopathy.      For kidney function, she has baseline Cr of 0.9 in 12/19/22. However, Cr started to increase to 1.05 in 5/22/23 and now 1.11 on 11/28/23. Serum albumin was 3.5 in 2/24/21 and 2.2 in 12/19/22. Monoclonal work-up on 11/28/23 showed kappa 3.05, lambda 7.82 and K/L  "ratio 0.39 and L/K ratio 2.56. dFLC 4.77. Other serologies including MPO, PMND1 were negative. UPCR 12.50 mg/g. No 24 hr urine protein yet. UA showed small blood but no RBC. She also has LCL of 174. TSH 29.16 and + Tissue TTG.      She has Echo on 1/3/24. There is mild concentric left ventricular hypertrophy and borderline RVH. Global peak LV longitudinal strain is averaged at -15.3%. This suggests abnormal strain (normal <-18%).The visual ejection fraction is estimated at 54-56%.Trivial posterior pericardial effusion Clinical history is listed as renal amyloid--on this echo the atria are normal size, the valves are not thickend but there is mild LVH and borderline RVH, there is questionable \"scintillation\" of LV myocardium, the global longitudinal strain is abnormal and the best strain values are at the apex (borderline \"apical sparing\" strain pattern) and there is trace pericardial effusion along with borderline criteria for diastolic dysfunction grade2--take together this could represent some features of cardiac amyloid. Additional studies such as cardiac MRI, cardiac biopsy etc may be useful. She is scheduled to see Cardiology on 1/31/24.      1/8/24: I saw her for consultation via video. She is with her  at home. Still feels fatigue and upset stomach last night.  She is currently on furosemide 20 mg daily, lisinopril 2.5 mg daily and crestor 10 mg daily. She reports having lower extremities edema for about 2 years. She was diagnosed with Hashimoto's in August 2023.  She has been feeling better after started taking thyroid supplement. She has leg swelling but not as bad as before. Furosemide was started in December 2023 and she has lost 20 Lbs wit that. She has seen more bubbly in the urineShe sometimes feels lightheadedness and dizziness and her BP was low sometimes. However, mostly > BP /60s. HR in the 80s.  BP today is 114/76, HR is 76.  She has no problem with swallowing, numbness or tingling " sensation. No CTS symptoms. No tongue enlargement. No periorbital ecchymoses. No easy bruising. She has no other medical problem before this. She has been active and exercise at least twice a week.   3/18/24: In the interim, she underwent BmBx on 1/18/24 which showed  lambda restricted plasma cell, 5%. Congo red positive in the BmBx. She was started on Zenia-CyBorD, C1D1 on 1/26/24. Now s/p 2 cycles, and will start 3rd cycle soon. ASCT is plan on 1st week of Vijaya. In the interim, she was started on Jardiance 2/3/24 and spironolactone on 2/29/24. She does not feel lightheadedness and dizziness. Lisinopril was on hold because of dizziness. Lasix was increased to 40 mg BID on early February. Overall she feels better.  She denies any lightheadedness or dizziness.  She intermittently has numbness and tingling sensation around her thoracic cage typically after the chemo but it usually disappear within 1 week.  Labs on 3/18/24 show creatinine 1.41, BUN 21.1, bicarb 32, potassium 3.7, bicarb 32. Albumin 2.3.  White blood cell 10.8, hemoglobin 13.5, platelet 333. UA showed small blood, glucose 150 and protein 300. UPCR and UACR pending.   5/16/24: In the interim, she continued with Zenia-CyBorD and so far has received 4 cycles. Her Cr has increased gradually and now at 1.90 on 5/14/24. UA showed large protein with hematuria, RBC 5 and WBC 8. Granular cast 8. Serum albumin improved slightly to 2.4. UPCR is stable at 16.04 on 5/8/24 and 24 hour urine protein was 17 g/d. On 4/19/24 Kappa 1.13, lambda 1.71 (8.31 upon diagnosis) with monoclonal protein IgG kappa 0.1 (could be zenia peak). Today, she does not feel good. She has several SE with chemo: feeling nausea, lightheadedness or dizziness. She has no diarrhea. Swelling has been better controlled.  She also has chest pain and just went to [facility].Cardiology thought that chest pain could be from hypotension so she was started on midodrine 2.5 mg for 1 week. She will start  work-up next week for BMT. They plan to possibly do BMT on early June. Home BP are 107-117/60-70. She feels lightheadedness with exertion. I stopped Jardiance due to rising in Cr and hypotension.   7/8/24: In the interim, the patient underwent PBSCT, D0 on 6/12/24 with melphalan induction.  She has significant mucositis nausea vomiting and diarrhea.  Her Lasix and spironolactone were on hold due to orthostatic hypotension and decreased p.o. intake.  She is on acyclovir and fluconazole prophylaxis. She has been receiving IVF for orthostatic hypotension. Today, she is a little bit tired today. She is slowly getting better every day. She does not feel lightheadedness or dizziness. Her swelling has comes back last night considerably mostly at her feet. She still feels nausea intermittently. She has diarrhea this week and has to take imodium. She takes midodrine 10 mg TID. Labs on 7/5/2024 showed sodium 133, potassium 3.7, bicarb 22, creatinine 1.43, GFR 43, calcium 7.3, albumin 1.9.  Hemoglobin 8.6 white blood cell 4.2 and platelet 38.  10/15/24: I called Mary Ann to discuss with her about the progression of her CKD. I shareed with her that I have been in communication with Dr. Chandler about her case. I doubt that CKD progression is from amyloid but rather hypercalcemia. She has no problem with urination. Ca was 12.3. PTH is low normal at 18, Vit D 27 and PTHrP mildly low at 7.4 ([facility] lab later came back 6.7, (nl <4.2)) PET showed no bony lesion or lesion concerning for cancer. Still unclear why she has hypercalcemia. Not taking vitamin D for about 2 months now. Not taking calcium supplement. We gave NS 1 L and Pamidronate 30 mg x 1 dose (on 10/16/24) and repeat labs in 1 week. Repeat iCa, PTHrP ([facility] send out, came back 6.7, mild abnormal) and check 1,25 dihydroxy vit D which was low at 9.2.   12/9/24: Since pamidronate, her calcium level has improved and now down to 9.1 (corrected 9.9) from peak at 12.3 in October 2024.   Her creatinine also improved and down to 1.94 from peak of 2.97.  She has a cold 3 weeks ago but now feeling better.  She has been feeling nauseous for the past weekend but has resolved today. She has leg swelling last week but this week is better, only sock dent. She has not been taking blood pressure lately but BP was 97/63 mmHg last visit. She does not feel dizziness or lightheadedness.   Labs on 12/9/24 showed creatinine 1.94, GFR 30, potassium 4.5, dioxide 22, calcium 9.1, albumin 3.0.  Hemoglobin 10, less than 6.8 and platelets 227.  UA shows small blood and RBC 3 cells.  UPCR 8.34 g/g.     3/27/25: In the interim, we skipped pamidronate on 1/14/25 due to normocalcemia. She has been doing well. However, swelling in right foot come back slightly. Her BP has been good. She does not feel dizzy. She is not on any chemotherapy currently.     Labs on 3/20/2025 showed creatinine 1.79, BUN 35.3, potassium 4.5, bicarb 25, GFR 33, calcium 10.1, phosphorus 3.9, albumin 3.5.  Vitamin D 19.  Hemoglobin 11.4 and platelet 265.  UA shows small blood. UPCR 7.31, improving.  Immunofixation showed IgG kappa with peaks 0.1.  Kappa 2.15 and lambda 2.16 with ratio 1.0.  PTH 17. PTHrP pending. Ionized calcium is 5.8.       7/1/2025: Int he interim, she received pamidronate 30 mg on 4/7/25. Calcium has improved. She has been doing well. Has more leg swelling more on right leg. She is eat more but not more on salt. She has no fever or chills.  Denies any chest pain or short of breath.  No abdominal pain no shortness of vomiting.   Labs today show creatinine 2.01, BUN 34, GFR 29, sodium 139, potassium 4.1 and bicarb 24.  Calcium 9.6, phosphorus 4.1 in the room and 3.5.  Alkaline phos 224, ALT 63 and AST 38.  .  CBC showed white blood cell 9.7, hemoglobin 10.1 and platelet 239.  UA shows small blood small leukocyte Estrace and white blood cells 11 red blood cell 3.  PTH 24, vitamin D pending. UPCR 5.15 g/g.     Past medical  history  Past Medical History:   Diagnosis Date     Celiac disease      Family history of colon cancer     Colonoscoy q5 years next 9/2020     Past surgical history  Past Surgical History:   Procedure Laterality Date     BONE MARROW BIOPSY, BONE SPECIMEN, NEEDLE/TROCAR Left 5/24/2024    Procedure: BIOPSY, BONE MARROW;  Surgeon: Sabrina Jain NP;  Location: [facility] OR     COLONOSCOPY N/A 9/28/2015    Procedure: COLONOSCOPY;  Surgeon: Eugenio Travis MD;  Location: [facility]     ESOPHAGOSCOPY, GASTROSCOPY, DUODENOSCOPY (EGD), COMBINED N/A 1/30/2024    Procedure: ESOPHAGOGASTRODUODENOSCOPY, WITH BIOPSY;  Surgeon: Melo Cloud MD;  Location: [facility]     ESOPHAGOSCOPY, GASTROSCOPY, DUODENOSCOPY (EGD), COMBINED N/A 9/18/2024    Procedure: ESOPHAGOGASTRODUODENOSCOPY, WITH BIOPSY;  Surgeon: Melo Cloud MD;  Location: [facility]     IR CVC TUNNEL PLACEMENT > 5 YRS OF AGE  6/5/2024     IR CVC TUNNEL REMOVAL RIGHT  7/15/2024     IR RENAL BIOPSY RIGHT  12/21/2023     TONSILLECTOMY       Review of Systems:   14 systems were reviewed and all negative except as mentioned above.   Current Medications:  Current Outpatient Medications   Medication Sig Dispense Refill     acyclovir (ZOVIRAX) 400 MG tablet Take 2 tablets (800 mg) by mouth 2 times daily 120 tablet 11     cetirizine (ZYRTEC) 10 MG tablet Take 10 mg by mouth as needed.       levothyroxine (SYNTHROID) 200 MCG tablet Take 200 mcg by mouth. Pt takes Synthroid brand name. FRANKI Taking 175mg  Taking 4 days per week       liothyronine (CYTOMEL) 5 MCG tablet Take 5 mcg by mouth 2 times daily       rosuvastatin (CRESTOR) 5 MG tablet Take 1 tablet by mouth daily at 2 pm.       sulfamethoxazole-trimethoprim (BACTRIM DS) 800-160 MG tablet Take 1 tablet by mouth Every Mon, Tues two times daily 16 tablet 11     No current facility-administered medications for this visit.       Physical Exam:   There were no vitals taken for this visit.   There is no height or weight on file to  calculate BMI.    Physical Exam  Constitutional:       Appearance: Normal appearance.   HENT:      Head: Normocephalic.      Nose: Nose normal. No congestion.      Mouth/Throat:      Mouth: Mucous membranes are moist.      Pharynx: No oropharyngeal exudate.   Eyes:      Conjunctiva/sclera: Conjunctivae normal.   Cardiovascular:      Rate and Rhythm: Normal rate.      Pulses: Normal pulses.      Heart sounds: No murmur heard.  Abdominal:      Palpations: Abdomen is soft.   Musculoskeletal:         General: Swelling present. Normal range of motion.      Cervical back: Normal range of motion.      Comments: Rt ankle swelling > Left ankle swelling.   Skin:     General: Skin is warm.      Capillary Refill: Capillary refill takes less than 2 seconds.   Neurological:      General: No focal deficit present.      Mental Status: She is alert.   Psychiatric:         Mood and Affect: Mood normal.       Labs:   All labs reviewed by me  Last Renal Panel:  Sodium   Date Value Ref Range Status   06/06/2025 140 135 - 145 mmol/L Final   02/24/2021 140 133 - 144 mmol/L Final     Potassium   Date Value Ref Range Status   06/06/2025 3.9 3.4 - 5.3 mmol/L Final   12/19/2022 3.9 3.4 - 5.3 mmol/L Final   02/24/2021 4.3 3.4 - 5.3 mmol/L Final     Chloride   Date Value Ref Range Status   06/06/2025 104 98 - 107 mmol/L Final   12/19/2022 108 94 - 109 mmol/L Final   02/24/2021 107 94 - 109 mmol/L Final     Carbon Dioxide   Date Value Ref Range Status   02/24/2021 29 20 - 32 mmol/L Final     Carbon Dioxide (CO2)   Date Value Ref Range Status   06/06/2025 22 22 - 29 mmol/L Final   12/19/2022 33 (H) 20 - 32 mmol/L Final     Anion Gap   Date Value Ref Range Status   06/06/2025 14 7 - 15 mmol/L Final   12/19/2022 2 (L) 3 - 14 mmol/L Final   02/24/2021 4 3 - 14 mmol/L Final     Glucose   Date Value Ref Range Status   06/06/2025 157 (H) 70 - 99 mg/dL Final   06/06/2025 85 70 - 99 mg/dL Final     Urea Nitrogen   Date Value Ref Range Status   06/06/2025  56.3 (H) 6.0 - 20.0 mg/dL Final   12/19/2022 15 7 - 30 mg/dL Final   02/24/2021 16 7 - 30 mg/dL Final     Creatinine   Date Value Ref Range Status   06/06/2025 2.35 (H) 0.51 - 0.95 mg/dL Final   02/24/2021 0.80 0.52 - 1.04 mg/dL Final     GFR Estimate   Date Value Ref Range Status   06/06/2025 24 (L) >60 mL/min/1.73m2 Final     Comment:     eGFR calculated using 2021 CKD-EPI equation.   02/24/2021 85 >60 mL/min/[1.73_m2] Final     Comment:     Non  GFR Calc  Starting 12/18/2018, serum creatinine based estimated GFR (eGFR) will be   calculated using the Chronic Kidney Disease Epidemiology Collaboration   (CKD-EPI) equation.       Calcium   Date Value Ref Range Status   06/06/2025 10.4 8.8 - 10.4 mg/dL Final   02/24/2021 9.2 8.5 - 10.1 mg/dL Final     Phosphorus   Date Value Ref Range Status   06/06/2025 3.6 2.5 - 4.5 mg/dL Final     Albumin   Date Value Ref Range Status   06/06/2025 3.7 3.5 - 5.2 g/dL Final   12/19/2022 2.2 (L) 3.4 - 5.0 g/dL Final   02/24/2021 3.5 3.4 - 5.0 g/dL Final       Imaging:  I reviewed imaging studies.     CT abdomen pelvis on 10/9/2024    Kidneys: No FDG avid lesion. No hydronephrosis. Nonobstructive 6 mm  renal calculus in the left lower pole. Bifid left collecting system  The urinary bladder is decompressed.     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 Noted hypoalbuminemia since 12/22. UPCR 12.5g with Salb 2.2 mg/dl. Cr 1.1 with eGFR 59. She has kidney Bx which showed lambda AL involved mainly glomeruli, some mesangium and vessels. Of note kidney  sizes are normal on US.  Echo 1/3/23 showed mild LVH and RVH with strained pattern suggestive of possible amyloidosis.  She is at stage I per SANTO 2012 classification. She has bone marrow biopsy done which showed lambda light chain restricted plasma cells less than 5%.  She was started on Zenia CyBorD C1 D1 on 1/26/24. Now her LC has come down and attained at least VGPR.  He still has positive immunofixation but that was IgG kappa which could be from Zenia. If IgG lambda was negative and negative monofixation in the urine then she would be having CR.  Now she is approved for ASCT and she hao start the work-uo process in late May 2024.     For treatment of nephrotic syndrome, she was on lasix 20 mg daily and lisinopril 2.5 mg daily.  Lisinopril was discontinued due to dizziness.  And cardiology has adjusted medication and increase Lasix to 40 mg twice a day.  She was also started on Jardiance 10 mg daily on 2/3/2024 and spironolactone on 2/28/24.  I noticed that her creatinine has increased from 1-1.1 to 1.2-1.4.  I doubt that this is from progression of AL amyloidosis given significant improvement in hematological parameters.  This is likely due to hemodynamics effects of Jardiance and spironolactone and increasing Lasix.  This regimen has improved her swelling so I plan not to change any regimen at this time.   Cr has increased to ~ 1.5 and now 1.9 on 5/14/24 so I stopped Jardince in May 2024. She underwent PBSCT on 6/12/24 c/b orthostatics and her midodrine has been increased to 10 mg 3 times daily.  Later on midodrine was stopped as well as lasix and spironolactone. In September 2024, she started to have hyperkalemia and 11.9 along with progressive CHRIS.  Hypercalcemia peaked at 12.3 on 10/10/2024 with creatinine 2.97 on the same day.  Extensive workup of hypercalcemia was done and the only thing that was positive was PTHrP.  She was treated with pamidronate 30 mg IV x1 on 10/16/24 with resolution in hypercalcemia  and improvement in serum creatinine.  She has recurrent hypercalcemia again in March 2025 and received another dose of pamidronate 30 mg in early April 2025.  Now her creatinine has been fluctuating between 2-2.3, likely her new baseline.  Her urine protein creatinine ratio has improved gradually and now at 6.93 g/g with normal serum albumin.  Today, she has right lower extremity edema which I doubt that this is from AL amyloidosis given improvement in proteinuria.  Therefore I will check kidney ultrasound.  Discussed with her about adding diuretics but the patient is hesitant about its so she would like to try conservative management at this time.     Her AL amyloid remains in CR per hematology.     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 She was on on Vit D 5000 U daily. Calcium was 10 but corrected calcium was 11.28! So I asked her to reduce vitamin D to 2000 U daily.  Onset of hypercalcemia in September 2024 and peaked at 12.3. 1,25 vit D was low. PTHrP ([place]) was 6.5. PET CT neg. unclear what is the source of PTH RP secretion but there are some case reports of AL associated with PThrP. S/p pamidronate on 10/16/2024  With improvement in serum calcium but started to increase again.  She had recurrent hypercalcemia in March 2025 and received another dose of 30 mg pamidronate  in April 2025.  - PTHrP trending down 7.4 (9/2024) -> 6.9 (3/24/2025)  - Vitamin D pending  - Redose Pamidronate as needed when recurrent hypercalcemia    # Hypothyroidism  TPO ab is positive so Dx with Hashimoto's. On thyroid supplement presently.   # Celiac disease Bx proven  She had EGD but no Amyloid presence.   # Elevated ALP  # Elevated ALT  Unlikely related to CKD. BMT team is monitoring. Ddx from Bactrim? She will complete Bactrim  today. If this persists, she would benefit from hepatology referral.     Follow-up in 4 months with labs    The longitudinal plan of care for Renal AL, nephrotic syndrome, hypotension, low Vit D, CKD 3 was addressed during this visit. Due to the added complexity in care, I will continue to support Vivian Brown  in the subsequent management of this condition(s) and with the ongoing continuity of care of this condition(s).    I spent  60 minutes on the date of the encounter doing chart review, history and exam, documentation and further activities as noted above. 32 minutes of this visit is dedicated to direct patient interaction via face to face.     Tracie Dobson MD on 07/01/2025            Again, thank you for allowing me to participate in the care of your patient.      Sincerely,    Tracie Dobson MD     Banner Transposition Flap Text: The defect edges were debeveled with a #15 scalpel blade.  Given the location of the defect and the proximity to free margins a Banner transposition flap was deemed most appropriate.  Using a sterile surgical marker, an appropriate flap drawn around the defect. The area thus outlined was incised deep to adipose tissue with a #15 scalpel blade.  The skin margins were undermined to an appropriate distance in all directions utilizing iris scissors.

## 2025-07-02 ENCOUNTER — HOSPITAL ENCOUNTER (OUTPATIENT)
Dept: ULTRASOUND IMAGING | Facility: CLINIC | Age: 55
Discharge: HOME | End: 2025-07-02
Attending: INTERNAL MEDICINE
Payer: COMMERCIAL

## 2025-07-02 ENCOUNTER — HOSPITAL ENCOUNTER (OUTPATIENT)
Dept: MAMMOGRAPHY | Facility: CLINIC | Age: 55
Discharge: HOME | End: 2025-07-02
Attending: PHYSICIAN ASSISTANT
Payer: COMMERCIAL

## 2025-07-02 ENCOUNTER — MYC MEDICAL ADVICE (OUTPATIENT)
Dept: NEPHROLOGY | Facility: CLINIC | Age: 55
End: 2025-07-02

## 2025-07-02 DIAGNOSIS — M79.89 RIGHT LEG SWELLING: ICD-10-CM

## 2025-07-02 DIAGNOSIS — Z12.31 VISIT FOR SCREENING MAMMOGRAM: ICD-10-CM

## 2025-07-02 LAB
KAPPA LC FREE SER-MCNC: 2.2 MG/DL (ref 0.33–1.94)
KAPPA LC FREE/LAMBDA FREE SER NEPH: 1.06 {RATIO} (ref 0.26–1.65)
LAMBDA LC FREE SERPL-MCNC: 2.07 MG/DL (ref 0.57–2.63)

## 2025-07-02 PROCEDURE — 93971 EXTREMITY STUDY: CPT | Mod: RT

## 2025-07-02 PROCEDURE — 77063 BREAST TOMOSYNTHESIS BI: CPT

## 2025-07-08 LAB — PTH RELATED PROT SERPL-SCNC: 7.6 PMOL/L

## 2025-07-31 NOTE — TELEPHONE ENCOUNTER
Received request from Dr. Dobson to review therapy plans.  RBC infusion orders have .  Therapy plan ordered for HGB <7, which has not been needed for several years.  Therapy plan cancelled as completed.  Annabelle Leyva RN, BSN, PHN   Care Coordinator  [phone]

## 2025-08-12 ENCOUNTER — ALLIED HEALTH/NURSE VISIT (OUTPATIENT)
Dept: TRANSPLANT | Facility: CLINIC | Age: 55
End: 2025-08-12
Attending: STUDENT IN AN ORGANIZED HEALTH CARE EDUCATION/TRAINING PROGRAM
Payer: COMMERCIAL

## 2025-08-12 DIAGNOSIS — E85.81 AL AMYLOIDOSIS (H): ICD-10-CM

## 2025-08-12 DIAGNOSIS — Z52.011 AUTOLOGOUS DONOR OF STEM CELLS: ICD-10-CM

## 2025-08-12 DIAGNOSIS — E03.4 HYPOTHYROIDISM DUE TO ACQUIRED ATROPHY OF THYROID: ICD-10-CM

## 2025-08-12 DIAGNOSIS — E85.81 AL AMYLOIDOSIS (H): Primary | ICD-10-CM

## 2025-08-12 PROCEDURE — 90472 IMMUNIZATION ADMIN EACH ADD: CPT | Performed by: STUDENT IN AN ORGANIZED HEALTH CARE EDUCATION/TRAINING PROGRAM

## 2025-08-12 PROCEDURE — 250N000011 HC RX IP 250 OP 636: Performed by: STUDENT IN AN ORGANIZED HEALTH CARE EDUCATION/TRAINING PROGRAM

## 2025-08-12 PROCEDURE — 90677 PCV20 VACCINE IM: CPT | Performed by: STUDENT IN AN ORGANIZED HEALTH CARE EDUCATION/TRAINING PROGRAM

## 2025-08-12 PROCEDURE — 250N000021 HC RX MED A9270 GY (STAT IND- M) 250: Performed by: STUDENT IN AN ORGANIZED HEALTH CARE EDUCATION/TRAINING PROGRAM

## 2025-08-12 PROCEDURE — 90697 DTAP-IPV-HIB-HEPB VACCINE IM: CPT | Performed by: STUDENT IN AN ORGANIZED HEALTH CARE EDUCATION/TRAINING PROGRAM

## 2025-08-12 PROCEDURE — 90471 IMMUNIZATION ADMIN: CPT | Performed by: STUDENT IN AN ORGANIZED HEALTH CARE EDUCATION/TRAINING PROGRAM

## 2025-08-12 PROCEDURE — G0009 ADMIN PNEUMOCOCCAL VACCINE: HCPCS | Performed by: STUDENT IN AN ORGANIZED HEALTH CARE EDUCATION/TRAINING PROGRAM

## 2025-08-12 PROCEDURE — 90750 HZV VACC RECOMBINANT IM: CPT | Performed by: STUDENT IN AN ORGANIZED HEALTH CARE EDUCATION/TRAINING PROGRAM

## 2025-08-12 RX ORDER — ACYCLOVIR 400 MG/1
800 TABLET ORAL 2 TIMES DAILY
Qty: 120 TABLET | Refills: 1 | Status: SHIPPED | OUTPATIENT
Start: 2025-08-12

## 2025-08-12 RX ORDER — HEPARIN SODIUM (PORCINE) LOCK FLUSH IV SOLN 100 UNIT/ML 100 UNIT/ML
5 SOLUTION INTRAVENOUS
OUTPATIENT
Start: 2025-08-12

## 2025-08-12 RX ORDER — HEPARIN SODIUM,PORCINE 10 UNIT/ML
5-20 VIAL (ML) INTRAVENOUS DAILY PRN
OUTPATIENT
Start: 2025-08-12

## 2025-08-12 RX ADMIN — PNEUMOCOCCAL 20-VALENT CONJUGATE VACCINE 0.5 ML
2.2; 2.2; 2.2; 2.2; 2.2; 2.2; 2.2; 2.2; 2.2; 2.2; 2.2; 2.2; 2.2; 2.2; 2.2; 2.2; 4.4; 2.2; 2.2; 2.2 INJECTION, SUSPENSION INTRAMUSCULAR at 09:26

## 2025-08-12 RX ADMIN — ZOSTER VACCINE RECOMBINANT, ADJUVANTED 0.5 ML: KIT at 09:27

## 2025-08-12 RX ADMIN — DIPHTHERIA AND TETANUS TOXOIDS AND ACELLULAR PERTUSSIS, INACTIVATED POLIOVIRUS, HAEMOPHILUS B CONJUGATE AND HEPATITIS B VACCINE 0.5 ML: 15; 5; 20; 20; 3; 5; 29; 7; 26; 10; 3 INJECTION, SUSPENSION INTRAMUSCULAR at 09:25

## (undated) DEVICE — LUBRICATING JELLY 4.25OZ

## (undated) DEVICE — KIT ENDO TURNOVER/PROCEDURE CARRY-ON 101822

## (undated) DEVICE — ENDO FORCEP ENDOJAW BIOPSY 2.8MMX230CM FB-220U

## (undated) DEVICE — NDL BX BONE MARROW 11GA 4"

## (undated) DEVICE — TUBING SUCTION 12"X1/4" N612

## (undated) DEVICE — TRAY BONE MARROW BIOPSY ASC 640 31-0097A

## (undated) RX ORDER — FUROSEMIDE 10 MG/ML
INJECTION INTRAMUSCULAR; INTRAVENOUS
Status: DISPENSED
Start: 2024-10-09

## (undated) RX ORDER — POTASSIUM CHLORIDE 1500 MG/1
TABLET, EXTENDED RELEASE ORAL
Status: DISPENSED
Start: 2024-06-07

## (undated) RX ORDER — LIDOCAINE HYDROCHLORIDE 10 MG/ML
INJECTION, SOLUTION EPIDURAL; INFILTRATION; INTRACAUDAL; PERINEURAL
Status: DISPENSED
Start: 2024-01-30

## (undated) RX ORDER — FENTANYL CITRATE 50 UG/ML
INJECTION, SOLUTION INTRAMUSCULAR; INTRAVENOUS
Status: DISPENSED
Start: 2024-06-05

## (undated) RX ORDER — LIDOCAINE HYDROCHLORIDE 10 MG/ML
INJECTION, SOLUTION EPIDURAL; INFILTRATION; INTRACAUDAL; PERINEURAL
Status: DISPENSED
Start: 2024-06-05

## (undated) RX ORDER — PROPOFOL 10 MG/ML
INJECTION, EMULSION INTRAVENOUS
Status: DISPENSED
Start: 2024-01-30

## (undated) RX ORDER — FENTANYL CITRATE 50 UG/ML
INJECTION, SOLUTION INTRAMUSCULAR; INTRAVENOUS
Status: DISPENSED
Start: 2023-12-21

## (undated) RX ORDER — ACETAMINOPHEN 325 MG/1
TABLET ORAL
Status: DISPENSED
Start: 2024-06-06

## (undated) RX ORDER — CEFAZOLIN SODIUM 2 G/100ML
INJECTION, SOLUTION INTRAVENOUS
Status: DISPENSED
Start: 2024-06-05

## (undated) RX ORDER — SODIUM CHLORIDE 9 MG/ML
INJECTION, SOLUTION INTRAVENOUS
Status: DISPENSED
Start: 2024-06-05

## (undated) RX ORDER — CALCIUM GLUCONATE 94 MG/ML
INJECTION, SOLUTION INTRAVENOUS
Status: DISPENSED
Start: 2024-06-06

## (undated) RX ORDER — ACETAMINOPHEN 325 MG/1
TABLET ORAL
Status: DISPENSED
Start: 2024-05-24

## (undated) RX ORDER — SODIUM CHLORIDE 9 MG/ML
INJECTION, SOLUTION INTRAVENOUS
Status: DISPENSED
Start: 2023-12-21

## (undated) RX ORDER — LIDOCAINE HYDROCHLORIDE 10 MG/ML
INJECTION, SOLUTION EPIDURAL; INFILTRATION; INTRACAUDAL; PERINEURAL
Status: DISPENSED
Start: 2023-12-21

## (undated) RX ORDER — CALCIUM GLUCONATE 94 MG/ML
INJECTION, SOLUTION INTRAVENOUS
Status: DISPENSED
Start: 2024-06-07

## (undated) RX ORDER — HEPARIN SODIUM (PORCINE) LOCK FLUSH IV SOLN 100 UNIT/ML 100 UNIT/ML
SOLUTION INTRAVENOUS
Status: DISPENSED
Start: 2024-06-05

## (undated) RX ORDER — LIDOCAINE HYDROCHLORIDE 10 MG/ML
INJECTION, SOLUTION EPIDURAL; INFILTRATION; INTRACAUDAL; PERINEURAL
Status: DISPENSED
Start: 2024-07-15